# Patient Record
Sex: FEMALE | Race: WHITE
[De-identification: names, ages, dates, MRNs, and addresses within clinical notes are randomized per-mention and may not be internally consistent; named-entity substitution may affect disease eponyms.]

---

## 2017-04-24 ENCOUNTER — HOSPITAL ENCOUNTER (INPATIENT)
Dept: HOSPITAL 47 - 4MS4W | Age: 57
LOS: 4 days | Discharge: HOME HEALTH | DRG: 617 | End: 2017-04-28
Payer: COMMERCIAL

## 2017-04-24 VITALS — BODY MASS INDEX: 19.7 KG/M2

## 2017-04-24 DIAGNOSIS — Z95.5: ICD-10-CM

## 2017-04-24 DIAGNOSIS — I25.2: ICD-10-CM

## 2017-04-24 DIAGNOSIS — Z88.1: ICD-10-CM

## 2017-04-24 DIAGNOSIS — I50.9: ICD-10-CM

## 2017-04-24 DIAGNOSIS — E78.5: ICD-10-CM

## 2017-04-24 DIAGNOSIS — F32.9: ICD-10-CM

## 2017-04-24 DIAGNOSIS — E11.621: ICD-10-CM

## 2017-04-24 DIAGNOSIS — F41.9: ICD-10-CM

## 2017-04-24 DIAGNOSIS — F17.200: ICD-10-CM

## 2017-04-24 DIAGNOSIS — E11.69: Primary | ICD-10-CM

## 2017-04-24 DIAGNOSIS — Z82.5: ICD-10-CM

## 2017-04-24 DIAGNOSIS — Z83.3: ICD-10-CM

## 2017-04-24 DIAGNOSIS — Z79.82: ICD-10-CM

## 2017-04-24 DIAGNOSIS — Z88.0: ICD-10-CM

## 2017-04-24 DIAGNOSIS — K21.9: ICD-10-CM

## 2017-04-24 DIAGNOSIS — Z88.5: ICD-10-CM

## 2017-04-24 DIAGNOSIS — L97.514: ICD-10-CM

## 2017-04-24 DIAGNOSIS — Z79.4: ICD-10-CM

## 2017-04-24 DIAGNOSIS — I11.0: ICD-10-CM

## 2017-04-24 DIAGNOSIS — J44.9: ICD-10-CM

## 2017-04-24 DIAGNOSIS — E11.319: ICD-10-CM

## 2017-04-24 DIAGNOSIS — I69.354: ICD-10-CM

## 2017-04-24 DIAGNOSIS — Z82.49: ICD-10-CM

## 2017-04-24 DIAGNOSIS — Z79.899: ICD-10-CM

## 2017-04-24 DIAGNOSIS — M86.9: ICD-10-CM

## 2017-04-24 DIAGNOSIS — I25.10: ICD-10-CM

## 2017-04-24 LAB
ALP SERPL-CCNC: 63 U/L (ref 38–126)
ALT SERPL-CCNC: 23 U/L (ref 9–52)
ANION GAP SERPL CALC-SCNC: 6 MMOL/L
AST SERPL-CCNC: 15 U/L (ref 14–36)
BASOPHILS # BLD AUTO: 0 K/UL (ref 0–0.2)
BASOPHILS NFR BLD AUTO: 1 %
BUN SERPL-SCNC: 35 MG/DL (ref 7–17)
CALCIUM SPEC-MCNC: 9.7 MG/DL (ref 8.4–10.2)
CH: 31.2
CHCM: 31.7
CHLORIDE SERPL-SCNC: 108 MMOL/L (ref 98–107)
CO2 SERPL-SCNC: 26 MMOL/L (ref 22–30)
EOSINOPHIL # BLD AUTO: 0.1 K/UL (ref 0–0.7)
EOSINOPHIL NFR BLD AUTO: 1 %
ERYTHROCYTE [DISTWIDTH] IN BLOOD BY AUTOMATED COUNT: 3.28 M/UL (ref 3.8–5.4)
ERYTHROCYTE [DISTWIDTH] IN BLOOD: 12.7 % (ref 11.5–15.5)
GLUCOSE BLD-MCNC: 153 MG/DL (ref 75–99)
GLUCOSE BLD-MCNC: 226 MG/DL (ref 75–99)
GLUCOSE BLD-MCNC: 359 MG/DL (ref 75–99)
GLUCOSE BLD-MCNC: 372 MG/DL (ref 75–99)
GLUCOSE BLD-MCNC: 482 MG/DL (ref 75–99)
GLUCOSE BLD-MCNC: 497 MG/DL (ref 75–99)
GLUCOSE SERPL-MCNC: 286 MG/DL (ref 74–99)
HCT VFR BLD AUTO: 32.4 % (ref 34–46)
HDW: 2.24
HEMOGLOBIN A1C: 9.6 % (ref 4.2–6.1)
HGB BLD-MCNC: 10.5 GM/DL (ref 11.4–16)
LUC NFR BLD AUTO: 3 %
LYMPHOCYTES # SPEC AUTO: 1.7 K/UL (ref 1–4.8)
LYMPHOCYTES NFR SPEC AUTO: 29 %
MCH RBC QN AUTO: 32 PG (ref 25–35)
MCHC RBC AUTO-ENTMCNC: 32.3 G/DL (ref 31–37)
MCV RBC AUTO: 98.9 FL (ref 80–100)
MONOCYTES # BLD AUTO: 0.4 K/UL (ref 0–1)
MONOCYTES NFR BLD AUTO: 6 %
NEUTROPHILS # BLD AUTO: 3.4 K/UL (ref 1.3–7.7)
NEUTROPHILS NFR BLD AUTO: 60 %
NON-AFRICAN AMERICAN GFR(MDRD): 46
POTASSIUM SERPL-SCNC: 6.4 MMOL/L (ref 3.5–5.1)
PROT SERPL-MCNC: 7.2 G/DL (ref 6.3–8.2)
SODIUM SERPL-SCNC: 140 MMOL/L (ref 137–145)
WBC # BLD AUTO: 0.16 10*3/UL
WBC # BLD AUTO: 5.7 K/UL (ref 3.8–10.6)
WBC (PEROX): 6.12

## 2017-04-24 PROCEDURE — 80053 COMPREHEN METABOLIC PANEL: CPT

## 2017-04-24 PROCEDURE — 87086 URINE CULTURE/COLONY COUNT: CPT

## 2017-04-24 PROCEDURE — 83036 HEMOGLOBIN GLYCOSYLATED A1C: CPT

## 2017-04-24 PROCEDURE — 94640 AIRWAY INHALATION TREATMENT: CPT

## 2017-04-24 PROCEDURE — 80048 BASIC METABOLIC PNL TOTAL CA: CPT

## 2017-04-24 PROCEDURE — 81003 URINALYSIS AUTO W/O SCOPE: CPT

## 2017-04-24 PROCEDURE — 85025 COMPLETE CBC W/AUTO DIFF WBC: CPT

## 2017-04-24 RX ADMIN — ISODIUM CHLORIDE PRN MG: 0.03 SOLUTION RESPIRATORY (INHALATION) at 21:04

## 2017-04-24 RX ADMIN — ATORVASTATIN CALCIUM SCH MG: 20 TABLET, FILM COATED ORAL at 21:48

## 2017-04-24 RX ADMIN — INSULIN HUMAN SCH MLS/HR: 100 INJECTION, SOLUTION PARENTERAL at 22:17

## 2017-04-24 RX ADMIN — Medication SCH MG: at 21:48

## 2017-04-24 RX ADMIN — BUDESONIDE AND FORMOTEROL FUMARATE DIHYDRATE SCH PUFF: 160; 4.5 AEROSOL RESPIRATORY (INHALATION) at 21:04

## 2017-04-24 NOTE — P.HPIM
History of Present Illness


H&P Date: 17


Chief Complaint: Right foot ulcer with osteomyelitis





Patient is a 57-year-old female with multiple medical problems including 

history of hypertension, hyperlipidemia, insulin-dependent diabetes mellitus, 

history of stroke with left sided hemiparesis and history of physical debility 

who developed an ulcer was cellulitis on her right foot on her fifth toe she 

was treated aggressively as outpatient by Dr. Moody, Dr. Roy, and Dr. Ferrell however patient continued to have ulceration and suspected 

osteomyelitis at this time she is admitted to medical floor for IV antibiotic 

management and consultation was Dr. Moody and Dr. Ferrell patient may need 

amputation of her fifth toe on the right.





Past Medical History


Past Medical History: Chest Pain / Angina, Heart Failure, COPD, CVA/TIA, 

Diabetes Mellitus, Deep Vein Thrombosis (DVT), Eye Disorder, GERD/Reflux, 

Hyperlipidemia, Hypertension, Myocardial Infarction (MI), Thyroid Disorder


Additional Past Medical History / Comment(s): HX OF CVA X3 (LAST 2015)-HAS LT 

ARM PARALYSIS & WEAKNESS LEFT LEG. MI .  DVT RT  AXILLA. RENAL FAILURE.  

LOW THYROID,  PERIPHERAL NEUROPATHY HANDS & FEET.  ANEMIA.  HX OF DKA. USES W/

C. CONSTIPATION, ESOPHAGITIS.  EPIGLOTITIS.  HEADACHES SINCE CVA, uses a 

wheelchair.  RETINOPATHY MARZENA EYES.  HX RT TOE INFECTION, GANGRENE, HAD AMP.


Last Myocardial Infarction Date:: 


History of Any Multi-Drug Resistant Organisms: MRSA


Date of last positivie culture/infection: 2013


MDRO Source:: Right Foot


Past Surgical History: Appendectomy,  Section, Cholecystectomy, Heart 

Catheterization With Stent, Hysterectomy, Orthopedic Surgery


Additional Past Surgical History / Comment(s): Amputation Rt 2ND Toe.  C-S X3.  

EGD.  Bronchoscopy.  RT Arm Port Placed FOR AB RX; Removed.  6 CARDIAC STENTS. 

left shoulder bone removed


Past Anesthesia/Blood Transfusion Reactions: No Reported Reaction


Additional Past Anesthesia/Blood Transfusion Reaction / Comment(s): HX OF BLOOD 

TRANSFUSION- NO REACTION


Date of Last Stent Placement:: 2013


Past Psychological History: Anxiety, Bipolar, Depression


Additional Psychological History / Comment(s): HER adult son lives with her.


Smoking Status: Current every day smoker


Past Alcohol Use History: None Reported


Additional Past Alcohol Use History / Comment(s): SMOKED FOR 36 YRS. 1PPD.  

STARTED SMOKING AGE 19, QUIT 2015, RECENTLY SMOKING.


Past Drug Use History: None Reported





- Past Family History


  ** Father


Family Medical History: Unable to Obtain, Coronary Artery Disease (CAD), 

Diabetes Mellitus





  ** Mother


Family Medical History: COPD





Medications and Allergies


 Home Medications











 Medication  Instructions  Recorded  Confirmed  Type


 


hydrALAZINE HCL [Apresoline] 50 mg PO TID 14 History


 


Nitroglycerin Sl Tabs [Nitrostat] 0.4 mg SUBLINGUAL Q5M PRN 01/15/15 04/24/17 

History


 


Ferrous Sulfate [Feosol] 325 mg PO BID 07/10/15 04/24/17 History


 


Lisinopril [Prinivil] 10 mg PO QAM 07/10/15 04/24/17 History


 


Albuterol Inhaler [Ventolin Hfa 2 puff INHALATION RT-Q6H PRN 16 

History





Inhaler]    


 


Aspirin EC [Ecotrin] 81 mg PO DAILY 16 History


 


Atorvastatin [Lipitor] 20 mg PO HS 16 History


 


Citalopram Hydrobromide [CeleXA] 20 mg PO DAILY 16 History


 


Famotidine [Pepcid] 20 mg PO BID-W/MEALS 16 History


 


Insulin Lispro [humaLOG] 4 units SQ TID-W/MEALS 16 History


 


Metoclopramide [Reglan] 5 mg PO AC-TID 16 History


 


Valproic Acid [Depakene] 250 mg PO DAILY 16 History


 


Vitamin B Complex 1 cap PO DAILY 16 History


 


Budesonide-Formot 160-4.5 Mcg 2 puff INHALATION RT-BID 10/06/16 04/24/17 History





[Symbicort 160-4.5 Mcg Inhaler]    


 


Ergocalciferol [Vitamin D2] 50,000 unit PO SA 10/06/16 04/24/17 History


 


Insulin Glargine [Lantus] 15 unit SQ HS 10/06/16 04/24/17 History


 


Ondansetron [Zofran] 4 mg PO DAILY PRN 17 History


 


INSULIN LISPRO (HumaLOG) [humaLOG] See Protocol SQ TID-W/MEALS 04/10/17 04/24/

17 History


 


ALPRAZolam [Xanax] 0.5 mg PO TID PRN 17 History


 


Ascorbic Acid [Vitamin C] 500 mg PO DAILY 17 History


 


Folic Acid 0.8 mg PO DAILY 17 History


 


HYDROcodone/APAP 7.5-325MG [Norco 1 tab PO TID PRN 17 History





7.5-325]    


 


Polyethylene Glycol 3350 [Miralax] 17 gm PO DAILY PRN 17 History


 


Sennosides [Senna] 8.6 mg PO DAILY PRN 17 History











 Allergies











Allergy/AdvReac Type Severity Reaction Status Date / Time


 


Barbiturates Allergy  Rash/Hives Verified 17 16:47


 


cephalexin monohydrate Allergy  Rash/Hives Verified 17 16:47





[From Keflex]     


 


morphine Allergy  Rash/Hives Verified 17 16:47


 


Penicillins Allergy  Rash/Hives Verified 17 16:47


 


phenobarbital Allergy  Swelling Verified 17 16:47


 


venom-honey bee Allergy  Swelling Verified 17 16:47





[bee venom (honey bee)]     


 


amlodipine besylate AdvReac  Vomiting Verified 17 16:47





[From Norvasc]     














Physical Exam


Vitals: 


 Intake and Output











 17





 06:59 14:59 22:59


 


Other:   


 


  Weight   48.988 kg








 Patient Weight











 17





 06:59


 


Weight 48.988 kg














In general patient is alert and oriented 3 in no apparent distress 


HEENT head normocephalic and atraumatic


Neck is supple no JVD no goiter no lymphadenopathy


Chest is clear to auscultation no crackles no wheezing


Cardiac exam reveals regular heart sounds no gallops no murmurs


Abdomen is soft nontender no organomegaly


Extremity exam reveals mild edema with palpable peripheral pulses, there is a 

nonhealing ulcer on the right fifth


Neurological examination reveals left side hemiparesis with spastic contraction 

in the left hand








Results


Labs: 


 Abnormal Lab Results - Last 24 Hours (Table)











  17 Range/Units





  17:15 


 


POC Glucose (mg/dL)  153 H  (75-99)  mg/dL














Thrombosis Risk Factor Assmnt





- Choose All That Apply


Any of the Below Risk Factors Present?: Yes


Each Factor Represents 1 point: Abnormal pulmonary function (COPD), Age 41-60 

years, Medical pt on bed rest


Other Risk Factors: Yes


Each Risk Factor Represents 2 Points: Patient confined to bed


Thrombosis Risk Factor Assessment Total Risk Factor Score: 5


Thrombosis Risk Factor Assessment Level: High Risk





Assessment and Plan


Plan: 





#1 right foot cellulitis was possible osteomyelitis with nonhealing ulcer at 

this time will start IV vancomycin, consultation with Dr. Moody and Dr. Fredy Jc initiated patient may need amputation of her right fifth toe





#2 insulin-dependent diabetes mellitus at this time will check hemoglobin A1c 

will use insulin drip to assure good glucose control if needed





#3 underlying history of hypertension well-controlled on current medications 

continue





#4 underlying history of hyperlipidemia





#5 underlying history of coronary artery disease stable at this time





#6 previous history of right hemispheric stroke with left-sided hemiparesis





At this time patient is admitted to medical floor started on IV vancomycin 

awaiting input from Dr. oMody and Dr. Ferrell

## 2017-04-25 LAB
ALP SERPL-CCNC: 66 U/L (ref 38–126)
ALT SERPL-CCNC: 27 U/L (ref 9–52)
ANION GAP SERPL CALC-SCNC: 8 MMOL/L
AST SERPL-CCNC: 13 U/L (ref 14–36)
BASOPHILS # BLD AUTO: 0 K/UL (ref 0–0.2)
BASOPHILS NFR BLD AUTO: 1 %
BUN SERPL-SCNC: 29 MG/DL (ref 7–17)
CALCIUM SPEC-MCNC: 9.1 MG/DL (ref 8.4–10.2)
CH: 30.9
CHCM: 30.5
CHLORIDE SERPL-SCNC: 109 MMOL/L (ref 98–107)
CO2 SERPL-SCNC: 22 MMOL/L (ref 22–30)
EOSINOPHIL # BLD AUTO: 0.1 K/UL (ref 0–0.7)
EOSINOPHIL NFR BLD AUTO: 1 %
ERYTHROCYTE [DISTWIDTH] IN BLOOD BY AUTOMATED COUNT: 3.22 M/UL (ref 3.8–5.4)
ERYTHROCYTE [DISTWIDTH] IN BLOOD: 12.8 % (ref 11.5–15.5)
GLUCOSE BLD-MCNC: 104 MG/DL (ref 75–99)
GLUCOSE BLD-MCNC: 105 MG/DL (ref 75–99)
GLUCOSE BLD-MCNC: 134 MG/DL (ref 75–99)
GLUCOSE BLD-MCNC: 137 MG/DL (ref 75–99)
GLUCOSE BLD-MCNC: 151 MG/DL (ref 75–99)
GLUCOSE BLD-MCNC: 170 MG/DL (ref 75–99)
GLUCOSE BLD-MCNC: 263 MG/DL (ref 75–99)
GLUCOSE BLD-MCNC: 279 MG/DL (ref 75–99)
GLUCOSE BLD-MCNC: 332 MG/DL (ref 75–99)
GLUCOSE BLD-MCNC: 337 MG/DL (ref 75–99)
GLUCOSE BLD-MCNC: 340 MG/DL (ref 75–99)
GLUCOSE BLD-MCNC: 354 MG/DL (ref 75–99)
GLUCOSE BLD-MCNC: 360 MG/DL (ref 75–99)
GLUCOSE BLD-MCNC: 405 MG/DL (ref 75–99)
GLUCOSE BLD-MCNC: 73 MG/DL (ref 75–99)
GLUCOSE BLD-MCNC: 81 MG/DL (ref 75–99)
GLUCOSE SERPL-MCNC: 349 MG/DL (ref 74–99)
HCT VFR BLD AUTO: 32.8 % (ref 34–46)
HDW: 2.11
HGB BLD-MCNC: 10 GM/DL (ref 11.4–16)
LUC NFR BLD AUTO: 3 %
LYMPHOCYTES # SPEC AUTO: 2.5 K/UL (ref 1–4.8)
LYMPHOCYTES NFR SPEC AUTO: 38 %
MCH RBC QN AUTO: 31.1 PG (ref 25–35)
MCHC RBC AUTO-ENTMCNC: 30.5 G/DL (ref 31–37)
MCV RBC AUTO: 101.8 FL (ref 80–100)
MONOCYTES # BLD AUTO: 0.3 K/UL (ref 0–1)
MONOCYTES NFR BLD AUTO: 4 %
NEUTROPHILS # BLD AUTO: 3.5 K/UL (ref 1.3–7.7)
NEUTROPHILS NFR BLD AUTO: 54 %
NON-AFRICAN AMERICAN GFR(MDRD): 56
POTASSIUM SERPL-SCNC: 5.3 MMOL/L (ref 3.5–5.1)
PROT SERPL-MCNC: 6.7 G/DL (ref 6.3–8.2)
SODIUM SERPL-SCNC: 139 MMOL/L (ref 137–145)
WBC # BLD AUTO: 0.18 10*3/UL
WBC # BLD AUTO: 6.6 K/UL (ref 3.8–10.6)
WBC (PEROX): 6.87

## 2017-04-25 PROCEDURE — 0Y6X0Z1 DETACHMENT AT RIGHT 5TH TOE, HIGH, OPEN APPROACH: ICD-10-PCS

## 2017-04-25 RX ADMIN — METOCLOPRAMIDE SCH MG: 5 TABLET ORAL at 17:27

## 2017-04-25 RX ADMIN — FAMOTIDINE SCH MG: 20 TABLET, FILM COATED ORAL at 08:00

## 2017-04-25 RX ADMIN — ATORVASTATIN CALCIUM SCH MG: 20 TABLET, FILM COATED ORAL at 20:36

## 2017-04-25 RX ADMIN — CITALOPRAM HYDROBROMIDE SCH MG: 20 TABLET ORAL at 08:00

## 2017-04-25 RX ADMIN — FAMOTIDINE SCH MG: 20 TABLET, FILM COATED ORAL at 17:27

## 2017-04-25 RX ADMIN — HYDROMORPHONE HYDROCHLORIDE PRN MG: 1 INJECTION, SOLUTION INTRAMUSCULAR; INTRAVENOUS; SUBCUTANEOUS at 23:25

## 2017-04-25 RX ADMIN — HYDROCODONE BITARTRATE AND ACETAMINOPHEN PRN EACH: 7.5; 325 TABLET ORAL at 17:26

## 2017-04-25 RX ADMIN — METOCLOPRAMIDE SCH MG: 5 TABLET ORAL at 14:10

## 2017-04-25 RX ADMIN — FOLIC ACID SCH MG: 1 TABLET ORAL at 08:00

## 2017-04-25 RX ADMIN — METOCLOPRAMIDE SCH MG: 5 TABLET ORAL at 08:01

## 2017-04-25 RX ADMIN — OXYCODONE HYDROCHLORIDE AND ACETAMINOPHEN SCH MG: 500 TABLET ORAL at 08:00

## 2017-04-25 RX ADMIN — LISINOPRIL SCH MG: 10 TABLET ORAL at 08:00

## 2017-04-25 RX ADMIN — BUDESONIDE AND FORMOTEROL FUMARATE DIHYDRATE SCH PUFF: 160; 4.5 AEROSOL RESPIRATORY (INHALATION) at 20:41

## 2017-04-25 RX ADMIN — Medication SCH EACH: at 14:09

## 2017-04-25 RX ADMIN — Medication SCH MG: at 08:00

## 2017-04-25 RX ADMIN — ASPIRIN 81 MG CHEWABLE TABLET SCH MG: 81 TABLET CHEWABLE at 08:00

## 2017-04-25 RX ADMIN — VALPROIC ACID SCH MG: 250 SOLUTION ORAL at 08:00

## 2017-04-25 RX ADMIN — BUDESONIDE AND FORMOTEROL FUMARATE DIHYDRATE SCH PUFF: 160; 4.5 AEROSOL RESPIRATORY (INHALATION) at 07:28

## 2017-04-25 RX ADMIN — INSULIN HUMAN SCH MLS/HR: 100 INJECTION, SOLUTION PARENTERAL at 17:27

## 2017-04-25 RX ADMIN — ISODIUM CHLORIDE PRN MG: 0.03 SOLUTION RESPIRATORY (INHALATION) at 20:41

## 2017-04-25 RX ADMIN — Medication SCH MG: at 20:37

## 2017-04-25 RX ADMIN — ISODIUM CHLORIDE PRN MG: 0.03 SOLUTION RESPIRATORY (INHALATION) at 07:28

## 2017-04-25 RX ADMIN — ISODIUM CHLORIDE PRN MG: 0.03 SOLUTION RESPIRATORY (INHALATION) at 16:30

## 2017-04-25 NOTE — P.CON
Consult Note





- .


Consult date: 04/25/17


Assessment/Plan:: 





57-year-old  female with history of multiple medical troubles includes 

hypertension and diabetes mellitus type 2 is a history of extensive stroke with 

left-sided hemiparesis and remains under the care of family members and an aide 

for her ongoing care.  She is not able to toilet herself.  She has been a 

[facility] and was last seen by Dr. Ferrell.  There 

was plan for amputation which was apparently scheduled for April 19 but because 

patient's blood sugar was 360, this was canceled.  Patient has had ongoing 

problems with her right foot ulcer that is nonhealing and has been admitted to 

the hospital and started on vancomycin.  Lab work showed a potassium of 6.4, 

BUN 35 and creatinine 1.2.  Patient did receive dose of Kayexalate.  White 

count was 5.7.  Blood sugars were again elevated and as high as 497, currently 

104.  Patient has consult in place with Dr. Ferrell with anticipation of 

amputation on this hospitalization.  Patient is complaining of burning with 

urination for which a urinalysis and urine culture will be obtained. 


Please see the consult note is dictated by nurse practitioner Mrs. Kasia Steen.


Patient is on insulin drip to help with her elevated glucose.  Patient is 

instructed the importance of glucose control.  The fifth toe amputation will 

result in a wound that will require healing.  Fortune she has stopped smoking 

which will be helpful.  The without excellent glucose control she will be at 

risk for worsening of this infectious process.  The patient is aware.  

Currently on antibiotic therapy.  Cultures are pending.  Continue vancomycin 

for now.  Pain control was initiated postoperative and small doses of morphine 

will be given at this time.  Continue supportive care.  We'll follow.  I agree 

with evaluation, assessment and plan as dictated by nurse practitioner Mrs. Kasia Steen.

## 2017-04-25 NOTE — P.PN
Subjective


Principal diagnosis: 





right fifth toe osteomyelitis





patient is a 57-year-old female was known history of insulin-dependent diabetes 

mellitus and previous history of stroke with left sided pennie-paresis who was 

admitted to [facility] due to right foot cellulitis with 

evidence of osteomyelitis on the right fifth toe patient was evaluated by Dr. Ferrell and underwent amputation of the right fifth toe is maintained on IV 

antibiotic vancomycin consultation for Dr. Moody was also initiated.


Patient has insulin-dependent diabetes mellitus her glucose was not well 

controlled currently she is maintained on insulin drip





Clinically patient is doing better she is complaining of mild pain in her right 

foot otherwise she denies any complaints





Objective





- Vital Signs


Vital signs: 


 Vital Signs











Temp  99.4 F   04/25/17 12:34


 


Pulse  68   04/25/17 16:46


 


Resp  16   04/25/17 13:34


 


BP  135/66   04/25/17 13:34


 


Pulse Ox  100   04/25/17 13:34








 Intake & Output











 04/24/17 04/25/17 04/25/17





 18:59 06:59 18:59


 


Intake Total  33.200 565.50


 


Output Total   27


 


Balance  33.200 538.50


 


Weight 48.988 kg  48.988 kg


 


Intake:   


 


  IV   506


 


  Intake, IV Titration  33.200 59.50





  Amount   


 


    Insulin Regular 100 unit  33.200 59.50





    In Sodium Chloride 0.9%   





    100 ml @ Titrate IV .Q0M   





    [facility] Rx#:[id number]   


 


Output:   


 


  Urine   2


 


  Estimated Blood Loss   25


 


Other:   


 


  Voiding Method Diaper Diaper 





 Incontinent Incontinent 


 


  # Voids  3 


 


  # Bowel Movements  2 2














- Exam





in general patient is alert and oriented in no apparent distress


HEENT head normocephalic and atraumatic


Neck is supple no JVD no goiter no lymphadenopathy


Chest is clear to auscultation no wheezing


Cardiac exam reveals regular heart sounds no murmurs


Abdomen is soft nontender no organomegaly


Extremity exam reveals minimal edema





- Labs


CBC & Chem 7: 


 04/25/17 08:22





 04/25/17 08:22


Labs: 


 Abnormal Lab Results - Last 24 Hours (Table)











  04/24/17 04/24/17 04/24/17 Range/Units





  18:12 18:12 18:12 


 


RBC  3.28 L    (3.80-5.40)  m/uL


 


Hgb  10.5 L    (11.4-16.0)  gm/dL


 


Hct  32.4 L    (34.0-46.0)  %


 


MCV     (80.0-100.0)  fL


 


MCHC     (31.0-37.0)  g/dL


 


Potassium   6.4 H*   (3.5-5.1)  mmol/L


 


Chloride   108 H   ()  mmol/L


 


BUN   35 H   (7-17)  mg/dL


 


Creatinine   1.20 H   (0.52-1.04)  mg/dL


 


Glucose   286 H   (74-99)  mg/dL


 


POC Glucose (mg/dL)     (75-99)  mg/dL


 


Hemoglobin A1c    9.6 H  (4.2-6.1)  %


 


AST     (14-36)  U/L














  04/24/17 04/24/17 04/24/17 Range/Units





  19:43 19:45 20:44 


 


RBC     (3.80-5.40)  m/uL


 


Hgb     (11.4-16.0)  gm/dL


 


Hct     (34.0-46.0)  %


 


MCV     (80.0-100.0)  fL


 


MCHC     (31.0-37.0)  g/dL


 


Potassium     (3.5-5.1)  mmol/L


 


Chloride     ()  mmol/L


 


BUN     (7-17)  mg/dL


 


Creatinine     (0.52-1.04)  mg/dL


 


Glucose     (74-99)  mg/dL


 


POC Glucose (mg/dL)  533 H  497 H  482 H  (75-99)  mg/dL


 


Hemoglobin A1c     (4.2-6.1)  %


 


AST     (14-36)  U/L














  04/24/17 04/24/17 04/24/17 Range/Units





  22:16 22:50 23:23 


 


RBC     (3.80-5.40)  m/uL


 


Hgb     (11.4-16.0)  gm/dL


 


Hct     (34.0-46.0)  %


 


MCV     (80.0-100.0)  fL


 


MCHC     (31.0-37.0)  g/dL


 


Potassium     (3.5-5.1)  mmol/L


 


Chloride     ()  mmol/L


 


BUN     (7-17)  mg/dL


 


Creatinine     (0.52-1.04)  mg/dL


 


Glucose     (74-99)  mg/dL


 


POC Glucose (mg/dL)  372 H  359 H  226 H  (75-99)  mg/dL


 


Hemoglobin A1c     (4.2-6.1)  %


 


AST     (14-36)  U/L














  04/25/17 04/25/17 04/25/17 Range/Units





  00:35 02:39 04:45 


 


RBC     (3.80-5.40)  m/uL


 


Hgb     (11.4-16.0)  gm/dL


 


Hct     (34.0-46.0)  %


 


MCV     (80.0-100.0)  fL


 


MCHC     (31.0-37.0)  g/dL


 


Potassium     (3.5-5.1)  mmol/L


 


Chloride     ()  mmol/L


 


BUN     (7-17)  mg/dL


 


Creatinine     (0.52-1.04)  mg/dL


 


Glucose     (74-99)  mg/dL


 


POC Glucose (mg/dL)  73 L  137 H  151 H  (75-99)  mg/dL


 


Hemoglobin A1c     (4.2-6.1)  %


 


AST     (14-36)  U/L














  04/25/17 04/25/17 04/25/17 Range/Units





  06:27 08:22 08:22 


 


RBC   3.22 L   (3.80-5.40)  m/uL


 


Hgb   10.0 L   (11.4-16.0)  gm/dL


 


Hct   32.8 L   (34.0-46.0)  %


 


MCV   101.8 H   (80.0-100.0)  fL


 


MCHC   30.5 L   (31.0-37.0)  g/dL


 


Potassium    5.3 H  (3.5-5.1)  mmol/L


 


Chloride    109 H  ()  mmol/L


 


BUN    29 H  (7-17)  mg/dL


 


Creatinine     (0.52-1.04)  mg/dL


 


Glucose    349 H  (74-99)  mg/dL


 


POC Glucose (mg/dL)  134 H    (75-99)  mg/dL


 


Hemoglobin A1c     (4.2-6.1)  %


 


AST    13 L  (14-36)  U/L














  04/25/17 04/25/17 04/25/17 Range/Units





  08:46 09:40 10:28 


 


RBC     (3.80-5.40)  m/uL


 


Hgb     (11.4-16.0)  gm/dL


 


Hct     (34.0-46.0)  %


 


MCV     (80.0-100.0)  fL


 


MCHC     (31.0-37.0)  g/dL


 


Potassium     (3.5-5.1)  mmol/L


 


Chloride     ()  mmol/L


 


BUN     (7-17)  mg/dL


 


Creatinine     (0.52-1.04)  mg/dL


 


Glucose     (74-99)  mg/dL


 


POC Glucose (mg/dL)  405 H  354 H  263 H  (75-99)  mg/dL


 


Hemoglobin A1c     (4.2-6.1)  %


 


AST     (14-36)  U/L














  04/25/17 04/25/17 04/25/17 Range/Units





  11:11 12:20 13:27 


 


RBC     (3.80-5.40)  m/uL


 


Hgb     (11.4-16.0)  gm/dL


 


Hct     (34.0-46.0)  %


 


MCV     (80.0-100.0)  fL


 


MCHC     (31.0-37.0)  g/dL


 


Potassium     (3.5-5.1)  mmol/L


 


Chloride     ()  mmol/L


 


BUN     (7-17)  mg/dL


 


Creatinine     (0.52-1.04)  mg/dL


 


Glucose     (74-99)  mg/dL


 


POC Glucose (mg/dL)  170 H  104 H  105 H  (75-99)  mg/dL


 


Hemoglobin A1c     (4.2-6.1)  %


 


AST     (14-36)  U/L














  04/25/17 04/25/17 Range/Units





  14:57 16:51 


 


RBC    (3.80-5.40)  m/uL


 


Hgb    (11.4-16.0)  gm/dL


 


Hct    (34.0-46.0)  %


 


MCV    (80.0-100.0)  fL


 


MCHC    (31.0-37.0)  g/dL


 


Potassium    (3.5-5.1)  mmol/L


 


Chloride    ()  mmol/L


 


BUN    (7-17)  mg/dL


 


Creatinine    (0.52-1.04)  mg/dL


 


Glucose    (74-99)  mg/dL


 


POC Glucose (mg/dL)  360 H  337 H  (75-99)  mg/dL


 


Hemoglobin A1c    (4.2-6.1)  %


 


AST    (14-36)  U/L














Assessment and Plan


Plan: 





#1 right foot cellulitis was possible osteomyelitis with nonhealing ulcer at 

this time will start IV vancomycin, consultation with Dr. Moody initiated 


patient status post amputation of her right fifth toeby Dr. Ferrell





#2 insulin-dependent diabetes mellitus at this time will check hemoglobin A1c 

will use insulin drip to assure good glucose control if needed





#3 underlying history of hypertension well-controlled on current medications 

continue





#4 underlying history of hyperlipidemia





#5 underlying history of coronary artery disease stable at this time





#6 previous history of right hemispheric stroke with left-sided hemiparesis





At this time patient is admitted to medical floor started on IV vancomycin 

awaiting input from Dr. Moody and Dr. Ferrell

## 2017-04-25 NOTE — P.CONS
History of Present Illness





- Reason for Consult


Consult date: 17


Foot infection





- History of Present Illness





57-year-old  female with history of multiple medical troubles includes 

hypertension and diabetes mellitus type 2 is a history of extensive stroke with 

left-sided hemiparesis and remains under the care of family members and an aide 

for her ongoing care.  She is not able to toilet herself.  She has been a 

[facility] and was last seen by Dr. Ferrell.  There 

was plan for amputation which was apparently scheduled for  but because 

patient's blood sugar was 360, this was canceled.  Patient has had ongoing 

problems with her right foot ulcer that is nonhealing and has been admitted to 

the hospital and started on vancomycin.  Lab work showed a potassium of 6.4, 

BUN 35 and creatinine 1.2.  Patient did receive dose of Kayexalate.  White 

count was 5.7.  Blood sugars were again elevated and as high as 497, currently 

104.  Patient has consult in place with Dr. Ferrell with anticipation of 

amputation on this hospitalization.  Patient is complaining of burning with 

urination for which a urinalysis and urine culture will be obtained. 











Review of Systems


All systems: negative


Constitutional: Denies chills, Denies fever


Eyes: denies blurred vision, denies pain


Ears, nose, mouth and throat: Denies headache, Denies sore throat


Cardiovascular: Denies chest pain, Denies shortness of breath


Respiratory: Denies cough


Gastrointestinal: Denies abdominal pain, Denies diarrhea, Denies nausea, Denies 

vomiting


Genitourinary: Reports dysuria, Denies hematuria


Musculoskeletal: Denies myalgias


Integumentary: Denies pruritus, Denies rash


Neurological: Denies numbness, Denies weakness


Psychiatric: Denies anxiety, Denies depression


Endocrine: Denies fatigue, Denies weight change





Past Medical History


Past Medical History: Chest Pain / Angina, Heart Failure, COPD, CVA/TIA, 

Diabetes Mellitus, Deep Vein Thrombosis (DVT), Eye Disorder, GERD/Reflux, 

Hyperlipidemia, Hypertension, Myocardial Infarction (MI), Thyroid Disorder


Additional Past Medical History / Comment(s): HX OF CVA X3 (LAST 2015)-HAS LT 

ARM PARALYSIS & WEAKNESS LEFT LEG. MI .  DVT RT  AXILLA. RENAL FAILURE.  

LOW THYROID,  PERIPHERAL NEUROPATHY HANDS & FEET.  ANEMIA.  HX OF DKA. USES W/

C. CONSTIPATION, ESOPHAGITIS.  EPIGLOTITIS.  HEADACHES SINCE CVA, uses a 

wheelchair.  RETINOPATHY MARZENA EYES.  HX RT TOE INFECTION, GANGRENE, HAD AMP.


Last Myocardial Infarction Date:: 


History of Any Multi-Drug Resistant Organisms: MRSA


Year Discovered:: 2013


MDRO Source:: Right Foot


Past Surgical History: Appendectomy,  Section, Cholecystectomy, Heart 

Catheterization With Stent, Hysterectomy, Orthopedic Surgery


Additional Past Surgical History / Comment(s): Amputation Rt 2ND Toe.  C-S X3.  

EGD.  Bronchoscopy.  RT Arm Port Placed FOR AB RX; Removed.  6 CARDIAC STENTS. 

left shoulder bone removed


Past Anesthesia/Blood Transfusion Reactions: No Reported Reaction


Additional Past Anesthesia/Blood Transfusion Reaction / Comm: HX OF BLOOD 

TRANSFUSION- NO REACTION


Date of Last Stent Placement:: 2013


Past Psychological History: Anxiety, Bipolar, Depression


Additional Psychological History / Comment(s): HER adult son lives with her.


Smoking Status: Current every day smoker


Past Alcohol Use History: None Reported


Additional Past Alcohol Use History / Comment(s): SMOKED FOR 36 YRS. 1PPD.  

STARTED SMOKING AGE 19, QUIT 2015, RECENTLY SMOKING.


Past Drug Use History: None Reported





- Past Family History


  ** Father


Family Medical History: Unable to Obtain, Coronary Artery Disease (CAD), 

Diabetes Mellitus





  ** Mother


Family Medical History: COPD





Medications and Allergies


 Home Medications











 Medication  Instructions  Recorded  Confirmed  Type


 


hydrALAZINE HCL [Apresoline] 50 mg PO TID 14 History


 


Nitroglycerin Sl Tabs [Nitrostat] 0.4 mg SUBLINGUAL Q5M PRN 01/15/15 04/24/17 

History


 


Ferrous Sulfate [Feosol] 325 mg PO BID 07/10/15 04/24/17 History


 


Lisinopril [Prinivil] 10 mg PO QAM 07/10/15 04/24/17 History


 


Albuterol Inhaler [Ventolin Hfa 2 puff INHALATION RT-Q6H PRN 16 

History





Inhaler]    


 


Aspirin EC [Ecotrin] 81 mg PO DAILY 16 History


 


Atorvastatin [Lipitor] 20 mg PO HS 16 History


 


Citalopram Hydrobromide [CeleXA] 20 mg PO DAILY 16 History


 


Famotidine [Pepcid] 20 mg PO BID-W/MEALS 16 History


 


Insulin Lispro [humaLOG] 4 units SQ TID-W/MEALS 16 History


 


Metoclopramide [Reglan] 5 mg PO AC-TID 16 History


 


Valproic Acid [Depakene] 250 mg PO DAILY 16 History


 


Vitamin B Complex 1 cap PO DAILY 16 History


 


Budesonide-Formot 160-4.5 Mcg 2 puff INHALATION RT-BID 10/06/16 04/24/17 History





[Symbicort 160-4.5 Mcg Inhaler]    


 


Ergocalciferol [Vitamin D2] 50,000 unit PO SA 10/06/16 04/24/17 History


 


Insulin Glargine [Lantus] 15 unit SQ HS 10/06/16 04/24/17 History


 


Ondansetron [Zofran] 4 mg PO DAILY PRN 17 History


 


INSULIN LISPRO (HumaLOG) [humaLOG] See Protocol SQ TID-W/MEALS 04/10/17 04/24/

17 History


 


ALPRAZolam [Xanax] 0.5 mg PO TID PRN 17 History


 


Ascorbic Acid [Vitamin C] 500 mg PO DAILY 17 History


 


Folic Acid 0.8 mg PO DAILY 17 History


 


HYDROcodone/APAP 7.5-325MG [Norco 1 tab PO TID PRN 17 History





7.5-325]    


 


Polyethylene Glycol 3350 [Miralax] 17 gm PO DAILY PRN 17 History


 


Sennosides [Senna] 8.6 mg PO DAILY PRN 17 History











 Allergies











Allergy/AdvReac Type Severity Reaction Status Date / Time


 


Barbiturates Allergy  Rash/Hives Verified 17 16:47


 


cephalexin monohydrate Allergy  Rash/Hives Verified 17 16:47





[From Keflex]     


 


morphine Allergy  Rash/Hives Verified 17 16:47


 


Penicillins Allergy  Rash/Hives Verified 17 16:47


 


phenobarbital Allergy  Swelling Verified 17 16:47


 


venom-honey bee Allergy  Swelling Verified 17 16:47





[bee venom (honey bee)]     


 


amlodipine besylate AdvReac  Vomiting Verified 17 16:47





[From Norvasc]     














Physical Exam


Vitals: 


 Vital Signs











  Temp Pulse Pulse Pulse Resp BP Pulse Ox


 


 17 07:38   78     


 


 17 07:28   78     


 


 17 07:00  98.2 F   82   14  153/80  96


 


 17 23:00  99.1 F   71   14  114/64  93 L


 


 17 21:47     80   129/74 


 


 17 21:13   88     


 


 17 21:05   84     


 


 17 18:40  99.1 F    77  16  135/66  98








 Intake and Output











 17





 22:59 06:59 14:59


 


Intake Total 14.567 18.633 


 


Balance 14.567 18.633 


 


Intake:   


 


  Intake, IV Titration 14.567 18.633 





  Amount   


 


    Insulin Regular 100 unit 14.567 18.633 





    In Sodium Chloride 0.9%   





    100 ml @ Titrate IV .Q0M   





    [facility] Rx#:[id number]   


 


Other:   


 


  Voiding Method Diaper  





 Incontinent  


 


  # Voids 3 3 


 


  # Bowel Movements  2 


 


  Weight 48.988 kg  

















Gen: This is a 57-year-old  female.  She is sitting up in bed and 

appears to be in no acute distress.


HEENT: Head is atraumatic, normocephalic. Pupils equal, round. Sclerae is 

anicteric.  Conjunctiva pink.  Mucous membranes of the mouth are slightly dry.  

No thrush.  Poor dentition.


NECK: Supple. No JVD. No lymphadenopathy. No thyromegaly. 


LUNGS: Clear to auscultation. No wheezes or rhonchi.  No intercostal 

retractions.


HEART: Regular rate and rhythm. No murmur. 


ABDOMEN: Soft. Bowel sounds are present. No masses.  No tenderness.


EXTREMITIES: No pedal edema.  No calf tenderness.  Dorsalis pedis +2 

bilaterally.  Ulceration right foot.


NEUROLOGICAL: Patient is awake, alert and oriented x3.  Noted difficulty with 

speech but easy to understand.








Results


Results: 





 Laboratory Results











WBC  6.6 k/uL (3.8-10.6)   17  08:22    


 


RBC  3.22 m/uL (3.80-5.40)  L  17  08:22    


 


Hgb  10.0 gm/dL (11.4-16.0)  L  17  08:22    


 


Hct  32.8 % (34.0-46.0)  L  17  08:22    


 


MCV  101.8 fL (80.0-100.0)  H  17  08:22    


 


MCH  31.1 pg (25.0-35.0)   17  08:    


 


MCHC  30.5 g/dL (31.0-37.0)  L  17  08:22    


 


RDW  12.8 % (11.5-15.5)   17  08:22    


 


Plt Count  207 k/uL (150-450)   17  08:22    


 


Neutrophils %  54 %  17  08:22    


 


Lymphocytes %  38 %  17  08:22    


 


Monocytes %  4 %  17  08:22    


 


Eosinophils %  1 %  17  08:22    


 


Basophils %  1 %  17  08:22    


 


Neutrophils #  3.5 k/uL (1.3-7.7)   17  08:22    


 


Lymphocytes #  2.5 k/uL (1.0-4.8)   17  08:22    


 


Monocytes #  0.3 k/uL (0-1.0)   17  08:22    


 


Eosinophils #  0.1 k/uL (0-0.7)   17  08:22    


 


Basophils #  0.0 k/uL (0-0.2)   17  08:22    


 


Hypochromasia  Slight   17  08:22    


 


Macrocytosis  Slight   17  08:22    


 


Sodium  139 mmol/L (137-145)   17  08:22    


 


Potassium  5.3 mmol/L (3.5-5.1)  H  17  08:22    


 


Chloride  109 mmol/L ()  H  17  08:22    


 


Carbon Dioxide  22 mmol/L (22-30)   17  08:22    


 


Anion Gap  8 mmol/L  17  08:22    


 


BUN  29 mg/dL (7-17)  H  17  08:22    


 


Creatinine  1.02 mg/dL (0.52-1.04)   17  08:22    


 


Est GFR (MDRD) Af Amer  >60  (>60 ml/min/1.73 sqM)   17  08:22    


 


Est GFR (MDRD) Non-Af  56  (>60 ml/min/1.73 sqM)   17  08:22    


 


Glucose  349 mg/dL (74-99)  H  17  08:22    


 


POC Glucose (mg/dL)  104 mg/dL (75-99)  H  17  12:20    


 


POC Glu Operater ID  Lynn Sanz   17  12:20    


 


Estimated Ave Glu mg/dL  229 mg/dL  17  18:12    


 


Hemoglobin A1c  9.6 % (4.2-6.1)  H  17  18:12    


 


Calcium  9.1 mg/dL (8.4-10.2)   17  08:22    


 


Total Bilirubin  0.5 mg/dL (0.2-1.3)   17  08:22    


 


AST  13 U/L (14-36)  L  17  08:22    


 


ALT  27 U/L (9-52)   17  08:22    


 


Alkaline Phosphatase  66 U/L ()   17  08:22    


 


Total Protein  6.7 g/dL (6.3-8.2)   17  08:22    


 


Albumin  3.6 g/dL (3.5-5.0)   17  08:22    











CBC & Chem 7: 


 17 08:22





 17 08:22


Labs: 


 Abnormal Lab Results - Last 24 Hours (Table)











  17 Range/Units





  17:15 18:12 18:12 


 


RBC   3.28 L   (3.80-5.40)  m/uL


 


Hgb   10.5 L   (11.4-16.0)  gm/dL


 


Hct   32.4 L   (34.0-46.0)  %


 


Potassium    6.4 H*  (3.5-5.1)  mmol/L


 


Chloride    108 H  ()  mmol/L


 


BUN    35 H  (7-17)  mg/dL


 


Creatinine    1.20 H  (0.52-1.04)  mg/dL


 


Glucose    286 H  (74-99)  mg/dL


 


POC Glucose (mg/dL)  153 H    (75-99)  mg/dL


 


Hemoglobin A1c     (4.2-6.1)  %














  17 Range/Units





  18:12 19:43 19:45 


 


RBC     (3.80-5.40)  m/uL


 


Hgb     (11.4-16.0)  gm/dL


 


Hct     (34.0-46.0)  %


 


Potassium     (3.5-5.1)  mmol/L


 


Chloride     ()  mmol/L


 


BUN     (7-17)  mg/dL


 


Creatinine     (0.52-1.04)  mg/dL


 


Glucose     (74-99)  mg/dL


 


POC Glucose (mg/dL)   533 H  497 H  (75-99)  mg/dL


 


Hemoglobin A1c  9.6 H    (4.2-6.1)  %














  17 Range/Units





  20:44 22:16 22:50 


 


RBC     (3.80-5.40)  m/uL


 


Hgb     (11.4-16.0)  gm/dL


 


Hct     (34.0-46.0)  %


 


Potassium     (3.5-5.1)  mmol/L


 


Chloride     ()  mmol/L


 


BUN     (7-17)  mg/dL


 


Creatinine     (0.52-1.04)  mg/dL


 


Glucose     (74-99)  mg/dL


 


POC Glucose (mg/dL)  482 H  372 H  359 H  (75-99)  mg/dL


 


Hemoglobin A1c     (4.2-6.1)  %














  17 Range/Units





  23:23 00:35 02:39 


 


RBC     (3.80-5.40)  m/uL


 


Hgb     (11.4-16.0)  gm/dL


 


Hct     (34.0-46.0)  %


 


Potassium     (3.5-5.1)  mmol/L


 


Chloride     ()  mmol/L


 


BUN     (7-17)  mg/dL


 


Creatinine     (0.52-1.04)  mg/dL


 


Glucose     (74-99)  mg/dL


 


POC Glucose (mg/dL)  226 H  73 L  137 H  (75-99)  mg/dL


 


Hemoglobin A1c     (4.2-6.1)  %














  17 Range/Units





  04:45 06:27 


 


RBC    (3.80-5.40)  m/uL


 


Hgb    (11.4-16.0)  gm/dL


 


Hct    (34.0-46.0)  %


 


Potassium    (3.5-5.1)  mmol/L


 


Chloride    ()  mmol/L


 


BUN    (7-17)  mg/dL


 


Creatinine    (0.52-1.04)  mg/dL


 


Glucose    (74-99)  mg/dL


 


POC Glucose (mg/dL)  151 H  134 H  (75-99)  mg/dL


 


Hemoglobin A1c    (4.2-6.1)  %














Assessment and Plan


Plan: 





This is a 57-year-old  female with had a nonhealing diabetic ulcer to 

the right foot with uncontrolled blood sugars and hemoglobin A1c of 9.6.  

Patient is currently on vancomycin which will be continued.  Dr. Ferrell is on 

consult for amputation.  Continue supportive care.  Further recommendations as 

patient progresses.





The above dictated assessment and findings were discussed with Dr. Moody.  The 

impression and plan of care have been directed as dictated.  Kasia Steen nurse 

practitioner acting as scribe for Dr. Moody.

## 2017-04-25 NOTE — P.PCN
Date of Procedure: 04/25/17


Preoperative Diagnosis: 


Sepulveda grade 3 ulceration right fifth toe secondary to bone exposure.


Postoperative Diagnosis: 


Same


Procedure(s) Performed: 


Amputation of the right fifth toe through proximal phalanx


Anesthesia: MAC


Surgeon: Gerardo Ferrell


Estimated Blood Loss (ml): 25


Pathology: none sent


Condition: stable


Disposition: PACU


Indications for Procedure: 


Patient has had a refractory ulcer on the medial right great toe.  She has bone 

of the distal portion of the proximal phalanx exposed.


Operative Findings: 


The proximal tissues appeared healthy.  The area of infection of the bone was 

very isolated to the area of the ulceration.


Description of Procedure: 


With the patient supine position, under benefit of IV sedation, we prepped and 

draped in standard fashion.  We made an incision leaving a large lateral flap 

and excising the bony portion of the right fifth toe staying close to the bone 

on the lateral aspect.  The middle and distal phalanx were removed by excision.

  The proximal phalanx was removed except for its proximal head using a 

rongeur.  There was good bleeding from the edges.  Hemostasis was accomplished 

with pressure.  The wound was irrigated and closed with interrupted nylon.  

Sterile dressings were applied.  The patient tolerated the procedure well.

## 2017-04-25 NOTE — P.CON
Consult Note





- .


Consult date: 04/25/17


Assessment/Plan:: 





This 57-year-old woman has a and exposed bone in the fifth toe right foot.  

This is a nonhealing ulcer with osteomyelitis.





Please refer to consultation an [facility].





We discussed with the patient in detail options for therapy.  She wishes to 

proceed with amputation of the fifth toe right foot.

## 2017-04-26 LAB
ALP SERPL-CCNC: 48 U/L (ref 38–126)
ALT SERPL-CCNC: 25 U/L (ref 9–52)
ANION GAP SERPL CALC-SCNC: 8 MMOL/L
AST SERPL-CCNC: 14 U/L (ref 14–36)
BASOPHILS # BLD AUTO: 0 K/UL (ref 0–0.2)
BASOPHILS NFR BLD AUTO: 0 %
BUN SERPL-SCNC: 29 MG/DL (ref 7–17)
CALCIUM SPEC-MCNC: 9 MG/DL (ref 8.4–10.2)
CH: 31
CHCM: 30.9
CHLORIDE SERPL-SCNC: 111 MMOL/L (ref 98–107)
CO2 SERPL-SCNC: 24 MMOL/L (ref 22–30)
EOSINOPHIL # BLD AUTO: 0 K/UL (ref 0–0.7)
EOSINOPHIL NFR BLD AUTO: 1 %
ERYTHROCYTE [DISTWIDTH] IN BLOOD BY AUTOMATED COUNT: 2.93 M/UL (ref 3.8–5.4)
ERYTHROCYTE [DISTWIDTH] IN BLOOD: 13.2 % (ref 11.5–15.5)
GLUCOSE BLD-MCNC: 142 MG/DL (ref 75–99)
GLUCOSE BLD-MCNC: 142 MG/DL (ref 75–99)
GLUCOSE BLD-MCNC: 149 MG/DL (ref 75–99)
GLUCOSE BLD-MCNC: 152 MG/DL (ref 75–99)
GLUCOSE BLD-MCNC: 161 MG/DL (ref 75–99)
GLUCOSE BLD-MCNC: 184 MG/DL (ref 75–99)
GLUCOSE BLD-MCNC: 196 MG/DL (ref 75–99)
GLUCOSE BLD-MCNC: 208 MG/DL (ref 75–99)
GLUCOSE BLD-MCNC: 231 MG/DL (ref 75–99)
GLUCOSE BLD-MCNC: 328 MG/DL (ref 75–99)
GLUCOSE BLD-MCNC: 351 MG/DL (ref 75–99)
GLUCOSE SERPL-MCNC: 154 MG/DL (ref 74–99)
GLUCOSE UR QL: (no result)
HCT VFR BLD AUTO: 29.5 % (ref 34–46)
HDW: 2.07
HGB BLD-MCNC: 9.1 GM/DL (ref 11.4–16)
LUC NFR BLD AUTO: 2 %
LYMPHOCYTES # SPEC AUTO: 2.3 K/UL (ref 1–4.8)
LYMPHOCYTES NFR SPEC AUTO: 28 %
MCH RBC QN AUTO: 31.1 PG (ref 25–35)
MCHC RBC AUTO-ENTMCNC: 30.9 G/DL (ref 31–37)
MCV RBC AUTO: 100.7 FL (ref 80–100)
MONOCYTES # BLD AUTO: 0.4 K/UL (ref 0–1)
MONOCYTES NFR BLD AUTO: 5 %
NEUTROPHILS # BLD AUTO: 5.4 K/UL (ref 1.3–7.7)
NEUTROPHILS NFR BLD AUTO: 65 %
NON-AFRICAN AMERICAN GFR(MDRD): >60
PH UR: 5 [PH] (ref 5–8)
POTASSIUM SERPL-SCNC: 4.9 MMOL/L (ref 3.5–5.1)
PROT SERPL-MCNC: 6.2 G/DL (ref 6.3–8.2)
SODIUM SERPL-SCNC: 143 MMOL/L (ref 137–145)
SP GR UR: 1.01 (ref 1–1.03)
UA BILLING (MACRO VS. MICRO): (no result)
UROBILINOGEN UR QL STRIP: <2 MG/DL (ref ?–2)
WBC # BLD AUTO: 0.14 10*3/UL
WBC # BLD AUTO: 8.4 K/UL (ref 3.8–10.6)
WBC (PEROX): 8.7

## 2017-04-26 RX ADMIN — SODIUM CHLORIDE SCH MLS/HR: 9 INJECTION, SOLUTION INTRAVENOUS at 17:44

## 2017-04-26 RX ADMIN — BUDESONIDE AND FORMOTEROL FUMARATE DIHYDRATE SCH PUFF: 160; 4.5 AEROSOL RESPIRATORY (INHALATION) at 20:29

## 2017-04-26 RX ADMIN — ASPIRIN 81 MG CHEWABLE TABLET SCH MG: 81 TABLET CHEWABLE at 08:49

## 2017-04-26 RX ADMIN — LISINOPRIL SCH MG: 10 TABLET ORAL at 08:48

## 2017-04-26 RX ADMIN — HYDROCODONE BITARTRATE AND ACETAMINOPHEN PRN EACH: 7.5; 325 TABLET ORAL at 15:46

## 2017-04-26 RX ADMIN — ISODIUM CHLORIDE PRN MG: 0.03 SOLUTION RESPIRATORY (INHALATION) at 20:29

## 2017-04-26 RX ADMIN — ATORVASTATIN CALCIUM SCH MG: 20 TABLET, FILM COATED ORAL at 21:59

## 2017-04-26 RX ADMIN — FAMOTIDINE SCH MG: 20 TABLET, FILM COATED ORAL at 08:48

## 2017-04-26 RX ADMIN — INSULIN HUMAN SCH MLS/HR: 100 INJECTION, SOLUTION PARENTERAL at 10:56

## 2017-04-26 RX ADMIN — OXYCODONE HYDROCHLORIDE AND ACETAMINOPHEN SCH MG: 500 TABLET ORAL at 08:49

## 2017-04-26 RX ADMIN — METOCLOPRAMIDE SCH MG: 5 TABLET ORAL at 08:49

## 2017-04-26 RX ADMIN — CITALOPRAM HYDROBROMIDE SCH MG: 20 TABLET ORAL at 08:48

## 2017-04-26 RX ADMIN — ISODIUM CHLORIDE PRN MG: 0.03 SOLUTION RESPIRATORY (INHALATION) at 13:58

## 2017-04-26 RX ADMIN — METOCLOPRAMIDE SCH MG: 5 TABLET ORAL at 12:59

## 2017-04-26 RX ADMIN — FOLIC ACID SCH MG: 1 TABLET ORAL at 08:48

## 2017-04-26 RX ADMIN — INSULIN GLARGINE SCH UNIT: 100 INJECTION, SOLUTION SUBCUTANEOUS at 21:59

## 2017-04-26 RX ADMIN — Medication SCH MG: at 21:59

## 2017-04-26 RX ADMIN — FAMOTIDINE SCH MG: 20 TABLET, FILM COATED ORAL at 17:44

## 2017-04-26 RX ADMIN — BUDESONIDE AND FORMOTEROL FUMARATE DIHYDRATE SCH PUFF: 160; 4.5 AEROSOL RESPIRATORY (INHALATION) at 08:33

## 2017-04-26 RX ADMIN — HYDROMORPHONE HYDROCHLORIDE PRN MG: 1 INJECTION, SOLUTION INTRAMUSCULAR; INTRAVENOUS; SUBCUTANEOUS at 13:49

## 2017-04-26 RX ADMIN — Medication SCH MG: at 08:48

## 2017-04-26 RX ADMIN — Medication SCH EACH: at 12:59

## 2017-04-26 RX ADMIN — VALPROIC ACID SCH MG: 250 SOLUTION ORAL at 08:48

## 2017-04-26 RX ADMIN — ISODIUM CHLORIDE PRN MG: 0.03 SOLUTION RESPIRATORY (INHALATION) at 08:32

## 2017-04-26 RX ADMIN — METOCLOPRAMIDE SCH MG: 5 TABLET ORAL at 17:45

## 2017-04-26 NOTE — P.PN
Subjective


Principal diagnosis: 





Right Diabetic foot infection with bone necrosis


57-year-old  female with history of multiple medical troubles includes 

hypertension and diabetes mellitus type 2 is a history of extensive stroke with 

left-sided hemiparesis and remains under the care of family members and an aide 

for her ongoing care.  She is not able to toilet herself.  She has been a 

patient at the wound healing center and was last seen by Dr. Ferrell.  There 

was plan for amputation which was apparently scheduled for April 19 but because 

patient's blood sugar was 360, this was canceled.  Patient has had ongoing 

problems with her right foot ulcer that is nonhealing and has been admitted to 

the hospital and started on vancomycin.  Lab work showed a potassium of 6.4, 

BUN 35 and creatinine 1.2.  Patient did receive dose of Kayexalate.  White 

count was 5.7.  Blood sugars were again elevated and as high as 497, currently 

104.  


Remains on an insulin drip but is improving.  Amputation went well.





Objective





- Vital Signs


Vital signs: 


 Vital Signs











Temp  99.5 F   04/26/17 15:00


 


Pulse  76   04/26/17 20:51


 


Resp  20   04/26/17 15:00


 


BP  120/58   04/26/17 15:00


 


Pulse Ox  95   04/26/17 15:00








 Intake & Output











 04/26/17 04/26/17 04/27/17





 06:59 18:59 06:59


 


Intake Total 121.401 531.016 3.408


 


Balance 121.401 531.016 3.408


 


Intake:   


 


  Intake, IV Titration 61.401 51.016 3.408





  Amount   


 


    Insulin Regular 100 unit 61.401 51.016 3.408





    In Sodium Chloride 0.9%   





    100 ml @ Titrate IV .Q0M   





    [facility] Rx#:[id number]   


 


  Oral 60 480 


 


Other:   


 


  Voiding Method  Bedpan 


 


  # Voids 1 4 


 


  # Bowel Movements  0 














- Exam





Gen: This is a 57-year-old  female.  She is sitting up in bed and 

appears to be in no acute distress.


HEENT: Head is atraumatic, normocephalic. Pupils equal, round. Sclerae is 

anicteric.  Conjunctiva pink.  Mucous membranes of the mouth are slightly dry.  

No thrush.  Poor dentition.


NECK: Supple. No JVD. No lymphadenopathy. No thyromegaly. 


LUNGS: Clear to auscultation. No wheezes or rhonchi.  No intercostal 

retractions.


HEART: Regular rate and rhythm. No murmur. 


ABDOMEN: Soft. Bowel sounds are present. No masses.  No tenderness.


EXTREMITIES: No pedal edema.  No calf tenderness.  Dorsalis pedis +2 

bilaterally.  Post operative dressing is in place.  Patient to be changed by 

the surgeon.


NEUROLOGICAL: Patient is awake, alert and oriented x3.  Noted difficulty with 

speech but easy to understand.





- Labs


CBC & Chem 7: 


 04/26/17 09:08





 04/26/17 09:08


Labs: 


 Abnormal Lab Results - Last 24 Hours (Table)











  04/25/17 04/26/17 04/26/17 Range/Units





  23:10 01:13 03:30 


 


RBC     (3.80-5.40)  m/uL


 


Hgb     (11.4-16.0)  gm/dL


 


Hct     (34.0-46.0)  %


 


MCV     (80.0-100.0)  fL


 


MCHC     (31.0-37.0)  g/dL


 


Chloride     ()  mmol/L


 


BUN     (7-17)  mg/dL


 


Glucose     (74-99)  mg/dL


 


POC Glucose (mg/dL)  332 H  149 H   (75-99)  mg/dL


 


Total Protein     (6.3-8.2)  g/dL


 


Albumin     (3.5-5.0)  g/dL


 


Urine Glucose (UA)    3+ H  (Negative)  














  04/26/17 04/26/17 04/26/17 Range/Units





  03:41 05:17 07:02 


 


RBC     (3.80-5.40)  m/uL


 


Hgb     (11.4-16.0)  gm/dL


 


Hct     (34.0-46.0)  %


 


MCV     (80.0-100.0)  fL


 


MCHC     (31.0-37.0)  g/dL


 


Chloride     ()  mmol/L


 


BUN     (7-17)  mg/dL


 


Glucose     (74-99)  mg/dL


 


POC Glucose (mg/dL)  196 H  351 H  328 H  (75-99)  mg/dL


 


Total Protein     (6.3-8.2)  g/dL


 


Albumin     (3.5-5.0)  g/dL


 


Urine Glucose (UA)     (Negative)  














  04/26/17 04/26/17 04/26/17 Range/Units





  08:58 09:08 09:08 


 


RBC   2.93 L   (3.80-5.40)  m/uL


 


Hgb   9.1 L   (11.4-16.0)  gm/dL


 


Hct   29.5 L   (34.0-46.0)  %


 


MCV   100.7 H   (80.0-100.0)  fL


 


MCHC   30.9 L   (31.0-37.0)  g/dL


 


Chloride    111 H  ()  mmol/L


 


BUN    29 H  (7-17)  mg/dL


 


Glucose    154 H  (74-99)  mg/dL


 


POC Glucose (mg/dL)  152 H    (75-99)  mg/dL


 


Total Protein    6.2 L  (6.3-8.2)  g/dL


 


Albumin    3.3 L  (3.5-5.0)  g/dL


 


Urine Glucose (UA)     (Negative)  














  04/26/17 04/26/17 04/26/17 Range/Units





  10:49 13:27 15:32 


 


RBC     (3.80-5.40)  m/uL


 


Hgb     (11.4-16.0)  gm/dL


 


Hct     (34.0-46.0)  %


 


MCV     (80.0-100.0)  fL


 


MCHC     (31.0-37.0)  g/dL


 


Chloride     ()  mmol/L


 


BUN     (7-17)  mg/dL


 


Glucose     (74-99)  mg/dL


 


POC Glucose (mg/dL)  142 H  231 H  208 H  (75-99)  mg/dL


 


Total Protein     (6.3-8.2)  g/dL


 


Albumin     (3.5-5.0)  g/dL


 


Urine Glucose (UA)     (Negative)  














  04/26/17 04/26/17 04/26/17 Range/Units





  17:24 19:37 21:05 


 


RBC     (3.80-5.40)  m/uL


 


Hgb     (11.4-16.0)  gm/dL


 


Hct     (34.0-46.0)  %


 


MCV     (80.0-100.0)  fL


 


MCHC     (31.0-37.0)  g/dL


 


Chloride     ()  mmol/L


 


BUN     (7-17)  mg/dL


 


Glucose     (74-99)  mg/dL


 


POC Glucose (mg/dL)  161 H  142 H  184 H  (75-99)  mg/dL


 


Total Protein     (6.3-8.2)  g/dL


 


Albumin     (3.5-5.0)  g/dL


 


Urine Glucose (UA)     (Negative)  








 Microbiology - Last 24 Hours (Table)











 04/26/17 03:30 Urine Culture - Preliminary





 Urine,Voided 








 Laboratory Results











WBC  8.4 k/uL (3.8-10.6)   04/26/17  09:08    


 


RBC  2.93 m/uL (3.80-5.40)  L  04/26/17  09:08    


 


Hgb  9.1 gm/dL (11.4-16.0)  L  04/26/17  09:08    


 


Hct  29.5 % (34.0-46.0)  L  04/26/17  09:08    


 


MCV  100.7 fL (80.0-100.0)  H  04/26/17  09:08    


 


MCH  31.1 pg (25.0-35.0)   04/26/17  09:08    


 


MCHC  30.9 g/dL (31.0-37.0)  L  04/26/17  09:08    


 


RDW  13.2 % (11.5-15.5)   04/26/17  09:08    


 


Plt Count  195 k/uL (150-450)   04/26/17  09:08    


 


Neutrophils %  65 %  04/26/17  09:08    


 


Lymphocytes %  28 %  04/26/17  09:08    


 


Monocytes %  5 %  04/26/17  09:08    


 


Eosinophils %  1 %  04/26/17  09:08    


 


Basophils %  0 %  04/26/17  09:08    


 


Neutrophils #  5.4 k/uL (1.3-7.7)   04/26/17  09:08    


 


Lymphocytes #  2.3 k/uL (1.0-4.8)   04/26/17  09:08    


 


Monocytes #  0.4 k/uL (0-1.0)   04/26/17  09:08    


 


Eosinophils #  0.0 k/uL (0-0.7)   04/26/17  09:08    


 


Basophils #  0.0 k/uL (0-0.2)   04/26/17  09:08    


 


Hypochromasia  Slight   04/25/17  08:22    


 


Macrocytosis  Slight   04/25/17  08:22    


 


Sodium  143 mmol/L (137-145)   04/26/17  09:08    


 


Potassium  4.9 mmol/L (3.5-5.1)   04/26/17  09:08    


 


Chloride  111 mmol/L ()  H  04/26/17  09:08    


 


Carbon Dioxide  24 mmol/L (22-30)   04/26/17  09:08    


 


Anion Gap  8 mmol/L  04/26/17  09:08    


 


BUN  29 mg/dL (7-17)  H  04/26/17  09:08    


 


Creatinine  0.95 mg/dL (0.52-1.04)   04/26/17  09:08    


 


Est GFR (MDRD) Af Amer  >60  (>60 ml/min/1.73 sqM)   04/26/17  09:08    


 


Est GFR (MDRD) Non-Af  >60  (>60 ml/min/1.73 sqM)   04/26/17  09:08    


 


Glucose  154 mg/dL (74-99)  H  04/26/17  09:08    


 


POC Glucose (mg/dL)  184 mg/dL (75-99)  H  04/26/17  21:05    


 


POC Glu Operater ID  Jazmine Friend   04/26/17  21:05    


 


Estimated Ave Glu mg/dL  229 mg/dL  04/24/17  18:12    


 


Hemoglobin A1c  9.6 % (4.2-6.1)  H  04/24/17  18:12    


 


Calcium  9.0 mg/dL (8.4-10.2)   04/26/17  09:08    


 


Total Bilirubin  0.4 mg/dL (0.2-1.3)   04/26/17  09:08    


 


AST  14 U/L (14-36)   04/26/17  09:08    


 


ALT  25 U/L (9-52)   04/26/17  09:08    


 


Alkaline Phosphatase  48 U/L ()   04/26/17  09:08    


 


Total Protein  6.2 g/dL (6.3-8.2)  L  04/26/17  09:08    


 


Albumin  3.3 g/dL (3.5-5.0)  L  04/26/17  09:08    


 


Urine Color  Yellow   04/26/17  03:30    


 


Urine Appearance  Clear  (Clear)   04/26/17  03:30    


 


Urine pH  5.0  (5.0-8.0)   04/26/17  03:30    


 


Ur Specific Gravity  1.013  (1.001-1.035)   04/26/17  03:30    


 


Urine Protein  Negative  (Negative)   04/26/17  03:30    


 


Urine Glucose (UA)  3+  (Negative)  H  04/26/17  03:30    


 


Urine Ketones  Negative  (Negative)   04/26/17  03:30    


 


Urine Blood  Negative  (Negative)   04/26/17  03:30    


 


Urine Nitrite  Negative  (Negative)   04/26/17  03:30    


 


Urine Bilirubin  Negative  (Negative)   04/26/17  03:30    


 


Urine Urobilinogen  <2.0 mg/dL (<2.0)   04/26/17  03:30    


 


Ur Leukocyte Esterase  Negative  (Negative)   04/26/17  03:30    








 Microbiology





04/26/17 03:30   Urine,Voided   Urine Culture - Preliminary











Assessment and Plan


(1) Type 2 diabetes mellitus with right diabetic foot infection


Narrative/Plan: 


57-year-old woman presents to Hospital for evaluation of her nonhealing 

ulceration to her right lateral foot at the fifth metatarsal head.  She 

continues to have difficulties with uncontrolled blood sugar and is now on an 

insulin drip.  With improved blood sugar she was taken to the operating room 

and had the amputation of the fifth metatarsal head.  Dressing is in place.


Insulin drip is being tapered and discontinued.  And may be on insulin as of 

tomorrow by subcutaneous injection.  If stable may be going home.  There is no 

plans for outpatient intravenous antibiotic therapy.  However could go home on 

oral doxycycline therapy 100 mg daily twice per day until she is followed up in 

the [facility].


Status: Acute   





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


Status: Acute

## 2017-04-26 NOTE — P.PN
Subjective


Principal diagnosis: 





right fifth toe osteomyelitis





patient is a 57-year-old female was known history of insulin-dependent diabetes 

mellitus and previous history of stroke with left sided pennie-paresis who was 

admitted to [facility] due to right foot cellulitis with 

evidence of osteomyelitis on the right fifth toe patient was evaluated by Dr. Ferrell and underwent amputation of the right fifth toe is maintained on IV 

antibiotic vancomycin consultation for Dr. Moody was also initiated.


Patient has insulin-dependent diabetes mellitus her glucose was not well 

controlled currently she is maintained on insulin drip





Clinically patient is doing better she is complaining of mild pain in her right 

foot otherwise she denies any complaints





Objective





- Vital Signs


Vital signs: 


 Vital Signs











Temp  99.5 F   04/26/17 15:00


 


Pulse  86   04/26/17 15:00


 


Resp  20   04/26/17 15:00


 


BP  120/58   04/26/17 15:00


 


Pulse Ox  95   04/26/17 15:00








 Intake & Output











 04/25/17 04/26/17 04/26/17





 18:59 06:59 18:59


 


Intake Total 565.50 121.401 531.016


 


Output Total 27  


 


Balance 538.50 121.401 531.016


 


Weight 48.988 kg  


 


Intake:   


 


    


 


  Intake, IV Titration 59.50 61.401 51.016





  Amount   


 


    Insulin Regular 100 unit 59.50 61.401 51.016





    In Sodium Chloride 0.9%   





    100 ml @ Titrate IV .Q0M   





    [facility] Rx#:[id number]   


 


  Oral  60 480


 


Output:   


 


  Urine 2  


 


  Estimated Blood Loss 25  


 


Other:   


 


  Voiding Method   Bedpan


 


  # Voids  1 4


 


  # Bowel Movements 2  0














- Exam





in general patient is alert and oriented in no apparent distress


HEENT head normocephalic and atraumatic


Neck is supple no JVD no goiter no lymphadenopathy


Chest is clear to auscultation no wheezing


Cardiac exam reveals regular heart sounds no murmurs


Abdomen is soft nontender no organomegaly


Extremity exam reveals minimal edema





- Labs


CBC & Chem 7: 


 04/26/17 09:08





 04/26/17 09:08


Labs: 


 Abnormal Lab Results - Last 24 Hours (Table)











  04/25/17 04/25/17 04/25/17 Range/Units





  19:38 21:09 23:10 


 


RBC     (3.80-5.40)  m/uL


 


Hgb     (11.4-16.0)  gm/dL


 


Hct     (34.0-46.0)  %


 


MCV     (80.0-100.0)  fL


 


MCHC     (31.0-37.0)  g/dL


 


Chloride     ()  mmol/L


 


BUN     (7-17)  mg/dL


 


Glucose     (74-99)  mg/dL


 


POC Glucose (mg/dL)  279 H  340 H  332 H  (75-99)  mg/dL


 


Total Protein     (6.3-8.2)  g/dL


 


Albumin     (3.5-5.0)  g/dL


 


Urine Glucose (UA)     (Negative)  














  04/26/17 04/26/17 04/26/17 Range/Units





  01:13 03:30 03:41 


 


RBC     (3.80-5.40)  m/uL


 


Hgb     (11.4-16.0)  gm/dL


 


Hct     (34.0-46.0)  %


 


MCV     (80.0-100.0)  fL


 


MCHC     (31.0-37.0)  g/dL


 


Chloride     ()  mmol/L


 


BUN     (7-17)  mg/dL


 


Glucose     (74-99)  mg/dL


 


POC Glucose (mg/dL)  149 H   196 H  (75-99)  mg/dL


 


Total Protein     (6.3-8.2)  g/dL


 


Albumin     (3.5-5.0)  g/dL


 


Urine Glucose (UA)   3+ H   (Negative)  














  04/26/17 04/26/17 04/26/17 Range/Units





  05:17 07:02 08:58 


 


RBC     (3.80-5.40)  m/uL


 


Hgb     (11.4-16.0)  gm/dL


 


Hct     (34.0-46.0)  %


 


MCV     (80.0-100.0)  fL


 


MCHC     (31.0-37.0)  g/dL


 


Chloride     ()  mmol/L


 


BUN     (7-17)  mg/dL


 


Glucose     (74-99)  mg/dL


 


POC Glucose (mg/dL)  351 H  328 H  152 H  (75-99)  mg/dL


 


Total Protein     (6.3-8.2)  g/dL


 


Albumin     (3.5-5.0)  g/dL


 


Urine Glucose (UA)     (Negative)  














  04/26/17 04/26/17 04/26/17 Range/Units





  09:08 09:08 10:49 


 


RBC  2.93 L    (3.80-5.40)  m/uL


 


Hgb  9.1 L    (11.4-16.0)  gm/dL


 


Hct  29.5 L    (34.0-46.0)  %


 


MCV  100.7 H    (80.0-100.0)  fL


 


MCHC  30.9 L    (31.0-37.0)  g/dL


 


Chloride   111 H   ()  mmol/L


 


BUN   29 H   (7-17)  mg/dL


 


Glucose   154 H   (74-99)  mg/dL


 


POC Glucose (mg/dL)    142 H  (75-99)  mg/dL


 


Total Protein   6.2 L   (6.3-8.2)  g/dL


 


Albumin   3.3 L   (3.5-5.0)  g/dL


 


Urine Glucose (UA)     (Negative)  














  04/26/17 04/26/17 04/26/17 Range/Units





  13:27 15:32 17:24 


 


RBC     (3.80-5.40)  m/uL


 


Hgb     (11.4-16.0)  gm/dL


 


Hct     (34.0-46.0)  %


 


MCV     (80.0-100.0)  fL


 


MCHC     (31.0-37.0)  g/dL


 


Chloride     ()  mmol/L


 


BUN     (7-17)  mg/dL


 


Glucose     (74-99)  mg/dL


 


POC Glucose (mg/dL)  231 H  208 H  161 H  (75-99)  mg/dL


 


Total Protein     (6.3-8.2)  g/dL


 


Albumin     (3.5-5.0)  g/dL


 


Urine Glucose (UA)     (Negative)  








 Microbiology - Last 24 Hours (Table)











 04/26/17 03:30 Urine Culture - Preliminary





 Urine,Voided 














Assessment and Plan


Plan: 





#1 right foot cellulitis was possible osteomyelitis with nonhealing ulcer at 

this time will start IV vancomycin, consultation with Dr. Moody initiated 


patient status post amputation of her right fifth toe by Dr. Ferrell





#2 insulin-dependent diabetes mellitus at this time will check hemoglobin A1c 


plan today is to discontinue insulin drip and restart Lantus at 18 units subcu 

daily and use NovoLog sliding scale before meals





#3 underlying history of hypertension well-controlled on current medications 

continue





#4 underlying history of hyperlipidemia





#5 underlying history of coronary artery disease stable at this time





#6 previous history of right hemispheric stroke with left-sided hemiparesis





At this time patient is admitted to medical floor started on IV vancomycin 

awaiting input from Dr. Moody and Dr. Ferrell

## 2017-04-26 NOTE — P.PN
<Ray Kingston T - Last Filed: 04/26/17 09:24>





Progress Note - Text


Vascular Surgery Nursing 


POD: #2, amputation of the right fifth toe through proximal phalanx.


Patient awake and alert, no distress noted, no specific complaints. 


Vital Signs: Afebrile, T-max 99.4F


 Vital Signs - 24 hr











  04/25/17 04/25/17 04/25/17





  11:18 12:34 12:49


 


Temperature 98.5 F 99.4 F 


 


Pulse Rate   


 


Pulse Rate [ 93  





Pulse Oximetery   





]   


 


Pulse Rate [ 80 69 68





Right]   


 


Respiratory 16 12 16





Rate   


 


Blood Pressure 136/60 95/52 112/58





[Left Arm   





Supine]   


 


Blood Pressure   





[Right Arm]   


 


O2 Sat by Pulse 97 93 L 98





Oximetry   














  04/25/17 04/25/17 04/25/17





  13:04 13:19 13:34


 


Temperature   


 


Pulse Rate   


 


Pulse Rate [   





Pulse Oximetery   





]   


 


Pulse Rate [ 66 68 64





Right]   


 


Respiratory 16 16 16





Rate   


 


Blood Pressure 120/70 123/62 135/66





[Left Arm   





Supine]   


 


Blood Pressure   





[Right Arm]   


 


O2 Sat by Pulse 99 100 100





Oximetry   














  04/25/17 04/25/17 04/25/17





  16:31 16:46 20:33


 


Temperature   


 


Pulse Rate 68 68 80


 


Pulse Rate [   





Pulse Oximetery   





]   


 


Pulse Rate [   





Right]   


 


Respiratory   





Rate   


 


Blood Pressure   





[Left Arm   





Supine]   


 


Blood Pressure   





[Right Arm]   


 


O2 Sat by Pulse   





Oximetry   














  04/25/17 04/25/17 04/26/17





  20:44 23:00 03:46


 


Temperature  98.8 F 


 


Pulse Rate 80  


 


Pulse Rate [   





Pulse Oximetery   





]   


 


Pulse Rate [  76 





Right]   


 


Respiratory  14 





Rate   


 


Blood Pressure  93/53 111/63





[Left Arm   





Supine]   


 


Blood Pressure   





[Right Arm]   


 


O2 Sat by Pulse  95 





Oximetry   














  04/26/17 04/26/17 04/26/17





  07:00 08:33 08:49


 


Temperature 97.6 F  


 


Pulse Rate  72 74


 


Pulse Rate [ 80  





Pulse Oximetery   





]   


 


Pulse Rate [   





Right]   


 


Respiratory 20  





Rate   


 


Blood Pressure   





[Left Arm   





Supine]   


 


Blood Pressure 128/76  





[Right Arm]   


 


O2 Sat by Pulse 96  





Oximetry   








 


Labs: 


 BMP











  04/25/17





  08:22


 


Sodium  139


 


Potassium  5.3 H


 


Chloride  109 H


 


Carbon Dioxide  22


 


BUN  29 H


 


Creatinine  1.02


 


Glucose  349 H


 


Calcium  9.1








 Liver Function











  04/25/17 Range/Units





  08:22 


 


Total Bilirubin  0.5  (0.2-1.3)  mg/dL


 


AST  13 L  (14-36)  U/L


 


ALT  27  (9-52)  U/L


 


Alkaline Phosphatase  66  ()  U/L


 


Albumin  3.6  (3.5-5.0)  g/dL








 Urine











  04/26/17 Range/Units





  03:30 


 


Urine Color  Yellow  


 


Urine Appearance  Clear  (Clear)  


 


Urine pH  5.0  (5.0-8.0)  


 


Ur Specific Gravity  1.013  (1.001-1.035)  


 


Urine Protein  Negative  (Negative)  


 


Urine Glucose (UA)  3+ H  (Negative)  











IV Fluids: Insulin drip at 8 units per hour


Right foot wound dressing is dry and intact 


CBGs: 152-328 mg/dL


 Intake & Output











 04/24/17 04/25/17 04/26/17 04/27/17





 06:59 06:59 06:59 06:59


 


Intake Total  33.200 686.901 17.4


 


Output Total   27 


 


Balance  33.200 659.901 17.4


 


Weight  48.988 kg 48.988 kg 








Active Medications





Hydrocodone Bitart/Acetaminophen (Norco 7.5-325)  1 each PO TID PRN


   PRN Reason: Pain


   Last Admin: 04/25/17 17:26 Dose:  1 each


Albuterol Sulfate (Ventolin Nebulized)  2.5 mg INHALATION RT-Q6H PRN


   PRN Reason: Shortness Of Breath


   Last Admin: 04/26/17 08:32 Dose:  2.5 mg


Alprazolam (Xanax)  0.5 mg PO TID PRN


   PRN Reason: Anxiety


Ascorbic Acid (Vitamin C)  500 mg PO DAILY [facility]


   Last Admin: 04/26/17 08:49 Dose:  500 mg


Aspirin (Aspirin)  81 mg PO DAILY [facility]


   Last Admin: 04/26/17 08:49 Dose:  81 mg


Atorvastatin Calcium (Lipitor)  20 mg PO HS [facility]


   Last Admin: 04/25/17 20:36 Dose:  20 mg


Budesonide/Formoterol Fumarate (Symbicort 160-4.5 Mcg Inhaler)  2 puff 

INHALATION RT-BID [facility]


   Last Admin: 04/26/17 08:33 Dose:  2 puff


Citalopram Hydrobromide (Celexa)  20 mg PO DAILY [facility]


   Last Admin: 04/26/17 08:48 Dose:  20 mg


Famotidine (Pepcid)  20 mg PO BID-W/MEALS [facility]


   Last Admin: 04/26/17 08:48 Dose:  20 mg


Ferrous Sulfate (Feosol)  325 mg PO BID [facility]


   Last Admin: 04/26/17 08:48 Dose:  325 mg


Folic Acid (Folic Acid)  1 mg PO DAILY [facility]


   Last Admin: 04/26/17 08:48 Dose:  1 mg


Hydralazine HCl (Apresoline)  50 mg PO TID [facility]


   Last Admin: 04/26/17 08:48 Dose:  50 mg


Hydromorphone HCl (Dilaudid)  0.5 mg IVP Q4HR PRN


   PRN Reason: Pain


   Last Admin: 04/25/17 23:25 Dose:  0.5 mg


Insulin Human Regular 100 unit (/ Sodium Chloride)  101 mls @ 0 mls/hr IV .Q0M 

[facility]; Titrate


   PRN Reason: Protocol


   Last Titration: 04/26/17 07:15 Dose:  8 mls/hr


Vancomycin HCl 750 mg/ Sodium (Chloride)  250 mls @ 125 mls/hr IVPB Q16H [facility]


Lisinopril (Zestril)  10 mg PO QAM [facility]


   Last Admin: 04/26/17 08:48 Dose:  10 mg


Metoclopramide HCl (Reglan)  5 mg PO AC-TID [facility]


   Last Admin: 04/26/17 08:49 Dose:  5 mg


Nitroglycerin (Nitrostat)  0.4 mg SUBLINGUAL Q5M PRN


   PRN Reason: Chest Pain


Ondansetron HCl (Zofran)  4 mg PO DAILY PRN


   PRN Reason: Nausea


Polyethylene Glycol (Miralax)  17 gm PO DAILY PRN


   PRN Reason: Constipation


Senna (Senokot)  8.6 mg PO DAILY PRN


   PRN Reason: Constipation


Valproic Acid (Depakene Syrup)  250 mg PO DAILY [facility]


   Last Admin: 04/26/17 08:48 Dose:  250 mg


Vitamin B Complex/Vit C/Vit E/Zinc (Z-Bec)  1 each PO DAILY@1200 [facility]


   Last Admin: 04/25/17 14:09 Dose:  1 each





Plan:


Medicine to start insulin coverage, wean from insulin drip as tolerated.


Continue local wound care.


Infectious disease service following.


We'll follow up with the patient upon discharge in the wound clinic.











<Gerardo Ferrell - Last Filed: 04/26/17 10:58>





Progress Note - Text





NP note reviewed and accepted.





Stable condition post toe amputation.  Home when okay with primary.  We'll see 

in the wound center in 1 week.

## 2017-04-27 LAB
ANION GAP SERPL CALC-SCNC: 8 MMOL/L
BUN SERPL-SCNC: 22 MG/DL (ref 7–17)
CALCIUM SPEC-MCNC: 9.2 MG/DL (ref 8.4–10.2)
CHLORIDE SERPL-SCNC: 111 MMOL/L (ref 98–107)
CO2 SERPL-SCNC: 24 MMOL/L (ref 22–30)
GLUCOSE BLD-MCNC: 105 MG/DL (ref 75–99)
GLUCOSE BLD-MCNC: 211 MG/DL (ref 75–99)
GLUCOSE BLD-MCNC: 329 MG/DL (ref 75–99)
GLUCOSE BLD-MCNC: 367 MG/DL (ref 75–99)
GLUCOSE BLD-MCNC: 77 MG/DL (ref 75–99)
GLUCOSE SERPL-MCNC: 139 MG/DL (ref 74–99)
NON-AFRICAN AMERICAN GFR(MDRD): >60
POTASSIUM SERPL-SCNC: 5.3 MMOL/L (ref 3.5–5.1)
SODIUM SERPL-SCNC: 143 MMOL/L (ref 137–145)

## 2017-04-27 RX ADMIN — ASPIRIN 81 MG CHEWABLE TABLET SCH MG: 81 TABLET CHEWABLE at 08:17

## 2017-04-27 RX ADMIN — FAMOTIDINE SCH MG: 20 TABLET, FILM COATED ORAL at 17:12

## 2017-04-27 RX ADMIN — OXYCODONE HYDROCHLORIDE AND ACETAMINOPHEN SCH MG: 500 TABLET ORAL at 08:18

## 2017-04-27 RX ADMIN — HYDROMORPHONE HYDROCHLORIDE PRN MG: 1 INJECTION, SOLUTION INTRAMUSCULAR; INTRAVENOUS; SUBCUTANEOUS at 08:33

## 2017-04-27 RX ADMIN — INSULIN LISPRO SCH: 100 INJECTION, SOLUTION INTRAVENOUS; SUBCUTANEOUS at 08:16

## 2017-04-27 RX ADMIN — LISINOPRIL SCH MG: 10 TABLET ORAL at 10:59

## 2017-04-27 RX ADMIN — METOCLOPRAMIDE SCH MG: 5 TABLET ORAL at 17:12

## 2017-04-27 RX ADMIN — INSULIN LISPRO SCH UNIT: 100 INJECTION, SOLUTION INTRAVENOUS; SUBCUTANEOUS at 02:27

## 2017-04-27 RX ADMIN — Medication SCH MG: at 21:20

## 2017-04-27 RX ADMIN — METOCLOPRAMIDE SCH MG: 5 TABLET ORAL at 11:01

## 2017-04-27 RX ADMIN — BUDESONIDE AND FORMOTEROL FUMARATE DIHYDRATE SCH PUFF: 160; 4.5 AEROSOL RESPIRATORY (INHALATION) at 19:37

## 2017-04-27 RX ADMIN — VALPROIC ACID SCH MG: 250 SOLUTION ORAL at 08:17

## 2017-04-27 RX ADMIN — ATORVASTATIN CALCIUM SCH MG: 20 TABLET, FILM COATED ORAL at 21:20

## 2017-04-27 RX ADMIN — Medication SCH EACH: at 11:01

## 2017-04-27 RX ADMIN — SODIUM CHLORIDE SCH MLS/HR: 9 INJECTION, SOLUTION INTRAVENOUS at 08:17

## 2017-04-27 RX ADMIN — FAMOTIDINE SCH MG: 20 TABLET, FILM COATED ORAL at 08:17

## 2017-04-27 RX ADMIN — ISODIUM CHLORIDE PRN MG: 0.03 SOLUTION RESPIRATORY (INHALATION) at 19:37

## 2017-04-27 RX ADMIN — METOCLOPRAMIDE SCH MG: 5 TABLET ORAL at 08:18

## 2017-04-27 RX ADMIN — INSULIN LISPRO SCH: 100 INJECTION, SOLUTION INTRAVENOUS; SUBCUTANEOUS at 17:11

## 2017-04-27 RX ADMIN — BUDESONIDE AND FORMOTEROL FUMARATE DIHYDRATE SCH PUFF: 160; 4.5 AEROSOL RESPIRATORY (INHALATION) at 09:36

## 2017-04-27 RX ADMIN — INSULIN GLARGINE SCH UNIT: 100 INJECTION, SOLUTION SUBCUTANEOUS at 21:20

## 2017-04-27 RX ADMIN — Medication SCH MG: at 08:18

## 2017-04-27 RX ADMIN — HYDROCODONE BITARTRATE AND ACETAMINOPHEN PRN EACH: 7.5; 325 TABLET ORAL at 02:48

## 2017-04-27 RX ADMIN — FOLIC ACID SCH MG: 1 TABLET ORAL at 08:17

## 2017-04-27 RX ADMIN — CITALOPRAM HYDROBROMIDE SCH MG: 20 TABLET ORAL at 08:18

## 2017-04-27 RX ADMIN — VALPROIC ACID SCH: 250 SOLUTION ORAL at 11:00

## 2017-04-27 RX ADMIN — DIVALPROEX SODIUM SCH MG: 250 TABLET, DELAYED RELEASE ORAL at 10:59

## 2017-04-27 RX ADMIN — INSULIN LISPRO SCH UNIT: 100 INJECTION, SOLUTION INTRAVENOUS; SUBCUTANEOUS at 12:31

## 2017-04-27 RX ADMIN — INSULIN LISPRO SCH UNIT: 100 INJECTION, SOLUTION INTRAVENOUS; SUBCUTANEOUS at 21:20

## 2017-04-27 NOTE — P.DS
Providers


Date of admission: 


04/24/17 16:04





Expected date of discharge: 04/27/17


Attending physician: 


Saniya Cabral





Consults: 





 





04/24/17 17:35


Consult Physician Routine 


   Consulting Provider: Ray Moody


   Consult Reason/Comments: R foot osteomyelitis


   Do you want consulting provider notified?: Yes


Consult Physician Routine 


   Consulting Provider: Gerardo Ferrell


   Consult Reason/Comments: R foot osteomyelitis


   Do you want consulting provider notified?: Yes











Primary care physician: 


Saniyalloyd SiegelMisha





[facility]: 





Discharge diagnosis





#1 Sepulveda grade 3 ulceration of right fifth toe secondary to bone exposure and 

diabetes.  status post amputation of the right fifth toe by Dr. Ferrell





#2 insulin-dependent diabetes mellitus: Lantus increased to 18 units during 

this admission.  Continue with her scheduled Humalog and sliding scale





#3 underlying history of hypertension well-controlled on current medications 

continue





#4 underlying history of hyperlipidemia





#5 underlying history of coronary artery disease stable at this time





#6 previous history of right hemispheric stroke with left-sided hemiparesis





Hospital course





This is a 57-year-old female who has a chronic right diabetic foot ulcer that 

was nonhealing.  She does follow with Dr. Ferrell in the wound care center.  

However she was scheduled for amputation of the toe on April 19 because of 

patient's blood sugars being 360 this was canceled.  She was therefore admitted 

to the hospital and started on IV vancomycin.  Dr. Moody in Dr. Ferrell were 

consulted.  Patient was diagnosed with Sepulveda grade 3 ulceration of the right 

fifth toe related to her diabetes.  Dr. Ferrell did proceed with the 

amputation of the right fifth toe.  Patient has done well.  She scheduled follow

-up with Dr. Ferrell in the wound care center in one week.  Dr. Moody is 

recommending doxycycline 100 mg twice a day for 1 week or until she is followed 

at the wound care center.  Patient is stable for discharge.  Her Lantus was 

adjusted to 18 units during this admission.  Blood sugars are better controlled 

and she is off of the insulin drip.  Please refer to chart for any further 

details.  Patient is stable for discharge.








Patient Condition at Discharge: Stable





Plan - Discharge Summary


New Discharge Prescriptions: 


Doxycycline Hyclate 100 mg PO BID #14 tab


Discharge Medication List





hydrALAZINE HCL [Apresoline] 50 mg PO TID 09/16/14 [History]


Nitroglycerin Sl Tabs [Nitrostat] 0.4 mg SUBLINGUAL Q5M PRN 01/15/15 [History]


Ferrous Sulfate [Feosol] 325 mg PO BID 07/10/15 [History]


Lisinopril [Prinivil] 10 mg PO QAM 07/10/15 [History]


Albuterol Inhaler [Ventolin Hfa Inhaler] 2 puff INHALATION RT-Q6H PRN 02/19/16 [

History]


Aspirin EC [Ecotrin Low Dose] 81 mg PO DAILY 02/19/16 [History]


Atorvastatin [Lipitor] 20 mg PO HS 02/19/16 [History]


Citalopram Hydrobromide [CeleXA] 20 mg PO DAILY 02/19/16 [History]


Famotidine [Pepcid] 20 mg PO BID-W/MEALS 02/19/16 [History]


Insulin Lispro [humaLOG] 4 units SQ TID-W/MEALS 02/19/16 [History]


Metoclopramide [Reglan] 5 mg PO AC-TID 02/19/16 [History]


Valproic Acid [Depakene] 250 mg PO DAILY 02/19/16 [History]


Vitamin B Complex 1 cap PO DAILY 02/19/16 [History]


Budesonide-Formot 160-4.5 Mcg [Symbicort 160-4.5 Mcg Inhaler] 2 puff INHALATION 

RT-BID 10/06/16 [History]


Ergocalciferol [Vitamin D2 (DRISDOL)] 50,000 unit PO SA 10/06/16 [History]


Ondansetron [Zofran] 4 mg PO DAILY PRN 03/31/17 [History]


INSULIN LISPRO (HumaLOG) [humaLOG] See Protocol SQ TID-W/MEALS 04/10/17 [History

]


ALPRAZolam [Xanax] 0.5 mg PO TID PRN 04/24/17 [History]


Ascorbic Acid [Vitamin C] 500 mg PO DAILY 04/24/17 [History]


Folic Acid 0.8 mg PO DAILY 04/24/17 [History]


HYDROcodone/APAP 7.5-325MG [Norco 7.5-325] 1 tab PO TID PRN 04/24/17 [History]


Polyethylene Glycol 3350 [Miralax] 17 gm PO DAILY PRN 04/24/17 [History]


Sennosides [Senna] 8.6 mg PO DAILY PRN 04/24/17 [History]


Doxycycline Hyclate 100 mg PO BID #14 tab 04/27/17 [Rx]


Insulin Glargine [Lantus] 18 unit SQ HS #0  04/27/17 [Rx]








Follow up Appointment(s)/Referral(s): 


[facility], [NON-STAFF] - 


Gerardo Ferrell DO [Doctor of Osteopathic Medicine] - 1 Week


Saniya Cabral MD [Primary Care Provider] - 1 Week


Activity/Diet/Wound Care/Special Instructions: 


Diet: cardiac, diabetic


Activity: per Dr. Ferrell


Discharge Disposition: HOME WITH HOME HEALTH SERVICES

## 2017-04-27 NOTE — P.PN
Progress Note - Text





CV Surgery Nursing





POD: #2, status post amputation of the right fifth toe through the proximal 

phalanx.





Patient awake and alert, no distress noted, no specific complaints.





Vital Signs: Afebrile


 Vital Signs - 24 hr











  04/26/17 04/26/17 04/26/17





  15:00 20:30 20:51


 


Temperature 99.5 F  


 


Pulse Rate  76 76


 


Pulse Rate [ 86  





Pulse Oximetery   





]   


 


Respiratory 20  





Rate   


 


Blood Pressure 120/58  





[Right Arm]   


 


O2 Sat by Pulse 95  





Oximetry   














  04/26/17 04/27/17





  23:00 07:00


 


Temperature 97.8 F 97.5 F L


 


Pulse Rate  


 


Pulse Rate [ 89 72





Pulse Oximetery  





]  


 


Respiratory 18 20





Rate  


 


Blood Pressure 133/59 123/60





[Right Arm]  


 


O2 Sat by Pulse 93 L 96





Oximetry  














Labs: 


 [facility]











  04/27/17





  08:19


 


Sodium  143


 


Potassium  5.3 H


 


Chloride  111 H


 


Carbon Dioxide  24


 


BUN  22 H


 


Creatinine  0.89


 


Glucose  139 H


 


Calcium  9.2








 Microbiology





04/26/17 03:30   Urine,Voided   Urine Culture - Final








Lungs: Essentially clear throughout, diminished bilateral bases.  Respirations 

are symmetrical and unlabored.





O2 sat: 95% on room air.





Heart:  S1S2, regular rhythm and rate, negative for S3, gallop or murmur.





Right foot incision clean dry and well approximated.  No drainage noted.  

Sutures intact.  Dressing was placed applying Adaptic, folded 4 x 4 to cover, 

secured with Kerlix wrap and Ace wrap.





Abdomen:  Soft, Positive bowel sounds present in all 4 quadrants.





CBGs:  mg/dL in the last 24 hours.





U/O:  Adequate.





24 hr Total:


 Intake & Output











 04/25/17 04/26/17 04/27/17 04/28/17





 06:59 06:59 06:59 06:59


 


Intake Total 33.200 734.640 1938.424 480


 


Output Total  27  


 


Balance 33.200 746.069 7773.424 480


 


Weight 48.988 kg 48.988 kg  











Active Medications





Hydrocodone Bitart/Acetaminophen (Norco 7.5-325)  1 each PO TID PRN


   PRN Reason: Pain


   Last Admin: 04/27/17 02:48 Dose:  1 each


Albuterol Sulfate (Ventolin Nebulized)  2.5 mg INHALATION RT-Q6H PRN


   PRN Reason: Shortness Of Breath


   Last Admin: 04/26/17 20:29 Dose:  2.5 mg


Alprazolam (Xanax)  0.5 mg PO TID PRN


   PRN Reason: Anxiety


   Last Admin: 04/27/17 08:34 Dose:  0.5 mg


Ascorbic Acid (Vitamin C)  500 mg PO DAILY [facility]


   Last Admin: 04/27/17 08:18 Dose:  500 mg


Aspirin (Aspirin)  81 mg PO DAILY [facility]


   Last Admin: 04/27/17 08:17 Dose:  81 mg


Atorvastatin Calcium (Lipitor)  20 mg PO HS [facility]


   Last Admin: 04/26/17 21:59 Dose:  20 mg


Budesonide/Formoterol Fumarate (Symbicort 160-4.5 Mcg Inhaler)  2 puff 

INHALATION RT-BID [facility]


   Last Admin: 04/27/17 09:36 Dose:  2 puff


Citalopram Hydrobromide (Celexa)  20 mg PO DAILY [facility]


   Last Admin: 04/27/17 08:18 Dose:  20 mg


Divalproex Sodium (Depakote)  250 mg PO DAILY [facility]


   Last Admin: 04/27/17 10:59 Dose:  250 mg


Famotidine (Pepcid)  20 mg PO BID-W/MEALS [facility]


   Last Admin: 04/27/17 08:17 Dose:  20 mg


Ferrous Sulfate (Feosol)  325 mg PO BID [facility]


   Last Admin: 04/27/17 08:18 Dose:  325 mg


Folic Acid (Folic Acid)  1 mg PO DAILY [facility]


   Last Admin: 04/27/17 08:17 Dose:  1 mg


Hydralazine HCl (Apresoline)  50 mg PO TID [facility]


   Last Admin: 04/27/17 08:17 Dose:  50 mg


Hydromorphone HCl (Dilaudid)  0.5 mg IVP Q4HR PRN


   PRN Reason: Pain


   Last Admin: 04/27/17 08:33 Dose:  0.5 mg


Insulin Human Regular 100 unit (/ Sodium Chloride)  101 mls @ 0 mls/hr IV .Q0M 

[facility]; Titrate


   PRN Reason: Protocol


   Last Titration: 04/26/17 22:01 Dose:  0 mls/hr


Vancomycin HCl 750 mg/ Sodium (Chloride)  250 mls @ 125 mls/hr IVPB Q16H [facility]


   Last Admin: 04/27/17 08:17 Dose:  125 mls/hr


Insulin Glargine (Lantus)  18 unit SQ HS [facility]


   Last Admin: 04/26/17 21:59 Dose:  18 unit


Insulin Human Lispro (Humalog)  0 unit SQ ACHS [facility]


   PRN Reason: Protocol


   Last Admin: 04/27/17 12:31 Dose:  6 unit


Lisinopril (Zestril)  10 mg PO QAM [facility]


   Last Admin: 04/27/17 10:59 Dose:  10 mg


Metoclopramide HCl (Reglan)  5 mg PO AC-TID [facility]


   Last Admin: 04/27/17 11:01 Dose:  5 mg


Miscellaneous Information (Vancomycin Trough Due)  0 each MISCELLANE AS 

DIRECTED ONE


   Stop: 04/28/17 15:01


Nitroglycerin (Nitrostat)  0.4 mg SUBLINGUAL Q5M PRN


   PRN Reason: Chest Pain


Ondansetron HCl (Zofran)  4 mg PO DAILY PRN


   PRN Reason: Nausea


Polyethylene Glycol (Miralax)  17 gm PO DAILY PRN


   PRN Reason: Constipation


Senna (Senokot)  8.6 mg PO DAILY PRN


   PRN Reason: Constipation


Vitamin B Complex/Vit C/Vit E/Zinc (Z-Bec)  1 each PO DAILY@1200 [facility]


   Last Admin: 04/27/17 11:01 Dose:  1 each





 Plan:





1.  Discharge when okay with with primary care, follow-up in the wound center 

next Wednesday, 05/03/2017 at 10:30 AM with Dr. Ferrell.

## 2017-04-28 VITALS — SYSTOLIC BLOOD PRESSURE: 162 MMHG | DIASTOLIC BLOOD PRESSURE: 75 MMHG | TEMPERATURE: 96.7 F

## 2017-04-28 VITALS — RESPIRATION RATE: 18 BRPM

## 2017-04-28 VITALS — HEART RATE: 56 BPM

## 2017-04-28 LAB
ANION GAP SERPL CALC-SCNC: 5 MMOL/L
BUN SERPL-SCNC: 27 MG/DL (ref 7–17)
CALCIUM SPEC-MCNC: 9.3 MG/DL (ref 8.4–10.2)
CHLORIDE SERPL-SCNC: 113 MMOL/L (ref 98–107)
CO2 SERPL-SCNC: 27 MMOL/L (ref 22–30)
GLUCOSE BLD-MCNC: 437 MG/DL (ref 75–99)
GLUCOSE BLD-MCNC: 70 MG/DL (ref 75–99)
GLUCOSE SERPL-MCNC: 58 MG/DL (ref 74–99)
NON-AFRICAN AMERICAN GFR(MDRD): 54
POTASSIUM SERPL-SCNC: 5.4 MMOL/L (ref 3.5–5.1)
SODIUM SERPL-SCNC: 145 MMOL/L (ref 137–145)

## 2017-04-28 RX ADMIN — DIVALPROEX SODIUM SCH MG: 250 TABLET, DELAYED RELEASE ORAL at 08:16

## 2017-04-28 RX ADMIN — METOCLOPRAMIDE SCH MG: 5 TABLET ORAL at 11:32

## 2017-04-28 RX ADMIN — Medication SCH MG: at 08:15

## 2017-04-28 RX ADMIN — HYDROCODONE BITARTRATE AND ACETAMINOPHEN PRN EACH: 7.5; 325 TABLET ORAL at 08:27

## 2017-04-28 RX ADMIN — SODIUM CHLORIDE SCH MLS/HR: 9 INJECTION, SOLUTION INTRAVENOUS at 00:23

## 2017-04-28 RX ADMIN — FAMOTIDINE SCH MG: 20 TABLET, FILM COATED ORAL at 08:15

## 2017-04-28 RX ADMIN — Medication SCH EACH: at 11:32

## 2017-04-28 RX ADMIN — METOCLOPRAMIDE SCH MG: 5 TABLET ORAL at 08:15

## 2017-04-28 RX ADMIN — INSULIN LISPRO SCH UNIT: 100 INJECTION, SOLUTION INTRAVENOUS; SUBCUTANEOUS at 13:35

## 2017-04-28 RX ADMIN — ASPIRIN 81 MG CHEWABLE TABLET SCH MG: 81 TABLET CHEWABLE at 08:15

## 2017-04-28 RX ADMIN — OXYCODONE HYDROCHLORIDE AND ACETAMINOPHEN SCH MG: 500 TABLET ORAL at 08:15

## 2017-04-28 RX ADMIN — BUDESONIDE AND FORMOTEROL FUMARATE DIHYDRATE SCH PUFF: 160; 4.5 AEROSOL RESPIRATORY (INHALATION) at 09:26

## 2017-04-28 RX ADMIN — ISODIUM CHLORIDE PRN MG: 0.03 SOLUTION RESPIRATORY (INHALATION) at 09:26

## 2017-04-28 RX ADMIN — INSULIN LISPRO SCH: 100 INJECTION, SOLUTION INTRAVENOUS; SUBCUTANEOUS at 08:12

## 2017-04-28 RX ADMIN — FOLIC ACID SCH MG: 1 TABLET ORAL at 08:15

## 2017-04-28 RX ADMIN — CITALOPRAM HYDROBROMIDE SCH MG: 20 TABLET ORAL at 08:15

## 2017-04-28 RX ADMIN — LISINOPRIL SCH MG: 10 TABLET ORAL at 08:15

## 2017-05-06 ENCOUNTER — HOSPITAL ENCOUNTER (EMERGENCY)
Dept: HOSPITAL 47 - EC | Age: 57
Discharge: HOME | End: 2017-05-06
Payer: COMMERCIAL

## 2017-05-06 VITALS — DIASTOLIC BLOOD PRESSURE: 56 MMHG | HEART RATE: 64 BPM | SYSTOLIC BLOOD PRESSURE: 135 MMHG | RESPIRATION RATE: 18 BRPM

## 2017-05-06 DIAGNOSIS — I25.2: ICD-10-CM

## 2017-05-06 DIAGNOSIS — F31.9: ICD-10-CM

## 2017-05-06 DIAGNOSIS — E78.5: ICD-10-CM

## 2017-05-06 DIAGNOSIS — F41.9: ICD-10-CM

## 2017-05-06 DIAGNOSIS — Z88.5: ICD-10-CM

## 2017-05-06 DIAGNOSIS — R53.83: ICD-10-CM

## 2017-05-06 DIAGNOSIS — Z79.899: ICD-10-CM

## 2017-05-06 DIAGNOSIS — Z79.51: ICD-10-CM

## 2017-05-06 DIAGNOSIS — Z91.030: ICD-10-CM

## 2017-05-06 DIAGNOSIS — Z79.82: ICD-10-CM

## 2017-05-06 DIAGNOSIS — Z88.1: ICD-10-CM

## 2017-05-06 DIAGNOSIS — I50.9: ICD-10-CM

## 2017-05-06 DIAGNOSIS — Z88.8: ICD-10-CM

## 2017-05-06 DIAGNOSIS — R06.02: ICD-10-CM

## 2017-05-06 DIAGNOSIS — F17.200: ICD-10-CM

## 2017-05-06 DIAGNOSIS — Z86.73: ICD-10-CM

## 2017-05-06 DIAGNOSIS — Z79.4: ICD-10-CM

## 2017-05-06 DIAGNOSIS — R05: ICD-10-CM

## 2017-05-06 DIAGNOSIS — Z88.0: ICD-10-CM

## 2017-05-06 DIAGNOSIS — D64.9: ICD-10-CM

## 2017-05-06 DIAGNOSIS — E11.42: ICD-10-CM

## 2017-05-06 DIAGNOSIS — Z86.718: ICD-10-CM

## 2017-05-06 DIAGNOSIS — E03.9: ICD-10-CM

## 2017-05-06 DIAGNOSIS — I11.0: ICD-10-CM

## 2017-05-06 DIAGNOSIS — E11.649: Primary | ICD-10-CM

## 2017-05-06 DIAGNOSIS — K21.9: ICD-10-CM

## 2017-05-06 LAB
ALP SERPL-CCNC: 52 U/L (ref 38–126)
ALT SERPL-CCNC: 34 U/L (ref 9–52)
ANION GAP SERPL CALC-SCNC: 6 MMOL/L
AST SERPL-CCNC: 30 U/L (ref 14–36)
BASOPHILS # BLD AUTO: 0 K/UL (ref 0–0.2)
BASOPHILS NFR BLD AUTO: 0 %
BUN SERPL-SCNC: 40 MG/DL (ref 7–17)
CALCIUM SPEC-MCNC: 8.9 MG/DL (ref 8.4–10.2)
CH: 31
CHCM: 31.3
CHLORIDE SERPL-SCNC: 110 MMOL/L (ref 98–107)
CK SERPL-CCNC: 35 U/L (ref 30–135)
CO2 SERPL-SCNC: 22 MMOL/L (ref 22–30)
EOSINOPHIL # BLD AUTO: 0.1 K/UL (ref 0–0.7)
EOSINOPHIL NFR BLD AUTO: 1 %
ERYTHROCYTE [DISTWIDTH] IN BLOOD BY AUTOMATED COUNT: 3.37 M/UL (ref 3.8–5.4)
ERYTHROCYTE [DISTWIDTH] IN BLOOD: 13.1 % (ref 11.5–15.5)
GLUCOSE BLD-MCNC: 266 MG/DL (ref 75–99)
GLUCOSE SERPL-MCNC: 287 MG/DL (ref 74–99)
HCT VFR BLD AUTO: 33.6 % (ref 34–46)
HDW: 2.15
HGB BLD-MCNC: 10.5 GM/DL (ref 11.4–16)
LUC NFR BLD AUTO: 2 %
LYMPHOCYTES # SPEC AUTO: 1.4 K/UL (ref 1–4.8)
LYMPHOCYTES NFR SPEC AUTO: 23 %
MAGNESIUM SPEC-SCNC: 2.3 MG/DL (ref 1.6–2.3)
MCH RBC QN AUTO: 31.2 PG (ref 25–35)
MCHC RBC AUTO-ENTMCNC: 31.3 G/DL (ref 31–37)
MCV RBC AUTO: 99.6 FL (ref 80–100)
MONOCYTES # BLD AUTO: 0.4 K/UL (ref 0–1)
MONOCYTES NFR BLD AUTO: 7 %
NEUTROPHILS # BLD AUTO: 4.1 K/UL (ref 1.3–7.7)
NEUTROPHILS NFR BLD AUTO: 67 %
NON-AFRICAN AMERICAN GFR(MDRD): 46
PROT SERPL-MCNC: 6.6 G/DL (ref 6.3–8.2)
SODIUM SERPL-SCNC: 138 MMOL/L (ref 137–145)
WBC # BLD AUTO: 0.13 10*3/UL
WBC # BLD AUTO: 6.1 K/UL (ref 3.8–10.6)
WBC (PEROX): 6.42

## 2017-05-06 PROCEDURE — 85025 COMPLETE CBC W/AUTO DIFF WBC: CPT

## 2017-05-06 PROCEDURE — 36415 COLL VENOUS BLD VENIPUNCTURE: CPT

## 2017-05-06 PROCEDURE — 80053 COMPREHEN METABOLIC PANEL: CPT

## 2017-05-06 PROCEDURE — 82553 CREATINE MB FRACTION: CPT

## 2017-05-06 PROCEDURE — 83735 ASSAY OF MAGNESIUM: CPT

## 2017-05-06 PROCEDURE — 96360 HYDRATION IV INFUSION INIT: CPT

## 2017-05-06 PROCEDURE — 99285 EMERGENCY DEPT VISIT HI MDM: CPT

## 2017-05-06 PROCEDURE — 82550 ASSAY OF CK (CPK): CPT

## 2017-05-06 NOTE — ED
General Adult HPI





- General


Chief complaint: Recheck/Abnormal Lab/Rx


Stated complaint: diabetic issues


Time Seen by Provider: 17 20:28


Source: patient, RN notes reviewed, old records reviewed


Mode of arrival: EMS


Limitations: no limitations





- History of Present Illness


Initial comments: 





Physical 57-year-old female with chief complaint of hypoglycemic episode 

earlier today.  Patient's family reports that she was very tired all day today.

  They state that she did not eat much.  They state that she was acting almost 

as if she had a stroke.  She was on response.  They were able to get her some 

orange juice and pizza.  They state that she did start to perk up a bit after 

that.  They report that she's had a history of stroke in the past and does have 

a chronic left-sided arm paralysis and left-sided facial droop.  Patient denies 

any abdominal pain, dysuria or hematuria.  She reports that she's had a cough 

and felt slightly short of breath.  She denies any chest pain.  She states that 

she always has neck pain.  Patient family reports that she has just been 

generally fatigued today.Patient has a past medical history of chest pain, 

heart failure, COPD, diabetes, hypertension, thyroid disorder.  Patient's 

family reports that she was recently at her toe removed and is on antibiotics.  

She was discharged not too long ago at that time.  They state that they did 

change her blood pressure medication and decrease her lisinopril.





- Related Data


 Home Medications











 Medication  Instructions  Recorded  Confirmed


 


hydrALAZINE HCL [Apresoline] 50 mg PO TID 14


 


Nitroglycerin Sl Tabs [Nitrostat] 0.4 mg SUBLINGUAL Q5M PRN 01/15/15 05/06/17


 


Albuterol Inhaler [Ventolin Hfa 2 puff INHALATION RT-Q6H PRN 16





Inhaler]   


 


Aspirin EC [Ecotrin Low Dose] 81 mg PO DAILY 16


 


Atorvastatin [Lipitor] 20 mg PO HS 16


 


Citalopram Hydrobromide [CeleXA] 20 mg PO DAILY 16


 


Famotidine [Pepcid] 20 mg PO BID-W/MEALS 16


 


Insulin Lispro [humaLOG] 4 units SQ TID-W/MEALS 16


 


Metoclopramide [Reglan] 5 mg PO AC-TID 16


 


Valproic Acid [Depakene] 250 mg PO DAILY 16


 


Vitamin B Complex 1 cap PO DAILY 16


 


Budesonide-Formot 160-4.5 Mcg 2 puff INHALATION RT-BID 10/06/16 05/06/17





[Symbicort 160-4.5 Mcg Inhaler]   


 


Ergocalciferol [Vitamin D2 50,000 unit PO SA 10/06/16 05/06/17





(DRISDOL)]   


 


Ondansetron [Zofran] 4 mg PO DAILY PRN 17


 


INSULIN LISPRO (HumaLOG) [humaLOG] See Protocol SQ TID-W/MEALS 04/10/17 05/06/17


 


ALPRAZolam [Xanax] 0.5 mg PO TID PRN 17


 


Ascorbic Acid [Vitamin C] 500 mg PO DAILY 17


 


HYDROcodone/APAP 10-325MG [Norco 1 tab PO Q6H PRN 17





]   








 Previous Rx's











 Medication  Instructions  Recorded


 


Insulin Glargine [Lantus] 18 unit SQ HS #0  17


 


Lisinopril [Zestril] 2.5 mg PO DAILY #30 tab 17











 Allergies











Allergy/AdvReac Type Severity Reaction Status Date / Time


 


Barbiturates Allergy  Rash/Hives Verified 17 11:05


 


cephalexin monohydrate Allergy  Rash/Hives Verified 17 11:05





[From Keflex]     


 


morphine Allergy  Rash/Hives Verified 17 11:05


 


Penicillins Allergy  Rash/Hives Verified 17 11:05


 


phenobarbital Allergy  Swelling Verified 17 11:05


 


venom-honey bee Allergy  Swelling Verified 17 11:05





[bee venom (honey bee)]     


 


amlodipine besylate AdvReac  Vomiting Verified 17 11:05





[From Norvasc]     














Review of Systems


ROS Statement: 


Those systems with pertinent positive or pertinent negative responses have been 

documented in the HPI.





ROS Other: All systems not noted in ROS Statement are negative.





Past Medical History


Past Medical History: Chest Pain / Angina, Heart Failure, COPD, CVA/TIA, 

Diabetes Mellitus, Deep Vein Thrombosis (DVT), Eye Disorder, GERD/Reflux, 

Hyperlipidemia, Hypertension, Myocardial Infarction (MI), Thyroid Disorder


Additional Past Medical History / Comment(s): HX OF CVA X3 (LAST 2015)-HAS LT 

ARM PARALYSIS & WEAKNESS LEFT LEG. MI .  DVT RT  AXILLA. RENAL FAILURE.  

LOW THYROID,  PERIPHERAL NEUROPATHY HANDS & FEET.  ANEMIA.  HX OF DKA. USES W/

C. CONSTIPATION, ESOPHAGITIS.  EPIGLOTITIS.  HEADACHES SINCE CVA, uses a 

wheelchair.  RETINOPATHY MARZENA EYES.  HX RT TOE INFECTION, GANGRENE, HAD AMP.


Last Myocardial Infarction Date:: 


History of Any Multi-Drug Resistant Organisms: MRSA


Date of last positivie culture/infection: 2013


MDRO Source:: Right Foot


Past Surgical History: Appendectomy,  Section, Cholecystectomy, Heart 

Catheterization With Stent, Hysterectomy, Orthopedic Surgery


Additional Past Surgical History / Comment(s): Amputation Rt 2ND Toe.  C-S X3.  

EGD.  Bronchoscopy.  RT Arm Port Placed FOR AB RX; Removed.  6 CARDIAC STENTS. 

left shoulder bone removed


Past Anesthesia/Blood Transfusion Reactions: No Reported Reaction


Additional Past Anesthesia/Blood Transfusion Reaction / Comment(s): HX OF BLOOD 

TRANSFUSION- NO REACTION


Date of Last Stent Placement:: 2013


Past Psychological History: Anxiety, Bipolar, Depression


Additional Psychological History / Comment(s): HER adult son lives with her.


Smoking Status: Current every day smoker


Past Alcohol Use History: None Reported


Additional Past Alcohol Use History / Comment(s): SMOKED FOR 36 YRS. 1PPD.  

STARTED SMOKING AGE 19, QUIT 2015, RECENTLY SMOKING.


Past Drug Use History: None Reported





- Past Family History


  ** Father


Family Medical History: Unable to Obtain, Coronary Artery Disease (CAD), 

Diabetes Mellitus





  ** Mother


Family Medical History: COPD





General Exam





- General Exam Comments


Initial Comments: 





This is a 37-year-old female.  Patient has evidence of chronic stroke residual 

symptoms with left arm paralysis.  She doesn't appear to be in any acute 

distress at this time.


Limitations: no limitations


General appearance: alert, in no apparent distress


Head exam: Present: atraumatic, normocephalic, normal inspection


Eye exam: Present: normal appearance, PERRL, EOMI.  Absent: scleral icterus, 

conjunctival injection, periorbital swelling


ENT exam: Present: normal exam, mucous membranes moist


Neck exam: Present: normal inspection.  Absent: tenderness, meningismus, 

lymphadenopathy


Respiratory exam: Present: normal lung sounds bilaterally.  Absent: respiratory 

distress, wheezes, rales, rhonchi, stridor


Cardiovascular Exam: Present: regular rate, normal rhythm, normal heart sounds.

  Absent: systolic murmur, diastolic murmur, rubs, gallop, clicks


GI/Abdominal exam: Present: soft, normal bowel sounds.  Absent: distended, 

tenderness, guarding, rebound, rigid


Extremities exam: Present: normal inspection, full ROM, normal capillary 

refill.  Absent: tenderness, pedal edema, joint swelling, calf tenderness


  ** Left


Forearm Wrist exam: Absent: normal inspection, full ROM


Hand Wrist exam: Absent: normal inspection, full ROM (chronic left arm and hand 

contraction.)


Back exam: Present: normal inspection


Neurological exam: Present: alert, oriented X3, CN II-XII intact


Psychiatric exam: Present: normal affect, normal mood


Skin exam: Present: warm, dry, intact, normal color.  Absent: rash





Course


 Vital Signs











  17





  20:27 23:04


 


Pulse Rate 67 64


 


Respiratory 20 18





Rate  


 


Blood Pressure 86/47 135/56


 


O2 Sat by Pulse 99 97





Oximetry  














Medical Decision Making





- Medical Decision Making


Physical 57-year-old female with chief complaint of hypoglycemic episode 

earlier today.  Patient's family reports that she was very tired all day today.

  They state that she did not eat much.  They state that she was acting almost 

as if she had a stroke.  She was on response.  They were able to get her some 

orange juice and pizza.  They state that she did start to perk up a bit after 

that.  They report that she's had a history of stroke in the past and does have 

a chronic left-sided arm paralysis and left-sided facial droop.  Patient denies 

any abdominal pain, dysuria or hematuria.  She reports that she's had a cough 

and felt slightly short of breath.  She denies any chest pain.  She states that 

she always has neck pain.  Patient family reports that she has just been 

generally fatigued today.Patient has a past medical history of chest pain, 

heart failure, COPD, diabetes, hypertension, thyroid disorder.  Patient's 

family reports that she was recently at her toe removed and is on antibiotics.  

She was discharged not too long ago at that time.  They state that they did 

change her blood pressure medication and decrease her lisinopril.





Blood glucose checked, and 260. Patient is reportedly much better at this time. 

Patient BP initially as 86/56. Patient given IV fluids and BP returned to 130/

56. PAtient reprots she is feeling much better and wants to go home, labs 

reviewed and are negative besides slightly elevated CR. Compared to previous 

labs 4 months ago, no significant change.  Patient agrees to treatment plan and 

close follow up with PCP. 








- Lab Data


Result diagrams: 


 17 22:00





 17 22:00


 Lab Results











  17 Range/Units





  21:17 22:00 22:00 


 


WBC   6.1   (3.8-10.6)  k/uL


 


RBC   3.37 L   (3.80-5.40)  m/uL


 


Hgb   10.5 L   (11.4-16.0)  gm/dL


 


Hct   33.6 L   (34.0-46.0)  %


 


MCV   99.6   (80.0-100.0)  fL


 


MCH   31.2   (25.0-35.0)  pg


 


MCHC   31.3   (31.0-37.0)  g/dL


 


RDW   13.1   (11.5-15.5)  %


 


Plt Count   192   (150-450)  k/uL


 


Neutrophils %   67   %


 


Lymphocytes %   23   %


 


Monocytes %   7   %


 


Eosinophils %   1   %


 


Basophils %   0   %


 


Neutrophils #   4.1   (1.3-7.7)  k/uL


 


Lymphocytes #   1.4   (1.0-4.8)  k/uL


 


Monocytes #   0.4   (0-1.0)  k/uL


 


Eosinophils #   0.1   (0-0.7)  k/uL


 


Basophils #   0.0   (0-0.2)  k/uL


 


Sodium    138  (137-145)  mmol/L


 


Potassium      (3.5-5.1)  mmol/L


 


Chloride    110 H  ()  mmol/L


 


Carbon Dioxide    22  (22-30)  mmol/L


 


Anion Gap    6  mmol/L


 


BUN    40 H  (7-17)  mg/dL


 


Creatinine    1.20 H  (0.52-1.04)  mg/dL


 


Est GFR (MDRD) Af Amer    56  (>60 ml/min/1.73 sqM)  


 


Est GFR (MDRD) Non-Af    46  (>60 ml/min/1.73 sqM)  


 


Glucose    287 H  (74-99)  mg/dL


 


POC Glucose (mg/dL)  266 H    (75-99)  mg/dL


 


POC Glu Operater ID  Arft, Ricky    


 


Calcium    8.9  (8.4-10.2)  mg/dL


 


Magnesium    2.3  (1.6-2.3)  mg/dL


 


Total Bilirubin    0.6  (0.2-1.3)  mg/dL


 


AST    30  (14-36)  U/L


 


ALT    34  (9-52)  U/L


 


Alkaline Phosphatase    52  ()  U/L


 


Total Creatine Kinase     ()  U/L


 


CK-MB (CK-2)     (0.0-2.4)  ng/mL


 


CK-MB (CK-2) Rel Index     


 


Total Protein    6.6  (6.3-8.2)  g/dL


 


Albumin    3.6  (3.5-5.0)  g/dL














  17 Range/Units





  22:00 


 


WBC   (3.8-10.6)  k/uL


 


RBC   (3.80-5.40)  m/uL


 


Hgb   (11.4-16.0)  gm/dL


 


Hct   (34.0-46.0)  %


 


MCV   (80.0-100.0)  fL


 


MCH   (25.0-35.0)  pg


 


MCHC   (31.0-37.0)  g/dL


 


RDW   (11.5-15.5)  %


 


Plt Count   (150-450)  k/uL


 


Neutrophils %   %


 


Lymphocytes %   %


 


Monocytes %   %


 


Eosinophils %   %


 


Basophils %   %


 


Neutrophils #   (1.3-7.7)  k/uL


 


Lymphocytes #   (1.0-4.8)  k/uL


 


Monocytes #   (0-1.0)  k/uL


 


Eosinophils #   (0-0.7)  k/uL


 


Basophils #   (0-0.2)  k/uL


 


Sodium   (137-145)  mmol/L


 


Potassium   (3.5-5.1)  mmol/L


 


Chloride   ()  mmol/L


 


Carbon Dioxide   (22-30)  mmol/L


 


Anion Gap   mmol/L


 


BUN   (7-17)  mg/dL


 


Creatinine   (0.52-1.04)  mg/dL


 


Est GFR (MDRD) Af Amer   (>60 ml/min/1.73 sqM)  


 


Est GFR (MDRD) Non-Af   (>60 ml/min/1.73 sqM)  


 


Glucose   (74-99)  mg/dL


 


POC Glucose (mg/dL)   (75-99)  mg/dL


 


POC Glu Operater ID   


 


Calcium   (8.4-10.2)  mg/dL


 


Magnesium   (1.6-2.3)  mg/dL


 


Total Bilirubin   (0.2-1.3)  mg/dL


 


AST   (14-36)  U/L


 


ALT   (9-52)  U/L


 


Alkaline Phosphatase   ()  U/L


 


Total Creatine Kinase  35  ()  U/L


 


CK-MB (CK-2)  1.2  (0.0-2.4)  ng/mL


 


CK-MB (CK-2) Rel Index  3.4  


 


Total Protein   (6.3-8.2)  g/dL


 


Albumin   (3.5-5.0)  g/dL














Disposition


Clinical Impression: 


 Hypoglycemia





Disposition: HOME SELF-CARE


Condition: Good


Instructions:  Hypoglycemia in a Person with Diabetes (ED)


Additional Instructions: 


Patient advised to rest, remain hydrated.  Follow-up with primary care provider 

Monday.  Return to the emergency department if any alarming signs or symptoms 

occur.


Referrals: 


Saniya Cabral MD [Primary Care Provider] - 1-2 days


Time of Disposition: 23:17

## 2017-05-30 ENCOUNTER — HOSPITAL ENCOUNTER (EMERGENCY)
Dept: HOSPITAL 47 - EC | Age: 57
Discharge: HOME | End: 2017-05-30
Payer: COMMERCIAL

## 2017-05-30 VITALS — SYSTOLIC BLOOD PRESSURE: 149 MMHG | TEMPERATURE: 99.3 F | HEART RATE: 71 BPM | DIASTOLIC BLOOD PRESSURE: 69 MMHG

## 2017-05-30 VITALS — RESPIRATION RATE: 16 BRPM

## 2017-05-30 DIAGNOSIS — I25.2: ICD-10-CM

## 2017-05-30 DIAGNOSIS — Z79.82: ICD-10-CM

## 2017-05-30 DIAGNOSIS — M25.552: ICD-10-CM

## 2017-05-30 DIAGNOSIS — J44.9: ICD-10-CM

## 2017-05-30 DIAGNOSIS — Z86.73: ICD-10-CM

## 2017-05-30 DIAGNOSIS — M25.562: ICD-10-CM
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
## 2017-05-30 DIAGNOSIS — Z88.0: ICD-10-CM

## 2017-05-30 DIAGNOSIS — E78.5: ICD-10-CM

## 2017-05-30 DIAGNOSIS — Z79.51: ICD-10-CM

## 2017-05-30 DIAGNOSIS — M25.512: ICD-10-CM

## 2017-05-30 DIAGNOSIS — S62.102A: Primary | ICD-10-CM

## 2017-05-30 DIAGNOSIS — I50.9: ICD-10-CM

## 2017-05-30 DIAGNOSIS — Z88.8: ICD-10-CM

## 2017-05-30 DIAGNOSIS — E11.65: ICD-10-CM

## 2017-05-30 DIAGNOSIS — F31.9: ICD-10-CM

## 2017-05-30 DIAGNOSIS — Z79.4: ICD-10-CM

## 2017-05-30 DIAGNOSIS — I11.0: ICD-10-CM

## 2017-05-30 DIAGNOSIS — F17.200: ICD-10-CM

## 2017-05-30 DIAGNOSIS — D64.9: ICD-10-CM

## 2017-05-30 DIAGNOSIS — E11.42: ICD-10-CM

## 2017-05-30 DIAGNOSIS — R41.82: ICD-10-CM

## 2017-05-30 DIAGNOSIS — Z88.1: ICD-10-CM

## 2017-05-30 DIAGNOSIS — Z88.5: ICD-10-CM

## 2017-05-30 DIAGNOSIS — N19: ICD-10-CM

## 2017-05-30 DIAGNOSIS — Z79.899: ICD-10-CM

## 2017-05-30 DIAGNOSIS — E07.9: ICD-10-CM

## 2017-05-30 DIAGNOSIS — Z91.030: ICD-10-CM

## 2017-05-30 DIAGNOSIS — F41.9: ICD-10-CM

## 2017-05-30 DIAGNOSIS — I10: ICD-10-CM

## 2017-05-30 LAB
ALP SERPL-CCNC: 65 U/L (ref 38–126)
ALT SERPL-CCNC: 25 U/L (ref 9–52)
ANION GAP SERPL CALC-SCNC: 9 MMOL/L
APTT BLD: 19.9 SEC (ref 22–30)
AST SERPL-CCNC: 19 U/L (ref 14–36)
BASOPHILS # BLD AUTO: 0 K/UL (ref 0–0.2)
BASOPHILS NFR BLD AUTO: 0 %
BUN SERPL-SCNC: 32 MG/DL (ref 7–17)
CALCIUM SPEC-MCNC: 9.2 MG/DL (ref 8.4–10.2)
CH: 30.6
CHCM: 30.2
CHLORIDE SERPL-SCNC: 105 MMOL/L (ref 98–107)
CK SERPL-CCNC: 46 U/L (ref 30–135)
CO2 SERPL-SCNC: 24 MMOL/L (ref 22–30)
EOSINOPHIL # BLD AUTO: 0 K/UL (ref 0–0.7)
EOSINOPHIL NFR BLD AUTO: 0 %
ERYTHROCYTE [DISTWIDTH] IN BLOOD BY AUTOMATED COUNT: 3.33 M/UL (ref 3.8–5.4)
ERYTHROCYTE [DISTWIDTH] IN BLOOD: 12.7 % (ref 11.5–15.5)
GLUCOSE BLD-MCNC: 190 MG/DL (ref 75–99)
GLUCOSE BLD-MCNC: 352 MG/DL (ref 75–99)
GLUCOSE BLD-MCNC: 434 MG/DL (ref 75–99)
GLUCOSE SERPL-MCNC: 486 MG/DL (ref 74–99)
GLUCOSE UR QL: (no result)
HCT VFR BLD AUTO: 33.9 % (ref 34–46)
HDW: 2.21
HGB BLD-MCNC: 10.2 GM/DL (ref 11.4–16)
INR PPP: 0.9 (ref ?–1.1)
LUC NFR BLD AUTO: 1 %
LYMPHOCYTES # SPEC AUTO: 0.8 K/UL (ref 1–4.8)
LYMPHOCYTES NFR SPEC AUTO: 12 %
MCH RBC QN AUTO: 30.7 PG (ref 25–35)
MCHC RBC AUTO-ENTMCNC: 30.1 G/DL (ref 31–37)
MCV RBC AUTO: 101.8 FL (ref 80–100)
MONOCYTES # BLD AUTO: 0.4 K/UL (ref 0–1)
MONOCYTES NFR BLD AUTO: 5 %
NEUTROPHILS # BLD AUTO: 5.5 K/UL (ref 1.3–7.7)
NEUTROPHILS NFR BLD AUTO: 82 %
NON-AFRICAN AMERICAN GFR(MDRD): 46
PARTICLE COUNT: 3887
PH BLDV: 7.26 [PH] (ref 7.31–7.41)
PH UR: 5.5 [PH] (ref 5–8)
POTASSIUM SERPL-SCNC: 5.4 MMOL/L (ref 3.5–5.1)
PROT SERPL-MCNC: 6.9 G/DL (ref 6.3–8.2)
PT BLD: 9.7 SEC (ref 9–12)
RBC UR QL: 2 /HPF (ref 0–5)
SODIUM SERPL-SCNC: 138 MMOL/L (ref 137–145)
SP GR UR: 1.01 (ref 1–1.03)
SQUAMOUS UR QL AUTO: <1 /HPF (ref 0–4)
TROPONIN I SERPL-MCNC: <0.012 NG/ML (ref 0–0.03)
UA BILLING (MACRO VS. MICRO): (no result)
UROBILINOGEN UR QL STRIP: <2 MG/DL (ref ?–2)
WBC # BLD AUTO: 0.05 10*3/UL
WBC # BLD AUTO: 6.8 K/UL (ref 3.8–10.6)
WBC (PEROX): 7.38

## 2017-05-30 PROCEDURE — 96360 HYDRATION IV INFUSION INIT: CPT

## 2017-05-30 PROCEDURE — 81001 URINALYSIS AUTO W/SCOPE: CPT

## 2017-05-30 PROCEDURE — 73502 X-RAY EXAM HIP UNI 2-3 VIEWS: CPT

## 2017-05-30 PROCEDURE — 82553 CREATINE MB FRACTION: CPT

## 2017-05-30 PROCEDURE — 85730 THROMBOPLASTIN TIME PARTIAL: CPT

## 2017-05-30 PROCEDURE — 80306 DRUG TEST PRSMV INSTRMNT: CPT

## 2017-05-30 PROCEDURE — 93005 ELECTROCARDIOGRAM TRACING: CPT

## 2017-05-30 PROCEDURE — 80053 COMPREHEN METABOLIC PANEL: CPT

## 2017-05-30 PROCEDURE — 71020: CPT

## 2017-05-30 PROCEDURE — 36415 COLL VENOUS BLD VENIPUNCTURE: CPT

## 2017-05-30 PROCEDURE — 87040 BLOOD CULTURE FOR BACTERIA: CPT

## 2017-05-30 PROCEDURE — 87086 URINE CULTURE/COLONY COUNT: CPT

## 2017-05-30 PROCEDURE — 82803 BLOOD GASES ANY COMBINATION: CPT

## 2017-05-30 PROCEDURE — 84484 ASSAY OF TROPONIN QUANT: CPT

## 2017-05-30 PROCEDURE — 99285 EMERGENCY DEPT VISIT HI MDM: CPT

## 2017-05-30 PROCEDURE — 82550 ASSAY OF CK (CPK): CPT

## 2017-05-30 PROCEDURE — 29125 APPL SHORT ARM SPLINT STATIC: CPT

## 2017-05-30 PROCEDURE — 85025 COMPLETE CBC W/AUTO DIFF WBC: CPT

## 2017-05-30 PROCEDURE — 70450 CT HEAD/BRAIN W/O DYE: CPT

## 2017-05-30 PROCEDURE — 85610 PROTHROMBIN TIME: CPT

## 2017-05-30 NOTE — XR
EXAMINATION TYPE: XR chest 2V

 

DATE OF EXAM: 5/30/2017

 

COMPARISON: Chest x-ray 7/11/2015 

 

HISTORY: altered mental status

 

TECHNIQUE:  Frontal and lateral views of the chest are obtained.

 

FINDINGS:  There is no pleural effusion, or pneumothorax seen.  The cardiac silhouette size is within
 normal limits.  Patchy basilar density is noted. The osseous structures are intact.

 

IMPRESSION:  Basilar atelectasis. Low lung volumes, patient is rotated.

## 2017-05-30 NOTE — XR
EXAMINATION TYPE: XR wrist complete LT

 

DATE OF EXAM: 5/30/2017

 

CLINICAL HISTORY: Fall injury 2 weeks ago with persistent pain

 

TECHNIQUE:  Frontal, lateral, scaphoid, and oblique images of the left wrist are obtained.

 

COMPARISON: Left wrist x-ray [place] [facility], 2011.

 

FINDINGS: Osseous structures are demineralized. There is new irregularity with suspected tiny avulsio
n type subacute fracture at the radial aspect distal radial epiphysis. Carpal joint spaces are preser
roshan. Overlying peripheral IV in the dorsum of wrist is present with moderate soft tissue swelling not
ed.

 

IMPRESSION:  There is probable acute/subacute nondisplaced intra-articular fracture of distal radial 
epiphysis with new cortical irregularity present.

## 2017-05-30 NOTE — XR
EXAMINATION TYPE: XR Hip Complete LT

 

DATE OF EXAM: 5/30/2017

 

CLINICAL HISTORY: Left hip pain after fall.

 

TECHNIQUE:  AP and frogleg views of the left hip are obtained.

 

COMPARISON: Abdominal x-ray December 2, 2014.

 

FINDINGS:  There is no acute fracture/dislocation evident in the left hip. There is subchondral lucen
cy in the femoral head which have lost normal spherical shape. Underlying avascular necrosis is suspe
cted. This can be confirmed with nonemergent MRI if desired. Finding is progressed from 2014 study. S
ome new ossific fragmentation is felt present. Scattered pelvic phleboliths are redemonstrated.

 

IMPRESSION:  There is no acute fracture or dislocation in the left hip.

## 2017-05-30 NOTE — XR
EXAMINATION TYPE: XR shoulder complete LT

 

DATE OF EXAM: 5/30/2017

 

CLINICAL HISTORY: Left shoulder pain after fall. History of left-sided paralysis.

 

TECHNIQUE:  Three views of the left shoulder are obtained.

 

COMPARISON: Left shoulder x-ray September 8, 2016 

 

FINDINGS:  There is no new acute fracture/dislocation evident in the left shoulder. Osseous structure
s are demineralized.. There is redemonstration of displaced fracture through distal left clavicle. Gl
enohumeral joint is maintained.  The visualized ribs are intact and unremarkable.

 

IMPRESSION:  There is no new acute fracture or dislocation in the left shoulder.

## 2017-05-30 NOTE — XR
EXAMINATION TYPE: XR knee complete LT

 

DATE OF EXAM: 5/30/2017

 

CLINICAL HISTORY: Left knee pain

 

TECHNIQUE:  Three views of the left knee are obtained.

 

COMPARISON: None.

 

FINDINGS:  Osseous structures are somewhat demineralized. Evaluation suboptimal due to incomplete ext
ension. There is no acute fracture/dislocation evident in left knee.  The tri-compartment joint space
s appear within normal limits. Some vascular calcification in the posterior soft tissue is noted

 

IMPRESSION:  There is no acute fracture or dislocation in the left knee.

## 2017-05-30 NOTE — CT
EXAMINATION TYPE: CT brain wo con

 

DATE OF EXAM: 5/30/2017 2:13 PM

 

HISTORY: Altered mental status

 

CT DLP: 1085 mGycm.  Automated Exposure Control for Dose Reduction was Utilized.

 

TECHNIQUE: CT scan of the head is performed without contrast.

 

COMPARISON: CT brain July 12, 2015..

 

FINDINGS:   There is no acute intracranial hemorrhage or midline shift identified. No hydrocephalus i
s present.  Area of encephalomalacia right frontal lobe with superior anterior temporal lobe extensio
n is redemonstrated. The globes are intact and the visualized sinuses are clear.   

 

IMPRESSION:  No acute intracranial hemorrhage or midline shift.  There is large area of encephalomala
aaron centered right frontal lobe redemonstrated.

## 2017-05-30 NOTE — ED
Altered Mental Status HPI





- General


Chief Complaint: Altered Mental Status


Stated Complaint: Altered mental status


Time Seen by Provider: 17 11:49


Source: patient


Mode of arrival: EMS


Limitations: no limitations





- History of Present Illness


Initial Comments: 





And came in with there are change in mental status she was quite weak and didn'

t eat much her sister said her sugar was too high she fell and she injured her 

left wrist and left knee and left hip denies hitting her head or any scalp 

laceration.  She denies any chest pain no shortness of breath no abdominal pain 

no significant decrease in her strength in her arms or legs.  There is over 400 

today he was reassessed was unremarkable





- Related Data


 Home Medications











 Medication  Instructions  Recorded  Confirmed


 


Nitroglycerin Sl Tabs [Nitrostat] 0.4 mg SUBLINGUAL Q5M PRN 01/15/15 05/30/17


 


Albuterol Inhaler [Ventolin Hfa 2 puff INHALATION RT-Q6H PRN 16





Inhaler]   


 


Aspirin EC [Ecotrin Low Dose] 81 mg PO DAILY 16


 


Atorvastatin [Lipitor] 20 mg PO HS 16


 


Citalopram Hydrobromide [CeleXA] 20 mg PO DAILY 16


 


Famotidine [Pepcid] 20 mg PO W/LUNCH 16


 


Insulin Lispro [humaLOG] 4 units SQ TID-W/MEALS 16


 


Metoclopramide [Reglan] 5 mg PO AC-TID 16


 


Valproic Acid [Depakene] 250 mg PO DAILY 16


 


Vitamin B Complex 1 cap PO DAILY 16


 


Budesonide-Formot 160-4.5 Mcg 2 puff INHALATION RT-BID 10/06/16 05/30/17





[Symbicort 160-4.5 Mcg Inhaler]   


 


Ergocalciferol [Vitamin D2 50,000 unit PO SA 10/06/16 05/30/17





(DRISDOL)]   


 


Ondansetron [Zofran] 4 mg PO DAILY PRN 17


 


INSULIN LISPRO (HumaLOG) [humaLOG] See Protocol SQ TID-W/MEALS 04/10/17 05/30/17


 


ALPRAZolam [Xanax] 0.5 mg PO TID PRN 17


 


Ascorbic Acid [Vitamin C] 500 mg PO DAILY 17


 


HYDROcodone/APAP 10-325MG [Norco 1 tab PO Q6H PRN 17





]   








 Previous Rx's











 Medication  Instructions  Recorded


 


Insulin Glargine [Lantus] 18 unit SQ HS #0  17











 Allergies











Allergy/AdvReac Type Severity Reaction Status Date / Time


 


Barbiturates Allergy  Rash/Hives Verified 17 12:11


 


cephalexin monohydrate Allergy  Rash/Hives Verified 17 12:11





[From Keflex]     


 


morphine Allergy  Rash/Hives Verified 17 12:11


 


Penicillins Allergy  Rash/Hives Verified 17 12:11


 


phenobarbital Allergy  Swelling Verified 17 12:11


 


venom-honey bee Allergy  Swelling Verified 17 12:11





[bee venom (honey bee)]     


 


amlodipine besylate AdvReac  Vomiting Verified 17 12:11





[From Norvasc]     














Review of Systems


ROS Statement: 


Those systems with pertinent positive or pertinent negative responses have been 

documented in the HPI.





ROS Other: All systems not noted in ROS Statement are negative.





Past Medical History


Past Medical History: Chest Pain / Angina, Heart Failure, COPD, CVA/TIA, 

Diabetes Mellitus, Deep Vein Thrombosis (DVT), Eye Disorder, GERD/Reflux, 

Hyperlipidemia, Hypertension, Myocardial Infarction (MI), Thyroid Disorder


Additional Past Medical History / Comment(s): HX OF CVA X3 (LAST 2015)-HAS LT 

ARM PARALYSIS & WEAKNESS LEFT LEG. MI .  DVT RT  AXILLA. RENAL FAILURE.  

LOW THYROID,  PERIPHERAL NEUROPATHY HANDS & FEET.  ANEMIA.  HX OF DKA. USES W/

C. CONSTIPATION, ESOPHAGITIS.  EPIGLOTITIS.  HEADACHES SINCE CVA, uses a 

wheelchair.  RETINOPATHY MARZENA EYES.  HX RT TOE INFECTION, GANGRENE, HAD AMP.


Last Myocardial Infarction Date:: 


History of Any Multi-Drug Resistant Organisms: MRSA


Date of last positivie culture/infection: 2013


MDRO Source:: Right Foot


Past Surgical History: Appendectomy,  Section, Cholecystectomy, Heart 

Catheterization With Stent, Hysterectomy, Orthopedic Surgery


Additional Past Surgical History / Comment(s): Amputation Rt 2ND Toe.  C-S X3.  

EGD.  Bronchoscopy.  RT Arm Port Placed FOR AB RX; Removed.  6 CARDIAC STENTS. 

left shoulder bone removed


Past Anesthesia/Blood Transfusion Reactions: No Reported Reaction


Additional Past Anesthesia/Blood Transfusion Reaction / Comment(s): HX OF BLOOD 

TRANSFUSION- NO REACTION


Date of Last Stent Placement:: 2013


Past Psychological History: Anxiety, Bipolar, Depression


Additional Psychological History / Comment(s): HER adult son lives with her.


Smoking Status: Current every day smoker


Past Alcohol Use History: None Reported


Additional Past Alcohol Use History / Comment(s): SMOKED FOR 36 YRS. 1PPD.  

STARTED SMOKING AGE 19, QUIT 2015, RECENTLY SMOKING.


Past Drug Use History: None Reported





- Past Family History


  ** Father


Family Medical History: Unable to Obtain, Coronary Artery Disease (CAD), 

Diabetes Mellitus





  ** Mother


Family Medical History: COPD





General Exam





- General Exam Comments


Initial Comments: 





General:  The patient is awake and sleepy she follows the commands GSS is 15, 

she looks dehydrated 


Skin:  Skin is warm and dry and no rashes or lesions are noted. 


Eye:  Pupils are equal, round and reactive to light, extra-ocular movements are 

intact; there is normal conjunctiva bilaterally.  


Ears, nose, mouth and throat:  There are moist mucous membranes and no oral 

lesions. 


Neck:  The neck is supple, there is no tenderness  or JVD.  


Cardiovascular:  There is a regular rate and rhythm. No murmur, rub or gallop 

is appreciated.


Respiratory: To auscultation bilateral, no wheezing no rhonchi no distress  

respiratory wise noticed


Gastrointestinal:  Soft, non-distended, non-tender abdomen without masses or 

organomegaly noted. There is no rebound or guarding present. Bowel sounds are 

unremarkable. 


Back:  There is no tenderness to palpation in the midline. There is no obvious 

deformity.


Musculoskeletal:  Face has a deformity no neurovascular compromise noticed of 

the distal left hand she is tender over the left greater trochanter and left 

shoulder is also tender over the deltoid area is decreased range of motion 

worse so for all these joints is the left wrist range of motion is 

significantly decreased at the left wrist with the pain.  Refills are within 

normal range no motor or sensory deficits noticed


Neurological:  CN II-XII intact, Cranial nerves III through XII are intact. 

There are no obvious motor or sensory deficits. Coordination appears grossly 

intact. Speech is normal.


Psychiatric:  Cooperative, appropriate mood & affect, normal judgment.  





Limitations: no limitations





Course





 Vital Signs











  17





  11:44 13:29 14:30


 


Temperature 100.1 F H  


 


Pulse Rate 74  62


 


Respiratory 16 16 16





Rate   


 


Blood Pressure 91/52 140/67 179/72


 


O2 Sat by Pulse 95 100 100





Oximetry   














  17





  15:35 16:50


 


Temperature  


 


Pulse Rate 61 65


 


Respiratory 17 16





Rate  


 


Blood Pressure 149/69 166/75


 


O2 Sat by Pulse 100 100





Oximetry  














- Reevaluation(s)


Reevaluation #1: 





17 17:06


Patient decided to see Dr. Santiago since elective ToiBrandon was operated on his 

shoulder earlier , left wrist was splinted in the ER





Medical Decision Making





- Lab Data


Result diagrams: 


 17 12:24





 17 12:24





 Lab Results











  17 Range/Units





  11:48 12:24 12:24 


 


WBC   6.8   (3.8-10.6)  k/uL


 


RBC   3.33 L   (3.80-5.40)  m/uL


 


Hgb   10.2 L   (11.4-16.0)  gm/dL


 


Hct   33.9 L   (34.0-46.0)  %


 


MCV   101.8 H   (80.0-100.0)  fL


 


MCH   30.7   (25.0-35.0)  pg


 


MCHC   30.1 L   (31.0-37.0)  g/dL


 


RDW   12.7   (11.5-15.5)  %


 


Plt Count   148 L   (150-450)  k/uL


 


Neutrophils %   82   %


 


Lymphocytes %   12   %


 


Monocytes %   5   %


 


Eosinophils %   0   %


 


Basophils %   0   %


 


Neutrophils #   5.5   (1.3-7.7)  k/uL


 


Lymphocytes #   0.8 L   (1.0-4.8)  k/uL


 


Monocytes #   0.4   (0-1.0)  k/uL


 


Eosinophils #   0.0   (0-0.7)  k/uL


 


Basophils #   0.0   (0-0.2)  k/uL


 


Hypochromasia   Moderate   


 


Macrocytosis   Slight   


 


PT    9.7  (9.0-12.0)  sec


 


INR    0.9  (<1.1)  


 


APTT    19.9 L  (22.0-30.0)  sec


 


VBG pH     (7.31-7.41)  


 


VBG pCO2     (37-51)  mmHg


 


VBG HCO3     (24-28)  mmol/L


 


Sodium     (137-145)  mmol/L


 


Potassium     (3.5-5.1)  mmol/L


 


Chloride     ()  mmol/L


 


Carbon Dioxide     (22-30)  mmol/L


 


Anion Gap     mmol/L


 


BUN     (7-17)  mg/dL


 


Creatinine     (0.52-1.04)  mg/dL


 


Est GFR (MDRD) Af Amer     (>60 ml/min/1.73 sqM)  


 


Est GFR (MDRD) Non-Af     (>60 ml/min/1.73 sqM)  


 


Glucose     (74-99)  mg/dL


 


POC Glucose (mg/dL)  434 H    (75-99)  mg/dL


 


POC Glu Operater ID  Sebastien Hood    


 


Calcium     (8.4-10.2)  mg/dL


 


Total Bilirubin     (0.2-1.3)  mg/dL


 


AST     (14-36)  U/L


 


ALT     (9-52)  U/L


 


Alkaline Phosphatase     ()  U/L


 


Total Creatine Kinase     ()  U/L


 


CK-MB (CK-2)     (0.0-2.4)  ng/mL


 


CK-MB (CK-2) Rel Index     


 


Troponin I     (0.000-0.034)  ng/mL


 


Total Protein     (6.3-8.2)  g/dL


 


Albumin     (3.5-5.0)  g/dL


 


Urine Color     


 


Urine Appearance     (Clear)  


 


Urine pH     (5.0-8.0)  


 


Ur Specific Gravity     (1.001-1.035)  


 


Urine Protein     (Negative)  


 


Urine Glucose (UA)     (Negative)  


 


Urine Ketones     (Negative)  


 


Urine Blood     (Negative)  


 


Urine Nitrite     (Negative)  


 


Urine Bilirubin     (Negative)  


 


Urine Urobilinogen     (<2.0)  mg/dL


 


Ur Leukocyte Esterase     (Negative)  


 


Urine RBC     (0-5)  /hpf


 


Ur Squamous Epith Cells     (0-4)  /hpf


 


Urine Opiates Screen     (NotDetected)  


 


Ur Oxycodone Screen     (NotDetected)  


 


Urine Methadone Screen     (NotDetected)  


 


Ur Propoxyphene Screen     (NotDetected)  


 


Ur Barbiturates Screen     (NotDetected)  


 


U Tricyclic Antidepress     (NotDetected)  


 


Ur Phencyclidine Scrn     (NotDetected)  


 


Ur Amphetamines Screen     (NotDetected)  


 


U Methamphetamines Scrn     (NotDetected)  


 


U Benzodiazepines Scrn     (NotDetected)  


 


Urine Cocaine Screen     (NotDetected)  


 


U Marijuana (THC) Screen     (NotDetected)  














  17 Range/Units





  12:24 12:24 12:24 


 


WBC     (3.8-10.6)  k/uL


 


RBC     (3.80-5.40)  m/uL


 


Hgb     (11.4-16.0)  gm/dL


 


Hct     (34.0-46.0)  %


 


MCV     (80.0-100.0)  fL


 


MCH     (25.0-35.0)  pg


 


MCHC     (31.0-37.0)  g/dL


 


RDW     (11.5-15.5)  %


 


Plt Count     (150-450)  k/uL


 


Neutrophils %     %


 


Lymphocytes %     %


 


Monocytes %     %


 


Eosinophils %     %


 


Basophils %     %


 


Neutrophils #     (1.3-7.7)  k/uL


 


Lymphocytes #     (1.0-4.8)  k/uL


 


Monocytes #     (0-1.0)  k/uL


 


Eosinophils #     (0-0.7)  k/uL


 


Basophils #     (0-0.2)  k/uL


 


Hypochromasia     


 


Macrocytosis     


 


PT     (9.0-12.0)  sec


 


INR     (<1.1)  


 


APTT     (22.0-30.0)  sec


 


VBG pH    7.26 L  (7.31-7.41)  


 


VBG pCO2    56 H  (37-51)  mmHg


 


VBG HCO3    24  (24-28)  mmol/L


 


Sodium  138    (137-145)  mmol/L


 


Potassium  5.4 H    (3.5-5.1)  mmol/L


 


Chloride  105    ()  mmol/L


 


Carbon Dioxide  24    (22-30)  mmol/L


 


Anion Gap  9    mmol/L


 


BUN  32 H    (7-17)  mg/dL


 


Creatinine  1.22 H    (0.52-1.04)  mg/dL


 


Est GFR (MDRD) Af Amer  55    (>60 ml/min/1.73 sqM)  


 


Est GFR (MDRD) Non-Af  46    (>60 ml/min/1.73 sqM)  


 


Glucose  486 H*    (74-99)  mg/dL


 


POC Glucose (mg/dL)     (75-99)  mg/dL


 


POC Glu Operater ID     


 


Calcium  9.2    (8.4-10.2)  mg/dL


 


Total Bilirubin  0.5    (0.2-1.3)  mg/dL


 


AST  19    (14-36)  U/L


 


ALT  25    (9-52)  U/L


 


Alkaline Phosphatase  65    ()  U/L


 


Total Creatine Kinase   46   ()  U/L


 


CK-MB (CK-2)   1.1   (0.0-2.4)  ng/mL


 


CK-MB (CK-2) Rel Index   2.4   


 


Troponin I   <0.012   (0.000-0.034)  ng/mL


 


Total Protein  6.9    (6.3-8.2)  g/dL


 


Albumin  3.8    (3.5-5.0)  g/dL


 


Urine Color     


 


Urine Appearance     (Clear)  


 


Urine pH     (5.0-8.0)  


 


Ur Specific Gravity     (1.001-1.035)  


 


Urine Protein     (Negative)  


 


Urine Glucose (UA)     (Negative)  


 


Urine Ketones     (Negative)  


 


Urine Blood     (Negative)  


 


Urine Nitrite     (Negative)  


 


Urine Bilirubin     (Negative)  


 


Urine Urobilinogen     (<2.0)  mg/dL


 


Ur Leukocyte Esterase     (Negative)  


 


Urine RBC     (0-5)  /hpf


 


Ur Squamous Epith Cells     (0-4)  /hpf


 


Urine Opiates Screen     (NotDetected)  


 


Ur Oxycodone Screen     (NotDetected)  


 


Urine Methadone Screen     (NotDetected)  


 


Ur Propoxyphene Screen     (NotDetected)  


 


Ur Barbiturates Screen     (NotDetected)  


 


U Tricyclic Antidepress     (NotDetected)  


 


Ur Phencyclidine Scrn     (NotDetected)  


 


Ur Amphetamines Screen     (NotDetected)  


 


U Methamphetamines Scrn     (NotDetected)  


 


U Benzodiazepines Scrn     (NotDetected)  


 


Urine Cocaine Screen     (NotDetected)  


 


U Marijuana (THC) Screen     (NotDetected)  














  17 Range/Units





  13:27 13:28 16:47 


 


WBC     (3.8-10.6)  k/uL


 


RBC     (3.80-5.40)  m/uL


 


Hgb     (11.4-16.0)  gm/dL


 


Hct     (34.0-46.0)  %


 


MCV     (80.0-100.0)  fL


 


MCH     (25.0-35.0)  pg


 


MCHC     (31.0-37.0)  g/dL


 


RDW     (11.5-15.5)  %


 


Plt Count     (150-450)  k/uL


 


Neutrophils %     %


 


Lymphocytes %     %


 


Monocytes %     %


 


Eosinophils %     %


 


Basophils %     %


 


Neutrophils #     (1.3-7.7)  k/uL


 


Lymphocytes #     (1.0-4.8)  k/uL


 


Monocytes #     (0-1.0)  k/uL


 


Eosinophils #     (0-0.7)  k/uL


 


Basophils #     (0-0.2)  k/uL


 


Hypochromasia     


 


Macrocytosis     


 


PT     (9.0-12.0)  sec


 


INR     (<1.1)  


 


APTT     (22.0-30.0)  sec


 


VBG pH     (7.31-7.41)  


 


VBG pCO2     (37-51)  mmHg


 


VBG HCO3     (24-28)  mmol/L


 


Sodium     (137-145)  mmol/L


 


Potassium     (3.5-5.1)  mmol/L


 


Chloride     ()  mmol/L


 


Carbon Dioxide     (22-30)  mmol/L


 


Anion Gap     mmol/L


 


BUN     (7-17)  mg/dL


 


Creatinine     (0.52-1.04)  mg/dL


 


Est GFR (MDRD) Af Amer     (>60 ml/min/1.73 sqM)  


 


Est GFR (MDRD) Non-Af     (>60 ml/min/1.73 sqM)  


 


Glucose     (74-99)  mg/dL


 


POC Glucose (mg/dL)   352 H  190 H  (75-99)  mg/dL


 


POC Glu Operater Sebastien Goins Cynthia  


 


Calcium     (8.4-10.2)  mg/dL


 


Total Bilirubin     (0.2-1.3)  mg/dL


 


AST     (14-36)  U/L


 


ALT     (9-52)  U/L


 


Alkaline Phosphatase     ()  U/L


 


Total Creatine Kinase     ()  U/L


 


CK-MB (CK-2)     (0.0-2.4)  ng/mL


 


CK-MB (CK-2) Rel Index     


 


Troponin I     (0.000-0.034)  ng/mL


 


Total Protein     (6.3-8.2)  g/dL


 


Albumin     (3.5-5.0)  g/dL


 


Urine Color  Yellow    


 


Urine Appearance  Clear    (Clear)  


 


Urine pH  5.5    (5.0-8.0)  


 


Ur Specific Gravity  1.014    (1.001-1.035)  


 


Urine Protein  Negative    (Negative)  


 


Urine Glucose (UA)  4+ H    (Negative)  


 


Urine Ketones  Negative    (Negative)  


 


Urine Blood  Trace H    (Negative)  


 


Urine Nitrite  Negative    (Negative)  


 


Urine Bilirubin  Negative    (Negative)  


 


Urine Urobilinogen  <2.0    (<2.0)  mg/dL


 


Ur Leukocyte Esterase  Negative    (Negative)  


 


Urine RBC  2    (0-5)  /hpf


 


Ur Squamous Epith Cells  <1    (0-4)  /hpf


 


Urine Opiates Screen  Detected H    (NotDetected)  


 


Ur Oxycodone Screen  Not Detected    (NotDetected)  


 


Urine Methadone Screen  Not Detected    (NotDetected)  


 


Ur Propoxyphene Screen  Not Detected    (NotDetected)  


 


Ur Barbiturates Screen  Not Detected    (NotDetected)  


 


U Tricyclic Antidepress  Not Detected    (NotDetected)  


 


Ur Phencyclidine Scrn  Not Detected    (NotDetected)  


 


Ur Amphetamines Screen  Not Detected    (NotDetected)  


 


U Methamphetamines Scrn  Not Detected    (NotDetected)  


 


U Benzodiazepines Scrn  Detected H    (NotDetected)  


 


Urine Cocaine Screen  Not Detected    (NotDetected)  


 


U Marijuana (THC) Screen  Not Detected    (NotDetected)  














Disposition


Clinical Impression: 


 Change in mental status, Hyperglycemia, Left wrist fracture





Disposition: HOME SELF-CARE


Condition: Good


Additional Instructions: 


Will continue taking Tylenol or Motrin for the pain considering that she is 

quite groggy and sleepy and she is already taking the benzos as well as Norco's 

I would not add any further pain medications or prescriptions


Referrals: 


Saniya Cbaral MD [Primary Care Provider] - 1-2 days


Oscar Rosen MD [STAFF PHYSICIAN] - 1-2 days

## 2017-11-27 ENCOUNTER — HOSPITAL ENCOUNTER (OUTPATIENT)
Dept: HOSPITAL 47 - EC | Age: 57
Setting detail: OBSERVATION
LOS: 1 days | Discharge: HOME | End: 2017-11-28
Payer: COMMERCIAL

## 2017-11-27 VITALS — RESPIRATION RATE: 18 BRPM

## 2017-11-27 VITALS — BODY MASS INDEX: 32.9 KG/M2

## 2017-11-27 DIAGNOSIS — Z88.8: ICD-10-CM

## 2017-11-27 DIAGNOSIS — F31.9: ICD-10-CM

## 2017-11-27 DIAGNOSIS — I25.2: ICD-10-CM

## 2017-11-27 DIAGNOSIS — E86.0: ICD-10-CM

## 2017-11-27 DIAGNOSIS — Z90.710: ICD-10-CM

## 2017-11-27 DIAGNOSIS — Z82.49: ICD-10-CM

## 2017-11-27 DIAGNOSIS — I50.9: ICD-10-CM

## 2017-11-27 DIAGNOSIS — Z83.3: ICD-10-CM

## 2017-11-27 DIAGNOSIS — Z79.4: ICD-10-CM

## 2017-11-27 DIAGNOSIS — N18.3: ICD-10-CM

## 2017-11-27 DIAGNOSIS — I69.354: ICD-10-CM

## 2017-11-27 DIAGNOSIS — R55: Primary | ICD-10-CM

## 2017-11-27 DIAGNOSIS — Z82.5: ICD-10-CM

## 2017-11-27 DIAGNOSIS — Z89.421: ICD-10-CM

## 2017-11-27 DIAGNOSIS — E11.42: ICD-10-CM

## 2017-11-27 DIAGNOSIS — I95.9: ICD-10-CM

## 2017-11-27 DIAGNOSIS — E11.22: ICD-10-CM

## 2017-11-27 DIAGNOSIS — Z79.899: ICD-10-CM

## 2017-11-27 DIAGNOSIS — J44.9: ICD-10-CM

## 2017-11-27 DIAGNOSIS — R19.7: ICD-10-CM

## 2017-11-27 DIAGNOSIS — Z99.3: ICD-10-CM

## 2017-11-27 DIAGNOSIS — I65.23: ICD-10-CM

## 2017-11-27 DIAGNOSIS — Z88.0: ICD-10-CM

## 2017-11-27 DIAGNOSIS — Z86.718: ICD-10-CM

## 2017-11-27 DIAGNOSIS — E03.9: ICD-10-CM

## 2017-11-27 DIAGNOSIS — E11.319: ICD-10-CM

## 2017-11-27 DIAGNOSIS — E11.649: ICD-10-CM

## 2017-11-27 DIAGNOSIS — Z90.49: ICD-10-CM

## 2017-11-27 DIAGNOSIS — E11.65: ICD-10-CM

## 2017-11-27 DIAGNOSIS — F41.9: ICD-10-CM

## 2017-11-27 DIAGNOSIS — D50.9: ICD-10-CM

## 2017-11-27 DIAGNOSIS — K21.9: ICD-10-CM

## 2017-11-27 DIAGNOSIS — I13.0: ICD-10-CM

## 2017-11-27 DIAGNOSIS — F17.290: ICD-10-CM

## 2017-11-27 DIAGNOSIS — I25.10: ICD-10-CM

## 2017-11-27 DIAGNOSIS — E78.5: ICD-10-CM

## 2017-11-27 DIAGNOSIS — Z91.030: ICD-10-CM

## 2017-11-27 DIAGNOSIS — Z88.6: ICD-10-CM

## 2017-11-27 DIAGNOSIS — Z86.14: ICD-10-CM

## 2017-11-27 DIAGNOSIS — Z90.89: ICD-10-CM

## 2017-11-27 DIAGNOSIS — Z79.51: ICD-10-CM

## 2017-11-27 DIAGNOSIS — Z79.82: ICD-10-CM

## 2017-11-27 DIAGNOSIS — N17.9: ICD-10-CM

## 2017-11-27 LAB
ALBUMIN SERPL-MCNC: 3.6 G/DL (ref 3.5–5)
ALP SERPL-CCNC: 53 U/L (ref 38–126)
ALT SERPL-CCNC: 36 U/L (ref 9–52)
ANION GAP SERPL CALC-SCNC: 6 MMOL/L
APTT BLD: 22.1 SEC (ref 22–30)
AST SERPL-CCNC: 30 U/L (ref 14–36)
BASOPHILS # BLD AUTO: 0 K/UL (ref 0–0.2)
BASOPHILS NFR BLD AUTO: 1 %
BUN SERPL-SCNC: 32 MG/DL (ref 7–17)
CALCIUM SPEC-MCNC: 9.3 MG/DL (ref 8.4–10.2)
CHLORIDE SERPL-SCNC: 103 MMOL/L (ref 98–107)
CK SERPL-CCNC: 58 U/L (ref 30–135)
CO2 SERPL-SCNC: 31 MMOL/L (ref 22–30)
D DIMER PPP FEU-MCNC: 0.47 MG/L FEU (ref ?–0.6)
EOSINOPHIL # BLD AUTO: 0.1 K/UL (ref 0–0.7)
EOSINOPHIL NFR BLD AUTO: 1 %
ERYTHROCYTE [DISTWIDTH] IN BLOOD BY AUTOMATED COUNT: 3.33 M/UL (ref 3.8–5.4)
ERYTHROCYTE [DISTWIDTH] IN BLOOD: 13.8 % (ref 11.5–15.5)
GLUCOSE BLD-MCNC: 213 MG/DL (ref 75–99)
GLUCOSE BLD-MCNC: 220 MG/DL (ref 75–99)
GLUCOSE BLD-MCNC: 221 MG/DL (ref 75–99)
GLUCOSE BLD-MCNC: 316 MG/DL (ref 75–99)
GLUCOSE BLD-MCNC: 429 MG/DL (ref 75–99)
GLUCOSE BLD-MCNC: 47 MG/DL (ref 75–99)
GLUCOSE BLD-MCNC: 68 MG/DL (ref 75–99)
GLUCOSE SERPL-MCNC: 140 MG/DL (ref 74–99)
GLUCOSE UR QL: (no result)
HBA1C MFR BLD: 9.5 % (ref 4–6)
HCT VFR BLD AUTO: 32 % (ref 34–46)
HGB BLD-MCNC: 10.3 GM/DL (ref 11.4–16)
INR PPP: 1 (ref ?–1.2)
LYMPHOCYTES # SPEC AUTO: 1.7 K/UL (ref 1–4.8)
LYMPHOCYTES NFR SPEC AUTO: 38 %
MAGNESIUM SPEC-SCNC: 2.1 MG/DL (ref 1.6–2.3)
MCH RBC QN AUTO: 30.8 PG (ref 25–35)
MCHC RBC AUTO-ENTMCNC: 32.1 G/DL (ref 31–37)
MCV RBC AUTO: 96 FL (ref 80–100)
MONOCYTES # BLD AUTO: 0.4 K/UL (ref 0–1)
MONOCYTES NFR BLD AUTO: 8 %
NEUTROPHILS # BLD AUTO: 2.3 K/UL (ref 1.3–7.7)
NEUTROPHILS NFR BLD AUTO: 51 %
PH UR: 5 [PH] (ref 5–8)
PLATELET # BLD AUTO: 166 K/UL (ref 150–450)
POTASSIUM SERPL-SCNC: 4.8 MMOL/L (ref 3.5–5.1)
PROT SERPL-MCNC: 6.5 G/DL (ref 6.3–8.2)
PT BLD: 9.8 SEC (ref 9–12)
SODIUM SERPL-SCNC: 140 MMOL/L (ref 137–145)
SP GR UR: 1.02 (ref 1–1.03)
TROPONIN I SERPL-MCNC: <0.012 NG/ML (ref 0–0.03)
UROBILINOGEN UR QL STRIP: <2 MG/DL (ref ?–2)
WBC # BLD AUTO: 4.5 K/UL (ref 3.8–10.6)

## 2017-11-27 PROCEDURE — 96376 TX/PRO/DX INJ SAME DRUG ADON: CPT

## 2017-11-27 PROCEDURE — 99285 EMERGENCY DEPT VISIT HI MDM: CPT

## 2017-11-27 PROCEDURE — 94640 AIRWAY INHALATION TREATMENT: CPT

## 2017-11-27 PROCEDURE — 83735 ASSAY OF MAGNESIUM: CPT

## 2017-11-27 PROCEDURE — 80053 COMPREHEN METABOLIC PANEL: CPT

## 2017-11-27 PROCEDURE — 96374 THER/PROPH/DIAG INJ IV PUSH: CPT

## 2017-11-27 PROCEDURE — 81003 URINALYSIS AUTO W/O SCOPE: CPT

## 2017-11-27 PROCEDURE — 85379 FIBRIN DEGRADATION QUANT: CPT

## 2017-11-27 PROCEDURE — 84443 ASSAY THYROID STIM HORMONE: CPT

## 2017-11-27 PROCEDURE — 36415 COLL VENOUS BLD VENIPUNCTURE: CPT

## 2017-11-27 PROCEDURE — 93880 EXTRACRANIAL BILAT STUDY: CPT

## 2017-11-27 PROCEDURE — 85730 THROMBOPLASTIN TIME PARTIAL: CPT

## 2017-11-27 PROCEDURE — 82550 ASSAY OF CK (CPK): CPT

## 2017-11-27 PROCEDURE — 93005 ELECTROCARDIOGRAM TRACING: CPT

## 2017-11-27 PROCEDURE — 96360 HYDRATION IV INFUSION INIT: CPT

## 2017-11-27 PROCEDURE — 93306 TTE W/DOPPLER COMPLETE: CPT

## 2017-11-27 PROCEDURE — 84484 ASSAY OF TROPONIN QUANT: CPT

## 2017-11-27 PROCEDURE — 82553 CREATINE MB FRACTION: CPT

## 2017-11-27 PROCEDURE — 85610 PROTHROMBIN TIME: CPT

## 2017-11-27 PROCEDURE — 71020: CPT

## 2017-11-27 PROCEDURE — 70450 CT HEAD/BRAIN W/O DYE: CPT

## 2017-11-27 PROCEDURE — 85025 COMPLETE CBC W/AUTO DIFF WBC: CPT

## 2017-11-27 PROCEDURE — 70498 CT ANGIOGRAPHY NECK: CPT

## 2017-11-27 PROCEDURE — 96361 HYDRATE IV INFUSION ADD-ON: CPT

## 2017-11-27 PROCEDURE — 74000: CPT

## 2017-11-27 PROCEDURE — 83036 HEMOGLOBIN GLYCOSYLATED A1C: CPT

## 2017-11-27 PROCEDURE — 95816 EEG AWAKE AND DROWSY: CPT

## 2017-11-27 PROCEDURE — 96375 TX/PRO/DX INJ NEW DRUG ADDON: CPT

## 2017-11-27 RX ADMIN — OXYCODONE HYDROCHLORIDE AND ACETAMINOPHEN SCH MG: 500 TABLET ORAL at 12:27

## 2017-11-27 RX ADMIN — METOCLOPRAMIDE SCH MG: 5 TABLET ORAL at 08:42

## 2017-11-27 RX ADMIN — INSULIN ASPART SCH UNIT: 100 INJECTION, SOLUTION INTRAVENOUS; SUBCUTANEOUS at 13:58

## 2017-11-27 RX ADMIN — BUDESONIDE AND FORMOTEROL FUMARATE DIHYDRATE SCH: 160; 4.5 AEROSOL RESPIRATORY (INHALATION) at 15:56

## 2017-11-27 RX ADMIN — INSULIN ASPART SCH UNIT: 100 INJECTION, SOLUTION INTRAVENOUS; SUBCUTANEOUS at 21:51

## 2017-11-27 RX ADMIN — Medication SCH MG: at 12:28

## 2017-11-27 RX ADMIN — BUDESONIDE AND FORMOTEROL FUMARATE DIHYDRATE SCH PUFF: 160; 4.5 AEROSOL RESPIRATORY (INHALATION) at 19:32

## 2017-11-27 RX ADMIN — INSULIN ASPART SCH UNIT: 100 INJECTION, SOLUTION INTRAVENOUS; SUBCUTANEOUS at 18:10

## 2017-11-27 RX ADMIN — ASPIRIN 81 MG CHEWABLE TABLET SCH MG: 81 TABLET CHEWABLE at 08:42

## 2017-11-27 RX ADMIN — METOCLOPRAMIDE SCH MG: 5 TABLET ORAL at 18:10

## 2017-11-27 RX ADMIN — METOCLOPRAMIDE SCH MG: 5 TABLET ORAL at 14:03

## 2017-11-27 RX ADMIN — CITALOPRAM HYDROBROMIDE SCH MG: 20 TABLET ORAL at 08:42

## 2017-11-27 RX ADMIN — VALPROIC ACID SCH MG: 250 SOLUTION ORAL at 08:42

## 2017-11-27 RX ADMIN — DULOXETINE SCH MG: 60 CAPSULE, DELAYED RELEASE ORAL at 08:42

## 2017-11-27 RX ADMIN — HEPARIN SODIUM SCH UNIT: 5000 INJECTION, SOLUTION INTRAVENOUS; SUBCUTANEOUS at 21:51

## 2017-11-27 RX ADMIN — CEFAZOLIN SCH MLS/HR: 330 INJECTION, POWDER, FOR SOLUTION INTRAMUSCULAR; INTRAVENOUS at 05:03

## 2017-11-27 NOTE — XR
EXAMINATION TYPE: XR KUB

 

DATE OF EXAM: 11/27/2017

 

COMPARISON: 6/27/2013

 

HISTORY: Abdominal pain

 

TECHNIQUE: Single view

 

FINDINGS: There is no sign of intestinal obstruction or pneumoperitoneum. There are phleboliths in th
e pelvis. I see no pathologic calcifications over the kidneys. There is no evidence of a mass.

 

IMPRESSION: Nonacute abdomen.

## 2017-11-27 NOTE — XR
EXAMINATION TYPE: XR chest 2V

 

DATE OF EXAM: 11/27/2017

 

COMPARISON: 5/30/2017

 

HISTORY: Syncope

 

TECHNIQUE:  Frontal and lateral views of the chest are obtained.

 

FINDINGS:  Heart and mediastinum are normal. Lungs are clear. Diaphragm is normal. Bony thorax appear
s normal. There are chest leads.

 

IMPRESSION:  Normal chest. There is clearing of the mild congestion and pleural reaction compared to 
old exam.

## 2017-11-27 NOTE — ED
Dizziness HPI





- General


Chief Complaint: Syncope


Stated Complaint: syncope


Time Seen by Provider: 17 00:52


Source: patient, EMS, RN notes reviewed, old records reviewed


Mode of arrival: EMS


Limitations: no limitations





- History of Present Illness


Initial Comments: 





57-year-old female with a history of stroke presents emergency Department with 

syncopal episode.  She's had multiple episodes of diarrhea today, as well as 

been having high blood sugars.  Patient's caregiver reports that they took her 

to the bathroom, and when she stood up after having multiple bowel movements 

she passed out..  The patient was passed out for 3-4 minutes.  Patient's 

caregiver reports that her eyes seem to "flicker".  Patient caregiver called 

EMS and they brought her here shortly afterwards.  Patient reports emergency 

department alert and oriented.  Complains of a minor headache, some lower 

abdominal cramping, and paresthesias in her left arm.  Patient reports she does 

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  Patient 

denies any recent fever, chills, shortness of breath, chest pain, back pain, 

abdominal pain, nausea vomiting, numbness or tingling, dysuria or hematuria, 

constipation or diarrhea, headaches or visual changes, or any other current 

symptoms





- Related Data


 Home Medications











 Medication  Instructions  Recorded  Confirmed


 


Nitroglycerin Sl Tabs [Nitrostat] 0.4 mg SUBLINGUAL Q5M PRN 01/15/15 09/19/17


 


Albuterol Inhaler [Ventolin Hfa 2 puff INHALATION RT-Q6H PRN 16





Inhaler]   


 


Aspirin EC [Ecotrin Low Dose] 81 mg PO DAILY 16


 


Atorvastatin [Lipitor] 20 mg PO HS 16


 


Citalopram Hydrobromide [CeleXA] 20 mg PO DAILY 16


 


Famotidine [Pepcid] 20 mg PO BID 16


 


Metoclopramide [Reglan] 5 mg PO AC-TID 16


 


Valproic Acid [Depakene] 250 mg PO DAILY 16


 


Budesonide-Formot 160-4.5 Mcg 2 puff INHALATION RT-BID 10/06/16 09/19/17





[Symbicort 160-4.5 Mcg Inhaler]   


 


Ergocalciferol [Vitamin D2 50,000 unit PO SA 10/06/16 09/19/17





(DRISDOL)]   


 


Ascorbic Acid [Vitamin C] 500 mg PO DAILY 17


 


HYDROcodone/APAP 10-325MG [Norco 1 tab PO Q6H PRN 17





]   


 


DULoxetine HCL [Cymbalta] 60 mg PO DAILY 17


 


Ferrous Sulfate [Iron (65  mg PO DAILY 17





Elemental)]   


 


Insulin Glargine [Lantus] 22 unit SQ HS 17


 


Insulin Lispro [humaLOG] 6 units SQ AC-TID 17


 


ALPRAZolam [Xanax] 1 mg PO Q8HR 17











 Allergies











Allergy/AdvReac Type Severity Reaction Status Date / Time


 


Barbiturates Allergy  Rash/Hives Verified 17 00:32


 


cephalexin monohydrate Allergy  Rash/Hives Verified 17 00:32





[From Keflex]     


 


morphine Allergy  Rash/Hives Verified 17 00:32


 


Penicillins Allergy  Rash/Hives Verified 17 00:32


 


phenobarbital Allergy  Swelling Verified 17 00:32


 


venom-honey bee Allergy  Swelling Verified 17 00:32





[bee venom (honey bee)]     


 


amlodipine besylate AdvReac  Vomiting Verified 17 00:32





[From Norvasc]     














Review of Systems


ROS Statement: 


Those systems with pertinent positive or pertinent negative responses have been 

documented in the HPI.





ROS Other: All systems not noted in ROS Statement are negative.





Past Medical History


Past Medical History: Chest Pain / Angina, Heart Failure, COPD, CVA/TIA, 

Diabetes Mellitus, Deep Vein Thrombosis (DVT), Eye Disorder, GERD/Reflux, 

Hyperlipidemia, Hypertension, Myocardial Infarction (MI), Thyroid Disorder


Additional Past Medical History / Comment(s): HX OF CVA X3 (LAST 2015)-HAS LT 

ARM PARALYSIS & WEAKNESS LEFT LEG. MI .  DVT RT  AXILLA. RENAL FAILURE.  

LOW THYROID,  PERIPHERAL NEUROPATHY HANDS & FEET.  ANEMIA.  HX OF DKA. USES W/

C. CONSTIPATION, ESOPHAGITIS.  EPIGLOTITIS.  HEADACHES SINCE CVA, uses a 

wheelchair.  RETINOPATHY MARZENA EYES.  HX RT TOE INFECTION- GANGRENE, HAD AMP.


Last Myocardial Infarction Date:: 


History of Any Multi-Drug Resistant Organisms: MRSA


Date of last positivie culture/infection: 2013


MDRO Source:: Right Foot


Past Surgical History: Appendectomy,  Section, Cholecystectomy, Heart 

Catheterization With Stent, Hysterectomy, Orthopedic Surgery


Additional Past Surgical History / Comment(s): Amputation Rt 2ND Toe.  C-S X3.  

EGD.  Bronchoscopy.  RT Arm Port Placed FOR AB RX; Removed.  6 CARDIAC STENTS. 

left shoulder bone removed


Past Anesthesia/Blood Transfusion Reactions: No Reported Reaction


Additional Past Anesthesia/Blood Transfusion Reaction / Comment(s): HX OF BLOOD 

TRANSFUSION- NO REACTION


Date of Last Stent Placement:: 2013


Past Psychological History: Anxiety, Bipolar, Depression


Smoking Status: Current every day smoker


Past Alcohol Use History: None Reported


Past Drug Use History: None Reported





- Past Family History


  ** Father


Family Medical History: Unable to Obtain, Coronary Artery Disease (CAD), 

Diabetes Mellitus





  ** Mother


Family Medical History: COPD





General Exam





- General Exam Comments


Initial Comments: 





Frail-appearing 57-year-old female.  No acute distress.


Limitations: no limitations


General appearance: alert, in no apparent distress


Head exam: Present: atraumatic, normocephalic, normal inspection


Eye exam: Present: normal appearance, PERRL, EOMI.  Absent: scleral icterus, 

conjunctival injection, periorbital swelling


ENT exam: Present: normal exam, mucous membranes moist


Neck exam: Present: normal inspection.  Absent: tenderness, meningismus, 

lymphadenopathy


Respiratory exam: Present: normal lung sounds bilaterally.  Absent: respiratory 

distress, wheezes, rales, rhonchi, stridor


Cardiovascular Exam: Present: regular rate, normal rhythm, normal heart sounds.

  Absent: systolic murmur, diastolic murmur, rubs, gallop, clicks


GI/Abdominal exam: Present: soft, normal bowel sounds.  Absent: distended, 

tenderness, guarding, rebound, rigid


Extremities exam: Present: normal inspection, full ROM, normal capillary 

refill.  Absent: tenderness, pedal edema, joint swelling, calf tenderness


Back exam: Present: normal inspection


Neurological exam: Present: alert, oriented X3, other ( has chronic 

paralysis of the left arm and leg.)


Psychiatric exam: Present: normal affect, normal mood


Skin exam: Present: warm





Course





 Vital Signs











  17





  00:27 01:09 02:09


 


Temperature 98.3 F  


 


Pulse Rate 87 83 73


 


Respiratory 18 18 18





Rate   


 


Blood Pressure 109/63 100/65 137/77


 


O2 Sat by Pulse 99 100 100





Oximetry   














  17





  02:39


 


Temperature 


 


Pulse Rate 71


 


Respiratory 18





Rate 


 


Blood Pressure 148/66


 


O2 Sat by Pulse 100





Oximetry 














Medical Decision Making





- Medical Decision Making





57-year-old female with history of strokes presents emergency Department with 

syncopal episode when getting off the toilet, she was on the ground for 

approximately 3 minutes.  Plans of a headache, and just general fatigue and 

weakness.  Patient's labwork was reviewed, patient appears to be dehydrated 

with a elevation of BUN/creatinine compared to previous labs.  Patient is given 

IV hydration today.  Cardiac enzymes were within normal limits, EKG was also 

normal.  Chest x-ray abdominal x-ray showed no significant abnormalities.  At 

this time discussed with Dr. Owusu, Sukhwinderomet the patient to observation for IV 

hydration and monitoring.  Family understands treatment plan.





- Lab Data


Result diagrams: 


 17 00:56





 17 00:56





 Lab Results











  17 Range/Units





  00:34 00:56 00:56 


 


WBC    4.5  (3.8-10.6)  k/uL


 


RBC    3.33 L  (3.80-5.40)  m/uL


 


Hgb    10.3 L  (11.4-16.0)  gm/dL


 


Hct    32.0 L  (34.0-46.0)  %


 


MCV    96.0  (80.0-100.0)  fL


 


MCH    30.8  (25.0-35.0)  pg


 


MCHC    32.1  (31.0-37.0)  g/dL


 


RDW    13.8  (11.5-15.5)  %


 


Plt Count    166  (150-450)  k/uL


 


Neutrophils %    51  %


 


Lymphocytes %    38  %


 


Monocytes %    8  %


 


Eosinophils %    1  %


 


Basophils %    1  %


 


Neutrophils #    2.3  (1.3-7.7)  k/uL


 


Lymphocytes #    1.7  (1.0-4.8)  k/uL


 


Monocytes #    0.4  (0-1.0)  k/uL


 


Eosinophils #    0.1  (0-0.7)  k/uL


 


Basophils #    0.0  (0-0.2)  k/uL


 


PT     (9.0-12.0)  sec


 


INR     (<1.2)  


 


APTT     (22.0-30.0)  sec


 


D-Dimer     (<0.60)  mg/L FEU


 


Sodium     (137-145)  mmol/L


 


Potassium     (3.5-5.1)  mmol/L


 


Chloride     ()  mmol/L


 


Carbon Dioxide     (22-30)  mmol/L


 


Anion Gap     mmol/L


 


BUN     (7-17)  mg/dL


 


Creatinine     (0.52-1.04)  mg/dL


 


Est GFR (MDRD) Af Amer     (>60 ml/min/1.73 sqM)  


 


Est GFR (MDRD) Non-Af     (>60 ml/min/1.73 sqM)  


 


Glucose     (74-99)  mg/dL


 


POC Glucose (mg/dL)  220 H    (75-99)  mg/dL


 


POC Glu Operater ID  Francine Ceja    


 


Calcium     (8.4-10.2)  mg/dL


 


Magnesium     (1.6-2.3)  mg/dL


 


Total Bilirubin     (0.2-1.3)  mg/dL


 


AST     (14-36)  U/L


 


ALT     (9-52)  U/L


 


Alkaline Phosphatase     ()  U/L


 


Total Creatine Kinase   58   ()  U/L


 


CK-MB (CK-2)   1.3   (0.0-2.4)  ng/mL


 


CK-MB (CK-2) Rel Index   2.2   


 


Troponin I   <0.012   (0.000-0.034)  ng/mL


 


Total Protein     (6.3-8.2)  g/dL


 


Albumin     (3.5-5.0)  g/dL














  17 Range/Units





  00:56 00:56 


 


WBC    (3.8-10.6)  k/uL


 


RBC    (3.80-5.40)  m/uL


 


Hgb    (11.4-16.0)  gm/dL


 


Hct    (34.0-46.0)  %


 


MCV    (80.0-100.0)  fL


 


MCH    (25.0-35.0)  pg


 


MCHC    (31.0-37.0)  g/dL


 


RDW    (11.5-15.5)  %


 


Plt Count    (150-450)  k/uL


 


Neutrophils %    %


 


Lymphocytes %    %


 


Monocytes %    %


 


Eosinophils %    %


 


Basophils %    %


 


Neutrophils #    (1.3-7.7)  k/uL


 


Lymphocytes #    (1.0-4.8)  k/uL


 


Monocytes #    (0-1.0)  k/uL


 


Eosinophils #    (0-0.7)  k/uL


 


Basophils #    (0-0.2)  k/uL


 


PT   9.8  (9.0-12.0)  sec


 


INR   1.0  (<1.2)  


 


APTT   22.1  (22.0-30.0)  sec


 


D-Dimer   0.47  (<0.60)  mg/L FEU


 


Sodium  140   (137-145)  mmol/L


 


Potassium  4.8   (3.5-5.1)  mmol/L


 


Chloride  103   ()  mmol/L


 


Carbon Dioxide  31 H   (22-30)  mmol/L


 


Anion Gap  6   mmol/L


 


BUN  32 H   (7-17)  mg/dL


 


Creatinine  1.80 H   (0.52-1.04)  mg/dL


 


Est GFR (MDRD) Af Amer  35   (>60 ml/min/1.73 sqM)  


 


Est GFR (MDRD) Non-Af  29   (>60 ml/min/1.73 sqM)  


 


Glucose  140 H   (74-99)  mg/dL


 


POC Glucose (mg/dL)    (75-99)  mg/dL


 


POC Glu Operater ID    


 


Calcium  9.3   (8.4-10.2)  mg/dL


 


Magnesium  2.1   (1.6-2.3)  mg/dL


 


Total Bilirubin  0.4   (0.2-1.3)  mg/dL


 


AST  30   (14-36)  U/L


 


ALT  36   (9-52)  U/L


 


Alkaline Phosphatase  53   ()  U/L


 


Total Creatine Kinase    ()  U/L


 


CK-MB (CK-2)    (0.0-2.4)  ng/mL


 


CK-MB (CK-2) Rel Index    


 


Troponin I    (0.000-0.034)  ng/mL


 


Total Protein  6.5   (6.3-8.2)  g/dL


 


Albumin  3.6   (3.5-5.0)  g/dL














17 03:54


 is sinus rhythm, ventricular rate of 87 beats were minute period.  

Interval 1:30 milliseconds.  QRS ration 76 most seconds.  QT QTc is 358/4:30 

milliseconds.





- Radiology Data


Radiology results: report reviewed





Chest x-ray and KUB are negative for any acute process.





Disposition


Clinical Impression: 


 Dehydration, Hyperglycemia, NA (acute kidney injury), Diarrhea





Disposition: ADMITTED AS IP TO THIS HOSP


Condition: Stable


Referrals: 


Saniya Cabral MD [Primary Care Provider] - 1-2 days


Time of Disposition: 03:55

## 2017-11-27 NOTE — P.HPIM
History of Present Illness


H&P Date: 17


Chief Complaint: Passed out





This is a 57-year-old female with a known past medical history of CVA with left-

sided paralysis, diabetes mellitus, congestive heart failure, previous DVT of 

the lower extremity, hypertension, hyperlipidemia, hypothyroidism and bipolar.  

Patient's history was obtained from her caregiver.  The caregiver reports that 

Morenita had been having multiple episodes of diarrhea on Saturday.  Yesterday 

she had no further episodes of diarrhea.  Yesterday she was helping Morenita get 

to the bathroom to urinate.  She then finished urinating got up to stand with 

assistance and use of a wheelchair.  She became dizzy and passed out.  The 

caregiver reports that the patient passed out for about 3-4 minutes.  Her 

eyelids were flickering open and closed.  She called EMS and patient was 

brought into the emergency room.  Patient's blood sugar at that time was in the 

200s per caregiver.  Caregiver also reports that the patient was having issues 

with severely elevated blood sugars in the 500s.  She had been given extra 

insulin prior to the passing out episode.  When she came to the floor this 

morning blood sugars had dropped down to 46.  Blood sugars are now back up in 

the 200s.  Patient does have a history of fluctuating blood sugars.  Patient 

also had evidence of acute kidney injury with a creatinine of 1.80.  Caregiver 

does report that patient had been eating and drinking.  But again did have a 

day of multiple episodes of diarrhea.  D-dimer was reported normal at 0.47.  

Computed tomography scan the brain shows old stroke on the right side anterior 

cerebral and middle cerebral artery.  No acute intracranial abnormality.  No 

change.  KUB x-ray and chest x-ray were both negative.  EKG showing a normal 

sinus rhythm.  Troponin was negative.  Patient is very sleepy during my exam.  

However she is arousable.  Patient has not slept that she was in the ER all 

night.  Xanax was held this morning.  Also will be discontinuing the Dilaudid.  

Patient did receive a dose of IV Dilaudid due to a headache.  This could be 

contributing to her sleepiness.  She denies any chest pain or shortness of 

breath.  Denies any nausea or vomiting.  Denies any burning with urination.





Past Medical History


Past Medical History: Chest Pain / Angina, Heart Failure, COPD, CVA/TIA, 

Diabetes Mellitus, Deep Vein Thrombosis (DVT), Eye Disorder, GERD/Reflux, 

Hyperlipidemia, Hypertension, Myocardial Infarction (MI), Thyroid Disorder


Additional Past Medical History / Comment(s): HX OF CVA X3 (LAST 2015)-HAS LT 

ARM PARALYSIS & WEAKNESS LEFT LEG. MI .  DVT RT  AXILLA. RENAL FAILURE.  

LOW THYROID,  PERIPHERAL NEUROPATHY HANDS & FEET.  ANEMIA.  HX OF DKA. USES W/

C. CONSTIPATION, ESOPHAGITIS.  EPIGLOTITIS.  HEADACHES SINCE CVA.  RETINOPATHY 

MARZENA EYES.  HX RT TOE INFECTION- GANGRENE, HAD AMP.


Last Myocardial Infarction Date:: 


History of Any Multi-Drug Resistant Organisms: MRSA


Date of last positivie culture/infection: 2013


MDRO Source:: Right Foot


Past Surgical History: Appendectomy,  Section, Cholecystectomy, Heart 

Catheterization With Stent, Hysterectomy, Orthopedic Surgery


Additional Past Surgical History / Comment(s): Amputation Rt 2ND Toe.  C-S X3.  

EGD.  Bronchoscopy.  RT Arm Port Placed FOR AB RX; Removed.  6 CARDIAC STENTS. 

left shoulder bone removed


Past Anesthesia/Blood Transfusion Reactions: No Reported Reaction


Additional Past Anesthesia/Blood Transfusion Reaction / Comment(s): HX OF BLOOD 

TRANSFUSION- NO REACTION


Date of Last Stent Placement:: 2013


Past Psychological History: Anxiety, Bipolar, Depression


Additional Psychological History / Comment(s): PT HAS A CARE GIVER-LOLITA. LOLITA 

STATED PT UNABLE TO AMBULATE(LT SIDE WAS AFFECTED BY STROKE) USES A W/C.


Smoking Status: Former smoker


Past Alcohol Use History: None Reported


Additional Past Alcohol Use History / Comment(s): Patient states she is using a 

vaper cigarettes. She has a 24-hour caregiver that lives with her.  She is 

wheelchair bound.


Past Drug Use History: None Reported





- Past Family History


  ** Father


Family Medical History: Unable to Obtain, Coronary Artery Disease (CAD), 

Diabetes Mellitus





  ** Mother


Family Medical History: COPD





Medications and Allergies


 Home Medications











 Medication  Instructions  Recorded  Confirmed  Type


 


Nitroglycerin Sl Tabs [Nitrostat] 0.4 mg SUBLINGUAL Q5M PRN 01/15/15 11/27/17 

History


 


Albuterol Inhaler [Ventolin Hfa 2 puff INHALATION RT-Q6H PRN 16 

History





Inhaler]    


 


Aspirin EC [Ecotrin Low Dose] 81 mg PO DAILY 16 History


 


Atorvastatin [Lipitor] 20 mg PO HS 16 History


 


Citalopram Hydrobromide [CeleXA] 20 mg PO DAILY 16 History


 


Famotidine [Pepcid] 20 mg PO BID 16 History


 


Metoclopramide [Reglan] 5 mg PO AC-TID 16 History


 


Valproic Acid [Depakene] 250 mg PO DAILY 16 History


 


Budesonide-Formot 160-4.5 Mcg 2 puff INHALATION RT-BID 10/06/16 11/27/17 History





[Symbicort 160-4.5 Mcg Inhaler]    


 


Ergocalciferol [Vitamin D2 50,000 unit PO SA 10/06/16 11/27/17 History





(DRISDOL)]    


 


Ascorbic Acid [Vitamin C] 500 mg PO DAILY 17 History


 


HYDROcodone/APAP 10-325MG [Norco 1 tab PO Q6H PRN 17 History





]    


 


DULoxetine HCL [Cymbalta] 60 mg PO DAILY 17 History


 


Ferrous Sulfate [Iron (65  mg PO DAILY 17 History





Elemental)]    


 


Insulin Glargine [Lantus] 22 unit SQ HS 17 History


 


Insulin Lispro [humaLOG] 6 units SQ AC-TID 17 History


 


ALPRAZolam [Xanax] 1 mg PO Q8HR 17 History











 Allergies











Allergy/AdvReac Type Severity Reaction Status Date / Time


 


Barbiturates Allergy  Rash/Hives Verified 17 07:55


 


cephalexin monohydrate Allergy  Rash/Hives Verified 17 07:55





[From Keflex]     


 


morphine Allergy  Rash/Hives Verified 17 07:55


 


Penicillins Allergy  Rash/Hives Verified 17 07:55


 


phenobarbital Allergy  Swelling Verified 17 07:55


 


venom-honey bee Allergy  Swelling Verified 17 07:55





[bee venom (honey bee)]     


 


amlodipine besylate AdvReac  Vomiting Verified 17 07:55





[From Norvasc]     














Physical Exam


Vitals: 


 Vital Signs











  Temp Pulse Pulse Resp BP BP Pulse Ox


 


 17 08:35       106/52 


 


 17 07:56  97.4 F L   66  16   83/46  100


 


 17 06:16     18   


 


 17 05:57  97.9 F   67  18   131/60  100


 


 17 05:04  97.9 F  74   18  147/68   100


 


 17 04:09   75   18  151/74   100


 


 17 03:39   71   18  150/75   100


 


 17 03:09   74   18  139/80   100


 


 17 02:39   71   18  148/66   100


 


 17 02:09   73   18  137/77   100


 


 17 01:09   83   18  100/65   100


 


 17 00:27  98.3 F  87   18  109/63   99








 Intake and Output











 17





 22:59 06:59 14:59


 


Other:   


 


  Voiding Method  Diaper 





  Incontinent 


 


  Weight  81.647 kg 














Head normocephalic


Neck supple


Lungs clear to auscultation bilaterally no wheezing or crackles


Heart regular rate and rhythm S1-S2, no rub or gallop


Abdomen is soft nontender nondistended positive bowel sounds no 

hepatosplenomegaly


Extremities no edema


Neuro left-sided paralysis.  Patient sleepy but arousable.  No evidence of 

distress





Results


CBC & Chem 7: 


 17 00:56





 17 00:56


Labs: 


 Abnormal Lab Results - Last 24 Hours (Table)











  17 Range/Units





  00:34 00:56 00:56 


 


RBC   3.33 L   (3.80-5.40)  m/uL


 


Hgb   10.3 L   (11.4-16.0)  gm/dL


 


Hct   32.0 L   (34.0-46.0)  %


 


Carbon Dioxide    31 H  (22-30)  mmol/L


 


BUN    32 H  (7-17)  mg/dL


 


Creatinine    1.80 H  (0.52-1.04)  mg/dL


 


Glucose    140 H  (74-99)  mg/dL


 


POC Glucose (mg/dL)  220 H    (75-99)  mg/dL


 


Urine Glucose (UA)     (Negative)  














  17 Range/Units





  03:35 06:32 06:50 


 


RBC     (3.80-5.40)  m/uL


 


Hgb     (11.4-16.0)  gm/dL


 


Hct     (34.0-46.0)  %


 


Carbon Dioxide     (22-30)  mmol/L


 


BUN     (7-17)  mg/dL


 


Creatinine     (0.52-1.04)  mg/dL


 


Glucose     (74-99)  mg/dL


 


POC Glucose (mg/dL)   47 L  68 L  (75-99)  mg/dL


 


Urine Glucose (UA)  4+ H    (Negative)  














  17 Range/Units





  08:33 


 


RBC   (3.80-5.40)  m/uL


 


Hgb   (11.4-16.0)  gm/dL


 


Hct   (34.0-46.0)  %


 


Carbon Dioxide   (22-30)  mmol/L


 


BUN   (7-17)  mg/dL


 


Creatinine   (0.52-1.04)  mg/dL


 


Glucose   (74-99)  mg/dL


 


POC Glucose (mg/dL)  213 H  (75-99)  mg/dL


 


Urine Glucose (UA)   (Negative)  














Thrombosis Risk Factor Assmnt





- Choose All That Apply


Any of the Below Risk Factors Present?: Yes


Each Factor Represents 1 point: Abnormal pulmonary function (COPD), Age 41-60 

years


Other Risk Factors: Yes


Each Risk Factor Represents 3 Points: History of DVT/PE


Other congenital or acquired thrombophilia - If yes, enter type in comment: No


Thrombosis Risk Factor Assessment Total Risk Factor Score: 5


Thrombosis Risk Factor Assessment Level: High Risk





Assessment and Plan


Assessment: 





1.  Syncope: Monitor for episodes of hypoglycemia.  Check for orthostatic 

hypotension.  Check echo to.  Check carotid Doppler.  EKG showing a normal 

sinus rhythm.  Continue telemetry monitoring.  Symptoms also may have been 

related to dehydration.  We'll continue with IV fluids.





2.  Diarrhea prior to admission.  Now resolved.





3.  Acute kidney injury with known chronic kidney disease, stage III.  Continue 

IV fluids.  Fluids were decreased down to 50 mL an hour.  Repeat labs in a.m.





4.  History of stroke with left-sided paralysis





5.  Insulin-dependent diabetes mellitus: Fluctuating blood sugars.  Patient had 

hypoglycemia when she came to the observation floor likely related to the extra 

insulin she received at home for her elevated blood sugars.  Resume her Lantus.

  Continue sliding scale.  Continue to monitor.  Blood sugar now in the 200s





6.  History of coronary artery disease with previous myocardial infarction





7.  History of hypertension continue to monitor.  She did have one episode of 

hypotension.  Now improved.





8.  Iron deficiency anemia: Continue with iron supplement





9.  Hyperlipidemia continue Lipitor





GI prophylaxis Pepcid and DVT prophylaxis subcu heparin


Time with Patient: Greater than 30 (Greater than 50% of the total time spent in 

counseling and coordination of care.I performed an examination of the patient 

and discussed their management with the physician Assistant.  I have reviewed 

the Physician Assistant's notes and agree with the documented findings and plan 

of care)

## 2017-11-27 NOTE — US
EXAMINATION TYPE: US carotid duplex BILAT

 

DATE OF EXAM: 11/27/2017

 

COMPARISON: US 7/10/2015

 

CLINICAL HISTORY: 57-year-old  female with syncope. Patient could not stay awake during exam, could n
ot hold head up or turn her head making the exam extremely difficult and limited 

 

Carotid duplex ultrasound examination. In direct Doppler criteria was utilized.

 

FINDINGS:

There is moderate atherosclerotic change at both bifurcations. The right ICA appears occluded. Increa
sed velocities in the left mid ICA.

 

EXAM MEASUREMENTS: 

 

RIGHT:  Peak Systolic Velocity (PSV) cm/sec

----- Right CCA:  37.3  

----- Right ICA:  0.0     

----- Right ECA:  104.3   

ICA/CCA ratio:  0.0    

 

RIGHT:  End Diastole cm/sec

----- Right CCA:  4.3   

----- Right ICA:  0.0      

----- Right ECA:  11.4     

 

LEFT:  Peak Systolic Velocity (PSV) cm/sec

----- Left CCA:  74.1  

----- Left ICA:  150.8   

----- Left ECA:  127.6  

ICA/CCA ratio:  2.0  

 

LEFT:  End Diastole cm/sec

----- Left CCA:  17.6  

----- Left ICA:  41.6   

----- Left ECA:  9.1 

 

VERTEBRALS (direction of flow):

Right Vertebral: Antegrade

Left Vertebral: Antegrade

 

Rhythm:  Normal

 

 

 

 

IMPRESSION:  

1. 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 CT angiography can further assess.   

2. Measurements suggest a moderate (50-69%) stenosis of the left ICA.

 

 

Criteria for Assigning % of Stenosis / Diameter reduction

(Estimation based on the indirect measurements of the internal carotid artery velocities (ICA PSV).

1.  Normal (no stenosis)=ICA PSV < 125 cm/s: ratio < 2.0: ICA EDV<40 cm/s.

2. Less than 50% stenosis=ICA PSV < 125 cm/s: ratio < 2.0: ICA EDV<40 cm/s.

3.  50 to 69% stenosis=ICA PSV of 125 to 230 cm/s: ration 2.0 ? 4.0: ICA EDV  cm/s.

4.  Greater than 70% stenosis to near occlusion= ICA PSV > 230 cm/s: ratio > 4.0: ICA EDV > 100 cm/s.
 

5.  Near occlusion= ICA PSV velocities may be low or undetectable: variable ratio and ICA EDV.

6.  Total occlusion=unable to detect flow.

## 2017-11-27 NOTE — CT
EXAMINATION TYPE: CT brain wo con

 

DATE OF EXAM: 11/27/2017

 

COMPARISON: 5/30/2017

 

HISTORY: Prior on synapse, syncope tonight, history of CVA with left sided neuro deficits

 

CT DLP: DLP:892.10 mGycm

Automated exposure control for dose reduction was used.

 

FINDINGS: 

There is extensive hypodensity in the right cerebral hemisphere involving the frontal temporal and pa
rietal lobes related to old infarct. There is no midline shift. There is no sign of intracranial hemo
rrhage. The calvarium is intact.

 

IMPRESSION: 

OLD LARGE RIGHT-SIDED ANTERIOR CEREBRAL AND MIDDLE CEREBRAL ARTERY INFARCT. NO ACUTE INTRACRANIAL ABN
ORMALITY. NO CHANGE.

## 2017-11-28 VITALS — TEMPERATURE: 97.7 F | DIASTOLIC BLOOD PRESSURE: 86 MMHG | SYSTOLIC BLOOD PRESSURE: 146 MMHG | HEART RATE: 91 BPM

## 2017-11-28 LAB
ALBUMIN SERPL-MCNC: 3.2 G/DL (ref 3.5–5)
ALP SERPL-CCNC: 60 U/L (ref 38–126)
ALT SERPL-CCNC: 49 U/L (ref 9–52)
ANION GAP SERPL CALC-SCNC: 6 MMOL/L
AST SERPL-CCNC: 31 U/L (ref 14–36)
BUN SERPL-SCNC: 25 MG/DL (ref 7–17)
CALCIUM SPEC-MCNC: 9.2 MG/DL (ref 8.4–10.2)
CELLS COUNTED: 100
CHLORIDE SERPL-SCNC: 112 MMOL/L (ref 98–107)
CO2 SERPL-SCNC: 26 MMOL/L (ref 22–30)
ERYTHROCYTE [DISTWIDTH] IN BLOOD BY AUTOMATED COUNT: 3.37 M/UL (ref 3.8–5.4)
ERYTHROCYTE [DISTWIDTH] IN BLOOD: 12.8 % (ref 11.5–15.5)
GLUCOSE BLD-MCNC: 209 MG/DL (ref 75–99)
GLUCOSE BLD-MCNC: 289 MG/DL (ref 75–99)
GLUCOSE BLD-MCNC: 443 MG/DL (ref 75–99)
GLUCOSE BLD-MCNC: 49 MG/DL (ref 75–99)
GLUCOSE BLD-MCNC: 94 MG/DL (ref 75–99)
GLUCOSE SERPL-MCNC: 45 MG/DL (ref 74–99)
HCT VFR BLD AUTO: 33.1 % (ref 34–46)
HGB BLD-MCNC: 10.1 GM/DL (ref 11.4–16)
LYMPHOCYTES # BLD MANUAL: 2.21 K/UL (ref 1–4.8)
MCH RBC QN AUTO: 30.1 PG (ref 25–35)
MCHC RBC AUTO-ENTMCNC: 30.7 G/DL (ref 31–37)
MCV RBC AUTO: 98.2 FL (ref 80–100)
MONOCYTES # BLD MANUAL: 0.29 K/UL (ref 0–1)
NEUTROPHILS NFR BLD MANUAL: 39 %
NEUTS SEG # BLD MANUAL: 1.6 K/UL (ref 1.3–7.7)
PLATELET # BLD AUTO: 196 K/UL (ref 150–450)
POTASSIUM SERPL-SCNC: 4.1 MMOL/L (ref 3.5–5.1)
PROT SERPL-MCNC: 6.1 G/DL (ref 6.3–8.2)
SODIUM SERPL-SCNC: 144 MMOL/L (ref 137–145)
WBC # BLD AUTO: 4.1 K/UL (ref 3.8–10.6)

## 2017-11-28 RX ADMIN — INSULIN ASPART SCH: 100 INJECTION, SOLUTION INTRAVENOUS; SUBCUTANEOUS at 19:59

## 2017-11-28 RX ADMIN — CEFAZOLIN SCH: 330 INJECTION, POWDER, FOR SOLUTION INTRAMUSCULAR; INTRAVENOUS at 09:37

## 2017-11-28 RX ADMIN — CITALOPRAM HYDROBROMIDE SCH MG: 20 TABLET ORAL at 12:12

## 2017-11-28 RX ADMIN — METOCLOPRAMIDE SCH: 5 TABLET ORAL at 12:10

## 2017-11-28 RX ADMIN — BUDESONIDE AND FORMOTEROL FUMARATE DIHYDRATE SCH PUFF: 160; 4.5 AEROSOL RESPIRATORY (INHALATION) at 09:21

## 2017-11-28 RX ADMIN — OXYCODONE HYDROCHLORIDE AND ACETAMINOPHEN SCH MG: 500 TABLET ORAL at 12:13

## 2017-11-28 RX ADMIN — METOCLOPRAMIDE SCH MG: 5 TABLET ORAL at 12:13

## 2017-11-28 RX ADMIN — INSULIN ASPART SCH: 100 INJECTION, SOLUTION INTRAVENOUS; SUBCUTANEOUS at 12:10

## 2017-11-28 RX ADMIN — ASPIRIN 81 MG CHEWABLE TABLET SCH MG: 81 TABLET CHEWABLE at 12:13

## 2017-11-28 RX ADMIN — VALPROIC ACID SCH MG: 250 SOLUTION ORAL at 12:13

## 2017-11-28 RX ADMIN — BUDESONIDE AND FORMOTEROL FUMARATE DIHYDRATE SCH: 160; 4.5 AEROSOL RESPIRATORY (INHALATION) at 20:37

## 2017-11-28 RX ADMIN — DULOXETINE SCH MG: 60 CAPSULE, DELAYED RELEASE ORAL at 12:12

## 2017-11-28 RX ADMIN — METOCLOPRAMIDE SCH: 5 TABLET ORAL at 19:59

## 2017-11-28 RX ADMIN — INSULIN ASPART SCH: 100 INJECTION, SOLUTION INTRAVENOUS; SUBCUTANEOUS at 12:14

## 2017-11-28 RX ADMIN — Medication SCH: at 12:13

## 2017-11-28 RX ADMIN — HEPARIN SODIUM SCH UNIT: 5000 INJECTION, SOLUTION INTRAVENOUS; SUBCUTANEOUS at 12:14

## 2017-11-28 NOTE — CONS
CONSULTATION



This is 57-year-old female, she has been admitted to [facility] with history of dizzy

spells at home.  She was brought into the hospital.  Patient had a carotid ultrasound,

which showed right side total occlusion, left side 50% to 69%.  Patient had a history

of CVA 3 years ago.  Affected her left side with fixed paralysis.  Patient has history

of congestive heart failure, diabetes mellitus, history of coronary stent, history of

hypertension.



PHYSICAL EXAMINATION:

Patient was seen in her room.  Patient's history is obtained from the care caregivers.

Neck is supple.  No bruit appreciated.  Chest is clear to auscultation.  First and

second sounds are normal.  Abdomen is soft.

Brachial and radial femoral pulses present.  Patient affixed (left side affecting the

arm and leg.)



Ultrasound showed right side totally occluded, left side 50% to 69%.  At this point,

patient had a right-side total occlusion is from the previous stroke most likely and

she has a fixed stroke, left-sided 50% to 69%, history of dizziness but no history of

emesis _____ and any new motor deficit. The right side is totally occluded.  There is

no indication for surgical intervention.  We will follow this patient closely in my

office in a month.



Thank you very much.





MMODL / IJN: [id number] / Job#: [id number]

## 2017-11-28 NOTE — CT
EXAMINATION TYPE: CT angio neck

 

DATE OF EXAM: 11/28/2017

 

COMPARISON: Ultrasound 11/27/2017

 

HISTORY: 57-year-old female with CVA, evaluate for stenosis

 

TECHNIQUE: Contiguous axial scanning of the neck performed with IV Contrast, patient injected with 65
 mL of Omnipaque 350. Coronal/sagittal MIP reconstructions performed. 3-D reconstructions generated o
n a dedicated independent workstation.

 

CT DLP: 167.4 mGycm

Automated exposure control for dose reduction was used.

 

FINDINGS: 

Mild atherosclerotic changes at the origin of the left subclavian and brachiocephalic arteries. Possi
ble moderate to severe stenosis at the origin of the left vertebral artery and mild to moderate at th
e origin of the right vertebral artery. The vertebral arteries are codominant and otherwise patent th
roughout the course.

 

Large hypodensity right MCA territory with a very diminutive right intracranial anterior circulation 
which likely receives collateral flow from the left side and posterior circulation.

 

The right common carotid artery is patent. There is confirmation of the proximal right ICA occlusion.


 

The left common carotid artery is patent.

 

Marked tortuosity of the proximal left internal carotid artery. In combination with patient motion, a
ssessment is limited but narrowing in the left carotid bulb seems to be over 50% but the remainder of
 the left internal carotid artery is widely patent.

 

 

 

 

IMPRESSION: 

 

1. FINDINGS CONFIRM COMPLETE OCCLUSION OF THE PROXIMAL RIGHT ICA.

2. SEVERE TORTUOSITY OF THE PROXIMAL LEFT ICA IN COMBINATION WITH PATIENT MOTION. THE EXACT DEGREE OF
 NARROWING AT THE PROXIMAL LEFT ICA IS DIFFICULT TO ASSESS BUT IS OVER 50%. THE REMAINDER OF THE LEFT
 INTERNAL CAROTID ARTERY IS WIDELY PATENT.

3. DIMINUTIVE INTRACRANIAL ANTERIOR CIRCULATION ON THE RIGHT LIKELY BEING SUPPLIED FROM SOME RETROGRA
DE FLOW FROM THE LEFT SIDE AND POSTERIOR CIRCULATION.

4. OLD RIGHT-SIDED INTRACRANIAL INFARCT REDEMONSTRATED.

## 2017-11-28 NOTE — P.DS
Providers


Date of admission: 


11/28/17 10:08





Expected date of discharge: 11/28/17


Attending physician: 


Saniya Cabral





Consults: 





 





11/27/17 12:34


Consult Physician Routine 


   Consulting Provider: Melissa Solis


   Consult Reason/Comments: syncope and possible stroke


   Do you want consulting provider notified?: Yes





11/27/17 18:15


Consult Physician Routine 


   Consulting Provider: Ortega Dao


   Consult Reason/Comments: syncope


   Do you want consulting provider notified?: Yes











Primary care physician: 


Saniya Cabral





[facility] Course: 





Discharge diagnosis





1.  Syncope: Likely secondary to dehydration.  Also related to the right 

internal carotid artery stenosis.  orthostatics negative. Check echo.  EKG 

showing a normal sinus rhythm.  Continue telemetry monitoring.  Symptoms also 

may have been related to dehydration.  Patient was cleared for discharge by 

both cardiology and neurology





2.  Diarrhea prior to admission.  Now resolved.





3.  Acute kidney injury with known chronic kidney disease, stage III.  Continue 

IV fluids.  Creatinine has normalized with IV fluids





4.  History of stroke with left-sided paralysis





5.  Insulin-dependent diabetes mellitus: Fluctuating blood sugars.  Patient had 

hypoglycemia when she came to the observation floor likely related to the extra 

insulin she received at home for her elevated blood sugars.  Resume her Lantus.

  Continue sliding scale.  Continue to monitor.  patient had no episode of 

hypoglycemia this morning.  Per caregiver she's only taking the Levemir 18 

units at bedtime instead of 22.  At this time we'll cut her Levemir down to 16 

units and continue sliding scale coverage.  A1c is 9.5.  Patient will likely 

need follow-up with endocrinologist outpatient.





6.  History of coronary artery disease with previous myocardial infarction





7.  History of hypertension continue to monitor.  She did have one episode of 

hypotension.  Now improved.





8.  Iron deficiency anemia: Continue with iron supplement





9.  Hyperlipidemia continue Lipitor





10.  Complete occlusion of the right internal carotid artery and 50-69% left 

internal carotid artery stenosis on carotid ultrasound.  Patient was evaluated 

by vascular surgery.  No surgical intervention required.  We will follow up 

with her in the office.  CTA of the neck pending.








Hospital course





This is a 57-year-old female with a known past medical history of CVA with left-

sided paralysis, diabetes mellitus, congestive heart failure, previous DVT of 

the lower extremity, hypertension, hyperlipidemia, hypothyroidism and bipolar.  

Patient's history was obtained from her caregiver.  The caregiver reports that 

Morenita had been having multiple episodes of diarrhea on Saturday.  Yesterday 

she had no further episodes of diarrhea.  Yesterday she was helping Morenita get 

to the bathroom to urinate.  She then finished urinating got up to stand with 

assistance and use of a wheelchair.  She became dizzy and passed out.  The 

caregiver reports that the patient passed out for about 3-4 minutes.  Her 

eyelids were flickering open and closed.  She called EMS and patient was 

brought into the emergency room.  Patient's blood sugar at that time was in the 

200s per caregiver.  Caregiver also reports that the patient was having issues 

with severely elevated blood sugars in the 500s.  She had been given extra 

insulin prior to the passing out episode.  When she came to the floor this 

morning blood sugars had dropped down to 46.  Blood sugars are now back up in 

the 200s.  Patient does have a history of fluctuating blood sugars.  Patient 

also had evidence of acute kidney injury with a creatinine of 1.80.  Caregiver 

does report that patient had been eating and drinking.  But again did have a 

day of multiple episodes of diarrhea.  D-dimer was reported normal at 0.47.  

Computed tomography scan the brain shows old stroke on the right side anterior 

cerebral and middle cerebral artery.  No acute intracranial abnormality.  No 

change.  KUB x-ray and chest x-ray were both negative.  EKG showing a normal 

sinus rhythm.  Troponin was negative.  Patient is very sleepy during my exam.  

However she is arousable.  Patient has not slept that she was in the ER all 

night.  Xanax was held this morning.  Also will be discontinuing the Dilaudid.  

Patient did receive a dose of IV Dilaudid due to a headache.  This could be 

contributing to her sleepiness.  She denies any chest pain or shortness of 

breath.  Denies any nausea or vomiting.  Denies any burning with urination.





11/28/2017 patient more awake and alert today.  She said the night.  Her 

carotid ultrasound showed complete occlusion in the right internal carotid 

artery and 50-69% and the left internal carotid artery.  Patient has been seen 

by vascular surgery and will follow-up with her in the office.  No surgical 

intervention at this time.  Kidney functions have improved after given IV 

fluids.  She's also being followed by neurology.  Test results are pending for 

a CT of the neck echo and EEG.  Patient had hypoglycemia with a blood sugar of 

45 this morning.  Was given dextrose.  Blood sugar has come up to 209.  Her 

caregiver says that she only takes 18 units in said the 22 units of Levemir at 

home.





Patient was cleared by cardiology and neurology for discharge.  Echo showed a 

preserved EF.  CTA of the neck did confirm complete occlusion in the right 

internal carotid artery.  Patient will be following up with Dr. Dao in the 

outpatient setting for follow-up on her carotid artery stenosis.  Patient's 

kidney function returned to normal after IV fluids given.  Likely patient's 

syncopal episode was related to dehydration.  Patient's did have some 

hypoglycemia this morning but as stated above she was only taking Levemir 18 

units instead of the 22 units.  We will further decrease the Levemir to 16 

units.  Continue to have patient monitor blood sugars.  Continue with her 

Humalog 6 units with each meal.  And she is to continue with Humalog sliding 

scale coverage.  Hospital Humalog sliding scale given to patient.  Patient's 

symptoms have improved.  She will follow-up with consulting physicians as 

scheduled.  She is medically stable for discharge.  Please refer to chart for 

further details.


Patient Condition at Discharge: Stable





Plan - Discharge Summary


Discharge Rx Participant: No


New Discharge Prescriptions: 


Continue


   Nitroglycerin Sl Tabs [Nitrostat] 0.4 mg SUBLINGUAL Q5M PRN


     PRN Reason: Chest Pain


   Albuterol Inhaler [Ventolin Hfa Inhaler] 2 puff INHALATION RT-Q6H PRN


     PRN Reason: Shortness Of Breath


   Atorvastatin [Lipitor] 20 mg PO HS


   Famotidine [Pepcid] 20 mg PO BID


   Citalopram Hydrobromide [CeleXA] 20 mg PO DAILY


   Aspirin EC [Ecotrin Low Dose] 81 mg PO DAILY


   Valproic Acid [Depakene] 250 mg PO DAILY


   Metoclopramide [Reglan] 5 mg PO AC-TID


   Ergocalciferol [Vitamin D2 (DRISDOL)] 50,000 unit PO SA


   Budesonide-Formot 160-4.5 Mcg [Symbicort 160-4.5 Mcg Inhaler] 2 puff 

INHALATION RT-BID


   Ascorbic Acid [Vitamin C] 500 mg PO DAILY


   HYDROcodone/APAP 10-325MG [Norco ] 1 tab PO Q6H PRN


     PRN Reason: Pain


   Insulin Lispro [humaLOG] 6 units SQ AC-TID


   Ferrous Sulfate [Iron (65 MG Elemental)] 325 mg PO DAILY


   DULoxetine HCL [Cymbalta] 60 mg PO DAILY


   ALPRAZolam [Xanax] 1 mg PO Q8HR





Changed


   Insulin Glargine [Lantus] 16 unit SQ HS #0


Discharge Medication List





Nitroglycerin Sl Tabs [Nitrostat] 0.4 mg SUBLINGUAL Q5M PRN 01/15/15 [History]


Albuterol Inhaler [Ventolin Hfa Inhaler] 2 puff INHALATION RT-Q6H PRN 02/19/16 [

History]


Aspirin EC [Ecotrin Low Dose] 81 mg PO DAILY 02/19/16 [History]


Atorvastatin [Lipitor] 20 mg PO HS 02/19/16 [History]


Citalopram Hydrobromide [CeleXA] 20 mg PO DAILY 02/19/16 [History]


Famotidine [Pepcid] 20 mg PO BID 02/19/16 [History]


Metoclopramide [Reglan] 5 mg PO AC-TID 02/19/16 [History]


Valproic Acid [Depakene] 250 mg PO DAILY 02/19/16 [History]


Budesonide-Formot 160-4.5 Mcg [Symbicort 160-4.5 Mcg Inhaler] 2 puff INHALATION 

RT-BID 10/06/16 [History]


Ergocalciferol [Vitamin D2 (DRISDOL)] 50,000 unit PO SA 10/06/16 [History]


Ascorbic Acid [Vitamin C] 500 mg PO DAILY 04/24/17 [History]


HYDROcodone/APAP 10-325MG [Norco ] 1 tab PO Q6H PRN 05/06/17 [History]


DULoxetine HCL [Cymbalta] 60 mg PO DAILY 09/19/17 [History]


Ferrous Sulfate [Iron (65 MG Elemental)] 325 mg PO DAILY 09/19/17 [History]


Insulin Lispro [humaLOG] 6 units SQ AC-TID 09/19/17 [History]


ALPRAZolam [Xanax] 1 mg PO Q8HR 09/22/17 [History]


Insulin Glargine [Lantus] 16 unit SQ HS #0 11/28/17 [Rx]








Follow up Appointment(s)/Referral(s): 


Melissa Solis MD [STAFF PHYSICIAN] - 1 Week


Sandhya Marte MD [STAFF PHYSICIAN] - 2 Weeks


Ortega Dao MD [STAFF PHYSICIAN] - 4 Weeks


Saniya Cabral MD [Primary Care Provider] - 1 Week


Patient Instructions/Handouts:  Syncope (GEN)


Activity/Diet/Wound Care/Special Instructions: 


Diet: diabetic, cardiac


Activity: as tolerated





copy of Humalog sliding scale A given to patient


Discharge Disposition: HOME SELF-CARE

## 2017-11-28 NOTE — P.PN
Subjective


Progress Note Date: 11/28/17


Principal diagnosis: 





Syncope





This is a pleasant 57-year-old  female continuing be evaluated by the 

neurology service for syncope.  She has a history of ischemic stroke with 

residual left hemiparesis.  She lives at home with a caregiver.  She had a 

history of diarrhea for several days.  She stood up from the commode a few days 

ago and became lightheaded and had a syncopal episode.  She had been 

hypotensive.  IV hydration was started.  Recall that his CT of the brain showed 

an old ischemic stroke involving the right middle cerebral artery and anterior 

cerebral distribution.  Her carotid Doppler showed significant stenosis so a 

CTA was done.  It did show total occlusion of the right internal carotid artery 

and greater than 50% occlusion on the left.  She is being followed by 

cardiovascular surgery and they have recommended follow up in outpatient 

setting.  She denies any recurrence of significant dizziness or syncope since 

her admission.  At the time of my exam she is resting comfortably in bed in no 

acute distress.





Objective





- Vital Signs


Vital signs: 


 Vital Signs











Temp  97.5 F L  11/28/17 12:48


 


Pulse  76   11/28/17 12:48


 


Resp  18   11/28/17 12:48


 


BP  110/52   11/28/17 12:48


 


Pulse Ox  88 L  11/28/17 12:48








 Intake & Output











 11/27/17 11/28/17 11/28/17





 18:59 06:59 18:59


 


Intake Total 236  360


 


Balance 236  360


 


Weight 81.647 kg  


 


Intake:   


 


  Oral 236  360


 


Other:   


 


  Voiding Method Bedside Commode Bedside Commode 





 Diaper Diaper 





 Incontinent Incontinent 


 


  # Voids   1


 


  # Bowel Movements   1














- Constitutional


General appearance: Present: average body habitus, cooperative





- EENT


Eyes: Present: PERRLA.  Absent: abnormal pupil, ptosis


ENT: Present: hearing grossly normal





- Cardiovascular


Rhythm: regular





- Gastrointestinal


General gastrointestinal: Absent: distended, tenderness





- Neurologic


Neurologic Comment(s): 





She is alert awake and oriented 3.  Speech and language are normal.  Strength 

is 0 out of 5 left upper extremity 2 out of 5 left lower extremity and 5 out of 

5 on the right side.  She has diminished light touch on the left side.  She has 

a left facial droop.





- Labs


CBC & Chem 7: 


 11/28/17 06:54





 11/28/17 06:54


Labs: 


 Abnormal Lab Results - Last 24 Hours (Table)











  11/27/17 11/27/17 11/28/17 Range/Units





  17:01 20:36 06:54 


 


RBC    3.37 L  (3.80-5.40)  m/uL


 


Hgb    10.1 L  (11.4-16.0)  gm/dL


 


Hct    33.1 L  (34.0-46.0)  %


 


MCHC    30.7 L  (31.0-37.0)  g/dL


 


Chloride     ()  mmol/L


 


BUN     (7-17)  mg/dL


 


Glucose     (74-99)  mg/dL


 


POC Glucose (mg/dL)  316 H  221 H   (75-99)  mg/dL


 


Total Protein     (6.3-8.2)  g/dL


 


Albumin     (3.5-5.0)  g/dL














  11/28/17 11/28/17 11/28/17 Range/Units





  06:54 06:54 07:11 


 


RBC     (3.80-5.40)  m/uL


 


Hgb     (11.4-16.0)  gm/dL


 


Hct     (34.0-46.0)  %


 


MCHC     (31.0-37.0)  g/dL


 


Chloride  112 H    ()  mmol/L


 


BUN  25 H    (7-17)  mg/dL


 


Glucose  45 L*    (74-99)  mg/dL


 


POC Glucose (mg/dL)   49 L  209 H  (75-99)  mg/dL


 


Total Protein  6.1 L    (6.3-8.2)  g/dL


 


Albumin  3.2 L    (3.5-5.0)  g/dL














Assessment and Plan


(1) Syncope and collapse


Current Visit: Yes   Status: Resolved   Code(s): R55 - SYNCOPE AND COLLAPSE   

SNOMED Code(s): 732511137


   





(2) Hemiparesis affecting left side as late effect of cerebrovascular accident


Current Visit: Yes   Status: Chronic   Code(s): I69.354 - HEMIPLGA FOLLOWING 

CEREBRAL INFRC AFFECTING LEFT NONDOM SIDE   SNOMED Code(s): 348877894


   





(3) Carotid stenosis


Current Visit: Yes   Status: Chronic   Code(s): I65.29 - OCCLUSION AND STENOSIS 

OF UNSPECIFIED CAROTID ARTERY   SNOMED Code(s): 54154662


   





(4) Dehydration


Current Visit: Yes   Status: Resolved   Code(s): E86.0 - DEHYDRATION   SNOMED 

Code(s): 10074546


   





(5) Diarrhea


Current Visit: Yes   Status: Resolved   Code(s): R19.7 - DIARRHEA, UNSPECIFIED 

  SNOMED Code(s): 90259509


   





(6) Hyperglycemia


Current Visit: Yes   Status: Chronic   Code(s): R73.9 - HYPERGLYCEMIA, 

UNSPECIFIED   SNOMED Code(s): 91004898


   


Plan: 





Her syncopal episode was likely due to dehydration.  She is doing much better 

after IV hydration.  As discussed above she will follow-up in an outpatient 

setting to monitor her carotid stenosis.  Barring any unforeseen abnormalities 

on her EEG she would be released from a neurological standpoint.





I have performed a history and physical on the above patient.  I have reviewed 

the above note, and agree.

## 2017-11-28 NOTE — P.PN
Subjective


Progress Note Date: 11/28/17





This is a 57-year-old female with a known past medical history of CVA with left-

sided paralysis, diabetes mellitus, congestive heart failure, previous DVT of 

the lower extremity, hypertension, hyperlipidemia, hypothyroidism and bipolar.  

Patient's history was obtained from her caregiver.  The caregiver reports that 

Morenita had been having multiple episodes of diarrhea on Saturday.  Yesterday 

she had no further episodes of diarrhea.  Yesterday she was helping Morenita get 

to the bathroom to urinate.  She then finished urinating got up to stand with 

assistance and use of a wheelchair.  She became dizzy and passed out.  The 

caregiver reports that the patient passed out for about 3-4 minutes.  Her 

eyelids were flickering open and closed.  She called EMS and patient was 

brought into the emergency room.  Patient's blood sugar at that time was in the 

200s per caregiver.  Caregiver also reports that the patient was having issues 

with severely elevated blood sugars in the 500s.  She had been given extra 

insulin prior to the passing out episode.  When she came to the floor this 

morning blood sugars had dropped down to 46.  Blood sugars are now back up in 

the 200s.  Patient does have a history of fluctuating blood sugars.  Patient 

also had evidence of acute kidney injury with a creatinine of 1.80.  Caregiver 

does report that patient had been eating and drinking.  But again did have a 

day of multiple episodes of diarrhea.  D-dimer was reported normal at 0.47.  

Computed tomography scan the brain shows old stroke on the right side anterior 

cerebral and middle cerebral artery.  No acute intracranial abnormality.  No 

change.  KUB x-ray and chest x-ray were both negative.  EKG showing a normal 

sinus rhythm.  Troponin was negative.  Patient is very sleepy during my exam.  

However she is arousable.  Patient has not slept that she was in the ER all 

night.  Xanax was held this morning.  Also will be discontinuing the Dilaudid.  

Patient did receive a dose of IV Dilaudid due to a headache.  This could be 

contributing to her sleepiness.  She denies any chest pain or shortness of 

breath.  Denies any nausea or vomiting.  Denies any burning with urination.





11/28/2017 patient more awake and alert today.  She said the night.  Her 

carotid ultrasound showed complete occlusion in the right internal carotid 

artery and 50-69% and the left internal carotid artery.  Patient has been seen 

by vascular surgery and will follow-up with her in the office.  No surgical 

intervention at this time.  Kidney functions have improved after given IV 

fluids.  She's also being followed by neurology.  Test results are pending for 

a CT of the neck echo and EEG.  Patient had hypoglycemia with a blood sugar of 

45 this morning.  Was given dextrose.  Blood sugar has come up to 209.  Her 

caregiver says that she only takes 18 units in said the 22 units of Levemir at 

home.








Objective





- Vital Signs


Vital signs: 


 Vital Signs











Temp  97.4 F L  11/28/17 08:00


 


Pulse  71   11/28/17 08:00


 


Resp  18   11/28/17 08:00


 


BP  128/66   11/28/17 08:00


 


Pulse Ox  97   11/28/17 08:00








 Intake & Output











 11/27/17 11/28/17 11/28/17





 18:59 06:59 18:59


 


Intake Total 236  


 


Balance 236  


 


Weight 81.647 kg  


 


Intake:   


 


  Oral 236  


 


Other:   


 


  Voiding Method Bedside Commode Bedside Commode 





 Diaper Diaper 





 Incontinent Incontinent 














- Exam





1.  Syncope: Monitor for episodes of hypoglycemia. orthostatics negative. Check 

echo.  EKG showing a normal sinus rhythm.  Continue telemetry monitoring.  

Symptoms also may have been related to dehydration.  We'll continue with IV 

fluids.patient evaluated by cardiology and neurology





2.  Diarrhea prior to admission.  Now resolved.





3.  Acute kidney injury with known chronic kidney disease, stage III.  Continue 

IV fluids.  Creatinine has normalized with IV fluids





4.  History of stroke with left-sided paralysis





5.  Insulin-dependent diabetes mellitus: Fluctuating blood sugars.  Patient had 

hypoglycemia when she came to the observation floor likely related to the extra 

insulin she received at home for her elevated blood sugars.  Resume her Lantus.

  Continue sliding scale.  Continue to monitor.  patient had no episode of 

hypoglycemia this morning.  Per caregiver she's only taking the Levemir 18 

units at bedtime incentive 22.  At this time we'll cut her Levemir down to 16 

units and continue sliding scale coverage.  A1c is 9.5.  Patient will likely 

need follow-up with endocrinologist outpatient.





6.  History of coronary artery disease with previous myocardial infarction





7.  History of hypertension continue to monitor.  She did have one episode of 

hypotension.  Now improved.





8.  Iron deficiency anemia: Continue with iron supplement





9.  Hyperlipidemia continue Lipitor





10.  Complete occlusion of the right internal carotid artery and 50-69% left 

internal carotid artery stenosis on carotid ultrasound.  Patient was evaluated 

by vascular surgery.  No surgical intervention required.  We will follow up 

with her in the office.  CTA of the neck pending.





Consult physical therapy





Anticipate discharge within the next 1-2 days





I performed an examination of the patient and discussed their management with 

the physician Assistant.  I have reviewed the Physician Assistant's notes and 

agree with the documented findings and plan of care














- Labs


CBC & Chem 7: 


 11/28/17 06:54





 11/28/17 06:54


Labs: 


 Abnormal Lab Results - Last 24 Hours (Table)











  11/27/17 11/27/17 11/27/17 Range/Units





  00:56 11:52 17:01 


 


RBC     (3.80-5.40)  m/uL


 


Hgb     (11.4-16.0)  gm/dL


 


Hct     (34.0-46.0)  %


 


MCHC     (31.0-37.0)  g/dL


 


Chloride     ()  mmol/L


 


BUN     (7-17)  mg/dL


 


Glucose     (74-99)  mg/dL


 


POC Glucose (mg/dL)   429 H  316 H  (75-99)  mg/dL


 


Hemoglobin A1c  9.5 H    (4.0-6.0)  %


 


Total Protein     (6.3-8.2)  g/dL


 


Albumin     (3.5-5.0)  g/dL














  11/27/17 11/28/17 11/28/17 Range/Units





  20:36 06:54 06:54 


 


RBC   3.37 L   (3.80-5.40)  m/uL


 


Hgb   10.1 L   (11.4-16.0)  gm/dL


 


Hct   33.1 L   (34.0-46.0)  %


 


MCHC   30.7 L   (31.0-37.0)  g/dL


 


Chloride    112 H  ()  mmol/L


 


BUN    25 H  (7-17)  mg/dL


 


Glucose    45 L*  (74-99)  mg/dL


 


POC Glucose (mg/dL)  221 H    (75-99)  mg/dL


 


Hemoglobin A1c     (4.0-6.0)  %


 


Total Protein    6.1 L  (6.3-8.2)  g/dL


 


Albumin    3.2 L  (3.5-5.0)  g/dL














  11/28/17 11/28/17 Range/Units





  06:54 07:11 


 


RBC    (3.80-5.40)  m/uL


 


Hgb    (11.4-16.0)  gm/dL


 


Hct    (34.0-46.0)  %


 


MCHC    (31.0-37.0)  g/dL


 


Chloride    ()  mmol/L


 


BUN    (7-17)  mg/dL


 


Glucose    (74-99)  mg/dL


 


POC Glucose (mg/dL)  49 L  209 H  (75-99)  mg/dL


 


Hemoglobin A1c    (4.0-6.0)  %


 


Total Protein    (6.3-8.2)  g/dL


 


Albumin    (3.5-5.0)  g/dL

## 2017-11-28 NOTE — CONS
CONSULTATION



DATE OF CONSULTATION:

2017.



CHIEF COMPLAINT:

Syncope.



HISTORY OF PRESENT ILLNESS:

The patient is a pleasant 57-year-old  female, who was being evaluated today

on 2017 by the Neurology Service per the request of Dr. Cabral for syncope.  The

patient has history of ischemic stroke with residual left hemiparesis.  She does have a

caregiver at home.  The patient has been having diarrhea for several days.  According

to the caregiver, the patient was on the bedside commode and when she went to stand up,

she became lightheaded and passed out.  If she has been afebrile but has been having

episodes of hypotension with one of the blood pressure reading at 83/46.  She was

started on IV hydration and admitted for further workup and management.  A CT scan of

the brain was done, which showed old ischemic stroke involving the right middle

cerebral artery and anterior cerebral artery distribution.  Her carotid Doppler showed

evidence of possible complete occlusion of the right internal carotid artery and 50 to

69% occlusion of the left internal carotid artery.  Her CBC showed anemia with a

hemoglobin of 10.3 and hematocrit of 32%.  Her comprehensive metabolic profile showed

renal insufficiency with a BUN of 32 and creatinine of 1.8.  Her cardiac enzymes were

normal.  Her hemoglobin A1c was elevated at 9.5, and her urinalysis showed proteinuria.

According to the caregiver, the patient's diabetes has not been well controlled.

Although she does have a good diabetic diet.  At the time of my evaluation, the patient

is lying in her bed and appears to be in no acute distress.  She denies any recurrence

of any dizziness or syncopal spells.



PAST MEDICAL HISTORY:

Stroke, chronic obstructive pulmonary disease, diabetes, history of deep venous

thrombosis, gastroesophageal reflux disease, dyslipidemia, hypertension, history of

myocardial infarction, hypothyroidism, history of right toe amputation, history of

MRSA, appendectomy, , cholecystectomy, hysterectomy, orthopedic surgeries,

anxiety disorder, depression, bipolar disorder.



SOCIAL HISTORY:

The patient is a former smoker.  She denies any alcohol or drug use.  She does have a

caregiver that lives with her.



FAMILY HISTORY:

Positive for COPD, heart disease, and diabetes.



HOME MEDICATIONS:

Reviewed in the chart.



ALLERGIES:

BARBITURATES, KEFLEX, MORPHINE, PENICILLIN, PHENOBARBITAL, NORVASC, BEE VENOM.



REVIEW OF SYSTEMS:

CONSTITUTIONAL:  Positive for fatigue.

EYES:  Positive for blurred vision.

ENT:  Negative.

CARDIOVASCULAR:  As mentioned above.

NEUROLOGICAL:  As mentioned above.

RESPIRATORY:  Positive for occasional shortness of breath.

GASTROINTESTINAL:  Positive for occasional heartburn and as mentioned above.

GENITOURINARY:  Negative.

ENDOCRINE:  As mentioned above.

MUSCULOSKELETAL:  Positive for occasional joint pain.

DERMATOLOGICAL:  Negative.

PSYCHIATRIC:  Positive for bipolar disorder, depression, and anxiety disorder.



PHYSICAL EXAM:

Vital signs show a temperature of 98.8, pulse 80, respirations 16, blood pressure

101/54.

GENERAL APPEARANCE:  The patient is a well-developed  female, who appears to

be in no acute distress.

HEENT:  Normocephalic, atraumatic, left facial droop is seen.

NECK:  Supple  with no masses felt.

CARDIOVASCULAR:  Regular rate and rhythm.

ABDOMEN:  Nontender nondistended.

EXTREMITIES: Showed no edema or clubbing.

NEUROLOGICAL EXAM: The patient is awake and oriented x3.  Speech and language are

normal.  Strength is 0/5 on the left upper extremity, 2/5 on the left lower extremity,

and 5/5 on the right side.  Sensory exam showed diminished light touch sensation on the

left compared to the right.  Cranial nerve testing showed left facial drooping.  No

seizure-like activity is seen.



IMPRESSION:

1. Syncopal spell.

2. Dehydration.

3. History of ischemic stroke with residual left hemiparesis.

4. Uncontrolled diabetes.

5. Carotid stenosis.



RECOMMENDATION:

The patient did suffer a syncopal episode which was preceded by lightheadedness.  She

has been having diarrhea for a few days and is likely dehydrated, given her renal

function test.  Continue IV hydration as tolerated.  I did review her CT scan of the

brain which showed no changes when compared to her 2017 study.  She was reassured

from that standpoint.  Due to her carotid Doppler findings, I will order a CT angiogram

of the neck.  I will repeat her chemistry panel in the morning  to make sure her renal

function has normalized before doing the CT angiogram.  I will order an EEG.  Continue

DVT prophylaxis and GI prophylaxis.  I also had a lengthy discussion with her caregiver

regarding her uncontrolled diabetes.  She will follow up further with Dr. Cabral and

possibly with Endocrinology if needed.  Continue the rest of your current workup and

management.  I will continue to follow with you.  Further recommendations to follow.



Thank you, Dr. Cabral, our for allowing me to participate in the care of your patient.

If you have any questions, please feel free to contact me.





[email or web address] / IJN: [id number] / Job#: [id number]

## 2017-11-28 NOTE — EEG
ELECTROENCEPHALOGRAM REPORT



DATE OF SERVICE:

11/28/2017.



REASON FOR TESTING:

Syncope.



DESCRIPTION OF THE PROCEDURE:

This EEG was performed using a 21 channel digital electroencephalograph, following

international 10-20 system.



DESCRIPTION OF THE RECORDING:

From the beginning of the tracing, and with patient's eyes closed, the background

rhythm was mostly consisting of 8 hertz alpha frequency in the posterior occipital

leads.  Frequent muscle and movement artifacts are seen.  Photic stimulation was

performed with a minimal driving response seen.  No pathological waves were elicited.

Hyperventilation was not performed.  No epileptiform discharges were seen.  Her EKG

lead showed a regular rate and rhythm.



INTERPRETATION:

This awake EEG can be considered within normal limits.  No epileptiform discharges were

seen.  The absence of epileptiform discharges does not rule out the diagnosis of

epilepsy, therefore clinical correlation is recommended.





[email or web address] / IJN: [id number] / Job#: [id number]

## 2018-01-08 ENCOUNTER — HOSPITAL ENCOUNTER (OUTPATIENT)
Dept: HOSPITAL 47 - EC | Age: 58
Setting detail: OBSERVATION
LOS: 3 days | Discharge: HOME | End: 2018-01-11
Payer: COMMERCIAL

## 2018-01-08 VITALS — BODY MASS INDEX: 23.2 KG/M2

## 2018-01-08 DIAGNOSIS — I12.9: ICD-10-CM

## 2018-01-08 DIAGNOSIS — Z83.3: ICD-10-CM

## 2018-01-08 DIAGNOSIS — I50.9: ICD-10-CM

## 2018-01-08 DIAGNOSIS — Z95.5: ICD-10-CM

## 2018-01-08 DIAGNOSIS — Z79.82: ICD-10-CM

## 2018-01-08 DIAGNOSIS — E11.40: ICD-10-CM

## 2018-01-08 DIAGNOSIS — Z88.3: ICD-10-CM

## 2018-01-08 DIAGNOSIS — N18.3: ICD-10-CM

## 2018-01-08 DIAGNOSIS — Z87.891: ICD-10-CM

## 2018-01-08 DIAGNOSIS — E11.65: ICD-10-CM

## 2018-01-08 DIAGNOSIS — Z90.710: ICD-10-CM

## 2018-01-08 DIAGNOSIS — Z88.5: ICD-10-CM

## 2018-01-08 DIAGNOSIS — R51: ICD-10-CM

## 2018-01-08 DIAGNOSIS — Z79.51: ICD-10-CM

## 2018-01-08 DIAGNOSIS — R20.0: ICD-10-CM

## 2018-01-08 DIAGNOSIS — F31.9: ICD-10-CM

## 2018-01-08 DIAGNOSIS — Z79.899: ICD-10-CM

## 2018-01-08 DIAGNOSIS — F41.9: ICD-10-CM

## 2018-01-08 DIAGNOSIS — R07.89: Primary | ICD-10-CM

## 2018-01-08 DIAGNOSIS — E78.5: ICD-10-CM

## 2018-01-08 DIAGNOSIS — Z79.4: ICD-10-CM

## 2018-01-08 DIAGNOSIS — I13.0: ICD-10-CM

## 2018-01-08 DIAGNOSIS — E07.9: ICD-10-CM

## 2018-01-08 DIAGNOSIS — I25.10: ICD-10-CM

## 2018-01-08 DIAGNOSIS — Z86.711: ICD-10-CM

## 2018-01-08 DIAGNOSIS — K21.9: ICD-10-CM

## 2018-01-08 DIAGNOSIS — E11.319: ICD-10-CM

## 2018-01-08 DIAGNOSIS — E11.22: ICD-10-CM

## 2018-01-08 DIAGNOSIS — N17.9: ICD-10-CM

## 2018-01-08 DIAGNOSIS — I25.2: ICD-10-CM

## 2018-01-08 DIAGNOSIS — Z82.5: ICD-10-CM

## 2018-01-08 DIAGNOSIS — E86.0: ICD-10-CM

## 2018-01-08 DIAGNOSIS — Z88.8: ICD-10-CM

## 2018-01-08 DIAGNOSIS — Z86.14: ICD-10-CM

## 2018-01-08 DIAGNOSIS — Z99.3: ICD-10-CM

## 2018-01-08 DIAGNOSIS — R19.7: ICD-10-CM

## 2018-01-08 DIAGNOSIS — R11.10: ICD-10-CM

## 2018-01-08 DIAGNOSIS — I69.354: ICD-10-CM

## 2018-01-08 DIAGNOSIS — J44.9: ICD-10-CM

## 2018-01-08 DIAGNOSIS — Z91.030: ICD-10-CM

## 2018-01-08 DIAGNOSIS — Z82.49: ICD-10-CM

## 2018-01-08 DIAGNOSIS — Z88.0: ICD-10-CM

## 2018-01-08 DIAGNOSIS — Z89.421: ICD-10-CM

## 2018-01-08 DIAGNOSIS — Z86.718: ICD-10-CM

## 2018-01-08 LAB
ALBUMIN SERPL-MCNC: 3.5 G/DL (ref 3.5–5)
ALP SERPL-CCNC: 53 U/L (ref 38–126)
ALT SERPL-CCNC: 40 U/L (ref 9–52)
ANION GAP SERPL CALC-SCNC: 9 MMOL/L
APTT BLD: 21.1 SEC (ref 22–30)
AST SERPL-CCNC: 18 U/L (ref 14–36)
BASOPHILS # BLD AUTO: 0 K/UL (ref 0–0.2)
BASOPHILS NFR BLD AUTO: 0 %
BUN SERPL-SCNC: 32 MG/DL (ref 7–17)
CALCIUM SPEC-MCNC: 9.1 MG/DL (ref 8.4–10.2)
CHLORIDE SERPL-SCNC: 103 MMOL/L (ref 98–107)
CK SERPL-CCNC: 40 U/L (ref 30–135)
CK SERPL-CCNC: 50 U/L (ref 30–135)
CO2 SERPL-SCNC: 27 MMOL/L (ref 22–30)
EOSINOPHIL # BLD AUTO: 0 K/UL (ref 0–0.7)
EOSINOPHIL NFR BLD AUTO: 1 %
ERYTHROCYTE [DISTWIDTH] IN BLOOD BY AUTOMATED COUNT: 3.32 M/UL (ref 3.8–5.4)
ERYTHROCYTE [DISTWIDTH] IN BLOOD: 13.8 % (ref 11.5–15.5)
GLUCOSE BLD-MCNC: 321 MG/DL (ref 75–99)
GLUCOSE BLD-MCNC: 389 MG/DL (ref 75–99)
GLUCOSE BLD-MCNC: 408 MG/DL (ref 75–99)
GLUCOSE SERPL-MCNC: 402 MG/DL (ref 74–99)
HCT VFR BLD AUTO: 32.1 % (ref 34–46)
HGB BLD-MCNC: 10.1 GM/DL (ref 11.4–16)
INR PPP: 1 (ref ?–1.2)
LYMPHOCYTES # SPEC AUTO: 1.4 K/UL (ref 1–4.8)
LYMPHOCYTES NFR SPEC AUTO: 31 %
MAGNESIUM SPEC-SCNC: 1.9 MG/DL (ref 1.6–2.3)
MCH RBC QN AUTO: 30.5 PG (ref 25–35)
MCHC RBC AUTO-ENTMCNC: 31.5 G/DL (ref 31–37)
MCV RBC AUTO: 96.7 FL (ref 80–100)
MONOCYTES # BLD AUTO: 0.2 K/UL (ref 0–1)
MONOCYTES NFR BLD AUTO: 5 %
NEUTROPHILS # BLD AUTO: 2.7 K/UL (ref 1.3–7.7)
NEUTROPHILS NFR BLD AUTO: 61 %
PLATELET # BLD AUTO: 166 K/UL (ref 150–450)
POTASSIUM SERPL-SCNC: 5.1 MMOL/L (ref 3.5–5.1)
PROT SERPL-MCNC: 6.1 G/DL (ref 6.3–8.2)
PT BLD: 10.1 SEC (ref 9–12)
SODIUM SERPL-SCNC: 139 MMOL/L (ref 137–145)
TROPONIN I SERPL-MCNC: <0.012 NG/ML (ref 0–0.03)
TROPONIN I SERPL-MCNC: <0.012 NG/ML (ref 0–0.03)
WBC # BLD AUTO: 4.5 K/UL (ref 3.8–10.6)

## 2018-01-08 PROCEDURE — 80061 LIPID PANEL: CPT

## 2018-01-08 PROCEDURE — 85025 COMPLETE CBC W/AUTO DIFF WBC: CPT

## 2018-01-08 PROCEDURE — 96372 THER/PROPH/DIAG INJ SC/IM: CPT

## 2018-01-08 PROCEDURE — 96365 THER/PROPH/DIAG IV INF INIT: CPT

## 2018-01-08 PROCEDURE — 82550 ASSAY OF CK (CPK): CPT

## 2018-01-08 PROCEDURE — 87502 INFLUENZA DNA AMP PROBE: CPT

## 2018-01-08 PROCEDURE — 85610 PROTHROMBIN TIME: CPT

## 2018-01-08 PROCEDURE — 83036 HEMOGLOBIN GLYCOSYLATED A1C: CPT

## 2018-01-08 PROCEDURE — 93005 ELECTROCARDIOGRAM TRACING: CPT

## 2018-01-08 PROCEDURE — 82553 CREATINE MB FRACTION: CPT

## 2018-01-08 PROCEDURE — 80164 ASSAY DIPROPYLACETIC ACD TOT: CPT

## 2018-01-08 PROCEDURE — 94640 AIRWAY INHALATION TREATMENT: CPT

## 2018-01-08 PROCEDURE — 84484 ASSAY OF TROPONIN QUANT: CPT

## 2018-01-08 PROCEDURE — 94760 N-INVAS EAR/PLS OXIMETRY 1: CPT

## 2018-01-08 PROCEDURE — 83735 ASSAY OF MAGNESIUM: CPT

## 2018-01-08 PROCEDURE — 36415 COLL VENOUS BLD VENIPUNCTURE: CPT

## 2018-01-08 PROCEDURE — 99285 EMERGENCY DEPT VISIT HI MDM: CPT

## 2018-01-08 PROCEDURE — 70450 CT HEAD/BRAIN W/O DYE: CPT

## 2018-01-08 PROCEDURE — 96366 THER/PROPH/DIAG IV INF ADDON: CPT

## 2018-01-08 PROCEDURE — 80053 COMPREHEN METABOLIC PANEL: CPT

## 2018-01-08 PROCEDURE — 71046 X-RAY EXAM CHEST 2 VIEWS: CPT

## 2018-01-08 PROCEDURE — 85730 THROMBOPLASTIN TIME PARTIAL: CPT

## 2018-01-08 RX ADMIN — METOCLOPRAMIDE SCH MG: 5 TABLET ORAL at 22:46

## 2018-01-08 RX ADMIN — INSULIN ASPART SCH UNIT: 100 INJECTION, SOLUTION INTRAVENOUS; SUBCUTANEOUS at 22:40

## 2018-01-08 RX ADMIN — NITROGLYCERIN SCH: 20 OINTMENT TOPICAL at 22:42

## 2018-01-08 RX ADMIN — ATORVASTATIN CALCIUM SCH MG: 20 TABLET, FILM COATED ORAL at 22:44

## 2018-01-08 RX ADMIN — DOCUSATE SODIUM SCH: 100 CAPSULE, LIQUID FILLED ORAL at 22:41

## 2018-01-08 RX ADMIN — DOCUSATE SODIUM SCH MG: 100 CAPSULE, LIQUID FILLED ORAL at 22:46

## 2018-01-08 RX ADMIN — FAMOTIDINE SCH MG: 20 TABLET, FILM COATED ORAL at 22:45

## 2018-01-08 RX ADMIN — INSULIN ASPART SCH UNIT: 100 INJECTION, SOLUTION INTRAVENOUS; SUBCUTANEOUS at 17:01

## 2018-01-08 NOTE — ED
General Adult HPI





- General


Stated complaint: Chest Pain


Time Seen by Provider: 18 14:08


Source: patient, EMS, RN notes reviewed


Mode of arrival: EMS


Limitations: physical limitation





- History of Present Illness


Initial comments: 





Patient is a pleasant 57-year-old female presenting to the emergency department 

for chest discomfort.  Onset was around a half an hour ago.  Patient states 

discomfort was somewhat severe however now is moderate.  Patient has a 

difficult time describing the type of discomfort she is experiencing.  Patient 

is a poor historian.  Patient states she does not generally have chest 

discomfort.  Patient does complain of a headache.  Patient states she does get 

headaches frequently since her stroke in , approximately weekly.  Headache 

is similar to previous headaches.  No complaints of dyspnea.  Patient states 

there has been some mild rhinorrhea recently and has recently been exposed to 

influenza.  No vomiting.  No diaphoresis.  Patient did receive aspirin from EMS 

prior to arrival.





- Related Data


 Home Medications











 Medication  Instructions  Recorded  Confirmed


 


Nitroglycerin Sl Tabs [Nitrostat] 0.4 mg SUBLINGUAL Q5M PRN 01/15/15 01/08/18


 


Albuterol Inhaler [Ventolin Hfa 2 puff INHALATION RT-Q4H PRN 16





Inhaler]   


 


Aspirin EC [Ecotrin Low Dose] 81 mg PO DAILY 16


 


Atorvastatin [Lipitor] 20 mg PO HS 16


 


Famotidine [Pepcid] 20 mg PO BID 16


 


Metoclopramide [Reglan] 5 mg PO AC-TID 16


 


Valproic Acid [Depakene] 250 mg PO DAILY 16


 


Budesonide-Formot 160-4.5 Mcg 2 puff INHALATION RT-BID 10/06/16 01/08/18





[Symbicort 160-4.5 Mcg Inhaler]   


 


Ergocalciferol [Vitamin D2 50,000 unit PO Q7D 10/06/16 01/08/18





(DRISDOL)]   


 


HYDROcodone/APAP 10-325MG [Norco 1 tab PO Q6H PRN 17





]   


 


DULoxetine HCL [Cymbalta] 60 mg PO DAILY 17


 


Ferrous Sulfate [Iron (65  mg PO DAILY 17





Elemental)]   


 


Insulin Lispro [humaLOG] 4 units SQ AC-TID 17


 


ALPRAZolam [Xanax] 1 mg PO TID 17


 


Docusate [Colace] 100 mg PO BID 18


 


EPINEPHrine (Auto Inject) [Epipen] 0.3 mg IM ONCE PRN 18


 


Glucagon Emergency Kit 1 mg SQ ONCE PRN 18


 


Insulin Glargine [Lantus] 20 unit SQ HS 18


 


Ondansetron HCl [Zofran] 4 mg PO DAILY PRN 18


 


SILVER sulfADIAZINE Cream 1 applic TOPICAL DAILY 18





[Silvadene 1% Cream]   


 


Vitamin B Complex 1 cap PO DAILY 18











 Allergies











Allergy/AdvReac Type Severity Reaction Status Date / Time


 


Barbiturates Allergy  Rash/Hives Verified 18 15:02


 


cephalexin monohydrate Allergy  Rash/Hives Verified 18 15:02





[From Keflex]     


 


morphine Allergy  Rash/Hives Verified 18 15:02


 


Penicillins Allergy  Rash/Hives Verified 18 15:02


 


phenobarbital Allergy  Swelling Verified 18 14:20


 


venom-honey bee Allergy  Swelling Verified 18 15:02





[bee venom (honey bee)]     


 


amlodipine besylate AdvReac  Vomiting Verified 18 14:20





[From Norvasc]     














Review of Systems


ROS Statement: 


Those systems with pertinent positive or pertinent negative responses have been 

documented in the HPI.





ROS Other: All systems not noted in ROS Statement are negative.


Constitutional: Denies: fever


Eyes: Denies: eye pain


ENT: Denies: ear pain


Respiratory: Denies: cough


Cardiovascular: Reports: chest pain


Endocrine: Denies: fatigue


Gastrointestinal: Denies: abdominal pain


Genitourinary: Denies: dysuria


Musculoskeletal: Denies: back pain


Skin: Denies: rash


Neurological: Reports: headache





Past Medical History


Past Medical History: Chest Pain / Angina, Heart Failure, COPD, CVA/TIA, 

Diabetes Mellitus, Deep Vein Thrombosis (DVT), Eye Disorder, GERD/Reflux, 

Hyperlipidemia, Hypertension, Myocardial Infarction (MI), Thyroid Disorder


Additional Past Medical History / Comment(s): HX OF CVA X3 (LAST 2015)-HAS LT 

ARM PARALYSIS & WEAKNESS LEFT LEG. MI .  DVT RT  AXILLA. RENAL FAILURE.  

LOW THYROID,  PERIPHERAL NEUROPATHY HANDS & FEET.  ANEMIA.  HX OF DKA. USES W/

C. CONSTIPATION, ESOPHAGITIS.  EPIGLOTITIS.  HEADACHES SINCE CVA.  RETINOPATHY 

MARZENA EYES.  HX RT TOE INFECTION- GANGRENE, HAD AMP.


Last Myocardial Infarction Date:: 


History of Any Multi-Drug Resistant Organisms: MRSA


Date of last positivie culture/infection: 2013


MDRO Source:: Right Foot


Past Surgical History: Appendectomy,  Section, Cholecystectomy, Heart 

Catheterization With Stent, Hysterectomy, Orthopedic Surgery


Additional Past Surgical History / Comment(s): Amputation Rt 2ND Toe.  C-S X3.  

EGD.  Bronchoscopy.  RT Arm Port Placed FOR AB RX; Removed.  6 CARDIAC STENTS. 

left shoulder bone removed


Past Anesthesia/Blood Transfusion Reactions: No Reported Reaction


Additional Past Anesthesia/Blood Transfusion Reaction / Comment(s): HX OF BLOOD 

TRANSFUSION- NO REACTION


Date of Last Stent Placement:: 2013


Past Psychological History: Anxiety, Bipolar, Depression


Additional Psychological History / Comment(s): PT HAS A CARE GIVER-LOLITA. LOLITA 

STATED PT UNABLE TO AMBULATE(LT SIDE WAS AFFECTED BY STROKE) USES A W/C.


Smoking Status: Former smoker


Past Alcohol Use History: None Reported


Additional Past Alcohol Use History / Comment(s): Patient states she is using a 

vaper cigarettes. She has a 24-hour caregiver that lives with her.  She is 

wheelchair bound.


Past Drug Use History: None Reported





- Past Family History


  ** Father


Family Medical History: Unable to Obtain, Coronary Artery Disease (CAD), 

Diabetes Mellitus





  ** Mother


Family Medical History: COPD





General Exam


Limitations: no limitations


General appearance: alert, in no apparent distress, other (Patient states left 

facial droop and left arm weakness are chronic and unchanged.)


Head exam: Present: atraumatic


Eye exam: Present: normal appearance, PERRL


ENT exam: Present: normal oropharynx


Neck exam: Present: normal inspection


Respiratory exam: Present: normal lung sounds bilaterally


Cardiovascular Exam: Present: regular rate, normal rhythm


  ** Expanded


Peripheral pulses: 2+: Radial (R), Radial (L), Dorsalis Pedis (R), Dorsalis 

Pedis (L)


GI/Abdominal exam: Present: soft.  Absent: tenderness


Extremities exam: Present: other (Left arm contracted).  Absent: pedal edema, 

calf tenderness


Neurological exam: Present: alert, CN II-XII intact (Except for left facial 

droop), motor sensory deficit (Left arm weakness), other (Left arm weakness and 

contraction.  Patient states chronic.  Left facial weakness/droop patient 

states chronic.)


  ** Expanded


Motor strength exam: RUE: 5, LUE: 2/1, RLE: 5, LLE: 5


Psychiatric exam: Present: normal affect, normal mood


Skin exam: Present: normal color





Course


 Vital Signs











  18





  14:11 14:56


 


Temperature 98.5 F 


 


Pulse Rate 82 75


 


Respiratory 16 16





Rate  


 


Blood Pressure 124/58 133/68


 


O2 Sat by Pulse 100 100





Oximetry  














EKG Findings





- EKG Comments:


EKG Findings:: Normal sinus rhythm 78.  .  QRS 80.  .  QTc 456.  

Normal axis.  Normal QRS.  No acute ST change.





Medical Decision Making





- Medical Decision Making





Patient reevaluated and resting comfortably in bed.  No headache at this time.  

No chest discomfort at this time.  Case was discussed in detail with Dr. Cabral

, who will admit his patient.





- Lab Data


Result diagrams: 


 18 14:42





 18 14:42


 Lab Results











  18 Range/Units





  14:42 14:42 14:42 


 


WBC  4.5    (3.8-10.6)  k/uL


 


RBC  3.32 L    (3.80-5.40)  m/uL


 


Hgb  10.1 L    (11.4-16.0)  gm/dL


 


Hct  32.1 L    (34.0-46.0)  %


 


MCV  96.7    (80.0-100.0)  fL


 


MCH  30.5    (25.0-35.0)  pg


 


MCHC  31.5    (31.0-37.0)  g/dL


 


RDW  13.8    (11.5-15.5)  %


 


Plt Count  166    (150-450)  k/uL


 


Neutrophils %  61    %


 


Lymphocytes %  31    %


 


Monocytes %  5    %


 


Eosinophils %  1    %


 


Basophils %  0    %


 


Neutrophils #  2.7    (1.3-7.7)  k/uL


 


Lymphocytes #  1.4    (1.0-4.8)  k/uL


 


Monocytes #  0.2    (0-1.0)  k/uL


 


Eosinophils #  0.0    (0-0.7)  k/uL


 


Basophils #  0.0    (0-0.2)  k/uL


 


PT   10.1   (9.0-12.0)  sec


 


INR   1.0   (<1.2)  


 


APTT   21.1 L   (22.0-30.0)  sec


 


Sodium    139  (137-145)  mmol/L


 


Potassium    5.1  (3.5-5.1)  mmol/L


 


Chloride    103  ()  mmol/L


 


Carbon Dioxide    27  (22-30)  mmol/L


 


Anion Gap    9  mmol/L


 


BUN    32 H  (7-17)  mg/dL


 


Creatinine    1.10 H  (0.52-1.04)  mg/dL


 


Est GFR (MDRD) Af Amer    >60  (>60 ml/min/1.73 sqM)  


 


Est GFR (MDRD) Non-Af    51  (>60 ml/min/1.73 sqM)  


 


Glucose    402 H  (74-99)  mg/dL


 


Calcium    9.1  (8.4-10.2)  mg/dL


 


Magnesium    1.9  (1.6-2.3)  mg/dL


 


Total Bilirubin    0.3  (0.2-1.3)  mg/dL


 


AST    18  (14-36)  U/L


 


ALT    40  (9-52)  U/L


 


Alkaline Phosphatase    53  ()  U/L


 


Total Creatine Kinase     ()  U/L


 


CK-MB (CK-2)     (0.0-2.4)  ng/mL


 


CK-MB (CK-2) Rel Index     


 


Troponin I     (0.000-0.034)  ng/mL


 


Total Protein    6.1 L  (6.3-8.2)  g/dL


 


Albumin    3.5  (3.5-5.0)  g/dL


 


Valproic Acid    <10.0  ug/mL


 


Influenza Type A RNA     (Not Detectd)  


 


Influenza Type B (PCR)     (Not Detectd)  














  18 Range/Units





  14:42 15:00 


 


WBC    (3.8-10.6)  k/uL


 


RBC    (3.80-5.40)  m/uL


 


Hgb    (11.4-16.0)  gm/dL


 


Hct    (34.0-46.0)  %


 


MCV    (80.0-100.0)  fL


 


MCH    (25.0-35.0)  pg


 


MCHC    (31.0-37.0)  g/dL


 


RDW    (11.5-15.5)  %


 


Plt Count    (150-450)  k/uL


 


Neutrophils %    %


 


Lymphocytes %    %


 


Monocytes %    %


 


Eosinophils %    %


 


Basophils %    %


 


Neutrophils #    (1.3-7.7)  k/uL


 


Lymphocytes #    (1.0-4.8)  k/uL


 


Monocytes #    (0-1.0)  k/uL


 


Eosinophils #    (0-0.7)  k/uL


 


Basophils #    (0-0.2)  k/uL


 


PT    (9.0-12.0)  sec


 


INR    (<1.2)  


 


APTT    (22.0-30.0)  sec


 


Sodium    (137-145)  mmol/L


 


Potassium    (3.5-5.1)  mmol/L


 


Chloride    ()  mmol/L


 


Carbon Dioxide    (22-30)  mmol/L


 


Anion Gap    mmol/L


 


BUN    (7-17)  mg/dL


 


Creatinine    (0.52-1.04)  mg/dL


 


Est GFR (MDRD) Af Amer    (>60 ml/min/1.73 sqM)  


 


Est GFR (MDRD) Non-Af    (>60 ml/min/1.73 sqM)  


 


Glucose    (74-99)  mg/dL


 


Calcium    (8.4-10.2)  mg/dL


 


Magnesium    (1.6-2.3)  mg/dL


 


Total Bilirubin    (0.2-1.3)  mg/dL


 


AST    (14-36)  U/L


 


ALT    (9-52)  U/L


 


Alkaline Phosphatase    ()  U/L


 


Total Creatine Kinase  40   ()  U/L


 


CK-MB (CK-2)  1.2   (0.0-2.4)  ng/mL


 


CK-MB (CK-2) Rel Index  3.0   


 


Troponin I  <0.012   (0.000-0.034)  ng/mL


 


Total Protein    (6.3-8.2)  g/dL


 


Albumin    (3.5-5.0)  g/dL


 


Valproic Acid    ug/mL


 


Influenza Type A RNA   Not Detected  (Not Detectd)  


 


Influenza Type B (PCR)   Not Detected  (Not Detectd)  














- Radiology Data


Radiology results: image reviewed (Chest x-ray shows no acute cardiopulmonary 

process.)





Disposition


Clinical Impression: 


 Chest pain





Disposition: ADMITTED AS IP TO THIS HOSP


Referrals: 


Saniya Cabral MD [Primary Care Provider] - 1-2 days


Decision Time: 16:00

## 2018-01-08 NOTE — XR
EXAMINATION TYPE: XR chest 2V

 

DATE OF EXAM: 1/8/2018

 

COMPARISON: 11/20/1717

 

HISTORY: Chest pain and vomiting

 

TECHNIQUE:  Frontal and lateral views of the chest are obtained.

 

FINDINGS:  There is no focal air space opacity, pleural effusion, or pneumothorax seen.  The cardiac 
silhouette size is within normal limits.   The osseous structures are intact. Mild multilevel degener
ative change of the thoracic spine are noted. Dense coronary calcifications versus cardiac stents are
 incidentally seen.

 

IMPRESSION:  No acute cardiopulmonary process.

## 2018-01-09 LAB
ALBUMIN SERPL-MCNC: 4.2 G/DL (ref 3.5–5)
ALP SERPL-CCNC: 65 U/L (ref 38–126)
ALT SERPL-CCNC: 44 U/L (ref 9–52)
ANION GAP SERPL CALC-SCNC: 12 MMOL/L
AST SERPL-CCNC: 19 U/L (ref 14–36)
BASOPHILS # BLD AUTO: 0 K/UL (ref 0–0.2)
BASOPHILS NFR BLD AUTO: 0 %
BUN SERPL-SCNC: 36 MG/DL (ref 7–17)
CALCIUM SPEC-MCNC: 10 MG/DL (ref 8.4–10.2)
CHLORIDE SERPL-SCNC: 103 MMOL/L (ref 98–107)
CHOLEST SERPL-MCNC: 146 MG/DL (ref ?–200)
CK SERPL-CCNC: 58 U/L (ref 30–135)
CO2 SERPL-SCNC: 27 MMOL/L (ref 22–30)
EOSINOPHIL # BLD AUTO: 0.1 K/UL (ref 0–0.7)
EOSINOPHIL NFR BLD AUTO: 1 %
ERYTHROCYTE [DISTWIDTH] IN BLOOD BY AUTOMATED COUNT: 3.67 M/UL (ref 3.8–5.4)
ERYTHROCYTE [DISTWIDTH] IN BLOOD: 13.6 % (ref 11.5–15.5)
GLUCOSE BLD-MCNC: 198 MG/DL (ref 75–99)
GLUCOSE BLD-MCNC: 216 MG/DL (ref 75–99)
GLUCOSE BLD-MCNC: 386 MG/DL (ref 75–99)
GLUCOSE BLD-MCNC: 425 MG/DL (ref 75–99)
GLUCOSE BLD-MCNC: 541 MG/DL (ref 75–99)
GLUCOSE BLD-MCNC: 568 MG/DL (ref 75–99)
GLUCOSE BLD-MCNC: 74 MG/DL (ref 75–99)
GLUCOSE BLD-MCNC: 78 MG/DL (ref 75–99)
GLUCOSE SERPL-MCNC: 233 MG/DL (ref 74–99)
HBA1C MFR BLD: 9.4 % (ref 4–6)
HCT VFR BLD AUTO: 34.8 % (ref 34–46)
HDLC SERPL-MCNC: 50 MG/DL (ref 40–60)
HGB BLD-MCNC: 11.1 GM/DL (ref 11.4–16)
LDLC SERPL CALC-MCNC: 76 MG/DL (ref 0–99)
LYMPHOCYTES # SPEC AUTO: 2.5 K/UL (ref 1–4.8)
LYMPHOCYTES NFR SPEC AUTO: 38 %
MCH RBC QN AUTO: 30.3 PG (ref 25–35)
MCHC RBC AUTO-ENTMCNC: 32 G/DL (ref 31–37)
MCV RBC AUTO: 94.8 FL (ref 80–100)
MONOCYTES # BLD AUTO: 0.4 K/UL (ref 0–1)
MONOCYTES NFR BLD AUTO: 5 %
NEUTROPHILS # BLD AUTO: 3.5 K/UL (ref 1.3–7.7)
NEUTROPHILS NFR BLD AUTO: 54 %
PLATELET # BLD AUTO: 202 K/UL (ref 150–450)
POTASSIUM SERPL-SCNC: 4.4 MMOL/L (ref 3.5–5.1)
PROT SERPL-MCNC: 7.1 G/DL (ref 6.3–8.2)
SODIUM SERPL-SCNC: 142 MMOL/L (ref 137–145)
TRIGL SERPL-MCNC: 101 MG/DL (ref ?–150)
TROPONIN I SERPL-MCNC: <0.012 NG/ML (ref 0–0.03)
WBC # BLD AUTO: 6.6 K/UL (ref 3.8–10.6)

## 2018-01-09 RX ADMIN — FAMOTIDINE SCH MG: 20 TABLET, FILM COATED ORAL at 11:28

## 2018-01-09 RX ADMIN — ISODIUM CHLORIDE PRN MG: 0.03 SOLUTION RESPIRATORY (INHALATION) at 20:05

## 2018-01-09 RX ADMIN — METOCLOPRAMIDE SCH MG: 5 TABLET ORAL at 11:28

## 2018-01-09 RX ADMIN — BUDESONIDE AND FORMOTEROL FUMARATE DIHYDRATE SCH PUFF: 160; 4.5 AEROSOL RESPIRATORY (INHALATION) at 07:55

## 2018-01-09 RX ADMIN — ASPIRIN 325 MG ORAL TABLET SCH MG: 325 PILL ORAL at 11:28

## 2018-01-09 RX ADMIN — NITROGLYCERIN SCH: 20 OINTMENT TOPICAL at 18:00

## 2018-01-09 RX ADMIN — DOCUSATE SODIUM SCH MG: 100 CAPSULE, LIQUID FILLED ORAL at 20:06

## 2018-01-09 RX ADMIN — NITROGLYCERIN SCH: 20 OINTMENT TOPICAL at 00:24

## 2018-01-09 RX ADMIN — HEPARIN SODIUM SCH UNIT: 5000 INJECTION, SOLUTION INTRAVENOUS; SUBCUTANEOUS at 20:10

## 2018-01-09 RX ADMIN — VALPROIC ACID SCH MG: 250 SOLUTION ORAL at 11:29

## 2018-01-09 RX ADMIN — NITROGLYCERIN SCH: 20 OINTMENT TOPICAL at 11:10

## 2018-01-09 RX ADMIN — ISODIUM CHLORIDE PRN MG: 0.03 SOLUTION RESPIRATORY (INHALATION) at 12:20

## 2018-01-09 RX ADMIN — ISODIUM CHLORIDE PRN MG: 0.03 SOLUTION RESPIRATORY (INHALATION) at 07:55

## 2018-01-09 RX ADMIN — DULOXETINE SCH MG: 60 CAPSULE, DELAYED RELEASE ORAL at 11:28

## 2018-01-09 RX ADMIN — NITROGLYCERIN SCH: 20 OINTMENT TOPICAL at 06:51

## 2018-01-09 RX ADMIN — Medication SCH EACH: at 11:28

## 2018-01-09 RX ADMIN — METOCLOPRAMIDE SCH: 5 TABLET ORAL at 13:01

## 2018-01-09 RX ADMIN — BUDESONIDE AND FORMOTEROL FUMARATE DIHYDRATE SCH PUFF: 160; 4.5 AEROSOL RESPIRATORY (INHALATION) at 20:05

## 2018-01-09 RX ADMIN — METOCLOPRAMIDE SCH MG: 5 TABLET ORAL at 18:06

## 2018-01-09 RX ADMIN — ATORVASTATIN CALCIUM SCH MG: 20 TABLET, FILM COATED ORAL at 20:06

## 2018-01-09 RX ADMIN — CEFAZOLIN SCH MLS/HR: 330 INJECTION, POWDER, FOR SOLUTION INTRAMUSCULAR; INTRAVENOUS at 15:00

## 2018-01-09 RX ADMIN — INSULIN ASPART SCH UNIT: 100 INJECTION, SOLUTION INTRAVENOUS; SUBCUTANEOUS at 12:58

## 2018-01-09 RX ADMIN — INSULIN ASPART SCH: 100 INJECTION, SOLUTION INTRAVENOUS; SUBCUTANEOUS at 11:09

## 2018-01-09 RX ADMIN — INSULIN ASPART SCH UNIT: 100 INJECTION, SOLUTION INTRAVENOUS; SUBCUTANEOUS at 18:06

## 2018-01-09 RX ADMIN — ISODIUM CHLORIDE PRN MG: 0.03 SOLUTION RESPIRATORY (INHALATION) at 15:56

## 2018-01-09 NOTE — P.CRDCN
History of Present Illness


Consult date: 18


History of present illness: 





Mrs. Ramirez is a pleasant 57 year-old female with past medical history 

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 

mellitus, hypertension, dyslipidemia, CVA with residual left sided paralysis, 

COPD and neuropathy. She has seen Dr. Marte in the office. She lives at 

home with a full-time caregiver. We have been asked to see her in consultation 

for complaints of chest pain. At the time of my exam the patient is seen 

sitting up in bed behaving very jittery and removing her clothes. She is 

rocking back and forth in bed and talking loudly and forcefully. She denies 

chest pain, shortness of breath, dizziness, palpitations, nausea, vomiting or 

diaphoresis. She seems to be a very poor historian and is unclear of why she is 

here. Per ER notes she had an episode of chest discomfort that lasted 

approximately 20 minutes with a headache and congestion with nasal drainage. 

She was last here in the hospital with syncope and was recommended to have 

event monitor but declined. 


EKG reveals sinus mechanism with no acute ST or T-wave abnormalities. 


Cardiac enzymes negative x3, hgb 11.1, platelets 202, potassium 4.4, creatinine 

1.35, LDL 76, HDL 50. 


Chest xray negative for an acute cardiopulmonary process. 


Current cardiac medications include atorvastatin 20 mg and aspirin 81mg. 


Most recent catheterization from  reveals mild intimal disease of LAD at 

the teakoff of the first diagonal branch with no evidence of stent restenosis 

in RCA. 


Most recent echocardiogram on file from 2018 preserved LV function with EF 65

-70%, trace MR and TR. 


 





Review of Systems





At the time of my exam:


CONSTITUTIONAL: Denies fever. Denies chills.


EYES: Denies blurred vision. Denies vision changes. Denies eye pain.


EARS, NOSE, MOUTH & THROAT: Denies headache. Denies sore throat. Denies ear 

pain.


CARDIOVASCULAR: Denies chest pain. Denies shortness of breath. Denies 

orthopnea. Denies PND. Denies palpitations.


RESPIRATORY: Denies cough. 


GASTROINTESTINAL: Denies abdominal pain. Denies diarrhea. Denies constipation. 

Denies nausea. Denies vomiting.


MUSCULOSKELETAL: Denies myalgias.


INTEGUMENTARY: Denies pruitis. Denies rash.


NEUROLOGIC: Denies numbness. Denies tingling. Denies weakness.


PSYCHIATRIC: Denies anxiety. Denies depression.


ENDOCRINE: Denies fatigue. Denies weight change. Denies polydipsia. Denies 

polyurina.


GENITOURINARY: Denies burning, hematuria or urgency with micturation.


HEMATOLOGIC: Denies history of anemia. Denies bleeding. 








Past Medical History


Past Medical History: Chest Pain / Angina, Heart Failure, COPD, CVA/TIA, 

Diabetes Mellitus, Deep Vein Thrombosis (DVT), Eye Disorder, GERD/Reflux, 

Hyperlipidemia, Hypertension, Myocardial Infarction (MI), Thyroid Disorder


Additional Past Medical History / Comment(s): HX OF CVA X3 (LAST 2015)-HAS LT 

ARM PARALYSIS & WEAKNESS LEFT LEG. MI .  DVT RT  AXILLA. RENAL FAILURE.  

LOW THYROID,  PERIPHERAL NEUROPATHY HANDS & FEET.  ANEMIA.  HX OF DKA. USES W/

C. CONSTIPATION, ESOPHAGITIS.  EPIGLOTITIS.  HEADACHES SINCE CVA.  RETINOPATHY 

MARZENA EYES.  HX RT TOE INFECTION- GANGRENE, HAD AMP."i need eye sx-i have 

bleeding behind the eyes"


Last Myocardial Infarction Date:: 


History of Any Multi-Drug Resistant Organisms: MRSA


Date of last positivie culture/infection: 2013


MDRO Source:: Right Foot


Past Surgical History: Appendectomy,  Section, Cholecystectomy, Heart 

Catheterization With Stent, Hysterectomy, Orthopedic Surgery


Additional Past Surgical History / Comment(s): Amputation Rt 2ND Toe.  C-S X3.  

EGD.  Bronchoscopy.  RT Arm Port Placed FOR AB RX; Removed.  6 CARDIAC STENTS. 

left shoulder bone removed


Past Anesthesia/Blood Transfusion Reactions: No Reported Reaction


Additional Past Anesthesia/Blood Transfusion Reaction / Comment(s): HX OF BLOOD 

TRANSFUSION- NO REACTION


Date of Last Stent Placement:: 2013


Smoking Status: Former smoker





- Past Family History


  ** Father


Family Medical History: Unable to Obtain, Coronary Artery Disease (CAD), 

Diabetes Mellitus





  ** Mother


Family Medical History: COPD





Medications and Allergies


 Home Medications











 Medication  Instructions  Recorded  Confirmed  Type


 


Nitroglycerin Sl Tabs [Nitrostat] 0.4 mg SUBLINGUAL Q5M PRN 01/15/15 01/08/18 

History


 


Albuterol Inhaler [Ventolin Hfa 2 puff INHALATION RT-Q4H PRN 16 

History





Inhaler]    


 


Aspirin EC [Ecotrin Low Dose] 81 mg PO DAILY 16 History


 


Atorvastatin [Lipitor] 20 mg PO HS 16 History


 


Famotidine [Pepcid] 20 mg PO BID 16 History


 


Metoclopramide [Reglan] 5 mg PO AC-TID 16 History


 


Valproic Acid [Depakene] 250 mg PO DAILY 16 History


 


Budesonide-Formot 160-4.5 Mcg 2 puff INHALATION RT-BID 10/06/16 01/08/18 History





[Symbicort 160-4.5 Mcg Inhaler]    


 


Ergocalciferol [Vitamin D2 50,000 unit PO Q7D 10/06/16 01/08/18 History





(DRISDOL)]    


 


HYDROcodone/APAP 10-325MG [Norco 1 tab PO Q6H PRN 17 History





]    


 


DULoxetine HCL [Cymbalta] 60 mg PO DAILY 17 History


 


Ferrous Sulfate [Iron (65  mg PO DAILY 17 History





Elemental)]    


 


Insulin Lispro [humaLOG] 4 units SQ AC-TID 17 History


 


ALPRAZolam [Xanax] 1 mg PO TID 17 History


 


Docusate [Colace] 100 mg PO BID 18 History


 


EPINEPHrine (Auto Inject) [Epipen] 0.3 mg IM ONCE PRN 18 History


 


Glucagon Emergency Kit 1 mg SQ ONCE PRN 18 History


 


Insulin Glargine [Lantus] 20 unit SQ HS 18 History


 


Ondansetron HCl [Zofran] 4 mg PO DAILY PRN 18 History


 


SILVER sulfADIAZINE Cream 1 applic TOPICAL DAILY 18 History





[Silvadene 1% Cream]    


 


Vitamin B Complex 1 cap PO DAILY 18 History











 Allergies











Allergy/AdvReac Type Severity Reaction Status Date / Time


 


Barbiturates Allergy  Rash/Hives Verified 18 15:02


 


cephalexin monohydrate Allergy  Rash/Hives Verified 18 15:02





[From Keflex]     


 


morphine Allergy  Rash/Hives Verified 18 15:02


 


Penicillins Allergy  Rash/Hives Verified 18 15:02


 


phenobarbital Allergy  Swelling Verified 18 14:20


 


venom-honey bee Allergy  Swelling Verified 18 15:02





[bee venom (honey bee)]     


 


amlodipine besylate AdvReac  Vomiting Verified 18 14:20





[From Norvasc]     














Physical Exam


Vitals: 


 Vital Signs











  Temp Pulse Pulse Resp BP BP Pulse Ox


 


 18 08:10   76     


 


 18 07:58  98.1 F   74  18   124/68  95


 


 18 07:56   76     


 


 18 04:00     18   


 


 18 01:15  98 F   76  18   134/67  99


 


 18 00:00     18   


 


 18 20:00  98.7 F   80  18   142/78  97


 


 18 18:20    90  16   


 


 18 17:45  98.6 F   90  16   140/66  96


 


 18 16:33  98.8 F      


 


 18 16:32   65   18  145/76   97


 


 18 14:56   75   16  133/68   100


 


 18 14:11  98.5 F  82   16  124/58   100








 Intake and Output











 18





 22:59 06:59 14:59


 


Intake Total 236  


 


Balance 236  


 


Intake:   


 


  Oral 236  


 


Other:   


 


  Voiding Method   Bedside Commode


 


  # Voids  3 


 


  Weight  55.792 kg 











Blood pressure 124/68 heart rate 74 afebrile


GENERAL: This is a 57-year-old  female in no apparent distress at the 

time of my examination.


HEENT: Head is atraumatic, normocephalic. Pupils are equal, round. Sclerae 

anicteric. Conjunctivae are clear. Mucous membranes of the mouth are moist. 

Neck is supple. There is no jugular venous distention. No carotid bruit is 

heard.


LUNGS: Clear to auscultation no wheezes, rales or rhonchi. No chest wall 

tenderness is noted on palpation or with deep breathing.


HEART: Regular rate and rhythm without murmurs, rubs or gallops. S1 and S2 

heard.


ABDOMEN: Soft, nontender. Bowel sounds are heard. No organomegaly noted.


EXTREMITIES: 2+ peripheral pulses with no evidence of peripheral edema and no 

calf tenderness noted.  Left-sided weakness secondary to prior CVA.


NEUROLOGIC: Patient is awake, alert with confusion at baseline.


 








Results





 18 09:04





 18 09:04


 Cardiac Enzymes











  18 Range/Units





  14:42 14:42 20:38 


 


AST  18    (14-36)  U/L


 


CK-MB (CK-2)   1.2  1.4  (0.0-2.4)  ng/mL


 


Troponin I   <0.012  <0.012  (0.000-0.034)  ng/mL














  18 Range/Units





  03:09 09:04 


 


AST   19  (14-36)  U/L


 


CK-MB (CK-2)  1.4   (0.0-2.4)  ng/mL


 


Troponin I  <0.012   (0.000-0.034)  ng/mL








 Coagulation











  18 Range/Units





  14:42 


 


PT  10.1  (9.0-12.0)  sec


 


APTT  21.1 L  (22.0-30.0)  sec








 Lipids











  18 Range/Units





  03:09 


 


Triglycerides  101  (<150)  mg/dL


 


Cholesterol  146  (<200)  mg/dL


 


HDL Cholesterol  50  (40-60)  mg/dL








 CBC











  18 Range/Units





  14:42 09:04 


 


WBC  4.5  6.6  (3.8-10.6)  k/uL


 


RBC  3.32 L  3.67 L  (3.80-5.40)  m/uL


 


Hgb  10.1 L  11.1 L  (11.4-16.0)  gm/dL


 


Hct  32.1 L  34.8  (34.0-46.0)  %


 


Plt Count  166  202  (150-450)  k/uL








 Comprehensive Metabolic Panel











  18 Range/Units





  14:42 09:04 


 


Sodium  139  142  (137-145)  mmol/L


 


Potassium  5.1  4.4  (3.5-5.1)  mmol/L


 


Chloride  103  103  ()  mmol/L


 


Carbon Dioxide  27  27  (22-30)  mmol/L


 


BUN  32 H  36 H  (7-17)  mg/dL


 


Creatinine  1.10 H  1.35 H  (0.52-1.04)  mg/dL


 


Glucose  402 H  233 H  (74-99)  mg/dL


 


Calcium  9.1  10.0  (8.4-10.2)  mg/dL


 


AST  18  19  (14-36)  U/L


 


ALT  40  44  (9-52)  U/L


 


Alkaline Phosphatase  53  65  ()  U/L


 


Total Protein  6.1 L  7.1  (6.3-8.2)  g/dL


 


Albumin  3.5  4.2  (3.5-5.0)  g/dL








 Current Medications











Generic Name Dose Route Start Last Admin





  Trade Name Freq  PRN Reason Stop Dose Admin


 


Hydrocodone Bitart/Acetaminophen  1 each  18 21:17  





  [place] 10  PO   





  Q6H PRN   





  Pain   


 


Albuterol Sulfate  2.5 mg  18 21:17  18 07:55





  Ventolin Nebulized  INHALATION   2.5 mg





  RT-Q4H PRN   Administration





  Shortness Of Breath   


 


Alprazolam  1 mg  18 22:00  18 22:42





  Xanax  PO   1 mg





  TID TYRON   Administration


 


Aspirin  325 mg  18 09:00  





  Aspirin  PO   





  DAILY [facility]   


 


Atorvastatin Calcium  20 mg  18 21:30  18 22:44





  Lipitor  PO   20 mg





  HS TYRON   Administration


 


Budesonide/Formoterol Fumarate  2 puff  18 08:00  18 07:55





  Symbicort 160-4.5 Mcg Inhaler  INHALATION   2 puff





  RT-BID TYRON   Administration


 


Docusate Sodium  100 mg  18 21:30  18 22:46





  Colace  PO   100 mg





  BID TYRON   Administration


 


Duloxetine HCl  60 mg  18 09:00  





  Cymbalta  PO   





  DAILY [facility]   


 


Ergocalciferol  50,000 unit  18 21:30  18 22:45





  Vitamin D2  PO   50,000 unit





  Q7D TYRON   Administration


 


Famotidine  20 mg  18 21:30  18 22:45





  Pepcid  PO   20 mg





  BID [facility]   Administration


 


Ferrous Sulfate  325 mg  01/09/18 09:00  





  Feosol  PO   





  DAILY TYRON   


 


Insulin Aspart  0 unit  18 17:30  18 22:40





  Novolog  SQ   5 unit





  ACHS TYRON   Administration





  Protocol   


 


Insulin Detemir  20 unit  18 21:45  18 22:41





  Levemir  SQ   20 unit





  HS TYRON   Administration


 


Metoclopramide HCl  5 mg  18 21:30  18 22:46





  Reglan  PO   5 mg





  AC-TID TYRON   Administration


 


Nitroglycerin  1 inch  18 18:00  18 06:51





  Nitro-Bid Oint  TOPICAL   Not Given





  Q6HR TYRON   


 


Nitroglycerin  0.4 mg  18 16:01  





  Nitrostat  SUBLINGUAL   





  Q5M PRN   





  Chest Pain   


 


Silver Sulfadiazine  1 applic  18 21:30  18 22:42





  Silvadene Cream  TOPICAL   Not Given





  DAILY TYRON   


 


Valproic Acid  250 mg  18 09:00  





  Depakene Syrup  PO   





  DAILY [facility]   


 


Vitamin B Complex/Vit C/Vit E/Zinc  1 each  18 09:00  





  Z-Bec  PO   





  DAILY TYRON   








 Intake and Output











 18





 22:59 06:59 14:59


 


Intake Total 236  


 


Balance 236  


 


Intake:   


 


  Oral 236  


 


Other:   


 


  Voiding Method   Bedside Commode


 


  # Voids  3 


 


  Weight  55.792 kg 








 





 18 09:04 





 18 09:04 











Assessment and Plan


Assessment: 





ASSESSMENT


1.  Chest pain, atypical.  Negative cardiac enzymes and a normal EKG acute 

coronary event has been ruled out.


2.  History of coronary artery disease.  Patent stent to the RCA mild 10-20% 

disease of the LAD.


3.  Diabetes mellitus


4.  Hypertension


5.  Dyslipidemia


6.  History of CVA


7.  COPD





PLAN


Acute coronary event has been ruled out.  Continue current cardiac medications 

to include aspirin 81 mg and atorvastatin 20 mg.  No further cardiac workup at 

this time.  She can follow-up with Dr. Marte in the office as an 

outpatient.  He kindly for this consultation.





The above impression and plan of care have been discussed and directed by the 

signing physician. Jolynn Rogers, nurse practitioner, acting as scribe for 

signing physician.

## 2018-01-09 NOTE — CT
EXAMINATION TYPE: CT brain wo con

 

DATE OF EXAM: 1/9/2018

 

COMPARISON: Prior head CT 11/27/2017

 

HISTORY: Left sided facial numbness and pain

 

CT DLP: 1121 mGycm

Automated exposure control for dose reduction was used.

 

FINDINGS: 

Cerebral vascular calcifications, extensive remote cerebrovascular accident change again noted, there
 is ex vacuo phenomenon of the right lateral ventricle. There is no hemorrhage or hydrocephalus.

 

IMPRESSION: 

REMOTE CEREBRAL VASCULAR ACCIDENT. NO ACUTE ABNORMALITIES EVIDENT.

## 2018-01-09 NOTE — P.HPIM
History of Present Illness


H&P Date: 18


Chief Complaint: Chest pain





This is a 57-year-old female with a known past medical history of CVA with left-

sided paresis, myocardial infarction, coronary artery disease with previous 

cardiac stents and uncontrolled diabetes mellitus.  Also has a history of 

chronic renal failure, hypertension and hyperlipidemia.  Patient presents to 

emergency room with complaints of chest pain in the center of her chest.  She 

reports it felt like a ton of bricks sitting on her chest.  She reports some 

shortness of breath.  Also had an episode of vomiting and diarrhea.  Also 

complaining of a headache on the left side of her head with some left-sided 

facial numbness.  Troponins were negative 3 sets.  EKG had shown normal sinus 

rhythm.  Chest x-ray was negative.  Influenza swab negative.  Cardiology 

consulted.  Last heart catheterization was in  showing mild intimal disease 

of the LAD no evidence of stent restenosis in the RCA.  Patient seen by 

cardiology.  Recommend current cardiac medications.  No further cardiac workup 

required.  MI was ruled out.  However with patient's history of stroke and 

reporting some left facial numbness and headache.  Computed tomography scan of 

the brain will be ordered to rule out stroke





Review of Systems





Please refer to HPI otherwise unremarkable





Past Medical History


Past Medical History: Chest Pain / Angina, Heart Failure, COPD, CVA/TIA, 

Diabetes Mellitus, Deep Vein Thrombosis (DVT), Eye Disorder, GERD/Reflux, 

Hyperlipidemia, Hypertension, Myocardial Infarction (MI), Thyroid Disorder


Additional Past Medical History / Comment(s): HX OF CVA X3 (LAST 2015)-HAS LT 

ARM PARALYSIS & WEAKNESS LEFT LEG. MI .  DVT RT  AXILLA. RENAL FAILURE.  

LOW THYROID,  PERIPHERAL NEUROPATHY HANDS & FEET.  ANEMIA.  HX OF DKA. USES W/

C. CONSTIPATION, ESOPHAGITIS.  EPIGLOTITIS.  HEADACHES SINCE CVA.  RETINOPATHY 

MARZENA EYES.  HX RT TOE INFECTION- GANGRENE, HAD AMP."i need eye sx-i have 

bleeding behind the eyes"


Last Myocardial Infarction Date:: 


History of Any Multi-Drug Resistant Organisms: MRSA


Date of last positivie culture/infection: 2013


MDRO Source:: Right Foot


Past Surgical History: Appendectomy,  Section, Cholecystectomy, Heart 

Catheterization With Stent, Hysterectomy, Orthopedic Surgery


Additional Past Surgical History / Comment(s): Amputation Rt 2ND Toe.  C-S X3.  

EGD.  Bronchoscopy.  RT Arm Port Placed FOR AB RX; Removed.  6 CARDIAC STENTS. 

left shoulder bone removed


Past Anesthesia/Blood Transfusion Reactions: No Reported Reaction


Additional Past Anesthesia/Blood Transfusion Reaction / Comment(s): HX OF BLOOD 

TRANSFUSION- NO REACTION


Date of Last Stent Placement:: 2013


Smoking Status: Former smoker





- Past Family History


  ** Father


Family Medical History: Unable to Obtain, Coronary Artery Disease (CAD), 

Diabetes Mellitus





  ** Mother


Family Medical History: COPD





Medications and Allergies


 Home Medications











 Medication  Instructions  Recorded  Confirmed  Type


 


Nitroglycerin Sl Tabs [Nitrostat] 0.4 mg SUBLINGUAL Q5M PRN 01/15/15 01/08/18 

History


 


Albuterol Inhaler [Ventolin Hfa 2 puff INHALATION RT-Q4H PRN 16 

History





Inhaler]    


 


Aspirin EC [Ecotrin Low Dose] 81 mg PO DAILY 16 History


 


Atorvastatin [Lipitor] 20 mg PO HS 16 History


 


Famotidine [Pepcid] 20 mg PO BID 16 History


 


Metoclopramide [Reglan] 5 mg PO AC-TID 16 History


 


Valproic Acid [Depakene] 250 mg PO DAILY 16 History


 


Budesonide-Formot 160-4.5 Mcg 2 puff INHALATION RT-BID 10/06/16 01/08/18 History





[Symbicort 160-4.5 Mcg Inhaler]    


 


Ergocalciferol [Vitamin D2 50,000 unit PO Q7D 10/06/16 01/08/18 History





(DRISDOL)]    


 


HYDROcodone/APAP 10-325MG [Norco 1 tab PO Q6H PRN 17 History





]    


 


DULoxetine HCL [Cymbalta] 60 mg PO DAILY 17 History


 


Ferrous Sulfate [Iron (65  mg PO DAILY 17 History





Elemental)]    


 


Insulin Lispro [humaLOG] 4 units SQ AC-TID 17 History


 


ALPRAZolam [Xanax] 1 mg PO TID 17 History


 


Docusate [Colace] 100 mg PO BID 18 History


 


EPINEPHrine (Auto Inject) [Epipen] 0.3 mg IM ONCE PRN 18 History


 


Glucagon Emergency Kit 1 mg SQ ONCE PRN 18 History


 


Insulin Glargine [Lantus] 20 unit SQ HS 18 History


 


Ondansetron HCl [Zofran] 4 mg PO DAILY PRN 18 History


 


SILVER sulfADIAZINE Cream 1 applic TOPICAL DAILY 18 History





[Silvadene 1% Cream]    


 


Vitamin B Complex 1 cap PO DAILY 18 History











 Allergies











Allergy/AdvReac Type Severity Reaction Status Date / Time


 


Barbiturates Allergy  Rash/Hives Verified 18 15:02


 


cephalexin monohydrate Allergy  Rash/Hives Verified 18 15:02





[From Keflex]     


 


morphine Allergy  Rash/Hives Verified 18 15:02


 


Penicillins Allergy  Rash/Hives Verified 18 15:02


 


phenobarbital Allergy  Swelling Verified 18 14:20


 


venom-honey bee Allergy  Swelling Verified 18 15:02





[bee venom (honey bee)]     


 


amlodipine besylate AdvReac  Vomiting Verified 18 14:20





[From Norvasc]     














Physical Exam


Vitals: 


 Vital Signs











  Temp Pulse Pulse Resp BP BP Pulse Ox


 


 18 12:29   76     


 


 18 12:20   76     


 


 18 12:00  98.1 F   98  18   128/61  100


 


 18 08:10   76     


 


 18 07:58  98.1 F   74  18   124/68  95


 


 18 07:56   76     


 


 18 04:00     18   


 


 18 01:15  98 F   76  18   134/67  99


 


 18 00:00     18   


 


 18 20:00  98.7 F   80  18   142/78  97


 


 18 18:20    90  16   


 


 18 17:45  98.6 F   90  16   140/66  96


 


 18 16:33  98.8 F      


 


 18 16:32   65   18  145/76   97


 


 18 14:56   75   16  133/68   100


 


 18 14:11  98.5 F  82   16  124/58   100








 Intake and Output











 18





 22:59 06:59 14:59


 


Intake Total 236  


 


Balance 236  


 


Intake:   


 


  Oral 236  


 


Other:   


 


  Voiding Method   Bedside Commode


 


  # Voids  3 


 


  Weight  55.792 kg 














Head normocephalic


Neck supple


Lungs clear to auscultation bilaterally no wheezing or crackles


Heart regular rate and rhythm S1-S2, no rub or gallop


Abdomen is soft nontender nondistended positive bowel sounds no 

hepatosplenomegaly


Extremities no edema


Neuro alert and orientated to 3.  Left sided paralysis affecting the arm and leg





Results


CBC & Chem 7: 


 18 09:04





 18 09:04


Labs: 


 Abnormal Lab Results - Last 24 Hours (Table)











  18 Range/Units





  14:42 14:42 14:42 


 


RBC  3.32 L    (3.80-5.40)  m/uL


 


Hgb  10.1 L    (11.4-16.0)  gm/dL


 


Hct  32.1 L    (34.0-46.0)  %


 


APTT   21.1 L   (22.0-30.0)  sec


 


BUN    32 H  (7-17)  mg/dL


 


Creatinine    1.10 H  (0.52-1.04)  mg/dL


 


Glucose    402 H  (74-99)  mg/dL


 


POC Glucose (mg/dL)     (75-99)  mg/dL


 


Total Protein    6.1 L  (6.3-8.2)  g/dL














  18 Range/Units





  16:48 17:37 20:56 


 


RBC     (3.80-5.40)  m/uL


 


Hgb     (11.4-16.0)  gm/dL


 


Hct     (34.0-46.0)  %


 


APTT     (22.0-30.0)  sec


 


BUN     (7-17)  mg/dL


 


Creatinine     (0.52-1.04)  mg/dL


 


Glucose     (74-99)  mg/dL


 


POC Glucose (mg/dL)  408 H  389 H  321 H  (75-99)  mg/dL


 


Total Protein     (6.3-8.2)  g/dL














  18 Range/Units





  07:11 09:04 09:04 


 


RBC   3.67 L   (3.80-5.40)  m/uL


 


Hgb   11.1 L   (11.4-16.0)  gm/dL


 


Hct     (34.0-46.0)  %


 


APTT     (22.0-30.0)  sec


 


BUN    36 H  (7-17)  mg/dL


 


Creatinine    1.35 H  (0.52-1.04)  mg/dL


 


Glucose    233 H  (74-99)  mg/dL


 


POC Glucose (mg/dL)  216 H    (75-99)  mg/dL


 


Total Protein     (6.3-8.2)  g/dL














  18 Range/Units





  11:53 


 


RBC   (3.80-5.40)  m/uL


 


Hgb   (11.4-16.0)  gm/dL


 


Hct   (34.0-46.0)  %


 


APTT   (22.0-30.0)  sec


 


BUN   (7-17)  mg/dL


 


Creatinine   (0.52-1.04)  mg/dL


 


Glucose   (74-99)  mg/dL


 


POC Glucose (mg/dL)  425 H  (75-99)  mg/dL


 


Total Protein   (6.3-8.2)  g/dL














Thrombosis Risk Factor Assmnt





- Choose All That Apply


Any of the Below Risk Factors Present?: Yes


Each Factor Represents 1 point: Abnormal pulmonary function (COPD), Age 41-60 

years


Other Risk Factors: Yes


Each Risk Factor Represents 3 Points: History of DVT/PE


Other congenital or acquired thrombophilia - If yes, enter type in comment: No


Thrombosis Risk Factor Assessment Total Risk Factor Score: 5


Thrombosis Risk Factor Assessment Level: High Risk





Assessment and Plan


Assessment: 





1.Chest pain: MI ruled out.  Cardiac enzymes negative 3 sets.  EKG normal 

sinus rhythm.  Patient seen evaluated by cardiology.  Commending current 

cardiac meds with aspirin and Lipitor.  In follow-up outpatient.  No further 

cardiac testing required.





2.  Left facial numbness and headache.  Computed tomography scan of the brain 

will be ordered to rule out stroke.  Echo from 2017 shows an EF of 65-

70%.  Carotid ultrasound showed complete occlusion of right internal carotid 

artery and 50-69% on the left internal carotid artery.  Which is noted on 

carotid ultrasound in 2017.  Patient was evaluated by vascular surgery 

at that time and no surgical intervention required.  Patient is to continue 

with follow-up outpatient with vascular surgery





3.  History of coronary artery disease with previous cardiac stents





4.  Diabetes mellitus insulin-dependent and uncontrolled.  Patient had an 

elevated blood sugars in the 200s to 400.  Increase Levemir to 22 units at 

bedtime.  Continue NovoLog sliding scale





5.  History of CVA with left-sided paralysis





6.  Essential hypertension





7.  Acute kidney injury with chronic kidney disease, stage III.  creatinine has 

gone up to 1.35.  Start IV fluids





DVT prophylaxis subcu heparin


Time with Patient: Greater than 30 (Greater than 50% of the total time spent in 

counseling and coordination of care.I performed an examination of the patient 

and discussed their management with the physician Assistant.  I have reviewed 

the Physician Assistant's notes and agree with the documented findings and plan 

of care)

## 2018-01-10 VITALS — RESPIRATION RATE: 18 BRPM

## 2018-01-10 LAB
ALBUMIN SERPL-MCNC: 3.4 G/DL (ref 3.5–5)
ALP SERPL-CCNC: 54 U/L (ref 38–126)
ALT SERPL-CCNC: 40 U/L (ref 9–52)
ANION GAP SERPL CALC-SCNC: 9 MMOL/L
AST SERPL-CCNC: 18 U/L (ref 14–36)
BASOPHILS # BLD AUTO: 0 K/UL (ref 0–0.2)
BASOPHILS NFR BLD AUTO: 1 %
BUN SERPL-SCNC: 27 MG/DL (ref 7–17)
CALCIUM SPEC-MCNC: 9.2 MG/DL (ref 8.4–10.2)
CHLORIDE SERPL-SCNC: 105 MMOL/L (ref 98–107)
CO2 SERPL-SCNC: 28 MMOL/L (ref 22–30)
EOSINOPHIL # BLD AUTO: 0.1 K/UL (ref 0–0.7)
EOSINOPHIL NFR BLD AUTO: 2 %
ERYTHROCYTE [DISTWIDTH] IN BLOOD BY AUTOMATED COUNT: 3.33 M/UL (ref 3.8–5.4)
ERYTHROCYTE [DISTWIDTH] IN BLOOD: 13.4 % (ref 11.5–15.5)
GLUCOSE BLD-MCNC: 111 MG/DL (ref 75–99)
GLUCOSE BLD-MCNC: 189 MG/DL (ref 75–99)
GLUCOSE BLD-MCNC: 205 MG/DL (ref 75–99)
GLUCOSE BLD-MCNC: 342 MG/DL (ref 75–99)
GLUCOSE BLD-MCNC: 388 MG/DL (ref 75–99)
GLUCOSE BLD-MCNC: 414 MG/DL (ref 75–99)
GLUCOSE BLD-MCNC: 63 MG/DL (ref 75–99)
GLUCOSE BLD-MCNC: 66 MG/DL (ref 75–99)
GLUCOSE BLD-MCNC: 74 MG/DL (ref 75–99)
GLUCOSE SERPL-MCNC: 327 MG/DL (ref 74–99)
HCT VFR BLD AUTO: 31.9 % (ref 34–46)
HGB BLD-MCNC: 10.2 GM/DL (ref 11.4–16)
LYMPHOCYTES # SPEC AUTO: 1.5 K/UL (ref 1–4.8)
LYMPHOCYTES NFR SPEC AUTO: 34 %
MCH RBC QN AUTO: 30.5 PG (ref 25–35)
MCHC RBC AUTO-ENTMCNC: 31.9 G/DL (ref 31–37)
MCV RBC AUTO: 95.6 FL (ref 80–100)
MONOCYTES # BLD AUTO: 0.3 K/UL (ref 0–1)
MONOCYTES NFR BLD AUTO: 6 %
NEUTROPHILS # BLD AUTO: 2.5 K/UL (ref 1.3–7.7)
NEUTROPHILS NFR BLD AUTO: 56 %
PLATELET # BLD AUTO: 180 K/UL (ref 150–450)
POTASSIUM SERPL-SCNC: 4.6 MMOL/L (ref 3.5–5.1)
PROT SERPL-MCNC: 6 G/DL (ref 6.3–8.2)
SODIUM SERPL-SCNC: 142 MMOL/L (ref 137–145)
WBC # BLD AUTO: 4.4 K/UL (ref 3.8–10.6)

## 2018-01-10 RX ADMIN — Medication SCH MG: at 17:46

## 2018-01-10 RX ADMIN — DULOXETINE SCH MG: 60 CAPSULE, DELAYED RELEASE ORAL at 17:46

## 2018-01-10 RX ADMIN — ATORVASTATIN CALCIUM SCH MG: 20 TABLET, FILM COATED ORAL at 20:37

## 2018-01-10 RX ADMIN — INSULIN ASPART SCH UNIT: 100 INJECTION, SOLUTION INTRAVENOUS; SUBCUTANEOUS at 07:40

## 2018-01-10 RX ADMIN — INSULIN ASPART SCH: 100 INJECTION, SOLUTION INTRAVENOUS; SUBCUTANEOUS at 17:44

## 2018-01-10 RX ADMIN — INSULIN ASPART SCH UNIT: 100 INJECTION, SOLUTION INTRAVENOUS; SUBCUTANEOUS at 20:36

## 2018-01-10 RX ADMIN — DOCUSATE SODIUM SCH: 100 CAPSULE, LIQUID FILLED ORAL at 11:36

## 2018-01-10 RX ADMIN — METOCLOPRAMIDE SCH: 5 TABLET ORAL at 11:35

## 2018-01-10 RX ADMIN — VALPROIC ACID SCH MG: 250 SOLUTION ORAL at 17:46

## 2018-01-10 RX ADMIN — INSULIN ASPART SCH: 100 INJECTION, SOLUTION INTRAVENOUS; SUBCUTANEOUS at 14:23

## 2018-01-10 RX ADMIN — FAMOTIDINE SCH MG: 20 TABLET, FILM COATED ORAL at 17:46

## 2018-01-10 RX ADMIN — NITROGLYCERIN SCH: 20 OINTMENT TOPICAL at 00:12

## 2018-01-10 RX ADMIN — METOCLOPRAMIDE SCH MG: 5 TABLET ORAL at 17:45

## 2018-01-10 RX ADMIN — HEPARIN SODIUM SCH UNIT: 5000 INJECTION, SOLUTION INTRAVENOUS; SUBCUTANEOUS at 20:36

## 2018-01-10 RX ADMIN — HEPARIN SODIUM SCH: 5000 INJECTION, SOLUTION INTRAVENOUS; SUBCUTANEOUS at 11:36

## 2018-01-10 RX ADMIN — ASPIRIN 325 MG ORAL TABLET SCH: 325 PILL ORAL at 11:35

## 2018-01-10 RX ADMIN — BUDESONIDE AND FORMOTEROL FUMARATE DIHYDRATE SCH: 160; 4.5 AEROSOL RESPIRATORY (INHALATION) at 07:53

## 2018-01-10 RX ADMIN — METOCLOPRAMIDE SCH: 5 TABLET ORAL at 17:44

## 2018-01-10 RX ADMIN — NITROGLYCERIN SCH: 20 OINTMENT TOPICAL at 17:44

## 2018-01-10 RX ADMIN — BUDESONIDE AND FORMOTEROL FUMARATE DIHYDRATE SCH PUFF: 160; 4.5 AEROSOL RESPIRATORY (INHALATION) at 20:32

## 2018-01-10 RX ADMIN — Medication SCH: at 11:36

## 2018-01-10 RX ADMIN — CEFAZOLIN SCH MLS/HR: 330 INJECTION, POWDER, FOR SOLUTION INTRAMUSCULAR; INTRAVENOUS at 17:48

## 2018-01-10 RX ADMIN — NITROGLYCERIN SCH: 20 OINTMENT TOPICAL at 03:41

## 2018-01-10 RX ADMIN — NITROGLYCERIN SCH: 20 OINTMENT TOPICAL at 11:37

## 2018-01-10 NOTE — P.PN
Subjective


Progress Note Date: 01/10/18





This is a 57-year-old female with a known past medical history of CVA with left-

sided paresis, myocardial infarction, coronary artery disease with previous 

cardiac stents and uncontrolled diabetes mellitus.  Also has a history of 

chronic renal failure, hypertension and hyperlipidemia.  Patient presents to 

emergency room with complaints of chest pain in the center of her chest.  She 

reports it felt like a ton of bricks sitting on her chest.  She reports some 

shortness of breath.  Also had an episode of vomiting and diarrhea.  Also 

complaining of a headache on the left side of her head with some left-sided 

facial numbness.  Troponins were negative 3 sets.  EKG had shown normal sinus 

rhythm.  Chest x-ray was negative.  Influenza swab negative.  Cardiology 

consulted.  Last heart catheterization was in 2008 showing mild intimal disease 

of the LAD no evidence of stent restenosis in the RCA.  Patient seen by 

cardiology.  Recommend current cardiac medications.  No further cardiac workup 

required.  MI was ruled out.  However with patient's history of stroke and 

reporting some left facial numbness and headache.  Computed tomography scan of 

the brain will be ordered to rule out stroke.





On 01/10/2018 patient is somnolent she is arousable but go back to sleep easily

, glucose was elevated yesterday requiring insulin drip was discontinued today 

and patient is back on her Levemir 20 units daily and NovoLog sliding scale 

before meals.  Awaiting neurology evaluation to rule out continue 

cerebrovascular accident, continue was current management.  

consult will be requested








Objective





- Vital Signs


Vital signs: 


 Vital Signs











Temp  97.8 F   01/10/18 08:00


 


Pulse  78   01/10/18 08:00


 


Resp  16   01/10/18 08:00


 


BP  141/78   01/10/18 08:00


 


Pulse Ox  97   01/10/18 08:00








 Intake & Output











 01/09/18 01/10/18 01/10/18





 18:59 06:59 18:59


 


Intake Total 626.65 27.436 


 


Balance 626.65 27.436 


 


Weight   55.792 kg


 


Intake:   


 


  Intake, IV Titration 26.65 27.436 





  Amount   


 


    Insulin Regular 100 unit 26.65 27.436 





    In Sodium Chloride 0.9%   





    100 ml @ Titrate IV .Q0M   





    TYRON Rx#:[id number]   


 


  Oral 600  


 


Other:   


 


  Voiding Method Bedside Commode Bedside Commode Bedside Commode





   Diaper





   Incontinent


 


  # Voids 1  1














- Exam





Head normocephalic and atraumatic


Neck supple no JVD no goiter and no adenopathy


Lungs clear to auscultation bilaterally no wheezing or crackles


Heart regular rate and rhythm S1-S2, no rub or gallop


Abdomen is soft nontender nondistended positive bowel sounds no 

hepatosplenomegaly


Extremities no edema


Neuro alert and orientated to 3.  Left sided paralysis affecting the arm and 

leg which is chronic








- Labs


CBC & Chem 7: 


 01/10/18 06:27





 01/10/18 06:27


Labs: 


 Abnormal Lab Results - Last 24 Hours (Table)











  01/08/18 01/09/18 01/09/18 Range/Units





  20:38 13:29 13:32 


 


RBC     (3.80-5.40)  m/uL


 


Hgb     (11.4-16.0)  gm/dL


 


Hct     (34.0-46.0)  %


 


BUN     (7-17)  mg/dL


 


Creatinine     (0.52-1.04)  mg/dL


 


Glucose     (74-99)  mg/dL


 


POC Glucose (mg/dL)   597 H  568 H  (75-99)  mg/dL


 


Hemoglobin A1c  9.4 H    (4.0-6.0)  %


 


Total Protein     (6.3-8.2)  g/dL


 


Albumin     (3.5-5.0)  g/dL














  01/09/18 01/09/18 01/09/18 Range/Units





  14:37 17:02 18:58 


 


RBC     (3.80-5.40)  m/uL


 


Hgb     (11.4-16.0)  gm/dL


 


Hct     (34.0-46.0)  %


 


BUN     (7-17)  mg/dL


 


Creatinine     (0.52-1.04)  mg/dL


 


Glucose     (74-99)  mg/dL


 


POC Glucose (mg/dL)  541 H  386 H  198 H  (75-99)  mg/dL


 


Hemoglobin A1c     (4.0-6.0)  %


 


Total Protein     (6.3-8.2)  g/dL


 


Albumin     (3.5-5.0)  g/dL














  01/09/18 01/09/18 01/10/18 Range/Units





  21:13 23:15 03:59 


 


RBC     (3.80-5.40)  m/uL


 


Hgb     (11.4-16.0)  gm/dL


 


Hct     (34.0-46.0)  %


 


BUN     (7-17)  mg/dL


 


Creatinine     (0.52-1.04)  mg/dL


 


Glucose     (74-99)  mg/dL


 


POC Glucose (mg/dL)  74 L  42 L  189 H  (75-99)  mg/dL


 


Hemoglobin A1c     (4.0-6.0)  %


 


Total Protein     (6.3-8.2)  g/dL


 


Albumin     (3.5-5.0)  g/dL














  01/10/18 01/10/18 01/10/18 Range/Units





  06:27 06:27 07:01 


 


RBC  3.33 L    (3.80-5.40)  m/uL


 


Hgb  10.2 L    (11.4-16.0)  gm/dL


 


Hct  31.9 L    (34.0-46.0)  %


 


BUN   27 H   (7-17)  mg/dL


 


Creatinine   1.09 H   (0.52-1.04)  mg/dL


 


Glucose   327 H   (74-99)  mg/dL


 


POC Glucose (mg/dL)    342 H  (75-99)  mg/dL


 


Hemoglobin A1c     (4.0-6.0)  %


 


Total Protein   6.0 L   (6.3-8.2)  g/dL


 


Albumin   3.4 L   (3.5-5.0)  g/dL














  01/10/18 01/10/18 Range/Units





  10:12 12:03 


 


RBC    (3.80-5.40)  m/uL


 


Hgb    (11.4-16.0)  gm/dL


 


Hct    (34.0-46.0)  %


 


BUN    (7-17)  mg/dL


 


Creatinine    (0.52-1.04)  mg/dL


 


Glucose    (74-99)  mg/dL


 


POC Glucose (mg/dL)  205 H  111 H  (75-99)  mg/dL


 


Hemoglobin A1c    (4.0-6.0)  %


 


Total Protein    (6.3-8.2)  g/dL


 


Albumin    (3.5-5.0)  g/dL

## 2018-01-11 VITALS — DIASTOLIC BLOOD PRESSURE: 63 MMHG | TEMPERATURE: 98.1 F | SYSTOLIC BLOOD PRESSURE: 133 MMHG

## 2018-01-11 VITALS — HEART RATE: 84 BPM

## 2018-01-11 LAB
ALBUMIN SERPL-MCNC: 3.5 G/DL (ref 3.5–5)
ALP SERPL-CCNC: 49 U/L (ref 38–126)
ALT SERPL-CCNC: 36 U/L (ref 9–52)
ANION GAP SERPL CALC-SCNC: 9 MMOL/L
AST SERPL-CCNC: 21 U/L (ref 14–36)
BASOPHILS # BLD AUTO: 0 K/UL (ref 0–0.2)
BASOPHILS NFR BLD AUTO: 1 %
BUN SERPL-SCNC: 25 MG/DL (ref 7–17)
CALCIUM SPEC-MCNC: 9.7 MG/DL (ref 8.4–10.2)
CHLORIDE SERPL-SCNC: 107 MMOL/L (ref 98–107)
CO2 SERPL-SCNC: 27 MMOL/L (ref 22–30)
EOSINOPHIL # BLD AUTO: 0.1 K/UL (ref 0–0.7)
EOSINOPHIL NFR BLD AUTO: 2 %
ERYTHROCYTE [DISTWIDTH] IN BLOOD BY AUTOMATED COUNT: 3.61 M/UL (ref 3.8–5.4)
ERYTHROCYTE [DISTWIDTH] IN BLOOD: 13.6 % (ref 11.5–15.5)
GLUCOSE BLD-MCNC: 144 MG/DL (ref 75–99)
GLUCOSE BLD-MCNC: 494 MG/DL (ref 75–99)
GLUCOSE BLD-MCNC: 525 MG/DL (ref 75–99)
GLUCOSE SERPL-MCNC: 151 MG/DL (ref 74–99)
HCT VFR BLD AUTO: 33.8 % (ref 34–46)
HGB BLD-MCNC: 10.6 GM/DL (ref 11.4–16)
LYMPHOCYTES # SPEC AUTO: 1.5 K/UL (ref 1–4.8)
LYMPHOCYTES NFR SPEC AUTO: 42 %
MCH RBC QN AUTO: 29.5 PG (ref 25–35)
MCHC RBC AUTO-ENTMCNC: 31.4 G/DL (ref 31–37)
MCV RBC AUTO: 93.8 FL (ref 80–100)
MONOCYTES # BLD AUTO: 0.2 K/UL (ref 0–1)
MONOCYTES NFR BLD AUTO: 6 %
NEUTROPHILS # BLD AUTO: 1.8 K/UL (ref 1.3–7.7)
NEUTROPHILS NFR BLD AUTO: 49 %
PLATELET # BLD AUTO: 188 K/UL (ref 150–450)
POTASSIUM SERPL-SCNC: 4.9 MMOL/L (ref 3.5–5.1)
PROT SERPL-MCNC: 6.2 G/DL (ref 6.3–8.2)
SODIUM SERPL-SCNC: 143 MMOL/L (ref 137–145)
WBC # BLD AUTO: 3.7 K/UL (ref 3.8–10.6)

## 2018-01-11 RX ADMIN — HEPARIN SODIUM SCH: 5000 INJECTION, SOLUTION INTRAVENOUS; SUBCUTANEOUS at 08:45

## 2018-01-11 RX ADMIN — INSULIN ASPART SCH UNIT: 100 INJECTION, SOLUTION INTRAVENOUS; SUBCUTANEOUS at 12:53

## 2018-01-11 RX ADMIN — Medication SCH MG: at 12:54

## 2018-01-11 RX ADMIN — METOCLOPRAMIDE SCH MG: 5 TABLET ORAL at 08:39

## 2018-01-11 RX ADMIN — METOCLOPRAMIDE SCH MG: 5 TABLET ORAL at 12:56

## 2018-01-11 RX ADMIN — ASPIRIN 325 MG ORAL TABLET SCH MG: 325 PILL ORAL at 08:41

## 2018-01-11 RX ADMIN — FAMOTIDINE SCH MG: 20 TABLET, FILM COATED ORAL at 08:40

## 2018-01-11 RX ADMIN — ISODIUM CHLORIDE PRN MG: 0.03 SOLUTION RESPIRATORY (INHALATION) at 12:01

## 2018-01-11 RX ADMIN — ISODIUM CHLORIDE PRN MG: 0.03 SOLUTION RESPIRATORY (INHALATION) at 09:10

## 2018-01-11 RX ADMIN — NITROGLYCERIN SCH: 20 OINTMENT TOPICAL at 12:54

## 2018-01-11 RX ADMIN — DOCUSATE SODIUM SCH: 100 CAPSULE, LIQUID FILLED ORAL at 00:12

## 2018-01-11 RX ADMIN — DULOXETINE SCH MG: 60 CAPSULE, DELAYED RELEASE ORAL at 08:39

## 2018-01-11 RX ADMIN — Medication SCH EACH: at 08:39

## 2018-01-11 RX ADMIN — BUDESONIDE AND FORMOTEROL FUMARATE DIHYDRATE SCH PUFF: 160; 4.5 AEROSOL RESPIRATORY (INHALATION) at 09:10

## 2018-01-11 RX ADMIN — DOCUSATE SODIUM SCH MG: 100 CAPSULE, LIQUID FILLED ORAL at 08:39

## 2018-01-11 RX ADMIN — NITROGLYCERIN SCH: 20 OINTMENT TOPICAL at 00:12

## 2018-01-11 RX ADMIN — NITROGLYCERIN SCH: 20 OINTMENT TOPICAL at 06:38

## 2018-01-11 RX ADMIN — VALPROIC ACID SCH MG: 250 SOLUTION ORAL at 08:39

## 2018-01-11 NOTE — P.DS
Providers


Date of admission: 


01/08/18 16:01





Expected date of discharge: 01/11/18


Attending physician: 


Saniya Cabral





Consults: 





 





01/08/18 16:01


Consult Physician Urgent 


   Consulting Provider: Ofelia Huffman


   Consult Reason/Comments: cp


   Do you want consulting provider notified?: Yes











Primary care physician: 


Saniya Cabral





[facility] Course: 





Discharge diagnosis





1.Chest pain: MI ruled out.  Likely musculoskeletal pain.  She does have 

tenderness with palpation of the chest wall.  Cardiac enzymes negative 3 sets.

  EKG normal sinus rhythm.  Patient seen evaluated by cardiology.  Commending 

current cardiac meds with aspirin and Lipitor.  In follow-up outpatient.  No 

further cardiac testing required.





2.  Left facial numbness and headache.  These are chronic symptoms for patient 

since her old stroke.  She reports really no new change.  Computed tomography 

scan of the brain shows a remote cerebral vascular accident.  No acute 

abnormalities evident.  Echo from November 2017 shows an EF of 65-70%.  Carotid 

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

% on the left internal carotid artery.  Which is noted on carotid ultrasound in 

November 2017.  Patient was evaluated by vascular surgery at that time and no 

surgical intervention required.  Patient is to continue with follow-up 

outpatient with vascular surgery





3.  History of coronary artery disease with previous cardiac stents





4.  Diabetes mellitus insulin-dependent and uncontrolled.  Patient did require 

an insulin drip.  Blood sugars are now stable and restarted on her home dose of 

Levemir 20 units and NovoLog sliding scale





5.  History of CVA with left-sided paralysis





6.  Essential hypertension





7.  Acute kidney injury with chronic kidney disease, stage III.  creatinine has 

gone up to 1.35.  Improved with IV fluids.  Creatinine is now down to 1.07








Hospital course





This is a 57-year-old female with a known past medical history of CVA with left-

sided paresis, myocardial infarction, coronary artery disease with previous 

cardiac stents and uncontrolled diabetes mellitus.  Also has a history of 

chronic renal failure, hypertension and hyperlipidemia.  Patient presents to 

emergency room with complaints of chest pain in the center of her chest.  She 

reports it felt like a ton of bricks sitting on her chest.  She reports some 

shortness of breath.  Also had an episode of vomiting and diarrhea.  Also 

complaining of a headache on the left side of her head with some left-sided 

facial numbness.  Troponins were negative 3 sets.  EKG had shown normal sinus 

rhythm.  Chest x-ray was negative.  Influenza swab negative.  Cardiology 

consulted.  Last heart catheterization was in 2008 showing mild intimal disease 

of the LAD no evidence of stent restenosis in the RCA.  Patient seen by 

cardiology.  Recommend current cardiac medications.  No further cardiac workup 

required.  MI was ruled out.  However with patient's history of stroke and 

reporting some left facial numbness and headache.  Computed tomography scan of 

the brain will be ordered to rule out stroke





Patient symptoms have improved.  She reports some chest discomfort which is 

musculoskeletal.  She does have tenderness with palpation of the chest wall.  

MI was ruled out.  Troponins were negative 3 sets.  She will follow up with 

cardiology in outpatient setting.  Patient also had reported some left-sided 

facial numbness and headache.  These are chronic symptoms for the patient.  

There was a computed tomography scan of the brain which showed no acute 

changes.  An echo and carotid as stated above.  Initially a consult was placed 

for neurology however, patient has had no new symptoms.  Therefore this is not 

a new stroke.  These are her old symptoms.  She will follow-up with Dr. Cabral 

in the office.  She also had some dehydration which did improve with IV fluids.





Patient is having blood sugars in the 400s at time of discharge.  She will get 

a total of 13 units of NovoLog prior to discharge.  Patient has a known history 

of fluctuating blood sugars.  At home that she will be on her strict regimen of 

Levemir 20 and NovoLog sliding scale.  She is to continue with her NovoLog 

sliding scale.  And to add 5 units on top of her sliding scale if blood sugar 

is greater than 400.  In the hospital her insulin has been adjusted and held 

multiple times.  And patient is a very brittle diabetic and sensitive to these 

adjustments.  Initially held for possible cardiac testing.  And since then it 

has been fluctuating.  At this time when she her home regimen and strict diet 

blood sugar should show improvement.  Patient's caregiver has been informed and 

understands.  Patient and caregiver are very eager for discharge.  Patient is 

already getting dressed to go home.  Therefore we will discharge patient will 

have her monitor her blood sugars closely.  And have her follow up with Dr. Cabral on Monday next week





I performed an examination of the patient and discussed their management with 

the physician Assistant.  I have reviewed the Physician Assistant's notes and 

agree with the documented findings and plan of care


Patient Condition at Discharge: Stable





Plan - Discharge Summary


Discharge Rx Participant: No


New Discharge Prescriptions: 


Continue


   Nitroglycerin Sl Tabs [Nitrostat] 0.4 mg SUBLINGUAL Q5M PRN


     PRN Reason: Chest Pain


   Albuterol Inhaler [Ventolin Hfa Inhaler] 2 puff INHALATION RT-Q4H PRN


     PRN Reason: Shortness Of Breath


   Atorvastatin [Lipitor] 20 mg PO HS


   Famotidine [Pepcid] 20 mg PO BID


   Aspirin EC [Ecotrin Low Dose] 81 mg PO DAILY


   Valproic Acid [Depakene] 250 mg PO DAILY


   Metoclopramide [Reglan] 5 mg PO AC-TID


   Ergocalciferol [Vitamin D2 (DRISDOL)] 50,000 unit PO Q7D


   Budesonide-Formot 160-4.5 Mcg [Symbicort 160-4.5 Mcg Inhaler] 2 puff 

INHALATION RT-BID


   HYDROcodone/APAP 10-325MG [Norco ] 1 tab PO Q6H PRN


     PRN Reason: Pain


   Insulin Lispro [humaLOG] 4 units SQ AC-TID


   Ferrous Sulfate [Iron (65 MG Elemental)] 325 mg PO DAILY


   DULoxetine HCL [Cymbalta] 60 mg PO DAILY


   ALPRAZolam [Xanax] 1 mg PO TID


   SILVER sulfADIAZINE Cream [Silvadene 1% Cream] 1 applic TOPICAL DAILY


   Insulin Glargine [Lantus] 20 unit SQ HS


   Glucagon Emergency Kit 1 mg SQ ONCE PRN


     PRN Reason: Blood Sugar - Low


   Vitamin B Complex 1 cap PO DAILY


   EPINEPHrine (Auto Inject) [Epipen] 0.3 mg IM ONCE PRN


     PRN Reason: Anaphylaxis


   Docusate [Colace] 100 mg PO BID


   Ondansetron HCl [Zofran] 4 mg PO DAILY PRN


     PRN Reason: Nausea


Discharge Medication List





Nitroglycerin Sl Tabs [Nitrostat] 0.4 mg SUBLINGUAL Q5M PRN 01/15/15 [History]


Albuterol Inhaler [Ventolin Hfa Inhaler] 2 puff INHALATION RT-Q4H PRN 02/19/16 [

History]


Aspirin EC [Ecotrin Low Dose] 81 mg PO DAILY 02/19/16 [History]


Atorvastatin [Lipitor] 20 mg PO HS 02/19/16 [History]


Famotidine [Pepcid] 20 mg PO BID 02/19/16 [History]


Metoclopramide [Reglan] 5 mg PO AC-TID 02/19/16 [History]


Valproic Acid [Depakene] 250 mg PO DAILY 02/19/16 [History]


Budesonide-Formot 160-4.5 Mcg [Symbicort 160-4.5 Mcg Inhaler] 2 puff INHALATION 

RT-BID 10/06/16 [History]


Ergocalciferol [Vitamin D2 (DRISDOL)] 50,000 unit PO Q7D 10/06/16 [History]


HYDROcodone/APAP 10-325MG [Norco ] 1 tab PO Q6H PRN 05/06/17 [History]


DULoxetine HCL [Cymbalta] 60 mg PO DAILY 09/19/17 [History]


Ferrous Sulfate [Iron (65 MG Elemental)] 325 mg PO DAILY 09/19/17 [History]


Insulin Lispro [humaLOG] 4 units SQ AC-TID 09/19/17 [History]


ALPRAZolam [Xanax] 1 mg PO TID 09/22/17 [History]


Docusate [Colace] 100 mg PO BID 01/08/18 [History]


EPINEPHrine (Auto Inject) [Epipen] 0.3 mg IM ONCE PRN 01/08/18 [History]


Glucagon Emergency Kit 1 mg SQ ONCE PRN 01/08/18 [History]


Insulin Glargine [Lantus] 20 unit SQ HS 01/08/18 [History]


Ondansetron HCl [Zofran] 4 mg PO DAILY PRN 01/08/18 [History]


SILVER sulfADIAZINE Cream [Silvadene 1% Cream] 1 applic TOPICAL DAILY 01/08/18 [

History]


Vitamin B Complex 1 cap PO DAILY 01/08/18 [History]








Follow up Appointment(s)/Referral(s): 


Sandhya Marte MD [STAFF PHYSICIAN] - 4 Weeks


Saniya Cabral MD [Primary Care Provider] - 1 Week


Activity/Diet/Wound Care/Special Instructions: 


Diet: diabetic, cardiac


Activity: as tolerated





if Blood sugar is greater than 400 then give an additional 5 units on top of 

her regular sliding scale





check Blood sugar before each meal TID and record. Bring results to Dr. Cabral'

s office


Discharge Disposition: HOME SELF-CARE

## 2018-04-05 ENCOUNTER — HOSPITAL ENCOUNTER (EMERGENCY)
Dept: HOSPITAL 47 - EC | Age: 58
LOS: 1 days | Discharge: HOME | End: 2018-04-06
Payer: COMMERCIAL

## 2018-04-05 DIAGNOSIS — S90.821A: Primary | ICD-10-CM

## 2018-04-05 DIAGNOSIS — Z91.030: ICD-10-CM

## 2018-04-05 DIAGNOSIS — Z79.4: ICD-10-CM

## 2018-04-05 DIAGNOSIS — J44.9: ICD-10-CM

## 2018-04-05 DIAGNOSIS — Z88.1: ICD-10-CM

## 2018-04-05 DIAGNOSIS — Z88.0: ICD-10-CM

## 2018-04-05 DIAGNOSIS — Z79.82: ICD-10-CM

## 2018-04-05 DIAGNOSIS — L08.9: ICD-10-CM

## 2018-04-05 DIAGNOSIS — E11.40: ICD-10-CM

## 2018-04-05 DIAGNOSIS — Z89.421: ICD-10-CM

## 2018-04-05 DIAGNOSIS — Z88.8: ICD-10-CM

## 2018-04-05 DIAGNOSIS — Z88.5: ICD-10-CM

## 2018-04-05 DIAGNOSIS — K21.9: ICD-10-CM

## 2018-04-05 DIAGNOSIS — Z79.899: ICD-10-CM

## 2018-04-05 DIAGNOSIS — Z79.51: ICD-10-CM

## 2018-04-05 DIAGNOSIS — I25.2: ICD-10-CM

## 2018-04-05 DIAGNOSIS — E78.5: ICD-10-CM

## 2018-04-05 DIAGNOSIS — Z86.73: ICD-10-CM

## 2018-04-05 DIAGNOSIS — Z98.890: ICD-10-CM

## 2018-04-05 DIAGNOSIS — F17.200: ICD-10-CM

## 2018-04-05 DIAGNOSIS — F41.9: ICD-10-CM

## 2018-04-05 DIAGNOSIS — F32.9: ICD-10-CM

## 2018-04-05 DIAGNOSIS — Z95.5: ICD-10-CM

## 2018-04-05 DIAGNOSIS — D64.9: ICD-10-CM

## 2018-04-05 DIAGNOSIS — Z86.14: ICD-10-CM

## 2018-04-05 LAB
ALBUMIN SERPL-MCNC: 3.9 G/DL (ref 3.5–5)
ALP SERPL-CCNC: 67 U/L (ref 38–126)
ALT SERPL-CCNC: 26 U/L (ref 9–52)
ANION GAP SERPL CALC-SCNC: 9 MMOL/L
AST SERPL-CCNC: 26 U/L (ref 14–36)
BASOPHILS # BLD AUTO: 0 K/UL (ref 0–0.2)
BASOPHILS NFR BLD AUTO: 0 %
BUN SERPL-SCNC: 34 MG/DL (ref 7–17)
CALCIUM SPEC-MCNC: 9.8 MG/DL (ref 8.4–10.2)
CHLORIDE SERPL-SCNC: 103 MMOL/L (ref 98–107)
CO2 SERPL-SCNC: 30 MMOL/L (ref 22–30)
EOSINOPHIL # BLD AUTO: 0.1 K/UL (ref 0–0.7)
EOSINOPHIL NFR BLD AUTO: 1 %
ERYTHROCYTE [DISTWIDTH] IN BLOOD BY AUTOMATED COUNT: 3.47 M/UL (ref 3.8–5.4)
ERYTHROCYTE [DISTWIDTH] IN BLOOD: 12.7 % (ref 11.5–15.5)
GLUCOSE BLD-MCNC: 200 MG/DL (ref 75–99)
GLUCOSE SERPL-MCNC: 203 MG/DL (ref 74–99)
HCT VFR BLD AUTO: 32.4 % (ref 34–46)
HGB BLD-MCNC: 10.6 GM/DL (ref 11.4–16)
LYMPHOCYTES # SPEC AUTO: 1.5 K/UL (ref 1–4.8)
LYMPHOCYTES NFR SPEC AUTO: 15 %
MCH RBC QN AUTO: 30.4 PG (ref 25–35)
MCHC RBC AUTO-ENTMCNC: 32.7 G/DL (ref 31–37)
MCV RBC AUTO: 93.2 FL (ref 80–100)
MONOCYTES # BLD AUTO: 0.5 K/UL (ref 0–1)
MONOCYTES NFR BLD AUTO: 5 %
NEUTROPHILS # BLD AUTO: 7.6 K/UL (ref 1.3–7.7)
NEUTROPHILS NFR BLD AUTO: 77 %
PLATELET # BLD AUTO: 195 K/UL (ref 150–450)
POTASSIUM SERPL-SCNC: 5.3 MMOL/L (ref 3.5–5.1)
PROT SERPL-MCNC: 7.1 G/DL (ref 6.3–8.2)
SODIUM SERPL-SCNC: 142 MMOL/L (ref 137–145)
WBC # BLD AUTO: 9.9 K/UL (ref 3.8–10.6)

## 2018-04-05 PROCEDURE — 87070 CULTURE OTHR SPECIMN AEROBIC: CPT

## 2018-04-05 PROCEDURE — 80053 COMPREHEN METABOLIC PANEL: CPT

## 2018-04-05 PROCEDURE — 87186 SC STD MICRODIL/AGAR DIL: CPT

## 2018-04-05 PROCEDURE — 99284 EMERGENCY DEPT VISIT MOD MDM: CPT

## 2018-04-05 PROCEDURE — 36415 COLL VENOUS BLD VENIPUNCTURE: CPT

## 2018-04-05 PROCEDURE — 87040 BLOOD CULTURE FOR BACTERIA: CPT

## 2018-04-05 PROCEDURE — 96360 HYDRATION IV INFUSION INIT: CPT

## 2018-04-05 PROCEDURE — 87077 CULTURE AEROBIC IDENTIFY: CPT

## 2018-04-05 PROCEDURE — 85025 COMPLETE CBC W/AUTO DIFF WBC: CPT

## 2018-04-05 PROCEDURE — 87205 SMEAR GRAM STAIN: CPT

## 2018-04-05 NOTE — XR
EXAMINATION TYPE: XR foot complete RT

 

DATE OF EXAM: 4/5/2018

 

COMPARISON: NONE

 

HISTORY: Pain

 

TECHNIQUE: 3 views

 

FINDINGS: I see no fracture nor dislocation. There is amputation deformity of the second toe and the 
little toe. There is a lucency over the plantar aspect of the calcaneus that could be an ulcer crater
. The metatarsals are intact. I see no focal bone destruction.

 

IMPRESSION: No evidence of osteomyelitis. Possible fissure or ulcer on the plantar aspect of the calc
aneus.

## 2018-04-06 VITALS
TEMPERATURE: 97.4 F | RESPIRATION RATE: 16 BRPM | DIASTOLIC BLOOD PRESSURE: 70 MMHG | SYSTOLIC BLOOD PRESSURE: 140 MMHG | HEART RATE: 79 BPM

## 2018-04-06 NOTE — ED
Skin/Abscess/FB HPI





- General


Chief complaint: Skin/Abscess/Foreign Body


Stated complaint: foot infection


Time Seen by Provider: 18 21:40


Source: patient, RN notes reviewed, old records reviewed


Mode of arrival: wheelchair


Limitations: no limitations





- History of Present Illness


Initial comments: 





This patient is a 58-year-old female caregiver of an  and question infection on 

her right heel. She reports that she and she started out with a crack from dry 

skin on her heel. She states that they put some cream and tape on it. They now 

noticed a blister form. There for that she's had some smite are surrounding 

erythema from the blister. No fevers or chills. She has a diabetic, sugars have 

been elevated to 300. She has had a history of osteomyelitis in her 2nd and 5th 

toe .Patient has no fever, chills, chest pain, shortness of breath. She was 

given her nightly pain medication prior to arrival. She does not relate most of 

the history, majority from her caregiver. 





- Related Data


 Home Medications











 Medication  Instructions  Recorded  Confirmed


 


Albuterol Inhaler [Ventolin Hfa 2 puff INHALATION RT-Q4H PRN 16





Inhaler]   


 


Aspirin EC [Ecotrin Low Dose] 81 mg PO DAILY 16


 


Atorvastatin [Lipitor] 20 mg PO HS 16


 


Famotidine [Pepcid] 20 mg PO BID 16


 


Valproic Acid [Depakene] 250 mg PO DAILY 16


 


Budesonide-Formot 160-4.5 Mcg 2 puff INHALATION RT-BID 10/06/16 04/05/18





[Symbicort 160-4.5 Mcg Inhaler]   


 


Ergocalciferol [Vitamin D2 50,000 unit PO Q7D 10/06/16 04/05/18





(DRISDOL)]   


 


HYDROcodone/APAP 10-325MG [Norco 1 tab PO Q6H PRN 17





]   


 


DULoxetine HCL [Cymbalta] 60 mg PO DAILY 17


 


Ferrous Sulfate [Iron (65  mg PO DAILY 17





Elemental)]   


 


Insulin Lispro [humaLOG] 4 units SQ AC-TID 17


 


ALPRAZolam [Xanax] 1 mg PO TID 17


 


Docusate [Colace] 100 mg PO BID 18


 


EPINEPHrine (Auto Inject) [Epipen] 0.3 mg IM ONCE PRN 18


 


Glucagon Emergency Kit 1 mg SQ ONCE PRN 18


 


Insulin Glargine [Lantus] 20 unit SQ HS 18


 


Vitamin B Complex 1 cap PO DAILY 18








 Previous Rx's











 Medication  Instructions  Recorded


 


Collagenase [Santyl] 1 applic TOPICAL BID #20 gm 18


 


Sulfamethox-Tmp 800-160Mg [Bactrim 2 tab PO Q12HR #40 tab 18





-160 mg]  











 Allergies











Allergy/AdvReac Type Severity Reaction Status Date / Time


 


Barbiturates Allergy  Rash/Hives Verified 18 22:19


 


cephalexin monohydrate Allergy  Rash/Hives Verified 18 22:19





[From Keflex]     


 


morphine Allergy  Rash/Hives Verified 18 22:19


 


Penicillins Allergy  Rash/Hives Verified 18 22:19


 


phenobarbital Allergy  Swelling Verified 18 22:19


 


venom-honey bee Allergy  Swelling Verified 18 22:19





[bee venom (honey bee)]     


 


amlodipine besylate AdvReac  Vomiting Verified 18 22:19





[From Norvasc]     














Review of Systems


ROS Statement: 


Those systems with pertinent positive or pertinent negative responses have been 

documented in the HPI.





ROS Other: All systems not noted in ROS Statement are negative.





Past Medical History


Past Medical History: Chest Pain / Angina, Heart Failure, COPD, CVA/TIA, 

Diabetes Mellitus, Deep Vein Thrombosis (DVT), Eye Disorder, GERD/Reflux, 

Hyperlipidemia, Hypertension, Myocardial Infarction (MI), Thyroid Disorder


Additional Past Medical History / Comment(s): HX OF CVA X3 (LAST 2015)-HAS LT 

ARM PARALYSIS & WEAKNESS LEFT LEG. MI .  DVT RT  AXILLA. RENAL FAILURE.  

LOW THYROID,  PERIPHERAL NEUROPATHY HANDS & FEET.  ANEMIA.  HX OF DKA. USES W/

C. CONSTIPATION, ESOPHAGITIS.  EPIGLOTITIS.  HEADACHES SINCE CVA.  RETINOPATHY 

MARZENA EYES.  HX RT TOE INFECTION- GANGRENE, HAD AMP."i need eye sx-i have 

bleeding behind the eyes"


Last Myocardial Infarction Date:: 


History of Any Multi-Drug Resistant Organisms: MRSA


Date of last positivie culture/infection: 2013


MDRO Source:: Right Foot


Past Surgical History: Appendectomy,  Section, Cholecystectomy, Heart 

Catheterization With Stent, Hysterectomy, Orthopedic Surgery


Additional Past Surgical History / Comment(s): Amputation Rt 2ND Toe.  C-S X3.  

EGD.  Bronchoscopy.  RT Arm Port Placed FOR AB RX; Removed.  6 CARDIAC STENTS. 

left shoulder bone removed


Past Anesthesia/Blood Transfusion Reactions: No Reported Reaction


Additional Past Anesthesia/Blood Transfusion Reaction / Comment(s): HX OF BLOOD 

TRANSFUSION- NO REACTION


Date of Last Stent Placement:: 2013


Past Psychological History: Anxiety, Bipolar, Depression


Smoking Status: Current every day smoker


Past Alcohol Use History: None Reported


Past Drug Use History: None Reported





- Past Family History


  ** Father


Family Medical History: Unable to Obtain, Coronary Artery Disease (CAD), 

Diabetes Mellitus





  ** Mother


Family Medical History: COPD





General Exam





- General Exam Comments


Initial Comments: 





58 year old female, frail. 


Limitations: no limitations


General appearance: alert, in no apparent distress


Head exam: Present: atraumatic, normocephalic, normal inspection


Eye exam: Present: normal appearance, PERRL, EOMI.  Absent: scleral icterus, 

conjunctival injection, periorbital swelling


Respiratory exam: Present: normal lung sounds bilaterally.  Absent: respiratory 

distress, wheezes, rales, rhonchi, stridor


Cardiovascular Exam: Present: regular rate, normal rhythm, normal heart sounds.

  Absent: systolic murmur, diastolic murmur, rubs, gallop, clicks


  ** Right


Lower Leg exam: Present: normal inspection, full ROM


Ankle exam: Present: normal inspection, full ROM


Foot/Toe exam: Absent: normal inspection (missing toes 2,5 after amputation. 

She has a fissure over her heel with blister noted. Small surrounding erythema 

measuring 4 cm. )


Neurovascular tendon exam: Present: no vascular compromise


Neurological exam: Present: alert, oriented X3, CN II-XII intact


Psychiatric exam: Present: normal affect, normal mood


Skin exam: Present: warm, dry, intact, normal color.  Absent: rash





Course


 Vital Signs











  18





  21:37 01:06


 


Temperature 99.1 F 97.4 F L


 


Pulse Rate 88 79


 


Respiratory 18 16





Rate  


 


Blood Pressure 168/77 140/70


 


O2 Sat by Pulse 98 95





Oximetry  














Medical Decision Making





- Medical Decision Making


This patient is a 58-year-old female caregiver of an  and question infection on 

her right heel. She reports that she and she started out with a crack from dry 

skin on her heel. She states that they put some cream and tape on it. They now 

noticed a blister form. There for that she's had some smite are surrounding 

erythema from the blister. No fevers or chills. She has a diabetic, sugars have 

been elevated to 300.  PAtient had a wound culture obtaiend. She does have a 

fisure from cracked skin with bister and erythema measurin 3-4 cm around the 

area. No fever or chills. WBC is normal. Xray shows no osteomyelitis. Patient 

has seen previous wound care clinic. Patient will be started on santyl cream, 

advised on wound care. Will start on bactrim. Alll questions answered and 

return parameters discussed. 








- Lab Data


Result diagrams: 


 18 23:30





 18 23:30


 Lab Results











  18 Range/Units





  23:21 23:30 23:30 


 


WBC   9.9   (3.8-10.6)  k/uL


 


RBC   3.47 L   (3.80-5.40)  m/uL


 


Hgb   10.6 L   (11.4-16.0)  gm/dL


 


Hct   32.4 L   (34.0-46.0)  %


 


MCV   93.2   (80.0-100.0)  fL


 


MCH   30.4   (25.0-35.0)  pg


 


MCHC   32.7   (31.0-37.0)  g/dL


 


RDW   12.7   (11.5-15.5)  %


 


Plt Count   195   (150-450)  k/uL


 


Neutrophils %   77   %


 


Lymphocytes %   15   %


 


Monocytes %   5   %


 


Eosinophils %   1   %


 


Basophils %   0   %


 


Neutrophils #   7.6   (1.3-7.7)  k/uL


 


Lymphocytes #   1.5   (1.0-4.8)  k/uL


 


Monocytes #   0.5   (0-1.0)  k/uL


 


Eosinophils #   0.1   (0-0.7)  k/uL


 


Basophils #   0.0   (0-0.2)  k/uL


 


Sodium    142  (137-145)  mmol/L


 


Potassium    5.3 H  (3.5-5.1)  mmol/L


 


Chloride    103  ()  mmol/L


 


Carbon Dioxide    30  (22-30)  mmol/L


 


Anion Gap    9  mmol/L


 


BUN    34 H  (7-17)  mg/dL


 


Creatinine    0.90  (0.52-1.04)  mg/dL


 


Est GFR (CKD-EPI)AfAm    83  (>60 ml/min/1.73 sqM)  


 


Est GFR (CKD-EPI)NonAf    72  (>60 ml/min/1.73 sqM)  


 


Glucose    203 H  (74-99)  mg/dL


 


POC Glucose (mg/dL)  200 H    (75-99)  mg/dL


 


POC Glu Operater ID  Fransisco Harris    


 


Calcium    9.8  (8.4-10.2)  mg/dL


 


Total Bilirubin    0.3  (0.2-1.3)  mg/dL


 


AST    26  (14-36)  U/L


 


ALT    26  (9-52)  U/L


 


Alkaline Phosphatase    67  ()  U/L


 


Total Protein    7.1  (6.3-8.2)  g/dL


 


Albumin    3.9  (3.5-5.0)  g/dL














- Radiology Data


Radiology results: report reviewed


No evidence of osteomyelitis. Possible fissure or ulcer on the plantar aspect 

of the calcaneus. 





Disposition


Clinical Impression: 


 Blister of right heel with infection





Disposition: HOME SELF-CARE


Condition: Good


Instructions:  Abscess Incision and Drainage (ED)


Additional Instructions: 


Patient has a follow-up promptly with primary care provider in wound care 

clinic.  Can take the antibiotics.  Apply the wound care ointment.  Return to 

emergency department if any alarming signs or symptoms occur.


Prescriptions: 


Collagenase [Santyl] 1 applic TOPICAL BID #20 gm


Sulfamethox-Tmp 800-160Mg [Bactrim -160 mg] 2 tab PO Q12HR #40 tab


Referrals: 


Saniya Cabral MD [Primary Care Provider] - 1-2 days


Time of Disposition: 00:48

## 2018-04-19 ENCOUNTER — HOSPITAL ENCOUNTER (INPATIENT)
Dept: HOSPITAL 47 - EC | Age: 58
LOS: 3 days | Discharge: HOME | DRG: 683 | End: 2018-04-22
Payer: COMMERCIAL

## 2018-04-19 VITALS — BODY MASS INDEX: 21.5 KG/M2

## 2018-04-19 DIAGNOSIS — I25.2: ICD-10-CM

## 2018-04-19 DIAGNOSIS — Z79.51: ICD-10-CM

## 2018-04-19 DIAGNOSIS — Z88.0: ICD-10-CM

## 2018-04-19 DIAGNOSIS — Z90.710: ICD-10-CM

## 2018-04-19 DIAGNOSIS — Z88.1: ICD-10-CM

## 2018-04-19 DIAGNOSIS — Z99.3: ICD-10-CM
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
## 2018-04-19 DIAGNOSIS — Z89.421: ICD-10-CM

## 2018-04-19 DIAGNOSIS — Z88.5: ICD-10-CM

## 2018-04-19 DIAGNOSIS — F43.22: ICD-10-CM

## 2018-04-19 DIAGNOSIS — Z86.718: ICD-10-CM

## 2018-04-19 DIAGNOSIS — Z91.030: ICD-10-CM

## 2018-04-19 DIAGNOSIS — E78.5: ICD-10-CM

## 2018-04-19 DIAGNOSIS — Z86.14: ICD-10-CM

## 2018-04-19 DIAGNOSIS — I25.10: ICD-10-CM

## 2018-04-19 DIAGNOSIS — F32.9: ICD-10-CM

## 2018-04-19 DIAGNOSIS — Z82.5: ICD-10-CM

## 2018-04-19 DIAGNOSIS — Z71.6: ICD-10-CM

## 2018-04-19 DIAGNOSIS — I13.0: ICD-10-CM

## 2018-04-19 DIAGNOSIS — L97.419: ICD-10-CM

## 2018-04-19 DIAGNOSIS — L97.429: ICD-10-CM

## 2018-04-19 DIAGNOSIS — F17.200: ICD-10-CM

## 2018-04-19 DIAGNOSIS — E11.621: ICD-10-CM

## 2018-04-19 DIAGNOSIS — K21.9: ICD-10-CM

## 2018-04-19 DIAGNOSIS — Z79.4: ICD-10-CM

## 2018-04-19 DIAGNOSIS — Z95.5: ICD-10-CM

## 2018-04-19 DIAGNOSIS — S00.81XA: ICD-10-CM

## 2018-04-19 DIAGNOSIS — E11.319: ICD-10-CM

## 2018-04-19 DIAGNOSIS — N17.9: Primary | ICD-10-CM

## 2018-04-19 DIAGNOSIS — E87.5: ICD-10-CM

## 2018-04-19 DIAGNOSIS — J44.9: ICD-10-CM

## 2018-04-19 DIAGNOSIS — R45.1: ICD-10-CM

## 2018-04-19 DIAGNOSIS — I69.354: ICD-10-CM

## 2018-04-19 DIAGNOSIS — N18.3: ICD-10-CM

## 2018-04-19 DIAGNOSIS — Z83.3: ICD-10-CM

## 2018-04-19 DIAGNOSIS — I50.9: ICD-10-CM

## 2018-04-19 DIAGNOSIS — E11.22: ICD-10-CM

## 2018-04-19 DIAGNOSIS — Z88.8: ICD-10-CM

## 2018-04-19 DIAGNOSIS — E86.0: ICD-10-CM

## 2018-04-19 DIAGNOSIS — E07.9: ICD-10-CM

## 2018-04-19 DIAGNOSIS — Z82.49: ICD-10-CM

## 2018-04-19 LAB
ALBUMIN SERPL-MCNC: 4.2 G/DL (ref 3.5–5)
ALP SERPL-CCNC: 107 U/L (ref 38–126)
ALT SERPL-CCNC: 48 U/L (ref 9–52)
ANION GAP SERPL CALC-SCNC: 11 MMOL/L
AST SERPL-CCNC: 21 U/L (ref 14–36)
BASOPHILS # BLD AUTO: 0 K/UL (ref 0–0.2)
BASOPHILS NFR BLD AUTO: 0 %
BUN SERPL-SCNC: 35 MG/DL (ref 7–17)
CALCIUM SPEC-MCNC: 9.9 MG/DL (ref 8.4–10.2)
CHLORIDE SERPL-SCNC: 106 MMOL/L (ref 98–107)
CO2 SERPL-SCNC: 24 MMOL/L (ref 22–30)
EOSINOPHIL # BLD AUTO: 0.1 K/UL (ref 0–0.7)
EOSINOPHIL NFR BLD AUTO: 1 %
ERYTHROCYTE [DISTWIDTH] IN BLOOD BY AUTOMATED COUNT: 3.48 M/UL (ref 3.8–5.4)
ERYTHROCYTE [DISTWIDTH] IN BLOOD: 12.7 % (ref 11.5–15.5)
GLUCOSE BLD-MCNC: 332 MG/DL (ref 75–99)
GLUCOSE BLD-MCNC: 421 MG/DL (ref 75–99)
GLUCOSE SERPL-MCNC: 306 MG/DL (ref 74–99)
GLUCOSE UR QL: (no result)
HCT VFR BLD AUTO: 32.3 % (ref 34–46)
HGB BLD-MCNC: 10.6 GM/DL (ref 11.4–16)
LYMPHOCYTES # SPEC AUTO: 1.1 K/UL (ref 1–4.8)
LYMPHOCYTES NFR SPEC AUTO: 23 %
MAGNESIUM SPEC-SCNC: 2 MG/DL (ref 1.6–2.3)
MCH RBC QN AUTO: 30.5 PG (ref 25–35)
MCHC RBC AUTO-ENTMCNC: 32.9 G/DL (ref 31–37)
MCV RBC AUTO: 92.7 FL (ref 80–100)
MONOCYTES # BLD AUTO: 0.2 K/UL (ref 0–1)
MONOCYTES NFR BLD AUTO: 5 %
NEUTROPHILS # BLD AUTO: 3.3 K/UL (ref 1.3–7.7)
NEUTROPHILS NFR BLD AUTO: 67 %
PH UR: 5.5 [PH] (ref 5–8)
PLATELET # BLD AUTO: 263 K/UL (ref 150–450)
POTASSIUM SERPL-SCNC: 6 MMOL/L (ref 3.5–5.1)
PROT SERPL-MCNC: 7.2 G/DL (ref 6.3–8.2)
SODIUM SERPL-SCNC: 141 MMOL/L (ref 137–145)
SP GR UR: 1.01 (ref 1–1.03)
UROBILINOGEN UR QL STRIP: <2 MG/DL (ref ?–2)
WBC # BLD AUTO: 4.9 K/UL (ref 3.8–10.6)

## 2018-04-19 PROCEDURE — 83735 ASSAY OF MAGNESIUM: CPT

## 2018-04-19 PROCEDURE — 82075 ASSAY OF BREATH ETHANOL: CPT

## 2018-04-19 PROCEDURE — 99284 EMERGENCY DEPT VISIT MOD MDM: CPT

## 2018-04-19 PROCEDURE — 94640 AIRWAY INHALATION TREATMENT: CPT

## 2018-04-19 PROCEDURE — 80306 DRUG TEST PRSMV INSTRMNT: CPT

## 2018-04-19 PROCEDURE — 85025 COMPLETE CBC W/AUTO DIFF WBC: CPT

## 2018-04-19 PROCEDURE — 80164 ASSAY DIPROPYLACETIC ACD TOT: CPT

## 2018-04-19 PROCEDURE — 36415 COLL VENOUS BLD VENIPUNCTURE: CPT

## 2018-04-19 PROCEDURE — 87040 BLOOD CULTURE FOR BACTERIA: CPT

## 2018-04-19 PROCEDURE — 80053 COMPREHEN METABOLIC PANEL: CPT

## 2018-04-19 PROCEDURE — 81003 URINALYSIS AUTO W/O SCOPE: CPT

## 2018-04-19 RX ADMIN — FAMOTIDINE SCH MG: 20 TABLET, FILM COATED ORAL at 20:06

## 2018-04-19 RX ADMIN — SULFAMETHOXAZOLE AND TRIMETHOPRIM SCH EACH: 800; 160 TABLET ORAL at 20:06

## 2018-04-19 RX ADMIN — HYDROCODONE BITARTRATE AND ACETAMINOPHEN PRN EACH: 10; 325 TABLET ORAL at 20:06

## 2018-04-19 RX ADMIN — CEFAZOLIN SCH: 330 INJECTION, POWDER, FOR SOLUTION INTRAMUSCULAR; INTRAVENOUS at 23:20

## 2018-04-19 RX ADMIN — METOCLOPRAMIDE SCH MG: 5 TABLET ORAL at 20:06

## 2018-04-19 RX ADMIN — DOCUSATE SODIUM SCH MG: 100 CAPSULE, LIQUID FILLED ORAL at 20:06

## 2018-04-19 RX ADMIN — INSULIN DETEMIR SCH UNIT: 100 INJECTION, SOLUTION SUBCUTANEOUS at 21:09

## 2018-04-19 RX ADMIN — BUDESONIDE AND FORMOTEROL FUMARATE DIHYDRATE SCH PUFF: 160; 4.5 AEROSOL RESPIRATORY (INHALATION) at 19:33

## 2018-04-19 RX ADMIN — INSULIN ASPART SCH UNIT: 100 INJECTION, SOLUTION INTRAVENOUS; SUBCUTANEOUS at 21:09

## 2018-04-19 NOTE — ED
Psych HPI





- General


Chief Complaint: Psychiatric Symptoms


Stated Complaint: mental health


Time Seen by Provider: 18 16:35


Source: patient, family, RN notes reviewed, Caregiver


Mode of arrival: wheelchair





- History of Present Illness


Initial Comments: 





This is a 58-year-old female history of a CVA with left residual hemiparesis 

also a history depression who was brought in by family at the request of her 

doctor for admission today.  Patient apparently is history depression and has 

been having violent behavior and outbursts she's been trying to injure herself.

  She also is been trying to open up a wound on her right lower extremity that 

was starting he'll finally.  She apparently has been taking her medications as 

directed but they do not seem to be working.  No reports of fevers chills 

nausea vomiting sweats head trauma headaches dysuria or other symptoms.


MD Complaint: feels depressed, other





- Related Data


 Home Medications











 Medication  Instructions  Recorded  Confirmed


 


Albuterol Inhaler [Ventolin Hfa 2 puff INHALATION RT-Q4H PRN 16





Inhaler]   


 


Aspirin EC [Ecotrin Low Dose] 81 mg PO DAILY 16


 


Famotidine [Pepcid] 20 mg PO BID 16


 


Valproic Acid [Depakene] 250 mg PO DAILY 16


 


Budesonide-Formot 160-4.5 Mcg 2 puff INHALATION RT-BID 10/06/16 04/19/18





[Symbicort 160-4.5 Mcg Inhaler]   


 


Ergocalciferol [Vitamin D2 50,000 unit PO Q7D 10/06/16 04/19/18





(DRISDOL)]   


 


HYDROcodone/APAP 10-325MG [Norco 1 tab PO Q6H PRN 17





]   


 


DULoxetine HCL [Cymbalta] 60 mg PO DAILY 17


 


Ferrous Sulfate [Iron (65  mg PO DAILY 17





Elemental)]   


 


Insulin Lispro [humaLOG] 0 units SQ AC-TID 17


 


ALPRAZolam [Xanax] 1 mg PO TID 17


 


Docusate [Colace] 100 mg PO BID 18


 


EPINEPHrine (Auto Inject) [Epipen] 0.3 mg IM ONCE PRN 18


 


Glucagon Emergency Kit 1 mg SQ ONCE PRN 18


 


Insulin Glargine [Lantus] 20 unit SQ HS 18


 


Vitamin B Complex 1 cap PO DAILY 18


 


ARIPiprazole [Abilify] 2 mg PO DAILY 18


 


Metoclopramide [Reglan] 5 mg PO TID 18


 


Nitroglycerin Sl Tabs [Nitrostat] 0.4 mg SUBLINGUAL Q5M PRN 18


 


Ondansetron [Zofran] 4 mg PO DAILY PRN 18


 


SILVER sulfADIAZINE Cream 1 applic TOPICAL DAILY PRN 18





[Silvadene 1% Cream]   








 Previous Rx's











 Medication  Instructions  Recorded


 


Nicotine 14Mg/24Hr Patch [Habitrol] 1 patch TRANSDERM DAILY #30 patch 18


 


Sulfamethox-Tmp 800-160Mg [Bactrim 1 tab PO Q12HR #28 tab 18





-160 mg]  











 Allergies











Allergy/AdvReac Type Severity Reaction Status Date / Time


 


Barbiturates Allergy  Rash/Hives Verified 18 17:13


 


cephalexin monohydrate Allergy  Rash/Hives Verified 18 17:13





[From Keflex]     


 


morphine Allergy  Rash/Hives Verified 18 17:13


 


Penicillins Allergy  Rash/Hives Verified 18 17:13


 


phenobarbital Allergy  Swelling Verified 18 17:13


 


venom-honey bee Allergy  Swelling Verified 18 17:13





[bee venom (honey bee)]     


 


amlodipine besylate AdvReac  Vomiting Verified 18 17:13





[From Norvasc]     














Review of Systems


ROS Statement: 


Those systems with pertinent positive or pertinent negative responses have been 

documented in the HPI.





ROS Other: All systems not noted in ROS Statement are negative.





Past Medical History


Past Medical History: Chest Pain / Angina, Heart Failure, COPD, CVA/TIA, 

Diabetes Mellitus, Deep Vein Thrombosis (DVT), Eye Disorder, GERD/Reflux, 

Hyperlipidemia, Hypertension, Myocardial Infarction (MI), Thyroid Disorder


Additional Past Medical History / Comment(s): HX OF CVA X3 (LAST 2015)-HAS LT 

ARM PARALYSIS & WEAKNESS LEFT LEG. MI .  DVT RT  AXILLA. RENAL FAILURE.  

LOW THYROID,  PERIPHERAL NEUROPATHY HANDS & FEET.  ANEMIA.  HX OF DKA. USES W/

C. CONSTIPATION, ESOPHAGITIS.  EPIGLOTITIS.  HEADACHES SINCE CVA.  RETINOPATHY 

MARZENA EYES.  HX RT TOE INFECTION- GANGRENE, HAD AMP."i need eye sx-i have 

bleeding behind the eyes"


Last Myocardial Infarction Date:: 


History of Any Multi-Drug Resistant Organisms: MRSA


Date of last positivie culture/infection: 18


MDRO Source:: Right Foot


Past Surgical History: Appendectomy,  Section, Cholecystectomy, Heart 

Catheterization With Stent, Hysterectomy, Orthopedic Surgery


Additional Past Surgical History / Comment(s): Amputation Rt 2ND Toe.  C-S X3.  

EGD.  Bronchoscopy.  RT Arm Port Placed FOR AB RX; Removed.  6 CARDIAC STENTS. 

left shoulder bone removed


Past Anesthesia/Blood Transfusion Reactions: No Reported Reaction


Additional Past Anesthesia/Blood Transfusion Reaction / Comment(s): HX OF BLOOD 

TRANSFUSION- NO REACTION


Date of Last Stent Placement:: 2013


Past Psychological History: Anxiety, Bipolar, Depression


Smoking Status: Current every day smoker


Past Alcohol Use History: None Reported


Past Drug Use History: None Reported





- Past Family History


  ** Father


Family Medical History: Unable to Obtain, Coronary Artery Disease (CAD), 

Diabetes Mellitus





  ** Mother


Family Medical History: COPD





General Exam





- General Exam Comments


Initial Comments: 





Is a well-developed well-nourished awake alert oriented history female


Limitations: no limitations


General appearance: alert, in no apparent distress


Head exam: Present: atraumatic, normocephalic, normal inspection


Eye exam: Present: normal appearance, PERRL, EOMI.  Absent: scleral icterus, 

conjunctival injection, periorbital swelling


ENT exam: Present: normal exam, mucous membranes moist


Neck exam: Present: normal inspection.  Absent: tenderness, meningismus, 

lymphadenopathy


Respiratory exam: Present: normal lung sounds bilaterally.  Absent: respiratory 

distress, wheezes, rales, rhonchi, stridor


Cardiovascular Exam: Present: regular rate, normal rhythm, normal heart sounds.

  Absent: systolic murmur, diastolic murmur, rubs, gallop, clicks


GI/Abdominal exam: Present: soft, normal bowel sounds.  Absent: distended, 

tenderness, guarding, rebound, rigid


Extremities exam: Present: full ROM, normal capillary refill, other (Healing 

abrasion noted to the left upper arm.  Additionally there is a wound to the 

right lower extremity.  No proximal lymphangitis or lymphadenopathy.).  Absent: 

tenderness, pedal edema, joint swelling, calf tenderness


Back exam: Present: full ROM, other (Old scar to the upper back from self injury

).  Absent: tenderness, CVA tenderness (R), CVA tenderness (L), muscle spasm, 

paraspinal tenderness, vertebral tenderness


Neurological exam: Present: alert, oriented X3, CN II-XII intact


Psychiatric exam: Present: depressed, flat affect


Skin exam: Present: warm, dry, intact, normal color.  Absent: rash





Course





 Vital Signs











  18





  16:29


 


Temperature 97.5 F L


 


Pulse Rate 93


 


Respiratory 20





Rate 


 


Blood Pressure 165/101


 


O2 Sat by Pulse 98





Oximetry 














Medical Decision Making





- Medical Decision Making





I did discuss findings with patient's family as well as with Dr. Cabral the 

patient will be admitted with consultation by psychiatry as well as Dr. Moody.





Disposition


Clinical Impression: 


 Depression, Failure of outpatient treatment, Wound of right lower extremity, 

Adjustment disorder





Disposition: ADMITTED AS IP TO THIS [facility]


Condition: Stable


Is patient prescribed a controlled substance at d/c from ED?: No


Referrals: 


Saniya Cabral MD [Primary Care Provider] - 1-2 days

## 2018-04-20 LAB
ALBUMIN SERPL-MCNC: 3.5 G/DL (ref 3.5–5)
ALP SERPL-CCNC: 81 U/L (ref 38–126)
ALT SERPL-CCNC: 46 U/L (ref 9–52)
ANION GAP SERPL CALC-SCNC: 12 MMOL/L
AST SERPL-CCNC: 21 U/L (ref 14–36)
BASOPHILS # BLD AUTO: 0 K/UL (ref 0–0.2)
BASOPHILS NFR BLD AUTO: 0 %
BUN SERPL-SCNC: 39 MG/DL (ref 7–17)
CALCIUM SPEC-MCNC: 9.2 MG/DL (ref 8.4–10.2)
CHLORIDE SERPL-SCNC: 105 MMOL/L (ref 98–107)
CO2 SERPL-SCNC: 22 MMOL/L (ref 22–30)
EOSINOPHIL # BLD AUTO: 0.1 K/UL (ref 0–0.7)
EOSINOPHIL NFR BLD AUTO: 2 %
ERYTHROCYTE [DISTWIDTH] IN BLOOD BY AUTOMATED COUNT: 3.27 M/UL (ref 3.8–5.4)
ERYTHROCYTE [DISTWIDTH] IN BLOOD: 13 % (ref 11.5–15.5)
GLUCOSE BLD-MCNC: 167 MG/DL (ref 75–99)
GLUCOSE BLD-MCNC: 200 MG/DL (ref 75–99)
GLUCOSE BLD-MCNC: 233 MG/DL (ref 75–99)
GLUCOSE BLD-MCNC: 272 MG/DL (ref 75–99)
GLUCOSE SERPL-MCNC: 296 MG/DL (ref 74–99)
HCT VFR BLD AUTO: 31.3 % (ref 34–46)
HGB BLD-MCNC: 9.9 GM/DL (ref 11.4–16)
LYMPHOCYTES # SPEC AUTO: 1.4 K/UL (ref 1–4.8)
LYMPHOCYTES NFR SPEC AUTO: 35 %
MCH RBC QN AUTO: 30.1 PG (ref 25–35)
MCHC RBC AUTO-ENTMCNC: 31.5 G/DL (ref 31–37)
MCV RBC AUTO: 95.7 FL (ref 80–100)
MONOCYTES # BLD AUTO: 0.3 K/UL (ref 0–1)
MONOCYTES NFR BLD AUTO: 6 %
NEUTROPHILS # BLD AUTO: 2.1 K/UL (ref 1.3–7.7)
NEUTROPHILS NFR BLD AUTO: 54 %
PLATELET # BLD AUTO: 231 K/UL (ref 150–450)
POTASSIUM SERPL-SCNC: 5.4 MMOL/L (ref 3.5–5.1)
PROT SERPL-MCNC: 6.4 G/DL (ref 6.3–8.2)
SODIUM SERPL-SCNC: 139 MMOL/L (ref 137–145)
WBC # BLD AUTO: 4 K/UL (ref 3.8–10.6)

## 2018-04-20 RX ADMIN — FAMOTIDINE SCH MG: 20 TABLET, FILM COATED ORAL at 08:33

## 2018-04-20 RX ADMIN — INSULIN ASPART SCH UNIT: 100 INJECTION, SOLUTION INTRAVENOUS; SUBCUTANEOUS at 21:16

## 2018-04-20 RX ADMIN — NICOTINE SCH PATCH: 14 PATCH, EXTENDED RELEASE TRANSDERMAL at 08:29

## 2018-04-20 RX ADMIN — DOCUSATE SODIUM SCH MG: 100 CAPSULE, LIQUID FILLED ORAL at 08:33

## 2018-04-20 RX ADMIN — ISODIUM CHLORIDE PRN MG: 0.03 SOLUTION RESPIRATORY (INHALATION) at 10:54

## 2018-04-20 RX ADMIN — HYDROCODONE BITARTRATE AND ACETAMINOPHEN PRN EACH: 10; 325 TABLET ORAL at 15:05

## 2018-04-20 RX ADMIN — SULFAMETHOXAZOLE AND TRIMETHOPRIM SCH EACH: 800; 160 TABLET ORAL at 21:16

## 2018-04-20 RX ADMIN — BUDESONIDE AND FORMOTEROL FUMARATE DIHYDRATE SCH PUFF: 160; 4.5 AEROSOL RESPIRATORY (INHALATION) at 08:37

## 2018-04-20 RX ADMIN — Medication SCH EACH: at 08:31

## 2018-04-20 RX ADMIN — METOCLOPRAMIDE SCH MG: 5 TABLET ORAL at 21:16

## 2018-04-20 RX ADMIN — METOCLOPRAMIDE SCH MG: 5 TABLET ORAL at 08:32

## 2018-04-20 RX ADMIN — METOCLOPRAMIDE SCH MG: 5 TABLET ORAL at 16:45

## 2018-04-20 RX ADMIN — INSULIN ASPART SCH UNIT: 100 INJECTION, SOLUTION INTRAVENOUS; SUBCUTANEOUS at 08:26

## 2018-04-20 RX ADMIN — INSULIN ASPART SCH UNIT: 100 INJECTION, SOLUTION INTRAVENOUS; SUBCUTANEOUS at 18:00

## 2018-04-20 RX ADMIN — DOCUSATE SODIUM SCH: 100 CAPSULE, LIQUID FILLED ORAL at 21:16

## 2018-04-20 RX ADMIN — Medication SCH MG: at 08:33

## 2018-04-20 RX ADMIN — FAMOTIDINE SCH MG: 20 TABLET, FILM COATED ORAL at 21:16

## 2018-04-20 RX ADMIN — HYDROCODONE BITARTRATE AND ACETAMINOPHEN PRN EACH: 10; 325 TABLET ORAL at 08:27

## 2018-04-20 RX ADMIN — BUDESONIDE AND FORMOTEROL FUMARATE DIHYDRATE SCH PUFF: 160; 4.5 AEROSOL RESPIRATORY (INHALATION) at 19:45

## 2018-04-20 RX ADMIN — CEFAZOLIN SCH MLS/HR: 330 INJECTION, POWDER, FOR SOLUTION INTRAMUSCULAR; INTRAVENOUS at 15:05

## 2018-04-20 RX ADMIN — ASPIRIN 81 MG CHEWABLE TABLET SCH MG: 81 TABLET CHEWABLE at 08:34

## 2018-04-20 RX ADMIN — HYDROCODONE BITARTRATE AND ACETAMINOPHEN PRN EACH: 10; 325 TABLET ORAL at 21:14

## 2018-04-20 RX ADMIN — INSULIN DETEMIR SCH UNIT: 100 INJECTION, SOLUTION SUBCUTANEOUS at 21:15

## 2018-04-20 RX ADMIN — SULFAMETHOXAZOLE AND TRIMETHOPRIM SCH EACH: 800; 160 TABLET ORAL at 08:31

## 2018-04-20 RX ADMIN — DULOXETINE SCH MG: 60 CAPSULE, DELAYED RELEASE ORAL at 08:31

## 2018-04-20 RX ADMIN — DIVALPROEX SODIUM SCH MG: 250 TABLET, DELAYED RELEASE ORAL at 08:33

## 2018-04-20 RX ADMIN — INSULIN ASPART SCH UNIT: 100 INJECTION, SOLUTION INTRAVENOUS; SUBCUTANEOUS at 12:56

## 2018-04-20 NOTE — P.CN
Psychiatric Consult





- .


Consult date: 18


Consult:: 





18 14:49


Identification: Patient is a 58-year-old female who was admitted to the 

hospital because she tried to reopen the wound on her right heel.  





Reason for Consult: Consultation was requested for depression and adjustment 

reaction.





History of Present Illness: Patient's medical record was reviewed, the patient 

was seen and interviewed with her room and her caregiver was present during the 

interview and provided history.  Patient states that she banged her right heel 

on the floor at home when she was not given a cigarette, patient states she was 

angry because her caregiver smokes and she wanted a cigarette.  She states that 

she is not supposed to be smoking so that her heel will heal.  Patient states 

that she got frustrated and angry and that is what she did.  Her caregiver says 

that she has been more frustrated recently and at times will not take her 

medication by just dropping them on the floor.  Patient lives with her 

caregiver and they state that they will be moving because her son and his 

girlfriend who have been living with them were selling drugs in the house was 

raided and they now need to move.  Patient states that she had a CVA 3 years 

ago that caused her to have left sided weakness she states her arm greater than 

her leg, she states she uses a wheelchair at home.  She states since that time 

she is been more depressed and frustrated.  She states her psychotropic 

medications have been prescribed by her primary care physician and is unclear 

on the exact times in which they were started.  Patient states that the 

Cymbalta was begun several months ago as well as Depakote.  Patient is also 

taking Xanax 1 mg 3 times a day as well as Abilify 2 mg which she states was 

recently started.  She states that at times at home she has seen things such as 

people, she then stated that she thinks the house is haunted.  Patient states 

that she is not currently having any visual hallucinations.  Patient states 

that when she hit her foot on the floor she was not attempting to commit 

suicide.  Patient has also picked an area on her face and on her arm due to her 

frustration with not being given a cigarette, someone not pushing her in the 

wheelchair somewhere.  Patient states that she was never treated for any 

psychiatric disorders prior to her primary care physician beginning the above 

medications.  Patient states that she has never felt suicidal and has never 

made any suicide attempts.  Patient states that she has never had any inpatient 

psychiatric treatment nor has she ever been seen by a psychiatrist.  Patient's 

caregiver states that the patient does occasionally throw things when she gets 

upset, lies about how she is doing and occasionally does drop her medications 

on the floor and states that since the treatment for the ulcer has begun the 

patient has become more frustrated at home.


Patient does not endorse any manic symptoms, she does endorse feeling depressed 

since her stroke 3 years ago as well as increasing frustration recently due to 

the ulcer.  Patient states she is concerned if it doesn't heel she may have to 

have an amputation and she has already had that done on right foot.  Patient 

states that she is unsure if the medications have been helpful or not as is her 

caregiver unable to state if they have been effective or not.  Patient does not 

endorse any current suicidal thoughts and no psychotic symptoms were elicited.





Past Psychiatric History: Patient has no prior inpatient treatment or 

outpatient psychiatric care treatment.  Her primary care physician has been 

prescribing Cymbalta 60 mg a day, Depakote 250 mg a day, Xanax 1 mg 3 times a 

day and Abilify 2 mg daily.  Patient states she has never been on any other 

medications.





Past Medical/Surgical History: Patient has a history of COPD, CVA 3, diabetes 

mellitus, DVT, GERD, hyperlipidemia, hypertension, myocardial infarction, 

thyroid disorder and she is status post appendectomy, cholecystectomy status 

post stent placement for coronary artery disease, amputation of toes on her 

right foot.





Social History: Patient states she was  and her   in  and 

she has 3 children.  She has 5 surviving brothers and 1 surviving sister and 1 

 sibling.  She states that after high school she went tended college 

for 2 years.  She was  and did work and states she has not worked for a 

number of years.  Patient states that she is currently living with her 

caregiver who is a childhood friend and they now need to move out of the house 

after was raided due to her son's drug use.





Substance Use History: Patient does not use any alcohol or drugs and was 

smoking up to 3 packs of cigarettes a day.





Mental status: Appearance/Attitude: Patient is sitting up in bed in no acute 

distress, she makes intermittent eye contact and was cooperative.


Behavior: Patient does not exhibit any psychomotor agitation or retardation.


Speech/Language: Patient responded to questions with brief answers, she spoke 

in a flat monotone and was coherent


Thought Process: Patient was goal-directed there is no evidence of loose 

association or flight of ideas


Thought Content: Patient denied any current auditory or visual hallucinations, 

but stated her house was haunted and past she has seen figures or dead people 

walking.  No delusions or paranoid ideation were elicited.  Patient states that 

she's been increasingly frustrated since her last stroke 3 years ago as well as 

feeling more depressed.  Patient states that her appetite has been good at home 

and her sleep has been fair.  She states she was frustrated at home when she 

couldn't smoke a cigarette and so banged her right foot on the floor.  Patient 

per her caregiver has occasionally thrown her pills on the floor or thrown 

things when she gets frustrated the patient says this is when they don't we'll 

her in the wheelchair where she wants to go.


Suicidal/Homicidal Ideation: Patient denies any current suicidal or homicidal 

ideation


Sensorium/Cognition: Patient is alert and oriented to person, place, and time 

and formal cognitive testing was not performed


Mood/Affect: Patient's mood was restricted and her affect was slightly blunted


Insight/Judgment: Patient's insight and judgment are fair





Assessment: Patient presents after coming frustrated and upset because she 

could not have a cigarette and so banged her right heel on the floor, when I 

questioned the patient regarding this she said that she wanted a cigarette, 

when I questioned her further about how she thought that behavior stalled that 

situation she said that it didn't and that the pain in her foot increased.  She 

states that she is fearful that she will lose her foot.  Patient has been 

placed on medications by her primary care doctor for treatment of her 

depression that has presented after her last CVA in .  Patient cannot tell 

me how beneficial it has been.  Patient states that the Abilify 2 mg was most 

recently started because she had been reporting seeing things at home.  Patient 

is also on Depakote and Xanax.  Patient currently states that she was not 

attempting to hurt herself, she was not attempting suicide and has never made 

any suicide attempts but gets frustrated when people don't do what she wants 

especially as she now needs to ambulate using a wheelchair.  As well patient 

her caregiver need to move out of their current home due to her son's drug use 

and the house being raided.  Patient did not endorse any current symptoms of 

deondre, no psychotic symptoms were endorsed and the patient states that she is 

depressed and frustrated but no current suicidal ideation.





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 

disorder severe





Plan: Patient is not endorsing any current suicidal ideation, she is not 

endorsing any psychotic symptoms and therefore does not require inpatient 

psychiatric admission.  I will also discontinue the one-to-one sitter as the 

patient is not expressing any suicidal ideation or thoughts of self-harm.  I 

spoke with the patient regarding her frustration and depression and suggested 

that she seek outpatient psychiatric care for both counseling as well as 

medication management and her caregiver stated that they were planning to go to 

[facility] and she is aware of how to access treatment there.  

Patient was encouraged to continue to take her medications as they have been 

prescribed so that an assessment of how well they are working can be made 

knowing that the patient has been taking them as they have been prescribed.  I 

encouraged the patient to follow-up with the suggestion of outpatient 

counseling to assist her with developing better coping skills.  Patient should 

continue on her current psychotropic medication as they're currently 

prescribed.  It would certainly be advantageous as the patient is on opiate 

medication to slowly titrate her off of the Xanax, but this will need to be 

done over a period of time.  I will sign off the case there are any further 

questions or concerns please and hesitate to contact me


18 14:50





18 15:07

## 2018-04-20 NOTE — P.HPIM
History of Present Illness


H&P Date: 18


Chief Complaint: Combative and aggressive behavior





This is a 58-year-old female with a known past medical history of uncontrolled 

diabetes mellitus type 2, COPD, hyperlipidemia, hypertension, myocardial 

infarction, CVA with left-sided paralysis, coronary artery disease with 

previous cardiac stents, chronic kidney disease stage III, depression and 

possible bipolar.  She also was recently hospitalized with a right heel 

diabetic ulcer.  She's followed at the wound care center and has been on 

Bactrim.  Patient went to see Dr. Cabral yesterday family was concerned 

regarding her change in behavior.  Patient has been very aggressive and having 

violent behavior.  She has been aggressive towards her caregivers.  She's also 

been hurting herself on purpose.  As far as her left heel wound she has been 

hitting that purposely against things and breaking the new scab open.  Also she 

has been scratching at herself on the left arm and the left side of her chin.  

She's brought it into the emergency room for further evaluation and psychiatric 

evaluation.  Patient does have some evidence of dehydration and acute kidney 

injury.  Creatinine has gone up to 1.55.  She also has some hypokalemia with a 

potassium of 6.  Repeat potassium is 5.4 and she is receiving Kayexalate.  And 

IV fluids have been started.  She is on the Bactrim for the left diabetic heel 

ulcer.  Patient is currently sitting in bed comfortably no signs of agitation.  

She was actually able to sleep through the night.  Family had also mentioned 

patient now has been having difficulty sleeping and not very much.  Patient 

denies any fever chills or sweats.  Denies any nausea or vomiting.  Denies any 

bowel movement changes or urinary symptoms.  Denies any chest pain or shortness 

of breath





Review of Systems





Please refer to HPI otherwise unremarkable





Past Medical History


Past Medical History: Chest Pain / Angina, Heart Failure, COPD, CVA/TIA, 

Diabetes Mellitus, Deep Vein Thrombosis (DVT), Eye Disorder, GERD/Reflux, 

Hyperlipidemia, Hypertension, Myocardial Infarction (MI), Thyroid Disorder


Additional Past Medical History / Comment(s): HX OF CVA X3 (LAST 2015)-HAS LT 

ARM PARALYSIS & WEAKNESS LEFT LEG. MI 2012.  DVT RT  AXILLA. RENAL FAILURE.  

LOW THYROID,  PERIPHERAL NEUROPATHY HANDS & FEET.  ANEMIA.  HX OF DKA. USES W/

C. CONSTIPATION, ESOPHAGITIS.  EPIGLOTITIS.  HEADACHES SINCE CVA.  RETINOPATHY 

MARZENA EYES.  HX RT TOE INFECTION- GANGRENE, HAD AMP."i need eye sx-i have 

bleeding behind the eyes"


Last Myocardial Infarction Date:: 


History of Any Multi-Drug Resistant Organisms: MRSA


Date of last positivie culture/infection: 18


MDRO Source:: Right Foot


Past Surgical History: Appendectomy,  Section, Cholecystectomy, Heart 

Catheterization With Stent, Hysterectomy, Orthopedic Surgery


Additional Past Surgical History / Comment(s): Amputation Rt 2ND Toe.  C-S X3.  

EGD.  Bronchoscopy.  RT Arm Port Placed FOR AB RX; Removed.  6 CARDIAC STENTS. 

left shoulder bone removed


Past Anesthesia/Blood Transfusion Reactions: No Reported Reaction


Additional Past Anesthesia/Blood Transfusion Reaction / Comment(s): HX OF BLOOD 

TRANSFUSION- NO REACTION


Date of Last Stent Placement:: 2013


Past Psychological History: Anxiety, Bipolar, Depression


Additional Psychological History / Comment(s): PT HAS A CARE GIVER-LOLITA. LOLITA 

STATED PT UNABLE TO AMBULATE(LT SIDE WAS AFFECTED BY STROKE) USES A W/C. also 

has glucometer,shower chair(has cane /walker that she previously used).  

Patient does continue to smoke, the caregivers relate that the patient is 

unmanageable without tobacco use.  She is on multiple psychiatric medications 

it was not thought to be a candidate for Chantix.


Smoking Status: Current every day smoker


Past Alcohol Use History: None Reported


Additional Past Alcohol Use History / Comment(s): Patient states she is using a 

e cigarettes. She has a 24-hour caregiver that lives with her.  She is 

wheelchair bound.


Past Drug Use History: None Reported





- Past Family History


  ** Father


Family Medical History: Unable to Obtain, Coronary Artery Disease (CAD), 

Diabetes Mellitus





  ** Mother


Family Medical History: COPD





Medications and Allergies


 Home Medications











 Medication  Instructions  Recorded  Confirmed  Type


 


Albuterol Inhaler [Ventolin Hfa 2 puff INHALATION RT-Q4H PRN 16 

History





Inhaler]    


 


Aspirin EC [Ecotrin Low Dose] 81 mg PO DAILY 16 History


 


Famotidine [Pepcid] 20 mg PO BID 16 History


 


Valproic Acid [Depakene] 250 mg PO DAILY 16 History


 


Budesonide-Formot 160-4.5 Mcg 2 puff INHALATION RT-BID 10/06/16 04/19/18 History





[Symbicort 160-4.5 Mcg Inhaler]    


 


Ergocalciferol [Vitamin D2 50,000 unit PO Q7D 10/06/16 04/19/18 History





(DRISDOL)]    


 


HYDROcodone/APAP 10-325MG [Norco 1 tab PO Q6H PRN 17 History





]    


 


DULoxetine HCL [Cymbalta] 60 mg PO DAILY 17 History


 


Ferrous Sulfate [Iron (65  mg PO DAILY 17 History





Elemental)]    


 


Insulin Lispro [humaLOG] 0 units SQ AC-TID 17 History


 


ALPRAZolam [Xanax] 1 mg PO TID 17 History


 


Docusate [Colace] 100 mg PO BID 18 History


 


EPINEPHrine (Auto Inject) [Epipen] 0.3 mg IM ONCE PRN 18 History


 


Glucagon Emergency Kit 1 mg SQ ONCE PRN 18 History


 


Insulin Glargine [Lantus] 20 unit SQ HS 18 History


 


Vitamin B Complex 1 cap PO DAILY 18 History


 


ARIPiprazole [Abilify] 2 mg PO DAILY 18 History


 


Metoclopramide [Reglan] 5 mg PO TID 18 History


 


Nitroglycerin Sl Tabs [Nitrostat] 0.4 mg SUBLINGUAL Q5M PRN 18 

History


 


Ondansetron [Zofran] 4 mg PO DAILY PRN 18 History


 


SILVER sulfADIAZINE Cream 1 applic TOPICAL DAILY PRN 18 History





[Silvadene 1% Cream]    


 


Nicotine 14Mg/24Hr Patch [Habitrol] 1 patch TRANSDERM DAILY #30 patch 18 Rx


 


Sulfamethox-Tmp 800-160Mg [Bactrim 1 tab PO Q12HR #28 tab 18 Rx





-160 mg]    











 Allergies











Allergy/AdvReac Type Severity Reaction Status Date / Time


 


Barbiturates Allergy  Rash/Hives Verified 18 17:13


 


cephalexin monohydrate Allergy  Rash/Hives Verified 18 17:13





[From Keflex]     


 


morphine Allergy  Rash/Hives Verified 18 17:13


 


Penicillins Allergy  Rash/Hives Verified 18 17:13


 


phenobarbital Allergy  Swelling Verified 18 17:13


 


venom-honey bee Allergy  Swelling Verified 18 17:13





[bee venom (honey bee)]     


 


amlodipine besylate AdvReac  Vomiting Verified 18 17:13





[From Norvasc]     














Physical Exam


Vitals: 


 Vital Signs











  Temp Pulse Pulse Resp BP BP Pulse Ox


 


 18 11:05   88     


 


 18 10:54   88     


 


 18 22:40  98.6 F   92  17   109/55  96


 


 18 18:41  97.6 F  84   18  144/65   97


 


 18 17:47   83   18  157/75   97


 


 18 16:29  97.5 F L  93   20  165/101   98








 Intake and Output











 18





 22:59 06:59 14:59


 


Intake Total  240 


 


Balance  240 


 


Intake:   


 


  Oral  240 


 


Other:   


 


  # Voids  1 1


 


  Weight 53.524 kg  














Head normocephalic


Neck supple


Lungs clear to auscultation bilaterally no wheezing or crackles


Heart regular rate and rhythm S1-S2, no rub or gallop


Abdomen is soft nontender nondistended positive bowel sounds no 

hepatosplenomegaly


Extremities no edema.  Healing right heel ulcer.  There is a scab present.  No 

evidence of any drainage or cellulitis.


Neuro alert and orientated to 3.  Patient is calm and answering questions 

appropriately no signs of aggression.  Bedside sitter present.  Left-sided 

paralysis from old stroke





Results


CBC & Chem 7: 


 18 09:09





 18 09:09


Labs: 


 Abnormal Lab Results - Last 24 Hours (Table)











  18 Range/Units





  17:38 17:38 17:57 


 


RBC  3.48 L    (3.80-5.40)  m/uL


 


Hgb  10.6 L    (11.4-16.0)  gm/dL


 


Hct  32.3 L    (34.0-46.0)  %


 


Potassium   6.0 H   (3.5-5.1)  mmol/L


 


BUN   35 H   (7-17)  mg/dL


 


Creatinine   1.10 H   (0.52-1.04)  mg/dL


 


Glucose   306 H   (74-99)  mg/dL


 


POC Glucose (mg/dL)    332 H  (75-99)  mg/dL


 


Urine Glucose (UA)     (Negative)  


 


Urine Opiates Screen     (NotDetected)  


 


U Benzodiazepines Scrn     (NotDetected)  














  18 Range/Units





  19:10 20:39 08:20 


 


RBC     (3.80-5.40)  m/uL


 


Hgb     (11.4-16.0)  gm/dL


 


Hct     (34.0-46.0)  %


 


Potassium     (3.5-5.1)  mmol/L


 


BUN     (7-17)  mg/dL


 


Creatinine     (0.52-1.04)  mg/dL


 


Glucose     (74-99)  mg/dL


 


POC Glucose (mg/dL)   421 H  200 H  (75-99)  mg/dL


 


Urine Glucose (UA)  4+ H    (Negative)  


 


Urine Opiates Screen  Detected H    (NotDetected)  


 


U Benzodiazepines Scrn  Detected H    (NotDetected)  














  18 Range/Units





  09:09 09:09 11:22 


 


RBC  3.27 L    (3.80-5.40)  m/uL


 


Hgb  9.9 L    (11.4-16.0)  gm/dL


 


Hct  31.3 L    (34.0-46.0)  %


 


Potassium   5.4 H   (3.5-5.1)  mmol/L


 


BUN   39 H   (7-17)  mg/dL


 


Creatinine   1.55 H   (0.52-1.04)  mg/dL


 


Glucose   296 H   (74-99)  mg/dL


 


POC Glucose (mg/dL)    167 H  (75-99)  mg/dL


 


Urine Glucose (UA)     (Negative)  


 


Urine Opiates Screen     (NotDetected)  


 


U Benzodiazepines Scrn     (NotDetected)  














Assessment and Plan


Assessment: 





1.  Aggressive behavior and agitation: We'll have patient evaluated by 

psychiatry.  Patient reports that she has been taking her psychiatric meds as 

scheduled





2.  History of depression and possible bipolar





3.  Healing Diabetic right heel ulcer: Continue with the Bactrim.  Infectious 

disease consulted





4.  Acute kidney injury: Start normal saline at 50 mL an hour.  Repeat labs in 

a.m.





5.  Hyperkalemia: Patient receiving Kayexalate.  Repeat potassium level in a.m.





6.  Acute on chronic kidney disease, stage III





7 insulin-dependent diabetes mellitus, type II with uncontrolled blood sugars: 

Continue Levemir and sliding scale coverage





8.  History of CVA with left-sided paralysis





9.  History of coronary artery disease cardiac enzymes





10.  History of iron deficiency anemia





11.  Nicotine dependence: Continue nicotine patch.  Discussed smoking cessation 

for greater than 3 minutes





GI prophylaxis Pepcid and DVT prophylaxis Lovenox


Time with Patient: Greater than 30 (Greater than 50% of the total time spent in 

counseling and coordination of care.I performed an examination of the patient 

and discussed their management with the physician Assistant.  I have reviewed 

the Physician Assistant's notes and agree with the documented findings and plan 

of care)

## 2018-04-20 NOTE — P.CONS
History of Present Illness





- Reason for Consult


Consult date: 18


Right lower extremity wound





- History of Present Illness





This is a 58-year-old  female patient who was recently hospitalized 

 through  and was seen by ID service due to a MRSA diabetic 

ulcer right heel.  Patient was discharged on Bactrim and she subsequently 

followed up with Dr. Ferrell in the wound healing center on .  He 

recommended thoroughly padding that he'll ulcer as patient was traumatizing the 

area in order to get cigarettes.  Otherwise Eucerin cream to be used on dry 

areas.  Patient apparently had violent outburst yesterday and was trying to 

hurt herself and was also trying to get cigarettes again.  She was trying to 

traumatize her wound opened it.  Patient was found to be afebrile with white 

count of 4.9, blood pressure was elevated 165/101, potassium 6, BUN 35 

creatinine 1.1.  Urine drug screen was positive for opiates and 

benzodiazepines.  Blood sugars running in the 300-420.  Urinalysis was clear 

other than 4+ glucose.  Blood cultures status received.  Patient has been 

admitted to the [facility] floor and a psychiatry consult is in place.  Patient 

has a sitter at the bedside and her full-time caregiver is also at the bedside.

  Patient can verbalize what happened yesterday and realizes that it was a poor 

choice at this time denies wanting to hurt herself.  She is complaining of pain 

to the right heel.  No fever or chills.  She has been eating without 

difficulty.  She denies any nausea or vomiting.  No chest pain shortness of 

breath or cough.  Regarding smoking, patient is trying to quit and using a 

nicotine patch.





Review of Systems


All systems: negative


Constitutional: Denies anorexia, Denies chills, Denies fatigue, Denies fever, 

Denies lethargy, Denies malaise, Denies poor appetite, Denies weakness


Eyes: denies blurred vision, denies pain


Ears, nose, mouth and throat: Denies dental pain, Denies headache, Denies mouth 

pain, Denies sore throat, Denies vertigo


Cardiovascular: Denies chest pain, Denies dyspnea on exertion, Denies edema, 

Denies leg edema, Denies lightheadedness, Denies shortness of breath, Denies 

syncope


Respiratory: Denies congestion, Denies cough, Denies cough with sputum, Denies 

dyspnea, Denies excessive sputum, Denies hemoptysis, Denies home oxygen, Denies 

wheezing


Gastrointestinal: Denies abdominal pain, Denies diarrhea, Denies nausea, Denies 

vomiting


Genitourinary: Denies dysuria, Denies hematuria


Musculoskeletal: Denies myalgias


Integumentary: Reports wounds, Denies pruritus, Denies rash


Neurological: Denies numbness, Denies weakness


Psychiatric: Denies anxiety, Denies depression


Endocrine: Denies fatigue, Denies weight change





Past Medical History


Past Medical History: Chest Pain / Angina, Heart Failure, COPD, CVA/TIA, 

Diabetes Mellitus, Deep Vein Thrombosis (DVT), Eye Disorder, GERD/Reflux, 

Hyperlipidemia, Hypertension, Myocardial Infarction (MI), Thyroid Disorder


Additional Past Medical History / Comment(s): HX OF CVA X3 (LAST 2015)-HAS LT 

ARM PARALYSIS & WEAKNESS LEFT LEG. MI .  DVT RT  AXILLA. RENAL FAILURE.  

LOW THYROID,  PERIPHERAL NEUROPATHY HANDS & FEET.  ANEMIA.  HX OF DKA. USES W/

C. CONSTIPATION, ESOPHAGITIS.  EPIGLOTITIS.  HEADACHES SINCE CVA.  RETINOPATHY 

MARZENA EYES.  HX RT TOE INFECTION- GANGRENE, HAD AMP."i need eye sx-i have 

bleeding behind the eyes"


Last Myocardial Infarction Date:: 


History of Any Multi-Drug Resistant Organisms: MRSA


Year Discovered:: 18


MDRO Source:: Right Foot


Past Surgical History: Appendectomy,  Section, Cholecystectomy, Heart 

Catheterization With Stent, Hysterectomy, Orthopedic Surgery


Additional Past Surgical History / Comment(s): Amputation Rt 2ND Toe.  C-S X3.  

EGD.  Bronchoscopy.  RT Arm Port Placed FOR AB RX; Removed.  6 CARDIAC STENTS. 

left shoulder bone removed


Past Anesthesia/Blood Transfusion Reactions: No Reported Reaction


Additional Past Anesthesia/Blood Transfusion Reaction / Comm: HX OF BLOOD 

TRANSFUSION- NO REACTION


Date of Last Stent Placement:: 2013


Past Psychological History: Anxiety, Bipolar, Depression


Additional Psychological History / Comment(s): PT HAS A CARE GIVER-LOLITA. LOLITA 

STATED PT UNABLE TO AMBULATE(LT SIDE WAS AFFECTED BY STROKE) USES A W/C. also 

has glucometer,shower chair(has cane /walker that she previously used).  

Patient does continue to smoke, the caregivers relate that the patient is 

unmanageable without tobacco use.  She is on multiple psychiatric medications 

it was not thought to be a candidate for Chantix.


Smoking Status: Current every day smoker


Past Alcohol Use History: None Reported


Additional Past Alcohol Use History / Comment(s): Patient states she is using a 

e cigarettes. She has a 24-hour caregiver that lives with her.  She is 

wheelchair bound.


Past Drug Use History: None Reported





- Past Family History


  ** Father


Family Medical History: Unable to Obtain, Coronary Artery Disease (CAD), 

Diabetes Mellitus





  ** Mother


Family Medical History: COPD





Medications and Allergies


 Home Medications











 Medication  Instructions  Recorded  Confirmed  Type


 


Albuterol Inhaler [Ventolin Hfa 2 puff INHALATION RT-Q4H PRN 16 

History





Inhaler]    


 


Aspirin EC [Ecotrin Low Dose] 81 mg PO DAILY 16 History


 


Famotidine [Pepcid] 20 mg PO BID 16 History


 


Valproic Acid [Depakene] 250 mg PO DAILY 16 History


 


Budesonide-Formot 160-4.5 Mcg 2 puff INHALATION RT-BID 10/06/16 04/19/18 History





[Symbicort 160-4.5 Mcg Inhaler]    


 


Ergocalciferol [Vitamin D2 50,000 unit PO Q7D 10/06/16 04/19/18 History





(DRISDOL)]    


 


HYDROcodone/APAP 10-325MG [Norco 1 tab PO Q6H PRN 17 History





]    


 


DULoxetine HCL [Cymbalta] 60 mg PO DAILY 17 History


 


Ferrous Sulfate [Iron (65  mg PO DAILY 17 History





Elemental)]    


 


Insulin Lispro [humaLOG] 0 units SQ AC-TID 17 History


 


ALPRAZolam [Xanax] 1 mg PO TID 17 History


 


Docusate [Colace] 100 mg PO BID 18 History


 


EPINEPHrine (Auto Inject) [Epipen] 0.3 mg IM ONCE PRN 18 History


 


Glucagon Emergency Kit 1 mg SQ ONCE PRN 18 History


 


Insulin Glargine [Lantus] 20 unit SQ HS 18 History


 


Vitamin B Complex 1 cap PO DAILY 18 History


 


ARIPiprazole [Abilify] 2 mg PO DAILY 18 History


 


Metoclopramide [Reglan] 5 mg PO TID 18 History


 


Nitroglycerin Sl Tabs [Nitrostat] 0.4 mg SUBLINGUAL Q5M PRN 18 

History


 


Ondansetron [Zofran] 4 mg PO DAILY PRN 18 History


 


SILVER sulfADIAZINE Cream 1 applic TOPICAL DAILY PRN 18 History





[Silvadene 1% Cream]    


 


Nicotine 14Mg/24Hr Patch [Habitrol] 1 patch TRANSDERM DAILY #30 patch 18 Rx


 


Sulfamethox-Tmp 800-160Mg [Bactrim 1 tab PO Q12HR #28 tab 18 Rx





-160 mg]    











 Allergies











Allergy/AdvReac Type Severity Reaction Status Date / Time


 


Barbiturates Allergy  Rash/Hives Verified 18 17:13


 


cephalexin monohydrate Allergy  Rash/Hives Verified 18 17:13





[From Keflex]     


 


morphine Allergy  Rash/Hives Verified 18 17:13


 


Penicillins Allergy  Rash/Hives Verified 18 17:13


 


phenobarbital Allergy  Swelling Verified 18 17:13


 


venom-honey bee Allergy  Swelling Verified 18 17:13





[bee venom (honey bee)]     


 


amlodipine besylate AdvReac  Vomiting Verified 18 17:13





[From Norvasc]     














Physical Exam


Vitals: 


 Vital Signs











  Temp Pulse Pulse Resp BP BP Pulse Ox


 


 18 22:40  98.6 F   92  17   109/55  96


 


 18 18:41  97.6 F  84   18  144/65   97


 


 18 17:47   83   18  157/75   97


 


 18 16:29  97.5 F L  93   20  165/101   98








 Intake and Output











 18





 22:59 06:59 14:59


 


Intake Total  240 


 


Balance  240 


 


Intake:   


 


  Oral  240 


 


Other:   


 


  # Voids  1 1


 


  Weight 53.524 kg  

















Gen: This is a 58-year-old  female sitting up in bed and appears to be 

comfortable and in no acute distress.  


HEENT: Head is atraumatic, normocephalic. Pupils equal, round. Sclerae is 

anicteric. 


NECK: Supple. No JVD. No lymphadenopathy. No thyromegaly. 


LUNGS: Clear to auscultation. No wheezes or rhonchi.  No intercostal 

retractions.


HEART: Regular rate and rhythm. No murmur. 


ABDOMEN: Soft. Bowel sounds are present. No masses.  No tenderness.


EXTREMITIES: No pedal edema.  No calf tenderness.  To the right heel there is 

an ulceration noted with scant drainage, no significant erythema.  No foul 

order.


NEUROLOGICAL: Patient is awake, alert and oriented x3. Cranial nerves 2 through 

12 are grossly intact. 








Results


Results: 





 Laboratory Results











WBC  4.9 k/uL (3.8-10.6)   18  17:38    


 


RBC  3.48 m/uL (3.80-5.40)  L  18  17:38    


 


Hgb  10.6 gm/dL (11.4-16.0)  L  18  17:38    


 


Hct  32.3 % (34.0-46.0)  L  18  17:38    


 


MCV  92.7 fL (80.0-100.0)   18  17:38    


 


MCH  30.5 pg (25.0-35.0)   18  17:38    


 


MCHC  32.9 g/dL (31.0-37.0)   18  17:38    


 


RDW  12.7 % (11.5-15.5)   18  17:38    


 


Plt Count  263 k/uL (150-450)   18  17:38    


 


Neutrophils %  67 %  18  17:38    


 


Lymphocytes %  23 %  18  17:38    


 


Monocytes %  5 %  18  17:38    


 


Eosinophils %  1 %  18  17:38    


 


Basophils %  0 %  18  17:38    


 


Neutrophils #  3.3 k/uL (1.3-7.7)   18  17:38    


 


Lymphocytes #  1.1 k/uL (1.0-4.8)   18  17:38    


 


Monocytes #  0.2 k/uL (0-1.0)   18  17:38    


 


Eosinophils #  0.1 k/uL (0-0.7)   18  17:38    


 


Basophils #  0.0 k/uL (0-0.2)   18  17:38    


 


Sodium  141 mmol/L (137-145)   18  17:38    


 


Potassium  6.0 mmol/L (3.5-5.1)  H  18  17:38    


 


Chloride  106 mmol/L ()   18  17:38    


 


Carbon Dioxide  24 mmol/L (22-30)   18  17:38    


 


Anion Gap  11 mmol/L  18  17:38    


 


BUN  35 mg/dL (7-17)  H  18  17:38    


 


Creatinine  1.10 mg/dL (0.52-1.04)  H  18  17:38    


 


Est GFR (CKD-EPI)AfAm  64  (>60 ml/min/1.73 sqM)   18  17:38    


 


Est GFR (CKD-EPI)NonAf  56  (>60 ml/min/1.73 sqM)   18  17:38    


 


Glucose  306 mg/dL (74-99)  H  18  17:38    


 


POC Glucose (mg/dL)  200 mg/dL (75-99)  H  18  08:20    


 


POC Glu Operater ID  Charissa Olson   18  08:20    


 


Calcium  9.9 mg/dL (8.4-10.2)   18  17:38    


 


Magnesium  2.0 mg/dL (1.6-2.3)   18  17:38    


 


Total Bilirubin  0.3 mg/dL (0.2-1.3)   18  17:38    


 


AST  21 U/L (14-36)   18  17:38    


 


ALT  48 U/L (9-52)   18  17:38    


 


Alkaline Phosphatase  107 U/L ()   18  17:38    


 


Total Protein  7.2 g/dL (6.3-8.2)   18  17:38    


 


Albumin  4.2 g/dL (3.5-5.0)   18  17:38    


 


Urine Color  Yellow   18  19:10    


 


Urine Appearance  Clear  (Clear)   18  19:10    


 


Urine pH  5.5  (5.0-8.0)   18  19:10    


 


Ur Specific Gravity  1.014  (1.001-1.035)   18  19:10    


 


Urine Protein  Negative  (Negative)   18  19:10    


 


Urine Glucose (UA)  4+  (Negative)  H  18  19:10    


 


Urine Ketones  Negative  (Negative)   18  19:10    


 


Urine Blood  Negative  (Negative)   18  19:10    


 


Urine Nitrite  Negative  (Negative)   18  19:10    


 


Urine Bilirubin  Negative  (Negative)   18  19:10    


 


Urine Urobilinogen  <2.0 mg/dL (<2.0)   18  19:10    


 


Ur Leukocyte Esterase  Negative  (Negative)   18  19:10    


 


Urine Opiates Screen  Detected  (NotDetected)  H  18  19:10    


 


Ur Oxycodone Screen  Not Detected  (NotDetected)   18  19:10    


 


Urine Methadone Screen  Not Detected  (NotDetected)   18  19:10    


 


Ur Propoxyphene Screen  Not Detected  (NotDetected)   18  19:10    


 


Ur Barbiturates Screen  Not Detected  (NotDetected)   18  19:10    


 


Valproic Acid  <10.0 ug/mL  18  17:38    


 


U Tricyclic Antidepress  Not Detected  (NotDetected)   18  19:10    


 


Ur Phencyclidine Scrn  Not Detected  (NotDetected)   18  19:10    


 


Ur Amphetamines Screen  Not Detected  (NotDetected)   18  19:10    


 


U Methamphetamines Scrn  Not Detected  (NotDetected)   18  19:10    


 


U Benzodiazepines Scrn  Detected  (NotDetected)  H  18  19:10    


 


Urine Cocaine Screen  Not Detected  (NotDetected)   18  19:10    


 


U Marijuana (THC) Screen  Not Detected  (NotDetected)   04/19/18  19:10    











CBC & Chem 7: 


 04/21/18 07:35





 18 09:09


Labs: 


 Abnormal Lab Results - Last 24 Hours (Table)











  18 Range/Units





  17:38 17:38 17:57 


 


RBC  3.48 L    (3.80-5.40)  m/uL


 


Hgb  10.6 L    (11.4-16.0)  gm/dL


 


Hct  32.3 L    (34.0-46.0)  %


 


Potassium   6.0 H   (3.5-5.1)  mmol/L


 


BUN   35 H   (7-17)  mg/dL


 


Creatinine   1.10 H   (0.52-1.04)  mg/dL


 


Glucose   306 H   (74-99)  mg/dL


 


POC Glucose (mg/dL)    332 H  (75-99)  mg/dL


 


Urine Glucose (UA)     (Negative)  


 


Urine Opiates Screen     (NotDetected)  


 


U Benzodiazepines Scrn     (NotDetected)  














  18 Range/Units





  19:10 20:39 08:20 


 


RBC     (3.80-5.40)  m/uL


 


Hgb     (11.4-16.0)  gm/dL


 


Hct     (34.0-46.0)  %


 


Potassium     (3.5-5.1)  mmol/L


 


BUN     (7-17)  mg/dL


 


Creatinine     (0.52-1.04)  mg/dL


 


Glucose     (74-99)  mg/dL


 


POC Glucose (mg/dL)   421 H  200 H  (75-99)  mg/dL


 


Urine Glucose (UA)  4+ H    (Negative)  


 


Urine Opiates Screen  Detected H    (NotDetected)  


 


U Benzodiazepines Scrn  Detected H    (NotDetected)  














Assessment and Plan


Plan: 





This is a 58-year-old  female who presented to the hospital after self-

inflicted trauma to her right heel diabetic ulcer, present on admission  and 

concern for depression and suicidl thoughts. Patient has been on Bactrim for 

MRSA infection of this diabetic ulcer which is continued.  She is awaiting 

psychiatric evaluation with sitter at the bedside as well as full-time 

caregiver at the bedside.  Local wound care will be addressed.  Continue 

supportive care.  Further recommendations as patient progresses.





The above dictated assessment and findings were discussed with Dr. Moody.  The 

impression and plan of care have been directed as dictated.  Kasia Steen nurse 

practitioner acting as scribe for Dr. Moody.

## 2018-04-21 VITALS — TEMPERATURE: 97.6 F

## 2018-04-21 LAB
ALBUMIN SERPL-MCNC: 3.2 G/DL (ref 3.5–5)
ALP SERPL-CCNC: 71 U/L (ref 38–126)
ALT SERPL-CCNC: 40 U/L (ref 9–52)
ANION GAP SERPL CALC-SCNC: 10 MMOL/L
AST SERPL-CCNC: 18 U/L (ref 14–36)
BASOPHILS # BLD AUTO: 0 K/UL (ref 0–0.2)
BASOPHILS NFR BLD AUTO: 0 %
BUN SERPL-SCNC: 32 MG/DL (ref 7–17)
CALCIUM SPEC-MCNC: 8.8 MG/DL (ref 8.4–10.2)
CHLORIDE SERPL-SCNC: 108 MMOL/L (ref 98–107)
CO2 SERPL-SCNC: 27 MMOL/L (ref 22–30)
EOSINOPHIL # BLD AUTO: 0.1 K/UL (ref 0–0.7)
EOSINOPHIL NFR BLD AUTO: 2 %
ERYTHROCYTE [DISTWIDTH] IN BLOOD BY AUTOMATED COUNT: 3.08 M/UL (ref 3.8–5.4)
ERYTHROCYTE [DISTWIDTH] IN BLOOD: 13.2 % (ref 11.5–15.5)
GLUCOSE BLD-MCNC: 258 MG/DL (ref 75–99)
GLUCOSE BLD-MCNC: 374 MG/DL (ref 75–99)
GLUCOSE BLD-MCNC: 73 MG/DL (ref 75–99)
GLUCOSE SERPL-MCNC: 62 MG/DL (ref 74–99)
HCT VFR BLD AUTO: 29 % (ref 34–46)
HGB BLD-MCNC: 9.2 GM/DL (ref 11.4–16)
LYMPHOCYTES # SPEC AUTO: 2 K/UL (ref 1–4.8)
LYMPHOCYTES NFR SPEC AUTO: 43 %
MCH RBC QN AUTO: 29.7 PG (ref 25–35)
MCHC RBC AUTO-ENTMCNC: 31.6 G/DL (ref 31–37)
MCV RBC AUTO: 94.1 FL (ref 80–100)
MONOCYTES # BLD AUTO: 0.3 K/UL (ref 0–1)
MONOCYTES NFR BLD AUTO: 6 %
NEUTROPHILS # BLD AUTO: 2.2 K/UL (ref 1.3–7.7)
NEUTROPHILS NFR BLD AUTO: 47 %
PLATELET # BLD AUTO: 209 K/UL (ref 150–450)
POTASSIUM SERPL-SCNC: 4.7 MMOL/L (ref 3.5–5.1)
PROT SERPL-MCNC: 6 G/DL (ref 6.3–8.2)
SODIUM SERPL-SCNC: 145 MMOL/L (ref 137–145)
WBC # BLD AUTO: 4.7 K/UL (ref 3.8–10.6)

## 2018-04-21 RX ADMIN — METOCLOPRAMIDE SCH MG: 5 TABLET ORAL at 18:06

## 2018-04-21 RX ADMIN — INSULIN ASPART SCH UNIT: 100 INJECTION, SOLUTION INTRAVENOUS; SUBCUTANEOUS at 18:06

## 2018-04-21 RX ADMIN — BUDESONIDE AND FORMOTEROL FUMARATE DIHYDRATE SCH PUFF: 160; 4.5 AEROSOL RESPIRATORY (INHALATION) at 21:00

## 2018-04-21 RX ADMIN — ASPIRIN 81 MG CHEWABLE TABLET SCH MG: 81 TABLET CHEWABLE at 09:06

## 2018-04-21 RX ADMIN — FAMOTIDINE SCH MG: 20 TABLET, FILM COATED ORAL at 22:00

## 2018-04-21 RX ADMIN — BUDESONIDE AND FORMOTEROL FUMARATE DIHYDRATE SCH PUFF: 160; 4.5 AEROSOL RESPIRATORY (INHALATION) at 09:25

## 2018-04-21 RX ADMIN — SULFAMETHOXAZOLE AND TRIMETHOPRIM SCH EACH: 800; 160 TABLET ORAL at 22:01

## 2018-04-21 RX ADMIN — INSULIN ASPART SCH: 100 INJECTION, SOLUTION INTRAVENOUS; SUBCUTANEOUS at 08:32

## 2018-04-21 RX ADMIN — CEFAZOLIN SCH MLS/HR: 330 INJECTION, POWDER, FOR SOLUTION INTRAMUSCULAR; INTRAVENOUS at 09:13

## 2018-04-21 RX ADMIN — METOCLOPRAMIDE SCH MG: 5 TABLET ORAL at 22:00

## 2018-04-21 RX ADMIN — SULFAMETHOXAZOLE AND TRIMETHOPRIM SCH EACH: 800; 160 TABLET ORAL at 09:05

## 2018-04-21 RX ADMIN — INSULIN ASPART SCH UNIT: 100 INJECTION, SOLUTION INTRAVENOUS; SUBCUTANEOUS at 22:01

## 2018-04-21 RX ADMIN — ENOXAPARIN SODIUM SCH MG: 40 INJECTION SUBCUTANEOUS at 09:04

## 2018-04-21 RX ADMIN — HYDROCODONE BITARTRATE AND ACETAMINOPHEN PRN EACH: 10; 325 TABLET ORAL at 14:16

## 2018-04-21 RX ADMIN — HYDROCODONE BITARTRATE AND ACETAMINOPHEN PRN EACH: 10; 325 TABLET ORAL at 22:00

## 2018-04-21 RX ADMIN — INSULIN DETEMIR SCH UNIT: 100 INJECTION, SOLUTION SUBCUTANEOUS at 22:01

## 2018-04-21 RX ADMIN — DOCUSATE SODIUM SCH: 100 CAPSULE, LIQUID FILLED ORAL at 22:03

## 2018-04-21 RX ADMIN — DIVALPROEX SODIUM SCH MG: 250 TABLET, DELAYED RELEASE ORAL at 09:05

## 2018-04-21 RX ADMIN — HYDROCODONE BITARTRATE AND ACETAMINOPHEN PRN EACH: 10; 325 TABLET ORAL at 06:12

## 2018-04-21 RX ADMIN — FAMOTIDINE SCH MG: 20 TABLET, FILM COATED ORAL at 09:04

## 2018-04-21 RX ADMIN — NICOTINE SCH PATCH: 14 PATCH, EXTENDED RELEASE TRANSDERMAL at 09:04

## 2018-04-21 RX ADMIN — INSULIN ASPART SCH UNIT: 100 INJECTION, SOLUTION INTRAVENOUS; SUBCUTANEOUS at 12:50

## 2018-04-21 RX ADMIN — Medication SCH MG: at 09:05

## 2018-04-21 RX ADMIN — DULOXETINE SCH MG: 60 CAPSULE, DELAYED RELEASE ORAL at 09:06

## 2018-04-21 RX ADMIN — ISODIUM CHLORIDE PRN MG: 0.03 SOLUTION RESPIRATORY (INHALATION) at 09:25

## 2018-04-21 RX ADMIN — Medication SCH EACH: at 09:06

## 2018-04-21 RX ADMIN — DOCUSATE SODIUM SCH MG: 100 CAPSULE, LIQUID FILLED ORAL at 09:04

## 2018-04-21 RX ADMIN — METOCLOPRAMIDE SCH MG: 5 TABLET ORAL at 09:05

## 2018-04-21 NOTE — P.CON
Consult Note





- .


Consult date: 04/20/18


Assessment/Plan:: 





This is a 58-year-old  female patient who was recently hospitalized 

April 8 through April 13 and was seen by ID service due to a MRSA diabetic 

ulcer right heel.  Patient was discharged on Bactrim and she subsequently 

followed up with Dr. Ferrell in the wound healing center on April 17.  He 

recommended thoroughly padding that he'll ulcer as patient was traumatizing the 

area in order to get cigarettes.  Otherwise Eucerin cream to be used on dry 

areas.  Patient apparently had violent outburst yesterday and was trying to 

hurt herself and was also trying to get cigarettes again.  She was trying to 

traumatize her wound opened it.  Patient was found to be afebrile with white 

count of 4.9, blood pressure was elevated 165/101, potassium 6, BUN 35 

creatinine 1.1.  Urine drug screen was positive for opiates and 

benzodiazepines.  Blood sugars running in the 300-420.  Urinalysis was clear 

other than 4+ glucose.  Blood cultures status received.  Patient has been 

admitted to the [facility] floor and a psychiatry consult is in place.  Patient 

has a sitter at the bedside and her full-time caregiver is also at the bedside.

  Patient can verbalize what happened yesterday and realizes that it was a poor 

choice at this time denies wanting to hurt herself.  She is complaining of pain 

to the right heel.  No fever or chills.  She has been eating without 

difficulty.  She denies any nausea or vomiting.  No chest pain shortness of 

breath or cough.  Regarding smoking, patient is trying to quit and using a 

nicotine patch.Please see consult note as dictated by AN Steen.


Patient aware of poor behaviour and the consequences that are now occurring, 

patient denies suicidal ideation or desire to self harm at this time. Will have 

psych eval and will have local wound care as ordered.  Off load the heel and 

avoid further trauma. Needs follow up as outpatient for the ulceration to the 

right heel which is not draining at this time. I agree with the evaluation 

assessment and plan as dictated by AN Steen..

## 2018-04-21 NOTE — P.PN
Subjective


Progress Note Date: 04/21/18





This is a 58-year-old female with a known past medical history of uncontrolled 

diabetes mellitus type 2, COPD, hyperlipidemia, hypertension, myocardial 

infarction, CVA with left-sided paralysis, coronary artery disease with 

previous cardiac stents, chronic kidney disease stage III, depression and 

possible bipolar.  She also was recently hospitalized with a right heel 

diabetic ulcer.  She's followed at the wound care center and has been on 

Bactrim.  Patient went to see Dr. Cabral yesterday family was concerned 

regarding her change in behavior.  Patient has been very aggressive and having 

violent behavior.  She has been aggressive towards her caregivers.  She's also 

been hurting herself on purpose.  As far as her left heel wound she has been 

hitting that purposely against things and breaking the new scab open.  Also she 

has been scratching at herself on the left arm and the left side of her chin.  

She's brought it into the emergency room for further evaluation and psychiatric 

evaluation.  Patient does have some evidence of dehydration and acute kidney 

injury.  Creatinine has gone up to 1.55.  She also has some hypokalemia with a 

potassium of 6.  Repeat potassium is 5.4 and she is receiving Kayexalate.  And 

IV fluids have been started.  She is on the Bactrim for the left diabetic heel 

ulcer.  Patient is currently sitting in bed comfortably no signs of agitation.  

She was actually able to sleep through the night.  Family had also mentioned 

patient now has been having difficulty sleeping and not very much.  Patient 

denies any fever chills or sweats.  Denies any nausea or vomiting.  Denies any 

bowel movement changes or urinary symptoms.  Denies any chest pain or shortness 

of breath.





On 04/21/2018 patient is alert and oriented she is calm today, family is at 

bedside and they do not report any episode of anger or aggressive behavior, 

lower extremity open wound and cellulitis improving gradually patient was seen 

by Dr. Moody she is on oral antibiotic and local wound care, patient denies 

any symptoms at this time.








Objective





- Vital Signs


Vital signs: 


 Vital Signs











Temp  97.9 F   04/21/18 07:35


 


Pulse  80   04/21/18 09:40


 


Resp  18   04/21/18 07:35


 


BP  121/68   04/21/18 07:35


 


Pulse Ox  97   04/21/18 07:35








 Intake & Output











 04/20/18 04/21/18 04/21/18





 18:59 06:59 18:59


 


Intake Total  980 400


 


Balance  980 400


 


Weight  53.524 kg 


 


Intake:   


 


  IV   400


 


    Sodium Chloride 0.9% 1,   400





    000 ml @ 50 mls/hr IV .   





    Q20H TYRON Rx#:[id number]   


 


  Intake, IV Titration  400 





  Amount   


 


    Sodium Chloride 0.9% 1,  400 





    000 ml @ 50 mls/hr IV .   





    Q20H TYRON Rx#:[id number]   


 


  Oral  580 


 


Other:   


 


  Voiding Method  Bedside Commode Bedside Commode


 


  # Voids 2 2 


 


  # Bowel Movements  1 














- Exam





Head normocephalic and atraumatic


Neck supple no JVD no goiter


Lungs clear to auscultation bilaterally no wheezing or crackles


Heart regular rate and rhythm S1-S2, no rub or gallop


Abdomen is soft nontender nondistended positive bowel sounds no 

hepatosplenomegaly


Extremities no edema.  Healing right heel ulcer.  There is a scab present.  No 

evidence of any drainage or cellulitis.


Neuro alert and orientated to 3.  Patient is calm and answering questions 

appropriately no signs of aggression.  Bedside sitter present.  Left-sided 

paralysis from old stroke








- Labs


CBC & Chem 7: 


 04/21/18 07:35





 04/21/18 07:35


Labs: 


 Abnormal Lab Results - Last 24 Hours (Table)











  04/20/18 04/20/18 04/21/18 Range/Units





  17:37 20:22 07:30 


 


RBC     (3.80-5.40)  m/uL


 


Hgb     (11.4-16.0)  gm/dL


 


Hct     (34.0-46.0)  %


 


Chloride     ()  mmol/L


 


BUN     (7-17)  mg/dL


 


Creatinine     (0.52-1.04)  mg/dL


 


Glucose     (74-99)  mg/dL


 


POC Glucose (mg/dL)  272 H  233 H  73 L  (75-99)  mg/dL


 


Total Bilirubin     (0.2-1.3)  mg/dL


 


Total Protein     (6.3-8.2)  g/dL


 


Albumin     (3.5-5.0)  g/dL














  04/21/18 04/21/18 04/21/18 Range/Units





  07:35 07:35 11:36 


 


RBC  3.08 L    (3.80-5.40)  m/uL


 


Hgb  9.2 L    (11.4-16.0)  gm/dL


 


Hct  29.0 L    (34.0-46.0)  %


 


Chloride   108 H   ()  mmol/L


 


BUN   32 H   (7-17)  mg/dL


 


Creatinine   1.43 H   (0.52-1.04)  mg/dL


 


Glucose   62 L   (74-99)  mg/dL


 


POC Glucose (mg/dL)    258 H  (75-99)  mg/dL


 


Total Bilirubin   0.1 L   (0.2-1.3)  mg/dL


 


Total Protein   6.0 L   (6.3-8.2)  g/dL


 


Albumin   3.2 L   (3.5-5.0)  g/dL








 Microbiology - Last 24 Hours (Table)











 04/19/18 17:38 Blood Culture - Preliminary





 Blood    No Growth after 24 hours














Assessment and Plan


Plan: 





1.  Aggressive behavior and agitation: We'll have patient evaluated by 

psychiatry.  Patient reports that she has been taking her psychiatric meds as 

scheduled





2.  History of depression and possible bipolar





3.  Healing Diabetic right heel ulcer: Continue with the Bactrim.  Infectious 

disease consulted





4.  Acute kidney injury: Start normal saline at 50 mL an hour.  Repeat labs in 

a.m.





5.  Hyperkalemia: Patient receiving Kayexalate.  Repeat potassium level in a.m.





6.  Acute on chronic kidney disease, stage III





7 insulin-dependent diabetes mellitus, type II with uncontrolled blood sugars: 

Continue Levemir and sliding scale coverage





8.  History of CVA with left-sided paralysis





9.  History of coronary artery disease cardiac enzymes





10.  History of iron deficiency anemia





11.  Nicotine dependence: Continue nicotine patch.  Discussed smoking cessation 

for greater than 3 minutes





GI prophylaxis Pepcid and DVT prophylaxis Lovenox

## 2018-04-22 VITALS — HEART RATE: 73 BPM | SYSTOLIC BLOOD PRESSURE: 113 MMHG | DIASTOLIC BLOOD PRESSURE: 60 MMHG | RESPIRATION RATE: 15 BRPM

## 2018-04-22 LAB
ALBUMIN SERPL-MCNC: 3 G/DL (ref 3.5–5)
ALP SERPL-CCNC: 62 U/L (ref 38–126)
ALT SERPL-CCNC: 35 U/L (ref 9–52)
ANION GAP SERPL CALC-SCNC: 9 MMOL/L
AST SERPL-CCNC: 19 U/L (ref 14–36)
BASOPHILS # BLD AUTO: 0 K/UL (ref 0–0.2)
BASOPHILS NFR BLD AUTO: 0 %
BUN SERPL-SCNC: 29 MG/DL (ref 7–17)
CALCIUM SPEC-MCNC: 9.3 MG/DL (ref 8.4–10.2)
CHLORIDE SERPL-SCNC: 112 MMOL/L (ref 98–107)
CO2 SERPL-SCNC: 25 MMOL/L (ref 22–30)
EOSINOPHIL # BLD AUTO: 0.1 K/UL (ref 0–0.7)
EOSINOPHIL NFR BLD AUTO: 2 %
ERYTHROCYTE [DISTWIDTH] IN BLOOD BY AUTOMATED COUNT: 3.06 M/UL (ref 3.8–5.4)
ERYTHROCYTE [DISTWIDTH] IN BLOOD: 13.3 % (ref 11.5–15.5)
GLUCOSE BLD-MCNC: 103 MG/DL (ref 75–99)
GLUCOSE BLD-MCNC: 285 MG/DL (ref 75–99)
GLUCOSE SERPL-MCNC: 104 MG/DL (ref 74–99)
HCT VFR BLD AUTO: 29 % (ref 34–46)
HGB BLD-MCNC: 9.2 GM/DL (ref 11.4–16)
LYMPHOCYTES # SPEC AUTO: 1.7 K/UL (ref 1–4.8)
LYMPHOCYTES NFR SPEC AUTO: 37 %
MCH RBC QN AUTO: 30 PG (ref 25–35)
MCHC RBC AUTO-ENTMCNC: 31.6 G/DL (ref 31–37)
MCV RBC AUTO: 94.9 FL (ref 80–100)
MONOCYTES # BLD AUTO: 0.3 K/UL (ref 0–1)
MONOCYTES NFR BLD AUTO: 6 %
NEUTROPHILS # BLD AUTO: 2.5 K/UL (ref 1.3–7.7)
NEUTROPHILS NFR BLD AUTO: 53 %
PLATELET # BLD AUTO: 195 K/UL (ref 150–450)
POTASSIUM SERPL-SCNC: 5.8 MMOL/L (ref 3.5–5.1)
PROT SERPL-MCNC: 5.8 G/DL (ref 6.3–8.2)
SODIUM SERPL-SCNC: 146 MMOL/L (ref 137–145)
WBC # BLD AUTO: 4.7 K/UL (ref 3.8–10.6)

## 2018-04-22 RX ADMIN — FAMOTIDINE SCH MG: 20 TABLET, FILM COATED ORAL at 07:33

## 2018-04-22 RX ADMIN — METOCLOPRAMIDE SCH MG: 5 TABLET ORAL at 07:34

## 2018-04-22 RX ADMIN — Medication SCH EACH: at 07:35

## 2018-04-22 RX ADMIN — ENOXAPARIN SODIUM SCH MG: 40 INJECTION SUBCUTANEOUS at 07:34

## 2018-04-22 RX ADMIN — BUDESONIDE AND FORMOTEROL FUMARATE DIHYDRATE SCH PUFF: 160; 4.5 AEROSOL RESPIRATORY (INHALATION) at 09:21

## 2018-04-22 RX ADMIN — CEFAZOLIN SCH: 330 INJECTION, POWDER, FOR SOLUTION INTRAMUSCULAR; INTRAVENOUS at 07:19

## 2018-04-22 RX ADMIN — DIVALPROEX SODIUM SCH MG: 250 TABLET, DELAYED RELEASE ORAL at 07:36

## 2018-04-22 RX ADMIN — Medication SCH MG: at 07:35

## 2018-04-22 RX ADMIN — INSULIN ASPART SCH: 100 INJECTION, SOLUTION INTRAVENOUS; SUBCUTANEOUS at 07:33

## 2018-04-22 RX ADMIN — CEFAZOLIN SCH MLS/HR: 330 INJECTION, POWDER, FOR SOLUTION INTRAMUSCULAR; INTRAVENOUS at 07:26

## 2018-04-22 RX ADMIN — NICOTINE SCH PATCH: 14 PATCH, EXTENDED RELEASE TRANSDERMAL at 07:34

## 2018-04-22 RX ADMIN — ASPIRIN 81 MG CHEWABLE TABLET SCH MG: 81 TABLET CHEWABLE at 07:35

## 2018-04-22 RX ADMIN — INSULIN ASPART SCH UNIT: 100 INJECTION, SOLUTION INTRAVENOUS; SUBCUTANEOUS at 12:19

## 2018-04-22 RX ADMIN — SULFAMETHOXAZOLE AND TRIMETHOPRIM SCH EACH: 800; 160 TABLET ORAL at 07:35

## 2018-04-22 RX ADMIN — DOCUSATE SODIUM SCH MG: 100 CAPSULE, LIQUID FILLED ORAL at 07:35

## 2018-04-22 RX ADMIN — DULOXETINE SCH MG: 60 CAPSULE, DELAYED RELEASE ORAL at 07:34

## 2018-04-22 NOTE — P.DS
Providers


Date of admission: 


04/20/18 14:02





Expected date of discharge: 04/22/18


Attending physician: 


Saniya Cabral





Consults: 





 





04/19/18 17:30


Consult Physician Routine 


   Consulting Provider: Doreen Guzman


   Consult Reason/Comments: Depression and adjustment reaction


   Do you want consulting provider notified?: Yes, Notify in am





04/19/18 17:31


Consult Physician Routine 


   Consulting Provider: Ray Moody


   Consult Reason/Comments: Evaluation of right lower extremity wound


   Do you want consulting provider notified?: Yes











Primary care physician: 


Saniya Misha





[facility] Course: 








Diagnosis on discharge:











1.  Aggressive behavior and agitation: We'll have patient evaluated by 

psychiatry.  Patient reports that she has been taking her psychiatric meds as 

scheduled





2.  History of depression and possible bipolar





3.  Healing Diabetic right heel ulcer: Continue with the Bactrim.  Infectious 

disease consulted





4.  Acute kidney injury: Start normal saline at 50 mL an hour.  Repeat labs in 

a.m.





5.  Hyperkalemia: Patient receiving Kayexalate.  Repeat potassium level in a.m.





6.  Acute on chronic kidney disease, stage III





7 insulin-dependent diabetes mellitus, type II with uncontrolled blood sugars: 

Continue Levemir and sliding scale coverage





8.  History of CVA with left-sided paralysis





9.  History of coronary artery disease cardiac enzymes





10.  History of iron deficiency anemia





11.  Nicotine dependence: Continue nicotine patch.  Discussed smoking cessation 

for greater than 3 minutes

















Hospital course:








This is a 58-year-old female with a known past medical history of uncontrolled 

diabetes mellitus type 2, COPD, hyperlipidemia, hypertension, myocardial 

infarction, CVA with left-sided paralysis, coronary artery disease with 

previous cardiac stents, chronic kidney disease stage III, depression and 

possible bipolar.  She also was recently hospitalized with a right heel 

diabetic ulcer.  She's followed at the wound care center and has been on 

Bactrim.  Patient went to see Dr. Cabral yesterday family was concerned 

regarding her change in behavior.  Patient has been very aggressive and having 

violent behavior.  She has been aggressive towards her caregivers.  She's also 

been hurting herself on purpose.  As far as her left heel wound she has been 

hitting that purposely against things and breaking the new scab open.  Also she 

has been scratching at herself on the left arm and the left side of her chin.  

She's brought it into the emergency room for further evaluation and psychiatric 

evaluation.  Patient does have some evidence of dehydration and acute kidney 

injury.  Creatinine has gone up to 1.55.  She also has some hypokalemia with a 

potassium of 6.  Repeat potassium is 5.4 and she is receiving Kayexalate.  And 

IV fluids have been started.  She is on the Bactrim for the left diabetic heel 

ulcer.  Patient is currently sitting in bed comfortably no signs of agitation.  

She was actually able to sleep through the night.  Family had also mentioned 

patient now has been having difficulty sleeping and not very much.  Patient 

denies any fever chills or sweats.  Denies any nausea or vomiting.  Denies any 

bowel movement changes or urinary symptoms.  Denies any chest pain or shortness 

of breath.





On 04/21/2018 patient is alert and oriented she is calm today, family is at 

bedside and they do not report any episode of anger or aggressive behavior, 

lower extremity open wound and cellulitis improving gradually patient was seen 

by Dr. Moody she is on oral antibiotic and local wound care, patient denies 

any symptoms at this time.





On 4/22/2018 patient is alert and oriented in no distress. patient is calm 

today and has no complaints, there in no fever orchills, no cough, no nausea or 

vomiting no abdominal pain and no urinary symptoms.


Patient Condition at Discharge: Stable





Plan - Discharge Summary


New Discharge Prescriptions: 


Continue


   Albuterol Inhaler [Ventolin Hfa Inhaler] 2 puff INHALATION RT-Q4H PRN


     PRN Reason: Shortness Of Breath


   Famotidine [Pepcid] 20 mg PO BID


   Aspirin EC [Ecotrin Low Dose] 81 mg PO DAILY


   Valproic Acid [Depakene] 250 mg PO DAILY


   Ergocalciferol [Vitamin D2 (DRISDOL)] 50,000 unit PO Q7D


   Budesonide-Formot 160-4.5 Mcg [Symbicort 160-4.5 Mcg Inhaler] 2 puff 

INHALATION RT-BID


   HYDROcodone/APAP 10-325MG [Norco ] 1 tab PO Q6H PRN


     PRN Reason: Pain


   Insulin Lispro [humaLOG] 0 units SQ AC-TID


   Ferrous Sulfate [Iron (65 MG Elemental)] 325 mg PO DAILY


   DULoxetine HCL [Cymbalta] 60 mg PO DAILY


   ALPRAZolam [Xanax] 1 mg PO TID


   Insulin Glargine [Lantus] 20 unit SQ HS


   Glucagon Emergency Kit 1 mg SQ ONCE PRN


     PRN Reason: Blood Sugar - Low


   Vitamin B Complex 1 cap PO DAILY


   EPINEPHrine (Auto Inject) [Epipen] 0.3 mg IM ONCE PRN


     PRN Reason: Anaphylaxis


   Docusate [Colace] 100 mg PO BID


   Ondansetron [Zofran] 4 mg PO DAILY PRN


     PRN Reason: Nausea


   ARIPiprazole [Abilify] 2 mg PO DAILY


   SILVER sulfADIAZINE Cream [Silvadene 1% Cream] 1 applic TOPICAL DAILY PRN


     PRN Reason: Rash


   Metoclopramide [Reglan] 5 mg PO TID


   Nitroglycerin Sl Tabs [Nitrostat] 0.4 mg SUBLINGUAL Q5M PRN


     PRN Reason: Chest Pain


   Nicotine 14Mg/24Hr Patch [Habitrol] 1 patch TRANSDERM DAILY #30 patch


   Sulfamethox-Tmp 800-160Mg [Bactrim -160 mg] 1 tab PO Q12HR #28 tab


Discharge Medication List





Albuterol Inhaler [Ventolin Hfa Inhaler] 2 puff INHALATION RT-Q4H PRN 02/19/16 [

History]


Aspirin EC [Ecotrin Low Dose] 81 mg PO DAILY 02/19/16 [History]


Famotidine [Pepcid] 20 mg PO BID 02/19/16 [History]


Valproic Acid [Depakene] 250 mg PO DAILY 02/19/16 [History]


Budesonide-Formot 160-4.5 Mcg [Symbicort 160-4.5 Mcg Inhaler] 2 puff INHALATION 

RT-BID 10/06/16 [History]


Ergocalciferol [Vitamin D2 (DRISDOL)] 50,000 unit PO Q7D 10/06/16 [History]


HYDROcodone/APAP 10-325MG [Norco ] 1 tab PO Q6H PRN 05/06/17 [History]


DULoxetine HCL [Cymbalta] 60 mg PO DAILY 09/19/17 [History]


Ferrous Sulfate [Iron (65 MG Elemental)] 325 mg PO DAILY 09/19/17 [History]


Insulin Lispro [humaLOG] 0 units SQ AC-TID 09/19/17 [History]


ALPRAZolam [Xanax] 1 mg PO TID 09/22/17 [History]


Docusate [Colace] 100 mg PO BID 01/08/18 [History]


EPINEPHrine (Auto Inject) [Epipen] 0.3 mg IM ONCE PRN 01/08/18 [History]


Glucagon Emergency Kit 1 mg SQ ONCE PRN 01/08/18 [History]


Insulin Glargine [Lantus] 20 unit SQ HS 01/08/18 [History]


Vitamin B Complex 1 cap PO DAILY 01/08/18 [History]


ARIPiprazole [Abilify] 2 mg PO DAILY 04/09/18 [History]


Metoclopramide [Reglan] 5 mg PO TID 04/09/18 [History]


Nitroglycerin Sl Tabs [Nitrostat] 0.4 mg SUBLINGUAL Q5M PRN 04/09/18 [History]


Ondansetron [Zofran] 4 mg PO DAILY PRN 04/09/18 [History]


SILVER sulfADIAZINE Cream [Silvadene 1% Cream] 1 applic TOPICAL DAILY PRN 04/09/ 18 [History]


Nicotine 14Mg/24Hr Patch [Habitrol] 1 patch TRANSDERM DAILY #30 patch 04/13/18 [

Rx]


Sulfamethox-Tmp 800-160Mg [Bactrim -160 mg] 1 tab PO Q12HR #28 tab 04/13/ 18 [Rx]








Follow up Appointment(s)/Referral(s): 


Saniya Cabral MD [Primary Care Provider] - 1-2 days

## 2018-04-23 LAB — GLUCOSE BLD-MCNC: 145 MG/DL (ref 75–99)

## 2019-07-31 ENCOUNTER — HOSPITAL ENCOUNTER (OUTPATIENT)
Dept: HOSPITAL 47 - EC | Age: 59
Setting detail: OBSERVATION
LOS: 1 days | Discharge: HOME | End: 2019-08-01
Payer: COMMERCIAL

## 2019-07-31 DIAGNOSIS — E11.40: ICD-10-CM

## 2019-07-31 DIAGNOSIS — I25.2: ICD-10-CM

## 2019-07-31 DIAGNOSIS — E78.5: ICD-10-CM

## 2019-07-31 DIAGNOSIS — Z86.14: ICD-10-CM

## 2019-07-31 DIAGNOSIS — F41.9: ICD-10-CM

## 2019-07-31 DIAGNOSIS — I25.10: ICD-10-CM

## 2019-07-31 DIAGNOSIS — Z95.5: ICD-10-CM

## 2019-07-31 DIAGNOSIS — Z79.82: ICD-10-CM

## 2019-07-31 DIAGNOSIS — Z83.3: ICD-10-CM

## 2019-07-31 DIAGNOSIS — Z88.8: ICD-10-CM

## 2019-07-31 DIAGNOSIS — Z79.4: ICD-10-CM

## 2019-07-31 DIAGNOSIS — Z82.49: ICD-10-CM

## 2019-07-31 DIAGNOSIS — E11.65: ICD-10-CM

## 2019-07-31 DIAGNOSIS — E11.319: ICD-10-CM

## 2019-07-31 DIAGNOSIS — G62.9: ICD-10-CM

## 2019-07-31 DIAGNOSIS — F31.9: ICD-10-CM

## 2019-07-31 DIAGNOSIS — Z88.0: ICD-10-CM

## 2019-07-31 DIAGNOSIS — R07.2: Primary | ICD-10-CM

## 2019-07-31 DIAGNOSIS — I11.0: ICD-10-CM

## 2019-07-31 DIAGNOSIS — I50.9: ICD-10-CM

## 2019-07-31 DIAGNOSIS — E11.649: ICD-10-CM

## 2019-07-31 DIAGNOSIS — Z88.1: ICD-10-CM

## 2019-07-31 DIAGNOSIS — H57.9: ICD-10-CM

## 2019-07-31 DIAGNOSIS — Z89.421: ICD-10-CM

## 2019-07-31 DIAGNOSIS — J44.9: ICD-10-CM

## 2019-07-31 DIAGNOSIS — I69.354: ICD-10-CM

## 2019-07-31 DIAGNOSIS — F17.200: ICD-10-CM

## 2019-07-31 DIAGNOSIS — K21.0: ICD-10-CM

## 2019-07-31 DIAGNOSIS — Z82.5: ICD-10-CM

## 2019-07-31 DIAGNOSIS — R11.0: ICD-10-CM

## 2019-07-31 DIAGNOSIS — E03.9: ICD-10-CM

## 2019-07-31 DIAGNOSIS — Z91.030: ICD-10-CM

## 2019-07-31 DIAGNOSIS — Z90.49: ICD-10-CM

## 2019-07-31 DIAGNOSIS — Z88.5: ICD-10-CM

## 2019-07-31 DIAGNOSIS — Z79.899: ICD-10-CM

## 2019-07-31 DIAGNOSIS — N19: ICD-10-CM

## 2019-07-31 DIAGNOSIS — R51: ICD-10-CM

## 2019-07-31 DIAGNOSIS — Z86.718: ICD-10-CM

## 2019-07-31 LAB
ALBUMIN SERPL-MCNC: 3.8 G/DL (ref 3.5–5)
ALP SERPL-CCNC: 60 U/L (ref 38–126)
ALT SERPL-CCNC: 27 U/L (ref 9–52)
ANION GAP SERPL CALC-SCNC: 8 MMOL/L
APTT BLD: 20.1 SEC (ref 22–30)
AST SERPL-CCNC: 27 U/L (ref 14–36)
BASOPHILS # BLD AUTO: 0 K/UL (ref 0–0.2)
BASOPHILS NFR BLD AUTO: 0 %
BUN SERPL-SCNC: 33 MG/DL (ref 7–17)
CALCIUM SPEC-MCNC: 9.1 MG/DL (ref 8.4–10.2)
CHLORIDE SERPL-SCNC: 109 MMOL/L (ref 98–107)
CO2 SERPL-SCNC: 26 MMOL/L (ref 22–30)
D DIMER PPP FEU-MCNC: 0.5 MG/L FEU (ref ?–0.6)
EOSINOPHIL # BLD AUTO: 0.1 K/UL (ref 0–0.7)
EOSINOPHIL NFR BLD AUTO: 2 %
ERYTHROCYTE [DISTWIDTH] IN BLOOD BY AUTOMATED COUNT: 3.28 M/UL (ref 3.8–5.4)
ERYTHROCYTE [DISTWIDTH] IN BLOOD: 13.3 % (ref 11.5–15.5)
GLUCOSE BLD-MCNC: 104 MG/DL (ref 75–99)
GLUCOSE BLD-MCNC: 140 MG/DL (ref 75–99)
GLUCOSE BLD-MCNC: 58 MG/DL (ref 75–99)
GLUCOSE SERPL-MCNC: 45 MG/DL (ref 74–99)
HCT VFR BLD AUTO: 32 % (ref 34–46)
HGB BLD-MCNC: 10.1 GM/DL (ref 11.4–16)
INR PPP: 0.9 (ref ?–1.2)
LYMPHOCYTES # SPEC AUTO: 2.4 K/UL (ref 1–4.8)
LYMPHOCYTES NFR SPEC AUTO: 46 %
MAGNESIUM SPEC-SCNC: 2.3 MG/DL (ref 1.6–2.3)
MCH RBC QN AUTO: 30.7 PG (ref 25–35)
MCHC RBC AUTO-ENTMCNC: 31.5 G/DL (ref 31–37)
MCV RBC AUTO: 97.6 FL (ref 80–100)
MONOCYTES # BLD AUTO: 0.3 K/UL (ref 0–1)
MONOCYTES NFR BLD AUTO: 6 %
NEUTROPHILS # BLD AUTO: 2.3 K/UL (ref 1.3–7.7)
NEUTROPHILS NFR BLD AUTO: 44 %
PLATELET # BLD AUTO: 161 K/UL (ref 150–450)
POTASSIUM SERPL-SCNC: 4.9 MMOL/L (ref 3.5–5.1)
PROT SERPL-MCNC: 6.9 G/DL (ref 6.3–8.2)
PT BLD: 9.6 SEC (ref 9–12)
SODIUM SERPL-SCNC: 143 MMOL/L (ref 137–145)
WBC # BLD AUTO: 5.2 K/UL (ref 3.8–10.6)

## 2019-07-31 PROCEDURE — 36415 COLL VENOUS BLD VENIPUNCTURE: CPT

## 2019-07-31 PROCEDURE — 93306 TTE W/DOPPLER COMPLETE: CPT

## 2019-07-31 PROCEDURE — 84484 ASSAY OF TROPONIN QUANT: CPT

## 2019-07-31 PROCEDURE — 80164 ASSAY DIPROPYLACETIC ACD TOT: CPT

## 2019-07-31 PROCEDURE — 83880 ASSAY OF NATRIURETIC PEPTIDE: CPT

## 2019-07-31 PROCEDURE — 93005 ELECTROCARDIOGRAM TRACING: CPT

## 2019-07-31 PROCEDURE — 85610 PROTHROMBIN TIME: CPT

## 2019-07-31 PROCEDURE — 71046 X-RAY EXAM CHEST 2 VIEWS: CPT

## 2019-07-31 PROCEDURE — 83735 ASSAY OF MAGNESIUM: CPT

## 2019-07-31 PROCEDURE — 85025 COMPLETE CBC W/AUTO DIFF WBC: CPT

## 2019-07-31 PROCEDURE — 85730 THROMBOPLASTIN TIME PARTIAL: CPT

## 2019-07-31 PROCEDURE — 80053 COMPREHEN METABOLIC PANEL: CPT

## 2019-07-31 PROCEDURE — 99285 EMERGENCY DEPT VISIT HI MDM: CPT

## 2019-07-31 PROCEDURE — 85379 FIBRIN DEGRADATION QUANT: CPT

## 2019-07-31 PROCEDURE — 80048 BASIC METABOLIC PNL TOTAL CA: CPT

## 2019-07-31 RX ADMIN — ATORVASTATIN CALCIUM SCH MG: 20 TABLET, FILM COATED ORAL at 22:28

## 2019-07-31 NOTE — ED
Chest Pain HPI





- General


Chief Complaint: Chest Pain


Stated Complaint: CHEST PAIN


Time Seen by Provider: 19 17:45


Source: patient, EMS


Mode of arrival: EMS


Limitations: no limitations





- History of Present Illness


Initial Comments: 





The patient is a 59-year-old female who presents to the emergency department 

with reported chest pain.  She states that it started approximately 1 hour prior

to arrival.  She was sitting in her chair when she had left-sided substernal 

chest pain that did not radiate.  She admits to a significant cardiac history.  

She does have 6 stents in her heart.  States that the last was placed was 6 

years ago.  She is unsure of her last stress test or echo.  She does see Dr. Urban in office.  She grades the pain as a 7 out of 10.  EMS was called.  

They did provide her with 325 mg of aspirin and 1 mg of nitro.  She reports that

it did not change her pain.  She denies ripping or tearing sensation to her 

back.  She denies any back pain.  No ripping or tearing sensation. Does admit to

slight nausea.  No diaphoresis.  Denies a cough, hemoptysis or fevers.  Denies 

any abdominal pain or changes in her bowel or bladder habits. She does admit to 

a history of DVT. She is not currently on any blood thinners. Denies shortness 

of breath. There are no other alleviating, precipitating or modifying factors.





- Related Data


                                Home Medications











 Medication  Instructions  Recorded  Confirmed


 


Albuterol Inhaler [Ventolin Hfa 2 puff INHALATION RT-Q4H PRN 16





Inhaler]   


 


Aspirin EC [Ecotrin Low Dose] 81 mg PO DAILY 16


 


Famotidine [Pepcid] 20 mg PO DAILY 16


 


Valproic Acid [Depakene] 250 mg PO DAILY 16


 


Ergocalciferol [Vitamin D2 50,000 unit PO Q7D 10/06/16 07/31/19





(DRISDOL)]   


 


HYDROcodone/APAP 10-325MG [Norco 1 tab PO Q6H PRN 17





]   


 


DULoxetine HCL [Cymbalta] 60 mg PO DAILY 17


 


Ferrous Sulfate [Iron (65  mg PO DAILY 17





Elemental)]   


 


ALPRAZolam [Xanax] 1 mg PO TID PRN 17


 


Vitamin B Complex 1 cap PO DAILY 18


 


Ondansetron [Zofran] 4 mg PO DAILY PRN 18


 


Atorvastatin [Lipitor] 20 mg PO DAILY 19


 


QUEtiapine FUMARATE [SEROquel] 25 mg PO BID 19


 


QUEtiapine [SEROquel] 100 mg PO HS 19








                                  Previous Rx's











 Medication  Instructions  Recorded


 


Insulin Glargine [Lantus] 22 unit SQ HS #0 19


 


Insulin Lispro [humaLOG] See Protocol SQ AC-TID #1 19











                                    Allergies











Allergy/AdvReac Type Severity Reaction Status Date / Time


 


Barbiturates Allergy  Rash/Hives Verified 19 17:53


 


cephalexin monohydrate Allergy  Rash/Hives Verified 19 17:53





[From Keflex]     


 


morphine Allergy  Rash/Hives Verified 19 17:53


 


Penicillins Allergy  Rash/Hives Verified 19 17:53


 


phenobarbital Allergy  Swelling Verified 19 17:53


 


venom-honey bee Allergy  Swelling Verified 19 17:53





[bee venom (honey bee)]     


 


amlodipine besylate AdvReac  Vomiting Verified 19 17:53





[From Norvasc]     














Review of Systems


ROS Statement: 


Those systems with pertinent positive or pertinent negative responses have been 

documented in the HPI.





ROS Other: All systems not noted in ROS Statement are negative.





EKG Findings





- EKG Comments:


EKG Findings:: EKG demonstrates a normal sinus rhythm with a ventricular rate of

74.  DC interval 144.  QRS 84.  .  There are no acute ST segment 

elevations or depressions concerning for ischemic changes





Past Medical History


Past Medical History: Chest Pain / Angina, Heart Failure, COPD, CVA/TIA, 

Diabetes Mellitus, Deep Vein Thrombosis (DVT), Eye Disorder, GERD/Reflux, 

Hyperlipidemia, Hypertension, Myocardial Infarction (MI), Thyroid Disorder


Additional Past Medical History / Comment(s): HX OF CVA X3 (LAST 2015)-HAS LT 

ARM PARALYSIS & WEAKNESS LEFT LEG. MI 2012.  DVT RT  AXILLA. RENAL FAILURE.  LOW

THYROID,  PERIPHERAL NEUROPATHY HANDS & FEET.  ANEMIA.  HX OF DKA. USES W/C. 

CONSTIPATION, ESOPHAGITIS.  EPIGLOTITIS.  HEADACHES SINCE CVA.  RETINOPATHY MARZENA 

EYES.  HX RT TOE INFECTION- GANGRENE, HAD AMP."i need eye sx-i have bleeding 

behind the eyes"


Last Myocardial Infarction Date:: 


History of Any Multi-Drug Resistant Organisms: MRSA


Date of last positivie culture/infection: 18


MDRO Source:: Right Foot


Past Surgical History: Appendectomy,  Section, Cholecystectomy, Heart 

Catheterization With Stent, Hysterectomy, Orthopedic Surgery


Additional Past Surgical History / Comment(s): Amputation Rt 2ND Toe.  C-S X3.  

EGD.  Bronchoscopy.  RT Arm Port Placed FOR AB RX; Removed.  6 CARDIAC STENTS. 

left shoulder bone removed


Past Anesthesia/Blood Transfusion Reactions: No Reported Reaction


Additional Past Anesthesia/Blood Transfusion Reaction / Comment(s): HX OF BLOOD 

TRANSFUSION- NO REACTION


Date of Last Stent Placement:: 2013


Past Psychological History: Anxiety, Bipolar, Depression


Smoking Status: Current every day smoker


Past Alcohol Use History: None Reported


Past Drug Use History: None Reported





- Past Family History


  ** Father


Family Medical History: Unable to Obtain, Coronary Artery Disease (CAD), 

Diabetes Mellitus





  ** Mother


Family Medical History: COPD





General Exam


Limitations: physical limitation


General appearance: alert, in no apparent distress


Head exam: Present: atraumatic, normocephalic, normal inspection


Eye exam: Present: normal appearance, PERRL, EOMI.  Absent: scleral icterus, 

conjunctival injection, periorbital swelling


ENT exam: Present: normal exam, mucous membranes moist


Neck exam: Present: normal inspection.  Absent: tenderness, meningismus, 

lymphadenopathy


Respiratory exam: Present: normal lung sounds bilaterally.  Absent: respiratory 

distress, wheezes, rales, rhonchi, stridor


Cardiovascular Exam: Present: regular rate, normal rhythm, normal heart sounds. 

Absent: systolic murmur, diastolic murmur, rubs, gallop, clicks


GI/Abdominal exam: Present: soft, normal bowel sounds.  Absent: distended, 

tenderness, guarding, rebound, rigid


Extremities exam: Present: normal inspection, full ROM, normal capillary refill.

 Absent: tenderness, pedal edema, joint swelling, calf tenderness


Back exam: Present: normal inspection


Neurological exam: Present: alert, oriented X3, CN II-XII intact


Psychiatric exam: Present: normal affect, normal mood


Skin exam: Present: warm, dry, intact, normal color.  Absent: rash





Course


                                   Vital Signs











  19





  17:41 18:00 19:00


 


Temperature 98.3 F  


 


Pulse Rate 74 89 85


 


Respiratory 20 18 18





Rate   


 


Blood Pressure 104/63 121/63 142/99


 


O2 Sat by Pulse 96 98 99





Oximetry   














  19





  20:16 21:19


 


Temperature  


 


Pulse Rate 77 84


 


Respiratory 20 20





Rate  


 


Blood Pressure 134/98 161/91


 


O2 Sat by Pulse 98 94 L





Oximetry  














Chest Pain MDM





- Differential Diagnosis


AMI, ACS, PE, Pericarditis, Pneumonia, Pleurisy-Other, Pneumothorax/Tension





- MDM





Upon arrival the patient was placed into room 7.  A thorough history and 

physical exam was performed.  The patient was hooked up to continuous pulse ox 

and cardiac monitoring.  A 12-lead EKG was performed on the patient which 

demonstrated a normal sinus rhythm.  Laboratory studies were conducted and the 

patient was sent for a chest x-ray.  Upon return of the results, they are 

discussed with the patient. She was hypoglycemia and given juice to drink. Her 

first troponin is negative.  I did recommend overnight observation for telemetry

monitoring and to continue to trend the patient's troponins.  I also recommended

a cardiology consult.  Called and discussed case with Dr. Cabral and he did 

accept admission for the patient.  Bridging orders were placed. We will trend 

the patients glucose level. The patient was then transported to floor in stable 

condition





Disposition


Clinical Impression: 


 Chest pain, Hypoglycemia





Disposition: ADMITTED AS IP TO THIS [facility]


Condition: Stable


Is patient prescribed a controlled substance at d/c from ED?: No


Decision to Admit Reason: Admit from EC


Decision Date: 19


Decision Time: 20:35

## 2019-07-31 NOTE — XR
EXAMINATION: XR chest 2V

DATE AND TIME:  7/31/2019 6:17 PM

 

CLINICAL INDICATION: [facility]; Chest Pain

 

TECHNIQUE: Departmental protocol

 

COMPARISON: 1/8/2018

 

FINDINGS: 

The lungs are clear. 

 

The pleural spaces are negative.

 

The cardiac silhouette is not enlarged. Evidence of right coronary calcification versus stent noted; 
the remainder of the mediastinal silhouette is unremarkable. 

 

The skeletal structures and soft tissues are negative for acute findings. Remodeled left posterior T9
 and T10 is noted, consistent with remote healed fractures.

 

IMPRESSION:

NO ACUTE PROCESS.

## 2019-08-01 VITALS — SYSTOLIC BLOOD PRESSURE: 154 MMHG | DIASTOLIC BLOOD PRESSURE: 84 MMHG | TEMPERATURE: 97.5 F | HEART RATE: 63 BPM

## 2019-08-01 VITALS — RESPIRATION RATE: 18 BRPM

## 2019-08-01 LAB
ANION GAP SERPL CALC-SCNC: 6 MMOL/L
BASOPHILS # BLD AUTO: 0 K/UL (ref 0–0.2)
BASOPHILS NFR BLD AUTO: 1 %
BUN SERPL-SCNC: 29 MG/DL (ref 7–17)
CALCIUM SPEC-MCNC: 8.7 MG/DL (ref 8.4–10.2)
CHLORIDE SERPL-SCNC: 107 MMOL/L (ref 98–107)
CO2 SERPL-SCNC: 27 MMOL/L (ref 22–30)
EOSINOPHIL # BLD AUTO: 0.1 K/UL (ref 0–0.7)
EOSINOPHIL NFR BLD AUTO: 2 %
ERYTHROCYTE [DISTWIDTH] IN BLOOD BY AUTOMATED COUNT: 3.33 M/UL (ref 3.8–5.4)
ERYTHROCYTE [DISTWIDTH] IN BLOOD: 13.2 % (ref 11.5–15.5)
GLUCOSE BLD-MCNC: 267 MG/DL (ref 75–99)
GLUCOSE BLD-MCNC: 348 MG/DL (ref 75–99)
GLUCOSE SERPL-MCNC: 249 MG/DL (ref 74–99)
HCT VFR BLD AUTO: 31.9 % (ref 34–46)
HGB BLD-MCNC: 10.4 GM/DL (ref 11.4–16)
LYMPHOCYTES # SPEC AUTO: 1.6 K/UL (ref 1–4.8)
LYMPHOCYTES NFR SPEC AUTO: 48 %
MCH RBC QN AUTO: 31.1 PG (ref 25–35)
MCHC RBC AUTO-ENTMCNC: 32.5 G/DL (ref 31–37)
MCV RBC AUTO: 95.9 FL (ref 80–100)
MONOCYTES # BLD AUTO: 0.2 K/UL (ref 0–1)
MONOCYTES NFR BLD AUTO: 5 %
NEUTROPHILS # BLD AUTO: 1.4 K/UL (ref 1.3–7.7)
NEUTROPHILS NFR BLD AUTO: 41 %
PLATELET # BLD AUTO: 159 K/UL (ref 150–450)
POTASSIUM SERPL-SCNC: 4.9 MMOL/L (ref 3.5–5.1)
SODIUM SERPL-SCNC: 140 MMOL/L (ref 137–145)
WBC # BLD AUTO: 3.3 K/UL (ref 3.8–10.6)

## 2019-08-01 RX ADMIN — ATORVASTATIN CALCIUM SCH MG: 20 TABLET, FILM COATED ORAL at 10:26

## 2019-08-01 RX ADMIN — INSULIN ASPART SCH UNIT: 100 INJECTION, SOLUTION INTRAVENOUS; SUBCUTANEOUS at 12:34

## 2019-08-01 RX ADMIN — INSULIN ASPART SCH: 100 INJECTION, SOLUTION INTRAVENOUS; SUBCUTANEOUS at 10:16

## 2019-08-01 NOTE — P.HPIM
History of Present Illness


H&P Date: 19





This is a 59-year-old female patient who presents with complaints of chest pain.

 Patient reports that the pain started abruptly and lasted approximately 1 hour.

 Patient reports that it was left-sided substernal chest pain that did not 

radiate.  Patient does report associated nausea.  Patient does have a 

significant cardiac history for 6 stents in which she does follow with 

Cardilology.  Additional medical history includes heart failure, COPD, CVA, 

diabetes mellitus Coumadin DVT, I disorder, GERD, hyperlipidemia, hypertension, 

myocardial infarction, hypothyroidism, bipolar, anxiety and depression.  Patient

also is a current every day smoker.  Patient also hyperglycemic upon arrival 

blood sugar 45.  Chest x-ray completed showing no acute process.  EKG showing 

normal sinus rhythm and normal EKG.  Troponins negative.  Cardiology service is 

consulted.  2-D echo has been ordered.  At this time patient denies chest pain 

or shortness breath.  Patient denies nausea vomiting or diarrhea.  Patient 

denies any urinary burning or frequency. 





Review of Systems





please refer HPI otherwise unremarkable





Past Medical History


Past Medical History: Chest Pain / Angina, Heart Failure, COPD, CVA/TIA, 

Diabetes Mellitus, Deep Vein Thrombosis (DVT), Eye Disorder, GERD/Reflux, 

Hyperlipidemia, Hypertension, Myocardial Infarction (MI), Thyroid Disorder


Additional Past Medical History / Comment(s): HX OF CVA X3 (LAST 2015)-HAS LT 

ARM PARALYSIS & WEAKNESS LEFT LEG. MI .  DVT RT  AXILLA. RENAL FAILURE.  LOW

THYROID,  PERIPHERAL NEUROPATHY HANDS & FEET.  ANEMIA.  HX OF DKA. USES W/C. 

CONSTIPATION, ESOPHAGITIS.  EPIGLOTITIS.  HEADACHES SINCE CVA.  RETINOPATHY MARZENA 

EYES.  HX RT TOE INFECTION- GANGRENE, HAD AMP."i need eye sx-i have bleeding 

behind the eyes"


Last Myocardial Infarction Date:: 


History of Any Multi-Drug Resistant Organisms: MRSA


Date of last positivie culture/infection: 18


MDRO Source:: Right Foot


Past Surgical History: Appendectomy,  Section, Cholecystectomy, Heart 

Catheterization With Stent, Hysterectomy, Orthopedic Surgery


Additional Past Surgical History / Comment(s): Amputation Rt 2ND Toe.  C-S X3.  

EGD.  Bronchoscopy.  RT Arm Port Placed FOR AB RX; Removed.  6 CARDIAC STENTS. 

left shoulder bone removed


Past Anesthesia/Blood Transfusion Reactions: No Reported Reaction


Additional Past Anesthesia/Blood Transfusion Reaction / Comment(s): HX OF BLOOD 

TRANSFUSION- NO REACTION


Date of Last Stent Placement:: 2013


Past Psychological History: Anxiety, Bipolar, Depression


Smoking Status: Current every day smoker


Past Alcohol Use History: None Reported


Past Drug Use History: None Reported





- Past Family History


  ** Father


Family Medical History: Unable to Obtain, Coronary Artery Disease (CAD), 

Diabetes Mellitus





  ** Mother


Family Medical History: COPD





Medications and Allergies


                                Home Medications











 Medication  Instructions  Recorded  Confirmed  Type


 


Albuterol Inhaler [Ventolin Hfa 2 puff INHALATION RT-Q4H PRN 16 

History





Inhaler]    


 


Aspirin EC [Ecotrin Low Dose] 81 mg PO DAILY 16 History


 


Famotidine [Pepcid] 20 mg PO DAILY 16 History


 


Valproic Acid [Depakene] 250 mg PO DAILY 16 History


 


Ergocalciferol [Vitamin D2 50,000 unit PO Q7D 10/06/16 07/31/19 History





(DRISDOL)]    


 


HYDROcodone/APAP 10-325MG [Norco 1 tab PO Q6H PRN 17 History





]    


 


DULoxetine HCL [Cymbalta] 60 mg PO DAILY 17 History


 


Ferrous Sulfate [Iron (65  mg PO DAILY 17 History





Elemental)]    


 


Insulin Lispro [humaLOG] 6 units SQ AC-TID 17 History


 


ALPRAZolam [Xanax] 1 mg PO TID PRN 17 History


 


Insulin Glargine [Lantus] 25 unit SQ HS 18 History


 


Vitamin B Complex 1 cap PO DAILY 18 History


 


Ondansetron [Zofran] 4 mg PO DAILY PRN 18 History


 


Atorvastatin [Lipitor] 20 mg PO DAILY 19 History


 


QUEtiapine FUMARATE [SEROquel] 25 mg PO BID 19 History


 


QUEtiapine [SEROquel] 100 mg PO HS 19 History








                                    Allergies











Allergy/AdvReac Type Severity Reaction Status Date / Time


 


Barbiturates Allergy  Rash/Hives Verified 19 17:53


 


cephalexin monohydrate Allergy  Rash/Hives Verified 19 17:53





[From Keflex]     


 


morphine Allergy  Rash/Hives Verified 19 17:53


 


Penicillins Allergy  Rash/Hives Verified 19 17:53


 


phenobarbital Allergy  Swelling Verified 19 17:53


 


venom-honey bee Allergy  Swelling Verified 19 17:53





[bee venom (honey bee)]     


 


amlodipine besylate AdvReac  Vomiting Verified 19 17:53





[From Norvasc]     














Physical Exam


Vitals: 


                                   Vital Signs











  Temp Pulse Pulse Resp BP BP Pulse Ox


 


 19 08:00  97.3 F L   73  18   92/56  97


 


 19 04:00  97.5 F L   68  16   112/66  95


 


 19 00:00  97.9 F   72  16   144/75  97


 


 19 22:01  98.6 F   94  18   145/85  98


 


 19 21:19   84   20  161/91   94 L


 


 19 20:16   77   20  134/98   98


 


 19 19:00   85   18  142/99   99


 


 19 18:00   89   18  121/63   98


 


 19 17:41  98.3 F  74   20  104/63   96








                                Intake and Output











 19





 22:59 06:59 14:59


 


Intake Total 510  


 


Balance 510  


 


Intake:   


 


  Amount of Fluid Infused ( 10  





  ml)   


 


  Oral 500  


 


Other:   


 


  Voiding Method  Bedside Commode Bedside Commode


 


  # Voids 2 1 


 


  Weight 74.843 kg  














Head normocephalic


Neck supple


Lungs clear to auscultation bilaterally no wheezing or crackles


Heart regular rate and rhythm S1-S2, no rub or gallop


Abdomen is soft nontender nondistended positive bowel sounds no 

hepatosplenomegaly


Extremities no edema


Neuro alert and orientated to 3.  Slow to respond





Results


CBC & Chem 7: 


                                 19 07:11





                                 19 07:11


Labs: 


                  Abnormal Lab Results - Last 24 Hours (Table)











  19 Range/Units





  18:37 18:37 18:37 


 


WBC     (3.8-10.6)  k/uL


 


RBC  3.28 L    (3.80-5.40)  m/uL


 


Hgb  10.1 L    (11.4-16.0)  gm/dL


 


Hct  32.0 L    (34.0-46.0)  %


 


APTT   20.1 L   (22.0-30.0)  sec


 


Chloride    109 H  ()  mmol/L


 


BUN    33 H  (7-17)  mg/dL


 


Glucose    45 L*  (74-99)  mg/dL


 


POC Glucose (mg/dL)     (75-99)  mg/dL














  19 Range/Units





  18:45 19:05 22:24 


 


WBC     (3.8-10.6)  k/uL


 


RBC     (3.80-5.40)  m/uL


 


Hgb     (11.4-16.0)  gm/dL


 


Hct     (34.0-46.0)  %


 


APTT     (22.0-30.0)  sec


 


Chloride     ()  mmol/L


 


BUN     (7-17)  mg/dL


 


Glucose     (74-99)  mg/dL


 


POC Glucose (mg/dL)  58 L  104 H  140 H  (75-99)  mg/dL














  19 Range/Units





  06:29 07:11 07:11 


 


WBC   3.3 L   (3.8-10.6)  k/uL


 


RBC   3.33 L   (3.80-5.40)  m/uL


 


Hgb   10.4 L   (11.4-16.0)  gm/dL


 


Hct   31.9 L   (34.0-46.0)  %


 


APTT     (22.0-30.0)  sec


 


Chloride     ()  mmol/L


 


BUN    29 H  (7-17)  mg/dL


 


Glucose    249 H  (74-99)  mg/dL


 


POC Glucose (mg/dL)  267 H    (75-99)  mg/dL














Thrombosis Risk Factor Assmnt





- Choose All That Apply


Any of the Below Risk Factors Present?: Yes


Each Risk Factor Represents 3 Points: History of DVT/PE


Thrombosis Risk Factor Assessment Total Risk Factor Score: 3


Thrombosis Risk Factor Assessment Level: Moderate Risk





Assessment and Plan


Assessment: 





1.  Chest pain.  Troponins negative times Three.  Chest x-ray negative.  

Cardiology services have been consulted 2-D echo has been ordered





2.  Hypoglycemia.  Blood sugar 45 upon arrival.  Resolved





3.  History of diabetes mellitus type 2





4.  History of coronary artery disease with multiple stents.  Patient is 

followed cardiology services





5.  History of COPD





6.  History of CVA with left arm paralysis and weakness to left leg





7.  History of DVT





8.  History of eye disorder





9.  History of GERD





10.  History of hypothyroidism





11.  History of nicotine dependence.  Patient educated greater than 3 minutes on

smoking cessation.  Nicotine patch ordered





12.  History of bipolar





13.  History of anxiety














Time with Patient: Greater than 30 (Greater than 60% of the total time spent in 

counseling and coordination of care. I performed an examination of the patient 

and discussed their management with the Nurse Practitioner.  I have reviewed the

Nurse Practitioner's notes and agree with the documented findings and plan of 

care)

## 2019-08-01 NOTE — P.CRDCN
History of Present Illness


History of present illness: 


This is a pleasant 59-year-old  female past medical history significant

for inferior wall MI requiring sent to the [facility] in 2017, diabetes mellitus, 

hypertension, dyslipidemia, CVA, COPD and neuropathy. She has seen Dr. Marte in the office, has not followed up since 2017. We have been asked

to see her in consultation secondary to chest pain. She presented to ED last 

evening after having an episode of chest discomfort in the left precordial 

region all sitting at the dining room table the pain radiated through to the 

back.  The pain was persistent for approximately one hour.  She denies any 

associated shortness of breath, dizziness, nausea, vomiting or diaphoresis.  She

is somewhat lethargic and seems to be a poor historian.  She is seen and 

examined resting comfortably laying flat in bed in no acute distress.  She 

denies active chest discomfort at this time.





EKG reveals sinus mechanism with no acute ST or T wave abnormalities noted.


Chest x-ray is negative for an acute cardiopulmonary process.


Laboratory data reviewed, WBC 5.2, hemoglobin 10.1, platelets 161, d-dimer 0.5, 

proBNP 85, cardiac enzymes negative 3, sodium 143, potassium 4.9, creatinine 

0.97, glucose on admission 45.


Current cardiac medications include aspirin 81 mg daily, atorvastatin 20 mg d

aily. 


Most recent echocardiogram obtained in 2017 reveals preserved LV 

systolic function with ejection fraction 65-70%.





At the time of my exam:


CONSTITUTIONAL: Denies fever. Denies chills.


EYES: Denies blurred vision. Denies vision changes. Denies eye pain.


EARS, NOSE, MOUTH & THROAT: Denies headache. Denies sore throat. Denies ear 

pain.


CARDIOVASCULAR: Denies chest pain. Denies shortness of breath. Denies orthopnea.

Denies PND. Denies palpitations.


RESPIRATORY: Denies cough. 


GASTROINTESTINAL: Denies abdominal pain. Denies diarrhea. Denies constipation. 

Denies nausea. Denies vomiting.


MUSCULOSKELETAL: Denies myalgias.


INTEGUMENTARY: Denies pruitis. Denies rash.


NEUROLOGIC: Denies numbness. Denies tingling. Denies weakness.


PSYCHIATRIC: Denies anxiety. Denies depression.


ENDOCRINE: Denies fatigue. Denies weight change. Denies polydipsia. Denies 

polyurina.


GENITOURINARY: Denies burning, hematuria or urgency with micturation.


HEMATOLOGIC: Denies history of anemia. Denies bleeding. 





Blood pressure 112/66 heart rate 68 afebrile maintaining oxygen saturation on 

room air


GENERAL: This is a 59-year-old  female in no apparent distress at the 

time of my examination.


HEENT: Head is atraumatic, normocephalic. Pupils are equal, round. Sclerae 

anicteric. Conjunctivae are clear. Mucous membranes of the mouth are moist. Neck

is supple. There is no jugular venous distention. No carotid bruit is heard.


LUNGS: Clear to auscultation no wheezes, rales or rhonchi. No chest wall 

tenderness is noted on palpation or with deep breathing.


HEART: Regular rate and rhythm without murmurs, rubs or gallops. S1 and S2 

heard.


ABDOMEN: Soft, nontender. Bowel sounds are heard. No organomegaly noted.


EXTREMITIES: No evidence of peripheral edema and no calf tenderness noted.


VASCULAR: Radial and dorsalis pedis pulses palpated, no evidence of clubbing.  


NEUROLOGIC: Patient is awake, alert and oriented.


 


ASSESSMENT


Chest pain, atypical for angina. An acute coronary event has been ruled out. 


History of CAD s/p stent placement in the setting of an acute inferior wall 

myocardial infarction 


Dyslipidemia


Diabetes mellitus


CVA


COPD





PLAN


An acute coronary event has been ruled out. No EKG evidence of ischemia and 

negative cardiac enzymes. 


Obtain 2D echocardiogram and doppler study to assess cardiac structure and 

function. 


Symptoms are atypical for angina, no further cardiac work-up as an inpatient. 

Follow up with Dr. Marte upon discharge. 


She is quite lethargic and slow to respond, possibly related to hypoglycemia 

noted on admission or recent norco administration. 


Thank you kindly for this consultation.





Nurse Practitioner note has been reviewed, I agree with a documented findings 

and plan of care.  Patient was seen and examined.








Past Medical History


Past Medical History: Chest Pain / Angina, Heart Failure, COPD, CVA/TIA, 

Diabetes Mellitus, Deep Vein Thrombosis (DVT), Eye Disorder, GERD/Reflux, 

Hyperlipidemia, Hypertension, Myocardial Infarction (MI), Thyroid Disorder


Additional Past Medical History / Comment(s): HX OF CVA X3 (LAST 2015)-HAS LT 

ARM PARALYSIS & WEAKNESS LEFT LEG. MI .  DVT RT  AXILLA. RENAL FAILURE.  LOW

THYROID,  PERIPHERAL NEUROPATHY HANDS & FEET.  ANEMIA.  HX OF DKA. USES W/C. 

CONSTIPATION, ESOPHAGITIS.  EPIGLOTITIS.  HEADACHES SINCE CVA.  RETINOPATHY MARZENA 

EYES.  HX RT TOE INFECTION- GANGRENE, HAD AMP."i need eye sx-i have bleeding 

behind the eyes"


Last Myocardial Infarction Date:: 


History of Any Multi-Drug Resistant Organisms: MRSA


Date of last positivie culture/infection: 18


MDRO Source:: Right Foot


Past Surgical History: Appendectomy,  Section, Cholecystectomy, Heart 

Catheterization With Stent, Hysterectomy, Orthopedic Surgery


Additional Past Surgical History / Comment(s): Amputation Rt 2ND Toe.  C-S X3.  

EGD.  Bronchoscopy.  RT Arm Port Placed FOR AB RX; Removed.  6 CARDIAC STENTS. 

left shoulder bone removed


Past Anesthesia/Blood Transfusion Reactions: No Reported Reaction


Additional Past Anesthesia/Blood Transfusion Reaction / Comment(s): HX OF BLOOD 

TRANSFUSION- NO REACTION


Date of Last Stent Placement:: 2013


Past Psychological History: Anxiety, Bipolar, Depression


Smoking Status: Current every day smoker


Past Alcohol Use History: None Reported


Past Drug Use History: None Reported





- Past Family History


  ** Father


Family Medical History: Unable to Obtain, Coronary Artery Disease (CAD), 

Diabetes Mellitus





  ** Mother


Family Medical History: COPD





Medications and Allergies


                                Home Medications











 Medication  Instructions  Recorded  Confirmed  Type


 


Albuterol Inhaler [Ventolin Hfa 2 puff INHALATION RT-Q4H PRN 16 

History





Inhaler]    


 


Aspirin EC [Ecotrin Low Dose] 81 mg PO DAILY 16 History


 


Famotidine [Pepcid] 20 mg PO DAILY 16 History


 


Valproic Acid [Depakene] 250 mg PO DAILY 16 History


 


Ergocalciferol [Vitamin D2 50,000 unit PO Q7D 10/06/16 07/31/19 History





(DRISDOL)]    


 


HYDROcodone/APAP 10-325MG [Norco 1 tab PO Q6H PRN 17 History





]    


 


DULoxetine HCL [Cymbalta] 60 mg PO DAILY 17 History


 


Ferrous Sulfate [Iron (65  mg PO DAILY 17 History





Elemental)]    


 


Insulin Lispro [humaLOG] 6 units SQ AC-TID 17 History


 


ALPRAZolam [Xanax] 1 mg PO TID PRN 17 History


 


Insulin Glargine [Lantus] 25 unit SQ HS 18 History


 


Vitamin B Complex 1 cap PO DAILY 18 History


 


Ondansetron [Zofran] 4 mg PO DAILY PRN 18 History


 


Atorvastatin [Lipitor] 20 mg PO DAILY 19 History


 


QUEtiapine FUMARATE [SEROquel] 25 mg PO BID 19 History


 


QUEtiapine [SEROquel] 100 mg PO HS 19 History








                                    Allergies











Allergy/AdvReac Type Severity Reaction Status Date / Time


 


Barbiturates Allergy  Rash/Hives Verified 19 17:53


 


cephalexin monohydrate Allergy  Rash/Hives Verified 19 17:53





[From Keflex]     


 


morphine Allergy  Rash/Hives Verified 19 17:53


 


Penicillins Allergy  Rash/Hives Verified 19 17:53


 


phenobarbital Allergy  Swelling Verified 19 17:53


 


venom-honey bee Allergy  Swelling Verified 19 17:53





[bee venom (honey bee)]     


 


amlodipine besylate AdvReac  Vomiting Verified 19 17:53





[From Norvasc]     














Physical Exam


Vitals: 


                                   Vital Signs











  Temp Pulse Pulse Resp BP BP Pulse Ox


 


 19 04:00  97.5 F L   68  16   112/66  95


 


 19 00:00  97.9 F   72  16   144/75  97


 


 19 22:01  98.6 F   94  18   145/85  98


 


 19 21:19   84   20  161/91   94 L


 


 19 20:16   77   20  134/98   98


 


 19 19:00   85   18  142/99   99


 


 19 18:00   89   18  121/63   98


 


 19 17:41  98.3 F  74   20  104/63   96








                                Intake and Output











 19





 22:59 06:59 14:59


 


Intake Total 510  


 


Balance 510  


 


Intake:   


 


  Amount of Fluid Infused ( 10  





  ml)   


 


  Oral 500  


 


Other:   


 


  Voiding Method  Bedside Commode 


 


  # Voids 2 1 


 


  Weight 74.843 kg  














Results





                                 19 07:11





                                 19 07:11


                                 Cardiac Enzymes











  19 Range/Units





  18:37 18:37 00:15 


 


AST  27    (14-36)  U/L


 


Troponin I   <0.012  <0.012  (0.000-0.034)  ng/mL








                                   Coagulation











  19 Range/Units





  18:37 


 


PT  9.6  (9.0-12.0)  sec


 


APTT  20.1 L  (22.0-30.0)  sec








                                       CBC











  19 Range/Units





  18:37 07:11 


 


WBC  5.2  3.3 L  (3.8-10.6)  k/uL


 


RBC  3.28 L  3.33 L  (3.80-5.40)  m/uL


 


Hgb  10.1 L  10.4 L  (11.4-16.0)  gm/dL


 


Hct  32.0 L  31.9 L  (34.0-46.0)  %


 


Plt Count  161  159  (150-450)  k/uL








                          Comprehensive Metabolic Panel











  19 Range/Units





  18:37 


 


Sodium  143  (137-145)  mmol/L


 


Potassium  4.9  (3.5-5.1)  mmol/L


 


Chloride  109 H  ()  mmol/L


 


Carbon Dioxide  26  (22-30)  mmol/L


 


BUN  33 H  (7-17)  mg/dL


 


Creatinine  0.97  (0.52-1.04)  mg/dL


 


Glucose  45 L*  (74-99)  mg/dL


 


Calcium  9.1  (8.4-10.2)  mg/dL


 


AST  27  (14-36)  U/L


 


ALT  27  (9-52)  U/L


 


Alkaline Phosphatase  60  ()  U/L


 


Total Protein  6.9  (6.3-8.2)  g/dL


 


Albumin  3.8  (3.5-5.0)  g/dL








                               Current Medications











Generic Name Dose Route Start Last Admin





  Trade Name Freq  PRN Reason Stop Dose Admin


 


Hydrocodone Bitart/Acetaminophen  1 each  19 20:38  19 22:26





  Norco 10  PO   1 each





  Q6H PRN   Administration





  Pain  


 


Albuterol Sulfate  2.5 mg  19 20:38 





  Ventolin Nebulized  INHALATION  





  RT-Q4H PRN  





  Shortness Of Breath  


 


Alprazolam  1 mg  19 20:38  19 22:26





  Xanax  PO   1 mg





  TID PRN   Administration





  Anxiety  


 


Aspirin  81 mg  19 09:00 





  Aspirin  PO  





  DAILY [facility]  


 


Atorvastatin Calcium  20 mg  19 20:45  19 22:28





  Lipitor  PO   20 mg





  DAILY T[facility]ON   Administration


 


Duloxetine HCl  60 mg  19 09:00 





  Cymbalta  PO  





  DAILY TYRON  


 


Famotidine  20 mg  19 09:00 





  Pepcid  PO  





  DAILY TYRON  


 


Insulin Aspart  0 unit  19 07:30 





  Novolog  SQ  





  AC-TID [facility]  





  Protocol  


 


Insulin Detemir  25 unit  19 21:00  19 22:31





  Levemir  SQ   Not Given





  HS TYRON  


 


Naloxone HCl  0.2 mg  19 20:35 





  Narcan  IV  





  Q2M PRN  





  Opioid Reversal  


 


Quetiapine Fumarate  100 mg  19 21:00  19 22:26





  Seroquel  PO   100 mg





  HS TYRON   Administration


 


Valproic Acid  250 mg  19 09:00 





  Depakene Syrup  PO  





  DAILY [facility]  








                                Intake and Output











 19





 22:59 06:59 14:59


 


Intake Total 510  


 


Balance 510  


 


Intake:   


 


  Amount of Fluid Infused ( 10  





  ml)   


 


  Oral 500  


 


Other:   


 


  Voiding Method  Bedside Commode 


 


  # Voids 2 1 


 


  Weight 74.843 kg  








                                        





                                 19 07:11 





                                 19 18:37

## 2019-08-01 NOTE — ECHOF
Referral Reason:chest pain, CAD



MEASUREMENTS

--------

HEIGHT: 157.5 cm

WEIGHT: 56.7 kg

BP: 

RVIDd:   2.3 cm     (< 3.3)

IVSd:   1.1 cm     (0.6 - 1.1)

LVIDd:   4.1 cm     (3.9 - 5.3)

LVPWd:   1.0 cm     (0.6 - 1.1)

IVSs:   1.5 cm

LVIDs:   2.2 cm

LVPWs:   1.6 cm

LA Diam:   3.0 cm     (2.7 - 3.8)

LAESV Index (A-L):   13.40 ml/m

Ao Diam:   3.0 cm     (2.0 - 3.7)

AV Cusp:   1.7 cm     (1.5 - 2.6)

MV EXCURSION:   12.690 mm     (> 18.000)

MV EF SLOPE:   95 mm/s     (70 - 150)

EPSS:   0.4 cm

MV E Willie:   0.76 m/s

MV DecT:   234 ms

MV A Willie:   0.79 m/s

MV E/A Ratio:   0.96 

RAP:   5.00 mmHg

RVSP:   27.65 mmHg







FINDINGS

--------

Sinus rhythm.

This was a technically adequate study.

The left ventricular size is normal.   There is borderline concentric left ventricular hypertrophy.  
 Overall left ventricular systolic function is normal with, an EF between 60 - 65 %.

The right ventricle is normal in size.

Normal LA  size by volume 22+/-6 ml/m2.

The right atrium is normal in size.

Interatrial and interventricular septum intact.

The aortic valve is trileaflet and appears structurally normal.

The mitral valve is normal.

Mild tricuspid regurgitation present.   Right ventricular systolic pressure is normal at < 35 mmHg.

Trace/mild (physiologic)  pulmonic regurgitation.

The aortic root size is normal.

Normal inferior vena cava with normal inspiratory collapse consistent with estimated right atrial pre
ssure of  5 mmHg.

There is no pericardial effusion.



CONCLUSIONS

--------

1. Sinus rhythm.

2. This was a technically adequate study.

3. The left ventricular size is normal.

4. There is borderline concentric left ventricular hypertrophy.

5. Overall left ventricular systolic function is normal with, an EF between 60 - 65 %.

6. The right ventricle is normal in size.

7. Normal LA size by volume 22+/-6 ml/m2.

8. The right atrium is normal in size.

9. Interatrial and interventricular septum intact.

10. The aortic valve is trileaflet and appears structurally normal.

11. The mitral valve is normal.

12. Mild tricuspid regurgitation present.

13. Right ventricular systolic pressure is normal at < 35 mmHg.

14. Trace/mild (physiologic)  pulmonic regurgitation.

15. The aortic root size is normal.

16. Normal inferior vena cava with normal inspiratory collapse consistent with estimated right atrial
 pressure of  5 mmHg.

17. There is no pericardial effusion.





SONOGRAPHER: Yamilet Deleon RDCS

## 2019-08-01 NOTE — P.DS
Providers


Date of admission: 


07/31/19 20:35





Expected date of discharge: 08/01/19


Attending physician: 


Saniya Cabral





Consults: 





                                        





07/31/19 20:37


Consult Physician Urgent 


   Consulting Provider: [facility]


   Consult Reason/Comments: acute chest pain, hx [facility]


   Do you want consulting provider notified?: Yes











Primary care physician: 


Saniya Cabral





[facility] Course: 





Discharge diagnosis





1.  Chest pain.  Troponins negative times Three.  Chest x-ray negative.  2-D 

echo completed showing an EF of 60-65%.  Per cardiology services an acute 

coronary event has been ruled out.  No further cardiac workup.  Patient to 

follow-up outpatient with her cardiologist





2.  Hypoglycemia.  Blood sugar 45 upon arrival.  Resolved.  Patient's long and 

sitting insulin will be decreased to 22 units.  And short acting insulin will be

changed to sliding scale coverage





3.  History of diabetes mellitus type 2





4.  History of coronary artery disease with multiple stents.  Patient is 

followed cardiology services





5.  History of COPD





6.  History of CVA with left arm paralysis and weakness to left leg





7.  History of DVT





8.  History of eye disorder





9.  History of GERD





10.  History of hypothyroidism





11.  History of nicotine dependence.  Patient educated greater than 3 minutes on

smoking cessation.  Nicotine patch ordered





12.  History of bipolar





13.  History of anxiety











Hospital course








This is a 59-year-old female patient who presents with complaints of chest pain.

 Patient reports that the pain started abruptly and lasted approximately 1 hour.

 Patient reports that it was left-sided substernal chest pain that did not 

radiate.  Patient does report associated nausea.  Patient does have a 

significant cardiac history for 6 stents in which she does follow with 

Cardilology.  Additional medical history includes heart failure, COPD, CVA, 

diabetes mellitus Coumadin DVT, I disorder, GERD, hyperlipidemia, hypertension, 

myocardial infarction, hypothyroidism, bipolar, anxiety and depression.  Patient

also is a current every day smoker.  Patient also hyperglycemic upon arrival 

blood sugar 45.  Chest x-ray completed showing no acute process.  EKG showing 

normal sinus rhythm and normal EKG.  Troponins negative.  Cardiology service is 

consulted.  2-D echo has been ordered.  At this time patient denies chest pain 

or shortness breath.  Patient denies nausea vomiting or diarrhea.  Patient 

denies any urinary burning or frequency. 








Acute coronary event has been ruled out per cardiology.  2-D echo has completed.

 Patient has been cleared for discharge from cardiology standpoint.  Patients 

insulin adjusted due to hypoglycemia.  Patient's blood sugar is now on the 

higher side.  This time patient denies any chest pain or shortness of breath.  

Patient denies nausea vomiting or diarrhea.  Patient denies any urinary 

frequency Or burning





I performed an examination of the patient and discussed their management with 

the Nurse Practitioner.  I have reviewed the Nurse Practitioner's notes and 

agree with the documented findings and plan of care 


Patient Condition at Discharge: Stable





Plan - Discharge Summary


Discharge Rx Participant: No


New Discharge Prescriptions: 


Continue


   Albuterol Inhaler [Ventolin Hfa Inhaler] 2 puff INHALATION RT-Q4H PRN


     PRN Reason: Shortness Of Breath


   Famotidine [Pepcid] 20 mg PO DAILY


   Aspirin EC [Ecotrin Low Dose] 81 mg PO DAILY


   Valproic Acid [Depakene] 250 mg PO DAILY


   Ergocalciferol [Vitamin D2 (DRISDOL)] 50,000 unit PO Q7D


   HYDROcodone/APAP 10-325MG [Norco ] 1 tab PO Q6H PRN


     PRN Reason: Pain


   Ferrous Sulfate [Iron (65 MG Elemental)] 325 mg PO DAILY


   DULoxetine HCL [Cymbalta] 60 mg PO DAILY


   ALPRAZolam [Xanax] 1 mg PO TID PRN


     PRN Reason: Anxiety


   Insulin Glargine [Lantus] 25 unit SQ HS


   Vitamin B Complex 1 cap PO DAILY


   Ondansetron [Zofran] 4 mg PO DAILY PRN


     PRN Reason: Nausea


   QUEtiapine [SEROquel] 100 mg PO HS


   QUEtiapine FUMARATE [SEROquel] 25 mg PO BID


   Atorvastatin [Lipitor] 20 mg PO DAILY





Changed


   Insulin Lispro [humaLOG] See Protocol SQ AC-TID #1


Discharge Medication List





Albuterol Inhaler [Ventolin Hfa Inhaler] 2 puff INHALATION RT-Q4H PRN 02/19/16 

[History]


Aspirin EC [Ecotrin Low Dose] 81 mg PO DAILY 02/19/16 [History]


Famotidine [Pepcid] 20 mg PO DAILY 02/19/16 [History]


Valproic Acid [Depakene] 250 mg PO DAILY 02/19/16 [History]


Ergocalciferol [Vitamin D2 (DRISDOL)] 50,000 unit PO Q7D 10/06/16 [History]


HYDROcodone/APAP 10-325MG [Norco ] 1 tab PO Q6H PRN 05/06/17 [History]


DULoxetine HCL [Cymbalta] 60 mg PO DAILY 09/19/17 [History]


Ferrous Sulfate [Iron (65 MG Elemental)] 325 mg PO DAILY 09/19/17 [History]


ALPRAZolam [Xanax] 1 mg PO TID PRN 09/22/17 [History]


Insulin Glargine [Lantus] 25 unit SQ HS 01/08/18 [History]


Vitamin B Complex 1 cap PO DAILY 01/08/18 [History]


Ondansetron [Zofran] 4 mg PO DAILY PRN 04/09/18 [History]


Atorvastatin [Lipitor] 20 mg PO DAILY 07/31/19 [History]


QUEtiapine FUMARATE [SEROquel] 25 mg PO BID 07/31/19 [History]


QUEtiapine [SEROquel] 100 mg PO HS 07/31/19 [History]


Insulin Lispro [humaLOG] See Protocol SQ AC-TID #1 08/01/19 [Rx]








Follow up Appointment(s)/Referral(s): 


Saniya Cabral MD [Primary Care Provider] - 1-2 days


Sandhya Marte MD [STAFF PHYSICIAN] - 08/15/19 3:15 pm


Activity/Diet/Wound Care/Special Instructions: 


Activity as tolerated





Diet heart healthy





Sliding scale protocol scale a to be provided


Discharge Disposition: HOME SELF-CARE

## 2019-11-01 ENCOUNTER — HOSPITAL ENCOUNTER (INPATIENT)
Dept: HOSPITAL 47 - EC | Age: 59
LOS: 4 days | Discharge: HOME HEALTH | DRG: 617 | End: 2019-11-05
Attending: INTERNAL MEDICINE | Admitting: INTERNAL MEDICINE
Payer: COMMERCIAL

## 2019-11-01 VITALS — BODY MASS INDEX: 21.5 KG/M2

## 2019-11-01 DIAGNOSIS — Z79.82: ICD-10-CM

## 2019-11-01 DIAGNOSIS — Z95.5: ICD-10-CM

## 2019-11-01 DIAGNOSIS — Z79.899: ICD-10-CM

## 2019-11-01 DIAGNOSIS — M84.674A: ICD-10-CM

## 2019-11-01 DIAGNOSIS — E11.319: ICD-10-CM

## 2019-11-01 DIAGNOSIS — I96: ICD-10-CM

## 2019-11-01 DIAGNOSIS — E11.42: ICD-10-CM

## 2019-11-01 DIAGNOSIS — K21.9: ICD-10-CM

## 2019-11-01 DIAGNOSIS — E11.69: ICD-10-CM

## 2019-11-01 DIAGNOSIS — E11.65: ICD-10-CM

## 2019-11-01 DIAGNOSIS — Z86.14: ICD-10-CM

## 2019-11-01 DIAGNOSIS — E11.621: Primary | ICD-10-CM

## 2019-11-01 DIAGNOSIS — E03.9: ICD-10-CM

## 2019-11-01 DIAGNOSIS — Z86.718: ICD-10-CM

## 2019-11-01 DIAGNOSIS — Z99.3: ICD-10-CM

## 2019-11-01 DIAGNOSIS — Z83.3: ICD-10-CM

## 2019-11-01 DIAGNOSIS — F17.200: ICD-10-CM

## 2019-11-01 DIAGNOSIS — M86.9: ICD-10-CM

## 2019-11-01 DIAGNOSIS — E78.5: ICD-10-CM

## 2019-11-01 DIAGNOSIS — I11.0: ICD-10-CM

## 2019-11-01 DIAGNOSIS — I25.2: ICD-10-CM

## 2019-11-01 DIAGNOSIS — Z82.5: ICD-10-CM

## 2019-11-01 DIAGNOSIS — I50.9: ICD-10-CM

## 2019-11-01 DIAGNOSIS — L03.115: ICD-10-CM

## 2019-11-01 DIAGNOSIS — Z91.030: ICD-10-CM

## 2019-11-01 DIAGNOSIS — Z90.49: ICD-10-CM

## 2019-11-01 DIAGNOSIS — E11.52: ICD-10-CM

## 2019-11-01 DIAGNOSIS — F41.9: ICD-10-CM

## 2019-11-01 DIAGNOSIS — I25.10: ICD-10-CM

## 2019-11-01 DIAGNOSIS — Z88.1: ICD-10-CM

## 2019-11-01 DIAGNOSIS — Z88.8: ICD-10-CM

## 2019-11-01 DIAGNOSIS — Z82.49: ICD-10-CM

## 2019-11-01 DIAGNOSIS — L97.514: ICD-10-CM

## 2019-11-01 DIAGNOSIS — B95.62: ICD-10-CM

## 2019-11-01 DIAGNOSIS — F31.9: ICD-10-CM

## 2019-11-01 DIAGNOSIS — Z88.0: ICD-10-CM

## 2019-11-01 DIAGNOSIS — Z90.710: ICD-10-CM

## 2019-11-01 DIAGNOSIS — E11.649: ICD-10-CM

## 2019-11-01 DIAGNOSIS — D63.8: ICD-10-CM

## 2019-11-01 DIAGNOSIS — J44.9: ICD-10-CM

## 2019-11-01 DIAGNOSIS — Z79.4: ICD-10-CM

## 2019-11-01 DIAGNOSIS — I69.354: ICD-10-CM

## 2019-11-01 LAB
ALBUMIN SERPL-MCNC: 3.3 G/DL (ref 3.5–5)
ALP SERPL-CCNC: 72 U/L (ref 38–126)
ALT SERPL-CCNC: 22 U/L (ref 9–52)
ANION GAP SERPL CALC-SCNC: 8 MMOL/L
AST SERPL-CCNC: 20 U/L (ref 14–36)
BASOPHILS # BLD AUTO: 0.1 K/UL (ref 0–0.2)
BASOPHILS NFR BLD AUTO: 1 %
BUN SERPL-SCNC: 26 MG/DL (ref 7–17)
CALCIUM SPEC-MCNC: 8.8 MG/DL (ref 8.4–10.2)
CHLORIDE SERPL-SCNC: 101 MMOL/L (ref 98–107)
CO2 SERPL-SCNC: 29 MMOL/L (ref 22–30)
EOSINOPHIL # BLD AUTO: 0 K/UL (ref 0–0.7)
EOSINOPHIL NFR BLD AUTO: 0 %
ERYTHROCYTE [DISTWIDTH] IN BLOOD BY AUTOMATED COUNT: 3.04 M/UL (ref 3.8–5.4)
ERYTHROCYTE [DISTWIDTH] IN BLOOD: 12.5 % (ref 11.5–15.5)
GLUCOSE BLD-MCNC: 251 MG/DL (ref 75–99)
GLUCOSE SERPL-MCNC: 450 MG/DL (ref 74–99)
HCT VFR BLD AUTO: 28.8 % (ref 34–46)
HGB BLD-MCNC: 9.4 GM/DL (ref 11.4–16)
LYMPHOCYTES # SPEC AUTO: 1 K/UL (ref 1–4.8)
LYMPHOCYTES NFR SPEC AUTO: 12 %
MCH RBC QN AUTO: 30.9 PG (ref 25–35)
MCHC RBC AUTO-ENTMCNC: 32.6 G/DL (ref 31–37)
MCV RBC AUTO: 94.8 FL (ref 80–100)
MONOCYTES # BLD AUTO: 0.4 K/UL (ref 0–1)
MONOCYTES NFR BLD AUTO: 5 %
NEUTROPHILS # BLD AUTO: 6.9 K/UL (ref 1.3–7.7)
NEUTROPHILS NFR BLD AUTO: 82 %
PLATELET # BLD AUTO: 243 K/UL (ref 150–450)
POTASSIUM SERPL-SCNC: 4.9 MMOL/L (ref 3.5–5.1)
PROT SERPL-MCNC: 6.7 G/DL (ref 6.3–8.2)
SODIUM SERPL-SCNC: 138 MMOL/L (ref 137–145)
WBC # BLD AUTO: 8.4 K/UL (ref 3.8–10.6)

## 2019-11-01 PROCEDURE — 87205 SMEAR GRAM STAIN: CPT

## 2019-11-01 PROCEDURE — 93923 UPR/LXTR ART STDY 3+ LVLS: CPT

## 2019-11-01 PROCEDURE — 80053 COMPREHEN METABOLIC PANEL: CPT

## 2019-11-01 PROCEDURE — 0Y6P0Z1 DETACHMENT AT RIGHT 1ST TOE, HIGH, OPEN APPROACH: ICD-10-PCS

## 2019-11-01 PROCEDURE — 99285 EMERGENCY DEPT VISIT HI MDM: CPT

## 2019-11-01 PROCEDURE — 94640 AIRWAY INHALATION TREATMENT: CPT

## 2019-11-01 PROCEDURE — 82728 ASSAY OF FERRITIN: CPT

## 2019-11-01 PROCEDURE — 87075 CULTR BACTERIA EXCEPT BLOOD: CPT

## 2019-11-01 PROCEDURE — 82565 ASSAY OF CREATININE: CPT

## 2019-11-01 PROCEDURE — 83605 ASSAY OF LACTIC ACID: CPT

## 2019-11-01 PROCEDURE — 87040 BLOOD CULTURE FOR BACTERIA: CPT

## 2019-11-01 PROCEDURE — 96365 THER/PROPH/DIAG IV INF INIT: CPT

## 2019-11-01 PROCEDURE — 96367 TX/PROPH/DG ADDL SEQ IV INF: CPT

## 2019-11-01 PROCEDURE — 81001 URINALYSIS AUTO W/SCOPE: CPT

## 2019-11-01 PROCEDURE — 36415 COLL VENOUS BLD VENIPUNCTURE: CPT

## 2019-11-01 PROCEDURE — 83540 ASSAY OF IRON: CPT

## 2019-11-01 PROCEDURE — 83550 IRON BINDING TEST: CPT

## 2019-11-01 PROCEDURE — 80202 ASSAY OF VANCOMYCIN: CPT

## 2019-11-01 PROCEDURE — 85025 COMPLETE CBC W/AUTO DIFF WBC: CPT

## 2019-11-01 PROCEDURE — 96366 THER/PROPH/DIAG IV INF ADDON: CPT

## 2019-11-01 PROCEDURE — 87070 CULTURE OTHR SPECIMN AEROBIC: CPT

## 2019-11-01 RX ADMIN — INSULIN ASPART SCH UNIT: 100 INJECTION, SOLUTION INTRAVENOUS; SUBCUTANEOUS at 21:06

## 2019-11-01 RX ADMIN — CEFAZOLIN SCH MLS/HR: 330 INJECTION, POWDER, FOR SOLUTION INTRAMUSCULAR; INTRAVENOUS at 18:24

## 2019-11-01 RX ADMIN — INSULIN DETEMIR SCH UNIT: 100 INJECTION, SOLUTION SUBCUTANEOUS at 21:06

## 2019-11-01 NOTE — XR
EXAMINATION TYPE: XR foot complete RT

 

DATE OF EXAM: 11/1/2019

 

COMPARISON: 4/8/2018

 

HISTORY: Diabetic foot ulcer great toe

 

TECHNIQUE: 3 views right foot

 

FINDINGS: There is some erosion of the distal portion of the distal phalanx compared to prior study. 
Soft tissue abnormality is evident. There is prior amputation of the second and fifth digits. No acut
e fractures are evident. Soft tissue swelling over the great toe.

 

IMPRESSION:

1.  Soft tissue swelling at the great toe with some resorption of the distal portion of the still pha
lanx and underlying osteomyelitis may be present.

## 2019-11-01 NOTE — ED
General Adult HPI





<Tereso Marroquin - Last Filed: 19 17:26>





- General


Source: patient, family, RN notes reviewed, old records reviewed


Mode of arrival: wheelchair


Limitations: altered mental status, physical limitation





<Estrada Munroe - Last Filed: 19 17:33>





- General


Chief complaint: Skin/Abscess/Foreign Body


Stated complaint: diabetic rt foot ulcer


Time Seen by Provider: 19 15:15





- History of Present Illness


Initial comments: 


59-year-old female patient past history significant for diabetes, headache for 

ulcers presents ED chief complaint of diabetic foot ulcer on great toe of right 

foot.  Patient caregiver provides most of history.  She reports this has been 

ongoing for approximately 3 weeks.  She has been following up with Dr. Roy a 

podiatrist.  He has been monitoring the wound on a weekly basis.  Today was the 

third check of the wound, he believes that has gotten much worse.  And requires 

IV antibiotics and admission to the hospital. Prior to this the patient has not 

been on any antibiotics.  Reports chills at home, denies any other complaints.








Systemic: Pt denies fatigue, fever/chills, rash. Pt denies weakness, night 

sweats, weight loss. 


Neuro: Pt denies headache, visual disturbances, syncope or pre-syncope.


HEENT: Pt denies ocular discharge or irritation, otalgia, rhinorrhea, 

pharyngitis or notable lymphadenopathy. 


Cardiopulmonary: Pt denies chest pain, SOB, heart palpitations, dyspnea on 

exertion.  


Abdominal/GI: Pt denies abdominal pain, n/v/d. 


: Pt denies dysuria, burning w/ urination, frequency/urgency. Denies new onset

urinary or bowel incontinence.  


MSK: Pt denies myalgia, loss of strength or function in extremities. 


Neuro: Pt denies new onset weakness, paresthesias. 


 (Estrada Munroe)





- Related Data


                                Home Medications











 Medication  Instructions  Recorded  Confirmed


 


Albuterol Inhaler [Ventolin Hfa 2 puff INHALATION RT-Q4H PRN 16





Inhaler]   


 


Aspirin EC [Ecotrin Low Dose] 81 mg PO DAILY 16


 


Famotidine [Pepcid] 20 mg PO DAILY 16


 


Valproic Acid [Depakene] 250 mg PO DAILY 16


 


Ergocalciferol [Vitamin D2 50,000 unit PO SA 10/06/16 11/01/19





(DRISDOL)]   


 


HYDROcodone/APAP 10-325MG [Norco 1 tab PO Q6H PRN 17





]   


 


DULoxetine HCL [Cymbalta] 60 mg PO DAILY 17


 


Ferrous Sulfate [Iron (65  mg PO DAILY 17





Elemental)]   


 


ALPRAZolam [Xanax] 1 mg PO Q8H PRN 17


 


Vitamin B Complex 1 cap PO DAILY 18


 


Ondansetron [Zofran] 4 mg PO DAILY PRN 18


 


Atorvastatin [Lipitor] 20 mg PO DAILY 19


 


QUEtiapine FUMARATE [SEROquel] 25 mg PO BID 19


 


QUEtiapine [SEROquel] 100 mg PO HS 19








                                  Previous Rx's











 Medication  Instructions  Recorded


 


Insulin Glargine [Lantus] 22 unit SQ HS #0 19


 


Insulin Lispro [humaLOG] See Protocol SQ AC-TID #1 19











                                    Allergies











Allergy/AdvReac Type Severity Reaction Status Date / Time


 


Barbiturates Allergy  Rash/Hives Verified 19 16:45


 


cephalexin monohydrate Allergy  Rash/Hives Verified 19 16:45





[From Keflex]     


 


morphine Allergy  Rash/Hives Verified 19 16:45


 


Penicillins Allergy  Rash/Hives Verified 19 16:45


 


phenobarbital Allergy  Swelling Verified 19 16:45


 


venom-honey bee Allergy  Swelling Verified 19 16:45





[bee venom (honey bee)]     


 


amlodipine besylate AdvReac  Vomiting Verified 19 16:45





[From Norvasc]     














Review of Systems


ROS Other: All systems not noted in ROS Statement are negative.





<Tereso Marroquin - Last Filed: 19 17:26>


ROS Other: All systems not noted in ROS Statement are negative.





<Estrada Munroe - Last Filed: 19 17:33>


ROS Statement: 


Those systems with pertinent positive or pertinent negative responses have been 

documented in the HPI.








Past Medical History


Past Medical History: Chest Pain / Angina, Heart Failure, COPD, CVA/TIA, 

Diabetes Mellitus, Deep Vein Thrombosis (DVT), Eye Disorder, GERD/Reflux, 

Hyperlipidemia, Hypertension, Myocardial Infarction (MI), Thyroid Disorder


Additional Past Medical History / Comment(s): HX OF CVA X3 (LAST 2015)-HAS LT 

ARM PARALYSIS & WEAKNESS LEFT LEG. MI .  DVT RT  AXILLA. RENAL FAILURE.  LOW

THYROID,  PERIPHERAL NEUROPATHY HANDS & FEET.  ANEMIA.  HX OF DKA. USES W/C. 

CONSTIPATION, ESOPHAGITIS.  EPIGLOTITIS.  HEADACHES SINCE CVA.  RETINOPATHY MARZENA 

EYES.  HX RT TOE INFECTION- GANGRENE, HAD AMP."i need eye sx-i have bleeding 

behind the eyes"


Last Myocardial Infarction Date:: 


History of Any Multi-Drug Resistant Organisms: MRSA


Date of last positivie culture/infection: 18


MDRO Source:: Right Foot


Past Surgical History: Appendectomy,  Section, Cholecystectomy, Heart 

Catheterization With Stent, Hysterectomy, Orthopedic Surgery


Additional Past Surgical History / Comment(s): Amputation Rt 2ND Toe.  C-S X3.  

EGD.  Bronchoscopy.  RT Arm Port Placed FOR AB RX; Removed.  6 CARDIAC STENTS. 

left shoulder bone removed


Past Anesthesia/Blood Transfusion Reactions: No Reported Reaction


Additional Past Anesthesia/Blood Transfusion Reaction / Comment(s): HX OF BLOOD 

TRANSFUSION- NO REACTION


Date of Last Stent Placement:: 2013


Past Psychological History: Anxiety, Bipolar, Depression


Smoking Status: Current every day smoker


Past Alcohol Use History: None Reported


Past Drug Use History: None Reported





- Past Family History


  ** Father


Family Medical History: Unable to Obtain, Coronary Artery Disease (CAD), 

Diabetes Mellitus





  ** Mother


Family Medical History: COPD





<Estrada Munroe - Last Filed: 19 17:33>





General Exam


Limitations: altered mental status, physical limitation





<Estrada Munroe - Last Filed: 19 17:33>





- General Exam Comments


Initial Comments: 








Constitutional: NAD, AOX3, Pt has pleasant affect. 


HEENT: NC/AT, trachea midline, neck supple, no lymphadenopathy. Posterior 

pharynx non erythematous, without exudates. External ears appear normal, without

discharge. Mucous membranes moist. Eyes PERRLA, EOM intact. There is no scleral 

icterus. No pallor noted. 


Cardiopulmonary: RRR, no murmurs, rubs or gallops, no JVD noted. Lungs CTAB in 

anterior and posterior fields. No peripheral edema. 


Abdominal exam: Abdomen soft and non-distended. Abdomen non-tender to palpation 

in all 4 quadrants. Bowel sounds active in LLQ. No hepatosplenomegaly. No ecch

ymosis


Neuro: CN II-XII grossly intact. No nuchal rigidity. No raccon eyes, no brown 

sign, no hemotympanum. No cervical spinal tenderness. 


MSK: Stage II ulcer noted at her aspect of great toe right foot. Mild amount of 

surrounding erythema.  No posterior calf tenderness bilaterally, homans sign ne

gative bilaterally. Posterior tibialis and radial pulse +2 bilaterally. 

Sensation intact in upper and lower extremities. Full active ROM in upper and 

lower extremities, 5/5 stregnth. 


 (Estrada Munroe)





Course


                                   Vital Signs











  19





  15:06 16:49


 


Temperature 97.9 F 


 


Pulse Rate 99 101 H


 


Respiratory 18 16





Rate  


 


Blood Pressure 118/64 116/82


 


O2 Sat by Pulse 97 96





Oximetry  














Medical Decision Making





- Lab Data


Result diagrams: 


                                 19 16:00





                                 19 16:00





<Tereso Marroquin - Last Filed: 19 17:26>





- Lab Data


Result diagrams: 


                                 19 16:00





                                 19 16:00





<Estrada Munroe - Last Filed: 19 17:33>





- Medical Decision Making





Patient reevaluated and reexamined by myself, Dr. Marroquin.  He did repeat pharynx.

 This includes diagnostic interpretation [facility] plan.  Patient is updated on plan.

 Case was discussed in detail with Dr. Cabral who will admit his patient with 

consult for Dr. Moody, Dr. Ferrell, and podiatry Dr. Roy. (Tereso Marroquin)


59-year-old female patient past history significant for diabetes, headache for 

ulcers presents ED chief complaint of diabetic foot ulcer on great toe of right 

foot.  Patient caregiver provides most of history.  She reports this has been 

ongoing for approximately 3 weeks.  She has been following up with Dr. Roy a 

podiatrist.  He has been monitoring the wound on a weekly basis.  Today was the 

third check of the wound, he believes that has gotten much worse.  And requires 

IV antibiotics and admission to the hospital. Prior to this the patient has not 

been on any antibiotics.  Reports chills at home, denies any other complaints.  

Patient will signs stable, afebrile.  Physical exam displayed: Stage II ulcer 

noted at her aspect of great toe right foot. Mild amount of surrounding 

erythema.  No posterior calf tenderness bilaterally, homans sign negative 

bilaterally. Posterior tibialis and radial pulse +2 bilaterally. Sensation 

intact in upper and lower extremities. Full active ROM in upper and lower 

extremities, 5/5 stregnth.  Laboratory investigations displayed hyperglycemic, 

otherwise noncompressive.  Plain films displayed soft tissue swelling at the 

great toe with some reabsorption of the distal portion of the phalanx and underl

betzy possible malaise may still be present.  Patient initiated on vancomycin, 

Levaquin.  Will be admitted for further evaluation.  Case discussed with Dr. Marroquin.  (Estrada Munroe)





- Lab Data


                                   Lab Results











  19 Range/Units





  16:00 16:00 16:00 


 


WBC  8.4    (3.8-10.6)  k/uL


 


RBC  3.04 L    (3.80-5.40)  m/uL


 


Hgb  9.4 L    (11.4-16.0)  gm/dL


 


Hct  28.8 L    (34.0-46.0)  %


 


MCV  94.8    (80.0-100.0)  fL


 


MCH  30.9    (25.0-35.0)  pg


 


MCHC  32.6    (31.0-37.0)  g/dL


 


RDW  12.5    (11.5-15.5)  %


 


Plt Count  243    (150-450)  k/uL


 


Neutrophils %  82    %


 


Lymphocytes %  12    %


 


Monocytes %  5    %


 


Eosinophils %  0    %


 


Basophils %  1    %


 


Neutrophils #  6.9    (1.3-7.7)  k/uL


 


Lymphocytes #  1.0    (1.0-4.8)  k/uL


 


Monocytes #  0.4    (0-1.0)  k/uL


 


Eosinophils #  0.0    (0-0.7)  k/uL


 


Basophils #  0.1    (0-0.2)  k/uL


 


Sodium   138   (137-145)  mmol/L


 


Potassium   4.9   (3.5-5.1)  mmol/L


 


Chloride   101   ()  mmol/L


 


Carbon Dioxide   29   (22-30)  mmol/L


 


Anion Gap   8   mmol/L


 


BUN   26 H   (7-17)  mg/dL


 


Creatinine   0.97   (0.52-1.04)  mg/dL


 


Est GFR (CKD-EPI)AfAm   75   (>60 ml/min/1.73 sqM)  


 


Est GFR (CKD-EPI)NonAf   65   (>60 ml/min/1.73 sqM)  


 


Glucose   450 H   (74-99)  mg/dL


 


Plasma Lactic Acid Carmelo    0.9  (0.7-2.0)  mmol/L


 


Calcium   8.8   (8.4-10.2)  mg/dL


 


Total Bilirubin   0.3   (0.2-1.3)  mg/dL


 


AST   20   (14-36)  U/L


 


ALT   22   (9-52)  U/L


 


Alkaline Phosphatase   72   ()  U/L


 


Total Protein   6.7   (6.3-8.2)  g/dL


 


Albumin   3.3 L   (3.5-5.0)  g/dL














Disposition





<Tereso Marroquin - Last Filed: 19 17:26>





<Estrada Munroe - Last Filed: 19 17:33>


Clinical Impression: 


 Diabetic foot ulcer





Disposition: ADMITTED AS IP TO THIS HOSP


Condition: Serious


Referrals: 


Saniya Cabral MD [Primary Care Provider] - 1-2 days

## 2019-11-02 LAB
GLUCOSE BLD-MCNC: 213 MG/DL (ref 75–99)
GLUCOSE BLD-MCNC: 258 MG/DL (ref 75–99)
GLUCOSE BLD-MCNC: 343 MG/DL (ref 75–99)
GLUCOSE BLD-MCNC: 51 MG/DL (ref 75–99)
GLUCOSE BLD-MCNC: 78 MG/DL (ref 75–99)
GLUCOSE UR QL: (no result)
HYALINE CASTS UR QL AUTO: 3 /LPF (ref 0–2)
PH UR: 5.5 [PH] (ref 5–8)
RBC UR QL: 4 /HPF (ref 0–5)
SP GR UR: 1.01 (ref 1–1.03)
UROBILINOGEN UR QL STRIP: <2 MG/DL (ref ?–2)

## 2019-11-02 RX ADMIN — INSULIN ASPART SCH: 100 INJECTION, SOLUTION INTRAVENOUS; SUBCUTANEOUS at 07:14

## 2019-11-02 RX ADMIN — ISODIUM CHLORIDE PRN MG: 0.03 SOLUTION RESPIRATORY (INHALATION) at 17:07

## 2019-11-02 RX ADMIN — CEFAZOLIN SCH MLS/HR: 330 INJECTION, POWDER, FOR SOLUTION INTRAMUSCULAR; INTRAVENOUS at 04:37

## 2019-11-02 RX ADMIN — CEFAZOLIN SCH MLS/HR: 330 INJECTION, POWDER, FOR SOLUTION INTRAMUSCULAR; INTRAVENOUS at 13:53

## 2019-11-02 RX ADMIN — DULOXETINE SCH MG: 60 CAPSULE, DELAYED RELEASE ORAL at 08:43

## 2019-11-02 RX ADMIN — Medication SCH EACH: at 08:43

## 2019-11-02 RX ADMIN — CEFAZOLIN SCH: 330 INJECTION, POWDER, FOR SOLUTION INTRAMUSCULAR; INTRAVENOUS at 04:08

## 2019-11-02 RX ADMIN — SODIUM CHLORIDE SCH MLS/HR: 9 INJECTION, SOLUTION INTRAVENOUS at 05:52

## 2019-11-02 RX ADMIN — SODIUM CHLORIDE SCH MLS/HR: 9 INJECTION, SOLUTION INTRAVENOUS at 22:08

## 2019-11-02 RX ADMIN — HYDROCODONE BITARTRATE AND ACETAMINOPHEN PRN EACH: 10; 325 TABLET ORAL at 10:06

## 2019-11-02 RX ADMIN — HYDROCODONE BITARTRATE AND ACETAMINOPHEN PRN EACH: 10; 325 TABLET ORAL at 20:13

## 2019-11-02 RX ADMIN — INSULIN ASPART SCH UNIT: 100 INJECTION, SOLUTION INTRAVENOUS; SUBCUTANEOUS at 22:07

## 2019-11-02 RX ADMIN — ISODIUM CHLORIDE PRN MG: 0.03 SOLUTION RESPIRATORY (INHALATION) at 08:37

## 2019-11-02 RX ADMIN — ENOXAPARIN SODIUM SCH MG: 40 INJECTION SUBCUTANEOUS at 13:54

## 2019-11-02 RX ADMIN — INSULIN DETEMIR SCH UNIT: 100 INJECTION, SOLUTION SUBCUTANEOUS at 22:08

## 2019-11-02 RX ADMIN — INSULIN ASPART SCH UNIT: 100 INJECTION, SOLUTION INTRAVENOUS; SUBCUTANEOUS at 17:00

## 2019-11-02 RX ADMIN — ISODIUM CHLORIDE PRN MG: 0.03 SOLUTION RESPIRATORY (INHALATION) at 21:50

## 2019-11-02 RX ADMIN — INSULIN ASPART SCH UNIT: 100 INJECTION, SOLUTION INTRAVENOUS; SUBCUTANEOUS at 12:15

## 2019-11-02 RX ADMIN — FAMOTIDINE SCH MG: 20 TABLET, FILM COATED ORAL at 08:43

## 2019-11-02 RX ADMIN — ISODIUM CHLORIDE PRN MG: 0.03 SOLUTION RESPIRATORY (INHALATION) at 11:39

## 2019-11-02 RX ADMIN — DIVALPROEX SODIUM SCH MG: 250 TABLET, DELAYED RELEASE ORAL at 08:43

## 2019-11-02 RX ADMIN — ASPIRIN 81 MG CHEWABLE TABLET SCH MG: 81 TABLET CHEWABLE at 08:43

## 2019-11-02 RX ADMIN — Medication SCH MG: at 08:43

## 2019-11-02 RX ADMIN — LEVOFLOXACIN SCH MLS/HR: 750 INJECTION, SOLUTION INTRAVENOUS at 16:59

## 2019-11-02 RX ADMIN — ATORVASTATIN CALCIUM SCH MG: 20 TABLET, FILM COATED ORAL at 08:43

## 2019-11-02 NOTE — P.GSCN
History of Present Illness


Consult date: 19


Reason for Consult: 





Right foot great toe ulcer


Requesting physician: Tereso Marroquin


History of present illness: 





This is a 59-year-old female patient who is followed by Dr. Cabral on an 

outpatient basis.  She has a past medical history significant for diabetes 

mellitus type 2, cerebrovascular accident in  with left-sided weakness, 

wheelchair-bound, myocardial infarction, atherosclerotic heart disease with 

previous stenting, peripheral neuropathy, gastroesophageal reflux disease, 

hypertension, hyperlipidemia, bipolar, anxiety, depression, COPD, DVT, remote 

history of nicotine dependence and anemia.  The patient presented to the 

emergency department here at [facility] last evening with 

complaints of a right great toe ulceration which she reports has been ongoing 

for about 3 weeks.  She has been following with Dr. Roy a podiatrist on an 

outpatient basis.  She feels that the wound to her right great toe has been 

getting more red over the last week.  She denies any fevers, chills, fatigue, 

recent weight loss, shortness of breath or nausea and vomiting.  Due to the 

patient's wound to her right great toe a consult was placed to Dr. Gerardo Ferrell for further evaluation and treatment recommendations.





Review of Systems





A 14 point review of systems was completed and was negative except as mentioned 

in HPI.





Past Medical History


Past Medical History: Asthma, Coronary Artery Disease (CAD), Chest Pain / 

Angina, Heart Failure, COPD, CVA/TIA, Diabetes Mellitus, Deep Vein Thrombosis 

(DVT), Eye Disorder, GERD/Reflux, Hyperlipidemia, Hypertension, Myocardial 

Infarction (MI), Neurologic Disorder


Additional Past Medical History / Comment(s): HX OF CVA X3 (LAST 2015)-HAS LT 

ARM PARALYSIS & WEAKNESS LEFT LEG. MI .  DVT RT  AXILLA. RENAL FAILURE.  LOW

THYROID,  PERIPHERAL NEUROPATHY HANDS & FEET.  ANEMIA.  HX OF DKA. USES W/C. 

CONSTIPATION, ESOPHAGITIS.  EPIGLOTITIS.  HEADACHES SINCE CVA.  RETINOPATHY MARZENA 

EYES.  HX RT TOE INFECTION- GANGRENE, HAD AMP."i need eye sx-i have bleeding 

behind the eyes"


Last Myocardial Infarction Date:: 


History of Any Multi-Drug Resistant Organisms: MRSA


Year Discovered:: 18


MDRO Source:: Right Foot


Past Surgical History: Appendectomy,  Section, Cholecystectomy, Heart 

Catheterization With Stent, Hysterectomy, Orthopedic Surgery


Additional Past Surgical History / Comment(s): Amputation Rt 2ND Toe.  C-S X3.  

EGD.  Bronchoscopy.  RT Arm Port Placed FOR AB RX; Removed.  6 CARDIAC STENTS. 

left shoulder bone removed


Past Anesthesia/Blood Transfusion Reactions: No Reported Reaction


Additional Past Anesthesia/Blood Transfusion Reaction / Comm: HX OF BLOOD 

TRANSFUSION- NO REACTION


Date of Last Stent Placement:: 2013


Past Psychological History: Anxiety, Bipolar, Depression


Additional Psychological History / Comment(s): PT HAS A CARE GIVER-LOLITA. UNABLE

TO AMBULATE(LT SIDE WAS AFFECTED BY STROKE) USES A W/C. also has 

glucometer,shower chair(has cane /walker that she previously used).  Patient 

does continue to smoke, the caregivers relate that the patient is unmanageable 

without tobacco use.  She is on multiple psychiatric medications it was not 

thought to be a candidate for Chantix.


Smoking Status: Former smoker


Past Alcohol Use History: None Reported


Additional Past Alcohol Use History / Comment(s): Patient states she is using a 

e cigarettes. She has a 24-hour caregiver that lives with her.  She is 

wheelchair bound. states she quit smoking a year and a half ago. Using marijuana

edibles


Past Drug Use History: Marijuana


Additional Drug Use History / Comment(s): using marijuana edibles





- Past Family History


  ** Father


Family Medical History: Unable to Obtain, Coronary Artery Disease (CAD), 

Diabetes Mellitus





  ** Mother


Family Medical History: COPD





Medications and Allergies


                                Home Medications











 Medication  Instructions  Recorded  Confirmed  Type


 


Albuterol Inhaler [Ventolin Hfa 2 puff INHALATION RT-Q4H PRN 16 

History





Inhaler]    


 


Aspirin EC [Ecotrin Low Dose] 81 mg PO DAILY 16 History


 


Famotidine [Pepcid] 20 mg PO DAILY 16 History


 


Valproic Acid [Depakene] 250 mg PO DAILY 16 History


 


Ergocalciferol [Vitamin D2 50,000 unit PO SA 10/06/16 11/01/19 History





(DRISDOL)]    


 


HYDROcodone/APAP 10-325MG [Norco 1 tab PO Q6H PRN 17 History





]    


 


DULoxetine HCL [Cymbalta] 60 mg PO DAILY 17 History


 


Ferrous Sulfate [Iron (65  mg PO DAILY 17 History





Elemental)]    


 


ALPRAZolam [Xanax] 1 mg PO Q8H PRN 17 History


 


Vitamin B Complex 1 cap PO DAILY 18 History


 


Ondansetron [Zofran] 4 mg PO DAILY PRN 18 History


 


Atorvastatin [Lipitor] 20 mg PO DAILY 19 History


 


QUEtiapine FUMARATE [SEROquel] 25 mg PO BID 19 History


 


QUEtiapine [SEROquel] 100 mg PO HS 19 History


 


Insulin Glargine [Lantus] 22 unit SQ HS #0 19 Rx


 


Insulin Lispro [humaLOG] See Protocol SQ AC-TID #1 19 Rx








                                    Allergies











Allergy/AdvReac Type Severity Reaction Status Date / Time


 


Barbiturates Allergy  Rash/Hives Verified 19 16:45


 


cephalexin monohydrate Allergy  Rash/Hives Verified 19 16:45





[From Keflex]     


 


morphine Allergy  Rash/Hives Verified 19 16:45


 


Penicillins Allergy  Rash/Hives Verified 19 16:45


 


phenobarbital Allergy  Swelling Verified 19 16:45


 


venom-honey bee Allergy  Swelling Verified 19 16:45





[bee venom (honey bee)]     


 


amlodipine besylate AdvReac  Vomiting Verified 19 16:45





[From Norvasc]     














Surgical - Exam


                                   Vital Signs











Temp Pulse Resp BP Pulse Ox


 


 97.9 F   99   18   118/64   97 


 


 19 15:06  19 15:06  19 15:06  19 15:06  19 15:06














- General


well developed, well nourished, no distress, no pain, chronically ill





- Eyes


PERRL, normal ocular movement





- ENT


normal pinna, normal nares, normal mucosa, no hearing loss, no congestion





- Neck





Neck is supple, no lymphadenopathy.


no masses, no bruits, trachea midline, no venous distension





- Respiratory





Lung sounds essentially clear throughout.  Respirations are symmetrical and 

nonlabored.





- Cardiovascular





Regular rhythm and rate.  S1 and S2 present, negative for S3, gallop or murmur. 

No peripheral edema.





- Abdomen





Abdomen is soft, nontender nondistended.  Active bowel sounds present all 4 

abdominal quadrants.  No guarding or rigidity.  No organomegaly present.





- Genitourinary





Deferred





- Rectum





Deferred





- Integumentary





Right great toe wound Sepulveda's grade 3.  Right great toe erythema.


no rash, no growths, no abnormal pigmentation





- Neurologic





Cranial nerves II through XII grossly intact.





- Musculoskeletal





Chronic Left arm paralysis and left lower extremity weakness.





- Psychiatric


oriented to person, oriented to place, speech is normal





Results





- Labs





                                 19 16:00





                                 19 16:00


                  Abnormal Lab Results - Last 24 Hours (Table)











  19 Range/Units





  16:00 16:00 20:28 


 


RBC  3.04 L    (3.80-5.40)  m/uL


 


Hgb  9.4 L    (11.4-16.0)  gm/dL


 


Hct  28.8 L    (34.0-46.0)  %


 


BUN   26 H   (7-17)  mg/dL


 


Glucose   450 H   (74-99)  mg/dL


 


POC Glucose (mg/dL)    251 H  (75-99)  mg/dL


 


Albumin   3.3 L   (3.5-5.0)  g/dL


 


Urine Glucose (UA)     (Negative)  


 


Ur Leukocyte Esterase     (Negative)  


 


Urine WBC     (0-5)  /hpf


 


Hyaline Casts     (0-2)  /lpf














  19 Range/Units





  06:05 07:07 


 


RBC    (3.80-5.40)  m/uL


 


Hgb    (11.4-16.0)  gm/dL


 


Hct    (34.0-46.0)  %


 


BUN    (7-17)  mg/dL


 


Glucose    (74-99)  mg/dL


 


POC Glucose (mg/dL)   51 L  (75-99)  mg/dL


 


Albumin    (3.5-5.0)  g/dL


 


Urine Glucose (UA)  3+ H   (Negative)  


 


Ur Leukocyte Esterase  Small H   (Negative)  


 


Urine WBC  8 H   (0-5)  /hpf


 


Hyaline Casts  3 H   (0-2)  /lpf








                      Microbiology - Last 24 Hours (Table)











 19 16:00 Gram Stain - Preliminary





 Foot - Right Wound Culture - Preliminary








                                 Diabetes panel











  19 Range/Units





  16:00 


 


Sodium  138  (137-145)  mmol/L


 


Potassium  4.9  (3.5-5.1)  mmol/L


 


Chloride  101  ()  mmol/L


 


Carbon Dioxide  29  (22-30)  mmol/L


 


BUN  26 H  (7-17)  mg/dL


 


Creatinine  0.97  (0.52-1.04)  mg/dL


 


Glucose  450 H  (74-99)  mg/dL


 


Calcium  8.8  (8.4-10.2)  mg/dL


 


AST  20  (14-36)  U/L


 


ALT  22  (9-52)  U/L


 


Alkaline Phosphatase  72  ()  U/L


 


Total Protein  6.7  (6.3-8.2)  g/dL


 


Albumin  3.3 L  (3.5-5.0)  g/dL








                                  Calcium panel











  19 Range/Units





  16:00 


 


Calcium  8.8  (8.4-10.2)  mg/dL


 


Albumin  3.3 L  (3.5-5.0)  g/dL








                                 Pituitary panel











  19 Range/Units





  16:00 


 


Sodium  138  (137-145)  mmol/L


 


Potassium  4.9  (3.5-5.1)  mmol/L


 


Chloride  101  ()  mmol/L


 


Carbon Dioxide  29  (22-30)  mmol/L


 


BUN  26 H  (7-17)  mg/dL


 


Creatinine  0.97  (0.52-1.04)  mg/dL


 


Glucose  450 H  (74-99)  mg/dL


 


Calcium  8.8  (8.4-10.2)  mg/dL








                                  Adrenal panel











  19 Range/Units





  16:00 


 


Sodium  138  (137-145)  mmol/L


 


Potassium  4.9  (3.5-5.1)  mmol/L


 


Chloride  101  ()  mmol/L


 


Carbon Dioxide  29  (22-30)  mmol/L


 


BUN  26 H  (7-17)  mg/dL


 


Creatinine  0.97  (0.52-1.04)  mg/dL


 


Glucose  450 H  (74-99)  mg/dL


 


Calcium  8.8  (8.4-10.2)  mg/dL


 


Total Bilirubin  0.3  (0.2-1.3)  mg/dL


 


AST  20  (14-36)  U/L


 


ALT  22  (9-52)  U/L


 


Alkaline Phosphatase  72  ()  U/L


 


Total Protein  6.7  (6.3-8.2)  g/dL


 


Albumin  3.3 L  (3.5-5.0)  g/dL














Assessment and Plan


Assessment: 





1.  Right great toe ulcer


2.  Diabetes mellitus type 2


3.  History of hypertension


4.  History of hyperlipidemia


5.  History of CVA with left-sided weakness, wheelchair bound


6.  History of myocardial infarction


7.  History of atherosclerotic heart disease with previous stenting


8.  Gastroesophageal reflux disease


9.  History of bipolar disorder


10.  History of anxiety


11.  History of depression


12.  Remote history of nicotine dependence


13.  History of DVT


14.  History of COPD


15.  History of anemia.


Plan: 





The patient was seen and examined at her bedside on the fourth floor medical 

surgical unit.  Her chart and diagnostics review.  Her case was discussed with 

Dr. Gerardo Ferrell.  At this time we will obtain a lower extremity arterial 

Doppler with toe pressures.  Wound care instructions are as follows, irrigate 

the wound to her right great toe with half strength peroxide, cover with 4 x 4 

gauze and wrapped with Kerlix.  No weightbearing to her right foot.  The patient

will be tentatively scheduled for an amputation of her right great toe on 

Monday, 2019.  Medical management and other comorbidities per primary care

service.  Antibiotic management per infectious disease.  More recommendations to

follow based on patient's clinical course.





Thank you for this consult and we will look for to working with him the care of 

your patient.


Time with Patient: Greater than 30

## 2019-11-02 NOTE — P.HPIM
History of Present Illness


H&P Date: 19








Morenita Ramirez is a 59-year-old female who presented to [facility] emergency room due to right foot cellulitis and ulceration on the right

great toe, patient was followed by Dr. Roy podiatrist, she failed outpatient 

treatment for foot cellulitis and toe ulceration were worsening, she was sent to

emergency room and was admitted to medical floor, consultation to infectious 

disease and surgery were initiated, patient was started on IV antibiotics in the

emergency room.


Patient has a known history of diabetes mellitus type 1 maintained on insulin he

also has a known history of stroke with left sided weakness, coronary artery 

disease was previous history of angioplasty and stent placement, history of 

peripheral neuropathy, history of hypertension, history of hyperlipidemia, 

history of depression was anxiety, history of COPD DVT, history of chronic 

anemia.





Today patient was seen and examined on the medical floor she is alert and 

oriented 3 in no apparent distress she is complaining of pain in her foot 

otherwise she denies any complaints there is no fever or chills no headache or 

dizziness no chest pain no shortness of breath no cough no nausea or vomiting no

abdominal pain no diarrhea no burning was urination no frequency or urgency no 

hematuria.  Patient has chronic neurological deficit without any change at this 

time.  She is wheelchair bound since her stroke 4 years.





Past Medical History


Past Medical History: Asthma, Coronary Artery Disease (CAD), Chest Pain / 

Angina, Heart Failure, COPD, CVA/TIA, Diabetes Mellitus, Deep Vein Thrombosis 

(DVT), Eye Disorder, GERD/Reflux, Hyperlipidemia, Hypertension, Myocardial Inf

arction (MI), Neurologic Disorder


Additional Past Medical History / Comment(s): HX OF CVA X3 (LAST 2015)-HAS LT 

ARM PARALYSIS & WEAKNESS LEFT LEG. MI .  DVT RT  AXILLA. RENAL FAILURE.  LOW

THYROID,  PERIPHERAL NEUROPATHY HANDS & FEET.  ANEMIA.  HX OF DKA. USES W/C. CON

STIPATION, ESOPHAGITIS.  EPIGLOTITIS.  HEADACHES SINCE CVA.  RETINOPATHY MARZENA 

EYES.  HX RT TOE INFECTION- GANGRENE, HAD AMP."i need eye sx-i have bleeding 

behind the eyes"


Last Myocardial Infarction Date:: 


History of Any Multi-Drug Resistant Organisms: MRSA


Date of last positivie culture/infection: 18


MDRO Source:: Right Foot


Past Surgical History: Appendectomy,  Section, Cholecystectomy, Heart 

Catheterization With Stent, Hysterectomy, Orthopedic Surgery


Additional Past Surgical History / Comment(s): Amputation Rt 2ND Toe.  C-S X3.  

EGD.  Bronchoscopy.  RT Arm Port Placed FOR AB RX; Removed.  6 CARDIAC STENTS. 

left shoulder bone removed


Past Anesthesia/Blood Transfusion Reactions: No Reported Reaction


Additional Past Anesthesia/Blood Transfusion Reaction / Comment(s): HX OF BLOOD 

TRANSFUSION- NO REACTION


Date of Last Stent Placement:: 2013


Past Psychological History: Anxiety, Bipolar, Depression


Additional Psychological History / Comment(s): PT HAS A CARE GIVER-LOLITA. UNABLE

TO AMBULATE(LT SIDE WAS AFFECTED BY STROKE) USES A W/C. also has 

glucometer,shower chair(has cane /walker that she previously used).  Patient 

does continue to smoke, the caregivers relate that the patient is unmanageable 

without tobacco use.  She is on multiple psychiatric medications it was not 

thought to be a candidate for Chantix.


Smoking Status: Former smoker


Past Alcohol Use History: None Reported


Additional Past Alcohol Use History / Comment(s): Patient states she is using a 

e cigarettes. She has a 24-hour caregiver that lives with her.  She is 

wheelchair bound. states she quit smoking a year and a half ago. Using marijuana

edibles


Past Drug Use History: Marijuana


Additional Drug Use History / Comment(s): using marijuana edibles





- Past Family History


  ** Father


Family Medical History: Unable to Obtain, Coronary Artery Disease (CAD), 

Diabetes Mellitus





  ** Mother


Family Medical History: COPD





Medications and Allergies


                                Home Medications











 Medication  Instructions  Recorded  Confirmed  Type


 


Albuterol Inhaler [Ventolin Hfa 2 puff INHALATION RT-Q4H PRN 16 

History





Inhaler]    


 


Aspirin EC [Ecotrin Low Dose] 81 mg PO DAILY 16 History


 


Famotidine [Pepcid] 20 mg PO DAILY 16 History


 


Valproic Acid [Depakene] 250 mg PO DAILY 16 History


 


Ergocalciferol [Vitamin D2 50,000 unit PO SA 10/06/16 11/01/19 History





(DRISDOL)]    


 


HYDROcodone/APAP 10-325MG [Norco 1 tab PO Q6H PRN 17 History





]    


 


DULoxetine HCL [Cymbalta] 60 mg PO DAILY 17 History


 


Ferrous Sulfate [Iron (65  mg PO DAILY 17 History





Elemental)]    


 


ALPRAZolam [Xanax] 1 mg PO Q8H PRN 17 History


 


Vitamin B Complex 1 cap PO DAILY 18 History


 


Ondansetron [Zofran] 4 mg PO DAILY PRN 18 History


 


Atorvastatin [Lipitor] 20 mg PO DAILY 19 History


 


QUEtiapine FUMARATE [SEROquel] 25 mg PO BID 19 History


 


QUEtiapine [SEROquel] 100 mg PO HS 19 History


 


Insulin Glargine [Lantus] 22 unit SQ HS #0 19 Rx


 


Insulin Lispro [humaLOG] See Protocol SQ AC-TID #1 19 Rx








                                    Allergies











Allergy/AdvReac Type Severity Reaction Status Date / Time


 


Barbiturates Allergy  Rash/Hives Verified 19 16:45


 


cephalexin monohydrate Allergy  Rash/Hives Verified 19 16:45





[From Keflex]     


 


morphine Allergy  Rash/Hives Verified 19 16:45


 


Penicillins Allergy  Rash/Hives Verified 19 16:45


 


phenobarbital Allergy  Swelling Verified 19 16:45


 


venom-honey bee Allergy  Swelling Verified 19 16:45





[bee venom (honey bee)]     


 


amlodipine besylate AdvReac  Vomiting Verified 19 16:45





[From Norvasc]     














Physical Exam


Vitals: 


                                   Vital Signs











  Temp Pulse Pulse Resp BP BP Pulse Ox


 


 19 11:50   92     


 


 19 11:39   92     


 


 19 08:47   92     


 


 19 08:45     18   


 


 19 08:37   92     


 


 19 07:00  98.5 F   93  16   123/66  92 L


 


 19 02:11  98.6 F   85  17   95/53  95


 


 19 19:25  99.2 F   98  16   144/61  96


 


 19 18:45   99   20  122/65   99


 


 19 16:49   101 H   16  116/82   96


 


 19 15:06  97.9 F  99   18  118/64   97








                                Intake and Output











 19





 22:59 06:59 14:59


 


Intake Total 200 1000 596


 


Balance 200 1000 596


 


Intake:   


 


  Intake, IV Titration 200 1000 





  Amount   


 


    Sodium Chloride 0.9% 1, 200 1000 





    000 ml @ 100 mls/hr IV .   





    Q10H TYRON Rx#:[id number]   


 


  Oral   596


 


Other:   


 


  Voiding Method Toilet  Bedside Commode


 


  # Voids 1 1 1


 


  # Bowel Movements   1


 


  Weight 53.524 kg  














In general patient is alert and oriented 3 in no apparent distress


HEENT head normocephalic and atraumatic


Neck is supple no JVD no goiter no lymphadenopathy


Chest exam reveals a few scattered crackles no wheezing


Cardiac exam reveals regular heart sounds no gallops no murmurs


Abdomen is soft nontender no organomegaly with normal bowel sounds


Extremity exam reveals no edema, the right great toe is swollen was ulceration 

and scabbing and surrounding erythema extending to the base of the foot


Neurological examination reveals left sided weakness which is chronic involving 

the upper and lower extremities





Results


CBC & Chem 7: 


                                 19 16:00





                                 19 16:00


Labs: 


                  Abnormal Lab Results - Last 24 Hours (Table)











  19 Range/Units





  16:00 16:00 20:28 


 


RBC  3.04 L    (3.80-5.40)  m/uL


 


Hgb  9.4 L    (11.4-16.0)  gm/dL


 


Hct  28.8 L    (34.0-46.0)  %


 


BUN   26 H   (7-17)  mg/dL


 


Glucose   450 H   (74-99)  mg/dL


 


POC Glucose (mg/dL)    251 H  (75-99)  mg/dL


 


Albumin   3.3 L   (3.5-5.0)  g/dL


 


Urine Glucose (UA)     (Negative)  


 


Ur Leukocyte Esterase     (Negative)  


 


Urine WBC     (0-5)  /hpf


 


Hyaline Casts     (0-2)  /lpf














  19 Range/Units





  06:05 07:07 11:42 


 


RBC     (3.80-5.40)  m/uL


 


Hgb     (11.4-16.0)  gm/dL


 


Hct     (34.0-46.0)  %


 


BUN     (7-17)  mg/dL


 


Glucose     (74-99)  mg/dL


 


POC Glucose (mg/dL)   51 L  343 H  (75-99)  mg/dL


 


Albumin     (3.5-5.0)  g/dL


 


Urine Glucose (UA)  3+ H    (Negative)  


 


Ur Leukocyte Esterase  Small H    (Negative)  


 


Urine WBC  8 H    (0-5)  /hpf


 


Hyaline Casts  3 H    (0-2)  /lpf








                      Microbiology - Last 24 Hours (Table)











 19 16:00 Gram Stain - Preliminary





 Foot - Right Wound Culture - Preliminary














Thrombosis Risk Factor Assmnt





- Choose All That Apply


Any of the Below Risk Factors Present?: Yes


Each Factor Represents 1 point: Abnormal pulmonary function (COPD)


Other Risk Factors: No


Other congenital or acquired thrombophilia - If yes, enter type in comment: No


Thrombosis Risk Factor Assessment Total Risk Factor Score: 1


Thrombosis Risk Factor Assessment Level: Low Risk





Assessment and Plan


Plan: 





#1 right lower extremity cellulitis with right big toe ulceration and swelling





#2 insulin-dependent diabetes mellitus very brittle continue was current insulin

dose and add sliding scale





#3 history of stroke was left sided weakness in 2015 patient is wheelchair-bound

and a 24-hour care





#4 underlying history of hypertension





#5 underlying history of hyperlipidemia





#6 underlying history of COPD patient quit smoking recently





#7 underlying history of depression and bipolar disorder and anxiety disorder





At this time patient was started on IV antibiotic continue


Continue current insulin dose


Awaiting input from infectious disease and vascular surgery


For DVT prophylaxis she will be started on Lovenox


For GI prophylaxis patient is maintained on Pepcid

## 2019-11-02 NOTE — P.CONS
History of Present Illness





- Reason for Consult


Consult date: 19





- Chief Complaint


right great toe ulcer





- History of Present Illness





59-year-old woman who is known to the infectious disease service from prior 

diabetic lower extremity ulcerations.  She is following the wound healing Center

most recently for the left heel ulceration that healed.  He is now presenting 

from her care setting with the significant change to the right foot at the great

toe.  There is  ulceration that has progressively worsened and now she has 

evidence of some gangrenous changes of the toe at the time of her presentation. 

She's been seen by vascular surgery and infectious disease consultation was 

requested.  The patient has mental compromise but is able to relate that her jayashree

n is under good control.  She is denying severe fevers or chills but just does 

not feel well.





Review of Systems





patient is a poor historian but relates


HEENT:Denies headache or acute visual change.  Denies sinus or mouth 

discomforts.  Denies neck stiffness or pain.  Denies significant oral cavity 

pain.  Denies difficulty on swallowing.





Lungs: Chronic shortness of breath cough no hemoptysis





Cardiovascular: Denies significant shortness of breath, chest pain, chest wall 

pain, 


orthopnea, dyspnea on exertion, syncope





Gastrointestinal:Denies nausea, vomiting, diarrhea, constipation, hematemesis, 

melena, hematochezia.  No no significant change of bowel habit noticed.





Musculoskeletal: denies significant myalgias or arthralgias.  No new joint 

swelling.  Denies new back pain.





Skin: Denies new rash or lesions.  No new ulcers or wounds are related..





Neuro: Denies headache or visual change.  Denies any new onset weakness or 

difficulty with ambulation.  Denies falls or seizures.





Psychiatric:Denies anxiety or depression.





Endocrine: Denies significant fatigue, denies significant weight loss or weight 

gain.














Past Medical History


Past Medical History: Asthma, Coronary Artery Disease (CAD), Chest Pain / 

Angina, Heart Failure, COPD, CVA/TIA, Diabetes Mellitus, Deep Vein Thrombosis 

(DVT), Eye Disorder, GERD/Reflux, Hyperlipidemia, Hypertension, Myocardial 

Infarction (MI), Neurologic Disorder


Additional Past Medical History / Comment(s): HX OF CVA X3 (LAST 2015)-HAS LT 

ARM PARALYSIS & WEAKNESS LEFT LEG. MI 2012.  DVT RT  AXILLA. RENAL FAILURE.  LOW

THYROID,  PERIPHERAL NEUROPATHY HANDS & FEET.  ANEMIA.  HX OF DKA. USES W/C. 

CONSTIPATION, ESOPHAGITIS.  EPIGLOTITIS.  HEADACHES SINCE CVA.  RETINOPATHY MARZENA 

EYES.  HX RT TOE INFECTION- GANGRENE, HAD AMP."i need eye sx-i have bleeding 

behind the eyes"


Last Myocardial Infarction Date:: 


History of Any Multi-Drug Resistant Organisms: MRSA


Year Discovered:: 18


MDRO Source:: Right Foot


Past Surgical History: Appendectomy,  Section, Cholecystectomy, Heart 

Catheterization With Stent, Hysterectomy, Orthopedic Surgery


Additional Past Surgical History / Comment(s): Amputation Rt 2ND Toe.  C-S X3.  

EGD.  Bronchoscopy.  RT Arm Port Placed FOR AB RX; Removed.  6 CARDIAC STENTS. l

eft shoulder bone removed


Past Anesthesia/Blood Transfusion Reactions: No Reported Reaction


Additional Past Anesthesia/Blood Transfusion Reaction / Comm: HX OF BLOOD 

TRANSFUSION- NO REACTION


Date of Last Stent Placement:: 2013


Past Psychological History: Anxiety, Bipolar, Depression


Additional Psychological History / Comment(s): PT HAS A CARE GIVER-LOLITA. UNABLE

TO AMBULATE(LT SIDE WAS AFFECTED BY STROKE) USES A W/C. also has 

glucometer,shower chair(has cane /walker that she previously used).  Patient 

does continue to smoke, the caregivers relate that the patient is unmanageable 

without tobacco use.  She is on multiple psychiatric medications it was not 

thought to be a candidate for Chantix.


Smoking Status: Current every day smoker


Past Alcohol Use History: None Reported


Additional Past Alcohol Use History / Comment(s): Using marijuana edibles


Past Drug Use History: Marijuana


Additional Drug Use History / Comment(s): using marijuana edibles





- Past Family History


  ** Father


Family Medical History: Unable to Obtain, Coronary Artery Disease (CAD), 

Diabetes Mellitus





  ** Mother


Family Medical History: COPD





Medications and Allergies


Home Medications and Allergies Comment(s): 





                               Current Medications





Acetaminophen (Tylenol Tab)  650 mg PO Q6HR PRN


   PRN Reason: Mild Pain or Fever > 100.5


Hydrocodone Bitart/Acetaminophen (Norco 10)  1 each PO Q6H PRN


   PRN Reason: MODERATE Pain


   Last Admin: 19 20:13 Dose:  1 each


   Documented by: 


Albuterol Sulfate (Ventolin Nebulized)  2.5 mg INHALATION RT-Q4H PRN


   PRN Reason: Shortness Of Breath


   Last Admin: 19 21:50 Dose:  2.5 mg


   Documented by: 


Alprazolam (Xanax)  1 mg PO Q8H PRN


   PRN Reason: Anxiety


Aspirin (Aspirin)  81 mg PO DAILY [facility]


   Last Admin: 19 08:43 Dose:  81 mg


   Documented by: 


Atorvastatin Calcium (Lipitor)  20 mg PO DAILY [facility]


   Last Admin: 19 08:43 Dose:  20 mg


   Documented by: 


Divalproex Sodium (Depakote)  250 mg PO DAILY [facility]


   Last Admin: 19 08:43 Dose:  250 mg


   Documented by: 


Duloxetine HCl (Cymbalta)  60 mg PO DAILY [facility]


   Last Admin: 19 08:43 Dose:  60 mg


   Documented by: 


Enoxaparin Sodium (Lovenox)  40 mg SQ DAILY [facility]


   Last Admin: 19 13:54 Dose:  40 mg


   Documented by: 


Ergocalciferol (Vitamin D2)  50,000 unit PO SA [facility]


   Last Admin: 19 08:43 Dose:  50,000 unit


   Documented by: 


Famotidine (Pepcid)  20 mg PO DAILY [facility]


   Last Admin: 19 08:43 Dose:  20 mg


   Documented by: 


Ferrous Sulfate (Feosol)  325 mg PO DAILY [facility]


   Last Admin: 19 08:43 Dose:  325 mg


   Documented by: 


Vancomycin HCl 1,000 mg/ (Sodium Chloride)  250 mls @ 125 mls/hr IVPB Q16H [facility]


   Last Admin: 19 22:08 Dose:  125 mls/hr


   Documented by: 


Sodium Chloride (Saline 0.9%)  1,000 mls @ 100 mls/hr IV .Q10H [facility]


   Last Admin: 19 13:53 Dose:  100 mls/hr


   Documented by: 


Levofloxacin 750 mg/ IV (Solution)  150 mls @ 100 mls/hr IVPB Q24H [facility]


   Last Admin: 19 16:59 Dose:  100 mls/hr


   Documented by: 


Ibuprofen (Motrin)  400 mg PO Q6HR PRN


   PRN Reason: Mild Pain or Fever > 100.5


Insulin Aspart (Novolog)  0 unit SQ ACHS [facility]; Protocol


   Last Admin: 19 22:07 Dose:  3 unit


   Documented by: 


Insulin Detemir (Levemir)  22 unit SQ HS [facility]


   Last Admin: 19 22:08 Dose:  22 unit


   Documented by: 


Miscellaneous Information (Vancomycin Trough Due)  1 each MISCELLANE ONCE ONE


   Stop: 19 05:01


Multivit/Ca Carb/B Cmplx/FA/Prenat (Nephrocaps)  1 each PO DAILY [facility]


   Last Admin: 19 08:43 Dose:  1 each


   Documented by: 


Naloxone HCl (Narcan)  0.2 mg IV Q2M PRN


   PRN Reason: Opioid Reversal


Ondansetron HCl (Zofran)  4 mg PO DAILY PRN


   PRN Reason: Nausea


Quetiapine Fumarate (Seroquel)  100 mg PO HS [facility]


   Last Admin: 19 22:07 Dose:  100 mg


   Documented by: 


Quetiapine Fumarate (Seroquel)  25 mg PO BID [facility]


   Last Admin: 19 22:07 Dose:  25 mg


   Documented by: 








                                Home Medications











 Medication  Instructions  Recorded  Confirmed  Type


 


Albuterol Inhaler [Ventolin Hfa 2 puff INHALATION RT-Q4H PRN 16 

History





Inhaler]    


 


Aspirin EC [Ecotrin Low Dose] 81 mg PO DAILY 16 History


 


Famotidine [Pepcid] 20 mg PO DAILY 16 History


 


Valproic Acid [Depakene] 250 mg PO DAILY 16 History


 


Ergocalciferol [Vitamin D2 50,000 unit PO SA 10/06/16 11/01/19 History





(DRISDOL)]    


 


HYDROcodone/APAP 10-325MG [Norco 1 tab PO Q6H PRN 17 History





]    


 


DULoxetine HCL [Cymbalta] 60 mg PO DAILY 17 History


 


Ferrous Sulfate [Iron (65  mg PO DAILY 17 History





Elemental)]    


 


ALPRAZolam [Xanax] 1 mg PO Q8H PRN 17 History


 


Vitamin B Complex 1 cap PO DAILY 18 History


 


Ondansetron [Zofran] 4 mg PO DAILY PRN 18 History


 


Atorvastatin [Lipitor] 20 mg PO DAILY 19 History


 


QUEtiapine FUMARATE [SEROquel] 25 mg PO BID 19 History


 


QUEtiapine [SEROquel] 100 mg PO HS 19 History


 


Insulin Glargine [Lantus] 22 unit SQ HS #0 19 Rx


 


Insulin Lispro [humaLOG] See Protocol SQ AC-TID #1 19 Rx








                                    Allergies











Allergy/AdvReac Type Severity Reaction Status Date / Time


 


Barbiturates Allergy  Rash/Hives Verified 19 16:45


 


cephalexin monohydrate Allergy  Rash/Hives Verified 19 16:45





[From Keflex]     


 


morphine Allergy  Rash/Hives Verified 19 16:45


 


Penicillins Allergy  Rash/Hives Verified 19 16:45


 


phenobarbital Allergy  Swelling Verified 19 16:45


 


venom-honey bee Allergy  Swelling Verified 19 16:45





[bee venom (honey bee)]     


 


amlodipine besylate AdvReac  Vomiting Verified 19 16:45





[From Norvasc]     














Physical Exam


Vitals: 


                                   Vital Signs











  Temp Pulse Pulse Resp BP Pulse Ox


 


 19 21:50   91    


 


 19 19:50  99.3 F   96  14  118/61  94 L


 


 19 17:16   89    


 


 19 17:07   89    


 


 19 16:50     18  


 


 19 15:00  99.3 F   87  16  102/68  96


 


 19 11:50   92    


 


 19 11:39   92    


 


 19 08:47   92    


 


 19 08:45     18  


 


 19 08:37   92    


 


 19 07:00  98.5 F   93  16  123/66  92 L


 


 19 02:11  98.6 F   85  17  95/53  95








                                Intake and Output











 19





 06:59 14:59 22:59


 


Intake Total 1000 1642 296


 


Balance 1000 1642 296


 


Intake:   


 


  Intake, IV Titration 1000 750 





  Amount   


 


    Sodium Chloride 0.9% 1, 1000 750 





    000 ml @ 100 mls/hr IV .   





    Q10H [facility] Rx#:[id number]   


 


  Oral  892 296


 


Other:   


 


  Voiding Method  Bedside Commode 


 


  # Voids 1 1 1


 


  # Bowel Movements  1 


 


  Weight  53.524 kg 














Gen: This is a 89-year-old  female sitting up in bed and appears to be

comfortable and in no acute distress.  


HEENT: Head is atraumatic, normocephalic. Pupils equal, round. Sclerae is 

anicteric. 


NECK: Supple. No JVD. No lymphadenopathy. No thyromegaly. 


LUNGS: Clear to auscultation. No wheezes or rhonchi.  No intercostal 

retractions.


HEART: Regular rate and rhythm. No murmur. 


ABDOMEN: Soft. Bowel sounds are present. No masses.  No tenderness.


EXTREMITIES: No pedal edema.  No calf tenderness.  To the right Great toe there 

is evidence of gangrenous change, soft tissue liquefication foul odor is noted


NEUROLOGICAL: Patient is awake, alert and oriented x3 recognizably observe her 

by name does have difficulty with her speech poststroke





Results


CBC & Chem 7: 


                                 19 16:00





                                 19 16:00


Labs: 


                  Abnormal Lab Results - Last 24 Hours (Table)











  19 Range/Units





  06:05 07:07 11:42 


 


POC Glucose (mg/dL)   51 L  343 H  (75-99)  mg/dL


 


Urine Glucose (UA)  3+ H    (Negative)  


 


Ur Leukocyte Esterase  Small H    (Negative)  


 


Urine WBC  8 H    (0-5)  /hpf


 


Hyaline Casts  3 H    (0-2)  /lpf














  19 Range/Units





  16:32 21:06 


 


POC Glucose (mg/dL)  258 H  213 H  (75-99)  mg/dL


 


Urine Glucose (UA)    (Negative)  


 


Ur Leukocyte Esterase    (Negative)  


 


Urine WBC    (0-5)  /hpf


 


Hyaline Casts    (0-2)  /lpf








                      Microbiology - Last 24 Hours (Table)











 19 16:00 Blood Culture - Preliminary





 Blood    No Growth after 24 hours


 


 19 16:00 Gram Stain - Preliminary





 Foot - Right Wound Culture - Preliminary








                               Laboratory Results











WBC  8.4 k/uL (3.8-10.6)   19  16:00    


 


RBC  3.04 m/uL (3.80-5.40)  L  19  16:00    


 


Hgb  9.4 gm/dL (11.4-16.0)  L  19  16:00    


 


Hct  28.8 % (34.0-46.0)  L  19  16:00    


 


MCV  94.8 fL (80.0-100.0)   19  16:00    


 


MCH  30.9 pg (25.0-35.0)   19  16:00    


 


MCHC  32.6 g/dL (31.0-37.0)   19  16:00    


 


RDW  12.5 % (11.5-15.5)   19  16:00    


 


Plt Count  243 k/uL (150-450)   19  16:00    


 


Neutrophils %  82 %  19  16:00    


 


Lymphocytes %  12 %  19  16:00    


 


Monocytes %  5 %  19  16:00    


 


Eosinophils %  0 %  19  16:00    


 


Basophils %  1 %  19  16:00    


 


Neutrophils #  6.9 k/uL (1.3-7.7)   19  16:00    


 


Lymphocytes #  1.0 k/uL (1.0-4.8)   19  16:00    


 


Monocytes #  0.4 k/uL (0-1.0)   19  16:00    


 


Eosinophils #  0.0 k/uL (0-0.7)   19  16:00    


 


Basophils #  0.1 k/uL (0-0.2)   19  16:00    


 


Sodium  138 mmol/L (137-145)   19  16:00    


 


Potassium  4.9 mmol/L (3.5-5.1)   19  16:00    


 


Chloride  101 mmol/L ()   19  16:00    


 


Carbon Dioxide  29 mmol/L (22-30)   19  16:00    


 


Anion Gap  8 mmol/L  19  16:00    


 


BUN  26 mg/dL (7-17)  H  19  16:00    


 


Creatinine  0.97 mg/dL (0.52-1.04)   19  16:00    


 


Est GFR (CKD-EPI)AfAm  75  (>60 ml/min/1.73 sqM)   19  16:00    


 


Est GFR (CKD-EPI)NonAf  65  (>60 ml/min/1.73 sqM)   19  16:00    


 


Glucose  450 mg/dL (74-99)  H  19  16:00    


 


POC Glucose (mg/dL)  213 mg/dL (75-99)  H  19  21:06    


 


POC Glu Operater Shaunna Munoz   19  21:06    


 


Plasma Lactic Acid Carmelo  0.9 mmol/L (0.7-2.0)   19  16:00    


 


Calcium  8.8 mg/dL (8.4-10.2)   19  16:00    


 


Total Bilirubin  0.3 mg/dL (0.2-1.3)   19  16:00    


 


AST  20 U/L (14-36)   19  16:00    


 


ALT  22 U/L (9-52)   19  16:00    


 


Alkaline Phosphatase  72 U/L ()   19  16:00    


 


Total Protein  6.7 g/dL (6.3-8.2)   19  16:00    


 


Albumin  3.3 g/dL (3.5-5.0)  L  19  16:00    


 


Urine Color  Yellow   19  06:05    


 


Urine Appearance  Clear  (Clear)   19  06:05    


 


Urine pH  5.5  (5.0-8.0)   19  06:05    


 


Ur Specific Gravity  1.013  (1.001-1.035)   19  06:05    


 


Urine Protein  Negative  (Negative)   19  06:05    


 


Urine Glucose (UA)  3+  (Negative)  H  19  06:05    


 


Urine Ketones  Negative  (Negative)   19  06:05    


 


Urine Blood  Negative  (Negative)   19  06:05    


 


Urine Nitrite  Negative  (Negative)   19  06:05    


 


Urine Bilirubin  Negative  (Negative)   19  06:05    


 


Urine Urobilinogen  <2.0 mg/dL (<2.0)   19  06:05    


 


Ur Leukocyte Esterase  Small  (Negative)  H  19  06:05    


 


Urine RBC  4 /hpf (0-5)   19  06:05    


 


Urine WBC  8 /hpf (0-5)  H  19  06:05    


 


Hyaline Casts  3 /lpf (0-2)  H  19  06:05    








                                  Microbiology





19 16:00   Blood   Blood Culture - Preliminary


                            No Growth after 24 hours


19 16:00   Foot - Right   Gram Stain - Preliminary


19 16:00   Foot - Right   Wound Culture - Preliminary








Comments: 


Right foot x-ray is reviewed revealing evidence of the osteomyelitis of the 

great toe distal phalanx with bony destruction








Assessment and Plan


(1) Diabetic ulcer of foot associated with type 2 diabetes mellitus, with 

necrosis of bone


Narrative/Plan: 


59 -year-old woman who has multiple medical troubles including stroke, diabetes 

hypertension peripheral vascular disease with prior diabetic foot ulcerations 

presents to hospital with worsening ulceration to the right foot great toe.  The

patient has been followed in the outpatient clinic.  However recently there's 

been no significant change to the right great toe now with evidence of the 

gangrenous changes to the toe.  She's been seen by the vascular surgeon with 

plans for at least distal toe amputation hoping to save the first metatarsal 

head given her difficulties with transfer from her prior stroke.  Antibiotic 

therapy is with vancomycin and Zosyn until further cultures are available but s

he does have recent MRSA infection noted.  Local wound care as absorptive 

dressing until surgical intervention.  Glucose control adequate protein intake 

and a multivitamin are all important.


Current Visit: Yes   Status: Acute   Code(s): E11.621 - TYPE 2 DIABETES MELLITUS

WITH FOOT ULCER; L97.504 - NON-PRS CHRONIC ULCER OTH PRT UNSP FOOT W NECROSIS OF

BONE   SNOMED Code(s): 0012098438641


   





(2) Cellulitis of right foot


Current Visit: Yes   Status: Acute   Code(s): L03.115 - CELLULITIS OF RIGHT 

LOWER LIMB   SNOMED Code(s): 499390894


   





(3) MRSA (methicillin resistant Staphylococcus aureus) infection


Current Visit: Yes   Status: Acute   Code(s): A49.02 - METHICILLIN RESIS STAPH 

INFECTION, UNSP SITE   SNOMED Code(s): 877493453

## 2019-11-03 LAB
GLUCOSE BLD-MCNC: 207 MG/DL (ref 75–99)
GLUCOSE BLD-MCNC: 260 MG/DL (ref 75–99)
GLUCOSE BLD-MCNC: 321 MG/DL (ref 75–99)
GLUCOSE BLD-MCNC: 46 MG/DL (ref 75–99)
GLUCOSE BLD-MCNC: 54 MG/DL (ref 75–99)
GLUCOSE BLD-MCNC: 90 MG/DL (ref 75–99)

## 2019-11-03 RX ADMIN — ISODIUM CHLORIDE PRN MG: 0.03 SOLUTION RESPIRATORY (INHALATION) at 21:26

## 2019-11-03 RX ADMIN — INSULIN ASPART SCH UNIT: 100 INJECTION, SOLUTION INTRAVENOUS; SUBCUTANEOUS at 21:47

## 2019-11-03 RX ADMIN — Medication SCH MG: at 08:24

## 2019-11-03 RX ADMIN — INSULIN ASPART SCH: 100 INJECTION, SOLUTION INTRAVENOUS; SUBCUTANEOUS at 08:29

## 2019-11-03 RX ADMIN — FAMOTIDINE SCH MG: 20 TABLET, FILM COATED ORAL at 08:24

## 2019-11-03 RX ADMIN — ISODIUM CHLORIDE PRN MG: 0.03 SOLUTION RESPIRATORY (INHALATION) at 09:02

## 2019-11-03 RX ADMIN — INSULIN ASPART SCH UNIT: 100 INJECTION, SOLUTION INTRAVENOUS; SUBCUTANEOUS at 17:35

## 2019-11-03 RX ADMIN — DULOXETINE SCH MG: 60 CAPSULE, DELAYED RELEASE ORAL at 08:24

## 2019-11-03 RX ADMIN — HYDROCODONE BITARTRATE AND ACETAMINOPHEN PRN EACH: 10; 325 TABLET ORAL at 19:50

## 2019-11-03 RX ADMIN — Medication SCH EACH: at 08:25

## 2019-11-03 RX ADMIN — SODIUM CHLORIDE SCH MLS/HR: 9 INJECTION, SOLUTION INTRAVENOUS at 18:05

## 2019-11-03 RX ADMIN — DIVALPROEX SODIUM SCH MG: 250 TABLET, DELAYED RELEASE ORAL at 08:26

## 2019-11-03 RX ADMIN — INSULIN DETEMIR SCH: 100 INJECTION, SOLUTION SUBCUTANEOUS at 21:48

## 2019-11-03 RX ADMIN — ASPIRIN 81 MG CHEWABLE TABLET SCH MG: 81 TABLET CHEWABLE at 08:24

## 2019-11-03 RX ADMIN — INSULIN ASPART SCH: 100 INJECTION, SOLUTION INTRAVENOUS; SUBCUTANEOUS at 14:34

## 2019-11-03 RX ADMIN — LEVOFLOXACIN SCH MLS/HR: 750 INJECTION, SOLUTION INTRAVENOUS at 21:47

## 2019-11-03 RX ADMIN — CEFAZOLIN SCH: 330 INJECTION, POWDER, FOR SOLUTION INTRAMUSCULAR; INTRAVENOUS at 14:34

## 2019-11-03 RX ADMIN — ISODIUM CHLORIDE PRN MG: 0.03 SOLUTION RESPIRATORY (INHALATION) at 16:40

## 2019-11-03 RX ADMIN — ISODIUM CHLORIDE PRN MG: 0.03 SOLUTION RESPIRATORY (INHALATION) at 12:00

## 2019-11-03 RX ADMIN — ATORVASTATIN CALCIUM SCH MG: 20 TABLET, FILM COATED ORAL at 08:24

## 2019-11-03 RX ADMIN — ENOXAPARIN SODIUM SCH MG: 40 INJECTION SUBCUTANEOUS at 08:24

## 2019-11-03 RX ADMIN — CEFAZOLIN SCH: 330 INJECTION, POWDER, FOR SOLUTION INTRAMUSCULAR; INTRAVENOUS at 21:43

## 2019-11-03 NOTE — P.PN
Subjective


Progress Note Date: 11/03/19


Principal diagnosis: 





Diabetic infection with osteomyelitis right great toe.





Patient has no specific complaints today.





Objective





- Vital Signs


Vital signs: 


                                   Vital Signs











Temp  98.8 F   11/03/19 07:00


 


Pulse  86   11/03/19 07:00


 


Resp  14   11/03/19 07:00


 


BP  141/65   11/03/19 07:00


 


Pulse Ox  96   11/03/19 07:00








                                 Intake & Output











 11/02/19 11/03/19 11/03/19





 19:59 06:59 18:59


 


Intake Total   296


 


Output Total   300


 


Balance   -4


 


Weight   


 


Intake:   


 


  Intake, IV Titration   





  Amount   


 


    Sodium Chloride 0.9% 1,   





    000 ml @ 100 mls/hr IV .   





    Q10H [facility] Rx#:[id number]   


 


    Vancomycin 1,000 mg In   





    Sodium Chloride 0.9% 250   





    ml @ 125 mls/hr IVPB Q16H   





    [facility] Rx#:[id number]   


 


  Oral   296


 


Output:   


 


  Urine   300


 


Other:   


 


  Voiding Method   


 


  # Voids   


 


  # Bowel Movements   














- Exam





Patient has necrosis of the distal tip of the toe and the distal tip of the 

phalanx is palpable through the necrosis.  She is already status post amputation

of the right fourth and fifth toes.





- Labs


CBC & Chem 7: 


                                 11/01/19 16:00





                                 11/03/19 06:24


Labs: 


                  Abnormal Lab Results - Last 24 Hours (Table)











  11/02/19 11/02/19 11/02/19 Range/Units





  11:42 16:32 21:06 


 


POC Glucose (mg/dL)  343 H  258 H  213 H  (75-99)  mg/dL














  11/03/19 11/03/19 Range/Units





  07:17 07:33 


 


POC Glucose (mg/dL)  46 L  54 L  (75-99)  mg/dL








                      Microbiology - Last 24 Hours (Table)











 11/01/19 16:00 Blood Culture - Preliminary





 Blood    No Growth after 24 hours


 


 11/01/19 16:00 Gram Stain - Preliminary





 Foot - Right Wound Culture - Preliminary














Assessment and Plan


(1) Diabetic ulcer of right great toe


Current Visit: Yes   Status: Acute   Code(s): E11.621 - TYPE 2 DIABETES MELLITUS

WITH FOOT ULCER; L97.519 - NON-PRS CHRONIC ULCER OTH PRT RIGHT FOOT W UNSP 

SEVERITY   SNOMED Code(s): 78723222


   





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


Current Visit: Yes   Status: Acute   Code(s): L97.514 - NON-PRS CHRONIC ULCER 

OTH PRT RIGHT FOOT W NECROSIS OF BONE   SNOMED Code(s): 92198305


   


Plan: 





I discussed with the patient the findings.  She will require amputation of the 

distal great toe.  We also discussed the potential to leave this portion open 

for delayed primary closure due to the diabetic infection.  We also discussed 

the possibility of amputation of the second and third toes due to their 

prominence and vulnerability once the great toe is gone.  She verbalizes 

understanding of the options.  We will proceed tomorrow with debridement and 

make decisions accordingly.  She verbalizes agreement with our plan.

## 2019-11-03 NOTE — P.PN
Subjective


Progress Note Date: 11/03/19








Morenita Ramirez is a 59-year-old female who presented to [facility] emergency room due to right foot cellulitis and ulceration on the right

great toe, patient was followed by Dr. Roy podiatrist, she failed outpatient 

treatment for foot cellulitis and toe ulceration were worsening, she was sent to

emergency room and was admitted to medical floor, consultation to infectious 

disease and surgery were initiated, patient was started on IV antibiotics in the

emergency room.


Patient has a known history of diabetes mellitus type 1 maintained on insulin he

also has a known history of stroke with left sided weakness, coronary artery 

disease was previous history of angioplasty and stent placement, history of 

peripheral neuropathy, history of hypertension, history of hyperlipidemia, 

history of depression was anxiety, history of COPD DVT, history of chronic 

anemia.





On 11/2/2019 patient was seen and examined on the medical floor she is alert and

oriented 3 in no apparent distress she is complaining of pain in her foot 

otherwise she denies any complaints there is no fever or chills no headache or 

dizziness no chest pain no shortness of breath no cough no nausea or vomiting no

abdominal pain no diarrhea no burning was urination no frequency or urgency no 

hematuria.  Patient has chronic neurological deficit without any change at this 

time.  She is wheelchair bound since her stroke 4 years.





On 11/03/2019 patient was seen and examined she is alert and oriented in no 

apparent distress she is complaining of some pain and discomfort in her right 

foot otherwise she denies any complaints there is no fever or chills no headache

or dizziness no chest pain no shortness of breath no cough no nausea or vomiting

no abdominal pain no diarrhea and no urinary symptoms.  Patient had episodes of 

hypoglycemia in the morning at this point will decrease Lantus dose from 22 

units daily to 20 units daily.  Case was discussed with Dr. Frausto and plan is 

to proceed with right great toe amputation tomorrow.








Objective





- Vital Signs


Vital signs: 


                                   Vital Signs











Temp  99.1 F   11/03/19 01:10 EDT


 


Pulse  89   11/03/19 01:10 EDT


 


Resp  16   11/03/19 01:10 EDT


 


BP  112/66   11/03/19 01:10 EDT


 


Pulse Ox  96   11/03/19 01:10 EDT








                                 Intake & Output











 11/02/19 11/03/19 11/03/19





 19:59 06:59 18:59


 


Intake Total   


 


Balance   


 


Weight   


 


Intake:   


 


  Intake, IV Titration   





  Amount   


 


    Sodium Chloride 0.9% 1,   





    000 ml @ 100 mls/hr IV .   





    Q10H [facility] Rx#:[id number]   


 


    Vancomycin 1,000 mg In   





    Sodium Chloride 0.9% 250   





    ml @ 125 mls/hr IVPB Q16H   





    [facility] Rx#:[id number]   


 


  Oral   


 


Other:   


 


  Voiding Method   


 


  # Voids   


 


  # Bowel Movements   














- Exam








In general patient is alert and oriented 3 in no apparent distress


HEENT head normocephalic and atraumatic


Neck is supple no JVD no goiter no lymphadenopathy


Chest exam reveals a few scattered crackles no wheezing


Cardiac exam reveals regular heart sounds no gallops no murmurs


Abdomen is soft nontender no organomegaly with normal bowel sounds


Extremity exam reveals no edema, the right great toe is swollen with ulceration 

and scabbing and surrounding erythema extending to the base of the foot


Neurological examination reveals left sided weakness which is chronic involving 

the upper and lower extremities








- Labs


CBC & Chem 7: 


                                 11/01/19 16:00





                                 11/03/19 06:24


Labs: 


                  Abnormal Lab Results - Last 24 Hours (Table)











  11/02/19 11/02/19 11/02/19 Range/Units





  11:42 16:32 21:06 


 


POC Glucose (mg/dL)  343 H  258 H  213 H  (75-99)  mg/dL














  11/03/19 11/03/19 Range/Units





  07:17 07:33 


 


POC Glucose (mg/dL)  46 L  54 L  (75-99)  mg/dL








                      Microbiology - Last 24 Hours (Table)











 11/01/19 16:00 Blood Culture - Preliminary





 Blood    No Growth after 24 hours


 


 11/01/19 16:00 Gram Stain - Preliminary





 Foot - Right Wound Culture - Preliminary














Assessment and Plan


Plan: 





#1 right lower extremity cellulitis with right big toe ulceration and swelling





#2 insulin-dependent diabetes mellitus very brittle continue was current insulin

dose and add sliding scale





#3 history of stroke was left sided weakness in 2015 patient is wheelchair-bound

and a 24-hour care





#4 underlying history of hypertension





#5 underlying history of hyperlipidemia





#6 underlying history of COPD patient quit smoking recently





#7 underlying history of depression and bipolar disorder and anxiety disorder





At this time patient was started on IV antibiotic continue


Continue current insulin dose


Input from Dr. Moody reviewed continue with current antibiotics


Case discussed with Dr. Dencklau plan is for right great toe amputation tomorrow


For DVT prophylaxis she will be started on Lovenox


For GI prophylaxis patient is maintained on Pepcid

## 2019-11-04 LAB
ALBUMIN SERPL-MCNC: 2.6 G/DL (ref 3.5–5)
ALP SERPL-CCNC: 52 U/L (ref 38–126)
ALT SERPL-CCNC: 21 U/L (ref 9–52)
ANION GAP SERPL CALC-SCNC: 3 MMOL/L
AST SERPL-CCNC: 21 U/L (ref 14–36)
BASOPHILS # BLD AUTO: 0 K/UL (ref 0–0.2)
BASOPHILS NFR BLD AUTO: 1 %
BUN SERPL-SCNC: 12 MG/DL (ref 7–17)
CALCIUM SPEC-MCNC: 8.2 MG/DL (ref 8.4–10.2)
CHLORIDE SERPL-SCNC: 112 MMOL/L (ref 98–107)
CO2 SERPL-SCNC: 28 MMOL/L (ref 22–30)
EOSINOPHIL # BLD AUTO: 0.1 K/UL (ref 0–0.7)
EOSINOPHIL NFR BLD AUTO: 2 %
ERYTHROCYTE [DISTWIDTH] IN BLOOD BY AUTOMATED COUNT: 2.6 M/UL (ref 3.8–5.4)
ERYTHROCYTE [DISTWIDTH] IN BLOOD: 12.2 % (ref 11.5–15.5)
GLUCOSE BLD-MCNC: 138 MG/DL (ref 75–99)
GLUCOSE BLD-MCNC: 248 MG/DL (ref 75–99)
GLUCOSE BLD-MCNC: 264 MG/DL (ref 75–99)
GLUCOSE BLD-MCNC: 269 MG/DL (ref 75–99)
GLUCOSE BLD-MCNC: 277 MG/DL (ref 75–99)
GLUCOSE SERPL-MCNC: 95 MG/DL (ref 74–99)
HCT VFR BLD AUTO: 25.2 % (ref 34–46)
HGB BLD-MCNC: 8 GM/DL (ref 11.4–16)
LYMPHOCYTES # SPEC AUTO: 1.2 K/UL (ref 1–4.8)
LYMPHOCYTES NFR SPEC AUTO: 23 %
MCH RBC QN AUTO: 30.7 PG (ref 25–35)
MCHC RBC AUTO-ENTMCNC: 31.6 G/DL (ref 31–37)
MCV RBC AUTO: 97.2 FL (ref 80–100)
MONOCYTES # BLD AUTO: 0.4 K/UL (ref 0–1)
MONOCYTES NFR BLD AUTO: 7 %
NEUTROPHILS # BLD AUTO: 3.4 K/UL (ref 1.3–7.7)
NEUTROPHILS NFR BLD AUTO: 64 %
PLATELET # BLD AUTO: 259 K/UL (ref 150–450)
POTASSIUM SERPL-SCNC: 4.6 MMOL/L (ref 3.5–5.1)
PROT SERPL-MCNC: 5.7 G/DL (ref 6.3–8.2)
SODIUM SERPL-SCNC: 143 MMOL/L (ref 137–145)
WBC # BLD AUTO: 5.3 K/UL (ref 3.8–10.6)

## 2019-11-04 RX ADMIN — LEVOFLOXACIN SCH MLS/HR: 750 INJECTION, SOLUTION INTRAVENOUS at 15:42

## 2019-11-04 RX ADMIN — SODIUM CHLORIDE SCH MLS/HR: 9 INJECTION, SOLUTION INTRAVENOUS at 18:01

## 2019-11-04 RX ADMIN — ASPIRIN 81 MG CHEWABLE TABLET SCH: 81 TABLET CHEWABLE at 09:54

## 2019-11-04 RX ADMIN — SODIUM CHLORIDE SCH MLS/HR: 9 INJECTION, SOLUTION INTRAVENOUS at 17:02

## 2019-11-04 RX ADMIN — HYDROMORPHONE HYDROCHLORIDE ONE MG: 1 INJECTION, SOLUTION INTRAMUSCULAR; INTRAVENOUS; SUBCUTANEOUS at 12:35

## 2019-11-04 RX ADMIN — Medication SCH: at 09:54

## 2019-11-04 RX ADMIN — INSULIN ASPART SCH: 100 INJECTION, SOLUTION INTRAVENOUS; SUBCUTANEOUS at 07:55

## 2019-11-04 RX ADMIN — DULOXETINE SCH MG: 60 CAPSULE, DELAYED RELEASE ORAL at 13:43

## 2019-11-04 RX ADMIN — CEFAZOLIN SCH MLS/HR: 330 INJECTION, POWDER, FOR SOLUTION INTRAMUSCULAR; INTRAVENOUS at 15:45

## 2019-11-04 RX ADMIN — SODIUM CHLORIDE SCH MLS/HR: 9 INJECTION, SOLUTION INTRAVENOUS at 06:09

## 2019-11-04 RX ADMIN — ISODIUM CHLORIDE PRN MG: 0.03 SOLUTION RESPIRATORY (INHALATION) at 06:54

## 2019-11-04 RX ADMIN — INSULIN ASPART SCH: 100 INJECTION, SOLUTION INTRAVENOUS; SUBCUTANEOUS at 13:40

## 2019-11-04 RX ADMIN — INSULIN ASPART SCH UNIT: 100 INJECTION, SOLUTION INTRAVENOUS; SUBCUTANEOUS at 22:41

## 2019-11-04 RX ADMIN — DIVALPROEX SODIUM SCH MG: 250 TABLET, DELAYED RELEASE ORAL at 13:43

## 2019-11-04 RX ADMIN — ENOXAPARIN SODIUM SCH: 40 INJECTION SUBCUTANEOUS at 09:54

## 2019-11-04 RX ADMIN — INSULIN DETEMIR SCH UNIT: 100 INJECTION, SOLUTION SUBCUTANEOUS at 22:42

## 2019-11-04 RX ADMIN — ATORVASTATIN CALCIUM SCH: 20 TABLET, FILM COATED ORAL at 09:54

## 2019-11-04 RX ADMIN — CEFAZOLIN SCH MLS/HR: 330 INJECTION, POWDER, FOR SOLUTION INTRAMUSCULAR; INTRAVENOUS at 15:43

## 2019-11-04 RX ADMIN — FAMOTIDINE SCH: 20 TABLET, FILM COATED ORAL at 09:54

## 2019-11-04 RX ADMIN — HYDROCODONE BITARTRATE AND ACETAMINOPHEN PRN EACH: 10; 325 TABLET ORAL at 15:43

## 2019-11-04 RX ADMIN — HYDROMORPHONE HYDROCHLORIDE ONE MG: 1 INJECTION, SOLUTION INTRAMUSCULAR; INTRAVENOUS; SUBCUTANEOUS at 12:57

## 2019-11-04 RX ADMIN — ISODIUM CHLORIDE PRN MG: 0.03 SOLUTION RESPIRATORY (INHALATION) at 15:21

## 2019-11-04 RX ADMIN — INSULIN ASPART SCH UNIT: 100 INJECTION, SOLUTION INTRAVENOUS; SUBCUTANEOUS at 17:01

## 2019-11-04 RX ADMIN — CEFAZOLIN SCH: 330 INJECTION, POWDER, FOR SOLUTION INTRAMUSCULAR; INTRAVENOUS at 06:13

## 2019-11-04 NOTE — P.PN
Subjective


Progress Note Date: 11/04/19





Morenita Ramirez is a 59-year-old female who presented to [facility] emergency room due to right foot cellulitis and ulceration on the right

great toe, patient was followed by Dr. Roy podiatrist, she failed outpatient 

treatment for foot cellulitis and toe ulceration were worsening, she was sent to

emergency room and was admitted to medical floor, consultation to infectious 

disease and surgery were initiated, patient was started on IV antibiotics in the

emergency room.


Patient has a known history of diabetes mellitus type 1 maintained on insulin he

also has a known history of stroke with left sided weakness, coronary artery 

disease was previous history of angioplasty and stent placement, history of 

peripheral neuropathy, history of hypertension, history of hyperlipidemia, 

history of depression was anxiety, history of COPD DVT, history of chronic 

anemia.





On 11/2/2019 patient was seen and examined on the medical floor she is alert and

oriented 3 in no apparent distress she is complaining of pain in her foot 

otherwise she denies any complaints there is no fever or chills no headache or 

dizziness no chest pain no shortness of breath no cough no nausea or vomiting no

abdominal pain no diarrhea no burning was urination no frequency or urgency no 

hematuria.  Patient has chronic neurological deficit without any change at this 

time.  She is wheelchair bound since her stroke 4 years.





On 11/03/2019 patient was seen and examined she is alert and oriented in no 

apparent distress she is complaining of some pain and discomfort in her right 

foot otherwise she denies any complaints there is no fever or chills no headache

or dizziness no chest pain no shortness of breath no cough no nausea or vomiting

no abdominal pain no diarrhea and no urinary symptoms.  Patient had episodes of 

hypoglycemia in the morning at this point will decrease Lantus dose from 22 

units daily to 20 units daily.  Case was discussed with Dr. Frausto and plan is 

to proceed with right great toe amputation tomorrow.








On 11/04/2019 patient is alert and oriented with some mild discomfort to right 

foot area.  Plans for right greater toe amputation today with Dr. garcia.  

Patient denies chest pain or shortness of breath.  Patient denies nausea 

vomiting or diarrhea.  Patient denies any urinary burning or frequency.  Blood 

sugars have improved.  Patient's hemoglobin low at 8.0.  Patient denies any 

active bleeding.  Will order iron studies





Objective





- Vital Signs


Vital signs: 


                                   Vital Signs











Temp  99.3 F   11/04/19 10:28


 


Pulse  93   11/04/19 10:28


 


Resp  20   11/04/19 10:28


 


BP  141/69   11/04/19 10:28


 


Pulse Ox  92 L  11/04/19 10:28








                                 Intake & Output











 11/03/19 11/04/19 11/04/19





 18:59 06:59 18:59


 


Intake Total 592 296 


 


Output Total 600  


 


Balance -8 296 


 


Intake:   


 


  Oral 592 296 


 


Output:   


 


  Urine 600  


 


Other:   


 


  Voiding Method Bedpan Bedpan 


 


  # Voids 1 2 


 


  # Bowel Movements 1 1 














- Exam








In general patient is alert and oriented 3 in no apparent distress


HEENT head normocephalic and atraumatic


Neck is supple no JVD no goiter no lymphadenopathy


Chest exam reveals a few scattered crackles no wheezing


Cardiac exam reveals regular heart sounds no gallops no murmurs


Abdomen is soft nontender no organomegaly with normal bowel sounds


Extremity exam reveals no edema, the right great toe is swollen with ulceration 

and scabbing and surrounding erythema extending to the base of the foot


Neurological examination reveals left sided weakness which is chronic involving 

the upper and lower extremities








- Labs


CBC & Chem 7: 


                                 11/04/19 05:05





                                 11/04/19 05:05


Labs: 


                  Abnormal Lab Results - Last 24 Hours (Table)











  11/03/19 11/03/19 11/03/19 Range/Units





  11:51 17:10 20:25 


 


RBC     (3.80-5.40)  m/uL


 


Hgb     (11.4-16.0)  gm/dL


 


Hct     (34.0-46.0)  %


 


Chloride     ()  mmol/L


 


POC Glucose (mg/dL)  260 H  321 H  207 H  (75-99)  mg/dL


 


Calcium     (8.4-10.2)  mg/dL


 


Total Protein     (6.3-8.2)  g/dL


 


Albumin     (3.5-5.0)  g/dL














  11/04/19 11/04/19 11/04/19 Range/Units





  05:05 05:05 06:58 


 


RBC   2.60 L   (3.80-5.40)  m/uL


 


Hgb   8.0 L   (11.4-16.0)  gm/dL


 


Hct   25.2 L   (34.0-46.0)  %


 


Chloride  112 H    ()  mmol/L


 


POC Glucose (mg/dL)    138 H  (75-99)  mg/dL


 


Calcium  8.2 L    (8.4-10.2)  mg/dL


 


Total Protein  5.7 L    (6.3-8.2)  g/dL


 


Albumin  2.6 L    (3.5-5.0)  g/dL














  11/04/19 Range/Units





  10:47 


 


RBC   (3.80-5.40)  m/uL


 


Hgb   (11.4-16.0)  gm/dL


 


Hct   (34.0-46.0)  %


 


Chloride   ()  mmol/L


 


POC Glucose (mg/dL)  277 H  (75-99)  mg/dL


 


Calcium   (8.4-10.2)  mg/dL


 


Total Protein   (6.3-8.2)  g/dL


 


Albumin   (3.5-5.0)  g/dL








                      Microbiology - Last 24 Hours (Table)











 11/01/19 16:00 Gram Stain - Final





 Foot - Right Wound Culture - Final


 


 11/01/19 16:00 Blood Culture - Preliminary





 Blood    No Growth after 48 hours














Assessment and Plan


Assessment: 


#1 right lower extremity cellulitis with right big toe ulceration and swelling. 

Plans for right greater toe amputation today with Dr. garcia.  Per infectious 

disease patient is currently receiving Levaquin and Zosyn





#2 insulin-dependent diabetes mellitus very brittle continue was current insulin

dose and add sliding scale.  Lantus decreased to 20 units due to hypoglycemia





#3 history of stroke was left sided weakness in 2015 patient is wheelchair-bound

and a 24-hour care





#4 underlying history of hypertension





#5 underlying history of hyperlipidemia





#6 underlying history of COPD patient quit smoking recently





#7 underlying history of depression and bipolar disorder and anxiety disorder





#8.  Anemia.  Iron studies have been ordered.  No signs of active bleeding at 

this time.  Patient is maintained on ferrous sulfate








For DVT prophylaxis she will be started on Lovenox


For GI prophylaxis patient is maintained on Pepcid





I performed an examination of the patient and discussed their management with 

the Nurse Practitioner.  I have reviewed the Nurse Practitioner's notes and 

agree with the documented findings and plan of care

## 2019-11-04 NOTE — P.OP
Date of Procedure: 11/04/19


Preoperative Diagnosis: 


Osteomyelitis right great toe


Postoperative Diagnosis: 


Same


Procedure(s) Performed: 


Amputation right great toe through proximal phalanx


Anesthesia: MAC


Surgeon: Gerardo Ferrell


Estimated Blood Loss (ml): 50


Pathology: other (Toe for cultures)


Condition: stable


Disposition: PACU


Indications for Procedure: 


The patient has an infection on the distal right great toe involving the distal 

tip of the phalanx with a fracture.  He through its midportion


Operative Findings: 


The tissues around the fracture and soft tissues around it were obviously 

necrotic.  Proximal to this area the tissues were healthy pink and had an 

excellent blood supply.  There was no sign of infection or abnormality in the 

proximal phalanx


Description of Procedure: 


With the patient spine position, under benefit of IV sedation, we prepped and 

draped in standard fashion.  We started with a fishmouth incision proximal to 

the area of obvious necrosis.  We took this directly down to the interphalangeal

joint and remove the entire distal phalanx with surrounding soft tissue.  We 

used a rongeur to remove the distal head of the proximal phalanx.  We then 

excised any abnormal looking tissue of the surrounding flaps and fashioned flaps

in a fishmouth fashion.  Hemostasis was accomplished with electrocautery.  We 

irrigated with saline.  We closed with 4-0 nylon.  Hemostasis was satisfactory. 

Sterile dressings were applied.  The patient tolerated the procedure well and 

was taken to recovery area in stable condition.

## 2019-11-04 NOTE — P.PN
Progress Note - Text


Progress Note Date: 11/04/19





The patient tolerated her right great toe amputation well.  From a surgical 

standpoint she can be discharged at any time.  Most of the infection has been 

removed.  The patient should go home on oral antibiotics as dictated by 

infectious disease.  I will follow her in the office in 3 or 10 days depending 

on scheduling

## 2019-11-05 VITALS
TEMPERATURE: 97.7 F | DIASTOLIC BLOOD PRESSURE: 57 MMHG | HEART RATE: 82 BPM | SYSTOLIC BLOOD PRESSURE: 113 MMHG | RESPIRATION RATE: 15 BRPM

## 2019-11-05 LAB
ALBUMIN SERPL-MCNC: 2.6 G/DL (ref 3.5–5)
ALP SERPL-CCNC: 52 U/L (ref 38–126)
ALT SERPL-CCNC: 21 U/L (ref 9–52)
ANION GAP SERPL CALC-SCNC: 6 MMOL/L
AST SERPL-CCNC: 16 U/L (ref 14–36)
BASOPHILS # BLD AUTO: 0 K/UL (ref 0–0.2)
BASOPHILS NFR BLD AUTO: 0 %
BUN SERPL-SCNC: 15 MG/DL (ref 7–17)
CALCIUM SPEC-MCNC: 8.5 MG/DL (ref 8.4–10.2)
CHLORIDE SERPL-SCNC: 112 MMOL/L (ref 98–107)
CO2 SERPL-SCNC: 26 MMOL/L (ref 22–30)
EOSINOPHIL # BLD AUTO: 0.1 K/UL (ref 0–0.7)
EOSINOPHIL NFR BLD AUTO: 2 %
ERYTHROCYTE [DISTWIDTH] IN BLOOD BY AUTOMATED COUNT: 2.7 M/UL (ref 3.8–5.4)
ERYTHROCYTE [DISTWIDTH] IN BLOOD: 12.3 % (ref 11.5–15.5)
FERRITIN SERPL-MCNC: 183.2 NG/ML (ref 10–291)
GLUCOSE BLD-MCNC: 111 MG/DL (ref 75–99)
GLUCOSE BLD-MCNC: 156 MG/DL (ref 75–99)
GLUCOSE BLD-MCNC: 91 MG/DL (ref 75–99)
GLUCOSE SERPL-MCNC: 99 MG/DL (ref 74–99)
HCT VFR BLD AUTO: 26.2 % (ref 34–46)
HGB BLD-MCNC: 8.3 GM/DL (ref 11.4–16)
IRON SERPL-MCNC: 43 UG/DL (ref 50–170)
LYMPHOCYTES # SPEC AUTO: 1.3 K/UL (ref 1–4.8)
LYMPHOCYTES NFR SPEC AUTO: 33 %
MCH RBC QN AUTO: 30.5 PG (ref 25–35)
MCHC RBC AUTO-ENTMCNC: 31.5 G/DL (ref 31–37)
MCV RBC AUTO: 96.9 FL (ref 80–100)
MONOCYTES # BLD AUTO: 0.2 K/UL (ref 0–1)
MONOCYTES NFR BLD AUTO: 6 %
NEUTROPHILS # BLD AUTO: 2.2 K/UL (ref 1.3–7.7)
NEUTROPHILS NFR BLD AUTO: 55 %
PLATELET # BLD AUTO: 289 K/UL (ref 150–450)
POTASSIUM SERPL-SCNC: 4.1 MMOL/L (ref 3.5–5.1)
PROT SERPL-MCNC: 5.6 G/DL (ref 6.3–8.2)
SODIUM SERPL-SCNC: 144 MMOL/L (ref 137–145)
TIBC SERPL-MCNC: 160 UG/DL (ref 228–460)
WBC # BLD AUTO: 3.9 K/UL (ref 3.8–10.6)

## 2019-11-05 RX ADMIN — DULOXETINE SCH MG: 60 CAPSULE, DELAYED RELEASE ORAL at 07:57

## 2019-11-05 RX ADMIN — ENOXAPARIN SODIUM SCH MG: 40 INJECTION SUBCUTANEOUS at 07:57

## 2019-11-05 RX ADMIN — HYDROCODONE BITARTRATE AND ACETAMINOPHEN PRN EACH: 10; 325 TABLET ORAL at 17:39

## 2019-11-05 RX ADMIN — Medication SCH MG: at 07:57

## 2019-11-05 RX ADMIN — ATORVASTATIN CALCIUM SCH MG: 20 TABLET, FILM COATED ORAL at 07:57

## 2019-11-05 RX ADMIN — Medication SCH EACH: at 07:58

## 2019-11-05 RX ADMIN — FAMOTIDINE SCH MG: 20 TABLET, FILM COATED ORAL at 07:57

## 2019-11-05 RX ADMIN — INSULIN ASPART SCH: 100 INJECTION, SOLUTION INTRAVENOUS; SUBCUTANEOUS at 07:13

## 2019-11-05 RX ADMIN — DIVALPROEX SODIUM SCH MG: 250 TABLET, DELAYED RELEASE ORAL at 07:58

## 2019-11-05 RX ADMIN — CEFAZOLIN SCH MLS/HR: 330 INJECTION, POWDER, FOR SOLUTION INTRAMUSCULAR; INTRAVENOUS at 07:59

## 2019-11-05 RX ADMIN — HYDROCODONE BITARTRATE AND ACETAMINOPHEN PRN EACH: 10; 325 TABLET ORAL at 01:49

## 2019-11-05 RX ADMIN — SODIUM CHLORIDE SCH MLS/HR: 9 INJECTION, SOLUTION INTRAVENOUS at 05:55

## 2019-11-05 RX ADMIN — LEVOFLOXACIN SCH: 750 INJECTION, SOLUTION INTRAVENOUS at 17:06

## 2019-11-05 RX ADMIN — INSULIN ASPART SCH: 100 INJECTION, SOLUTION INTRAVENOUS; SUBCUTANEOUS at 11:59

## 2019-11-05 RX ADMIN — ASPIRIN 81 MG CHEWABLE TABLET SCH MG: 81 TABLET CHEWABLE at 07:57

## 2019-11-05 NOTE — P.DS
Providers


Date of admission: 


11/01/19 17:27





Expected date of discharge: 11/05/19


Attending physician: 


Saniya Cabral





Consults: 





                                        





11/01/19 17:27


Consult Physician Urgent 


   Consulting Provider: Ray Moody


   Consult Reason/Comments: Toe ulcer, osteomyelitis


   Do you want consulting provider notified?: Yes





11/01/19 17:28


Consult Physician Urgent 


   Consulting Provider: Gerardo Ferrell


   Consult Reason/Comments: Total ulcer


   Do you want consulting provider notified?: Yes


Consult Physician Urgent 


   Consulting Provider: Janes Roy


   Consult Reason/Comments: Total ulcer


   Do you want consulting provider notified?: Yes











Primary care physician: 


Saniya Misha





[facility]: 





Discharge diagnosis


#1 right lower extremity cellulitis with right big toe ulceration and swelling. 

Status post right greater toe amputation with Dr. garcia.  Per infectious 

disease patient is currently receiving Levaquin and Zosyn.  Patient has been 

cleared for discharge from surgical standpoint patient follow-up outpatient with

surgical services.  Per infectious disease patient may be discharged home on 

Bactrim double strength twice a day for 10 days until seen in wound care center.





#2 insulin-dependent diabetes mellitus very brittle continue was current insulin

dose and add sliding scale.  Lantus decreased to 20 units due to hypoglycemia





#3 history of stroke was left sided weakness in 2015 patient is wheelchair-bound

and a 24-hour care





#4 underlying history of hypertension





#5 underlying history of hyperlipidemia





#6 underlying history of COPD patient quit smoking recently





#7 underlying history of depression and bipolar disorder and anxiety disorder





#8.  Anemia.  Iron studies have been ordered.  No signs of active bleeding at 

this time.  Patient is maintained on ferrous sulfate.  hgb increasing to 8.3








Hospital course





Morenita Ramirez is a 59-year-old female who presented to [facility] emergency room due to right foot cellulitis and ulceration on the right

great toe, patient was followed by Dr. Roy podiatrist, she failed outpatient 

treatment for foot cellulitis and toe ulceration were worsening, she was sent to

emergency room and was admitted to medical floor, consultation to infectious 

disease and surgery were initiated, patient was started on IV antibiotics in the

emergency room.


Patient has a known history of diabetes mellitus type 1 maintained on insulin he

also has a known history of stroke with left sided weakness, coronary artery 

disease was previous history of angioplasty and stent placement, history of 

peripheral neuropathy, history of hypertension, history of hyperlipidemia, 

history of depression was anxiety, history of COPD DVT, history of chronic 

anemia.





On 11/2/2019 patient was seen and examined on the medical floor she is alert and

oriented 3 in no apparent distress she is complaining of pain in her foot 

otherwise she denies any complaints there is no fever or chills no headache or d

izziness no chest pain no shortness of breath no cough no nausea or vomiting no 

abdominal pain no diarrhea no burning was urination no frequency or urgency no 

hematuria.  Patient has chronic neurological deficit without any change at this 

time.  She is wheelchair bound since her stroke 4 years.





On 11/03/2019 patient was seen and examined she is alert and oriented in no 

apparent distress she is complaining of some pain and discomfort in her right 

foot otherwise she denies any complaints there is no fever or chills no headache

or dizziness no chest pain no shortness of breath no cough no nausea or vomiting

no abdominal pain no diarrhea and no urinary symptoms.  Patient had episodes of 

hypoglycemia in the morning at this point will decrease Lantus dose from 22 

units daily to 20 units daily.  Case was discussed with Dr. Frausto and plan is 

to proceed with right great toe amputation tomorrow.








On 11/04/2019 patient is alert and oriented with some mild discomfort to right 

foot area.  Plans for right greater toe amputation today with Dr. garcia.  

Patient denies chest pain or shortness of breath.  Patient denies nausea 

vomiting or diarrhea.  Patient denies any urinary burning or frequency.  Blood 

sugars have improved.  Patient's hemoglobin low at 8.0.  Patient denies any 

active bleeding.  Will order iron studies





On 11/05/2019 patient is alert and oriented 3.  Patient having some mild 

discomfort to right foot area.  Patient has been cleared for discharge from 

surgical standpoint.  Per infectious disease patient may be discharged on 

Bactrim DS twice a day for 10 days and follow-up in wound care clinic for 

further management.  Patient will be DC'd with [facility].  Patient

denies chest pain or shortness of breath.  Patient denies nausea vomiting or 

diarrhea.  Patient denies any urinary burning or frequency.  Repeat CBC has been

ordered for 1 week due to anemia.  Patient will be DC'd on lower dose of Lantus 

due to hypoglycemia.





I performed an examination of the patient and discussed their management with 

the Nurse Practitioner.  I have reviewed the Nurse Practitioner's notes and 

agree with the documented findings and plan of care


Patient Condition at Discharge: Stable





Plan - Discharge Summary


Discharge Rx Participant: Yes


New Discharge Prescriptions: 


New


   Sulfamethox-Tmp 800-160Mg [Bactrim -160 mg] 1 tab PO Q12HR 10 Days #20 

tab


   Insulin Glargine [Lantus] 20 unit SQ HS 30 Days #1 vial





Continue


   Albuterol Inhaler [Ventolin Hfa Inhaler] 2 puff INHALATION RT-Q4H PRN


     PRN Reason: Shortness Of Breath


   Famotidine [Pepcid] 20 mg PO DAILY


   Aspirin EC [Ecotrin Low Dose] 81 mg PO DAILY


   Valproic Acid [Depakene] 250 mg PO DAILY


   Ergocalciferol [Vitamin D2 (DRISDOL)] 50,000 unit PO SA


   HYDROcodone/APAP 10-325MG [Norco ] 1 tab PO Q6H PRN


     PRN Reason: Pain


   Ferrous Sulfate [Iron (65 MG Elemental)] 325 mg PO DAILY


   DULoxetine HCL [Cymbalta] 60 mg PO DAILY


   ALPRAZolam [Xanax] 1 mg PO Q8H PRN


     PRN Reason: Anxiety


   Vitamin B Complex 1 cap PO DAILY


   Ondansetron [Zofran] 4 mg PO DAILY PRN


     PRN Reason: Nausea


   QUEtiapine [SEROquel] 100 mg PO HS


   QUEtiapine FUMARATE [SEROquel] 25 mg PO BID


   Atorvastatin [Lipitor] 20 mg PO DAILY


   Insulin Lispro [humaLOG] See Protocol SQ AC-TID #1





Discontinued


   Insulin Glargine [Lantus] 22 unit SQ HS #0


Discharge Medication List





Albuterol Inhaler [Ventolin Hfa Inhaler] 2 puff INHALATION RT-Q4H PRN 02/19/16 

[History]


Aspirin EC [Ecotrin Low Dose] 81 mg PO DAILY 02/19/16 [History]


Famotidine [Pepcid] 20 mg PO DAILY 02/19/16 [History]


Valproic Acid [Depakene] 250 mg PO DAILY 02/19/16 [History]


Ergocalciferol [Vitamin D2 (DRISDOL)] 50,000 unit PO SA 10/06/16 [History]


HYDROcodone/APAP 10-325MG [Norco ] 1 tab PO Q6H PRN 05/06/17 [History]


DULoxetine HCL [Cymbalta] 60 mg PO DAILY 09/19/17 [History]


Ferrous Sulfate [Iron (65 MG Elemental)] 325 mg PO DAILY 09/19/17 [History]


ALPRAZolam [Xanax] 1 mg PO Q8H PRN 09/22/17 [History]


Vitamin B Complex 1 cap PO DAILY 01/08/18 [History]


Ondansetron [Zofran] 4 mg PO DAILY PRN 04/09/18 [History]


Atorvastatin [Lipitor] 20 mg PO DAILY 07/31/19 [History]


QUEtiapine FUMARATE [SEROquel] 25 mg PO BID 07/31/19 [History]


QUEtiapine [SEROquel] 100 mg PO HS 07/31/19 [History]


Insulin Lispro [humaLOG] See Protocol SQ AC-TID #1 08/01/19 [Rx]


Insulin Glargine [Lantus] 20 unit SQ HS 30 Days #1 vial 11/05/19 [Rx]


Sulfamethox-Tmp 800-160Mg [Bactrim -160 mg] 1 tab PO Q12HR 10 Days #20 tab

11/05/19 [Rx]








Follow up Appointment(s)/Referral(s): 


[facility], [NON-STAFF] - 


Saniya Cabral MD [Primary Care Provider] - 11/08/19 11:30 am


Gerardo Ferrell DO [Doctor of Osteopathic Medicine] - 10 Days


Activity/Diet/Wound Care/Special Instructions: 


Activity as tolerated








Diet heart healthy cardiac consistent








Patient to follow-up with wound care center in 10 days.


Discharge Disposition: HOME WITH HOME HEALTH SERVICES

## 2019-11-06 NOTE — P.ARTDOP
Arterial Doppler





LOWER EXTREMITY ARTERIAL DOPPLER:





DATE OF SERVICE: 11/02/2019





Reason for study: Right foot ulcers.





Doppler waveforms: Atypical bilaterally throughout.





Pulse volume recording: Good toe waveforms bilaterally.





Pressure gradients: Small gradient above the thigh on the right and across the 

knee bilaterally..





Ankle-brachial indices: 0.74 on the right and 0.75 on the left.





Toe pressures: 86 on the right, 75 on the left





Impression: Mild to moderate bilateral fem-pop disease.  Toe perfusion probably 

adequate for healing.  Clinical correlation recommended.

## 2019-11-08 ENCOUNTER — HOSPITAL ENCOUNTER (EMERGENCY)
Dept: HOSPITAL 47 - EC | Age: 59
Discharge: HOME | End: 2019-11-08
Payer: COMMERCIAL

## 2019-11-08 VITALS — SYSTOLIC BLOOD PRESSURE: 95 MMHG | DIASTOLIC BLOOD PRESSURE: 52 MMHG

## 2019-11-08 VITALS — TEMPERATURE: 98.3 F | HEART RATE: 82 BPM | RESPIRATION RATE: 16 BRPM

## 2019-11-08 DIAGNOSIS — I11.0: ICD-10-CM

## 2019-11-08 DIAGNOSIS — F31.9: ICD-10-CM

## 2019-11-08 DIAGNOSIS — M96.840: ICD-10-CM

## 2019-11-08 DIAGNOSIS — D64.9: ICD-10-CM

## 2019-11-08 DIAGNOSIS — Z88.5: ICD-10-CM

## 2019-11-08 DIAGNOSIS — I50.9: ICD-10-CM

## 2019-11-08 DIAGNOSIS — Z79.82: ICD-10-CM

## 2019-11-08 DIAGNOSIS — Z91.030: ICD-10-CM

## 2019-11-08 DIAGNOSIS — L76.82: Primary | ICD-10-CM

## 2019-11-08 DIAGNOSIS — I25.2: ICD-10-CM

## 2019-11-08 DIAGNOSIS — Z86.14: ICD-10-CM

## 2019-11-08 DIAGNOSIS — Z86.73: ICD-10-CM

## 2019-11-08 DIAGNOSIS — Z88.8: ICD-10-CM

## 2019-11-08 DIAGNOSIS — J44.9: ICD-10-CM

## 2019-11-08 DIAGNOSIS — Z89.421: ICD-10-CM

## 2019-11-08 DIAGNOSIS — F41.9: ICD-10-CM

## 2019-11-08 DIAGNOSIS — I25.119: ICD-10-CM

## 2019-11-08 DIAGNOSIS — F17.200: ICD-10-CM

## 2019-11-08 DIAGNOSIS — E78.5: ICD-10-CM

## 2019-11-08 DIAGNOSIS — K21.9: ICD-10-CM

## 2019-11-08 DIAGNOSIS — Z79.899: ICD-10-CM

## 2019-11-08 DIAGNOSIS — Z88.1: ICD-10-CM

## 2019-11-08 DIAGNOSIS — Z88.0: ICD-10-CM

## 2019-11-08 LAB
ANION GAP SERPL CALC-SCNC: 6 MMOL/L
BASOPHILS # BLD AUTO: 0 K/UL (ref 0–0.2)
BASOPHILS NFR BLD AUTO: 0 %
BUN SERPL-SCNC: 23 MG/DL (ref 7–17)
CALCIUM SPEC-MCNC: 8.6 MG/DL (ref 8.4–10.2)
CHLORIDE SERPL-SCNC: 106 MMOL/L (ref 98–107)
CO2 SERPL-SCNC: 30 MMOL/L (ref 22–30)
EOSINOPHIL # BLD AUTO: 0.1 K/UL (ref 0–0.7)
EOSINOPHIL NFR BLD AUTO: 1 %
ERYTHROCYTE [DISTWIDTH] IN BLOOD BY AUTOMATED COUNT: 2.48 M/UL (ref 3.8–5.4)
ERYTHROCYTE [DISTWIDTH] IN BLOOD: 12.9 % (ref 11.5–15.5)
GLUCOSE SERPL-MCNC: 256 MG/DL (ref 74–99)
HCT VFR BLD AUTO: 23.5 % (ref 34–46)
HGB BLD-MCNC: 7.7 GM/DL (ref 11.4–16)
LYMPHOCYTES # SPEC AUTO: 1.2 K/UL (ref 1–4.8)
LYMPHOCYTES NFR SPEC AUTO: 20 %
MCH RBC QN AUTO: 30.9 PG (ref 25–35)
MCHC RBC AUTO-ENTMCNC: 32.6 G/DL (ref 31–37)
MCV RBC AUTO: 94.8 FL (ref 80–100)
MONOCYTES # BLD AUTO: 0.3 K/UL (ref 0–1)
MONOCYTES NFR BLD AUTO: 5 %
NEUTROPHILS # BLD AUTO: 4.4 K/UL (ref 1.3–7.7)
NEUTROPHILS NFR BLD AUTO: 71 %
PLATELET # BLD AUTO: 288 K/UL (ref 150–450)
POTASSIUM SERPL-SCNC: 4.6 MMOL/L (ref 3.5–5.1)
SODIUM SERPL-SCNC: 142 MMOL/L (ref 137–145)
WBC # BLD AUTO: 6.2 K/UL (ref 3.8–10.6)

## 2019-11-08 PROCEDURE — 99283 EMERGENCY DEPT VISIT LOW MDM: CPT

## 2019-11-08 PROCEDURE — 36415 COLL VENOUS BLD VENIPUNCTURE: CPT

## 2019-11-08 PROCEDURE — 80048 BASIC METABOLIC PNL TOTAL CA: CPT

## 2019-11-08 PROCEDURE — 85025 COMPLETE CBC W/AUTO DIFF WBC: CPT

## 2019-11-08 NOTE — XR
EXAMINATION TYPE: XR foot complete RT

 

DATE OF EXAM: 11/8/2019

 

CLINICAL HISTORY: Right foot pain and discoloration since recent surgery Monday

 

TECHNIQUE: Frontal, lateral, and oblique images of the right foot are obtained.

 

COMPARISON: 11/1/2019

 

FINDINGS: Interval partial amputation of the proximal first phalanx and entire distal first phalanx. 
Osseous fragment remains. Overlying soft tissue swelling is seen. Chronic unchanged appearance of the
 amputations of the second and fifth digits. Linear density within the fourth digit is also unchanged
, likely surgical. Mild diffuse soft tissue swelling is seen. Old fracture deformity of the base of t
he fifth metatarsal unchanged from the prior. No osseous ulceration or periosteal reaction seen.

 

IMPRESSION: Mild diffuse soft tissue swelling and postoperative change. No osseous ulceration nor per
iosteal reaction.

## 2019-11-08 NOTE — CDI
Documentation Clarification Form



Date: 11/8/19

From: Ernestina Genao

Phone: If you have a question about this query, please contact [place] 
 at [phone] between 8am and 5pm.

Admit Date: 11/1/19

Discharge Date:11/5/19 

Patient Name: Morenita Ramirez

Visit Number: [id number]



ATTENTION: The Clinical Documentation Specialists (CDI) and [facility] Coding Staff 
appreciate your assistance in clarifying documentation. Please respond to the 
clarification below the line at the bottom and electronically sign. The CDI & 
[facility] Coding staff will review the response and follow-up if needed. Please note: 
Queries are made part of the Legal Health Record. If you have any questions, 
please contact the author of this message via ITS.



Dear Dr. Saniya Cabral



Conflicting documentation has been found in the medical record:  Type 1 diabetes
is documented in the H&P, discharge summary and 10/3 and 10/4 progress notes.  
Type 2 diabetes is documented is documented in Dr. Ferrell's and Dr. Moody' 
notes.



History/Risk Factors:  Diabetes, neuropathy, retinopathy

Clinical Indicators: hyperglycemia, hypoglycemia

Treatment: Patient is on Insulin



In your opinion, what is the most clinically appropriate diagnosis for this 
patient?  

Type 1 diabetes

Type 2 diabetes

Other explanation of clinical findings

Unable to determine (no explanation for clinical findings)

___________________________________________________________________________

Type 2 diabetes mellitus

MTDD

## 2019-11-08 NOTE — ED
General Adult HPI





- General


Chief complaint: Wound/Laceration


Stated complaint: rt foot problem, post op


Time Seen by Provider: 19 19:07


Source: patient, family


Mode of arrival: ambulatory


Limitations: no limitations





- History of Present Illness


Initial comments: 





Dictation was produced using dragon dictation software. please excuse any 

grammatical, word or spelling errors. 





Chief Complaint: 59-year-old female presents with abnormal discoloration to the 

right foot





History of Present Illness: Is a 59-year-old female with past medical history of

peripheral vascular disease.  On Monday patient had amputation of the great toe 

on the right foot.  Since then she's been having discoloration to the right 

foot.  There is been bruising noted by family members to the right lateral and 

right dorsum of the foot with blister formation in the left foot.  Patient 

denies any worsening pain.  Patient had procedures performed by Dr. Ferrell.  

She was seen in the office and started on Bactrim for concerns of infection.  

Patient has no other complaints at this time.  Does not complain of increased 

pain to the right foot.  They do have home health care nurse that irrigates the 

wound regularly.








The ROS documented in this emergency department record has been reviewed and 

confirmed by me.  Those systems with pertinent positive or negative responses 

have been documented in the HPI.  All other systems are other negative and/or 

noncontributory.








PHYSICAL EXAM:


General Impression: Alert and oriented x3, not in acute distress


HEENT: Normocephalic atraumatic, extra-ocular movements intact, pupils equal and

reactive to light bilaterally, mucous membranes moist.


Cardiovascular: Heart regular rate and rhythm, S1&S2 audible, no murmurs, rubs 

or gallops


Chest: Lungs clear to auscultation bilaterally, no rhonchi, no wheeze, no rales


Abdomen: Bowel sounds present, abdomen soft, non-tender, non-distended, no 

organomegaly


Musculoskeletal: Pulses present and equal in all extremities, no peripheral 

edema


Right foot: Surgical site clean dry intact with ecchymoses noted to the distal 

toe stump.  There is also bruising noted to the right lateral and dorsal medial 

aspect of the right foot.  There is a 2 x 2 centimeter blister to the plantar 

surface of the midfoot


Motor:  no focal deficits noted


Neurological: CN II-XII grossly intact, no focal motor or sensory deficits noted


Skin: Intact with no visualized rashes


Psych: Normal affect and mood





ED course: 59-year-old female presents with discoloration of the right foot.  

She is 5 days postop from toe amputation.  She is currently on Bactrim.  There 

is bruising and blister formation noted on the foot.  These appear likely 

secondary to mild trauma from surgery.  Vital signs upon arrival are within 

acceptable limits.  There are no signs of infection at this time.  Laboratory 

evaluation obtained showing no acute findings.  X-rays nonacute.  Discussed 

patient case in detail with Dr. Ferrell who performed the surgery recently.  

Dr. Ferrell agrees the patient's clear for discharge with follow-up next week. 

Return parameters discussed.  Patient told to continue her Bactrim.  Patient 

placed in a soft dressing around the foot and soft shoe.














- Related Data


                                Home Medications











 Medication  Instructions  Recorded  Confirmed


 


RX: Albuterol Inhaler [Ventolin 2 puff INHALATION RT-Q4H PRN 16





Hfa Inhaler]   


 


RX: Aspirin EC [Ecotrin Low Dose] 81 mg PO DAILY 16


 


RX: Famotidine [Pepcid] 20 mg PO DAILY 16


 


RX: Valproic Acid [Depakene] 250 mg PO DAILY 16


 


RX: Ergocalciferol [Vitamin D2 50,000 unit PO SA 10/06/16 11/08/19





(DRISDOL)]   


 


RX: HYDROcodone/APAP 10-325MG 1 tab PO Q6H PRN 17





[Norco ]   


 


RX: DULoxetine HCL [Cymbalta] 60 mg PO DAILY 17


 


RX: Ferrous Sulfate [Iron (65  mg PO DAILY 17





Elemental)]   


 


RX: ALPRAZolam [Xanax] 1 mg PO Q8H PRN 17


 


RX: Vitamin B Complex 1 cap PO DAILY 18


 


RX: Ondansetron [Zofran] 4 mg PO DAILY PRN 18


 


RX: Atorvastatin [Lipitor] 20 mg PO DAILY 19


 


RX: QUEtiapine FUMARATE [SEROquel] 25 mg PO BID 19


 


RX: QUEtiapine [SEROquel] 100 mg PO HS 19








                                  Previous Rx's











 Medication  Instructions  Recorded


 


RX: Insulin Lispro [humaLOG] See Protocol SQ AC-TID #1 19


 


Insulin Glargine [Lantus] 20 unit SQ HS 30 Days #1 vial 19


 


Sulfamethox-Tmp 800-160Mg [Bactrim 1 tab PO Q12HR 10 Days #20 tab 19





-160 mg]  











                                    Allergies











Allergy/AdvReac Type Severity Reaction Status Date / Time


 


Barbiturates Allergy  Rash/Hives Verified 19 19:22


 


cephalexin monohydrate Allergy  Rash/Hives Verified 19 19:22





[From Keflex]     


 


morphine Allergy  Rash/Hives Verified 19 19:22


 


Penicillins Allergy  Rash/Hives Verified 19 19:22


 


phenobarbital Allergy  Swelling Verified 19 19:22


 


venom-honey bee Allergy  Swelling Verified 19 19:22





[bee venom (honey bee)]     


 


amlodipine besylate AdvReac  Vomiting Verified 19 19:22





[From Norvasc]     














Review of Systems


ROS Statement: 


Those systems with pertinent positive or pertinent negative responses have been 

documented in the HPI.





ROS Other: All systems not noted in ROS Statement are negative.





Past Medical History


Past Medical History: Asthma, Coronary Artery Disease (CAD), Chest Pain / 

Angina, Heart Failure, COPD, CVA/TIA, Diabetes Mellitus, Deep Vein Thrombosis 

(DVT), Eye Disorder, GERD/Reflux, Hyperlipidemia, Hypertension, Myocardial 

Infarction (MI), Neurologic Disorder


Additional Past Medical History / Comment(s): HX OF CVA X3 (LAST 2015)-HAS LT 

ARM PARALYSIS & WEAKNESS LEFT LEG. MI .  DVT RT  AXILLA. RENAL FAILURE.  LOW

THYROID,  PERIPHERAL NEUROPATHY HANDS & FEET.  ANEMIA.  HX OF DKA. USES W/C. 

CONSTIPATION, ESOPHAGITIS.  EPIGLOTITIS.  HEADACHES SINCE CVA.  RETINOPATHY MARZENA 

EYES.  HX RT TOE INFECTION- GANGRENE, HAD AMP."i need eye sx-i have bleeding 

behind the eyes"


Last Myocardial Infarction Date:: 


History of Any Multi-Drug Resistant Organisms: MRSA


Date of last positivie culture/infection: 18


MDRO Source:: Right Foot


Past Surgical History: Appendectomy,  Section, Cholecystectomy, Heart 

Catheterization With Stent, Hysterectomy, Orthopedic Surgery


Additional Past Surgical History / Comment(s): Amputation Rt 2ND Toe.  C-S X3.  

EGD.  Bronchoscopy.  RT Arm Port Placed FOR AB RX; Removed.  6 CARDIAC STENTS. 

left shoulder bone removed


Past Anesthesia/Blood Transfusion Reactions: No Reported Reaction


Additional Past Anesthesia/Blood Transfusion Reaction / Comment(s): HX OF BLOOD 

TRANSFUSION- NO REACTION


Date of Last Stent Placement:: 2013


Past Psychological History: Anxiety, Bipolar, Depression


Smoking Status: Current every day smoker


Past Alcohol Use History: None Reported


Past Drug Use History: Marijuana





- Past Family History


  ** Father


Family Medical History: Unable to Obtain, Coronary Artery Disease (CAD), 

Diabetes Mellitus





  ** Mother


Family Medical History: COPD





General Exam


Limitations: no limitations





Course


                                   Vital Signs











  19





  18:58


 


Temperature 97.5 F L


 


Pulse Rate 78


 


Respiratory 22





Rate 


 


Blood Pressure 95/52


 


O2 Sat by Pulse 96





Oximetry 














Medical Decision Making





- Lab Data


Result diagrams: 


                                 19 19:50





                                 19 19:50


                                   Lab Results











  19 Range/Units





  19:50 19:50 


 


WBC   6.2  (3.8-10.6)  k/uL


 


RBC   2.48 L  (3.80-5.40)  m/uL


 


Hgb   7.7 L  (11.4-16.0)  gm/dL


 


Hct   23.5 L  (34.0-46.0)  %


 


MCV   94.8  (80.0-100.0)  fL


 


MCH   30.9  (25.0-35.0)  pg


 


MCHC   32.6  (31.0-37.0)  g/dL


 


RDW   12.9  (11.5-15.5)  %


 


Plt Count   288  (150-450)  k/uL


 


Neutrophils %   71  %


 


Lymphocytes %   20  %


 


Monocytes %   5  %


 


Eosinophils %   1  %


 


Basophils %   0  %


 


Neutrophils #   4.4  (1.3-7.7)  k/uL


 


Lymphocytes #   1.2  (1.0-4.8)  k/uL


 


Monocytes #   0.3  (0-1.0)  k/uL


 


Eosinophils #   0.1  (0-0.7)  k/uL


 


Basophils #   0.0  (0-0.2)  k/uL


 


Hypochromasia   Slight  


 


Sodium  142   (137-145)  mmol/L


 


Potassium  4.6   (3.5-5.1)  mmol/L


 


Chloride  106   ()  mmol/L


 


Carbon Dioxide  30   (22-30)  mmol/L


 


Anion Gap  6   mmol/L


 


BUN  23 H   (7-17)  mg/dL


 


Creatinine  1.37 H   (0.52-1.04)  mg/dL


 


Est GFR (CKD-EPI)AfAm  49   (>60 ml/min/1.73 sqM)  


 


Est GFR (CKD-EPI)NonAf  43   (>60 ml/min/1.73 sqM)  


 


Glucose  256 H   (74-99)  mg/dL


 


Calcium  8.6   (8.4-10.2)  mg/dL














Disposition


Clinical Impression: 


 Discoloration of skin of foot





Disposition: HOME SELF-CARE


Condition: Good


Instructions (If sedation given, give patient instructions):  Toe Amputation 

(DC)


Is patient prescribed a controlled substance at d/c from ED?: No


Referrals: 


Saniya Cabral MD [Primary Care Provider] - 1-2 days


Time of Disposition: 21:22

## 2020-01-02 ENCOUNTER — HOSPITAL ENCOUNTER (INPATIENT)
Dept: HOSPITAL 47 - EC | Age: 60
LOS: 7 days | Discharge: HOME HEALTH | DRG: 637 | End: 2020-01-09
Attending: INTERNAL MEDICINE | Admitting: INTERNAL MEDICINE
Payer: COMMERCIAL

## 2020-01-02 VITALS — BODY MASS INDEX: 23.1 KG/M2

## 2020-01-02 DIAGNOSIS — I25.10: ICD-10-CM

## 2020-01-02 DIAGNOSIS — I69.351: ICD-10-CM

## 2020-01-02 DIAGNOSIS — Z79.4: ICD-10-CM

## 2020-01-02 DIAGNOSIS — E10.43: ICD-10-CM

## 2020-01-02 DIAGNOSIS — L89.513: ICD-10-CM

## 2020-01-02 DIAGNOSIS — E10.69: ICD-10-CM

## 2020-01-02 DIAGNOSIS — Z91.030: ICD-10-CM

## 2020-01-02 DIAGNOSIS — E87.5: ICD-10-CM

## 2020-01-02 DIAGNOSIS — M86.9: ICD-10-CM

## 2020-01-02 DIAGNOSIS — Z82.49: ICD-10-CM

## 2020-01-02 DIAGNOSIS — E10.319: ICD-10-CM

## 2020-01-02 DIAGNOSIS — F31.9: ICD-10-CM

## 2020-01-02 DIAGNOSIS — E10.51: ICD-10-CM

## 2020-01-02 DIAGNOSIS — J44.9: ICD-10-CM

## 2020-01-02 DIAGNOSIS — A41.9: ICD-10-CM

## 2020-01-02 DIAGNOSIS — Z88.8: ICD-10-CM

## 2020-01-02 DIAGNOSIS — D50.9: ICD-10-CM

## 2020-01-02 DIAGNOSIS — E78.5: ICD-10-CM

## 2020-01-02 DIAGNOSIS — Z86.14: ICD-10-CM

## 2020-01-02 DIAGNOSIS — E10.22: ICD-10-CM

## 2020-01-02 DIAGNOSIS — E10.649: ICD-10-CM

## 2020-01-02 DIAGNOSIS — Z79.82: ICD-10-CM

## 2020-01-02 DIAGNOSIS — Z95.5: ICD-10-CM

## 2020-01-02 DIAGNOSIS — N18.3: ICD-10-CM

## 2020-01-02 DIAGNOSIS — K31.84: ICD-10-CM

## 2020-01-02 DIAGNOSIS — I13.0: ICD-10-CM

## 2020-01-02 DIAGNOSIS — E83.42: ICD-10-CM

## 2020-01-02 DIAGNOSIS — E10.10: Primary | ICD-10-CM

## 2020-01-02 DIAGNOSIS — E87.1: ICD-10-CM

## 2020-01-02 DIAGNOSIS — I25.2: ICD-10-CM

## 2020-01-02 DIAGNOSIS — Z90.710: ICD-10-CM

## 2020-01-02 DIAGNOSIS — Z86.718: ICD-10-CM

## 2020-01-02 DIAGNOSIS — Z83.3: ICD-10-CM

## 2020-01-02 DIAGNOSIS — Z96.1: ICD-10-CM

## 2020-01-02 DIAGNOSIS — Z79.899: ICD-10-CM

## 2020-01-02 DIAGNOSIS — R65.20: ICD-10-CM

## 2020-01-02 DIAGNOSIS — I50.9: ICD-10-CM

## 2020-01-02 DIAGNOSIS — Z87.891: ICD-10-CM

## 2020-01-02 DIAGNOSIS — Z88.0: ICD-10-CM

## 2020-01-02 DIAGNOSIS — E87.6: ICD-10-CM

## 2020-01-02 DIAGNOSIS — Z88.1: ICD-10-CM

## 2020-01-02 DIAGNOSIS — E03.9: ICD-10-CM

## 2020-01-02 DIAGNOSIS — F41.9: ICD-10-CM

## 2020-01-02 DIAGNOSIS — Z82.5: ICD-10-CM

## 2020-01-02 LAB
ALBUMIN SERPL-MCNC: 3.8 G/DL (ref 3.5–5)
ALBUMIN SERPL-MCNC: 4 G/DL (ref 3.5–5)
ALP SERPL-CCNC: 104 U/L (ref 38–126)
ALP SERPL-CCNC: 90 U/L (ref 38–126)
ALT SERPL-CCNC: 20 U/L (ref 4–34)
ALT SERPL-CCNC: 22 U/L (ref 4–34)
ANION GAP SERPL CALC-SCNC: 18 MMOL/L
ANION GAP SERPL CALC-SCNC: 26 MMOL/L
ANION GAP SERPL CALC-SCNC: 31 MMOL/L
AST SERPL-CCNC: 21 U/L (ref 14–36)
AST SERPL-CCNC: 22 U/L (ref 14–36)
BASOPHILS # BLD AUTO: 0 K/UL (ref 0–0.2)
BASOPHILS NFR BLD AUTO: 0 %
BUN SERPL-SCNC: 45 MG/DL (ref 7–17)
BUN SERPL-SCNC: 55 MG/DL (ref 7–17)
BUN SERPL-SCNC: 56 MG/DL (ref 7–17)
CALCIUM SPEC-MCNC: 10.2 MG/DL (ref 8.4–10.2)
CALCIUM SPEC-MCNC: 9.1 MG/DL (ref 8.4–10.2)
CALCIUM SPEC-MCNC: 9.4 MG/DL (ref 8.4–10.2)
CHLORIDE SERPL-SCNC: 102 MMOL/L (ref 98–107)
CHLORIDE SERPL-SCNC: 109 MMOL/L (ref 98–107)
CHLORIDE SERPL-SCNC: 115 MMOL/L (ref 98–107)
CO2 SERPL-SCNC: 10 MMOL/L (ref 22–30)
CO2 SERPL-SCNC: 15 MMOL/L (ref 22–30)
CO2 SERPL-SCNC: 8 MMOL/L (ref 22–30)
EOSINOPHIL # BLD AUTO: 0 K/UL (ref 0–0.7)
EOSINOPHIL NFR BLD AUTO: 0 %
ERYTHROCYTE [DISTWIDTH] IN BLOOD BY AUTOMATED COUNT: 3.61 M/UL (ref 3.8–5.4)
ERYTHROCYTE [DISTWIDTH] IN BLOOD: 13.6 % (ref 11.5–15.5)
GLUCOSE BLD-MCNC: 227 MG/DL (ref 75–99)
GLUCOSE BLD-MCNC: 257 MG/DL (ref 75–99)
GLUCOSE BLD-MCNC: 313 MG/DL (ref 75–99)
GLUCOSE BLD-MCNC: 351 MG/DL (ref 75–99)
GLUCOSE BLD-MCNC: 380 MG/DL (ref 75–99)
GLUCOSE BLD-MCNC: 406 MG/DL (ref 75–99)
GLUCOSE BLD-MCNC: 412 MG/DL (ref 75–99)
GLUCOSE BLD-MCNC: 454 MG/DL (ref 75–99)
GLUCOSE BLD-MCNC: 507 MG/DL (ref 75–99)
GLUCOSE BLD-MCNC: 531 MG/DL (ref 75–99)
GLUCOSE BLD-MCNC: 575 MG/DL (ref 75–99)
GLUCOSE BLD-MCNC: >600 MG/DL (ref 75–99)
GLUCOSE BLD-MCNC: >600 MG/DL (ref 75–99)
GLUCOSE SERPL-MCNC: 401 MG/DL (ref 74–99)
GLUCOSE SERPL-MCNC: 645 MG/DL (ref 74–99)
GLUCOSE SERPL-MCNC: 829 MG/DL (ref 74–99)
GLUCOSE UR QL: (no result)
HCO3 BLDV-SCNC: 7 MMOL/L (ref 24–28)
HCT VFR BLD AUTO: 37.1 % (ref 34–46)
HGB BLD-MCNC: 10.4 GM/DL (ref 11.4–16)
KETONES UR QL STRIP.AUTO: (no result)
LYMPHOCYTES # SPEC AUTO: 0.8 K/UL (ref 1–4.8)
LYMPHOCYTES NFR SPEC AUTO: 9 %
MCH RBC QN AUTO: 28.8 PG (ref 25–35)
MCHC RBC AUTO-ENTMCNC: 28 G/DL (ref 31–37)
MCV RBC AUTO: 102.8 FL (ref 80–100)
MONOCYTES # BLD AUTO: 0.3 K/UL (ref 0–1)
MONOCYTES NFR BLD AUTO: 3 %
NEUTROPHILS # BLD AUTO: 7.3 K/UL (ref 1.3–7.7)
NEUTROPHILS NFR BLD AUTO: 87 %
PCO2 BLDV: 18 MMHG (ref 37–51)
PH BLDV: 7.21 [PH] (ref 7.31–7.41)
PH UR: 5 [PH] (ref 5–8)
PLATELET # BLD AUTO: 222 K/UL (ref 150–450)
POTASSIUM SERPL-SCNC: 4.7 MMOL/L (ref 3.5–5.1)
POTASSIUM SERPL-SCNC: 4.7 MMOL/L (ref 3.5–5.1)
POTASSIUM SERPL-SCNC: 5.8 MMOL/L (ref 3.5–5.1)
PROT SERPL-MCNC: 7.1 G/DL (ref 6.3–8.2)
PROT SERPL-MCNC: 7.3 G/DL (ref 6.3–8.2)
SODIUM SERPL-SCNC: 141 MMOL/L (ref 137–145)
SODIUM SERPL-SCNC: 145 MMOL/L (ref 137–145)
SODIUM SERPL-SCNC: 148 MMOL/L (ref 137–145)
SP GR UR: 1.02 (ref 1–1.03)
UROBILINOGEN UR QL STRIP: <2 MG/DL (ref ?–2)
WBC # BLD AUTO: 8.4 K/UL (ref 3.8–10.6)

## 2020-01-02 PROCEDURE — 05HB33Z INSERTION OF INFUSION DEVICE INTO RIGHT BASILIC VEIN, PERCUTANEOUS APPROACH: ICD-10-PCS

## 2020-01-02 PROCEDURE — 80048 BASIC METABOLIC PNL TOTAL CA: CPT

## 2020-01-02 PROCEDURE — 74018 RADEX ABDOMEN 1 VIEW: CPT

## 2020-01-02 PROCEDURE — 84484 ASSAY OF TROPONIN QUANT: CPT

## 2020-01-02 PROCEDURE — 96375 TX/PRO/DX INJ NEW DRUG ADDON: CPT

## 2020-01-02 PROCEDURE — 83605 ASSAY OF LACTIC ACID: CPT

## 2020-01-02 PROCEDURE — 82728 ASSAY OF FERRITIN: CPT

## 2020-01-02 PROCEDURE — 96374 THER/PROPH/DIAG INJ IV PUSH: CPT

## 2020-01-02 PROCEDURE — 82803 BLOOD GASES ANY COMBINATION: CPT

## 2020-01-02 PROCEDURE — 83735 ASSAY OF MAGNESIUM: CPT

## 2020-01-02 PROCEDURE — 87186 SC STD MICRODIL/AGAR DIL: CPT

## 2020-01-02 PROCEDURE — 81003 URINALYSIS AUTO W/O SCOPE: CPT

## 2020-01-02 PROCEDURE — 80053 COMPREHEN METABOLIC PANEL: CPT

## 2020-01-02 PROCEDURE — 83550 IRON BINDING TEST: CPT

## 2020-01-02 PROCEDURE — 36410 VNPNXR 3YR/> PHY/QHP DX/THER: CPT

## 2020-01-02 PROCEDURE — 87070 CULTURE OTHR SPECIMN AEROBIC: CPT

## 2020-01-02 PROCEDURE — 86140 C-REACTIVE PROTEIN: CPT

## 2020-01-02 PROCEDURE — 83690 ASSAY OF LIPASE: CPT

## 2020-01-02 PROCEDURE — 83540 ASSAY OF IRON: CPT

## 2020-01-02 PROCEDURE — 87077 CULTURE AEROBIC IDENTIFY: CPT

## 2020-01-02 PROCEDURE — 96361 HYDRATE IV INFUSION ADD-ON: CPT

## 2020-01-02 PROCEDURE — 84100 ASSAY OF PHOSPHORUS: CPT

## 2020-01-02 PROCEDURE — 87205 SMEAR GRAM STAIN: CPT

## 2020-01-02 PROCEDURE — 82009 KETONE BODYS QUAL: CPT

## 2020-01-02 PROCEDURE — 76937 US GUIDE VASCULAR ACCESS: CPT

## 2020-01-02 PROCEDURE — 36415 COLL VENOUS BLD VENIPUNCTURE: CPT

## 2020-01-02 PROCEDURE — 83036 HEMOGLOBIN GLYCOSYLATED A1C: CPT

## 2020-01-02 PROCEDURE — 93005 ELECTROCARDIOGRAM TRACING: CPT

## 2020-01-02 PROCEDURE — 85652 RBC SED RATE AUTOMATED: CPT

## 2020-01-02 PROCEDURE — 85025 COMPLETE CBC W/AUTO DIFF WBC: CPT

## 2020-01-02 PROCEDURE — 80202 ASSAY OF VANCOMYCIN: CPT

## 2020-01-02 PROCEDURE — 99285 EMERGENCY DEPT VISIT HI MDM: CPT

## 2020-01-02 PROCEDURE — 78315 BONE IMAGING 3 PHASE: CPT

## 2020-01-02 RX ADMIN — FAMOTIDINE SCH MG: 10 INJECTION, SOLUTION INTRAVENOUS at 21:11

## 2020-01-02 RX ADMIN — ONDANSETRON SCH MG: 2 INJECTION INTRAMUSCULAR; INTRAVENOUS at 22:58

## 2020-01-02 RX ADMIN — HEPARIN SODIUM SCH UNIT: 5000 INJECTION, SOLUTION INTRAVENOUS; SUBCUTANEOUS at 20:33

## 2020-01-02 RX ADMIN — CEFAZOLIN SCH MLS/HR: 330 INJECTION, POWDER, FOR SOLUTION INTRAMUSCULAR; INTRAVENOUS at 12:06

## 2020-01-02 RX ADMIN — CEFAZOLIN SCH MLS/HR: 330 INJECTION, POWDER, FOR SOLUTION INTRAMUSCULAR; INTRAVENOUS at 20:33

## 2020-01-02 RX ADMIN — CEFAZOLIN SCH: 330 INJECTION, POWDER, FOR SOLUTION INTRAMUSCULAR; INTRAVENOUS at 17:13

## 2020-01-02 NOTE — HP
HISTORY AND PHYSICAL



Morenita Ramirez is a 59-year-old female with a history of diabetes mellitus, who

presented to the ER at [facility] with nausea and vomiting associated with

diarrhea for about 3 days.  She was subsequently seen in the ER and admitted for

further evaluation and management.  She has a history of diabetes mellitus, CVA,

history of chronic wound in the right leg, history of asthma, coronary artery disease,

congestive heart failure, anemia, appendectomy, cholecystectomy, cardiac cath with

stent placement, amputation of the right 2nd toe, anxiety, depression.



FAMILY HISTORY:

Positive for coronary artery disease in her father.  COPD in her mother.



SOCIAL HISTORY:

The patient is a former smoker.  Does not drink alcohol excessively.



MEDICATIONS:

Prior to admission were vitamin B complex, Depakene, Seroquel, Zofran, Lantus insulin,

Humalog, Norco, ferrous sulfate, Pepcid, Drisdol, Cymbalta, Lipitor, Ecotrin, Ventolin

HFA and Xanax.



REVIEW OF SYSTEMS:

Noncontributory.



PHYSICAL EXAMINATION:

Patient was lying in bed.  Her blood pressure is 118/74, respiratory rate of 12, pulse

rate 105, temperature 98.4 degrees Fahrenheit.  HEENT reveals pupils are equal.  No

jugular venous distention.  Chest reveals no wheeze.  Cardiovascular system is S1, S2.

No S3, no S4.  No murmurs.  Abdomen is soft.  There is no edema.  There are 2 wounds on

the right foot, 1 lateral and 1 medial.



LABS:

Revealed a white count of 8.4, hemoglobin of 10.4.  Venous blood gas, pH of 7.21 with a

bicarb of 7.  On the electrolytes, sodium is 145, potassium 4.7, chloride 109, bicarb

10, BUN 55, creatinine 1.66, glucose 645.  Acetone was positive.  UA showed 3+ glucose

with 3+ ketones.  Foot x-ray showed medial and lateral soft tissue ulcers with new

underlying lytic destruction involving the lateral aspect of the 5th metatarsal head

and neck compatible with osteomyelitis.  Previous partial great toe and 5th toe

amputations, previous complete 2nd toe amputation.



IMPRESSION:

At this time:

1. Diabetic ketoacidosis.

2. Hyperosmolar state.

3. Right wound infection with osteomyelitis.

At this point in time, keep her in the ICU on DKA protocol.  Keep her on Levaquin and

vancomycin until seen by ID.  Keep her on GI and DVT prophylaxis.  Depending on how she

does, we shall make further changes to her care.  She was counseled regarding her

condition and this approach and has a fair understanding of our recommendations.





MMODL / IJN: [id number] / Job#: [id number]

## 2020-01-02 NOTE — P.CNPUL
History of Present Illness


Consult date: 20


Reason for consult: dyspnea, COPD


Chief complaint: Osteomyelitis and DKA


History of present illness: 


59-year-old female patient well-known to me with extensive vascular history 

including coronary artery disease, CHF, COPD, type 2 diabetes, CVA with right-

sided hemiparesis presents to the chief complaint nausea vomiting diarrhea the 

last 3 days.  Patient has caregiver with her which provides much of history.  

She reports the patient has had very high sugars, nausea vomiting diarrhea last 

3 days.  Patient complains some epigastric abdominal pain.  Denies any upper 

respiratory symptoms, cough, congestion.  Denies any chest pain or shortness of 

breath.  Patient does have a chronic wound of her right foot for she is present 

Bactrim has been following up with the wound clinic.  Review of the data 

revealed that patient has profound metabolic acidosis with a stage III chronic 

renal failure and x-ray of the foot consistent consistent with osteomyelitis of 

the fifth metatarsal bone








Review of Systems


All systems: negative





Past Medical History


Past Medical History: Asthma, Coronary Artery Disease (CAD), Chest Pain / 

Angina, Heart Failure, COPD, CVA/TIA, Diabetes Mellitus, Deep Vein Thrombosis 

(DVT), Eye Disorder, GERD/Reflux, Hyperlipidemia, Hypertension, Myocardial 

Infarction (MI), Neurologic Disorder, Osteoarthritis (OA), Pneumonia, Renal 

Disease


Additional Past Medical History / Comment(s): IDDM (brittle), DKAs, neuropathy 

bilateral hands/feet, retinopathy bilateral eyes, cellulitis R foot, R great toe

and 2nd toe infections/amputations, current wound R foot-being seen in [facility], 

renal failure, anemia, CVAs with L sided paralysis, headaches started after 

CVAs, brain lesions, DVT R axillae, low back pain, varicosities, seizure many 

years ago (), hypothyroid, constipation, bilateral tinnitis occasionally, 

sinus problems.


Last Myocardial Infarction Date:: 


History of Any Multi-Drug Resistant Organisms: MRSA


Date of last positivie culture/infection: 18


MDRO Source:: Right Foot


Past Surgical History: Appendectomy,  Section, Cholecystectomy, Heart 

Catheterization With Stent, Hysterectomy, Orthopedic Surgery


Additional Past Surgical History / Comment(s): PCI with multiple stents, R great

toe and 2nd toe amps, debridements R foot ulcer, L shoulder surgery to remove 

bone, bronchoscopy, EGD, colonoscopy, R arm port since removed, bilateral 

cataract removals/lens implants.


Past Anesthesia/Blood Transfusion Reactions: No Reported Reaction


Additional Past Anesthesia/Blood Transfusion Reaction / Comment(s): HX OF BLOOD 

TRANSFUSION- NO REACTION


Date of Last Stent Placement:: 2013


Smoking Status: Former smoker





- Past Family History


  ** Father


Family Medical History: Unable to Obtain, Coronary Artery Disease (CAD), 

Diabetes Mellitus





  ** Mother


Family Medical History: COPD





Medications and Allergies


                                Home Medications











 Medication  Instructions  Recorded  Confirmed  Type


 


Albuterol Inhaler [Ventolin Hfa 2 puff INHALATION RT-Q4H PRN 16 

History





Inhaler]    


 


Aspirin EC [Ecotrin Low Dose] 81 mg PO DAILY 16 History


 


Famotidine [Pepcid] 20 mg PO DAILY 16 History


 


Valproic Acid [Depakene] 250 mg PO DAILY 16 History


 


Ergocalciferol [Vitamin D2 50,000 unit PO SA 10/06/16 01/02/20 History





(DRISDOL)]    


 


HYDROcodone/APAP 10-325MG [Norco 1 tab PO Q6H PRN 17 History





]    


 


DULoxetine HCL [Cymbalta] 60 mg PO DAILY 17 History


 


Ferrous Sulfate [Iron (65  mg PO DAILY 17 History





Elemental)]    


 


ALPRAZolam [Xanax] 1 mg PO Q8H PRN 17 History


 


Ondansetron [Zofran] 4 mg PO DAILY PRN 18 History


 


Atorvastatin [Lipitor] 20 mg PO DAILY 19 History


 


QUEtiapine FUMARATE [SEROquel] 25 mg PO BID 19 History


 


QUEtiapine [SEROquel] 100 mg PO HS 19 History


 


INSULIN LISPRO (humaLOG) [humaLOG] 6 units SQ AC-TID 20 History


 


Insulin Glargine [Lantus] 25 unit SQ HS 20 History


 


Vitamin B Complex With Vit C 1 tab PO DAILY 20 History








                                    Allergies











Allergy/AdvReac Type Severity Reaction Status Date / Time


 


Barbiturates Allergy  Rash/Hives Verified 20 13:39


 


cephalexin monohydrate Allergy  Rash/Hives Verified 20 13:39





[From Keflex]     


 


morphine Allergy  Rash/Hives Verified 20 13:39


 


Penicillins Allergy  Rash/Hives Verified 20 13:39


 


phenobarbital Allergy  Swelling Verified 20 13:39


 


venom-honey bee Allergy  Swelling Verified 20 13:39





[bee venom (honey bee)]     


 


amlodipine besylate AdvReac  Vomiting Verified 20 13:39





[From Norvasc]     














Physical Exam


Vitals: 


                                   Vital Signs











  Temp Pulse Resp BP Pulse Ox


 


 20 17:00   105 H  18  125/80  96


 


 20 16:00  98.4 F  106 H  15  125/80  98


 


 20 15:43   102 H  11 L  161/74  99


 


 20 14:26  98.4 F  107 H  18  132/58  97


 


 20 13:51   104 H  18  114/49  97


 


 20 12:36   105 H  18  136/58  97


 


 20 09:54  98.4 F  100  20  136/58  98








                                Intake and Output











 20





 06:59 14:59 22:59


 


Intake Total   600


 


Balance   600


 


Intake:   


 


  IV   600


 


    Sodium Chloride 0.9% 1,   600





    000 ml @ 200 mls/hr IV .   





    Q5H [facility] Rx#:[id number]   


 


Other:   


 


  Voiding Method   Bedpan





   Diaper


 


  # Voids   1


 


  Weight  54.431 kg 














- Constitutional


General appearance: disheveled, mild distress





- EENT


Eyes: EOMI, PERRLA, poor dentition, normal appearance


ENT: normal oropharynx


Ears: bilateral: normal





- Neck


Neck: normal ROM


Carotids: bilateral: upstroke normal


Thyroid: bilateral: normal size





- Respiratory


Respiratory: bilateral: CTA





- Cardiovascular


Rhythm: regular


Heart sounds: normal: S1, S2





- Gastrointestinal


General gastrointestinal: decreased bowel sounds, distended, soft





- Integumentary


Integumentary: decreased turgor





- Neurologic


Neurologic: CNII-XII intact





- Musculoskeletal


Musculoskeletal: generalized weakness, right sided weakness





- Psychiatric


Psychiatric: A&O x's 3, appropriate affect





Prior surgery of the right foot with osteomyelitis finding as noted above





Results





- Laboratory Findings


CBC and BMP: 


                                 20 09:53





                                 20 13:24


Abnormal lab findings: 


                                  Abnormal Labs











  20





  09:53 09:53 09:53


 


RBC  3.61 L  


 


Hgb  10.4 L  


 


MCV  102.8 H D  


 


MCHC  28.0 L  


 


Lymphocytes #  0.8 L  


 


VBG pH   


 


VBG pCO2   


 


VBG HCO3   


 


Potassium   5.8 H 


 


Chloride   


 


Carbon Dioxide   8 L* 


 


BUN   56 H 


 


Creatinine   1.70 H 


 


Glucose   829 H* 


 


POC Glucose (mg/dL)   


 


Plasma Lactic Acid Carmelo    2.4 H*


 


Lipase   15 L 


 


Urine Glucose (UA)   


 


Urine Ketones   














  20





  10:18 11:10 11:10


 


RBC   


 


Hgb   


 


MCV   


 


MCHC   


 


Lymphocytes #   


 


VBG pH   7.21 L 


 


VBG pCO2   18 L* 


 


VBG HCO3   7 L* 


 


Potassium   


 


Chloride   


 


Carbon Dioxide   


 


BUN   


 


Creatinine   


 


Glucose   


 


POC Glucose (mg/dL)  >600 H  


 


Plasma Lactic Acid Carmelo   


 


Lipase   


 


Urine Glucose (UA)    4+ H


 


Urine Ketones    3+ H














  20





  12:38 13:24 13:41


 


RBC   


 


Hgb   


 


MCV   


 


MCHC   


 


Lymphocytes #   


 


VBG pH   


 


VBG pCO2   


 


VBG HCO3   


 


Potassium   


 


Chloride   109 H 


 


Carbon Dioxide   10 L 


 


BUN   55 H 


 


Creatinine   1.66 H 


 


Glucose   645 H* 


 


POC Glucose (mg/dL)  >600 H   575 H


 


Plasma Lactic Acid Carmelo   


 


Lipase   


 


Urine Glucose (UA)   


 


Urine Ketones   














  20





  14:39 15:03 16:03


 


RBC   


 


Hgb   


 


MCV   


 


MCHC   


 


Lymphocytes #   


 


VBG pH   


 


VBG pCO2   


 


VBG HCO3   


 


Potassium   


 


Chloride   


 


Carbon Dioxide   


 


BUN   


 


Creatinine   


 


Glucose   


 


POC Glucose (mg/dL)  531 H  507 H  412 H


 


Plasma Lactic Acid Carmelo   


 


Lipase   


 


Urine Glucose (UA)   


 


Urine Ketones   














  20





  17:13


 


RBC 


 


Hgb 


 


MCV 


 


MCHC 


 


Lymphocytes # 


 


VBG pH 


 


VBG pCO2 


 


VBG HCO3 


 


Potassium 


 


Chloride 


 


Carbon Dioxide 


 


BUN 


 


Creatinine 


 


Glucose 


 


POC Glucose (mg/dL)  454 H


 


Plasma Lactic Acid Carmelo 


 


Lipase 


 


Urine Glucose (UA) 


 


Urine Ketones 














- Diagnostic Findings


Comments: 





X-ray of abdomen and foot reviewed





Assessment and Plan


Assessment: 


Severe sepsis





Osteomyelitis of the fifth metatarsal bone





Diabetic ketoacidosis





Severe type 1 diabetes with complications





Mild hyperkalemia





Peripheral vascular disease





Plan: 





The ICU staff could not get a IV need to put a central line





Insulin drip





IV fluids





Broad-spectrum antibiotics with IV vancomycin





Supportive care





Monitor electrolytes and potassium





Monitor renal functions








Time with Patient: Greater than 30

## 2020-01-02 NOTE — XR
EXAMINATION TYPE: XR KUB

 

DATE OF EXAM: 1/2/2020 11:53 AM

 

CLINICAL HISTORY:  Abdominal pain and nausea

 

TECHNIQUE: Single supine KUB image of the abdomen is obtained.

 

COMPARISON: None.

 

FINDINGS: Scattered gas is seen in nondilated small bowel loops. Gas and fecal material is seen in no
ndilated colon. Multiple phleboliths are seen within the pelvis. Extensive atherosclerosis of the dis
tommy abdominal aorta and its branches. The lung bases are clear and the osseous structures are intact.


 

IMPRESSION: 

 

Nonobstructive bowel gas pattern.

## 2020-01-02 NOTE — ED
General Adult HPI





- General


Chief complaint: Nausea/Vomiting/Diarrhea


Stated complaint: High sugar


Time Seen by Provider: 20 10:06


Source: patient, family, RN notes reviewed, old records reviewed


Mode of arrival: EMS


Limitations: physical limitation





- History of Present Illness


Initial comments: 


59-year-old female patient with extensive vascular history including coronary 

artery disease, CHF, COPD, type 2 diabetes, CVA with right-sided hemiparesis 

presents to the chief complaint nausea vomiting diarrhea the last 3 days.  

Patient has caregiver with her which provides much of history.  She reports the 

patient has had very high sugars, nausea vomiting diarrhea last 3 days.  Patient

complains some epigastric abdominal pain.  Denies any upper respiratory 

symptoms, cough, congestion.  Denies any chest pain or shortness of breath.  

Patient does have a chronic wound of her right foot for she is present Bactrim 

has been following up with the wound clinic.





Systemic: Pt denies fatigue, fever/chills, rash. Pt denies weakness, night 

sweats, weight loss. 


Neuro: Pt denies headache, visual disturbances, syncope or pre-syncope.


HEENT: Pt denies ocular discharge or irritation, otalgia, rhinorrhea, 

pharyngitis or notable lymphadenopathy. 


Cardiopulmonary: Pt denies chest pain, SOB, heart palpitations, dyspnea on 

exertion.  


Abdominal/GI: Pt denies abdominal pain, n/v/d. 


: Pt denies dysuria, burning w/ urination, frequency/urgency. Denies new onset

urinary or bowel incontinence.  


MSK: Pt denies myalgia, loss of strength or function in extremities. 


Neuro: Pt denies new onset weakness, paresthesias. 








- Related Data


                                Home Medications











 Medication  Instructions  Recorded  Confirmed


 


Albuterol Inhaler [Ventolin Hfa 2 puff INHALATION RT-Q4H PRN 16





Inhaler]   


 


Aspirin EC [Ecotrin Low Dose] 81 mg PO DAILY 16


 


Famotidine [Pepcid] 20 mg PO DAILY 16


 


Valproic Acid [Depakene] 250 mg PO DAILY 16


 


Ergocalciferol [Vitamin D2 50,000 unit PO SA 10/06/16 11/08/19





(DRISDOL)]   


 


HYDROcodone/APAP 10-325MG [Norco 1 tab PO Q6H PRN 17





]   


 


DULoxetine HCL [Cymbalta] 60 mg PO DAILY 17


 


Ferrous Sulfate [Iron (65  mg PO DAILY 17





Elemental)]   


 


ALPRAZolam [Xanax] 1 mg PO Q8H PRN 17


 


Vitamin B Complex 1 cap PO DAILY 18


 


Ondansetron [Zofran] 4 mg PO DAILY PRN 18


 


Atorvastatin [Lipitor] 20 mg PO DAILY 19


 


QUEtiapine FUMARATE [SEROquel] 25 mg PO BID 19


 


QUEtiapine [SEROquel] 100 mg PO HS 19








                                  Previous Rx's











 Medication  Instructions  Recorded


 


Insulin Lispro [humaLOG] See Protocol SQ AC-TID #1 19


 


Insulin Glargine [Lantus] 20 unit SQ HS 30 Days #1 vial 19


 


Sulfamethox-Tmp 800-160Mg [Bactrim 1 tab PO Q12HR 10 Days #20 tab 19





-160 mg]  











                                    Allergies











Allergy/AdvReac Type Severity Reaction Status Date / Time


 


Barbiturates Allergy  Rash/Hives Verified 19 19:22


 


cephalexin monohydrate Allergy  Rash/Hives Verified 19 19:22





[From Keflex]     


 


morphine Allergy  Rash/Hives Verified 19 19:22


 


Penicillins Allergy  Rash/Hives Verified 19 19:22


 


phenobarbital Allergy  Swelling Verified 19 19:22


 


venom-honey bee Allergy  Swelling Verified 19 19:22





[bee venom (honey bee)]     


 


amlodipine besylate AdvReac  Vomiting Verified 19 19:22





[From Norvasc]     














Review of Systems


ROS Statement: 


Those systems with pertinent positive or pertinent negative responses have been 

documented in the HPI.





ROS Other: All systems not noted in ROS Statement are negative.





Past Medical History


Past Medical History: Asthma, Coronary Artery Disease (CAD), Chest Pain / 

Angina, Heart Failure, COPD, CVA/TIA, Diabetes Mellitus, Deep Vein Thrombosis 

(DVT), Eye Disorder, GERD/Reflux, Hyperlipidemia, Hypertension, Myocardial 

Infarction (MI), Neurologic Disorder


Additional Past Medical History / Comment(s): HX OF CVA X3 (LAST 2015)-HAS LT 

ARM PARALYSIS & WEAKNESS LEFT LEG. MI .  DVT RT  AXILLA. RENAL FAILURE.  LOW

THYROID,  PERIPHERAL NEUROPATHY HANDS & FEET.  ANEMIA.  HX OF DKA. USES W/C. 

CONSTIPATION, ESOPHAGITIS.  EPIGLOTITIS.  HEADACHES SINCE CVA.  RETINOPATHY MARZENA 

EYES.  HX RT TOE INFECTION- GANGRENE, HAD AMP.


Last Myocardial Infarction Date:: 


History of Any Multi-Drug Resistant Organisms: MRSA


Date of last positivie culture/infection: 18


MDRO Source:: Right Foot


Past Surgical History: Appendectomy,  Section, Cholecystectomy, Heart 

Catheterization With Stent, Hysterectomy, Orthopedic Surgery


Additional Past Surgical History / Comment(s): Amputation Rt 2ND Toe.  C-S X3.  

EGD.  Bronchoscopy.  RT Arm Port Placed FOR AB RX; Removed.  6 CARDIAC STENTS. 

left shoulder bone removed


Past Anesthesia/Blood Transfusion Reactions: No Reported Reaction


Additional Past Anesthesia/Blood Transfusion Reaction / Comment(s): HX OF BLOOD 

TRANSFUSION- NO REACTION


Date of Last Stent Placement:: 2013


Past Psychological History: Anxiety, Bipolar, Depression


Smoking Status: Former smoker


Past Alcohol Use History: None Reported


Past Drug Use History: None Reported





- Past Family History


  ** Father


Family Medical History: Unable to Obtain, Coronary Artery Disease (CAD), 

Diabetes Mellitus





  ** Mother


Family Medical History: COPD





General Exam





- General Exam Comments


Initial Comments: 





Constitutional: NAD, AOX3, Pt has pleasant affect. 


HEENT: NC/AT, trachea midline, neck supple, no lymphadenopathy. Posterior 

pharynx non erythematous, without exudates. External ears appear normal, without

discharge. Mucous membranes moist. Eyes PERRLA, EOM intact. There is no scleral 

icterus. No pallor noted. 


Cardiopulmonary: RRR, no murmurs, rubs or gallops, no JVD noted. Lungs CTAB in 

anterior and posterior fields. No peripheral edema. 


Abdominal exam: Abdomen soft and non-distended. Abdomen mildly tender to 

palpation in epigastric region. Bowel sounds active in LLQ. No 

hepatosplenomegaly. No ecchymosis


Neuro: CN II-XII intact. No nuchal rigidity. No raccon eyes, no brown sign, no 

hemotympanum. No cervical spinal tenderness. 


MSK: Diabetic foot also noted on medial and lateral aspect of right foot.  

Approximately 4 cm in diameter.  No posterior calf tenderness bilaterally, 

homans sign negative bilaterally. Posterior tibialis and radial pulse +2 

bilaterally. Sensation intact in upper and lower extremities. Full active ROM in

upper and lower extremities, 5/5 stregnth. 








Limitations: physical limitation





Course


                                   Vital Signs











  20





  09:54 12:36


 


Temperature 98.4 F 


 


Pulse Rate 100 105 H


 


Respiratory 20 18





Rate  


 


Blood Pressure 136/58 136/58


 


O2 Sat by Pulse 98 97





Oximetry  














Medical Decision Making





- Medical Decision Making


59-year-old female patient with extensive vascular history including coronary 

artery disease, CHF, COPD, type 2 diabetes, CVA with right-sided hemiparesis 

presents to the chief complaint nausea vomiting diarrhea the last 3 days.  

Patient has caregiver with her which provides much of history.  She reports the 

patient has had very high sugars, nausea vomiting diarrhea last 3 days.  Patient

complains some epigastric abdominal pain.  Denies any upper respiratory 

symptoms, cough, congestion.  Denies any chest pain or shortness of breath.  

Patient does have a chronic wound of her right foot for she is present Bactrim 

has been following up with the wound clinic. Patient vital signs are stable, 

afebrile.  Physical exam displayed: Diabetic foot also noted on medial and 

lateral aspect of right foot.  Approximately 4 cm in diameter.  Laboratory 

investigations revealed diabetic ketoacidosis.  PH 7.2, venous blood gas pCO2 

18, H CN III 7.  Potassium 5.8.  At 8.  Creatinine 1.7.  She has a 2.4.  Glucose

829.  UA displayed ketones.  EKG nonischemic.  Patient initiated on insulin, IV 

fluids.  Initiated on Levaquin and vancomycin for diabetic foot infection which 

may have caused diabetic ketoacidosis.  Patient was reportedly compliant on 

medication insulin.  Case discussed in depth with Dr. Plasencia. 








- Lab Data


Result diagrams: 


                                 20 09:53





                                 20 09:53


                                   Lab Results











  20 Range/Units





  09:53 09:53 09:53 


 


WBC  8.4    (3.8-10.6)  k/uL


 


RBC  3.61 L    (3.80-5.40)  m/uL


 


Hgb  10.4 L    (11.4-16.0)  gm/dL


 


Hct  37.1    (34.0-46.0)  %


 


MCV  102.8 H D    (80.0-100.0)  fL


 


MCH  28.8    (25.0-35.0)  pg


 


MCHC  28.0 L    (31.0-37.0)  g/dL


 


RDW  13.6    (11.5-15.5)  %


 


Plt Count  222    (150-450)  k/uL


 


Neutrophils %  87    %


 


Lymphocytes %  9    %


 


Monocytes %  3    %


 


Eosinophils %  0    %


 


Basophils %  0    %


 


Neutrophils #  7.3    (1.3-7.7)  k/uL


 


Lymphocytes #  0.8 L    (1.0-4.8)  k/uL


 


Monocytes #  0.3    (0-1.0)  k/uL


 


Eosinophils #  0.0    (0-0.7)  k/uL


 


Basophils #  0.0    (0-0.2)  k/uL


 


Hypochromasia  Marked    


 


Macrocytosis  Slight    


 


VBG pH     (7.31-7.41)  


 


VBG pCO2     (37-51)  mmHg


 


VBG HCO3     (24-28)  mmol/L


 


Sodium   141   (137-145)  mmol/L


 


Potassium   5.8 H   (3.5-5.1)  mmol/L


 


Chloride   102   ()  mmol/L


 


Carbon Dioxide   8 L*   (22-30)  mmol/L


 


Anion Gap   31   mmol/L


 


BUN   56 H   (7-17)  mg/dL


 


Creatinine   1.70 H   (0.52-1.04)  mg/dL


 


Est GFR (CKD-EPI)AfAm   38   (>60 ml/min/1.73 sqM)  


 


Est GFR (CKD-EPI)NonAf   33   (>60 ml/min/1.73 sqM)  


 


Glucose   829 H*   (74-99)  mg/dL


 


POC Glucose (mg/dL)     (75-99)  mg/dL


 


POC Glu Operater ID     


 


Plasma Lactic Acid Carmelo    2.4 H*  (0.7-2.0)  mmol/L


 


Calcium   10.2   (8.4-10.2)  mg/dL


 


Total Bilirubin   0.5   (0.2-1.3)  mg/dL


 


AST   21   (14-36)  U/L


 


ALT   20   (4-34)  U/L


 


Alkaline Phosphatase   104   ()  U/L


 


Troponin I     (0.000-0.034)  ng/mL


 


Total Protein   7.3   (6.3-8.2)  g/dL


 


Albumin   4.0   (3.5-5.0)  g/dL


 


Lipase   15 L   ()  U/L


 


Urine Color     


 


Urine Appearance     (Clear)  


 


Urine pH     (5.0-8.0)  


 


Ur Specific Gravity     (1.001-1.035)  


 


Urine Protein     (Negative)  


 


Urine Glucose (UA)     (Negative)  


 


Urine Ketones     (Negative)  


 


Urine Blood     (Negative)  


 


Urine Nitrite     (Negative)  


 


Urine Bilirubin     (Negative)  


 


Urine Urobilinogen     (<2.0)  mg/dL


 


Ur Leukocyte Esterase     (Negative)  


 


Acetone, Qual     (Negative)  














  20 Range/Units





  09:53 09:53 10:18 


 


WBC     (3.8-10.6)  k/uL


 


RBC     (3.80-5.40)  m/uL


 


Hgb     (11.4-16.0)  gm/dL


 


Hct     (34.0-46.0)  %


 


MCV     (80.0-100.0)  fL


 


MCH     (25.0-35.0)  pg


 


MCHC     (31.0-37.0)  g/dL


 


RDW     (11.5-15.5)  %


 


Plt Count     (150-450)  k/uL


 


Neutrophils %     %


 


Lymphocytes %     %


 


Monocytes %     %


 


Eosinophils %     %


 


Basophils %     %


 


Neutrophils #     (1.3-7.7)  k/uL


 


Lymphocytes #     (1.0-4.8)  k/uL


 


Monocytes #     (0-1.0)  k/uL


 


Eosinophils #     (0-0.7)  k/uL


 


Basophils #     (0-0.2)  k/uL


 


Hypochromasia     


 


Macrocytosis     


 


VBG pH     (7.31-7.41)  


 


VBG pCO2     (37-51)  mmHg


 


VBG HCO3     (24-28)  mmol/L


 


Sodium     (137-145)  mmol/L


 


Potassium     (3.5-5.1)  mmol/L


 


Chloride     ()  mmol/L


 


Carbon Dioxide     (22-30)  mmol/L


 


Anion Gap     mmol/L


 


BUN     (7-17)  mg/dL


 


Creatinine     (0.52-1.04)  mg/dL


 


Est GFR (CKD-EPI)AfAm     (>60 ml/min/1.73 sqM)  


 


Est GFR (CKD-EPI)NonAf     (>60 ml/min/1.73 sqM)  


 


Glucose     (74-99)  mg/dL


 


POC Glucose (mg/dL)    >600 H  (75-99)  mg/dL


 


POC Glu Operater ID    Nolvia Mart  


 


Plasma Lactic Acid Carmelo     (0.7-2.0)  mmol/L


 


Calcium     (8.4-10.2)  mg/dL


 


Total Bilirubin     (0.2-1.3)  mg/dL


 


AST     (14-36)  U/L


 


ALT     (4-34)  U/L


 


Alkaline Phosphatase     ()  U/L


 


Troponin I  <0.012    (0.000-0.034)  ng/mL


 


Total Protein     (6.3-8.2)  g/dL


 


Albumin     (3.5-5.0)  g/dL


 


Lipase     ()  U/L


 


Urine Color     


 


Urine Appearance     (Clear)  


 


Urine pH     (5.0-8.0)  


 


Ur Specific Gravity     (1.001-1.035)  


 


Urine Protein     (Negative)  


 


Urine Glucose (UA)     (Negative)  


 


Urine Ketones     (Negative)  


 


Urine Blood     (Negative)  


 


Urine Nitrite     (Negative)  


 


Urine Bilirubin     (Negative)  


 


Urine Urobilinogen     (<2.0)  mg/dL


 


Ur Leukocyte Esterase     (Negative)  


 


Acetone, Qual   Positive   (Negative)  














  20 Range/Units





  11:10 11:10 12:38 


 


WBC     (3.8-10.6)  k/uL


 


RBC     (3.80-5.40)  m/uL


 


Hgb     (11.4-16.0)  gm/dL


 


Hct     (34.0-46.0)  %


 


MCV     (80.0-100.0)  fL


 


MCH     (25.0-35.0)  pg


 


MCHC     (31.0-37.0)  g/dL


 


RDW     (11.5-15.5)  %


 


Plt Count     (150-450)  k/uL


 


Neutrophils %     %


 


Lymphocytes %     %


 


Monocytes %     %


 


Eosinophils %     %


 


Basophils %     %


 


Neutrophils #     (1.3-7.7)  k/uL


 


Lymphocytes #     (1.0-4.8)  k/uL


 


Monocytes #     (0-1.0)  k/uL


 


Eosinophils #     (0-0.7)  k/uL


 


Basophils #     (0-0.2)  k/uL


 


Hypochromasia     


 


Macrocytosis     


 


VBG pH  7.21 L    (7.31-7.41)  


 


VBG pCO2  18 L*    (37-51)  mmHg


 


VBG HCO3  7 L*    (24-28)  mmol/L


 


Sodium     (137-145)  mmol/L


 


Potassium     (3.5-5.1)  mmol/L


 


Chloride     ()  mmol/L


 


Carbon Dioxide     (22-30)  mmol/L


 


Anion Gap     mmol/L


 


BUN     (7-17)  mg/dL


 


Creatinine     (0.52-1.04)  mg/dL


 


Est GFR (CKD-EPI)AfAm     (>60 ml/min/1.73 sqM)  


 


Est GFR (CKD-EPI)NonAf     (>60 ml/min/1.73 sqM)  


 


Glucose     (74-99)  mg/dL


 


POC Glucose (mg/dL)    >600 H  (75-99)  mg/dL


 


POC Glu Operater ID    Brianna Dc  


 


Plasma Lactic Acid Carmelo     (0.7-2.0)  mmol/L


 


Calcium     (8.4-10.2)  mg/dL


 


Total Bilirubin     (0.2-1.3)  mg/dL


 


AST     (14-36)  U/L


 


ALT     (4-34)  U/L


 


Alkaline Phosphatase     ()  U/L


 


Troponin I     (0.000-0.034)  ng/mL


 


Total Protein     (6.3-8.2)  g/dL


 


Albumin     (3.5-5.0)  g/dL


 


Lipase     ()  U/L


 


Urine Color   Light Yellow   


 


Urine Appearance   Clear   (Clear)  


 


Urine pH   5.0   (5.0-8.0)  


 


Ur Specific Gravity   1.019   (1.001-1.035)  


 


Urine Protein   Negative   (Negative)  


 


Urine Glucose (UA)   4+ H   (Negative)  


 


Urine Ketones   3+ H   (Negative)  


 


Urine Blood   Negative   (Negative)  


 


Urine Nitrite   Negative   (Negative)  


 


Urine Bilirubin   Negative   (Negative)  


 


Urine Urobilinogen   <2.0   (<2.0)  mg/dL


 


Ur Leukocyte Esterase   Negative   (Negative)  


 


Acetone, Qual     (Negative)  














Disposition


Clinical Impression: 


 Diabetic foot infection, Diabetic ketoacidosis





Disposition: ADMITTED AS IP TO THIS HOSP


Condition: Serious


Is patient prescribed a controlled substance at d/c from ED?: No


Referrals: 


Saniya Cabral MD [Primary Care Provider] - 1-2 days

## 2020-01-02 NOTE — XR
EXAMINATION TYPE: XR foot complete RT

 

DATE OF EXAM: 1/2/2020

 

COMPARISON: 12/18/2019

 

HISTORY: 59-year-old female pain and chronic infection

 

TECHNIQUE: 3 views

 

FINDINGS: 

Partial great toe amputation at the level of the proximal phalangeal neck. Stable appearance to the o
steotomy margin. Additional partial amputation of the fifth toe just beyond the proximal phalangeal b
ase. Again, stable appearance to the osteotomy margin.

 

Prior second toe amputation. Subchondral cystic change redemonstrated within the second metatarsal he
ad. Suspects subtle early developing heterotopic ossification.

 

Suspect a retained needle fragment measuring 8 mm long within the plantar soft tissues at the level o
f the fourth proximal phalanx.

 

Medial and lateral ulcers at the mid/forefoot level. Annual lytic destruction along the lateral aspec
t of the fifth metatarsal head and neck.

 

 

IMPRESSION: 

1. Medial and lateral soft tissue ulcers at the mid/forefoot level. There is new underlying lytic bassem
truction involving the lateral aspect of the fifth metatarsal head and neck compatible with osteomyel
itis.

2. Previous partial great toe and fifth toe amputations and previous complete second toe amputation. 
The osteotomy margins appear relatively similar to 12/18/2019 arguing against osteomyelitis at these 
sites.

## 2020-01-02 NOTE — ED
Medical Decision Making





- Medical Decision Making


Ventricular 102,.  Full and 52, QRS 88, QT//42.  Sinus tachycardia, right

shoulder discomfort on EKG, no concern for acute ischemia at this time.








- Lab Data


Result diagrams: 


                                 01/02/20 09:53





                                 01/02/20 09:53


                                   Lab Results











  01/02/20 01/02/20 01/02/20 Range/Units





  09:53 09:53 09:53 


 


WBC  8.4    (3.8-10.6)  k/uL


 


RBC  3.61 L    (3.80-5.40)  m/uL


 


Hgb  10.4 L    (11.4-16.0)  gm/dL


 


Hct  37.1    (34.0-46.0)  %


 


MCV  102.8 H D    (80.0-100.0)  fL


 


MCH  28.8    (25.0-35.0)  pg


 


MCHC  28.0 L    (31.0-37.0)  g/dL


 


RDW  13.6    (11.5-15.5)  %


 


Plt Count  222    (150-450)  k/uL


 


Neutrophils %  87    %


 


Lymphocytes %  9    %


 


Monocytes %  3    %


 


Eosinophils %  0    %


 


Basophils %  0    %


 


Neutrophils #  7.3    (1.3-7.7)  k/uL


 


Lymphocytes #  0.8 L    (1.0-4.8)  k/uL


 


Monocytes #  0.3    (0-1.0)  k/uL


 


Eosinophils #  0.0    (0-0.7)  k/uL


 


Basophils #  0.0    (0-0.2)  k/uL


 


Hypochromasia  Marked    


 


Macrocytosis  Slight    


 


VBG pH     (7.31-7.41)  


 


VBG pCO2     (37-51)  mmHg


 


VBG HCO3     (24-28)  mmol/L


 


Sodium   141   (137-145)  mmol/L


 


Potassium   5.8 H   (3.5-5.1)  mmol/L


 


Chloride   102   ()  mmol/L


 


Carbon Dioxide   8 L*   (22-30)  mmol/L


 


Anion Gap   31   mmol/L


 


BUN   56 H   (7-17)  mg/dL


 


Creatinine   1.70 H   (0.52-1.04)  mg/dL


 


Est GFR (CKD-EPI)AfAm   38   (>60 ml/min/1.73 sqM)  


 


Est GFR (CKD-EPI)NonAf   33   (>60 ml/min/1.73 sqM)  


 


Glucose   829 H*   (74-99)  mg/dL


 


POC Glucose (mg/dL)     (75-99)  mg/dL


 


POC Glu Operater ID     


 


Plasma Lactic Acid Carmelo    2.4 H*  (0.7-2.0)  mmol/L


 


Calcium   10.2   (8.4-10.2)  mg/dL


 


Total Bilirubin   0.5   (0.2-1.3)  mg/dL


 


AST   21   (14-36)  U/L


 


ALT   20   (4-34)  U/L


 


Alkaline Phosphatase   104   ()  U/L


 


Troponin I     (0.000-0.034)  ng/mL


 


Total Protein   7.3   (6.3-8.2)  g/dL


 


Albumin   4.0   (3.5-5.0)  g/dL


 


Lipase   15 L   ()  U/L


 


Urine Color     


 


Urine Appearance     (Clear)  


 


Urine pH     (5.0-8.0)  


 


Ur Specific Gravity     (1.001-1.035)  


 


Urine Protein     (Negative)  


 


Urine Glucose (UA)     (Negative)  


 


Urine Ketones     (Negative)  


 


Urine Blood     (Negative)  


 


Urine Nitrite     (Negative)  


 


Urine Bilirubin     (Negative)  


 


Urine Urobilinogen     (<2.0)  mg/dL


 


Ur Leukocyte Esterase     (Negative)  


 


Acetone, Qual     (Negative)  














  01/02/20 01/02/20 01/02/20 Range/Units





  09:53 09:53 10:18 


 


WBC     (3.8-10.6)  k/uL


 


RBC     (3.80-5.40)  m/uL


 


Hgb     (11.4-16.0)  gm/dL


 


Hct     (34.0-46.0)  %


 


MCV     (80.0-100.0)  fL


 


MCH     (25.0-35.0)  pg


 


MCHC     (31.0-37.0)  g/dL


 


RDW     (11.5-15.5)  %


 


Plt Count     (150-450)  k/uL


 


Neutrophils %     %


 


Lymphocytes %     %


 


Monocytes %     %


 


Eosinophils %     %


 


Basophils %     %


 


Neutrophils #     (1.3-7.7)  k/uL


 


Lymphocytes #     (1.0-4.8)  k/uL


 


Monocytes #     (0-1.0)  k/uL


 


Eosinophils #     (0-0.7)  k/uL


 


Basophils #     (0-0.2)  k/uL


 


Hypochromasia     


 


Macrocytosis     


 


VBG pH     (7.31-7.41)  


 


VBG pCO2     (37-51)  mmHg


 


VBG HCO3     (24-28)  mmol/L


 


Sodium     (137-145)  mmol/L


 


Potassium     (3.5-5.1)  mmol/L


 


Chloride     ()  mmol/L


 


Carbon Dioxide     (22-30)  mmol/L


 


Anion Gap     mmol/L


 


BUN     (7-17)  mg/dL


 


Creatinine     (0.52-1.04)  mg/dL


 


Est GFR (CKD-EPI)AfAm     (>60 ml/min/1.73 sqM)  


 


Est GFR (CKD-EPI)NonAf     (>60 ml/min/1.73 sqM)  


 


Glucose     (74-99)  mg/dL


 


POC Glucose (mg/dL)    >600 H  (75-99)  mg/dL


 


POC Glu Operater ID    Nolvia Mart  


 


Plasma Lactic Acid Carmelo     (0.7-2.0)  mmol/L


 


Calcium     (8.4-10.2)  mg/dL


 


Total Bilirubin     (0.2-1.3)  mg/dL


 


AST     (14-36)  U/L


 


ALT     (4-34)  U/L


 


Alkaline Phosphatase     ()  U/L


 


Troponin I  <0.012    (0.000-0.034)  ng/mL


 


Total Protein     (6.3-8.2)  g/dL


 


Albumin     (3.5-5.0)  g/dL


 


Lipase     ()  U/L


 


Urine Color     


 


Urine Appearance     (Clear)  


 


Urine pH     (5.0-8.0)  


 


Ur Specific Gravity     (1.001-1.035)  


 


Urine Protein     (Negative)  


 


Urine Glucose (UA)     (Negative)  


 


Urine Ketones     (Negative)  


 


Urine Blood     (Negative)  


 


Urine Nitrite     (Negative)  


 


Urine Bilirubin     (Negative)  


 


Urine Urobilinogen     (<2.0)  mg/dL


 


Ur Leukocyte Esterase     (Negative)  


 


Acetone, Qual   Positive   (Negative)  














  01/02/20 01/02/20 01/02/20 Range/Units





  11:10 11:10 12:38 


 


WBC     (3.8-10.6)  k/uL


 


RBC     (3.80-5.40)  m/uL


 


Hgb     (11.4-16.0)  gm/dL


 


Hct     (34.0-46.0)  %


 


MCV     (80.0-100.0)  fL


 


MCH     (25.0-35.0)  pg


 


MCHC     (31.0-37.0)  g/dL


 


RDW     (11.5-15.5)  %


 


Plt Count     (150-450)  k/uL


 


Neutrophils %     %


 


Lymphocytes %     %


 


Monocytes %     %


 


Eosinophils %     %


 


Basophils %     %


 


Neutrophils #     (1.3-7.7)  k/uL


 


Lymphocytes #     (1.0-4.8)  k/uL


 


Monocytes #     (0-1.0)  k/uL


 


Eosinophils #     (0-0.7)  k/uL


 


Basophils #     (0-0.2)  k/uL


 


Hypochromasia     


 


Macrocytosis     


 


VBG pH  7.21 L    (7.31-7.41)  


 


VBG pCO2  18 L*    (37-51)  mmHg


 


VBG HCO3  7 L*    (24-28)  mmol/L


 


Sodium     (137-145)  mmol/L


 


Potassium     (3.5-5.1)  mmol/L


 


Chloride     ()  mmol/L


 


Carbon Dioxide     (22-30)  mmol/L


 


Anion Gap     mmol/L


 


BUN     (7-17)  mg/dL


 


Creatinine     (0.52-1.04)  mg/dL


 


Est GFR (CKD-EPI)AfAm     (>60 ml/min/1.73 sqM)  


 


Est GFR (CKD-EPI)NonAf     (>60 ml/min/1.73 sqM)  


 


Glucose     (74-99)  mg/dL


 


POC Glucose (mg/dL)    >600 H  (75-99)  mg/dL


 


POC Glu Operater ID    Brianna Dc  


 


Plasma Lactic Acid Carmelo     (0.7-2.0)  mmol/L


 


Calcium     (8.4-10.2)  mg/dL


 


Total Bilirubin     (0.2-1.3)  mg/dL


 


AST     (14-36)  U/L


 


ALT     (4-34)  U/L


 


Alkaline Phosphatase     ()  U/L


 


Troponin I     (0.000-0.034)  ng/mL


 


Total Protein     (6.3-8.2)  g/dL


 


Albumin     (3.5-5.0)  g/dL


 


Lipase     ()  U/L


 


Urine Color   Light Yellow   


 


Urine Appearance   Clear   (Clear)  


 


Urine pH   5.0   (5.0-8.0)  


 


Ur Specific Gravity   1.019   (1.001-1.035)  


 


Urine Protein   Negative   (Negative)  


 


Urine Glucose (UA)   4+ H   (Negative)  


 


Urine Ketones   3+ H   (Negative)  


 


Urine Blood   Negative   (Negative)  


 


Urine Nitrite   Negative   (Negative)  


 


Urine Bilirubin   Negative   (Negative)  


 


Urine Urobilinogen   <2.0   (<2.0)  mg/dL


 


Ur Leukocyte Esterase   Negative   (Negative)  


 


Acetone, Qual     (Negative)  














Disposition


Clinical Impression: 


 Diabetic foot infection, Diabetic ketoacidosis





Disposition: ADMITTED AS IP TO THIS HOSP


Condition: Serious


Is patient prescribed a controlled substance at d/c from ED?: No

## 2020-01-03 LAB
ANION GAP SERPL CALC-SCNC: 8 MMOL/L
ANION GAP SERPL CALC-SCNC: 8 MMOL/L
BASOPHILS # BLD AUTO: 0.1 K/UL (ref 0–0.2)
BASOPHILS NFR BLD AUTO: 1 %
BUN SERPL-SCNC: 33 MG/DL (ref 7–17)
BUN SERPL-SCNC: 39 MG/DL (ref 7–17)
CALCIUM SPEC-MCNC: 9.1 MG/DL (ref 8.4–10.2)
CALCIUM SPEC-MCNC: 9.4 MG/DL (ref 8.4–10.2)
CHLORIDE SERPL-SCNC: 118 MMOL/L (ref 98–107)
CHLORIDE SERPL-SCNC: 118 MMOL/L (ref 98–107)
CO2 SERPL-SCNC: 23 MMOL/L (ref 22–30)
CO2 SERPL-SCNC: 24 MMOL/L (ref 22–30)
EOSINOPHIL # BLD AUTO: 0 K/UL (ref 0–0.7)
EOSINOPHIL NFR BLD AUTO: 0 %
ERYTHROCYTE [DISTWIDTH] IN BLOOD BY AUTOMATED COUNT: 3.5 M/UL (ref 3.8–5.4)
ERYTHROCYTE [DISTWIDTH] IN BLOOD: 13.9 % (ref 11.5–15.5)
GLUCOSE BLD-MCNC: 159 MG/DL (ref 75–99)
GLUCOSE BLD-MCNC: 179 MG/DL (ref 75–99)
GLUCOSE BLD-MCNC: 205 MG/DL (ref 75–99)
GLUCOSE BLD-MCNC: 319 MG/DL (ref 75–99)
GLUCOSE BLD-MCNC: 79 MG/DL (ref 75–99)
GLUCOSE BLD-MCNC: 87 MG/DL (ref 75–99)
GLUCOSE SERPL-MCNC: 102 MG/DL (ref 74–99)
GLUCOSE SERPL-MCNC: 209 MG/DL (ref 74–99)
HBA1C MFR BLD: 9 % (ref 4–6)
HCT VFR BLD AUTO: 32.6 % (ref 34–46)
HGB BLD-MCNC: 10.3 GM/DL (ref 11.4–16)
LYMPHOCYTES # SPEC AUTO: 1.3 K/UL (ref 1–4.8)
LYMPHOCYTES NFR SPEC AUTO: 10 %
MCH RBC QN AUTO: 29.4 PG (ref 25–35)
MCHC RBC AUTO-ENTMCNC: 31.6 G/DL (ref 31–37)
MCV RBC AUTO: 93.2 FL (ref 80–100)
MONOCYTES # BLD AUTO: 0.5 K/UL (ref 0–1)
MONOCYTES NFR BLD AUTO: 3 %
NEUTROPHILS # BLD AUTO: 11.6 K/UL (ref 1.3–7.7)
NEUTROPHILS NFR BLD AUTO: 86 %
PLATELET # BLD AUTO: 321 K/UL (ref 150–450)
POTASSIUM SERPL-SCNC: 4.1 MMOL/L (ref 3.5–5.1)
POTASSIUM SERPL-SCNC: 4.2 MMOL/L (ref 3.5–5.1)
SODIUM SERPL-SCNC: 149 MMOL/L (ref 137–145)
SODIUM SERPL-SCNC: 150 MMOL/L (ref 137–145)
WBC # BLD AUTO: 13.5 K/UL (ref 3.8–10.6)

## 2020-01-03 RX ADMIN — INSULIN DETEMIR SCH UNIT: 100 INJECTION, SOLUTION SUBCUTANEOUS at 01:22

## 2020-01-03 RX ADMIN — SODIUM BICARBONATE SCH: 84 INJECTION, SOLUTION INTRAVENOUS at 23:17

## 2020-01-03 RX ADMIN — TRIMETHOBENZAMIDE HYDROCHLORIDE PRN MG: 100 INJECTION INTRAMUSCULAR at 22:49

## 2020-01-03 RX ADMIN — HEPARIN SODIUM SCH UNIT: 5000 INJECTION, SOLUTION INTRAVENOUS; SUBCUTANEOUS at 20:36

## 2020-01-03 RX ADMIN — INSULIN ASPART SCH UNIT: 100 INJECTION, SOLUTION INTRAVENOUS; SUBCUTANEOUS at 17:15

## 2020-01-03 RX ADMIN — TRIMETHOBENZAMIDE HYDROCHLORIDE PRN MG: 100 INJECTION INTRAMUSCULAR at 15:12

## 2020-01-03 RX ADMIN — SODIUM CHLORIDE SCH MLS/HR: 9 INJECTION, SOLUTION INTRAVENOUS at 12:16

## 2020-01-03 RX ADMIN — ONDANSETRON SCH MG: 2 INJECTION INTRAMUSCULAR; INTRAVENOUS at 12:15

## 2020-01-03 RX ADMIN — INSULIN ASPART SCH: 100 INJECTION, SOLUTION INTRAVENOUS; SUBCUTANEOUS at 12:30

## 2020-01-03 RX ADMIN — KETOROLAC TROMETHAMINE PRN MG: 30 INJECTION, SOLUTION INTRAMUSCULAR at 22:45

## 2020-01-03 RX ADMIN — INSULIN ASPART SCH UNIT: 100 INJECTION, SOLUTION INTRAVENOUS; SUBCUTANEOUS at 20:36

## 2020-01-03 RX ADMIN — INSULIN ASPART SCH: 100 INJECTION, SOLUTION INTRAVENOUS; SUBCUTANEOUS at 06:34

## 2020-01-03 RX ADMIN — ONDANSETRON SCH MG: 2 INJECTION INTRAMUSCULAR; INTRAVENOUS at 17:15

## 2020-01-03 RX ADMIN — KETOROLAC TROMETHAMINE PRN MG: 30 INJECTION, SOLUTION INTRAMUSCULAR at 08:57

## 2020-01-03 RX ADMIN — HEPARIN SODIUM SCH UNIT: 5000 INJECTION, SOLUTION INTRAVENOUS; SUBCUTANEOUS at 07:57

## 2020-01-03 RX ADMIN — FAMOTIDINE SCH MG: 10 INJECTION, SOLUTION INTRAVENOUS at 07:57

## 2020-01-03 RX ADMIN — INSULIN DETEMIR SCH UNIT: 100 INJECTION, SOLUTION SUBCUTANEOUS at 20:38

## 2020-01-03 RX ADMIN — KETOROLAC TROMETHAMINE PRN MG: 30 INJECTION, SOLUTION INTRAMUSCULAR at 15:16

## 2020-01-03 RX ADMIN — SODIUM BICARBONATE SCH MLS/HR: 84 INJECTION, SOLUTION INTRAVENOUS at 01:23

## 2020-01-03 RX ADMIN — ONDANSETRON SCH MG: 2 INJECTION INTRAMUSCULAR; INTRAVENOUS at 05:13

## 2020-01-03 NOTE — PN
PROGRESS NOTE



DATE OF SERVICE:

01/03/2020.



REASON FOR FOLLOWUP:

Right diabetic foot wound and concern for underlying osteomyelitis.



INTERVAL HISTORY:

The patient is currently afebrile.  Patient is breathing comfortably.  Still

complaining of feeling nauseous and vomiting and rocking in the bed with movement,

consistent movement of the leg.  Still complaining of pain to the leg area, but unable

to quantify further.  No diarrhea.



PHYSICAL EXAMINATION:

Blood pressure is 155/100 with a pulse of 94, temperature 98.3.  She is 94% on room

air. General description is a middle-aged female up in the bed in no distress.

Respiratory system: Unlabored breathing. Clear to auscultation anteriorly.  Heart S1,

S2.  Regular rate and rhythm. Abdomen soft, no tenderness. Bilateral leg wounds with

slough tissue with surrounding redness and no drainage.



LABS:

Hemoglobin 10.3 with white count 13.5, BUN of 53, creatinine 1.02.



DIAGNOSTIC IMPRESSION AND PLAN:

Patient with right foot wound, both the lateral and medial side with concern for

underlying osteomyelitis on the basis of the x-ray report.  Previous culture positive

for MRSA.  The patient is currently covered with vancomycin which will be continued

with local care with [facility] and monitor clinical course closely.





MMODL / IJN: [id number] / Job#: [id number]

## 2020-01-03 NOTE — CONS
CONSULTATION



DATE OF SERVICE:

2020



REASON FOR CONSULTATION:

Diabetic foot infection.



HISTORY OF PRESENT ILLNESS:

Patient is a 59-year-old  female with a past medical history significant for

diabetes mellitus.  The patient recently did have amputation of the right big toe,

distal phalanx for a diabetic foot infection by Dr. Ferrell.  The patient apparently

did have a problem keeping her foot straight, keep on blocking her foot and apparently

did develop a wound on both the medial as well as the lateral aspect off the right foot

with the patient has for a couple of weeks now.  The patient local wound care has been

[facility] and follows [facility].  The patient presenting

to the [facility] ER this morning with chief complaints of nausea, vomiting,

diarrhea that has been going on for the last 3 days.  The patient's sugar had been

running high.  The patient denies significant abdominal pain, though and denies having

any blood or any mucus in the stool.  The patient on arrival to the ER has been

afebrile.  The patient has a normal white count of 8.4.  This patient's blood sugar was

more than 600.  She has been diagnosed with diabetic ketoacidosis and has been admitted

to the ICU.  I was asked to see the patient regarding right diabetic foot wound and

need for antibiotic therapy.



REVIEW OF SYSTEM:

Positive points have been mentioned in HPI.  Rest of systems are negative.



Past medical history significant for asthma, coronary artery disease, heart failure

with CVA, TIA, diabetes mellitus is severe deflated, hypertension, hyperlipidemia,

pneumonia, insufficiency.



PAST SURGICAL HISTORY:

Appendectomy, , cholecystectomy, PTCA with stent, hysterectomy.



SOCIAL HISTORY:

Remote history of smoking, no drinking or drug use.



FAMILY HISTORY:

Father with history of diabetes and coronary artery disease.  Mother history of COPD.



ALLERGIES:

Multiple medication _____PENICILLIN, PHENOBARBITAL, AMLODIPINE.



PHYSICAL EXAMINATION:

On examination, her blood pressure is 125/80 with a pulse of 105, temperature 98.4, she

is 96% on room air.

General description is a middle-aged female, lying in bed in no distress.  No tachypnea

or accessory muscle for respiration use.

HEENT:  Shows pallor, no scleral icterus.  Oral mucosa is dry.  No pharyngeal erythema

or thrush.

NECK:  Trachea central, no thyromegaly.

LUNGS:  Unlabored breathing, clear to auscultation anteriorly.  No wheeze or crackle,

heart S1, S2.  Regular rate and rhythm.

ABDOMEN:  Soft, no tenderness.

EXTREMITIES: No edema of the feet, examination of right foot both on the medial and

lateral border did have a wound with slough tissue. There is no significant guarding,

swelling, redness or any drainage.

NEUROLOGICAL:  Patient is awake, alert, oriented, mood and affect normal.



LABS:

Hemoglobin is 10.4, white count 8.4, BUN of 56, creatinine 1.66, potassium is 4.7,

liver exams are normal.  X-rays of the foot, medial and lateral soft tissue at the mid

forefront level with new underlying lytic destruction involving the lateral aspect of

the fifth metatarsal head, compatible with osteomyelitis.



DIAGNOSTIC IMPRESSION AND PLAN:

Patient presenting to the hospital with nausea and vomiting with concern for underlying

DKA in this patient who did have a right diabetic foot wound on the right foot, medial

and lateral border, now with abnormal x-ray is suspicious for osteomyelitis.  Recent

cultures were positive for strep.  This patient currently do have allergies both to

PENICILLIN as well as CEPHALEXIN, limit the number of antibiotics that could be safely

used.



PLAN:

1. Local wound care with Medihoney followed by moist dressing and keep the area off

    the pressure.

2. Vancomycin pharmacy to dose target of 15, will watch kidney function closely.

3. Will follow up on clinical condition and culture to further adjust medication if

    needed.

Thank you for this consultation.  Will follow the patient along with you.





MMODL / IJN: [id number] / Job#: [id number]

## 2020-01-03 NOTE — P.PN
Subjective


Progress Note Date: 01/03/20


Principal diagnosis: 


Severe sepsis





Osteomyelitis of the right fifth metatarsal bone





Diabetic ketoacidosis





Severe type 1 diabetes with complications





Mild hyperkalemia





Peripheral vascular disease








01/03/2020, patient seen and evaluated examined she is appears more comfortable 

diabetic ketoacidosis had resolved patient is off of insulin drip she cannot 

take much by mouth due to her ongoing intermittent nausea and emesis she is on 

Zofran however, she is complaining of pain in the right foot for which she is 

started on low-dose Toradol, overall appears more composed labs reviewed 

medications reviewed care plan discussed with the staff at length patient is 

appear more composed today








Objective





- Vital Signs


Vital signs: 


                                   Vital Signs











Temp  97.4 F L  01/03/20 08:00


 


Pulse  108 H  01/03/20 08:00


 


Resp  20   01/03/20 08:00


 


BP  136/93   01/03/20 08:00


 


Pulse Ox  96   01/03/20 08:00








                                 Intake & Output











 01/02/20 01/03/20 01/03/20





 18:59 06:59 18:59


 


Intake Total 800 1669.221 100


 


Output Total  931 200


 


Balance 800 738.221 -100


 


Weight 54.431 kg 57.5 kg 


 


Intake:   


 


   1600 100


 


    D5-0.45% NaCl with KCl  450 





    20Meq/l 1,000 ml @ 150   





    mls/hr IV .Q6H40M [facility] Rx#   





    :[id number]   


 


    Levofloxacin 750Mg-D5w  100 





    Pmx 750 mg In Dextrose/   





    Water 1 150ml.bag @ 100   





    mls/hr IVPB Q24H [facility] Rx#:   





    [id number]   


 


    Sodium Chloride 0.45% 1,  250 100





    000 ml @ 50 mls/hr IV .   





    Q20H [facility] Rx#:[id number]   


 


    Sodium Chloride 0.9% 1, 800 800 





    000 ml @ 200 mls/hr IV .   





    Q5H [facility] Rx#:[id number]   


 


  Intake, IV Titration  69.221 





  Amount   


 


    Insulin Regular 100 unit  69.221 





    In Sodium Chloride 0.9%   





    100 ml @ 0.1 UNITS/KG/HR   





    5.498 mls/hr IV .[id number]   





    [facility] Rx#:[id number]   


 


Output:   


 


  Urine  930 200


 


  Stool  1 


 


Other:   


 


  Voiding Method Bedpan Indwelling Catheter Indwelling Catheter





 Diaper  


 


  # Voids 0 1 














- Exam


- Constitutional


General appearance: disheveled, mild distress





- EENT


Eyes: EOMI, PERRLA, poor dentition, normal appearance


ENT: normal oropharynx


Ears: bilateral: normal





- Neck


Neck: normal ROM


Carotids: bilateral: upstroke normal


Thyroid: bilateral: normal size





- Respiratory


Respiratory: bilateral: CTA





- Cardiovascular


Rhythm: regular


Heart sounds: normal: S1, S2





- Gastrointestinal


General gastrointestinal: decreased bowel sounds, distended, soft





- Integumentary


Integumentary: decreased turgor





- Neurologic


Neurologic: CNII-XII intact





- Musculoskeletal


Musculoskeletal: generalized weakness, right sided weakness





- Psychiatric


Psychiatric: A&O x's 3, appropriate affect





Prior surgery of the right foot with osteomyelitis finding as noted above











- Labs


CBC & Chem 7: 


                                 01/02/20 09:53





                                 01/03/20 05:18


Labs: 


                  Abnormal Lab Results - Last 24 Hours (Table)











  01/02/20 01/02/20 01/02/20 Range/Units





  09:53 09:53 09:53 


 


RBC  3.61 L    (3.80-5.40)  m/uL


 


Hgb  10.4 L    (11.4-16.0)  gm/dL


 


MCV  102.8 H D    (80.0-100.0)  fL


 


MCHC  28.0 L    (31.0-37.0)  g/dL


 


Lymphocytes #  0.8 L    (1.0-4.8)  k/uL


 


VBG pH     (7.31-7.41)  


 


VBG pCO2     (37-51)  mmHg


 


VBG HCO3     (24-28)  mmol/L


 


Sodium     (137-145)  mmol/L


 


Potassium   5.8 H   (3.5-5.1)  mmol/L


 


Chloride     ()  mmol/L


 


Carbon Dioxide   8 L*   (22-30)  mmol/L


 


BUN   56 H   (7-17)  mg/dL


 


Creatinine   1.70 H   (0.52-1.04)  mg/dL


 


Glucose   829 H*   (74-99)  mg/dL


 


POC Glucose (mg/dL)     (75-99)  mg/dL


 


Plasma Lactic Acid Carmelo    2.4 H*  (0.7-2.0)  mmol/L


 


Phosphorus     (2.5-4.5)  mg/dL


 


Lipase   15 L   ()  U/L


 


Urine Glucose (UA)     (Negative)  


 


Urine Ketones     (Negative)  














  01/02/20 01/02/20 01/02/20 Range/Units





  10:18 11:10 11:10 


 


RBC     (3.80-5.40)  m/uL


 


Hgb     (11.4-16.0)  gm/dL


 


MCV     (80.0-100.0)  fL


 


MCHC     (31.0-37.0)  g/dL


 


Lymphocytes #     (1.0-4.8)  k/uL


 


VBG pH   7.21 L   (7.31-7.41)  


 


VBG pCO2   18 L*   (37-51)  mmHg


 


VBG HCO3   7 L*   (24-28)  mmol/L


 


Sodium     (137-145)  mmol/L


 


Potassium     (3.5-5.1)  mmol/L


 


Chloride     ()  mmol/L


 


Carbon Dioxide     (22-30)  mmol/L


 


BUN     (7-17)  mg/dL


 


Creatinine     (0.52-1.04)  mg/dL


 


Glucose     (74-99)  mg/dL


 


POC Glucose (mg/dL)  >600 H    (75-99)  mg/dL


 


Plasma Lactic Acid Carmelo     (0.7-2.0)  mmol/L


 


Phosphorus     (2.5-4.5)  mg/dL


 


Lipase     ()  U/L


 


Urine Glucose (UA)    4+ H  (Negative)  


 


Urine Ketones    3+ H  (Negative)  














  01/02/20 01/02/20 01/02/20 Range/Units





  12:38 13:24 13:41 


 


RBC     (3.80-5.40)  m/uL


 


Hgb     (11.4-16.0)  gm/dL


 


MCV     (80.0-100.0)  fL


 


MCHC     (31.0-37.0)  g/dL


 


Lymphocytes #     (1.0-4.8)  k/uL


 


VBG pH     (7.31-7.41)  


 


VBG pCO2     (37-51)  mmHg


 


VBG HCO3     (24-28)  mmol/L


 


Sodium     (137-145)  mmol/L


 


Potassium     (3.5-5.1)  mmol/L


 


Chloride   109 H   ()  mmol/L


 


Carbon Dioxide   10 L   (22-30)  mmol/L


 


BUN   55 H   (7-17)  mg/dL


 


Creatinine   1.66 H   (0.52-1.04)  mg/dL


 


Glucose   645 H*   (74-99)  mg/dL


 


POC Glucose (mg/dL)  >600 H   575 H  (75-99)  mg/dL


 


Plasma Lactic Acid Carmelo     (0.7-2.0)  mmol/L


 


Phosphorus     (2.5-4.5)  mg/dL


 


Lipase     ()  U/L


 


Urine Glucose (UA)     (Negative)  


 


Urine Ketones     (Negative)  














  01/02/20 01/02/20 01/02/20 Range/Units





  14:39 15:03 16:03 


 


RBC     (3.80-5.40)  m/uL


 


Hgb     (11.4-16.0)  gm/dL


 


MCV     (80.0-100.0)  fL


 


MCHC     (31.0-37.0)  g/dL


 


Lymphocytes #     (1.0-4.8)  k/uL


 


VBG pH     (7.31-7.41)  


 


VBG pCO2     (37-51)  mmHg


 


VBG HCO3     (24-28)  mmol/L


 


Sodium     (137-145)  mmol/L


 


Potassium     (3.5-5.1)  mmol/L


 


Chloride     ()  mmol/L


 


Carbon Dioxide     (22-30)  mmol/L


 


BUN     (7-17)  mg/dL


 


Creatinine     (0.52-1.04)  mg/dL


 


Glucose     (74-99)  mg/dL


 


POC Glucose (mg/dL)  531 H  507 H  412 H  (75-99)  mg/dL


 


Plasma Lactic Acid Carmelo     (0.7-2.0)  mmol/L


 


Phosphorus     (2.5-4.5)  mg/dL


 


Lipase     ()  U/L


 


Urine Glucose (UA)     (Negative)  


 


Urine Ketones     (Negative)  














  01/02/20 01/02/20 01/02/20 Range/Units





  17:13 18:08 18:56 


 


RBC     (3.80-5.40)  m/uL


 


Hgb     (11.4-16.0)  gm/dL


 


MCV     (80.0-100.0)  fL


 


MCHC     (31.0-37.0)  g/dL


 


Lymphocytes #     (1.0-4.8)  k/uL


 


VBG pH     (7.31-7.41)  


 


VBG pCO2     (37-51)  mmHg


 


VBG HCO3     (24-28)  mmol/L


 


Sodium     (137-145)  mmol/L


 


Potassium     (3.5-5.1)  mmol/L


 


Chloride     ()  mmol/L


 


Carbon Dioxide     (22-30)  mmol/L


 


BUN     (7-17)  mg/dL


 


Creatinine     (0.52-1.04)  mg/dL


 


Glucose     (74-99)  mg/dL


 


POC Glucose (mg/dL)  454 H  406 H  380 H  (75-99)  mg/dL


 


Plasma Lactic Acid Carmelo     (0.7-2.0)  mmol/L


 


Phosphorus     (2.5-4.5)  mg/dL


 


Lipase     ()  U/L


 


Urine Glucose (UA)     (Negative)  


 


Urine Ketones     (Negative)  














  01/02/20 01/02/20 01/02/20 Range/Units





  19:19 20:17 20:54 


 


RBC     (3.80-5.40)  m/uL


 


Hgb     (11.4-16.0)  gm/dL


 


MCV     (80.0-100.0)  fL


 


MCHC     (31.0-37.0)  g/dL


 


Lymphocytes #     (1.0-4.8)  k/uL


 


VBG pH     (7.31-7.41)  


 


VBG pCO2     (37-51)  mmHg


 


VBG HCO3     (24-28)  mmol/L


 


Sodium  148 H    (137-145)  mmol/L


 


Potassium     (3.5-5.1)  mmol/L


 


Chloride  115 H    ()  mmol/L


 


Carbon Dioxide  15 L    (22-30)  mmol/L


 


BUN  45 H    (7-17)  mg/dL


 


Creatinine  1.24 H    (0.52-1.04)  mg/dL


 


Glucose  401 H    (74-99)  mg/dL


 


POC Glucose (mg/dL)   351 H  313 H  (75-99)  mg/dL


 


Plasma Lactic Acid Carmelo     (0.7-2.0)  mmol/L


 


Phosphorus     (2.5-4.5)  mg/dL


 


Lipase     ()  U/L


 


Urine Glucose (UA)     (Negative)  


 


Urine Ketones     (Negative)  














  01/02/20 01/02/20 01/02/20 Range/Units





  22:08 22:59 23:32 


 


RBC     (3.80-5.40)  m/uL


 


Hgb     (11.4-16.0)  gm/dL


 


MCV     (80.0-100.0)  fL


 


MCHC     (31.0-37.0)  g/dL


 


Lymphocytes #     (1.0-4.8)  k/uL


 


VBG pH     (7.31-7.41)  


 


VBG pCO2     (37-51)  mmHg


 


VBG HCO3     (24-28)  mmol/L


 


Sodium    149 H  (137-145)  mmol/L


 


Potassium     (3.5-5.1)  mmol/L


 


Chloride    118 H  ()  mmol/L


 


Carbon Dioxide     (22-30)  mmol/L


 


BUN    39 H  (7-17)  mg/dL


 


Creatinine    1.08 H  (0.52-1.04)  mg/dL


 


Glucose    209 H  (74-99)  mg/dL


 


POC Glucose (mg/dL)  257 H  227 H   (75-99)  mg/dL


 


Plasma Lactic Acid Carmelo     (0.7-2.0)  mmol/L


 


Phosphorus     (2.5-4.5)  mg/dL


 


Lipase     ()  U/L


 


Urine Glucose (UA)     (Negative)  


 


Urine Ketones     (Negative)  














  01/03/20 01/03/20 01/03/20 Range/Units





  00:13 02:05 05:18 


 


RBC     (3.80-5.40)  m/uL


 


Hgb     (11.4-16.0)  gm/dL


 


MCV     (80.0-100.0)  fL


 


MCHC     (31.0-37.0)  g/dL


 


Lymphocytes #     (1.0-4.8)  k/uL


 


VBG pH     (7.31-7.41)  


 


VBG pCO2     (37-51)  mmHg


 


VBG HCO3     (24-28)  mmol/L


 


Sodium    150 H  (137-145)  mmol/L


 


Potassium     (3.5-5.1)  mmol/L


 


Chloride    118 H  ()  mmol/L


 


Carbon Dioxide     (22-30)  mmol/L


 


BUN    33 H  (7-17)  mg/dL


 


Creatinine     (0.52-1.04)  mg/dL


 


Glucose    102 H  (74-99)  mg/dL


 


POC Glucose (mg/dL)  205 H  159 H   (75-99)  mg/dL


 


Plasma Lactic Acid Carmelo     (0.7-2.0)  mmol/L


 


Phosphorus    2.0 L  (2.5-4.5)  mg/dL


 


Lipase     ()  U/L


 


Urine Glucose (UA)     (Negative)  


 


Urine Ketones     (Negative)  














Assessment and Plan


Assessment: 


Severe sepsis





Osteomyelitis of the fifth metatarsal bone





Diabetic ketoacidosis





Severe type 1 diabetes with complications





Poorly controlled diabetes mellitus





Mild hyperkalemia





Peripheral vascular disease





Plan: 





The ICU staff could not get a IV need to put a central line





Insulin drip





IV fluids





Broad-spectrum antibiotics with IV vancomycin





Supportive care





Monitor electrolytes and potassium





Monitor renal functions








Time with Patient: Greater than 30

## 2020-01-03 NOTE — PN
PROGRESS NOTE



Covering for Dr. Cabral.



DATE OF SERVICE:

01/03/2020



Patient is a 59-year-old female who is seen sitting up in bed, is awake and alert.

Does rock back and forth in the bed.  Tells me that the nausea is resolved, however,

does have dry heaves while I am doing my assessment. Patient is afebrile,

hemodynamically stable, in no acute distress.



ON PHYSICAL EXAM:

VITAL SIGNS: Temperature 97.4, heart rate is 93, respiratory rate is 59, blood pressure

136/93, O2 saturation 96% on room air.

HEENT.  Head is normocephalic, atraumatic.

Neck is supple.  Trachea is midline.

LUNGS:  With decreased breath sounds.  No clear rales or wheezes.

HEART: S1, S2 heard.  Not tachycardic.

ABDOMEN:  Soft.  Bowel sounds are heard.

EXTREMITIES: With no edema to the left lower extremity.  Patient does have wounds on

the right foot lateral and medial with dressings, which are dry and intact.

NEUROLOGIC:  Patient is awake and alert.



LABS:

White count 13.5, hemoglobin is 10.3, hematocrit 32.6 with 321,000 platelets.  Sodium

is 150, potassium is 4.2, chloride 118, CO2 is 24. Anion gap is 8. BUN is 33,

creatinine is 1.02. Glucose is 102. Calcium is 9.4. Phosphorus is 2.0.



No new imaging to review.



IMPRESSION AT THIS TIME:

1. Diabetic ketoacidosis.

2. Hyperosmolar state.

3. Right wound infection with osteomyelitis.



PLAN:

Continue current medications, which have been reviewed.  Continue IV antibiotics per

Infectious Disease.  Continue GI and DVT prophylaxis.  Replace phosphorus and we will

continue to follow patient closely making further changes as necessary.





MMODL / IJN: [id number] / Job#: [id number]

## 2020-01-03 NOTE — P.GSCN
History of Present Illness


Consult date: 20


Reason for Consult: 





Right foot wounds


Requesting physician: Yoni Zazueta


History of present illness: 





This is a 59-year-old female who follows on an outpatient basis with Dr. Cabral.

 She is a previous medical history of multiple comorbidities including right 

diabetic foot wounds with amputation of the second and fifth toes and history of

MRSA and strep infection in her wounds, uncontrolled type 2 diabetes with h

yperglycemia and most recent hemoglobin A1c 9.5%, coronary artery disease, 

chronic CHF, COPD, CVA, and previous tobacco dependence.  She presented to 

[facility] emergency room yesterday with complaints of nausea, vomiting,

and diarrhea 3 days as well as elevated blood sugars.  She was diagnosed with 

DKA and admitted to the intensive care unit for management.  She has been 

treated in the wound care center by Dr. Ferrell for chronic right lower 

extremity wounds.  Foot x-ray completed yesterday demonstrate soft tissue ulcers

of the mid and forefoot with new underlying lytic distraction compatible with 

osteomyelitis.  Infectious disease was consulted as well as Dr. Ferrell for 

management.





Review of Systems





Review of systems was completed and was negative except as noted.  Most of ROS 

was completed with the assistance of the patient's caregiver as the patient is 

not the best historian





- Gastrointestinal


Reports as per HPI, Reports diarrhea, Reports nausea, Reports vomiting





- Integumentary


Reports as per HPI, Reports wounds





Past Medical History


Past Medical History: Asthma, Coronary Artery Disease (CAD), Chest Pain / 

Angina, Heart Failure, COPD, CVA/TIA, Diabetes Mellitus, Deep Vein Thrombosis 

(DVT), Eye Disorder, GERD/Reflux, Hyperlipidemia, Hypertension, Myocardial 

Infarction (MI), Neurologic Disorder, Osteoarthritis (OA), Pneumonia, Renal 

Disease


Additional Past Medical History / Comment(s): IDDM (brittle), DKAs, neuropathy 

bilateral hands/feet, retinopathy bilateral eyes, cellulitis R foot, R great toe

and 2nd toe infections/amputations, current wound R foot-being seen in [facility], 

renal failure, anemia, CVAs with L sided paralysis, headaches started after 

CVAs, brain lesions, DVT R axillae, low back pain, varicosities, seizure many 

years ago (), hypothyroid, constipation, bilateral tinnitis occasionally, 

sinus problems.


Last Myocardial Infarction Date:: 


History of Any Multi-Drug Resistant Organisms: MRSA


Year Discovered:: 18


MDRO Source:: Right Foot


Past Surgical History: Appendectomy,  Section, Cholecystectomy, Heart 

Catheterization With Stent, Hysterectomy, Orthopedic Surgery


Additional Past Surgical History / Comment(s): PCI with multiple stents, R great

toe and 2nd toe amps, debridements R foot ulcer, L shoulder surgery to remove 

bone, bronchoscopy, EGD, colonoscopy, R arm port since removed, bilateral 

cataract removals/lens implants.


Past Anesthesia/Blood Transfusion Reactions: No Reported Reaction


Additional Past Anesthesia/Blood Transfusion Reaction / Comm: HX OF BLOOD 

TRANSFUSION- NO REACTION


Date of Last Stent Placement:: 2013


Smoking Status: Former smoker





- Past Family History


  ** Father


Family Medical History: Unable to Obtain, Coronary Artery Disease (CAD), Kamilla

betes Mellitus





  ** Mother


Family Medical History: COPD





Medications and Allergies


                                Home Medications











 Medication  Instructions  Recorded  Confirmed  Type


 


Albuterol Inhaler [Ventolin Hfa 2 puff INHALATION RT-Q4H PRN 16 

History





Inhaler]    


 


Aspirin EC [Ecotrin Low Dose] 81 mg PO DAILY 16 History


 


Famotidine [Pepcid] 20 mg PO DAILY 16 History


 


Valproic Acid [Depakene] 250 mg PO DAILY 16 History


 


Ergocalciferol [Vitamin D2 50,000 unit PO SA 10/06/16 01/02/20 History





(DRISDOL)]    


 


HYDROcodone/APAP 10-325MG [Norco 1 tab PO Q6H PRN 17 History





]    


 


DULoxetine HCL [Cymbalta] 60 mg PO DAILY 17 History


 


Ferrous Sulfate [Iron (65  mg PO DAILY 17 History





Elemental)]    


 


ALPRAZolam [Xanax] 1 mg PO Q8H PRN 17 History


 


Ondansetron [Zofran] 4 mg PO DAILY PRN 18 History


 


Atorvastatin [Lipitor] 20 mg PO DAILY 19 History


 


QUEtiapine FUMARATE [SEROquel] 25 mg PO BID 19 History


 


QUEtiapine [SEROquel] 100 mg PO HS 19 History


 


INSULIN LISPRO (humaLOG) [humaLOG] 6 units SQ AC-TID 20 History


 


Insulin Glargine [Lantus] 25 unit SQ HS 20 History


 


Vitamin B Complex With Vit C 1 tab PO DAILY 20 History








                                    Allergies











Allergy/AdvReac Type Severity Reaction Status Date / Time


 


Barbiturates Allergy  Rash/Hives Verified 20 13:39


 


cephalexin monohydrate Allergy  Rash/Hives Verified 20 13:39





[From Keflex]     


 


morphine Allergy  Rash/Hives Verified 20 13:39


 


Penicillins Allergy  Rash/Hives Verified 20 13:39


 


phenobarbital Allergy  Swelling Verified 20 13:39


 


venom-honey bee Allergy  Swelling Verified 20 13:39





[bee venom (honey bee)]     


 


amlodipine besylate AdvReac  Vomiting Verified 20 13:39





[From Norvasc]     














Surgical - Exam


                                   Vital Signs











Temp Pulse Resp BP Pulse Ox


 


 98.4 F   100   20   136/58   98 


 


 20 09:54  20 09:54  20 09:54  20 09:54  20 09:54














- General


well developed, well nourished, no distress, no pain, chronically ill





- Eyes


PERRL, normal ocular movement





- ENT


no hearing loss, poor skilled nursing





- Neck


no masses, no bruits, trachea midline





- Respiratory





Lungs sounds diminished bilaterally.  Respirations even, nonlabored.  Currently 

on room air with oxygen saturation 96%.  No chest wall deformities.  No clubbing

or cyanosis.





- Cardiovascular





S1, S2 present.  Tachycardic but regular rate and rhythm.  Palpable peripheral 

pulses bilaterally.  No edema present.  No calf pain or tenderness.





- Abdomen


Abdomen: soft, non tender, bowel sounds





- Genitourinary





Deferred





- Rectum





Deferred





- Integumentary





Skin is warm and dry.  There are 2 wounds to the right foot, see chart for 

pictures and measurements, there is no drainage or purulence present, both 

wounds are dry.  Surrounding skin is slightly reddened.





- Neurologic


normal coordination





- Musculoskeletal


normal posture





- Psychiatric


oriented to time, oriented to person, oriented to place, speech is normal





Results





- Labs





                                 20 08:57





                                 20 05:18


                  Abnormal Lab Results - Last 24 Hours (Table)











  20 Range/Units





  13:24 14:39 15:03 


 


WBC     (3.8-10.6)  k/uL


 


RBC     (3.80-5.40)  m/uL


 


Hgb     (11.4-16.0)  gm/dL


 


Hct     (34.0-46.0)  %


 


Neutrophils #     (1.3-7.7)  k/uL


 


Sodium     (137-145)  mmol/L


 


Chloride  109 H    ()  mmol/L


 


Carbon Dioxide  10 L    (22-30)  mmol/L


 


BUN  55 H    (7-17)  mg/dL


 


Creatinine  1.66 H    (0.52-1.04)  mg/dL


 


Glucose  645 H*    (74-99)  mg/dL


 


POC Glucose (mg/dL)   531 H  507 H  (75-99)  mg/dL


 


Phosphorus     (2.5-4.5)  mg/dL














  20 Range/Units





  16:03 17:13 18:08 


 


WBC     (3.8-10.6)  k/uL


 


RBC     (3.80-5.40)  m/uL


 


Hgb     (11.4-16.0)  gm/dL


 


Hct     (34.0-46.0)  %


 


Neutrophils #     (1.3-7.7)  k/uL


 


Sodium     (137-145)  mmol/L


 


Chloride     ()  mmol/L


 


Carbon Dioxide     (22-30)  mmol/L


 


BUN     (7-17)  mg/dL


 


Creatinine     (0.52-1.04)  mg/dL


 


Glucose     (74-99)  mg/dL


 


POC Glucose (mg/dL)  412 H  454 H  406 H  (75-99)  mg/dL


 


Phosphorus     (2.5-4.5)  mg/dL














  20 Range/Units





  18:56 19:19 20:17 


 


WBC     (3.8-10.6)  k/uL


 


RBC     (3.80-5.40)  m/uL


 


Hgb     (11.4-16.0)  gm/dL


 


Hct     (34.0-46.0)  %


 


Neutrophils #     (1.3-7.7)  k/uL


 


Sodium   148 H   (137-145)  mmol/L


 


Chloride   115 H   ()  mmol/L


 


Carbon Dioxide   15 L   (22-30)  mmol/L


 


BUN   45 H   (7-17)  mg/dL


 


Creatinine   1.24 H   (0.52-1.04)  mg/dL


 


Glucose   401 H   (74-99)  mg/dL


 


POC Glucose (mg/dL)  380 H   351 H  (75-99)  mg/dL


 


Phosphorus     (2.5-4.5)  mg/dL














  20 Range/Units





  20:54 22:08 22:59 


 


WBC     (3.8-10.6)  k/uL


 


RBC     (3.80-5.40)  m/uL


 


Hgb     (11.4-16.0)  gm/dL


 


Hct     (34.0-46.0)  %


 


Neutrophils #     (1.3-7.7)  k/uL


 


Sodium     (137-145)  mmol/L


 


Chloride     ()  mmol/L


 


Carbon Dioxide     (22-30)  mmol/L


 


BUN     (7-17)  mg/dL


 


Creatinine     (0.52-1.04)  mg/dL


 


Glucose     (74-99)  mg/dL


 


POC Glucose (mg/dL)  313 H  257 H  227 H  (75-99)  mg/dL


 


Phosphorus     (2.5-4.5)  mg/dL














  20 Range/Units





  23:32 00:13 02:05 


 


WBC     (3.8-10.6)  k/uL


 


RBC     (3.80-5.40)  m/uL


 


Hgb     (11.4-16.0)  gm/dL


 


Hct     (34.0-46.0)  %


 


Neutrophils #     (1.3-7.7)  k/uL


 


Sodium  149 H    (137-145)  mmol/L


 


Chloride  118 H    ()  mmol/L


 


Carbon Dioxide     (22-30)  mmol/L


 


BUN  39 H    (7-17)  mg/dL


 


Creatinine  1.08 H    (0.52-1.04)  mg/dL


 


Glucose  209 H    (74-99)  mg/dL


 


POC Glucose (mg/dL)   205 H  159 H  (75-99)  mg/dL


 


Phosphorus     (2.5-4.5)  mg/dL














  20 Range/Units





  05:18 08:57 


 


WBC   13.5 H  (3.8-10.6)  k/uL


 


RBC   3.50 L  (3.80-5.40)  m/uL


 


Hgb   10.3 L  (11.4-16.0)  gm/dL


 


Hct   32.6 L  (34.0-46.0)  %


 


Neutrophils #   11.6 H  (1.3-7.7)  k/uL


 


Sodium  150 H   (137-145)  mmol/L


 


Chloride  118 H   ()  mmol/L


 


Carbon Dioxide    (22-30)  mmol/L


 


BUN  33 H   (7-17)  mg/dL


 


Creatinine    (0.52-1.04)  mg/dL


 


Glucose  102 H   (74-99)  mg/dL


 


POC Glucose (mg/dL)    (75-99)  mg/dL


 


Phosphorus  2.0 L   (2.5-4.5)  mg/dL








                                 Diabetes panel











  20 Range/Units





  13:24 19:19 23:32 


 


Sodium  145  148 H  149 H  (137-145)  mmol/L


 


Potassium  4.7  4.7  4.1  (3.5-5.1)  mmol/L


 


Chloride  109 H  115 H  118 H  ()  mmol/L


 


Carbon Dioxide  10 L  15 L  23  (22-30)  mmol/L


 


BUN  55 H  45 H  39 H  (7-17)  mg/dL


 


Creatinine  1.66 H  1.24 H  1.08 H  (0.52-1.04)  mg/dL


 


Glucose  645 H*  401 H  209 H  (74-99)  mg/dL


 


Calcium  9.4  9.1  9.1  (8.4-10.2)  mg/dL


 


AST  22    (14-36)  U/L


 


ALT  22    (4-34)  U/L


 


Alkaline Phosphatase  90    ()  U/L


 


Total Protein  7.1    (6.3-8.2)  g/dL


 


Albumin  3.8    (3.5-5.0)  g/dL














  20 Range/Units





  05:18 


 


Sodium  150 H  (137-145)  mmol/L


 


Potassium  4.2  (3.5-5.1)  mmol/L


 


Chloride  118 H  ()  mmol/L


 


Carbon Dioxide  24  (22-30)  mmol/L


 


BUN  33 H  (7-17)  mg/dL


 


Creatinine  1.02  (0.52-1.04)  mg/dL


 


Glucose  102 H  (74-99)  mg/dL


 


Calcium  9.4  (8.4-10.2)  mg/dL


 


AST   (14-36)  U/L


 


ALT   (4-34)  U/L


 


Alkaline Phosphatase   ()  U/L


 


Total Protein   (6.3-8.2)  g/dL


 


Albumin   (3.5-5.0)  g/dL








                                  Calcium panel











  20 Range/Units





  13:24 19:19 23:32 


 


Calcium  9.4  9.1  9.1  (8.4-10.2)  mg/dL


 


Phosphorus     (2.5-4.5)  mg/dL


 


Albumin  3.8    (3.5-5.0)  g/dL














  20 Range/Units





  05:18 


 


Calcium  9.4  (8.4-10.2)  mg/dL


 


Phosphorus  2.0 L  (2.5-4.5)  mg/dL


 


Albumin   (3.5-5.0)  g/dL








                                 Pituitary panel











  20 Range/Units





  13:24 19:19 23:32 


 


Sodium  145  148 H  149 H  (137-145)  mmol/L


 


Potassium  4.7  4.7  4.1  (3.5-5.1)  mmol/L


 


Chloride  109 H  115 H  118 H  ()  mmol/L


 


Carbon Dioxide  10 L  15 L  23  (22-30)  mmol/L


 


BUN  55 H  45 H  39 H  (7-17)  mg/dL


 


Creatinine  1.66 H  1.24 H  1.08 H  (0.52-1.04)  mg/dL


 


Glucose  645 H*  401 H  209 H  (74-99)  mg/dL


 


Calcium  9.4  9.1  9.1  (8.4-10.2)  mg/dL














  20 Range/Units





  05:18 


 


Sodium  150 H  (137-145)  mmol/L


 


Potassium  4.2  (3.5-5.1)  mmol/L


 


Chloride  118 H  ()  mmol/L


 


Carbon Dioxide  24  (22-30)  mmol/L


 


BUN  33 H  (7-17)  mg/dL


 


Creatinine  1.02  (0.52-1.04)  mg/dL


 


Glucose  102 H  (74-99)  mg/dL


 


Calcium  9.4  (8.4-10.2)  mg/dL








                                  Adrenal panel











  20 Range/Units





  13:24 19:19 23:32 


 


Sodium  145  148 H  149 H  (137-145)  mmol/L


 


Potassium  4.7  4.7  4.1  (3.5-5.1)  mmol/L


 


Chloride  109 H  115 H  118 H  ()  mmol/L


 


Carbon Dioxide  10 L  15 L  23  (22-30)  mmol/L


 


BUN  55 H  45 H  39 H  (7-17)  mg/dL


 


Creatinine  1.66 H  1.24 H  1.08 H  (0.52-1.04)  mg/dL


 


Glucose  645 H*  401 H  209 H  (74-99)  mg/dL


 


Calcium  9.4  9.1  9.1  (8.4-10.2)  mg/dL


 


Total Bilirubin  0.4    (0.2-1.3)  mg/dL


 


AST  22    (14-36)  U/L


 


ALT  22    (4-34)  U/L


 


Alkaline Phosphatase  90    ()  U/L


 


Total Protein  7.1    (6.3-8.2)  g/dL


 


Albumin  3.8    (3.5-5.0)  g/dL














  20 Range/Units





  05:18 


 


Sodium  150 H  (137-145)  mmol/L


 


Potassium  4.2  (3.5-5.1)  mmol/L


 


Chloride  118 H  ()  mmol/L


 


Carbon Dioxide  24  (22-30)  mmol/L


 


BUN  33 H  (7-17)  mg/dL


 


Creatinine  1.02  (0.52-1.04)  mg/dL


 


Glucose  102 H  (74-99)  mg/dL


 


Calcium  9.4  (8.4-10.2)  mg/dL


 


Total Bilirubin   (0.2-1.3)  mg/dL


 


AST   (14-36)  U/L


 


ALT   (4-34)  U/L


 


Alkaline Phosphatase   ()  U/L


 


Total Protein   (6.3-8.2)  g/dL


 


Albumin   (3.5-5.0)  g/dL














- Imaging


Additional studies: 





Foot x-ray reviewed





Assessment and Plan


Assessment: 





1.  Chronic right lower extremity wounds


2.  History of MRSA and strep infection to her right foot wounds


3.  Uncontrolled type 2 diabetes with hyperglycemia, most recent hemoglobin A1c 

9.5%


4.  History of coronary artery disease


5.  Chronic CHF


6.  COPD


7.  CVA


8.  Previous tobacco dependence


Plan: 





The patient was seen and examined at the bedside in the intensive care unit.  

Chart/diagnostics were reviewed.  Caregiver was at the bedside.  The case will 

be discussed with Dr. Ferrell.  Notes from the wound care center were reviewed.

 Both right foot wounds are without purulent drainage.  As recommended by Dr. Snyder we would continue with medical any dressing changes daily.  Continue IV 

antibiotics per infectious disease.  Right foot should be elevated.  Blood 

sugars need to be much better controlled, caregiver states her blood sugars 

usually run in the 300s.  Long discussion had with patient's caregiver that her 

for her blood sugars are not better controlled she runs the risk of continued 

wounds with possibility of future amputation.  Medical management of other 

comorbidities per primary care service.  More recommendations to follow.





Thank you Dr. Zazueta for this consult.  Please call us with any further 

questions.


Time with Patient: Greater than 30

## 2020-01-04 LAB
GLUCOSE BLD-MCNC: 104 MG/DL (ref 75–99)
GLUCOSE BLD-MCNC: 104 MG/DL (ref 75–99)
GLUCOSE BLD-MCNC: 112 MG/DL (ref 75–99)
GLUCOSE BLD-MCNC: 240 MG/DL (ref 75–99)
GLUCOSE BLD-MCNC: 296 MG/DL (ref 75–99)
GLUCOSE BLD-MCNC: 322 MG/DL (ref 75–99)
GLUCOSE BLD-MCNC: 49 MG/DL (ref 75–99)
GLUCOSE BLD-MCNC: 55 MG/DL (ref 75–99)
GLUCOSE BLD-MCNC: 75 MG/DL (ref 75–99)
GLUCOSE BLD-MCNC: 85 MG/DL (ref 75–99)

## 2020-01-04 RX ADMIN — INSULIN ASPART SCH: 100 INJECTION, SOLUTION INTRAVENOUS; SUBCUTANEOUS at 10:22

## 2020-01-04 RX ADMIN — INSULIN ASPART SCH UNIT: 100 INJECTION, SOLUTION INTRAVENOUS; SUBCUTANEOUS at 17:35

## 2020-01-04 RX ADMIN — SODIUM CHLORIDE SCH MLS/HR: 9 INJECTION, SOLUTION INTRAVENOUS at 20:31

## 2020-01-04 RX ADMIN — HEPARIN SODIUM SCH: 5000 INJECTION, SOLUTION INTRAVENOUS; SUBCUTANEOUS at 14:10

## 2020-01-04 RX ADMIN — INSULIN DETEMIR SCH: 100 INJECTION, SOLUTION SUBCUTANEOUS at 21:16

## 2020-01-04 RX ADMIN — TRIMETHOBENZAMIDE HYDROCHLORIDE PRN MG: 100 INJECTION INTRAMUSCULAR at 04:49

## 2020-01-04 RX ADMIN — SODIUM CHLORIDE SCH MLS/HR: 9 INJECTION, SOLUTION INTRAVENOUS at 03:31

## 2020-01-04 RX ADMIN — ONDANSETRON SCH MG: 2 INJECTION INTRAMUSCULAR; INTRAVENOUS at 14:09

## 2020-01-04 RX ADMIN — INSULIN ASPART SCH UNIT: 100 INJECTION, SOLUTION INTRAVENOUS; SUBCUTANEOUS at 21:10

## 2020-01-04 RX ADMIN — TRIMETHOBENZAMIDE HYDROCHLORIDE PRN MG: 100 INJECTION INTRAMUSCULAR at 17:18

## 2020-01-04 RX ADMIN — LEVOFLOXACIN SCH MLS/HR: 750 INJECTION, SOLUTION INTRAVENOUS at 17:28

## 2020-01-04 RX ADMIN — ONDANSETRON SCH MG: 2 INJECTION INTRAMUSCULAR; INTRAVENOUS at 00:20

## 2020-01-04 RX ADMIN — INSULIN ASPART SCH: 100 INJECTION, SOLUTION INTRAVENOUS; SUBCUTANEOUS at 14:10

## 2020-01-04 RX ADMIN — ONDANSETRON SCH MG: 2 INJECTION INTRAMUSCULAR; INTRAVENOUS at 06:03

## 2020-01-04 RX ADMIN — KETOROLAC TROMETHAMINE PRN MG: 30 INJECTION, SOLUTION INTRAMUSCULAR at 17:17

## 2020-01-04 RX ADMIN — TRIMETHOBENZAMIDE HYDROCHLORIDE PRN MG: 100 INJECTION INTRAMUSCULAR at 23:40

## 2020-01-04 RX ADMIN — FAMOTIDINE SCH MG: 10 INJECTION, SOLUTION INTRAVENOUS at 14:10

## 2020-01-04 RX ADMIN — SODIUM BICARBONATE SCH: 84 INJECTION, SOLUTION INTRAVENOUS at 20:29

## 2020-01-04 RX ADMIN — HEPARIN SODIUM SCH UNIT: 5000 INJECTION, SOLUTION INTRAVENOUS; SUBCUTANEOUS at 21:22

## 2020-01-04 RX ADMIN — INSULIN DETEMIR SCH UNIT: 100 INJECTION, SOLUTION SUBCUTANEOUS at 22:14

## 2020-01-04 RX ADMIN — ONDANSETRON SCH MG: 2 INJECTION INTRAMUSCULAR; INTRAVENOUS at 20:27

## 2020-01-04 NOTE — P.CONS
History of Present Illness





- Reason for Consult


Consult date: 20


Nausea and vomiting


Requesting physician: Yoni Zazueta





- Chief Complaint


Nausea, vomiting, DKA





- History of Present Illness





59-year-old female with medical history significant for uncontrolled type 2 

diabetes mellitus, coronary artery disease, CHF, COPD, CVA and tobacco 

dependence who presented to the hospital due to intractable nausea, vomiting and

elevated blood sugars.  The patient reports symptoms of nausea, vomiting and 

retching occurring for approximately 3 days.  She did have some diarrhea prior 

to presentation however this is resolved.  No blood or black tarry stool noted. 

The patient reports she's had previous similar episodes associated with uncontro

lled blood sugars in DKA.  Her last episode over 5 years ago per her 

recollection.  She does report nausea at home for which she takes Zofran 

therapy.  She denies any sick contacts or new medications prior to developing 

the symptoms.  Last EGD and colonoscopy were approximately 8 years ago significa

nt polyps.  Laboratory evaluation on presentation was significant for WBC 13.5, 

hemoglobin 10.3, platelets 321,000, lipase 15, total bilirubin 0.4, alkaline 

phosphatase 90, AST 22 and ALT 22.











Review of Systems








REVIEW OF SYSTEMS:


CONSTITUTIONAL: Denies any fevers, chills, weight change or fatigue.


CARDIOVASCULAR: Denies any chest pain, palpitations high or low blood pressures


RESPIRATORY: Denies any shortness of breath, hemoptysis or cough.  


GENITOURINARY:  No dysuria or hematuria. 


MUSCULOSKELETAL: No weakness reported. 


SKIN: Denies any new rashes or lesions, jaundice or pallor. 


PSYCHIATRIC: Denies any depression or anxiety. 


NEUROLOGY: Denies headache, denies any new focal deficits. 


EARS/NOSE/THROAT: No recent hearing change, congestion, nasal discharge or sore 

throat.


EYES: No pain in eyes, discharge or change in vision. 


GASTROINTESTINAL: As per HPI.





Past Medical History


Past Medical History: Asthma, Coronary Artery Disease (CAD), Chest Pain / 

Angina, Heart Failure, COPD, CVA/TIA, Diabetes Mellitus, Deep Vein Thrombosis 

(DVT), Eye Disorder, GERD/Reflux, Hyperlipidemia, Hypertension, Myocardial 

Infarction (MI), Neurologic Disorder, Osteoarthritis (OA), Pneumonia, Renal Dis

ease


Additional Past Medical History / Comment(s): IDDM (brittle), DKAs, neuropathy 

bilateral hands/feet, retinopathy bilateral eyes, cellulitis R foot, R great toe

and 2nd toe infections/amputations, current wound R foot-being seen in [facility], 

renal failure, anemia, CVAs with L sided paralysis, headaches started after 

CVAs, brain lesions, DVT R axillae, low back pain, varicosities, seizure many 

years ago (), hypothyroid, constipation, bilateral tinnitis occasionally, si

nus problems.


Last Myocardial Infarction Date:: 


History of Any Multi-Drug Resistant Organisms: MRSA


Year Discovered:: 18


MDRO Source:: Right Foot


Past Surgical History: Appendectomy,  Section, Cholecystectomy, Heart 

Catheterization With Stent, Hysterectomy, Orthopedic Surgery


Additional Past Surgical History / Comment(s): PCI with multiple stents, R great

toe and 2nd toe amps, debridements R foot ulcer, L shoulder surgery to remove 

bone, bronchoscopy, EGD, colonoscopy, R arm port since removed, bilateral 

cataract removals/lens implants.


Past Anesthesia/Blood Transfusion Reactions: No Reported Reaction


Additional Past Anesthesia/Blood Transfusion Reaction / Comm: HX OF BLOOD 

TRANSFUSION- NO REACTION


Date of Last Stent Placement:: 2013


Smoking Status: Former smoker





- Past Family History


  ** Father


Family Medical History: Unable to Obtain, Coronary Artery Disease (CAD), 

Diabetes Mellitus





  ** Mother


Family Medical History: COPD





Medications and Allergies


                                Home Medications











 Medication  Instructions  Recorded  Confirmed  Type


 


Albuterol Inhaler [Ventolin Hfa 2 puff INHALATION RT-Q4H PRN 16 

History





Inhaler]    


 


Aspirin EC [Ecotrin Low Dose] 81 mg PO DAILY 16 History


 


Famotidine [Pepcid] 20 mg PO DAILY 16 History


 


Valproic Acid [Depakene] 250 mg PO DAILY 16 History


 


Ergocalciferol [Vitamin D2 50,000 unit PO SA 10/06/16 01/02/20 History





(DRISDOL)]    


 


HYDROcodone/APAP 10-325MG [Norco 1 tab PO Q6H PRN 17 History





]    


 


DULoxetine HCL [Cymbalta] 60 mg PO DAILY 17 History


 


Ferrous Sulfate [Iron (65  mg PO DAILY 17 History





Elemental)]    


 


ALPRAZolam [Xanax] 1 mg PO Q8H PRN 17 History


 


Ondansetron [Zofran] 4 mg PO DAILY PRN 18 History


 


Atorvastatin [Lipitor] 20 mg PO DAILY 19 History


 


QUEtiapine FUMARATE [SEROquel] 25 mg PO BID 19 History


 


QUEtiapine [SEROquel] 100 mg PO HS 19 History


 


INSULIN LISPRO (humaLOG) [humaLOG] 6 units SQ AC-TID 20 History


 


Insulin Glargine [Lantus] 25 unit SQ HS 20 History


 


Vitamin B Complex With Vit C 1 tab PO DAILY 20 History








                                    Allergies











Allergy/AdvReac Type Severity Reaction Status Date / Time


 


Barbiturates Allergy  Rash/Hives Verified 20 13:39


 


cephalexin monohydrate Allergy  Rash/Hives Verified 20 13:39





[From Keflex]     


 


morphine Allergy  Rash/Hives Verified 20 13:39


 


Penicillins Allergy  Rash/Hives Verified 20 13:39


 


phenobarbital Allergy  Swelling Verified 20 13:39


 


venom-honey bee Allergy  Swelling Verified 20 13:39





[bee venom (honey bee)]     


 


amlodipine besylate AdvReac  Vomiting Verified 20 13:39





[From Norvasc]     














Physical Exam


Vitals: 


                                   Vital Signs











  Temp Pulse Resp BP Pulse Ox


 


 20 09:00   93  15  155/109  96


 


 20 08:00  97.4 F L  108 H  20  136/93  96


 


 20 07:00   102 H  39 H  136/93  96


 


 20 06:00   108 H  33 H  121/105  96


 


 20 05:00   108 H  30 H  131/78  97


 


 20 04:00  98.0 F  107 H  61 H  155/70  96


 


 20 03:00   112 H  26 H  131/65  93 L


 


 20 02:00   105 H  24  109/77  95


 


 20 01:00   112 H  15  109/77  96


 


 20 00:00  98.2 F  109 H  26 H  109/77  97


 


 20 23:28   113 H  22   96


 


 20 23:00   105 H  21  151/47  97


 


 20 22:00   104 H  13  90/77  94 L


 


 20 21:00   107 H  19  166/73  95


 


 20 20:00  98.1 F  108 H  42 H  166/73  97


 


 20 19:00   106 H  15  146/105  96


 


 20 18:00   105 H  12  118/74  96


 


 20 17:00   105 H  18  125/80  96


 


 20 16:00  98.4 F  106 H  15  125/80  98


 


 20 15:43   102 H  11 L  161/74  99


 


 20 14:26  98.4 F  107 H  18  132/58  97


 


 20 13:51   104 H  18  114/49  97


 


 20 12:36   105 H  18  136/58  97








                                Intake and Output











 20





 22:59 06:59 14:59


 


Intake Total 1755.896 713.325 100


 


Output Total 0 931 200


 


Balance 1755.896 -217.675 -100


 


Intake:   


 


  IV 1700 700 100


 


    D5-0.45% NaCl with KCl  450 





    20Meq/l 1,000 ml @ 150   





    mls/hr IV .Q6H40M [facility] Rx#   





    :[id number]   


 


    Levofloxacin 750Mg-D5w 100  





    Pmx 750 mg In Dextrose/   





    Water 1 150ml.bag @ 100   





    mls/hr IVPB Q24H [facility] Rx#:   





    [id number]   


 


    Sodium Chloride 0.45% 1,  250 100





    000 ml @ 50 mls/hr IV .   





    Q20H [facility] Rx#:[id number]   


 


    Sodium Chloride 0.9% 1, 1600  





    000 ml @ 200 mls/hr IV .   





    Q5H [facility] Rx#:[id number]   


 


  Intake, IV Titration 55.896 13.325 





  Amount   


 


    Insulin Regular 100 unit 55.896 13.325 





    In Sodium Chloride 0.9%   





    100 ml @ 0.1 UNITS/KG/HR   





    5.498 mls/hr IV .[id number]   





    [facility] Rx#:[id number]   


 


Output:   


 


  Urine 0 930 200


 


  Stool  1 


 


Other:   


 


  Voiding Method Bedpan Indwelling Catheter Indwelling Catheter


 


  # Voids 1 1 


 


  Weight  57.5 kg 














On physical examination, patient appears comfortable in no apparent distress. 


HEAD: Normocephalic, atraumatic. 


EYES: No scleral icterus. No conjunctival injection. 


MOUTH: No lesions, tongue midline. 


NECK: Trachea midline, no gross abnormalities. 


CHEST: Clear to auscultation with no wheezing or rhonchi appreciated. 


HEART: Regular rate and rhythm. 


ABDOMEN: Soft, nontender. Bowel sounds are positive. No organomegaly.  No 

guarding or rigidity.


EXTREMITIES: No pedal edema. 


SKIN: No rashes, no jaundice. 


NEUROLOGIC: Alert and oriented x3. 





Results


CBC & Chem 7: 


                                 20 08:57





                                 20 05:18


Labs: 


                  Abnormal Lab Results - Last 24 Hours (Table)











  20 Range/Units





  11:10 11:10 12:38 


 


WBC     (3.8-10.6)  k/uL


 


RBC     (3.80-5.40)  m/uL


 


Hgb     (11.4-16.0)  gm/dL


 


Hct     (34.0-46.0)  %


 


Neutrophils #     (1.3-7.7)  k/uL


 


VBG pH  7.21 L    (7.31-7.41)  


 


VBG pCO2  18 L*    (37-51)  mmHg


 


VBG HCO3  7 L*    (24-28)  mmol/L


 


Sodium     (137-145)  mmol/L


 


Chloride     ()  mmol/L


 


Carbon Dioxide     (22-30)  mmol/L


 


BUN     (7-17)  mg/dL


 


Creatinine     (0.52-1.04)  mg/dL


 


Glucose     (74-99)  mg/dL


 


POC Glucose (mg/dL)    >600 H  (75-99)  mg/dL


 


Phosphorus     (2.5-4.5)  mg/dL


 


Urine Glucose (UA)   4+ H   (Negative)  


 


Urine Ketones   3+ H   (Negative)  














  20 Range/Units





  13:24 13:41 14:39 


 


WBC     (3.8-10.6)  k/uL


 


RBC     (3.80-5.40)  m/uL


 


Hgb     (11.4-16.0)  gm/dL


 


Hct     (34.0-46.0)  %


 


Neutrophils #     (1.3-7.7)  k/uL


 


VBG pH     (7.31-7.41)  


 


VBG pCO2     (37-51)  mmHg


 


VBG HCO3     (24-28)  mmol/L


 


Sodium     (137-145)  mmol/L


 


Chloride  109 H    ()  mmol/L


 


Carbon Dioxide  10 L    (22-30)  mmol/L


 


BUN  55 H    (7-17)  mg/dL


 


Creatinine  1.66 H    (0.52-1.04)  mg/dL


 


Glucose  645 H*    (74-99)  mg/dL


 


POC Glucose (mg/dL)   575 H  531 H  (75-99)  mg/dL


 


Phosphorus     (2.5-4.5)  mg/dL


 


Urine Glucose (UA)     (Negative)  


 


Urine Ketones     (Negative)  














  20 Range/Units





  15:03 16:03 17:13 


 


WBC     (3.8-10.6)  k/uL


 


RBC     (3.80-5.40)  m/uL


 


Hgb     (11.4-16.0)  gm/dL


 


Hct     (34.0-46.0)  %


 


Neutrophils #     (1.3-7.7)  k/uL


 


VBG pH     (7.31-7.41)  


 


VBG pCO2     (37-51)  mmHg


 


VBG HCO3     (24-28)  mmol/L


 


Sodium     (137-145)  mmol/L


 


Chloride     ()  mmol/L


 


Carbon Dioxide     (22-30)  mmol/L


 


BUN     (7-17)  mg/dL


 


Creatinine     (0.52-1.04)  mg/dL


 


Glucose     (74-99)  mg/dL


 


POC Glucose (mg/dL)  507 H  412 H  454 H  (75-99)  mg/dL


 


Phosphorus     (2.5-4.5)  mg/dL


 


Urine Glucose (UA)     (Negative)  


 


Urine Ketones     (Negative)  














  20 Range/Units





  18:08 18:56 19:19 


 


WBC     (3.8-10.6)  k/uL


 


RBC     (3.80-5.40)  m/uL


 


Hgb     (11.4-16.0)  gm/dL


 


Hct     (34.0-46.0)  %


 


Neutrophils #     (1.3-7.7)  k/uL


 


VBG pH     (7.31-7.41)  


 


VBG pCO2     (37-51)  mmHg


 


VBG HCO3     (24-28)  mmol/L


 


Sodium    148 H  (137-145)  mmol/L


 


Chloride    115 H  ()  mmol/L


 


Carbon Dioxide    15 L  (22-30)  mmol/L


 


BUN    45 H  (7-17)  mg/dL


 


Creatinine    1.24 H  (0.52-1.04)  mg/dL


 


Glucose    401 H  (74-99)  mg/dL


 


POC Glucose (mg/dL)  406 H  380 H   (75-99)  mg/dL


 


Phosphorus     (2.5-4.5)  mg/dL


 


Urine Glucose (UA)     (Negative)  


 


Urine Ketones     (Negative)  














  20 Range/Units





  20:17 20:54 22:08 


 


WBC     (3.8-10.6)  k/uL


 


RBC     (3.80-5.40)  m/uL


 


Hgb     (11.4-16.0)  gm/dL


 


Hct     (34.0-46.0)  %


 


Neutrophils #     (1.3-7.7)  k/uL


 


VBG pH     (7.31-7.41)  


 


VBG pCO2     (37-51)  mmHg


 


VBG HCO3     (24-28)  mmol/L


 


Sodium     (137-145)  mmol/L


 


Chloride     ()  mmol/L


 


Carbon Dioxide     (22-30)  mmol/L


 


BUN     (7-17)  mg/dL


 


Creatinine     (0.52-1.04)  mg/dL


 


Glucose     (74-99)  mg/dL


 


POC Glucose (mg/dL)  351 H  313 H  257 H  (75-99)  mg/dL


 


Phosphorus     (2.5-4.5)  mg/dL


 


Urine Glucose (UA)     (Negative)  


 


Urine Ketones     (Negative)  














  20 Range/Units





  22:59 23:32 00:13 


 


WBC     (3.8-10.6)  k/uL


 


RBC     (3.80-5.40)  m/uL


 


Hgb     (11.4-16.0)  gm/dL


 


Hct     (34.0-46.0)  %


 


Neutrophils #     (1.3-7.7)  k/uL


 


VBG pH     (7.31-7.41)  


 


VBG pCO2     (37-51)  mmHg


 


VBG HCO3     (24-28)  mmol/L


 


Sodium   149 H   (137-145)  mmol/L


 


Chloride   118 H   ()  mmol/L


 


Carbon Dioxide     (22-30)  mmol/L


 


BUN   39 H   (7-17)  mg/dL


 


Creatinine   1.08 H   (0.52-1.04)  mg/dL


 


Glucose   209 H   (74-99)  mg/dL


 


POC Glucose (mg/dL)  227 H   205 H  (75-99)  mg/dL


 


Phosphorus     (2.5-4.5)  mg/dL


 


Urine Glucose (UA)     (Negative)  


 


Urine Ketones     (Negative)  














  20 Range/Units





  02:05 05:18 08:57 


 


WBC    13.5 H  (3.8-10.6)  k/uL


 


RBC    3.50 L  (3.80-5.40)  m/uL


 


Hgb    10.3 L  (11.4-16.0)  gm/dL


 


Hct    32.6 L  (34.0-46.0)  %


 


Neutrophils #    11.6 H  (1.3-7.7)  k/uL


 


VBG pH     (7.31-7.41)  


 


VBG pCO2     (37-51)  mmHg


 


VBG HCO3     (24-28)  mmol/L


 


Sodium   150 H   (137-145)  mmol/L


 


Chloride   118 H   ()  mmol/L


 


Carbon Dioxide     (22-30)  mmol/L


 


BUN   33 H   (7-17)  mg/dL


 


Creatinine     (0.52-1.04)  mg/dL


 


Glucose   102 H   (74-99)  mg/dL


 


POC Glucose (mg/dL)  159 H    (75-99)  mg/dL


 


Phosphorus   2.0 L   (2.5-4.5)  mg/dL


 


Urine Glucose (UA)     (Negative)  


 


Urine Ketones     (Negative)  











Abdominal x-ray: report reviewed (Nonobstructive bowel gas pattern or abdominal 

x-ray)





Assessment and Plan


(1) Intractable nausea and vomiting


Narrative/Plan: 


51-year-old female with multiple medical comorbidities came into the hospital 3 

days of nausea and vomiting and diarrhea.  Diarrhea currently improved however 

patient continued to have retching and vomiting.  She was found to have a low 

blood sugars and treated for diabetic ketoacidosis.  Blood sugars, however 

cannot rule out a component of viral or bacterial gastroenteritis, or other 

etiology.  So improvement in symptoms with antiemetic therapy which has been 

optimized.


Current Visit: Yes   Status: Acute   Code(s): R11.2 - NAUSEA WITH VOMITING, 

UNSPECIFIED   SNOMED Code(s): 497550605


   





(2) Diabetic ketoacidosis


Current Visit: Yes   Status: Acute   Code(s): E11.10 - TYPE 2 DIABETES MELLITUS 

WITH KETOACIDOSIS WITHOUT COMA   SNOMED Code(s): 674999469


   


Plan: 





Supportive care


Okay for liquids, advance as tolerated


Zofran changed to around-the-clock


Tigan as needed, intramuscular for breakthrough nausea


Compazine suppository, for breakthrough nausea


Tight glycemic control


No plans for endoscopic evaluation at this time


If symptoms improve and otherwise medically stable no further plan for GI 

evaluation at this time


Thank you for allowing us to participate in the care of the patient

## 2020-01-04 NOTE — PN
PROGRESS NOTE



DATE OF SERVICE:

01/04/2020



REASON FOR FOLLOWUP:

Right diabetic foot wound with osteomyelitis.



INTERVAL HISTORY:

The patient is currently afebrile.  Patient has been breathing comfortably.  Still

complaining of feeling nauseated and vomiting, unable to keep anything down.  No chest

pain.  No abdominal pain.  No diarrhea or worsening pain to the right foot area.



PHYSICAL EXAMINATION:

Blood pressure 184/84 with a pulse of 91 temperature 97.5.  She is 95% we description

is a middle-aged female, lying in bed in no distress.

RESPIRATORY SYSTEM: Unlabored breathing, clear to auscultation anteriorly.

HEART: S1, S2.  Regular rate and rhythm.

ABDOMEN:  Soft, no tenderness.

Right foot is currently dressed up, no obvious drainage on the dressing.



LABS:

No new labs have been obtained today.



DIAGNOSTIC IMPRESSION AND PLAN:

Patient with right diabetic foot wound.  This patient did have pressure ulcer stage III

both on the right foot lateral and medial side with the x-ray suspicious for

osteomyelitis.  Previous culture positive for MRSA.  The patient is covered with

vancomycin.  We will monitor clinical course closely.  Local care to continue with dry

Aquacel Silver dressing and continue supportive care.





MMODL / IJN: [id number] / Job#: [id number]

## 2020-01-04 NOTE — PN
PROGRESS NOTE



She was seen on 01/04/2020.



She does not have IV access and has been hypoglycemic.  She does not have shortness of

breath and had been doing somewhat better overall.



On physical examination her blood pressure is 184/84, respiratory rate is 16, pulse 91,

temperature 97.5, O2 saturation on room is 95%.

HEENT is unremarkable.  Chest is clear.  Cardiovascular system reveals an S1, S2.

Abdomen is soft.  There is chronic wound.



LABS:

Reveal glucose it was 55 this morning and subsequently has come up to 104.



IMPRESSION:

1. Diabetic ketoacidosis.

2. There is a wound of the right foot.

Continue IV antibiotics per ID.  Insulin treat hypoglycemia.  Follow her electrolytes.

She may require central line IV access.





MMODL / IJN: [id number] / Job#: [id number]

## 2020-01-04 NOTE — P.PN
Subjective


Progress Note Date: 01/04/20


Principal diagnosis: 


Severe sepsis





Osteomyelitis of the right fifth metatarsal bone





Diabetic ketoacidosis





Severe type 1 diabetes with complications





Mild hyperkalemia





Peripheral vascular disease








01/04/2020, patient seen eval examined during the rounds is still intermittently

nauseous but more awake and oriented now remains on broad-spectrum antibiotics 

midline there wasn't inserted yesterday was lost would however a small that her 

for IV axis has been obtained patient will likely need a PICC line for long-term

antibiotics for right foot osteomyelitis which will be done early next week labs

reviewed medications reviewed continue current plan of care





01/03/2020, patient seen and evaluated examined she is appears more comfortable 

diabetic ketoacidosis had resolved patient is off of insulin drip she cannot 

take much by mouth due to her ongoing intermittent nausea and emesis she is on 

Zofran however, she is complaining of pain in the right foot for which she is 

started on low-dose Toradol, overall appears more composed labs reviewed 

medications reviewed care plan discussed with the staff at length patient is 

appear more composed today








Objective





- Vital Signs


Vital signs: 


                                   Vital Signs











Temp  97.5 F L  01/04/20 09:00


 


Pulse  91   01/04/20 09:00


 


Resp  16   01/04/20 09:00


 


BP  184/84   01/04/20 09:00


 


Pulse Ox  95   01/04/20 09:00








                                 Intake & Output











 01/03/20 01/04/20 01/04/20





 18:59 06:59 18:59


 


Intake Total 100 50 


 


Output Total 1400 1400 400


 


Balance -1300 -1350 -400


 


Intake:   


 


   50 


 


    Sodium Chloride 0.45% 1, 100 50 





    000 ml @ 50 mls/hr IV .   





    Q20H [facility] Rx#:[id number]   


 


Output:   


 


  Urine 1400 1400 400


 


Other:   


 


  Voiding Method Indwelling Catheter Indwelling Catheter 


 


  # Bowel Movements   0














- Exam


- Constitutional


General appearance: disheveled, mild distress





- EENT


Eyes: EOMI, PERRLA, poor dentition, normal appearance


ENT: normal oropharynx


Ears: bilateral: normal





- Neck


Neck: normal ROM


Carotids: bilateral: upstroke normal


Thyroid: bilateral: normal size





- Respiratory


Respiratory: bilateral: CTA





- Cardiovascular


Rhythm: regular


Heart sounds: normal: S1, S2





- Gastrointestinal


General gastrointestinal: decreased bowel sounds, distended, soft





- Integumentary


Integumentary: decreased turgor





- Neurologic


Neurologic: CNII-XII intact





- Musculoskeletal


Musculoskeletal: generalized weakness, right sided weakness





- Psychiatric


Psychiatric: A&O x's 3, appropriate affect





Prior surgery of the right foot with osteomyelitis finding as noted above











- Labs


CBC & Chem 7: 


                                 01/03/20 08:57





                                 01/03/20 05:18


Labs: 


                  Abnormal Lab Results - Last 24 Hours (Table)











  01/03/20 01/03/20 01/04/20 Range/Units





  16:53 20:22 02:51 


 


POC Glucose (mg/dL)  319 H  179 H  49 L  (75-99)  mg/dL














  01/04/20 01/04/20 01/04/20 Range/Units





  03:08 03:26 11:04 


 


POC Glucose (mg/dL)  55 L  104 H  104 H  (75-99)  mg/dL














  01/04/20 Range/Units





  11:53 


 


POC Glucose (mg/dL)  112 H  (75-99)  mg/dL














Assessment and Plan


Assessment: 


Severe sepsis





Osteomyelitis of the fifth metatarsal bone





Diabetic ketoacidosis





Severe type 1 diabetes with complications





Poorly controlled diabetes mellitus





Mild hyperkalemia





Peripheral vascular disease





Plan: 





Off of Insulin drip





IV fluids will gently rehydrate





Broad-spectrum antibiotics with IV vancomycin





Supportive care





Monitor electrolytes and potassium





Monitor renal functions





PICC line early next week








Time with Patient: Greater than 30

## 2020-01-05 LAB
GLUCOSE BLD-MCNC: 104 MG/DL (ref 75–99)
GLUCOSE BLD-MCNC: 123 MG/DL (ref 75–99)
GLUCOSE BLD-MCNC: 145 MG/DL (ref 75–99)
GLUCOSE BLD-MCNC: 200 MG/DL (ref 75–99)
GLUCOSE BLD-MCNC: 204 MG/DL (ref 75–99)
GLUCOSE BLD-MCNC: 212 MG/DL (ref 75–99)

## 2020-01-05 RX ADMIN — SODIUM CHLORIDE SCH: 9 INJECTION, SOLUTION INTRAVENOUS at 12:08

## 2020-01-05 RX ADMIN — ONDANSETRON SCH MG: 2 INJECTION INTRAMUSCULAR; INTRAVENOUS at 17:31

## 2020-01-05 RX ADMIN — POTASSIUM CHLORIDE SCH: 14.9 INJECTION, SOLUTION INTRAVENOUS at 10:42

## 2020-01-05 RX ADMIN — HEPARIN SODIUM SCH UNIT: 5000 INJECTION, SOLUTION INTRAVENOUS; SUBCUTANEOUS at 08:14

## 2020-01-05 RX ADMIN — INSULIN DETEMIR SCH UNIT: 100 INJECTION, SOLUTION SUBCUTANEOUS at 20:28

## 2020-01-05 RX ADMIN — KETOROLAC TROMETHAMINE PRN MG: 30 INJECTION, SOLUTION INTRAMUSCULAR at 01:37

## 2020-01-05 RX ADMIN — ONDANSETRON SCH MG: 2 INJECTION INTRAMUSCULAR; INTRAVENOUS at 12:04

## 2020-01-05 RX ADMIN — INSULIN ASPART SCH UNIT: 100 INJECTION, SOLUTION INTRAVENOUS; SUBCUTANEOUS at 20:28

## 2020-01-05 RX ADMIN — HEPARIN SODIUM SCH UNIT: 5000 INJECTION, SOLUTION INTRAVENOUS; SUBCUTANEOUS at 19:41

## 2020-01-05 RX ADMIN — INSULIN ASPART SCH: 100 INJECTION, SOLUTION INTRAVENOUS; SUBCUTANEOUS at 11:55

## 2020-01-05 RX ADMIN — ONDANSETRON SCH MG: 2 INJECTION INTRAMUSCULAR; INTRAVENOUS at 00:45

## 2020-01-05 RX ADMIN — FAMOTIDINE SCH MG: 10 INJECTION, SOLUTION INTRAVENOUS at 08:14

## 2020-01-05 RX ADMIN — ONDANSETRON SCH MG: 2 INJECTION INTRAMUSCULAR; INTRAVENOUS at 05:38

## 2020-01-05 RX ADMIN — INSULIN ASPART SCH: 100 INJECTION, SOLUTION INTRAVENOUS; SUBCUTANEOUS at 17:05

## 2020-01-05 RX ADMIN — TRIMETHOBENZAMIDE HYDROCHLORIDE PRN MG: 100 INJECTION INTRAMUSCULAR at 08:14

## 2020-01-05 RX ADMIN — TRIMETHOBENZAMIDE HYDROCHLORIDE PRN MG: 100 INJECTION INTRAMUSCULAR at 19:41

## 2020-01-05 RX ADMIN — SODIUM CHLORIDE SCH MLS/HR: 9 INJECTION, SOLUTION INTRAVENOUS at 13:00

## 2020-01-05 RX ADMIN — INSULIN ASPART SCH: 100 INJECTION, SOLUTION INTRAVENOUS; SUBCUTANEOUS at 07:12

## 2020-01-05 NOTE — PN
PROGRESS NOTE



She is unable to eat or drink anything.  She has had some episodes of hypoglycemia.

She does have a stable IV at this point.  She is complaining of pain in her right foot.



On physical examination, vitals were stable.  She is afebrile. Her chest reveals

decreased breath sounds.  No wheeze.  Cardiovascular system is S1, S2.  Abdomen is

soft.  Bowel sounds are heard.  There is no edema.  There is a surgical dressing over

the right foot.



IMPRESSION:

At this time is:

1. Diabetic ketoacidosis.

2. Gastroparesis, which is quite significant for which we shall start her on D5 half-

    normal saline as she has not received any calories since yesterday.  Would defer to

    Gastroenterology to further help with treatment of the gastroparesis.

Her prognosis is guarded.  Continue antibiotics per ID and local wound care.





MMODL / IJN: [id number] / Job#: [id number]

## 2020-01-05 NOTE — PN
PROGRESS NOTE



DATE OF SERVICE:

01/05/2020



REASON FOR FOLLOWUP:

Right diabetic foot wound with concern for underlying osteomyelitis.



INTERVAL HISTORY:

The patient is currently afebrile.  Patient remains to be feeling nauseous and throwing

up.  Denies having any chest pain or any worsening abdominal pain.  No diarrhea or any

worsening pain to the right foot.



PHYSICAL EXAMINATION:

Blood pressure is 144/80 with a pulse of 99, temperature 98.2. She is 95% on room air.

General description is a middle-aged female, lying in bed in no distress.  Respiratory

system: Unlabored breathing, clear to auscultation anteriorly. Heart S1, S2.  Regular

rate and rhythm.  Abdomen soft, no tenderness.

Right foot is dressed, no drainage on the dressing.



DIAGNOSTIC IMPRESSION AND PLAN:

Patient with right foot diabetic foot wound with a pressure ulcer stage III both the

medial and lateral side of the right foot.  It was suspicious (  ) possible

osteomyelitis.  Bone scan done to confirm that. Local care to continue with Medihoney.

Continue vancomycin and monitor clinical course closely.





MMODL / IJN: [id number] / Job#: [id number]

## 2020-01-06 LAB
ALBUMIN SERPL-MCNC: 3.2 G/DL (ref 3.5–5)
ALP SERPL-CCNC: 76 U/L (ref 38–126)
ALT SERPL-CCNC: 33 U/L (ref 4–34)
ANION GAP SERPL CALC-SCNC: 8 MMOL/L
AST SERPL-CCNC: 45 U/L (ref 14–36)
BASOPHILS # BLD AUTO: 0 K/UL (ref 0–0.2)
BASOPHILS NFR BLD AUTO: 1 %
BUN SERPL-SCNC: 17 MG/DL (ref 7–17)
CALCIUM SPEC-MCNC: 8.7 MG/DL (ref 8.4–10.2)
CHLORIDE SERPL-SCNC: 105 MMOL/L (ref 98–107)
CO2 SERPL-SCNC: 28 MMOL/L (ref 22–30)
EOSINOPHIL # BLD AUTO: 0 K/UL (ref 0–0.7)
EOSINOPHIL NFR BLD AUTO: 0 %
ERYTHROCYTE [DISTWIDTH] IN BLOOD BY AUTOMATED COUNT: 3.96 M/UL (ref 3.8–5.4)
ERYTHROCYTE [DISTWIDTH] IN BLOOD: 13.9 % (ref 11.5–15.5)
GLUCOSE BLD-MCNC: 123 MG/DL (ref 75–99)
GLUCOSE BLD-MCNC: 145 MG/DL (ref 75–99)
GLUCOSE BLD-MCNC: 164 MG/DL (ref 75–99)
GLUCOSE BLD-MCNC: 70 MG/DL (ref 75–99)
GLUCOSE SERPL-MCNC: 127 MG/DL (ref 74–99)
HCT VFR BLD AUTO: 36.6 % (ref 34–46)
HGB BLD-MCNC: 11.7 GM/DL (ref 11.4–16)
LYMPHOCYTES # SPEC AUTO: 1.8 K/UL (ref 1–4.8)
LYMPHOCYTES NFR SPEC AUTO: 27 %
MCH RBC QN AUTO: 29.6 PG (ref 25–35)
MCHC RBC AUTO-ENTMCNC: 32.1 G/DL (ref 31–37)
MCV RBC AUTO: 92.4 FL (ref 80–100)
MONOCYTES # BLD AUTO: 0.3 K/UL (ref 0–1)
MONOCYTES NFR BLD AUTO: 5 %
NEUTROPHILS # BLD AUTO: 4.4 K/UL (ref 1.3–7.7)
NEUTROPHILS NFR BLD AUTO: 65 %
PLATELET # BLD AUTO: 222 K/UL (ref 150–450)
POTASSIUM SERPL-SCNC: 3 MMOL/L (ref 3.5–5.1)
PROT SERPL-MCNC: 6.5 G/DL (ref 6.3–8.2)
SODIUM SERPL-SCNC: 141 MMOL/L (ref 137–145)
WBC # BLD AUTO: 6.7 K/UL (ref 3.8–10.6)

## 2020-01-06 RX ADMIN — ONDANSETRON SCH MG: 2 INJECTION INTRAMUSCULAR; INTRAVENOUS at 00:28

## 2020-01-06 RX ADMIN — KETOROLAC TROMETHAMINE PRN MG: 30 INJECTION, SOLUTION INTRAMUSCULAR at 23:47

## 2020-01-06 RX ADMIN — ONDANSETRON SCH MG: 2 INJECTION INTRAMUSCULAR; INTRAVENOUS at 05:11

## 2020-01-06 RX ADMIN — INSULIN ASPART SCH UNIT: 100 INJECTION, SOLUTION INTRAVENOUS; SUBCUTANEOUS at 20:23

## 2020-01-06 RX ADMIN — POTASSIUM CHLORIDE SCH MLS/HR: 14.9 INJECTION, SOLUTION INTRAVENOUS at 05:13

## 2020-01-06 RX ADMIN — HEPARIN SODIUM SCH UNIT: 5000 INJECTION, SOLUTION INTRAVENOUS; SUBCUTANEOUS at 07:21

## 2020-01-06 RX ADMIN — POTASSIUM CHLORIDE SCH MLS/HR: 7.46 INJECTION, SOLUTION INTRAVENOUS at 13:54

## 2020-01-06 RX ADMIN — LEVOFLOXACIN SCH MLS/HR: 750 INJECTION, SOLUTION INTRAVENOUS at 18:51

## 2020-01-06 RX ADMIN — KETOROLAC TROMETHAMINE PRN MG: 30 INJECTION, SOLUTION INTRAMUSCULAR at 11:45

## 2020-01-06 RX ADMIN — POTASSIUM CHLORIDE SCH MLS/HR: 7.46 INJECTION, SOLUTION INTRAVENOUS at 14:45

## 2020-01-06 RX ADMIN — INSULIN ASPART SCH: 100 INJECTION, SOLUTION INTRAVENOUS; SUBCUTANEOUS at 17:07

## 2020-01-06 RX ADMIN — INSULIN ASPART SCH: 100 INJECTION, SOLUTION INTRAVENOUS; SUBCUTANEOUS at 12:49

## 2020-01-06 RX ADMIN — HEPARIN SODIUM SCH UNIT: 5000 INJECTION, SOLUTION INTRAVENOUS; SUBCUTANEOUS at 18:52

## 2020-01-06 RX ADMIN — ONDANSETRON SCH MG: 2 INJECTION INTRAMUSCULAR; INTRAVENOUS at 11:45

## 2020-01-06 RX ADMIN — FAMOTIDINE SCH MG: 10 INJECTION, SOLUTION INTRAVENOUS at 07:21

## 2020-01-06 RX ADMIN — SODIUM CHLORIDE SCH MLS/HR: 9 INJECTION, SOLUTION INTRAVENOUS at 00:29

## 2020-01-06 RX ADMIN — INSULIN DETEMIR SCH UNIT: 100 INJECTION, SOLUTION SUBCUTANEOUS at 20:38

## 2020-01-06 RX ADMIN — POTASSIUM CHLORIDE SCH MLS/HR: 7.46 INJECTION, SOLUTION INTRAVENOUS at 17:56

## 2020-01-06 RX ADMIN — SODIUM CHLORIDE SCH MLS/HR: 9 INJECTION, SOLUTION INTRAVENOUS at 23:47

## 2020-01-06 RX ADMIN — INSULIN ASPART SCH: 100 INJECTION, SOLUTION INTRAVENOUS; SUBCUTANEOUS at 07:06

## 2020-01-06 RX ADMIN — SODIUM CHLORIDE SCH MLS/HR: 9 INJECTION, SOLUTION INTRAVENOUS at 13:50

## 2020-01-06 RX ADMIN — POTASSIUM CHLORIDE SCH MLS/HR: 7.46 INJECTION, SOLUTION INTRAVENOUS at 16:47

## 2020-01-06 RX ADMIN — ONDANSETRON SCH MG: 2 INJECTION INTRAMUSCULAR; INTRAVENOUS at 23:47

## 2020-01-06 RX ADMIN — ONDANSETRON SCH MG: 2 INJECTION INTRAMUSCULAR; INTRAVENOUS at 17:56

## 2020-01-06 NOTE — NM
EXAMINATION TYPE: NM bone 3 phase

 

DATE OF EXAM: 1/6/2020

 

COMPARISON: NONE

 

HISTORY: Right foot wound. Possible osteomyelitis.

 

Triple phase bone scintigraphy was performed following the injection of 23.8 mCi Tc 99m MDP.  Immedia
te images and 4 hours post injection images acquired.

 

FINDINGS: 

 

The flow study shows some hyperemia of the right forefoot compared to the left. This is also present 
on the immediate images.

 

Delayed images show focal increased uptake in the distal fifth metatarsal of the right foot.

 

Delayed images show increased uptake at the first tarsometatarsal joint of the right foot. There is m
ild increased uptake in the left first MP joint. Remainder of exam is unremarkable.

 

IMPRESSION:

Increased uptake distal fifth metatarsal is consistent with osteomyelitis.

 

Increased uptake in the first tarsometatarsal joint of the right foot consistent with arthritic disea
se. Increased uptake first MP joint of the left foot consistent with arthritic disease.

 

IMPRESSION: 

 

No scintigraphic evidence of osseous metastatic disease.

## 2020-01-06 NOTE — PN
PROGRESS NOTE



DATE OF SERVICE:

01/06/2020



REASON FOR FOLLOWUP:

Right diabetic foot wound and a question of osteomyelitis.



INTERVAL HISTORY:

The patient is currently afebrile.  The patient has been breathing comfortably.  The

patient remains to be complaining of nausea and vomiting and unable to keep anything

down.  No abdominal pain  or any worsening pain in the right foot area.



PHYSICAL EXAMINATION:

Blood pressure is 147/79 with pulse of 97, temperature 98.  She is 97% on room air.

General description is a middle-aged female lying in bed in no distress.  Respiratory

system: Unlabored breathing.  Clear to auscultation anteriorly.  Heart S1, S2.  Regular

rate and rhythm.

ABDOMEN:  Soft. No tenderness.  Right foot is currently dressed up.  No obvious

drainage on the dressing.



LABS:

The patient's CRP is currently normal.  Sedimentation rate is not significantly

elevated.  _____.



DIAGNOSTIC IMPRESSION AND PLAN:

Patient with right diabetic foot wound.  X-rays were suspicious for osteo.  However,

the bone scan report is not very clear.  The patient's CRP is normal and sed rate is

not elevated either.  We will hold on PICC line at this point and review the bone scan

images with the radiologist.  Currently on vancomycin to continue and monitor clinical

course closely.





MMODL / IJN: [id number] / Job#: [id number]

## 2020-01-06 NOTE — PN
PROGRESS NOTE



REQUESTING PHYSICIAN:  Dr. Cabral.





The patient is a 59-year-old pleasant white female admitted to the hospital with acute

DKA, which since has resolved.  She has longstanding history of diabetes mellitus.

Congestive heart failure, and coronary artery disease.  She continues to have

nausea,vomiting with occasional retching.  Presently on a clear liquid diet and for the

1st time she states she is able to keep some Jell-O down.  She denies any heartburn.

She remains on Zofran, Tigan suppository and Compazine as needed for the nausea.  She

denies any abdominal pain.  No rectal bleeding or melena.



PHYSICAL EXAMINATION:

Appears comfortable no apparent distress vital signs stable.  The blood pressure

126/70.  Pulse rate 96, tempature 98.9.

HEENT EXAMINATION:  Unremarkable.  Conjunctivae pink, sclerae anicteric.  Pupils equal.

Oral cavity no lesions.

NECK:  No JVD or lymph node enlargement.

CHEST:  Clear to auscultation.

HEART:  Regular rate and rhythm.

ABDOMEN:  Soft.  Very minimal tenderness in the epigastric area.

EXTREMITIES:  No pedal edema.  She has a diabetic foot infection and the left foot was

all bandaged.

NEUROLOGIC:  Alert and oriented x3.  No focal deficits.



LABS:

From today WBC 6.7, hemoglobin 11.7, platelets normal.  Basic metabolic panel is within

normal limits. Blood sugar is 127.  ALT, AST, T-bilirubin and alkaline phosphatase are

within normal limits.



IMPRESSION:

1. Persistent nausea, vomiting for the last few days duration.  Presently on IV Pepcid

    and antiemetics and her symptoms are gradually improving.

2. Acute diabetic ketoacidosis, resolved.

3. Diabetic foot infection, on IV vancomycin.

4. History of congestive heart failure/CVA in the past.



RECOMMENDATIONS:

1. Continue with symptomatic and supportive care with antiemetics and IV H2 blockers.

2. Continue with clear liquids and tomorrow morning if she is better, we will advance

    as tolerated.

3. Small frequent meals.

4. If she continues to have persistent symptoms, we will consider an upper endoscopy

    during this hospitalization.





For now we will continue to monitor her closely.

Thank you for this consultation.





MMODL / IJN: [id number] / Job#: [id number]

## 2020-01-06 NOTE — P.PN
Subjective


Progress Note Date: 01/06/20 01/06/2020 patient admitted to the hospital with nausea vomiting and diarrhea 

for 3 days.  She's found have evidence of DKA.  She does have right wound 

infections with possible osteomyelitis of the foot.  She's undergoing a bone 

scan today.  She's followed closely by infectious disease.  She is also being 

seen by GI service regarding possible gastroparesis.  She did have a blood sugar

this morning of 123 is now down to 70.  She did have episodes of hypoglycemia 

and the D5 half-normal saline was added yesterday.  Sodium level has also 

improved from 150-141.  Patient has been dry heaving and is had poor oral 

intake.  No further episodes of diarrhea.  She has any chest pain or shortness 

of breath.





Objective





- Vital Signs


Vital signs: 


                                   Vital Signs











Temp  98.9 F   01/06/20 08:20


 


Pulse  96   01/06/20 08:20


 


Resp  16   01/06/20 08:20


 


BP  126/70   01/06/20 08:20


 


Pulse Ox  96   01/06/20 08:20








                                 Intake & Output











 01/05/20 01/06/20 01/06/20





 18:59 06:59 18:59


 


Intake Total 920  400


 


Output Total 350 350 350


 


Balance 570 -350 50


 


Intake:   


 


  Intake, IV Titration 400  200





  Amount   


 


    Dextrose 5%-0.45% NaCl 1, 150  200





    000 ml @ 50 mls/hr IV .   





    Q20H [facility] Rx#:[id number]   


 


    Vancomycin 1,000 mg In 250  





    Sodium Chloride 0.9% 250   





    ml @ 125 mls/hr IVPB Q12H   





    [facility] Rx#:[id number]   


 


  Oral 520  200


 


Output:   


 


  Urine 350 350 350


 


Other:   


 


  Voiding Method Indwelling Catheter Indwelling Catheter Indwelling Catheter


 


  # Voids   1














- Exam





Head normocephalic


Neck supple


Lungs clear to auscultation bilaterally no wheezing or crackles


Heart regular rate and rhythm S1-S2, no rub or gallop


Abdomen is soft nontender nondistended positive bowel sounds no 

hepatosplenomegaly


Extremities right foot ulcers noted on the medial and lateral aspect of the 

foot.  Are large and dry


Neuro alert and orientated to 3





- Labs


CBC & Chem 7: 


                                 01/06/20 10:43





                                 01/06/20 10:43


Labs: 


                  Abnormal Lab Results - Last 24 Hours (Table)











  01/05/20 01/05/20 01/05/20 Range/Units





  17:03 20:09 20:24 


 


Potassium     (3.5-5.1)  mmol/L


 


Glucose     (74-99)  mg/dL


 


POC Glucose (mg/dL)  204 H  200 H  212 H  (75-99)  mg/dL


 


AST     (14-36)  U/L


 


Albumin     (3.5-5.0)  g/dL














  01/06/20 01/06/20 01/06/20 Range/Units





  02:03 06:59 10:43 


 


Potassium    3.0 L  (3.5-5.1)  mmol/L


 


Glucose    127 H  (74-99)  mg/dL


 


POC Glucose (mg/dL)  145 H  123 H   (75-99)  mg/dL


 


AST    45 H  (14-36)  U/L


 


Albumin    3.2 L  (3.5-5.0)  g/dL














  01/06/20 Range/Units





  13:45 


 


Potassium   (3.5-5.1)  mmol/L


 


Glucose   (74-99)  mg/dL


 


POC Glucose (mg/dL)  70 L  (75-99)  mg/dL


 


AST   (14-36)  U/L


 


Albumin   (3.5-5.0)  g/dL














Assessment and Plan


Assessment: 





1.  Diabetic ketoacidosis present on admission now improved





2.  Gastroparesis with poor oral intake.  GI service has been on consult.  





3.  Nausea vomiting and diarrhea likely secondary to a gastroenteritis.  Patient

still having episodes of dry heaves.  Continue with the IV Pepcid, Zofran and 

high and as needed





4.  Right diabetic foot wound with stage III pressure ulcer on both the medial 

and lateral side of the right foot.  Concerns for possible osteomyelitis of the 

right fifth metatarsal bone on x-ray.  Infectious disease is following.  Patient

has bone scan scheduled for today.  Continue with the vancomycin.  Patient was 

seen by vascular surgery no plans for surgical intervention at this time 

continue with antibiotics





5.  History of diabetes type 1, brittle diabetic.  Patient has been having 

episodes of hypoglycemia.  Currently has D5 half-normal saline.  Hypoglycemia 

likely due to poor oral intake.





6.  History of peripheral vascular disease





7.  Hyponatremia: Likely secondary to poor oral intake.  Improved with IV 

fluids.  Sodium has decreased from 150 down to 141.  Continue to monitor





8.  Hypokalemia: Patient receiving potassium supplement.  Repeat labs in a.m.





GI prophylaxis Pepcid and DVT prophylaxis subcu





I performed an examination of the patient and discussed their management with 

the physician Assistant.  I have reviewed the Physician Assistant's notes and 

agree with the documented findings and plan of care

## 2020-01-07 LAB
ALBUMIN SERPL-MCNC: 2.8 G/DL (ref 3.5–5)
ALP SERPL-CCNC: 60 U/L (ref 38–126)
ALT SERPL-CCNC: 31 U/L (ref 4–34)
ANION GAP SERPL CALC-SCNC: 7 MMOL/L
AST SERPL-CCNC: 45 U/L (ref 14–36)
BASOPHILS # BLD AUTO: 0 K/UL (ref 0–0.2)
BASOPHILS NFR BLD AUTO: 0 %
BUN SERPL-SCNC: 17 MG/DL (ref 7–17)
CALCIUM SPEC-MCNC: 8.4 MG/DL (ref 8.4–10.2)
CHLORIDE SERPL-SCNC: 105 MMOL/L (ref 98–107)
CO2 SERPL-SCNC: 27 MMOL/L (ref 22–30)
EOSINOPHIL # BLD AUTO: 0.1 K/UL (ref 0–0.7)
EOSINOPHIL NFR BLD AUTO: 1 %
ERYTHROCYTE [DISTWIDTH] IN BLOOD BY AUTOMATED COUNT: 3.49 M/UL (ref 3.8–5.4)
ERYTHROCYTE [DISTWIDTH] IN BLOOD: 14.1 % (ref 11.5–15.5)
FERRITIN SERPL-MCNC: 194.9 NG/ML (ref 10–291)
GLUCOSE BLD-MCNC: 158 MG/DL (ref 75–99)
GLUCOSE BLD-MCNC: 237 MG/DL (ref 75–99)
GLUCOSE BLD-MCNC: 348 MG/DL (ref 75–99)
GLUCOSE BLD-MCNC: 54 MG/DL (ref 75–99)
GLUCOSE BLD-MCNC: 74 MG/DL (ref 75–99)
GLUCOSE BLD-MCNC: 99 MG/DL (ref 75–99)
GLUCOSE SERPL-MCNC: 75 MG/DL (ref 74–99)
HCT VFR BLD AUTO: 32.4 % (ref 34–46)
HGB BLD-MCNC: 10.3 GM/DL (ref 11.4–16)
IRON SERPL-MCNC: 47 UG/DL (ref 50–170)
LYMPHOCYTES # SPEC AUTO: 2.1 K/UL (ref 1–4.8)
LYMPHOCYTES NFR SPEC AUTO: 30 %
MCH RBC QN AUTO: 29.6 PG (ref 25–35)
MCHC RBC AUTO-ENTMCNC: 31.9 G/DL (ref 31–37)
MCV RBC AUTO: 92.9 FL (ref 80–100)
MONOCYTES # BLD AUTO: 0.3 K/UL (ref 0–1)
MONOCYTES NFR BLD AUTO: 5 %
NEUTROPHILS # BLD AUTO: 4.2 K/UL (ref 1.3–7.7)
NEUTROPHILS NFR BLD AUTO: 62 %
PLATELET # BLD AUTO: 196 K/UL (ref 150–450)
POTASSIUM SERPL-SCNC: 3.1 MMOL/L (ref 3.5–5.1)
PROT SERPL-MCNC: 5.7 G/DL (ref 6.3–8.2)
SODIUM SERPL-SCNC: 139 MMOL/L (ref 137–145)
TIBC SERPL-MCNC: 154 UG/DL (ref 228–460)
WBC # BLD AUTO: 6.9 K/UL (ref 3.8–10.6)

## 2020-01-07 RX ADMIN — SODIUM CHLORIDE SCH MLS/HR: 9 INJECTION, SOLUTION INTRAVENOUS at 15:18

## 2020-01-07 RX ADMIN — INSULIN ASPART SCH UNIT: 100 INJECTION, SOLUTION INTRAVENOUS; SUBCUTANEOUS at 17:45

## 2020-01-07 RX ADMIN — INSULIN ASPART SCH UNIT: 100 INJECTION, SOLUTION INTRAVENOUS; SUBCUTANEOUS at 12:25

## 2020-01-07 RX ADMIN — FAMOTIDINE SCH MG: 10 INJECTION, SOLUTION INTRAVENOUS at 08:41

## 2020-01-07 RX ADMIN — POTASSIUM CHLORIDE SCH MLS/HR: 14.9 INJECTION, SOLUTION INTRAVENOUS at 02:28

## 2020-01-07 RX ADMIN — HEPARIN SODIUM SCH UNIT: 5000 INJECTION, SOLUTION INTRAVENOUS; SUBCUTANEOUS at 08:41

## 2020-01-07 RX ADMIN — ONDANSETRON SCH MG: 2 INJECTION INTRAMUSCULAR; INTRAVENOUS at 05:55

## 2020-01-07 RX ADMIN — INSULIN ASPART SCH UNIT: 100 INJECTION, SOLUTION INTRAVENOUS; SUBCUTANEOUS at 21:00

## 2020-01-07 RX ADMIN — FAMOTIDINE SCH MG: 20 TABLET, FILM COATED ORAL at 21:00

## 2020-01-07 RX ADMIN — ONDANSETRON SCH MG: 2 INJECTION INTRAMUSCULAR; INTRAVENOUS at 23:13

## 2020-01-07 RX ADMIN — HEPARIN SODIUM SCH UNIT: 5000 INJECTION, SOLUTION INTRAVENOUS; SUBCUTANEOUS at 21:00

## 2020-01-07 RX ADMIN — ONDANSETRON SCH MG: 2 INJECTION INTRAMUSCULAR; INTRAVENOUS at 17:43

## 2020-01-07 RX ADMIN — INSULIN ASPART SCH: 100 INJECTION, SOLUTION INTRAVENOUS; SUBCUTANEOUS at 07:31

## 2020-01-07 RX ADMIN — INSULIN DETEMIR SCH UNIT: 100 INJECTION, SOLUTION SUBCUTANEOUS at 21:01

## 2020-01-07 RX ADMIN — POTASSIUM CHLORIDE SCH MLS/HR: 14.9 INJECTION, SOLUTION INTRAVENOUS at 22:29

## 2020-01-07 RX ADMIN — ONDANSETRON SCH MG: 2 INJECTION INTRAMUSCULAR; INTRAVENOUS at 12:24

## 2020-01-07 NOTE — P.PN
Subjective


Progress Note Date: 01/07/20 01/06/2020 patient admitted to the hospital with nausea vomiting and diarrhea 

for 3 days.  She's found have evidence of DKA.  She does have right wound 

infections with possible osteomyelitis of the foot.  She's undergoing a bone 

scan today.  She's followed closely by infectious disease.  She is also being 

seen by GI service regarding possible gastroparesis.  She did have a blood sugar

this morning of 123 is now down to 70.  She did have episodes of hypoglycemia 

and the D5 half-normal saline was added yesterday.  Sodium level has also 

improved from 150-141.  Patient has been dry heaving and is had poor oral 

intake.  No further episodes of diarrhea.  She has any chest pain or shortness 

of breath.





01/07/2020 patient sitting up in bed comfortably.  She was tolerating a clear 

liquid breakfast.  No further episodes of vomiting or dry heaves since yesterday

afternoon.  No bowel movement for a couple days.  Springer catheter will be removed

today.  Discussed case with ID service.  Levaquin will be discontinued. continue

the vancomycin.  ID service will be reviewed feeling bone scan findings with rad

iologist.  Await their further recommendations.  Patient did have another blood 

sugar low at 54 at 2:00 this morning.  Blood sugars are now showing improvement 

patient is now eating.  We'll monitor.





Objective





- Vital Signs


Vital signs: 


                                   Vital Signs











Temp  98.9 F   01/07/20 07:00


 


Pulse  82   01/07/20 07:00


 


Resp  16   01/07/20 07:00


 


BP  144/76   01/07/20 07:00


 


Pulse Ox  98   01/07/20 07:00








                                 Intake & Output











 01/06/20 01/07/20 01/07/20





 18:59 06:59 18:59


 


Intake Total 750 1050 225


 


Output Total 350 200 


 


Balance 400 850 225


 


Intake:   


 


  Intake, IV Titration 550 1050 





  Amount   


 


    Dextrose 5%-0.45% NaCl 1, 200 800 





    000 ml @ 50 mls/hr IV .   





    Q20H [facility] Rx#:[id number]   


 


    Potassium Chloride 10 meq 100  





    In Water For Injection 1   





    100ml.bag @ 100 mls/hr   





    IVPB Q1HR [facility] Rx#:   





    [id number]   


 


    Vancomycin 1,000 mg In 250 250 





    Sodium Chloride 0.9% 250   





    ml @ 125 mls/hr IVPB Q12H   





    [facility] Rx#:[id number]   


 


  Oral 200  225


 


Output:   


 


  Urine 350 200 


 


Other:   


 


  Voiding Method Indwelling Catheter Indwelling Catheter Indwelling Catheter


 


  # Voids 1  














- Exam





Head normocephalic


Neck supple


Lungs clear to auscultation bilaterally no wheezing or crackles


Heart regular rate and rhythm S1-S2, no rub or gallop


Abdomen is soft nontender nondistended positive bowel sounds no 

hepatosplenomegaly


Extremities right foot ulcers noted on the medial and lateral aspect of the 

foot.  Are large and dry


Neuro alert and orientated to 3





- Labs


CBC & Chem 7: 


                                 01/07/20 06:45





                                 01/07/20 06:45


Labs: 


                  Abnormal Lab Results - Last 24 Hours (Table)











  01/06/20 01/06/20 01/07/20 Range/Units





  10:43 17:01 02:04 


 


RBC     (3.80-5.40)  m/uL


 


Hgb     (11.4-16.0)  gm/dL


 


Hct     (34.0-46.0)  %


 


ESR  26 H    (0-20)  mm/hr


 


Potassium     (3.5-5.1)  mmol/L


 


POC Glucose (mg/dL)   164 H  54 L  (75-99)  mg/dL


 


AST     (14-36)  U/L


 


Total Protein     (6.3-8.2)  g/dL


 


Albumin     (3.5-5.0)  g/dL














  01/07/20 01/07/20 01/07/20 Range/Units





  02:19 06:45 06:45 


 


RBC   3.49 L   (3.80-5.40)  m/uL


 


Hgb   10.3 L   (11.4-16.0)  gm/dL


 


Hct   32.4 L   (34.0-46.0)  %


 


ESR     (0-20)  mm/hr


 


Potassium    3.1 L  (3.5-5.1)  mmol/L


 


POC Glucose (mg/dL)  74 L    (75-99)  mg/dL


 


AST    45 H  (14-36)  U/L


 


Total Protein    5.7 L  (6.3-8.2)  g/dL


 


Albumin    2.8 L  (3.5-5.0)  g/dL














  01/07/20 Range/Units





  11:57 


 


RBC   (3.80-5.40)  m/uL


 


Hgb   (11.4-16.0)  gm/dL


 


Hct   (34.0-46.0)  %


 


ESR   (0-20)  mm/hr


 


Potassium   (3.5-5.1)  mmol/L


 


POC Glucose (mg/dL)  158 H  (75-99)  mg/dL


 


AST   (14-36)  U/L


 


Total Protein   (6.3-8.2)  g/dL


 


Albumin   (3.5-5.0)  g/dL














Assessment and Plan


Assessment: 





1.  Diabetic ketoacidosis present on admission now improved





2.  Gastroparesis with poor oral intake.  patient evaluated by GI service.  At 

this time they're advancing diet.  And only plan for any further endoscopy if 

patient has vomiting.  





3.  Nausea vomiting and diarrhea likely secondary to a gastroenteritis.  Patient

still having episodes of dry heaves.  Continue with the IV Pepcid, Zofran as 

needed





4.  Right diabetic foot wound with stage III pressure ulcer on both the medial 

and lateral side of the right foot.  Concerns for possible osteomyelitis of the 

right fifth metatarsal bone on x-ray.  Infectious disease is following.  patient

had bone scan completed.  Awaiting further ID recommendations.  ID is reviewing 

bone scan results with radiologist. Continue with the vancomycin.  Patient was 

seen by vascular surgery no plans for surgical intervention at this time 

continue with antibiotics





5.  History of diabetes type 1, brittle diabetic.  Patient has been having 

episodes of hypoglycemia.  Currently has D5 half-normal saline.  Hypoglycemia 

likely due to poor oral intake.Levemir was decreased to 20 units at bedtime.  

Patient is now starting to eat.  Last blood sugar was 158.  We'll continue to 

monitor





6.  History of peripheral vascular disease





7.  Hyponatremia: Likely secondary to poor oral intake.  Improved with IV 

fluids.  Sodium has decreased from 150 down to 141.  Continue to monitor





8.  Hypokalemia: Patient receiving potassium supplement.  Repeat labs in a.m.





9.  Hypomagnesemia: Magnesium 1.6 will give 1 g of magnesium sulfate





GI prophylaxis Pepcid and DVT prophylaxis subcu





I performed an examination of the patient and discussed their management with 

the physician Assistant.  I have reviewed the Physician Assistant's notes and 

agree with the documented findings and plan of care

## 2020-01-07 NOTE — P.PN
Subjective


Progress Note Date: 01/07/20


Principal diagnosis: 


Severe sepsis





Osteomyelitis of the right fifth metatarsal bone





Diabetic ketoacidosis





Severe type 1 diabetes with complications





Mild hyperkalemia





Peripheral vascular disease








01/07/2020, patient seen eval reexamined during the rounds labs reviewed medicat

ions reviewed patient has been doing well currently on remains on IV antibiotics

bone scan is not showing a clear-cut osteomyelitis for now ID service is 

evaluating it, patient is with a PICC line now due to unreliable venous axis





01/06/2020, patient seen eval examined during the rounds she has been started on

clear liquid diet denies any chest pain patient remains on broad-spectrum 

antibiotics for osteomyelitis of the right foot, PICC line to be inserted, bone 

scan phosphatase has been completed secondary to be done tonight





01/04/2020, patient seen eval examined during the rounds is still intermittently

nauseous but more awake and oriented now remains on broad-spectrum antibiotics 

midline there wasn't inserted yesterday was lost would however a small that her 

for IV axis has been obtained patient will likely need a PICC line for long-term

antibiotics for right foot osteomyelitis which will be done early next week labs

reviewed medications reviewed continue current plan of care





01/03/2020, patient seen and evaluated examined she is appears more comfortable 

diabetic ketoacidosis had resolved patient is off of insulin drip she cannot 

take much by mouth due to her ongoing intermittent nausea and emesis she is on 

Zofran however, she is complaining of pain in the right foot for which she is 

started on low-dose Toradol, overall appears more composed labs reviewed 

medications reviewed care plan discussed with the staff at length patient is 

appear more composed today








Objective





- Vital Signs


Vital signs: 


                                   Vital Signs











Temp  99.1 F   01/07/20 16:00


 


Pulse  92   01/07/20 16:00


 


Resp  16   01/07/20 16:00


 


BP  119/48   01/07/20 16:00


 


Pulse Ox  98   01/07/20 16:00








                                 Intake & Output











 01/06/20 01/07/20 01/07/20





 18:59 06:59 18:59


 


Intake Total 750 1050 2305


 


Output Total 350 200 


 


Balance 


 


Weight   57.5 kg


 


Intake:   


 


  Intake, IV Titration 550 1050 1600





  Amount   


 


    Dextrose 5%-0.45% NaCl 1, 





    000 ml @ 50 mls/hr IV .   





    Q20H [facility] Rx#:[id number]   


 


    Potassium Chloride 10 meq 100  





    In Water For Injection 1   





    100ml.bag @ 100 mls/hr   





    IVPB Q1HR [facility] Rx#:   





    [id number]   


 


    Vancomycin 1,000 mg In 250 250 





    Sodium Chloride 0.9% 250   





    ml @ 125 mls/hr IVPB Q12H   





    [facility] Rx#:[id number]   


 


  Oral 200  705


 


Output:   


 


  Urine 350 200 


 


Other:   


 


  Voiding Method Indwelling Catheter Indwelling Catheter Indwelling Catheter


 


  # Voids 1  














- Exam


- Constitutional


General appearance: disheveled, mild distress





- EENT


Eyes: EOMI, PERRLA, poor dentition, normal appearance


ENT: normal oropharynx


Ears: bilateral: normal





- Neck


Neck: normal ROM


Carotids: bilateral: upstroke normal


Thyroid: bilateral: normal size





- Respiratory


Respiratory: bilateral: CTA





- Cardiovascular


Rhythm: regular


Heart sounds: normal: S1, S2





- Gastrointestinal


General gastrointestinal: decreased bowel sounds, distended, soft





- Integumentary


Integumentary: decreased turgor





- Neurologic


Neurologic: CNII-XII intact





- Musculoskeletal


Musculoskeletal: generalized weakness, right sided weakness





- Psychiatric


Psychiatric: A&O x's 3, appropriate affect





Prior surgery of the right foot with osteomyelitis finding as noted above











- Labs


CBC & Chem 7: 


                                 01/07/20 06:45





                                 01/07/20 06:45


Labs: 


                  Abnormal Lab Results - Last 24 Hours (Table)











  01/06/20 01/07/20 01/07/20 Range/Units





  17:01 02:04 02:19 


 


RBC     (3.80-5.40)  m/uL


 


Hgb     (11.4-16.0)  gm/dL


 


Hct     (34.0-46.0)  %


 


Potassium     (3.5-5.1)  mmol/L


 


POC Glucose (mg/dL)  164 H  54 L  74 L  (75-99)  mg/dL


 


Iron     ()  ug/dL


 


TIBC     (228-460)  ug/dL


 


AST     (14-36)  U/L


 


Total Protein     (6.3-8.2)  g/dL


 


Albumin     (3.5-5.0)  g/dL














  01/07/20 01/07/20 01/07/20 Range/Units





  06:45 06:45 10:48 


 


RBC  3.49 L    (3.80-5.40)  m/uL


 


Hgb  10.3 L    (11.4-16.0)  gm/dL


 


Hct  32.4 L    (34.0-46.0)  %


 


Potassium   3.1 L   (3.5-5.1)  mmol/L


 


POC Glucose (mg/dL)     (75-99)  mg/dL


 


Iron    47 L  ()  ug/dL


 


TIBC    154 L  (228-460)  ug/dL


 


AST   45 H   (14-36)  U/L


 


Total Protein   5.7 L   (6.3-8.2)  g/dL


 


Albumin   2.8 L   (3.5-5.0)  g/dL














  01/07/20 Range/Units





  11:57 


 


RBC   (3.80-5.40)  m/uL


 


Hgb   (11.4-16.0)  gm/dL


 


Hct   (34.0-46.0)  %


 


Potassium   (3.5-5.1)  mmol/L


 


POC Glucose (mg/dL)  158 H  (75-99)  mg/dL


 


Iron   ()  ug/dL


 


TIBC   (228-460)  ug/dL


 


AST   (14-36)  U/L


 


Total Protein   (6.3-8.2)  g/dL


 


Albumin   (3.5-5.0)  g/dL








                      Microbiology - Last 24 Hours (Table)











 01/07/20 11:30 Wound Culture - Preliminary





 Foot - Right 














Assessment and Plan


Assessment: 


Severe sepsis





Osteomyelitis of the right foot and metatarsal bones versus arthritis of the 

right foot





Diabetic ketoacidosis





Severe type 1 diabetes with complications





Poorly controlled diabetes mellitus





Mild hyperkalemia





Peripheral vascular disease





Plan: 





PICC line due to unreliable IV axis





IV fluids will gently rehydrate





Broad-spectrum antibiotics with IV vancomycin





Supportive care





Monitor electrolytes and potassium





Monitor renal functions








Time with Patient: Greater than 30

## 2020-01-07 NOTE — PN
PROGRESS NOTE



DATE OF DICTATION:

01/07/2020



This patient is a 59-year-old pleasant white female admitted to the hospital with acute

DKA four days ago. Since being in the hospital she has been having nausea, vomiting and

abdominal pain.  She was started on antiemetics with Zofran and Tigan suppository as

well as Compazine and she is feeling much better today.  Her diet was advanced to a

regular diet; tolerating well.  No new complaints.



PHYSICAL EXAMINATION:

She appears comfortable. No apparent distress.

Vital signs are stable. Blood pressure is 133/86, pulse rate 84 per minute and

afebrile.

HEENT examination unremarkable. Conjunctivae pink. Sclerae anicteric. Oral cavity no

lesions.

NECK: No JVD or lymph node enlargement.

CHEST: Clear to auscultation.

HEART:  Regular rate and rhythm.

ABDOMEN:  Soft.  Bowel sounds are positive.  No organomegaly.

EXTREMITIES: No pedal edema. Diabetic ulcer on the left foot, which is bandaged.

SKIN: No rashes.

NEUROLOGIC:  Alert and oriented x3.  No focal deficits.



LABS:

No labs available from today.



IMPRESSION:

1. Acute diabetic ketoacidosis, resolved.

2. Nausea and vomiting have significantly improved.  Presently on a regular diet,

    tolerating well.  Remains on antiemetics as needed and IV famotidine.

3. Diabetic foot infection.  Remains on IV vancomycin.



RECOMMENDATIONS:

1. Continue with Pepcid 20 mg twice daily.

2. Advance diet as tolerated.

3. Small frequent meals.

4. We will sign off at this time.  Please call us if needed.

Thank you for this consultation.





MMODL / IJN: [id number] / Job#: [id number]

## 2020-01-08 LAB
ALBUMIN SERPL-MCNC: 2.7 G/DL (ref 3.5–5)
ALP SERPL-CCNC: 67 U/L (ref 38–126)
ALT SERPL-CCNC: 27 U/L (ref 4–34)
ANION GAP SERPL CALC-SCNC: 4 MMOL/L
AST SERPL-CCNC: 27 U/L (ref 14–36)
BASOPHILS # BLD AUTO: 0 K/UL (ref 0–0.2)
BASOPHILS NFR BLD AUTO: 0 %
BUN SERPL-SCNC: 11 MG/DL (ref 7–17)
CALCIUM SPEC-MCNC: 8.7 MG/DL (ref 8.4–10.2)
CHLORIDE SERPL-SCNC: 109 MMOL/L (ref 98–107)
CO2 SERPL-SCNC: 27 MMOL/L (ref 22–30)
EOSINOPHIL # BLD AUTO: 0.1 K/UL (ref 0–0.7)
EOSINOPHIL NFR BLD AUTO: 1 %
ERYTHROCYTE [DISTWIDTH] IN BLOOD BY AUTOMATED COUNT: 3.35 M/UL (ref 3.8–5.4)
ERYTHROCYTE [DISTWIDTH] IN BLOOD: 14.6 % (ref 11.5–15.5)
GLUCOSE BLD-MCNC: 123 MG/DL (ref 75–99)
GLUCOSE BLD-MCNC: 133 MG/DL (ref 75–99)
GLUCOSE BLD-MCNC: 193 MG/DL (ref 75–99)
GLUCOSE BLD-MCNC: 317 MG/DL (ref 75–99)
GLUCOSE BLD-MCNC: 339 MG/DL (ref 75–99)
GLUCOSE BLD-MCNC: 359 MG/DL (ref 75–99)
GLUCOSE BLD-MCNC: 373 MG/DL (ref 75–99)
GLUCOSE BLD-MCNC: 93 MG/DL (ref 75–99)
GLUCOSE SERPL-MCNC: 114 MG/DL (ref 74–99)
HCT VFR BLD AUTO: 31.2 % (ref 34–46)
HGB BLD-MCNC: 9.8 GM/DL (ref 11.4–16)
LYMPHOCYTES # SPEC AUTO: 1.6 K/UL (ref 1–4.8)
LYMPHOCYTES NFR SPEC AUTO: 19 %
MAGNESIUM SPEC-SCNC: 1.9 MG/DL (ref 1.6–2.3)
MCH RBC QN AUTO: 29.1 PG (ref 25–35)
MCHC RBC AUTO-ENTMCNC: 31.3 G/DL (ref 31–37)
MCV RBC AUTO: 93.1 FL (ref 80–100)
MONOCYTES # BLD AUTO: 0.4 K/UL (ref 0–1)
MONOCYTES NFR BLD AUTO: 5 %
NEUTROPHILS # BLD AUTO: 5.9 K/UL (ref 1.3–7.7)
NEUTROPHILS NFR BLD AUTO: 73 %
PLATELET # BLD AUTO: 191 K/UL (ref 150–450)
POTASSIUM SERPL-SCNC: 3.7 MMOL/L (ref 3.5–5.1)
PROT SERPL-MCNC: 5.6 G/DL (ref 6.3–8.2)
SODIUM SERPL-SCNC: 140 MMOL/L (ref 137–145)
WBC # BLD AUTO: 8.1 K/UL (ref 3.8–10.6)

## 2020-01-08 RX ADMIN — SODIUM CHLORIDE SCH MLS/HR: 9 INJECTION, SOLUTION INTRAVENOUS at 17:07

## 2020-01-08 RX ADMIN — INSULIN ASPART SCH: 100 INJECTION, SOLUTION INTRAVENOUS; SUBCUTANEOUS at 09:16

## 2020-01-08 RX ADMIN — INSULIN ASPART SCH UNIT: 100 INJECTION, SOLUTION INTRAVENOUS; SUBCUTANEOUS at 20:44

## 2020-01-08 RX ADMIN — SODIUM CHLORIDE SCH MLS/HR: 9 INJECTION, SOLUTION INTRAVENOUS at 05:22

## 2020-01-08 RX ADMIN — POTASSIUM CHLORIDE SCH: 14.9 INJECTION, SOLUTION INTRAVENOUS at 18:45

## 2020-01-08 RX ADMIN — INSULIN ASPART SCH: 100 INJECTION, SOLUTION INTRAVENOUS; SUBCUTANEOUS at 20:13

## 2020-01-08 RX ADMIN — INSULIN ASPART SCH UNIT: 100 INJECTION, SOLUTION INTRAVENOUS; SUBCUTANEOUS at 13:38

## 2020-01-08 RX ADMIN — INSULIN ASPART SCH: 100 INJECTION, SOLUTION INTRAVENOUS; SUBCUTANEOUS at 20:46

## 2020-01-08 RX ADMIN — FAMOTIDINE SCH MG: 20 TABLET, FILM COATED ORAL at 09:16

## 2020-01-08 RX ADMIN — ONDANSETRON SCH MG: 2 INJECTION INTRAMUSCULAR; INTRAVENOUS at 05:23

## 2020-01-08 RX ADMIN — ONDANSETRON SCH MG: 2 INJECTION INTRAMUSCULAR; INTRAVENOUS at 13:37

## 2020-01-08 RX ADMIN — HEPARIN SODIUM SCH UNIT: 5000 INJECTION, SOLUTION INTRAVENOUS; SUBCUTANEOUS at 09:16

## 2020-01-08 RX ADMIN — INSULIN ASPART SCH UNIT: 100 INJECTION, SOLUTION INTRAVENOUS; SUBCUTANEOUS at 17:07

## 2020-01-08 RX ADMIN — ONDANSETRON SCH MG: 2 INJECTION INTRAMUSCULAR; INTRAVENOUS at 17:08

## 2020-01-08 RX ADMIN — INSULIN DETEMIR SCH UNIT: 100 INJECTION, SOLUTION SUBCUTANEOUS at 21:44

## 2020-01-08 RX ADMIN — INSULIN DETEMIR SCH: 100 INJECTION, SOLUTION SUBCUTANEOUS at 20:21

## 2020-01-08 RX ADMIN — FAMOTIDINE SCH MG: 20 TABLET, FILM COATED ORAL at 20:33

## 2020-01-08 RX ADMIN — POTASSIUM CHLORIDE SCH MLS/HR: 14.9 INJECTION, SOLUTION INTRAVENOUS at 20:34

## 2020-01-08 RX ADMIN — HEPARIN SODIUM SCH UNIT: 5000 INJECTION, SOLUTION INTRAVENOUS; SUBCUTANEOUS at 20:33

## 2020-01-08 NOTE — P.PN
Subjective


Progress Note Date: 01/08/20


Principal diagnosis: 


Severe sepsis





Osteomyelitis of the right fifth metatarsal bone





Diabetic ketoacidosis





Severe type 1 diabetes with complications





Mild hyperkalemia





Peripheral vascular disease








01/08/2020, patient seen eval examined during the rounds labs reviewed medicatio

ns reviewed, PICC line couldn't be done patient is being considered for MediPort





01/07/2020, patient seen eval reexamined during the rounds labs reviewed 

medications reviewed patient has been doing well currently on remains on IV 

antibiotics bone scan is not showing a clear-cut osteomyelitis for now ID 

service is evaluating it, patient is with a PICC line now due to unreliable 

venous axis





01/06/2020, patient seen eval examined during the rounds she has been started on

clear liquid diet denies any chest pain patient remains on broad-spectrum 

antibiotics for osteomyelitis of the right foot, PICC line to be inserted, bone 

scan phosphatase has been completed secondary to be done tonight





01/04/2020, patient seen eval examined during the rounds is still intermittently

nauseous but more awake and oriented now remains on broad-spectrum antibiotics 

midline there wasn't inserted yesterday was lost would however a small that her 

for IV axis has been obtained patient will likely need a PICC line for long-term

antibiotics for right foot osteomyelitis which will be done early next week labs

reviewed medications reviewed continue current plan of care





01/03/2020, patient seen and evaluated examined she is appears more comfortable 

diabetic ketoacidosis had resolved patient is off of insulin drip she cannot 

take much by mouth due to her ongoing intermittent nausea and emesis she is on 

Zofran however, she is complaining of pain in the right foot for which she is 

started on low-dose Toradol, overall appears more composed labs reviewed 

medications reviewed care plan discussed with the staff at length patient is 

appear more composed today








Objective





- Vital Signs


Vital signs: 


                                   Vital Signs











Temp  98.5 F   01/08/20 07:00


 


Pulse  83   01/08/20 07:00


 


Resp  15   01/08/20 07:00


 


BP  152/78   01/08/20 07:00


 


Pulse Ox  95   01/08/20 07:00








                                 Intake & Output











 01/07/20 01/08/20 01/08/20





 18:59 06:59 18:59


 


Intake Total 2430  240


 


Output Total 500 175 


 


Balance 1930 -175 240


 


Weight 57.5 kg  


 


Intake:   


 


  Intake, IV Titration 1600  





  Amount   


 


    Dextrose 5%-0.45% NaCl 1, 1600  





    000 ml @ 50 mls/hr IV .   





    Q20H [facility] Rx#:[id number]   


 


  Oral 830  240


 


Output:   


 


  Urine 250 175 


 


  Post Void Residual 250  


 


Other:   


 


  Voiding Method Indwelling Catheter Bedpan 





  Diaper 


 


  # Voids  2 














- Exam


- Constitutional


General appearance: disheveled, mild distress





- EENT


Eyes: EOMI, PERRLA, poor dentition, normal appearance


ENT: normal oropharynx


Ears: bilateral: normal





- Neck


Neck: normal ROM


Carotids: bilateral: upstroke normal


Thyroid: bilateral: normal size





- Respiratory


Respiratory: bilateral: CTA





- Cardiovascular


Rhythm: regular


Heart sounds: normal: S1, S2





- Gastrointestinal


General gastrointestinal: decreased bowel sounds, distended, soft





- Integumentary


Integumentary: decreased turgor





- Neurologic


Neurologic: CNII-XII intact





- Musculoskeletal


Musculoskeletal: generalized weakness, right sided weakness





- Psychiatric


Psychiatric: A&O x's 3, appropriate affect





Prior surgery of the right foot with osteomyelitis finding as noted above











- Labs


CBC & Chem 7: 


                                 01/08/20 07:34





                                 01/08/20 07:34


Labs: 


                  Abnormal Lab Results - Last 24 Hours (Table)











  01/06/20 01/06/20 01/07/20 Range/Units





  20:09 20:15 10:48 


 


RBC     (3.80-5.40)  m/uL


 


Hgb     (11.4-16.0)  gm/dL


 


Hct     (34.0-46.0)  %


 


Chloride     ()  mmol/L


 


Glucose     (74-99)  mg/dL


 


POC Glucose (mg/dL)  349 H  359 H   (75-99)  mg/dL


 


Iron    47 L  ()  ug/dL


 


TIBC    154 L  (228-460)  ug/dL


 


Total Protein     (6.3-8.2)  g/dL


 


Albumin     (3.5-5.0)  g/dL














  01/07/20 01/07/20 01/07/20 Range/Units





  11:57 16:43 20:29 


 


RBC     (3.80-5.40)  m/uL


 


Hgb     (11.4-16.0)  gm/dL


 


Hct     (34.0-46.0)  %


 


Chloride     ()  mmol/L


 


Glucose     (74-99)  mg/dL


 


POC Glucose (mg/dL)  158 H  348 H  237 H  (75-99)  mg/dL


 


Iron     ()  ug/dL


 


TIBC     (228-460)  ug/dL


 


Total Protein     (6.3-8.2)  g/dL


 


Albumin     (3.5-5.0)  g/dL














  01/08/20 01/08/20 01/08/20 Range/Units





  05:21 06:57 07:34 


 


RBC    3.35 L  (3.80-5.40)  m/uL


 


Hgb    9.8 L  (11.4-16.0)  gm/dL


 


Hct    31.2 L  (34.0-46.0)  %


 


Chloride     ()  mmol/L


 


Glucose     (74-99)  mg/dL


 


POC Glucose (mg/dL)  123 H  133 H   (75-99)  mg/dL


 


Iron     ()  ug/dL


 


TIBC     (228-460)  ug/dL


 


Total Protein     (6.3-8.2)  g/dL


 


Albumin     (3.5-5.0)  g/dL














  01/08/20 Range/Units





  07:34 


 


RBC   (3.80-5.40)  m/uL


 


Hgb   (11.4-16.0)  gm/dL


 


Hct   (34.0-46.0)  %


 


Chloride  109 H  ()  mmol/L


 


Glucose  114 H  (74-99)  mg/dL


 


POC Glucose (mg/dL)   (75-99)  mg/dL


 


Iron   ()  ug/dL


 


TIBC   (228-460)  ug/dL


 


Total Protein  5.6 L  (6.3-8.2)  g/dL


 


Albumin  2.7 L  (3.5-5.0)  g/dL








                      Microbiology - Last 24 Hours (Table)











 01/07/20 11:30 Gram Stain - Preliminary





 Foot - Right Wound Culture - Preliminary














Assessment and Plan


Assessment: 


Severe sepsis





Osteomyelitis of the right foot and metatarsal bones versus arthritis of the 

right foot





Diabetic ketoacidosis





Severe type 1 diabetes with complications





Poorly controlled diabetes mellitus





Mild hyperkalemia





Peripheral vascular disease





Plan: 





PICC line could not be done consider Mediport 





IV fluids will gently rehydrate





Broad-spectrum antibiotics with IV vancomycin





Supportive care





Monitor electrolytes and potassium





Monitor renal functions








Time with Patient: Greater than 30

## 2020-01-08 NOTE — PN
PROGRESS NOTE



DATE OF SERVICE:

01/08/2020



REASON FOR FOLLOWUP:

Right diabetic foot infection with osteomyelitis.



INTERVAL HISTORY:

The patient is currently afebrile. She has been breathing comfortably.  Her nausea and

vomiting have resolved.  She was tolerating a regular diet.  No chest pain.  No

abdominal pain. No diarrhea.



PHYSICAL EXAMINATION:

Blood pressure is 174/82 with a pulse of 89, temperature 98.2. She is 96% on room air.

General description is a middle-aged female lying in bed in no distress.

RESPIRATORY SYSTEM: Unlabored breathing. Clear to auscultation anteriorly.

HEART: S1, S2.  Regular rate and rhythm.

ABDOMEN: Soft. No tenderness.

Right foot is currently dressed up. No obvious drainage on the dressing.



LABS:

Hemoglobin 9._____, white count 8.1.  BUN of 11, creatinine 0.87.



DIAGNOSTIC IMPRESSION AND PLAN:

Patient with right diabetic foot wound with a pressure ulcer on both the medial and

lateral sides with evidence of osteomyelitis on the right lateral foot at the base of

the fifth toe.  The case was discussed in detail with the surgeon, who is recommending

more extensive surgery and possible transmetatarsal amputation.  Okay to discharge the

patient home tomorrow on _____ mg twice a day and he will follow the patient next week.

This has been discussed in detail with the nurse practitioner for the admitting team.

Continue local wound care with Med[facility] and continue with supportive care.





MMGOMEZL / MIKAYLA: [id number] / Job#: [id number]

## 2020-01-08 NOTE — PN
PROGRESS NOTE



DATE OF SERVICE:

01/07/2020.



REASON FOR FOLLOWUP:

Right diabetic foot wound with osteomyelitis.



INTERVAL HISTORY:

The patient is currently afebrile.  The patient has been breathing comfortably.  The

patient denies having any chest pain or shortness of breath or cough.  Vomiting has

improved.  Denies any worsening pain in the right foot area.



PHYSICAL EXAMINATION:

Blood pressure 132/80 with a pulse of 89, temperature 97.8.  She is 100% on room air.

General description is a middle-aged female lying in bed in no distress.  Respiratory

system: Unlabored breathing, clear to auscultation anteriorly.  Heart S1, S2.  Regular

rate and rhythm.  Abdomen soft, no tenderness.  Right foot lateral border wound still

has slough tissue. _____ culture obtained. No foul smelling drainage.



LABS:

Hemoglobin is 10.1, white count 6.9 with a BUN of 17, creatinine 0.84.  X-rays and the

bone scan was reviewed with Radiology.  This is suspicious for osteomyelitis.



DIAGNOSTIC IMPRESSION AND PLAN:

Patient with right diabetic foot wound in this patient who did have a wound both on the

lateral and medial side with evidence of destruction left 5th metatarsal head.

Detailed discussion with vascular surgery for debridement of the wound and deep

cultures and possible consideration for MediPort placement for IV antibiotics as the

patient could not get the PICC line and monitor her clinical course closely.





MMGOMEZL / CHRISTOPHN: [id number] / Job#: [id number]

## 2020-01-08 NOTE — P.PN
Subjective


Progress Note Date: 01/08/20 01/06/2020 patient admitted to the hospital with nausea vomiting and diarrhea 

for 3 days.  She's found have evidence of DKA.  She does have right wound 

infections with possible osteomyelitis of the foot.  She's undergoing a bone 

scan today.  She's followed closely by infectious disease.  She is also being 

seen by GI service regarding possible gastroparesis.  She did have a blood sugar

this morning of 123 is now down to 70.  She did have episodes of hypoglycemia 

and the D5 half-normal saline was added yesterday.  Sodium level has also 

improved from 150-141.  Patient has been dry heaving and is had poor oral 

intake.  No further episodes of diarrhea.  She has any chest pain or shortness 

of breath.





01/07/2020 patient sitting up in bed comfortably.  She was tolerating a clear 

liquid breakfast.  No further episodes of vomiting or dry heaves since yesterday

afternoon.  No bowel movement for a couple days.  Springer catheter will be removed

today.  Discussed case with ID service.  Levaquin will be discontinued. continue

the vancomycin.  ID service will be reviewed feeling bone scan findings with rad

iologist.  Await their further recommendations.  Patient did have another blood 

sugar low at 54 at 2:00 this morning.  Blood sugars are now showing improvement 

patient is now eating.  We'll monitor.








On 01/08/2020 patient is resting comfortably in bed.  No further episodes of 

nausea or vomiting.  She remains on vancomycin for wounds.  No episodes of 

hypoglycemia.  Dr. Snyder to discuss case with vascular surgery for possible 

debridement.  At this time patient denies chest pain or shortness breath.  

Patient denies nausea vomiting or diarrhea.  Patient denies any urinary burning 

or frequency





Objective





- Vital Signs


Vital signs: 


                                   Vital Signs











Temp  98.5 F   01/08/20 07:00


 


Pulse  83   01/08/20 07:00


 


Resp  15   01/08/20 07:00


 


BP  152/78   01/08/20 07:00


 


Pulse Ox  95   01/08/20 07:00








                                 Intake & Output











 01/07/20 01/08/20 01/08/20





 18:59 06:59 18:59


 


Intake Total 2430  240


 


Output Total 500 175 


 


Balance 1930 -175 240


 


Weight 57.5 kg  


 


Intake:   


 


  Intake, IV Titration 1600  





  Amount   


 


    Dextrose 5%-0.45% NaCl 1, 1600  





    000 ml @ 50 mls/hr IV .   





    Q20H [facility] Rx#:[id number]   


 


  Oral 830  240


 


Output:   


 


  Urine 250 175 


 


  Post Void Residual 250  


 


Other:   


 


  Voiding Method Indwelling Catheter Bedpan 





  Diaper 


 


  # Voids  2 














- Exam





Head normocephalic


Neck supple


Lungs clear to auscultation bilaterally no wheezing or crackles


Heart regular rate and rhythm S1-S2, no rub or gallop


Abdomen is soft nontender nondistended positive bowel sounds no 

hepatosplenomegaly


Extremities right foot ulcers noted on the medial and lateral aspect of the 

foot.  Are large and dry


Neuro alert and orientated to 3








- Labs


CBC & Chem 7: 


                                 01/08/20 07:34





                                 01/08/20 07:34


Labs: 


                  Abnormal Lab Results - Last 24 Hours (Table)











  01/06/20 01/06/20 01/07/20 Range/Units





  20:09 20:15 10:48 


 


RBC     (3.80-5.40)  m/uL


 


Hgb     (11.4-16.0)  gm/dL


 


Hct     (34.0-46.0)  %


 


Chloride     ()  mmol/L


 


Glucose     (74-99)  mg/dL


 


POC Glucose (mg/dL)  349 H  359 H   (75-99)  mg/dL


 


Iron    47 L  ()  ug/dL


 


TIBC    154 L  (228-460)  ug/dL


 


Total Protein     (6.3-8.2)  g/dL


 


Albumin     (3.5-5.0)  g/dL














  01/07/20 01/07/20 01/08/20 Range/Units





  16:43 20:29 05:21 


 


RBC     (3.80-5.40)  m/uL


 


Hgb     (11.4-16.0)  gm/dL


 


Hct     (34.0-46.0)  %


 


Chloride     ()  mmol/L


 


Glucose     (74-99)  mg/dL


 


POC Glucose (mg/dL)  348 H  237 H  123 H  (75-99)  mg/dL


 


Iron     ()  ug/dL


 


TIBC     (228-460)  ug/dL


 


Total Protein     (6.3-8.2)  g/dL


 


Albumin     (3.5-5.0)  g/dL














  01/08/20 01/08/20 01/08/20 Range/Units





  06:57 07:34 07:34 


 


RBC   3.35 L   (3.80-5.40)  m/uL


 


Hgb   9.8 L   (11.4-16.0)  gm/dL


 


Hct   31.2 L   (34.0-46.0)  %


 


Chloride    109 H  ()  mmol/L


 


Glucose    114 H  (74-99)  mg/dL


 


POC Glucose (mg/dL)  133 H    (75-99)  mg/dL


 


Iron     ()  ug/dL


 


TIBC     (228-460)  ug/dL


 


Total Protein    5.6 L  (6.3-8.2)  g/dL


 


Albumin    2.7 L  (3.5-5.0)  g/dL














  01/08/20 Range/Units





  11:22 


 


RBC   (3.80-5.40)  m/uL


 


Hgb   (11.4-16.0)  gm/dL


 


Hct   (34.0-46.0)  %


 


Chloride   ()  mmol/L


 


Glucose   (74-99)  mg/dL


 


POC Glucose (mg/dL)  193 H  (75-99)  mg/dL


 


Iron   ()  ug/dL


 


TIBC   (228-460)  ug/dL


 


Total Protein   (6.3-8.2)  g/dL


 


Albumin   (3.5-5.0)  g/dL








                      Microbiology - Last 24 Hours (Table)











 01/07/20 11:30 Gram Stain - Preliminary





 Foot - Right Wound Culture - Preliminary














Assessment and Plan


Assessment: 


1.  Diabetic ketoacidosis present on admission now improved





2.  Gastroparesis with poor oral intake.  patient evaluated by GI service.  At 

this time they're advancing diet.  And only plan for any further endoscopy if 

patient has vomiting.  





3.  Nausea vomiting and diarrhea likely secondary to a gastroenteritis.  Patient

still having episodes of dry heaves.  Continue with the IV Pepcid, Zofran as 

needed.  Symptoms have resolved





4.  Right diabetic foot wound with stage III pressure ulcer on both the medial 

and lateral side of the right foot.  Concerns for possible osteomyelitis of the 

right fifth metatarsal bone on x-ray.  Infectious disease is following.  patient

had bone scan completed.  Awaiting further ID recommendations.  ID is reviewing 

bone scan results with radiologist. Continue with the vancomycin.  Patient was 

seen by vascular surgery no plans for surgical intervention at this time 

continue with antibiotics





5.  History of diabetes type 1, brittle diabetic.  Patient has been having 

episodes of hypoglycemia.  Currently has D5 half-normal saline.  Hypoglycemia 

likely due to poor oral intake.Levemir was decreased to 20 units at bedtime.  

Patient is now starting to eat.  Last blood sugar was 158.  We'll continue to 

monitor.  No further episodes of hypoglycemia





6.  History of peripheral vascular disease





7.  Hyponatremia: Likely secondary to poor oral intake.  Improved with IV 

fluids.  Sodium has decreased from 150 down to 141.  Continue to monitor





8.  Hypokalemia: Patient receiving potassium supplement.  Repeat labs in a.m.





9.  Hypomagnesemia: Magnesium 1.6 will give 1 g of magnesium sulfate








GI prophylaxis Pepcid.  DVT prophylaxis subcu heparin





I performed an examination of the patient and discussed their management with 

the Nurse Practitioner.  I have reviewed the Nurse Practitioner's notes and 

agree with the documented findings and plan of care

## 2020-01-09 VITALS — TEMPERATURE: 98.5 F | HEART RATE: 72 BPM | SYSTOLIC BLOOD PRESSURE: 150 MMHG | DIASTOLIC BLOOD PRESSURE: 90 MMHG

## 2020-01-09 VITALS — RESPIRATION RATE: 15 BRPM

## 2020-01-09 LAB
ALBUMIN SERPL-MCNC: 2.6 G/DL (ref 3.5–5)
ALP SERPL-CCNC: 60 U/L (ref 38–126)
ALT SERPL-CCNC: 21 U/L (ref 4–34)
ANION GAP SERPL CALC-SCNC: 2 MMOL/L
AST SERPL-CCNC: 21 U/L (ref 14–36)
BASOPHILS # BLD AUTO: 0.1 K/UL (ref 0–0.2)
BASOPHILS NFR BLD AUTO: 1 %
BUN SERPL-SCNC: 12 MG/DL (ref 7–17)
CALCIUM SPEC-MCNC: 8.5 MG/DL (ref 8.4–10.2)
CHLORIDE SERPL-SCNC: 109 MMOL/L (ref 98–107)
CO2 SERPL-SCNC: 30 MMOL/L (ref 22–30)
EOSINOPHIL # BLD AUTO: 0.1 K/UL (ref 0–0.7)
EOSINOPHIL NFR BLD AUTO: 3 %
ERYTHROCYTE [DISTWIDTH] IN BLOOD BY AUTOMATED COUNT: 3.18 M/UL (ref 3.8–5.4)
ERYTHROCYTE [DISTWIDTH] IN BLOOD: 14.9 % (ref 11.5–15.5)
GLUCOSE BLD-MCNC: 115 MG/DL (ref 75–99)
GLUCOSE BLD-MCNC: 120 MG/DL (ref 75–99)
GLUCOSE BLD-MCNC: 149 MG/DL (ref 75–99)
GLUCOSE BLD-MCNC: 99 MG/DL (ref 75–99)
GLUCOSE SERPL-MCNC: 88 MG/DL (ref 74–99)
HCT VFR BLD AUTO: 29.2 % (ref 34–46)
HGB BLD-MCNC: 9.4 GM/DL (ref 11.4–16)
LYMPHOCYTES # SPEC AUTO: 1.5 K/UL (ref 1–4.8)
LYMPHOCYTES NFR SPEC AUTO: 26 %
MCH RBC QN AUTO: 29.6 PG (ref 25–35)
MCHC RBC AUTO-ENTMCNC: 32.3 G/DL (ref 31–37)
MCV RBC AUTO: 91.8 FL (ref 80–100)
MONOCYTES # BLD AUTO: 0.4 K/UL (ref 0–1)
MONOCYTES NFR BLD AUTO: 7 %
NEUTROPHILS # BLD AUTO: 3.6 K/UL (ref 1.3–7.7)
NEUTROPHILS NFR BLD AUTO: 63 %
PLATELET # BLD AUTO: 189 K/UL (ref 150–450)
POTASSIUM SERPL-SCNC: 3.5 MMOL/L (ref 3.5–5.1)
PROT SERPL-MCNC: 5.4 G/DL (ref 6.3–8.2)
SODIUM SERPL-SCNC: 141 MMOL/L (ref 137–145)
WBC # BLD AUTO: 5.8 K/UL (ref 3.8–10.6)

## 2020-01-09 RX ADMIN — SODIUM CHLORIDE SCH MLS/HR: 9 INJECTION, SOLUTION INTRAVENOUS at 05:00

## 2020-01-09 RX ADMIN — FAMOTIDINE SCH MG: 20 TABLET, FILM COATED ORAL at 08:07

## 2020-01-09 RX ADMIN — HEPARIN SODIUM SCH UNIT: 5000 INJECTION, SOLUTION INTRAVENOUS; SUBCUTANEOUS at 08:07

## 2020-01-09 RX ADMIN — ONDANSETRON SCH: 2 INJECTION INTRAMUSCULAR; INTRAVENOUS at 00:13

## 2020-01-09 RX ADMIN — ONDANSETRON SCH: 2 INJECTION INTRAMUSCULAR; INTRAVENOUS at 07:58

## 2020-01-09 RX ADMIN — ONDANSETRON SCH: 2 INJECTION INTRAMUSCULAR; INTRAVENOUS at 05:00

## 2020-01-09 RX ADMIN — INSULIN ASPART SCH UNIT: 100 INJECTION, SOLUTION INTRAVENOUS; SUBCUTANEOUS at 12:47

## 2020-01-09 RX ADMIN — INSULIN ASPART SCH: 100 INJECTION, SOLUTION INTRAVENOUS; SUBCUTANEOUS at 07:58

## 2020-01-09 NOTE — P.PN
Subjective


Progress Note Date: 01/09/20


Principal diagnosis: 


Severe sepsis





Osteomyelitis of the right fifth metatarsal bone





Diabetic ketoacidosis





Severe type 1 diabetes with complications





Mild hyperkalemia





Peripheral vascular disease








01/09/2020, patient seen and evaluated examined during the rounds labs reviewed 

medications reviewed care plan discussed, patient is likely to be discharged 

later on today, patient is being followed by vascular infectious disease for 

possible osteomyelitis





01/08/2020, patient seen eval examined during the rounds labs reviewed 

medications reviewed, PICC line couldn't be done patient is being considered for

MediPort





01/07/2020, patient seen eval reexamined during the rounds labs reviewed 

medications reviewed patient has been doing well currently on remains on IV 

antibiotics bone scan is not showing a clear-cut osteomyelitis for now ID 

service is evaluating it, patient is with a PICC line now due to unreliable 

venous axis





01/06/2020, patient seen eval examined during the rounds she has been started on

clear liquid diet denies any chest pain patient remains on broad-spectrum 

antibiotics for osteomyelitis of the right foot, PICC line to be inserted, bone 

scan phosphatase has been completed secondary to be done tonight





01/04/2020, patient seen eval examined during the rounds is still intermittently

nauseous but more awake and oriented now remains on broad-spectrum antibiotics 

midline there wasn't inserted yesterday was lost would however a small that her 

for IV axis has been obtained patient will likely need a PICC line for long-term

antibiotics for right foot osteomyelitis which will be done early next week labs

reviewed medications reviewed continue current plan of care





01/03/2020, patient seen and evaluated examined she is appears more comfortable 

diabetic ketoacidosis had resolved patient is off of insulin drip she cannot 

take much by mouth due to her ongoing intermittent nausea and emesis she is on 

Zofran however, she is complaining of pain in the right foot for which she is 

started on low-dose Toradol, overall appears more composed labs reviewed 

medications reviewed care plan discussed with the staff at length patient is 

appear more composed today








Objective





- Vital Signs


Vital signs: 


                                   Vital Signs











Temp  98.6 F   01/09/20 07:00


 


Pulse  81   01/09/20 07:00


 


Resp  15   01/09/20 07:00


 


BP  146/77   01/09/20 07:00


 


Pulse Ox  98   01/09/20 07:00








                                 Intake & Output











 01/08/20 01/09/20 01/09/20





 18:59 06:59 18:59


 


Intake Total 365 1050 300


 


Balance 365 1050 300


 


Intake:   


 


  Intake, IV Titration  1050 





  Amount   


 


    Dextrose 5%-0.45% NaCl 1,  800 





    000 ml @ 50 mls/hr IV .   





    Q20H [facility] Rx#:[id number]   


 


    Vancomycin 1,000 mg In  250 





    Sodium Chloride 0.9% 250   





    ml @ 125 mls/hr IVPB Q12H   





    [facility] Rx#:[id number]   


 


  Oral 365  300


 


Other:   


 


  Voiding Method  Diaper Diaper


 


  # Voids 2 2 














- Exam


- Constitutional


General appearance: disheveled, mild distress





- EENT


Eyes: EOMI, PERRLA, poor dentition, normal appearance


ENT: normal oropharynx


Ears: bilateral: normal





- Neck


Neck: normal ROM


Carotids: bilateral: upstroke normal


Thyroid: bilateral: normal size





- Respiratory


Respiratory: bilateral: CTA





- Cardiovascular


Rhythm: regular


Heart sounds: normal: S1, S2





- Gastrointestinal


General gastrointestinal: decreased bowel sounds, distended, soft





- Integumentary


Integumentary: decreased turgor





- Neurologic


Neurologic: CNII-XII intact





- Musculoskeletal


Musculoskeletal: generalized weakness, right sided weakness





- Psychiatric


Psychiatric: A&O x's 3, appropriate affect





Prior surgery of the right foot with osteomyelitis finding as noted above











- Labs


CBC & Chem 7: 


                                 01/09/20 07:22





                                 01/09/20 07:22


Labs: 


                  Abnormal Lab Results - Last 24 Hours (Table)











  01/08/20 01/08/20 01/08/20 Range/Units





  16:49 20:10 21:38 


 


RBC     (3.80-5.40)  m/uL


 


Hgb     (11.4-16.0)  gm/dL


 


Hct     (34.0-46.0)  %


 


Chloride     ()  mmol/L


 


POC Glucose (mg/dL)  339 H  373 H  317 H  (75-99)  mg/dL


 


Total Protein     (6.3-8.2)  g/dL


 


Albumin     (3.5-5.0)  g/dL














  01/09/20 01/09/20 01/09/20 Range/Units





  01:39 02:21 07:22 


 


RBC    3.18 L  (3.80-5.40)  m/uL


 


Hgb    9.4 L  (11.4-16.0)  gm/dL


 


Hct    29.2 L  (34.0-46.0)  %


 


Chloride     ()  mmol/L


 


POC Glucose (mg/dL)  115 H  120 H   (75-99)  mg/dL


 


Total Protein     (6.3-8.2)  g/dL


 


Albumin     (3.5-5.0)  g/dL














  01/09/20 01/09/20 Range/Units





  07:22 11:26 


 


RBC    (3.80-5.40)  m/uL


 


Hgb    (11.4-16.0)  gm/dL


 


Hct    (34.0-46.0)  %


 


Chloride  109 H   ()  mmol/L


 


POC Glucose (mg/dL)   149 H  (75-99)  mg/dL


 


Total Protein  5.4 L   (6.3-8.2)  g/dL


 


Albumin  2.6 L   (3.5-5.0)  g/dL








                      Microbiology - Last 24 Hours (Table)











 01/07/20 11:30 Gram Stain - Final





 Foot - Right Wound Culture - Final





    Staphylococcus epidermidis














Assessment and Plan


Assessment: 


Severe sepsis





Osteomyelitis of the right foot and metatarsal bones versus arthritis of the 

right foot





Diabetic ketoacidosis





Severe type 1 diabetes with complications





Poorly controlled diabetes mellitus





Mild hyperkalemia





Peripheral vascular disease





Plan: 





PICC line could not be done consider Mediport 





IV fluids will gently rehydrate





Broad-spectrum antibiotics with IV vancomycin





Supportive care





Monitor electrolytes and potassium





Monitor renal functions








Time with Patient: Greater than 30

## 2020-01-09 NOTE — CDI
Documentation Clarification Form



Date: 01/09/2020 11:57:16 AM

From: Lana Guthrie RN CCDS 

Phone: [phone]

MRN: [id number]

Admit Date: 01/02/2020 01:13:00 PM

Patient Name: Morenita Ramirez

Visit Number: [id number]

Discharge Date:  





ATTENTION: The Clinical Documentation Specialists (CDI) and [facility] Coding Staff 
appreciate your assistance in clarifying documentation. Please respond to the 
clarification below the line at the bottom and electronically sign. The CDI & 
[facility] Coding staff will review the response and follow-up if needed. Please note: 
Queries are made part of the Legal Health Record. If you have any questions, 
please contact the author of this message via ITS.



Dr. Saniya Cabral



Severe Sepsis is documented in Pulmonary consult and in subsequent progress 
notes.  



History/Risk Factors: 59-year-old female presents to the ED via EMS for nausea, 
vomiting, diarrhea and high blood sugars. 

Medical history Diabetic foot wound, DM, Asthma, CAD, Anemia, 

Clinical Indicators:

Vss on admission 136/58 100 98.4 98% ra 

Wbc 8.4; VBG pCO2 18; VBG HC03 7; Glucose 829; Lactic acid 2.4; A1c 9.0; 
Acetone, Qual positive

Treatment: 1/2/20/20 .9ns 2L bolus, Insulin 101mls @ 5.498 mls/hr iv [id number]; 
0.9ns 1000mls @ 200mls/hr iv Q5H jerry, Levaquin ivpb x1; 1/2/2020 thru current 
date Vancomycin ivpb; D5% 1/2ns [id number] 



Definition of Present on Admission (POA):  A diagnosis present at the time the 
order for admission to inpatient status was written.



For each diagnosis, documentation must be clear to determine if the condition 
was present at the time of the patients inpatient admission or developed during
the hospital stay.  



Please clarify Sepsis 



____ Sepsis Ruled Out

____Y = Yes, the condition was present at the time of the order for inpatient 
admission. 

____N = No, the condition was not present at the time of the order for inpatient
admission. 

____W = Clinically undetermined if the condition was present at the time of the 
order for inpatient admission.





(Last Revision: Dec 2018)

___________________________________________________________________________

NO the condition was not present at the time of the order for inpatient 
admission



[facility]

## 2020-01-09 NOTE — P.DS
Providers


Date of admission: 


01/02/20 13:13





Expected date of discharge: 01/09/20


Attending physician: 


Sainya Cabral





Consults: 





                                        





01/02/20 13:13


Consult Physician Stat 


   Consulting Provider: Ross Delgado


   Consult Reason/Comments: icu patient


   Do you want consulting provider notified?: Yes





01/02/20 14:47


Consult Physician Routine 


   Consulting Provider: Mian Snyder


   Consult Reason/Comments: diabetic foot infection


   Do you want consulting provider notified?: Yes





01/02/20 19:15


Consult Physician Routine 


   Consulting Provider: Kieran Tompkins


   Consult Reason/Comments: nausea/vomiting


   Do you want consulting provider notified?: Yes





01/03/20 12:31


Consult Physician Routine 


   Consulting Provider: Gerardo Ferrell


   Consult Reason/Comments: foot wound


   Do you want consulting provider notified?: Yes











Primary care physician: 


Saniya Cabral





[facility] Course: 





Discharge diagnosis





1.  Diabetic ketoacidosis present on admission now improved





2.  Gastroparesis with poor oral intake.  Now resolved.  Patient seen by GI 

service.  No plan for endoscopy during this admission





3.  Nausea vomiting and diarrhea likely secondary to a gastroenteritis. Symptoms

have resolved and tolerating diet





4.  Right diabetic foot wound with stage III pressure ulcer on both the medial 

and lateral side of the right foot with evidence of osteomyelitis on the right 

lateral foot at the base of the fifth toe.  Patient followed by infectious 

disease and vascular surgery.  This surgery is recommending surgical 

intervention outpatient on Monday.  Nursing staff is checking on the exact 

appointment date and time.





5.  History of diabetes type 1, brittle diabetic.  Patient has been having 

episodes of hypoglycemia.  Patient's Levemir was decreased to 22 units during 

this admission.  And she will continue with her Humalog sliding scale.  Sliding 

scale printed out per nursing staff.





6.  History of peripheral vascular disease





7.  Hyponatremia: Likely secondary to poor oral intake.  Improved with IV 

fluids.  Sodium has decreased from 150 down to 141.  Continue to monitor





8.  Hypokalemia: Patient receiving potassium supplement.  Potassium at discharge

3.5.





9.  Hypomagnesemia: Resolved.  Magnesium at discharge 2.1








Hospital course





01/06/2020 patient admitted to the hospital with nausea vomiting and diarrhea 

for 3 days.  She's found have evidence of DKA.  She does have right wound 

infections with possible osteomyelitis of the foot.  She's undergoing a bone 

scan today.  She's followed closely by infectious disease.  She is also being 

seen by GI service regarding possible gastroparesis.  She did have a blood sugar

this morning of 123 is now down to 70.  She did have episodes of hypoglycemia 

and the D5 half-normal saline was added yesterday.  Sodium level has also 

improved from 150-141.  Patient has been dry heaving and is had poor oral 

intake.  No further episodes of diarrhea.  She has any chest pain or shortness 

of breath.





01/07/2020 patient sitting up in bed comfortably.  She was tolerating a clear 

liquid breakfast.  No further episodes of vomiting or dry heaves since yesterday

afternoon.  No bowel movement for a couple days.  Springer catheter will be removed

today.  Discussed case with ID service.  Levaquin will be discontinued. continue

the vancomycin.  ID service will be reviewed feeling bone scan findings with 

radiologist.  Await their further recommendations.  Patient did have another 

blood sugar low at 54 at 2:00 this morning.  Blood sugars are now showing 

improvement patient is now eating.  We'll monitor.








On 01/08/2020 patient is resting comfortably in bed.  No further episodes of 

nausea or vomiting.  She remains on vancomycin for wounds.  No episodes of 

hypoglycemia.  Dr. Snyder to discuss case with vascular surgery for possible 

debridement.  At this time patient denies chest pain or shortness breath.  

Patient denies nausea vomiting or diarrhea.  Patient denies any urinary burning 

or frequency








01/09/2020 patient is medically stable for discharge.  She has been cleared by 

all consulting physicians for discharge.  Patient presented with diabetic 

ketoacidosis on admission with also evidence of a gastroenteritis.  The symptoms

have improved.  Her insulin dose has been adjusted.  The Levemir has been 

decreased from 25-20 units at bedtime.  The NovoLog scheduled has been stopped. 

And she will continue just with a sliding scale coverage.  Nursing staff will 

put out a sliding scale for patient and family.  For patient's right diabetic 

foot wound she will continue with doxycycline 100 mg twice a day for 7 more 

days.  Patient also to follow-up with Dr. Ferrell on Monday for surgical 

intervention on that right foot.  Patient is currently tolerating diet.  

Vomiting and diarrhea resolved.  She was seen by GI service during this 

admission no intervention required.  Patient's hemoglobin at discharge is 9.4.  

She has known iron deficiency anemia no active signs of bleeding.  Iron was low 

at 47.  We'll continue with ferrous sulfate at 325 mg twice a day.  Recommend 

checking CBC and BMP in 3 days.





Patient has appointment with Dr. Ferrell on Monday for surgery on her right 

foot


Follow up with Dr. Cabral in 1 week


Follow-up with Dr. Andre in 1 week


Follow-up with Dr. Snyder at wound care center in one week


[facility] to follow





I performed an examination of the patient and discussed their management with 

the physician Assistant.  I have reviewed the Physician Assistant's notes and 

agree with the documented findings and plan of care


Patient Condition at Discharge: Stable





Plan - Discharge Summary


Discharge Rx Participant: No


New Discharge Prescriptions: 


New


   Ferrous Sulfate [Iron (65 MG Elemental)] 325 mg PO BID #60 tab


   Insulin Detemir (Levemir) [Levemir] 22 unit SQ HS #1 vial


   INSULIN ASPART (NovoLOG) [NovoLOG (formulary)] 0 unit SQ ACHS  vial


   Doxycycline [Vibramycin] 100 mg PO BID 7 Days #14 capsule





Continue


   Albuterol Inhaler [Ventolin Hfa Inhaler] 2 puff INHALATION RT-Q4H PRN


     PRN Reason: Shortness Of Breath


   Famotidine [Pepcid] 20 mg PO DAILY


   Valproic Acid [Depakene] 250 mg PO DAILY


   Ergocalciferol [Vitamin D2 (DRISDOL)] 50,000 unit PO SA


   HYDROcodone/APAP 10-325MG [Norco ] 1 tab PO Q6H PRN


     PRN Reason: Pain


   DULoxetine HCL [Cymbalta] 60 mg PO DAILY


   ALPRAZolam [Xanax] 1 mg PO Q8H PRN


     PRN Reason: Anxiety


   Ondansetron [Zofran] 4 mg PO DAILY PRN


     PRN Reason: Nausea


   QUEtiapine [SEROquel] 100 mg PO HS


   QUEtiapine FUMARATE [SEROquel] 25 mg PO BID


   Atorvastatin [Lipitor] 20 mg PO DAILY


   Vitamin B Complex With Vit C 1 tab PO DAILY





Discontinued


   Aspirin EC [Ecotrin Low Dose] 81 mg PO DAILY


   Ferrous Sulfate [Iron (65 MG Elemental)] 325 mg PO DAILY


   INSULIN LISPRO (humaLOG) [humaLOG] 6 units SQ AC-TID


   Insulin Glargine [Lantus] 25 unit SQ HS


Discharge Medication List





Albuterol Inhaler [Ventolin Hfa Inhaler] 2 puff INHALATION RT-Q4H PRN 02/19/16 

[History]


Famotidine [Pepcid] 20 mg PO DAILY 02/19/16 [History]


Valproic Acid [Depakene] 250 mg PO DAILY 02/19/16 [History]


Ergocalciferol [Vitamin D2 (DRISDOL)] 50,000 unit PO SA 10/06/16 [History]


HYDROcodone/APAP 10-325MG [Norco ] 1 tab PO Q6H PRN 05/06/17 [History]


DULoxetine HCL [Cymbalta] 60 mg PO DAILY 09/19/17 [History]


ALPRAZolam [Xanax] 1 mg PO Q8H PRN 09/22/17 [History]


Ondansetron [Zofran] 4 mg PO DAILY PRN 04/09/18 [History]


Atorvastatin [Lipitor] 20 mg PO DAILY 07/31/19 [History]


QUEtiapine FUMARATE [SEROquel] 25 mg PO BID 07/31/19 [History]


QUEtiapine [SEROquel] 100 mg PO HS 07/31/19 [History]


Vitamin B Complex With Vit C 1 tab PO DAILY 01/02/20 [History]


Doxycycline [Vibramycin] 100 mg PO BID 7 Days #14 capsule 01/09/20 [Rx]


Ferrous Sulfate [Iron (65 MG Elemental)] 325 mg PO BID #60 tab 01/09/20 [Rx]


INSULIN ASPART (NovoLOG) [NovoLOG (formulary)] 0 unit SQ ACHS  vial 01/09/20 

[Rx]


Insulin Detemir (Levemir) [Levemir] 22 unit SQ HS #1 vial 01/09/20 [Rx]








Follow up Appointment(s)/Referral(s): 


[facility], [NON-STAFF] - As Needed


[facility] [NON-STAFF] - 1 Week (With Dr. Ferrell)


Ross Delgado MD [STAFF PHYSICIAN] - 1 Week


Saniya Cabral MD [Primary Care Provider] - 1 Week


Gerardo Ferrell DO [Doctor of Osteopathic Medicine] - 01/13/20


Activity/Diet/Wound Care/Special Instructions: 


Diet: diabetic


Activity: as tolerated





Hold aspirin for surgery on Monday


Discharge Disposition: HOME WITH HOME HEALTH SERVICES

## 2020-01-10 ENCOUNTER — HOSPITAL ENCOUNTER (INPATIENT)
Dept: HOSPITAL 47 - EC | Age: 60
LOS: 11 days | Discharge: HOME HEALTH | DRG: 628 | End: 2020-01-21
Attending: INTERNAL MEDICINE | Admitting: INTERNAL MEDICINE
Payer: COMMERCIAL

## 2020-01-10 VITALS — BODY MASS INDEX: 20.3 KG/M2

## 2020-01-10 DIAGNOSIS — I11.0: ICD-10-CM

## 2020-01-10 DIAGNOSIS — J44.9: ICD-10-CM

## 2020-01-10 DIAGNOSIS — I25.2: ICD-10-CM

## 2020-01-10 DIAGNOSIS — Z86.14: ICD-10-CM

## 2020-01-10 DIAGNOSIS — G81.94: ICD-10-CM

## 2020-01-10 DIAGNOSIS — E10.628: ICD-10-CM

## 2020-01-10 DIAGNOSIS — Z83.3: ICD-10-CM

## 2020-01-10 DIAGNOSIS — I25.10: ICD-10-CM

## 2020-01-10 DIAGNOSIS — Z87.891: ICD-10-CM

## 2020-01-10 DIAGNOSIS — E10.65: ICD-10-CM

## 2020-01-10 DIAGNOSIS — E10.621: ICD-10-CM

## 2020-01-10 DIAGNOSIS — Z90.710: ICD-10-CM

## 2020-01-10 DIAGNOSIS — I70.235: ICD-10-CM

## 2020-01-10 DIAGNOSIS — F41.9: ICD-10-CM

## 2020-01-10 DIAGNOSIS — G93.41: ICD-10-CM

## 2020-01-10 DIAGNOSIS — E78.5: ICD-10-CM

## 2020-01-10 DIAGNOSIS — E03.9: ICD-10-CM

## 2020-01-10 DIAGNOSIS — Z79.899: ICD-10-CM

## 2020-01-10 DIAGNOSIS — E10.69: Primary | ICD-10-CM

## 2020-01-10 DIAGNOSIS — Z79.2: ICD-10-CM

## 2020-01-10 DIAGNOSIS — Z91.19: ICD-10-CM

## 2020-01-10 DIAGNOSIS — Z86.73: ICD-10-CM

## 2020-01-10 DIAGNOSIS — Z99.3: ICD-10-CM

## 2020-01-10 DIAGNOSIS — Z96.1: ICD-10-CM

## 2020-01-10 DIAGNOSIS — Z82.49: ICD-10-CM

## 2020-01-10 DIAGNOSIS — Z91.11: ICD-10-CM

## 2020-01-10 DIAGNOSIS — Z88.1: ICD-10-CM

## 2020-01-10 DIAGNOSIS — Z86.718: ICD-10-CM

## 2020-01-10 DIAGNOSIS — Z79.82: ICD-10-CM

## 2020-01-10 DIAGNOSIS — Z95.5: ICD-10-CM

## 2020-01-10 DIAGNOSIS — F31.9: ICD-10-CM

## 2020-01-10 DIAGNOSIS — E10.51: ICD-10-CM

## 2020-01-10 DIAGNOSIS — I50.9: ICD-10-CM

## 2020-01-10 DIAGNOSIS — M86.171: ICD-10-CM

## 2020-01-10 DIAGNOSIS — E10.319: ICD-10-CM

## 2020-01-10 DIAGNOSIS — Z79.4: ICD-10-CM

## 2020-01-10 DIAGNOSIS — Z82.5: ICD-10-CM

## 2020-01-10 LAB
ALBUMIN SERPL-MCNC: 3.3 G/DL (ref 3.5–5)
ALP SERPL-CCNC: 104 U/L (ref 38–126)
ALT SERPL-CCNC: 39 U/L (ref 4–34)
AMYLASE SERPL-CCNC: <30 U/L (ref 30–110)
ANION GAP SERPL CALC-SCNC: 9 MMOL/L
APTT BLD: 16.4 SEC (ref 22–30)
AST SERPL-CCNC: 66 U/L (ref 14–36)
BASOPHILS # BLD AUTO: 0 K/UL (ref 0–0.2)
BASOPHILS NFR BLD AUTO: 0 %
BUN SERPL-SCNC: 18 MG/DL (ref 7–17)
CALCIUM SPEC-MCNC: 9.2 MG/DL (ref 8.4–10.2)
CHLORIDE SERPL-SCNC: 102 MMOL/L (ref 98–107)
CO2 SERPL-SCNC: 26 MMOL/L (ref 22–30)
EOSINOPHIL # BLD AUTO: 0.1 K/UL (ref 0–0.7)
EOSINOPHIL NFR BLD AUTO: 1 %
ERYTHROCYTE [DISTWIDTH] IN BLOOD BY AUTOMATED COUNT: 3.52 M/UL (ref 3.8–5.4)
ERYTHROCYTE [DISTWIDTH] IN BLOOD: 14.8 % (ref 11.5–15.5)
GLUCOSE BLD-MCNC: 225 MG/DL (ref 75–99)
GLUCOSE BLD-MCNC: 284 MG/DL (ref 75–99)
GLUCOSE BLD-MCNC: 305 MG/DL (ref 75–99)
GLUCOSE BLD-MCNC: 448 MG/DL (ref 75–99)
GLUCOSE BLD-MCNC: 565 MG/DL (ref 75–99)
GLUCOSE BLD-MCNC: >600 MG/DL (ref 75–99)
GLUCOSE SERPL-MCNC: 649 MG/DL (ref 74–99)
HCO3 BLDV-SCNC: 21 MMOL/L (ref 24–28)
HCT VFR BLD AUTO: 33.9 % (ref 34–46)
HGB BLD-MCNC: 10.5 GM/DL (ref 11.4–16)
INR PPP: 0.9 (ref ?–1.2)
LYMPHOCYTES # SPEC AUTO: 1.2 K/UL (ref 1–4.8)
LYMPHOCYTES NFR SPEC AUTO: 14 %
MCH RBC QN AUTO: 29.8 PG (ref 25–35)
MCHC RBC AUTO-ENTMCNC: 31 G/DL (ref 31–37)
MCV RBC AUTO: 96.2 FL (ref 80–100)
MONOCYTES # BLD AUTO: 0.3 K/UL (ref 0–1)
MONOCYTES NFR BLD AUTO: 4 %
NEUTROPHILS # BLD AUTO: 7 K/UL (ref 1.3–7.7)
NEUTROPHILS NFR BLD AUTO: 80 %
PCO2 BLDV: 21 MMHG (ref 37–51)
PH BLDV: 7.61 [PH] (ref 7.31–7.41)
PLATELET # BLD AUTO: 202 K/UL (ref 150–450)
POTASSIUM SERPL-SCNC: 5 MMOL/L (ref 3.5–5.1)
PROT SERPL-MCNC: 6.2 G/DL (ref 6.3–8.2)
PT BLD: 9.6 SEC (ref 9–12)
SODIUM SERPL-SCNC: 137 MMOL/L (ref 137–145)
WBC # BLD AUTO: 8.7 K/UL (ref 3.8–10.6)

## 2020-01-10 PROCEDURE — 96361 HYDRATE IV INFUSION ADD-ON: CPT

## 2020-01-10 PROCEDURE — 85652 RBC SED RATE AUTOMATED: CPT

## 2020-01-10 PROCEDURE — 87075 CULTR BACTERIA EXCEPT BLOOD: CPT

## 2020-01-10 PROCEDURE — 83735 ASSAY OF MAGNESIUM: CPT

## 2020-01-10 PROCEDURE — 83605 ASSAY OF LACTIC ACID: CPT

## 2020-01-10 PROCEDURE — 84520 ASSAY OF UREA NITROGEN: CPT

## 2020-01-10 PROCEDURE — 87070 CULTURE OTHR SPECIMN AEROBIC: CPT

## 2020-01-10 PROCEDURE — 93005 ELECTROCARDIOGRAM TRACING: CPT

## 2020-01-10 PROCEDURE — 84100 ASSAY OF PHOSPHORUS: CPT

## 2020-01-10 PROCEDURE — 87077 CULTURE AEROBIC IDENTIFY: CPT

## 2020-01-10 PROCEDURE — 94640 AIRWAY INHALATION TREATMENT: CPT

## 2020-01-10 PROCEDURE — 82803 BLOOD GASES ANY COMBINATION: CPT

## 2020-01-10 PROCEDURE — 36415 COLL VENOUS BLD VENIPUNCTURE: CPT

## 2020-01-10 PROCEDURE — 85610 PROTHROMBIN TIME: CPT

## 2020-01-10 PROCEDURE — 82150 ASSAY OF AMYLASE: CPT

## 2020-01-10 PROCEDURE — 94760 N-INVAS EAR/PLS OXIMETRY 1: CPT

## 2020-01-10 PROCEDURE — 85025 COMPLETE CBC W/AUTO DIFF WBC: CPT

## 2020-01-10 PROCEDURE — 83690 ASSAY OF LIPASE: CPT

## 2020-01-10 PROCEDURE — 84132 ASSAY OF SERUM POTASSIUM: CPT

## 2020-01-10 PROCEDURE — 87186 SC STD MICRODIL/AGAR DIL: CPT

## 2020-01-10 PROCEDURE — 82565 ASSAY OF CREATININE: CPT

## 2020-01-10 PROCEDURE — 80051 ELECTROLYTE PANEL: CPT

## 2020-01-10 PROCEDURE — 82009 KETONE BODYS QUAL: CPT

## 2020-01-10 PROCEDURE — 99285 EMERGENCY DEPT VISIT HI MDM: CPT

## 2020-01-10 PROCEDURE — 96360 HYDRATION IV INFUSION INIT: CPT

## 2020-01-10 PROCEDURE — 86140 C-REACTIVE PROTEIN: CPT

## 2020-01-10 PROCEDURE — 36573 INSJ PICC RS&I 5 YR+: CPT

## 2020-01-10 PROCEDURE — 85730 THROMBOPLASTIN TIME PARTIAL: CPT

## 2020-01-10 PROCEDURE — 83036 HEMOGLOBIN GLYCOSYLATED A1C: CPT

## 2020-01-10 PROCEDURE — 87205 SMEAR GRAM STAIN: CPT

## 2020-01-10 PROCEDURE — 80053 COMPREHEN METABOLIC PANEL: CPT

## 2020-01-10 PROCEDURE — 82947 ASSAY GLUCOSE BLOOD QUANT: CPT

## 2020-01-10 RX ADMIN — CEFAZOLIN SCH MLS/HR: 330 INJECTION, POWDER, FOR SOLUTION INTRAMUSCULAR; INTRAVENOUS at 23:26

## 2020-01-10 RX ADMIN — INSULIN HUMAN SCH MLS/HR: 100 INJECTION, SOLUTION PARENTERAL at 18:38

## 2020-01-10 RX ADMIN — CEFAZOLIN SCH MLS/HR: 330 INJECTION, POWDER, FOR SOLUTION INTRAMUSCULAR; INTRAVENOUS at 20:14

## 2020-01-10 RX ADMIN — DOXYCYCLINE SCH MG: 100 CAPSULE ORAL at 23:25

## 2020-01-10 RX ADMIN — DEXTROSE MONOHYDRATE, SODIUM CHLORIDE, AND POTASSIUM CHLORIDE SCH: 50; 4.5; 1.49 INJECTION, SOLUTION INTRAVENOUS at 23:20

## 2020-01-10 NOTE — P.PN
Progress Note - Text


Progress Note Date: 01/10/20





The patient was discharged to home 1/9/20 to return as an outpatient for surgery

1/13/20 with Dr. Ferrell.  She was discharged in stable condition, but does 

need surgical debridement with possible transmetatarsal amputation of the right 

foot for treatment of osteomyeltis in conjunction with antibiotics prescribed by

Dr. Snyder from infectious disease.  Physical exam at the time of discharge 

revealed a stable woman with no cardiac or respiratory issues, better control of

her blood sugars, no further nausea or vomitting, with chronic right foot ulcers

being treated with Doxycycline and local wound care with Medihoney dressing 

changes daily.

## 2020-01-10 NOTE — ED
General Adult HPI





- General


Chief complaint: Recheck/Abnormal Lab/Rx


Stated complaint: high blood sugar


Time Seen by Provider: 01/10/20 16:53


Source: EMS


Mode of arrival: EMS


Limitations: physical limitation





- History of Present Illness


Initial comments: 





Patient is 59-year-old female with type 2 diabetes presenting to emergency 

Department with chief complaint of abdominal pain nausea vomiting.  Her daughter

states the patient was discharged yesterday for DKA however blood sugar went 

back up last night.  She reports the patient woke up this morning and was 

getting her medication as advised but it was not able to stay control.  Daughter

states the patient developed nausea and multiple episodes of vomiting.  Patient 

also reports abdominal pain but denies any diarrhea.  They deny any night sweats

or chills.  They state that at home the glucose monitor could not give an actual

number.





- Related Data


                                Home Medications











 Medication  Instructions  Recorded  Confirmed


 


Albuterol Inhaler [Ventolin Hfa 2 puff INHALATION RT-Q4H PRN 02/19/16 01/10/20





Inhaler]   


 


Famotidine [Pepcid] 20 mg PO DAILY 02/19/16 01/10/20


 


Valproic Acid [Depakene] 250 mg PO DAILY 02/19/16 01/10/20


 


Ergocalciferol [Vitamin D2 50,000 unit PO SA 10/06/16 01/10/20





(DRISDOL)]   


 


HYDROcodone/APAP 10-325MG [Norco 1 tab PO Q6H PRN 05/06/17 01/10/20





]   


 


DULoxetine HCL [Cymbalta] 60 mg PO DAILY 09/19/17 01/10/20


 


ALPRAZolam [Xanax] 1 mg PO Q8H 09/22/17 01/10/20


 


Ondansetron [Zofran] 4 mg PO DAILY PRN 04/09/18 01/10/20


 


Atorvastatin [Lipitor] 20 mg PO DAILY 07/31/19 01/10/20


 


QUEtiapine FUMARATE [SEROquel] 25 mg PO BID 07/31/19 01/10/20


 


QUEtiapine [SEROquel] 100 mg PO HS 07/31/19 01/10/20


 


Vitamin B Complex With Vit C 1 tab PO DAILY 01/02/20 01/10/20


 


Aspirin [Adult Low Dose Aspirin EC] 81 mg PO DAILY 01/10/20 01/10/20


 


Ferrous Sulfate [Iron (65  mg PO DAILY 01/10/20 01/10/20





Elemental)]   


 


INSULIN LISPRO (humaLOG) [humaLOG] 6 units SQ AC-TID 01/10/20 01/10/20


 


Insulin Glargine [Lantus] 25 unit SQ HS 01/10/20 01/10/20








                                  Previous Rx's











 Medication  Instructions  Recorded


 


Doxycycline [Vibramycin] 100 mg PO BID 7 Days #14 capsule 20











                                    Allergies











Allergy/AdvReac Type Severity Reaction Status Date / Time


 


Barbiturates Allergy  Rash/Hives Verified 01/10/20 16:27


 


cephalexin monohydrate Allergy  Rash/Hives Verified 01/10/20 16:27





[From Keflex]     


 


morphine Allergy  Rash/Hives Verified 01/10/20 16:27


 


Penicillins Allergy  Rash/Hives Verified 01/10/20 16:27


 


phenobarbital Allergy  Swelling Verified 01/10/20 16:27


 


venom-honey bee Allergy  Swelling Verified 01/10/20 16:27





[bee venom (honey bee)]     


 


amlodipine besylate AdvReac  Vomiting Verified 01/10/20 16:27





[From Norvasc]     














Review of Systems


ROS Statement: 


Those systems with pertinent positive or pertinent negative responses have been 

documented in the HPI.





ROS Other: All systems not noted in ROS Statement are negative.





Past Medical History


Past Medical History: Asthma, Coronary Artery Disease (CAD), Chest Pain / 

Angina, Heart Failure, COPD, CVA/TIA, Diabetes Mellitus, Deep Vein Thrombosis 

(DVT), Eye Disorder, GERD/Reflux, Hyperlipidemia, Hypertension, Myocardial 

Infarction (MI), Neurologic Disorder, Osteoarthritis (OA), Pneumonia, Renal 

Disease


Additional Past Medical History / Comment(s): IDDM (brittle), DKAs, neuropathy 

bilateral hands/feet, retinopathy bilateral eyes, cellulitis R foot, R great toe

and 2nd toe infections/amputations, current wound R foot-being seen in [facility], 

renal failure, anemia, CVAs with L sided paralysis, headaches started after 

CVAs, brain lesions, DVT R axillae, low back pain, varicosities, seizure many 

years ago (), hypothyroid, constipation, bilateral tinnitis occasionally, 

sinus problems.


Last Myocardial Infarction Date:: 


History of Any Multi-Drug Resistant Organisms: MRSA


Date of last positivie culture/infection: 18


MDRO Source:: Right Foot


Past Surgical History: Appendectomy,  Section, Cholecystectomy, Heart 

Catheterization With Stent, Hysterectomy, Orthopedic Surgery


Additional Past Surgical History / Comment(s): PCI with multiple stents, R great

toe and 2nd toe amps, debridements R foot ulcer, L shoulder surgery to remove 

bone, bronchoscopy, EGD, colonoscopy, R arm port since removed, bilateral ca

taract removals/lens implants.


Past Anesthesia/Blood Transfusion Reactions: No Reported Reaction


Additional Past Anesthesia/Blood Transfusion Reaction / Comment(s): HX OF BLOOD 

TRANSFUSION- NO REACTION


Date of Last Stent Placement:: 2013


Past Psychological History: Anxiety, Bipolar, Depression


Smoking Status: Former smoker


Past Alcohol Use History: None Reported


Past Drug Use History: Marijuana





- Past Family History


  ** Father


Family Medical History: Unable to Obtain, Coronary Artery Disease (CAD), 

Diabetes Mellitus





  ** Mother


Family Medical History: COPD





General Exam


Limitations: physical limitation


General appearance: alert, in no apparent distress


Head exam: Present: atraumatic, normocephalic, normal inspection


Eye exam: Present: normal appearance, PERRL, EOMI


Pupils: Present: normal accommodation


ENT exam: Present: normal exam, mucous membranes dry


Neck exam: Present: normal inspection, full ROM


Respiratory exam: Present: normal lung sounds bilaterally


Cardiovascular Exam: Present: regular rate, normal rhythm, normal heart sounds


GI/Abdominal exam: Present: soft, tenderness (Diffuse abdominal tenderness).  

Absent: distended


Extremities exam: Present: normal inspection, full ROM


Back exam: Present: normal inspection, full ROM


Neurological exam: Present: alert, oriented X3


Psychiatric exam: Present: normal affect, normal mood


Skin exam: Present: warm, dry, intact, normal color





Course





                                   Vital Signs











  01/10/20 01/10/20 01/10/20





  16:27 16:40 17:00


 


Temperature 98.7 F  


 


Pulse Rate 104 H 103 H 103 H


 


Respiratory 20 15 24





Rate   


 


Blood Pressure 131/53 108/52 108/52


 


O2 Sat by Pulse 95 100 100





Oximetry   














  01/10/20 01/10/20 01/10/20





  17:30 18:00 18:40


 


Temperature   


 


Pulse Rate 106 H 106 H 109 H


 


Respiratory 23 22 20





Rate   


 


Blood Pressure 129/71 138/77 137/65


 


O2 Sat by Pulse 100 100 94 L





Oximetry   














Medical Decision Making





- Medical Decision Making





patient is a 59-year-old female with history of type 2 diabetes presenting to 

the emergency department with a chief complaint of nausea vomiting abdominal 

pain.  On initial evaluation patient is still nausea and has abdominal pain.  

She appears to be very dry.  Blood glucose levels were 650.  Patient given 1.7 L

of bolus fluids.  Patient was also given 6 mg of regular insulin bolus.  Patient

started on 5.7 units of regular insulin per hour drip.  Patient started on DKA 

protocol.  Acetone negative.  Patient will be admitted for hyperglycemia.  Case 

discussed with Dr. Marroquin.





- Lab Data


Result diagrams: 


                                 01/10/20 16:53





                                 01/10/20 16:53





                                   Lab Results











  01/10/20 01/10/20 01/10/20 Range/Units





  16:51 16:53 16:53 


 


WBC     (3.8-10.6)  k/uL


 


RBC     (3.80-5.40)  m/uL


 


Hgb     (11.4-16.0)  gm/dL


 


Hct     (34.0-46.0)  %


 


MCV     (80.0-100.0)  fL


 


MCH     (25.0-35.0)  pg


 


MCHC     (31.0-37.0)  g/dL


 


RDW     (11.5-15.5)  %


 


Plt Count     (150-450)  k/uL


 


Neutrophils %     %


 


Lymphocytes %     %


 


Monocytes %     %


 


Eosinophils %     %


 


Basophils %     %


 


Neutrophils #     (1.3-7.7)  k/uL


 


Lymphocytes #     (1.0-4.8)  k/uL


 


Monocytes #     (0-1.0)  k/uL


 


Eosinophils #     (0-0.7)  k/uL


 


Basophils #     (0-0.2)  k/uL


 


Hypochromasia     


 


PT     (9.0-12.0)  sec


 


INR     (<1.2)  


 


APTT     (22.0-30.0)  sec


 


VBG pH     (7.31-7.41)  


 


VBG pCO2     (37-51)  mmHg


 


VBG HCO3     (24-28)  mmol/L


 


Sodium   137   (137-145)  mmol/L


 


Potassium   5.0   (3.5-5.1)  mmol/L


 


Chloride   102   ()  mmol/L


 


Carbon Dioxide   26   (22-30)  mmol/L


 


Anion Gap   9   mmol/L


 


BUN   18 H   (7-17)  mg/dL


 


Creatinine   0.90   (0.52-1.04)  mg/dL


 


Est GFR (CKD-EPI)AfAm   82   (>60 ml/min/1.73 sqM)  


 


Est GFR (CKD-EPI)NonAf   71   (>60 ml/min/1.73 sqM)  


 


Glucose   649 H*   (74-99)  mg/dL


 


POC Glucose (mg/dL)  >600 H    (75-99)  mg/dL


 


POC Glu Operater ID      


 


Plasma Lactic Acid Carmelo    1.9  (0.7-2.0)  mmol/L


 


Calcium   9.2   (8.4-10.2)  mg/dL


 


Phosphorus     (2.5-4.5)  mg/dL


 


Total Bilirubin   0.7   (0.2-1.3)  mg/dL


 


AST   66 H   (14-36)  U/L


 


ALT   39 H   (4-34)  U/L


 


Alkaline Phosphatase   104   ()  U/L


 


Total Protein   6.2 L   (6.3-8.2)  g/dL


 


Albumin   3.3 L   (3.5-5.0)  g/dL


 


Amylase   <30 L   ()  U/L


 


Lipase   15 L   ()  U/L


 


Acetone, Qual     (Negative)  














  01/10/20 01/10/20 01/10/20 Range/Units





  16:53 16:53 16:53 


 


WBC  8.7    (3.8-10.6)  k/uL


 


RBC  3.52 L    (3.80-5.40)  m/uL


 


Hgb  10.5 L    (11.4-16.0)  gm/dL


 


Hct  33.9 L    (34.0-46.0)  %


 


MCV  96.2    (80.0-100.0)  fL


 


MCH  29.8    (25.0-35.0)  pg


 


MCHC  31.0    (31.0-37.0)  g/dL


 


RDW  14.8    (11.5-15.5)  %


 


Plt Count  202    (150-450)  k/uL


 


Neutrophils %  80    %


 


Lymphocytes %  14    %


 


Monocytes %  4    %


 


Eosinophils %  1    %


 


Basophils %  0    %


 


Neutrophils #  7.0    (1.3-7.7)  k/uL


 


Lymphocytes #  1.2    (1.0-4.8)  k/uL


 


Monocytes #  0.3    (0-1.0)  k/uL


 


Eosinophils #  0.1    (0-0.7)  k/uL


 


Basophils #  0.0    (0-0.2)  k/uL


 


Hypochromasia  Moderate    


 


PT   9.6   (9.0-12.0)  sec


 


INR   0.9   (<1.2)  


 


APTT   16.4 L   (22.0-30.0)  sec


 


VBG pH     (7.31-7.41)  


 


VBG pCO2     (37-51)  mmHg


 


VBG HCO3     (24-28)  mmol/L


 


Sodium     (137-145)  mmol/L


 


Potassium     (3.5-5.1)  mmol/L


 


Chloride     ()  mmol/L


 


Carbon Dioxide     (22-30)  mmol/L


 


Anion Gap     mmol/L


 


BUN     (7-17)  mg/dL


 


Creatinine     (0.52-1.04)  mg/dL


 


Est GFR (CKD-EPI)AfAm     (>60 ml/min/1.73 sqM)  


 


Est GFR (CKD-EPI)NonAf     (>60 ml/min/1.73 sqM)  


 


Glucose     (74-99)  mg/dL


 


POC Glucose (mg/dL)     (75-99)  mg/dL


 


POC Glu Operater ID     


 


Plasma Lactic Acid Carmelo     (0.7-2.0)  mmol/L


 


Calcium     (8.4-10.2)  mg/dL


 


Phosphorus    3.9  (2.5-4.5)  mg/dL


 


Total Bilirubin     (0.2-1.3)  mg/dL


 


AST     (14-36)  U/L


 


ALT     (4-34)  U/L


 


Alkaline Phosphatase     ()  U/L


 


Total Protein     (6.3-8.2)  g/dL


 


Albumin     (3.5-5.0)  g/dL


 


Amylase     ()  U/L


 


Lipase     ()  U/L


 


Acetone, Qual     (Negative)  














  01/10/20 01/10/20 01/10/20 Range/Units





  16:53 18:35 19:02 


 


WBC     (3.8-10.6)  k/uL


 


RBC     (3.80-5.40)  m/uL


 


Hgb     (11.4-16.0)  gm/dL


 


Hct     (34.0-46.0)  %


 


MCV     (80.0-100.0)  fL


 


MCH     (25.0-35.0)  pg


 


MCHC     (31.0-37.0)  g/dL


 


RDW     (11.5-15.5)  %


 


Plt Count     (150-450)  k/uL


 


Neutrophils %     %


 


Lymphocytes %     %


 


Monocytes %     %


 


Eosinophils %     %


 


Basophils %     %


 


Neutrophils #     (1.3-7.7)  k/uL


 


Lymphocytes #     (1.0-4.8)  k/uL


 


Monocytes #     (0-1.0)  k/uL


 


Eosinophils #     (0-0.7)  k/uL


 


Basophils #     (0-0.2)  k/uL


 


Hypochromasia     


 


PT     (9.0-12.0)  sec


 


INR     (<1.2)  


 


APTT     (22.0-30.0)  sec


 


VBG pH   7.61 H*   (7.31-7.41)  


 


VBG pCO2   21 L   (37-51)  mmHg


 


VBG HCO3   21 L   (24-28)  mmol/L


 


Sodium     (137-145)  mmol/L


 


Potassium     (3.5-5.1)  mmol/L


 


Chloride     ()  mmol/L


 


Carbon Dioxide     (22-30)  mmol/L


 


Anion Gap     mmol/L


 


BUN     (7-17)  mg/dL


 


Creatinine     (0.52-1.04)  mg/dL


 


Est GFR (CKD-EPI)AfAm     (>60 ml/min/1.73 sqM)  


 


Est GFR (CKD-EPI)NonAf     (>60 ml/min/1.73 sqM)  


 


Glucose     (74-99)  mg/dL


 


POC Glucose (mg/dL)    565 H  (75-99)  mg/dL


 


POC Glu Operater ID      


 


Plasma Lactic Acid Carmelo     (0.7-2.0)  mmol/L


 


Calcium     (8.4-10.2)  mg/dL


 


Phosphorus     (2.5-4.5)  mg/dL


 


Total Bilirubin     (0.2-1.3)  mg/dL


 


AST     (14-36)  U/L


 


ALT     (4-34)  U/L


 


Alkaline Phosphatase     ()  U/L


 


Total Protein     (6.3-8.2)  g/dL


 


Albumin     (3.5-5.0)  g/dL


 


Amylase     ()  U/L


 


Lipase     ()  U/L


 


Acetone, Qual  Negative    (Negative)  














  01/10/20 Range/Units





  20:10 


 


WBC   (3.8-10.6)  k/uL


 


RBC   (3.80-5.40)  m/uL


 


Hgb   (11.4-16.0)  gm/dL


 


Hct   (34.0-46.0)  %


 


MCV   (80.0-100.0)  fL


 


MCH   (25.0-35.0)  pg


 


MCHC   (31.0-37.0)  g/dL


 


RDW   (11.5-15.5)  %


 


Plt Count   (150-450)  k/uL


 


Neutrophils %   %


 


Lymphocytes %   %


 


Monocytes %   %


 


Eosinophils %   %


 


Basophils %   %


 


Neutrophils #   (1.3-7.7)  k/uL


 


Lymphocytes #   (1.0-4.8)  k/uL


 


Monocytes #   (0-1.0)  k/uL


 


Eosinophils #   (0-0.7)  k/uL


 


Basophils #   (0-0.2)  k/uL


 


Hypochromasia   


 


PT   (9.0-12.0)  sec


 


INR   (<1.2)  


 


APTT   (22.0-30.0)  sec


 


VBG pH   (7.31-7.41)  


 


VBG pCO2   (37-51)  mmHg


 


VBG HCO3   (24-28)  mmol/L


 


Sodium   (137-145)  mmol/L


 


Potassium   (3.5-5.1)  mmol/L


 


Chloride   ()  mmol/L


 


Carbon Dioxide   (22-30)  mmol/L


 


Anion Gap   mmol/L


 


BUN   (7-17)  mg/dL


 


Creatinine   (0.52-1.04)  mg/dL


 


Est GFR (CKD-EPI)AfAm   (>60 ml/min/1.73 sqM)  


 


Est GFR (CKD-EPI)NonAf   (>60 ml/min/1.73 sqM)  


 


Glucose   (74-99)  mg/dL


 


POC Glucose (mg/dL)  448 H  (75-99)  mg/dL


 


POC Glu Operater ID  Quoc Coto  


 


Plasma Lactic Acid Camrelo   (0.7-2.0)  mmol/L


 


Calcium   (8.4-10.2)  mg/dL


 


Phosphorus   (2.5-4.5)  mg/dL


 


Total Bilirubin   (0.2-1.3)  mg/dL


 


AST   (14-36)  U/L


 


ALT   (4-34)  U/L


 


Alkaline Phosphatase   ()  U/L


 


Total Protein   (6.3-8.2)  g/dL


 


Albumin   (3.5-5.0)  g/dL


 


Amylase   ()  U/L


 


Lipase   ()  U/L


 


Acetone, Qual   (Negative)  














Disposition


Clinical Impression: 


 Hyperglycemia





Disposition: ADMITTED AS IP TO THIS [facility]


Condition: Fair


Instructions (If sedation given, give patient instructions):  Diabetic 

Ketoacidosis (DC)


Additional Instructions: 


Patient will be admitted


Is patient prescribed a controlled substance at d/c from ED?: No


Referrals: 


Saniya Cabral MD [Primary Care Provider] - 1-2 days


Time of Disposition: 20:26

## 2020-01-10 NOTE — PN
PROGRESS NOTE



DATE OF SERVICE:

01/09/2020



REASON FOR FOLLOWUP:

Right diabetic foot wound with osteomyelitis.



INTERVAL HISTORY:

The patient is currently afebrile.  The patient has been breathing comfortably.  The

patient denies having any chest pain or shortness of breath or cough. No nausea,

vomiting.  No abdominal pain.  No diarrhea.



PHYSICAL EXAMINATION:

Blood pressure is 150/90 with a pulse of 72, temperature 98.5.  She is 98% on room air.

General description is a middle-aged female, up in the bed in no distress.

RESPIRATORY SYSTEM:  Unlabored breathing.  Clear to auscultation anteriorly.

HEART:  S1, S2. Regular rate and rhythm.

ABDOMEN:  Soft.  No tenderness.

Right foot is currently dressed up.  No obvious drainage on the dressing.



LABS:

Hemoglobin 9.4, white count of 5.8, BUN 12, creatinine of 0.89.  Wound culture with

Staph epi.



DIAGNOSTIC IMPRESSION AND PLAN:

Patient with right diabetic foot wound with concern for underlying osteomyelitis.

Surgery is recommending possible transmetatarsal amputation, has been scheduled for

Monday.  Patient will be able to go home on oral doxycycline in a patient currently not

toxic or septic.  _____ has been discussed in detail with the nurse practitioner for

admitting team.





MMODL / IJN: [id number] / Job#: [id number]

## 2020-01-11 LAB
ANION GAP SERPL CALC-SCNC: 5 MMOL/L
BUN SERPL-SCNC: 19 MG/DL (ref 7–17)
CHLORIDE SERPL-SCNC: 115 MMOL/L (ref 98–107)
CO2 SERPL-SCNC: 22 MMOL/L (ref 22–30)
GLUCOSE BLD-MCNC: 111 MG/DL (ref 75–99)
GLUCOSE BLD-MCNC: 124 MG/DL (ref 75–99)
GLUCOSE BLD-MCNC: 130 MG/DL (ref 75–99)
GLUCOSE BLD-MCNC: 146 MG/DL (ref 75–99)
GLUCOSE BLD-MCNC: 152 MG/DL (ref 75–99)
GLUCOSE BLD-MCNC: 197 MG/DL (ref 75–99)
GLUCOSE BLD-MCNC: 303 MG/DL (ref 75–99)
GLUCOSE BLD-MCNC: 327 MG/DL (ref 75–99)
GLUCOSE BLD-MCNC: 93 MG/DL (ref 75–99)
GLUCOSE BLD-MCNC: 98 MG/DL (ref 75–99)
GLUCOSE SERPL-MCNC: 128 MG/DL (ref 74–99)
POTASSIUM SERPL-SCNC: 3.9 MMOL/L (ref 3.5–5.1)
SODIUM SERPL-SCNC: 142 MMOL/L (ref 137–145)

## 2020-01-11 RX ADMIN — INSULIN ASPART SCH UNIT: 100 INJECTION, SOLUTION INTRAVENOUS; SUBCUTANEOUS at 21:07

## 2020-01-11 RX ADMIN — INSULIN HUMAN SCH: 100 INJECTION, SOLUTION PARENTERAL at 14:23

## 2020-01-11 RX ADMIN — DEXTROSE MONOHYDRATE, SODIUM CHLORIDE, AND POTASSIUM CHLORIDE SCH MLS/HR: 50; 4.5; 1.49 INJECTION, SOLUTION INTRAVENOUS at 12:40

## 2020-01-11 RX ADMIN — CEFAZOLIN SCH: 330 INJECTION, POWDER, FOR SOLUTION INTRAMUSCULAR; INTRAVENOUS at 05:20

## 2020-01-11 RX ADMIN — Medication SCH MG: at 15:21

## 2020-01-11 RX ADMIN — DULOXETINE SCH MG: 60 CAPSULE, DELAYED RELEASE ORAL at 15:21

## 2020-01-11 RX ADMIN — DEXTROSE MONOHYDRATE, SODIUM CHLORIDE, AND POTASSIUM CHLORIDE SCH: 50; 4.5; 1.49 INJECTION, SOLUTION INTRAVENOUS at 05:20

## 2020-01-11 RX ADMIN — DOXYCYCLINE SCH MG: 100 CAPSULE ORAL at 07:34

## 2020-01-11 RX ADMIN — ERGOCALCIFEROL SCH UNIT: 1.25 CAPSULE ORAL at 15:21

## 2020-01-11 RX ADMIN — CEFAZOLIN SCH: 330 INJECTION, POWDER, FOR SOLUTION INTRAMUSCULAR; INTRAVENOUS at 15:22

## 2020-01-11 RX ADMIN — DOXYCYCLINE SCH MG: 100 CAPSULE ORAL at 21:08

## 2020-01-11 RX ADMIN — CEFAZOLIN SCH MLS/HR: 330 INJECTION, POWDER, FOR SOLUTION INTRAMUSCULAR; INTRAVENOUS at 21:08

## 2020-01-11 RX ADMIN — INSULIN ASPART SCH UNIT: 100 INJECTION, SOLUTION INTRAVENOUS; SUBCUTANEOUS at 17:53

## 2020-01-11 RX ADMIN — INSULIN ASPART SCH UNIT: 100 INJECTION, SOLUTION INTRAVENOUS; SUBCUTANEOUS at 17:54

## 2020-01-11 RX ADMIN — CEFAZOLIN SCH: 330 INJECTION, POWDER, FOR SOLUTION INTRAMUSCULAR; INTRAVENOUS at 07:27

## 2020-01-11 NOTE — P.HPIM
History of Present Illness


H&P Date: 20








Morenita Pham is a 59-year-old female with known history of insulin-dependent 

diabetes mellitus type 1 and peripheral vascular disease who was recently 

discharged from [facility] to the nursing home.  Patient was 

therefore short time, she was found to have a glucose level of 665 she was 

having mental status changes she was sent back to [facility] 

emergency room, she was started on IV insulin drip and was admitted to medical 

floor.  Patient has multiple medical problems including history of hypertension,

history of hyperlipidemia, history of asthma, history of severe peripheral 

vascular disease with chronic wounds on the right leg requiring toe amputation, 

history of stroke, history of congestive heart failure, history of coronary 

artery disease with previous history of angioplasty and stent placement, history

of depression with anxiety disorder.





Patient was seen and examined on 2019, she is alert slightly confused in 

no apparent distress, she has been maintained on IV insulin drip and her glucose

level is below 200 this time, there is no fever or chills no headache or 

dizziness no chest pain no shortness of breath no cough no nausea or vomiting no

abdominal pain no diarrhea no burning with urination no frequency or urgency and

no hematuria.





Past Medical History


Past Medical History: Asthma, Coronary Artery Disease (CAD), Chest Pain / 

Angina, Heart Failure, COPD, CVA/TIA, Diabetes Mellitus, Deep Vein Thrombosis 

(DVT), Eye Disorder, GERD/Reflux, Hyperlipidemia, Hypertension, Myocardial 

Infarction (MI), Neurologic Disorder, Osteoarthritis (OA), Pneumonia, Renal 

Disease


Additional Past Medical History / Comment(s): IDDM (brittle), DKAs, neuropathy 

bilateral hands/feet, retinopathy bilateral eyes, cellulitis R foot, R great toe

and 2nd toe infections/amputations, current wound R foot-being seen in [facility], 

renal failure, anemia, CVAs with L sided paralysis, headaches started after 

CVAs, brain lesions, DVT R axillae, low back pain, varicosities, seizure many 

years ago (), hypothyroid, constipation, bilateral tinnitis occasionally, 

sinus problems.


Last Myocardial Infarction Date:: 


History of Any Multi-Drug Resistant Organisms: MRSA


Date of last positivie culture/infection: 18


MDRO Source:: Right Foot


Past Surgical History: Appendectomy,  Section, Cholecystectomy, Heart 

Catheterization With Stent, Hysterectomy, Orthopedic Surgery


Additional Past Surgical History / Comment(s): PCI with multiple stents, R great

toe and 2nd toe amps, debridements R foot ulcer, L shoulder surgery to remove b

one, bronchoscopy, EGD, colonoscopy, R arm port since removed, bilateral 

cataract removals/lens implants.


Past Anesthesia/Blood Transfusion Reactions: No Reported Reaction


Additional Past Anesthesia/Blood Transfusion Reaction / Comment(s): HX OF BLOOD 

TRANSFUSION- NO REACTION


Date of Last Stent Placement:: 2013


Past Psychological History: Anxiety, Bipolar, Depression


Additional Psychological History / Comment(s): Pt has a legal guardian, Noelle Menon, who is pt's sister.  Currently her legal guardian is hospitalized.  Pt 

has a caregiver, Eleanor, who resides with her.  Pt is wheelchair bound d/t CVA 

with L sided paralysis arm and leg.  She has a shower chair and a glucometer.  

Her sister or caregiver drive her to appts.  [employer].


Smoking Status: Former smoker


Past Alcohol Use History: None Reported


Additional Past Alcohol Use History / Comment(s): Pt started smoking in  and

quit in .   Using marijuana edibles occasionally but none for a "long time"


Past Drug Use History: Marijuana


Additional Drug Use History / Comment(s): using marijuana edibles





- Past Family History


  ** Father


Family Medical History: Unable to Obtain, Coronary Artery Disease (CAD), 

Diabetes Mellitus





  ** Mother


Family Medical History: COPD





Medications and Allergies


                                Home Medications











 Medication  Instructions  Recorded  Confirmed  Type


 


Albuterol Inhaler [Ventolin Hfa 2 puff INHALATION RT-Q4H PRN 02/19/16 01/10/20 

History





Inhaler]    


 


Famotidine [Pepcid] 20 mg PO DAILY 02/19/16 01/10/20 History


 


Valproic Acid [Depakene] 250 mg PO DAILY 02/19/16 01/10/20 History


 


Ergocalciferol [Vitamin D2 50,000 unit PO SA 10/06/16 01/10/20 History





(DRISDOL)]    


 


HYDROcodone/APAP 10-325MG [Norco 1 tab PO Q6H PRN 05/06/17 01/10/20 History





]    


 


DULoxetine HCL [Cymbalta] 60 mg PO DAILY 09/19/17 01/10/20 History


 


ALPRAZolam [Xanax] 1 mg PO Q8H 09/22/17 01/10/20 History


 


Ondansetron [Zofran] 4 mg PO DAILY PRN 04/09/18 01/10/20 History


 


Atorvastatin [Lipitor] 20 mg PO DAILY 07/31/19 01/10/20 History


 


QUEtiapine FUMARATE [SEROquel] 25 mg PO BID 07/31/19 01/10/20 History


 


QUEtiapine [SEROquel] 100 mg PO HS 07/31/19 01/10/20 History


 


Vitamin B Complex With Vit C 1 tab PO DAILY 01/02/20 01/10/20 History


 


Doxycycline [Vibramycin] 100 mg PO BID 7 Days #14 capsule 01/09/20 01/10/20 Rx


 


Aspirin [Adult Low Dose Aspirin EC] 81 mg PO DAILY 01/10/20 01/10/20 History


 


Ferrous Sulfate [Iron (65  mg PO DAILY 01/10/20 01/10/20 History





Elemental)]    


 


INSULIN LISPRO (humaLOG) [humaLOG] 6 units SQ AC-TID 01/10/20 01/10/20 History


 


Insulin Glargine [Lantus] 25 unit SQ HS 01/10/20 01/10/20 History








                                    Allergies











Allergy/AdvReac Type Severity Reaction Status Date / Time


 


Barbiturates Allergy  Rash/Hives Verified 01/10/20 20:52


 


cephalexin monohydrate Allergy  Rash/Hives Verified 01/10/20 20:52





[From Keflex]     


 


morphine Allergy  Rash/Hives Verified 01/10/20 20:52


 


Penicillins Allergy  Rash/Hives Verified 01/10/20 20:52


 


phenobarbital Allergy  Swelling Verified 01/10/20 20:52


 


venom-honey bee Allergy  Swelling Verified 01/10/20 20:52





[bee venom (honey bee)]     


 


amlodipine besylate AdvReac  Vomiting Verified 01/10/20 20:52





[From Norvasc]     














Physical Exam


Vitals: 


                                   Vital Signs











  Temp Pulse Pulse Resp BP BP Pulse Ox


 


 20 07:32  98 F   87  12   134/63  97


 


 20 01:51  98.1 F   101 H  13   160/59  98


 


 01/10/20 20:30   98   17  127/72  


 


 01/10/20 20:00   99   14  132/71  


 


 01/10/20 19:30   101 H   14  140/67  


 


 01/10/20 19:00   108 H   12  137/65  


 


 01/10/20 18:40   109 H   20  137/65   94 L


 


 01/10/20 18:30   105 H   28 H  134/68  


 


 01/10/20 18:00   106 H   22  138/77   100


 


 01/10/20 17:30   106 H   23  129/71   100


 


 01/10/20 17:00   103 H   24  108/52   100


 


 01/10/20 16:40   103 H   15  108/52   100


 


 01/10/20 16:27  98.7 F  104 H   20  131/53   95








                                Intake and Output











 01/10/20 01/11/20 01/11/20





 22:59 06:59 14:59


 


Intake Total 214.102 549.475 808.367


 


Balance 214.102 549.475 808.367


 


Intake:   


 


  Intake, IV Titration 34.102 9.475 808.367





  Amount   


 


    D5-0.45% NaCl with KCl   800





    20Meq/l 1,000 ml @ 150   





    mls/hr IV .Q6H40M [facility] Rx#   





    :[id number]   


 


    Insulin Regular 100 unit 34.102 9.475 8.367





    In Sodium Chloride 0.9%   





    100 ml @ 0.1 UNITS/KG/HR   





    5.772 mls/hr IV .[id number]   





    [facility] Rx#:[id number]   


 


  Oral 180 540 


 


Other:   


 


  Voiding Method Diaper  Diaper


 


  # Voids   2


 


  # Bowel Movements   1


 


  Weight 57.153 kg  














In general patient is alert slightly confused in no apparent distress


HEENT head normocephalic and atraumatic


Neck is supple no JVD no goiter no lymphadenopathy


Chest exam reveals a few scattered rhonchi no wheezing


Cardiac exam reveals regular heart sounds no gallops no murmurs


Abdomen is soft nontender no organomegaly with normal bowel sounds


Extremity exam reveals no edema no cyanosis or clubbing, right foot with 

previous second toe amputation and open wounds.


Neurological examination reveals weakness related to previous stroke without any

acute neurological changes





Results


CBC & Chem 7: 


                                 01/10/20 16:53





                                 20 00:17


Labs: 


                  Abnormal Lab Results - Last 24 Hours (Table)











  01/10/20 01/10/20 01/10/20 Range/Units





  16:51 16:53 16:53 


 


RBC    3.52 L  (3.80-5.40)  m/uL


 


Hgb    10.5 L  (11.4-16.0)  gm/dL


 


Hct    33.9 L  (34.0-46.0)  %


 


APTT     (22.0-30.0)  sec


 


VBG pH     (7.31-7.41)  


 


VBG pCO2     (37-51)  mmHg


 


VBG HCO3     (24-28)  mmol/L


 


Chloride     ()  mmol/L


 


BUN   18 H   (7-17)  mg/dL


 


Glucose   649 H*   (74-99)  mg/dL


 


POC Glucose (mg/dL)  >600 H    (75-99)  mg/dL


 


Phosphorus     (2.5-4.5)  mg/dL


 


AST   66 H   (14-36)  U/L


 


ALT   39 H   (4-34)  U/L


 


Total Protein   6.2 L   (6.3-8.2)  g/dL


 


Albumin   3.3 L   (3.5-5.0)  g/dL


 


Amylase   <30 L   ()  U/L


 


Lipase   15 L   ()  U/L














  01/10/20 01/10/20 01/10/20 Range/Units





  16:53 18:35 19:02 


 


RBC     (3.80-5.40)  m/uL


 


Hgb     (11.4-16.0)  gm/dL


 


Hct     (34.0-46.0)  %


 


APTT  16.4 L    (22.0-30.0)  sec


 


VBG pH   7.61 H*   (7.31-7.41)  


 


VBG pCO2   21 L   (37-51)  mmHg


 


VBG HCO3   21 L   (24-28)  mmol/L


 


Chloride     ()  mmol/L


 


BUN     (7-17)  mg/dL


 


Glucose     (74-99)  mg/dL


 


POC Glucose (mg/dL)    565 H  (75-99)  mg/dL


 


Phosphorus     (2.5-4.5)  mg/dL


 


AST     (14-36)  U/L


 


ALT     (4-34)  U/L


 


Total Protein     (6.3-8.2)  g/dL


 


Albumin     (3.5-5.0)  g/dL


 


Amylase     ()  U/L


 


Lipase     ()  U/L














  01/10/20 01/10/20 01/10/20 Range/Units





  20:10 21:23 21:56 


 


RBC     (3.80-5.40)  m/uL


 


Hgb     (11.4-16.0)  gm/dL


 


Hct     (34.0-46.0)  %


 


APTT     (22.0-30.0)  sec


 


VBG pH     (7.31-7.41)  


 


VBG pCO2     (37-51)  mmHg


 


VBG HCO3     (24-28)  mmol/L


 


Chloride     ()  mmol/L


 


BUN     (7-17)  mg/dL


 


Glucose     (74-99)  mg/dL


 


POC Glucose (mg/dL)  448 H  305 H  284 H  (75-99)  mg/dL


 


Phosphorus     (2.5-4.5)  mg/dL


 


AST     (14-36)  U/L


 


ALT     (4-34)  U/L


 


Total Protein     (6.3-8.2)  g/dL


 


Albumin     (3.5-5.0)  g/dL


 


Amylase     ()  U/L


 


Lipase     ()  U/L














  01/10/20 01/11/20 01/11/20 Range/Units





  22:32 00:17 02:26 


 


RBC     (3.80-5.40)  m/uL


 


Hgb     (11.4-16.0)  gm/dL


 


Hct     (34.0-46.0)  %


 


APTT     (22.0-30.0)  sec


 


VBG pH     (7.31-7.41)  


 


VBG pCO2     (37-51)  mmHg


 


VBG HCO3     (24-28)  mmol/L


 


Chloride   115 H   ()  mmol/L


 


BUN   19 H   (7-17)  mg/dL


 


Glucose   128 H   (74-99)  mg/dL


 


POC Glucose (mg/dL)  225 H   130 H  (75-99)  mg/dL


 


Phosphorus   2.4 L   (2.5-4.5)  mg/dL


 


AST     (14-36)  U/L


 


ALT     (4-34)  U/L


 


Total Protein     (6.3-8.2)  g/dL


 


Albumin     (3.5-5.0)  g/dL


 


Amylase     ()  U/L


 


Lipase     ()  U/L














  20 Range/Units





  03:33 05:28 07:16 


 


RBC     (3.80-5.40)  m/uL


 


Hgb     (11.4-16.0)  gm/dL


 


Hct     (34.0-46.0)  %


 


APTT     (22.0-30.0)  sec


 


VBG pH     (7.31-7.41)  


 


VBG pCO2     (37-51)  mmHg


 


VBG HCO3     (24-28)  mmol/L


 


Chloride     ()  mmol/L


 


BUN     (7-17)  mg/dL


 


Glucose     (74-99)  mg/dL


 


POC Glucose (mg/dL)  152 H  124 H  197 H  (75-99)  mg/dL


 


Phosphorus     (2.5-4.5)  mg/dL


 


AST     (14-36)  U/L


 


ALT     (4-34)  U/L


 


Total Protein     (6.3-8.2)  g/dL


 


Albumin     (3.5-5.0)  g/dL


 


Amylase     ()  U/L


 


Lipase     ()  U/L














  20 Range/Units





  09:36 11:44 


 


RBC    (3.80-5.40)  m/uL


 


Hgb    (11.4-16.0)  gm/dL


 


Hct    (34.0-46.0)  %


 


APTT    (22.0-30.0)  sec


 


VBG pH    (7.31-7.41)  


 


VBG pCO2    (37-51)  mmHg


 


VBG HCO3    (24-28)  mmol/L


 


Chloride    ()  mmol/L


 


BUN    (7-17)  mg/dL


 


Glucose    (74-99)  mg/dL


 


POC Glucose (mg/dL)  146 H  111 H  (75-99)  mg/dL


 


Phosphorus    (2.5-4.5)  mg/dL


 


AST    (14-36)  U/L


 


ALT    (4-34)  U/L


 


Total Protein    (6.3-8.2)  g/dL


 


Albumin    (3.5-5.0)  g/dL


 


Amylase    ()  U/L


 


Lipase    ()  U/L














Assessment and Plan


Plan: 





#1 severe hyperglycemia likely related to noncompliance with diet, patient 

consumes large amount of candies in a binge manner occasionally.  She was 

counseled in length in that regard, she was started on IV insulin drip in the 

emergency room and at this time her glucose is below 200 will discontinue 

insulin drip and resume Levemir 22 units at bedtime and NovoLog sliding scale 

before meals.





#2 right foot wound infection with osteomyelitis, continue doxycycline by mouth.

 Patient will need further surgical intervention on her foot.





#3 underlying history of hypertension





#4 underlying history of hyperlipidemia





#5 underlying history of congestive heart failure





#6 underlying history of coronary artery disease with previous history of 

angioplasty and stent placement





#7 underlying history of depression with anxiety disorder





Plan at this time is to switched to Levemir and NovoLog sliding scale


Continue to  regarding diet


Continue with oral antibiotic doxycycline


Patient was scheduled to be readmitted next week for further surgical 

intervention on her right foot


Will reassess if patient needs to be transferred back to the nursing home or if 

she will stay until her surgery.

## 2020-01-12 LAB
ALBUMIN SERPL-MCNC: 2.6 G/DL (ref 3.5–5)
ALP SERPL-CCNC: 69 U/L (ref 38–126)
ALT SERPL-CCNC: 25 U/L (ref 4–34)
ANION GAP SERPL CALC-SCNC: 5 MMOL/L
AST SERPL-CCNC: 27 U/L (ref 14–36)
BASOPHILS # BLD AUTO: 0 K/UL (ref 0–0.2)
BASOPHILS NFR BLD AUTO: 0 %
BUN SERPL-SCNC: 14 MG/DL (ref 7–17)
CALCIUM SPEC-MCNC: 8.5 MG/DL (ref 8.4–10.2)
CHLORIDE SERPL-SCNC: 112 MMOL/L (ref 98–107)
CO2 SERPL-SCNC: 24 MMOL/L (ref 22–30)
EOSINOPHIL # BLD AUTO: 0 K/UL (ref 0–0.7)
EOSINOPHIL NFR BLD AUTO: 1 %
ERYTHROCYTE [DISTWIDTH] IN BLOOD BY AUTOMATED COUNT: 3 M/UL (ref 3.8–5.4)
ERYTHROCYTE [DISTWIDTH] IN BLOOD: 14.9 % (ref 11.5–15.5)
GLUCOSE BLD-MCNC: 103 MG/DL (ref 75–99)
GLUCOSE BLD-MCNC: 127 MG/DL (ref 75–99)
GLUCOSE BLD-MCNC: 189 MG/DL (ref 75–99)
GLUCOSE BLD-MCNC: 24 MG/DL (ref 75–99)
GLUCOSE BLD-MCNC: 26 MG/DL (ref 75–99)
GLUCOSE BLD-MCNC: 93 MG/DL (ref 75–99)
GLUCOSE SERPL-MCNC: 25 MG/DL (ref 74–99)
HCT VFR BLD AUTO: 28.3 % (ref 34–46)
HGB BLD-MCNC: 9 GM/DL (ref 11.4–16)
LYMPHOCYTES # SPEC AUTO: 2.2 K/UL (ref 1–4.8)
LYMPHOCYTES NFR SPEC AUTO: 37 %
MCH RBC QN AUTO: 29.9 PG (ref 25–35)
MCHC RBC AUTO-ENTMCNC: 31.6 G/DL (ref 31–37)
MCV RBC AUTO: 94.6 FL (ref 80–100)
MONOCYTES # BLD AUTO: 0.3 K/UL (ref 0–1)
MONOCYTES NFR BLD AUTO: 6 %
NEUTROPHILS # BLD AUTO: 3.2 K/UL (ref 1.3–7.7)
NEUTROPHILS NFR BLD AUTO: 54 %
PLATELET # BLD AUTO: 229 K/UL (ref 150–450)
POTASSIUM SERPL-SCNC: 3.3 MMOL/L (ref 3.5–5.1)
PROT SERPL-MCNC: 5.5 G/DL (ref 6.3–8.2)
SODIUM SERPL-SCNC: 141 MMOL/L (ref 137–145)
WBC # BLD AUTO: 5.8 K/UL (ref 3.8–10.6)

## 2020-01-12 RX ADMIN — CEFAZOLIN SCH MLS/HR: 330 INJECTION, POWDER, FOR SOLUTION INTRAMUSCULAR; INTRAVENOUS at 10:18

## 2020-01-12 RX ADMIN — ATORVASTATIN CALCIUM SCH MG: 20 TABLET, FILM COATED ORAL at 08:21

## 2020-01-12 RX ADMIN — POTASSIUM CHLORIDE SCH MEQ: 20 TABLET, EXTENDED RELEASE ORAL at 12:03

## 2020-01-12 RX ADMIN — FAMOTIDINE SCH MG: 20 TABLET, FILM COATED ORAL at 08:22

## 2020-01-12 RX ADMIN — CEFAZOLIN SCH: 330 INJECTION, POWDER, FOR SOLUTION INTRAMUSCULAR; INTRAVENOUS at 03:48

## 2020-01-12 RX ADMIN — INSULIN ASPART SCH: 100 INJECTION, SOLUTION INTRAVENOUS; SUBCUTANEOUS at 08:21

## 2020-01-12 RX ADMIN — POTASSIUM CHLORIDE SCH MEQ: 20 TABLET, EXTENDED RELEASE ORAL at 10:18

## 2020-01-12 RX ADMIN — CEFAZOLIN SCH MLS/HR: 330 INJECTION, POWDER, FOR SOLUTION INTRAMUSCULAR; INTRAVENOUS at 21:37

## 2020-01-12 RX ADMIN — INSULIN ASPART SCH: 100 INJECTION, SOLUTION INTRAVENOUS; SUBCUTANEOUS at 12:49

## 2020-01-12 RX ADMIN — DOXYCYCLINE SCH MG: 100 CAPSULE ORAL at 08:22

## 2020-01-12 RX ADMIN — DOXYCYCLINE SCH MG: 100 CAPSULE ORAL at 21:37

## 2020-01-12 RX ADMIN — INSULIN ASPART SCH UNIT: 100 INJECTION, SOLUTION INTRAVENOUS; SUBCUTANEOUS at 17:33

## 2020-01-12 RX ADMIN — DULOXETINE SCH MG: 60 CAPSULE, DELAYED RELEASE ORAL at 08:22

## 2020-01-12 RX ADMIN — ASPIRIN 81 MG CHEWABLE TABLET SCH MG: 81 TABLET CHEWABLE at 08:22

## 2020-01-12 RX ADMIN — Medication SCH MG: at 08:21

## 2020-01-12 RX ADMIN — CEFAZOLIN SCH: 330 INJECTION, POWDER, FOR SOLUTION INTRAMUSCULAR; INTRAVENOUS at 05:22

## 2020-01-12 RX ADMIN — CEFAZOLIN SCH: 330 INJECTION, POWDER, FOR SOLUTION INTRAMUSCULAR; INTRAVENOUS at 16:18

## 2020-01-12 RX ADMIN — INSULIN ASPART SCH UNIT: 100 INJECTION, SOLUTION INTRAVENOUS; SUBCUTANEOUS at 17:32

## 2020-01-12 RX ADMIN — VALPROIC ACID SCH MG: 250 SOLUTION ORAL at 08:22

## 2020-01-12 RX ADMIN — INSULIN ASPART SCH: 100 INJECTION, SOLUTION INTRAVENOUS; SUBCUTANEOUS at 21:04

## 2020-01-12 NOTE — P.PN
Subjective


Progress Note Date: 01/12/20








Morenita Pham is a 59-year-old female with known history of insulin-dependent 

diabetes mellitus type 1 and peripheral vascular disease who was recently 

discharged from [facility] to the nursing home.  Patient was 

therefore short time, she was found to have a glucose level of 665 she was 

having mental status changes she was sent back to [facility] 

emergency room, she was started on IV insulin drip and was admitted to medical 

floor.  Patient has multiple medical problems including history of hypertension,

history of hyperlipidemia, history of asthma, history of severe peripheral 

vascular disease with chronic wounds on the right leg requiring toe amputation, 

history of stroke, history of congestive heart failure, history of coronary 

artery disease with previous history of angioplasty and stent placement, history

of depression with anxiety disorder.





Patient was seen and examined on 01/11/2020, she is alert slightly confused in 

no apparent distress, she has been maintained on IV insulin drip and her glucose

level is below 200 this time, there is no fever or chills no headache or 

dizziness no chest pain no shortness of breath no cough no nausea or vomiting no

abdominal pain no diarrhea no burning with urination no frequency or urgency and

no hematuria.





On 01/12/2020 patient was seen and examined on the medical floor, she is alert 

slightly confused in no distress, glucose level is well-controlled at this time,

she had an episode of hypoglycemia this morning, at this time will decrease 

Levemir dose to 20 units down from 22 units and monitor glucose level closely.  

On review of systems patient is complaining of pain in her lower extremity, 

otherwise she denies any complaints there is no fever or chills no headache or 

dizziness no chest pain no shortness of breath no cough no nausea or vomiting no

abdominal pain no diarrhea and no urinary symptoms.





Objective





- Vital Signs


Vital signs: 


                                   Vital Signs











Temp  98.2 F   01/12/20 02:32


 


Pulse  84   01/12/20 12:14


 


Resp  15   01/12/20 07:00


 


BP  113/65   01/12/20 07:00


 


Pulse Ox  96   01/12/20 07:00








                                 Intake & Output











 01/11/20 01/12/20 01/12/20





 18:59 06:59 18:59


 


Intake Total 808.367 600 400


 


Output Total  1 


 


Balance 808.367 599 400


 


Weight 57.153 kg  


 


Intake:   


 


  IV   400


 


    Sodium Chloride 0.9% 1,   400





    000 ml @ 200 mls/hr IV .   





    Q5H [facility] Rx#:[id number]   


 


  Intake, IV Titration 808.367 600 





  Amount   


 


    D5-0.45% NaCl with KCl 800  





    20Meq/l 1,000 ml @ 150   





    mls/hr IV .Q6H40M [facility] Rx#   





    :[id number]   


 


    Insulin Regular 100 unit 8.367  





    In Sodium Chloride 0.9%   





    100 ml @ 0.1 UNITS/KG/HR   





    5.772 mls/hr IV .[id number]   





    [facility] Rx#:[id number]   


 


    Sodium Chloride 0.9% 1,  600 





    000 ml @ 200 mls/hr IV .   





    Q5H [facility] Rx#:[id number]   


 


Output:   


 


  Urine  1 


 


Other:   


 


  Voiding Method Diaper Diaper Incontinent


 


  # Voids 2 2 


 


  # Bowel Movements 1 1 2














- Exam








In general patient is alert slightly confused in no apparent distress


HEENT head normocephalic and atraumatic


Neck is supple no JVD no goiter no lymphadenopathy


Chest exam reveals a few scattered rhonchi no wheezing


Cardiac exam reveals regular heart sounds no gallops no murmurs


Abdomen is soft nontender no organomegaly with normal bowel sounds


Extremity exam reveals no edema no cyanosis or clubbing, right foot with 

previous second toe amputation and open wounds.


Neurological examination reveals weakness related to previous stroke without any

acute neurological changes








- Labs


CBC & Chem 7: 


                                 01/12/20 06:28





                                 01/12/20 14:51


Labs: 


                  Abnormal Lab Results - Last 24 Hours (Table)











  01/11/20 01/11/20 01/12/20 Range/Units





  16:52 20:23 06:28 


 


RBC    3.00 L  (3.80-5.40)  m/uL


 


Hgb    9.0 L D  (11.4-16.0)  gm/dL


 


Hct    28.3 L  (34.0-46.0)  %


 


Potassium     (3.5-5.1)  mmol/L


 


Chloride     ()  mmol/L


 


Glucose     (74-99)  mg/dL


 


POC Glucose (mg/dL)  327 H  303 H   (75-99)  mg/dL


 


Total Protein     (6.3-8.2)  g/dL


 


Albumin     (3.5-5.0)  g/dL














  01/12/20 01/12/20 01/12/20 Range/Units





  06:28 07:01 07:03 


 


RBC     (3.80-5.40)  m/uL


 


Hgb     (11.4-16.0)  gm/dL


 


Hct     (34.0-46.0)  %


 


Potassium  3.3 L    (3.5-5.1)  mmol/L


 


Chloride  112 H    ()  mmol/L


 


Glucose  25 L*    (74-99)  mg/dL


 


POC Glucose (mg/dL)   24 L  26 L  (75-99)  mg/dL


 


Total Protein  5.5 L    (6.3-8.2)  g/dL


 


Albumin  2.6 L    (3.5-5.0)  g/dL














  01/12/20 01/12/20 Range/Units





  07:26 12:12 


 


RBC    (3.80-5.40)  m/uL


 


Hgb    (11.4-16.0)  gm/dL


 


Hct    (34.0-46.0)  %


 


Potassium    (3.5-5.1)  mmol/L


 


Chloride    ()  mmol/L


 


Glucose    (74-99)  mg/dL


 


POC Glucose (mg/dL)  103 H  127 H  (75-99)  mg/dL


 


Total Protein    (6.3-8.2)  g/dL


 


Albumin    (3.5-5.0)  g/dL














Assessment and Plan


Plan: 





#1 severe hyperglycemia likely related to noncompliance with diet, patient 

consumes large amount of candies in a binge manner occasionally.  She was 

counseled in length in that regard, she was started on IV insulin drip in the 

emergency room and at this time her glucose is below 200 will discontinue i

nsulin drip and resume Levemir 22 units at bedtime and NovoLog sliding scale 

before meals.





#2 right foot wound infection with osteomyelitis, continue doxycycline by mouth.

 Patient will need further surgical intervention on her foot.





#3 underlying history of hypertension





#4 underlying history of hyperlipidemia





#5 underlying history of congestive heart failure





#6 underlying history of coronary artery disease with previous history of 

angioplasty and stent placement





#7 underlying history of depression with anxiety disorder





#8 patient was scheduled to be readmitted tomorrow for surgical intervention on 

her right foot consultation for vascular surgery was initiated





Plan at this time is to decrease Levemir dose to 20 units daily at bedtime 

continue NovoLog sliding scale


Consultation for vascular surgery was initiated


Continue to  regarding diet


Continue with oral antibiotic doxycycline


Patient was scheduled to be readmitted next week for further surgical 

intervention on her right foot


Will reassess if patient needs to be transferred back to the nursing home or if 

she will stay until her surgery.

## 2020-01-13 LAB
ALBUMIN SERPL-MCNC: 2.4 G/DL (ref 3.5–5)
ALP SERPL-CCNC: 66 U/L (ref 38–126)
ALT SERPL-CCNC: 20 U/L (ref 4–34)
ANION GAP SERPL CALC-SCNC: 2 MMOL/L
AST SERPL-CCNC: 20 U/L (ref 14–36)
BASOPHILS # BLD AUTO: 0 K/UL (ref 0–0.2)
BASOPHILS NFR BLD AUTO: 1 %
BUN SERPL-SCNC: 12 MG/DL (ref 7–17)
CALCIUM SPEC-MCNC: 8.3 MG/DL (ref 8.4–10.2)
CHLORIDE SERPL-SCNC: 110 MMOL/L (ref 98–107)
CO2 SERPL-SCNC: 30 MMOL/L (ref 22–30)
EOSINOPHIL # BLD AUTO: 0 K/UL (ref 0–0.7)
EOSINOPHIL NFR BLD AUTO: 1 %
ERYTHROCYTE [DISTWIDTH] IN BLOOD BY AUTOMATED COUNT: 2.92 M/UL (ref 3.8–5.4)
ERYTHROCYTE [DISTWIDTH] IN BLOOD: 15.4 % (ref 11.5–15.5)
GLUCOSE BLD-MCNC: 125 MG/DL (ref 75–99)
GLUCOSE BLD-MCNC: 138 MG/DL (ref 75–99)
GLUCOSE BLD-MCNC: 145 MG/DL (ref 75–99)
GLUCOSE BLD-MCNC: 156 MG/DL (ref 75–99)
GLUCOSE BLD-MCNC: 157 MG/DL (ref 75–99)
GLUCOSE BLD-MCNC: 176 MG/DL (ref 75–99)
GLUCOSE BLD-MCNC: 424 MG/DL (ref 75–99)
GLUCOSE BLD-MCNC: 507 MG/DL (ref 75–99)
GLUCOSE BLD-MCNC: 558 MG/DL (ref 75–99)
GLUCOSE BLD-MCNC: 68 MG/DL (ref 75–99)
GLUCOSE BLD-MCNC: 83 MG/DL (ref 75–99)
GLUCOSE SERPL-MCNC: 140 MG/DL (ref 74–99)
HCT VFR BLD AUTO: 27.6 % (ref 34–46)
HGB BLD-MCNC: 8.5 GM/DL (ref 11.4–16)
LYMPHOCYTES # SPEC AUTO: 1.6 K/UL (ref 1–4.8)
LYMPHOCYTES NFR SPEC AUTO: 45 %
MCH RBC QN AUTO: 29.3 PG (ref 25–35)
MCHC RBC AUTO-ENTMCNC: 30.9 G/DL (ref 31–37)
MCV RBC AUTO: 94.7 FL (ref 80–100)
MONOCYTES # BLD AUTO: 0.2 K/UL (ref 0–1)
MONOCYTES NFR BLD AUTO: 6 %
NEUTROPHILS # BLD AUTO: 1.6 K/UL (ref 1.3–7.7)
NEUTROPHILS NFR BLD AUTO: 45 %
PLATELET # BLD AUTO: 220 K/UL (ref 150–450)
POTASSIUM SERPL-SCNC: 4.2 MMOL/L (ref 3.5–5.1)
PROT SERPL-MCNC: 5.1 G/DL (ref 6.3–8.2)
SODIUM SERPL-SCNC: 142 MMOL/L (ref 137–145)
WBC # BLD AUTO: 3.5 K/UL (ref 3.8–10.6)

## 2020-01-13 PROCEDURE — 0QBN0ZZ EXCISION OF RIGHT METATARSAL, OPEN APPROACH: ICD-10-PCS

## 2020-01-13 PROCEDURE — 2W3SX2Z IMMOBILIZATION OF RIGHT FOOT USING CAST: ICD-10-PCS

## 2020-01-13 RX ADMIN — HEPARIN SODIUM SCH UNIT: 5000 INJECTION, SOLUTION INTRAVENOUS; SUBCUTANEOUS at 21:18

## 2020-01-13 RX ADMIN — Medication SCH: at 19:04

## 2020-01-13 RX ADMIN — VALPROIC ACID SCH MG: 250 SOLUTION ORAL at 08:48

## 2020-01-13 RX ADMIN — CEFAZOLIN SCH: 330 INJECTION, POWDER, FOR SOLUTION INTRAMUSCULAR; INTRAVENOUS at 17:00

## 2020-01-13 RX ADMIN — CEFAZOLIN SCH MLS/HR: 330 INJECTION, POWDER, FOR SOLUTION INTRAMUSCULAR; INTRAVENOUS at 21:18

## 2020-01-13 RX ADMIN — INSULIN ASPART SCH: 100 INJECTION, SOLUTION INTRAVENOUS; SUBCUTANEOUS at 14:29

## 2020-01-13 RX ADMIN — DOXYCYCLINE SCH MG: 100 CAPSULE ORAL at 14:51

## 2020-01-13 RX ADMIN — INSULIN ASPART SCH UNIT: 100 INJECTION, SOLUTION INTRAVENOUS; SUBCUTANEOUS at 08:48

## 2020-01-13 RX ADMIN — FAMOTIDINE SCH MG: 20 TABLET, FILM COATED ORAL at 14:28

## 2020-01-13 RX ADMIN — ASPIRIN 81 MG CHEWABLE TABLET SCH MG: 81 TABLET CHEWABLE at 14:28

## 2020-01-13 RX ADMIN — ATORVASTATIN CALCIUM SCH MG: 20 TABLET, FILM COATED ORAL at 14:28

## 2020-01-13 RX ADMIN — DOXYCYCLINE SCH MG: 100 CAPSULE ORAL at 21:18

## 2020-01-13 RX ADMIN — DULOXETINE SCH MG: 60 CAPSULE, DELAYED RELEASE ORAL at 14:28

## 2020-01-13 RX ADMIN — INSULIN ASPART SCH: 100 INJECTION, SOLUTION INTRAVENOUS; SUBCUTANEOUS at 08:49

## 2020-01-13 NOTE — P.OPNOTE
Outpatient Note





- .


Assessment/Comments:: 





Date of service: 01/13/2020


Surgeon: Mariaelena


Pre-and postop diagnosis: Diabetic infection right foot


Type of debridement: Excisional surgical including bone tendon and soft tissue


Chief complaint: ulcer of lateral and medial right foot


Anesthesia: Gen.


Signs of infection: Mild redness and some erosion of the distal fifth metatarsal

head


Other material in the wound that is expected to inhibit healing or promote 

adjacent tissue breakdown: Same


Degree of epithelialization: %


Method and instrument: Surgical debridement with scalpel, rongeur,


Character of the wound after debridement: Clean bloody soft tissue bed 

Description of necrotic material present: Nonviable soft tissue, subcutaneous 

tissue, distal aspect of fifth metatarsal on the right Description of tissue 

removed: Same


Pre-debridement measurement: The medial aspect was 3.5 x 3 lateral 5 x 3.8 cm 

and medial 0.2 lateral 0.2 cm in depth


Postoperative debridement measurement: Medial ray 0.8 x 3.3, lateral 5.2 x 3.9 

cm and medial 0.3 lateral 0.8 cm in depth


Specimens bone for culture


Control of bleeding: Electrocautery


Post debridement dressing: Right side total contact cast


Patient tolerated procedure well





Date of service: 01/13/2020


Surgeon: Mariaelena


Pre-and postop diagnosis : Diabetic ulcers right foot


Procedure: Placement of right-sided total contact cast





Procedure: With the patient in supine position, we first placed the thinwall 

stockinette with the upper portion at the top of the patella. Redundancy was 

taped to the dorsum of the foot.  We then placed the felt pad, taping it over 

the tibia, dorsum of the foot, plantar aspect of the foot, and Achilles tendon. 

Corners were cut at the heel.  The disks were taped over the malleoli.  We then 

placed the heavy fishnet stockinette with the upper portion at the lower margin 

of the patella.  Redundancy was taped to the dorsum of the foot.  The patient 

was then placed in prone position.  Cast material was wetted and rolled onto the

foot and leg.  The upper edges of the stockinettes were rolled over the top.  

Wrinkles were smoothed out.  Redundancy was smoothed onto the dorsum of the 

foot.  When the cast was hardened, patient was placed in the walking boot.  

Patient tolerated the procedure well.

## 2020-01-13 NOTE — P.PN
Progress Note - Text


Progress Note Date: 01/13/20





This patient is very familiar to me.  Please refer to recent progress notes and 

recent H&P's.





This is a 59-year-old debilitated female who was previously scheduled for an 

outpatient debridement of the right foot with possible transmetatarsal 

amputation.  Due to a lot of jerking motions in her foot, her diabetes, her 

noncompliance with wound care and blood sugar management she has developed 

ulcers on the medial and lateral aspect of the right foot.  Her vascular studies

suggest occlusive disease but with perfusion that appears adequate for healing. 

She is nonambulatory and is considered a poor candidate for vascular 

intervention.





We have discussed options with the patient and her family in detail.  We have 

recommended that we proceed with an aggressive debridement.  If at the time of 

the debridement the remaining foot does not appear adequate for eventual closure

we would proceed directly with transmetatarsal amputation.  We will do our best 

to protect the area with a total contact cast.  If the area does not heal she 

will require below knee amputation.





The patient and family have verbalized an understanding of the options and their

agreement with our plan.

## 2020-01-13 NOTE — P.PN
Subjective


Progress Note Date: 01/13/20





Morenita Pham is a 59-year-old female with known history of insulin-dependent 

diabetes mellitus type 1 and peripheral vascular disease who was recently 

discharged from [facility] to the nursing home.  Patient was 

therefore short time, she was found to have a glucose level of 665 she was 

having mental status changes she was sent back to [facility] 

emergency room, she was started on IV insulin drip and was admitted to medical 

floor.  Patient has multiple medical problems including history of hypertension,

history of hyperlipidemia, history of asthma, history of severe peripheral 

vascular disease with chronic wounds on the right leg requiring toe amputation, 

history of stroke, history of congestive heart failure, history of coronary 

artery disease with previous history of angioplasty and stent placement, history

of depression with anxiety disorder.





Patient was seen and examined on 01/11/2020, she is alert slightly confused in 

no apparent distress, she has been maintained on IV insulin drip and her glucose

level is below 200 this time, there is no fever or chills no headache or 

dizziness no chest pain no shortness of breath no cough no nausea or vomiting no

abdominal pain no diarrhea no burning with urination no frequency or urgency and

no hematuria.





On 01/12/2020 patient was seen and examined on the medical floor, she is alert 

slightly confused in no distress, glucose level is well-controlled at this time,

she had an episode of hypoglycemia this morning, at this time will decrease 

Levemir dose to 20 units down from 22 units and monitor glucose level closely.  

On review of systems patient is complaining of pain in her lower extremity, 

otherwise she denies any complaints there is no fever or chills no headache or 

dizziness no chest pain no shortness of breath no cough no nausea or vomiting no

abdominal pain no diarrhea and no urinary symptoms.





On 01/13/2020 patient is alert and resting comfortably in bed.  Plans for 

debridement today with Dr. Dash of foot wound.  Long-acting insulin insulin 

currently on hold.  Blood sugar this a.m. 176.  This time patient denies chest 

pain or shortness of breath.  Patient moving diarrhea.  Denies any urinary 

burning or frequency





Objective





- Vital Signs


Vital signs: 


                                   Vital Signs











Temp  97.5 F L  01/13/20 09:49


 


Pulse  75   01/13/20 09:49


 


Resp  16   01/13/20 09:49


 


BP  125/58   01/13/20 09:49


 


Pulse Ox  99   01/13/20 09:49








                                 Intake & Output











 01/12/20 01/13/20 01/13/20





 18:59 06:59 18:59


 


Intake Total 400  


 


Balance 400  


 


Intake:   


 


    


 


    Sodium Chloride 0.9% 1, 400  





    000 ml @ 50 mls/hr IV .   





    Q20H [facility] Rx#:[id number]   


 


Other:   


 


  Voiding Method Incontinent Incontinent 


 


  # Voids  2 


 


  # Bowel Movements 2  














- Exam





In general patient is alert slightly confused in no apparent distress


HEENT head normocephalic and atraumatic


Neck is supple no JVD no goiter no lymphadenopathy


Chest exam reveals a few scattered rhonchi no wheezing


Cardiac exam reveals regular heart sounds no gallops no murmurs


Abdomen is soft nontender no organomegaly with normal bowel sounds


Extremity exam reveals no edema no cyanosis or clubbing, right foot with 

previous second toe amputation and open wounds.


Neurological examination reveals weakness related to previous stroke without any

acute neurological changes








- Labs


CBC & Chem 7: 


                                 01/13/20 06:42





                                 01/13/20 06:42


Labs: 


                  Abnormal Lab Results - Last 24 Hours (Table)











  01/12/20 01/12/20 01/13/20 Range/Units





  12:12 17:19 01:18 


 


WBC     (3.8-10.6)  k/uL


 


RBC     (3.80-5.40)  m/uL


 


Hgb     (11.4-16.0)  gm/dL


 


Hct     (34.0-46.0)  %


 


MCHC     (31.0-37.0)  g/dL


 


Chloride     ()  mmol/L


 


Glucose     (74-99)  mg/dL


 


POC Glucose (mg/dL)  127 H  189 H  68 L  (75-99)  mg/dL


 


Calcium     (8.4-10.2)  mg/dL


 


Total Protein     (6.3-8.2)  g/dL


 


Albumin     (3.5-5.0)  g/dL














  01/13/20 01/13/20 01/13/20 Range/Units





  05:29 06:42 06:42 


 


WBC   3.5 L   (3.8-10.6)  k/uL


 


RBC   2.92 L   (3.80-5.40)  m/uL


 


Hgb   8.5 L   (11.4-16.0)  gm/dL


 


Hct   27.6 L   (34.0-46.0)  %


 


MCHC   30.9 L   (31.0-37.0)  g/dL


 


Chloride    110 H  ()  mmol/L


 


Glucose    140 H  (74-99)  mg/dL


 


POC Glucose (mg/dL)  125 H    (75-99)  mg/dL


 


Calcium    8.3 L  (8.4-10.2)  mg/dL


 


Total Protein    5.1 L  (6.3-8.2)  g/dL


 


Albumin    2.4 L  (3.5-5.0)  g/dL














  01/13/20 01/13/20 Range/Units





  07:06 10:01 


 


WBC    (3.8-10.6)  k/uL


 


RBC    (3.80-5.40)  m/uL


 


Hgb    (11.4-16.0)  gm/dL


 


Hct    (34.0-46.0)  %


 


MCHC    (31.0-37.0)  g/dL


 


Chloride    ()  mmol/L


 


Glucose    (74-99)  mg/dL


 


POC Glucose (mg/dL)  156 H  176 H  (75-99)  mg/dL


 


Calcium    (8.4-10.2)  mg/dL


 


Total Protein    (6.3-8.2)  g/dL


 


Albumin    (3.5-5.0)  g/dL














Assessment and Plan


Assessment: 





#1 severe hyperglycemia likely related to noncompliance with diet, patient 

consumes large amount of candies in a binge manner occasionally.  She was 

counseled in length in that regard, she was started on IV insulin drip in the 

emergency room and at this time her glucose is below 200 will discontinue 

insulin drip and resume Levemir 22 units at bedtime and NovoLog sliding scale 

before meals.  Long-acting insulin has been DC'd.  Patient remains on sliding 

scale +6 units with meals.  We'll continue to monitor





#2 right foot wound infection with osteomyelitis, continue doxycycline by mouth.

 She to undergo debridement today with Dr. Dash.





#3 underlying history of hypertension





#4 underlying history of hyperlipidemia





#5 underlying history of congestive heart failure





#6 underlying history of coronary artery disease with previous history of 

angioplasty and stent placement





#7 underlying history of depression with anxiety disorder





DVT prophylaxis heparin.  GI prophylaxis Pepcid





Consultation for vascular surgery was initiated


Continue to  regarding diet


Continue with oral antibiotic doxycycline








I performed an examination of the patient and discussed their management with 

the Nurse Practitioner.  I have reviewed the Nurse Practitioner's notes and 

agree with the documented findings and plan of care

## 2020-01-14 LAB
ALBUMIN SERPL-MCNC: 2.3 G/DL (ref 3.5–5)
ALP SERPL-CCNC: 72 U/L (ref 38–126)
ALT SERPL-CCNC: 18 U/L (ref 4–34)
ANION GAP SERPL CALC-SCNC: 3 MMOL/L
AST SERPL-CCNC: 19 U/L (ref 14–36)
BASOPHILS # BLD AUTO: 0 K/UL (ref 0–0.2)
BASOPHILS NFR BLD AUTO: 0 %
BUN SERPL-SCNC: 11 MG/DL (ref 7–17)
CALCIUM SPEC-MCNC: 8.2 MG/DL (ref 8.4–10.2)
CHLORIDE SERPL-SCNC: 106 MMOL/L (ref 98–107)
CO2 SERPL-SCNC: 29 MMOL/L (ref 22–30)
EOSINOPHIL # BLD AUTO: 0.1 K/UL (ref 0–0.7)
EOSINOPHIL NFR BLD AUTO: 1 %
ERYTHROCYTE [DISTWIDTH] IN BLOOD BY AUTOMATED COUNT: 2.84 M/UL (ref 3.8–5.4)
ERYTHROCYTE [DISTWIDTH] IN BLOOD: 15.1 % (ref 11.5–15.5)
GLUCOSE BLD-MCNC: 110 MG/DL (ref 75–99)
GLUCOSE BLD-MCNC: 114 MG/DL (ref 75–99)
GLUCOSE BLD-MCNC: 161 MG/DL (ref 75–99)
GLUCOSE BLD-MCNC: 177 MG/DL (ref 75–99)
GLUCOSE BLD-MCNC: 223 MG/DL (ref 75–99)
GLUCOSE BLD-MCNC: 258 MG/DL (ref 75–99)
GLUCOSE BLD-MCNC: 261 MG/DL (ref 75–99)
GLUCOSE BLD-MCNC: 68 MG/DL (ref 75–99)
GLUCOSE BLD-MCNC: 69 MG/DL (ref 75–99)
GLUCOSE BLD-MCNC: 77 MG/DL (ref 75–99)
GLUCOSE BLD-MCNC: 94 MG/DL (ref 75–99)
GLUCOSE SERPL-MCNC: 259 MG/DL (ref 74–99)
HCT VFR BLD AUTO: 27.1 % (ref 34–46)
HGB BLD-MCNC: 8.3 GM/DL (ref 11.4–16)
LYMPHOCYTES # SPEC AUTO: 1.6 K/UL (ref 1–4.8)
LYMPHOCYTES NFR SPEC AUTO: 32 %
MCH RBC QN AUTO: 29.1 PG (ref 25–35)
MCHC RBC AUTO-ENTMCNC: 30.5 G/DL (ref 31–37)
MCV RBC AUTO: 95.5 FL (ref 80–100)
MONOCYTES # BLD AUTO: 0.3 K/UL (ref 0–1)
MONOCYTES NFR BLD AUTO: 5 %
NEUTROPHILS # BLD AUTO: 3 K/UL (ref 1.3–7.7)
NEUTROPHILS NFR BLD AUTO: 60 %
PLATELET # BLD AUTO: 216 K/UL (ref 150–450)
POTASSIUM SERPL-SCNC: 4.3 MMOL/L (ref 3.5–5.1)
PROT SERPL-MCNC: 5.1 G/DL (ref 6.3–8.2)
SODIUM SERPL-SCNC: 138 MMOL/L (ref 137–145)
WBC # BLD AUTO: 5 K/UL (ref 3.8–10.6)

## 2020-01-14 RX ADMIN — DOXYCYCLINE SCH MG: 100 CAPSULE ORAL at 09:06

## 2020-01-14 RX ADMIN — Medication SCH MG: at 12:48

## 2020-01-14 RX ADMIN — INSULIN ASPART SCH: 100 INJECTION, SOLUTION INTRAVENOUS; SUBCUTANEOUS at 20:54

## 2020-01-14 RX ADMIN — ATORVASTATIN CALCIUM SCH MG: 20 TABLET, FILM COATED ORAL at 09:06

## 2020-01-14 RX ADMIN — VALPROIC ACID SCH MG: 250 SOLUTION ORAL at 09:07

## 2020-01-14 RX ADMIN — INSULIN DETEMIR SCH: 100 INJECTION, SOLUTION SUBCUTANEOUS at 21:41

## 2020-01-14 RX ADMIN — DOXYCYCLINE SCH MG: 100 CAPSULE ORAL at 21:40

## 2020-01-14 RX ADMIN — INSULIN ASPART SCH UNIT: 100 INJECTION, SOLUTION INTRAVENOUS; SUBCUTANEOUS at 12:48

## 2020-01-14 RX ADMIN — DULOXETINE SCH MG: 60 CAPSULE, DELAYED RELEASE ORAL at 09:06

## 2020-01-14 RX ADMIN — HEPARIN SODIUM SCH UNIT: 5000 INJECTION, SOLUTION INTRAVENOUS; SUBCUTANEOUS at 09:06

## 2020-01-14 RX ADMIN — ASPIRIN 81 MG CHEWABLE TABLET SCH MG: 81 TABLET CHEWABLE at 09:06

## 2020-01-14 RX ADMIN — FAMOTIDINE SCH MG: 20 TABLET, FILM COATED ORAL at 09:06

## 2020-01-14 RX ADMIN — INSULIN ASPART SCH: 100 INJECTION, SOLUTION INTRAVENOUS; SUBCUTANEOUS at 17:26

## 2020-01-14 RX ADMIN — HEPARIN SODIUM SCH UNIT: 5000 INJECTION, SOLUTION INTRAVENOUS; SUBCUTANEOUS at 21:40

## 2020-01-14 RX ADMIN — HYDROCODONE BITARTRATE AND ACETAMINOPHEN PRN EACH: 10; 325 TABLET ORAL at 09:11

## 2020-01-14 RX ADMIN — INSULIN ASPART SCH: 100 INJECTION, SOLUTION INTRAVENOUS; SUBCUTANEOUS at 17:30

## 2020-01-14 NOTE — P.PN
Subjective


Progress Note Date: 01/14/20


Principal diagnosis: 





Right lower extremity chronic wounds with infection.  Previous medical history 

of recent hospitaliztion for DKA, right diabetic foot wounds with amputation of 

the second and fifth toes and history of MRSA and strep infection in her wounds,

uncontrolled type 2 diabetes with hyperglycemia and most recent hemoglobin A1c 

9.5%, coronary artery disease, chronic CHF, COPD, CVA, and previous tobacco 

dependence.  





POD #1 Excisional surgical debridement including bone, tendon, soft tissue with 

placement of total contact cast





The patient is currently sitting up in bed on the medical surgical unit in no 

acute distress.  She states pain is controlled on current medication regimen.  

Contact cast currently in place to right lower extremity. Blood sugars have been

very labile. No new concerns.





Objective





- Vital Signs


Vital signs: 


                                   Vital Signs











Temp  98.1 F   01/14/20 07:48


 


Pulse  91   01/14/20 08:00


 


Resp  18   01/14/20 08:00


 


BP  148/84   01/14/20 07:48


 


Pulse Ox  98   01/14/20 07:48








                                 Intake & Output











 01/13/20 01/14/20 01/14/20





 18:59 06:59 18:59


 


Intake Total 675 68.049 400


 


Output Total 30  


 


Balance 645 68.049 400


 


Intake:   


 


    400


 


    Sodium Chloride 0.9% 1,   400





    000 ml @ 50 mls/hr IV .   





    Q20H [facility] Rx#:[id number]   


 


  Intake, IV Titration  68.049 





  Amount   


 


    Insulin Regular 100 unit  68.049 





    In Sodium Chloride 0.9%   





    100 ml @ Titrate IV .Q0M   





    [facility] Rx#:[id number]   


 


Output:   


 


  Estimated Blood Loss 30  


 


Other:   


 


  Voiding Method Incontinent Incontinent Incontinent


 


  # Voids 1 2 














- Constitutional


General appearance: Present: cooperative, no acute distress





- Respiratory


Details: 





Lungs sounds diminished bilaterally.  Respirations even, nonlabored.  Currently 

on room air with oxygen saturation 98%.  





- Cardiovascular


Details: 





S1, S2 present.  Regular rate and rhythm.  Palpable peripheral pulses 

bilaterally.  No edema present.  No calf pain or tenderness.





- Gastrointestinal


Gastrointestinal Comment(s): 





Abdomen soft, non-tender, non-distended.  Active bowel sounds x 4 quadrants.  

Tolerating diet





- Genitourinary


Genitourinary Comment(s): 





Continues to void





- Integumentary


Integumentary Comment(s): 





Skin is warm and dry.  Total contact cast in place to right lower extremity.





- Musculoskeletal


Musculoskeletal: Present: strength equal bilaterally





- Psychiatric


Psychiatric: Present: A&O x's 3, appropriate affect





- Allied health notes


Allied health notes reviewed: nursing





- Labs


CBC & Chem 7: 


                                 01/14/20 09:27





                                 01/14/20 09:27


Labs: 


                  Abnormal Lab Results - Last 24 Hours (Table)











  01/13/20 01/13/20 01/13/20 Range/Units





  16:46 18:08 20:23 


 


RBC     (3.80-5.40)  m/uL


 


Hgb     (11.4-16.0)  gm/dL


 


Hct     (34.0-46.0)  %


 


MCHC     (31.0-37.0)  g/dL


 


Glucose     (74-99)  mg/dL


 


POC Glucose (mg/dL)  507 H  558 H  424 H  (75-99)  mg/dL


 


Calcium     (8.4-10.2)  mg/dL


 


Total Protein     (6.3-8.2)  g/dL


 


Albumin     (3.5-5.0)  g/dL














  01/13/20 01/14/20 01/14/20 Range/Units





  22:50 00:54 01:40 


 


RBC     (3.80-5.40)  m/uL


 


Hgb     (11.4-16.0)  gm/dL


 


Hct     (34.0-46.0)  %


 


MCHC     (31.0-37.0)  g/dL


 


Glucose     (74-99)  mg/dL


 


POC Glucose (mg/dL)  138 H  68 L  110 H  (75-99)  mg/dL


 


Calcium     (8.4-10.2)  mg/dL


 


Total Protein     (6.3-8.2)  g/dL


 


Albumin     (3.5-5.0)  g/dL














  01/14/20 01/14/20 01/14/20 Range/Units





  03:16 07:07 08:21 


 


RBC     (3.80-5.40)  m/uL


 


Hgb     (11.4-16.0)  gm/dL


 


Hct     (34.0-46.0)  %


 


MCHC     (31.0-37.0)  g/dL


 


Glucose     (74-99)  mg/dL


 


POC Glucose (mg/dL)  177 H  69 L  161 H  (75-99)  mg/dL


 


Calcium     (8.4-10.2)  mg/dL


 


Total Protein     (6.3-8.2)  g/dL


 


Albumin     (3.5-5.0)  g/dL














  01/14/20 01/14/20 01/14/20 Range/Units





  09:18 09:27 09:27 


 


RBC   2.84 L   (3.80-5.40)  m/uL


 


Hgb   8.3 L   (11.4-16.0)  gm/dL


 


Hct   27.1 L   (34.0-46.0)  %


 


MCHC   30.5 L   (31.0-37.0)  g/dL


 


Glucose    259 H  (74-99)  mg/dL


 


POC Glucose (mg/dL)  258 H    (75-99)  mg/dL


 


Calcium    8.2 L  (8.4-10.2)  mg/dL


 


Total Protein    5.1 L  (6.3-8.2)  g/dL


 


Albumin    2.3 L  (3.5-5.0)  g/dL














  01/14/20 01/14/20 Range/Units





  10:57 11:50 


 


RBC    (3.80-5.40)  m/uL


 


Hgb    (11.4-16.0)  gm/dL


 


Hct    (34.0-46.0)  %


 


MCHC    (31.0-37.0)  g/dL


 


Glucose    (74-99)  mg/dL


 


POC Glucose (mg/dL)  223 H  261 H  (75-99)  mg/dL


 


Calcium    (8.4-10.2)  mg/dL


 


Total Protein    (6.3-8.2)  g/dL


 


Albumin    (3.5-5.0)  g/dL








                      Microbiology - Last 24 Hours (Table)











 01/13/20 11:20 Gram Stain - Preliminary





 Foot - Right Tissue Culture - Preliminary


 


 01/13/20 11:20 Anaerobic Culture - Preliminary





 Foot - Right 














Assessment and Plan


Assessment: 





1.  Chronic right lower extremity wounds, S/P debridement with placement of 

total contact cast


2.  History of MRSA and strep infection to her right foot wounds


3.  Uncontrolled type 2 diabetes with hyperglycemia, most recent hemoglobin A1c 

9.5%, recent admission for DKA


4.  History of coronary artery disease


5.  Chronic CHF


6.  COPD


7.  CVA


8.  Previous tobacco dependence


Plan: 





1.  Maintain total contact cast.  Walking boot to be applied for ambulation


2.  Follow up in wound care center for TCC change.  Appointment made


3.  Patient needs tight control of blood sugars 


4.  Appropriate for rehab placement at discharge.  PT/OT consulted


5.  Continue doxycline per ID


6.  GI/DVT prophylaxis


7.  Medical management of other comorbidities per primary care service


8.  Will continue to see while hospitalized as needed


Time with Patient: Greater than 30

## 2020-01-14 NOTE — P.PN
Subjective


Progress Note Date: 01/14/20





Morenita Pham is a 59-year-old female with known history of insulin-dependent 

diabetes mellitus type 1 and peripheral vascular disease who was recently 

discharged from [facility] to the nursing home.  Patient was 

therefore short time, she was found to have a glucose level of 665 she was 

having mental status changes she was sent back to [facility] 

emergency room, she was started on IV insulin drip and was admitted to medical 

floor.  Patient has multiple medical problems including history of hypertension,

history of hyperlipidemia, history of asthma, history of severe peripheral 

vascular disease with chronic wounds on the right leg requiring toe amputation, 

history of stroke, history of congestive heart failure, history of coronary 

artery disease with previous history of angioplasty and stent placement, history

of depression with anxiety disorder.





Patient was seen and examined on 01/11/2020, she is alert slightly confused in 

no apparent distress, she has been maintained on IV insulin drip and her glucose

level is below 200 this time, there is no fever or chills no headache or 

dizziness no chest pain no shortness of breath no cough no nausea or vomiting no

abdominal pain no diarrhea no burning with urination no frequency or urgency and

no hematuria.





On 01/12/2020 patient was seen and examined on the medical floor, she is alert 

slightly confused in no distress, glucose level is well-controlled at this time,

she had an episode of hypoglycemia this morning, at this time will decrease 

Levemir dose to 20 units down from 22 units and monitor glucose level closely.  

On review of systems patient is complaining of pain in her lower extremity, 

otherwise she denies any complaints there is no fever or chills no headache or 

dizziness no chest pain no shortness of breath no cough no nausea or vomiting no

abdominal pain no diarrhea and no urinary symptoms.





On 01/13/2020 patient is alert and resting comfortably in bed.  Plans for 

debridement today with Dr. Dash of foot wound.  Long-acting insulin insulin 

currently on hold.  Blood sugar this a.m. 176.  This time patient denies chest 

pain or shortness of breath.  Patient moving diarrhea.  Denies any urinary 

burning or frequency





On 01/14/2020 patient is status post debridement of right foot wounds with Dr. Dash.  She is currently postop day 1.  Blood sugars after surgical procedure 

greater than 400.  Patient was started back on insulin drip.  Current blood 

sugar 166.  Will transition patient to NovoLog 6 units and 12 units of long-

acting insulin will continue to monitor blood sugars closely due to known 

brittle diabetic.  At this time patient denies chest pain or shortness of 

breath.  Patient denies nausea vomiting or diarrhea.  Patient denies any urinary

burning or frequency.





Objective





- Vital Signs


Vital signs: 


                                   Vital Signs











Temp  98.1 F   01/14/20 07:48


 


Pulse  91   01/14/20 07:48


 


Resp  18   01/14/20 07:48


 


BP  148/84   01/14/20 07:48


 


Pulse Ox  98   01/14/20 07:48








                                 Intake & Output











 01/13/20 01/14/20 01/14/20





 18:59 06:59 18:59


 


Intake Total 675 68.049 


 


Output Total 30  


 


Balance 645 68.049 


 


Intake:   


 


    


 


  Intake, IV Titration  68.049 





  Amount   


 


    Insulin Regular 100 unit  68.049 





    In Sodium Chloride 0.9%   





    100 ml @ Titrate IV .Q0M   





    [facility] Rx#:[id number]   


 


Output:   


 


  Estimated Blood Loss 30  


 


Other:   


 


  Voiding Method Incontinent Incontinent 


 


  # Voids 1 2 














- Exam





In general patient is alert slightly confused in no apparent distress


HEENT head normocephalic and atraumatic


Neck is supple no JVD no goiter no lymphadenopathy


Chest exam reveals a few scattered rhonchi no wheezing


Cardiac exam reveals regular heart sounds no gallops no murmurs


Abdomen is soft nontender no organomegaly with normal bowel sounds


Extremity exam reveals no edema no cyanosis or clubbing, right foot with 

previous second toe amputation and open wounds.


Neurological examination reveals weakness related to previous stroke without any

acute neurological changes








- Labs


CBC & Chem 7: 


                                 01/14/20 09:27





                                 01/14/20 09:27


Labs: 


                  Abnormal Lab Results - Last 24 Hours (Table)











  01/13/20 01/13/20 01/13/20 Range/Units





  07:30 12:07 16:46 


 


RBC     (3.80-5.40)  m/uL


 


Hgb     (11.4-16.0)  gm/dL


 


Hct     (34.0-46.0)  %


 


MCHC     (31.0-37.0)  g/dL


 


Glucose     (74-99)  mg/dL


 


POC Glucose (mg/dL)  157 H  145 H  507 H  (75-99)  mg/dL


 


Calcium     (8.4-10.2)  mg/dL


 


Total Protein     (6.3-8.2)  g/dL


 


Albumin     (3.5-5.0)  g/dL














  01/13/20 01/13/20 01/13/20 Range/Units





  18:08 20:23 22:50 


 


RBC     (3.80-5.40)  m/uL


 


Hgb     (11.4-16.0)  gm/dL


 


Hct     (34.0-46.0)  %


 


MCHC     (31.0-37.0)  g/dL


 


Glucose     (74-99)  mg/dL


 


POC Glucose (mg/dL)  558 H  424 H  138 H  (75-99)  mg/dL


 


Calcium     (8.4-10.2)  mg/dL


 


Total Protein     (6.3-8.2)  g/dL


 


Albumin     (3.5-5.0)  g/dL














  01/14/20 01/14/20 01/14/20 Range/Units





  00:54 01:40 03:16 


 


RBC     (3.80-5.40)  m/uL


 


Hgb     (11.4-16.0)  gm/dL


 


Hct     (34.0-46.0)  %


 


MCHC     (31.0-37.0)  g/dL


 


Glucose     (74-99)  mg/dL


 


POC Glucose (mg/dL)  68 L  110 H  177 H  (75-99)  mg/dL


 


Calcium     (8.4-10.2)  mg/dL


 


Total Protein     (6.3-8.2)  g/dL


 


Albumin     (3.5-5.0)  g/dL














  01/14/20 01/14/20 01/14/20 Range/Units





  07:07 08:21 09:18 


 


RBC     (3.80-5.40)  m/uL


 


Hgb     (11.4-16.0)  gm/dL


 


Hct     (34.0-46.0)  %


 


MCHC     (31.0-37.0)  g/dL


 


Glucose     (74-99)  mg/dL


 


POC Glucose (mg/dL)  69 L  161 H  258 H  (75-99)  mg/dL


 


Calcium     (8.4-10.2)  mg/dL


 


Total Protein     (6.3-8.2)  g/dL


 


Albumin     (3.5-5.0)  g/dL














  01/14/20 01/14/20 Range/Units





  09:27 09:27 


 


RBC  2.84 L   (3.80-5.40)  m/uL


 


Hgb  8.3 L   (11.4-16.0)  gm/dL


 


Hct  27.1 L   (34.0-46.0)  %


 


MCHC  30.5 L   (31.0-37.0)  g/dL


 


Glucose   259 H  (74-99)  mg/dL


 


POC Glucose (mg/dL)    (75-99)  mg/dL


 


Calcium   8.2 L  (8.4-10.2)  mg/dL


 


Total Protein   5.1 L  (6.3-8.2)  g/dL


 


Albumin   2.3 L  (3.5-5.0)  g/dL








                      Microbiology - Last 24 Hours (Table)











 01/13/20 11:20 Gram Stain - Preliminary





 Foot - Right Tissue Culture - Preliminary


 


 01/13/20 11:20 Anaerobic Culture - Preliminary





 Foot - Right 














Assessment and Plan


Assessment: 





#1 severe hyperglycemia likely related to noncompliance with diet, patient 

consumes large amount of candies in a binge manner occasionally.  She was 

counseled in length in that regard, she was started on IV insulin drip in the 

emergency room .  Patient was started back on insulin drip post surgical 

intervention.  At this time insulin drip will be DC'd.  Patient will be started 

back on NovoLog sliding scale +6 units with meals and 12 units of Levemir





#2 right foot wound infection with osteomyelitis, continue doxycycline by mouth.

 Status post debridement of right foot once per Dr. Dash.  joel is 

currently postop day 1





#3 underlying history of hypertension





#4 underlying history of hyperlipidemia





#5 underlying history of congestive heart failure





#6 underlying history of coronary artery disease with previous history of 

angioplasty and stent placement





#7 underlying history of depression with anxiety disorder





DVT prophylaxis heparin.  GI prophylaxis Pepcid





Continue to  regarding diet


Continue with oral antibiotic doxycycline


PT and OT has been consulted





I performed an examination of the patient and discussed their management with 

the Nurse Practitioner.  I have reviewed the Nurse Practitioner's notes and 

agree with the documented findings and plan of care

## 2020-01-15 LAB
ALBUMIN SERPL-MCNC: 2.5 G/DL (ref 3.5–5)
ALP SERPL-CCNC: 85 U/L (ref 38–126)
ALT SERPL-CCNC: 18 U/L (ref 4–34)
ANION GAP SERPL CALC-SCNC: 5 MMOL/L
AST SERPL-CCNC: 18 U/L (ref 14–36)
BASOPHILS # BLD AUTO: 0 K/UL (ref 0–0.2)
BASOPHILS NFR BLD AUTO: 1 %
BUN SERPL-SCNC: 14 MG/DL (ref 7–17)
CALCIUM SPEC-MCNC: 8.2 MG/DL (ref 8.4–10.2)
CHLORIDE SERPL-SCNC: 105 MMOL/L (ref 98–107)
CO2 SERPL-SCNC: 27 MMOL/L (ref 22–30)
EOSINOPHIL # BLD AUTO: 0.1 K/UL (ref 0–0.7)
EOSINOPHIL NFR BLD AUTO: 2 %
ERYTHROCYTE [DISTWIDTH] IN BLOOD BY AUTOMATED COUNT: 2.86 M/UL (ref 3.8–5.4)
ERYTHROCYTE [DISTWIDTH] IN BLOOD: 14.9 % (ref 11.5–15.5)
GLUCOSE BLD-MCNC: 201 MG/DL (ref 75–99)
GLUCOSE BLD-MCNC: 269 MG/DL (ref 75–99)
GLUCOSE BLD-MCNC: 359 MG/DL (ref 75–99)
GLUCOSE BLD-MCNC: 478 MG/DL (ref 75–99)
GLUCOSE BLD-MCNC: 71 MG/DL (ref 75–99)
GLUCOSE SERPL-MCNC: 433 MG/DL (ref 74–99)
HBA1C MFR BLD: 8.4 % (ref 4–6)
HCT VFR BLD AUTO: 27.6 % (ref 34–46)
HGB BLD-MCNC: 8.7 GM/DL (ref 11.4–16)
LYMPHOCYTES # SPEC AUTO: 1.3 K/UL (ref 1–4.8)
LYMPHOCYTES NFR SPEC AUTO: 35 %
MCH RBC QN AUTO: 30.3 PG (ref 25–35)
MCHC RBC AUTO-ENTMCNC: 31.3 G/DL (ref 31–37)
MCV RBC AUTO: 96.6 FL (ref 80–100)
MONOCYTES # BLD AUTO: 0.1 K/UL (ref 0–1)
MONOCYTES NFR BLD AUTO: 4 %
NEUTROPHILS # BLD AUTO: 2.1 K/UL (ref 1.3–7.7)
NEUTROPHILS NFR BLD AUTO: 57 %
PLATELET # BLD AUTO: 187 K/UL (ref 150–450)
POTASSIUM SERPL-SCNC: 4.9 MMOL/L (ref 3.5–5.1)
PROT SERPL-MCNC: 5.3 G/DL (ref 6.3–8.2)
SODIUM SERPL-SCNC: 137 MMOL/L (ref 137–145)
WBC # BLD AUTO: 3.6 K/UL (ref 3.8–10.6)

## 2020-01-15 PROCEDURE — 2W3SX2Z IMMOBILIZATION OF RIGHT FOOT USING CAST: ICD-10-PCS

## 2020-01-15 RX ADMIN — VALPROIC ACID SCH MG: 250 SOLUTION ORAL at 07:54

## 2020-01-15 RX ADMIN — INSULIN ASPART SCH: 100 INJECTION, SOLUTION INTRAVENOUS; SUBCUTANEOUS at 16:51

## 2020-01-15 RX ADMIN — Medication SCH MG: at 07:54

## 2020-01-15 RX ADMIN — INSULIN ASPART SCH UNIT: 100 INJECTION, SOLUTION INTRAVENOUS; SUBCUTANEOUS at 22:43

## 2020-01-15 RX ADMIN — INSULIN ASPART SCH: 100 INJECTION, SOLUTION INTRAVENOUS; SUBCUTANEOUS at 17:13

## 2020-01-15 RX ADMIN — DOXYCYCLINE SCH MG: 100 CAPSULE ORAL at 22:42

## 2020-01-15 RX ADMIN — INSULIN DETEMIR SCH UNIT: 100 INJECTION, SOLUTION SUBCUTANEOUS at 22:42

## 2020-01-15 RX ADMIN — ASPIRIN 81 MG CHEWABLE TABLET SCH MG: 81 TABLET CHEWABLE at 07:54

## 2020-01-15 RX ADMIN — DOXYCYCLINE SCH MG: 100 CAPSULE ORAL at 07:54

## 2020-01-15 RX ADMIN — HEPARIN SODIUM SCH UNIT: 5000 INJECTION, SOLUTION INTRAVENOUS; SUBCUTANEOUS at 07:54

## 2020-01-15 RX ADMIN — ATORVASTATIN CALCIUM SCH MG: 20 TABLET, FILM COATED ORAL at 07:54

## 2020-01-15 RX ADMIN — INSULIN ASPART SCH UNIT: 100 INJECTION, SOLUTION INTRAVENOUS; SUBCUTANEOUS at 07:55

## 2020-01-15 RX ADMIN — DULOXETINE SCH MG: 60 CAPSULE, DELAYED RELEASE ORAL at 07:54

## 2020-01-15 RX ADMIN — HEPARIN SODIUM SCH UNIT: 5000 INJECTION, SOLUTION INTRAVENOUS; SUBCUTANEOUS at 22:42

## 2020-01-15 RX ADMIN — INSULIN ASPART SCH UNIT: 100 INJECTION, SOLUTION INTRAVENOUS; SUBCUTANEOUS at 12:53

## 2020-01-15 RX ADMIN — CEFAZOLIN SCH MLS/HR: 330 INJECTION, POWDER, FOR SOLUTION INTRAMUSCULAR; INTRAVENOUS at 09:49

## 2020-01-15 RX ADMIN — FAMOTIDINE SCH MG: 20 TABLET, FILM COATED ORAL at 07:54

## 2020-01-15 NOTE — P.PN
Subjective


Progress Note Date: 01/15/20


Patient is tolerating total contact cast.  Patient has abrasions to the left 

anterior leg due to rubbing of the total contact cast.  Cast removed and 

reapplied.  Ulceration shows Slough with minimal granulation to dorsal foot and 

lateral foot.  Patient has no complaints








Objective





- Vital Signs


Vital signs: 


                                   Vital Signs











Temp  97.7 F   01/15/20 08:00


 


Pulse  103 H  01/15/20 08:00


 


Resp  19   01/15/20 08:00


 


BP  136/81   01/15/20 08:00


 


Pulse Ox  96   01/15/20 08:00








                                 Intake & Output











 01/14/20 01/15/20 01/15/20





 18:59 06:59 18:59


 


Intake Total 400 150 650


 


Balance 400 150 650


 


Intake:   


 


   150 400


 


    Sodium Chloride 0.9% 1, 400 150 400





    000 ml @ 50 mls/hr IV .   





    Q20H [facility] Rx#:[id number]   


 


  Oral   250


 


Other:   


 


  Voiding Method Diaper Diaper Diaper





 Incontinent Incontinent Incontinent


 


  # Voids 1 2 














- Exam


Ulceration to lateral foot has adherent Slough with minimal granulation, 

ulceration to dorsal foot shows granulation with minimal adherent Slough








- Labs


CBC & Chem 7: 


                                 01/15/20 06:31





                                 01/15/20 06:31


Labs: 


                  Abnormal Lab Results - Last 24 Hours (Table)











  01/14/20 01/15/20 01/15/20 Range/Units





  20:21 06:31 06:31 


 


WBC   3.6 L   (3.8-10.6)  k/uL


 


RBC   2.86 L   (3.80-5.40)  m/uL


 


Hgb   8.7 L   (11.4-16.0)  gm/dL


 


Hct   27.6 L   (34.0-46.0)  %


 


Glucose    433 H  (74-99)  mg/dL


 


POC Glucose (mg/dL)  114 H    (75-99)  mg/dL


 


Calcium    8.2 L  (8.4-10.2)  mg/dL


 


Total Protein    5.3 L  (6.3-8.2)  g/dL


 


Albumin    2.5 L  (3.5-5.0)  g/dL














  01/15/20 01/15/20 Range/Units





  07:21 11:56 


 


WBC    (3.8-10.6)  k/uL


 


RBC    (3.80-5.40)  m/uL


 


Hgb    (11.4-16.0)  gm/dL


 


Hct    (34.0-46.0)  %


 


Glucose    (74-99)  mg/dL


 


POC Glucose (mg/dL)  478 H  269 H  (75-99)  mg/dL


 


Calcium    (8.4-10.2)  mg/dL


 


Total Protein    (6.3-8.2)  g/dL


 


Albumin    (3.5-5.0)  g/dL








                      Microbiology - Last 24 Hours (Table)











 01/13/20 11:20 Gram Stain - Preliminary





 Foot - Right Tissue Culture - Preliminary





    Coagulase Negative Staph














Assessment and Plan


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 

exposed


Current Visit: Yes   Status: Acute   Code(s): L97.512 - NON-PRS CHRONIC ULCER 

OTH PRT RIGHT FOOT W FAT LAYER EXPOSED   SNOMED Code(s): 010695809


   





(2) Diabetes mellitus due to underlying condition with foot ulcer


Current Visit: Yes   Status: Acute   Code(s): E08.621 - DIABETES MELLITUS DUE TO

UNDERLYING CONDITION W FOOT ULCER; L97.509 - NON-PRESSURE CHRONIC ULCER OTH PRT 

UNSP FOOT W UNSP SEVERITY   SNOMED Code(s): 8358648


   





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 

part of foot


Current Visit: No   Status: Acute   Code(s): I70.235 - ATHSCL NATIVE ARTERIES OF

RIGHT LEG W ULCER OTH PRT FOOT   SNOMED Code(s): 159790641206195


   


Plan: 


Obstructive silver and foam applied to right lower extremity, right total 

contact cast applied, nonweightbearing to right lower extremity.  Zinc barrier 

cream applied to left anterior lower extremity with foam and Kerlix.  Patient to

return to the wound care center next Wednesday for her weekly appointment.  

Instructed given on weightbearing status patient verbalized understanding





DNP note has been reviewed and discussed with Dr. Ferrell and the impression 

and plan of care has been directed as dictated.

## 2020-01-15 NOTE — P.PN
Subjective


Progress Note Date: 01/15/20





Morenita Pham is a 59-year-old female with known history of insulin-dependent 

diabetes mellitus type 1 and peripheral vascular disease who was recently 

discharged from [facility] to the nursing home.  Patient was 

therefore short time, she was found to have a glucose level of 665 she was 

having mental status changes she was sent back to [facility] 

emergency room, she was started on IV insulin drip and was admitted to medical 

floor.  Patient has multiple medical problems including history of hypertension,

history of hyperlipidemia, history of asthma, history of severe peripheral 

vascular disease with chronic wounds on the right leg requiring toe amputation, 

history of stroke, history of congestive heart failure, history of coronary 

artery disease with previous history of angioplasty and stent placement, history

of depression with anxiety disorder.





Patient was seen and examined on 01/11/2020, she is alert slightly confused in 

no apparent distress, she has been maintained on IV insulin drip and her glucose

level is below 200 this time, there is no fever or chills no headache or 

dizziness no chest pain no shortness of breath no cough no nausea or vomiting no

abdominal pain no diarrhea no burning with urination no frequency or urgency and

no hematuria.





On 01/12/2020 patient was seen and examined on the medical floor, she is alert 

slightly confused in no distress, glucose level is well-controlled at this time,

she had an episode of hypoglycemia this morning, at this time will decrease 

Levemir dose to 20 units down from 22 units and monitor glucose level closely.  

On review of systems patient is complaining of pain in her lower extremity, 

otherwise she denies any complaints there is no fever or chills no headache or 

dizziness no chest pain no shortness of breath no cough no nausea or vomiting no

abdominal pain no diarrhea and no urinary symptoms.





On 01/13/2020 patient is alert and resting comfortably in bed.  Plans for 

debridement today with Dr. Dash of foot wound.  Long-acting insulin insulin 

currently on hold.  Blood sugar this a.m. 176.  This time patient denies chest 

pain or shortness of breath.  Patient moving diarrhea.  Denies any urinary 

burning or frequency





On 01/14/2020 patient is status post debridement of right foot wounds with Dr. Dash.  She is currently postop day 1.  Blood sugars after surgical procedure 

greater than 400.  Patient was started back on insulin drip.  Current blood 

sugar 166.  Will transition patient to NovoLog 6 units and 12 units of long-

acting insulin will continue to monitor blood sugars closely due to known 

brittle diabetic.  At this time patient denies chest pain or shortness of 

breath.  Patient denies nausea vomiting or diarrhea.  Patient denies any urinary

burning or frequency.





On 01/15/2020 patient is currently postop day 2 from debridement of right foot 

wounds.  Discussed case with Mary nurse practitioner with Dr. Dash.  Planning 

to switch boot tomorrow.  Blood sugars remain fluctuating.  Blood sugar last 

night 112 per nursing staff long-acting insulin was held blood sugar this a.m. 

478.  At this time patient denies chest pain or shortness of breath.  Patient 

denies nausea vomiting or diarrhea.  Patient denies any urinary burning or 

frequency











Objective





- Vital Signs


Vital signs: 


                                   Vital Signs











Temp  97.7 F   01/15/20 08:00


 


Pulse  103 H  01/15/20 08:00


 


Resp  19   01/15/20 08:00


 


BP  136/81   01/15/20 08:00


 


Pulse Ox  96   01/15/20 08:00








                                 Intake & Output











 01/14/20 01/15/20 01/15/20





 18:59 06:59 18:59


 


Intake Total 400 150 


 


Balance 400 150 


 


Intake:   


 


   150 


 


    Sodium Chloride 0.9% 1, 400 150 





    000 ml @ 50 mls/hr IV .   





    Q20H [facility] Rx#:[id number]   


 


Other:   


 


  Voiding Method Diaper Diaper 





 Incontinent Incontinent 


 


  # Voids 1 2 














- Exam





In general patient is alert slightly confused in no apparent distress


HEENT head normocephalic and atraumatic


Neck is supple no JVD no goiter no lymphadenopathy


Chest exam reveals a few scattered rhonchi no wheezing


Cardiac exam reveals regular heart sounds no gallops no murmurs


Abdomen is soft nontender no organomegaly with normal bowel sounds


Extremity exam reveals no edema no cyanosis or clubbing, right foot with 

previous second toe amputation and open wounds.  Right boot in place dressing is

clean dry and intact


Neurological examination reveals weakness related to previous stroke without any

acute neurological changes








- Labs


CBC & Chem 7: 


                                 01/15/20 06:31





                                 01/15/20 06:31


Labs: 


                  Abnormal Lab Results - Last 24 Hours (Table)











  01/14/20 01/14/20 01/14/20 Range/Units





  09:27 09:27 10:57 


 


WBC     (3.8-10.6)  k/uL


 


RBC  2.84 L    (3.80-5.40)  m/uL


 


Hgb  8.3 L    (11.4-16.0)  gm/dL


 


Hct  27.1 L    (34.0-46.0)  %


 


MCHC  30.5 L    (31.0-37.0)  g/dL


 


Glucose   259 H   (74-99)  mg/dL


 


POC Glucose (mg/dL)    223 H  (75-99)  mg/dL


 


Calcium   8.2 L   (8.4-10.2)  mg/dL


 


Total Protein   5.1 L   (6.3-8.2)  g/dL


 


Albumin   2.3 L   (3.5-5.0)  g/dL














  01/14/20 01/14/20 01/15/20 Range/Units





  11:50 20:21 06:31 


 


WBC    3.6 L  (3.8-10.6)  k/uL


 


RBC    2.86 L  (3.80-5.40)  m/uL


 


Hgb    8.7 L  (11.4-16.0)  gm/dL


 


Hct    27.6 L  (34.0-46.0)  %


 


MCHC     (31.0-37.0)  g/dL


 


Glucose     (74-99)  mg/dL


 


POC Glucose (mg/dL)  261 H  114 H   (75-99)  mg/dL


 


Calcium     (8.4-10.2)  mg/dL


 


Total Protein     (6.3-8.2)  g/dL


 


Albumin     (3.5-5.0)  g/dL














  01/15/20 01/15/20 Range/Units





  06:31 07:21 


 


WBC    (3.8-10.6)  k/uL


 


RBC    (3.80-5.40)  m/uL


 


Hgb    (11.4-16.0)  gm/dL


 


Hct    (34.0-46.0)  %


 


MCHC    (31.0-37.0)  g/dL


 


Glucose  433 H   (74-99)  mg/dL


 


POC Glucose (mg/dL)   478 H  (75-99)  mg/dL


 


Calcium  8.2 L   (8.4-10.2)  mg/dL


 


Total Protein  5.3 L   (6.3-8.2)  g/dL


 


Albumin  2.5 L   (3.5-5.0)  g/dL








                      Microbiology - Last 24 Hours (Table)











 01/13/20 11:20 Gram Stain - Preliminary





 Foot - Right Tissue Culture - Preliminary














Assessment and Plan


Assessment: 





#1 severe hyperglycemia likely related to noncompliance with diet, patient 

consumes large amount of candies in a binge manner occasionally.  She was 

counseled in length in that regard, she was started on IV insulin drip in the 

emergency room .  Patient was started back on insulin drip post surgical 

intervention.  At this time insulin drip will be DC'd.  Patient will be started 

back on NovoLog sliding scale +6 units with meals and 12 units of Levemir





#2 right foot wound infection with osteomyelitis, continue doxycycline by mouth.

 Status post debridement of right foot once per Dr. Dash.  joel is 

currently postop day 2. Per nurse practitioner in the planning to switch boot 

tomorrow 01/16/2020 prior to discharge





#3 underlying history of hypertension





#4 underlying history of hyperlipidemia





#5 underlying history of congestive heart failure





#6 underlying history of coronary artery disease with previous history of 

angioplasty and stent placement





#7 underlying history of depression with anxiety disorder





DVT prophylaxis heparin.  GI prophylaxis Pepcid





Continue to  regarding diet


Continue with oral antibiotic doxycycline


PT and OT has been consulted





I performed an examination of the patient and discussed their management with 

the Nurse Practitioner.  I have reviewed the Nurse Practitioner's notes and 

agree with the documented findings and plan of care

## 2020-01-15 NOTE — CDI
Documentation Clarification Form



Date: 01/15/2020 02:37:55 PM

From: Lana Guthrie RN CCDS

Phone: [phone]

MRN: [id number]

Admit Date: 01/12/2020 02:17:00 PM

Patient Name: Morenita Ramirez

Visit Number: [id number]

Discharge Date:  



ATTENTION: The Clinical Documentation Specialists (CDI) and [facility] Coding Staff 
appreciate your assistance in clarifying documentation. Please respond to the 
clarification below the line at the bottom and electronically sign. The CDI & 
[facility] Coding staff will review the response and follow-up if needed. Please note: 
Queries are made part of the Legal Health Record. If you have any questions, 
please contact the author of this message via ITS.



Dr. Saniya Cabral



Altered Mental Status was documented in the H & P 



History/Risk Factors: 59-year-old female with a medical history of DM, COPD, HTN
with recent admission for DKA and wound infection. 

Clinical Indicators: She was found to have a glucose level of 665 she was 
having mental status changes she was sent back to [facility] 
Emergency Room, she was started on IV insulin drip and admitted to medical 
floor [facility] 

Per your Progress note 1/11 she is alert slightly confused in no apparent 
distress, she has been maintained on IV insulin drip and her glucose level is 
below 200 this time.

Per your Progress note 1/12  She is alert slightly confused in no distress, 
glucose level is well controlled at this time, she had an episode of 
hypoglycemia this morning, at this time will decrease Levemir  dose to 20 units 
down from 22 units and monitor glucose level closely.

Labs: 1/10/20 Glucose 649; POC Glucose 1/11/20 152; 1/11/20 POC glucose 327; 
1/12/20 Glucose 25; 1/13/20 POC glucose 558; 1/14/2020 POC Glucose 114; 1/15/20 
POC Glucose 478; 

Treatment:  1/10 Humulin R 6 unit iv x1; Insulin Human Regular 101mls @ 5.772 
mls/hr iv Q [id number], 1/11 Insulin Aspart sliding scale ACHS; Levemir 22 unit HS;
Insulin Glargine 25 units HS ; orders changed 1/14 discontinued ivpb insulin, 
and Insulin Glargine, Levemir 22 unit HS;  1/14 Levemir 12units HS  



In your professional opinion, please clarify the etiology of the Altered Mental 
Status, if known.



* Metabolic Encephalopathy secondary to hyperglycemia 

* Metabolic Encephalopathy ruled out

* Metabolic Encephalopathy (please specify) ___________ 

* Other condition (please specify) _________________

* Unable to determine



(Last Revision: April 2018)

___________________________________________________________________________



metabolic encephalopathy secondary to hyperglycemia

MTDD

## 2020-01-15 NOTE — P.PCN
Date of Procedure: 01/15/20


Procedure(s) Performed: 


Provider: Guillermina


Pre-and postop diagnosis :[ Arthrosclerosis of the native vessels of right leg 

with ulceration of other part of foot, nonpressure chronic ulcer of other part 

of foot with muscle exposed, diabetes with foot ulcer]


Procedure: Placement of [right ] total contact cast





Procedure: With the patient in supine position, we first placed the thinwall 

stockinette with the upper portion at the top of the patella. Redundancy was 

taped to the dorsum of the foot.  We then placed the felt pad, taping it over 

the tibia, dorsum of the foot, plantar aspect of the foot, and Achilles tendon. 

Corners were cut at the heel.  The disks were taped over the malleoli.  We then 

placed the heavy fishnet stockinette with the upper portion at the lower margin 

of the patella.  Redundancy was taped to the dorsum of the foot.  The patient 

was then placed in prone position.  Cast material was wetted and rolled onto the

foot and leg.  The upper edges of the stockinettes were rolled over the top.  

Wrinkles were smoothed out.  Redundancy was smoothed onto the dorsum of the 

foot.  When the cast was hardened, patient was placed in the walking boot.  

Patient tolerated the procedure well.


Abrasion noted to left lower extremity related to rubbing of the cast.  Zinc 

applied to site with foam and Kerlix to secure.

## 2020-01-16 LAB
ALBUMIN SERPL-MCNC: 2.5 G/DL (ref 3.5–5)
ALP SERPL-CCNC: 81 U/L (ref 38–126)
ALT SERPL-CCNC: 16 U/L (ref 4–34)
ANION GAP SERPL CALC-SCNC: 2 MMOL/L
AST SERPL-CCNC: 17 U/L (ref 14–36)
BASOPHILS # BLD AUTO: 0 K/UL (ref 0–0.2)
BASOPHILS NFR BLD AUTO: 1 %
BUN SERPL-SCNC: 19 MG/DL (ref 7–17)
CALCIUM SPEC-MCNC: 8.4 MG/DL (ref 8.4–10.2)
CHLORIDE SERPL-SCNC: 106 MMOL/L (ref 98–107)
CO2 SERPL-SCNC: 32 MMOL/L (ref 22–30)
EOSINOPHIL # BLD AUTO: 0.1 K/UL (ref 0–0.7)
EOSINOPHIL NFR BLD AUTO: 1 %
ERYTHROCYTE [DISTWIDTH] IN BLOOD BY AUTOMATED COUNT: 2.75 M/UL (ref 3.8–5.4)
ERYTHROCYTE [DISTWIDTH] IN BLOOD: 15 % (ref 11.5–15.5)
GLUCOSE BLD-MCNC: 138 MG/DL (ref 75–99)
GLUCOSE BLD-MCNC: 185 MG/DL (ref 75–99)
GLUCOSE BLD-MCNC: 244 MG/DL (ref 75–99)
GLUCOSE BLD-MCNC: 65 MG/DL (ref 75–99)
GLUCOSE BLD-MCNC: 77 MG/DL (ref 75–99)
GLUCOSE BLD-MCNC: 89 MG/DL (ref 75–99)
GLUCOSE SERPL-MCNC: 69 MG/DL (ref 74–99)
HCT VFR BLD AUTO: 26 % (ref 34–46)
HGB BLD-MCNC: 8.4 GM/DL (ref 11.4–16)
LYMPHOCYTES # SPEC AUTO: 1.4 K/UL (ref 1–4.8)
LYMPHOCYTES NFR SPEC AUTO: 36 %
MCH RBC QN AUTO: 30.5 PG (ref 25–35)
MCHC RBC AUTO-ENTMCNC: 32.3 G/DL (ref 31–37)
MCV RBC AUTO: 94.5 FL (ref 80–100)
MONOCYTES # BLD AUTO: 0.2 K/UL (ref 0–1)
MONOCYTES NFR BLD AUTO: 5 %
NEUTROPHILS # BLD AUTO: 2.2 K/UL (ref 1.3–7.7)
NEUTROPHILS NFR BLD AUTO: 55 %
PLATELET # BLD AUTO: 212 K/UL (ref 150–450)
POTASSIUM SERPL-SCNC: 4 MMOL/L (ref 3.5–5.1)
PROT SERPL-MCNC: 5.3 G/DL (ref 6.3–8.2)
SODIUM SERPL-SCNC: 140 MMOL/L (ref 137–145)
WBC # BLD AUTO: 4 K/UL (ref 3.8–10.6)

## 2020-01-16 RX ADMIN — INSULIN ASPART SCH: 100 INJECTION, SOLUTION INTRAVENOUS; SUBCUTANEOUS at 21:28

## 2020-01-16 RX ADMIN — CEFAZOLIN SCH MLS/HR: 330 INJECTION, POWDER, FOR SOLUTION INTRAMUSCULAR; INTRAVENOUS at 03:55

## 2020-01-16 RX ADMIN — INSULIN ASPART SCH UNIT: 100 INJECTION, SOLUTION INTRAVENOUS; SUBCUTANEOUS at 17:19

## 2020-01-16 RX ADMIN — FAMOTIDINE SCH MG: 20 TABLET, FILM COATED ORAL at 09:51

## 2020-01-16 RX ADMIN — ASPIRIN 81 MG CHEWABLE TABLET SCH MG: 81 TABLET CHEWABLE at 09:51

## 2020-01-16 RX ADMIN — INSULIN ASPART SCH: 100 INJECTION, SOLUTION INTRAVENOUS; SUBCUTANEOUS at 07:23

## 2020-01-16 RX ADMIN — INSULIN ASPART SCH: 100 INJECTION, SOLUTION INTRAVENOUS; SUBCUTANEOUS at 13:10

## 2020-01-16 RX ADMIN — CEFAZOLIN SCH MLS/HR: 330 INJECTION, POWDER, FOR SOLUTION INTRAMUSCULAR; INTRAVENOUS at 21:49

## 2020-01-16 RX ADMIN — DOXYCYCLINE SCH MG: 100 CAPSULE ORAL at 09:51

## 2020-01-16 RX ADMIN — HEPARIN SODIUM SCH UNIT: 5000 INJECTION, SOLUTION INTRAVENOUS; SUBCUTANEOUS at 09:51

## 2020-01-16 RX ADMIN — Medication SCH MG: at 17:18

## 2020-01-16 RX ADMIN — INSULIN DETEMIR SCH: 100 INJECTION, SOLUTION SUBCUTANEOUS at 21:29

## 2020-01-16 RX ADMIN — INSULIN ASPART SCH UNIT: 100 INJECTION, SOLUTION INTRAVENOUS; SUBCUTANEOUS at 17:18

## 2020-01-16 RX ADMIN — ATORVASTATIN CALCIUM SCH MG: 20 TABLET, FILM COATED ORAL at 09:51

## 2020-01-16 RX ADMIN — VALPROIC ACID SCH MG: 250 SOLUTION ORAL at 09:50

## 2020-01-16 RX ADMIN — DULOXETINE SCH MG: 60 CAPSULE, DELAYED RELEASE ORAL at 09:51

## 2020-01-16 RX ADMIN — INSULIN ASPART SCH: 100 INJECTION, SOLUTION INTRAVENOUS; SUBCUTANEOUS at 07:24

## 2020-01-16 RX ADMIN — HEPARIN SODIUM SCH UNIT: 5000 INJECTION, SOLUTION INTRAVENOUS; SUBCUTANEOUS at 21:33

## 2020-01-16 NOTE — P.DS
Providers


Date of admission: 


01/12/20 14:17





Expected date of discharge: 01/16/20


Attending physician: 


Saniya Cabral





Consults: 





                                        





01/14/20 08:09


Consult Physician Routine 


   Consulting Provider: Gerardo Ferrell


   Consult Reason/Comments: s/p debridement, established patient


   Do you want consulting provider notified?: Yes





01/16/20 08:43


Consult Physician Routine 


   Consulting Provider: Mian Snyder


   Consult Reason/Comments: culture antibiotics for d/c


   Do you want consulting provider notified?: Yes











Primary care physician: 


Saniya [facility] Course: 





Discharge diagnosis





#1 severe hyperglycemia likely related to noncompliance with diet, patient 

consumes large amount of candies in a binge manner occasionally.  She was 

counseled in length in that regard, she was started on IV insulin drip in the 

emergency room .  Patient was started back on insulin drip post surgical 

intervention.  At this time insulin drip will be DC'd.  Patient will be started 

back on NovoLog sliding scale +6 units with meals and 12 units of Levemir.  

Sugars have improved.  Blood sugar this a.m. 69 patient did receive 12 units of 

Levemir last night.  Patient will be DC'd home on sliding scale +6 units with 

meals +12 units of Levemir at night.  Patient educated to monitor sugar intake.





#2 right foot wound infection with osteomyelitis, continue doxycycline by mouth.

 Status post debridement of right foot once per Dr. Dash.  lashondan is 

currently postop day 2. Per nurse practitioner in the planning to switch boot 

tomorrow 01/16/2020 prior to discharge.  Dr. Snyder has been consulted to 

evaluate patient prior to discharge continue doxycycline





#3 underlying history of hypertension





#4 underlying history of hyperlipidemia





#5 underlying history of congestive heart failure





#6 underlying history of coronary artery disease with previous history of 

angioplasty and stent placement





#7 underlying history of depression with anxiety disorder





Hospital course





Morenita Pham is a 59-year-old female with known history of insulin-dependent 

diabetes mellitus type 1 and peripheral vascular disease who was recently 

discharged from [facility] to the nursing home.  Patient was 

therefore short time, she was found to have a glucose level of 665 she was 

having mental status changes she was sent back to [facility] 

emergency room, she was started on IV insulin drip and was admitted to medical 

floor.  Patient has multiple medical problems including history of hypertension,

history of hyperlipidemia, history of asthma, history of severe peripheral 

vascular disease with chronic wounds on the right leg requiring toe amputation, 

history of stroke, history of congestive heart failure, history of coronary 

artery disease with previous history of angioplasty and stent placement, history

of depression with anxiety disorder.





Patient was seen and examined on 01/11/2020, she is alert slightly confused in 

no apparent distress, she has been maintained on IV insulin drip and her glucose

level is below 200 this time, there is no fever or chills no headache or 

dizziness no chest pain no shortness of breath no cough no nausea or vomiting no

abdominal pain no diarrhea no burning with urination no frequency or urgency and

no hematuria.





On 01/12/2020 patient was seen and examined on the medical floor, she is alert 

slightly confused in no distress, glucose level is well-controlled at this time,

she had an episode of hypoglycemia this morning, at this time will decrease 

Levemir dose to 20 units down from 22 units and monitor glucose level closely.  

On review of systems patient is complaining of pain in her lower extremity, 

otherwise she denies any complaints there is no fever or chills no headache or 

dizziness no chest pain no shortness of breath no cough no nausea or vomiting no

abdominal pain no diarrhea and no urinary symptoms.





On 01/13/2020 patient is alert and resting comfortably in bed.  Plans for debrid

ement today with Dr. Dash of foot wound.  Long-acting insulin insulin 

currently on hold.  Blood sugar this a.m. 176.  This time patient denies chest 

pain or shortness of breath.  Patient moving diarrhea.  Denies any urinary 

burning or frequency





On 01/14/2020 patient is status post debridement of right foot wounds with Dr. Dash.  She is currently postop day 1.  Blood sugars after surgical procedure 

greater than 400.  Patient was started back on insulin drip.  Current blood 

sugar 166.  Will transition patient to NovoLog 6 units and 12 units of long-

acting insulin will continue to monitor blood sugars closely due to known 

brittle diabetic.  At this time patient denies chest pain or shortness of 

breath.  Patient denies nausea vomiting or diarrhea.  Patient denies any urinary

burning or frequency.





On 01/15/2020 patient is currently postop day 2 from debridement of right foot 

wounds.  Discussed case with Mary nurse practitioner with Dr. Dash.  Planning 

to switch boot tomorrow.  Blood sugars remain fluctuating.  Blood sugar last 

night 112 per nursing staff long-acting insulin was held blood sugar this a.m. 

478.  At this time patient denies chest pain or shortness of breath.  Patient 

denies nausea vomiting or diarrhea.  Patient denies any urinary burning or 

frequency





On 01/16/2020 patient is currently postop day 3 from debridement of right foot 

wounds.  Patient has been cleared for discharge from surgical services.  Patient

to return to wound care center next Wednesday for her weekly appointment contact

cast has been applied.  Blood sugars have improved.  Insulin has been adjusted 

to 12 units of Levemir at night +6 units and sliding scale with meals.  NovoLog 

sliding scale to be printed and given to caretaker.  Dr. Snyder to evaluate 

patient prior to discharge patient will be likely discharged on doxycycline.





I performed an examination of the patient and discussed their management with 

the Nurse Practitioner.  I have reviewed the Nurse Practitioner's notes and ag

ree with the documented findings and plan of care


Patient Condition at Discharge: Stable





Plan - Discharge Summary


Discharge Rx Participant: Yes


New Discharge Prescriptions: 


New


   INSULIN ASPART (NovoLOG) [NovoLOG (formulary)] 0 unit SQ ACHS  vial





Continue


   Albuterol Inhaler [Ventolin Hfa Inhaler] 2 puff INHALATION RT-Q4H PRN


     PRN Reason: Shortness Of Breath


   Famotidine [Pepcid] 20 mg PO DAILY


   Valproic Acid [Depakene] 250 mg PO DAILY


   Ergocalciferol [Vitamin D2 (DRISDOL)] 50,000 unit PO SA


   HYDROcodone/APAP 10-325MG [Norco ] 1 tab PO Q6H PRN


     PRN Reason: Pain


   DULoxetine HCL [Cymbalta] 60 mg PO DAILY


   ALPRAZolam [Xanax] 1 mg PO Q8H


   Ondansetron [Zofran] 4 mg PO DAILY PRN


     PRN Reason: Nausea


   QUEtiapine [SEROquel] 100 mg PO HS


   QUEtiapine FUMARATE [SEROquel] 25 mg PO BID


   Atorvastatin [Lipitor] 20 mg PO DAILY


   Vitamin B Complex With Vit C 1 tab PO DAILY


   INSULIN LISPRO (humaLOG) [humaLOG] 6 units SQ AC-TID


   Aspirin [Adult Low Dose Aspirin EC] 81 mg PO DAILY


   Ferrous Sulfate [Iron (65 MG Elemental)] 325 mg PO DAILY


   Doxycycline [Vibramycin] 100 mg PO BID 10 Days #20 capsule





Changed


   Insulin Glargine [Lantus] 12 unit SQ HS #0


Discharge Medication List





Albuterol Inhaler [Ventolin Hfa Inhaler] 2 puff INHALATION RT-Q4H PRN 02/19/16 

[History]


Famotidine [Pepcid] 20 mg PO DAILY 02/19/16 [History]


Valproic Acid [Depakene] 250 mg PO DAILY 02/19/16 [History]


Ergocalciferol [Vitamin D2 (DRISDOL)] 50,000 unit PO SA 10/06/16 [History]


HYDROcodone/APAP 10-325MG [Norco ] 1 tab PO Q6H PRN 05/06/17 [History]


DULoxetine HCL [Cymbalta] 60 mg PO DAILY 09/19/17 [History]


ALPRAZolam [Xanax] 1 mg PO Q8H 09/22/17 [History]


Ondansetron [Zofran] 4 mg PO DAILY PRN 04/09/18 [History]


Atorvastatin [Lipitor] 20 mg PO DAILY 07/31/19 [History]


QUEtiapine FUMARATE [SEROquel] 25 mg PO BID 07/31/19 [History]


QUEtiapine [SEROquel] 100 mg PO HS 07/31/19 [History]


Vitamin B Complex With Vit C 1 tab PO DAILY 01/02/20 [History]


Aspirin [Adult Low Dose Aspirin EC] 81 mg PO DAILY 01/10/20 [History]


Ferrous Sulfate [Iron (65 MG Elemental)] 325 mg PO DAILY 01/10/20 [History]


INSULIN LISPRO (humaLOG) [humaLOG] 6 units SQ AC-TID 01/10/20 [History]


Doxycycline [Vibramycin] 100 mg PO BID 10 Days #20 capsule 01/16/20 [Rx]


INSULIN ASPART (NovoLOG) [NovoLOG (formulary)] 0 unit SQ ACHS  vial 01/16/20 

[Rx]


Insulin Glargine [Lantus] 12 unit SQ HS #0 01/16/20 [Rx]








Follow up Appointment(s)/Referral(s): 


[facility], [NON-STAFF] - 


Wound Healing,Center [NON-STAFF] - 01/22/20 2:00 pm


Saniya Cabral MD [Primary Care Provider] - 1-2 days


Patient Instructions/Handouts:  Diabetic Ketoacidosis (DC)


Activity/Diet/Wound Care/Special Instructions: 


Activity as tolerated





Diet consistent carb





Insulin has been adjusted to long-acting decreasing down to 12 units at night 

plus sliding scale and 6 units of NovoLog with meals.  Nursing staff to provide 

sliding-scale to caretaker








Discharge Disposition: HOME WITH HOME HEALTH SERVICES

## 2020-01-16 NOTE — CONS
CONSULTATION



DATE OF SERVICE:

2020



REASON FOR CONSULTATION:

Right diabetic foot infection with osteomyelitis and discharge antibiotic

recommendations.



HISTORY OF PRESENT ILLNESS:

The patient is a 59-year-old  female who was recently admitted to this

facility.  She had a right diabetic foot infection with a wound both on the right

median and lateral side with evidence of osteomyelitis at the base of the fifth toe.

Cultures were positive for Staph epi.  The patient was evaluated on her last visit by

Vascular Surgery and they recommended transmetatarsal amputation.  The patient was

discharged home on oral doxycycline.  Apparently the patient was readmitted the very

next day.  Subsequently the patient has been evaluated by Vascular Surgery and did have

debridement of the right foot wound with excision including bone, tendon and soft

tissue.  The patient's OR cultures are now positive for Staph epidermidis.  The patient

has been maintained on oral doxycycline.  I was asked to see the patient today prior to

discharge for discharge antibiotic recommendations. The patient currently denies having

any chest pain or shortness of breath or cough.  No nausea, no vomiting.  No abdominal

pain or pain to the right foot, which is currently covered with a total contact cast

that was applied yesterday.



REVIEW OF SYSTEMS:

Positive points have been mentioned in HPI.  Rest of the systems are negative.



PAST MEDICAL HISTORY:

Her past medical history is significant for diabetes mellitus, CVA, TIA, COPD, coronary

artery disease, asthma, MI, hypertension, hyperlipidemia, right diabetic foot

infection, previous history of MRSA.



PAST SURGICAL HISTORY:

Appendectomy, , cholecystectomy, heart catheterization with stent,

hysterectomy.



SOCIAL HISTORY:

Remote history of smoking.  Did admit to marijuana use.  No drinking.



FAMILY HISTORY:

Father with history of diabetes and coronary artery disease.  Mother with history of

COPD.



ALLERGIES:

CEPHALEXIN, PENICILLIN, PHENOBARBITAL, AMLODIPINE.



MEDICATIONS:

Medications currently include Norco, Ventolin, Xanax, aspirin, Cymbalta, Pepcid, iron

sulfate, heparin, NovoLog, Levemir, Narcan, Zofran, Seroquel and Depakote.



PHYSICAL EXAMINATION:

His blood pressure is 150/60 with a pulse of 90, temperature 98.3. She is 99% on room

air.

General description is a middle-aged female lying in bed in no distress.  No tachypnea

or accessory muscle of respiration use.

HEENT examination shows pallor. No scleral icterus.  Oral mucosa membrane is dry.  No

pharyngeal erythema or thrush.

NECK: Trachea is central. No thyromegaly.

LUNGS: Unlabored breathing. Clear to auscultation anteriorly.

HEART: S1, S2.  Regular rate and rhythm.

ABDOMEN: Soft. No tenderness. No guarding or rigidity.

EXTREMITIES:  Right foot is currently in a total contact cast.

Neurologically, patient is awake, alert, oriented x3.  Mood and affect normal.



LABS:

Hemoglobin 8.4, white count 4.6, BUN of 19, creatinine 0.94.  Electrolytes and liver

enzymes are normal.  Wound culture with Staph epidermidis with vancomycin AKASH of 2.



DIAGNOSTIC IMPRESSION AND PLAN:

1. Patient with right diabetic foot infection with underlying osteomyelitis at the

    base of the fifth toe.  Patient is status post debridement, as this patient did

    undergo transmetatarsal amputation as was previously suggested and with concern for

    underlying osteomyelitis.  The patient will need to be on IV antibiotic therapy in

    order to completely heal this osteomyelitis.

2. Patient with multiple ANTIBIOTIC ALLERGIES. That will limit the number of

    antibiotics safe to use.



PLAN:

1. Discontinue doxycycline.

2. Will start the patient on daptomycin 6 mg/kg as the Staph epi AKASH to vancomycin has

    been 2 and with concern for possible failure of treatment.

3. Continue local wound care per Vascular Surgery.

4. She will get a PICC line. Once the outpatient IV antibiotic are arranged, she will

    go home from ID standpoint.  This has been discussed in detail with the nurse

    practitioner for the admitting team.





MMODL / IJN: [id number] / Job#: [id number]

## 2020-01-16 NOTE — XMS REPORT
Referral Summary

                           Created on:2020



Patient:Morenita Ramirez

Sex:Female

:1960

External Reference #:[id number]





Demographics







                          Address                   [address]



                                                    [place] [address]

 

                          Phone                     [phone]

 

                          Preferred Language        en

 

                          Marital Status            Not  or 

 

                          Confucianism Affiliation     Unknown

 

                          Race                      White

 

                          Ethnic Group              Not  or 









Author







                          Name                      Jacit

 

                          Address                   [address]



                                                    Unavailable



                                                    [place] [address]









Care Team Providers







                    Name                Role                Phone

 

                    Mariaelena            Unavailable         Unavailable

 

                    Matthew                Unavailable         Unavailable

 

                    Bri            Unavailable         Unavailable

 

                    [facility]            Un[facility]         Unavailable









Allergies, Adverse Reactions and Alerts







                Substance       Reaction        Reaction Severity Status

 

                phenobarbital   cant breathe    Severe          Active

 

                morphine        cant breathe    Severe          Active

 

                penicillin      rash            Moderate        Active

 

                bee venom protein (honey bee) cant breathe    Severe          Ac

tive







Medications







                Medication      Directions      Start Date      Status

 

                alprazolam 0.5 mg tablet 1 tablet oral   Unspecified     active

 

                valproic acid 250 mg capsule 1 capsule oral  Unspecified     act

yadi

 

                Seroquel 100 mg tablet 1 tablet oral   Unspecified     active

 

                Norco 5 mg-325 mg tablet 1 tablet oral   Unspecified     active

 

                Cymbalta 20 mg capsule,delayed 1 capsule,delayed release(DR/EC) 

Unspecified     

active



                release         oral                            

 

                Zofran 4 mg tablet 1 tablet oral/daily 2019      active

 

                Lipitor 10 mg tablet 1 tablet oral/daily 2019      active

 

                Ventolin HFA 90 mcg/actuation 1 HFA aerosol inhaler inhalation 1

2019      active



                aerosol inhaler                                 

 

                Pepcid 20 mg tablet 1 tablet oral/daily 2019      active

 

                Lantus U-100 Insulin 100 unit/mL 25 solution subcutaneous/daily 

2019      

active



                subcutaneous solution                                 

 

                aspirin 81 mg chewable tablet 1 tablet,chewable oral/daily       active

 

                B Complex 1.7 mg-20 mg-2 mg-1.2 1 liquid sublingual 2019  

    active



                mg/mL sublingual liquid                                 

 

                Santyl 250 unit/gram topical ointment topical apply to ulcers       active



                ointment        nickel in depth, edge to edge,                 



                                30 day supply                   







Problems







                    Problem             Onset Date          Status

 

                    I70.235 - Atherosclerosis of native arteries of right leg wi

th 2019          active



                    ulceration of other part of foot                     

 

                    L97.512 - Non-pressure chronic ulcer of other part of right 

foot 2019          

active



                    with fat layer exposed                     

 

                    E08.621 - Diabetes mellitus due to underlying condition with

 foot 2019          

active



                    ulcer                                   







Encounters







                          Date                      Location

 

                          2019 12:00:00 AM    [facility]

 

                          Encounter Diagnosis: I70.235 - Atherosclerosis 



                          of native arteries of right leg with 



                          ulceration of other part of foot 

 

                          Encounter Diagnosis: L97.512 - Non-pressure 



                          chronic ulcer of other part of right foot with 



                          fat layer exposed         

 

                          Encounter Diagnosis: E08.621 - Diabetes 



                          mellitus due to underlying condition with foot 



                          ulcer                     







Vital signs







                    Vital               Value               Unit

 

                    Height              62                  [in_i]

 

                    Weight Measured     120                 [lb_av]

 

                    BP Systolic         148                 mm[Hg]

 

                    BP Diastolic        107                 mm[Hg]

 

                    BMI (Body Mass Index) 21.9                Unspecified

 

                    Weight Measured     54.55               kg

 

                    Body Temperature    98.1                [degF]

 

                    Body Temperature    36.72               Aye

 

                    O2 % BldC Oximetry  Unspecified         Unspecified

 

                    Heart Rate          94                  /min

 

                    Respiratory Rate    18                  /min

 

                    Inhaled O2 concentration Unspecified         Unspecified







Immunizations







                    Name                Date                Status

 

                    Immunization information has not been included or does not e

xist.                     







Procedures







                    Procedure           Date                Status

 

                    Debridement; sub-Q tissue (includes epidermis and dermis, if

 2019          

Completed



                    performed), first 20 sqcm or less                     

 

                    Debridement (eg. high pressure waterjet w/wo suction, sharp 

2019          Completed



                    selective debridement with scissors, scalpel, &             

        



                    forceps)including topical application(s), total wound surfac

e                     



                    area; 1st 20 sqcm or less                     







Social History

Smoking Status: Current some day smoker



Goals







                                        Description

 

                                        Goal: Necrotic/devitalized tissue will b

e minimized in the wound bed

 

                                        Goal: Patient/caregiver will verbalize u

nderstanding of the [facility]



                                        [facility]

 

                                        Goal: Patient will remain free from deve

lopment of additional pressure ulcers

 

                                        Goal: Patient/caregiver will verbalize u

nderstanding of pressure ulcer 

management

 

                                        Goal: Patient/caregiver will verbalize u

nderstanding of skin care regimen

 

                                        Goal: Ulcer/skin breakdown will have a v

olume reduction of 30% by week 4







Health Concerns







                                        Description

 

                                        Problem: Necrotic Tissue

 

                                        Problem: Orientation to the Wound Care P

rogram

 

                                        Problem: Pressure

 

                                        Problem: Wound/Skin Impairment







Functional Status







                          Description               Date

 

                          Cognitive Status: Alert and Oriented x 4 (Active) 

 

                          Ambulatory Status - Wheel Chair (Active) 2019







Assessment and Plan







                                        Description

 

                                        Radiology: X-ray, foot. Notes: r/o osteo

.

 

                                        Other: FOOTCMRT.  2019.

 

                                        Plan of Treatment: Apply topical anesthe

tic as ordered

 

                                        Plan of Treatment: Excisional debridemen

t

 

                                        Plan of Treatment: Test ordered outside 

of clinic

 

                                        Plan of Treatment: Patient referred for 

pressure reduction/relief devices

 

                                        Plan of Treatment: Test ordered outside 

of clinic

 

                                        Plan of Treatment: Patient referred to h

ome care

 

                                        Plan of Treatment: Referred to [facility] for dressing supplies

 

                                        Plan of Treatment: Skin care regimen ini

tiated

 

                                        Plan of Treatment: Topical wound managem

ent initiated

 

                                        Assessment: The patient is status post a

mputation of the right great toe.  The



                                        patient is status post CVA and has invol

untary movements.  She subsequently 

kicked



                                        and distorted the dressings in her right

 foot enough to create ulcerations.  She

 has



                                        known occlusive disease but appears to h

ave adequate circulation for



                                        [id number]: Patient is tolerating

 dressings without any difficulties.  

Sutures



                                        are starting to lift from the right grea

t toe.2019: Patient is complaining

 of



                                        pain to the right foot radiating to the 

leg.  Patient has started using Santyl.







Results







           Name       Specimen   Value      Unit       Ref. Range Date

 

           Result information has not been included or does                     

                        



           not exist.                                             







Medical Equipment







                Implanted       Area            [place]             Assigning Author

marisol

 

                Medical Equipment information has not been included or          

                       



                does not exist.                                 







Reason for Referral

transition of care

## 2020-01-17 LAB
BASOPHILS # BLD AUTO: 0 K/UL (ref 0–0.2)
BASOPHILS NFR BLD AUTO: 1 %
EOSINOPHIL # BLD AUTO: 0.1 K/UL (ref 0–0.7)
EOSINOPHIL NFR BLD AUTO: 2 %
ERYTHROCYTE [DISTWIDTH] IN BLOOD BY AUTOMATED COUNT: 2.76 M/UL (ref 3.8–5.4)
ERYTHROCYTE [DISTWIDTH] IN BLOOD: 15 % (ref 11.5–15.5)
GLUCOSE BLD-MCNC: 130 MG/DL (ref 75–99)
GLUCOSE BLD-MCNC: 281 MG/DL (ref 75–99)
GLUCOSE BLD-MCNC: 332 MG/DL (ref 75–99)
GLUCOSE BLD-MCNC: 390 MG/DL (ref 75–99)
GLUCOSE BLD-MCNC: 398 MG/DL (ref 75–99)
GLUCOSE BLD-MCNC: 42 MG/DL (ref 75–99)
GLUCOSE BLD-MCNC: 426 MG/DL (ref 75–99)
GLUCOSE BLD-MCNC: 435 MG/DL (ref 75–99)
GLUCOSE BLD-MCNC: 77 MG/DL (ref 75–99)
HCT VFR BLD AUTO: 26.9 % (ref 34–46)
HGB BLD-MCNC: 8.6 GM/DL (ref 11.4–16)
LYMPHOCYTES # SPEC AUTO: 1.3 K/UL (ref 1–4.8)
LYMPHOCYTES NFR SPEC AUTO: 41 %
MCH RBC QN AUTO: 31.1 PG (ref 25–35)
MCHC RBC AUTO-ENTMCNC: 31.9 G/DL (ref 31–37)
MCV RBC AUTO: 97.4 FL (ref 80–100)
MONOCYTES # BLD AUTO: 0.2 K/UL (ref 0–1)
MONOCYTES NFR BLD AUTO: 5 %
NEUTROPHILS # BLD AUTO: 1.6 K/UL (ref 1.3–7.7)
NEUTROPHILS NFR BLD AUTO: 50 %
PLATELET # BLD AUTO: 191 K/UL (ref 150–450)
WBC # BLD AUTO: 3.2 K/UL (ref 3.8–10.6)

## 2020-01-17 PROCEDURE — 02HV33Z INSERTION OF INFUSION DEVICE INTO SUPERIOR VENA CAVA, PERCUTANEOUS APPROACH: ICD-10-PCS

## 2020-01-17 RX ADMIN — INSULIN ASPART SCH UNIT: 100 INJECTION, SOLUTION INTRAVENOUS; SUBCUTANEOUS at 17:19

## 2020-01-17 RX ADMIN — CEFAZOLIN SCH MLS/HR: 330 INJECTION, POWDER, FOR SOLUTION INTRAMUSCULAR; INTRAVENOUS at 15:00

## 2020-01-17 RX ADMIN — INSULIN ASPART SCH: 100 INJECTION, SOLUTION INTRAVENOUS; SUBCUTANEOUS at 12:15

## 2020-01-17 RX ADMIN — HEPARIN SODIUM SCH: 5000 INJECTION, SOLUTION INTRAVENOUS; SUBCUTANEOUS at 08:51

## 2020-01-17 RX ADMIN — FAMOTIDINE SCH MG: 20 TABLET, FILM COATED ORAL at 09:38

## 2020-01-17 RX ADMIN — Medication SCH MG: at 12:20

## 2020-01-17 RX ADMIN — INSULIN ASPART SCH: 100 INJECTION, SOLUTION INTRAVENOUS; SUBCUTANEOUS at 21:58

## 2020-01-17 RX ADMIN — INSULIN ASPART SCH UNIT: 100 INJECTION, SOLUTION INTRAVENOUS; SUBCUTANEOUS at 17:18

## 2020-01-17 RX ADMIN — ASPIRIN 81 MG CHEWABLE TABLET SCH MG: 81 TABLET CHEWABLE at 14:59

## 2020-01-17 RX ADMIN — VALPROIC ACID SCH MG: 250 SOLUTION ORAL at 09:39

## 2020-01-17 RX ADMIN — HEPARIN SODIUM SCH UNIT: 5000 INJECTION, SOLUTION INTRAVENOUS; SUBCUTANEOUS at 21:58

## 2020-01-17 RX ADMIN — INSULIN DETEMIR SCH UNIT: 100 INJECTION, SOLUTION SUBCUTANEOUS at 21:55

## 2020-01-17 RX ADMIN — INSULIN ASPART SCH UNIT: 100 INJECTION, SOLUTION INTRAVENOUS; SUBCUTANEOUS at 07:39

## 2020-01-17 RX ADMIN — INSULIN ASPART SCH UNIT: 100 INJECTION, SOLUTION INTRAVENOUS; SUBCUTANEOUS at 07:40

## 2020-01-17 RX ADMIN — DULOXETINE SCH MG: 60 CAPSULE, DELAYED RELEASE ORAL at 09:38

## 2020-01-17 NOTE — P.DS
Providers


Date of admission: 


01/12/20 14:17





Expected date of discharge: 01/17/20


Attending physician: 


Saniya Cabral





Consults: 





                                        





01/14/20 08:09


Consult Physician Routine 


   Consulting Provider: Gerardo Ferrell


   Consult Reason/Comments: s/p debridement, established patient


   Do you want consulting provider notified?: Yes





01/16/20 08:43


Consult Physician Routine 


   Consulting Provider: Mian Snyder


   Consult Reason/Comments: culture antibiotics for d/c


   Do you want consulting provider notified?: Yes











Primary care physician: 


Saniyalloyd Cabral





[facility] Course: 





discharge diagnosis


#1 severe hyperglycemia likely related to noncompliance with diet, patient 

consumes large amount of candies in a binge manner occasionally.  She was 

counseled in length in that regard, she was started on IV insulin drip in the 

emergency room .  Patient was started back on insulin drip post surgical 

intervention.  At this time insulin drip will be DC'd.  Patient will be started 

back on NovoLog sliding scale +6 units with meals and 12 units of Levemir.  

Sugars have improved.  Blood sugar this a.m. 69 patient did receive 12 units of 

Levemir last night.  Patient will be DC'd home on sliding scale +6 units with 

meals +12 units of Levemir at night.  Patient educated to monitor sugar intake. 

Patient's blood sugar still fluctuating patient is known brittle diabetic will 

be DC'd on 12 units a Levemir and sliding scale insulin only





#2 right foot wound infection with osteomyelitis, continue doxycycline by mouth.

 Status post debridement of right foot once per Dr. Dash.  lashondan is 

currently postop day 2. Per nurse practitioner in the planning to switch boot 

tomorrow 01/16/2020 prior to discharge.  Discussed case with Dr. Snyder.  Patient

did have PICC line placement and will be DC'd on daptomycin IV antibiotics per 

infectious disease.  Patient is planning to go home with home healthcare.  

Patient has been cleared for discharge from infectious disease standpoint





#3 underlying history of hypertension





#4 underlying history of hyperlipidemia





#5 underlying history of congestive heart failure





#6 underlying history of coronary artery disease with previous history of angio

plasty and stent placement








Hospital course








Morenita Pham is a 59-year-old female with known history of insulin-dependent 

diabetes mellitus type 1 and peripheral vascular disease who was recently 

discharged from [facility] to the nursing home.  Patient was 

therefore short time, she was found to have a glucose level of 665 she was 

having mental status changes she was sent back to [facility] 

emergency room, she was started on IV insulin drip and was admitted to medical 

floor.  Patient has multiple medical problems including history of hypertension,

history of hyperlipidemia, history of asthma, history of severe peripheral 

vascular disease with chronic wounds on the right leg requiring toe amputation, 

history of stroke, history of congestive heart failure, history of coronary 

artery disease with previous history of angioplasty and stent placement, history

of depression with anxiety disorder.





Patient was seen and examined on 01/11/2020, she is alert slightly confused in 

no apparent distress, she has been maintained on IV insulin drip and her glucose

level is below 200 this time, there is no fever or chills no headache or 

dizziness no chest pain no shortness of breath no cough no nausea or vomiting no

abdominal pain no diarrhea no burning with urination no frequency or urgency and

no hematuria.





On 01/12/2020 patient was seen and examined on the medical floor, she is alert 

slightly confused in no distress, glucose level is well-controlled at this time,

she had an episode of hypoglycemia this morning, at this time will decrease 

Levemir dose to 20 units down from 22 units and monitor glucose level closely.  

On review of systems patient is complaining of pain in her lower extremity, 

otherwise she denies any complaints there is no fever or chills no headache or 

dizziness no chest pain no shortness of breath no cough no nausea or vomiting no

abdominal pain no diarrhea and no urinary symptoms.





On 01/13/2020 patient is alert and resting comfortably in bed.  Plans for 

debridement today with Dr. Dash of foot wound.  Long-acting insulin insulin 

currently on hold.  Blood sugar this a.m. 176.  This time patient denies chest 

pain or shortness of breath.  Patient moving diarrhea.  Denies any urinary 

burning or frequency





On 01/14/2020 patient is status post debridement of right foot wounds with Dr. Dash.  She is currently postop day 1.  Blood sugars after surgical procedure 

greater than 400.  Patient was started back on insulin drip.  Current blood 

sugar 166.  Will transition patient to NovoLog 6 units and 12 units of long-

acting insulin will continue to monitor blood sugars closely due to known 

brittle diabetic.  At this time patient denies chest pain or shortness of 

breath.  Patient denies nausea vomiting or diarrhea.  Patient denies any urinary

burning or frequency.





On 01/15/2020 patient is currently postop day 2 from debridement of right foot 

wounds.  Discussed case with Mary nurse practitioner with Dr. Dash.  Planning 

to switch boot tomorrow.  Blood sugars remain fluctuating.  Blood sugar last 

night 112 per nursing staff long-acting insulin was held blood sugar this a.m. 

478.  At this time patient denies chest pain or shortness of breath.  Patient 

denies nausea vomiting or diarrhea.  Patient denies any urinary burning or 

frequency





On 01/16/2020 patient is currently postop day 3 from debridement of right foot 

wounds.  Patient has been cleared for discharge from surgical services.  Patient

to return to wound care center next Wednesday for her weekly appointment contact

cast has been applied.  Blood sugars have improved.  Insulin has been adjusted 

to 12 units of Levemir at night +6 units and sliding scale with meals.  NovoLog 

sliding scale to be printed and given to caretaker.  Dr. Snyder to evaluate 

patient prior to discharge patient will be likely discharged on doxycycline.





On 01/17/2020 patient is alert and oriented 3.  Patient did have PICC line 

placement.  Patient will be DC'd on daptomycin per infectious disease 

recommendation.  Antibiotics have been ordered per ID.  Insulin has been 

adjusted to Levemir 12 units at night with sliding scale.  Patient to follow-up 

with PCP for further management.  Patient will be discharged home with home 

healthcare PICC line has been placed.  Patient denies chest pain or shortness 

breath.  Patient denies nausea vomiting or diarrhea.  Patient denies any urinary

burning or frequency





I performed an examination of the patient and discussed their management with 

the Nurse Practitioner.  I have reviewed the Nurse Practitioner's notes and 

agree with the documented findings and plan of care








Patient Condition at Discharge: Stable





Plan - Discharge Summary


Discharge Rx Participant: Yes


New Discharge Prescriptions: 


New


   INSULIN ASPART (NovoLOG) [NovoLOG (formulary)] 0 unit SQ ACHS  vial


   DAPTOmycin [Daptomycin] 350 mg IV DAILY #40 vial





Continue


   Albuterol Inhaler [Ventolin Hfa Inhaler] 2 puff INHALATION RT-Q4H PRN


     PRN Reason: Shortness Of Breath


   Famotidine [Pepcid] 20 mg PO DAILY


   Valproic Acid [Depakene] 250 mg PO DAILY


   Ergocalciferol [Vitamin D2 (DRISDOL)] 50,000 unit PO SA


   HYDROcodone/APAP 10-325MG [Norco ] 1 tab PO Q6H PRN


     PRN Reason: Pain


   DULoxetine HCL [Cymbalta] 60 mg PO DAILY


   ALPRAZolam [Xanax] 1 mg PO Q8H


   Ondansetron [Zofran] 4 mg PO DAILY PRN


     PRN Reason: Nausea


   QUEtiapine [SEROquel] 100 mg PO HS


   QUEtiapine FUMARATE [SEROquel] 25 mg PO BID


   Atorvastatin [Lipitor] 20 mg PO DAILY


   Vitamin B Complex With Vit C 1 tab PO DAILY


   Aspirin [Adult Low Dose Aspirin EC] 81 mg PO DAILY


   Ferrous Sulfate [Iron (65 MG Elemental)] 325 mg PO DAILY





Changed


   Insulin Glargine [Lantus] 12 unit SQ HS #0





Discontinued


   Doxycycline [Vibramycin] 100 mg PO BID 7 Days #14 capsule


   INSULIN LISPRO (humaLOG) [humaLOG] 6 units SQ AC-TID


Discharge Medication List





Albuterol Inhaler [Ventolin Hfa Inhaler] 2 puff INHALATION RT-Q4H PRN 02/19/16 

[History]


Famotidine [Pepcid] 20 mg PO DAILY 02/19/16 [History]


Valproic Acid [Depakene] 250 mg PO DAILY 02/19/16 [History]


Ergocalciferol [Vitamin D2 (DRISDOL)] 50,000 unit PO SA 10/06/16 [History]


HYDROcodone/APAP 10-325MG [Norco ] 1 tab PO Q6H PRN 05/06/17 [History]


DULoxetine HCL [Cymbalta] 60 mg PO DAILY 09/19/17 [History]


ALPRAZolam [Xanax] 1 mg PO Q8H 09/22/17 [History]


Ondansetron [Zofran] 4 mg PO DAILY PRN 04/09/18 [History]


Atorvastatin [Lipitor] 20 mg PO DAILY 07/31/19 [History]


QUEtiapine FUMARATE [SEROquel] 25 mg PO BID 07/31/19 [History]


QUEtiapine [SEROquel] 100 mg PO HS 07/31/19 [History]


Vitamin B Complex With Vit C 1 tab PO DAILY 01/02/20 [History]


Aspirin [Adult Low Dose Aspirin EC] 81 mg PO DAILY 01/10/20 [History]


Ferrous Sulfate [Iron (65 MG Elemental)] 325 mg PO DAILY 01/10/20 [History]


INSULIN ASPART (NovoLOG) [NovoLOG (formulary)] 0 unit SQ ACHS  vial 01/16/20 

[Rx]


Insulin Glargine [Lantus] 12 unit SQ HS #0 01/16/20 [Rx]


DAPTOmycin [Daptomycin] 350 mg IV DAILY #40 vial 01/17/20 [Rx]








Follow up Appointment(s)/Referral(s): 


[facility], [NON-STAFF] - 


Wound Healing,[place] [NON-STAFF] - 01/22/20 2:00 pm


Saniya Cabral MD [Primary Care Provider] - 1-2 days


Mian Snyder MD [STAFF PHYSICIAN] - 1 Week


Ambulatory/Diagnostic Orders: 


Basic Metabolic Panel [LAB.AMB] Location: None Selected


C Reactive Protein [LAB.AMB] Location: None Selected


Complete Blood Count w/diff [LAB.AMB] Location: None Selected


Erythrocyte Sedimentation Rate [LAB.AMB] Location: None Selected


Patient Instructions/Handouts:  Diabetic Ketoacidosis (DC)


Activity/Diet/Wound Care/Special Instructions: 


Activity as tolerated





Diet consistent carb





Insulin has been adjusted to long-acting decreasing down to 12 units at night 

plus sliding scale and 6 units of NovoLog with meals.  Nursing staff to provide 

sliding-scale to caretaker








Discharge Disposition: HOME WITH HOME HEALTH SERVICES

## 2020-01-17 NOTE — IR
PICC LINE PLACEMENT:

 

HISTORY:  Infection requiring long-term antibiotic therapy

 

PROCEDURE:  Ultrasound and fluoroscopic guidance of PICC line placement.

 

COMPLICATIONS:  None

 

ANESTHESIA:  1. 1% Lidocaine locally.

 

FINDINGS/TECHNIQUE:  The procedure was explained to the patient.  The risks, complications, benefits 
and alternatives were discussed and any questions were answered.  Informed consent was obtained.  The
 patient was placed supine on the fluoroscopic table and prepped and draped in the usual sterile [facility]
ion.  Utilizing a  21 gauge needle and sonographic and fluoroscopic guidance, access in the vein was 
achieved and there is placement of a 0.018 guidewire.  The vein is patent.  A 4-F sheath was placed o
orlin the guidewire.  The guidewire and dilator were removed and a 4-F. PICC line was placed through th
e sheath with the tip at the level of the SVC.  The sheath was removed, the catheter was flushed and 
sutured into position.  The patient was stable throughout the procedure and remained stable upon disc
harge from the Department of Radiology. 

 

The vein puncture was patent under ultrasound. A gray scale image was obtained to document patency of
 the vein punctured.

 

All elements of the maximal barrier technique were utilized.

 

FLUOROSCOPY TIME:  0.2 minutes and one image submitted

 

IMPRESSION: 

Successful PICC line placement under ultrasound and fluoroscopic guidance.

## 2020-01-17 NOTE — PN
PROGRESS NOTE



DATE OF SERVICE:

01/17/2020.



REASON FOR FOLLOWUP:

Right diabetic foot ulcer with osteomyelitis.



INTERVAL HISTORY:

The patient is currently afebrile.  The patient is breathing comfortably.  The patient

denies having any chest pain or shortness of breath or cough. No nausea, vomiting, or

any worsening pain to the right foot.



PHYSICAL EXAMINATION:

Blood pressure 132/78 with a pulse of 91, temperature 98. She is 97% on room air.

General description is a middle-aged female lying in bed in no distress.

RESPIRATORY SYSTEM: Unlabored breathing. Clear to auscultation anteriorly.

HEART: S1, S2.  Regular rate and rhythm.

ABDOMEN: Soft. No tenderness.

Right foot is currently covered with a total contact cast.



LABS:

Wound culture has been positive for Staph epi with vancomycin AKASH of 2.



DIAGNOSTIC IMPRESSION AND PLAN:

Patient with a right diabetic foot wound with osteomyelitis at the base of the fifth

toe in this patient who is status post surgical debridement.  Cultures have been

Staphylococcus epidermidis. These have been tissue cultures with a vancomycin AKASH of 2

and concern for vancomycin failure of treatment. Recommending daptomycin 6 mg/kg for a

total of 6 weeks.  Current dose is 350 mg daily with weekly monitoring of CBC, BMP,

sedimentation rate and CPK.  Once antibiotic is arranged, she will be able to go home

from ID standpoint.  Discussed with the  as well as the nurse practitioner

for the admitting team.





AMY / MIKAYLA: [id number] / Job#: [id number]

## 2020-01-18 LAB
ALBUMIN SERPL-MCNC: 2.5 G/DL (ref 3.5–5)
ALP SERPL-CCNC: 88 U/L (ref 38–126)
ALT SERPL-CCNC: 14 U/L (ref 4–34)
ANION GAP SERPL CALC-SCNC: 2 MMOL/L
AST SERPL-CCNC: 17 U/L (ref 14–36)
BUN SERPL-SCNC: 19 MG/DL (ref 7–17)
CALCIUM SPEC-MCNC: 8.5 MG/DL (ref 8.4–10.2)
CHLORIDE SERPL-SCNC: 109 MMOL/L (ref 98–107)
CO2 SERPL-SCNC: 31 MMOL/L (ref 22–30)
GLUCOSE BLD-MCNC: 128 MG/DL (ref 75–99)
GLUCOSE BLD-MCNC: 131 MG/DL (ref 75–99)
GLUCOSE BLD-MCNC: 215 MG/DL (ref 75–99)
GLUCOSE BLD-MCNC: 75 MG/DL (ref 75–99)
GLUCOSE BLD-MCNC: 81 MG/DL (ref 75–99)
GLUCOSE SERPL-MCNC: 72 MG/DL (ref 74–99)
MAGNESIUM SPEC-SCNC: 2.1 MG/DL (ref 1.6–2.3)
POTASSIUM SERPL-SCNC: 4.2 MMOL/L (ref 3.5–5.1)
PROT SERPL-MCNC: 5.5 G/DL (ref 6.3–8.2)
SODIUM SERPL-SCNC: 142 MMOL/L (ref 137–145)

## 2020-01-18 RX ADMIN — VALPROIC ACID SCH MG: 250 SOLUTION ORAL at 09:25

## 2020-01-18 RX ADMIN — INSULIN ASPART SCH UNIT: 100 INJECTION, SOLUTION INTRAVENOUS; SUBCUTANEOUS at 17:11

## 2020-01-18 RX ADMIN — INSULIN DETEMIR SCH UNIT: 100 INJECTION, SOLUTION SUBCUTANEOUS at 21:53

## 2020-01-18 RX ADMIN — CEFAZOLIN SCH: 330 INJECTION, POWDER, FOR SOLUTION INTRAMUSCULAR; INTRAVENOUS at 16:27

## 2020-01-18 RX ADMIN — HEPARIN SODIUM SCH UNIT: 5000 INJECTION, SOLUTION INTRAVENOUS; SUBCUTANEOUS at 21:53

## 2020-01-18 RX ADMIN — DULOXETINE SCH MG: 60 CAPSULE, DELAYED RELEASE ORAL at 09:25

## 2020-01-18 RX ADMIN — INSULIN ASPART SCH: 100 INJECTION, SOLUTION INTRAVENOUS; SUBCUTANEOUS at 09:26

## 2020-01-18 RX ADMIN — INSULIN ASPART SCH: 100 INJECTION, SOLUTION INTRAVENOUS; SUBCUTANEOUS at 12:09

## 2020-01-18 RX ADMIN — FAMOTIDINE SCH MG: 20 TABLET, FILM COATED ORAL at 09:25

## 2020-01-18 RX ADMIN — ERGOCALCIFEROL SCH UNIT: 1.25 CAPSULE ORAL at 09:25

## 2020-01-18 RX ADMIN — Medication SCH MG: at 12:09

## 2020-01-18 RX ADMIN — INSULIN ASPART SCH: 100 INJECTION, SOLUTION INTRAVENOUS; SUBCUTANEOUS at 21:05

## 2020-01-18 RX ADMIN — HEPARIN SODIUM SCH UNIT: 5000 INJECTION, SOLUTION INTRAVENOUS; SUBCUTANEOUS at 09:26

## 2020-01-18 RX ADMIN — ASPIRIN 81 MG CHEWABLE TABLET SCH MG: 81 TABLET CHEWABLE at 09:25

## 2020-01-18 RX ADMIN — HYDROCODONE BITARTRATE AND ACETAMINOPHEN PRN EACH: 10; 325 TABLET ORAL at 20:06

## 2020-01-18 NOTE — P.PN
Subjective


Progress Note Date: 01/18/20





Morneita Pham is a 59-year-old female with known history of insulin-dependent 

diabetes mellitus type 1 and peripheral vascular disease who was recently 

discharged from [facility] to the nursing home.  Patient was 

therefore short time, she was found to have a glucose level of 665 she was 

having mental status changes she was sent back to [facility] 

emergency room, she was started on IV insulin drip and was admitted to medical 

floor.  Patient has multiple medical problems including history of hypertension,

history of hyperlipidemia, history of asthma, history of severe peripheral 

vascular disease with chronic wounds on the right leg requiring toe amputation, 

history of stroke, history of congestive heart failure, history of coronary 

artery disease with previous history of angioplasty and stent placement, history

of depression with anxiety disorder.





Patient was seen and examined on 01/11/2020, she is alert slightly confused in 

no apparent distress, she has been maintained on IV insulin drip and her glucose

level is below 200 this time, there is no fever or chills no headache or 

dizziness no chest pain no shortness of breath no cough no nausea or vomiting no

abdominal pain no diarrhea no burning with urination no frequency or urgency and

no hematuria.





On 01/12/2020 patient was seen and examined on the medical floor, she is alert 

slightly confused in no distress, glucose level is well-controlled at this time,

she had an episode of hypoglycemia this morning, at this time will decrease 

Levemir dose to 20 units down from 22 units and monitor glucose level closely.  

On review of systems patient is complaining of pain in her lower extremity, 

otherwise she denies any complaints there is no fever or chills no headache or 

dizziness no chest pain no shortness of breath no cough no nausea or vomiting no

abdominal pain no diarrhea and no urinary symptoms.





On 01/13/2020 patient is alert and resting comfortably in bed.  Plans for 

debridement today with Dr. Dash of foot wound.  Long-acting insulin insulin 

currently on hold.  Blood sugar this a.m. 176.  This time patient denies chest 

pain or shortness of breath.  Patient moving diarrhea.  Denies any urinary 

burning or frequency





On 01/14/2020 patient is status post debridement of right foot wounds with Dr. Dash.  She is currently postop day 1.  Blood sugars after surgical procedure 

greater than 400.  Patient was started back on insulin drip.  Current blood 

sugar 166.  Will transition patient to NovoLog 6 units and 12 units of long-

acting insulin will continue to monitor blood sugars closely due to known 

brittle diabetic.  At this time patient denies chest pain or shortness of 

breath.  Patient denies nausea vomiting or diarrhea.  Patient denies any urinary

burning or frequency.





On 01/15/2020 patient is currently postop day 2 from debridement of right foot 

wounds.  Discussed case with Mary nurse practitioner with Dr. Dash.  Planning 

to switch boot tomorrow.  Blood sugars remain fluctuating.  Blood sugar last 

night 112 per nursing staff long-acting insulin was held blood sugar this a.m. 

478.  At this time patient denies chest pain or shortness of breath.  Patient 

denies nausea vomiting or diarrhea.  Patient denies any urinary burning or 

frequency





On 01/16/2020 patient is currently postop day 3 from debridement of right foot 

wounds.  Patient has been cleared for discharge from surgical services.  Patient

to return to wound care center next Wednesday for her weekly appointment contact

cast has been applied.  Blood sugars have improved.  Insulin has been adjusted 

to 12 units of Levemir at night +6 units and sliding scale with meals.  NovoLog 

sliding scale to be printed and given to caretaker.  Dr. Snyder to evaluate 

patient prior to discharge patient will be likely discharged on doxycycline.





On 01/17/2020 patient is alert and oriented 3.  Patient did have PICC line 

placement.  Patient will be DC'd on daptomycin per infectious disease 

recommendation.  Antibiotics have been ordered per ID.  Insulin has been 

adjusted to Levemir 12 units at night with sliding scale.  Patient to follow-up 

with PCP for further management.  Patient will be discharged home with home 

healthcare PICC line has been placed.  Patient denies chest pain or shortness 

breath.  Patient denies nausea vomiting or diarrhea.  Patient denies any urinary

burning or frequency





On 1/18/2019 Patient is alert and oriented x 3. discharge on hold until 

insurance prior auth is complete for home IVAB.  At this time patient denies 

chest pain or shortness of breath. denies nausea vomiting or diarrhea. denies 

any urinary burning or frequency





Objective





- Vital Signs


Vital signs: 


                                   Vital Signs











Temp  97.3 F L  01/18/20 07:00


 


Pulse  68   01/18/20 07:00


 


Resp  15   01/18/20 07:00


 


BP  103/61   01/18/20 07:00


 


Pulse Ox  98   01/18/20 07:00








                                 Intake & Output











 01/17/20 01/18/20 01/18/20





 18:59 06:59 18:59


 


Intake Total 400 450 


 


Balance 400 450 


 


Intake:   


 


    


 


    Sodium Chloride 0.9% 1, 400  





    000 ml @ 50 mls/hr IV .   





    Q20H [facility] Rx#:[id number]   


 


  Oral  450 


 


Other:   


 


  Voiding Method Diaper Diaper 





 Incontinent Incontinent 


 


  # Voids  1 














- Exam





In general patient is alert slightly confused in no apparent distress


HEENT head normocephalic and atraumatic


Neck is supple no JVD no goiter no lymphadenopathy


Chest exam reveals a few scattered rhonchi no wheezing


Cardiac exam reveals regular heart sounds no gallops no murmurs


Abdomen is soft nontender no organomegaly with normal bowel sounds


Extremity exam reveals no edema no cyanosis or clubbing, right foot with 

previous second toe amputation and open wounds.  Right boot in place dressing is

clean dry and intact


Neurological examination reveals weakness related to previous stroke without any

acute neurological changes








- Labs


CBC & Chem 7: 


                                 01/17/20 06:32





                                 01/18/20 07:48


Labs: 


                  Abnormal Lab Results - Last 24 Hours (Table)











  01/17/20 01/17/20 01/17/20 Range/Units





  06:32 12:15 16:56 


 


Chloride     ()  mmol/L


 


Carbon Dioxide     (22-30)  mmol/L


 


BUN     (7-17)  mg/dL


 


Glucose     (74-99)  mg/dL


 


POC Glucose (mg/dL)   42 L  281 H  (75-99)  mg/dL


 


C-Reactive Protein  23.7 H    (<10.0)  mg/L


 


Total Protein     (6.3-8.2)  g/dL


 


Albumin     (3.5-5.0)  g/dL














  01/17/20 01/18/20 Range/Units





  21:48 07:48 


 


Chloride   109 H  ()  mmol/L


 


Carbon Dioxide   31 H  (22-30)  mmol/L


 


BUN   19 H  (7-17)  mg/dL


 


Glucose   72 L  (74-99)  mg/dL


 


POC Glucose (mg/dL)  130 H   (75-99)  mg/dL


 


C-Reactive Protein    (<10.0)  mg/L


 


Total Protein   5.5 L  (6.3-8.2)  g/dL


 


Albumin   2.5 L  (3.5-5.0)  g/dL








                      Microbiology - Last 24 Hours (Table)











 01/13/20 11:20 Anaerobic Culture - Final





 Foot - Right 














Assessment and Plan


Assessment: 





#1 severe hyperglycemia likely related to noncompliance with diet, patient 

consumes large amount of candies in a binge manner occasionally.  She was 

counseled in length in that regard, she was started on IV insulin drip in the 

emergency room .  Patient was started back on insulin drip post surgical 

intervention.  At this time insulin drip will be DC'd.  Patient will be started 

back on NovoLog sliding scale +6 units with meals and 12 units of Levemir.  

Sugars have improved.  Blood sugar this a.m. 69 patient did receive 12 units of 

Levemir last night.  Patient will be DC'd home on sliding scale +6 units with 

meals +12 units of Levemir at night.  Patient educated to monitor sugar intake. 

Patient's blood sugar still fluctuating patient is known brittle diabetic will 

be DC'd on 12 units a Levemir and sliding scale insulin only





#2 right foot wound infection with osteomyelitis, continue doxycycline by mouth.

 Status post debridement of right foot once per Dr. Dash.  lashondan is 

currently postop day 2. Per nurse practitioner in the planning to switch boot 

tomorrow 01/16/2020 prior to discharge.  Discussed case with Dr. Snyder.  Patient

did have PICC line placement and will be DC'd on daptomycin IV antibiotics per 

infectious disease.  Patient is planning to go home with home healthcare.  

Patient has been cleared for discharge from infectious disease standpoint





#3 underlying history of hypertension





#4 underlying history of hyperlipidemia





#5 underlying history of congestive heart failure





#6 underlying history of coronary artery disease with previous history of 

angioplasty and stent placement





DVT prophylaxis heparin, GI prophylaxis pepcid.


waiting on insurance prior auth for home antibiotics





I performed an examination of the patient and discussed their management with [facility] Nurse Practitioner.  I have reviewed the Nurse Practitioner's notes and agree 

with the documented findings and plan of care

## 2020-01-18 NOTE — PN
PROGRESS NOTE



DATE OF SERVICE:

01/18/2020.



REASON FOR FOLLOWUP:

Right foot osteomyelitis.



INTERVAL HISTORY:

The patient is currently afebrile.  The patient is breathing comfortably.  Denies

having any chest pain, shortness of breath or cough.  No nausea, vomiting, abdominal

pain, or any worsening pain in the right foot.



PHYSICAL EXAMINATION:

Blood pressure 149/71 with a pulse of 106, temperature of 98.2. She is 93% on room air.

General description is a middle-aged female, lying in bed in no distress.  Respiratory

system: Unlabored breathing.  Clear to auscultation anteriorly.  Heart S1, S2.  Regular

rate and rhythm.

ABDOMEN:  Soft, no tenderness.



LABS:

BUN of 19, creatinine 0.96.



DIAGNOSTIC IMPRESSION AND PLAN:

Patient with right diabetic foot wound with underlying osteomyelitis in this patient

with  culture positive for Staph epi with vancomycin _____.  The patient is currently

covered with daptomycin.  Waiting for insurance approval for discharge. Continue

supportive care.





MMODL / IJN: [id number] / Job#: [id number]

## 2020-01-19 LAB
ALBUMIN SERPL-MCNC: 2.5 G/DL (ref 3.5–5)
ALP SERPL-CCNC: 91 U/L (ref 38–126)
ALT SERPL-CCNC: 15 U/L (ref 4–34)
ANION GAP SERPL CALC-SCNC: 2 MMOL/L
AST SERPL-CCNC: 23 U/L (ref 14–36)
BASOPHILS # BLD AUTO: 0 K/UL (ref 0–0.2)
BASOPHILS NFR BLD AUTO: 1 %
BUN SERPL-SCNC: 20 MG/DL (ref 7–17)
CALCIUM SPEC-MCNC: 8.5 MG/DL (ref 8.4–10.2)
CHLORIDE SERPL-SCNC: 106 MMOL/L (ref 98–107)
CO2 SERPL-SCNC: 31 MMOL/L (ref 22–30)
EOSINOPHIL # BLD AUTO: 0.1 K/UL (ref 0–0.7)
EOSINOPHIL NFR BLD AUTO: 1 %
ERYTHROCYTE [DISTWIDTH] IN BLOOD BY AUTOMATED COUNT: 2.84 M/UL (ref 3.8–5.4)
ERYTHROCYTE [DISTWIDTH] IN BLOOD: 15.4 % (ref 11.5–15.5)
GLUCOSE BLD-MCNC: 117 MG/DL (ref 75–99)
GLUCOSE BLD-MCNC: 166 MG/DL (ref 75–99)
GLUCOSE BLD-MCNC: 213 MG/DL (ref 75–99)
GLUCOSE BLD-MCNC: 269 MG/DL (ref 75–99)
GLUCOSE BLD-MCNC: 470 MG/DL (ref 75–99)
GLUCOSE BLD-MCNC: 477 MG/DL (ref 75–99)
GLUCOSE SERPL-MCNC: 122 MG/DL (ref 74–99)
HCT VFR BLD AUTO: 27.3 % (ref 34–46)
HGB BLD-MCNC: 8.5 GM/DL (ref 11.4–16)
LYMPHOCYTES # SPEC AUTO: 1.3 K/UL (ref 1–4.8)
LYMPHOCYTES NFR SPEC AUTO: 36 %
MCH RBC QN AUTO: 30.1 PG (ref 25–35)
MCHC RBC AUTO-ENTMCNC: 31.2 G/DL (ref 31–37)
MCV RBC AUTO: 96.3 FL (ref 80–100)
MONOCYTES # BLD AUTO: 0.2 K/UL (ref 0–1)
MONOCYTES NFR BLD AUTO: 6 %
NEUTROPHILS # BLD AUTO: 1.9 K/UL (ref 1.3–7.7)
NEUTROPHILS NFR BLD AUTO: 53 %
PLATELET # BLD AUTO: 188 K/UL (ref 150–450)
POTASSIUM SERPL-SCNC: 4.2 MMOL/L (ref 3.5–5.1)
PROT SERPL-MCNC: 5.4 G/DL (ref 6.3–8.2)
SODIUM SERPL-SCNC: 139 MMOL/L (ref 137–145)
WBC # BLD AUTO: 3.5 K/UL (ref 3.8–10.6)

## 2020-01-19 RX ADMIN — DULOXETINE SCH MG: 60 CAPSULE, DELAYED RELEASE ORAL at 10:07

## 2020-01-19 RX ADMIN — INSULIN ASPART SCH: 100 INJECTION, SOLUTION INTRAVENOUS; SUBCUTANEOUS at 09:40

## 2020-01-19 RX ADMIN — CEFAZOLIN SCH: 330 INJECTION, POWDER, FOR SOLUTION INTRAMUSCULAR; INTRAVENOUS at 12:03

## 2020-01-19 RX ADMIN — INSULIN ASPART SCH UNIT: 100 INJECTION, SOLUTION INTRAVENOUS; SUBCUTANEOUS at 12:03

## 2020-01-19 RX ADMIN — INSULIN ASPART SCH UNIT: 100 INJECTION, SOLUTION INTRAVENOUS; SUBCUTANEOUS at 21:47

## 2020-01-19 RX ADMIN — FAMOTIDINE SCH MG: 20 TABLET, FILM COATED ORAL at 10:07

## 2020-01-19 RX ADMIN — HEPARIN SODIUM SCH UNIT: 5000 INJECTION, SOLUTION INTRAVENOUS; SUBCUTANEOUS at 21:48

## 2020-01-19 RX ADMIN — Medication SCH MG: at 12:03

## 2020-01-19 RX ADMIN — INSULIN ASPART SCH UNIT: 100 INJECTION, SOLUTION INTRAVENOUS; SUBCUTANEOUS at 17:31

## 2020-01-19 RX ADMIN — VALPROIC ACID SCH MG: 250 SOLUTION ORAL at 10:16

## 2020-01-19 RX ADMIN — INSULIN DETEMIR SCH UNIT: 100 INJECTION, SOLUTION SUBCUTANEOUS at 21:47

## 2020-01-19 RX ADMIN — HEPARIN SODIUM SCH UNIT: 5000 INJECTION, SOLUTION INTRAVENOUS; SUBCUTANEOUS at 10:07

## 2020-01-19 RX ADMIN — ASPIRIN 81 MG CHEWABLE TABLET SCH MG: 81 TABLET CHEWABLE at 10:07

## 2020-01-19 NOTE — P.PN
Subjective


Progress Note Date: 01/19/20








Morenita Pham is a 59-year-old female with known history of insulin-dependent 

diabetes mellitus type 1 and peripheral vascular disease who was recently 

discharged from [facility] to the nursing home.  Patient was 

therefore short time, she was found to have a glucose level of 665 she was 

having mental status changes she was sent back to [facility] 

emergency room, she was started on IV insulin drip and was admitted to medical 

floor.  Patient has multiple medical problems including history of hypertension,

history of hyperlipidemia, history of asthma, history of severe peripheral 

vascular disease with chronic wounds on the right leg requiring toe amputation, 

history of stroke, history of congestive heart failure, history of coronary 

artery disease with previous history of angioplasty and stent placement, history

of depression with anxiety disorder.





Patient was seen and examined on 01/11/2020, she is alert slightly confused in 

no apparent distress, she has been maintained on IV insulin drip and her glucose

level is below 200 this time, there is no fever or chills no headache or 

dizziness no chest pain no shortness of breath no cough no nausea or vomiting no

abdominal pain no diarrhea no burning with urination no frequency or urgency and

no hematuria.





On 01/12/2020 patient was seen and examined on the medical floor, she is alert 

slightly confused in no distress, glucose level is well-controlled at this time,

she had an episode of hypoglycemia this morning, at this time will decrease 

Levemir dose to 20 units down from 22 units and monitor glucose level closely.  

On review of systems patient is complaining of pain in her lower extremity, 

otherwise she denies any complaints there is no fever or chills no headache or 

dizziness no chest pain no shortness of breath no cough no nausea or vomiting no

abdominal pain no diarrhea and no urinary symptoms.





On 01/13/2020 patient is alert and resting comfortably in bed.  Plans for 

debridement today with Dr. Dash of foot wound.  Long-acting insulin insulin 

currently on hold.  Blood sugar this a.m. 176.  This time patient denies chest 

pain or shortness of breath.  Patient moving diarrhea.  Denies any urinary 

burning or frequency





On 01/14/2020 patient is status post debridement of right foot wounds with Dr. Dash.  She is currently postop day 1.  Blood sugars after surgical procedure 

greater than 400.  Patient was started back on insulin drip.  Current blood 

sugar 166.  Will transition patient to NovoLog 6 units and 12 units of long-

acting insulin will continue to monitor blood sugars closely due to known 

brittle diabetic.  At this time patient denies chest pain or shortness of 

breath.  Patient denies nausea vomiting or diarrhea.  Patient denies any urinary

burning or frequency.





On 01/15/2020 patient is currently postop day 2 from debridement of right foot 

wounds.  Discussed case with Mary nurse practitioner with Dr. Dash.  Planning 

to switch boot tomorrow.  Blood sugars remain fluctuating.  Blood sugar last 

night 112 per nursing staff long-acting insulin was held blood sugar this a.m. 

478.  At this time patient denies chest pain or shortness of breath.  Patient 

denies nausea vomiting or diarrhea.  Patient denies any urinary burning or 

frequency





On 01/16/2020 patient is currently postop day 3 from debridement of right foot 

wounds.  Patient has been cleared for discharge from surgical services.  Patient

to return to wound care center next Wednesday for her weekly appointment contact

cast has been applied.  Blood sugars have improved.  Insulin has been adjusted 

to 12 units of Levemir at night +6 units and sliding scale with meals.  NovoLog 

sliding scale to be printed and given to caretaker.  Dr. Snyder to evaluate 

patient prior to discharge patient will be likely discharged on doxycycline.





On 01/17/2020 patient is alert and oriented 3.  Patient did have PICC line 

placement.  Patient will be DC'd on daptomycin per infectious disease 

recommendation.  Antibiotics have been ordered per ID.  Insulin has been 

adjusted to Levemir 12 units at night with sliding scale.  Patient to follow-up 

with PCP for further management.  Patient will be discharged home with home 

healthcare PICC line has been placed.  Patient denies chest pain or shortness 

breath.  Patient denies nausea vomiting or diarrhea.  Patient denies any urinary

burning or frequency





On 1/18/2020 Patient is alert and oriented x 3. discharge on hold until 

insurance prior auth is complete for home IVAB.  At this time patient denies 

chest pain or shortness of breath. denies nausea vomiting or diarrhea. denies 

any urinary burning or frequency








On 01/19/2020 patient was seen and examined on the medical floor she is alert 

and oriented 3 in no apparent distress she denies any complaints at this time 

she is still awaiting prior authorization for IV antibiotic at home





Objective





- Vital Signs


Vital signs: 


                                   Vital Signs











Temp  97.7 F   01/19/20 07:00


 


Pulse  83   01/19/20 07:00


 


Resp  15   01/19/20 07:00


 


BP  96/58   01/19/20 07:00


 


Pulse Ox  98   01/19/20 07:04








                                 Intake & Output











 01/18/20 01/19/20 01/19/20





 18:59 06:59 18:59


 


Intake Total 350 1160 275


 


Balance 350 1160 275


 


Intake:   


 


  Oral 350 1160 275


 


Other:   


 


  Voiding Method Diaper Diaper Diaper





 Incontinent Incontinent Incontinent


 


  # Voids 1 1 














- Exam








In general patient is alert slightly confused in no apparent distress


HEENT head normocephalic and atraumatic


Neck is supple no JVD no goiter no lymphadenopathy


Chest exam reveals a few scattered rhonchi no wheezing


Cardiac exam reveals regular heart sounds no gallops no murmurs


Abdomen is soft nontender no organomegaly with normal bowel sounds


Extremity exam reveals no edema no cyanosis or clubbing, right foot with 

previous second toe amputation and open wounds.  Right boot in place dressing is

clean dry and intact


Neurological examination reveals weakness related to previous stroke without any

acute neurological changes











- Labs


CBC & Chem 7: 


                                 01/19/20 06:21





                                 01/19/20 06:21


Labs: 


                  Abnormal Lab Results - Last 24 Hours (Table)











  01/18/20 01/18/20 01/19/20 Range/Units





  16:33 20:13 04:05 


 


WBC     (3.8-10.6)  k/uL


 


RBC     (3.80-5.40)  m/uL


 


Hgb     (11.4-16.0)  gm/dL


 


Hct     (34.0-46.0)  %


 


Carbon Dioxide     (22-30)  mmol/L


 


BUN     (7-17)  mg/dL


 


Glucose     (74-99)  mg/dL


 


POC Glucose (mg/dL)  215 H  128 H  166 H  (75-99)  mg/dL


 


Total Protein     (6.3-8.2)  g/dL


 


Albumin     (3.5-5.0)  g/dL














  01/19/20 01/19/20 01/19/20 Range/Units





  06:21 06:21 06:55 


 


WBC  3.5 L    (3.8-10.6)  k/uL


 


RBC  2.84 L    (3.80-5.40)  m/uL


 


Hgb  8.5 L    (11.4-16.0)  gm/dL


 


Hct  27.3 L    (34.0-46.0)  %


 


Carbon Dioxide   31 H   (22-30)  mmol/L


 


BUN   20 H   (7-17)  mg/dL


 


Glucose   122 H   (74-99)  mg/dL


 


POC Glucose (mg/dL)    117 H  (75-99)  mg/dL


 


Total Protein   5.4 L   (6.3-8.2)  g/dL


 


Albumin   2.5 L   (3.5-5.0)  g/dL














  01/19/20 Range/Units





  11:33 


 


WBC   (3.8-10.6)  k/uL


 


RBC   (3.80-5.40)  m/uL


 


Hgb   (11.4-16.0)  gm/dL


 


Hct   (34.0-46.0)  %


 


Carbon Dioxide   (22-30)  mmol/L


 


BUN   (7-17)  mg/dL


 


Glucose   (74-99)  mg/dL


 


POC Glucose (mg/dL)  269 H  (75-99)  mg/dL


 


Total Protein   (6.3-8.2)  g/dL


 


Albumin   (3.5-5.0)  g/dL














Assessment and Plan


Plan: 








#1 severe hyperglycemia likely related to noncompliance with diet, patient 

consumes large amount of candies in a binge manner occasionally.  She was 

counseled in length in that regard, she was started on IV insulin drip in the 

emergency room .  Patient was started back on insulin drip post surgical 

intervention.  At this time insulin drip will be DC'd.  Patient will be started 

back on NovoLog sliding scale +6 units with meals and 12 units of Levemir.  

Sugars have improved.  Blood sugar this a.m. 69 patient did receive 12 units of 

Levemir last night.  Patient will be DC'd home on sliding scale +6 units with 

meals +12 units of Levemir at night.  Patient educated to monitor sugar intake. 

Patient's blood sugar still fluctuating patient is known brittle diabetic will 

be DC'd on 12 units a Levemir and sliding scale insulin only





#2 right foot wound infection with osteomyelitis, continue doxycycline by mouth.

 Status post debridement of right foot once per Dr. Dash.  joel is 

currently postop day 2. Per nurse practitioner in the planning to switch boot 

tomorrow 01/16/2020 prior to discharge.  Discussed case with Dr. Claudio.  Patient

did have PICC line placement and will be DC'd on daptomycin IV antibiotics per 

infectious disease.  Patient is planning to go home with home healthcare.  

Patient has been cleared for discharge from infectious disease standpoint





#3 underlying history of hypertension





#4 underlying history of hyperlipidemia





#5 underlying history of congestive heart failure





#6 underlying history of coronary artery disease with previous history of 

angioplasty and stent placement





DVT prophylaxis heparin, GI prophylaxis pepcid.


waiting on insurance prior auth for home antibiotics

## 2020-01-19 NOTE — PN
PROGRESS NOTE



DATE OF SERVICE:

01/19/2020.



REASON FOR FOLLOWUP:

Right diabetic foot wound with osteomyelitis.



INTERVAL HISTORY:

The patient is currently afebrile.  Patient is breathing comfortably.  Patient denies

any chest pain.  No cough.  No further nausea, vomiting, abdominal pain.  No pain to

the right foot.



PHYSICAL EXAMINATION:

Blood pressure 107/61 with a pulse of 98 temperature 98.3. She is 97% on room air.

General description is a middle-aged female lying in bed in no distress.  Respiratory

system: Unlabored breathing.  Clear to auscultation anteriorly.  Heart S1, S2.  Regular

rate and rhythm.  Abdomen soft, no tenderness. Right foot is currently covered with

_____ cast.



LABS:

Hemoglobin 8.5, white count 3.4, BUN of 20, creatinine 0.89.



DIAGNOSTIC IMPRESSION AND PLAN:

Patient with right diabetic foot infection in this patient who did have osteomyelitis.

The patient is status post debridement.  Culture positive for Staph epi with vancomycin

_____.  Currently waiting for daptomycin.  Arrangement for the outpatient and continue

with supportive care.





MMODL / IJN: [id number] / Job#: [id number]

## 2020-01-20 LAB
ALBUMIN SERPL-MCNC: 2.4 G/DL (ref 3.5–5)
ALP SERPL-CCNC: 93 U/L (ref 38–126)
ALT SERPL-CCNC: 16 U/L (ref 4–34)
ANION GAP SERPL CALC-SCNC: 3 MMOL/L
AST SERPL-CCNC: 25 U/L (ref 14–36)
BASOPHILS # BLD AUTO: 0 K/UL (ref 0–0.2)
BASOPHILS NFR BLD AUTO: 1 %
BUN SERPL-SCNC: 20 MG/DL (ref 7–17)
CALCIUM SPEC-MCNC: 8.4 MG/DL (ref 8.4–10.2)
CHLORIDE SERPL-SCNC: 105 MMOL/L (ref 98–107)
CO2 SERPL-SCNC: 30 MMOL/L (ref 22–30)
EOSINOPHIL # BLD AUTO: 0 K/UL (ref 0–0.7)
EOSINOPHIL NFR BLD AUTO: 1 %
ERYTHROCYTE [DISTWIDTH] IN BLOOD BY AUTOMATED COUNT: 2.78 M/UL (ref 3.8–5.4)
ERYTHROCYTE [DISTWIDTH] IN BLOOD: 15.3 % (ref 11.5–15.5)
GLUCOSE BLD-MCNC: 173 MG/DL (ref 75–99)
GLUCOSE BLD-MCNC: 270 MG/DL (ref 75–99)
GLUCOSE BLD-MCNC: 291 MG/DL (ref 75–99)
GLUCOSE BLD-MCNC: 320 MG/DL (ref 75–99)
GLUCOSE BLD-MCNC: 350 MG/DL (ref 75–99)
GLUCOSE BLD-MCNC: 428 MG/DL (ref 75–99)
GLUCOSE BLD-MCNC: 87 MG/DL (ref 75–99)
GLUCOSE SERPL-MCNC: 72 MG/DL (ref 74–99)
HCT VFR BLD AUTO: 26.5 % (ref 34–46)
HGB BLD-MCNC: 8.3 GM/DL (ref 11.4–16)
LYMPHOCYTES # SPEC AUTO: 1.5 K/UL (ref 1–4.8)
LYMPHOCYTES NFR SPEC AUTO: 43 %
MCH RBC QN AUTO: 30 PG (ref 25–35)
MCHC RBC AUTO-ENTMCNC: 31.5 G/DL (ref 31–37)
MCV RBC AUTO: 95.3 FL (ref 80–100)
MONOCYTES # BLD AUTO: 0.3 K/UL (ref 0–1)
MONOCYTES NFR BLD AUTO: 7 %
NEUTROPHILS # BLD AUTO: 1.6 K/UL (ref 1.3–7.7)
NEUTROPHILS NFR BLD AUTO: 45 %
PLATELET # BLD AUTO: 168 K/UL (ref 150–450)
POTASSIUM SERPL-SCNC: 4.2 MMOL/L (ref 3.5–5.1)
PROT SERPL-MCNC: 5.2 G/DL (ref 6.3–8.2)
SODIUM SERPL-SCNC: 138 MMOL/L (ref 137–145)
WBC # BLD AUTO: 3.6 K/UL (ref 3.8–10.6)

## 2020-01-20 RX ADMIN — ASPIRIN 81 MG CHEWABLE TABLET SCH MG: 81 TABLET CHEWABLE at 09:05

## 2020-01-20 RX ADMIN — FAMOTIDINE SCH MG: 20 TABLET, FILM COATED ORAL at 09:05

## 2020-01-20 RX ADMIN — INSULIN DETEMIR SCH UNIT: 100 INJECTION, SOLUTION SUBCUTANEOUS at 22:03

## 2020-01-20 RX ADMIN — HEPARIN SODIUM SCH UNIT: 5000 INJECTION, SOLUTION INTRAVENOUS; SUBCUTANEOUS at 09:05

## 2020-01-20 RX ADMIN — INSULIN ASPART SCH: 100 INJECTION, SOLUTION INTRAVENOUS; SUBCUTANEOUS at 09:03

## 2020-01-20 RX ADMIN — INSULIN ASPART SCH UNIT: 100 INJECTION, SOLUTION INTRAVENOUS; SUBCUTANEOUS at 21:25

## 2020-01-20 RX ADMIN — INSULIN ASPART SCH UNIT: 100 INJECTION, SOLUTION INTRAVENOUS; SUBCUTANEOUS at 12:20

## 2020-01-20 RX ADMIN — DULOXETINE SCH MG: 60 CAPSULE, DELAYED RELEASE ORAL at 09:05

## 2020-01-20 RX ADMIN — INSULIN ASPART SCH UNIT: 100 INJECTION, SOLUTION INTRAVENOUS; SUBCUTANEOUS at 17:39

## 2020-01-20 RX ADMIN — Medication SCH MG: at 12:20

## 2020-01-20 RX ADMIN — CEFAZOLIN SCH MLS/HR: 330 INJECTION, POWDER, FOR SOLUTION INTRAMUSCULAR; INTRAVENOUS at 02:08

## 2020-01-20 RX ADMIN — VALPROIC ACID SCH MG: 250 SOLUTION ORAL at 09:06

## 2020-01-20 RX ADMIN — HEPARIN SODIUM SCH UNIT: 5000 INJECTION, SOLUTION INTRAVENOUS; SUBCUTANEOUS at 21:20

## 2020-01-20 NOTE — P.PN
Subjective


Progress Note Date: 01/20/20





Morenita Pham is a 59-year-old female with known history of insulin-dependent 

diabetes mellitus type 1 and peripheral vascular disease who was recently 

discharged from [facility] to the nursing home.  Patient was 

therefore short time, she was found to have a glucose level of 665 she was 

having mental status changes she was sent back to [facility] 

emergency room, she was started on IV insulin drip and was admitted to medical 

floor.  Patient has multiple medical problems including history of hypertension,

history of hyperlipidemia, history of asthma, history of severe peripheral 

vascular disease with chronic wounds on the right leg requiring toe amputation, 

history of stroke, history of congestive heart failure, history of coronary 

artery disease with previous history of angioplasty and stent placement, history

of depression with anxiety disorder.





Patient was seen and examined on 01/11/2020, she is alert slightly confused in 

no apparent distress, she has been maintained on IV insulin drip and her glucose

level is below 200 this time, there is no fever or chills no headache or 

dizziness no chest pain no shortness of breath no cough no nausea or vomiting no

abdominal pain no diarrhea no burning with urination no frequency or urgency and

no hematuria.





On 01/12/2020 patient was seen and examined on the medical floor, she is alert 

slightly confused in no distress, glucose level is well-controlled at this time,

she had an episode of hypoglycemia this morning, at this time will decrease 

Levemir dose to 20 units down from 22 units and monitor glucose level closely.  

On review of systems patient is complaining of pain in her lower extremity, 

otherwise she denies any complaints there is no fever or chills no headache or 

dizziness no chest pain no shortness of breath no cough no nausea or vomiting no

abdominal pain no diarrhea and no urinary symptoms.





On 01/13/2020 patient is alert and resting comfortably in bed.  Plans for 

debridement today with Dr. Dash of foot wound.  Long-acting insulin insulin 

currently on hold.  Blood sugar this a.m. 176.  This time patient denies chest 

pain or shortness of breath.  Patient moving diarrhea.  Denies any urinary 

burning or frequency





On 01/14/2020 patient is status post debridement of right foot wounds with Dr. Dash.  She is currently postop day 1.  Blood sugars after surgical procedure 

greater than 400.  Patient was started back on insulin drip.  Current blood 

sugar 166.  Will transition patient to NovoLog 6 units and 12 units of long-

acting insulin will continue to monitor blood sugars closely due to known 

brittle diabetic.  At this time patient denies chest pain or shortness of 

breath.  Patient denies nausea vomiting or diarrhea.  Patient denies any urinary

burning or frequency.





On 01/15/2020 patient is currently postop day 2 from debridement of right foot 

wounds.  Discussed case with Mary nurse practitioner with Dr. Dash.  Planning 

to switch boot tomorrow.  Blood sugars remain fluctuating.  Blood sugar last 

night 112 per nursing staff long-acting insulin was held blood sugar this a.m. 

478.  At this time patient denies chest pain or shortness of breath.  Patient 

denies nausea vomiting or diarrhea.  Patient denies any urinary burning or 

frequency





On 01/16/2020 patient is currently postop day 3 from debridement of right foot 

wounds.  Patient has been cleared for discharge from surgical services.  Patient

to return to wound care center next Wednesday for her weekly appointment contact

cast has been applied.  Blood sugars have improved.  Insulin has been adjusted 

to 12 units of Levemir at night +6 units and sliding scale with meals.  NovoLog 

sliding scale to be printed and given to caretaker.  Dr. Snyder to evaluate 

patient prior to discharge patient will be likely discharged on doxycycline.





On 01/17/2020 patient is alert and oriented 3.  Patient did have PICC line 

placement.  Patient will be DC'd on daptomycin per infectious disease 

recommendation.  Antibiotics have been ordered per ID.  Insulin has been 

adjusted to Levemir 12 units at night with sliding scale.  Patient to follow-up 

with PCP for further management.  Patient will be discharged home with home 

healthcare PICC line has been placed.  Patient denies chest pain or shortness 

breath.  Patient denies nausea vomiting or diarrhea.  Patient denies any urinary

burning or frequency





On 1/18/2020 Patient is alert and oriented x 3. discharge on hold until 

insurance prior auth is complete for home IVAB.  At this time patient denies 

chest pain or shortness of breath. denies nausea vomiting or diarrhea. denies 

any urinary burning or frequency








On 01/19/2020 patient was seen and examined on the medical floor she is alert 

and oriented 3 in no apparent distress she denies any complaints at this time 

she is still awaiting prior authorization for IV antibiotic at home





On 01/20/2020 patient is alert and oriented 3.  Awaiting insurance prior off 

for home IV antibiotics.  Patient remains on daptomycin.  Patient denies chest 

pain or shortness of breath.  She denies nausea vomiting diarrhea.  Patient 

denies any urinary burning burning or frequency.





Objective





- Vital Signs


Vital signs: 


                                   Vital Signs











Temp  98.5 F   01/20/20 07:00


 


Pulse  80   01/20/20 07:00


 


Resp  16   01/20/20 07:00


 


BP  90/52   01/20/20 02:05


 


Pulse Ox  95   01/20/20 07:00








                                 Intake & Output











 01/19/20 01/20/20 01/20/20





 18:59 06:59 18:59


 


Intake Total 375 240 


 


Output Total 800  


 


Balance -425 240 


 


Intake:   


 


  Oral 375 240 


 


Output:   


 


  Urine 800  


 


Other:   


 


  Voiding Method Diaper Diaper 





 Incontinent Incontinent 


 


  # Voids 2 1 














- Exam





In general patient is alert slightly confused in no apparent distress


HEENT head normocephalic and atraumatic


Neck is supple no JVD no goiter no lymphadenopathy


Chest exam reveals a few scattered rhonchi no wheezing


Cardiac exam reveals regular heart sounds no gallops no murmurs


Abdomen is soft nontender no organomegaly with normal bowel sounds


Extremity exam reveals no edema no cyanosis or clubbing, right foot with 

previous second toe amputation and open wounds.  Right boot in place dressing is

clean dry and intact


Neurological examination reveals weakness related to previous stroke without any

acute neurological changes








- Labs


CBC & Chem 7: 


                                 01/19/20 06:21





                                 01/19/20 06:21


Labs: 


                  Abnormal Lab Results - Last 24 Hours (Table)











  01/17/20 01/17/20 01/19/20 Range/Units





  11:56 11:58 11:33 


 


POC Glucose (mg/dL)  38 L  41 L  269 H  (75-99)  mg/dL














  01/19/20 01/19/20 01/19/20 Range/Units





  16:28 21:15 21:19 


 


POC Glucose (mg/dL)  213 H  419 H  428 H  (75-99)  mg/dL














  01/19/20 01/19/20 01/19/20 Range/Units





  21:36 22:29 23:58 


 


POC Glucose (mg/dL)  477 H  470 H  270 H  (75-99)  mg/dL














Assessment and Plan


Assessment: 





#1 severe hyperglycemia likely related to noncompliance with diet, patient 

consumes large amount of candies in a binge manner occasionally.  She was 

counseled in length in that regard, she was started on IV insulin drip in the 

emergency room .  Patient was started back on insulin drip post surgical i

ntervention.  At this time insulin drip will be DC'd.  Patient will be started 

back on NovoLog sliding scale +6 units with meals and 12 units of Levemir.  

Sugars have improved.  Blood sugar this a.m. 69 patient did receive 12 units of 

Levemir last night.  Patient will be DC'd home on sliding scale +6 units with 

meals +12 units of Levemir at night.  Patient educated to monitor sugar intake. 

Patient's blood sugar still fluctuating patient is known brittle diabetic will 

be DC'd on 12 units a Levemir and sliding scale insulin only





#2 right foot wound infection with osteomyelitis, continue doxycycline by mouth.

 Status post debridement of right foot once per Dr. Dash.  lashondan is 

currently postop day 2. Per nurse practitioner in the planning to switch boot 

tomorrow 01/16/2020 prior to discharge.  Discussed case with Dr. Snyder.  Patient

did have PICC line placement and will be DC'd on daptomycin IV antibiotics per 

infectious disease.  Patient is planning to go home with home healthcare.  

Patient has been cleared for discharge from infectious disease standpoint





#3 underlying history of hypertension





#4 underlying history of hyperlipidemia





#5 underlying history of congestive heart failure





#6 underlying history of coronary artery disease with previous history of 

angioplasty and stent placement





DVT prophylaxis heparin, GI prophylaxis pepcid.


waiting on insurance prior auth for home antibiotics





I performed an examination of the patient and discussed their management with 

the Nurse Practitioner.  I have reviewed the Nurse Practitioner's notes and 

agree with the documented findings and plan of care

## 2020-01-20 NOTE — PN
PROGRESS NOTE



DATE OF SERVICE:

01/20/2020



REASON FOR FOLLOWUP:

Right foot osteomyelitis.



INTERVAL HISTORY:

The patient is currently afebrile.  The patient is breathing comfortably.  The patient

denies having any chest pain or cough.  No nausea, vomiting.  No abdominal pain.  No

diarrhea.  No pain in the right foot area.



PHYSICAL EXAMINATION:

Blood pressure is 90/52 with a pulse of 80, temperature of 98.4. She is 95% on room

air.

General description is a middle-aged female lying in bed in no distress.

RESPIRATORY SYSTEM: Unlabored breathing. Clear to auscultation anteriorly.

HEART: S1, S2.  Regular rate and rhythm.

ABDOMEN: Soft. No tenderness.

Right foot is currently covered with a cast.



LABS:

Hemoglobin is 8.3, white count of 3.3, BUN of 20, creatinine 1.02.



DIAGNOSTIC IMPRESSION AND PLAN:

Patient with right diabetic foot wound with underlying osteomyelitis at the base of the

fifth toe, status post debridement.  Culture has been positive for Staph epidermidis

with vancomycin AKASH of 2.  Currently on daptomycin.  Waiting for insurance

authorization before discharge. Monitor clinical course closely.





MMODL / IJN: [id number] / Job#: [id number]

## 2020-01-21 VITALS
DIASTOLIC BLOOD PRESSURE: 83 MMHG | TEMPERATURE: 97.5 F | RESPIRATION RATE: 16 BRPM | SYSTOLIC BLOOD PRESSURE: 139 MMHG | HEART RATE: 97 BPM

## 2020-01-21 LAB
ALBUMIN SERPL-MCNC: 2.5 G/DL (ref 3.5–5)
ALP SERPL-CCNC: 92 U/L (ref 38–126)
ALT SERPL-CCNC: 16 U/L (ref 4–34)
ANION GAP SERPL CALC-SCNC: 3 MMOL/L
AST SERPL-CCNC: 24 U/L (ref 14–36)
BASOPHILS # BLD AUTO: 0 K/UL (ref 0–0.2)
BASOPHILS NFR BLD AUTO: 0 %
BUN SERPL-SCNC: 20 MG/DL (ref 7–17)
CALCIUM SPEC-MCNC: 8.5 MG/DL (ref 8.4–10.2)
CHLORIDE SERPL-SCNC: 110 MMOL/L (ref 98–107)
CO2 SERPL-SCNC: 29 MMOL/L (ref 22–30)
EOSINOPHIL # BLD AUTO: 0 K/UL (ref 0–0.7)
EOSINOPHIL NFR BLD AUTO: 2 %
ERYTHROCYTE [DISTWIDTH] IN BLOOD BY AUTOMATED COUNT: 2.8 M/UL (ref 3.8–5.4)
ERYTHROCYTE [DISTWIDTH] IN BLOOD: 15 % (ref 11.5–15.5)
GLUCOSE BLD-MCNC: 168 MG/DL (ref 75–99)
GLUCOSE BLD-MCNC: 401 MG/DL (ref 75–99)
GLUCOSE BLD-MCNC: 97 MG/DL (ref 75–99)
GLUCOSE SERPL-MCNC: 95 MG/DL (ref 74–99)
HCT VFR BLD AUTO: 26.8 % (ref 34–46)
HGB BLD-MCNC: 8.4 GM/DL (ref 11.4–16)
LYMPHOCYTES # SPEC AUTO: 1.1 K/UL (ref 1–4.8)
LYMPHOCYTES NFR SPEC AUTO: 40 %
MCH RBC QN AUTO: 30.1 PG (ref 25–35)
MCHC RBC AUTO-ENTMCNC: 31.5 G/DL (ref 31–37)
MCV RBC AUTO: 95.5 FL (ref 80–100)
MONOCYTES # BLD AUTO: 0.2 K/UL (ref 0–1)
MONOCYTES NFR BLD AUTO: 7 %
NEUTROPHILS # BLD AUTO: 1.4 K/UL (ref 1.3–7.7)
NEUTROPHILS NFR BLD AUTO: 50 %
PLATELET # BLD AUTO: 176 K/UL (ref 150–450)
POTASSIUM SERPL-SCNC: 4.2 MMOL/L (ref 3.5–5.1)
PROT SERPL-MCNC: 5.4 G/DL (ref 6.3–8.2)
SODIUM SERPL-SCNC: 142 MMOL/L (ref 137–145)
WBC # BLD AUTO: 2.9 K/UL (ref 3.8–10.6)

## 2020-01-21 RX ADMIN — ASPIRIN 81 MG CHEWABLE TABLET SCH MG: 81 TABLET CHEWABLE at 08:25

## 2020-01-21 RX ADMIN — INSULIN ASPART SCH: 100 INJECTION, SOLUTION INTRAVENOUS; SUBCUTANEOUS at 07:08

## 2020-01-21 RX ADMIN — INSULIN ASPART SCH UNIT: 100 INJECTION, SOLUTION INTRAVENOUS; SUBCUTANEOUS at 16:49

## 2020-01-21 RX ADMIN — Medication SCH MG: at 08:25

## 2020-01-21 RX ADMIN — VALPROIC ACID SCH MG: 250 SOLUTION ORAL at 08:25

## 2020-01-21 RX ADMIN — DULOXETINE SCH MG: 60 CAPSULE, DELAYED RELEASE ORAL at 08:24

## 2020-01-21 RX ADMIN — INSULIN ASPART SCH UNIT: 100 INJECTION, SOLUTION INTRAVENOUS; SUBCUTANEOUS at 12:13

## 2020-01-21 RX ADMIN — CEFAZOLIN SCH: 330 INJECTION, POWDER, FOR SOLUTION INTRAMUSCULAR; INTRAVENOUS at 05:09

## 2020-01-21 RX ADMIN — FAMOTIDINE SCH MG: 20 TABLET, FILM COATED ORAL at 08:24

## 2020-01-21 RX ADMIN — HEPARIN SODIUM SCH UNIT: 5000 INJECTION, SOLUTION INTRAVENOUS; SUBCUTANEOUS at 08:25

## 2020-01-21 NOTE — PN
PROGRESS NOTE



DATE OF SERVICE:

01/21/2020



REASON FOR FOLLOWUP:

Right foot osteomyelitis.



INTERVAL HISTORY:

The patient is currently afebrile.  Patient is breathing comfortably.  Patient denies

having any chest pain, shortness of breath or cough.  No nausea, no vomiting.  No

abdominal pain or pain to the right leg area.



PHYSICAL EXAMINATION:

Blood pressure 102/67 with pulse of 57, temperature 98. She is 95% on room air.

General description is a middle-aged female, lying in bed in no distress.

RESPIRATORY SYSTEM: Unlabored breathing, clear to auscultation anteriorly.  HEART: S1,

S2.  Regular rate and rhythm.

ABDOMEN:  Soft, no tenderness.

Right foot currently dressed up with _____ cast.



LABS:

Hemoglobin 8.4, white count 2.9, BUN of 20, creatinine 0.92.



DIAGNOSTIC IMPRESSION AND PLAN:

Patient with right foot osteomyelitis, status post debridement.  Culture with

Staphylococcus epidermidis with vancomycin AKASH of 2.  She is currently on daptomycin to

continue for a total of 6 weeks. _____ CBC, BMP and Sed rate.  Follow up in the office

next week.





MMODL / IJN: [id number] / Job#: [id number]

## 2020-01-21 NOTE — P.DS
Providers


Date of admission: 


01/12/20 14:17





Expected date of discharge: 01/21/20


Attending physician: 


Saniya Cabral





Consults: 





                                        





01/14/20 08:09


Consult Physician Routine 


   Consulting Provider: Gerardo Ferrell


   Consult Reason/Comments: s/p debridement, established patient


   Do you want consulting provider notified?: Yes





01/16/20 08:43


Consult Physician Routine 


   Consulting Provider: Mian Snyder


   Consult Reason/Comments: culture antibiotics for d/c


   Do you want consulting provider notified?: Yes











Primary care physician: 


Saniyalloyd Cabarl





[facility] Course: 





Discharge diagnosis


#1 severe hyperglycemia likely related to noncompliance with diet, patient 

consumes large amount of candies in a binge manner occasionally.  She was 

counseled in length in that regard, she was started on IV insulin drip in the 

emergency room .  Patient was started back on insulin drip post surgical 

intervention.  At this time insulin drip will be DC'd.  Patient will be started 

back on NovoLog sliding scale +6 units with meals and 12 units of Levemir.  

Sugars have improved.  Blood sugar this a.m. 69 patient did receive 12 units of 

Levemir last night.  Patient will be DC'd home on sliding scale +6 units with 

meals +12 units of Levemir at night.  Patient educated to monitor sugar intake. 

Patient's blood sugar still fluctuating patient is known brittle diabetic will 

be DC'd on 12 units a Levemir and sliding scale insulin only





#2 right foot wound infection with osteomyelitis, continue doxycycline by mouth.

 Status post debridement of right foot once per Dr. Dash.  lashondan is 

currently postop day 2. Per nurse practitioner in the planning to switch boot 

tomorrow 01/16/2020 prior to discharge.  Discussed case with Dr. Snyder.  Patient

did have PICC line placement and will be DC'd on daptomycin IV antibiotics per 

infectious disease.  Patient is planning to go home with home healthcare.  

Patient has been cleared for discharge from infectious disease standpoint





#3 underlying history of hypertension





#4 underlying history of hyperlipidemia





#5 underlying history of congestive heart failure





#6 underlying history of coronary artery disease with previous history of angio

plasty and stent placement





Hospital course





Morenita Pham is a 59-year-old female with known history of insulin-dependent 

diabetes mellitus type 1 and peripheral vascular disease who was recently 

discharged from [facility] to the nursing home.  Patient was 

therefore short time, she was found to have a glucose level of 665 she was 

having mental status changes she was sent back to [facility] 

emergency room, she was started on IV insulin drip and was admitted to medical 

floor.  Patient has multiple medical problems including history of hypertension,

history of hyperlipidemia, history of asthma, history of severe peripheral 

vascular disease with chronic wounds on the right leg requiring toe amputation, 

history of stroke, history of congestive heart failure, history of coronary 

artery disease with previous history of angioplasty and stent placement, history

of depression with anxiety disorder.





Patient was seen and examined on 01/11/2020, she is alert slightly confused in 

no apparent distress, she has been maintained on IV insulin drip and her glucose

level is below 200 this time, there is no fever or chills no headache or di

zziness no chest pain no shortness of breath no cough no nausea or vomiting no 

abdominal pain no diarrhea no burning with urination no frequency or urgency and

no hematuria.





On 01/12/2020 patient was seen and examined on the medical floor, she is alert 

slightly confused in no distress, glucose level is well-controlled at this time,

she had an episode of hypoglycemia this morning, at this time will decrease 

Levemir dose to 20 units down from 22 units and monitor glucose level closely.  

On review of systems patient is complaining of pain in her lower extremity, 

otherwise she denies any complaints there is no fever or chills no headache or 

dizziness no chest pain no shortness of breath no cough no nausea or vomiting no

abdominal pain no diarrhea and no urinary symptoms.





On 01/13/2020 patient is alert and resting comfortably in bed.  Plans for 

debridement today with Dr. Dash of foot wound.  Long-acting insulin insulin 

currently on hold.  Blood sugar this a.m. 176.  This time patient denies chest 

pain or shortness of breath.  Patient moving diarrhea.  Denies any urinary 

burning or frequency





On 01/14/2020 patient is status post debridement of right foot wounds with Dr. Dash.  She is currently postop day 1.  Blood sugars after surgical procedure 

greater than 400.  Patient was started back on insulin drip.  Current blood 

sugar 166.  Will transition patient to NovoLog 6 units and 12 units of long-

acting insulin will continue to monitor blood sugars closely due to known 

brittle diabetic.  At this time patient denies chest pain or shortness of 

breath.  Patient denies nausea vomiting or diarrhea.  Patient denies any urinary

burning or frequency.





On 01/15/2020 patient is currently postop day 2 from debridement of right foot 

wounds.  Discussed case with Mary nurse practitioner with Dr. Dash.  Planning 

to switch boot tomorrow.  Blood sugars remain fluctuating.  Blood sugar last 

night 112 per nursing staff long-acting insulin was held blood sugar this a.m. 

478.  At this time patient denies chest pain or shortness of breath.  Patient 

denies nausea vomiting or diarrhea.  Patient denies any urinary burning or 

frequency





On 01/16/2020 patient is currently postop day 3 from debridement of right foot 

wounds.  Patient has been cleared for discharge from surgical services.  Patient

to return to wound care center next Wednesday for her weekly appointment contact

cast has been applied.  Blood sugars have improved.  Insulin has been adjusted 

to 12 units of Levemir at night +6 units and sliding scale with meals.  NovoLog 

sliding scale to be printed and given to caretaker.  Dr. Snyder to evaluate 

patient prior to discharge patient will be likely discharged on doxycycline.





On 01/17/2020 patient is alert and oriented 3.  Patient did have PICC line 

placement.  Patient will be DC'd on daptomycin per infectious disease 

recommendation.  Antibiotics have been ordered per ID.  Insulin has been 

adjusted to Levemir 12 units at night with sliding scale.  Patient to follow-up 

with PCP for further management.  Patient will be discharged home with home 

healthcare PICC line has been placed.  Patient denies chest pain or shortness 

breath.  Patient denies nausea vomiting or diarrhea.  Patient denies any urinary

burning or frequency





On 1/18/2020 Patient is alert and oriented x 3. discharge on hold until 

insurance prior auth is complete for home IVAB.  At this time patient denies 

chest pain or shortness of breath. denies nausea vomiting or diarrhea. denies 

any urinary burning or frequency








On 01/19/2020 patient was seen and examined on the medical floor she is alert 

and oriented 3 in no apparent distress she denies any complaints at this time 

she is still awaiting prior authorization for IV antibiotic at home





On 01/20/2020 patient is alert and oriented 3.  Awaiting insurance prior off 

for home IV antibiotics.  Patient remains on daptomycin.  Patient denies chest 

pain or shortness of breath.  She denies nausea vomiting diarrhea.  Patient 

denies any urinary burning burning or frequency.





On 01/21/2020 patient is alert and oriented 3.  Patient did receive insurance 

prior auth for home IV antibiotics.  Discussed case with Dr. Snyder per 

infectious disease patient may receive today's dose prior to leaving.  Patient 

will be DC'd on sliding scale +12 units of levemir or night.  Patient denies any

chest pain or shortness breath.  Patient denies nausea vomiting or diarrhea.  

Patient denies any urinary burning or frequency





I performed an examination of the patient and discussed their management with 

the Nurse Practitioner.  I have reviewed the Nurse Practitioner's notes and 

agree with the documented findings and plan of care


Patient Condition at Discharge: Stable





Plan - Discharge Summary


Discharge Rx Participant: Yes


New Discharge Prescriptions: 


New


   INSULIN ASPART (NovoLOG) [NovoLOG (formulary)] 0 unit SQ ACHS  vial


   DAPTOmycin [Daptomycin] 350 mg IV DAILY #40 vial





Continue


   Albuterol Inhaler [Ventolin Hfa Inhaler] 2 puff INHALATION RT-Q4H PRN


     PRN Reason: Shortness Of Breath


   Famotidine [Pepcid] 20 mg PO DAILY


   Valproic Acid [Depakene] 250 mg PO DAILY


   Ergocalciferol [Vitamin D2 (DRISDOL)] 50,000 unit PO SA


   HYDROcodone/APAP 10-325MG [Norco ] 1 tab PO Q6H PRN


     PRN Reason: Pain


   DULoxetine HCL [Cymbalta] 60 mg PO DAILY


   ALPRAZolam [Xanax] 1 mg PO Q8H


   Ondansetron [Zofran] 4 mg PO DAILY PRN


     PRN Reason: Nausea


   QUEtiapine [SEROquel] 100 mg PO HS


   QUEtiapine FUMARATE [SEROquel] 25 mg PO BID


   Atorvastatin [Lipitor] 20 mg PO DAILY


   Vitamin B Complex With Vit C 1 tab PO DAILY


   Aspirin [Adult Low Dose Aspirin EC] 81 mg PO DAILY


   Ferrous Sulfate [Iron (65 MG Elemental)] 325 mg PO DAILY





Changed


   Insulin Glargine [Lantus] 12 unit SQ HS #0





Discontinued


   Doxycycline [Vibramycin] 100 mg PO BID 7 Days #14 capsule


   INSULIN LISPRO (humaLOG) [humaLOG] 6 units SQ AC-TID


Discharge Medication List





Albuterol Inhaler [Ventolin Hfa Inhaler] 2 puff INHALATION RT-Q4H PRN 02/19/16 

[History]


Famotidine [Pepcid] 20 mg PO DAILY 02/19/16 [History]


Valproic Acid [Depakene] 250 mg PO DAILY 02/19/16 [History]


Ergocalciferol [Vitamin D2 (DRISDOL)] 50,000 unit PO SA 10/06/16 [History]


HYDROcodone/APAP 10-325MG [Norco ] 1 tab PO Q6H PRN 05/06/17 [History]


DULoxetine HCL [Cymbalta] 60 mg PO DAILY 09/19/17 [History]


ALPRAZolam [Xanax] 1 mg PO Q8H 09/22/17 [History]


Ondansetron [Zofran] 4 mg PO DAILY PRN 04/09/18 [History]


Atorvastatin [Lipitor] 20 mg PO DAILY 07/31/19 [History]


QUEtiapine FUMARATE [SEROquel] 25 mg PO BID 07/31/19 [History]


QUEtiapine [SEROquel] 100 mg PO HS 07/31/19 [History]


Vitamin B Complex With Vit C 1 tab PO DAILY 01/02/20 [History]


Aspirin [Adult Low Dose Aspirin EC] 81 mg PO DAILY 01/10/20 [History]


Ferrous Sulfate [Iron (65 MG Elemental)] 325 mg PO DAILY 01/10/20 [History]


INSULIN ASPART (NovoLOG) [NovoLOG (formulary)] 0 unit SQ ACHS  vial 01/16/20 

[Rx]


Insulin Glargine [Lantus] 12 unit SQ HS #0 01/16/20 [Rx]


DAPTOmycin [Daptomycin] 350 mg IV DAILY #40 vial 01/17/20 [Rx]








Follow up Appointment(s)/Referral(s): 


[facility], [NON-STAFF] - 


[facility], [REFERRING] - As Needed


[facility] [NON-STAFF] - 01/22/20 2:00 pm


Saniya Cabral MD [Primary Care Provider] - 1-2 days


Mian Snyder MD [STAFF PHYSICIAN] - 1 Week


Ambulatory/Diagnostic Orders: 


Basic Metabolic Panel [LAB.AMB] Location: None Selected


C Reactive Protein [LAB.AMB] Location: None Selected


Complete Blood Count w/diff [LAB.AMB] Location: None Selected


Erythrocyte Sedimentation Rate [LAB.AMB] Location: None Selected


Patient Instructions/Handouts:  Diabetic Ketoacidosis (DC)


Activity/Diet/Wound Care/Special Instructions: 


Activity as tolerated





Diet consistent carb





Insulin has been adjusted to long-acting decreasing down to 12 units at night 

plus sliding scale and 6 units of NovoLog with meals.  Nursing staff to provide 

sliding-scale to caretaker








Discharge Disposition: HOME WITH HOME HEALTH SERVICES

## 2020-01-23 ENCOUNTER — HOSPITAL ENCOUNTER (INPATIENT)
Dept: HOSPITAL 47 - EC | Age: 60
LOS: 8 days | Discharge: NURSING, REHAB OR LONG-TERM CARE | DRG: 638 | End: 2020-01-31
Attending: INTERNAL MEDICINE | Admitting: INTERNAL MEDICINE
Payer: COMMERCIAL

## 2020-01-23 VITALS — BODY MASS INDEX: 23.2 KG/M2

## 2020-01-23 DIAGNOSIS — B95.7: ICD-10-CM

## 2020-01-23 DIAGNOSIS — Z88.0: ICD-10-CM

## 2020-01-23 DIAGNOSIS — G92: ICD-10-CM

## 2020-01-23 DIAGNOSIS — Z83.3: ICD-10-CM
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
## 2020-01-23 DIAGNOSIS — Z99.3: ICD-10-CM

## 2020-01-23 DIAGNOSIS — Z91.030: ICD-10-CM

## 2020-01-23 DIAGNOSIS — Z88.8: ICD-10-CM

## 2020-01-23 DIAGNOSIS — Z89.421: ICD-10-CM

## 2020-01-23 DIAGNOSIS — E10.65: ICD-10-CM

## 2020-01-23 DIAGNOSIS — I25.2: ICD-10-CM

## 2020-01-23 DIAGNOSIS — Z82.5: ICD-10-CM

## 2020-01-23 DIAGNOSIS — E10.51: ICD-10-CM

## 2020-01-23 DIAGNOSIS — Z87.891: ICD-10-CM

## 2020-01-23 DIAGNOSIS — J44.1: ICD-10-CM

## 2020-01-23 DIAGNOSIS — Z90.710: ICD-10-CM

## 2020-01-23 DIAGNOSIS — Z90.49: ICD-10-CM

## 2020-01-23 DIAGNOSIS — R32: ICD-10-CM

## 2020-01-23 DIAGNOSIS — Z89.411: ICD-10-CM

## 2020-01-23 DIAGNOSIS — E10.319: ICD-10-CM

## 2020-01-23 DIAGNOSIS — I50.9: ICD-10-CM

## 2020-01-23 DIAGNOSIS — E78.5: ICD-10-CM

## 2020-01-23 DIAGNOSIS — M19.90: ICD-10-CM

## 2020-01-23 DIAGNOSIS — Z79.82: ICD-10-CM

## 2020-01-23 DIAGNOSIS — I83.90: ICD-10-CM

## 2020-01-23 DIAGNOSIS — Z98.42: ICD-10-CM

## 2020-01-23 DIAGNOSIS — Z91.19: ICD-10-CM

## 2020-01-23 DIAGNOSIS — L97.519: ICD-10-CM

## 2020-01-23 DIAGNOSIS — R62.7: ICD-10-CM

## 2020-01-23 DIAGNOSIS — E10.621: Primary | ICD-10-CM

## 2020-01-23 DIAGNOSIS — I69.354: ICD-10-CM

## 2020-01-23 DIAGNOSIS — K21.9: ICD-10-CM

## 2020-01-23 DIAGNOSIS — T50.905A: ICD-10-CM

## 2020-01-23 DIAGNOSIS — Z95.5: ICD-10-CM

## 2020-01-23 DIAGNOSIS — I95.9: ICD-10-CM

## 2020-01-23 DIAGNOSIS — F41.9: ICD-10-CM

## 2020-01-23 DIAGNOSIS — E03.9: ICD-10-CM

## 2020-01-23 DIAGNOSIS — I11.0: ICD-10-CM

## 2020-01-23 DIAGNOSIS — E10.69: ICD-10-CM

## 2020-01-23 DIAGNOSIS — I25.10: ICD-10-CM

## 2020-01-23 DIAGNOSIS — E44.1: ICD-10-CM

## 2020-01-23 DIAGNOSIS — E10.42: ICD-10-CM

## 2020-01-23 DIAGNOSIS — Z87.01: ICD-10-CM

## 2020-01-23 DIAGNOSIS — Z82.49: ICD-10-CM

## 2020-01-23 DIAGNOSIS — M86.9: ICD-10-CM

## 2020-01-23 DIAGNOSIS — E10.649: ICD-10-CM

## 2020-01-23 DIAGNOSIS — Z79.899: ICD-10-CM

## 2020-01-23 DIAGNOSIS — Z79.4: ICD-10-CM

## 2020-01-23 DIAGNOSIS — Z86.718: ICD-10-CM

## 2020-01-23 DIAGNOSIS — Z96.1: ICD-10-CM

## 2020-01-23 DIAGNOSIS — Z98.41: ICD-10-CM

## 2020-01-23 DIAGNOSIS — Z86.14: ICD-10-CM

## 2020-01-23 LAB
ALBUMIN SERPL-MCNC: 3 G/DL (ref 3.5–5)
ALP SERPL-CCNC: 114 U/L (ref 38–126)
ALT SERPL-CCNC: 18 U/L (ref 4–34)
ANION GAP SERPL CALC-SCNC: 7 MMOL/L
APTT BLD: 21.9 SEC (ref 22–30)
AST SERPL-CCNC: 19 U/L (ref 14–36)
BASOPHILS # BLD AUTO: 0.1 K/UL (ref 0–0.2)
BASOPHILS NFR BLD AUTO: 1 %
BUN SERPL-SCNC: 24 MG/DL (ref 7–17)
CALCIUM SPEC-MCNC: 8.3 MG/DL (ref 8.4–10.2)
CHLORIDE SERPL-SCNC: 101 MMOL/L (ref 98–107)
CO2 SERPL-SCNC: 27 MMOL/L (ref 22–30)
EOSINOPHIL # BLD AUTO: 0 K/UL (ref 0–0.7)
EOSINOPHIL NFR BLD AUTO: 0 %
ERYTHROCYTE [DISTWIDTH] IN BLOOD BY AUTOMATED COUNT: 2.94 M/UL (ref 3.8–5.4)
ERYTHROCYTE [DISTWIDTH] IN BLOOD: 14.9 % (ref 11.5–15.5)
GLUCOSE BLD-MCNC: 247 MG/DL (ref 75–99)
GLUCOSE BLD-MCNC: 269 MG/DL (ref 75–99)
GLUCOSE BLD-MCNC: 521 MG/DL (ref 75–99)
GLUCOSE SERPL-MCNC: 426 MG/DL (ref 74–99)
HCT VFR BLD AUTO: 27.4 % (ref 34–46)
HGB BLD-MCNC: 8.8 GM/DL (ref 11.4–16)
INR PPP: 0.9 (ref ?–1.2)
LYMPHOCYTES # SPEC AUTO: 1.1 K/UL (ref 1–4.8)
LYMPHOCYTES NFR SPEC AUTO: 17 %
MAGNESIUM SPEC-SCNC: 1.6 MG/DL (ref 1.6–2.3)
MCH RBC QN AUTO: 29.9 PG (ref 25–35)
MCHC RBC AUTO-ENTMCNC: 32.1 G/DL (ref 31–37)
MCV RBC AUTO: 93.2 FL (ref 80–100)
MONOCYTES # BLD AUTO: 0.4 K/UL (ref 0–1)
MONOCYTES NFR BLD AUTO: 6 %
NEUTROPHILS # BLD AUTO: 4.6 K/UL (ref 1.3–7.7)
NEUTROPHILS NFR BLD AUTO: 74 %
PLATELET # BLD AUTO: 204 K/UL (ref 150–450)
POTASSIUM SERPL-SCNC: 4.9 MMOL/L (ref 3.5–5.1)
PROT SERPL-MCNC: 6.1 G/DL (ref 6.3–8.2)
PT BLD: 9.4 SEC (ref 9–12)
SODIUM SERPL-SCNC: 135 MMOL/L (ref 137–145)
WBC # BLD AUTO: 6.3 K/UL (ref 3.8–10.6)

## 2020-01-23 PROCEDURE — 83036 HEMOGLOBIN GLYCOSYLATED A1C: CPT

## 2020-01-23 PROCEDURE — 96374 THER/PROPH/DIAG INJ IV PUSH: CPT

## 2020-01-23 PROCEDURE — 85730 THROMBOPLASTIN TIME PARTIAL: CPT

## 2020-01-23 PROCEDURE — 96361 HYDRATE IV INFUSION ADD-ON: CPT

## 2020-01-23 PROCEDURE — 84100 ASSAY OF PHOSPHORUS: CPT

## 2020-01-23 PROCEDURE — 87040 BLOOD CULTURE FOR BACTERIA: CPT

## 2020-01-23 PROCEDURE — 94640 AIRWAY INHALATION TREATMENT: CPT

## 2020-01-23 PROCEDURE — 85610 PROTHROMBIN TIME: CPT

## 2020-01-23 PROCEDURE — 80164 ASSAY DIPROPYLACETIC ACD TOT: CPT

## 2020-01-23 PROCEDURE — 80053 COMPREHEN METABOLIC PANEL: CPT

## 2020-01-23 PROCEDURE — 71045 X-RAY EXAM CHEST 1 VIEW: CPT

## 2020-01-23 PROCEDURE — 82140 ASSAY OF AMMONIA: CPT

## 2020-01-23 PROCEDURE — 83735 ASSAY OF MAGNESIUM: CPT

## 2020-01-23 PROCEDURE — 94760 N-INVAS EAR/PLS OXIMETRY 1: CPT

## 2020-01-23 PROCEDURE — 84484 ASSAY OF TROPONIN QUANT: CPT

## 2020-01-23 PROCEDURE — 83605 ASSAY OF LACTIC ACID: CPT

## 2020-01-23 PROCEDURE — 71046 X-RAY EXAM CHEST 2 VIEWS: CPT

## 2020-01-23 PROCEDURE — 99284 EMERGENCY DEPT VISIT MOD MDM: CPT

## 2020-01-23 PROCEDURE — 70450 CT HEAD/BRAIN W/O DYE: CPT

## 2020-01-23 PROCEDURE — 36415 COLL VENOUS BLD VENIPUNCTURE: CPT

## 2020-01-23 PROCEDURE — 85025 COMPLETE CBC W/AUTO DIFF WBC: CPT

## 2020-01-23 RX ADMIN — CEFAZOLIN SCH MLS/HR: 330 INJECTION, POWDER, FOR SOLUTION INTRAMUSCULAR; INTRAVENOUS at 22:42

## 2020-01-24 LAB
ALBUMIN SERPL-MCNC: 3.3 G/DL (ref 3.5–5)
ALP SERPL-CCNC: 90 U/L (ref 38–126)
ALT SERPL-CCNC: 21 U/L (ref 4–34)
ANION GAP SERPL CALC-SCNC: 8 MMOL/L
AST SERPL-CCNC: 33 U/L (ref 14–36)
BASOPHILS # BLD AUTO: 0 K/UL (ref 0–0.2)
BASOPHILS NFR BLD AUTO: 0 %
BUN SERPL-SCNC: 23 MG/DL (ref 7–17)
CALCIUM SPEC-MCNC: 8.6 MG/DL (ref 8.4–10.2)
CHLORIDE SERPL-SCNC: 100 MMOL/L (ref 98–107)
CO2 SERPL-SCNC: 29 MMOL/L (ref 22–30)
EOSINOPHIL # BLD AUTO: 0 K/UL (ref 0–0.7)
EOSINOPHIL NFR BLD AUTO: 1 %
ERYTHROCYTE [DISTWIDTH] IN BLOOD BY AUTOMATED COUNT: 2.94 M/UL (ref 3.8–5.4)
ERYTHROCYTE [DISTWIDTH] IN BLOOD: 14.9 % (ref 11.5–15.5)
GLUCOSE BLD-MCNC: 230 MG/DL (ref 75–99)
GLUCOSE BLD-MCNC: 231 MG/DL (ref 75–99)
GLUCOSE BLD-MCNC: 363 MG/DL (ref 75–99)
GLUCOSE BLD-MCNC: 427 MG/DL (ref 75–99)
GLUCOSE BLD-MCNC: 461 MG/DL (ref 75–99)
GLUCOSE BLD-MCNC: 473 MG/DL (ref 75–99)
GLUCOSE SERPL-MCNC: 336 MG/DL (ref 74–99)
HCT VFR BLD AUTO: 28.1 % (ref 34–46)
HGB BLD-MCNC: 8.9 GM/DL (ref 11.4–16)
LYMPHOCYTES # SPEC AUTO: 1 K/UL (ref 1–4.8)
LYMPHOCYTES NFR SPEC AUTO: 14 %
MCH RBC QN AUTO: 30.2 PG (ref 25–35)
MCHC RBC AUTO-ENTMCNC: 31.6 G/DL (ref 31–37)
MCV RBC AUTO: 95.5 FL (ref 80–100)
MONOCYTES # BLD AUTO: 0.1 K/UL (ref 0–1)
MONOCYTES NFR BLD AUTO: 2 %
NEUTROPHILS # BLD AUTO: 5.6 K/UL (ref 1.3–7.7)
NEUTROPHILS NFR BLD AUTO: 82 %
PLATELET # BLD AUTO: 208 K/UL (ref 150–450)
POTASSIUM SERPL-SCNC: 4.9 MMOL/L (ref 3.5–5.1)
PROT SERPL-MCNC: 6.8 G/DL (ref 6.3–8.2)
SODIUM SERPL-SCNC: 137 MMOL/L (ref 137–145)
WBC # BLD AUTO: 6.8 K/UL (ref 3.8–10.6)

## 2020-01-24 RX ADMIN — IPRATROPIUM BROMIDE AND ALBUTEROL SULFATE SCH ML: .5; 3 SOLUTION RESPIRATORY (INHALATION) at 08:44

## 2020-01-24 RX ADMIN — ASPIRIN 81 MG CHEWABLE TABLET SCH MG: 81 TABLET CHEWABLE at 09:58

## 2020-01-24 RX ADMIN — IPRATROPIUM BROMIDE AND ALBUTEROL SULFATE SCH ML: .5; 3 SOLUTION RESPIRATORY (INHALATION) at 19:31

## 2020-01-24 RX ADMIN — METHYLPREDNISOLONE SODIUM SUCCINATE SCH MG: 125 INJECTION, POWDER, FOR SOLUTION INTRAMUSCULAR; INTRAVENOUS at 00:48

## 2020-01-24 RX ADMIN — INSULIN ASPART SCH UNIT: 100 INJECTION, SOLUTION INTRAVENOUS; SUBCUTANEOUS at 07:16

## 2020-01-24 RX ADMIN — Medication SCH MG: at 09:59

## 2020-01-24 RX ADMIN — FAMOTIDINE SCH MG: 20 TABLET, FILM COATED ORAL at 09:59

## 2020-01-24 RX ADMIN — VALPROIC ACID SCH MG: 250 SOLUTION ORAL at 10:50

## 2020-01-24 RX ADMIN — ENOXAPARIN SODIUM SCH MG: 40 INJECTION SUBCUTANEOUS at 09:58

## 2020-01-24 RX ADMIN — HYDROCODONE BITARTRATE AND ACETAMINOPHEN PRN EACH: 10; 325 TABLET ORAL at 10:50

## 2020-01-24 RX ADMIN — INSULIN ASPART SCH UNIT: 100 INJECTION, SOLUTION INTRAVENOUS; SUBCUTANEOUS at 12:04

## 2020-01-24 RX ADMIN — CEFAZOLIN SCH MLS/HR: 330 INJECTION, POWDER, FOR SOLUTION INTRAMUSCULAR; INTRAVENOUS at 00:51

## 2020-01-24 RX ADMIN — IPRATROPIUM BROMIDE AND ALBUTEROL SULFATE SCH ML: .5; 3 SOLUTION RESPIRATORY (INHALATION) at 15:53

## 2020-01-24 RX ADMIN — INSULIN ASPART SCH UNIT: 100 INJECTION, SOLUTION INTRAVENOUS; SUBCUTANEOUS at 17:04

## 2020-01-24 RX ADMIN — IPRATROPIUM BROMIDE AND ALBUTEROL SULFATE SCH ML: .5; 3 SOLUTION RESPIRATORY (INHALATION) at 11:56

## 2020-01-24 RX ADMIN — DULOXETINE SCH MG: 60 CAPSULE, DELAYED RELEASE ORAL at 09:58

## 2020-01-24 RX ADMIN — METHYLPREDNISOLONE SODIUM SUCCINATE SCH MG: 125 INJECTION, POWDER, FOR SOLUTION INTRAMUSCULAR; INTRAVENOUS at 05:32

## 2020-01-24 RX ADMIN — HYDROCODONE BITARTRATE AND ACETAMINOPHEN PRN EACH: 10; 325 TABLET ORAL at 21:54

## 2020-01-24 NOTE — XR
EXAMINATION TYPE: XR chest 2V

 

DATE OF EXAM: 1/24/2020

 

COMPARISON: Chest x-ray July 31, 2019

 

HISTORY: COPD with difficulty breathing.

 

TECHNIQUE:  Frontal and lateral views of the chest are obtained.

 

FINDINGS:  New Right-sided PICC line terminating in SVC. There is chronic parenchyma change without s
uspicious new focal air space opacity, pleural effusion, or pneumothorax seen.  The cardiac silhouett
e size is enlarged on current study.  Calcified coronary stent right aspect of heart redemonstrated. 
The osseous structures are intact.

 

IMPRESSION:  Cardiomegaly without suspicious acute pulmonary process.

## 2020-01-25 LAB
ALBUMIN SERPL-MCNC: 3 G/DL (ref 3.5–5)
ALP SERPL-CCNC: 89 U/L (ref 38–126)
ALT SERPL-CCNC: 18 U/L (ref 4–34)
ANION GAP SERPL CALC-SCNC: 4 MMOL/L
AST SERPL-CCNC: 23 U/L (ref 14–36)
BASOPHILS # BLD AUTO: 0.1 K/UL (ref 0–0.2)
BASOPHILS NFR BLD AUTO: 1 %
BUN SERPL-SCNC: 26 MG/DL (ref 7–17)
CALCIUM SPEC-MCNC: 9.1 MG/DL (ref 8.4–10.2)
CHLORIDE SERPL-SCNC: 105 MMOL/L (ref 98–107)
CO2 SERPL-SCNC: 31 MMOL/L (ref 22–30)
EOSINOPHIL # BLD AUTO: 0 K/UL (ref 0–0.7)
EOSINOPHIL NFR BLD AUTO: 0 %
ERYTHROCYTE [DISTWIDTH] IN BLOOD BY AUTOMATED COUNT: 2.82 M/UL (ref 3.8–5.4)
ERYTHROCYTE [DISTWIDTH] IN BLOOD: 15.4 % (ref 11.5–15.5)
GLUCOSE BLD-MCNC: 179 MG/DL (ref 75–99)
GLUCOSE BLD-MCNC: 199 MG/DL (ref 75–99)
GLUCOSE BLD-MCNC: 237 MG/DL (ref 75–99)
GLUCOSE BLD-MCNC: 440 MG/DL (ref 75–99)
GLUCOSE BLD-MCNC: 70 MG/DL (ref 75–99)
GLUCOSE SERPL-MCNC: 185 MG/DL (ref 74–99)
HCT VFR BLD AUTO: 27.1 % (ref 34–46)
HGB BLD-MCNC: 8.6 GM/DL (ref 11.4–16)
LYMPHOCYTES # SPEC AUTO: 1.4 K/UL (ref 1–4.8)
LYMPHOCYTES NFR SPEC AUTO: 24 %
MCH RBC QN AUTO: 30.3 PG (ref 25–35)
MCHC RBC AUTO-ENTMCNC: 31.6 G/DL (ref 31–37)
MCV RBC AUTO: 95.9 FL (ref 80–100)
MONOCYTES # BLD AUTO: 0.3 K/UL (ref 0–1)
MONOCYTES NFR BLD AUTO: 6 %
NEUTROPHILS # BLD AUTO: 4.1 K/UL (ref 1.3–7.7)
NEUTROPHILS NFR BLD AUTO: 68 %
PLATELET # BLD AUTO: 242 K/UL (ref 150–450)
POTASSIUM SERPL-SCNC: 4.4 MMOL/L (ref 3.5–5.1)
PROT SERPL-MCNC: 6.1 G/DL (ref 6.3–8.2)
SODIUM SERPL-SCNC: 140 MMOL/L (ref 137–145)
WBC # BLD AUTO: 6 K/UL (ref 3.8–10.6)

## 2020-01-25 RX ADMIN — IPRATROPIUM BROMIDE AND ALBUTEROL SULFATE SCH ML: .5; 3 SOLUTION RESPIRATORY (INHALATION) at 15:56

## 2020-01-25 RX ADMIN — DULOXETINE SCH MG: 60 CAPSULE, DELAYED RELEASE ORAL at 08:20

## 2020-01-25 RX ADMIN — IPRATROPIUM BROMIDE AND ALBUTEROL SULFATE SCH ML: .5; 3 SOLUTION RESPIRATORY (INHALATION) at 12:21

## 2020-01-25 RX ADMIN — INSULIN ASPART SCH UNIT: 100 INJECTION, SOLUTION INTRAVENOUS; SUBCUTANEOUS at 08:21

## 2020-01-25 RX ADMIN — ENOXAPARIN SODIUM SCH MG: 40 INJECTION SUBCUTANEOUS at 08:20

## 2020-01-25 RX ADMIN — ASPIRIN 81 MG CHEWABLE TABLET SCH MG: 81 TABLET CHEWABLE at 08:20

## 2020-01-25 RX ADMIN — IPRATROPIUM BROMIDE AND ALBUTEROL SULFATE SCH ML: .5; 3 SOLUTION RESPIRATORY (INHALATION) at 20:37

## 2020-01-25 RX ADMIN — INSULIN DETEMIR SCH UNIT: 100 INJECTION, SOLUTION SUBCUTANEOUS at 20:48

## 2020-01-25 RX ADMIN — FAMOTIDINE SCH MG: 20 TABLET, FILM COATED ORAL at 08:20

## 2020-01-25 RX ADMIN — INSULIN ASPART SCH UNIT: 100 INJECTION, SOLUTION INTRAVENOUS; SUBCUTANEOUS at 12:09

## 2020-01-25 RX ADMIN — Medication SCH MG: at 08:20

## 2020-01-25 RX ADMIN — IPRATROPIUM BROMIDE AND ALBUTEROL SULFATE SCH ML: .5; 3 SOLUTION RESPIRATORY (INHALATION) at 08:55

## 2020-01-25 RX ADMIN — HYDROCODONE BITARTRATE AND ACETAMINOPHEN PRN EACH: 10; 325 TABLET ORAL at 21:32

## 2020-01-25 RX ADMIN — VALPROIC ACID SCH MG: 250 SOLUTION ORAL at 08:20

## 2020-01-25 RX ADMIN — INSULIN ASPART SCH: 100 INJECTION, SOLUTION INTRAVENOUS; SUBCUTANEOUS at 16:32

## 2020-01-25 NOTE — P.PN
Subjective


Progress Note Date: 01/25/20








This is a 59-year-old female patient who presented to the ER with concerns of 

failure to thrive and concerns of living conditions per Visiting nurse.  Patient

was recently discharged on IV antibiotics for concerns for right foot 

osteomyelitis.  Patient is also a known brittle diabetic.  Additional medical 

history includes asthma, CAD, chest pain, heart failure, COPD, CVA, diabetes 

mellitus, deep vein thrombosis, GERD, hyperlipidemia, hypertension, 

osteoarthritis, renal disease and reoccurring extremity wounds due to diabetes 

cells and wound care clinic.  According to ER report patient was covered in 

feces and poor living conditions were noted in house.  Per patient she reports 

that she lives with caregiver.  Patient was also noted to have some wheezing per

ER admitted for COPD exacerbation.  At this time will consult infectious disease

for ongoing IV antibiotic use.  Social work services also consulted to assess 

living condition and plan for discharge.  Patient was started on Solu-Medrol for

COPD exacerbation and DuoNeb breathing treatments will order chest x-ray.  At 

this time patient denies chest pain or shortness of breath.  Patient denies 

nausea vomiting or diarrhea.  Patient denies any urinary burning or frequency





On 01/25/2020 patient was seen and examined on the medical floor she is alert 

and oriented 3 in no apparent distress she is complaining of occasional pain in

her lower extremity otherwise she denies any complaints there is no fever no 

chills no headache no dizziness no chest pain no shortness of breath no cough no

nausea or vomiting no abdominal pain no diarrhea no burning with urination no 

frequency or urgency no hematuria





Objective





- Vital Signs


Vital signs: 


                                   Vital Signs











Temp  97.9 F   01/25/20 14:33


 


Pulse  85   01/25/20 15:17


 


Resp  16   01/25/20 15:17


 


BP  90/52   01/25/20 14:33


 


Pulse Ox  95   01/25/20 14:33








                                 Intake & Output











 01/24/20 01/25/20 01/25/20





 18:59 06:59 18:59


 


Intake Total 1040  1004


 


Balance 1040  1004


 


Weight 57.606 kg  


 


Intake:   


 


  Intake, IV Titration 800  50





  Amount   


 


    DAPTOmycin 350 mg In 100  50





    Sodium Chloride 0.9% 50   





    ml @ 100 mls/hr IVPB   





    Q24HR [facility] Rx#:[id number]   


 


    Sodium Chloride 0.9% 1, 700  





    000 ml @ 100 mls/hr IV .   





    Q10H [facility] Rx#:[id number]   


 


  Oral 240  954


 


Other:   


 


  Voiding Method Diaper  Diaper





 Incontinent  Incontinent


 


  # Voids 1 1 2














- Exam





In general patient is alert and oriented 3 in no apparent distress


HEENT head normocephalic and atraumatic


Neck is supple no JVD no goiter no lymphadenopathy


Lungs diminished bilaterally no crackles no wheezing


Heart regular rate and rhythm S1-S2, no rub or gallop


Abdomen is soft nontender nondistended positive bowel sounds no 

hepatosplenomegaly


Extremities no edema.  Right foot dressing place clean dry and intact


Neuro no acute gross focal neurological deficit, residual weakness from previous

stroke and changed





- Labs


CBC & Chem 7: 


                                 01/25/20 06:53





                                 01/25/20 06:53


Labs: 


                  Abnormal Lab Results - Last 24 Hours (Table)











  01/24/20 01/24/20 01/25/20 Range/Units





  16:29 20:58 01:44 


 


RBC     (3.80-5.40)  m/uL


 


Hgb     (11.4-16.0)  gm/dL


 


Hct     (34.0-46.0)  %


 


Carbon Dioxide     (22-30)  mmol/L


 


BUN     (7-17)  mg/dL


 


Glucose     (74-99)  mg/dL


 


POC Glucose (mg/dL)  461 H  473 H  440 H  (75-99)  mg/dL


 


Total Protein     (6.3-8.2)  g/dL


 


Albumin     (3.5-5.0)  g/dL














  01/25/20 01/25/20 01/25/20 Range/Units





  06:53 06:53 06:56 


 


RBC  2.82 L    (3.80-5.40)  m/uL


 


Hgb  8.6 L    (11.4-16.0)  gm/dL


 


Hct  27.1 L    (34.0-46.0)  %


 


Carbon Dioxide   31 H   (22-30)  mmol/L


 


BUN   26 H   (7-17)  mg/dL


 


Glucose   185 H   (74-99)  mg/dL


 


POC Glucose (mg/dL)    199 H  (75-99)  mg/dL


 


Total Protein   6.1 L   (6.3-8.2)  g/dL


 


Albumin   3.0 L   (3.5-5.0)  g/dL














  01/25/20 Range/Units





  11:33 


 


RBC   (3.80-5.40)  m/uL


 


Hgb   (11.4-16.0)  gm/dL


 


Hct   (34.0-46.0)  %


 


Carbon Dioxide   (22-30)  mmol/L


 


BUN   (7-17)  mg/dL


 


Glucose   (74-99)  mg/dL


 


POC Glucose (mg/dL)  179 H  (75-99)  mg/dL


 


Total Protein   (6.3-8.2)  g/dL


 


Albumin   (3.5-5.0)  g/dL








                      Microbiology - Last 24 Hours (Table)











 01/23/20 22:45 Blood Culture - Preliminary





 Blood    No Growth after 24 hours














Assessment and Plan


Plan: 











1.  Failure to thrive with concerns of living conditions.  Social work services 

have been consulted to assess discharge planning and home environment





2.  Right foot wound infection with osteomyelitis.  Patient recently underwent 

debridement with Dr. Dash.  Patient recently discharged on IV daptomycin per 

ID.  Infectious disease services have been consulted.  Daptomycin has been 

resumed





3.  Acute COPD exacerbation.  Patient started Solu-Medrol DuoNeb breathing 

treatments chest x-ray has been ordered





4.  Type 1 diabetes mellitus known brittle diabetic with noncompliance.  

Patient's home dose of Levemir and sliding scale insulin has been ordered





5.  History of peripheral vascular disease





6.  History of essential hypertension





7.  History of hyperlipidemia





8.  History of congestive heart failure





9.  History of coronary artery disease with previous history of angioplasty and 

stent placement





DVT prophylaxis Lovenox.  GI prophylaxis Pepcid


Infectious disease consultant continue daptomycin


Social work services consulted

## 2020-01-26 LAB
ALBUMIN SERPL-MCNC: 3 G/DL (ref 3.5–5)
ALP SERPL-CCNC: 94 U/L (ref 38–126)
ALT SERPL-CCNC: 18 U/L (ref 4–34)
ANION GAP SERPL CALC-SCNC: 3 MMOL/L
AST SERPL-CCNC: 20 U/L (ref 14–36)
BASOPHILS # BLD AUTO: 0 K/UL (ref 0–0.2)
BASOPHILS NFR BLD AUTO: 1 %
BUN SERPL-SCNC: 28 MG/DL (ref 7–17)
CALCIUM SPEC-MCNC: 9 MG/DL (ref 8.4–10.2)
CHLORIDE SERPL-SCNC: 104 MMOL/L (ref 98–107)
CO2 SERPL-SCNC: 33 MMOL/L (ref 22–30)
EOSINOPHIL # BLD AUTO: 0 K/UL (ref 0–0.7)
EOSINOPHIL NFR BLD AUTO: 1 %
ERYTHROCYTE [DISTWIDTH] IN BLOOD BY AUTOMATED COUNT: 2.62 M/UL (ref 3.8–5.4)
ERYTHROCYTE [DISTWIDTH] IN BLOOD: 15.6 % (ref 11.5–15.5)
GLUCOSE BLD-MCNC: 168 MG/DL (ref 75–99)
GLUCOSE BLD-MCNC: 225 MG/DL (ref 75–99)
GLUCOSE BLD-MCNC: 242 MG/DL (ref 75–99)
GLUCOSE BLD-MCNC: 58 MG/DL (ref 75–99)
GLUCOSE BLD-MCNC: 64 MG/DL (ref 75–99)
GLUCOSE BLD-MCNC: 71 MG/DL (ref 75–99)
GLUCOSE BLD-MCNC: 73 MG/DL (ref 75–99)
GLUCOSE BLD-MCNC: 93 MG/DL (ref 75–99)
GLUCOSE SERPL-MCNC: 74 MG/DL (ref 74–99)
HCT VFR BLD AUTO: 25.3 % (ref 34–46)
HGB BLD-MCNC: 8.1 GM/DL (ref 11.4–16)
LYMPHOCYTES # SPEC AUTO: 1.6 K/UL (ref 1–4.8)
LYMPHOCYTES NFR SPEC AUTO: 30 %
MCH RBC QN AUTO: 30.9 PG (ref 25–35)
MCHC RBC AUTO-ENTMCNC: 32.1 G/DL (ref 31–37)
MCV RBC AUTO: 96.2 FL (ref 80–100)
MONOCYTES # BLD AUTO: 0.3 K/UL (ref 0–1)
MONOCYTES NFR BLD AUTO: 6 %
NEUTROPHILS # BLD AUTO: 3.2 K/UL (ref 1.3–7.7)
NEUTROPHILS NFR BLD AUTO: 61 %
PLATELET # BLD AUTO: 235 K/UL (ref 150–450)
POTASSIUM SERPL-SCNC: 4.9 MMOL/L (ref 3.5–5.1)
PROT SERPL-MCNC: 6.2 G/DL (ref 6.3–8.2)
SODIUM SERPL-SCNC: 140 MMOL/L (ref 137–145)
WBC # BLD AUTO: 5.3 K/UL (ref 3.8–10.6)

## 2020-01-26 RX ADMIN — IPRATROPIUM BROMIDE AND ALBUTEROL SULFATE SCH ML: .5; 3 SOLUTION RESPIRATORY (INHALATION) at 08:28

## 2020-01-26 RX ADMIN — INSULIN ASPART SCH: 100 INJECTION, SOLUTION INTRAVENOUS; SUBCUTANEOUS at 07:03

## 2020-01-26 RX ADMIN — INSULIN DETEMIR SCH UNIT: 100 INJECTION, SOLUTION SUBCUTANEOUS at 20:32

## 2020-01-26 RX ADMIN — IPRATROPIUM BROMIDE AND ALBUTEROL SULFATE SCH ML: .5; 3 SOLUTION RESPIRATORY (INHALATION) at 19:53

## 2020-01-26 RX ADMIN — HYDROCODONE BITARTRATE AND ACETAMINOPHEN PRN EACH: 10; 325 TABLET ORAL at 23:15

## 2020-01-26 RX ADMIN — INSULIN ASPART SCH UNIT: 100 INJECTION, SOLUTION INTRAVENOUS; SUBCUTANEOUS at 12:37

## 2020-01-26 RX ADMIN — ENOXAPARIN SODIUM SCH MG: 40 INJECTION SUBCUTANEOUS at 08:45

## 2020-01-26 RX ADMIN — IPRATROPIUM BROMIDE AND ALBUTEROL SULFATE SCH ML: .5; 3 SOLUTION RESPIRATORY (INHALATION) at 11:57

## 2020-01-26 RX ADMIN — INSULIN ASPART SCH UNIT: 100 INJECTION, SOLUTION INTRAVENOUS; SUBCUTANEOUS at 17:17

## 2020-01-26 RX ADMIN — Medication SCH MG: at 08:45

## 2020-01-26 RX ADMIN — ASPIRIN 81 MG CHEWABLE TABLET SCH MG: 81 TABLET CHEWABLE at 08:45

## 2020-01-26 RX ADMIN — IPRATROPIUM BROMIDE AND ALBUTEROL SULFATE SCH ML: .5; 3 SOLUTION RESPIRATORY (INHALATION) at 16:15

## 2020-01-26 RX ADMIN — DULOXETINE SCH MG: 60 CAPSULE, DELAYED RELEASE ORAL at 08:45

## 2020-01-26 RX ADMIN — VALPROIC ACID SCH MG: 250 SOLUTION ORAL at 08:45

## 2020-01-26 RX ADMIN — FAMOTIDINE SCH MG: 20 TABLET, FILM COATED ORAL at 08:45

## 2020-01-26 NOTE — P.PN
Subjective


Progress Note Date: 01/26/20








This is a 59-year-old female patient who presented to the ER with concerns of 

failure to thrive and concerns of living conditions per Visiting nurse.  Patient

was recently discharged on IV antibiotics for concerns for right foot 

osteomyelitis.  Patient is also a known brittle diabetic.  Additional medical 

history includes asthma, CAD, chest pain, heart failure, COPD, CVA, diabetes 

mellitus, deep vein thrombosis, GERD, hyperlipidemia, hypertension, 

osteoarthritis, renal disease and reoccurring extremity wounds due to diabetes 

cells and wound care clinic.  According to ER report patient was covered in 

feces and poor living conditions were noted in house.  Per patient she reports 

that she lives with caregiver.  Patient was also noted to have some wheezing per

ER admitted for COPD exacerbation.  At this time will consult infectious disease

for ongoing IV antibiotic use.  Social work services also consulted to assess 

living condition and plan for discharge.  Patient was started on Solu-Medrol for

COPD exacerbation and DuoNeb breathing treatments will order chest x-ray.  At 

this time patient denies chest pain or shortness of breath.  Patient denies 

nausea vomiting or diarrhea.  Patient denies any urinary burning or frequency





On 01/25/2020 patient was seen and examined on the medical floor she is alert 

and oriented 3 in no apparent distress she is complaining of occasional pain in

her lower extremity otherwise she denies any complaints there is no fever no 

chills no headache no dizziness no chest pain no shortness of breath no cough no

nausea or vomiting no abdominal pain no diarrhea no burning with urination no 

frequency or urgency no hematuria





On 01/26/2020 patient was seen and examined on the medical floor she states she 

is doing well she denies any complaints at this time there is no fever or chills

no headache or dizziness no chest pain no shortness of breath no cough no nausea

or vomiting no abdominal pain no diarrhea no burning with urination no frequency

or urgency and no hematuria and in the lower extremity is well-controlled at 

this time





Objective





- Vital Signs


Vital signs: 


                                   Vital Signs











Temp  98.4 F   01/26/20 06:55


 


Pulse  82   01/26/20 12:09


 


Resp  14   01/26/20 08:25


 


BP  104/66   01/26/20 06:55


 


Pulse Ox  96   01/26/20 06:55








                                 Intake & Output











 01/25/20 01/26/20 01/26/20





 18:59 06:59 18:59


 


Intake Total 1004 720 


 


Balance 1004 720 


 


Intake:   


 


  Intake, IV Titration 50  





  Amount   


 


    DAPTOmycin 350 mg In 50  





    Sodium Chloride 0.9% 50   





    ml @ 100 mls/hr IVPB   





    Q24HR [facility] Rx#:[id number]   


 


  Oral 030 658 


 


Other:   


 


  Voiding Method Diaper  Diaper





 Incontinent  Incontinent


 


  # Voids 2 1 














- Exam





In general patient is alert and oriented 3 in no apparent distress


HEENT head normocephalic and atraumatic


Neck is supple no JVD no goiter no lymphadenopathy


Lungs diminished bilaterally no crackles no wheezing


Heart regular rate and rhythm S1-S2, no rub or gallop


Abdomen is soft nontender nondistended positive bowel sounds no 

hepatosplenomegaly


Extremities no edema.  Right foot dressing place clean dry and intact


Neuro no acute gross focal neurological deficit, residual weakness from previous

stroke and changed





- Labs


CBC & Chem 7: 


                                 01/26/20 06:36





                                 01/26/20 06:36


Labs: 


                  Abnormal Lab Results - Last 24 Hours (Table)











  01/25/20 01/25/20 01/26/20 Range/Units





  16:30 20:45 03:09 


 


RBC     (3.80-5.40)  m/uL


 


Hgb     (11.4-16.0)  gm/dL


 


Hct     (34.0-46.0)  %


 


RDW     (11.5-15.5)  %


 


Carbon Dioxide     (22-30)  mmol/L


 


BUN     (7-17)  mg/dL


 


POC Glucose (mg/dL)  70 L  237 H  58 L  (75-99)  mg/dL


 


Total Protein     (6.3-8.2)  g/dL


 


Albumin     (3.5-5.0)  g/dL














  01/26/20 01/26/20 01/26/20 Range/Units





  03:35 04:01 04:35 


 


RBC     (3.80-5.40)  m/uL


 


Hgb     (11.4-16.0)  gm/dL


 


Hct     (34.0-46.0)  %


 


RDW     (11.5-15.5)  %


 


Carbon Dioxide     (22-30)  mmol/L


 


BUN     (7-17)  mg/dL


 


POC Glucose (mg/dL)  71 L  64 L  73 L  (75-99)  mg/dL


 


Total Protein     (6.3-8.2)  g/dL


 


Albumin     (3.5-5.0)  g/dL














  01/26/20 01/26/20 01/26/20 Range/Units





  06:36 06:36 11:43 


 


RBC  2.62 L    (3.80-5.40)  m/uL


 


Hgb  8.1 L    (11.4-16.0)  gm/dL


 


Hct  25.3 L    (34.0-46.0)  %


 


RDW  15.6 H    (11.5-15.5)  %


 


Carbon Dioxide   33 H   (22-30)  mmol/L


 


BUN   28 H   (7-17)  mg/dL


 


POC Glucose (mg/dL)    168 H  (75-99)  mg/dL


 


Total Protein   6.2 L   (6.3-8.2)  g/dL


 


Albumin   3.0 L   (3.5-5.0)  g/dL








                      Microbiology - Last 24 Hours (Table)











 01/23/20 22:45 Blood Culture - Preliminary





 Blood    No Growth after 48 hours














Assessment and Plan


Plan: 











1.  Failure to thrive with concerns of living conditions.  Social work services 

have been consulted to assess discharge planning and home environment





2.  Right foot wound infection with osteomyelitis.  Patient recently underwent 

debridement with Dr. Dash.  Patient recently discharged on IV daptomycin per 

ID.  Infectious disease services have been consulted.  Daptomycin has been 

resumed





3.  Acute COPD exacerbation.  Patient started Solu-Medrol DuoNeb breathing 

treatments chest x-ray has been ordered





4.  Type 1 diabetes mellitus known brittle diabetic with noncompliance.  

Patient's home dose of Levemir and sliding scale insulin has been ordered





5.  History of peripheral vascular disease





6.  History of essential hypertension





7.  History of hyperlipidemia





8.  History of congestive heart failure





9.  History of coronary artery disease with previous history of angioplasty and 

stent placement





DVT prophylaxis Lovenox.  GI prophylaxis Pepcid


Infectious disease consultant continue daptomycin


Social work services consulted

## 2020-01-27 LAB
ALBUMIN SERPL-MCNC: 2.6 G/DL (ref 3.5–5)
ALP SERPL-CCNC: 83 U/L (ref 38–126)
ALT SERPL-CCNC: 14 U/L (ref 4–34)
ANION GAP SERPL CALC-SCNC: 3 MMOL/L
AST SERPL-CCNC: 16 U/L (ref 14–36)
BASOPHILS # BLD AUTO: 0 K/UL (ref 0–0.2)
BASOPHILS NFR BLD AUTO: 1 %
BUN SERPL-SCNC: 25 MG/DL (ref 7–17)
CALCIUM SPEC-MCNC: 8.6 MG/DL (ref 8.4–10.2)
CHLORIDE SERPL-SCNC: 103 MMOL/L (ref 98–107)
CO2 SERPL-SCNC: 34 MMOL/L (ref 22–30)
EOSINOPHIL # BLD AUTO: 0.1 K/UL (ref 0–0.7)
EOSINOPHIL NFR BLD AUTO: 2 %
ERYTHROCYTE [DISTWIDTH] IN BLOOD BY AUTOMATED COUNT: 2.65 M/UL (ref 3.8–5.4)
ERYTHROCYTE [DISTWIDTH] IN BLOOD: 15.4 % (ref 11.5–15.5)
GLUCOSE BLD-MCNC: 137 MG/DL (ref 75–99)
GLUCOSE BLD-MCNC: 219 MG/DL (ref 75–99)
GLUCOSE BLD-MCNC: 234 MG/DL (ref 75–99)
GLUCOSE BLD-MCNC: 370 MG/DL (ref 75–99)
GLUCOSE BLD-MCNC: 58 MG/DL (ref 75–99)
GLUCOSE BLD-MCNC: 88 MG/DL (ref 75–99)
GLUCOSE BLD-MCNC: 92 MG/DL (ref 75–99)
GLUCOSE SERPL-MCNC: 76 MG/DL (ref 74–99)
HCT VFR BLD AUTO: 25.4 % (ref 34–46)
HGB BLD-MCNC: 8 GM/DL (ref 11.4–16)
LYMPHOCYTES # SPEC AUTO: 1.3 K/UL (ref 1–4.8)
LYMPHOCYTES NFR SPEC AUTO: 37 %
MCH RBC QN AUTO: 30.2 PG (ref 25–35)
MCHC RBC AUTO-ENTMCNC: 31.5 G/DL (ref 31–37)
MCV RBC AUTO: 95.8 FL (ref 80–100)
MONOCYTES # BLD AUTO: 0.3 K/UL (ref 0–1)
MONOCYTES NFR BLD AUTO: 7 %
NEUTROPHILS # BLD AUTO: 1.8 K/UL (ref 1.3–7.7)
NEUTROPHILS NFR BLD AUTO: 51 %
PLATELET # BLD AUTO: 226 K/UL (ref 150–450)
POTASSIUM SERPL-SCNC: 4.5 MMOL/L (ref 3.5–5.1)
PROT SERPL-MCNC: 5.6 G/DL (ref 6.3–8.2)
SODIUM SERPL-SCNC: 140 MMOL/L (ref 137–145)
WBC # BLD AUTO: 3.5 K/UL (ref 3.8–10.6)

## 2020-01-27 RX ADMIN — ENOXAPARIN SODIUM SCH MG: 40 INJECTION SUBCUTANEOUS at 09:20

## 2020-01-27 RX ADMIN — FAMOTIDINE SCH MG: 20 TABLET, FILM COATED ORAL at 09:20

## 2020-01-27 RX ADMIN — IPRATROPIUM BROMIDE AND ALBUTEROL SULFATE SCH ML: .5; 3 SOLUTION RESPIRATORY (INHALATION) at 09:12

## 2020-01-27 RX ADMIN — INSULIN DETEMIR SCH UNIT: 100 INJECTION, SOLUTION SUBCUTANEOUS at 21:17

## 2020-01-27 RX ADMIN — Medication SCH MG: at 09:20

## 2020-01-27 RX ADMIN — ASPIRIN 81 MG CHEWABLE TABLET SCH MG: 81 TABLET CHEWABLE at 09:20

## 2020-01-27 RX ADMIN — HYDROCODONE BITARTRATE AND ACETAMINOPHEN PRN EACH: 10; 325 TABLET ORAL at 12:23

## 2020-01-27 RX ADMIN — DULOXETINE SCH MG: 60 CAPSULE, DELAYED RELEASE ORAL at 09:20

## 2020-01-27 RX ADMIN — IPRATROPIUM BROMIDE AND ALBUTEROL SULFATE SCH ML: .5; 3 SOLUTION RESPIRATORY (INHALATION) at 13:00

## 2020-01-27 RX ADMIN — VALPROIC ACID SCH MG: 250 SOLUTION ORAL at 09:20

## 2020-01-27 RX ADMIN — INSULIN ASPART SCH UNIT: 100 INJECTION, SOLUTION INTRAVENOUS; SUBCUTANEOUS at 17:43

## 2020-01-27 RX ADMIN — IPRATROPIUM BROMIDE AND ALBUTEROL SULFATE SCH ML: .5; 3 SOLUTION RESPIRATORY (INHALATION) at 22:14

## 2020-01-27 RX ADMIN — IPRATROPIUM BROMIDE AND ALBUTEROL SULFATE SCH: .5; 3 SOLUTION RESPIRATORY (INHALATION) at 17:04

## 2020-01-27 RX ADMIN — INSULIN ASPART SCH UNIT: 100 INJECTION, SOLUTION INTRAVENOUS; SUBCUTANEOUS at 12:20

## 2020-01-27 RX ADMIN — INSULIN ASPART SCH: 100 INJECTION, SOLUTION INTRAVENOUS; SUBCUTANEOUS at 08:46

## 2020-01-27 NOTE — PN
PROGRESS NOTE



DATE OF SERVICE:

01/26/2020



REASON FOR FOLLOWUP:

Right foot osteomyelitis.



INTERVAL HISTORY:

The patient is currently afebrile.  She has been breathing comfortably.  Denies having

any chest pain or cough. No nausea, no vomiting.  No abdominal pain or pain to the

right foot.



PHYSICAL EXAMINATION:

Blood pressure is 117/63 with a pulse of 90, temperature 97.7. She is 93% on room air.

General description is a middle-aged female lying in bed in no distress.

RESPIRATORY SYSTEM:  Unlabored breathing, clear to auscultation anteriorly.

HEART: S1, S2.  Regular rate and rhythm.

ABDOMEN: Soft, no tenderness.

Right foot is currently dressed up, no obvious drainage on the dressing.



LABS:

Hemoglobin 8.1, white count of 5.3, BUN of 28, creatinine 0.97.  Blood culture has been

negative.



DIAGNOSTIC IMPRESSION AND PLAN:

Patient with right diabetic foot infection in this patient who did have a right foot

osteomyelitis at the base of the fifth toe. Culture with Staphylococcus epidermidis.

Patient is currently covered with daptomycin.  Continue with local care with [facility]

and admitted most for socially and more likely will need placement.  Continue

supportive care.





MMODL / IJN: [id number] / Job#: [id number]

## 2020-01-27 NOTE — P.PN
Subjective


Progress Note Date: 01/27/20





This is a 59-year-old female patient who presented to the ER with concerns of 

failure to thrive and concerns of living conditions per Visiting nurse.  Patient

was recently discharged on IV antibiotics for concerns for right foot 

osteomyelitis.  Patient is also a known brittle diabetic.  Additional medical 

history includes asthma, CAD, chest pain, heart failure, COPD, CVA, diabetes 

mellitus, deep vein thrombosis, GERD, hyperlipidemia, hypertension, 

osteoarthritis, renal disease and reoccurring extremity wounds due to diabetes 

cells and wound care clinic.  According to ER report patient was covered in 

feces and poor living conditions were noted in house.  Per patient she reports 

that she lives with caregiver.  Patient was also noted to have some wheezing per

ER admitted for COPD exacerbation.  At this time will consult infectious disease

for ongoing IV antibiotic use.  Social work services also consulted to assess 

living condition and plan for discharge.  Patient was started on Solu-Medrol for

COPD exacerbation and DuoNeb breathing treatments will order chest x-ray.  At 

this time patient denies chest pain or shortness of breath.  Patient denies 

nausea vomiting or diarrhea.  Patient denies any urinary burning or frequency





On 01/25/2020 patient was seen and examined on the medical floor she is alert 

and oriented 3 in no apparent distress she is complaining of occasional pain in

her lower extremity otherwise she denies any complaints there is no fever no 

chills no headache no dizziness no chest pain no shortness of breath no cough no

nausea or vomiting no abdominal pain no diarrhea no burning with urination no 

frequency or urgency no hematuria





On 01/26/2020 patient was seen and examined on the medical floor she states she 

is doing well she denies any complaints at this time there is no fever or chills

no headache or dizziness no chest pain no shortness of breath no cough no nausea

or vomiting no abdominal pain no diarrhem burning with urination no frequency or

urgency and no hematuria and in the lower extremity is well-controlled at this 

time








On 01/27/2020 patient is alert and oriented 3.  Patient denies any complaints 

there is nausea vomiting or diarrhea.  Denies chest pain or shortness breath.  

Patient denies any urinary burning or frequency.  Per social work discussion was

held with legal gaurdian and okay to discharge to [facility] for IV antibiotics.  

[facility] for IV antibiotics. 














Objective





- Vital Signs


Vital signs: 


                                   Vital Signs











Temp  97.9 F   01/27/20 07:00


 


Pulse  92   01/27/20 13:12


 


Resp  16   01/27/20 07:00


 


BP  92/53   01/27/20 07:00


 


Pulse Ox  93 L  01/27/20 07:00








                                 Intake & Output











 01/26/20 01/27/20 01/27/20





 18:59 06:59 18:59


 


Intake Total 500 480 


 


Balance 500 480 


 


Intake:   


 


  Intake, IV Titration 50  





  Amount   


 


    DAPTOmycin 350 mg In 50  





    Sodium Chloride 0.9% 50   





    ml @ 100 mls/hr IVPB   





    Q24HR [facility] Rx#:[id number]   


 


  Oral 450 480 


 


Other:   


 


  Voiding Method Diaper  Diaper





 Incontinent  Incontinent


 


  # Voids 1 2 














- Exam





In general patient is alert and oriented 3 in no apparent distress


HEENT head normocephalic and atraumatic


Neck is supple no JVD no goiter no lymphadenopathy


Lungs diminished bilaterally no crackles no wheezing


Heart regular rate and rhythm S1-S2, no rub or gallop


Abdomen is soft nontender nondistended positive bowel sounds no hepatos

plenomegaly


Extremities no edema.  Right foot dressing place clean dry and intact


Neuro no acute gross focal neurological deficit, residual weakness from previous

stroke and changed





- Labs


CBC & Chem 7: 


                                 01/27/20 06:34





                                 01/27/20 06:34


Labs: 


                  Abnormal Lab Results - Last 24 Hours (Table)











  01/26/20 01/26/20 01/27/20 Range/Units





  16:55 19:05 02:12 


 


WBC     (3.8-10.6)  k/uL


 


RBC     (3.80-5.40)  m/uL


 


Hgb     (11.4-16.0)  gm/dL


 


Hct     (34.0-46.0)  %


 


Carbon Dioxide     (22-30)  mmol/L


 


BUN     (7-17)  mg/dL


 


POC Glucose (mg/dL)  225 H  242 H  137 H  (75-99)  mg/dL


 


Total Protein     (6.3-8.2)  g/dL


 


Albumin     (3.5-5.0)  g/dL














  01/27/20 01/27/20 01/27/20 Range/Units





  05:31 06:34 06:34 


 


WBC   3.5 L   (3.8-10.6)  k/uL


 


RBC   2.65 L   (3.80-5.40)  m/uL


 


Hgb   8.0 L   (11.4-16.0)  gm/dL


 


Hct   25.4 L   (34.0-46.0)  %


 


Carbon Dioxide    34 H  (22-30)  mmol/L


 


BUN    25 H  (7-17)  mg/dL


 


POC Glucose (mg/dL)  58 L    (75-99)  mg/dL


 


Total Protein    5.6 L  (6.3-8.2)  g/dL


 


Albumin    2.6 L  (3.5-5.0)  g/dL














  01/27/20 Range/Units





  11:46 


 


WBC   (3.8-10.6)  k/uL


 


RBC   (3.80-5.40)  m/uL


 


Hgb   (11.4-16.0)  gm/dL


 


Hct   (34.0-46.0)  %


 


Carbon Dioxide   (22-30)  mmol/L


 


BUN   (7-17)  mg/dL


 


POC Glucose (mg/dL)  234 H  (75-99)  mg/dL


 


Total Protein   (6.3-8.2)  g/dL


 


Albumin   (3.5-5.0)  g/dL








                      Microbiology - Last 24 Hours (Table)











 01/23/20 22:45 Blood Culture - Preliminary





 Blood    No Growth after 72 hours














Assessment and Plan


Assessment: 





1.  Failure to thrive with concerns of living conditions.  Social work services 

have been consulted to assess discharge planning and home environment





2.  Right foot wound infection with osteomyelitis.  Patient recently underwent 

debridement with Dr. Dash.  Patient recently discharged on IV daptomycin per 

ID.  Infectious disease services have been consulted.  Daptomycin has been 

resumed





3.  Acute COPD exacerbation.  Patient started Solu-Medrol DuoNeb breathing 

treatments chest x-ray has been ordered





4.  Type 1 diabetes mellitus known brittle diabetic with noncompliance.  

Patient's home dose of Levemir and sliding scale insulin has been ordered





5.  History of peripheral vascular disease





6.  History of essential hypertension





7.  History of hyperlipidemia





8.  History of congestive heart failure





9.  History of coronary artery disease with previous history of angioplasty and 

stent placement





DVT prophylaxis Lovenox.  GI prophylaxis Pepcid


Infectious disease consultant continue daptomycin


Social work services consulted





Possible discharge to [facility] or  [facility]





I performed an examination of the patient and discussed their management with 

the Nurse Practitioner.  I have reviewed the Nurse Practitioner's notes and 

agree with the documented findings and plan of care

## 2020-01-28 LAB
ALBUMIN SERPL-MCNC: 2.9 G/DL (ref 3.5–5)
ALP SERPL-CCNC: 104 U/L (ref 38–126)
ALT SERPL-CCNC: 15 U/L (ref 4–34)
ANION GAP SERPL CALC-SCNC: 5 MMOL/L
AST SERPL-CCNC: 18 U/L (ref 14–36)
BASOPHILS # BLD AUTO: 0 K/UL (ref 0–0.2)
BASOPHILS NFR BLD AUTO: 1 %
BUN SERPL-SCNC: 30 MG/DL (ref 7–17)
CALCIUM SPEC-MCNC: 8.8 MG/DL (ref 8.4–10.2)
CHLORIDE SERPL-SCNC: 102 MMOL/L (ref 98–107)
CO2 SERPL-SCNC: 31 MMOL/L (ref 22–30)
EOSINOPHIL # BLD AUTO: 0 K/UL (ref 0–0.7)
EOSINOPHIL NFR BLD AUTO: 0 %
ERYTHROCYTE [DISTWIDTH] IN BLOOD BY AUTOMATED COUNT: 2.84 M/UL (ref 3.8–5.4)
ERYTHROCYTE [DISTWIDTH] IN BLOOD: 15.2 % (ref 11.5–15.5)
GLUCOSE BLD-MCNC: 242 MG/DL (ref 75–99)
GLUCOSE BLD-MCNC: 249 MG/DL (ref 75–99)
GLUCOSE BLD-MCNC: 288 MG/DL (ref 75–99)
GLUCOSE BLD-MCNC: 402 MG/DL (ref 75–99)
GLUCOSE BLD-MCNC: 454 MG/DL (ref 75–99)
GLUCOSE BLD-MCNC: 496 MG/DL (ref 75–99)
GLUCOSE SERPL-MCNC: 229 MG/DL (ref 74–99)
HCT VFR BLD AUTO: 27.2 % (ref 34–46)
HGB BLD-MCNC: 8.6 GM/DL (ref 11.4–16)
LYMPHOCYTES # SPEC AUTO: 1.4 K/UL (ref 1–4.8)
LYMPHOCYTES NFR SPEC AUTO: 21 %
MAGNESIUM SPEC-SCNC: 2.1 MG/DL (ref 1.6–2.3)
MCH RBC QN AUTO: 30.1 PG (ref 25–35)
MCHC RBC AUTO-ENTMCNC: 31.4 G/DL (ref 31–37)
MCV RBC AUTO: 95.8 FL (ref 80–100)
MONOCYTES # BLD AUTO: 0.3 K/UL (ref 0–1)
MONOCYTES NFR BLD AUTO: 5 %
NEUTROPHILS # BLD AUTO: 4.9 K/UL (ref 1.3–7.7)
NEUTROPHILS NFR BLD AUTO: 72 %
PLATELET # BLD AUTO: 251 K/UL (ref 150–450)
POTASSIUM SERPL-SCNC: 5 MMOL/L (ref 3.5–5.1)
PROT SERPL-MCNC: 6.2 G/DL (ref 6.3–8.2)
SODIUM SERPL-SCNC: 138 MMOL/L (ref 137–145)
WBC # BLD AUTO: 6.7 K/UL (ref 3.8–10.6)

## 2020-01-28 RX ADMIN — INSULIN ASPART SCH UNIT: 100 INJECTION, SOLUTION INTRAVENOUS; SUBCUTANEOUS at 17:05

## 2020-01-28 RX ADMIN — INSULIN ASPART SCH UNIT: 100 INJECTION, SOLUTION INTRAVENOUS; SUBCUTANEOUS at 11:45

## 2020-01-28 RX ADMIN — Medication SCH MG: at 07:32

## 2020-01-28 RX ADMIN — ASPIRIN 81 MG CHEWABLE TABLET SCH MG: 81 TABLET CHEWABLE at 07:32

## 2020-01-28 RX ADMIN — IPRATROPIUM BROMIDE AND ALBUTEROL SULFATE SCH ML: .5; 3 SOLUTION RESPIRATORY (INHALATION) at 11:48

## 2020-01-28 RX ADMIN — IPRATROPIUM BROMIDE AND ALBUTEROL SULFATE SCH ML: .5; 3 SOLUTION RESPIRATORY (INHALATION) at 08:30

## 2020-01-28 RX ADMIN — VALPROIC ACID SCH MG: 250 SOLUTION ORAL at 07:33

## 2020-01-28 RX ADMIN — IPRATROPIUM BROMIDE AND ALBUTEROL SULFATE SCH ML: .5; 3 SOLUTION RESPIRATORY (INHALATION) at 15:34

## 2020-01-28 RX ADMIN — ENOXAPARIN SODIUM SCH MG: 40 INJECTION SUBCUTANEOUS at 07:32

## 2020-01-28 RX ADMIN — IPRATROPIUM BROMIDE AND ALBUTEROL SULFATE SCH ML: .5; 3 SOLUTION RESPIRATORY (INHALATION) at 19:15

## 2020-01-28 RX ADMIN — FAMOTIDINE SCH MG: 20 TABLET, FILM COATED ORAL at 07:32

## 2020-01-28 RX ADMIN — INSULIN DETEMIR SCH UNIT: 100 INJECTION, SOLUTION SUBCUTANEOUS at 20:35

## 2020-01-28 RX ADMIN — DULOXETINE SCH MG: 60 CAPSULE, DELAYED RELEASE ORAL at 07:33

## 2020-01-28 RX ADMIN — INSULIN ASPART SCH UNIT: 100 INJECTION, SOLUTION INTRAVENOUS; SUBCUTANEOUS at 07:33

## 2020-01-28 NOTE — PN
PROGRESS NOTE



DATE OF SERVICE:

01/28/2020



REASON FOR FOLLOWUP:

Right foot wound and osteomyelitis.



INTERVAL HISTORY:

The patient is currently afebrile.  The patient is breathing comfortably.  Denies

having any chest pain or any cough.  No nausea, vomiting.  No abdominal pain.  No

diarrhea.



PHYSICAL EXAMINATION:

The blood pressure is 156/81 with a pulse of 96, temperature 98. She is 96% on room

air.  General description is a middle-aged female lying in bed in no distress.

Respiratory system: Unlabored breathing, clear to auscultation anteriorly.  Heart S1,

S2.  Regular rate and rhythm.

ABDOMEN:  Soft.  Right foot wound base still has some slough tissue.  No surrounding

redness and minimal drainage.



LABS:

Creatinine 1.08.



DIAGNOSTIC IMPRESSION AND PLAN:

Patient with right foot diabetic foot wound with underlying osteomyelitis to the foot.

Local care to continue with the Medihoney followed by moist dressing to keep the area

off the pressure and continue with tobramycin.  Continue supportive care.



Aquacel Silver dressing and continue with supportive care.





MMODL / IJN: [id number] / Job#: [id number]

## 2020-01-28 NOTE — CDI
Documentation Clarification Form



Date: 01/28/2020 01:39:12 PM

From: Kat Jacobson RN, CCDS

Phone: [phone]

MRN: [id number]

Admit Date: 01/26/2020 12:52:00 PM

Patient Name: Morenita Ramirez

Visit Number: [id number]

Discharge Date:  





ATTENTION: The Clinical Documentation Specialists (CDI) and [facility] Coding Staff 
appreciate your assistance in clarifying documentation. Please respond to the 
clarification below the line at the bottom and electronically sign. The CDI & 
[facility] Coding staff will review the response and follow-up if needed. Please note: 
Queries are made part of the Legal Health Record. If you have any questions, 
please contact the author of this message via ITS.



Dr. Saniya Cabral



Malnutrition and failure to thrive has been documented in ER evaluation on 
1/23/20. Failure to thrive is in the H/P and subsequent progress notes and 
further clarification is needed.



History/Risk Factors: Right foot wound infection with osteomyelitis, Diabetes 
mellitus, CVA with left sided paralysis, Wheel chair bound, 



Clinical Indicators:  59-year-old female found with unsafe living conditions, 
weakness, malnutrition, failure to thrive per ER evaluation on admission. 

Labs: 1/23/20) Glucose 269, 247, 521, HGB 8.8, HCT 27.4, Total protein 6.1, 
albumin 3.0

   Current BMI: 23.2

   Insufficient energy intake: Good, well nourished

       

Treatment:

Dietary Consult: Yes; Altered nutrition related laboratory values, glycemic 
labs. 

Dietary diagnostic statement: Suspected poor DM control PTA: unable to care for 
self at home  glucose = 426 on admit

Supplements: Glucernia BID,  Carbohydrate-modified diet 

Monitor supplement intake, glucemic labs





In your professional opinion, can you please clarify if these findings signify 
one of the following conditions?

Mild Protein-Calorie Malnutrition 

Moderate Protein-Calorie Malnutrition

Other condition, please specify 

Unable to determine



(Last Revision: July 2019)

___________________________________________________________________________

Mild protein-calorie malnutrition

MTDD

## 2020-01-28 NOTE — PN
PROGRESS NOTE



DATE OF SERVICE:

01/27/2020



REASON FOR FOLLOWUP:

Right foot osteomyelitis.



INTERVAL HISTORY:

The patient is currently afebrile.  Patient is breathing comfortably.  The patient

denies having any chest pain or cough.  No nausea or vomiting.  No abdominal pain or

pain to the right foot area.



EXAMINATION:

Blood pressure is 100/60 with a pulse of 73, temperature 98.6.  He is 92% on room air.

General  description is a middle-aged female lying in bed in no distress.  Respiratory

system: Unlabored breathing.  Clear to auscultation anteriorly. Heart S1, S2.  Regular

rate and rhythm.

ABDOMEN:  Soft.  No tenderness.  EXTREMITIES:  No edema of the feet.



LABS:

Hemoglobin of 8 with a white count of 3.5.  BUN of 25, creatinine 0.9.



DIAGNOSTIC IMPRESSION AND PLAN:

Patient with right foot osteomyelitis in this patient with diabetic foot infection

admitted to the hospital for possible _____ placement because of _____ condition.  The

patient is currently covered with daptomycin that will be continued and we will monitor

clinical course closely.  Continue supportive care.





MMODL / IJN: [id number] / Job#: [id number]

## 2020-01-28 NOTE — P.PN
Subjective


Progress Note Date: 01/28/20





This is a 59-year-old female patient who presented to the ER with concerns of 

failure to thrive and concerns of living conditions per Visiting nurse.  Patient

was recently discharged on IV antibiotics for concerns for right foot 

osteomyelitis.  Patient is also a known brittle diabetic.  Additional medical 

history includes asthma, CAD, chest pain, heart failure, COPD, CVA, diabetes 

mellitus, deep vein thrombosis, GERD, hyperlipidemia, hypertension, 

osteoarthritis, renal disease and reoccurring extremity wounds due to diabetes 

cells and wound care clinic.  According to ER report patient was covered in 

feces and poor living conditions were noted in house.  Per patient she reports 

that she lives with caregiver.  Patient was also noted to have some wheezing per

ER admitted for COPD exacerbation.  At this time will consult infectious disease

for ongoing IV antibiotic use.  Social work services also consulted to assess 

living condition and plan for discharge.  Patient was started on Solu-Medrol for

COPD exacerbation and DuoNeb breathing treatments will order chest x-ray.  At 

this time patient denies chest pain or shortness of breath.  Patient denies 

nausea vomiting or diarrhea.  Patient denies any urinary burning or frequency





On 01/25/2020 patient was seen and examined on the medical floor she is alert 

and oriented 3 in no apparent distress she is complaining of occasional pain in

her lower extremity otherwise she denies any complaints there is no fever no 

chills no headache no dizziness no chest pain no shortness of breath no cough no

nausea or vomiting no abdominal pain no diarrhea no burning with urination no 

frequency or urgency no hematuria





On 01/26/2020 patient was seen and examined on the medical floor she states she 

is doing well she denies any complaints at this time there is no fever or chills

no headache or dizziness no chest pain no shortness of breath no cough no nausea

or vomiting no abdominal pain no diarrhem burning with urination no frequency or

urgency and no hematuria and in the lower extremity is well-controlled at this 

time








On 01/27/2020 patient is alert and oriented 3.  Patient denies any complaints 

there is nausea vomiting or diarrhea.  Denies chest pain or shortness breath.  

Patient denies any urinary burning or frequency.  Per social work discussion was

held with legal gaurdian and okay to discharge to [facility] for IV antibiotics.  

[facility] or [facility] for IV antibiotics. 








On 01/28/2020 patient is alert and oriented 3.  Patient is sleepy but does wake

up to follow commands.  Waiting on insurance coverage for rehab in regards to 

antibiotics.  Possible [facility] and [facility] discharge. patient denies chest 

pain or shortness of breath. denies any urinary and frequency.  Denies nausea 

vomiting or diarrhea patient remains on daptomycin IV antibiotics





Objective





- Vital Signs


Vital signs: 


                                   Vital Signs











Temp  97.9 F   01/28/20 07:00


 


Pulse  86   01/28/20 11:58


 


Resp  17   01/28/20 08:00


 


BP  125/58   01/28/20 07:00


 


Pulse Ox  96   01/28/20 07:00








                                 Intake & Output











 01/27/20 01/28/20 01/28/20





 18:59 06:59 18:59


 


Other:   


 


  Voiding Method Diaper  Diaper





 Incontinent  Incontinent


 


  # Voids 2 1 














- Exam





In general patient is alert and oriented 3 in no apparent distress


HEENT head normocephalic and atraumatic


Neck is supple no JVD no goiter no lymphadenopathy


Lungs diminished bilaterally no crackles no wheezing


Heart regular rate and rhythm S1-S2, no rub or gallop


Abdomen is soft nontender nondistended positive bowel sounds no 

hepatosplenomegaly


Extremities no edema.  Right foot dressing place clean dry and intact


Neuro no acute gross focal neurological deficit, residual weakness from previous

stroke and changed





- Labs


CBC & Chem 7: 


                                 01/28/20 09:34





                                 01/28/20 09:34


Labs: 


                  Abnormal Lab Results - Last 24 Hours (Table)











  01/27/20 01/27/20 01/28/20 Range/Units





  16:38 20:21 01:40 


 


RBC     (3.80-5.40)  m/uL


 


Hgb     (11.4-16.0)  gm/dL


 


Hct     (34.0-46.0)  %


 


Carbon Dioxide     (22-30)  mmol/L


 


BUN     (7-17)  mg/dL


 


Creatinine     (0.52-1.04)  mg/dL


 


Glucose     (74-99)  mg/dL


 


POC Glucose (mg/dL)  219 H  370 H  496 H  (75-99)  mg/dL


 


Total Protein     (6.3-8.2)  g/dL


 


Albumin     (3.5-5.0)  g/dL














  01/28/20 01/28/20 01/28/20 Range/Units





  06:32 09:34 09:34 


 


RBC    2.84 L  (3.80-5.40)  m/uL


 


Hgb    8.6 L  (11.4-16.0)  gm/dL


 


Hct    27.2 L  (34.0-46.0)  %


 


Carbon Dioxide   31 H   (22-30)  mmol/L


 


BUN   30 H   (7-17)  mg/dL


 


Creatinine   1.08 H   (0.52-1.04)  mg/dL


 


Glucose   229 H   (74-99)  mg/dL


 


POC Glucose (mg/dL)  288 H    (75-99)  mg/dL


 


Total Protein   6.2 L   (6.3-8.2)  g/dL


 


Albumin   2.9 L   (3.5-5.0)  g/dL














  01/28/20 01/28/20 Range/Units





  11:39 13:33 


 


RBC    (3.80-5.40)  m/uL


 


Hgb    (11.4-16.0)  gm/dL


 


Hct    (34.0-46.0)  %


 


Carbon Dioxide    (22-30)  mmol/L


 


BUN    (7-17)  mg/dL


 


Creatinine    (0.52-1.04)  mg/dL


 


Glucose    (74-99)  mg/dL


 


POC Glucose (mg/dL)  242 H  249 H  (75-99)  mg/dL


 


Total Protein    (6.3-8.2)  g/dL


 


Albumin    (3.5-5.0)  g/dL








                      Microbiology - Last 24 Hours (Table)











 01/23/20 22:45 Blood Culture - Preliminary





 Blood    No Growth after 96 hours














Assessment and Plan


Assessment: 





1.  Failure to thrive with concerns of living conditions.  Social work services 

have been consulted to assess discharge planning and home environment





2.  Right foot wound infection with osteomyelitis.  Patient recently underwent 

debridement with Dr. Dash.  Patient recently discharged on IV daptomycin per 

ID.  Infectious disease services have been consulted.  Daptomycin has been 

resumed





3.  Acute COPD exacerbation.  Patient started Solu-Medrol DuoNeb breathing [place]

tments chest x-ray has been ordered





4.  Type 1 diabetes mellitus known brittle diabetic with noncompliance.  

Patient's home dose of Levemir and sliding scale insulin has been ordered





5.  History of peripheral vascular disease





6.  History of essential hypertension





7.  History of hyperlipidemia





8.  History of congestive heart failure





9.  History of coronary artery disease with previous history of angioplasty and 

stent placement





DVT prophylaxis Lovenox.  GI prophylaxis Pepcid


Infectious disease consultant continue daptomycin


Social work services consulted





Possible discharge to [facility] or  [facility]





I performed an examination of the patient and discussed their management with 

the Nurse Practitioner.  I have reviewed the Nurse Practitioner's notes and 

agree with the documented findings and plan of care

## 2020-01-29 LAB
ALBUMIN SERPL-MCNC: 2.8 G/DL (ref 3.5–5)
ALP SERPL-CCNC: 92 U/L (ref 38–126)
ALT SERPL-CCNC: 14 U/L (ref 4–34)
ANION GAP SERPL CALC-SCNC: 3 MMOL/L
AST SERPL-CCNC: 18 U/L (ref 14–36)
BASOPHILS # BLD AUTO: 0 K/UL (ref 0–0.2)
BASOPHILS NFR BLD AUTO: 0 %
BUN SERPL-SCNC: 27 MG/DL (ref 7–17)
CALCIUM SPEC-MCNC: 9 MG/DL (ref 8.4–10.2)
CHLORIDE SERPL-SCNC: 103 MMOL/L (ref 98–107)
CO2 SERPL-SCNC: 34 MMOL/L (ref 22–30)
EOSINOPHIL # BLD AUTO: 0.1 K/UL (ref 0–0.7)
EOSINOPHIL NFR BLD AUTO: 2 %
ERYTHROCYTE [DISTWIDTH] IN BLOOD BY AUTOMATED COUNT: 2.84 M/UL (ref 3.8–5.4)
ERYTHROCYTE [DISTWIDTH] IN BLOOD: 15.1 % (ref 11.5–15.5)
GLUCOSE BLD-MCNC: 139 MG/DL (ref 75–99)
GLUCOSE BLD-MCNC: 240 MG/DL (ref 75–99)
GLUCOSE BLD-MCNC: 251 MG/DL (ref 75–99)
GLUCOSE BLD-MCNC: 282 MG/DL (ref 75–99)
GLUCOSE BLD-MCNC: 300 MG/DL (ref 75–99)
GLUCOSE BLD-MCNC: 305 MG/DL (ref 75–99)
GLUCOSE SERPL-MCNC: 164 MG/DL (ref 74–99)
HBA1C MFR BLD: 8.5 % (ref 4–6)
HCT VFR BLD AUTO: 27.4 % (ref 34–46)
HGB BLD-MCNC: 8.2 GM/DL (ref 11.4–16)
LYMPHOCYTES # SPEC AUTO: 1.7 K/UL (ref 1–4.8)
LYMPHOCYTES NFR SPEC AUTO: 37 %
MCH RBC QN AUTO: 29 PG (ref 25–35)
MCHC RBC AUTO-ENTMCNC: 30 G/DL (ref 31–37)
MCV RBC AUTO: 96.7 FL (ref 80–100)
MONOCYTES # BLD AUTO: 0.3 K/UL (ref 0–1)
MONOCYTES NFR BLD AUTO: 6 %
NEUTROPHILS # BLD AUTO: 2.5 K/UL (ref 1.3–7.7)
NEUTROPHILS NFR BLD AUTO: 54 %
PLATELET # BLD AUTO: 292 K/UL (ref 150–450)
POTASSIUM SERPL-SCNC: 4.5 MMOL/L (ref 3.5–5.1)
PROT SERPL-MCNC: 6 G/DL (ref 6.3–8.2)
SODIUM SERPL-SCNC: 140 MMOL/L (ref 137–145)
WBC # BLD AUTO: 4.6 K/UL (ref 3.8–10.6)

## 2020-01-29 RX ADMIN — INSULIN DETEMIR SCH UNIT: 100 INJECTION, SOLUTION SUBCUTANEOUS at 20:49

## 2020-01-29 RX ADMIN — IPRATROPIUM BROMIDE AND ALBUTEROL SULFATE SCH ML: .5; 3 SOLUTION RESPIRATORY (INHALATION) at 15:47

## 2020-01-29 RX ADMIN — INSULIN ASPART SCH UNIT: 100 INJECTION, SOLUTION INTRAVENOUS; SUBCUTANEOUS at 17:16

## 2020-01-29 RX ADMIN — IPRATROPIUM BROMIDE AND ALBUTEROL SULFATE SCH ML: .5; 3 SOLUTION RESPIRATORY (INHALATION) at 11:44

## 2020-01-29 RX ADMIN — FAMOTIDINE SCH MG: 20 TABLET, FILM COATED ORAL at 07:21

## 2020-01-29 RX ADMIN — DULOXETINE SCH MG: 60 CAPSULE, DELAYED RELEASE ORAL at 07:21

## 2020-01-29 RX ADMIN — ASPIRIN 81 MG CHEWABLE TABLET SCH MG: 81 TABLET CHEWABLE at 07:21

## 2020-01-29 RX ADMIN — VALPROIC ACID SCH MG: 250 SOLUTION ORAL at 07:21

## 2020-01-29 RX ADMIN — INSULIN ASPART SCH UNIT: 100 INJECTION, SOLUTION INTRAVENOUS; SUBCUTANEOUS at 07:20

## 2020-01-29 RX ADMIN — IPRATROPIUM BROMIDE AND ALBUTEROL SULFATE SCH ML: .5; 3 SOLUTION RESPIRATORY (INHALATION) at 19:49

## 2020-01-29 RX ADMIN — Medication SCH MG: at 07:21

## 2020-01-29 RX ADMIN — IPRATROPIUM BROMIDE AND ALBUTEROL SULFATE SCH ML: .5; 3 SOLUTION RESPIRATORY (INHALATION) at 08:15

## 2020-01-29 RX ADMIN — ENOXAPARIN SODIUM SCH MG: 40 INJECTION SUBCUTANEOUS at 07:21

## 2020-01-29 RX ADMIN — INSULIN ASPART SCH UNIT: 100 INJECTION, SOLUTION INTRAVENOUS; SUBCUTANEOUS at 11:53

## 2020-01-29 NOTE — PN
PROGRESS NOTE



DATE OF SERVICE:

01/29/2020



REASON FOR FOLLOWUP:

Right diabetic foot wound with an underlying osteomyelitis.



INTERVAL HISTORY:

The patient is currently afebrile.  Patient is breathing comfortably.  No chest pain.

No cough.  No nausea, no vomiting.  No abdominal pain.  Pain to the right foot area,

currently waiting for placement.



PHYSICAL EXAMINATION:

Blood pressure 103/71 with a pulse of 83, temperature 98.2, she is 94% on room air.

General description is a middle-aged female, lying in bed in no distress.

RESPIRATORY SYSTEM: Unlabored breathing, clear to auscultation anteriorly.

HEART:  S1, S2.  Regular rate and rhythm.

ABDOMEN:  Soft, no tenderness.

Right foot is currently dressed up.  No obvious drainage on the dressing.



LABS:

Hemoglobin 8.1, white count of 4.7, BUN of 27, creatinine 1.04.  Blood cultures have

been negative.



DIAGNOSTIC IMPRESSION AND PLAN:

Patient with right diabetic foot wound with underlying osteomyelitis.  The patient is

currently covered with daptomycin, will be continued to finish a 6-week course of

therapy.  Local care with Medihoney moist dressing, keep the area off the pressure.

Continue supportive care.





MMODL / IJN: [id number] / Job#: [id number]

## 2020-01-29 NOTE — P.PN
Subjective


Progress Note Date: 01/29/20





This is a 59-year-old female patient who presented to the ER with concerns of 

failure to thrive and concerns of living conditions per Visiting nurse.  Patient

was recently discharged on IV antibiotics for concerns for right foot 

osteomyelitis.  Patient is also a known brittle diabetic.  Additional medical 

history includes asthma, CAD, chest pain, heart failure, COPD, CVA, diabetes 

mellitus, deep vein thrombosis, GERD, hyperlipidemia, hypertension, 

osteoarthritis, renal disease and reoccurring extremity wounds due to diabetes 

cells and wound care clinic.  According to ER report patient was covered in 

feces and poor living conditions were noted in house.  Per patient she reports 

that she lives with caregiver.  Patient was also noted to have some wheezing per

ER admitted for COPD exacerbation.  At this time will consult infectious disease

for ongoing IV antibiotic use.  Social work services also consulted to assess 

living condition and plan for discharge.  Patient was started on Solu-Medrol for

COPD exacerbation and DuoNeb breathing treatments will order chest x-ray.  At 

this time patient denies chest pain or shortness of breath.  Patient denies 

nausea vomiting or diarrhea.  Patient denies any urinary burning or frequency





On 01/25/2020 patient was seen and examined on the medical floor she is alert 

and oriented 3 in no apparent distress she is complaining of occasional pain in

her lower extremity otherwise she denies any complaints there is no fever no 

chills no headache no dizziness no chest pain no shortness of breath no cough no

nausea or vomiting no abdominal pain no diarrhea no burning with urination no 

frequency or urgency no hematuria





On 01/26/2020 patient was seen and examined on the medical floor she states she 

is doing well she denies any complaints at this time there is no fever or chills

no headache or dizziness no chest pain no shortness of breath no cough no nausea

or vomiting no abdominal pain no diarrhem burning with urination no frequency or

urgency and no hematuria and in the lower extremity is well-controlled at this 

time








On 01/27/2020 patient is alert and oriented 3.  Patient denies any complaints 

there is nausea vomiting or diarrhea.  Denies chest pain or shortness breath.  

Patient denies any urinary burning or frequency.  Per social work discussion was

held with legal gaurdian and okay to discharge to [facility] for IV antibiotics.  

[facility] or [facility] for IV antibiotics. 








On 01/28/2020 patient is alert and oriented 3.  Patient is sleepy but does wake

up to follow commands.  Waiting on insurance coverage for rehab in regards to 

antibiotics.  Possible [facility] and [facility] discharge. patient denies chest 

pain or shortness of breath. denies any urinary and frequency.  Denies nausea 

vomiting or diarrhea patient remains on daptomycin IV antibiotics





On 01/29/2020 patient is alert and oriented 3.  Patient denies any complaints. 

Patient denies chest pain or shortness breath.  Patient denies nausea vomiting 

or diarrhea.  Patient denies any urinary burning or frequency.  Did discuss case

with infectious disease patient will acquire daptomycin for approximately 30 

more days at discharge social work working on discharge planning





Objective





- Vital Signs


Vital signs: 


                                   Vital Signs











Temp  98.2 F   01/29/20 07:00


 


Pulse  80   01/29/20 11:59


 


Resp  17   01/29/20 07:24


 


BP  103/71   01/29/20 07:00


 


Pulse Ox  94 L  01/29/20 07:00








                                 Intake & Output











 01/28/20 01/29/20 01/29/20





 18:59 06:59 18:59


 


Intake Total 100  


 


Balance 100  


 


Intake:   


 


  Intake, IV Titration 50  





  Amount   


 


    DAPTOmycin 350 mg In 50  





    Sodium Chloride 0.9% 50   





    ml @ 100 mls/hr IVPB   





    Q24HR [facility] Rx#:[id number]   


 


  Oral 50  


 


Other:   


 


  Voiding Method Diaper Diaper Diaper





 Incontinent Incontinent Incontinent


 


  # Voids  1 














- Exam





In general patient is alert and oriented 3 in no apparent distress


HEENT head normocephalic and atraumatic


Neck is supple no JVD no goiter no lymphadenopathy


Lungs diminished bilaterally no crackles no wheezing


Heart regular rate and rhythm S1-S2, no rub or gallop


Abdomen is soft nontender nondistended positive bowel sounds no 

hepatosplenomegaly


Extremities no edema.  Right foot dressing place clean dry and intact


Neuro no acute gross focal neurological deficit, residual weakness from previous

stroke and changed





- Labs


CBC & Chem 7: 


                                 01/29/20 08:26





                                 01/29/20 08:26


Labs: 


                  Abnormal Lab Results - Last 24 Hours (Table)











  01/28/20 01/28/20 01/28/20 Range/Units





  13:33 16:49 20:12 


 


RBC     (3.80-5.40)  m/uL


 


Hgb     (11.4-16.0)  gm/dL


 


Hct     (34.0-46.0)  %


 


MCHC     (31.0-37.0)  g/dL


 


Carbon Dioxide     (22-30)  mmol/L


 


BUN     (7-17)  mg/dL


 


Glucose     (74-99)  mg/dL


 


POC Glucose (mg/dL)  249 H  402 H  454 H  (75-99)  mg/dL


 


Total Protein     (6.3-8.2)  g/dL


 


Albumin     (3.5-5.0)  g/dL














  01/28/20 01/29/20 01/29/20 Range/Units





  23:54 01:36 06:51 


 


RBC     (3.80-5.40)  m/uL


 


Hgb     (11.4-16.0)  gm/dL


 


Hct     (34.0-46.0)  %


 


MCHC     (31.0-37.0)  g/dL


 


Carbon Dioxide     (22-30)  mmol/L


 


BUN     (7-17)  mg/dL


 


Glucose     (74-99)  mg/dL


 


POC Glucose (mg/dL)  305 H  251 H  139 H  (75-99)  mg/dL


 


Total Protein     (6.3-8.2)  g/dL


 


Albumin     (3.5-5.0)  g/dL














  01/29/20 01/29/20 01/29/20 Range/Units





  08:26 08:26 11:46 


 


RBC  2.84 L    (3.80-5.40)  m/uL


 


Hgb  8.2 L    (11.4-16.0)  gm/dL


 


Hct  27.4 L    (34.0-46.0)  %


 


MCHC  30.0 L    (31.0-37.0)  g/dL


 


Carbon Dioxide   34 H   (22-30)  mmol/L


 


BUN   27 H   (7-17)  mg/dL


 


Glucose   164 H   (74-99)  mg/dL


 


POC Glucose (mg/dL)    282 H  (75-99)  mg/dL


 


Total Protein   6.0 L   (6.3-8.2)  g/dL


 


Albumin   2.8 L   (3.5-5.0)  g/dL








                      Microbiology - Last 24 Hours (Table)











 01/23/20 22:45 Blood Culture - Preliminary





 Blood    No Growth after 120 hours














Assessment and Plan


Assessment: 





1.  Failure to thrive with concerns of living conditions.  Social work services 

have been consulted to assess discharge planning and home environment





2.  Right foot wound infection with osteomyelitis.  Patient recently underwent 

debridement with Dr. Denclau.  Patient recently discharged on IV daptomycin per 

ID.  Infectious disease services have been consulted.  Daptomycin has been 

resumed





3.  Acute COPD exacerbation.  Patient started Solu-Medrol DuoNeb breathing 

treatments chest x-ray has been ordered





4.  Type 1 diabetes mellitus known brittle diabetic with noncompliance.  

Patient's home dose of Levemir and sliding scale insulin has been ordered





5.  History of peripheral vascular disease





6.  History of essential hypertension





7.  History of hyperlipidemia





8.  History of congestive heart failure





9.  History of coronary artery disease with previous history of angioplasty and 

stent placement





DVT prophylaxis Lovenox.  GI prophylaxis Pepcid


Infectious disease consultant continue daptomycin


Social work services consulted





Possible discharge to [facility] or  [facility]





I performed an examination of the patient and discussed their management with 

the Nurse Practitioner.  I have reviewed the Nurse Practitioner's notes and 

agree with the documented findings and plan of care

## 2020-01-30 LAB
ALBUMIN SERPL-MCNC: 2.9 G/DL (ref 3.5–5)
ALP SERPL-CCNC: 88 U/L (ref 38–126)
ALT SERPL-CCNC: 15 U/L (ref 4–34)
AMMONIA PLAS-SCNC: <9 UMOL/L (ref ?–30)
ANION GAP SERPL CALC-SCNC: 2 MMOL/L
AST SERPL-CCNC: 21 U/L (ref 14–36)
BASOPHILS # BLD AUTO: 0 K/UL (ref 0–0.2)
BASOPHILS NFR BLD AUTO: 1 %
BUN SERPL-SCNC: 23 MG/DL (ref 7–17)
CALCIUM SPEC-MCNC: 9 MG/DL (ref 8.4–10.2)
CHLORIDE SERPL-SCNC: 104 MMOL/L (ref 98–107)
CO2 SERPL-SCNC: 32 MMOL/L (ref 22–30)
EOSINOPHIL # BLD AUTO: 0.1 K/UL (ref 0–0.7)
EOSINOPHIL NFR BLD AUTO: 2 %
ERYTHROCYTE [DISTWIDTH] IN BLOOD BY AUTOMATED COUNT: 2.72 M/UL (ref 3.8–5.4)
ERYTHROCYTE [DISTWIDTH] IN BLOOD: 15.2 % (ref 11.5–15.5)
GLUCOSE BLD-MCNC: 123 MG/DL (ref 75–99)
GLUCOSE BLD-MCNC: 127 MG/DL (ref 75–99)
GLUCOSE BLD-MCNC: 170 MG/DL (ref 75–99)
GLUCOSE BLD-MCNC: 179 MG/DL (ref 75–99)
GLUCOSE BLD-MCNC: 180 MG/DL (ref 75–99)
GLUCOSE BLD-MCNC: 183 MG/DL (ref 75–99)
GLUCOSE BLD-MCNC: 233 MG/DL (ref 75–99)
GLUCOSE BLD-MCNC: 288 MG/DL (ref 75–99)
GLUCOSE SERPL-MCNC: 117 MG/DL (ref 74–99)
HCT VFR BLD AUTO: 26 % (ref 34–46)
HGB BLD-MCNC: 8.1 GM/DL (ref 11.4–16)
LACTATE BLDV-SCNC: 0.8 MMOL/L (ref 0.7–2)
LYMPHOCYTES # SPEC AUTO: 1.4 K/UL (ref 1–4.8)
LYMPHOCYTES NFR SPEC AUTO: 32 %
MCH RBC QN AUTO: 29.9 PG (ref 25–35)
MCHC RBC AUTO-ENTMCNC: 31.2 G/DL (ref 31–37)
MCV RBC AUTO: 95.7 FL (ref 80–100)
MONOCYTES # BLD AUTO: 0.3 K/UL (ref 0–1)
MONOCYTES NFR BLD AUTO: 6 %
NEUTROPHILS # BLD AUTO: 2.4 K/UL (ref 1.3–7.7)
NEUTROPHILS NFR BLD AUTO: 57 %
PLATELET # BLD AUTO: 287 K/UL (ref 150–450)
POTASSIUM SERPL-SCNC: 4.6 MMOL/L (ref 3.5–5.1)
PROT SERPL-MCNC: 6.2 G/DL (ref 6.3–8.2)
SODIUM SERPL-SCNC: 138 MMOL/L (ref 137–145)
WBC # BLD AUTO: 4.2 K/UL (ref 3.8–10.6)

## 2020-01-30 RX ADMIN — ASPIRIN 81 MG CHEWABLE TABLET SCH: 81 TABLET CHEWABLE at 09:57

## 2020-01-30 RX ADMIN — IPRATROPIUM BROMIDE AND ALBUTEROL SULFATE SCH ML: .5; 3 SOLUTION RESPIRATORY (INHALATION) at 12:17

## 2020-01-30 RX ADMIN — INSULIN ASPART SCH UNIT: 100 INJECTION, SOLUTION INTRAVENOUS; SUBCUTANEOUS at 12:57

## 2020-01-30 RX ADMIN — FAMOTIDINE SCH: 20 TABLET, FILM COATED ORAL at 09:58

## 2020-01-30 RX ADMIN — INSULIN DETEMIR SCH UNIT: 100 INJECTION, SOLUTION SUBCUTANEOUS at 20:49

## 2020-01-30 RX ADMIN — INSULIN ASPART SCH UNIT: 100 INJECTION, SOLUTION INTRAVENOUS; SUBCUTANEOUS at 17:15

## 2020-01-30 RX ADMIN — Medication SCH: at 09:58

## 2020-01-30 RX ADMIN — ENOXAPARIN SODIUM SCH: 40 INJECTION SUBCUTANEOUS at 09:57

## 2020-01-30 RX ADMIN — VALPROIC ACID SCH: 250 SOLUTION ORAL at 10:14

## 2020-01-30 RX ADMIN — IPRATROPIUM BROMIDE AND ALBUTEROL SULFATE SCH: .5; 3 SOLUTION RESPIRATORY (INHALATION) at 19:29

## 2020-01-30 RX ADMIN — IPRATROPIUM BROMIDE AND ALBUTEROL SULFATE SCH ML: .5; 3 SOLUTION RESPIRATORY (INHALATION) at 15:38

## 2020-01-30 RX ADMIN — DULOXETINE SCH: 60 CAPSULE, DELAYED RELEASE ORAL at 09:57

## 2020-01-30 RX ADMIN — INSULIN ASPART SCH: 100 INJECTION, SOLUTION INTRAVENOUS; SUBCUTANEOUS at 08:53

## 2020-01-30 RX ADMIN — IPRATROPIUM BROMIDE AND ALBUTEROL SULFATE SCH ML: .5; 3 SOLUTION RESPIRATORY (INHALATION) at 08:45

## 2020-01-30 NOTE — P.PN
Subjective


Progress Note Date: 01/30/20





This is a 59-year-old female patient who presented to the ER with concerns of 

failure to thrive and concerns of living conditions per Visiting nurse.  Patient

was recently discharged on IV antibiotics for concerns for right foot 

osteomyelitis.  Patient is also a known brittle diabetic.  Additional medical 

history includes asthma, CAD, chest pain, heart failure, COPD, CVA, diabetes 

mellitus, deep vein thrombosis, GERD, hyperlipidemia, hypertension, 

osteoarthritis, renal disease and reoccurring extremity wounds due to diabetes 

cells and wound care clinic.  According to ER report patient was covered in 

feces and poor living conditions were noted in house.  Per patient she reports 

that she lives with caregiver.  Patient was also noted to have some wheezing per

ER admitted for COPD exacerbation.  At this time will consult infectious disease

for ongoing IV antibiotic use.  Social work services also consulted to assess 

living condition and plan for discharge.  Patient was started on Solu-Medrol for

COPD exacerbation and DuoNeb breathing treatments will order chest x-ray.  At 

this time patient denies chest pain or shortness of breath.  Patient denies 

nausea vomiting or diarrhea.  Patient denies any urinary burning or frequency





On 01/25/2020 patient was seen and examined on the medical floor she is alert 

and oriented 3 in no apparent distress she is complaining of occasional pain in

her lower extremity otherwise she denies any complaints there is no fever no 

chills no headache no dizziness no chest pain no shortness of breath no cough no

nausea or vomiting no abdominal pain no diarrhea no burning with urination no 

frequency or urgency no hematuria





On 01/26/2020 patient was seen and examined on the medical floor she states she 

is doing well she denies any complaints at this time there is no fever or chills

no headache or dizziness no chest pain no shortness of breath no cough no nausea

or vomiting no abdominal pain no diarrhem burning with urination no frequency or

urgency and no hematuria and in the lower extremity is well-controlled at this 

time








On 01/27/2020 patient is alert and oriented 3.  Patient denies any complaints 

there is nausea vomiting or diarrhea.  Denies chest pain or shortness breath.  

Patient denies any urinary burning or frequency.  Per social work discussion was

held with legal gaurdian and okay to discharge to [facility] for IV antibiotics.  

[facility] for [facility] antibiotics. 








On 01/28/2020 patient is alert and oriented 3.  Patient is sleepy but does wake

up to follow commands.  Waiting on insurance coverage for rehab in regards to 

antibiotics.  Possible [facility] and [facility] discharge. patient denies chest 

pain or shortness of breath. denies any urinary and frequency.  Denies nausea 

vomiting or diarrhea patient remains on daptomycin IV antibiotics





On 01/29/2020 patient is alert and oriented 3.  Patient denies any complaints. 

Patient denies chest pain or shortness breath.  Patient denies nausea vomiting 

or diarrhea.  Patient denies any urinary burning or frequency.  Did discuss case

with infectious disease patient will acquire daptomycin for approximately 30 

more days at discharge social work working on discharge planning





On 01/30/2020 on examination patient was found to be lethargic.  Patient does 

wake up to stimuli but quickly falls back to sleep.  Notify nursing staff to 

check blood sugar and vitals.  Patient's blood pressure low with systolic in the

70's. 500 mL bolus ordered.  Medications currently on hold.  Upon reexamination 

patient's blood pressure had improved but patient remains lethargic.  At this 

time will order head CT, lactic acid, ABGs, ammonia level, EKG and troponins.  

Also consulted Dr. Farfan per critical care did discuss case with critical care

teams nurse practitioner.  Vitals remained stable.  Patient is on room air pulse

ox 99.





Objective





- Vital Signs


Vital signs: 


                                   Vital Signs











Temp  97.8 F   01/30/20 10:30


 


Pulse  74   01/30/20 12:30


 


Resp  16   01/30/20 10:30


 


BP  90/52   01/30/20 10:30


 


Pulse Ox  95   01/30/20 10:30








                                 Intake & Output











 01/29/20 01/30/20 01/30/20





 18:59 06:59 18:59


 


Intake Total 50 1380 


 


Balance 50 1380 


 


Weight 57.606 kg  


 


Intake:   


 


  Intake, IV Titration 50  





  Amount   


 


    DAPTOmycin 350 mg In 50  





    Sodium Chloride 0.9% 50   





    ml @ 100 mls/hr IVPB   





    Q24HR [facility] Rx#:[id number]   


 


  Oral  1380 


 


Other:   


 


  Voiding Method Diaper Diaper Diaper





 Incontinent Incontinent Incontinent


 


  # Voids  3 














- Exam





In general patient is alert and oriented 3 in no apparent distress


HEENT head normocephalic and atraumatic


Neck is supple no JVD no goiter no lymphadenopathy


Lungs diminished bilaterally no crackles no wheezing


Heart regular rate and rhythm S1-S2, no rub or gallop


Abdomen is soft nontender nondistended positive bowel sounds no 

hepatosplenomegaly


Extremities no edema.  Right foot dressing place clean dry and intact


Neuro patient lethargic but does wake up to painful stimuli but does fall back 

to sleep and confusion at times residual weakness from previous stroke and 

changed





- Labs


CBC & Chem 7: 


                                 01/30/20 07:35





                                 01/30/20 07:35


Labs: 


                  Abnormal Lab Results - Last 24 Hours (Table)











  01/29/20 01/29/20 01/29/20 Range/Units





  08:26 17:08 20:15 


 


RBC     (3.80-5.40)  m/uL


 


Hgb     (11.4-16.0)  gm/dL


 


Hct     (34.0-46.0)  %


 


Carbon Dioxide     (22-30)  mmol/L


 


BUN     (7-17)  mg/dL


 


Glucose     (74-99)  mg/dL


 


POC Glucose (mg/dL)   300 H  240 H  (75-99)  mg/dL


 


Hemoglobin A1c  8.5 H    (4.0-6.0)  %


 


Total Protein     (6.3-8.2)  g/dL


 


Albumin     (3.5-5.0)  g/dL














  01/30/20 01/30/20 01/30/20 Range/Units





  01:49 06:57 07:35 


 


RBC    2.72 L  (3.80-5.40)  m/uL


 


Hgb    8.1 L  (11.4-16.0)  gm/dL


 


Hct    26.0 L  (34.0-46.0)  %


 


Carbon Dioxide     (22-30)  mmol/L


 


BUN     (7-17)  mg/dL


 


Glucose     (74-99)  mg/dL


 


POC Glucose (mg/dL)  183 H  127 H   (75-99)  mg/dL


 


Hemoglobin A1c     (4.0-6.0)  %


 


Total Protein     (6.3-8.2)  g/dL


 


Albumin     (3.5-5.0)  g/dL














  01/30/20 01/30/20 01/30/20 Range/Units





  07:35 07:42 09:43 


 


RBC     (3.80-5.40)  m/uL


 


Hgb     (11.4-16.0)  gm/dL


 


Hct     (34.0-46.0)  %


 


Carbon Dioxide  32 H    (22-30)  mmol/L


 


BUN  23 H    (7-17)  mg/dL


 


Glucose  117 H    (74-99)  mg/dL


 


POC Glucose (mg/dL)   123 H  170 H  (75-99)  mg/dL


 


Hemoglobin A1c     (4.0-6.0)  %


 


Total Protein  6.2 L    (6.3-8.2)  g/dL


 


Albumin  2.9 L    (3.5-5.0)  g/dL














  01/30/20 01/30/20 Range/Units





  11:05 11:44 


 


RBC    (3.80-5.40)  m/uL


 


Hgb    (11.4-16.0)  gm/dL


 


Hct    (34.0-46.0)  %


 


Carbon Dioxide    (22-30)  mmol/L


 


BUN    (7-17)  mg/dL


 


Glucose    (74-99)  mg/dL


 


POC Glucose (mg/dL)  180 H  179 H  (75-99)  mg/dL


 


Hemoglobin A1c    (4.0-6.0)  %


 


Total Protein    (6.3-8.2)  g/dL


 


Albumin    (3.5-5.0)  g/dL








                      Microbiology - Last 24 Hours (Table)











 01/23/20 22:45 Blood Culture - Final





 Blood    No Growth after 144 hours














Assessment and Plan


Assessment: 





1.  Failure to thrive with concerns of living conditions.  Social work services 

have been consulted to assess discharge planning and home environment





2.  Right foot wound infection with osteomyelitis.  Patient recently underwent 

debridement with Dr. Dash.  Patient recently discharged on IV daptomycin per 

ID.  Infectious disease services have been consulted.  Daptomycin has been 

resumed





3.  Acute COPD exacerbation.  Patient started Solu-Medrol DuoNeb breathing 

treatments chest x-ray has been ordered.  Resolved





4.  Type 1 diabetes mellitus known brittle diabetic with noncompliance.  

Patient's home dose of Levemir and sliding scale insulin has been ordered





5.  History of peripheral vascular disease





6.  History of essential hypertension





7.  History of hyperlipidemia





8.  History of congestive heart failure





9.  History of coronary artery disease with previous history of angioplasty and 

stent placement





10.  Altered mental status.  Head CT will be ordered.  ABGs, ammonia level and 

lactic acid ordered.  Critical care consult placed.  Did discuss case with 

critical care nurse practitioner.  Narcotics and controlled substances currently

on hold





11.  Hypotension.  Blood pressure systolic in the 70s.  500 mL bolus given blood

pressure did improve.





DVT prophylaxis Lovenox.  GI prophylaxis Pepcid


Infectious disease consultant continue daptomycin


Social work services consulted


Critical care service is consulted





Possible discharge to [facility] or  [facility]





I performed an examination of the patient and discussed their management with 

the Nurse Practitioner.  I have reviewed the Nurse Practitioner's notes and 

agree with the documented findings and plan of care

## 2020-01-30 NOTE — CT
EXAMINATION TYPE: CT brain wo con

 

DATE OF EXAM: 1/30/2020

 

HISTORY: Headache, history of left-sided paralysis and brain lesions. Altered mental status.

 

CT DLP: 1035.4 mGycm.  Automated Exposure Control for Dose Reduction was Utilized.

 

TECHNIQUE: CT scan of the head is performed without contrast.

 

COMPARISON: CT brain January 9, 2018 and older CTs

 

FINDINGS:   There is no acute intracranial hemorrhage or midline shift identified. There is diffuse v
entricular and sulcal prominence consistent with diffuse cerebral atrophy. Large area of encephalomal
acia involving the entire right frontal hemisphere MCA is redemonstrated. There is some extension int
o the right temporal lobe and to lesser degree the right parietal lobe similar to prior. Vascular kia
cification distal internal carotid arteries bilaterally redemonstrated. Globes are intact and visuali
zed paranasal sinuses are clear.  

 

IMPRESSION:  No acute intracranial hemorrhage or midline shift.  There is persistent mild to moderate
 diffuse cerebral atrophy with large right MCA distribution infarct all redemonstrated. No significan
t change from most recent prior.

## 2020-01-30 NOTE — XR
EXAMINATION TYPE: XR chest 1V

 

DATE OF EXAM: 1/30/2020

 

COMPARISON: 1/24/2020

 

HISTORY: Chest pain

 

TECHNIQUE: Single frontal view of the chest is obtained.

 

FINDINGS:  Right-sided PICC again terminates in the distal superior vena cava. Very trace left pleura
l effusion blunts the costophrenic angle. Otherwise the lungs are clear. Cardiomediastinal silhouette
 is stable. No acute osseous pathology.

 

IMPRESSION:  New trace left pleural effusion blunting the costophrenic angle, otherwise the lungs are
 clear.

## 2020-01-30 NOTE — PN
PROGRESS NOTE



DATE OF SERVICE:

01/30/2020



REASON FOR FOLLOWUP:

Right diabetic foot infection with osteomyelitis.



INTERVAL HISTORY:

The patient is currently afebrile.  The patient has been breathing comfortably.  Denies

having any chest pain or any cough.  No nausea, vomiting.  No abdominal pain. No

diarrhea.



PHYSICAL EXAMINATION:

Blood pressure 117/59, pulse of 87, temperature of 98.2. She is 97% on room air.

General description is a middle-aged female lying in bed in no distress.

RESPIRATORY SYSTEM: Unlabored breathing. Clear to auscultation anteriorly.

HEART: S1, S2.  Regular rate and rhythm.

ABDOMEN: Soft. No tenderness.

Right foot is currently dressed up.  No drainage on the dressing.



LABS:

Hemoglobin 8.1, white count 4.2. Creatinine is normal at 0.97. Blood culture has been

negative.



DIAGNOSTIC IMPRESSION AND PLAN:

Patient with right diabetic foot wound with evidence of osteomyelitis at the base of

the fifth _____, status post debridement.  The patient is currently covered with

daptomycin; that will be continued. Local care with [facility].  Awaiting placement.

Continue with supportive care.





MMODL / IJN: [id number] / Job#: [id number]

## 2020-01-30 NOTE — P.CNPUL
History of Present Illness


Consult date: 20


Chief complaint: Altered mentation


History of present illness: 





This is a 59-year-old female patient was sent over to the hospital because of 

concerns from the visiting nurse.  The patient has she has had diabetic foot 

ulcers and previous history of osteomyelitis and she had a recent discharge from

the hospital on IV antibiotics due to concern of right foot osteomyelitis.  She 

also has history of coronary artery disease, COPD, CVA, DVT, hypertension, 

hyperlipidemia, acid reflux, osteoarthritis, chronic kidney disease, recurring 

wounds/diabetic ulcers .  Apparently, the patient was found at home and feces 

and poor living condition was noted at home.  She apparently lives with a 

caregiver.  This was noted that the patient was also having increased shortness 

of breath and she wasn't COPD exacerbation in time of admission.  Social service

s were also consulted on the case.  Patient during this current hospital stay 

was started on COPD exacerbation treatment with accommodation bronchodilators 

and steroids.  The patient denies having any chest pain.  She denies any nausea 

vomiting abdominal pain or diarrhea and she denies having any dysuria fixed or 

urgency.  Over the next few days, the patient was found to be alert and she was 

complaining of pain in the lower extremities and she was being considered to be 

dis[place] back [place] [facility] [place]  Subsequently, on on 

today's evaluation the patient was found to be having some altered mentation, 

sedated and slightly hypotensive with a systolic blood pressure 74 with a 

diastolic of 37.  Note that the patient was taking daptomycin for right foot 

wound infection/osteomyelitis.  The patient undergone debridement by Dr. Ferrell.  She was receiving daptomycin upon discharge from the recent admission

and this was Resumed.  Based on this hypotension, the patient was started on IV 

fluids and subsequently systolic blood pressure improved..  Repeat chest x-ray 

shows no acute abnormalities.  A computed tomography scan of the brain was also 

ordered.





I saw the patient the bedside.  I noticed that she is easily arousable and she 

will follow commands and answer questions.  One left unstimulated, she will 

gradually snows and go to sleep.  She has obvious weakness in her left face and 

left upper extremity which is quite contracted related to previous CVA.  She has

osteomyelitis and open wounds in her right foot at the level of the anterior 

medial area and the lateral area and the base is covered with some purulent 

material.  The patient has missing toes on the right foot.  There is no 

surrounding cellulitis.  There is no swelling in lower extremities.  No reported

cough.  No reported shortness of breath.  No significant tachycardia.  She is 

given a half a liter bolus and his blood pressure is normalized.














Review of Systems


ROS unobtainable: due to mental status


Constitutional: Reports daytime sleepiness, Reports weakness


Eyes: denies as per HPI, denies blurred vision, denies bulging eye, denies 

decreased vision, denies diplopia, denies discharge, denies dry eye, denies 

irritation, denies itching, denies pain, denies photophobia, denies loss of 

peripheral vision, denies loss of vision, denies tunnel vision/blind spots


Ears: deny: decreased hearing, ear discharge, earache, tinnitus


Ears, nose, mouth and throat: Denies headache, Denies sore throat


Breasts: absent: as per HPI


Cardiovascular: Reports as per HPI


Respiratory: Reports as per HPI


Gastrointestinal: Reports as per HPI


Genitourinary: Reports incomplete emptying


Menstruation: Reports as per HPI


Musculoskeletal: Reports gait dysfunction, Reports muscle weakness


Musculoskeletal: absent: ankle pain, ankle stiffness, ankle swelling


Integumentary: Reports wounds


Neurological: Reports change in mentation, Reports gait dysfunction, Reports 

lack of coordination, Reports weakness (Left-sided weakness related to previous 

stroke)


Endocrine: Reports fatigue


Hematologic/Lymphatic: Reports as per HPI


Allergic/Immunologic: Reports as per HPI





Past Medical History


Past Medical History: Asthma, Coronary Artery Disease (CAD), Chest Pain / 

Angina, Heart Failure, COPD, CVA/TIA, Diabetes Mellitus, Deep Vein Thrombosis 

(DVT), Eye Disorder, GERD/Reflux, Hyperlipidemia, Hypertension, Myocardial 

Infarction (MI), Neurologic Disorder, Osteoarthritis (OA), Pneumonia, Renal 

Disease


Additional Past Medical History / Comment(s): IDDM (brittle), DKAs, neuropathy 

bilateral hands/feet, retinopathy bilateral eyes, cellulitis R foot, R great toe

and 2nd toe infections/amputations, current wound R foot-being seen in [facility], 

renal failure, anemia, CVAs with L sided paralysis, headaches started after 

CVAs, brain lesions, DVT R axillae, low back pain, varicosities, seizure many 

years ago (), hypothyroid, constipation, bilateral tinnitis occasionally, 

sinus problems.


Last Myocardial Infarction Date:: 


History of Any Multi-Drug Resistant Organisms: MRSA


Date of last positivie culture/infection: 18


MDRO Source:: Right Foot


Past Surgical History: Appendectomy,  Section, Cholecystectomy, Heart 

Catheterization With Stent, Hysterectomy, Orthopedic Surgery


Additional Past Surgical History / Comment(s): PCI with multiple stents, R great

toe and 2nd toe amps, debridements R foot ulcer, L shoulder surgery to remove 

bone, bronchoscopy, EGD, colonoscopy, R arm port since removed, bilateral 

cataract removals/lens implants.


Past Anesthesia/Blood Transfusion Reactions: No Reported Reaction


Additional Past Anesthesia/Blood Transfusion Reaction / Comment(s): HX OF BLOOD 

TRANSFUSION- NO REACTION


Date of Last Stent Placement:: 2013


Past Psychological History: Anxiety, Bipolar, Depression


Additional Psychological History / Comment(s): Pt has a legal guardian, Noelle Menon, who is pt's sister.  Currently her legal guardian is hospitalized.  Pt 

has a caregiver, Eleanor, who resides with her.  Pt is wheelchair bound d/t CVA 

with L sided paralysis arm and leg.  She has a shower chair and a glucometer.  

Her sister or caregiver drive her to appts.  [email or web address].


Smoking Status: Former smoker


Past Alcohol Use History: None Reported


Additional Past Alcohol Use History / Comment(s): Pt started smoking in  and

quit in .   Using marijuana edibles occasionally but none for a "long time"


Past Drug Use History: Marijuana


Additional Drug Use History / Comment(s): using marijuana edibles





- Past Family History


  ** Father


Family Medical History: Unable to Obtain, Coronary Artery Disease (CAD), 

Diabetes Mellitus





  ** Mother


Family Medical History: COPD





Medications and Allergies


                                Home Medications











 Medication  Instructions  Recorded  Confirmed  Type


 


Albuterol Inhaler [Ventolin Hfa 2 puff INHALATION RT-Q4H PRN 16 

History





Inhaler]    


 


Famotidine [Pepcid] 20 mg PO DAILY 16 History


 


Valproic Acid [Depakene] 250 mg PO DAILY 16 History


 


Ergocalciferol [Vitamin D2 50,000 unit PO SA 10/06/16 01/23/20 History





(DRISDOL)]    


 


HYDROcodone/APAP 10-325MG [Norco 1 tab PO Q6H PRN 17 History





]    


 


DULoxetine HCL [Cymbalta] 60 mg PO DAILY 17 History


 


ALPRAZolam [Xanax] 1 mg PO Q8H 17 History


 


Ondansetron [Zofran] 4 mg PO DAILY PRN 18 History


 


Atorvastatin [Lipitor] 20 mg PO DAILY 19 History


 


QUEtiapine FUMARATE [SEROquel] 25 mg PO BID 19 History


 


QUEtiapine [SEROquel] 100 mg PO HS 19 History


 


Vitamin B Complex With Vit C 1 tab PO DAILY 20 History


 


Aspirin [Adult Low Dose Aspirin EC] 81 mg PO DAILY 01/10/20 01/23/20 History


 


Ferrous Sulfate [Iron (65  mg PO DAILY 01/10/20 01/23/20 History





Elemental)]    


 


Insulin Glargine [Lantus] 12 unit SQ HS #0 20 Rx


 


DAPTOmycin [Daptomycin] 350 mg IV DAILY #40 vial 20 Rx


 


INSULIN ASPART (NovoLOG) [NovoLOG See Protocol SQ ACHS 20 History





(formulary)]    








                                    Allergies











Allergy/AdvReac Type Severity Reaction Status Date / Time


 


Barbiturates Allergy  Rash/Hives Verified 20 22:34


 


cephalexin monohydrate Allergy  Rash/Hives Verified 20 22:34





[From Keflex]     


 


morphine Allergy  Rash/Hives Verified 20 22:34


 


Penicillins Allergy  Rash/Hives Verified 20 22:34


 


phenobarbital Allergy  Swelling Verified 20 22:34


 


venom-honey bee Allergy  Swelling Verified 20 22:34





[bee venom (honey bee)]     


 


amlodipine besylate AdvReac  Vomiting Verified 20 22:34





[From Norvasc]     














Physical Exam


Vitals: 


                                   Vital Signs











  Temp Pulse Pulse Resp BP Pulse Ox


 


 20 12:30   74    


 


 20 12:20   71    


 


 20 10:30  97.8 F   68  16  90/52  95


 


 20 09:00   81    


 


 20 08:49       97


 


 20 08:45   74    


 


 20 07:45  97.6 F   91  16  74/37  98


 


 20 07:14  97.9 F   78  15  104/57  98


 


 20 01:08  97.7 F   113 H  16  125/71  95


 


 20 20:01   104 H    


 


 20 19:50   110 H    


 


 20 19:07  98.5 F   108 H  16  135/75  98


 


 20 16:00   91    


 


 20 15:47   91    


 


 20 15:00  98.0 F   94  18  162/89  99








                                Intake and Output











 20





 22:59 06:59 14:59


 


Intake Total 590 790 


 


Balance 590 790 


 


Intake:   


 


  Oral 590 790 


 


Other:   


 


  Voiding Method Diaper  Diaper





 Incontinent  Incontinent


 


  # Voids  3 


 


  Weight 57.606 kg  

















In general patient is alert when stimulated and she would open up her eyes and 

follows commands and answer questions appropriately.  If left unstimulated, she 

will gradually drift back to sleep if she gets quite sedated.  No neck 

stiffness.


HEENT head normocephalic and atraumatic


Neck is supple no JVD no goiter no lymphadenopathy


Lungs diminished bilaterally no crackles no wheezing


Heart regular rate and rhythm S1-S2, no rub or gallop


Abdomen is soft nontender nondistended positive bowel sounds no 

hepatosplenomegaly


Extremities no edema.  Right foot has 2 missing toes.  The patient has also 

large ulcers one in the lateral aspect of the right foot and the other one is in

the anteromedial aspect of the right foot and the base is covered with some 

purulent material and is exposed.  No cellulitis.  Pulses are diminished in 

lower extremities bilaterally.  Skin is dry.  Poor toenail status.


Neuro patient lethargic but does wake up to painful stimuli but does fall back 

to sleep and confusion at times residual weakness from previous stroke and 

changed.  The patient has chronic contracture in the left upper extremity and 

weakness along with some left facial weakness.  Pupils are equal and reactive to

light.  She is able to communicate and answer questions when aroused.








Results





- Laboratory Findings


CBC and BMP: 


                                 20 07:35





                                 20 07:35


PT/INR, D-dimer











PT  9.4 sec (9.0-12.0)   20  22:14    


 


INR  0.9  (<1.2)   20  22:14    








Abnormal lab findings: 


                                  Abnormal Labs











  20





  17:44 19:35 22:14


 


WBC   


 


RBC    2.94 L


 


Hgb    8.8 L


 


Hct    27.4 L


 


MCHC   


 


RDW   


 


APTT   


 


Sodium   


 


Carbon Dioxide   


 


BUN   


 


Creatinine   


 


Glucose   


 


POC Glucose (mg/dL)  269 H  247 H 


 


Hemoglobin A1c   


 


Calcium   


 


Total Protein   


 


Albumin   














  20





  22:14 22:14 23:01


 


WBC   


 


RBC   


 


Hgb   


 


Hct   


 


MCHC   


 


RDW   


 


APTT   21.9 L 


 


Sodium  135 L  


 


Carbon Dioxide   


 


BUN  24 H  


 


Creatinine   


 


Glucose  426 H  


 


POC Glucose (mg/dL)    521 H


 


Hemoglobin A1c   


 


Calcium  8.3 L  


 


Total Protein  6.1 L  


 


Albumin  3.0 L  














  20





  01:59 05:23 06:47


 


WBC   


 


RBC   


 


Hgb   


 


Hct   


 


MCHC   


 


RDW   


 


APTT   


 


Sodium   


 


Carbon Dioxide   


 


BUN   


 


Creatinine   


 


Glucose   


 


POC Glucose (mg/dL)  363 H  231 H  230 H


 


Hemoglobin A1c   


 


Calcium   


 


Total Protein   


 


Albumin   














  20





  09:07 09:07 11:39


 


WBC   


 


RBC  2.94 L  


 


Hgb  8.9 L  


 


Hct  28.1 L  


 


MCHC   


 


RDW   


 


APTT   


 


Sodium   


 


Carbon Dioxide   


 


BUN   23 H 


 


Creatinine   


 


Glucose   336 H 


 


POC Glucose (mg/dL)    427 H


 


Hemoglobin A1c   


 


Calcium   


 


Total Protein   


 


Albumin   3.3 L 














  20





  16:29 20:58 01:44


 


WBC   


 


RBC   


 


Hgb   


 


Hct   


 


MCHC   


 


RDW   


 


APTT   


 


Sodium   


 


Carbon Dioxide   


 


BUN   


 


Creatinine   


 


Glucose   


 


POC Glucose (mg/dL)  461 H  473 H  440 H


 


Hemoglobin A1c   


 


Calcium   


 


Total Protein   


 


Albumin   














  20





  06:53 06:53 06:56


 


WBC   


 


RBC  2.82 L  


 


Hgb  8.6 L  


 


Hct  27.1 L  


 


MCHC   


 


RDW   


 


APTT   


 


Sodium   


 


Carbon Dioxide   31 H 


 


BUN   26 H 


 


Creatinine   


 


Glucose   185 H 


 


POC Glucose (mg/dL)    199 H


 


Hemoglobin A1c   


 


Calcium   


 


Total Protein   6.1 L 


 


Albumin   3.0 L 














  20





  11:33 16:30 20:45


 


WBC   


 


RBC   


 


Hgb   


 


Hct   


 


MCHC   


 


RDW   


 


APTT   


 


Sodium   


 


Carbon Dioxide   


 


BUN   


 


Creatinine   


 


Glucose   


 


POC Glucose (mg/dL)  179 H  70 L  237 H


 


Hemoglobin A1c   


 


Calcium   


 


Total Protein   


 


Albumin   














  20





  03:09 03:35 04:01


 


WBC   


 


RBC   


 


Hgb   


 


Hct   


 


MCHC   


 


RDW   


 


APTT   


 


Sodium   


 


Carbon Dioxide   


 


BUN   


 


Creatinine   


 


Glucose   


 


POC Glucose (mg/dL)  58 L  71 L  64 L


 


Hemoglobin A1c   


 


Calcium   


 


Total Protein   


 


Albumin   














  20





  04:35 06:36 06:36


 


WBC   


 


RBC   2.62 L 


 


Hgb   8.1 L 


 


Hct   25.3 L 


 


MCHC   


 


RDW   15.6 H 


 


APTT   


 


Sodium   


 


Carbon Dioxide    33 H


 


BUN    28 H


 


Creatinine   


 


Glucose   


 


POC Glucose (mg/dL)  73 L  


 


Hemoglobin A1c   


 


Calcium   


 


Total Protein    6.2 L


 


Albumin    3.0 L














  20





  11:43 16:55 19:05


 


WBC   


 


RBC   


 


Hgb   


 


Hct   


 


MCHC   


 


RDW   


 


APTT   


 


Sodium   


 


Carbon Dioxide   


 


BUN   


 


Creatinine   


 


Glucose   


 


POC Glucose (mg/dL)  168 H  225 H  242 H


 


Hemoglobin A1c   


 


Calcium   


 


Total Protein   


 


Albumin   














  20





  02:12 05:31 06:34


 


WBC    3.5 L


 


RBC    2.65 L


 


Hgb    8.0 L


 


Hct    25.4 L


 


MCHC   


 


RDW   


 


APTT   


 


Sodium   


 


Carbon Dioxide   


 


BUN   


 


Creatinine   


 


Glucose   


 


POC Glucose (mg/dL)  137 H  58 L 


 


Hemoglobin A1c   


 


Calcium   


 


Total Protein   


 


Albumin   














  20





  06:34 11:46 16:38


 


WBC   


 


RBC   


 


Hgb   


 


Hct   


 


MCHC   


 


RDW   


 


APTT   


 


Sodium   


 


Carbon Dioxide  34 H  


 


BUN  25 H  


 


Creatinine   


 


Glucose   


 


POC Glucose (mg/dL)   234 H  219 H


 


Hemoglobin A1c   


 


Calcium   


 


Total Protein  5.6 L  


 


Albumin  2.6 L  














  20





  20:21 01:40 06:32


 


WBC   


 


RBC   


 


Hgb   


 


Hct   


 


MCHC   


 


RDW   


 


APTT   


 


Sodium   


 


Carbon Dioxide   


 


BUN   


 


Creatinine   


 


Glucose   


 


POC Glucose (mg/dL)  370 H  496 H  288 H


 


Hemoglobin A1c   


 


Calcium   


 


Total Protein   


 


Albumin   














  20





  09:34 09:34 11:39


 


WBC   


 


RBC   2.84 L 


 


Hgb   8.6 L 


 


Hct   27.2 L 


 


MCHC   


 


RDW   


 


APTT   


 


Sodium   


 


Carbon Dioxide  31 H  


 


BUN  30 H  


 


Creatinine  1.08 H  


 


Glucose  229 H  


 


POC Glucose (mg/dL)    242 H


 


Hemoglobin A1c   


 


Calcium   


 


Total Protein  6.2 L  


 


Albumin  2.9 L  














  20





  13:33 16:49 20:12


 


WBC   


 


RBC   


 


Hgb   


 


Hct   


 


MCHC   


 


RDW   


 


APTT   


 


Sodium   


 


Carbon Dioxide   


 


BUN   


 


Creatinine   


 


Glucose   


 


POC Glucose (mg/dL)  249 H  402 H  454 H


 


Hemoglobin A1c   


 


Calcium   


 


Total Protein   


 


Albumin   














  20





  23:54 01:36 06:51


 


WBC   


 


RBC   


 


Hgb   


 


Hct   


 


MCHC   


 


RDW   


 


APTT   


 


Sodium   


 


Carbon Dioxide   


 


BUN   


 


Creatinine   


 


Glucose   


 


POC Glucose (mg/dL)  305 H  251 H  139 H


 


Hemoglobin A1c   


 


Calcium   


 


Total Protein   


 


Albumin   














  20





  08:26 08:26 08:26


 


WBC   


 


RBC  2.84 L  


 


Hgb  8.2 L  


 


Hct  27.4 L  


 


MCHC  30.0 L  


 


RDW   


 


APTT   


 


Sodium   


 


Carbon Dioxide   34 H 


 


BUN   27 H 


 


Creatinine   


 


Glucose   164 H 


 


POC Glucose (mg/dL)   


 


Hemoglobin A1c    8.5 H


 


Calcium   


 


Total Protein   6.0 L 


 


Albumin   2.8 L 














  20





  11:46 17:08 20:15


 


WBC   


 


RBC   


 


Hgb   


 


Hct   


 


MCHC   


 


RDW   


 


APTT   


 


Sodium   


 


Carbon Dioxide   


 


BUN   


 


Creatinine   


 


Glucose   


 


POC Glucose (mg/dL)  282 H  300 H  240 H


 


Hemoglobin A1c   


 


Calcium   


 


Total Protein   


 


Albumin   














  20





  01:49 06:57 07:35


 


WBC   


 


RBC    2.72 L


 


Hgb    8.1 L


 


Hct    26.0 L


 


MCHC   


 


RDW   


 


APTT   


 


Sodium   


 


Carbon Dioxide   


 


BUN   


 


Creatinine   


 


Glucose   


 


POC Glucose (mg/dL)  183 H  127 H 


 


Hemoglobin A1c   


 


Calcium   


 


Total Protein   


 


Albumin   














  20





  07:35 07:42 09:43


 


WBC   


 


RBC   


 


Hgb   


 


Hct   


 


MCHC   


 


RDW   


 


APTT   


 


Sodium   


 


Carbon Dioxide  32 H  


 


BUN  23 H  


 


Creatinine   


 


Glucose  117 H  


 


POC Glucose (mg/dL)   123 H  170 H


 


Hemoglobin A1c   


 


Calcium   


 


Total Protein  6.2 L  


 


Albumin  2.9 L  














  20





  11:05 11:44


 


WBC  


 


RBC  


 


Hgb  


 


Hct  


 


MCHC  


 


RDW  


 


APTT  


 


Sodium  


 


Carbon Dioxide  


 


BUN  


 


Creatinine  


 


Glucose  


 


POC Glucose (mg/dL)  180 H  179 H


 


Hemoglobin A1c  


 


Calcium  


 


Total Protein  


 


Albumin  














- Diagnostic Findings


Chest x-ray: image reviewed





Assessment and Plan


Plan: 





1 altered mental status.  The patient was evaluated today.  The patient is more 

drowsy and sleepy and she does not demonstrate any new onset neurologic deficits

other than the ones that she had in the past.  On examination, she is easily 

arousable and she does have some left-sided weakness which has been present from

the previous CVA.  There may be a component of metabolic encephalopathy with 

drug induced encephalopathy.  Sepsis is felt to be less likely as the patient d

oes not have the typical systemic inflammatory response syndrome that is 

associated with sepsis.





2 right foot wound/osteomyelitis currently on daptomycin





3 COPD





4 diabetes mellitus type 1





5 peripheral vascular disease





6 hypertension





7 hyperlipidemia





8 coronary artery disease with previous coronary stenting





9 brief hypotension which has recovered





10 CHF which is currently inactive in stable





11 peripheral vascular disease





Plan





Check a valproic acid level


Hold Seroquel


Hold narcotics


Obtain blood cultures


Obtain ABGs


Proceed with a CAT scan of the brain


Check lactic acid level


Monitor the mental status and the patient is doing well for now and she is 

hemodynamically stable.  I do not see the need for ICU transfer for the time 

being.

## 2020-01-31 VITALS — TEMPERATURE: 97.4 F | DIASTOLIC BLOOD PRESSURE: 61 MMHG | RESPIRATION RATE: 15 BRPM | SYSTOLIC BLOOD PRESSURE: 99 MMHG

## 2020-01-31 VITALS — HEART RATE: 96 BPM

## 2020-01-31 LAB
ALBUMIN SERPL-MCNC: 3.3 G/DL (ref 3.5–5)
ALP SERPL-CCNC: 110 U/L (ref 38–126)
ALT SERPL-CCNC: 17 U/L (ref 4–34)
ANION GAP SERPL CALC-SCNC: 6 MMOL/L
AST SERPL-CCNC: 22 U/L (ref 14–36)
BASOPHILS # BLD AUTO: 0 K/UL (ref 0–0.2)
BASOPHILS NFR BLD AUTO: 0 %
BUN SERPL-SCNC: 18 MG/DL (ref 7–17)
CALCIUM SPEC-MCNC: 9.5 MG/DL (ref 8.4–10.2)
CHLORIDE SERPL-SCNC: 103 MMOL/L (ref 98–107)
CO2 SERPL-SCNC: 32 MMOL/L (ref 22–30)
EOSINOPHIL # BLD AUTO: 0 K/UL (ref 0–0.7)
EOSINOPHIL NFR BLD AUTO: 1 %
ERYTHROCYTE [DISTWIDTH] IN BLOOD BY AUTOMATED COUNT: 3.14 M/UL (ref 3.8–5.4)
ERYTHROCYTE [DISTWIDTH] IN BLOOD: 14.9 % (ref 11.5–15.5)
GLUCOSE BLD-MCNC: 123 MG/DL (ref 75–99)
GLUCOSE BLD-MCNC: 158 MG/DL (ref 75–99)
GLUCOSE BLD-MCNC: 380 MG/DL (ref 75–99)
GLUCOSE BLD-MCNC: 56 MG/DL (ref 75–99)
GLUCOSE BLD-MCNC: 57 MG/DL (ref 75–99)
GLUCOSE SERPL-MCNC: 37 MG/DL (ref 74–99)
HCT VFR BLD AUTO: 29.9 % (ref 34–46)
HGB BLD-MCNC: 9.1 GM/DL (ref 11.4–16)
LYMPHOCYTES # SPEC AUTO: 1.1 K/UL (ref 1–4.8)
LYMPHOCYTES NFR SPEC AUTO: 16 %
MCH RBC QN AUTO: 29.2 PG (ref 25–35)
MCHC RBC AUTO-ENTMCNC: 30.6 G/DL (ref 31–37)
MCV RBC AUTO: 95.3 FL (ref 80–100)
MONOCYTES # BLD AUTO: 0.5 K/UL (ref 0–1)
MONOCYTES NFR BLD AUTO: 7 %
NEUTROPHILS # BLD AUTO: 4.9 K/UL (ref 1.3–7.7)
NEUTROPHILS NFR BLD AUTO: 74 %
PLATELET # BLD AUTO: 380 K/UL (ref 150–450)
POTASSIUM SERPL-SCNC: 4.8 MMOL/L (ref 3.5–5.1)
PROT SERPL-MCNC: 7 G/DL (ref 6.3–8.2)
SODIUM SERPL-SCNC: 141 MMOL/L (ref 137–145)
WBC # BLD AUTO: 6.6 K/UL (ref 3.8–10.6)

## 2020-01-31 RX ADMIN — INSULIN ASPART SCH: 100 INJECTION, SOLUTION INTRAVENOUS; SUBCUTANEOUS at 08:19

## 2020-01-31 RX ADMIN — ENOXAPARIN SODIUM SCH MG: 40 INJECTION SUBCUTANEOUS at 10:08

## 2020-01-31 RX ADMIN — HYDROCODONE BITARTRATE AND ACETAMINOPHEN PRN EACH: 10; 325 TABLET ORAL at 04:19

## 2020-01-31 RX ADMIN — IPRATROPIUM BROMIDE AND ALBUTEROL SULFATE SCH ML: .5; 3 SOLUTION RESPIRATORY (INHALATION) at 09:02

## 2020-01-31 RX ADMIN — IPRATROPIUM BROMIDE AND ALBUTEROL SULFATE SCH: .5; 3 SOLUTION RESPIRATORY (INHALATION) at 12:13

## 2020-01-31 RX ADMIN — FAMOTIDINE SCH MG: 20 TABLET, FILM COATED ORAL at 10:09

## 2020-01-31 RX ADMIN — HYDROCODONE BITARTRATE AND ACETAMINOPHEN PRN EACH: 10; 325 TABLET ORAL at 10:09

## 2020-01-31 RX ADMIN — DULOXETINE SCH MG: 60 CAPSULE, DELAYED RELEASE ORAL at 10:08

## 2020-01-31 RX ADMIN — Medication SCH MG: at 10:09

## 2020-01-31 RX ADMIN — ASPIRIN 81 MG CHEWABLE TABLET SCH MG: 81 TABLET CHEWABLE at 10:08

## 2020-01-31 RX ADMIN — INSULIN ASPART SCH UNIT: 100 INJECTION, SOLUTION INTRAVENOUS; SUBCUTANEOUS at 12:02

## 2020-01-31 NOTE — P.DS
Providers


Date of admission: 


01/26/20 12:52





Expected date of discharge: 01/31/20


Attending physician: 


Saniya Cabral





Consults: 





                                        





01/24/20 08:09


Consult Physician Routine 


   Consulting Provider: Mian Snyder


   Consult Reason/Comments: established patient on daptomycin


   Do you want consulting provider notified?: Yes





01/30/20 12:24


Consult Physician Routine 


   Consulting Provider: David Hurley


   Consult Reason/Comments: [facility] [place]


   Do you want consulting provider notified?: Yes











Primary care physician: 


Saniya Cabral





[facility] Course: 





Discharge diagnosis





1.  Failure to thrive with concerns of living conditions.  Social work services 

have been consulted to assess discharge planning and home environment





2.  Right foot wound infection with osteomyelitis.  Patient recently underwent 

debridement with Dr. Dash.  Patient recently discharged on IV daptomycin per 

ID.  Infectious disease services have been consulted.  Daptomycin has been 

resumed





3.  Acute COPD exacerbation.  Patient started Solu-Medrol DuoNeb breathing 

treatments chest x-ray has been ordered.  Resolved





4.  Type 1 diabetes mellitus known brittle diabetic with noncompliance.  

Patient's home dose of Levemir and sliding scale insulin has been ordered.  She 

did have episode of hypoglycemia will decrease Levemir to 15 units





5.  History of peripheral vascular disease





6.  History of essential hypertension





7.  History of hyperlipidemia





8.  History of congestive heart failure





9.  History of coronary artery disease with previous history of angioplasty and 

stent placement





10.  Altered mental status.  Head CT will be ordered.  ABGs, ammonia level and 

lactic acid ordered.  Critical care consult placed.  Did discuss case with 

critical care nurse practitioner.  Narcotics and controlled substances currently

on hold.  Patient has returned to baseline.  Head CT was completed showing no 

acute intracranial hemorrhage or midline shift there is persistent mild to 

moderate diffuse cerebral atrophy with large right MCA distribution infarct 

already demonstrate no significant change from recent.  Patient has returned to 

normal baseline mentation





11.  Hypotension.  Blood pressure systolic in the 70s.  500 mL bolus given blood

pressure did improve.  Blood pressure has improved











Hospital course





This is a 59-year-old female patient who presented to the ER with concerns of 

failure to thrive and concerns of living conditions per Visiting nurse.  Patient

was recently discharged on IV antibiotics for concerns for right foot 

osteomyelitis.  Patient is also a known brittle diabetic.  Additional medical 

history includes asthma, CAD, chest pain, heart failure, COPD, CVA, diabetes 

mellitus, deep vein thrombosis, GERD, hyperlipidemia, hypertension, 

osteoarthritis, renal disease and reoccurring extremity wounds due to diabetes 

cells and wound care clinic.  According to ER report patient was covered in 

feces and poor living conditions were noted in house.  Per patient she reports 

that she lives with caregiver.  Patient was also noted to have some wheezing per

ER admitted for COPD exacerbation.  At this time will consult infectious disease

for ongoing IV antibiotic use.  Social work services also consulted to assess 

living condition and plan for discharge.  Patient was started on Solu-Medrol for

COPD exacerbation and DuoNeb breathing treatments will order chest x-ray.  At 

this time patient denies chest pain or shortness of breath.  Patient denies 

nausea vomiting or diarrhea.  Patient denies any urinary burning or frequency





On 01/25/2020 patient was seen and examined on the medical floor she is alert 

and oriented 3 in no apparent distress she is complaining of occasional pain in

her lower extremity otherwise she denies any complaints there is no fever no 

chills no headache no dizziness no chest pain no shortness of breath no cough no

nausea or vomiting no abdominal pain no diarrhea no burning with urination no 

frequency or urgency no hematuria





On 01/26/2020 patient was seen and examined on the medical floor she states she 

is doing well she denies any complaints at this time there is no fever or chills

no headache or dizziness no chest pain no shortness of breath no cough no nausea

or vomiting no abdominal pain no diarrhem burning with urination no frequency or

urgency and no hematuria and in the lower extremity is well-controlled at this 

time








On 01/27/2020 patient is alert and oriented 3.  Patient denies any complaints 

there is nausea vomiting or diarrhea.  Denies chest pain or shortness breath.  

Patient denies any urinary burning or frequency.  Per social work discussion was

held with legal messi and isabel to discharge to [facility] for IV antibiotics.  

Possible discharge to  [facility] or [facility] for IV antibiotics. 








On 01/28/2020 patient is alert and oriented 3.  Patient is sleepy but does wake

up to follow commands.  Waiting on insurance coverage for rehab in regards to 

antibiotics.  Possible [facility] and [facility] discharge. patient denies chest 

pain or shortness of breath. denies any urinary and frequency.  Denies nausea 

vomiting or diarrhea patient remains on daptomycin IV antibiotics





On 01/29/2020 patient is alert and oriented 3.  Patient denies any complaints. 

Patient denies chest pain or shortness breath.  Patient denies nausea vomiting 

or diarrhea.  Patient denies any urinary burning or frequency.  Did discuss case

with infectious disease patient will acquire daptomycin for approximately 30 

more days at discharge social work working on discharge planning





On 01/30/2020 on examination patient was found to be lethargic.  Patient does 

wake up to stimuli but quickly falls back to sleep.  Notify nursing staff to 

check blood sugar and vitals.  Patient's blood pressure low with systolic in the

70's. 500 mL bolus ordered.  Medications currently on hold.  Upon reexamination 

patient's blood pressure had improved but patient remains lethargic.  At this 

time will order head CT, lactic acid, ABGs, ammonia level, EKG and troponins.  

Also consulted Dr. Farfan per critical care did discuss case with critical care

teams nurse practitioner.  Vitals remained stable.  Patient is on room air pulse

ox 99.





On 01/31/2020 patient's mentation has returned to baseline.  Patient awake 

following commands eating breakfast.  Patient did have episode of low blood 

sugar exam room air has been decreased to 15 units.  Patient will be DC'd to 

[facility] today in order to finish out IV antibiotics.  Patient denies chest pain 

or shortness of breath.  Patient denies nausea vomiting or diarrhea.  Patient 

denies any urinary burning or frequency





I performed an examination of the patient and discussed their management with 

the Nurse Practitioner.  I have reviewed the Nurse Practitioner's notes and 

agree with the documented findings and plan of care





Patient Condition at Discharge: Stable





Plan - Discharge Summary


Discharge Rx Participant: No


New Discharge Prescriptions: 


New


   DAPTOmycin [Cubicin] 350 mg IVPB Q24HR  vial


   Insulin Detemir (Levemir) [Levemir] 15 unit SQ HS  syr


   QUEtiapine [SEROquel] 25 mg PO DAILY@1200  tab


   ALPRAZolam [Xanax] 0.5 mg PO Q8HR PRN  tab


     PRN Reason: Anxiety





Continue


   Albuterol Inhaler [Ventolin Hfa Inhaler] 2 puff INHALATION RT-Q4H PRN


     PRN Reason: Shortness Of Breath


   Famotidine [Pepcid] 20 mg PO DAILY


   Valproic Acid [Depakene] 250 mg PO DAILY


   Ergocalciferol [Vitamin D2 (DRISDOL)] 50,000 unit PO SA


   HYDROcodone/APAP 10-325MG [Norco ] 1 tab PO Q6H PRN


     PRN Reason: Pain


   DULoxetine HCL [Cymbalta] 60 mg PO DAILY


   Ondansetron [Zofran] 4 mg PO DAILY PRN


     PRN Reason: Nausea


   QUEtiapine [SEROquel] 100 mg PO HS


   Atorvastatin [Lipitor] 20 mg PO DAILY


   Vitamin B Complex With Vit C 1 tab PO DAILY


   Aspirin [Adult Low Dose Aspirin EC] 81 mg PO DAILY


   Ferrous Sulfate [Iron (65 MG Elemental)] 325 mg PO DAILY


   DAPTOmycin [Daptomycin] 350 mg IV DAILY #40 vial


   INSULIN ASPART (NovoLOG) [NovoLOG (formulary)] See Protocol SQ ACHS





Discontinued


   ALPRAZolam [Xanax] 1 mg PO Q8H


   QUEtiapine FUMARATE [SEROquel] 25 mg PO BID


   Insulin Glargine [Lantus] 12 unit SQ HS #0


Discharge Medication List





Albuterol Inhaler [Ventolin Hfa Inhaler] 2 puff INHALATION RT-Q4H PRN 02/19/16 

[History]


Famotidine [Pepcid] 20 mg PO DAILY 02/19/16 [History]


Valproic Acid [Depakene] 250 mg PO DAILY 02/19/16 [History]


Ergocalciferol [Vitamin D2 (DRISDOL)] 50,000 unit PO SA 10/06/16 [History]


HYDROcodone/APAP 10-325MG [Norco ] 1 tab PO Q6H PRN 05/06/17 [History]


DULoxetine HCL [Cymbalta] 60 mg PO DAILY 09/19/17 [History]


Ondansetron [Zofran] 4 mg PO DAILY PRN 04/09/18 [History]


Atorvastatin [Lipitor] 20 mg PO DAILY 07/31/19 [History]


QUEtiapine [SEROquel] 100 mg PO HS 07/31/19 [History]


Vitamin B Complex With Vit C 1 tab PO DAILY 01/02/20 [History]


Aspirin [Adult Low Dose Aspirin EC] 81 mg PO DAILY 01/10/20 [History]


Ferrous Sulfate [Iron (65 MG Elemental)] 325 mg PO DAILY 01/10/20 [History]


DAPTOmycin [Daptomycin] 350 mg IV DAILY #40 vial 01/17/20 [Rx]


INSULIN ASPART (NovoLOG) [NovoLOG (formulary)] See Protocol SQ ACHS 01/23/20 

[History]


ALPRAZolam [Xanax] 0.5 mg PO Q8HR PRN  tab 01/31/20 [Rx]


DAPTOmycin [Cubicin] 350 mg IVPB Q24HR  vial 01/31/20 [Rx]


Insulin Detemir (Levemir) [Levemir] 15 unit SQ HS  syr 01/31/20 [Rx]


QUEtiapine [SEROquel] 25 mg PO DAILY@1200  tab 01/31/20 [Rx]








Follow up Appointment(s)/Referral(s): 


Saniya Cabral MD [Primary Care Provider] - 1-2 days

## 2020-01-31 NOTE — P.PN
Subjective


Progress Note Date: 01/31/20


Principal diagnosis: 


  Altered mental status





This is a 59-year-old female patient was sent over to the hospital because of 

concerns from the visiting nurse.  The patient has she has had diabetic foot 

ulcers and previous history of osteomyelitis and she had a recent discharge from

the hospital on IV antibiotics due to concern of right foot osteomyelitis.  She 

also has history of coronary artery disease, COPD, CVA, DVT, hypertension, 

hyperlipidemia, acid reflux, osteoarthritis, chronic kidney disease, recurring 

wounds/diabetic ulcers .  Apparently, the patient was found at home and feces 

and poor living condition was noted at home.  She apparently lives with a 

caregiver.  This was noted that the patient was also having increased shortness 

of breath and she wasn't COPD exacerbation in time of admission.  Social 

services were also consulted on the case.  Patient during this current hospital 

stay was started on COPD exacerbation treatment with accommodation bronchodilato

rs and steroids.  The patient denies having any chest pain.  She denies any 

nausea vomiting abdominal pain or diarrhea and she denies having any dysuria 

fixed or urgency.  Over the next few days, the patient was found to be alert and

she was complaining of pain in the lower extremities and she was being 

considered to be discharged back to [facility] or [facility].  

Subsequently, on on today's evaluation the patient was found to be having some 

altered mentation, sedated and slightly hypotensive with a systolic blood 

pressure 74 with a diastolic of 37.  Note that the patient was taking daptomycin

for right foot wound infection/osteomyelitis.  The patient undergone debridement

by Dr. Ferrell.  She was receiving daptomycin upon discharge from the recent 

admission and this was Resumed.  Based on this hypotension, the patient was 

started on IV fluids and subsequently systolic blood pressure improved..  Repeat

chest x-ray shows no acute abnormalities.  A computed tomography scan of the 

brain was also ordered.





I saw the patient the bedside.  I noticed that she is easily arousable and she 

will follow commands and answer questions.  One left unstimulated, she will 

gradually snows and go to sleep.  She has obvious weakness in her left face and 

left upper extremity which is quite contracted related to previous CVA.  She has

osteomyelitis and open wounds in her right foot at the level of the anterior 

medial area and the lateral area and the base is covered with some purulent 

material.  The patient has missing toes on the right foot.  There is no 

surrounding cellulitis.  There is no swelling in lower extremities.  No reported

cough.  No reported shortness of breath.  No significant tachycardia.  She is 

given a half a liter bolus and his blood pressure is normalized.





On 01/31/2020 patient seen in follow-up on the general medical floor, her 

mentation has much improved, she is fully awake on today's exam, quite 

talkative, but very pleasant, cooperative, she denies any acute distress, she is

oriented to person and place, denies any difficulty breathing, denies any pain, 

she has involuntary movement involving her lower extremities and upper 

extremities, or if this is chronic or her baseline.  She states she had a fall 

last night, no signs of obvious injury. Lung sounds are clear on auscultation.  

No cough or congestion, patient was found to be hypoglycemic this morning, with 

a serum glucose of 37, and capillary glucose in the 50s, which has recovered 

with oral intake, and is currently at 123 mg/dL.  Brain CT did not show acute 

intracranial findings, it did show old large right MCA infarct.  Chest x-ray was

reviewed showing trace left pleural effusion, but no pneumonic infiltrates, no 

sign of fluid overload.  Patient remains on daptomycin for right foot o

steomyelitis, she has been afebrile, hemodynamically she stable, blood pressure 

is 99/60, patient is non-tachycardic on today's exam.











Objective





- Vital Signs


Vital signs: 


                                   Vital Signs











Temp  97.4 F L  01/31/20 07:19


 


Pulse  96   01/31/20 10:03


 


Resp  15   01/31/20 10:03


 


BP  99/61   01/31/20 07:19


 


Pulse Ox  96   01/31/20 07:19








                                 Intake & Output











 01/30/20 01/31/20 01/31/20





 18:59 06:59 18:59


 


Output Total  700 


 


Balance  -700 


 


Output:   


 


  Urine  700 


 


    Uretheral (Springer)  700 


 


Other:   


 


  Voiding Method Diaper Diaper Diaper





 Incontinent Incontinent Incontinent


 


  # Voids 1 3 














- Exam


In general patient is alert and oriented to person and place and follows 

commands and answer questions appropriately.  Her level of consciousness is 

improved from yesterday's exam, patient has spontaneously awake, she is quite 

talkative, she is cooperative, she denies any acute distress on today's exam.  

No neck stiffness.


HEENT head normocephalic and atraumatic


Neck is supple no JVD no goiter no lymphadenopathy


Lungs diminished bilaterally no crackles no wheezing


Heart regular rate and rhythm S1-S2, no rub or gallop


Abdomen is soft nontender nondistended positive bowel sounds no 

hepatosplenomegaly


Extremities no edema.  Right foot has 2 missing toes.  The patient has also lar

ge ulcers one in the lateral aspect of the right foot and the other one is in 

the anteromedial aspect of the right foot and the base is covered with some 

purulent material and is exposed.  No cellulitis.  Pulses are diminished in 

lower extremities bilaterally.  Skin is dry.  Poor toenail status.


Neuro patient awake and alert, oriented 2, talkative, responding appropriately,

at times residual weakness from previous stroke and changed.  She has some 

involuntary movement involving her bilateral lower extremities and upper 

extremities to a lesser degree, not sure of this is baseline The patient has 

chronic contracture in the left upper extremity and weakness along with some 

left facial weakness.  Pupils are equal and reactive to light.  She is able to 

communicate and answer questions 











- Labs


CBC & Chem 7: 


                                 01/31/20 06:44





                                 01/31/20 06:44


Labs: 


                  Abnormal Lab Results - Last 24 Hours (Table)











  01/30/20 01/30/20 01/30/20 Range/Units





  11:05 11:44 16:32 


 


RBC     (3.80-5.40)  m/uL


 


Hgb     (11.4-16.0)  gm/dL


 


Hct     (34.0-46.0)  %


 


MCHC     (31.0-37.0)  g/dL


 


Carbon Dioxide     (22-30)  mmol/L


 


BUN     (7-17)  mg/dL


 


Glucose     (74-99)  mg/dL


 


POC Glucose (mg/dL)  180 H  179 H  288 H  (75-99)  mg/dL


 


Albumin     (3.5-5.0)  g/dL














  01/30/20 01/31/20 01/31/20 Range/Units





  20:04 00:40 06:44 


 


RBC    3.14 L  (3.80-5.40)  m/uL


 


Hgb    9.1 L  (11.4-16.0)  gm/dL


 


Hct    29.9 L  (34.0-46.0)  %


 


MCHC    30.6 L  (31.0-37.0)  g/dL


 


Carbon Dioxide     (22-30)  mmol/L


 


BUN     (7-17)  mg/dL


 


Glucose     (74-99)  mg/dL


 


POC Glucose (mg/dL)  233 H  158 H   (75-99)  mg/dL


 


Albumin     (3.5-5.0)  g/dL














  01/31/20 01/31/20 01/31/20 Range/Units





  06:44 07:23 07:43 


 


RBC     (3.80-5.40)  m/uL


 


Hgb     (11.4-16.0)  gm/dL


 


Hct     (34.0-46.0)  %


 


MCHC     (31.0-37.0)  g/dL


 


Carbon Dioxide  32 H    (22-30)  mmol/L


 


BUN  18 H    (7-17)  mg/dL


 


Glucose  37 L*    (74-99)  mg/dL


 


POC Glucose (mg/dL)   56 L  57 L  (75-99)  mg/dL


 


Albumin  3.3 L    (3.5-5.0)  g/dL














  01/31/20 Range/Units





  08:10 


 


RBC   (3.80-5.40)  m/uL


 


Hgb   (11.4-16.0)  gm/dL


 


Hct   (34.0-46.0)  %


 


MCHC   (31.0-37.0)  g/dL


 


Carbon Dioxide   (22-30)  mmol/L


 


BUN   (7-17)  mg/dL


 


Glucose   (74-99)  mg/dL


 


POC Glucose (mg/dL)  123 H  (75-99)  mg/dL


 


Albumin   (3.5-5.0)  g/dL














Assessment and Plan


Plan: 


 Assessment:





1 altered mental status, improved.  The patient was evaluated today.  The 

patient is more drowsy and sleepy and she does not demonstrate any new onset 

neurologic deficits other than the ones that she had in the past.  On 

examination, she is easily arousable and she does have some left-sided weakness 

which has been present from the previous CVA.  There may be a component of 

metabolic encephalopathy with drug induced encephalopathy.  Sepsis is felt to be

less likely as the patient does not have the typical systemic inflammatory 

response syndrome that is associated with sepsis. 





2 right foot wound/osteomyelitis currently on daptomycin





3 COPD





4 diabetes mellitus type 1





5 peripheral vascular disease





6 hypertension





7 hyperlipidemia





8 coronary artery disease with previous coronary stenting





9 brief hypotension which has recovered





10 CHF which is currently inactive in stable





11 peripheral vascular disease





Plan:





Patient is much more awake and responsive on today's exam, she did have some 

episodes of hypoglycemia which was treated, and her blood glucose has recovered,

hemodynamically she is stable, blood cultures so far have shown no growth, she 

continues on daptomycin for right foot osteomyelitis, she's had no fevers, she 

is on room air, chest x-ray has been reviewed showing only trace left pleural 

effusion, no difficulty breathing, no cough or congestion, no fever or chills, 

no nausea vomiting or diarrhea.  Maintain safety precautions, oral intake, 

maintain aspiration precautions, from pulmonary/critical care perspective 

patient is stable, it could be considered for discharge to subacute rehab.  Her 

service will sign off and follow on as-needed basis





I performed a history & physical examination of the patient and discussed their 

management with my nurse practitioner, Amber Abraham.  I reviewed the nurse 

practitioner's note and agree with the documented findings and plan of care.  

Lung sounds are positive for clear breath sounds.  The findings and the 

impression was discussed with the patient.  I attest to the documentation by the

nurse practitioner. 


Time with Patient: Less than 30

## 2020-01-31 NOTE — PN
PROGRESS NOTE



DATE OF SERVICE:

01/31/2020



REASON FOR FOLLOWUP:

Right foot diabetic foot wound and osteomyelitis.



INTERVAL HISTORY:

The patient was seen on rounds early this afternoon.  The patient has been afebrile,

breathing comfortably.  Denies any pain to the right foot area.  No chest pain,

shortness of breath or cough.  No abdominal pain.  No diarrhea.



PHYSICAL EXAMINATION:

On examination, blood pressure 99/61 with a pulse of 96.  Temperature 97.4, she is 96%

on room air. General description is a middle-aged female lying in bed in no distress.

Respiratory system: Unlabored breathing.  Clear to auscultation anteriorly.  Heart S1,

S2.  Regular rate and rhythm. Abdomen soft, no tenderness.  Right foot both lateral and

medial wound did have soft tissue surrounding redness improved.  No drainage.



LABS:

Hemoglobin 9.1, white count 6.6, creatinine 0.88.



DIAGNOSTIC IMPRESSION AND PLAN:

Patient with right diabetic foot infection with underlying osteomyelitis.  Culture with

Staph epi.  _____Vancomycin. The patient currently on daptomycin.  She will continue to

finish for a total of 6 week course of therapy.  Local wound care with [facility],

followed by moist dressing to keep the area off the pressure.  Continue supportive

care.





MMODL / IJN: [id number] / Job#: [id number]

## 2020-03-14 ENCOUNTER — HOSPITAL ENCOUNTER (INPATIENT)
Dept: HOSPITAL 47 - EC | Age: 60
LOS: 5 days | Discharge: HOME HEALTH | DRG: 57 | End: 2020-03-19
Attending: INTERNAL MEDICINE | Admitting: INTERNAL MEDICINE
Payer: COMMERCIAL

## 2020-03-14 VITALS — BODY MASS INDEX: 22.3 KG/M2

## 2020-03-14 DIAGNOSIS — Z88.0: ICD-10-CM

## 2020-03-14 DIAGNOSIS — E03.9: ICD-10-CM

## 2020-03-14 DIAGNOSIS — F50.9: ICD-10-CM

## 2020-03-14 DIAGNOSIS — Z71.3: ICD-10-CM

## 2020-03-14 DIAGNOSIS — R10.9: ICD-10-CM

## 2020-03-14 DIAGNOSIS — Z88.1: ICD-10-CM

## 2020-03-14 DIAGNOSIS — Z98.41: ICD-10-CM

## 2020-03-14 DIAGNOSIS — E78.5: ICD-10-CM

## 2020-03-14 DIAGNOSIS — R32: ICD-10-CM

## 2020-03-14 DIAGNOSIS — E10.51: ICD-10-CM

## 2020-03-14 DIAGNOSIS — F41.9: ICD-10-CM

## 2020-03-14 DIAGNOSIS — Z91.81: ICD-10-CM

## 2020-03-14 DIAGNOSIS — N17.9: ICD-10-CM

## 2020-03-14 DIAGNOSIS — Z88.5: ICD-10-CM

## 2020-03-14 DIAGNOSIS — Z99.3: ICD-10-CM

## 2020-03-14 DIAGNOSIS — G93.89: ICD-10-CM

## 2020-03-14 DIAGNOSIS — Z82.49: ICD-10-CM

## 2020-03-14 DIAGNOSIS — I50.9: ICD-10-CM

## 2020-03-14 DIAGNOSIS — Z86.14: ICD-10-CM

## 2020-03-14 DIAGNOSIS — K21.9: ICD-10-CM

## 2020-03-14 DIAGNOSIS — D50.9: ICD-10-CM

## 2020-03-14 DIAGNOSIS — Z90.49: ICD-10-CM

## 2020-03-14 DIAGNOSIS — I49.3: ICD-10-CM

## 2020-03-14 DIAGNOSIS — G40.802: ICD-10-CM

## 2020-03-14 DIAGNOSIS — I11.0: ICD-10-CM

## 2020-03-14 DIAGNOSIS — Z79.4: ICD-10-CM

## 2020-03-14 DIAGNOSIS — E10.65: ICD-10-CM

## 2020-03-14 DIAGNOSIS — Z86.718: ICD-10-CM

## 2020-03-14 DIAGNOSIS — E10.319: ICD-10-CM

## 2020-03-14 DIAGNOSIS — E10.649: ICD-10-CM

## 2020-03-14 DIAGNOSIS — Z87.891: ICD-10-CM

## 2020-03-14 DIAGNOSIS — Z96.1: ICD-10-CM

## 2020-03-14 DIAGNOSIS — F31.9: ICD-10-CM

## 2020-03-14 DIAGNOSIS — I69.354: ICD-10-CM

## 2020-03-14 DIAGNOSIS — Z90.710: ICD-10-CM

## 2020-03-14 DIAGNOSIS — Z98.42: ICD-10-CM

## 2020-03-14 DIAGNOSIS — Z88.8: ICD-10-CM

## 2020-03-14 DIAGNOSIS — I25.10: ICD-10-CM

## 2020-03-14 DIAGNOSIS — I25.2: ICD-10-CM

## 2020-03-14 DIAGNOSIS — Z82.5: ICD-10-CM

## 2020-03-14 DIAGNOSIS — Z91.030: ICD-10-CM

## 2020-03-14 DIAGNOSIS — E10.621: ICD-10-CM

## 2020-03-14 DIAGNOSIS — Z79.82: ICD-10-CM

## 2020-03-14 DIAGNOSIS — Z83.3: ICD-10-CM

## 2020-03-14 DIAGNOSIS — L97.512: ICD-10-CM

## 2020-03-14 DIAGNOSIS — J44.9: ICD-10-CM

## 2020-03-14 DIAGNOSIS — I65.21: ICD-10-CM

## 2020-03-14 DIAGNOSIS — E86.0: ICD-10-CM

## 2020-03-14 DIAGNOSIS — Z87.01: ICD-10-CM

## 2020-03-14 DIAGNOSIS — M19.90: ICD-10-CM

## 2020-03-14 DIAGNOSIS — I69.398: Primary | ICD-10-CM

## 2020-03-14 DIAGNOSIS — Z89.421: ICD-10-CM

## 2020-03-14 DIAGNOSIS — I70.235: ICD-10-CM

## 2020-03-14 DIAGNOSIS — Z79.899: ICD-10-CM

## 2020-03-14 DIAGNOSIS — M62.40: ICD-10-CM

## 2020-03-14 DIAGNOSIS — Z89.411: ICD-10-CM

## 2020-03-14 LAB
ALBUMIN SERPL-MCNC: 3.9 G/DL (ref 3.5–5)
ALP SERPL-CCNC: 95 U/L (ref 38–126)
ALT SERPL-CCNC: 17 U/L (ref 4–34)
AMYLASE SERPL-CCNC: <30 U/L (ref 30–110)
ANION GAP SERPL CALC-SCNC: 9 MMOL/L
APTT BLD: 19.3 SEC (ref 22–30)
AST SERPL-CCNC: 18 U/L (ref 14–36)
BASOPHILS # BLD AUTO: 0 K/UL (ref 0–0.2)
BASOPHILS NFR BLD AUTO: 0 %
BUN SERPL-SCNC: 68 MG/DL (ref 7–17)
CALCIUM SPEC-MCNC: 9.7 MG/DL (ref 8.4–10.2)
CHLORIDE SERPL-SCNC: 105 MMOL/L (ref 98–107)
CO2 SERPL-SCNC: 28 MMOL/L (ref 22–30)
EOSINOPHIL # BLD AUTO: 0.1 K/UL (ref 0–0.7)
EOSINOPHIL NFR BLD AUTO: 1 %
ERYTHROCYTE [DISTWIDTH] IN BLOOD BY AUTOMATED COUNT: 4.05 M/UL (ref 3.8–5.4)
ERYTHROCYTE [DISTWIDTH] IN BLOOD: 14.4 % (ref 11.5–15.5)
GLUCOSE SERPL-MCNC: 305 MG/DL (ref 74–99)
GLUCOSE UR QL: (no result)
HCT VFR BLD AUTO: 37.8 % (ref 34–46)
HGB BLD-MCNC: 11.4 GM/DL (ref 11.4–16)
HYALINE CASTS UR QL AUTO: 288 /LPF (ref 0–2)
INR PPP: 1 (ref ?–1.2)
LYMPHOCYTES # SPEC AUTO: 2.1 K/UL (ref 1–4.8)
LYMPHOCYTES NFR SPEC AUTO: 23 %
MCH RBC QN AUTO: 28.2 PG (ref 25–35)
MCHC RBC AUTO-ENTMCNC: 30.3 G/DL (ref 31–37)
MCV RBC AUTO: 93.2 FL (ref 80–100)
MONOCYTES # BLD AUTO: 0.4 K/UL (ref 0–1)
MONOCYTES NFR BLD AUTO: 5 %
NEUTROPHILS # BLD AUTO: 6.4 K/UL (ref 1.3–7.7)
NEUTROPHILS NFR BLD AUTO: 70 %
PH UR: 5.5 [PH] (ref 5–8)
PLATELET # BLD AUTO: 357 K/UL (ref 150–450)
POTASSIUM SERPL-SCNC: 4.1 MMOL/L (ref 3.5–5.1)
PROT SERPL-MCNC: 7.8 G/DL (ref 6.3–8.2)
PROT UR QL: (no result)
PT BLD: 10.5 SEC (ref 9–12)
RBC UR QL: 1 /HPF (ref 0–5)
SODIUM SERPL-SCNC: 142 MMOL/L (ref 137–145)
SP GR UR: 1.02 (ref 1–1.03)
SQUAMOUS UR QL AUTO: <1 /HPF (ref 0–4)
UROBILINOGEN UR QL STRIP: <2 MG/DL (ref ?–2)
WBC # BLD AUTO: 9.1 K/UL (ref 3.8–10.6)
WBC # UR AUTO: <1 /HPF (ref 0–5)

## 2020-03-14 PROCEDURE — 96361 HYDRATE IV INFUSION ADD-ON: CPT

## 2020-03-14 PROCEDURE — 84484 ASSAY OF TROPONIN QUANT: CPT

## 2020-03-14 PROCEDURE — 93005 ELECTROCARDIOGRAM TRACING: CPT

## 2020-03-14 PROCEDURE — 81001 URINALYSIS AUTO W/SCOPE: CPT

## 2020-03-14 PROCEDURE — 80156 ASSAY CARBAMAZEPINE TOTAL: CPT

## 2020-03-14 PROCEDURE — 82150 ASSAY OF AMYLASE: CPT

## 2020-03-14 PROCEDURE — 99285 EMERGENCY DEPT VISIT HI MDM: CPT

## 2020-03-14 PROCEDURE — 83690 ASSAY OF LIPASE: CPT

## 2020-03-14 PROCEDURE — 87040 BLOOD CULTURE FOR BACTERIA: CPT

## 2020-03-14 PROCEDURE — 74018 RADEX ABDOMEN 1 VIEW: CPT

## 2020-03-14 PROCEDURE — 80164 ASSAY DIPROPYLACETIC ACD TOT: CPT

## 2020-03-14 PROCEDURE — 85025 COMPLETE CBC W/AUTO DIFF WBC: CPT

## 2020-03-14 PROCEDURE — 96372 THER/PROPH/DIAG INJ SC/IM: CPT

## 2020-03-14 PROCEDURE — 71046 X-RAY EXAM CHEST 2 VIEWS: CPT

## 2020-03-14 PROCEDURE — 85610 PROTHROMBIN TIME: CPT

## 2020-03-14 PROCEDURE — 70450 CT HEAD/BRAIN W/O DYE: CPT

## 2020-03-14 PROCEDURE — 80185 ASSAY OF PHENYTOIN TOTAL: CPT

## 2020-03-14 PROCEDURE — 85730 THROMBOPLASTIN TIME PARTIAL: CPT

## 2020-03-14 PROCEDURE — 83605 ASSAY OF LACTIC ACID: CPT

## 2020-03-14 PROCEDURE — 95816 EEG AWAKE AND DROWSY: CPT

## 2020-03-14 PROCEDURE — 83036 HEMOGLOBIN GLYCOSYLATED A1C: CPT

## 2020-03-14 PROCEDURE — 96365 THER/PROPH/DIAG IV INF INIT: CPT

## 2020-03-14 PROCEDURE — 36415 COLL VENOUS BLD VENIPUNCTURE: CPT

## 2020-03-14 PROCEDURE — 80053 COMPREHEN METABOLIC PANEL: CPT

## 2020-03-14 NOTE — ED
General Adult HPI





- General


Chief complaint: Seizure


Stated complaint: seizure


Time Seen by Provider: 20 21:48


Source: EMS


Mode of arrival: EMS


Limitations: no limitations





- History of Present Illness


Initial comments: 


59-year-old female patient with an extensive past medical history presents to 

the emergency department today for evaluation of possible syncope or seizure.  

Family member states that they were and with the patient when she was attempting

to use the bathroom when she fell backwards.  They deny any body shaking.  

States her eyes were open but she was not responding.  She was breathing.  

Patient has remote history of seizure is nothing recent.  Patient has been not 

eating or drinking lately due to no appetite.  Patient is reporting substernal 

chest pain and abdominal pain.  Is reporting nausea.  She has not had any 

vomiting.  Family member states that she has been trying to make herself vomit 

over the last few days.  Patient does have history of eating disorder.  She 

denies any fever or chills.  Denies cough or congestion. Patient denies any 

recent rash, cough, shortness of breath, diarrhea, constipation, back pain, numb

ness, tingling, dizziness, weakness, hematuria, dysuria, urinary urgency, 

urinary frequency, visual changes, or any other complaints.








- Related Data


                                Home Medications











 Medication  Instructions  Recorded  Confirmed


 


Albuterol Inhaler [Ventolin Hfa 2 puff INHALATION RT-Q4H PRN 16





Inhaler]   


 


Famotidine [Pepcid] 20 mg PO DAILY 16


 


Valproic Acid [Depakene] 250 mg PO DAILY 16


 


Ergocalciferol [Vitamin D2 50,000 unit PO SA 10/06/16 01/23/20





(DRISDOL)]   


 


HYDROcodone/APAP 10-325MG [Norco 1 tab PO Q6H PRN 17





]   


 


DULoxetine HCL [Cymbalta] 60 mg PO DAILY 17


 


Ondansetron [Zofran] 4 mg PO DAILY PRN 18


 


Atorvastatin [Lipitor] 20 mg PO DAILY 19


 


QUEtiapine [SEROquel] 100 mg PO HS 19


 


Vitamin B Complex With Vit C 1 tab PO DAILY 20


 


Aspirin [Adult Low Dose Aspirin EC] 81 mg PO DAILY 01/10/20 01/23/20


 


Ferrous Sulfate [Iron (65  mg PO DAILY 01/10/20 01/23/20





Elemental)]   


 


INSULIN ASPART (NovoLOG) [NovoLOG See Protocol SQ ACHS 20





(formulary)]   








                                  Previous Rx's











 Medication  Instructions  Recorded


 


DAPTOmycin [Daptomycin] 350 mg IV DAILY #40 vial 20


 


ALPRAZolam [Xanax] 0.5 mg PO Q8HR PRN  tab 20


 


DAPTOmycin [Cubicin] 350 mg IVPB Q24HR  vial 20


 


Insulin Detemir (Levemir) [Levemir] 15 unit SQ HS  syr 20


 


QUEtiapine [SEROquel] 25 mg PO DAILY@1200  tab 20











                                    Allergies











Allergy/AdvReac Type Severity Reaction Status Date / Time


 


Barbiturates Allergy  Rash/Hives Verified 20 21:44


 


cephalexin monohydrate Allergy  Rash/Hives Verified 20 21:44





[From Keflex]     


 


morphine Allergy  Rash/Hives Verified 20 21:44


 


Penicillins Allergy  Rash/Hives Verified 20 21:44


 


phenobarbital Allergy  Swelling Verified 20 21:44


 


venom-honey bee Allergy  Swelling Verified 20 21:44





[bee venom (honey bee)]     


 


amlodipine besylate AdvReac  Vomiting Verified 20 21:44





[From Norvasc]     














Review of Systems


ROS Statement: 


Those systems with pertinent positive or pertinent negative responses have been 

documented in the HPI.





ROS Other: All systems not noted in ROS Statement are negative.





Past Medical History


Past Medical History: Asthma, Coronary Artery Disease (CAD), Chest Pain / 

Angina, Heart Failure, COPD, CVA/TIA, Diabetes Mellitus, Deep Vein Thrombosis 

(DVT), Eye Disorder, GERD/Reflux, Hyperlipidemia, Hypertension, Myocardial 

Infarction (MI), Neurologic Disorder, Osteoarthritis (OA), Pneumonia, Renal 

Disease


Additional Past Medical History / Comment(s): IDDM (brittle), DKAs, neuropathy 

bilateral hands/feet, retinopathy bilateral eyes, cellulitis R foot, R great toe

and 2nd toe infections/amputations, current wound R foot-being seen in [facility], 

renal failure, anemia, CVAs with L sided paralysis, headaches started after 

CVAs, brain lesions, DVT R axillae, low back pain, varicosities, seizure many 

years ago (), hypothyroid, constipation, bilateral tinnitis occasionally, 

sinus problems.


Last Myocardial Infarction Date:: 


History of Any Multi-Drug Resistant Organisms: MRSA


Date of last positivie culture/infection: 18


MDRO Source:: Right Foot


Past Surgical History: Appendectomy,  Section, Cholecystectomy, Heart 

Catheterization With Stent, Hysterectomy, Orthopedic Surgery


Additional Past Surgical History / Comment(s): PCI with multiple stents, R great

toe and 2nd toe amps, debridements R foot ulcer, L shoulder surgery to remove 

bone, bronchoscopy, EGD, colonoscopy, R arm port since removed, bilateral 

cataract removals/lens implants.


Past Anesthesia/Blood Transfusion Reactions: No Reported Reaction


Additional Past Anesthesia/Blood Transfusion Reaction / Comment(s): HX OF BLOOD 

TRANSFUSION- NO REACTION


Date of Last Stent Placement:: 2013


Past Psychological History: Anxiety, Bipolar, Depression


Smoking Status: Former smoker


Past Alcohol Use History: None Reported


Past Drug Use History: Marijuana





- Past Family History


  ** Father


Family Medical History: Unable to Obtain, Coronary Artery Disease (CAD), 

Diabetes Mellitus





  ** Mother


Family Medical History: COPD





General Exam


Limitations: no limitations


General appearance: alert, in no apparent distress, other (This is a well-

developed, thin appearing adult female patient in no acute distress.  Vital 

signs upon presentation are temperature 98.1F, pulse 109, respirations 18, 

blood pressure 90/59, pulse ox 94% on room air.)


Eye exam: Present: normal appearance, PERRL, EOMI.  Absent: scleral icterus, 

conjunctival injection, periorbital swelling


ENT exam: Present: normal exam, normal oropharynx, mucous membranes moist


Respiratory exam: Present: normal lung sounds bilaterally.  Absent: respiratory 

distress, wheezes, rales, rhonchi, stridor


Cardiovascular Exam: Present: regular rate, normal rhythm, normal heart sounds. 

Absent: systolic murmur, diastolic murmur, rubs, gallop, clicks


GI/Abdominal exam: Present: soft, tenderness (Generalized), normal bowel sounds.

 Absent: distended, guarding, rebound, rigid


Neurological exam: Present: alert, oriented X3, CN II-XII intact


Psychiatric exam: Present: normal affect, normal mood


Skin exam: Present: warm, dry, intact, normal color.  Absent: rash





Course


                                   Vital Signs











  20





  21:40 22:07 22:30


 


Temperature 98.1 F  


 


Pulse Rate 109 H 100 98


 


Respiratory 18 18 20





Rate   


 


Blood Pressure 90/59 128/85 140/89


 


O2 Sat by Pulse 94 L 99 97





Oximetry   














  20





  23:00 23:30


 


Temperature  


 


Pulse Rate 96 91


 


Respiratory 16 15





Rate  


 


Blood Pressure 139/85 131/76


 


O2 Sat by Pulse 100 99





Oximetry  














EKG Findings





- EKG Comments:


EKG Findings:: EKG obtained at 2151 shows sinus rhythm with occasional PVCs.  

There is a prolonged QT interval.  Ventricular rate is 100, SC interval 128, QR 

restoration 84, , QTc 505.  No evidence of ST elevation or depression.





Medical Decision Making





- Medical Decision Making


59-year-old female patient presents to the emergency department today for ev

aluation of possible seizure-like activity.  Physical examination reveals a thin

appearing woman who is medically debilitated.  Lungs are clear to auscultation 

with good air movement.  She does have chronic contracture to the left arm.  She

is otherwise neurologically intact answering questions appropriately.  Patient 

apparently had a seizure-like activity while in the department where her head wa

s related to the left, eyes rolled back in her head and she had tonic-clonic 

movements.  She was given the fetus of Ativan which did improve symptoms, no 

further seizure activity noted.  Labs reviewed and did reveal elevated lactic 

acid.  Elevated BUN and creatinine.  Patient was given IV fluids.  Vital signs 

were satisfactory with no major abnormalities except for her initial low blood 

pressure.  She'll be admitted to the hospital for further evaluation.  She was 

started on seizure medications.








- Lab Data


Result diagrams: 


                                 20 22:18





                                 20 22:17


                                   Lab Results











  20 Range/Units





  22:17 22:18 22:18 


 


WBC   9.1   (3.8-10.6)  k/uL


 


RBC   4.05   (3.80-5.40)  m/uL


 


Hgb   11.4   (11.4-16.0)  gm/dL


 


Hct   37.8   (34.0-46.0)  %


 


MCV   93.2   (80.0-100.0)  fL


 


MCH   28.2   (25.0-35.0)  pg


 


MCHC   30.3 L   (31.0-37.0)  g/dL


 


RDW   14.4   (11.5-15.5)  %


 


Plt Count   357   (150-450)  k/uL


 


Neutrophils %   70   %


 


Lymphocytes %   23   %


 


Monocytes %   5   %


 


Eosinophils %   1   %


 


Basophils %   0   %


 


Neutrophils #   6.4   (1.3-7.7)  k/uL


 


Lymphocytes #   2.1   (1.0-4.8)  k/uL


 


Monocytes #   0.4   (0-1.0)  k/uL


 


Eosinophils #   0.1   (0-0.7)  k/uL


 


Basophils #   0.0   (0-0.2)  k/uL


 


Hypochromasia   Slight   


 


PT    10.5  (9.0-12.0)  sec


 


INR    1.0  (<1.2)  


 


APTT    19.3 L  (22.0-30.0)  sec


 


Sodium  142    (137-145)  mmol/L


 


Potassium  4.1    (3.5-5.1)  mmol/L


 


Chloride  105    ()  mmol/L


 


Carbon Dioxide  28    (22-30)  mmol/L


 


Anion Gap  9    mmol/L


 


BUN  68 H    (7-17)  mg/dL


 


Creatinine  1.39 H    (0.52-1.04)  mg/dL


 


Est GFR (CKD-EPI)AfAm  48    (>60 ml/min/1.73 sqM)  


 


Est GFR (CKD-EPI)NonAf  42    (>60 ml/min/1.73 sqM)  


 


Glucose  305 H    (74-99)  mg/dL


 


Plasma Lactic Acid Carmelo     (0.7-2.0)  mmol/L


 


Calcium  9.7    (8.4-10.2)  mg/dL


 


Total Bilirubin  0.2    (0.2-1.3)  mg/dL


 


AST  18    (14-36)  U/L


 


ALT  17    (4-34)  U/L


 


Alkaline Phosphatase  95    ()  U/L


 


Troponin I     (0.000-0.034)  ng/mL


 


Total Protein  7.8    (6.3-8.2)  g/dL


 


Albumin  3.9    (3.5-5.0)  g/dL


 


Amylase  <30 L    ()  U/L


 


Lipase  19 L    ()  U/L


 


Urine Color     


 


Urine Appearance     (Clear)  


 


Urine pH     (5.0-8.0)  


 


Ur Specific Gravity     (1.001-1.035)  


 


Urine Protein     (Negative)  


 


Urine Glucose (UA)     (Negative)  


 


Urine Ketones     (Negative)  


 


Urine Blood     (Negative)  


 


Urine Nitrite     (Negative)  


 


Urine Bilirubin     (Negative)  


 


Urine Urobilinogen     (<2.0)  mg/dL


 


Ur Leukocyte Esterase     (Negative)  


 


Urine RBC     (0-5)  /hpf


 


Urine WBC     (0-5)  /hpf


 


Ur Squamous Epith Cells     (0-4)  /hpf


 


Urine Bacteria     (None)  /hpf


 


Hyaline Casts     (0-2)  /lpf


 


Urine Mucus     (None)  /hpf


 


Phenytoin     ug/mL


 


Valproic Acid     ug/mL


 


Carbamazepine     ug/mL














  20 Range/Units





  22:18 22:18 22:18 


 


WBC     (3.8-10.6)  k/uL


 


RBC     (3.80-5.40)  m/uL


 


Hgb     (11.4-16.0)  gm/dL


 


Hct     (34.0-46.0)  %


 


MCV     (80.0-100.0)  fL


 


MCH     (25.0-35.0)  pg


 


MCHC     (31.0-37.0)  g/dL


 


RDW     (11.5-15.5)  %


 


Plt Count     (150-450)  k/uL


 


Neutrophils %     %


 


Lymphocytes %     %


 


Monocytes %     %


 


Eosinophils %     %


 


Basophils %     %


 


Neutrophils #     (1.3-7.7)  k/uL


 


Lymphocytes #     (1.0-4.8)  k/uL


 


Monocytes #     (0-1.0)  k/uL


 


Eosinophils #     (0-0.7)  k/uL


 


Basophils #     (0-0.2)  k/uL


 


Hypochromasia     


 


PT     (9.0-12.0)  sec


 


INR     (<1.2)  


 


APTT     (22.0-30.0)  sec


 


Sodium     (137-145)  mmol/L


 


Potassium     (3.5-5.1)  mmol/L


 


Chloride     ()  mmol/L


 


Carbon Dioxide     (22-30)  mmol/L


 


Anion Gap     mmol/L


 


BUN     (7-17)  mg/dL


 


Creatinine     (0.52-1.04)  mg/dL


 


Est GFR (CKD-EPI)AfAm     (>60 ml/min/1.73 sqM)  


 


Est GFR (CKD-EPI)NonAf     (>60 ml/min/1.73 sqM)  


 


Glucose     (74-99)  mg/dL


 


Plasma Lactic Acid Carmelo  3.4 H*    (0.7-2.0)  mmol/L


 


Calcium     (8.4-10.2)  mg/dL


 


Total Bilirubin     (0.2-1.3)  mg/dL


 


AST     (14-36)  U/L


 


ALT     (4-34)  U/L


 


Alkaline Phosphatase     ()  U/L


 


Troponin I   <0.012   (0.000-0.034)  ng/mL


 


Total Protein     (6.3-8.2)  g/dL


 


Albumin     (3.5-5.0)  g/dL


 


Amylase     ()  U/L


 


Lipase     ()  U/L


 


Urine Color     


 


Urine Appearance     (Clear)  


 


Urine pH     (5.0-8.0)  


 


Ur Specific Gravity     (1.001-1.035)  


 


Urine Protein     (Negative)  


 


Urine Glucose (UA)     (Negative)  


 


Urine Ketones     (Negative)  


 


Urine Blood     (Negative)  


 


Urine Nitrite     (Negative)  


 


Urine Bilirubin     (Negative)  


 


Urine Urobilinogen     (<2.0)  mg/dL


 


Ur Leukocyte Esterase     (Negative)  


 


Urine RBC     (0-5)  /hpf


 


Urine WBC     (0-5)  /hpf


 


Ur Squamous Epith Cells     (0-4)  /hpf


 


Urine Bacteria     (None)  /hpf


 


Hyaline Casts     (0-2)  /lpf


 


Urine Mucus     (None)  /hpf


 


Phenytoin    <3.0  ug/mL


 


Valproic Acid    21.4  ug/mL


 


Carbamazepine    <3.0  ug/mL














  20 Range/Units





  22:50 


 


WBC   (3.8-10.6)  k/uL


 


RBC   (3.80-5.40)  m/uL


 


Hgb   (11.4-16.0)  gm/dL


 


Hct   (34.0-46.0)  %


 


MCV   (80.0-100.0)  fL


 


MCH   (25.0-35.0)  pg


 


MCHC   (31.0-37.0)  g/dL


 


RDW   (11.5-15.5)  %


 


Plt Count   (150-450)  k/uL


 


Neutrophils %   %


 


Lymphocytes %   %


 


Monocytes %   %


 


Eosinophils %   %


 


Basophils %   %


 


Neutrophils #   (1.3-7.7)  k/uL


 


Lymphocytes #   (1.0-4.8)  k/uL


 


Monocytes #   (0-1.0)  k/uL


 


Eosinophils #   (0-0.7)  k/uL


 


Basophils #   (0-0.2)  k/uL


 


Hypochromasia   


 


PT   (9.0-12.0)  sec


 


INR   (<1.2)  


 


APTT   (22.0-30.0)  sec


 


Sodium   (137-145)  mmol/L


 


Potassium   (3.5-5.1)  mmol/L


 


Chloride   ()  mmol/L


 


Carbon Dioxide   (22-30)  mmol/L


 


Anion Gap   mmol/L


 


BUN   (7-17)  mg/dL


 


Creatinine   (0.52-1.04)  mg/dL


 


Est GFR (CKD-EPI)AfAm   (>60 ml/min/1.73 sqM)  


 


Est GFR (CKD-EPI)NonAf   (>60 ml/min/1.73 sqM)  


 


Glucose   (74-99)  mg/dL


 


Plasma Lactic Acid Carmelo   (0.7-2.0)  mmol/L


 


Calcium   (8.4-10.2)  mg/dL


 


Total Bilirubin   (0.2-1.3)  mg/dL


 


AST   (14-36)  U/L


 


ALT   (4-34)  U/L


 


Alkaline Phosphatase   ()  U/L


 


Troponin I   (0.000-0.034)  ng/mL


 


Total Protein   (6.3-8.2)  g/dL


 


Albumin   (3.5-5.0)  g/dL


 


Amylase   ()  U/L


 


Lipase   ()  U/L


 


Urine Color  Yellow  


 


Urine Appearance  Clear  (Clear)  


 


Urine pH  5.5  (5.0-8.0)  


 


Ur Specific Gravity  1.020  (1.001-1.035)  


 


Urine Protein  1+ H  (Negative)  


 


Urine Glucose (UA)  3+ H  (Negative)  


 


Urine Ketones  Negative  (Negative)  


 


Urine Blood  Negative  (Negative)  


 


Urine Nitrite  Negative  (Negative)  


 


Urine Bilirubin  Negative  (Negative)  


 


Urine Urobilinogen  <2.0  (<2.0)  mg/dL


 


Ur Leukocyte Esterase  Negative  (Negative)  


 


Urine RBC  1  (0-5)  /hpf


 


Urine WBC  <1  (0-5)  /hpf


 


Ur Squamous Epith Cells  <1  (0-4)  /hpf


 


Urine Bacteria  Rare H  (None)  /hpf


 


Hyaline Casts  288 H  (0-2)  /lpf


 


Urine Mucus  Rare H  (None)  /hpf


 


Phenytoin   ug/mL


 


Valproic Acid   ug/mL


 


Carbamazepine   ug/mL














- Radiology Data


Radiology results: report reviewed, image reviewed


KUB x-ray is obtained.  Report was reviewed in its entirety.  Impression by Dr. Brambila shows nonacute abdomen.  No change.





Two-view x-ray of the chest is obtained.  Report reviewed in its entirety.  

Impression by Dr. Brambila shows normal chest.  It is essentially complete c

learing of the left lower lobe pleural effusion and mild infiltrate compared to 

old exam.











Disposition


Clinical Impression: 


 Seizures





Disposition: ADMITTED AS IP TO THIS [facility]


Condition: Serious


Referrals: 


Saniya Cabral MD [Primary Care Provider] - 1-2 days


Decision to Admit Reason: Admit from EC


Decision Date: 03/15/20


Decision Time: 00:43

## 2020-03-14 NOTE — XR
EXAMINATION TYPE: XR chest 2V

 

DATE OF EXAM: 3/14/2020

 

COMPARISON: 1/30/2020

 

HISTORY: Abdominal pain. Chest pain

 

TECHNIQUE:

 

FINDINGS: Heart and mediastinum are normal. Lungs are clear. Diaphragm is normal. Bony thorax appears
 normal. There are chest leads.

 

IMPRESSION: Normal chest. There is essentially complete clearing of the left lower lobe pleural effus
ion and mild infiltrate compared to old exam.

## 2020-03-14 NOTE — XR
EXAMINATION TYPE: XR KUB

 

DATE OF EXAM: 3/14/2020

 

COMPARISON: January 2, 2020

 

HISTORY: Abdominal pain

 

TECHNIQUE:

 

FINDINGS: Supine view shows a normal bowel gas pattern. There is no sign of intestinal obstruction or
 pneumoperitoneum. Fecal pattern is normal. There is no evidence of a mass. There are phleboliths in 
the pelvis. Bony structures are intact. There are no pathologic calcifications over the kidneys.

 

IMPRESSION: Nonacute abdomen. No change.

## 2020-03-15 LAB
CARBAMAZEPINE SERPL-MCNC: <3 UG/ML
GLUCOSE BLD-MCNC: 173 MG/DL (ref 75–99)
GLUCOSE BLD-MCNC: 236 MG/DL (ref 75–99)
GLUCOSE BLD-MCNC: 258 MG/DL (ref 75–99)
GLUCOSE BLD-MCNC: 330 MG/DL (ref 75–99)
GLUCOSE BLD-MCNC: 68 MG/DL (ref 75–99)
GLUCOSE BLD-MCNC: 72 MG/DL (ref 75–99)
GLUCOSE BLD-MCNC: 78 MG/DL (ref 75–99)
GLUCOSE BLD-MCNC: 93 MG/DL (ref 75–99)
PHENYTOIN SERPL-MCNC: <3 UG/ML

## 2020-03-15 RX ADMIN — LEVETIRACETAM SCH MLS/HR: 10 INJECTION INTRAVENOUS at 20:34

## 2020-03-15 RX ADMIN — LEVETIRACETAM SCH MLS/HR: 10 INJECTION INTRAVENOUS at 07:14

## 2020-03-15 RX ADMIN — CEFAZOLIN SCH MLS/HR: 330 INJECTION, POWDER, FOR SOLUTION INTRAMUSCULAR; INTRAVENOUS at 01:12

## 2020-03-15 RX ADMIN — INSULIN ASPART SCH UNIT: 100 INJECTION, SOLUTION INTRAVENOUS; SUBCUTANEOUS at 20:34

## 2020-03-15 RX ADMIN — INSULIN ASPART SCH UNIT: 100 INJECTION, SOLUTION INTRAVENOUS; SUBCUTANEOUS at 17:09

## 2020-03-15 RX ADMIN — ATORVASTATIN CALCIUM SCH MG: 20 TABLET, FILM COATED ORAL at 20:34

## 2020-03-15 RX ADMIN — CEFAZOLIN SCH MLS/HR: 330 INJECTION, POWDER, FOR SOLUTION INTRAMUSCULAR; INTRAVENOUS at 16:04

## 2020-03-15 RX ADMIN — INSULIN ASPART SCH UNIT: 100 INJECTION, SOLUTION INTRAVENOUS; SUBCUTANEOUS at 12:22

## 2020-03-15 NOTE — CT
EXAMINATION TYPE: CT brain wo con

 

DATE OF EXAM: 3/15/2020

 

COMPARISON: 1/30/2020

 

HISTORY: seizure

 

CT DLP: 1099.4 mGycm

Automated exposure control for dose reduction was used.

 

FINDINGS: 

FINDINGS: There is no acute intracranial hemorrhage or midline shift identified. There is diffuse valery
tricular and sulcal prominence consistent with diffuse cerebral atrophy. Large area of encephalomalac
ia involving the entire right frontal hemisphere MCA is redemonstrated. There is some extension into 
the right temporal lobe and to lesser degree the right parietal lobe similar to prior. Vascular calci
fication distal internal carotid arteries bilaterally redemonstrated. Globes are intact and visualize
d paranasal sinuses are clear. 

 

 

IMPRESSION: 

STABLE LARGE AREA OF ENCEPHALOMALACIA INVOLVING THE RIGHT TEMPORAL, PARIETAL AND FRONTAL LOBES UNCHAN
GED FROM THE PRIOR EXAM. DYSTROPHIC AREAS OF CALCIFICATION ARE ALSO STABLE.

2. DEGENERATIVE CHANGE OF THE GREATER CENTRAL COMPONENT CORRELATE FOR NORMAL PRESSURE HYDROCEPHALUS O
R HYDROCEPHALUS. FINDINGS ARE SIMILAR TO THE PRIOR EXAM.

## 2020-03-15 NOTE — P.HPIM
History of Present Illness


H&P Date: 03/15/20





Morenita Ramirez is a 59-year-old female with extensive past medical history who 

presented to [facility] emergency room after having an episode 

of syncope at home.  Family described that the patient was attempting to 

transfer from her wheelchair to the toilet seat when she fell backward she was 

an responsive her eyes were open there was no tonic or clonic movements, patient

was brought in to [facility] emergency room where she was 

evaluated.


During her emergency room stay patient had a second episode of what seemed to be

a seizure activity, patient was an responsive her head was rotated to the left 

and her eyes were rolled back in her head and at this time she had tonic-clonic 

movements.  She was started on IV Keppra and was admitted to medical floor.


Patient has unclear history of seizure disorder, she has been maintained on 

valproic acid.  She was evaluated twice by neurology in  by Dr. Slois, and

in  by Dr. Washington and there was no mention of seizure diagnosis during 

those 2 consults.


In  patient had a major stroke affecting the right MCA and JESSICA, she has 

total occlusion of the right internal carotid artery.





Patient has multiple medical problems including insulin-dependent diabetes 

mellitus that is suboptimally controlled, she has sequela of her stroke in  

was left upper extremity and left lower extremity weakness and muscle 

contracture, she has peripheral vascular disease with open wounds on the right 

lower extremity with history of multiple toe amputation in the past, patient 

also has known history of hypertension, hyperlipidemia, asthma, depression, 

anemia, she has suboptimal living conditions at home.





Patient was seen and evaluated on the medical floor on 03/15/2020 she is alert 

somnolent in no apparent distress, she is answering a few questions 

appropriately prior to closing her eyes again, she is complaining of chest pain 

and abdominal pain, she states that she is in the hospital because she had a 

seizure, she denies any complaints otherwise





Past Medical History


Past Medical History: Asthma, Coronary Artery Disease (CAD), Chest Pain / 

Angina, Heart Failure, COPD, CVA/TIA, Diabetes Mellitus, Deep Vein Thrombosis 

(DVT), Eye Disorder, GERD/Reflux, Hyperlipidemia, Hypertension, Myocardial 

Infarction (MI), Neurologic Disorder, Osteoarthritis (OA), Pneumonia, Renal 

Disease


Additional Past Medical History / Comment(s): IDDM (brittle), DKAs, neuropathy 

bilateral hands/feet, retinopathy bilateral eyes, cellulitis R foot, R great toe

and 2nd toe infections/amputations, current wound R foot-being seen in [facility], 

renal failure, anemia, CVAs with L sided paralysis, headaches started after 

CVAs, brain lesions, DVT R axillae, low back pain, varicosities, seizure many 

years ago (), hypothyroid, constipation, bilateral tinnitis occasionally, 

sinus problems.


Last Myocardial Infarction Date:: 


History of Any Multi-Drug Resistant Organisms: MRSA


Date of last positivie culture/infection: 18


MDRO Source:: Right Foot


Past Surgical History: Appendectomy,  Section, Cholecystectomy, Heart 

Catheterization With Stent, Hysterectomy, Orthopedic Surgery


Additional Past Surgical History / Comment(s): PCI with multiple stents, R great

toe and 2nd toe amps, debridements R foot ulcer, L shoulder surgery to remove 

bone, bronchoscopy, EGD, colonoscopy, R arm port since removed, bilateral 

cataract removals/lens implants.


Past Anesthesia/Blood Transfusion Reactions: No Reported Reaction


Additional Past Anesthesia/Blood Transfusion Reaction / Comment(s): HX OF BLOOD 

TRANSFUSION- NO REACTION


Date of Last Stent Placement:: 2013


Past Psychological History: Anxiety, Bipolar, Depression


Additional Psychological History / Comment(s): Pt has a legal guardian, Noelle Menon, who is pt's sister.  Currently her legal guardian is hospitalized.  Pt 

has a caregiver, Eleanor, who resides with her.  Pt is wheelchair bound d/t CVA 

with L sided paralysis arm and leg.  She has a shower chair and a glucometer.  

Her sister or caregiver drive her to appts.  [email or web address].


Smoking Status: Former smoker


Past Alcohol Use History: None Reported


Additional Past Alcohol Use History / Comment(s): Pt started smoking in  and

quit in 2018.   Using marijuana edibles occasionally but none for a "long time"


Past Drug Use History: Marijuana


Additional Drug Use History / Comment(s): using marijuana edibles





- Past Family History


  ** Father


Family Medical History: Unable to Obtain, Coronary Artery Disease (CAD), 

Diabetes Mellitus





  ** Mother


Family Medical History: COPD





Medications and Allergies


                                Home Medications











 Medication  Instructions  Recorded  Confirmed  Type


 


Albuterol Inhaler [Ventolin Hfa 2 puff INHALATION RT-Q4H PRN 16 

History





Inhaler]    


 


Famotidine [Pepcid] 20 mg PO DAILY 16 History


 


Valproic Acid [Depakene] 250 mg PO DAILY 16 History


 


Ergocalciferol [Vitamin D2 50,000 unit PO SA 10/06/16 01/23/20 History





(DRISDOL)]    


 


HYDROcodone/APAP 10-325MG [Norco 1 tab PO Q6H PRN 17 History





]    


 


DULoxetine HCL [Cymbalta] 60 mg PO DAILY 17 History


 


Ondansetron [Zofran] 4 mg PO DAILY PRN 18 History


 


Atorvastatin [Lipitor] 20 mg PO DAILY 19 History


 


QUEtiapine [SEROquel] 100 mg PO HS 19 History


 


Vitamin B Complex With Vit C 1 tab PO DAILY 20 History


 


Aspirin [Adult Low Dose Aspirin EC] 81 mg PO DAILY 01/10/20 01/23/20 History


 


Ferrous Sulfate [Iron (65  mg PO DAILY 01/10/20 01/23/20 History





Elemental)]    


 


DAPTOmycin [Daptomycin] 350 mg IV DAILY #40 vial 20 Rx


 


INSULIN ASPART (NovoLOG) [NovoLOG See Protocol SQ ACHS 20 History





(formulary)]    


 


ALPRAZolam [Xanax] 0.5 mg PO Q8HR PRN  tab 20  Rx


 


DAPTOmycin [Cubicin] 350 mg IVPB Q24HR  vial 20  Rx


 


Insulin Detemir (Levemir) [Levemir] 15 unit SQ HS  syr 20  Rx


 


QUEtiapine [SEROquel] 25 mg PO DAILY@1200  tab 20  Rx








                                    Allergies











Allergy/AdvReac Type Severity Reaction Status Date / Time


 


Barbiturates Allergy  Rash/Hives Verified 20 21:44


 


cephalexin monohydrate Allergy  Rash/Hives Verified 20 21:44





[From Keflex]     


 


morphine Allergy  Rash/Hives Verified 20 21:44


 


Penicillins Allergy  Rash/Hives Verified 20 21:44


 


phenobarbital Allergy  Swelling Verified 20 21:44


 


venom-honey bee Allergy  Swelling Verified 20 21:44





[bee venom (honey bee)]     


 


amlodipine besylate AdvReac  Vomiting Verified 20 21:44





[From Norvasc]     














Physical Exam


Vitals: 


                                   Vital Signs











  Temp Pulse Pulse Resp BP BP Pulse Ox


 


 03/15/20 07:30     18   


 


 03/15/20 07:00  97.7 F   91  18   167/83  98


 


 03/15/20 04:37    94  18   


 


 03/15/20 03:20  98.5 F   94  18   128/75  99


 


 03/15/20 02:29  97.5 F L   83  18   110/70  98


 


 03/15/20 01:00  98.2 F  87   13  115/72   100


 


 03/15/20 00:30   94   12  142/67   99


 


 03/15/20 00:00   96   18  110/68   100


 


 20 23:30   91   15  131/76   99


 


 20 23:00   96   16  139/85   100


 


 20 22:30   98   20  140/89   97


 


 20 22:07   100   18  128/85   99


 


 20 21:40  98.1 F  109 H   18  90/59   94 L








                                Intake and Output











 03/14/20 03/15/20 03/15/20





 22:59 06:59 14:59


 


Intake Total  450 


 


Balance  450 


 


Intake:   


 


  Intake, IV Titration  450 





  Amount   


 


    Sodium Chloride 0.9% 1,  450 





    000 ml @ 75 mls/hr IV .   





    [id number] [facility] Rx#:[id number]   


 


Other:   


 


  Voiding Method  Bedside Commode Bedside Commode





  Bedpan Bedpan


 


  Weight 55.338 kg 55.338 kg 














In general patient is alert somnolent in no apparent distress


HEENT head normocephalic and atraumatic


Neck is supple no JVD no goiter no lymphadenopathy


Chest exam reveals a few scattered crackles bilaterally no wheezing


Cardiac exam reveals regular heart sounds no gallops no murmurs


Abdomen is soft nontender no organomegaly with normal bowel sounds


Extremity exam reveals no edema no cyanosis or clubbing, right foot is bandaged,

patient had recent toe amputation


Neurological examination reveals left sided weakness and muscle contracture 

involving the upper and lower extremities which is chronic no other acute 

neurological deficit at this





Results


CBC & Chem 7: 


                                 20 22:18





                                 20 22:17


Labs: 


                  Abnormal Lab Results - Last 24 Hours (Table)











  20 Range/Units





  22:17 22:18 22:18 


 


MCHC   30.3 L   (31.0-37.0)  g/dL


 


APTT    19.3 L  (22.0-30.0)  sec


 


BUN  68 H    (7-17)  mg/dL


 


Creatinine  1.39 H    (0.52-1.04)  mg/dL


 


Glucose  305 H    (74-99)  mg/dL


 


POC Glucose (mg/dL)     (75-99)  mg/dL


 


Plasma Lactic Acid Carmelo     (0.7-2.0)  mmol/L


 


Amylase  <30 L    ()  U/L


 


Lipase  19 L    ()  U/L


 


Urine Protein     (Negative)  


 


Urine Glucose (UA)     (Negative)  


 


Urine Bacteria     (None)  /hpf


 


Hyaline Casts     (0-2)  /lpf


 


Urine Mucus     (None)  /hpf














  03/14/20 03/14/20 03/15/20 Range/Units





  22:18 22:50 02:49 


 


MCHC     (31.0-37.0)  g/dL


 


APTT     (22.0-30.0)  sec


 


BUN     (7-17)  mg/dL


 


Creatinine     (0.52-1.04)  mg/dL


 


Glucose     (74-99)  mg/dL


 


POC Glucose (mg/dL)    72 L  (75-99)  mg/dL


 


Plasma Lactic Acid Carmelo  3.4 H*    (0.7-2.0)  mmol/L


 


Amylase     ()  U/L


 


Lipase     ()  U/L


 


Urine Protein   1+ H   (Negative)  


 


Urine Glucose (UA)   3+ H   (Negative)  


 


Urine Bacteria   Rare H   (None)  /hpf


 


Hyaline Casts   288 H   (0-2)  /lpf


 


Urine Mucus   Rare H   (None)  /hpf














  03/15/20 03/15/20 Range/Units





  02:59 07:38 


 


MCHC    (31.0-37.0)  g/dL


 


APTT    (22.0-30.0)  sec


 


BUN    (7-17)  mg/dL


 


Creatinine    (0.52-1.04)  mg/dL


 


Glucose    (74-99)  mg/dL


 


POC Glucose (mg/dL)  68 L  258 H  (75-99)  mg/dL


 


Plasma Lactic Acid Carmelo    (0.7-2.0)  mmol/L


 


Amylase    ()  U/L


 


Lipase    ()  U/L


 


Urine Protein    (Negative)  


 


Urine Glucose (UA)    (Negative)  


 


Urine Bacteria    (None)  /hpf


 


Hyaline Casts    (0-2)  /lpf


 


Urine Mucus    (None)  /hpf














Thrombosis Risk Factor Assmnt





- Choose All That Apply


Any of the Below Risk Factors Present?: Yes


Each Factor Represents 1 point: Age 41-60 years


Each Risk Factor Represents 3 Points: History of DVT/PE


Thrombosis Risk Factor Assessment Total Risk Factor Score: 4


Thrombosis Risk Factor Assessment Level: Moderate Risk





Assessment and Plan


Plan: 





#1 likely seizure activity 2, at this time patient was started on Keppra in the

emergency room, will obtain computed tomography scan of the brain, will obtain 

EEG, neurology consultation was requested.





#2 insulin-dependent diabetes mellitus will resume home insulin and monitor 

closely





#3 underlying history of peripheral vascular disease, with foot ulcer and 

multiple toe amputation in the past.





#4 complaint of chest pain, will obtain EKG and serial cardiac enzymes





#5 complaint of abdominal pain Will obtain abdominal ultrasound





#6 dehydration was acute kidney injury was elevated BUN at 68 and elevated crea

tinine at 1.39 will use gentle hydration and monitor.





#7 medication and labs were reviewed please see orders will follow closely 

prognosis is guarded

## 2020-03-16 LAB
ALBUMIN SERPL-MCNC: 2.7 G/DL (ref 3.5–5)
ALP SERPL-CCNC: 72 U/L (ref 38–126)
ALT SERPL-CCNC: 14 U/L (ref 4–34)
ANION GAP SERPL CALC-SCNC: 6 MMOL/L
AST SERPL-CCNC: 18 U/L (ref 14–36)
BASOPHILS # BLD AUTO: 0 K/UL (ref 0–0.2)
BASOPHILS NFR BLD AUTO: 0 %
BUN SERPL-SCNC: 28 MG/DL (ref 7–17)
CALCIUM SPEC-MCNC: 8.2 MG/DL (ref 8.4–10.2)
CHLORIDE SERPL-SCNC: 110 MMOL/L (ref 98–107)
CO2 SERPL-SCNC: 24 MMOL/L (ref 22–30)
EOSINOPHIL # BLD AUTO: 0.1 K/UL (ref 0–0.7)
EOSINOPHIL NFR BLD AUTO: 2 %
ERYTHROCYTE [DISTWIDTH] IN BLOOD BY AUTOMATED COUNT: 3.41 M/UL (ref 3.8–5.4)
ERYTHROCYTE [DISTWIDTH] IN BLOOD: 14.6 % (ref 11.5–15.5)
GLUCOSE BLD-MCNC: 101 MG/DL (ref 75–99)
GLUCOSE BLD-MCNC: 138 MG/DL (ref 75–99)
GLUCOSE BLD-MCNC: 149 MG/DL (ref 75–99)
GLUCOSE BLD-MCNC: 262 MG/DL (ref 75–99)
GLUCOSE BLD-MCNC: 264 MG/DL (ref 75–99)
GLUCOSE BLD-MCNC: 43 MG/DL (ref 75–99)
GLUCOSE BLD-MCNC: 56 MG/DL (ref 75–99)
GLUCOSE BLD-MCNC: 71 MG/DL (ref 75–99)
GLUCOSE SERPL-MCNC: 222 MG/DL (ref 74–99)
HCT VFR BLD AUTO: 32.2 % (ref 34–46)
HGB BLD-MCNC: 9.8 GM/DL (ref 11.4–16)
LYMPHOCYTES # SPEC AUTO: 2.7 K/UL (ref 1–4.8)
LYMPHOCYTES NFR SPEC AUTO: 37 %
MCH RBC QN AUTO: 28.6 PG (ref 25–35)
MCHC RBC AUTO-ENTMCNC: 30.4 G/DL (ref 31–37)
MCV RBC AUTO: 94.2 FL (ref 80–100)
MONOCYTES # BLD AUTO: 0.4 K/UL (ref 0–1)
MONOCYTES NFR BLD AUTO: 5 %
NEUTROPHILS # BLD AUTO: 3.8 K/UL (ref 1.3–7.7)
NEUTROPHILS NFR BLD AUTO: 53 %
PLATELET # BLD AUTO: 242 K/UL (ref 150–450)
POTASSIUM SERPL-SCNC: 4.4 MMOL/L (ref 3.5–5.1)
PROT SERPL-MCNC: 6 G/DL (ref 6.3–8.2)
SODIUM SERPL-SCNC: 140 MMOL/L (ref 137–145)
WBC # BLD AUTO: 7.2 K/UL (ref 3.8–10.6)

## 2020-03-16 RX ADMIN — ASPIRIN 81 MG CHEWABLE TABLET SCH MG: 81 TABLET CHEWABLE at 07:01

## 2020-03-16 RX ADMIN — DIVALPROEX SODIUM SCH MG: 250 TABLET, DELAYED RELEASE ORAL at 15:00

## 2020-03-16 RX ADMIN — CEFAZOLIN SCH MLS/HR: 330 INJECTION, POWDER, FOR SOLUTION INTRAMUSCULAR; INTRAVENOUS at 14:58

## 2020-03-16 RX ADMIN — DULOXETINE SCH MG: 60 CAPSULE, DELAYED RELEASE ORAL at 07:00

## 2020-03-16 RX ADMIN — LEVETIRACETAM SCH MLS/HR: 10 INJECTION INTRAVENOUS at 10:25

## 2020-03-16 RX ADMIN — DIVALPROEX SODIUM SCH MG: 250 TABLET, DELAYED RELEASE ORAL at 21:38

## 2020-03-16 RX ADMIN — INSULIN DETEMIR SCH UNIT: 100 INJECTION, SOLUTION SUBCUTANEOUS at 21:39

## 2020-03-16 RX ADMIN — FAMOTIDINE SCH MG: 20 TABLET, FILM COATED ORAL at 07:00

## 2020-03-16 RX ADMIN — ATORVASTATIN CALCIUM SCH MG: 20 TABLET, FILM COATED ORAL at 21:38

## 2020-03-16 RX ADMIN — INSULIN ASPART SCH: 100 INJECTION, SOLUTION INTRAVENOUS; SUBCUTANEOUS at 21:30

## 2020-03-16 RX ADMIN — INSULIN ASPART SCH UNIT: 100 INJECTION, SOLUTION INTRAVENOUS; SUBCUTANEOUS at 16:53

## 2020-03-16 RX ADMIN — Medication SCH MG: at 07:00

## 2020-03-16 RX ADMIN — CEFAZOLIN SCH MLS/HR: 330 INJECTION, POWDER, FOR SOLUTION INTRAMUSCULAR; INTRAVENOUS at 21:40

## 2020-03-16 RX ADMIN — LEVETIRACETAM SCH MLS/HR: 10 INJECTION INTRAVENOUS at 21:39

## 2020-03-16 RX ADMIN — INSULIN ASPART SCH UNIT: 100 INJECTION, SOLUTION INTRAVENOUS; SUBCUTANEOUS at 06:59

## 2020-03-16 RX ADMIN — CEFAZOLIN SCH MLS/HR: 330 INJECTION, POWDER, FOR SOLUTION INTRAMUSCULAR; INTRAVENOUS at 03:20

## 2020-03-16 RX ADMIN — INSULIN ASPART SCH: 100 INJECTION, SOLUTION INTRAVENOUS; SUBCUTANEOUS at 12:00

## 2020-03-16 NOTE — P.PN
Subjective


Progress Note Date: 03/16/20








Morenita Ramirez is a 59-year-old female with extensive past medical history who 

presented to [facility] emergency room after having an episode 

of syncope at home.  Family described that the patient was attempting to 

transfer from her wheelchair to the toilet seat when she fell backward she was 

an responsive her eyes were open there was no tonic or clonic movements, patient

was brought in to [facility] emergency room where she was 

evaluated.


During her emergency room stay patient had a second episode of what seemed to be

a seizure activity, patient was an responsive her head was rotated to the left 

and her eyes were rolled back in her head and at this time she had tonic-clonic 

movements.  She was started on IV Keppra and was admitted to medical floor.


Patient has unclear history of seizure disorder, she has been maintained on 

valproic acid.  She was evaluated twice by neurology in 2017 by Dr. Solis, and

in 2015 by Dr. Washington and there was no mention of seizure diagnosis during 

those 2 consults.


In 2015 patient had a major stroke affecting the right MCA and JESSICA, she has 

total occlusion of the right internal carotid artery.





On 03/16/2020 patient was seen and examined on the medical floor, she is alert 

slightly somnolent in no apparent distress, no new seizure activity reported by 

nursing staff.  Patient is feeling tired otherwise she denies any complaints, 

she had an episode of hypoglycemia at night, otherwise there is no fever or 

chills no headache or dizziness no chest pain no shortness of breath no cough no

nausea or vomiting no abdominal pain no diarrhea and no urinary symptoms





Objective





- Vital Signs


Vital signs: 


                                   Vital Signs











Temp  97.4 F L  03/16/20 07:00


 


Pulse  86   03/16/20 07:00


 


Resp  18   03/16/20 07:00


 


BP  149/79   03/16/20 07:00


 


Pulse Ox  99   03/16/20 07:00








                                 Intake & Output











 03/15/20 03/16/20 03/16/20





 18:59 06:59 18:59


 


Intake Total  925 


 


Balance  925 


 


Intake:   


 


  Intake, IV Titration  925 





  Amount   


 


    Sodium Chloride 0.9% 1,  825 





    000 ml @ 75 mls/hr IV .   





    [id number] [facility] Rx#:[id number]   


 


    levETIRAcetam IV 1,000 mg  100 





    In Saline 1 100ml.bag @   





    400 mls/hr IVPB Q12HR [facility]   





    Rx#:[id number]   


 


Other:   


 


  Voiding Method Bedside Commode Bedside Commode Bedside Commode





 Bedpan Bedpan Bedpan





  Diaper Diaper





  Incontinent Incontinent


 


  # Voids  2 2


 


  # Bowel Movements  1 1














- Exam








In general patient is alert somnolent in no apparent distress


HEENT head normocephalic and atraumatic


Neck is supple no JVD no goiter no lymphadenopathy


Chest exam reveals a few scattered crackles bilaterally no wheezing


Cardiac exam reveals regular heart sounds no gallops no murmurs


Abdomen is soft nontender no organomegaly with normal bowel sounds


Extremity exam reveals no edema no cyanosis or clubbing, right foot is bandaged,

patient had recent toe amputation


Neurological examination reveals left sided weakness and muscle contracture 

involving the upper and lower extremities which is chronic no other acute 

neurological deficit at this





- Labs


CBC & Chem 7: 


                                 03/16/20 07:24





                                 03/16/20 07:24


Labs: 


                  Abnormal Lab Results - Last 24 Hours (Table)











  03/15/20 03/15/20 03/16/20 Range/Units





  17:01 20:20 01:06 


 


RBC     (3.80-5.40)  m/uL


 


Hgb     (11.4-16.0)  gm/dL


 


Hct     (34.0-46.0)  %


 


MCHC     (31.0-37.0)  g/dL


 


Chloride     ()  mmol/L


 


BUN     (7-17)  mg/dL


 


Glucose     (74-99)  mg/dL


 


POC Glucose (mg/dL)  330 H  236 H  43 L  (75-99)  mg/dL


 


Calcium     (8.4-10.2)  mg/dL


 


Total Bilirubin     (0.2-1.3)  mg/dL


 


Total Protein     (6.3-8.2)  g/dL


 


Albumin     (3.5-5.0)  g/dL














  03/16/20 03/16/20 03/16/20 Range/Units





  01:22 01:42 05:35 


 


RBC     (3.80-5.40)  m/uL


 


Hgb     (11.4-16.0)  gm/dL


 


Hct     (34.0-46.0)  %


 


MCHC     (31.0-37.0)  g/dL


 


Chloride     ()  mmol/L


 


BUN     (7-17)  mg/dL


 


Glucose     (74-99)  mg/dL


 


POC Glucose (mg/dL)  56 L  101 H  262 H  (75-99)  mg/dL


 


Calcium     (8.4-10.2)  mg/dL


 


Total Bilirubin     (0.2-1.3)  mg/dL


 


Total Protein     (6.3-8.2)  g/dL


 


Albumin     (3.5-5.0)  g/dL














  03/16/20 03/16/20 03/16/20 Range/Units





  06:44 07:24 07:24 


 


RBC   3.41 L   (3.80-5.40)  m/uL


 


Hgb   9.8 L D   (11.4-16.0)  gm/dL


 


Hct   32.2 L   (34.0-46.0)  %


 


MCHC   30.4 L   (31.0-37.0)  g/dL


 


Chloride    110 H  ()  mmol/L


 


BUN    28 H  (7-17)  mg/dL


 


Glucose    222 H  (74-99)  mg/dL


 


POC Glucose (mg/dL)  264 H    (75-99)  mg/dL


 


Calcium    8.2 L  (8.4-10.2)  mg/dL


 


Total Bilirubin    <0.1 L  (0.2-1.3)  mg/dL


 


Total Protein    6.0 L  (6.3-8.2)  g/dL


 


Albumin    2.7 L  (3.5-5.0)  g/dL














  03/16/20 Range/Units





  11:56 


 


RBC   (3.80-5.40)  m/uL


 


Hgb   (11.4-16.0)  gm/dL


 


Hct   (34.0-46.0)  %


 


MCHC   (31.0-37.0)  g/dL


 


Chloride   ()  mmol/L


 


BUN   (7-17)  mg/dL


 


Glucose   (74-99)  mg/dL


 


POC Glucose (mg/dL)  71 L  (75-99)  mg/dL


 


Calcium   (8.4-10.2)  mg/dL


 


Total Bilirubin   (0.2-1.3)  mg/dL


 


Total Protein   (6.3-8.2)  g/dL


 


Albumin   (3.5-5.0)  g/dL








                      Microbiology - Last 24 Hours (Table)











 03/14/20 22:17 Blood Culture - Preliminary





 Blood    No Growth after 24 hours














Assessment and Plan


Plan: 





#1 likely seizure activity 2, at this time patient was started on Keppra in the

emergency room, will obtain computed tomography scan of the brain, will obtain 

EEG, neurology consultation was requested.





#2 insulin-dependent diabetes mellitus will resume home insulin and monitor 

closely





#3 underlying history of peripheral vascular disease, with foot ulcer and 

multiple toe amputation in the past.





#4 complaint of chest pain, will obtain EKG and serial cardiac enzymes





#5 complaint of abdominal pain Will obtain abdominal ultrasound





#6 dehydration was acute kidney injury was elevated BUN at 68 and elevated 

creatinine at 1.39 will use gentle hydration and monitor.





#7 medication and labs were reviewed please see orders will follow closely 

prognosis is guarded

## 2020-03-16 NOTE — P.CNNES
History of Present Illness


Consult date: 20


Requesting physician: Cris Whalen


Reason for Consult: Seizures


History of Present Illness: 





Patient is a 59-year-old female with history of previous CVA with chronic left 

hemiparesis, came to the hospital for possible seizures.  Patient does not 

remember details about her history.  No family members were present.  According 

to ED records, it was mentioned that family was present, when she was attempting

to use the bathroom, when she fell backwards.  They deny any body shaking.  Her 

eyes were open but she was not responding.  She was breathing.  Patient does 

have a remote history of seizure.  Patient has not been eating or drinking 

lately due to lack of appetite.  Patient does have history of eating disorder, 

and tries to induce vomiting.  There was no cough, congestion.  No rash.  

Patient arrived to the hospital at 9:38 PM on 2020.  Patient apparently 

had a seizure-like activity while in the ED, which her head was rolled to the 

left, eyes rolled back in her head and she had tonic-clonic movement.  She was 

given Ativan, which did improve symptoms with no further seizure activity noted.

 Patient has been on Depakote 250 mg once daily.  Her Depakote level was 21.4 on

arrival to the ED.  Patient at present offers no complaints.





Patient had computed tomography scan of the head, which again revealed stable 

large area of encephalomalacia involving the right temporal parietal and frontal

lobes unchanged from the prior examination with dystrophic areas of 

calcification are also stable.  Degenerative changes of the brain.  Radiologist 

reported possibility of NPH, although on my review, there is no evidence of NPH.

 EKG showed sinus rhythm with occasional PVCs, possible left atrial enlargement,

prolonged QT.  Chest x-ray showed normal chest.  Patient's blood test shows WBC 

7.2 hemoglobin 9.8, platelets 242, Chem-7 is normal.  Liver panel is normal.





Previous records revealed normal factor V Leiden, ESR 18, hemoglobin A1c 8.5 on 

2020, normal ammonia <9, on 2020.  Her total cholesterol 194, LDL 

96, HDL 65 and triglycerides 161, B12 367, folate normal, TFTs normal, vitamin D

98.  JENNIFER negative, ANCA C&P negative, anticardiolipin antibody borderline 

12.9/12.5.  DsDNA negative.  Patient's CTA of the neck from 2017 showed 

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  Severe tortuosity of the proximal

left ICA in combination with patient motion, the exact degree of narrowing at 

the proximal left ICA is difficult to assess but is over a 50%.  The remainder 

of the left ICA is widely patent.  Diminutive intracranial anterior circulation 

on the right likely being supplied from the retrograde flow from the left side 

and posterior circulation.  Old right-sided intracranial infarct.  Her last 2-D 

echo from 2019 showed sinus rhythm, EF 60-65%.  Normal left atrial size.  

EKG normal sinus rhythm





Patient states that she lives by herself.  She is a caregiver all the time at 

home.  She had 2 boys, who live in [facility] and GERD who live in [place].  Patient 

has history of an acute left hemiparesis on 01/15/2015.  Patient states that she

has smoked 2 packs per day, quit before her CVA.





Review of Systems





Denies headache, problem with the vision, hoarseness without dysphagia.  Patient

does have chronic left hemiparesis.  She also has amputated some toe of the 

right foot.  Denies any nausea vomiting diarrhea.  Patient did have some chest 

pain prior to arrival.





Past Medical History


Past Medical History: Asthma, Coronary Artery Disease (CAD), Chest Pain / 

Angina, Heart Failure, COPD, CVA/TIA, Diabetes Mellitus, Deep Vein Thrombosis 

(DVT), Eye Disorder, GERD/Reflux, Hyperlipidemia, Hypertension, Myocardial 

Infarction (MI), Neurologic Disorder, Osteoarthritis (OA), Pneumonia, Renal 

Disease


Additional Past Medical History / Comment(s): IDDM (brittle), DKAs, neuropathy 

bilateral hands/feet, retinopathy bilateral eyes, cellulitis R foot, R great toe

and 2nd toe infections/amputations, current wound R foot-being seen in [facility], 

renal failure, anemia, CVAs with L sided paralysis, headaches started after 

CVAs, brain lesions, DVT R axillae, low back pain, varicosities, seizure many 

years ago (), hypothyroid, constipation, bilateral tinnitis occasionally, 

sinus problems.


Last Myocardial Infarction Date:: 


History of Any Multi-Drug Resistant Organisms: MRSA


Date of last positivie culture/infection: 18


MDRO Source:: Right Foot


Past Surgical History: Appendectomy,  Section, Cholecystectomy, Heart 

Catheterization With Stent, Hysterectomy, Orthopedic Surgery


Additional Past Surgical History / Comment(s): PCI with multiple stents, R great

toe and 2nd toe amps, debridements R foot ulcer, L shoulder surgery to remove 

bone, bronchoscopy, EGD, colonoscopy, R arm port since removed, bilateral cat

aract removals/lens implants.


Past Anesthesia/Blood Transfusion Reactions: No Reported Reaction


Additional Past Anesthesia/Blood Transfusion Reaction / Comment(s): HX OF BLOOD 

TRANSFUSION- NO REACTION


Date of Last Stent Placement:: 2013


Past Psychological History: Anxiety, Bipolar, Depression


Additional Psychological History / Comment(s): Pt has a legal guardian, Noelle Menon, who is pt's sister.  Currently her legal guardian is hospitalized.  Pt 

has a caregiver, Eleanor, who resides with her.  Pt is wheelchair bound d/t CVA 

with L sided paralysis arm and leg.  She has a shower chair and a glucometer.  

Her sister or caregiver drive her to appts.  [email or web address].


Smoking Status: Former smoker


Past Alcohol Use History: None Reported


Additional Past Alcohol Use History / Comment(s): Pt started smoking in  and

quit in .   Using marijuana edibles occasionally but none for a "long time"


Past Drug Use History: Marijuana


Additional Drug Use History / Comment(s): using marijuana edibles





- Past Family History


  ** Father


Family Medical History: Unable to Obtain, Coronary Artery Disease (CAD), 

Diabetes Mellitus





  ** Mother


Family Medical History: COPD





Medications and Allergies


                                Home Medications











 Medication  Instructions  Recorded  Confirmed  Type


 


Albuterol Inhaler [Ventolin Hfa 2 puff INHALATION RT-Q4H PRN 02/19/16 03/15/20 

History





Inhaler]    


 


Famotidine [Pepcid] 20 mg PO DAILY@0900 02/19/16 03/15/20 History


 


Valproic Acid [Depakene] 250 mg PO DAILY@0900 02/19/16 03/15/20 History


 


Ergocalciferol [Vitamin D2 50,000 unit PO SA@0900 10/06/16 03/15/20 History





(DRISDOL)]    


 


HYDROcodone/APAP 10-325MG [Norco 1 tab PO Q6H PRN 05/06/17 03/15/20 History





]    


 


DULoxetine HCL [Cymbalta] 60 mg PO DAILY@0900 09/19/17 03/15/20 History


 


Ondansetron [Zofran] 4 mg PO DAILY PRN 04/09/18 03/15/20 History


 


Atorvastatin [Lipitor] 20 mg PO DAILY@2100 07/31/19 03/15/20 History


 


QUEtiapine [SEROquel] 100 mg PO HS@2100 07/31/19 03/15/20 History


 


Vitamin B Complex With Vit C 1 tab PO DAILY 01/02/20 03/15/20 History


 


Aspirin [Adult Low Dose Aspirin EC] 81 mg PO DAILY@0900 01/10/20 03/15/20 His

tory


 


Ferrous Sulfate [Iron (65  mg PO DAILY@0900 01/10/20 03/15/20 History





Elemental)]    


 


ALPRAZolam [Xanax] 0.5 mg PO Q8HR PRN  tab 01/31/20 03/15/20 Rx


 


Doxycycline Hyclate [Vibramycin] 100 mg PO BID 03/15/20 03/15/20 History


 


INSULIN LISPRO (humaLOG) [humaLOG] See Protocol SQ ACHS 03/15/20 03/15/20 

History


 


Insulin Glargine,Hum.rec.anlog 20 unit SQ HS 03/15/20 03/15/20 History





[Basaglar Kwikpen U-100]    


 


QUEtiapine [SEROquel] 25 mg PO DAILY@0900 03/15/20 03/15/20 History








                                    Allergies











Allergy/AdvReac Type Severity Reaction Status Date / Time


 


Barbiturates Allergy  Rash/Hives Verified 03/15/20 12:53


 


cephalexin monohydrate Allergy  Rash/Hives Verified 03/15/20 12:53





[From Keflex]     


 


morphine Allergy  Rash/Hives Verified 03/15/20 12:53


 


Penicillins Allergy  Rash/Hives Verified 03/15/20 12:53


 


phenobarbital Allergy  Swelling Verified 03/15/20 12:53


 


venom-honey bee Allergy  Swelling Verified 03/15/20 12:53





[bee venom (honey bee)]     


 


amlodipine besylate AdvReac  Vomiting Verified 03/15/20 12:53





[From Norvasc]     














Physical Examination





- Vital Signs


Vital Signs: 


                                   Vital Signs











  Temp Pulse Resp BP Pulse Ox


 


 20 07:00  97.4 F L  86  18  149/79  99


 


 20 01:05  97.4 F L  82  17  139/66  96


 


 03/15/20 18:40  98.4 F  94  18  151/80  98


 


 03/15/20 15:00  98.6 F  87  16  156/81 








                                Intake and Output











 03/15/20 03/16/20 03/16/20





 22:59 06:59 14:59


 


Intake Total 325 600 


 


Balance 325 600 


 


Intake:   


 


  Intake, IV Titration 325 600 





  Amount   


 


    Sodium Chloride 0.9% 1, 225 600 





    000 ml @ 75 mls/hr IV .   





    [id number] [facility] Rx#:[id number]   


 


    levETIRAcetam IV 1,000 mg 100  





    In Saline 1 100ml.bag @   





    400 mls/hr IVPB Q12HR [facility]   





    Rx#:[id number]   


 


Other:   


 


  Voiding Method Bedside Commode Bedside Commode Bedside Commode





 Bedpan Bedpan Bedpan





 Diaper Diaper Diaper





 Incontinent Incontinent Incontinent


 


  # Voids 1 2 1


 


  # Bowel Movements  1 














On examination patient is a middle aged  female, who appears older than

her stated age.  No obvious bruit S1 and S2 audible.  Patient has peripheral 

vascular disease.  Patient has amputated big toe and the second toe of the right

foot.  Patient is alert and awake.  Patient knows that she is in [facility] in [facility].  She thinks it is November and the year is .  

She knows her age and the name of the current president.  Speech and language 

functions are normal.  Attention and concentration fund of knowledge is somewhat

limited.  On cranial examination pupils are round and reactive to light, visual 

fields are full on confrontation with no neglect.  Extra ocular muscles are 

intact.  Patient has left facial weakness, central type.  Tongue protrudes the 

midline.  Palatal elevation normal on muscle strength testing the strength 

appears normal in the right arm and right leg.  On the left side her left arm is

completely spastic paretic.  Her left leg at hip flexion is about 3+ to 4 ankle 

is very weak.  Reflexes are brisk appears on the left with the Babinski in the 

left.  Sensory touch is equal with no neglect.  No ataxia for finger-to-nose on 

the right.  Cannot assess it on the left.





Results





- Laboratory Findings


CBC and BMP: 


                                 20 07:24





                                 20 07:24


Abnormal Lab Findings: 


                                  Abnormal Labs











  20





  22:17 22:18 22:18


 


RBC   


 


Hgb   


 


Hct   


 


MCHC   30.3 L 


 


APTT    19.3 L


 


Chloride   


 


BUN  68 H  


 


Creatinine  1.39 H  


 


Glucose  305 H  


 


POC Glucose (mg/dL)   


 


Plasma Lactic Acid Carmelo   


 


Calcium   


 


Total Bilirubin   


 


Total Protein   


 


Albumin   


 


Amylase  <30 L  


 


Lipase  19 L  


 


Urine Protein   


 


Urine Glucose (UA)   


 


Urine Bacteria   


 


Hyaline Casts   


 


Urine Mucus   














  03/14/20 03/14/20 03/15/20





  22:18 22:50 02:49


 


RBC   


 


Hgb   


 


Hct   


 


MCHC   


 


APTT   


 


Chloride   


 


BUN   


 


Creatinine   


 


Glucose   


 


POC Glucose (mg/dL)    72 L


 


Plasma Lactic Acid Carmelo  3.4 H*  


 


Calcium   


 


Total Bilirubin   


 


Total Protein   


 


Albumin   


 


Amylase   


 


Lipase   


 


Urine Protein   1+ H 


 


Urine Glucose (UA)   3+ H 


 


Urine Bacteria   Rare H 


 


Hyaline Casts   288 H 


 


Urine Mucus   Rare H 














  03/15/20 03/15/20 03/15/20





  02:59 07:38 12:05


 


RBC   


 


Hgb   


 


Hct   


 


MCHC   


 


APTT   


 


Chloride   


 


BUN   


 


Creatinine   


 


Glucose   


 


POC Glucose (mg/dL)  68 L  258 H  173 H


 


Plasma Lactic Acid Carmelo   


 


Calcium   


 


Total Bilirubin   


 


Total Protein   


 


Albumin   


 


Amylase   


 


Lipase   


 


Urine Protein   


 


Urine Glucose (UA)   


 


Urine Bacteria   


 


Hyaline Casts   


 


Urine Mucus   














  03/15/20 03/15/20 03/16/20





  17:01 20:20 01:06


 


RBC   


 


Hgb   


 


Hct   


 


MCHC   


 


APTT   


 


Chloride   


 


BUN   


 


Creatinine   


 


Glucose   


 


POC Glucose (mg/dL)  330 H  236 H  43 L


 


Plasma Lactic Acid Carmelo   


 


Calcium   


 


Total Bilirubin   


 


Total Protein   


 


Albumin   


 


Amylase   


 


Lipase   


 


Urine Protein   


 


Urine Glucose (UA)   


 


Urine Bacteria   


 


Hyaline Casts   


 


Urine Mucus   














  20





  01:22 01:42 05:35


 


RBC   


 


Hgb   


 


Hct   


 


MCHC   


 


APTT   


 


Chloride   


 


BUN   


 


Creatinine   


 


Glucose   


 


POC Glucose (mg/dL)  56 L  101 H  262 H


 


Plasma Lactic Acid Carmelo   


 


Calcium   


 


Total Bilirubin   


 


Total Protein   


 


Albumin   


 


Amylase   


 


Lipase   


 


Urine Protein   


 


Urine Glucose (UA)   


 


Urine Bacteria   


 


Hyaline Casts   


 


Urine Mucus   














  20





  06:44 07:24 07:24


 


RBC   3.41 L 


 


Hgb   9.8 L D 


 


Hct   32.2 L 


 


MCHC   30.4 L 


 


APTT   


 


Chloride    110 H


 


BUN    28 H


 


Creatinine   


 


Glucose    222 H


 


POC Glucose (mg/dL)  264 H  


 


Plasma Lactic Acid Carmelo   


 


Calcium    8.2 L


 


Total Bilirubin    <0.1 L


 


Total Protein    6.0 L


 


Albumin    2.7 L


 


Amylase   


 


Lipase   


 


Urine Protein   


 


Urine Glucose (UA)   


 


Urine Bacteria   


 


Hyaline Casts   


 


Urine Mucus   














  20





  11:56


 


RBC 


 


Hgb 


 


Hct 


 


MCHC 


 


APTT 


 


Chloride 


 


BUN 


 


Creatinine 


 


Glucose 


 


POC Glucose (mg/dL)  71 L


 


Plasma Lactic Acid Carmelo 


 


Calcium 


 


Total Bilirubin 


 


Total Protein 


 


Albumin 


 


Amylase 


 


Lipase 


 


Urine Protein 


 


Urine Glucose (UA) 


 


Urine Bacteria 


 


Hyaline Casts 


 


Urine Mucus 














Assessment and Plan


Assessment: 





* 59-year-old female admitted with seizure at home, but had a witnessed focal 

  onset seizure in the ER.  Patient has history of old right hemispheric CVA, 

  with chronic left hemiparesis.  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 

  epilepsy.  Patient EEG showed epileptic focus in the involving the right 

  temporal region.


* History of CVA involving right MCA/JESSICA vascular territory, with left pennie

  paresis, arm> leg.


* Diabetes, poorly controlled


* Hypertension


* Dyslipidemia


* X tobacco user.


Plan: 





* Patient has developed post stroke epilepsy.  Patient is currently on Depakote,

  but the levels are very subtherapeutic 21.4.


* We will increase dose of Depakote to 250 mg 3 times a day.


* I would suggest checking Depakote level in 2 weeks.


* Suggest follow-up with a neurologist locally in town in 2-4 weeks, to manage 

  her post stroke epilepsy and to adjust the antiepileptic medication, if 

  needed.


* Continue aspirin 81 mg daily.  Patient at present is not on statins.  Suggest 

  checking fasting a.m. lipid panel.  If abnormal, then would recommend Lipitor.


* Optimize control of diabetes to target A1c <7.0.


* Neurologically clear otherwise.

## 2020-03-17 LAB
GLUCOSE BLD-MCNC: 123 MG/DL (ref 75–99)
GLUCOSE BLD-MCNC: 155 MG/DL (ref 75–99)
GLUCOSE BLD-MCNC: 389 MG/DL (ref 75–99)
GLUCOSE BLD-MCNC: 394 MG/DL (ref 75–99)
GLUCOSE BLD-MCNC: 57 MG/DL (ref 75–99)
GLUCOSE BLD-MCNC: 70 MG/DL (ref 75–99)

## 2020-03-17 RX ADMIN — ATORVASTATIN CALCIUM SCH MG: 20 TABLET, FILM COATED ORAL at 20:34

## 2020-03-17 RX ADMIN — DIVALPROEX SODIUM SCH MG: 250 TABLET, DELAYED RELEASE ORAL at 07:31

## 2020-03-17 RX ADMIN — ASPIRIN 81 MG CHEWABLE TABLET SCH MG: 81 TABLET CHEWABLE at 07:30

## 2020-03-17 RX ADMIN — DIVALPROEX SODIUM SCH MG: 250 TABLET, DELAYED RELEASE ORAL at 15:03

## 2020-03-17 RX ADMIN — DIVALPROEX SODIUM SCH MG: 250 TABLET, DELAYED RELEASE ORAL at 20:34

## 2020-03-17 RX ADMIN — LEVETIRACETAM SCH MLS/HR: 10 INJECTION INTRAVENOUS at 08:44

## 2020-03-17 RX ADMIN — FAMOTIDINE SCH MG: 20 TABLET, FILM COATED ORAL at 07:30

## 2020-03-17 RX ADMIN — Medication SCH MG: at 07:30

## 2020-03-17 RX ADMIN — INSULIN ASPART SCH: 100 INJECTION, SOLUTION INTRAVENOUS; SUBCUTANEOUS at 06:55

## 2020-03-17 RX ADMIN — INSULIN ASPART SCH UNIT: 100 INJECTION, SOLUTION INTRAVENOUS; SUBCUTANEOUS at 20:34

## 2020-03-17 RX ADMIN — CEFAZOLIN SCH MLS/HR: 330 INJECTION, POWDER, FOR SOLUTION INTRAMUSCULAR; INTRAVENOUS at 07:32

## 2020-03-17 RX ADMIN — INSULIN ASPART SCH UNIT: 100 INJECTION, SOLUTION INTRAVENOUS; SUBCUTANEOUS at 11:36

## 2020-03-17 RX ADMIN — INSULIN DETEMIR SCH UNIT: 100 INJECTION, SOLUTION SUBCUTANEOUS at 20:34

## 2020-03-17 RX ADMIN — INSULIN ASPART SCH: 100 INJECTION, SOLUTION INTRAVENOUS; SUBCUTANEOUS at 16:49

## 2020-03-17 RX ADMIN — DULOXETINE SCH MG: 60 CAPSULE, DELAYED RELEASE ORAL at 07:29

## 2020-03-17 NOTE — P.PN
Subjective


Progress Note Date: 03/17/20





Patient is laying comfortably in the bed.  Offers no complaints.  No further 

seizures reported.





Objective





- Vital Signs


Vital signs: 


                                   Vital Signs











Temp  98.1 F   03/17/20 15:00


 


Pulse  96   03/17/20 15:00


 


Resp  18   03/17/20 15:00


 


BP  102/56   03/17/20 15:00


 


Pulse Ox  100   03/17/20 15:00








                                 Intake & Output











 03/16/20 03/17/20 03/17/20





 18:59 06:59 18:59


 


Other:   


 


  Voiding Method Bedside Commode Diaper Diaper





 Bedpan Incontinent Incontinent





 Diaper  





 Incontinent  


 


  # Voids 2 2 1


 


  # Bowel Movements 1  














- Exam





No change.





- Labs


CBC & Chem 7: 


                                 03/16/20 07:24





                                 03/16/20 07:24


Labs: 


                  Abnormal Lab Results - Last 24 Hours (Table)











  03/16/20 03/16/20 03/17/20 Range/Units





  16:38 20:40 01:27 


 


POC Glucose (mg/dL)  149 H  138 H  155 H  (75-99)  mg/dL














  03/17/20 03/17/20 03/17/20 Range/Units





  06:43 06:46 07:03 


 


POC Glucose (mg/dL)  48 L  57 L  70 L  (75-99)  mg/dL














  03/17/20 Range/Units





  11:32 


 


POC Glucose (mg/dL)  394 H  (75-99)  mg/dL








                      Microbiology - Last 24 Hours (Table)











 03/14/20 22:17 Blood Culture - Preliminary





 Blood    No Growth after 48 hours














Assessment and Plan


Assessment: 





* 59-year-old female admitted with seizure at home, but had a witnessed focal 

  onset seizure in the ER.  Patient has history of old right hemispheric CVA, 

  with chronic left hemiparesis.  Patient probably has developed post stroke ep

  ilepsy.  Patient EEG showed epileptic focus in the involving the right 

  temporal region.


* History of CVA involving right MCA/JESSICA vascular territory, with left 

  hemiparesis, arm> leg.


* Diabetes, poorly controlled


* Hypertension


* Dyslipidemia


* X tobacco user.


Plan: 





* Patient has developed post stroke epilepsy.  Patient is currently on Depakote,

  but the levels are very subtherapeutic 21.4.


* Continue Depakote 250 mg 3 times a day.  Depakote was previously given for 

  behavioral disorder.  Hopefully Depakote will further help with behaviors.


* Would stop Keppra, as I would suggest maintaining on single antiepileptic 

  agent.  Keppra can also worsen behavioral issues in the patient.


* I would suggest checking Depakote level in 2 weeks.


* Suggest follow-up with a neurologist locally in town in 2-4 weeks, to manage 

  her post stroke epilepsy and to adjust the antiepileptic medication, if 

  needed.


* Continue aspirin 81 mg daily.  


* Patient's lipid panel shows cholesterol 194, LDL 96.8, HDL 65.0 and 

  triglycerides 161.  Consider starting Lipitor 10 mg.  


* Optimize control of diabetes to target A1c <7.0.


* Neurologically clear otherwise.

## 2020-03-17 NOTE — P.CONS
History of Present Illness





- Reason for Consult


Consult date: 20


Wound care





- History of Present Illness


This is a 59-year-old patient known to the wound care center being seen on 4 S. 

for nonhealing ulcerations to the right foot.  Patient has a nonhealing 

ulcerations to the right dorsal foot right great toe and right lateral foot.  

Patient has had previous surgical debridement including removal of bone to the 

right lateral foot.  Patient previously was on a total contact cast however she 

had developed some excoriation and redness to the dorsal foot and right great 

toe.  Last appointment patient was found to have bone exposed to the right great

toe.  Patient's past medical history significant for post CVA and involuntary 

movements, peripheral vascular disease, diabetes, seizures.








Review of Systems


Review Of Systems:


Constitutional: No fever, no chills, no night sweats.  No weight change.  No 

weakness, fatigue or lethargy.  No daytime sleepiness.


Integumentary:reports wounds, no lesions.  No rash or pruritus.  No unusual 

bruising.  No change in hair or nails.








Past Medical History


Past Medical History: Asthma, Coronary Artery Disease (CAD), Chest Pain / 

Angina, Heart Failure, COPD, CVA/TIA, Diabetes Mellitus, Deep Vein Thrombosis 

(DVT), Eye Disorder, GERD/Reflux, Hyperlipidemia, Hypertension, Myocardial 

Infarction (MI), Neurologic Disorder, Osteoarthritis (OA), Pneumonia, Renal Dis

ease


Additional Past Medical History / Comment(s): IDDM (brittle), DKAs, neuropathy 

bilateral hands/feet, retinopathy bilateral eyes, cellulitis R foot, R great toe

and 2nd toe infections/amputations, current wound R foot-being seen in [facility], 

renal failure, anemia, CVAs with L sided paralysis, headaches started after 

CVAs, brain lesions, DVT R axillae, low back pain, varicosities, seizure many 

years ago (), hypothyroid, constipation, bilateral tinnitis occasionally, si

nus problems.


Last Myocardial Infarction Date:: 


History of Any Multi-Drug Resistant Organisms: MRSA


Year Discovered:: 18


MDRO Source:: Right Foot


Past Surgical History: Appendectomy,  Section, Cholecystectomy, Heart 

Catheterization With Stent, Hysterectomy, Orthopedic Surgery


Additional Past Surgical History / Comment(s): PCI with multiple stents, R great

toe and 2nd toe amps, debridements R foot ulcer, L shoulder surgery to remove 

bone, bronchoscopy, EGD, colonoscopy, R arm port since removed, bilateral 

cataract removals/lens implants.


Past Anesthesia/Blood Transfusion Reactions: No Reported Reaction


Additional Past Anesthesia/Blood Transfusion Reaction / Comm: HX OF BLOOD 

TRANSFUSION- NO REACTION


Date of Last Stent Placement:: 2013


Past Psychological History: Anxiety, Bipolar, Depression


Additional Psychological History / Comment(s): Pt has a legal guardian, Noelle Menon, who is pt's sister.  Currently her legal guardian is hospitalized.  Pt 

has a caregiver, Eleanor, who resides with her.  Pt is wheelchair bound d/t CVA 

with L sided paralysis arm and leg.  She has a shower chair and a glucometer.  

Her sister or caregiver drive her to appts.  [email or web address].


Smoking Status: Former smoker


Past Alcohol Use History: None Reported


Additional Past Alcohol Use History / Comment(s): Pt started smoking in  and

quit in .   Using marijuana edibles occasionally but none for a "long time"


Past Drug Use History: Marijuana


Additional Drug Use History / Comment(s): using marijuana edibles





- Past Family History


  ** Father


Family Medical History: Unable to Obtain, Coronary Artery Disease (CAD), 

Diabetes Mellitus





  ** Mother


Family Medical History: COPD





Medications and Allergies


                                Home Medications











 Medication  Instructions  Recorded  Confirmed  Type


 


Albuterol Inhaler [Ventolin Hfa 2 puff INHALATION RT-Q4H PRN 02/19/16 03/15/20 

History





Inhaler]    


 


Famotidine [Pepcid] 20 mg PO DAILY@0900 02/19/16 03/15/20 History


 


Valproic Acid [Depakene] 250 mg PO DAILY@0900 02/19/16 03/15/20 History


 


Ergocalciferol [Vitamin D2 50,000 unit PO SA@0900 10/06/16 03/15/20 History





(DRISDOL)]    


 


HYDROcodone/APAP 10-325MG [Norco 1 tab PO Q6H PRN 05/06/17 03/15/20 History





]    


 


DULoxetine HCL [Cymbalta] 60 mg PO DAILY@0900 09/19/17 03/15/20 History


 


Ondansetron [Zofran] 4 mg PO DAILY PRN 04/09/18 03/15/20 History


 


Atorvastatin [Lipitor] 20 mg PO DAILY@2100 07/31/19 03/15/20 History


 


QUEtiapine [SEROquel] 100 mg PO HS@2100 07/31/19 03/15/20 History


 


Vitamin B Complex With Vit C 1 tab PO DAILY 01/02/20 03/15/20 History


 


Aspirin [Adult Low Dose Aspirin EC] 81 mg PO DAILY@0900 01/10/20 03/15/20 

History


 


Ferrous Sulfate [Iron (65  mg PO DAILY@0900 01/10/20 03/15/20 History





Elemental)]    


 


ALPRAZolam [Xanax] 0.5 mg PO Q8HR PRN  tab 01/31/20 03/15/20 Rx


 


Doxycycline Hyclate [Vibramycin] 100 mg PO BID 03/15/20 03/15/20 History


 


INSULIN LISPRO (humaLOG) [humaLOG] See Protocol SQ ACHS 03/15/20 03/15/20 

History


 


Insulin Glargine,Hum.rec.anlog 20 unit SQ HS 03/15/20 03/15/20 History





[Basaglar Kwikpen U-100]    


 


QUEtiapine [SEROquel] 25 mg PO DAILY@0900 03/15/20 03/15/20 History








                                    Allergies











Allergy/AdvReac Type Severity Reaction Status Date / Time


 


Barbiturates Allergy  Rash/Hives Verified 03/15/20 12:53


 


cephalexin monohydrate Allergy  Rash/Hives Verified 03/15/20 12:53





[From Keflex]     


 


morphine Allergy  Rash/Hives Verified 03/15/20 12:53


 


Penicillins Allergy  Rash/Hives Verified 03/15/20 12:53


 


phenobarbital Allergy  Swelling Verified 03/15/20 12:53


 


venom-honey bee Allergy  Swelling Verified 03/15/20 12:53





[bee venom (honey bee)]     


 


amlodipine besylate AdvReac  Vomiting Verified 03/15/20 12:53





[From Norvasc]     














Physical Exam


Vitals: 


                                   Vital Signs











  Temp Pulse Resp BP Pulse Ox


 


 20 07:00  97.7 F  83  18  112/75  100


 


 20 02:08  98.0 F  95  18  110/58  100


 


 20 19:22  97.9 F  95  18  149/90  99


 


 20 15:00  97.8 F  92  16  159/76  99








                                Intake and Output











 20





 22:59 06:59 14:59


 


Other:   


 


  Voiding Method  Diaper Diaper





  Incontinent Incontinent


 


  # Voids 2 2 1











Physical exam:


General Appearance: Alert, cooperative, no distress, appears stated age.


Skin: Right lateral foot ulceration is healed, right dorsal foot of ulceration 

measures approximately 1 x 0.6 0.1 cm Slough noticed within the wound bed.  

Minimal granulation.  Skin edges are attached no tunneling or undermining noted.

 Ulceration to right great toe distal aspect shows significant callous Slough 

and fibrin.  Periwound shows erythema extending to dorsal foot.  all other Skin 

color, texture, tugor normal, no rashes or lesions.


Neurologic: Alert oriented x3 








Results


CBC & Chem 7: 


                                 20 07:24





                                 20 07:24


Labs: 


                  Abnormal Lab Results - Last 24 Hours (Table)











  20 Range/Units





  16:38 20:40 01:27 


 


POC Glucose (mg/dL)  149 H  138 H  155 H  (75-99)  mg/dL














  20 Range/Units





  06:43 06:46 07:03 


 


POC Glucose (mg/dL)  48 L  57 L  70 L  (75-99)  mg/dL














  20 Range/Units





  11:32 


 


POC Glucose (mg/dL)  394 H  (75-99)  mg/dL








                      Microbiology - Last 24 Hours (Table)











 20 22:17 Blood Culture - Preliminary





 Blood    No Growth after 48 hours














Assessment and Plan


(1) Atherosclerosis of native arteries of right leg with ulceration of other 

part of foot


Current Visit: No   Status: Acute   Code(s): I70.235 - ATHSCL NATIVE ARTERIES OF

RIGHT LEG W ULCER OTH PRT FOOT   SNOMED Code(s): 261081019188758


   





(2) Diabetes mellitus due to underlying condition with foot ulcer


Current Visit: No   Status: Acute   Code(s): E08.621 - DIABETES MELLITUS DUE TO 

UNDERLYING CONDITION W FOOT ULCER; L97.509 - NON-PRESSURE CHRONIC ULCER OTH PRT 

UNSP FOOT W UNSP SEVERITY   SNOMED Code(s): 7345557


   





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 

exposed


Current Visit: No   Status: Acute   Code(s): L97.512 - NON-PRS CHRONIC ULCER OTH

PRT RIGHT FOOT W FAT LAYER EXPOSED   SNOMED Code(s): 115642780


   


Plan: 


Patient to continue with wound care treatment.  At this time we will apply 

collagen, saline moistened gauze, dry gauze, rolled gauze secured paper tape 

changing every other day.  Patient will return to the wound care center upon 

discharge.  Next appointment  at 2:30.





For the consultation any questions please contact the wound care center





DNP note has been reviewed and discussed with Dr. Ferrell and the impression 

and plan of care has been directed as dictated.

## 2020-03-18 LAB
ALBUMIN SERPL-MCNC: 2.7 G/DL (ref 3.5–5)
ALP SERPL-CCNC: 59 U/L (ref 38–126)
ALT SERPL-CCNC: 15 U/L (ref 4–34)
ANION GAP SERPL CALC-SCNC: 7 MMOL/L
AST SERPL-CCNC: 20 U/L (ref 14–36)
BASOPHILS # BLD AUTO: 0 K/UL (ref 0–0.2)
BASOPHILS NFR BLD AUTO: 0 %
BUN SERPL-SCNC: 24 MG/DL (ref 7–17)
CALCIUM SPEC-MCNC: 8.3 MG/DL (ref 8.4–10.2)
CHLORIDE SERPL-SCNC: 106 MMOL/L (ref 98–107)
CO2 SERPL-SCNC: 25 MMOL/L (ref 22–30)
EOSINOPHIL # BLD AUTO: 0.2 K/UL (ref 0–0.7)
EOSINOPHIL NFR BLD AUTO: 2 %
ERYTHROCYTE [DISTWIDTH] IN BLOOD BY AUTOMATED COUNT: 3.57 M/UL (ref 3.8–5.4)
ERYTHROCYTE [DISTWIDTH] IN BLOOD: 14.5 % (ref 11.5–15.5)
GLUCOSE BLD-MCNC: 111 MG/DL (ref 75–99)
GLUCOSE BLD-MCNC: 117 MG/DL (ref 75–99)
GLUCOSE BLD-MCNC: 263 MG/DL (ref 75–99)
GLUCOSE BLD-MCNC: 314 MG/DL (ref 75–99)
GLUCOSE BLD-MCNC: 393 MG/DL (ref 75–99)
GLUCOSE BLD-MCNC: 91 MG/DL (ref 75–99)
GLUCOSE SERPL-MCNC: 390 MG/DL (ref 74–99)
HBA1C MFR BLD: 10 % (ref 4–6)
HCT VFR BLD AUTO: 32.8 % (ref 34–46)
HGB BLD-MCNC: 10 GM/DL (ref 11.4–16)
LYMPHOCYTES # SPEC AUTO: 2.7 K/UL (ref 1–4.8)
LYMPHOCYTES NFR SPEC AUTO: 33 %
MCH RBC QN AUTO: 28.1 PG (ref 25–35)
MCHC RBC AUTO-ENTMCNC: 30.6 G/DL (ref 31–37)
MCV RBC AUTO: 91.9 FL (ref 80–100)
MONOCYTES # BLD AUTO: 0.3 K/UL (ref 0–1)
MONOCYTES NFR BLD AUTO: 4 %
NEUTROPHILS # BLD AUTO: 4.9 K/UL (ref 1.3–7.7)
NEUTROPHILS NFR BLD AUTO: 60 %
PLATELET # BLD AUTO: 207 K/UL (ref 150–450)
POTASSIUM SERPL-SCNC: 4.9 MMOL/L (ref 3.5–5.1)
PROT SERPL-MCNC: 5.8 G/DL (ref 6.3–8.2)
SODIUM SERPL-SCNC: 138 MMOL/L (ref 137–145)
WBC # BLD AUTO: 8.2 K/UL (ref 3.8–10.6)

## 2020-03-18 RX ADMIN — DULOXETINE SCH MG: 60 CAPSULE, DELAYED RELEASE ORAL at 09:29

## 2020-03-18 RX ADMIN — DIVALPROEX SODIUM SCH MG: 250 TABLET, DELAYED RELEASE ORAL at 17:02

## 2020-03-18 RX ADMIN — CEFAZOLIN SCH: 330 INJECTION, POWDER, FOR SOLUTION INTRAMUSCULAR; INTRAVENOUS at 22:05

## 2020-03-18 RX ADMIN — INSULIN DETEMIR SCH UNIT: 100 INJECTION, SOLUTION SUBCUTANEOUS at 21:26

## 2020-03-18 RX ADMIN — DIVALPROEX SODIUM SCH MG: 250 TABLET, DELAYED RELEASE ORAL at 21:26

## 2020-03-18 RX ADMIN — FAMOTIDINE SCH MG: 20 TABLET, FILM COATED ORAL at 09:29

## 2020-03-18 RX ADMIN — INSULIN ASPART SCH UNIT: 100 INJECTION, SOLUTION INTRAVENOUS; SUBCUTANEOUS at 17:01

## 2020-03-18 RX ADMIN — INSULIN ASPART SCH UNIT: 100 INJECTION, SOLUTION INTRAVENOUS; SUBCUTANEOUS at 21:26

## 2020-03-18 RX ADMIN — CEFAZOLIN SCH: 330 INJECTION, POWDER, FOR SOLUTION INTRAMUSCULAR; INTRAVENOUS at 10:58

## 2020-03-18 RX ADMIN — ATORVASTATIN CALCIUM SCH MG: 20 TABLET, FILM COATED ORAL at 21:25

## 2020-03-18 RX ADMIN — DIVALPROEX SODIUM SCH MG: 250 TABLET, DELAYED RELEASE ORAL at 09:28

## 2020-03-18 RX ADMIN — INSULIN ASPART SCH: 100 INJECTION, SOLUTION INTRAVENOUS; SUBCUTANEOUS at 10:58

## 2020-03-18 RX ADMIN — Medication SCH MG: at 09:29

## 2020-03-18 RX ADMIN — INSULIN ASPART SCH UNIT: 100 INJECTION, SOLUTION INTRAVENOUS; SUBCUTANEOUS at 12:15

## 2020-03-18 RX ADMIN — ASPIRIN 81 MG CHEWABLE TABLET SCH MG: 81 TABLET CHEWABLE at 09:29

## 2020-03-18 NOTE — P.PN
Subjective


Progress Note Date: 03/18/20








Morenita Ramirez is a 59-year-old female with extensive past medical history who 

presented to [facility] emergency room after having an episode 

of syncope at home.  Family described that the patient was attempting to 

transfer from her wheelchair to the toilet seat when she fell backward she was 

an responsive her eyes were open there was no tonic or clonic movements, patient

was brought in to [facility] emergency room where she was 

evaluated.


During her emergency room stay patient had a second episode of what seemed to be

a seizure activity, patient was an responsive her head was rotated to the left 

and her eyes were rolled back in her head and at this time she had tonic-clonic 

movements.  She was started on IV Keppra and was admitted to medical floor.


Patient has unclear history of seizure disorder, she has been maintained on 

valproic acid.  She was evaluated twice by neurology in 2017 by Dr. Solis, and

in 2015 by Dr. Washington and there was no mention of seizure diagnosis during 

those 2 consults.


In 2015 patient had a major stroke affecting the right MCA and JESSICA, she has 

total occlusion of the right internal carotid artery.





On 03/16/2020 patient was seen and examined on the medical floor, she is alert 

slightly somnolent in no apparent distress, no new seizure activity reported by 

nursing staff.  Patient is feeling tired otherwise she denies any complaints, 

she had an episode of hypoglycemia at night, otherwise there is no fever or 

chills no headache or dizziness no chest pain no shortness of breath no cough no

nausea or vomiting no abdominal pain no diarrhea and no urinary symptoms





On 3/17/2020 Patient was seen and examined on the medical floor, case was 

discussed with Dr Hazel neurologist, patient had evidence of seizure activity 

on admission, she had a significant stroke in 2015, she has been maintained on a

low-dose Depakote for behavioral issues, she has not had any well-documented 

seizure activity in the past, she was started on IV Keppra in the emergency room

on presentation, case discussed in details with Dr. Hazel, who advised to 

increase dose of Depakote and continue was 1 and I seizure medication and 

discontinue Keppra at this time.  Clinically patient is doing better she is 

alert and responsive in no apparent distress there is no fever or chills no 

headache or dizziness no chest pain no shortness of breath no cough no nausea or

vomiting no abdominal pain no diarrhea and no urinary symptoms.  Consultation 

was requested for patient to be seen by Dr. Ferrell in regard to her right foot

open ulcers with history of toe amputations,





On 03/18/2020 patient was seen and examined on the medical floor she is alert 

and oriented in no apparent distress she is answering questions appropriately, 

there is no fever or chills no headache or dizziness no chest pain no shortness 

of breath no cough no nausea or vomiting no abdominal pain no diarrhea and no 

urinary symptoms, no new reported seizure activity per nursing staff.





Objective





- Vital Signs


Vital signs: 


                                   Vital Signs











Temp  97.5 F L  03/18/20 07:00


 


Pulse  77   03/18/20 07:25


 


Resp  16   03/18/20 07:25


 


BP  119/51   03/18/20 07:00


 


Pulse Ox  100   03/18/20 07:00








                                 Intake & Output











 03/17/20 03/18/20 03/18/20





 18:59 06:59 18:59


 


Intake Total   296


 


Balance   296


 


Intake:   


 


  Oral   296


 


Other:   


 


  Voiding Method Diaper Diaper Diaper





 Incontinent Incontinent Incontinent


 


  # Voids 1 1 














- Exam








In general patient is alert somnolent in no apparent distress


HEENT head normocephalic and atraumatic


Neck is supple no JVD no goiter no lymphadenopathy


Chest exam reveals a few scattered crackles bilaterally no wheezing


Cardiac exam reveals regular heart sounds no gallops no murmurs


Abdomen is soft nontender no organomegaly with normal bowel sounds


Extremity exam reveals no edema no cyanosis or clubbing, right foot is bandaged,

patient had recent toe amputation


Neurological examination reveals left sided weakness and muscle contracture inv

olving the upper and lower extremities which is chronic no other acute 

neurological deficit at this





- Labs


CBC & Chem 7: 


                                 03/16/20 07:24





                                 03/16/20 07:24


Labs: 


                  Abnormal Lab Results - Last 24 Hours (Table)











  03/17/20 03/17/20 03/18/20 Range/Units





  16:26 20:27 01:41 


 


POC Glucose (mg/dL)  123 H  389 H  117 H  (75-99)  mg/dL














  03/18/20 03/18/20 Range/Units





  07:01 11:38 


 


POC Glucose (mg/dL)  111 H  393 H  (75-99)  mg/dL








                      Microbiology - Last 24 Hours (Table)











 03/14/20 22:17 Blood Culture - Preliminary





 Blood    No Growth after 72 hours














Assessment and Plan


Plan: 





#1 likely seizure activity 2, at this time patient was started on Keppra in the

emergency room, will obtain computed tomography scan of the brain, will obtain 

EEG, neurology consultation was requested.  Per neurology recommendation dose of

Depakote was increased, Keppra was discontinued, will recheck Depakote level in 

a.m. tomorrow





#2 insulin-dependent diabetes mellitus will resume home insulin and monitor 

closely





#3 underlying history of peripheral vascular disease, with foot ulcer and 

multiple toe amputation in the past.





#4 complaint of chest pain, will obtain EKG and serial cardiac enzymes





#5 complaint of abdominal pain Will obtain abdominal ultrasound





#6 dehydration was acute kidney injury was elevated BUN at 68 and elevated 

creatinine at 1.39 will use gentle hydration and monitor.





#7 medication and labs were reviewed please see orders will follow closely p

rognosis is guarded

## 2020-03-18 NOTE — CDI
Documentation Clarification Form



Date: 03/18/2020 01:20:34 PM

From: Kat Jacobson RN, CCDS

Phone: [phone]

MRN: [id number]

Admit Date: 03/15/2020 01:37:00 AM

Patient Name: Morenita Ramirez

Visit Number: [id number]

Discharge Date:  





ATTENTION: The Clinical Documentation Specialists (CDI) and [facility] Coding Staff 
appreciate your assistance in clarifying documentation. Please respond to the 
clarification below the line at the bottom and electronically sign. The CDI & 
[facility] Coding staff will review the response and follow-up if needed. Please note: 
Queries are made part of the Legal Health Record. If you have any questions, 
please contact the author of this message via ITS.



Dr. Saniya Cabral





The patients principal diagnosis has not been clearly identified and requires 
clarification.

3/15 H/P and subsequent progress notes you have documented likely seizure 
activity x2

3/16 Neurology consult;(Dr. 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 
epilepsy



History/Risk factors: CVA, Diabetes Mellitus 



Clinical Indicators: 59-year-old female admitted with seizure at home, had a 
witnessed focal onset seizure in the ER.

 

EEG 3/16: Abnormal EEG due to background slowing of moderate to severe degree. 
this suggestive of generalized cerebral dysfunction and can be seen in a toxic 
medical metabolic encephalopathies or due to diffuse structure brain 
abnormality. Epileptic focus in evolving the right temporal region.

3/14 Lab findings: Lactic acid 3.4, Valproic Acid 21.4 

3/15 CT Brain: Stable large area of encephalomalcia involving the right temporal
parietal and frontal lobes unchanged from the prior exam. 

3/14 In ED: Vital Signs: 90/59 109 18 98.1 94 % RA



Treatment:

Depakote 250 mg PO TID

Monitor Depakote Levels 

Neurological assessment Q4 hours



In your professional opinion, can you please clarify which diagnosis, after 
study, accounted for the patients presenting symptoms and was the reason 
chiefly responsible for the admission?



Post stroke epilepsy

Seizure (specify type and cause)

Other, specify



(Last Revision: April 2018)

___________________________________________________________________________

MTDD

## 2020-03-18 NOTE — P.PN
Subjective


Progress Note Date: 03/18/20





Patient is laying comfortably in the bed.  Offers no complaints.  No further 

seizures reported.  Denies any side effect of medication





Objective





- Vital Signs


Vital signs: 


                                   Vital Signs











Temp  98.2 F   03/18/20 13:51


 


Pulse  96   03/18/20 16:00


 


Resp  18   03/18/20 13:51


 


BP  126/77   03/18/20 13:51


 


Pulse Ox  100   03/18/20 13:51








                                 Intake & Output











 03/17/20 03/18/20 03/18/20





 18:59 06:59 18:59


 


Intake Total   888


 


Balance   888


 


Intake:   


 


  Oral   888


 


Other:   


 


  Voiding Method Diaper Diaper Diaper





 Incontinent Incontinent Incontinent


 


  # Voids 1 1 2














- Exam





Patient is alert and awake.  Speech and language function appears normal.  

Patient continues to have left hemiparesis.





- Labs


CBC & Chem 7: 


                                 03/18/20 13:05





                                 03/18/20 13:05


Labs: 


                  Abnormal Lab Results - Last 24 Hours (Table)











  03/16/20 03/17/20 03/18/20 Range/Units





  07:24 20:27 01:41 


 


RBC     (3.80-5.40)  m/uL


 


Hgb     (11.4-16.0)  gm/dL


 


Hct     (34.0-46.0)  %


 


MCHC     (31.0-37.0)  g/dL


 


BUN     (7-17)  mg/dL


 


Glucose     (74-99)  mg/dL


 


POC Glucose (mg/dL)   389 H  117 H  (75-99)  mg/dL


 


Hemoglobin A1c  10.0 H    (4.0-6.0)  %


 


Calcium     (8.4-10.2)  mg/dL


 


Total Protein     (6.3-8.2)  g/dL


 


Albumin     (3.5-5.0)  g/dL














  03/18/20 03/18/20 03/18/20 Range/Units





  07:01 11:38 13:05 


 


RBC    3.57 L  (3.80-5.40)  m/uL


 


Hgb    10.0 L  (11.4-16.0)  gm/dL


 


Hct    32.8 L  (34.0-46.0)  %


 


MCHC    30.6 L  (31.0-37.0)  g/dL


 


BUN     (7-17)  mg/dL


 


Glucose     (74-99)  mg/dL


 


POC Glucose (mg/dL)  111 H  393 H   (75-99)  mg/dL


 


Hemoglobin A1c     (4.0-6.0)  %


 


Calcium     (8.4-10.2)  mg/dL


 


Total Protein     (6.3-8.2)  g/dL


 


Albumin     (3.5-5.0)  g/dL














  03/18/20 03/18/20 Range/Units





  13:05 16:31 


 


RBC    (3.80-5.40)  m/uL


 


Hgb    (11.4-16.0)  gm/dL


 


Hct    (34.0-46.0)  %


 


MCHC    (31.0-37.0)  g/dL


 


BUN  24 H   (7-17)  mg/dL


 


Glucose  390 H   (74-99)  mg/dL


 


POC Glucose (mg/dL)   263 H  (75-99)  mg/dL


 


Hemoglobin A1c    (4.0-6.0)  %


 


Calcium  8.3 L   (8.4-10.2)  mg/dL


 


Total Protein  5.8 L   (6.3-8.2)  g/dL


 


Albumin  2.7 L   (3.5-5.0)  g/dL








                      Microbiology - Last 24 Hours (Table)











 03/14/20 22:17 Blood Culture - Preliminary





 Blood    No Growth after 72 hours














Assessment and Plan


Assessment: 





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  Patient's EEG showed epileptic focus in the 

  involving the right temporal region.


* History of CVA involving right MCA/JESSICA vascular territory, with left 

  hemiparesis, arm> leg.


* Diabetes, poorly controlled


* Hypertension


* Dyslipidemia


* X tobacco user.


Plan: 





* Patient has developed post stroke epilepsy.  Patient is currently on Depakote,

  but the levels are very subtherapeutic 21.4.


* Continue Depakote 250 mg 3 times a day.  Depakote was previously given for 

  behavioral disorder.  Hopefully Depakote will further help with behaviors.


* Patient is off Keppra.  Keppra can also worsen behavioral issues in the 

  patient.


* I would suggest checking Depakote level in 2 weeks.


* Suggest follow-up with a neurologist locally in town in 2-4 weeks, to manage 

  her post stroke epilepsy and to adjust the antiepileptic medication, if 

  needed.


* Continue aspirin 81 mg daily.  


* Patient's lipid panel shows cholesterol 194, LDL 96.8, HDL 65.0 and 

  triglycerides 161.  Consider starting Lipitor 10 mg.  


* Optimize control of diabetes to target A1c <7.0.


* Neurologically clear otherwise.

## 2020-03-18 NOTE — P.PN
Subjective


Progress Note Date: 03/18/20








Morenita Ramirez is a 59-year-old female with extensive past medical history who 

presented to [facility] emergency room after having an episode 

of syncope at home.  Family described that the patient was attempting to 

transfer from her wheelchair to the toilet seat when she fell backward she was 

an responsive her eyes were open there was no tonic or clonic movements, patient

was brought in to [facility] emergency room where she was 

evaluated.


During her emergency room stay patient had a second episode of what seemed to be

a seizure activity, patient was an responsive her head was rotated to the left 

and her eyes were rolled back in her head and at this time she had tonic-clonic 

movements.  She was started on IV Keppra and was admitted to medical floor.


Patient has unclear history of seizure disorder, she has been maintained on 

valproic acid.  She was evaluated twice by neurology in 2017 by Dr. Solis, and

in 2015 by Dr. Washington and there was no mention of seizure diagnosis during 

those 2 consults.


In 2015 patient had a major stroke affecting the right MCA and JESSICA, she has 

total occlusion of the right internal carotid artery.





On 03/16/2020 patient was seen and examined on the medical floor, she is alert 

slightly somnolent in no apparent distress, no new seizure activity reported by 

nursing staff.  Patient is feeling tired otherwise she denies any complaints, 

she had an episode of hypoglycemia at night, otherwise there is no fever or 

chills no headache or dizziness no chest pain no shortness of breath no cough no

nausea or vomiting no abdominal pain no diarrhea and no urinary symptoms





On 3/17/2020 Patient was seen and examined on the medical floor, case was 

discussed with Dr Hazel neurologist, patient had evidence of seizure activity 

on admission, she had a significant stroke in 2015, she has been maintained on a

low-dose Depakote for behavioral issues, she has not had any well-documented 

seizure activity in the past, she was started on IV Keppra in the emergency room

on presentation, case discussed in details with Dr. Hazel, who advised to 

increase dose of Depakote and continue was 1 and I seizure medication and 

discontinue Keppra at this time.  Clinically patient is doing better she is 

alert and responsive in no apparent distress there is no fever or chills no 

headache or dizziness no chest pain no shortness of breath no cough no nausea or

vomiting no abdominal pain no diarrhea and no urinary symptoms.  Consultation 

was requested for patient to be seen by Dr. Ferrell in regard to her right foot

open ulcers with history of toe amputations,





Objective





- Vital Signs


Vital signs: 


                                   Vital Signs











Temp  97.5 F L  03/18/20 07:00


 


Pulse  77   03/18/20 07:25


 


Resp  16   03/18/20 07:25


 


BP  119/51   03/18/20 07:00


 


Pulse Ox  100   03/18/20 07:00








                                 Intake & Output











 03/17/20 03/18/20 03/18/20





 18:59 06:59 18:59


 


Intake Total   296


 


Balance   296


 


Intake:   


 


  Oral   296


 


Other:   


 


  Voiding Method Diaper Diaper Diaper





 Incontinent Incontinent Incontinent


 


  # Voids 1 1 














- Exam








In general patient is alert somnolent in no apparent distress


HEENT head normocephalic and atraumatic


Neck is supple no JVD no goiter no lymphadenopathy


Chest exam reveals a few scattered crackles bilaterally no wheezing


Cardiac exam reveals regular heart sounds no gallops no murmurs


Abdomen is soft nontender no organomegaly with normal bowel sounds


Extremity exam reveals no edema no cyanosis or clubbing, right foot is bandaged,

patient had recent toe amputation


Neurological examination reveals left sided weakness and muscle contracture 

involving the upper and lower extremities which is chronic no other acute 

neurological deficit at this





- Labs


CBC & Chem 7: 


                                 03/16/20 07:24





                                 03/16/20 07:24


Labs: 


                  Abnormal Lab Results - Last 24 Hours (Table)











  03/17/20 03/17/20 03/18/20 Range/Units





  16:26 20:27 01:41 


 


POC Glucose (mg/dL)  123 H  389 H  117 H  (75-99)  mg/dL














  03/18/20 03/18/20 Range/Units





  07:01 11:38 


 


POC Glucose (mg/dL)  111 H  393 H  (75-99)  mg/dL








                      Microbiology - Last 24 Hours (Table)











 03/14/20 22:17 Blood Culture - Preliminary





 Blood    No Growth after 72 hours














Assessment and Plan


Plan: 





#1 likely seizure activity 2, at this time patient was started on Keppra in the

emergency room, will obtain computed tomography scan of the brain, will obtain 

EEG, neurology consultation was requested.





#2 insulin-dependent diabetes mellitus will resume home insulin and monitor 

closely





#3 underlying history of peripheral vascular disease, with foot ulcer and mul

tiple toe amputation in the past.





#4 complaint of chest pain, will obtain EKG and serial cardiac enzymes





#5 complaint of abdominal pain Will obtain abdominal ultrasound





#6 dehydration was acute kidney injury was elevated BUN at 68 and elevated 

creatinine at 1.39 will use gentle hydration and monitor.





#7 medication and labs were reviewed please see orders will follow closely 

prognosis is guarded

## 2020-03-19 VITALS
DIASTOLIC BLOOD PRESSURE: 54 MMHG | RESPIRATION RATE: 18 BRPM | TEMPERATURE: 98.3 F | SYSTOLIC BLOOD PRESSURE: 109 MMHG | HEART RATE: 80 BPM

## 2020-03-19 LAB
ALBUMIN SERPL-MCNC: 2.7 G/DL (ref 3.5–5)
ALP SERPL-CCNC: 67 U/L (ref 38–126)
ALT SERPL-CCNC: 12 U/L (ref 4–34)
ANION GAP SERPL CALC-SCNC: 6 MMOL/L
AST SERPL-CCNC: 17 U/L (ref 14–36)
BASOPHILS # BLD AUTO: 0 K/UL (ref 0–0.2)
BASOPHILS NFR BLD AUTO: 0 %
BUN SERPL-SCNC: 32 MG/DL (ref 7–17)
CALCIUM SPEC-MCNC: 8.4 MG/DL (ref 8.4–10.2)
CHLORIDE SERPL-SCNC: 107 MMOL/L (ref 98–107)
CO2 SERPL-SCNC: 24 MMOL/L (ref 22–30)
EOSINOPHIL # BLD AUTO: 0.1 K/UL (ref 0–0.7)
EOSINOPHIL NFR BLD AUTO: 2 %
ERYTHROCYTE [DISTWIDTH] IN BLOOD BY AUTOMATED COUNT: 3.31 M/UL (ref 3.8–5.4)
ERYTHROCYTE [DISTWIDTH] IN BLOOD: 14.4 % (ref 11.5–15.5)
GLUCOSE BLD-MCNC: 145 MG/DL (ref 75–99)
GLUCOSE BLD-MCNC: 348 MG/DL (ref 75–99)
GLUCOSE BLD-MCNC: 383 MG/DL (ref 75–99)
GLUCOSE SERPL-MCNC: 325 MG/DL (ref 74–99)
HCT VFR BLD AUTO: 31 % (ref 34–46)
HGB BLD-MCNC: 9.5 GM/DL (ref 11.4–16)
LYMPHOCYTES # SPEC AUTO: 3.1 K/UL (ref 1–4.8)
LYMPHOCYTES NFR SPEC AUTO: 50 %
MCH RBC QN AUTO: 28.8 PG (ref 25–35)
MCHC RBC AUTO-ENTMCNC: 30.8 G/DL (ref 31–37)
MCV RBC AUTO: 93.5 FL (ref 80–100)
MONOCYTES # BLD AUTO: 0.3 K/UL (ref 0–1)
MONOCYTES NFR BLD AUTO: 4 %
NEUTROPHILS # BLD AUTO: 2.6 K/UL (ref 1.3–7.7)
NEUTROPHILS NFR BLD AUTO: 42 %
PLATELET # BLD AUTO: 202 K/UL (ref 150–450)
POTASSIUM SERPL-SCNC: 4.7 MMOL/L (ref 3.5–5.1)
PROT SERPL-MCNC: 5.8 G/DL (ref 6.3–8.2)
SODIUM SERPL-SCNC: 137 MMOL/L (ref 137–145)
WBC # BLD AUTO: 6.3 K/UL (ref 3.8–10.6)

## 2020-03-19 RX ADMIN — CEFAZOLIN SCH: 330 INJECTION, POWDER, FOR SOLUTION INTRAMUSCULAR; INTRAVENOUS at 12:18

## 2020-03-19 RX ADMIN — INSULIN ASPART SCH UNIT: 100 INJECTION, SOLUTION INTRAVENOUS; SUBCUTANEOUS at 07:17

## 2020-03-19 RX ADMIN — Medication SCH MG: at 07:15

## 2020-03-19 RX ADMIN — FAMOTIDINE SCH MG: 20 TABLET, FILM COATED ORAL at 07:15

## 2020-03-19 RX ADMIN — INSULIN ASPART SCH UNIT: 100 INJECTION, SOLUTION INTRAVENOUS; SUBCUTANEOUS at 12:18

## 2020-03-19 RX ADMIN — DIVALPROEX SODIUM SCH MG: 250 TABLET, DELAYED RELEASE ORAL at 07:16

## 2020-03-19 RX ADMIN — ASPIRIN 81 MG CHEWABLE TABLET SCH MG: 81 TABLET CHEWABLE at 07:15

## 2020-03-19 RX ADMIN — DIVALPROEX SODIUM SCH MG: 250 TABLET, DELAYED RELEASE ORAL at 15:07

## 2020-03-19 RX ADMIN — DULOXETINE SCH MG: 60 CAPSULE, DELAYED RELEASE ORAL at 07:14

## 2020-03-19 NOTE — P.PN
Subjective


Progress Note Date: 03/19/20





Patient is laying comfortably in the bed.  Offers no complaints.  No further 

seizures reported.  Denies any side effect of medication





Objective





- Vital Signs


Vital signs: 


                                   Vital Signs











Temp  98.3 F   03/19/20 07:00


 


Pulse  80   03/19/20 07:00


 


Resp  18   03/19/20 07:00


 


BP  109/54   03/19/20 07:00


 


Pulse Ox  97   03/19/20 07:00








                                 Intake & Output











 03/18/20 03/19/20 03/19/20





 18:59 06:59 18:59


 


Intake Total 888  


 


Output Total  2 


 


Balance 888 -2 


 


Weight   55.338 kg


 


Intake:   


 


  Oral 888  


 


Output:   


 


  Stool  2 


 


Other:   


 


  Voiding Method Diaper Diaper Diaper





 Incontinent Incontinent Incontinent


 


  # Voids 2 1 














- Exam





Patient is alert and awake.  Speech and language function appears normal.  

Patient continues to have left hemiparesis.





- Labs


CBC & Chem 7: 


                                 03/19/20 07:36





                                 03/19/20 07:36


Labs: 


                  Abnormal Lab Results - Last 24 Hours (Table)











  03/16/20 03/18/20 03/18/20 Range/Units





  07:24 16:31 20:35 


 


RBC     (3.80-5.40)  m/uL


 


Hgb     (11.4-16.0)  gm/dL


 


Hct     (34.0-46.0)  %


 


MCHC     (31.0-37.0)  g/dL


 


BUN     (7-17)  mg/dL


 


Glucose     (74-99)  mg/dL


 


POC Glucose (mg/dL)   263 H  314 H  (75-99)  mg/dL


 


Hemoglobin A1c  10.0 H    (4.0-6.0)  %


 


Total Protein     (6.3-8.2)  g/dL


 


Albumin     (3.5-5.0)  g/dL














  03/19/20 03/19/20 03/19/20 Range/Units





  02:48 06:57 07:36 


 


RBC    3.31 L  (3.80-5.40)  m/uL


 


Hgb    9.5 L  (11.4-16.0)  gm/dL


 


Hct    31.0 L  (34.0-46.0)  %


 


MCHC    30.8 L  (31.0-37.0)  g/dL


 


BUN     (7-17)  mg/dL


 


Glucose     (74-99)  mg/dL


 


POC Glucose (mg/dL)  383 H  348 H   (75-99)  mg/dL


 


Hemoglobin A1c     (4.0-6.0)  %


 


Total Protein     (6.3-8.2)  g/dL


 


Albumin     (3.5-5.0)  g/dL














  03/19/20 03/19/20 Range/Units





  07:36 11:48 


 


RBC    (3.80-5.40)  m/uL


 


Hgb    (11.4-16.0)  gm/dL


 


Hct    (34.0-46.0)  %


 


MCHC    (31.0-37.0)  g/dL


 


BUN  32 H   (7-17)  mg/dL


 


Glucose  325 H   (74-99)  mg/dL


 


POC Glucose (mg/dL)   145 H  (75-99)  mg/dL


 


Hemoglobin A1c    (4.0-6.0)  %


 


Total Protein  5.8 L   (6.3-8.2)  g/dL


 


Albumin  2.7 L   (3.5-5.0)  g/dL








                      Microbiology - Last 24 Hours (Table)











 03/14/20 22:17 Blood Culture - Preliminary





 Blood    No Growth after 96 hours














Assessment and Plan


Assessment: 





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  Patient's EEG showed epileptic focus involving

  the right temporal region.


* History of CVA involving right MCA/JESSICA vascular territory, with left 

  hemiparesis, arm> leg.


* Diabetes, poorly controlled


* Hypertension


* Dyslipidemia


* X tobacco user.


Plan: 





* Patient has developed post stroke epilepsy. Continue Depakote 250 mg 3 times a

  day.  Depakote was previously given for behavioral disorder.  Hopefully Dep

  akote will further help with behaviors.


* Patient is off Keppra.  Keppra can also worsen behavioral issues in the 

  patient.


* I would suggest checking Depakote level in 2 weeks.


* Suggest follow-up with a neurologist locally in town in 2-4 weeks, to manage 

  her post stroke epilepsy and to adjust the antiepileptic medication, if 

  needed.


* Continue aspirin 81 mg daily.  


* Patient's lipid panel shows cholesterol 194, LDL 96.8, HDL 65.0 and 

  triglycerides 161.  Consider starting Lipitor 10 mg.  


* Optimize control of diabetes to target A1c <7.0.


* Neurologically clear otherwise.

## 2020-03-19 NOTE — CDI
Documentation Clarification Form



Date: 03/19/2020 07:26:26 AM

From: Kat Jacobson RN, CCDS

Phone: [phone]

MRN: [id number]

Admit Date: 03/15/2020 01:37:00 AM

Patient Name: Morenita Ramirez

Visit Number: [id number]

Discharge Date:  





ATTENTION: The Clinical Documentation Specialists (CDI) and [facility] Coding Staff 
appreciate your assistance in clarifying documentation. Please respond to the 
clarification below the line at the bottom and electronically sign. The CDI & 
[facility] Coding staff will review the response and follow-up if needed. Please note: 
Queries are made part of the Legal Health Record. If you have any questions, 
please contact the author of this message via ITS.



Dr. Saniya Cabral



The patient has diabetes, as indicated in the past medical history and ED 
evaluation on 3/14.

3/18 neurology (Dr. Lance) "Diabetes, poorly controlled

Clarification is requested for diabetes poorly controlled.



History/Risk Factors: Diabetes mellitus, (Insulin dependent) (brittle) Diabetic 
Ketoacidosis acidosis, Eating disorder



Clinical Indicators: 59 year-old female (per ED evaluation on 3/14) has not been
eating or drinking lately due to no appetite. Family member states that she has 
been trying to make herself vomit over the last few days. 

3/14 Labs: BUN 68, Creatinine 1.39, Blood Glucose 305 

3/19  Blood Glucose 383, 348



Treatment: 

Insulin Novolog SQ ACHS 6 units

Insulin Levemir SQ 18 units HS

Monitor blood glucose ACHS





In order to capture the severity of Illness and necessary documentation 
specificity, please clarify:

DM Type 1

DM Type 2

Other, please specify

Unable to Determine



Please document any body system complications or specific manifestations related
to the diabetes:



Hyperglycemia

Hypoglycemia 

Other condition 

 



(Last Revision: October 2017)

___________________________________________________________________________

MTDD

## 2020-03-19 NOTE — P.DS
Providers


Date of admission: 


03/15/20 01:37





Expected date of discharge: 03/19/20


Attending physician: 


Saniya Cabral





Consults: 





                                        





03/15/20 00:42


Consult Physician Routine 


   Consulting Provider: Chloe Lance


   Consult Reason/Comments: Seizures


   Do you want consulting provider notified?: Yes





03/16/20 17:14


Consult Physician Routine 


   Consulting Provider: Gerardo Ferrell


   Consult Reason/Comments: known patient, right foot wound


   Do you want consulting provider notified?: Yes











Primary care physician: 


[facility] Course: 





Discharge diagnosis





#1Probable post stroke epilepsy.  Patient's EEG showed epileptic focus in the 

involving the right temporal region.  Patient seen evaluated by neurology.  They

have increased her Depakote to 250 mg 3 times a day.





#2 type 1 diabetes mellitus with uncontrolled blood sugars.  Patient is a very 

brittle diabetic.  Discharge home on her current insulin.  Further adjustments 

outpatient.





#3 underlying history of peripheral vascular disease, with foot ulcer and 

multiple toe amputation in the past.





#4 complaint of chest pain, resolved.  EKG normal sinus rhythm and troponins 

negative 3 sets.





#5 acute kidney injury with dehydration present on admission.  Creatinine was 

1.39.  Patient was given IV fluids.  Kidney functions have normalized.











Hospital course





Morenita Ramirez is a 59-year-old female with extensive past medical history who 

presented to [facility] emergency room after having an episode 

of syncope at home.  Family described that the patient was attempting to 

transfer from her wheelchair to the toilet seat when she fell backward she was 

an responsive her eyes were open there was no tonic or clonic movements, patient

was brought in to [facility] emergency room where she was 

evaluated.


During her emergency room stay patient had a second episode of what seemed to be

a seizure activity, patient was an responsive her head was rotated to the left 

and her eyes were rolled back in her head and at this time she had tonic-clonic 

movements.  She was started on IV Keppra and was admitted to medical floor.


Patient has unclear history of seizure disorder, she has been maintained on 

valproic acid.  She was evaluated twice by neurology in 2017 by Dr. Solis, and

in 2015 by Dr. Washington and there was no mention of seizure diagnosis during 

those 2 consults.


In 2015 patient had a major stroke affecting the right MCA and JESSICA, she has 

total occlusion of the right internal carotid artery.





On 03/16/2020 patient was seen and examined on the medical floor, she is alert 

slightly somnolent in no apparent distress, no new seizure activity reported by 

nursing staff.  Patient is feeling tired otherwise she denies any complaints, 

she had an episode of hypoglycemia at night, otherwise there is no fever or 

chills no headache or dizziness no chest pain no shortness of breath no cough no

nausea or vomiting no abdominal pain no diarrhea and no urinary symptoms





On 3/17/2020 Patient was seen and examined on the medical floor, case was 

discussed with Dr Hazel neurologist, patient had evidence of seizure activity 

on admission, she had a significant stroke in 2015, she has been maintained on a

low-dose Depakote for behavioral issues, she has not had any well-documented 

seizure activity in the past, she was started on IV Keppra in the emergency room

on presentation, case discussed in details with Dr. Hazel, who advised to 

increase dose of Depakote and continue was 1 and I seizure medication and 

discontinue Keppra at this time.  Clinically patient is doing better she is 

alert and responsive in no apparent distress there is no fever or chills no 

headache or dizziness no chest pain no shortness of breath no cough no nausea or

vomiting no abdominal pain no diarrhea and no urinary symptoms.  Consultation 

was requested for patient to be seen by Dr. Ferrell in regard to her right foot

open ulcers with history of toe amputations,





On 03/18/2020 patient was seen and examined on the medical floor she is alert 

and oriented in no apparent distress she is answering questions appropriately, 

there is no fever or chills no headache or dizziness no chest pain no shortness 

of breath no cough no nausea or vomiting no abdominal pain no diarrhea and no 

urinary symptoms, no new reported seizure activity per nursing staff.





03/19/2020 patient is medically stable for discharge.  Patient seen by neurology

during this admission for a possible post stroke epilepsy.  Patient has had no 

further seizure activity.  Neurology did increase patient's Depakote to 3 times 

a day instead of once a day.  Patient had been on the Depakote previously for 

just a mood stabilizer.  Patient is stable for discharge please refer to chart 

for any further details.  Depakote level subtherapeutic during this admission at

21.4.  





Neurology is recommending a repeat Depakote level in 2 weeks.





Patient follow up with Dr. Cabral in 1 week and Dr. Moeller in 1 week





I performed an examination of the patient and discussed their management with 

the physician Assistant.  I have reviewed the Physician Assistant's notes and 

agree with the documented findings and plan of care


Patient Condition at Discharge: Stable





Plan - Discharge Summary


Discharge Rx Participant: Yes


New Discharge Prescriptions: 


New


   Divalproex [Depakote] 250 mg PO TID #90 tablet.dr





Continue


   Albuterol Inhaler [Ventolin Hfa Inhaler] 2 puff INHALATION RT-Q4H PRN


     PRN Reason: Shortness Of Breath


   Famotidine [Pepcid] 20 mg PO DAILY@0900


   Ergocalciferol [Vitamin D2 (DRISDOL)] 50,000 unit PO SA@0900


   HYDROcodone/APAP 10-325MG [Norco ] 1 tab PO Q6H PRN


     PRN Reason: Pain


   DULoxetine HCL [Cymbalta] 60 mg PO DAILY@0900


   Ondansetron [Zofran] 4 mg PO DAILY PRN


     PRN Reason: Nausea


   QUEtiapine [SEROquel] 100 mg PO HS@2100


   Atorvastatin [Lipitor] 20 mg PO DAILY@2100


   Vitamin B Complex With Vit C 1 tab PO DAILY


   Aspirin [Adult Low Dose Aspirin EC] 81 mg PO DAILY@0900


   Ferrous Sulfate [Iron (65 MG Elemental)] 325 mg PO DAILY@0900


   ALPRAZolam [Xanax] 0.5 mg PO Q8HR PRN  tab


     PRN Reason: Anxiety


   Insulin Glargine,Hum.rec.anlog [Basaglar Kwikpen U-100] 20 unit SQ HS


   INSULIN LISPRO (humaLOG) [humaLOG] See Protocol SQ ACHS


   QUEtiapine [SEROquel] 25 mg PO DAILY@0900





Discontinued


   Valproic Acid [Depakene] 250 mg PO DAILY@0900


   Doxycycline Hyclate [Vibramycin] 100 mg PO BID


Discharge Medication List





Albuterol Inhaler [Ventolin Hfa Inhaler] 2 puff INHALATION RT-Q4H PRN 02/19/16 

[History]


Famotidine [Pepcid] 20 mg PO DAILY@0900 02/19/16 [History]


Ergocalciferol [Vitamin D2 (DRISDOL)] 50,000 unit PO SA@0900 10/06/16 [History]


HYDROcodone/APAP 10-325MG [Norco ] 1 tab PO Q6H PRN 05/06/17 [History]


DULoxetine HCL [Cymbalta] 60 mg PO DAILY@0900 09/19/17 [History]


Ondansetron [Zofran] 4 mg PO DAILY PRN 04/09/18 [History]


Atorvastatin [Lipitor] 20 mg PO DAILY@2100 07/31/19 [History]


QUEtiapine [SEROquel] 100 mg PO HS@2100 07/31/19 [History]


Vitamin B Complex With Vit C 1 tab PO DAILY 01/02/20 [History]


Aspirin [Adult Low Dose Aspirin EC] 81 mg PO DAILY@0900 01/10/20 [History]


Ferrous Sulfate [Iron (65 MG Elemental)] 325 mg PO DAILY@0900 01/10/20 [History]


ALPRAZolam [Xanax] 0.5 mg PO Q8HR PRN  tab 01/31/20 [Rx]


INSULIN LISPRO (humaLOG) [humaLOG] See Protocol SQ ACHS 03/15/20 [History]


Insulin Glargine,Hum.rec.anlog [Basaglar Kwikpen U-100] 20 unit SQ HS 03/15/20 

[History]


QUEtiapine [SEROquel] 25 mg PO DAILY@0900 03/15/20 [History]


Divalproex [Depakote] 250 mg PO TID #90 tablet. 03/19/20 [Rx]








Follow up Appointment(s)/Referral(s): 


[facility], [NON-STAFF] - 


[facility],[place] [NON-STAFF] - 04/01/20 2:30 pm


Saniya Cabral MD [Primary Care Provider] - 1 Week


Activity/Diet/Wound Care/Special Instructions: 


Diet: diabetic


Activity: as tolerated


Discharge Disposition: HOME WITH HOME HEALTH SERVICES

## 2020-07-28 ENCOUNTER — HOSPITAL ENCOUNTER (INPATIENT)
Dept: HOSPITAL 47 - EC | Age: 60
LOS: 8 days | Discharge: HOME HEALTH | DRG: 638 | End: 2020-08-05
Attending: INTERNAL MEDICINE | Admitting: INTERNAL MEDICINE
Payer: COMMERCIAL

## 2020-07-28 VITALS — BODY MASS INDEX: 21.7 KG/M2

## 2020-07-28 DIAGNOSIS — Z91.030: ICD-10-CM

## 2020-07-28 DIAGNOSIS — E78.5: ICD-10-CM

## 2020-07-28 DIAGNOSIS — Z86.718: ICD-10-CM

## 2020-07-28 DIAGNOSIS — E86.0: ICD-10-CM

## 2020-07-28 DIAGNOSIS — N18.3: ICD-10-CM

## 2020-07-28 DIAGNOSIS — Z87.891: ICD-10-CM

## 2020-07-28 DIAGNOSIS — F31.9: ICD-10-CM

## 2020-07-28 DIAGNOSIS — Z89.421: ICD-10-CM

## 2020-07-28 DIAGNOSIS — Z82.49: ICD-10-CM

## 2020-07-28 DIAGNOSIS — Z20.828: ICD-10-CM

## 2020-07-28 DIAGNOSIS — F41.9: ICD-10-CM

## 2020-07-28 DIAGNOSIS — K21.9: ICD-10-CM

## 2020-07-28 DIAGNOSIS — E11.10: Primary | ICD-10-CM

## 2020-07-28 DIAGNOSIS — R41.82: ICD-10-CM

## 2020-07-28 DIAGNOSIS — Z79.899: ICD-10-CM

## 2020-07-28 DIAGNOSIS — Z88.0: ICD-10-CM

## 2020-07-28 DIAGNOSIS — R51: ICD-10-CM

## 2020-07-28 DIAGNOSIS — Z88.5: ICD-10-CM

## 2020-07-28 DIAGNOSIS — Z98.42: ICD-10-CM

## 2020-07-28 DIAGNOSIS — Z90.49: ICD-10-CM

## 2020-07-28 DIAGNOSIS — E03.9: ICD-10-CM

## 2020-07-28 DIAGNOSIS — Z83.3: ICD-10-CM

## 2020-07-28 DIAGNOSIS — I95.9: ICD-10-CM

## 2020-07-28 DIAGNOSIS — J44.9: ICD-10-CM

## 2020-07-28 DIAGNOSIS — I69.354: ICD-10-CM

## 2020-07-28 DIAGNOSIS — N19: ICD-10-CM

## 2020-07-28 DIAGNOSIS — Z79.4: ICD-10-CM

## 2020-07-28 DIAGNOSIS — R11.2: ICD-10-CM

## 2020-07-28 DIAGNOSIS — Z98.41: ICD-10-CM

## 2020-07-28 DIAGNOSIS — G40.909: ICD-10-CM

## 2020-07-28 DIAGNOSIS — D63.8: ICD-10-CM

## 2020-07-28 DIAGNOSIS — Z82.5: ICD-10-CM

## 2020-07-28 DIAGNOSIS — I25.10: ICD-10-CM

## 2020-07-28 DIAGNOSIS — E11.649: ICD-10-CM

## 2020-07-28 DIAGNOSIS — H93.13: ICD-10-CM

## 2020-07-28 DIAGNOSIS — K59.00: ICD-10-CM

## 2020-07-28 DIAGNOSIS — Z90.710: ICD-10-CM

## 2020-07-28 DIAGNOSIS — E11.319: ICD-10-CM

## 2020-07-28 DIAGNOSIS — I25.2: ICD-10-CM

## 2020-07-28 DIAGNOSIS — Z86.14: ICD-10-CM

## 2020-07-28 DIAGNOSIS — Z99.3: ICD-10-CM

## 2020-07-28 DIAGNOSIS — Z89.411: ICD-10-CM

## 2020-07-28 DIAGNOSIS — Z88.8: ICD-10-CM

## 2020-07-28 DIAGNOSIS — Z96.1: ICD-10-CM

## 2020-07-28 DIAGNOSIS — Z79.82: ICD-10-CM

## 2020-07-28 DIAGNOSIS — Z87.01: ICD-10-CM

## 2020-07-28 DIAGNOSIS — I50.32: ICD-10-CM

## 2020-07-28 LAB
ALBUMIN SERPL-MCNC: 4 G/DL (ref 3.5–5)
ALP SERPL-CCNC: 89 U/L (ref 38–126)
ALT SERPL-CCNC: 23 U/L (ref 4–34)
ANION GAP SERPL CALC-SCNC: 14 MMOL/L
APTT BLD: 22.1 SEC (ref 22–30)
AST SERPL-CCNC: 23 U/L (ref 14–36)
BASOPHILS # BLD AUTO: 0 K/UL (ref 0–0.2)
BASOPHILS NFR BLD AUTO: 0 %
BUN SERPL-SCNC: 50 MG/DL (ref 7–17)
CALCIUM SPEC-MCNC: 9.5 MG/DL (ref 8.4–10.2)
CHLORIDE SERPL-SCNC: 101 MMOL/L (ref 98–107)
CO2 SERPL-SCNC: 22 MMOL/L (ref 22–30)
EOSINOPHIL # BLD AUTO: 0 K/UL (ref 0–0.7)
EOSINOPHIL NFR BLD AUTO: 1 %
ERYTHROCYTE [DISTWIDTH] IN BLOOD BY AUTOMATED COUNT: 3.61 M/UL (ref 3.8–5.4)
ERYTHROCYTE [DISTWIDTH] IN BLOOD: 15.3 % (ref 11.5–15.5)
GLUCOSE BLD-MCNC: 127 MG/DL (ref 75–99)
GLUCOSE BLD-MCNC: 141 MG/DL (ref 75–99)
GLUCOSE BLD-MCNC: 179 MG/DL (ref 75–99)
GLUCOSE BLD-MCNC: 215 MG/DL (ref 75–99)
GLUCOSE BLD-MCNC: 216 MG/DL (ref 75–99)
GLUCOSE BLD-MCNC: 267 MG/DL (ref 75–99)
GLUCOSE BLD-MCNC: 380 MG/DL (ref 75–99)
GLUCOSE BLD-MCNC: 582 MG/DL (ref 75–99)
GLUCOSE BLD-MCNC: 97 MG/DL (ref 75–99)
GLUCOSE BLD-MCNC: >600 MG/DL (ref 75–99)
GLUCOSE SERPL-MCNC: 595 MG/DL (ref 74–99)
HCT VFR BLD AUTO: 34.4 % (ref 34–46)
HGB BLD-MCNC: 10.5 GM/DL (ref 11.4–16)
INR PPP: 0.9 (ref ?–1.2)
LYMPHOCYTES # SPEC AUTO: 1 K/UL (ref 1–4.8)
LYMPHOCYTES NFR SPEC AUTO: 18 %
MCH RBC QN AUTO: 29.1 PG (ref 25–35)
MCHC RBC AUTO-ENTMCNC: 30.6 G/DL (ref 31–37)
MCV RBC AUTO: 95.1 FL (ref 80–100)
MONOCYTES # BLD AUTO: 0.2 K/UL (ref 0–1)
MONOCYTES NFR BLD AUTO: 3 %
NEUTROPHILS # BLD AUTO: 4.5 K/UL (ref 1.3–7.7)
NEUTROPHILS NFR BLD AUTO: 77 %
PLATELET # BLD AUTO: 263 K/UL (ref 150–450)
POTASSIUM SERPL-SCNC: 4.6 MMOL/L (ref 3.5–5.1)
PROT SERPL-MCNC: 8.5 G/DL (ref 6.3–8.2)
PT BLD: 9.6 SEC (ref 9–12)
SODIUM SERPL-SCNC: 137 MMOL/L (ref 137–145)
WBC # BLD AUTO: 5.8 K/UL (ref 3.8–10.6)

## 2020-07-28 PROCEDURE — 74150 CT ABDOMEN W/O CONTRAST: CPT

## 2020-07-28 PROCEDURE — 80053 COMPREHEN METABOLIC PANEL: CPT

## 2020-07-28 PROCEDURE — 99285 EMERGENCY DEPT VISIT HI MDM: CPT

## 2020-07-28 PROCEDURE — 84484 ASSAY OF TROPONIN QUANT: CPT

## 2020-07-28 PROCEDURE — 80048 BASIC METABOLIC PNL TOTAL CA: CPT

## 2020-07-28 PROCEDURE — 82150 ASSAY OF AMYLASE: CPT

## 2020-07-28 PROCEDURE — 83690 ASSAY OF LIPASE: CPT

## 2020-07-28 PROCEDURE — 96360 HYDRATION IV INFUSION INIT: CPT

## 2020-07-28 PROCEDURE — 85730 THROMBOPLASTIN TIME PARTIAL: CPT

## 2020-07-28 PROCEDURE — 82140 ASSAY OF AMMONIA: CPT

## 2020-07-28 PROCEDURE — 83036 HEMOGLOBIN GLYCOSYLATED A1C: CPT

## 2020-07-28 PROCEDURE — 96361 HYDRATE IV INFUSION ADD-ON: CPT

## 2020-07-28 PROCEDURE — 71046 X-RAY EXAM CHEST 2 VIEWS: CPT

## 2020-07-28 PROCEDURE — 36415 COLL VENOUS BLD VENIPUNCTURE: CPT

## 2020-07-28 PROCEDURE — 83605 ASSAY OF LACTIC ACID: CPT

## 2020-07-28 PROCEDURE — 85025 COMPLETE CBC W/AUTO DIFF WBC: CPT

## 2020-07-28 PROCEDURE — 82947 ASSAY GLUCOSE BLOOD QUANT: CPT

## 2020-07-28 PROCEDURE — 93005 ELECTROCARDIOGRAM TRACING: CPT

## 2020-07-28 PROCEDURE — 76937 US GUIDE VASCULAR ACCESS: CPT

## 2020-07-28 PROCEDURE — 82009 KETONE BODYS QUAL: CPT

## 2020-07-28 PROCEDURE — 85610 PROTHROMBIN TIME: CPT

## 2020-07-28 PROCEDURE — 82805 BLOOD GASES W/O2 SATURATION: CPT

## 2020-07-28 PROCEDURE — 36410 VNPNXR 3YR/> PHY/QHP DX/THER: CPT

## 2020-07-28 PROCEDURE — 36600 WITHDRAWAL OF ARTERIAL BLOOD: CPT

## 2020-07-28 RX ADMIN — INSULIN HUMAN SCH MLS/HR: 100 INJECTION, SOLUTION PARENTERAL at 15:10

## 2020-07-28 RX ADMIN — CEFAZOLIN SCH: 330 INJECTION, POWDER, FOR SOLUTION INTRAMUSCULAR; INTRAVENOUS at 18:31

## 2020-07-28 RX ADMIN — CEFAZOLIN SCH MLS/HR: 330 INJECTION, POWDER, FOR SOLUTION INTRAMUSCULAR; INTRAVENOUS at 21:20

## 2020-07-28 RX ADMIN — DEXTROSE MONOHYDRATE, SODIUM CHLORIDE, AND POTASSIUM CHLORIDE SCH MLS/HR: 50; 4.5; 1.49 INJECTION, SOLUTION INTRAVENOUS at 17:31

## 2020-07-28 NOTE — ED
General Adult HPI





- General


Chief complaint: Recheck/Abnormal Lab/Rx


Stated complaint: hyperglycemia


Time Seen by Provider: 20 14:08


Source: patient, RN notes reviewed


Mode of arrival: ambulatory


Limitations: no limitations





- History of Present Illness


Initial comments: 





Patient is a pleasant 60-year-old female presenting to the emergency Department 

with concerns for hyperglycemia.  Patient states her blood sugar has been high 

on her readings for the past 3 days.  Patient complains of fatigue, polyuria and

polydipsia.  Patient feels somewhat achy.  No fever or recent illness.  No chest

pain or abdominal pain.  Patient does have history of diabetes with similar 

symptoms previously.





- Related Data


                                Home Medications











 Medication  Instructions  Recorded  Confirmed


 


Albuterol Inhaler (Mhu) [Ventolin 2 puff INHALATION RT-Q4H PRN 02/19/16 03/15/20





Hfa Inhaler (Mhu)]   


 


Famotidine [Pepcid] 20 mg PO DAILY@0900 02/19/16 03/15/20


 


Ergocalciferol [Vitamin D2 50,000 unit PO SA@0900 10/06/16 03/15/20





(DRISDOL)]   


 


HYDROcodone/APAP 10-325MG [Norco 1 tab PO Q6H PRN 05/06/17 03/15/20





]   


 


DULoxetine HCL [Cymbalta] 60 mg PO DAILY@0900 09/19/17 03/15/20


 


Ondansetron [Zofran] 4 mg PO DAILY PRN 04/09/18 03/15/20


 


Atorvastatin [Lipitor] 20 mg PO DAILY@2100 07/31/19 03/15/20


 


QUEtiapine [SEROquel] 100 mg PO HS@2100 07/31/19 03/15/20


 


Vitamin B Complex With Vit C 1 tab PO DAILY 01/02/20 03/15/20


 


Aspirin [Adult Low Dose Aspirin EC] 81 mg PO DAILY@0900 01/10/20 03/15/20


 


Ferrous Sulfate [Iron (65  mg PO DAILY@0900 01/10/20 03/15/20





Elemental)]   


 


INSULIN LISPRO (humaLOG) [humaLOG] See Protocol SQ ACHS 03/15/20 03/15/20


 


Insulin Glargine,Hum.rec.anlog 20 unit SQ HS 03/15/20 03/15/20





[Basaglar Kwikpen U-100]   


 


QUEtiapine [SEROquel] 25 mg PO DAILY@0900 03/15/20 03/15/20








                                  Previous Rx's











 Medication  Instructions  Recorded


 


ALPRAZolam [Xanax] 0.5 mg PO Q8HR PRN  tab 20


 


Divalproex [Depakote] 250 mg PO TID #90 tablet. 20











                                    Allergies











Allergy/AdvReac Type Severity Reaction Status Date / Time


 


Barbiturates Allergy  Rash/Hives Verified 20 13:55


 


cephalexin monohydrate Allergy  Rash/Hives Verified 20 13:55





[From Keflex]     


 


morphine Allergy  Rash/Hives Verified 20 13:55


 


Penicillins Allergy  Rash/Hives Verified 20 13:55


 


phenobarbital Allergy  Swelling Verified 20 13:55


 


venom-honey bee Allergy  Swelling Verified 20 13:55





[bee venom (honey bee)]     


 


amlodipine besylate AdvReac  Vomiting Verified 20 13:55





[From Norvasc]     














Review of Systems


ROS Statement: 


Those systems with pertinent positive or pertinent negative responses have been 

documented in the HPI.





ROS Other: All systems not noted in ROS Statement are negative.


Constitutional: Denies: fever


Eyes: Denies: eye pain


ENT: Denies: ear pain


Respiratory: Denies: cough


Cardiovascular: Denies: chest pain


Endocrine: Reports: fatigue, polydipsia, polyuria


Gastrointestinal: Denies: abdominal pain


Genitourinary: Denies: dysuria


Musculoskeletal: Denies: back pain


Skin: Denies: rash


Neurological: Denies: weakness





Past Medical History


Past Medical History: Asthma, Coronary Artery Disease (CAD), Chest Pain / 

Angina, Heart Failure, COPD, CVA/TIA, Diabetes Mellitus, Deep Vein Thrombosis 

(DVT), Eye Disorder, GERD/Reflux, Hyperlipidemia, Hypertension, Myocardial 

Infarction (MI), Neurologic Disorder, Osteoarthritis (OA), Pneumonia, Renal 

Disease


Additional Past Medical History / Comment(s): IDDM (brittle), DKAs, neuropathy 

bilateral hands/feet, retinopathy bilateral eyes, cellulitis R foot, R great toe

and 2nd toe infections/amputations, current wound R foot-being seen in [facility], 

renal failure, anemia, CVAs with L sided paralysis, headaches started after 

CVAs, brain lesions, DVT R axillae, low back pain, varicosities, seizure many 

years ago (), hypothyroid, constipation, bilateral tinnitis occasionally, 

sinus problems.


Last Myocardial Infarction Date:: 


History of Any Multi-Drug Resistant Organisms: MRSA


Date of last positivie culture/infection: 18


MDRO Source:: Right Foot


Past Surgical History: Appendectomy,  Section, Cholecystectomy, Heart 

Catheterization With Stent, Hysterectomy, Orthopedic Surgery


Additional Past Surgical History / Comment(s): PCI with multiple stents, R great

toe and 2nd toe amps, debridements R foot ulcer, L shoulder surgery to remove 

bone, bronchoscopy, EGD, colonoscopy, R arm port since removed, bilateral 

cataract removals/lens implants.


Past Anesthesia/Blood Transfusion Reactions: No Reported Reaction


Additional Past Anesthesia/Blood Transfusion Reaction / Comment(s): HX OF BLOOD 

TRANSFUSION- NO REACTION


Date of Last Stent Placement:: 2013


Past Psychological History: Anxiety, Bipolar, Depression


Smoking Status: Former smoker


Past Alcohol Use History: None Reported


Past Drug Use History: Marijuana





- Past Family History


  ** Father


Family Medical History: Unable to Obtain, Coronary Artery Disease (CAD), 

Diabetes Mellitus





  ** Mother


Family Medical History: COPD





General Exam


Limitations: no limitations


General appearance: alert, in no apparent distress


Head exam: Present: normocephalic


Eye exam: Present: normal appearance


ENT exam: Present: normal oropharynx


Neck exam: Present: normal inspection


Respiratory exam: Present: normal lung sounds bilaterally


Cardiovascular Exam: Present: regular rate, normal rhythm


GI/Abdominal exam: Present: soft.  Absent: tenderness


Extremities exam: Present: other (Right foot with recent incision is clean and 

dry and intact.  No significant erythema or swelling.)


Neurological exam: Present: alert


Psychiatric exam: Present: normal affect, normal mood


Skin exam: Present: normal color.  Absent: erythema





Course


                                   Vital Signs











  20





  13:47


 


Pulse Rate 116 H


 


Respiratory 18





Rate 


 


Blood Pressure 151/83


 


O2 Sat by Pulse 99





Oximetry 














EKG Findings





- EKG Comments:


EKG Findings:: Normal sinus rhythm 94.  .  QRS 84.  .  .  

Normal axis.  Normal QRS.  No acute ST change.





Medical Decision Making





- Medical Decision Making





Patient reevaluated and updated.  Case discussed in detail with Dr. Cabral who 

is familiar with this patient and will admit with IV insulin.





- Lab Data


Result diagrams: 


                                 20 14:40





                                 20 14:40


                                   Lab Results











  20 Range/Units





  13:52 14:40 14:40 


 


WBC   5.8   (3.8-10.6)  k/uL


 


RBC   3.61 L   (3.80-5.40)  m/uL


 


Hgb   10.5 L   (11.4-16.0)  gm/dL


 


Hct   34.4   (34.0-46.0)  %


 


MCV   95.1   (80.0-100.0)  fL


 


MCH   29.1   (25.0-35.0)  pg


 


MCHC   30.6 L   (31.0-37.0)  g/dL


 


RDW   15.3   (11.5-15.5)  %


 


Plt Count   263   (150-450)  k/uL


 


Neutrophils %   77   %


 


Lymphocytes %   18   %


 


Monocytes %   3   %


 


Eosinophils %   1   %


 


Basophils %   0   %


 


Neutrophils #   4.5   (1.3-7.7)  k/uL


 


Lymphocytes #   1.0   (1.0-4.8)  k/uL


 


Monocytes #   0.2   (0-1.0)  k/uL


 


Eosinophils #   0.0   (0-0.7)  k/uL


 


Basophils #   0.0   (0-0.2)  k/uL


 


Hypochromasia   Marked   


 


PT    9.6  (9.0-12.0)  sec


 


INR    0.9  (<1.2)  


 


APTT    22.1  (22.0-30.0)  sec


 


Sodium     (137-145)  mmol/L


 


Potassium     (3.5-5.1)  mmol/L


 


Chloride     ()  mmol/L


 


Carbon Dioxide     (22-30)  mmol/L


 


Anion Gap     mmol/L


 


BUN     (7-17)  mg/dL


 


Creatinine     (0.52-1.04)  mg/dL


 


Est GFR (CKD-EPI)AfAm     (>60 ml/min/1.73 sqM)  


 


Est GFR (CKD-EPI)NonAf     (>60 ml/min/1.73 sqM)  


 


Glucose     (74-99)  mg/dL


 


POC Glucose (mg/dL)  >600 H    (75-99)  mg/dL


 


POC Glu Operater ID  Ray Pereyra    


 


Calcium     (8.4-10.2)  mg/dL


 


Total Bilirubin     (0.2-1.3)  mg/dL


 


AST     (14-36)  U/L


 


ALT     (4-34)  U/L


 


Alkaline Phosphatase     ()  U/L


 


Troponin I     (0.000-0.034)  ng/mL


 


Total Protein     (6.3-8.2)  g/dL


 


Albumin     (3.5-5.0)  g/dL


 


Acetone, Qual     (Negative)  














  20 Range/Units





  14:40 14:40 15:05 


 


WBC     (3.8-10.6)  k/uL


 


RBC     (3.80-5.40)  m/uL


 


Hgb     (11.4-16.0)  gm/dL


 


Hct     (34.0-46.0)  %


 


MCV     (80.0-100.0)  fL


 


MCH     (25.0-35.0)  pg


 


MCHC     (31.0-37.0)  g/dL


 


RDW     (11.5-15.5)  %


 


Plt Count     (150-450)  k/uL


 


Neutrophils %     %


 


Lymphocytes %     %


 


Monocytes %     %


 


Eosinophils %     %


 


Basophils %     %


 


Neutrophils #     (1.3-7.7)  k/uL


 


Lymphocytes #     (1.0-4.8)  k/uL


 


Monocytes #     (0-1.0)  k/uL


 


Eosinophils #     (0-0.7)  k/uL


 


Basophils #     (0-0.2)  k/uL


 


Hypochromasia     


 


PT     (9.0-12.0)  sec


 


INR     (<1.2)  


 


APTT     (22.0-30.0)  sec


 


Sodium  137    (137-145)  mmol/L


 


Potassium  4.6    (3.5-5.1)  mmol/L


 


Chloride  101    ()  mmol/L


 


Carbon Dioxide  22    (22-30)  mmol/L


 


Anion Gap  14    mmol/L


 


BUN  50 H    (7-17)  mg/dL


 


Creatinine  1.13 H    (0.52-1.04)  mg/dL


 


Est GFR (CKD-EPI)AfAm  62    (>60 ml/min/1.73 sqM)  


 


Est GFR (CKD-EPI)NonAf  53    (>60 ml/min/1.73 sqM)  


 


Glucose  595 H*    (74-99)  mg/dL


 


POC Glucose (mg/dL)    582 H  (75-99)  mg/dL


 


POC Glu Operater ID    Annika Ruiz  


 


Calcium  9.5    (8.4-10.2)  mg/dL


 


Total Bilirubin  0.5    (0.2-1.3)  mg/dL


 


AST  23    (14-36)  U/L


 


ALT  23    (4-34)  U/L


 


Alkaline Phosphatase  89    ()  U/L


 


Troponin I   <0.012   (0.000-0.034)  ng/mL


 


Total Protein  8.5 H    (6.3-8.2)  g/dL


 


Albumin  4.0    (3.5-5.0)  g/dL


 


Acetone, Qual  Negative    (Negative)  














- Radiology Data


Radiology results: image reviewed (Chest x-ray shows no acute process)





Disposition


Clinical Impression: 


 Hyperglycemia





Disposition: ADMITTED AS IP TO THIS HOSP


Is patient prescribed a controlled substance at d/c from ED?: No


Referrals: 


Saniya Cabral MD [Primary Care Provider] - 1-2 days


Decision Time: 16:09

## 2020-07-28 NOTE — XR
EXAMINATION TYPE: XR chest 2V

 

DATE OF EXAM: 7/28/2020

 

COMPARISON: 3/14/2020

 

HISTORY: Shortness of breath

 

TECHNIQUE:  Frontal and lateral views of the chest are obtained.

 

FINDINGS:

 

Scattered senescent parenchymal changes noted. Hyperinflation compatible with COPD. 

 

No evidence for infiltrate. No evidence for atelectasis.

 

Heart size is stable.

 

Mediastinal structures are stable and grossly unremarkable.

 

No evidence for hilar prominence.

 

Degenerative changes dorsal spine. 

 

IMPRESSION:

1. No evidence for acute pulmonary disease.

## 2020-07-29 LAB
ALBUMIN SERPL-MCNC: 3.2 G/DL (ref 3.5–5)
ALBUMIN SERPL-MCNC: 3.8 G/DL (ref 3.5–5)
ALP SERPL-CCNC: 102 U/L (ref 38–126)
ALP SERPL-CCNC: 74 U/L (ref 38–126)
ALT SERPL-CCNC: 21 U/L (ref 4–34)
ALT SERPL-CCNC: 24 U/L (ref 4–34)
AMYLASE SERPL-CCNC: 37 U/L (ref 30–110)
ANION GAP SERPL CALC-SCNC: 10 MMOL/L
ANION GAP SERPL CALC-SCNC: 8 MMOL/L
AST SERPL-CCNC: 27 U/L (ref 14–36)
AST SERPL-CCNC: 30 U/L (ref 14–36)
BASOPHILS # BLD AUTO: 0 K/UL (ref 0–0.2)
BASOPHILS NFR BLD AUTO: 0 %
BUN SERPL-SCNC: 22 MG/DL (ref 7–17)
BUN SERPL-SCNC: 41 MG/DL (ref 7–17)
CALCIUM SPEC-MCNC: 8.4 MG/DL (ref 8.4–10.2)
CALCIUM SPEC-MCNC: 8.9 MG/DL (ref 8.4–10.2)
CHLORIDE SERPL-SCNC: 102 MMOL/L (ref 98–107)
CHLORIDE SERPL-SCNC: 108 MMOL/L (ref 98–107)
CO2 SERPL-SCNC: 21 MMOL/L (ref 22–30)
CO2 SERPL-SCNC: 22 MMOL/L (ref 22–30)
EOSINOPHIL # BLD AUTO: 0 K/UL (ref 0–0.7)
EOSINOPHIL NFR BLD AUTO: 0 %
ERYTHROCYTE [DISTWIDTH] IN BLOOD BY AUTOMATED COUNT: 3.48 M/UL (ref 3.8–5.4)
ERYTHROCYTE [DISTWIDTH] IN BLOOD: 15.5 % (ref 11.5–15.5)
GLUCOSE BLD-MCNC: 114 MG/DL (ref 75–99)
GLUCOSE BLD-MCNC: 122 MG/DL (ref 75–99)
GLUCOSE BLD-MCNC: 137 MG/DL (ref 75–99)
GLUCOSE BLD-MCNC: 149 MG/DL (ref 75–99)
GLUCOSE BLD-MCNC: 153 MG/DL (ref 75–99)
GLUCOSE BLD-MCNC: 156 MG/DL (ref 75–99)
GLUCOSE BLD-MCNC: 165 MG/DL (ref 75–99)
GLUCOSE BLD-MCNC: 185 MG/DL (ref 75–99)
GLUCOSE BLD-MCNC: 194 MG/DL (ref 75–99)
GLUCOSE BLD-MCNC: 198 MG/DL (ref 75–99)
GLUCOSE BLD-MCNC: 203 MG/DL (ref 75–99)
GLUCOSE BLD-MCNC: 210 MG/DL (ref 75–99)
GLUCOSE BLD-MCNC: 222 MG/DL (ref 75–99)
GLUCOSE BLD-MCNC: 241 MG/DL (ref 75–99)
GLUCOSE BLD-MCNC: 242 MG/DL (ref 75–99)
GLUCOSE BLD-MCNC: 248 MG/DL (ref 75–99)
GLUCOSE BLD-MCNC: 258 MG/DL (ref 75–99)
GLUCOSE BLD-MCNC: 264 MG/DL (ref 75–99)
GLUCOSE BLD-MCNC: 273 MG/DL (ref 75–99)
GLUCOSE BLD-MCNC: 325 MG/DL (ref 75–99)
GLUCOSE BLD-MCNC: 332 MG/DL (ref 75–99)
GLUCOSE BLD-MCNC: 351 MG/DL (ref 75–99)
GLUCOSE BLD-MCNC: 409 MG/DL (ref 75–99)
GLUCOSE BLD-MCNC: 409 MG/DL (ref 75–99)
GLUCOSE SERPL-MCNC: 235 MG/DL (ref 74–99)
GLUCOSE SERPL-MCNC: 412 MG/DL (ref 74–99)
HCT VFR BLD AUTO: 32.2 % (ref 34–46)
HGB BLD-MCNC: 10.2 GM/DL (ref 11.4–16)
LYMPHOCYTES # SPEC AUTO: 1.7 K/UL (ref 1–4.8)
LYMPHOCYTES NFR SPEC AUTO: 21 %
MCH RBC QN AUTO: 29.2 PG (ref 25–35)
MCHC RBC AUTO-ENTMCNC: 31.5 G/DL (ref 31–37)
MCV RBC AUTO: 92.6 FL (ref 80–100)
MONOCYTES # BLD AUTO: 0.3 K/UL (ref 0–1)
MONOCYTES NFR BLD AUTO: 3 %
NEUTROPHILS # BLD AUTO: 5.9 K/UL (ref 1.3–7.7)
NEUTROPHILS NFR BLD AUTO: 73 %
PLATELET # BLD AUTO: 280 K/UL (ref 150–450)
POTASSIUM SERPL-SCNC: 4.7 MMOL/L (ref 3.5–5.1)
POTASSIUM SERPL-SCNC: 4.8 MMOL/L (ref 3.5–5.1)
PROT SERPL-MCNC: 6.9 G/DL (ref 6.3–8.2)
PROT SERPL-MCNC: 8.1 G/DL (ref 6.3–8.2)
SODIUM SERPL-SCNC: 134 MMOL/L (ref 137–145)
SODIUM SERPL-SCNC: 137 MMOL/L (ref 137–145)
WBC # BLD AUTO: 8 K/UL (ref 3.8–10.6)

## 2020-07-29 RX ADMIN — TRIMETHOBENZAMIDE HYDROCHLORIDE PRN MG: 100 INJECTION INTRAMUSCULAR at 14:57

## 2020-07-29 RX ADMIN — DIVALPROEX SODIUM SCH: 250 TABLET, DELAYED RELEASE ORAL at 17:56

## 2020-07-29 RX ADMIN — ONDANSETRON PRN MG: 2 INJECTION INTRAMUSCULAR; INTRAVENOUS at 17:37

## 2020-07-29 RX ADMIN — METOCLOPRAMIDE SCH MG: 5 INJECTION, SOLUTION INTRAMUSCULAR; INTRAVENOUS at 15:12

## 2020-07-29 RX ADMIN — INSULIN DETEMIR SCH: 100 INJECTION, SOLUTION SUBCUTANEOUS at 22:58

## 2020-07-29 RX ADMIN — VALPROIC ACID SCH: 250 SOLUTION ORAL at 17:22

## 2020-07-29 RX ADMIN — CEFAZOLIN SCH: 330 INJECTION, POWDER, FOR SOLUTION INTRAMUSCULAR; INTRAVENOUS at 00:10

## 2020-07-29 RX ADMIN — INSULIN HUMAN SCH MLS/HR: 100 INJECTION, SOLUTION PARENTERAL at 17:49

## 2020-07-29 RX ADMIN — INSULIN ASPART SCH: 100 INJECTION, SOLUTION INTRAVENOUS; SUBCUTANEOUS at 17:56

## 2020-07-29 RX ADMIN — TRIMETHOBENZAMIDE HYDROCHLORIDE PRN MG: 100 INJECTION INTRAMUSCULAR at 20:37

## 2020-07-29 RX ADMIN — DEXTROSE MONOHYDRATE, SODIUM CHLORIDE, AND POTASSIUM CHLORIDE SCH MLS/HR: 50; 4.5; 1.49 INJECTION, SOLUTION INTRAVENOUS at 19:48

## 2020-07-29 RX ADMIN — METOCLOPRAMIDE SCH MG: 5 INJECTION, SOLUTION INTRAMUSCULAR; INTRAVENOUS at 23:04

## 2020-07-29 RX ADMIN — PANTOPRAZOLE SODIUM SCH MG: 40 INJECTION, POWDER, FOR SOLUTION INTRAVENOUS at 20:36

## 2020-07-29 RX ADMIN — ATORVASTATIN CALCIUM SCH: 20 TABLET, FILM COATED ORAL at 23:01

## 2020-07-29 RX ADMIN — ONDANSETRON PRN MG: 2 INJECTION INTRAMUSCULAR; INTRAVENOUS at 13:20

## 2020-07-29 RX ADMIN — DEXTROSE MONOHYDRATE, SODIUM CHLORIDE, AND POTASSIUM CHLORIDE SCH MLS/HR: 50; 4.5; 1.49 INJECTION, SOLUTION INTRAVENOUS at 07:19

## 2020-07-29 RX ADMIN — PANTOPRAZOLE SODIUM SCH MG: 40 INJECTION, POWDER, FOR SOLUTION INTRAVENOUS at 17:59

## 2020-07-29 RX ADMIN — DEXTROSE MONOHYDRATE, SODIUM CHLORIDE, AND POTASSIUM CHLORIDE SCH MLS/HR: 50; 4.5; 1.49 INJECTION, SOLUTION INTRAVENOUS at 00:55

## 2020-07-29 RX ADMIN — DEXTROSE MONOHYDRATE, SODIUM CHLORIDE, AND POTASSIUM CHLORIDE SCH MLS/HR: 50; 4.5; 1.49 INJECTION, SOLUTION INTRAVENOUS at 17:59

## 2020-07-29 RX ADMIN — DIVALPROEX SODIUM SCH: 250 TABLET, DELAYED RELEASE ORAL at 23:01

## 2020-07-29 RX ADMIN — METOCLOPRAMIDE SCH MG: 5 INJECTION, SOLUTION INTRAMUSCULAR; INTRAVENOUS at 17:37

## 2020-07-29 RX ADMIN — INSULIN ASPART SCH: 100 INJECTION, SOLUTION INTRAVENOUS; SUBCUTANEOUS at 20:26

## 2020-07-29 NOTE — P.HPIM
History of Present Illness


H&P Date: 20





Morenita Ramirez, is a 60-year-old female who presented to [facility] emergency room due to uncontrolled blood sugar, caregiver states that 

sugar has been up above 600 for about 3 days, she has been giving her extra 

insulin without ability of getting sugar under control.  Patient was evaluated 

in the emergency room she was started on IV insulin drip and was admitted to 

medical floor.


On the medical floor patient started developing nausea and vomiting, otherwise 

she denies any complaints there is no fever or chills no headache or dizziness 

no chest pain no shortness of breath no cough no burning was urination no 

frequency or urgency and no hematuria





Past Medical History


Past Medical History: Asthma, Coronary Artery Disease (CAD), Chest Pain / 

Angina, Heart Failure, COPD, CVA/TIA, Diabetes Mellitus, Deep Vein Thrombosis 

(DVT), Eye Disorder, GERD/Reflux, Hyperlipidemia, Hypertension, Myocardial 

Infarction (MI), Neurologic Disorder, Osteoarthritis (OA), Pneumonia, Renal 

Disease


Additional Past Medical History / Comment(s): IDDM (brittle), DKAs, neuropathy 

bilateral hands/feet, retinopathy bilateral eyes, cellulitis R foot, R great toe

and 2nd toe infections/amputations, current wound R foot-being seen in [facility], 

renal failure, anemia, CVAs with L sided paralysis, headaches started after 

CVAs, brain lesions, DVT R axillae, low back pain, varicosities, seizure many 

years ago (), hypothyroid, constipation, bilateral tinnitis occasionally, 

sinus problems.


Last Myocardial Infarction Date:: 


History of Any Multi-Drug Resistant Organisms: MRSA


Date of last positivie culture/infection: 18


MDRO Source:: Right Foot


Past Surgical History: Appendectomy,  Section, Cholecystectomy, Heart 

Catheterization With Stent, Hysterectomy, Orthopedic Surgery


Additional Past Surgical History / Comment(s): PCI with multiple stents, R great

toe and 2nd toe amps, debridements R foot ulcer, L shoulder surgery to remove 

bone, bronchoscopy, EGD, colonoscopy, R arm port since removed, bilateral 

cataract removals/lens implants.


Past Anesthesia/Blood Transfusion Reactions: No Reported Reaction


Additional Past Anesthesia/Blood Transfusion Reaction / Comment(s): HX OF BLOOD 

TRANSFUSION- NO REACTION


Date of Last Stent Placement:: 2013


Past Psychological History: Anxiety, Bipolar, Depression


Smoking Status: Former smoker


Past Alcohol Use History: None Reported


Past Drug Use History: Marijuana





- Past Family History


  ** Father


Family Medical History: Unable to Obtain, Coronary Artery Disease (CAD), 

Diabetes Mellitus





  ** Mother


Family Medical History: COPD





Medications and Allergies


                                Home Medications











 Medication  Instructions  Recorded  Confirmed  Type


 


Famotidine [Pepcid] 20 mg PO DAILY@0900 16 History


 


Ergocalciferol [Vitamin D2 50,000 unit PO SA@0900 10/06/16 07/28/20 History





(DRISDOL)]    


 


HYDROcodone/APAP 10-325MG [Norco 1 tab PO Q6H PRN 17 History





]    


 


DULoxetine HCL [Cymbalta] 60 mg PO DAILY@17 History


 


Atorvastatin [Lipitor] 20 mg PO DAILY@19 History


 


QUEtiapine [SEROquel] 100 mg PO HS@19 History


 


Vitamin B Complex With Vit C 1 tab PO DAILY 20 History


 


Aspirin [Adult Low Dose Aspirin EC] 81 mg PO DAILY@0900 01/10/20 07/28/20 

History


 


Ferrous Sulfate [Iron (65  mg PO DAILY@0900 01/10/20 07/28/20 History





Elemental)]    


 


INSULIN LISPRO (humaLOG) [humaLOG] See Protocol SQ ACHS 03/15/20 07/28/20 His

tory


 


Insulin Glargine,Hum.rec.anlog 20 unit SQ HS 03/15/20 07/28/20 History





[Basaglar Kwikpen U-100]    


 


Divalproex [Depakote] 250 mg PO TID #90 tablet. 20 Rx


 


ALPRAZolam [Xanax] 1 mg PO TID PRN 20 History


 


Albuterol Sulfate [Ventolin HFA] 2 puff INHALATION RT-Q4H PRN 20 

History


 


Ondansetron Odt [Zofran Odt] 4 mg PO DAILY PRN 20 History


 


Valproic Acid [Depakene] 250 mg PO DAILY 20 History








                                    Allergies











Allergy/AdvReac Type Severity Reaction Status Date / Time


 


Barbiturates Allergy  Rash/Hives Verified 20 17:26


 


cephalexin monohydrate Allergy  Rash/Hives Verified 20 17:26





[From Keflex]     


 


morphine Allergy  Rash/Hives Verified 20 17:26


 


Penicillins Allergy  Rash/Hives Verified 20 17:26


 


phenobarbital Allergy  Swelling Verified 20 17:26


 


venom-honey bee Allergy  Swelling Verified 20 17:26





[bee venom (honey bee)]     


 


amlodipine besylate AdvReac  Vomiting Verified 20 17:26





[From Norvasc]     














Physical Exam


Vitals: 


                                   Vital Signs











  Temp Pulse Pulse Resp BP BP Pulse Ox


 


 20 04:00  98.3 F   86  18   145/69  99


 


 20 23:19    91  19   


 


 20 23:14  98.1 F   91  19   116/98  98


 


 20 21:22      120/70  


 


 20 19:19  97.7 F  89   18  85/55   100


 


 20 17:00   96   23  123/52   100


 


 20 16:30   87   20  112/65   99


 


 20 16:00   90   27 H  95/81   99


 


 20 15:00   102 H   20  110/62   98


 


 20 14:30   96   21  143/80   98


 


 20 13:47   116 H   18  151/83   99








                                Intake and Output











 20





 22:59 06:59 14:59


 


Intake Total 25.741 859.884 6834.975


 


Balance 25.741 847.588 4626.975


 


Intake:   


 


  Intake, IV Titration 25.741 000.989 6383.975





  Amount   


 


    D5-0.45% NaCl with KCl  150 1200





    20Meq/l 1,000 ml @ 150   





    mls/hr IV .Q6H40M TYRON Rx#   





    :[id number]   


 


    Insulin Regular 100 unit 25.741 15.242 1.975





    In Sodium Chloride 0.9%   





    100 ml @ 0.1 UNITS/KG/HR   





    4.536 mls/hr IV .[id number]   





    TYRON Rx#:[id number]   


 


Other:   


 


  Voiding Method  Diaper 





  Incontinent 


 


  # Voids  1 2


 


  Weight  52.5 kg 














In general patient is alert and oriented 3 in mild distress due to continuous 

nausea and vomiting


HEENT head normocephalic and atraumatic


Neck is supple no JVD no goiter


Chest exam reveals a few scattered crackles bilaterally no wheezing


Cardiac exam reveals regular heart sounds no gallops no murmurs


Abdomen is soft with mild diffuse tenderness no organomegaly no palpable masses 

no rigidity or rebound


Extremity exam reveals no edema no cyanosis or clubbing patient has multiple toe

amputation in the past





Results


CBC & Chem 7: 


                                 20 15:09





                                 20 15:09


Labs: 


                  Abnormal Lab Results - Last 24 Hours (Table)











  20 Range/Units





  13:52 14:40 14:40 


 


RBC   3.61 L   (3.80-5.40)  m/uL


 


Hgb   10.5 L   (11.4-16.0)  gm/dL


 


MCHC   30.6 L   (31.0-37.0)  g/dL


 


Chloride     ()  mmol/L


 


Carbon Dioxide     (22-30)  mmol/L


 


BUN    50 H  (7-17)  mg/dL


 


Creatinine    1.13 H  (0.52-1.04)  mg/dL


 


Glucose    595 H*  (74-99)  mg/dL


 


POC Glucose (mg/dL)  >600 H    (75-99)  mg/dL


 


Total Protein    8.5 H  (6.3-8.2)  g/dL


 


Albumin     (3.5-5.0)  g/dL














  20 Range/Units





  15:05 16:06 17:16 


 


RBC     (3.80-5.40)  m/uL


 


Hgb     (11.4-16.0)  gm/dL


 


MCHC     (31.0-37.0)  g/dL


 


Chloride     ()  mmol/L


 


Carbon Dioxide     (22-30)  mmol/L


 


BUN     (7-17)  mg/dL


 


Creatinine     (0.52-1.04)  mg/dL


 


Glucose     (74-99)  mg/dL


 


POC Glucose (mg/dL)  582 H  380 H  267 H  (75-99)  mg/dL


 


Total Protein     (6.3-8.2)  g/dL


 


Albumin     (3.5-5.0)  g/dL














  20 Range/Units





  18:25 19:17 20:20 


 


RBC     (3.80-5.40)  m/uL


 


Hgb     (11.4-16.0)  gm/dL


 


MCHC     (31.0-37.0)  g/dL


 


Chloride     ()  mmol/L


 


Carbon Dioxide     (22-30)  mmol/L


 


BUN     (7-17)  mg/dL


 


Creatinine     (0.52-1.04)  mg/dL


 


Glucose     (74-99)  mg/dL


 


POC Glucose (mg/dL)  179 H  216 H  141 H  (75-99)  mg/dL


 


Total Protein     (6.3-8.2)  g/dL


 


Albumin     (3.5-5.0)  g/dL














  20 Range/Units





  21:52 22:47 23:38 


 


RBC     (3.80-5.40)  m/uL


 


Hgb     (11.4-16.0)  gm/dL


 


MCHC     (31.0-37.0)  g/dL


 


Chloride    108 H  ()  mmol/L


 


Carbon Dioxide    21 L  (22-30)  mmol/L


 


BUN    41 H  (7-17)  mg/dL


 


Creatinine    1.19 H  (0.52-1.04)  mg/dL


 


Glucose    235 H  (74-99)  mg/dL


 


POC Glucose (mg/dL)  127 H  215 H   (75-99)  mg/dL


 


Total Protein     (6.3-8.2)  g/dL


 


Albumin    3.2 L  (3.5-5.0)  g/dL














  20 Range/Units





  00:03 00:59 01:59 


 


RBC     (3.80-5.40)  m/uL


 


Hgb     (11.4-16.0)  gm/dL


 


MCHC     (31.0-37.0)  g/dL


 


Chloride     ()  mmol/L


 


Carbon Dioxide     (22-30)  mmol/L


 


BUN     (7-17)  mg/dL


 


Creatinine     (0.52-1.04)  mg/dL


 


Glucose     (74-99)  mg/dL


 


POC Glucose (mg/dL)  264 H  248 H  222 H  (75-99)  mg/dL


 


Total Protein     (6.3-8.2)  g/dL


 


Albumin     (3.5-5.0)  g/dL














  20 Range/Units





  02:59 04:00 04:59 


 


RBC     (3.80-5.40)  m/uL


 


Hgb     (11.4-16.0)  gm/dL


 


MCHC     (31.0-37.0)  g/dL


 


Chloride     ()  mmol/L


 


Carbon Dioxide     (22-30)  mmol/L


 


BUN     (7-17)  mg/dL


 


Creatinine     (0.52-1.04)  mg/dL


 


Glucose     (74-99)  mg/dL


 


POC Glucose (mg/dL)  198 H  194 H  185 H  (75-99)  mg/dL


 


Total Protein     (6.3-8.2)  g/dL


 


Albumin     (3.5-5.0)  g/dL














  20 Range/Units





  06:00 07:16 08:02 


 


RBC     (3.80-5.40)  m/uL


 


Hgb     (11.4-16.0)  gm/dL


 


MCHC     (31.0-37.0)  g/dL


 


Chloride     ()  mmol/L


 


Carbon Dioxide     (22-30)  mmol/L


 


BUN     (7-17)  mg/dL


 


Creatinine     (0.52-1.04)  mg/dL


 


Glucose     (74-99)  mg/dL


 


POC Glucose (mg/dL)  149 H  137 H  153 H  (75-99)  mg/dL


 


Total Protein     (6.3-8.2)  g/dL


 


Albumin     (3.5-5.0)  g/dL














Assessment and Plan


Plan: 





1.  Severe hyperglycemia improving was insulin drip





2.  Intractable nausea and vomiting, attempt to put NG tube failed to due to 

multiplefractures in the past, we are treating symptomatically, consultation for

general surgery initiated





3.  Underlying history of hypertension blood pressure is severely elevated we 

will use IV hydralazine when necessary





4.  Underlying history of hyperlipidemia





5.  Underlying history of depression and anxiety disorder





6.  Previous history of stroke





At this time we are treating symptomatically for nausea and vomiting Will 

attempt to obtain computed tomography scan of the abdomen


Will continue insulin drip at this time will recheck labs in a.m.

## 2020-07-30 LAB
ALBUMIN SERPL-MCNC: 2.5 G/DL (ref 3.5–5)
ALP SERPL-CCNC: 108 U/L (ref 38–126)
ALT SERPL-CCNC: 117 U/L (ref 4–34)
ANION GAP SERPL CALC-SCNC: 5 MMOL/L
AST SERPL-CCNC: 225 U/L (ref 14–36)
BASOPHILS # BLD AUTO: 0 K/UL (ref 0–0.2)
BASOPHILS NFR BLD AUTO: 0 %
BUN SERPL-SCNC: 23 MG/DL (ref 7–17)
CALCIUM SPEC-MCNC: 8.1 MG/DL (ref 8.4–10.2)
CHLORIDE SERPL-SCNC: 107 MMOL/L (ref 98–107)
CO2 BLDA-SCNC: 27 MMOL/L (ref 19–24)
CO2 SERPL-SCNC: 24 MMOL/L (ref 22–30)
EOSINOPHIL # BLD AUTO: 0 K/UL (ref 0–0.7)
EOSINOPHIL NFR BLD AUTO: 0 %
ERYTHROCYTE [DISTWIDTH] IN BLOOD BY AUTOMATED COUNT: 2.86 M/UL (ref 3.8–5.4)
ERYTHROCYTE [DISTWIDTH] IN BLOOD: 15.6 % (ref 11.5–15.5)
GLUCOSE BLD-MCNC: 109 MG/DL (ref 75–99)
GLUCOSE BLD-MCNC: 114 MG/DL (ref 75–99)
GLUCOSE BLD-MCNC: 190 MG/DL (ref 75–99)
GLUCOSE BLD-MCNC: 205 MG/DL (ref 75–99)
GLUCOSE BLD-MCNC: 386 MG/DL (ref 75–99)
GLUCOSE BLD-MCNC: 411 MG/DL (ref 75–99)
GLUCOSE BLD-MCNC: 475 MG/DL (ref 75–99)
GLUCOSE BLD-MCNC: 91 MG/DL (ref 75–99)
GLUCOSE BLD-MCNC: 94 MG/DL (ref 75–99)
GLUCOSE BLD-MCNC: 98 MG/DL (ref 75–99)
GLUCOSE SERPL-MCNC: 161 MG/DL (ref 74–99)
HCO3 BLDA-SCNC: 26 MMOL/L (ref 21–25)
HCT VFR BLD AUTO: 26.5 % (ref 34–46)
HGB BLD-MCNC: 8.2 GM/DL (ref 11.4–16)
INR PPP: 1 (ref ?–1.2)
LYMPHOCYTES # SPEC AUTO: 2.7 K/UL (ref 1–4.8)
LYMPHOCYTES NFR SPEC AUTO: 53 %
MCH RBC QN AUTO: 28.6 PG (ref 25–35)
MCHC RBC AUTO-ENTMCNC: 30.8 G/DL (ref 31–37)
MCV RBC AUTO: 92.8 FL (ref 80–100)
MONOCYTES # BLD AUTO: 0.4 K/UL (ref 0–1)
MONOCYTES NFR BLD AUTO: 7 %
NEUTROPHILS # BLD AUTO: 2 K/UL (ref 1.3–7.7)
NEUTROPHILS NFR BLD AUTO: 38 %
PCO2 BLDA: 38 MMHG (ref 35–45)
PH BLDA: 7.44 [PH] (ref 7.35–7.45)
PLATELET # BLD AUTO: 208 K/UL (ref 150–450)
PO2 BLDA: 86 MMHG (ref 83–108)
POTASSIUM SERPL-SCNC: 4.2 MMOL/L (ref 3.5–5.1)
PROT SERPL-MCNC: 5.7 G/DL (ref 6.3–8.2)
PT BLD: 10.7 SEC (ref 9–12)
SODIUM SERPL-SCNC: 136 MMOL/L (ref 137–145)
WBC # BLD AUTO: 5.2 K/UL (ref 3.8–10.6)

## 2020-07-30 RX ADMIN — METOCLOPRAMIDE SCH MG: 5 INJECTION, SOLUTION INTRAMUSCULAR; INTRAVENOUS at 22:38

## 2020-07-30 RX ADMIN — DIVALPROEX SODIUM SCH MG: 250 TABLET, DELAYED RELEASE ORAL at 10:01

## 2020-07-30 RX ADMIN — INSULIN ASPART SCH UNIT: 100 INJECTION, SOLUTION INTRAVENOUS; SUBCUTANEOUS at 20:18

## 2020-07-30 RX ADMIN — PANTOPRAZOLE SODIUM SCH MG: 40 TABLET, DELAYED RELEASE ORAL at 18:48

## 2020-07-30 RX ADMIN — DIVALPROEX SODIUM SCH MG: 250 TABLET, DELAYED RELEASE ORAL at 15:35

## 2020-07-30 RX ADMIN — INSULIN ASPART SCH UNIT: 100 INJECTION, SOLUTION INTRAVENOUS; SUBCUTANEOUS at 06:31

## 2020-07-30 RX ADMIN — INSULIN ASPART SCH: 100 INJECTION, SOLUTION INTRAVENOUS; SUBCUTANEOUS at 06:33

## 2020-07-30 RX ADMIN — INSULIN ASPART SCH: 100 INJECTION, SOLUTION INTRAVENOUS; SUBCUTANEOUS at 18:49

## 2020-07-30 RX ADMIN — DEXTROSE MONOHYDRATE, SODIUM CHLORIDE, AND POTASSIUM CHLORIDE SCH: 50; 4.5; 1.49 INJECTION, SOLUTION INTRAVENOUS at 01:30

## 2020-07-30 RX ADMIN — ATORVASTATIN CALCIUM SCH MG: 20 TABLET, FILM COATED ORAL at 19:39

## 2020-07-30 RX ADMIN — Medication SCH MG: at 15:34

## 2020-07-30 RX ADMIN — METOCLOPRAMIDE SCH: 5 INJECTION, SOLUTION INTRAMUSCULAR; INTRAVENOUS at 05:18

## 2020-07-30 RX ADMIN — INSULIN ASPART SCH UNIT: 100 INJECTION, SOLUTION INTRAVENOUS; SUBCUTANEOUS at 20:17

## 2020-07-30 RX ADMIN — INSULIN ASPART SCH: 100 INJECTION, SOLUTION INTRAVENOUS; SUBCUTANEOUS at 12:06

## 2020-07-30 RX ADMIN — FAMOTIDINE SCH MG: 20 TABLET, FILM COATED ORAL at 15:34

## 2020-07-30 RX ADMIN — DIVALPROEX SODIUM SCH MG: 250 TABLET, DELAYED RELEASE ORAL at 19:39

## 2020-07-30 RX ADMIN — METOCLOPRAMIDE SCH: 5 INJECTION, SOLUTION INTRAMUSCULAR; INTRAVENOUS at 15:30

## 2020-07-30 RX ADMIN — METOCLOPRAMIDE SCH MG: 5 INJECTION, SOLUTION INTRAMUSCULAR; INTRAVENOUS at 18:48

## 2020-07-30 RX ADMIN — DULOXETINE SCH MG: 60 CAPSULE, DELAYED RELEASE ORAL at 15:34

## 2020-07-30 RX ADMIN — ASPIRIN 81 MG CHEWABLE TABLET SCH MG: 81 TABLET CHEWABLE at 15:34

## 2020-07-30 RX ADMIN — PANTOPRAZOLE SODIUM SCH MG: 40 INJECTION, POWDER, FOR SOLUTION INTRAVENOUS at 10:03

## 2020-07-30 RX ADMIN — INSULIN DETEMIR SCH UNIT: 100 INJECTION, SOLUTION SUBCUTANEOUS at 20:17

## 2020-07-30 RX ADMIN — VALPROIC ACID SCH MG: 250 SOLUTION ORAL at 09:59

## 2020-07-30 NOTE — CT
EXAMINATION TYPE: CT abdomen wo con

 

DATE OF EXAM: 7/30/2020

 

COMPARISON: 10/16/2014

 

HISTORY: vomiting

 

CT DLP: 422.2 mGycm

Automated exposure control for dose reduction was used.

 

Images were obtained from the diaphragm to the floor the pelvis with no contrast.

 

FINDINGS:

Lung bases are clear. There is no pleural effusion. Heart size is normal. There is no pericardial eff
usion.

 

Liver shows no focal defect. Spleen is intact. Stomach is intact. There is no pancreatic mass. There 
is no adrenal mass. There is some atherosclerotic vascular calcification.

 

Kidneys have normal size. The ureters are not dilated. There is some mild fullness of the left and ri
ght renal pelvis. There is no retroperitoneal adenopathy. Abdominal aorta is atheromatous. There is a
therosclerotic vascular calcification in the iliac and femoral arteries. Bladder distends smoothly. T
here is no inguinal hernia. There is old healed fracture left ischium. There is no evidence of a pelv
ic mass. There is hysterectomy. Lumbar vertebra have normal alignment. Posterior elements are intact.
 Disc spaces are fairly normal. Bony pelvis is intact. There is no lumbar compression fracture.

 

Appendix is not seen. There is no sign of thickened appendix. There is no mesenteric edema. There is 
no ascites or free air. There is no sign of a bowel obstruction.

 

There are small air bubbles in the anterior subcutaneous fat probably from injection site.

 

IMPRESSION:

Mild fullness of the left and right renal pelvis is a change compared to old exam. No obstructing kia
culus seen. Atherosclerotic vascular disease.

 

There is clearing of the subcutaneous edema around the abdomen compared to old exam. I do not see a c
ause for vomiting.

## 2020-07-30 NOTE — P.GSCN
History of Present Illness


Consult date: 20


Reason for Consult: 





Nausea and abdominal pain


History of present illness: 





This 60-year-old female admitted to the medical service.  The patient complaints

of nausea and abdominal pain she came through the emergency room.  The patient 

is unable to give any significant medical history.  She is not complaining of 

any significant abdominal pain or nausea currently.





Past Medical History


Past Medical History: Asthma, Coronary Artery Disease (CAD), Chest Pain / 

Angina, Heart Failure, COPD, CVA/TIA, Diabetes Mellitus, Deep Vein Thrombosis 

(DVT), Eye Disorder, GERD/Reflux, Hyperlipidemia, Hypertension, Myocardial 

Infarction (MI), Neurologic Disorder, Osteoarthritis (OA), Pneumonia, Renal 

Disease


Additional Past Medical History / Comment(s): IDDM (brittle), DKAs, neuropathy 

bilateral hands/feet, retinopathy bilateral eyes, cellulitis R foot, R great toe

and 2nd toe infections/amputations, current wound R foot-being seen in [facility], 

renal failure, anemia, CVAs with L sided paralysis, headaches started after 

CVAs, brain lesions, DVT R axillae, low back pain, varicosities, seizure many 

years ago (), hypothyroid, constipation, bilateral tinnitis occasionally, 

sinus problems.


Last Myocardial Infarction Date:: 


History of Any Multi-Drug Resistant Organisms: MRSA


Year Discovered:: 18


MDRO Source:: Right Foot


Past Surgical History: Appendectomy,  Section, Cholecystectomy, Heart 

Catheterization With Stent, Hysterectomy, Orthopedic Surgery


Additional Past Surgical History / Comment(s): PCI with multiple stents, R great

toe and 2nd toe amps, debridements R foot ulcer, L shoulder surgery to remove 

bone, bronchoscopy, EGD, colonoscopy, R arm port since removed, bilateral 

cataract removals/lens implants.


Past Anesthesia/Blood Transfusion Reactions: No Reported Reaction


Additional Past Anesthesia/Blood Transfusion Reaction / Comm: HX OF BLOOD 

TRANSFUSION- NO REACTION


Date of Last Stent Placement:: 2013


Past Psychological History: Anxiety, Bipolar, Depression


Additional Psychological History / Comment(s): Pt has a legal guardian, Noelle Menon, who is pt's sister.  Currently her legal guardian is hospitalized.  Pt 

has a caregiver, Eleanor, who resides with her.  Pt is wheelchair bound d/t CVA 

with L sided paralysis arm and leg.  She has a shower chair and a glucometer.  

Her sister or caregiver drive her to appts.  [email or web address].


Smoking Status: Former smoker


Past Alcohol Use History: None Reported


Additional Past Alcohol Use History / Comment(s): Pt started smoking in  and

quit in 2018.   Using marijuana edibles occasionally but none for a "long time"


Past Drug Use History: Marijuana


Additional Drug Use History / Comment(s): using marijuana edibles





- Past Family History


  ** Father


Family Medical History: Unable to Obtain, Coronary Artery Disease (CAD), 

Diabetes Mellitus





  ** Mother


Family Medical History: COPD





Medications and Allergies


                                Home Medications











 Medication  Instructions  Recorded  Confirmed  Type


 


Famotidine [Pepcid] 20 mg PO DAILY@0900 16 History


 


Ergocalciferol [Vitamin D2 50,000 unit PO SA@0900 10/06/16 07/28/20 History





(DRISDOL)]    


 


HYDROcodone/APAP 10-325MG [Norco 1 tab PO Q6H PRN 17 History





]    


 


DULoxetine HCL [Cymbalta] 60 mg PO DAILY@17 History


 


Atorvastatin [Lipitor] 20 mg PO DAILY@19 History


 


QUEtiapine [SEROquel] 100 mg PO HS@19 History


 


Vitamin B Complex With Vit C 1 tab PO DAILY 20 History


 


Aspirin [Adult Low Dose Aspirin EC] 81 mg PO DAILY@0900 01/10/20 07/28/20 

History


 


Ferrous Sulfate [Iron (65  mg PO DAILY@0900 01/10/20 07/28/20 History





Elemental)]    


 


INSULIN LISPRO (humaLOG) [humaLOG] See Protocol SQ ACHS 03/15/20 07/28/20 Histor

y


 


Insulin Glargine,Hum.rec.anlog 20 unit SQ HS 03/15/20 07/28/20 History





[Basaglar Isabelikpen U-100]    


 


Divalproex [Depakote] 250 mg PO TID #90 tablet. 20 Rx


 


ALPRAZolam [Xanax] 1 mg PO TID PRN 20 History


 


Albuterol Sulfate [Ventolin HFA] 2 puff INHALATION RT-Q4H PRN 20 

History


 


Ondansetron Odt [Zofran Odt] 4 mg PO DAILY PRN 20 History


 


Valproic Acid [Depakene] 250 mg PO DAILY 20 History








                                    Allergies











Allergy/AdvReac Type Severity Reaction Status Date / Time


 


Barbiturates Allergy  Rash/Hives Verified 20 17:26


 


cephalexin monohydrate Allergy  Rash/Hives Verified 20 17:26





[From Keflex]     


 


morphine Allergy  Rash/Hives Verified 20 17:26


 


Penicillins Allergy  Rash/Hives Verified 20 17:26


 


phenobarbital Allergy  Swelling Verified 20 17:26


 


venom-honey bee Allergy  Swelling Verified 20 17:26





[bee venom (honey bee)]     


 


amlodipine besylate AdvReac  Vomiting Verified 20 17:26





[From [facility]]     














Surgical - Exam


                                   Vital Signs











Pulse Resp BP Pulse Ox


 


 116 H  18   151/83   99 


 


 20 13:47  20 13:47  20 13:47  20 13:47














- General


no distress





- Eyes


PERRL





- ENT


normal pinna





- Neck


no masses





- Respiratory


normal expansion





- Cardiovascular


Rhythm: regular





- Abdomen


Abdomen: soft, non tender





Results





- Labs





                                 20 15:09





                                 20 15:09


                  Abnormal Lab Results - Last 24 Hours (Table)











  20 Range/Units





  17:43 18:42 20:00 


 


POC Glucose (mg/dL)  325 H  273 H  156 H  (75-99)  mg/dL














  20 Range/Units





  20:54 21:56 22:57 


 


POC Glucose (mg/dL)  114 H  122 H  165 H  (75-99)  mg/dL














  20 Range/Units





  23:53 00:55 01:57 


 


POC Glucose (mg/dL)  203 H  386 H  475 H  (75-99)  mg/dL














  20 Range/Units





  06:29 11:46 12:05 


 


POC Glucose (mg/dL)  411 H  54 L  205 H  (75-99)  mg/dL














  20 Range/Units





  15:38 16:34 


 


POC Glucose (mg/dL)  109 H  114 H  (75-99)  mg/dL














Assessment and Plan


Assessment: 


Patient's abdominal pain and nausea.  Resolved.  No surgical intervention is 

planned.  She will start diet.

## 2020-07-31 LAB
ALBUMIN SERPL-MCNC: 3 G/DL (ref 3.5–5)
ALP SERPL-CCNC: 123 U/L (ref 38–126)
ALT SERPL-CCNC: 137 U/L (ref 4–34)
ANION GAP SERPL CALC-SCNC: 4 MMOL/L
AST SERPL-CCNC: 222 U/L (ref 14–36)
BASOPHILS # BLD AUTO: 0 K/UL (ref 0–0.2)
BASOPHILS NFR BLD AUTO: 0 %
BUN SERPL-SCNC: 23 MG/DL (ref 7–17)
CALCIUM SPEC-MCNC: 8.5 MG/DL (ref 8.4–10.2)
CHLORIDE SERPL-SCNC: 112 MMOL/L (ref 98–107)
CO2 SERPL-SCNC: 25 MMOL/L (ref 22–30)
EOSINOPHIL # BLD AUTO: 0 K/UL (ref 0–0.7)
EOSINOPHIL NFR BLD AUTO: 0 %
ERYTHROCYTE [DISTWIDTH] IN BLOOD BY AUTOMATED COUNT: 3.31 M/UL (ref 3.8–5.4)
ERYTHROCYTE [DISTWIDTH] IN BLOOD: 15.7 % (ref 11.5–15.5)
GLUCOSE BLD-MCNC: 113 MG/DL (ref 75–99)
GLUCOSE BLD-MCNC: 158 MG/DL (ref 75–99)
GLUCOSE BLD-MCNC: 282 MG/DL (ref 75–99)
GLUCOSE BLD-MCNC: 315 MG/DL (ref 75–99)
GLUCOSE BLD-MCNC: 32 MG/DL (ref 75–99)
GLUCOSE BLD-MCNC: 320 MG/DL (ref 75–99)
GLUCOSE BLD-MCNC: 35 MG/DL (ref 75–99)
GLUCOSE BLD-MCNC: 37 MG/DL (ref 75–99)
GLUCOSE BLD-MCNC: 44 MG/DL (ref 75–99)
GLUCOSE BLD-MCNC: 59 MG/DL (ref 75–99)
GLUCOSE BLD-MCNC: 78 MG/DL (ref 75–99)
GLUCOSE BLD-MCNC: 84 MG/DL (ref 75–99)
GLUCOSE BLD-MCNC: 85 MG/DL (ref 75–99)
GLUCOSE BLD-MCNC: 90 MG/DL (ref 75–99)
GLUCOSE SERPL-MCNC: 29 MG/DL (ref 74–99)
HBA1C MFR BLD: 9.1 % (ref 4–6)
HCT VFR BLD AUTO: 30.8 % (ref 34–46)
HGB BLD-MCNC: 9.6 GM/DL (ref 11.4–16)
LYMPHOCYTES # SPEC AUTO: 2.3 K/UL (ref 1–4.8)
LYMPHOCYTES NFR SPEC AUTO: 48 %
MCH RBC QN AUTO: 28.9 PG (ref 25–35)
MCHC RBC AUTO-ENTMCNC: 31.1 G/DL (ref 31–37)
MCV RBC AUTO: 93.2 FL (ref 80–100)
MONOCYTES # BLD AUTO: 0.3 K/UL (ref 0–1)
MONOCYTES NFR BLD AUTO: 7 %
NEUTROPHILS # BLD AUTO: 2.1 K/UL (ref 1.3–7.7)
NEUTROPHILS NFR BLD AUTO: 42 %
PLATELET # BLD AUTO: 231 K/UL (ref 150–450)
POTASSIUM SERPL-SCNC: 4.4 MMOL/L (ref 3.5–5.1)
PROT SERPL-MCNC: 6.7 G/DL (ref 6.3–8.2)
SODIUM SERPL-SCNC: 141 MMOL/L (ref 137–145)
WBC # BLD AUTO: 4.9 K/UL (ref 3.8–10.6)

## 2020-07-31 RX ADMIN — PANTOPRAZOLE SODIUM SCH MG: 40 TABLET, DELAYED RELEASE ORAL at 17:05

## 2020-07-31 RX ADMIN — ATORVASTATIN CALCIUM SCH MG: 20 TABLET, FILM COATED ORAL at 21:59

## 2020-07-31 RX ADMIN — METOCLOPRAMIDE SCH: 5 INJECTION, SOLUTION INTRAMUSCULAR; INTRAVENOUS at 08:50

## 2020-07-31 RX ADMIN — ASPIRIN 81 MG CHEWABLE TABLET SCH: 81 TABLET CHEWABLE at 09:02

## 2020-07-31 RX ADMIN — Medication SCH: at 09:04

## 2020-07-31 RX ADMIN — DIVALPROEX SODIUM SCH MG: 250 TABLET, DELAYED RELEASE ORAL at 22:51

## 2020-07-31 RX ADMIN — HEPARIN SODIUM SCH UNIT: 5000 INJECTION, SOLUTION INTRAVENOUS; SUBCUTANEOUS at 09:21

## 2020-07-31 RX ADMIN — INSULIN DETEMIR SCH UNIT: 100 INJECTION, SOLUTION SUBCUTANEOUS at 22:24

## 2020-07-31 RX ADMIN — DIVALPROEX SODIUM SCH: 250 TABLET, DELAYED RELEASE ORAL at 09:02

## 2020-07-31 RX ADMIN — INSULIN ASPART SCH: 100 INJECTION, SOLUTION INTRAVENOUS; SUBCUTANEOUS at 14:26

## 2020-07-31 RX ADMIN — FAMOTIDINE SCH: 20 TABLET, FILM COATED ORAL at 09:02

## 2020-07-31 RX ADMIN — DIVALPROEX SODIUM SCH MG: 250 TABLET, DELAYED RELEASE ORAL at 17:05

## 2020-07-31 RX ADMIN — DULOXETINE SCH: 60 CAPSULE, DELAYED RELEASE ORAL at 09:02

## 2020-07-31 RX ADMIN — VALPROIC ACID SCH: 250 SOLUTION ORAL at 09:04

## 2020-07-31 RX ADMIN — ATORVASTATIN CALCIUM SCH MG: 20 TABLET, FILM COATED ORAL at 22:01

## 2020-07-31 RX ADMIN — INSULIN ASPART SCH: 100 INJECTION, SOLUTION INTRAVENOUS; SUBCUTANEOUS at 22:05

## 2020-07-31 RX ADMIN — PANTOPRAZOLE SODIUM SCH: 40 TABLET, DELAYED RELEASE ORAL at 09:21

## 2020-07-31 RX ADMIN — INSULIN ASPART SCH: 100 INJECTION, SOLUTION INTRAVENOUS; SUBCUTANEOUS at 17:05

## 2020-07-31 RX ADMIN — HEPARIN SODIUM SCH UNIT: 5000 INJECTION, SOLUTION INTRAVENOUS; SUBCUTANEOUS at 22:04

## 2020-07-31 RX ADMIN — INSULIN ASPART SCH: 100 INJECTION, SOLUTION INTRAVENOUS; SUBCUTANEOUS at 08:53

## 2020-07-31 NOTE — CONS
CONSULTATION



PULMONARY/CRITICAL CARE CONSULTATION:



REASON FOR CONSULTATION:

Mental status changes.



DATE OF SERVICE:

2020



This is a patient who presented to the emergency room on .  She is 60 
years of

age.  She apparently was admitted with a diagnosis of hyperglycemia and possible

diabetic ketoacidosis.  Her blood sugar readings apparently have been high for 
the last

3 days.  She was complaining of fatigue as well as polyuria and polydipsia.  She
also

was feeling quite achy all over.  There was no fever or recent illness.  She 
denied any

chest pain or chest discomfort.  There was no abdominal pain.  There was no 
nausea,

vomiting or diarrhea.  The patient apparently has had previous episodes of 
diabetic

ketoacidosis in the past.  Apparently yesterday, she was evaluated.  An A-team 
was

called.  She came to the ICU primarily because of lethargy, somnolence, 
inability to be

aroused and hypotension.  She received 1.5 L of fluid between the floor and the 
ICU.

Currently, she is on room air.  She is getting saline at 50 mL an hour.  She 
seemed to

be fully recovered.  It is not exactly clear what happened.  According to the 
nurse on

the A-team, she was not hypoglycemic.  She did receive some dextrose.  That did 
not

seem to cause her to be aroused.



It may relate to her other medications including Depakene and Seroquel.  She 
apparently

had not been getting those and recently got those here in the hospital and that 
may

have caused mental status changes.  Finally, she had been receiving some Norco 
from

time to time.



HOME MEDICATIONS:

Reviewed.  She is on albuterol inhaler, Pepcid, vitamin D2, Norco, Cymbalta, 
Zofran,

Lipitor, Seroquel, vitamin B, aspirin, iron, and insulin.  Other medications 
include

Xanax and Depakote.



ALLERGIES:

Were multiple included BARBITURATES, KEFLEX, MORPHINE, PENICILLIN, 
PHENOBARBITAL, HONEY

BEE VENOM, and AMLODIPINE.



PAST MEDICAL HISTORY:

Includes asthma, CAD, chest pain/angina, heart failure, COPD, diabetes, CVA, 
DVT, GERD,

hyperlipidemia, hypertension, myocardial infarction, osteoarthritis, and 
pneumonia.



Other medical problems include multiple episodes of diabetic ketoacidosis, 
diabetic

neuropathy, diabetic retinopathy, cellulitis, right great toe and second toe

amputation, CVA with left-sided paralysis/weakness, headaches, tendinitis, lower

extremity varicosities, seizure disorder, and hypothyroidism.



SURGICAL HISTORY:

Includes among other things appendectomy, , cholecystectomy, heart

catheterization with stent, hysterectomy, right great toe and second toe 
amputation,

debridement right foot also, left shoulder surgery, bronchoscopy, EGD, and 
bilateral

cataract surgery with lens implants.



SOCIAL HISTORY:

Positive for previous heavy tobacco use.  She apparently does not smoke 
currently.

Denies alcohol use.  She does use marijuana.



FAMILY HISTORY:

Positive for father with CAD and diabetes and a mother with COPD.



REVIEW OF SYSTEMS:

CONSTITUTIONAL:  Negative.

NEUROLOGIC: Negative.

HEENT:  Negative.

CARDIOVASCULAR:  Negative.

PULMONARY:  Negative.

GI: Negative.

: Negative.

RHEUMATOLOGIC: Negative.

IMMUNOLOGIC:  Negative.

ENDOCRINOLOGIC:  Negative.

DERMATOLOGIC:  Negative.



The patient does not really have any recall or memory of what happened to her 
yesterday.



Current vital signs include a temperature of 98, heart rate 85, respiratory rate
12,

blood pressure 130/66 mean 87 and saturation on room air 97%.  Appears in no 
acute

distress.

HEENT: Examination is grossly unremarkable.

NECK:  Supple, full range of motion.  No adenopathy or thyromegaly.  Neck veins 
are

flat.

CARDIOVASCULAR: Examination reveals regular rhythm and rate.  Heart rate 85.  
S1, S2

normal.

LUNGS: Reveal relatively clear breath sounds.  No wheezes, rhonchi, or crackles.

ABDOMEN:  Soft.

EXTREMITIES:  Intact.  She does have a prior amputation of the right great toe 
and

right second toe.

SKIN: Without rash.

NEUROLOGIC: Examination is brief but nonfocal.



LABS:

Reviewed.  White count 4.9, hemoglobin 9.6, hematocrit 30.8, platelet count 
231,000,

PT, INR was 10.7 and 1.  Blood gas was done at the time of her A-team evaluation
with a

pO2 of 86, pCO2 of 38 and a pH is 7.44.  Sodium 141, potassium 4.4, chloride 
112, CO2

is 25, anion gap is 4.  BUN and creatinine were 23 and 1.13.  , .

Ammonia level is 41.



Microbiology is currently pending or negative.



CT of the abdomen and pelvis shows mild fullness of the left and right renal 
pelvis.

There is no obstructing calculus.  Medications are reviewed.  Currently, the 
patient is

on albuterol inhaler, Xanax, aspirin, atorvastatin, Depakote, Cymbalta, vitamin 
D2,

Pepcid, iron, subcu heparin, hydralazine, Norco, insulin, Narcan, Zofran, 
Protonix,

Tigan, and valproic acid.



ASSESSMENT:

1. Mental status changes, of unclear etiology.

2. Admission, on  for hyperglycemia and DKA.

3. 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

4. Mental status changes of unclear etiology, currently being evaluated.

5. History of asthma.

6. History of coronary artery disease.

7. History of angina pectoris.

8. History of congestive heart failure.

9. History of CVA.

10.History of COPD.

11.Diabetes mellitus with diabetic end organ involvement.

12.History of deep venous thrombosis.

13.Gastroesophageal reflux disease.

14.Bilateral cataract removal with lens implant.

15.Hyperlipidemia.

16.History of hypertension.

17.History of myocardial infarction.

18.History of CVA with left-sided paralysis/weakness.

19.Multiple other medical problems and comorbidities.



PLAN:

The patient looks much better.  If her neurologic status continues to remain 
stable,

will plan on transferring around to the general medical floor later today.  No

additional recommendations are made.  Will cut back on the unnecessary 
medications

which may have a profound effect on her respiratory status and cause respiratory

depression or mental status changes.





MMODL / IJN: [id number] / Job#: [id number]

ANNALEE

## 2020-07-31 NOTE — P.PN
Subjective


Progress Note Date: 07/30/20








Morenita Ramirez, is a 60-year-old female who presented to [facility] emergency room due to uncontrolled blood sugar, caregiver states that 

sugar has been up above 600 for about 3 days, she has been giving her extra 

insulin without ability of getting sugar under control.  Patient was evaluated 

in the emergency room she was started on IV insulin drip and was admitted to 

medical floor.


On the medical floor patient started developing nausea and vomiting, otherwise 

she denies any complaints there is no fever or chills no headache or dizziness 

no chest pain no shortness of breath no cough no burning was urination no 

frequency or urgency and no hematuria








On 07/30/2020 patient was seen and examined on the medical floor she is feeling 

somewhat better nausea and vomiting is subsiding abdominal pain improving there 

is no fever or chills no headache or dizziness no chest pain no shortness of 

breath no cough no nausea or vomiting no abdominal pain no diarrhea no burning 

was urination no frequency or urgency and no hematuria, patient is improving 

insulin drip was discontinued and patient was restarted on her home insulin dose





Objective





- Vital Signs


Vital signs: 


                                   Vital Signs











Temp  98.3 F   07/30/20 11:30


 


Pulse  88   07/30/20 11:30


 


Resp  18   07/30/20 11:30


 


BP  129/69   07/30/20 11:30


 


Pulse Ox  94 L  07/30/20 11:30








                                 Intake & Output











 07/29/20 07/30/20 07/30/20





 18:59 06:59 18:59


 


Intake Total 5740.124 7166.517 0


 


Balance 5585.847 9152.517 0


 


Weight 52.5 kg 49.5 kg 49.5 kg


 


Intake:   


 


  Intake, IV Titration 6375.937 3732.517 





  Amount   


 


    D5-0.45% NaCl with KCl 1200 1200 





    20Meq/l 1,000 ml @ 50 mls   





    /hr IV .Q20H [facility]ON Rx#:   





    [id number]   


 


    Insulin Regular 100 unit 18.926 14.517 





    In Sodium Chloride 0.9%   





    100 ml @ 0.1 UNITS/KG/HR   





    4.536 mls/hr IV .[id number]   





    [facility] Rx#:[id number]   


 


  Oral 0  0


 


Other:   


 


  Voiding Method Diaper Diaper Diaper





 Incontinent Incontinent Incontinent


 


  # Voids 3 1 1


 


  # Bowel Movements 1 1 1














- Exam











In general patient is alert and oriented 3 in mild distress due to continuous 

nausea and vomiting


HEENT head normocephalic and atraumatic


Neck is supple no JVD no goiter


Chest exam reveals a few scattered crackles bilaterally no wheezing


Cardiac exam reveals regular heart sounds no gallops no murmurs


Abdomen is soft with mild diffuse tenderness no organomegaly no palpable masses 

no rigidity or rebound


Extremity exam reveals no edema no cyanosis or clubbing patient has multiple toe

amputation in the past








- Labs


CBC & Chem 7: 


                                 07/31/20 04:27





                                 07/31/20 04:27


Labs: 


                  Abnormal Lab Results - Last 24 Hours (Table)











  07/29/20 07/29/20 07/29/20 Range/Units





  14:13 15:06 15:09 


 


RBC    3.48 L  (3.80-5.40)  m/uL


 


Hgb    10.2 L  (11.4-16.0)  gm/dL


 


Hct    32.2 L  (34.0-46.0)  %


 


Sodium     (137-145)  mmol/L


 


BUN     (7-17)  mg/dL


 


Glucose     (74-99)  mg/dL


 


POC Glucose (mg/dL)  351 H  409 H   (75-99)  mg/dL














  07/29/20 07/29/20 07/29/20 Range/Units





  15:09 16:00 17:43 


 


RBC     (3.80-5.40)  m/uL


 


Hgb     (11.4-16.0)  gm/dL


 


Hct     (34.0-46.0)  %


 


Sodium  134 L    (137-145)  mmol/L


 


BUN  22 H    (7-17)  mg/dL


 


Glucose  412 H    (74-99)  mg/dL


 


POC Glucose (mg/dL)   409 H  325 H  (75-99)  mg/dL














  07/29/20 07/29/20 07/29/20 Range/Units





  18:42 20:00 20:54 


 


RBC     (3.80-5.40)  m/uL


 


Hgb     (11.4-16.0)  gm/dL


 


Hct     (34.0-46.0)  %


 


Sodium     (137-145)  mmol/L


 


BUN     (7-17)  mg/dL


 


Glucose     (74-99)  mg/dL


 


POC Glucose (mg/dL)  273 H  156 H  114 H  (75-99)  mg/dL














  07/29/20 07/29/20 07/29/20 Range/Units





  21:56 22:57 23:53 


 


RBC     (3.80-5.40)  m/uL


 


Hgb     (11.4-16.0)  gm/dL


 


Hct     (34.0-46.0)  %


 


Sodium     (137-145)  mmol/L


 


BUN     (7-17)  mg/dL


 


Glucose     (74-99)  mg/dL


 


POC Glucose (mg/dL)  122 H  165 H  203 H  (75-99)  mg/dL














  07/30/20 07/30/20 07/30/20 Range/Units





  00:55 01:57 06:29 


 


RBC     (3.80-5.40)  m/uL


 


Hgb     (11.4-16.0)  gm/dL


 


Hct     (34.0-46.0)  %


 


Sodium     (137-145)  mmol/L


 


BUN     (7-17)  mg/dL


 


Glucose     (74-99)  mg/dL


 


POC Glucose (mg/dL)  386 H  475 H  411 H  (75-99)  mg/dL














  07/30/20 07/30/20 Range/Units





  11:46 12:05 


 


RBC    (3.80-5.40)  m/uL


 


Hgb    (11.4-16.0)  gm/dL


 


Hct    (34.0-46.0)  %


 


Sodium    (137-145)  mmol/L


 


BUN    (7-17)  mg/dL


 


Glucose    (74-99)  mg/dL


 


POC Glucose (mg/dL)  54 L  205 H  (75-99)  mg/dL














Assessment and Plan


Plan: 





1.  Severe hyperglycemia improving was insulin drip





2.  Intractable nausea and vomiting, attempt to put NG tube failed to due to 

multiplefractures in the past, we are treating symptomatically, consultation for

general surgery initiated





3.  Underlying history of hypertension blood pressure is severely elevated we 

will use IV hydralazine when necessary





4.  Underlying history of hyperlipidemia





5.  Underlying history of depression and anxiety disorder





6.  Previous history of stroke





At this time we are treating symptomatically for nausea and vomiting Will att

empt to obtain computed tomography scan of the abdomen


Will continue insulin drip at this time will recheck labs in a.m.

## 2020-07-31 NOTE — P.PN
<Constanza Hernandez A - Last Filed: 07/31/20 11:01>





Subjective


Progress Note Date: 07/31/20





CHIEF COMPLAINT: Abdominal pain





HISTORY OF PRESENT ILLNESS: Patient examined in the intensive care unit with Dr. Lopez (covering for Dr. Angelo).  Patient is lethargic at the time of ex

amination.  She awakens to verbal stimuli.  She denies abdominal pain.  No 

nausea or vomiting.  WBC 4.9.  Hemoglobin 9.6.  Vital signs are stable.  She is 

afebrile.





PHYSICAL EXAM: 


VITAL SIGNS: Reviewed


GENERAL: Well-developed in no acute distress. 


HEENT:  No sclera icterus. Extraocular movements grossly intact.  Moist buccal 

mucosa. 


Head is atraumatic, normocephalic. Hears conversational speech. No nasal 

drainage.


NECK:  Supple without lymphadenopathy.


CHEST:  Non-labored respirations and equal bilateral excursions. 


CARDIOVASCULAR:  Regular rate with regular rhythm.  Palpable 2+ radial pulses.


ABDOMEN:  Soft.  Nondistended. Nontender.


MUSCULOSKELETAL:  No clubbing or cyanosis.


NEUROLOGIC:  No focal or lateralizing signs.  Cranial nerves II through XII 

grossly intact.


PSYCH:  Patient lethargic.  Awakens to verbal stimuli.


SKIN: Well perfused.  Good skin turgor. 





ASSESSMENT: 


1.  Abdominal pain, resolved





PLAN: 


Patients abdominal pain has resolved. Continue diet as tolerated. No surgical 

intervention recommended. Continue management per medicine. We will sign off. 

Please re-consult if needed.





Nurse practitioner note has been reviewed by physician. Signing provider agrees 

with the documented findings, assessment, and plan of care. 








Objective





- Vital Signs


Vital signs: 


                                   Vital Signs











Temp  98.0 F   07/31/20 08:00


 


Pulse  85   07/31/20 08:00


 


Resp  12   07/31/20 08:00


 


BP  130/66   07/31/20 09:00


 


Pulse Ox  96   07/31/20 09:00








                                 Intake & Output











 07/30/20 07/31/20 07/31/20





 18:59 06:59 18:59


 


Intake Total 0 1730 100


 


Output Total  555 60


 


Balance 0 1175 40


 


Weight 49.5 kg 50.9 kg 


 


Intake:   


 


  IV   100


 


    Sodium Chloride 0.9% 1,   100





    000 ml @ 50 mls/hr IV .   





    Q20H [facility] Rx#:[id number]   


 


  Intake, IV Titration  1250 





  Amount   


 


    Sodium Chloride 0.9% 1,  250 





    000 ml @ 50 mls/hr IV .   





    Q20H [facility] Rx#:[id number]   


 


    Sodium Chloride 0.9% 1,  1000 





    000 ml @ 999 mls/hr IV .   





    Q1H1M [facility] Rx#:[id number]   


 


  Oral 0 480 


 


Output:   


 


  Urine  555 60


 


Other:   


 


  Voiding Method Diaper Indwelling Catheter 





 Incontinent  


 


  # Voids 1 1 


 


  # Bowel Movements 1  














- Labs


CBC & Chem 7: 


                                 07/31/20 04:27





                                 07/31/20 04:27


Labs: 


                  Abnormal Lab Results - Last 24 Hours (Table)











  07/30/20 07/30/20 07/30/20 Range/Units





  11:46 12:05 15:38 


 


RBC     (3.80-5.40)  m/uL


 


Hgb     (11.4-16.0)  gm/dL


 


Hct     (34.0-46.0)  %


 


MCHC     (31.0-37.0)  g/dL


 


RDW     (11.5-15.5)  %


 


ABG HCO3     (21-25)  mmol/L


 


ABG Total CO2     (19-24)  mmol/L


 


Sodium     (137-145)  mmol/L


 


Chloride     ()  mmol/L


 


BUN     (7-17)  mg/dL


 


Creatinine     (0.52-1.04)  mg/dL


 


Glucose     (74-99)  mg/dL


 


POC Glucose (mg/dL)  54 L  205 H  109 H  (75-99)  mg/dL


 


Plasma Lactic Acid Carmelo     (0.7-2.0)  mmol/L


 


Calcium     (8.4-10.2)  mg/dL


 


AST     (14-36)  U/L


 


ALT     (4-34)  U/L


 


Ammonia     (<30)  umol/L


 


Total Protein     (6.3-8.2)  g/dL


 


Albumin     (3.5-5.0)  g/dL














  07/30/20 07/30/20 07/30/20 Range/Units





  16:34 20:12 23:12 


 


RBC     (3.80-5.40)  m/uL


 


Hgb     (11.4-16.0)  gm/dL


 


Hct     (34.0-46.0)  %


 


MCHC     (31.0-37.0)  g/dL


 


RDW     (11.5-15.5)  %


 


ABG HCO3    26 H  (21-25)  mmol/L


 


ABG Total CO2    27 H  (19-24)  mmol/L


 


Sodium     (137-145)  mmol/L


 


Chloride     ()  mmol/L


 


BUN     (7-17)  mg/dL


 


Creatinine     (0.52-1.04)  mg/dL


 


Glucose     (74-99)  mg/dL


 


POC Glucose (mg/dL)  114 H  190 H   (75-99)  mg/dL


 


Plasma Lactic Acid Carmelo     (0.7-2.0)  mmol/L


 


Calcium     (8.4-10.2)  mg/dL


 


AST     (14-36)  U/L


 


ALT     (4-34)  U/L


 


Ammonia     (<30)  umol/L


 


Total Protein     (6.3-8.2)  g/dL


 


Albumin     (3.5-5.0)  g/dL














  07/30/20 07/30/20 07/30/20 Range/Units





  23:13 23:13 23:13 


 


RBC  2.86 L    (3.80-5.40)  m/uL


 


Hgb  8.2 L D    (11.4-16.0)  gm/dL


 


Hct  26.5 L    (34.0-46.0)  %


 


MCHC  30.8 L    (31.0-37.0)  g/dL


 


RDW  15.6 H    (11.5-15.5)  %


 


ABG HCO3     (21-25)  mmol/L


 


ABG Total CO2     (19-24)  mmol/L


 


Sodium   136 L   (137-145)  mmol/L


 


Chloride     ()  mmol/L


 


BUN   23 H   (7-17)  mg/dL


 


Creatinine   1.29 H   (0.52-1.04)  mg/dL


 


Glucose   161 H   (74-99)  mg/dL


 


POC Glucose (mg/dL)     (75-99)  mg/dL


 


Plasma Lactic Acid Carmelo    2.3 H*  (0.7-2.0)  mmol/L


 


Calcium   8.1 L   (8.4-10.2)  mg/dL


 


AST   225 H   (14-36)  U/L


 


ALT   117 H   (4-34)  U/L


 


Ammonia     (<30)  umol/L


 


Total Protein   5.7 L   (6.3-8.2)  g/dL


 


Albumin   2.5 L   (3.5-5.0)  g/dL














  07/31/20 07/31/20 07/31/20 Range/Units





  00:00 04:27 04:27 


 


RBC    3.31 L  (3.80-5.40)  m/uL


 


Hgb    9.6 L  (11.4-16.0)  gm/dL


 


Hct    30.8 L  (34.0-46.0)  %


 


MCHC     (31.0-37.0)  g/dL


 


RDW    15.7 H  (11.5-15.5)  %


 


ABG HCO3     (21-25)  mmol/L


 


ABG Total CO2     (19-24)  mmol/L


 


Sodium     (137-145)  mmol/L


 


Chloride   112 H   ()  mmol/L


 


BUN   23 H   (7-17)  mg/dL


 


Creatinine   1.13 H   (0.52-1.04)  mg/dL


 


Glucose   29 L*   (74-99)  mg/dL


 


POC Glucose (mg/dL)     (75-99)  mg/dL


 


Plasma Lactic Acid Carmelo     (0.7-2.0)  mmol/L


 


Calcium     (8.4-10.2)  mg/dL


 


AST   222 H   (14-36)  U/L


 


ALT   137 H   (4-34)  U/L


 


Ammonia  41 H    (<30)  umol/L


 


Total Protein     (6.3-8.2)  g/dL


 


Albumin   3.0 L   (3.5-5.0)  g/dL














  07/31/20 07/31/20 07/31/20 Range/Units





  05:13 05:33 05:35 


 


RBC     (3.80-5.40)  m/uL


 


Hgb     (11.4-16.0)  gm/dL


 


Hct     (34.0-46.0)  %


 


MCHC     (31.0-37.0)  g/dL


 


RDW     (11.5-15.5)  %


 


ABG HCO3     (21-25)  mmol/L


 


ABG Total CO2     (19-24)  mmol/L


 


Sodium     (137-145)  mmol/L


 


Chloride     ()  mmol/L


 


BUN     (7-17)  mg/dL


 


Creatinine     (0.52-1.04)  mg/dL


 


Glucose     (74-99)  mg/dL


 


POC Glucose (mg/dL)  32 L  44 L  44 L  (75-99)  mg/dL


 


Plasma Lactic Acid Carmelo     (0.7-2.0)  mmol/L


 


Calcium     (8.4-10.2)  mg/dL


 


AST     (14-36)  U/L


 


ALT     (4-34)  U/L


 


Ammonia     (<30)  umol/L


 


Total Protein     (6.3-8.2)  g/dL


 


Albumin     (3.5-5.0)  g/dL














  07/31/20 07/31/20 07/31/20 Range/Units





  07:10 10:43 10:54 


 


RBC     (3.80-5.40)  m/uL


 


Hgb     (11.4-16.0)  gm/dL


 


Hct     (34.0-46.0)  %


 


MCHC     (31.0-37.0)  g/dL


 


RDW     (11.5-15.5)  %


 


ABG HCO3     (21-25)  mmol/L


 


ABG Total CO2     (19-24)  mmol/L


 


Sodium     (137-145)  mmol/L


 


Chloride     ()  mmol/L


 


BUN     (7-17)  mg/dL


 


Creatinine     (0.52-1.04)  mg/dL


 


Glucose     (74-99)  mg/dL


 


POC Glucose (mg/dL)  158 H  35 L  37 L  (75-99)  mg/dL


 


Plasma Lactic Acid Carmelo     (0.7-2.0)  mmol/L


 


Calcium     (8.4-10.2)  mg/dL


 


AST     (14-36)  U/L


 


ALT     (4-34)  U/L


 


Ammonia     (<30)  umol/L


 


Total Protein     (6.3-8.2)  g/dL


 


Albumin     (3.5-5.0)  g/dL














<Jazmine Lopez N - Last Filed: 08/02/20 23:24>





Subjective


Patient seen and evaluated.  Agree with above.  At this time, patient is stable.

 Diet as tolerated.








Objective





- Vital Signs


Vital signs: 


                                   Vital Signs











Temp  98.2 F   08/02/20 21:00


 


Pulse  98   08/02/20 21:00


 


Resp  16   08/02/20 21:00


 


BP  129/77   08/02/20 21:00


 


Pulse Ox  99   08/02/20 21:00








                                 Intake & Output











 08/02/20 08/02/20 08/03/20





 06:59 18:59 06:59


 


Intake Total 700  


 


Output Total 2600 675 


 


Balance -1900 -675 


 


Weight 54 kg  


 


Intake:   


 


  Oral 700  


 


Output:   


 


  Urine 2600 675 


 


    Uretheral (Springer) 2600 675 


 


Other:   


 


  Voiding Method Indwelling Catheter Incontinent 


 


  # Voids  1 


 


  # Bowel Movements  0 














- Labs


CBC & Chem 7: 


                                 08/02/20 06:25





                                 08/02/20 20:24


Labs: 


                  Abnormal Lab Results - Last 24 Hours (Table)











  08/02/20 08/02/20 08/02/20 Range/Units





  02:00 06:25 06:25 


 


RBC   3.52 L   (3.80-5.40)  m/uL


 


Hgb   10.2 L   (11.4-16.0)  gm/dL


 


Hct   32.5 L   (34.0-46.0)  %


 


RDW   15.7 H   (11.5-15.5)  %


 


Sodium    136 L  (137-145)  mmol/L


 


BUN    21 H  (7-17)  mg/dL


 


Creatinine    1.07 H  (0.52-1.04)  mg/dL


 


Glucose    309 H  (74-99)  mg/dL


 


POC Glucose (mg/dL)  448 H    (75-99)  mg/dL


 


ALT    58 H  (4-34)  U/L


 


Albumin    3.2 L  (3.5-5.0)  g/dL














  08/02/20 08/02/20 08/02/20 Range/Units





  07:14 11:24 17:20 


 


RBC     (3.80-5.40)  m/uL


 


Hgb     (11.4-16.0)  gm/dL


 


Hct     (34.0-46.0)  %


 


RDW     (11.5-15.5)  %


 


Sodium     (137-145)  mmol/L


 


BUN     (7-17)  mg/dL


 


Creatinine     (0.52-1.04)  mg/dL


 


Glucose     (74-99)  mg/dL


 


POC Glucose (mg/dL)  308 H  285 H  >600 H  (75-99)  mg/dL


 


ALT     (4-34)  U/L


 


Albumin     (3.5-5.0)  g/dL














  08/02/20 08/02/20 08/02/20 Range/Units





  17:21 17:35 20:00 


 


RBC     (3.80-5.40)  m/uL


 


Hgb     (11.4-16.0)  gm/dL


 


Hct     (34.0-46.0)  %


 


RDW     (11.5-15.5)  %


 


Sodium     (137-145)  mmol/L


 


BUN     (7-17)  mg/dL


 


Creatinine     (0.52-1.04)  mg/dL


 


Glucose   617 H*   (74-99)  mg/dL


 


POC Glucose (mg/dL)  >600 H   >600 H  (75-99)  mg/dL


 


ALT     (4-34)  U/L


 


Albumin     (3.5-5.0)  g/dL














  08/02/20 Range/Units





  20:24 


 


RBC   (3.80-5.40)  m/uL


 


Hgb   (11.4-16.0)  gm/dL


 


Hct   (34.0-46.0)  %


 


RDW   (11.5-15.5)  %


 


Sodium   (137-145)  mmol/L


 


BUN   (7-17)  mg/dL


 


Creatinine   (0.52-1.04)  mg/dL


 


Glucose  561 H*  (74-99)  mg/dL


 


POC Glucose (mg/dL)   (75-99)  mg/dL


 


ALT   (4-34)  U/L


 


Albumin   (3.5-5.0)  g/dL

## 2020-07-31 NOTE — P.PN
Subjective


Progress Note Date: 07/31/20








Morenita Ramirez, is a 60-year-old female who presented to [facility] emergency room due to uncontrolled blood sugar, caregiver states that 

sugar has been up above 600 for about 3 days, she has been giving her extra 

insulin without ability of getting sugar under control.  Patient was evaluated 

in the emergency room she was started on IV insulin drip and was admitted to 

medical floor.


On the medical floor patient started developing nausea and vomiting, otherwise 

she denies any complaints there is no fever or chills no headache or dizziness 

no chest pain no shortness of breath no cough no burning was urination no 

frequency or urgency and no hematuria








On 07/30/2020 patient was seen and examined on the medical floor she is feeling 

somewhat better nausea and vomiting is subsiding abdominal pain improving there 

is no fever or chills no headache or dizziness no chest pain no shortness of 

breath no cough no nausea or vomiting no abdominal pain no diarrhea no burning 

was urination no frequency or urgency and no hematuria, patient is improving 

insulin drip was discontinued and patient was restarted on her home insulin 

dose.





On 07/31/2020 patient was seen and examined in the ICU she is alert and oriented

3 in no apparent distress last night patient had an episode of hypotension 

likely related to dehydration due to nausea and vomiting A team was called, and 

patient was transferred to ICU she was given IV fluid and her blood pressure is 

up to normal range today there was no need to use any vasopressors, today 

patient is feeling well she had episodes of hypoglycemia this morning she was 

started on D5W and her sugar is in normal range at this time otherwise she 

denies any complaints there is no fever or chills no headache or dizziness no 

chest pain no shortness of breath no cough no nausea or vomiting no abdominal 

pain no diarrhea no burning with urination no frequency or urgency and no 

hematuria





Objective





- Vital Signs


Vital signs: 


                                   Vital Signs











Temp  98.0 F   07/31/20 08:00


 


Pulse  85   07/31/20 08:00


 


Resp  12   07/31/20 08:00


 


BP  130/66   07/31/20 09:00


 


Pulse Ox  96   07/31/20 09:00








                                 Intake & Output











 07/30/20 07/31/20 07/31/20





 18:59 06:59 18:59


 


Intake Total 0 1730 250


 


Output Total  555 410


 


Balance 0 1175 -160


 


Weight 49.5 kg 50.9 kg 


 


Intake:   


 


  IV   250


 


    Dextrose 5% in Water 1,   150





    000 ml @ 50 mls/hr IV .   





    Q20H [facility] Rx#:[id number]   


 


    Sodium Chloride 0.9% 1,   100





    000 ml @ 50 mls/hr IV .   





    Q20H [facility] Rx#:[id number]   


 


  Intake, IV Titration  1250 





  Amount   


 


    Sodium Chloride 0.9% 1,  250 





    000 ml @ 50 mls/hr IV .   





    Q20H [facility] Rx#:[id number]   


 


    Sodium Chloride 0.9% 1,  1000 





    000 ml @ 999 mls/hr IV .   





    Q1H1M [facility] Rx#:[id number]   


 


  Oral 0 480 


 


Output:   


 


  Urine  555 410


 


Other:   


 


  Voiding Method Diaper Indwelling Catheter 





 Incontinent  


 


  # Voids 1 1 


 


  # Bowel Movements 1  














- Exam











In general patient is alert and oriented 3 in mild distress due to continuous 

nausea and vomiting


HEENT head normocephalic and atraumatic


Neck is supple no JVD no goiter


Chest exam reveals a few scattered crackles bilaterally no wheezing


Cardiac exam reveals regular heart sounds no gallops no murmurs


Abdomen is soft with mild diffuse tenderness no organomegaly no palpable masses 

no rigidity or rebound


Extremity exam reveals no edema no cyanosis or clubbing patient has multiple toe

amputation in the past








- Labs


CBC & Chem 7: 


                                 07/31/20 04:27





                                 07/31/20 04:27


Labs: 


                  Abnormal Lab Results - Last 24 Hours (Table)











  07/30/20 07/30/20 07/30/20 Range/Units





  15:38 16:34 20:12 


 


RBC     (3.80-5.40)  m/uL


 


Hgb     (11.4-16.0)  gm/dL


 


Hct     (34.0-46.0)  %


 


MCHC     (31.0-37.0)  g/dL


 


RDW     (11.5-15.5)  %


 


ABG HCO3     (21-25)  mmol/L


 


ABG Total CO2     (19-24)  mmol/L


 


Sodium     (137-145)  mmol/L


 


Chloride     ()  mmol/L


 


BUN     (7-17)  mg/dL


 


Creatinine     (0.52-1.04)  mg/dL


 


Glucose     (74-99)  mg/dL


 


POC Glucose (mg/dL)  109 H  114 H  190 H  (75-99)  mg/dL


 


Plasma Lactic Acid Carmelo     (0.7-2.0)  mmol/L


 


Calcium     (8.4-10.2)  mg/dL


 


AST     (14-36)  U/L


 


ALT     (4-34)  U/L


 


Ammonia     (<30)  umol/L


 


Total Protein     (6.3-8.2)  g/dL


 


Albumin     (3.5-5.0)  g/dL














  07/30/20 07/30/20 07/30/20 Range/Units





  23:12 23:13 23:13 


 


RBC   2.86 L   (3.80-5.40)  m/uL


 


Hgb   8.2 L D   (11.4-16.0)  gm/dL


 


Hct   26.5 L   (34.0-46.0)  %


 


MCHC   30.8 L   (31.0-37.0)  g/dL


 


RDW   15.6 H   (11.5-15.5)  %


 


ABG HCO3  26 H    (21-25)  mmol/L


 


ABG Total CO2  27 H    (19-24)  mmol/L


 


Sodium    136 L  (137-145)  mmol/L


 


Chloride     ()  mmol/L


 


BUN    23 H  (7-17)  mg/dL


 


Creatinine    1.29 H  (0.52-1.04)  mg/dL


 


Glucose    161 H  (74-99)  mg/dL


 


POC Glucose (mg/dL)     (75-99)  mg/dL


 


Plasma Lactic Acid Carmelo     (0.7-2.0)  mmol/L


 


Calcium    8.1 L  (8.4-10.2)  mg/dL


 


AST    225 H  (14-36)  U/L


 


ALT    117 H  (4-34)  U/L


 


Ammonia     (<30)  umol/L


 


Total Protein    5.7 L  (6.3-8.2)  g/dL


 


Albumin    2.5 L  (3.5-5.0)  g/dL














  07/30/20 07/31/20 07/31/20 Range/Units





  23:13 00:00 04:27 


 


RBC     (3.80-5.40)  m/uL


 


Hgb     (11.4-16.0)  gm/dL


 


Hct     (34.0-46.0)  %


 


MCHC     (31.0-37.0)  g/dL


 


RDW     (11.5-15.5)  %


 


ABG HCO3     (21-25)  mmol/L


 


ABG Total CO2     (19-24)  mmol/L


 


Sodium     (137-145)  mmol/L


 


Chloride    112 H  ()  mmol/L


 


BUN    23 H  (7-17)  mg/dL


 


Creatinine    1.13 H  (0.52-1.04)  mg/dL


 


Glucose    29 L*  (74-99)  mg/dL


 


POC Glucose (mg/dL)     (75-99)  mg/dL


 


Plasma Lactic Acid Carmelo  2.3 H*    (0.7-2.0)  mmol/L


 


Calcium     (8.4-10.2)  mg/dL


 


AST    222 H  (14-36)  U/L


 


ALT    137 H  (4-34)  U/L


 


Ammonia   41 H   (<30)  umol/L


 


Total Protein     (6.3-8.2)  g/dL


 


Albumin    3.0 L  (3.5-5.0)  g/dL














  07/31/20 07/31/20 07/31/20 Range/Units





  04:27 05:13 05:33 


 


RBC  3.31 L    (3.80-5.40)  m/uL


 


Hgb  9.6 L    (11.4-16.0)  gm/dL


 


Hct  30.8 L    (34.0-46.0)  %


 


MCHC     (31.0-37.0)  g/dL


 


RDW  15.7 H    (11.5-15.5)  %


 


ABG HCO3     (21-25)  mmol/L


 


ABG Total CO2     (19-24)  mmol/L


 


Sodium     (137-145)  mmol/L


 


Chloride     ()  mmol/L


 


BUN     (7-17)  mg/dL


 


Creatinine     (0.52-1.04)  mg/dL


 


Glucose     (74-99)  mg/dL


 


POC Glucose (mg/dL)   32 L  44 L  (75-99)  mg/dL


 


Plasma Lactic Acid Carmelo     (0.7-2.0)  mmol/L


 


Calcium     (8.4-10.2)  mg/dL


 


AST     (14-36)  U/L


 


ALT     (4-34)  U/L


 


Ammonia     (<30)  umol/L


 


Total Protein     (6.3-8.2)  g/dL


 


Albumin     (3.5-5.0)  g/dL














  07/31/20 07/31/20 07/31/20 Range/Units





  05:35 07:10 10:43 


 


RBC     (3.80-5.40)  m/uL


 


Hgb     (11.4-16.0)  gm/dL


 


Hct     (34.0-46.0)  %


 


MCHC     (31.0-37.0)  g/dL


 


RDW     (11.5-15.5)  %


 


ABG HCO3     (21-25)  mmol/L


 


ABG Total CO2     (19-24)  mmol/L


 


Sodium     (137-145)  mmol/L


 


Chloride     ()  mmol/L


 


BUN     (7-17)  mg/dL


 


Creatinine     (0.52-1.04)  mg/dL


 


Glucose     (74-99)  mg/dL


 


POC Glucose (mg/dL)  44 L  158 H  35 L  (75-99)  mg/dL


 


Plasma Lactic Acid Carmelo     (0.7-2.0)  mmol/L


 


Calcium     (8.4-10.2)  mg/dL


 


AST     (14-36)  U/L


 


ALT     (4-34)  U/L


 


Ammonia     (<30)  umol/L


 


Total Protein     (6.3-8.2)  g/dL


 


Albumin     (3.5-5.0)  g/dL














  07/31/20 07/31/20 07/31/20 Range/Units





  10:54 11:06 11:16 


 


RBC     (3.80-5.40)  m/uL


 


Hgb     (11.4-16.0)  gm/dL


 


Hct     (34.0-46.0)  %


 


MCHC     (31.0-37.0)  g/dL


 


RDW     (11.5-15.5)  %


 


ABG HCO3     (21-25)  mmol/L


 


ABG Total CO2     (19-24)  mmol/L


 


Sodium     (137-145)  mmol/L


 


Chloride     ()  mmol/L


 


BUN     (7-17)  mg/dL


 


Creatinine     (0.52-1.04)  mg/dL


 


Glucose     (74-99)  mg/dL


 


POC Glucose (mg/dL)  37 L  59 L  320 H  (75-99)  mg/dL


 


Plasma Lactic Acid Carmelo     (0.7-2.0)  mmol/L


 


Calcium     (8.4-10.2)  mg/dL


 


AST     (14-36)  U/L


 


ALT     (4-34)  U/L


 


Ammonia     (<30)  umol/L


 


Total Protein     (6.3-8.2)  g/dL


 


Albumin     (3.5-5.0)  g/dL














  07/31/20 07/31/20 Range/Units





  13:00 14:11 


 


RBC    (3.80-5.40)  m/uL


 


Hgb    (11.4-16.0)  gm/dL


 


Hct    (34.0-46.0)  %


 


MCHC    (31.0-37.0)  g/dL


 


RDW    (11.5-15.5)  %


 


ABG HCO3    (21-25)  mmol/L


 


ABG Total CO2    (19-24)  mmol/L


 


Sodium    (137-145)  mmol/L


 


Chloride    ()  mmol/L


 


BUN    (7-17)  mg/dL


 


Creatinine    (0.52-1.04)  mg/dL


 


Glucose    (74-99)  mg/dL


 


POC Glucose (mg/dL)  315 H  282 H  (75-99)  mg/dL


 


Plasma Lactic Acid Carmelo    (0.7-2.0)  mmol/L


 


Calcium    (8.4-10.2)  mg/dL


 


AST    (14-36)  U/L


 


ALT    (4-34)  U/L


 


Ammonia    (<30)  umol/L


 


Total Protein    (6.3-8.2)  g/dL


 


Albumin    (3.5-5.0)  g/dL














Assessment and Plan


Plan: 





1.  Severe hyperglycemia improving was insulin drip





2.  Intractable nausea and vomiting, attempt to put NG tube failed to due to 

multiplefractures in the past, we are treating symptomatically, consultation for

general surgery initiated





3.  Underlying history of hypertension blood pressure is severely elevated we 

will use IV hydralazine when necessary





4.  Underlying history of hyperlipidemia





5.  Underlying history of depression and anxiety disorder





6.  Previous history of stroke





At this time we are treating symptomatically for nausea and vomiting Will 

attempt to obtain computed tomography scan of the abdomen


Will continue insulin drip at this time will recheck labs in a.m.

## 2020-08-01 LAB
ANION GAP SERPL CALC-SCNC: 5 MMOL/L
BASOPHILS # BLD AUTO: 0 K/UL (ref 0–0.2)
BASOPHILS NFR BLD AUTO: 0 %
BUN SERPL-SCNC: 15 MG/DL (ref 7–17)
CALCIUM SPEC-MCNC: 8.5 MG/DL (ref 8.4–10.2)
CHLORIDE SERPL-SCNC: 105 MMOL/L (ref 98–107)
CO2 SERPL-SCNC: 29 MMOL/L (ref 22–30)
EOSINOPHIL # BLD AUTO: 0.1 K/UL (ref 0–0.7)
EOSINOPHIL NFR BLD AUTO: 1 %
ERYTHROCYTE [DISTWIDTH] IN BLOOD BY AUTOMATED COUNT: 3.29 M/UL (ref 3.8–5.4)
ERYTHROCYTE [DISTWIDTH] IN BLOOD: 15.9 % (ref 11.5–15.5)
GLUCOSE BLD-MCNC: 105 MG/DL (ref 75–99)
GLUCOSE BLD-MCNC: 203 MG/DL (ref 75–99)
GLUCOSE BLD-MCNC: 294 MG/DL (ref 75–99)
GLUCOSE BLD-MCNC: 304 MG/DL (ref 75–99)
GLUCOSE BLD-MCNC: 317 MG/DL (ref 75–99)
GLUCOSE BLD-MCNC: 40 MG/DL (ref 75–99)
GLUCOSE BLD-MCNC: 43 MG/DL (ref 75–99)
GLUCOSE BLD-MCNC: 90 MG/DL (ref 75–99)
GLUCOSE BLD-MCNC: 95 MG/DL (ref 75–99)
GLUCOSE SERPL-MCNC: 157 MG/DL (ref 74–99)
HCT VFR BLD AUTO: 30.3 % (ref 34–46)
HGB BLD-MCNC: 9.4 GM/DL (ref 11.4–16)
LYMPHOCYTES # SPEC AUTO: 2.6 K/UL (ref 1–4.8)
LYMPHOCYTES NFR SPEC AUTO: 32 %
MCH RBC QN AUTO: 28.5 PG (ref 25–35)
MCHC RBC AUTO-ENTMCNC: 31 G/DL (ref 31–37)
MCV RBC AUTO: 92.1 FL (ref 80–100)
MONOCYTES # BLD AUTO: 0.5 K/UL (ref 0–1)
MONOCYTES NFR BLD AUTO: 6 %
NEUTROPHILS # BLD AUTO: 4.6 K/UL (ref 1.3–7.7)
NEUTROPHILS NFR BLD AUTO: 58 %
PLATELET # BLD AUTO: 246 K/UL (ref 150–450)
POTASSIUM SERPL-SCNC: 5 MMOL/L (ref 3.5–5.1)
SODIUM SERPL-SCNC: 139 MMOL/L (ref 137–145)
WBC # BLD AUTO: 7.9 K/UL (ref 3.8–10.6)

## 2020-08-01 RX ADMIN — ASPIRIN 81 MG CHEWABLE TABLET SCH MG: 81 TABLET CHEWABLE at 08:25

## 2020-08-01 RX ADMIN — DIVALPROEX SODIUM SCH MG: 250 TABLET, DELAYED RELEASE ORAL at 16:40

## 2020-08-01 RX ADMIN — INSULIN ASPART SCH: 100 INJECTION, SOLUTION INTRAVENOUS; SUBCUTANEOUS at 17:58

## 2020-08-01 RX ADMIN — DIVALPROEX SODIUM SCH MG: 250 TABLET, DELAYED RELEASE ORAL at 08:26

## 2020-08-01 RX ADMIN — INSULIN ASPART SCH UNIT: 100 INJECTION, SOLUTION INTRAVENOUS; SUBCUTANEOUS at 12:55

## 2020-08-01 RX ADMIN — INSULIN ASPART SCH UNIT: 100 INJECTION, SOLUTION INTRAVENOUS; SUBCUTANEOUS at 20:41

## 2020-08-01 RX ADMIN — INSULIN ASPART SCH UNIT: 100 INJECTION, SOLUTION INTRAVENOUS; SUBCUTANEOUS at 07:51

## 2020-08-01 RX ADMIN — PANTOPRAZOLE SODIUM SCH MG: 40 TABLET, DELAYED RELEASE ORAL at 07:51

## 2020-08-01 RX ADMIN — ATORVASTATIN CALCIUM SCH MG: 20 TABLET, FILM COATED ORAL at 20:41

## 2020-08-01 RX ADMIN — DIVALPROEX SODIUM SCH MG: 250 TABLET, DELAYED RELEASE ORAL at 22:38

## 2020-08-01 RX ADMIN — INSULIN DETEMIR SCH: 100 INJECTION, SOLUTION SUBCUTANEOUS at 20:41

## 2020-08-01 RX ADMIN — DULOXETINE SCH MG: 60 CAPSULE, DELAYED RELEASE ORAL at 08:26

## 2020-08-01 RX ADMIN — HEPARIN SODIUM SCH UNIT: 5000 INJECTION, SOLUTION INTRAVENOUS; SUBCUTANEOUS at 20:41

## 2020-08-01 RX ADMIN — VALPROIC ACID SCH MG: 250 SOLUTION ORAL at 08:26

## 2020-08-01 RX ADMIN — HEPARIN SODIUM SCH UNIT: 5000 INJECTION, SOLUTION INTRAVENOUS; SUBCUTANEOUS at 08:26

## 2020-08-01 RX ADMIN — Medication SCH MG: at 08:25

## 2020-08-01 RX ADMIN — PANTOPRAZOLE SODIUM SCH MG: 40 TABLET, DELAYED RELEASE ORAL at 16:40

## 2020-08-01 RX ADMIN — FAMOTIDINE SCH MG: 20 TABLET, FILM COATED ORAL at 08:25

## 2020-08-01 NOTE — P.PN
Subjective


Progress Note Date: 08/01/20








Morenita Ramirez, is a 60-year-old female who presented to [facility] emergency room due to uncontrolled blood sugar, caregiver states that 

sugar has been up above 600 for about 3 days, she has been giving her extra 

insulin without ability of getting sugar under control.  Patient was evaluated 

in the emergency room she was started on IV insulin drip and was admitted to 

medical floor.


On the medical floor patient started developing nausea and vomiting, otherwise 

she denies any complaints there is no fever or chills no headache or dizziness 

no chest pain no shortness of breath no cough no burning was urination no 

frequency or urgency and no hematuria








On 07/30/2020 patient was seen and examined on the medical floor she is feeling 

somewhat better nausea and vomiting is subsiding abdominal pain improving there 

is no fever or chills no headache or dizziness no chest pain no shortness of 

breath no cough no nausea or vomiting no abdominal pain no diarrhea no burning 

was urination no frequency or urgency and no hematuria, patient is improving 

insulin drip was discontinued and patient was restarted on her home insulin 

dose.





On 07/31/2020 patient was seen and examined in the ICU she is alert and oriented

3 in no apparent distress last night patient had an episode of hypotension 

likely related to dehydration due to nausea and vomiting A team was called, and 

patient was transferred to ICU she was given IV fluid and her blood pressure is 

up to normal range today there was no need to use any vasopressors, today 

patient is feeling well she had episodes of hypoglycemia this morning she was 

started on D5W and her sugar is in normal range at this time otherwise she 

denies any complaints there is no fever or chills no headache or dizziness no 

chest pain no shortness of breath no cough no nausea or vomiting no abdominal 

pain no diarrhea no burning with urination no frequency or urgency and no 

hematuria





On 08/01/2020 patient was seen and examined on the medical floor she is alert 

and oriented 3 in no apparent distress there is no fever or chills no headache 

or dizziness no chest pain no shortness of breath no cough no nausea or vomiting

no abdominal pain no diarrhea no burning with urination no frequency or urgency 

and no hematuria she is tolerating diet well last night she had episodes of 

hypoglycemia which resolved this time will continue to monitor glucose levels 

and adjust medications and insulin as needed





Objective





- Vital Signs


Vital signs: 


                                   Vital Signs











Temp  97.4 F L  08/01/20 05:00


 


Pulse  94   08/01/20 05:00


 


Resp  18   08/01/20 05:00


 


BP  177/81   08/01/20 05:00


 


Pulse Ox  100   08/01/20 05:00








                                 Intake & Output











 07/31/20 08/01/20 08/01/20





 18:59 06:59 18:59


 


Intake Total 250 660 


 


Output Total 685 2500 


 


Balance -435 -1840 


 


Weight  54 kg 


 


Intake:   


 


   160 


 


    Dextrose 5% in Water 1, 150  





    000 ml @ 50 mls/hr IV .   





    Q20H ONE Rx#:[id number]   


 


    Sodium Chloride 0.9% 1, 100 160 





    000 ml @ 50 mls/hr IV .   





    Q20H TYRON Rx#:[id number]   


 


  Oral  500 


 


Output:   


 


  Urine 685 2500 


 


Other:   


 


  Voiding Method Indwelling Catheter Indwelling Catheter Indwelling Catheter














- Exam











In general patient is alert and oriented 3 in mild distress due to continuous 

nausea and vomiting


HEENT head normocephalic and atraumatic


Neck is supple no JVD no goiter


Chest exam reveals a few scattered crackles bilaterally no wheezing


Cardiac exam reveals regular heart sounds no gallops no murmurs


Abdomen is soft with mild diffuse tenderness no organomegaly no palpable masses 

no rigidity or rebound


Extremity exam reveals no edema no cyanosis or clubbing patient has multiple toe

amputation in the past








- Labs


CBC & Chem 7: 


                                 08/01/20 06:21





                                 08/01/20 06:21


Labs: 


                  Abnormal Lab Results - Last 24 Hours (Table)











  07/31/20 07/31/20 07/31/20 Range/Units





  04:27 13:00 14:11 


 


RBC     (3.80-5.40)  m/uL


 


Hgb     (11.4-16.0)  gm/dL


 


Hct     (34.0-46.0)  %


 


RDW     (11.5-15.5)  %


 


Glucose     (74-99)  mg/dL


 


POC Glucose (mg/dL)   315 H  282 H  (75-99)  mg/dL


 


Hemoglobin A1c  9.1 H    (4.0-6.0)  %














  07/31/20 08/01/20 08/01/20 Range/Units





  17:03 01:54 05:06 


 


RBC     (3.80-5.40)  m/uL


 


Hgb     (11.4-16.0)  gm/dL


 


Hct     (34.0-46.0)  %


 


RDW     (11.5-15.5)  %


 


Glucose     (74-99)  mg/dL


 


POC Glucose (mg/dL)  113 H  105 H  40 L  (75-99)  mg/dL


 


Hemoglobin A1c     (4.0-6.0)  %














  08/01/20 08/01/20 08/01/20 Range/Units





  05:20 06:21 06:21 


 


RBC   3.29 L   (3.80-5.40)  m/uL


 


Hgb   9.4 L   (11.4-16.0)  gm/dL


 


Hct   30.3 L   (34.0-46.0)  %


 


RDW   15.9 H   (11.5-15.5)  %


 


Glucose    157 H  (74-99)  mg/dL


 


POC Glucose (mg/dL)  43 L    (75-99)  mg/dL


 


Hemoglobin A1c     (4.0-6.0)  %














  08/01/20 08/01/20 08/01/20 Range/Units





  07:14 09:01 11:33 


 


RBC     (3.80-5.40)  m/uL


 


Hgb     (11.4-16.0)  gm/dL


 


Hct     (34.0-46.0)  %


 


RDW     (11.5-15.5)  %


 


Glucose     (74-99)  mg/dL


 


POC Glucose (mg/dL)  203 H  294 H  304 H  (75-99)  mg/dL


 


Hemoglobin A1c     (4.0-6.0)  %














Assessment and Plan


Plan: 





1.  Severe hyperglycemia improving was insulin drip





2.  Intractable nausea and vomiting, attempt to put NG tube failed to due to 

multiplefractures in the past, we are treating symptomatically, consultation for

general surgery initiated





3.  Underlying history of hypertension blood pressure is severely elevated we 

will use IV hydralazine when necessary





4.  Underlying history of hyperlipidemia





5.  Underlying history of depression and anxiety disorder





6.  Previous history of stroke





At this time we are treating symptomatically for nausea and vomiting Will 

attempt to obtain computed tomography scan of the abdomen


Will continue insulin drip at this time will recheck labs in a.m.

## 2020-08-02 LAB
ALBUMIN SERPL-MCNC: 3.2 G/DL (ref 3.5–5)
ALP SERPL-CCNC: 112 U/L (ref 38–126)
ALT SERPL-CCNC: 58 U/L (ref 4–34)
ANION GAP SERPL CALC-SCNC: 7 MMOL/L
AST SERPL-CCNC: 25 U/L (ref 14–36)
BASOPHILS # BLD AUTO: 0 K/UL (ref 0–0.2)
BASOPHILS NFR BLD AUTO: 1 %
BUN SERPL-SCNC: 21 MG/DL (ref 7–17)
CALCIUM SPEC-MCNC: 8.7 MG/DL (ref 8.4–10.2)
CHLORIDE SERPL-SCNC: 99 MMOL/L (ref 98–107)
CO2 SERPL-SCNC: 30 MMOL/L (ref 22–30)
EOSINOPHIL # BLD AUTO: 0.1 K/UL (ref 0–0.7)
EOSINOPHIL NFR BLD AUTO: 1 %
ERYTHROCYTE [DISTWIDTH] IN BLOOD BY AUTOMATED COUNT: 3.52 M/UL (ref 3.8–5.4)
ERYTHROCYTE [DISTWIDTH] IN BLOOD: 15.7 % (ref 11.5–15.5)
GLUCOSE BLD-MCNC: 285 MG/DL (ref 75–99)
GLUCOSE BLD-MCNC: 308 MG/DL (ref 75–99)
GLUCOSE BLD-MCNC: 448 MG/DL (ref 75–99)
GLUCOSE BLD-MCNC: >600 MG/DL (ref 75–99)
GLUCOSE SERPL-MCNC: 309 MG/DL (ref 74–99)
HCT VFR BLD AUTO: 32.5 % (ref 34–46)
HGB BLD-MCNC: 10.2 GM/DL (ref 11.4–16)
LYMPHOCYTES # SPEC AUTO: 3 K/UL (ref 1–4.8)
LYMPHOCYTES NFR SPEC AUTO: 50 %
MCH RBC QN AUTO: 28.9 PG (ref 25–35)
MCHC RBC AUTO-ENTMCNC: 31.2 G/DL (ref 31–37)
MCV RBC AUTO: 92.4 FL (ref 80–100)
MONOCYTES # BLD AUTO: 0.4 K/UL (ref 0–1)
MONOCYTES NFR BLD AUTO: 6 %
NEUTROPHILS # BLD AUTO: 2.4 K/UL (ref 1.3–7.7)
NEUTROPHILS NFR BLD AUTO: 40 %
PLATELET # BLD AUTO: 246 K/UL (ref 150–450)
POTASSIUM SERPL-SCNC: 5.1 MMOL/L (ref 3.5–5.1)
PROT SERPL-MCNC: 6.8 G/DL (ref 6.3–8.2)
SODIUM SERPL-SCNC: 136 MMOL/L (ref 137–145)
WBC # BLD AUTO: 5.9 K/UL (ref 3.8–10.6)

## 2020-08-02 RX ADMIN — DIVALPROEX SODIUM SCH MG: 250 TABLET, DELAYED RELEASE ORAL at 07:49

## 2020-08-02 RX ADMIN — FAMOTIDINE SCH MG: 20 TABLET, FILM COATED ORAL at 07:49

## 2020-08-02 RX ADMIN — HEPARIN SODIUM SCH UNIT: 5000 INJECTION, SOLUTION INTRAVENOUS; SUBCUTANEOUS at 07:48

## 2020-08-02 RX ADMIN — Medication SCH MG: at 07:49

## 2020-08-02 RX ADMIN — ONDANSETRON PRN MG: 2 INJECTION INTRAMUSCULAR; INTRAVENOUS at 07:48

## 2020-08-02 RX ADMIN — INSULIN DETEMIR SCH UNIT: 100 INJECTION, SOLUTION SUBCUTANEOUS at 22:06

## 2020-08-02 RX ADMIN — PANTOPRAZOLE SODIUM SCH MG: 40 TABLET, DELAYED RELEASE ORAL at 07:49

## 2020-08-02 RX ADMIN — VALPROIC ACID SCH MG: 250 SOLUTION ORAL at 07:48

## 2020-08-02 RX ADMIN — INSULIN ASPART SCH UNIT: 100 INJECTION, SOLUTION INTRAVENOUS; SUBCUTANEOUS at 11:51

## 2020-08-02 RX ADMIN — DULOXETINE SCH MG: 60 CAPSULE, DELAYED RELEASE ORAL at 07:48

## 2020-08-02 RX ADMIN — INSULIN ASPART SCH UNIT: 100 INJECTION, SOLUTION INTRAVENOUS; SUBCUTANEOUS at 07:49

## 2020-08-02 RX ADMIN — PANTOPRAZOLE SODIUM SCH MG: 40 TABLET, DELAYED RELEASE ORAL at 17:21

## 2020-08-02 RX ADMIN — INSULIN ASPART SCH UNIT: 100 INJECTION, SOLUTION INTRAVENOUS; SUBCUTANEOUS at 18:23

## 2020-08-02 RX ADMIN — DIVALPROEX SODIUM SCH MG: 250 TABLET, DELAYED RELEASE ORAL at 17:22

## 2020-08-02 RX ADMIN — HEPARIN SODIUM SCH UNIT: 5000 INJECTION, SOLUTION INTRAVENOUS; SUBCUTANEOUS at 22:06

## 2020-08-02 RX ADMIN — DIVALPROEX SODIUM SCH MG: 250 TABLET, DELAYED RELEASE ORAL at 22:06

## 2020-08-02 RX ADMIN — ASPIRIN 81 MG CHEWABLE TABLET SCH MG: 81 TABLET CHEWABLE at 07:48

## 2020-08-02 RX ADMIN — INSULIN ASPART SCH: 100 INJECTION, SOLUTION INTRAVENOUS; SUBCUTANEOUS at 21:31

## 2020-08-02 NOTE — P.PN
Subjective


Progress Note Date: 08/02/20








Morenita Ramirez, is a 60-year-old female who presented to [facility] emergency room due to uncontrolled blood sugar, caregiver states that 

sugar has been up above 600 for about 3 days, she has been giving her extra 

insulin without ability of getting sugar under control.  Patient was evaluated 

in the emergency room she was started on IV insulin drip and was admitted to 

medical floor.


On the medical floor patient started developing nausea and vomiting, otherwise 

she denies any complaints there is no fever or chills no headache or dizziness 

no chest pain no shortness of breath no cough no burning was urination no 

frequency or urgency and no hematuria








On 07/30/2020 patient was seen and examined on the medical floor she is feeling 

somewhat better nausea and vomiting is subsiding abdominal pain improving there 

is no fever or chills no headache or dizziness no chest pain no shortness of 

breath no cough no nausea or vomiting no abdominal pain no diarrhea no burning 

was urination no frequency or urgency and no hematuria, patient is improving 

insulin drip was discontinued and patient was restarted on her home insulin 

dose.





On 07/31/2020 patient was seen and examined in the ICU she is alert and oriented

3 in no apparent distress last night patient had an episode of hypotension 

likely related to dehydration due to nausea and vomiting A team was called, and 

patient was transferred to ICU she was given IV fluid and her blood pressure is 

up to normal range today there was no need to use any vasopressors, today 

patient is feeling well she had episodes of hypoglycemia this morning she was 

started on D5W and her sugar is in normal range at this time otherwise she 

denies any complaints there is no fever or chills no headache or dizziness no 

chest pain no shortness of breath no cough no nausea or vomiting no abdominal 

pain no diarrhea no burning with urination no frequency or urgency and no 

hematuria





On 08/01/2020 patient was seen and examined on the medical floor she is alert 

and oriented 3 in no apparent distress there is no fever or chills no headache 

or dizziness no chest pain no shortness of breath no cough no nausea or vomiting

no abdominal pain no diarrhea no burning with urination no frequency or urgency 

and no hematuria she is tolerating diet well last night she had episodes of 

hypoglycemia which resolved this time will continue to monitor glucose levels 

and adjust medications and insulin as needed





On 08/02/2020 patient was seen and examined on the medical floor she is alert 

and oriented 3 in no apparent distress there is no fever or chills no headache 

or dizziness no chest pain no shortness of breath no cough no nausea or vomiting

no abdominal pain no diarrhea no burning with urination no frequency or urgency 

and no hematuria, glucose level is wildly fluctuating, at this time we are 

counting insulin requirement, will discontinue Springer catheter, possible 

discharge to home tomorrow





Objective





- Vital Signs


Vital signs: 


                                   Vital Signs











Temp  98.2 F   08/02/20 11:23


 


Pulse  96   08/02/20 11:23


 


Resp  17   08/02/20 11:23


 


BP  129/89   08/02/20 11:23


 


Pulse Ox  100   08/02/20 11:23








                                 Intake & Output











 08/01/20 08/02/20 08/02/20





 18:59 06:59 18:59


 


Intake Total 600 700 


 


Output Total  2600 


 


Balance 600 -1900 


 


Weight  54 kg 


 


Intake:   


 


  Oral 600 700 


 


Output:   


 


  Urine  2600 


 


    Uretheral (Springer)  2600 


 


Other:   


 


  Voiding Method Indwelling Catheter Indwelling Catheter 














- Exam











In general patient is alert and oriented 3 in mild distress due to continuous 

nausea and vomiting


HEENT head normocephalic and atraumatic


Neck is supple no JVD no goiter


Chest exam reveals a few scattered crackles bilaterally no wheezing


Cardiac exam reveals regular heart sounds no gallops no murmurs


Abdomen is soft with mild diffuse tenderness no organomegaly no palpable masses 

no rigidity or rebound


Extremity exam reveals no edema no cyanosis or clubbing patient has multiple toe

amputation in the past








- Labs


CBC & Chem 7: 


                                 08/02/20 06:25





                                 08/02/20 06:25


Labs: 


                  Abnormal Lab Results - Last 24 Hours (Table)











  08/01/20 08/02/20 08/02/20 Range/Units





  20:00 02:00 06:25 


 


RBC    3.52 L  (3.80-5.40)  m/uL


 


Hgb    10.2 L  (11.4-16.0)  gm/dL


 


Hct    32.5 L  (34.0-46.0)  %


 


RDW    15.7 H  (11.5-15.5)  %


 


Sodium     (137-145)  mmol/L


 


BUN     (7-17)  mg/dL


 


Creatinine     (0.52-1.04)  mg/dL


 


Glucose     (74-99)  mg/dL


 


POC Glucose (mg/dL)  317 H  448 H   (75-99)  mg/dL


 


ALT     (4-34)  U/L


 


Albumin     (3.5-5.0)  g/dL














  08/02/20 08/02/20 08/02/20 Range/Units





  06:25 07:14 11:24 


 


RBC     (3.80-5.40)  m/uL


 


Hgb     (11.4-16.0)  gm/dL


 


Hct     (34.0-46.0)  %


 


RDW     (11.5-15.5)  %


 


Sodium  136 L    (137-145)  mmol/L


 


BUN  21 H    (7-17)  mg/dL


 


Creatinine  1.07 H    (0.52-1.04)  mg/dL


 


Glucose  309 H    (74-99)  mg/dL


 


POC Glucose (mg/dL)   308 H  285 H  (75-99)  mg/dL


 


ALT  58 H    (4-34)  U/L


 


Albumin  3.2 L    (3.5-5.0)  g/dL














Assessment and Plan


Plan: 





1.  Severe hyperglycemia improving was insulin drip





2.  Intractable nausea and vomiting, attempt to put NG tube failed to due to 

multiplefractures in the past, we are treating symptomatically, consultation for

general surgery initiated





3.  Underlying history of hypertension blood pressure is severely elevated we w

ill use IV hydralazine when necessary





4.  Underlying history of hyperlipidemia





5.  Underlying history of depression and anxiety disorder





6.  Previous history of stroke








Continue with Levemir 20 units once daily at bedtime


Continue with insulin sliding scale before meals, we are counting insulin 

requirement glucose level is wildly fluctuating


Possible discharge to home tomorrow if stable

## 2020-08-03 LAB
GLUCOSE BLD-MCNC: 189 MG/DL (ref 75–99)
GLUCOSE BLD-MCNC: 191 MG/DL (ref 75–99)
GLUCOSE BLD-MCNC: 225 MG/DL (ref 75–99)
GLUCOSE BLD-MCNC: 329 MG/DL (ref 75–99)
GLUCOSE BLD-MCNC: 64 MG/DL (ref 75–99)
GLUCOSE BLD-MCNC: 71 MG/DL (ref 75–99)
GLUCOSE BLD-MCNC: 82 MG/DL (ref 75–99)

## 2020-08-03 RX ADMIN — PANTOPRAZOLE SODIUM SCH MG: 40 TABLET, DELAYED RELEASE ORAL at 17:40

## 2020-08-03 RX ADMIN — INSULIN ASPART SCH: 100 INJECTION, SOLUTION INTRAVENOUS; SUBCUTANEOUS at 08:09

## 2020-08-03 RX ADMIN — INSULIN ASPART SCH UNIT: 100 INJECTION, SOLUTION INTRAVENOUS; SUBCUTANEOUS at 21:07

## 2020-08-03 RX ADMIN — DIVALPROEX SODIUM SCH MG: 250 TABLET, DELAYED RELEASE ORAL at 21:08

## 2020-08-03 RX ADMIN — INSULIN DETEMIR SCH UNIT: 100 INJECTION, SOLUTION SUBCUTANEOUS at 21:07

## 2020-08-03 RX ADMIN — FAMOTIDINE SCH MG: 20 TABLET, FILM COATED ORAL at 10:11

## 2020-08-03 RX ADMIN — HEPARIN SODIUM SCH UNIT: 5000 INJECTION, SOLUTION INTRAVENOUS; SUBCUTANEOUS at 10:13

## 2020-08-03 RX ADMIN — DIVALPROEX SODIUM SCH MG: 250 TABLET, DELAYED RELEASE ORAL at 10:13

## 2020-08-03 RX ADMIN — Medication SCH MG: at 10:11

## 2020-08-03 RX ADMIN — ASPIRIN 81 MG CHEWABLE TABLET SCH MG: 81 TABLET CHEWABLE at 10:09

## 2020-08-03 RX ADMIN — VALPROIC ACID SCH MG: 250 SOLUTION ORAL at 10:08

## 2020-08-03 RX ADMIN — DIVALPROEX SODIUM SCH MG: 250 TABLET, DELAYED RELEASE ORAL at 17:41

## 2020-08-03 RX ADMIN — DULOXETINE SCH MG: 60 CAPSULE, DELAYED RELEASE ORAL at 10:12

## 2020-08-03 RX ADMIN — ATORVASTATIN CALCIUM SCH MG: 20 TABLET, FILM COATED ORAL at 21:07

## 2020-08-03 RX ADMIN — PANTOPRAZOLE SODIUM SCH MG: 40 TABLET, DELAYED RELEASE ORAL at 09:34

## 2020-08-03 RX ADMIN — HEPARIN SODIUM SCH UNIT: 5000 INJECTION, SOLUTION INTRAVENOUS; SUBCUTANEOUS at 21:07

## 2020-08-03 RX ADMIN — INSULIN ASPART SCH UNIT: 100 INJECTION, SOLUTION INTRAVENOUS; SUBCUTANEOUS at 17:41

## 2020-08-03 RX ADMIN — INSULIN ASPART SCH UNIT: 100 INJECTION, SOLUTION INTRAVENOUS; SUBCUTANEOUS at 12:41

## 2020-08-03 NOTE — CDI
Documentation Clarification Form



Date: 08/04/2020 01:45:12 PM

From: Telma Wolff RN, CCDS

Phone: [phone]

MRN: [id number]

Admit Date: 07/28/2020 04:09:00 PM

Patient Name: Morenita Ramirez

Visit Number: [id number]



ATTENTION: The Clinical Documentation Specialists (CDI) and [facility] Coding Staff 
appreciate your assistance in clarifying documentation. Please respond to the 
clarification below the line at the bottom and electronically sign. The CDI & 
[facility] Coding staff will review the response and follow-up if needed. Please note: 
Queries are made part of the Legal Health Record. If you have any questions, 
please contact the author of this message via ITS.



Dr. Saniya Cabral



Anemia is documented in the PMH, pt is noted to have low Hgb on current 
admission.  Please provide specificity.



History/Risk Factors: 

Renal failure, anemia, Multiple Diabetic Complications, CVA with Left sided 
paralysis



Clinical indicators: 

Hemoglobin: 10.5/10.2/8.2/9.6/9.4

Hematocrit: 34.4/32.2/26.5/30.8/30.3



Treatment:        

Feosol 325 mg PO QD

7/28 1 L 0.9% NS IVF Bolus

7/30 1L 0.9% NS IVF Bolus



In order to capture the severity of condition, please clarify the type of anemia
and etiology if known.



 Chronic blood loss anemia

 Iron deficiency anemia

 Anemia due to malignancy

 Nutritional anemia

 Anemia of chronic disease

 Unable to determine

 Other, please specify ___________



(Last Form Revision: March 2020)

___________________________________________________________________________

Anemia of chronic disease

MTDD

## 2020-08-03 NOTE — CDI
Documentation Clarification Form



Date: 08/04/2020 01:45:12 PM

From: Telma Wolff RN, CCDS

Phone: [phone]

MRN: [id number]

Admit Date: 07/28/2020 04:09:00 PM

Patient Name: Morenita Ramirez

Visit Number: [id number]



ATTENTION: The Clinical Documentation Specialists (CDI) and [facility] Coding Staff 
appreciate your assistance in clarifying documentation. Please respond to the 
clarification below the line at the bottom and electronically sign. The CDI & 
[facility] Coding staff will review the response and follow-up if needed. Please note: 
Queries are made part of the Legal Health Record. If you have any questions, 
please contact the author of this message via ITS.



Dr. Saniya Cabral



Anemia is documented in the PMH, pt is noted to have low Hgb on current 
admission.  Please provide specificity.



History/Risk Factors: 

Renal failure, anemia, Multiple Diabetic Complications, CVA with Left sided 
paralysis



Clinical indicators: 

Hemoglobin: 10.5/10.2/8.2/9.6/9.4

Hematocrit: 34.4/32.2/26.5/30.8/30.3



Treatment:        

Feosol 325 mg PO QD

7/28 1 L 0.9% NS IVF Bolus

7/30 1L 0.9% NS IVF Bolus



In order to capture the severity of condition, please clarify the type of anemia
and etiology if known.



 Chronic blood loss anemia

 Iron deficiency anemia

 Anemia due to malignancy

 Nutritional anemia

 Anemia of chronic disease

 Unable to determine

 Other, please specify ___________



(Last Form Revision: March 2020)

___________________________________________________________________________

Anemia of chronic disease

MTDD

## 2020-08-04 LAB
ALBUMIN SERPL-MCNC: 3.2 G/DL (ref 3.5–5)
ALP SERPL-CCNC: 86 U/L (ref 38–126)
ALT SERPL-CCNC: 28 U/L (ref 4–34)
ANION GAP SERPL CALC-SCNC: 7 MMOL/L
AST SERPL-CCNC: 16 U/L (ref 14–36)
BASOPHILS # BLD AUTO: 0 K/UL (ref 0–0.2)
BASOPHILS NFR BLD AUTO: 0 %
BUN SERPL-SCNC: 19 MG/DL (ref 7–17)
CALCIUM SPEC-MCNC: 8.5 MG/DL (ref 8.4–10.2)
CHLORIDE SERPL-SCNC: 102 MMOL/L (ref 98–107)
CO2 SERPL-SCNC: 28 MMOL/L (ref 22–30)
EOSINOPHIL # BLD AUTO: 0.1 K/UL (ref 0–0.7)
EOSINOPHIL NFR BLD AUTO: 1 %
ERYTHROCYTE [DISTWIDTH] IN BLOOD BY AUTOMATED COUNT: 3.55 M/UL (ref 3.8–5.4)
ERYTHROCYTE [DISTWIDTH] IN BLOOD: 15.8 % (ref 11.5–15.5)
GLUCOSE BLD-MCNC: 103 MG/DL (ref 75–99)
GLUCOSE BLD-MCNC: 117 MG/DL (ref 75–99)
GLUCOSE BLD-MCNC: 190 MG/DL (ref 75–99)
GLUCOSE BLD-MCNC: 193 MG/DL (ref 75–99)
GLUCOSE BLD-MCNC: 292 MG/DL (ref 75–99)
GLUCOSE BLD-MCNC: 305 MG/DL (ref 75–99)
GLUCOSE BLD-MCNC: 372 MG/DL (ref 75–99)
GLUCOSE BLD-MCNC: 38 MG/DL (ref 75–99)
GLUCOSE BLD-MCNC: 47 MG/DL (ref 75–99)
GLUCOSE BLD-MCNC: 89 MG/DL (ref 75–99)
GLUCOSE SERPL-MCNC: 145 MG/DL (ref 74–99)
HCT VFR BLD AUTO: 32.9 % (ref 34–46)
HGB BLD-MCNC: 10.1 GM/DL (ref 11.4–16)
LYMPHOCYTES # SPEC AUTO: 2.4 K/UL (ref 1–4.8)
LYMPHOCYTES NFR SPEC AUTO: 40 %
MCH RBC QN AUTO: 28.4 PG (ref 25–35)
MCHC RBC AUTO-ENTMCNC: 30.7 G/DL (ref 31–37)
MCV RBC AUTO: 92.5 FL (ref 80–100)
MONOCYTES # BLD AUTO: 0.3 K/UL (ref 0–1)
MONOCYTES NFR BLD AUTO: 5 %
NEUTROPHILS # BLD AUTO: 3.1 K/UL (ref 1.3–7.7)
NEUTROPHILS NFR BLD AUTO: 52 %
PLATELET # BLD AUTO: 238 K/UL (ref 150–450)
POTASSIUM SERPL-SCNC: 4.6 MMOL/L (ref 3.5–5.1)
PROT SERPL-MCNC: 6.7 G/DL (ref 6.3–8.2)
SODIUM SERPL-SCNC: 137 MMOL/L (ref 137–145)
WBC # BLD AUTO: 6 K/UL (ref 3.8–10.6)

## 2020-08-04 RX ADMIN — FAMOTIDINE SCH MG: 20 TABLET, FILM COATED ORAL at 08:32

## 2020-08-04 RX ADMIN — DIVALPROEX SODIUM SCH MG: 250 TABLET, DELAYED RELEASE ORAL at 20:43

## 2020-08-04 RX ADMIN — DIVALPROEX SODIUM SCH MG: 250 TABLET, DELAYED RELEASE ORAL at 08:38

## 2020-08-04 RX ADMIN — Medication SCH MG: at 08:36

## 2020-08-04 RX ADMIN — DIVALPROEX SODIUM SCH MG: 250 TABLET, DELAYED RELEASE ORAL at 17:28

## 2020-08-04 RX ADMIN — INSULIN ASPART SCH UNIT: 100 INJECTION, SOLUTION INTRAVENOUS; SUBCUTANEOUS at 09:28

## 2020-08-04 RX ADMIN — INSULIN ASPART SCH UNIT: 100 INJECTION, SOLUTION INTRAVENOUS; SUBCUTANEOUS at 17:31

## 2020-08-04 RX ADMIN — ASPIRIN 81 MG CHEWABLE TABLET SCH MG: 81 TABLET CHEWABLE at 08:32

## 2020-08-04 RX ADMIN — PANTOPRAZOLE SODIUM SCH MG: 40 TABLET, DELAYED RELEASE ORAL at 17:28

## 2020-08-04 RX ADMIN — INSULIN ASPART SCH UNIT: 100 INJECTION, SOLUTION INTRAVENOUS; SUBCUTANEOUS at 20:44

## 2020-08-04 RX ADMIN — INSULIN ASPART SCH: 100 INJECTION, SOLUTION INTRAVENOUS; SUBCUTANEOUS at 11:26

## 2020-08-04 RX ADMIN — DULOXETINE SCH MG: 60 CAPSULE, DELAYED RELEASE ORAL at 08:35

## 2020-08-04 RX ADMIN — HEPARIN SODIUM SCH UNIT: 5000 INJECTION, SOLUTION INTRAVENOUS; SUBCUTANEOUS at 20:43

## 2020-08-04 RX ADMIN — ATORVASTATIN CALCIUM SCH MG: 20 TABLET, FILM COATED ORAL at 20:43

## 2020-08-04 RX ADMIN — VALPROIC ACID SCH MG: 250 SOLUTION ORAL at 08:37

## 2020-08-04 RX ADMIN — HEPARIN SODIUM SCH UNIT: 5000 INJECTION, SOLUTION INTRAVENOUS; SUBCUTANEOUS at 08:36

## 2020-08-04 RX ADMIN — PANTOPRAZOLE SODIUM SCH MG: 40 TABLET, DELAYED RELEASE ORAL at 08:32

## 2020-08-04 NOTE — P.PN
Subjective


Progress Note Date: 08/03/20








Morenita Ramirez, is a 60-year-old female who presented to [facility] emergency room due to uncontrolled blood sugar, caregiver states that 

sugar has been up above 600 for about 3 days, she has been giving her extra 

insulin without ability of getting sugar under control.  Patient was evaluated 

in the emergency room she was started on IV insulin drip and was admitted to 

medical floor.


On the medical floor patient started developing nausea and vomiting, otherwise 

she denies any complaints there is no fever or chills no headache or dizziness 

no chest pain no shortness of breath no cough no burning was urination no 

frequency or urgency and no hematuria








On 07/30/2020 patient was seen and examined on the medical floor she is feeling 

somewhat better nausea and vomiting is subsiding abdominal pain improving there 

is no fever or chills no headache or dizziness no chest pain no shortness of 

breath no cough no nausea or vomiting no abdominal pain no diarrhea no burning 

was urination no frequency or urgency and no hematuria, patient is improving 

insulin drip was discontinued and patient was restarted on her home insulin 

dose.





On 07/31/2020 patient was seen and examined in the ICU she is alert and oriented

3 in no apparent distress last night patient had an episode of hypotension 

likely related to dehydration due to nausea and vomiting A team was called, and 

patient was transferred to ICU she was given IV fluid and her blood pressure is 

up to normal range today there was no need to use any vasopressors, today 

patient is feeling well she had episodes of hypoglycemia this morning she was 

started on D5W and her sugar is in normal range at this time otherwise she 

denies any complaints there is no fever or chills no headache or dizziness no 

chest pain no shortness of breath no cough no nausea or vomiting no abdominal 

pain no diarrhea no burning with urination no frequency or urgency and no 

hematuria





On 08/01/2020 patient was seen and examined on the medical floor she is alert 

and oriented 3 in no apparent distress there is no fever or chills no headache 

or dizziness no chest pain no shortness of breath no cough no nausea or vomiting

no abdominal pain no diarrhea no burning with urination no frequency or urgency 

and no hematuria she is tolerating diet well last night she had episodes of 

hypoglycemia which resolved this time will continue to monitor glucose levels 

and adjust medications and insulin as needed





On 08/02/2020 patient was seen and examined on the medical floor she is alert 

and oriented 3 in no apparent distress there is no fever or chills no headache 

or dizziness no chest pain no shortness of breath no cough no nausea or vomiting

no abdominal pain no diarrhea no burning with urination no frequency or urgency 

and no hematuria, glucose level is wildly fluctuating, at this time we are 

counting insulin requirement, will discontinue Springer catheter, possible 

discharge to home tomorrow





On 08/03/2020 patient was seen and examined on the medical floor she is alert 

and oriented 3 in no apparent distress she had hypoglycemia this morning other

wise no complaints there is no fever or chills no headache or dizziness no chest

pain no shortness of breath no cough no nausea or vomiting no abdominal pain no 

diarrhea no burning with urination no frequency or urgency no hematuria





Objective





- Vital Signs


Vital signs: 


                                   Vital Signs











Temp  97.8 F   08/03/20 11:55


 


Pulse  89   08/03/20 11:55


 


Resp  21   08/03/20 11:55


 


BP  159/72   08/03/20 11:55


 


Pulse Ox  96   08/03/20 11:55








                                 Intake & Output











 08/02/20 08/03/20 08/03/20





 18:59 06:59 18:59


 


Intake Total  580 


 


Output Total 675  


 


Balance -675 580 


 


Weight  54 kg 54 kg


 


Intake:   


 


  Oral  580 


 


Output:   


 


  Urine 675  


 


    Uretheral (Springer) 675  


 


Other:   


 


  Voiding Method Incontinent Diaper 





  Incontinent 


 


  # Voids 1 1 1


 


  # Bowel Movements 0  2














- Exam











In general patient is alert and oriented 3 in mild distress due to continuous 

nausea and vomiting


HEENT head normocephalic and atraumatic


Neck is supple no JVD no goiter


Chest exam reveals a few scattered crackles bilaterally no wheezing


Cardiac exam reveals regular heart sounds no gallops no murmurs


Abdomen is soft with mild diffuse tenderness no organomegaly no palpable masses 

no rigidity or rebound


Extremity exam reveals no edema no cyanosis or clubbing patient has multiple toe

amputation in the past








- Labs


CBC & Chem 7: 


                                 08/02/20 06:25





                                 08/02/20 20:24


Labs: 


                  Abnormal Lab Results - Last 24 Hours (Table)











  08/02/20 08/02/20 08/02/20 Range/Units





  17:35 20:00 20:24 


 


Glucose  617 H*   561 H*  (74-99)  mg/dL


 


POC Glucose (mg/dL)   >600 H   (75-99)  mg/dL














  08/03/20 08/03/20 08/03/20 Range/Units





  02:28 07:04 10:39 


 


Glucose     (74-99)  mg/dL


 


POC Glucose (mg/dL)  64 L  71 L  189 H  (75-99)  mg/dL














  08/03/20 08/03/20 Range/Units





  11:21 17:23 


 


Glucose    (74-99)  mg/dL


 


POC Glucose (mg/dL)  191 H  329 H  (75-99)  mg/dL














Assessment and Plan


Plan: 





1.  Severe hyperglycemia improving was insulin drip





2.  Intractable nausea and vomiting, attempt to put NG tube failed to due to 

multiplefractures in the past, we are treating symptomatically, consultation for

general surgery initiated





3.  Underlying history of hypertension blood pressure is severely elevated we 

will use IV hydralazine when necessary





4.  Underlying history of hyperlipidemia





5.  Underlying history of depression and anxiety disorder





6.  Previous history of stroke








Continue with Levemir 20 units once daily at bedtime


Continue with insulin sliding scale before meals, we are counting insulin 

requirement glucose level is wildly fluctuating


Possible discharge to home tomorrow if stable

## 2020-08-05 VITALS
DIASTOLIC BLOOD PRESSURE: 69 MMHG | RESPIRATION RATE: 19 BRPM | SYSTOLIC BLOOD PRESSURE: 129 MMHG | HEART RATE: 98 BPM | TEMPERATURE: 97.7 F

## 2020-08-05 LAB
GLUCOSE BLD-MCNC: 153 MG/DL (ref 75–99)
GLUCOSE BLD-MCNC: 177 MG/DL (ref 75–99)
GLUCOSE BLD-MCNC: 216 MG/DL (ref 75–99)

## 2020-08-05 RX ADMIN — INSULIN ASPART SCH UNIT: 100 INJECTION, SOLUTION INTRAVENOUS; SUBCUTANEOUS at 12:52

## 2020-08-05 RX ADMIN — ASPIRIN 81 MG CHEWABLE TABLET SCH MG: 81 TABLET CHEWABLE at 09:58

## 2020-08-05 RX ADMIN — HEPARIN SODIUM SCH UNIT: 5000 INJECTION, SOLUTION INTRAVENOUS; SUBCUTANEOUS at 09:59

## 2020-08-05 RX ADMIN — INSULIN ASPART SCH UNIT: 100 INJECTION, SOLUTION INTRAVENOUS; SUBCUTANEOUS at 08:10

## 2020-08-05 RX ADMIN — FAMOTIDINE SCH MG: 20 TABLET, FILM COATED ORAL at 09:58

## 2020-08-05 RX ADMIN — Medication SCH MG: at 09:59

## 2020-08-05 RX ADMIN — PANTOPRAZOLE SODIUM SCH MG: 40 TABLET, DELAYED RELEASE ORAL at 08:11

## 2020-08-05 RX ADMIN — VALPROIC ACID SCH MG: 250 SOLUTION ORAL at 09:59

## 2020-08-05 RX ADMIN — DIVALPROEX SODIUM SCH MG: 250 TABLET, DELAYED RELEASE ORAL at 09:58

## 2020-08-05 RX ADMIN — DULOXETINE SCH MG: 60 CAPSULE, DELAYED RELEASE ORAL at 09:59

## 2020-08-05 NOTE — P.DS
Providers


Date of admission: 


07/28/20 16:09





Expected date of discharge: 08/05/20


Attending physician: 


Saniya Cabral





Consults: 





                                        





07/30/20 23:14


Consult Physician Routine 


   Consulting Provider: Yobani Zamora


   Consult Reason/Comments: low bp, ateam, icu tx


   Do you want consulting provider notified?: Yes











Primary care physician: 


Saniya Cabral





[facility] Course: 











Diagnoses on discharge:














1.  Severe hyperglycemia improving was insulin drip





2.  Intractable nausea and vomiting, attempt to put NG tube failed to due to 

multiplefractures in the past, we are treating symptomatically, consultation for

general surgery initiated





3.  Underlying history of hypertension blood pressure is severely elevated we 

will use IV hydralazine when necessary





4.  Underlying history of hyperlipidemia





5.  Underlying history of depression and anxiety disorder





6.  Previous history of stroke





7.  Multiple episodes of hypoglycemia


























Hospital course:








Morenita Ramirez, is a 60-year-old female who presented to [facility] emergency room due to uncontrolled blood sugar, caregiver states that 

sugar has been up above 600 for about 3 days, she has been giving her extra 

insulin without ability of getting sugar under control.  Patient was evaluated 

in the emergency room she was started on IV insulin drip and was admitted to 

medical floor.


On the medical floor patient started developing nausea and vomiting, otherwise 

she denies any complaints there is no fever or chills no headache or dizziness 

no chest pain no shortness of breath no cough no burning was urination no 

frequency or urgency and no hematuria








On 07/30/2020 patient was seen and examined on the medical floor she is feeling 

somewhat better nausea and vomiting is subsiding abdominal pain improving there 

is no fever or chills no headache or dizziness no chest pain no shortness of 

breath no cough no nausea or vomiting no abdominal pain no diarrhea no burning 

was urination no frequency or urgency and no hematuria, patient is improving 

insulin drip was discontinued and patient was restarted on her home insulin 

dose.





On 07/31/2020 patient was seen and examined in the ICU she is alert and oriented

3 in no apparent distress last night patient had an episode of hypotension 

likely related to dehydration due to nausea and vomiting A team was called, and 

patient was transferred to ICU she was given IV fluid and her blood pressure is 

up to normal range today there was no need to use any vasopressors, today 

patient is feeling well she had episodes of hypoglycemia this morning she was 

started on D5W and her sugar is in normal range at this time otherwise she 

denies any complaints there is no fever or chills no headache or dizziness no 

chest pain no shortness of breath no cough no nausea or vomiting no abdominal 

pain no diarrhea no burning with urination no frequency or urgency and no 

hematuria





On 08/01/2020 patient was seen and examined on the medical floor she is alert 

and oriented 3 in no apparent distress there is no fever or chills no headache 

or dizziness no chest pain no shortness of breath no cough no nausea or vomiting

no abdominal pain no diarrhea no burning with urination no frequency or urgency 

and no hematuria she is tolerating diet well last night she had episodes of 

hypoglycemia which resolved this time will continue to monitor glucose levels 

and adjust medications and insulin as needed





On 08/02/2020 patient was seen and examined on the medical floor she is alert 

and oriented 3 in no apparent distress there is no fever or chills no headache 

or dizziness no chest pain no shortness of breath no cough no nausea or vomiting

no abdominal pain no diarrhea no burning with urination no frequency or urgency 

and no hematuria, glucose level is wildly fluctuating, at this time we are 

counting insulin requirement, will discontinue Springer catheter, possible 

discharge to home tomorrow





On 08/03/2020 patient was seen and examined on the medical floor she is alert 

and oriented 3 in no apparent distress she had hypoglycemia this morning 

otherwise no complaints there is no fever or chills no headache or dizziness no 

chest pain no shortness of breath no cough no nausea or vomiting no abdominal 

pain no diarrhea no burning with urination no frequency or urgency no hematuria.








On 08/04/2020 patient was seen and examined on the medical floor she had another

episode of hypoglycemia this morning, otherwise there is no complaints there is 

no fever or chills no headache or dizziness no chest pain no shortness of breath

no cough no nausea or vomiting no abdominal pain no diarrhea no burning with 

urination no frequency or urgency and no hematuria time will decrease Levemir to

16 units at bedtime continue to monitor glucose level and adjust medications as 

needed








On 08/05/2020 patient was seen and examined on the medical floor she is alert 

and oriented 3 in no apparent distress there is no fever or chills no headache 

or dizziness no chest pain no shortness of breath no cough no nausea or vomiting

no abdominal pain no diarrhea no burning was urination no frequency or urgency 

and no hematuria.


Patient is very sensitive to insulin, and her glucose level fluctuated fairly 

wildly, at the time of discharge she was sent home on Levemir or Lantus 16 units

at bedtime, and NovoLog or Humalog 5 units before each meal, she will be 

followed in our office on Friday at 10:30 AM





Plan - Discharge Summary


New Discharge Prescriptions: 


New


   INSULIN ASPART (NovoLOG) [NovoLOG (formulary)] 5 unit SQ ACHS  vial


   Pantoprazole [Protonix] 40 mg PO AC-BID  tablet.dr Chakraborty


   Famotidine [Pepcid] 20 mg PO DAILY@0900


   HYDROcodone/APAP 10-325MG [Norco ] 1 tab PO Q6H PRN


     PRN Reason: Pain


   DULoxetine HCL [Cymbalta] 60 mg PO DAILY@0900


   QUEtiapine [SEROquel] 100 mg PO HS@2100


   Atorvastatin [Lipitor] 20 mg PO DAILY@2100


   Aspirin [Adult Low Dose Aspirin EC] 81 mg PO DAILY@0900


   Ferrous Sulfate [Iron (65 MG Elemental)] 325 mg PO DAILY@0900


   Divalproex [Depakote] 250 mg PO TID #90 tablet.


   ALPRAZolam [Xanax] 1 mg PO TID PRN


     PRN Reason: Anxiety


   Albuterol Sulfate [Ventolin HFA] 2 puff INHALATION RT-Q4H PRN


     PRN Reason: Shortness Of Breath


   Valproic Acid [Depakene] 250 mg PO DAILY


   Ondansetron Odt [Zofran ODT] 4 mg PO DAILY PRN


     PRN Reason: Nausea





Changed


   Insulin Glargine,Hum.rec.anlog [Basaglar Kwikpen U-100] 16 unit SQ HS #0





No Action


   Ergocalciferol [Vitamin D2 (DRISDOL)] 50,000 unit PO SA@0900


   Vitamin B Complex With Vit C 1 tab PO DAILY


   INSULIN LISPRO (humaLOG) [humaLOG] See Protocol SQ ACHS


Discharge Medication List





Famotidine [Pepcid] 20 mg PO DAILY@0900 02/19/16 [History]


Ergocalciferol [Vitamin D2 (DRISDOL)] 50,000 unit PO SA@0900 10/06/16 [History]


HYDROcodone/APAP 10-325MG [Norco ] 1 tab PO Q6H PRN 05/06/17 [History]


DULoxetine HCL [Cymbalta] 60 mg PO DAILY@0900 09/19/17 [History]


Atorvastatin [Lipitor] 20 mg PO DAILY@2100 07/31/19 [History]


QUEtiapine [SEROquel] 100 mg PO HS@2100 07/31/19 [History]


Vitamin B Complex With Vit C 1 tab PO DAILY 01/02/20 [History]


Aspirin [Adult Low Dose Aspirin EC] 81 mg PO DAILY@0900 01/10/20 [History]


Ferrous Sulfate [Iron (65 MG Elemental)] 325 mg PO DAILY@0900 01/10/20 [History]


INSULIN LISPRO (humaLOG) [humaLOG] See Protocol SQ ACHS 03/15/20 [History]


Divalproex [Depakote] 250 mg PO TID #90 tablet. 03/19/20 [Rx]


ALPRAZolam [Xanax] 1 mg PO TID PRN 07/28/20 [History]


Albuterol Sulfate [Ventolin HFA] 2 puff INHALATION RT-Q4H PRN 07/28/20 [History]


Ondansetron Odt [Zofran ODT] 4 mg PO DAILY PRN 07/28/20 [History]


Valproic Acid [Depakene] 250 mg PO DAILY 07/28/20 [History]


INSULIN ASPART (NovoLOG) [NovoLOG (formulary)] 5 unit SQ ACHS  vial 08/05/20 

[Rx]


Insulin Glargine,Hum.rec.anlog [Basaglar Kwikpen U-100] 16 unit SQ HS #0 

08/05/20 [Rx]


Pantoprazole [Protonix] 40 mg PO AC-BID  tablet. 08/05/20 [Rx]








Follow up Appointment(s)/Referral(s): 


[facility], [NON-STAFF] - 


Saniya Cabral MD [Primary Care Provider] - 08/07/20 10:30 am

## 2020-08-07 NOTE — CDI
Documentation Clarification Form



Date: 08/07/2020 11:05:01 AM

From: Sofi Gregory

Phone: If you have a question about this query, please contact [place] [place] 
 at [phone] between 8am and 5pm.

MRN: [id number]

Admit Date: 07/28/2020 04:09:00 PM

Patient Name: Morenita Ramirez

Visit Number: [id number]

Discharge Date:  08/05/2020 03:45:00 PM



ATTENTION: The Clinical Documentation Specialists (CDI) and [facility] Coding Staff 
appreciate your assistance in clarifying documentation. Please respond to the 
clarification below the line at the bottom and electronically sign. The CDI & 
[facility] Coding staff will review the response and follow-up if needed. Please note: 
Queries are made part of the Legal Health Record. If you have any questions, 
please contact the author of this message via ITS.



Dr. Saniya Cabral



Heart failure is documented in PMH of H and P and consults.  Please clarify if 
patient has CHF and if so, please specify type.



History/Risk Factors:  HTN DM    PMH of heart failure, heart disease, Hx of MI

Clinical Indicators: SOB

VS/Pulse OX: 116 pbm,    18,     151/83,    99 RA

BNP: not taken

Chest X Ray:  Heart size stable

  

In your professional opinion, can you please clarify if patient has CHF?  If so,
type.

Systolic Heart Failure:

 Acute 

 Chronic

 Acute on Chronic  



Diastolic Heart Failure:

 Acute 

 Chronic

 Acute on Chronic



Systolic & Diastolic Heart Failure:

 Acute 

 Chronic

 Acute on Chronic Heart Failure



Unable to Determine

Other, please specify________________



___________________________________________________________________________



Chronic diastolic heart failure

MTDD

## 2020-08-07 NOTE — CDI
Documentation Clarification Form



Date: 08/07/2020 10:51:28 AM

From: Sofi Gregory

Phone: If you have a question about this query, please contact [facility] [place] 
 at [phone] between 8am and 5pm.

MRN: [id number]

Admit Date: 07/28/2020 04:09:00 PM

Patient Name: Morenita Ramirez

Visit Number: [id number]

Discharge Date:  08/05/2020 03:45:00 PM





ATTENTION: The Clinical Documentation Specialists (CDI) and [facility] Coding Staff 
appreciate your assistance in clarifying documentation. Please respond to the 
clarification below the line at the bottom and electronically sign. The CDI & 
[facility] Coding staff will review the response and follow-up if needed. Please note: 
Queries are made part of the Legal Health Record. If you have any questions, 
please contact the author of this message via ITS.



Dr. Saniya Cabral





PMH for H and P documents renal disease and surgery consult documents PMH of 
renal failure. Please clarify if patient has CKD and if so, stage.



History/Risk Factors:  DM, HTN

   

Clinical Indicators: 

    Current BUN/CR/GFR:  BUN -  50  CREAT.  1.13     GFR   53 on admit



In order to capture the severity of condition, please clarify if patient has CDK
and if so, the stage of the CKD, if known:



CKD Stage 1 (GFR > 90)

CKD Stage 2 (GFR 60-89)

CKD Stage 3 (GFR 30-59)

CKD Stage 4 (GFR 15-29)

CKD Stage 5 (GFR <15)

ESRD

Other, please specify  ___________

Unable to determine

___________________________________________________________________________

CKD stage 3

MTDD

## 2020-08-12 ENCOUNTER — HOSPITAL ENCOUNTER (INPATIENT)
Dept: HOSPITAL 47 - EC | Age: 60
LOS: 8 days | Discharge: HOME HEALTH | DRG: 637 | End: 2020-08-20
Attending: INTERNAL MEDICINE | Admitting: INTERNAL MEDICINE
Payer: COMMERCIAL

## 2020-08-12 VITALS — BODY MASS INDEX: 20.6 KG/M2

## 2020-08-12 DIAGNOSIS — Z89.421: ICD-10-CM

## 2020-08-12 DIAGNOSIS — M19.90: ICD-10-CM

## 2020-08-12 DIAGNOSIS — E03.9: ICD-10-CM

## 2020-08-12 DIAGNOSIS — Z83.3: ICD-10-CM

## 2020-08-12 DIAGNOSIS — E10.10: Primary | ICD-10-CM

## 2020-08-12 DIAGNOSIS — Z99.3: ICD-10-CM

## 2020-08-12 DIAGNOSIS — E10.51: ICD-10-CM

## 2020-08-12 DIAGNOSIS — Z82.49: ICD-10-CM

## 2020-08-12 DIAGNOSIS — Z98.41: ICD-10-CM

## 2020-08-12 DIAGNOSIS — J44.0: ICD-10-CM

## 2020-08-12 DIAGNOSIS — I25.10: ICD-10-CM

## 2020-08-12 DIAGNOSIS — J98.11: ICD-10-CM

## 2020-08-12 DIAGNOSIS — Z79.4: ICD-10-CM

## 2020-08-12 DIAGNOSIS — I65.21: ICD-10-CM

## 2020-08-12 DIAGNOSIS — I25.2: ICD-10-CM

## 2020-08-12 DIAGNOSIS — E10.43: ICD-10-CM

## 2020-08-12 DIAGNOSIS — Z79.82: ICD-10-CM

## 2020-08-12 DIAGNOSIS — E10.319: ICD-10-CM

## 2020-08-12 DIAGNOSIS — F31.9: ICD-10-CM

## 2020-08-12 DIAGNOSIS — Z91.030: ICD-10-CM

## 2020-08-12 DIAGNOSIS — F41.9: ICD-10-CM

## 2020-08-12 DIAGNOSIS — Z98.42: ICD-10-CM

## 2020-08-12 DIAGNOSIS — R32: ICD-10-CM

## 2020-08-12 DIAGNOSIS — I69.398: ICD-10-CM

## 2020-08-12 DIAGNOSIS — G93.89: ICD-10-CM

## 2020-08-12 DIAGNOSIS — Z96.1: ICD-10-CM

## 2020-08-12 DIAGNOSIS — I10: ICD-10-CM

## 2020-08-12 DIAGNOSIS — R41.0: ICD-10-CM

## 2020-08-12 DIAGNOSIS — Z86.79: ICD-10-CM

## 2020-08-12 DIAGNOSIS — Z82.5: ICD-10-CM

## 2020-08-12 DIAGNOSIS — Z88.5: ICD-10-CM

## 2020-08-12 DIAGNOSIS — K31.84: ICD-10-CM

## 2020-08-12 DIAGNOSIS — K21.9: ICD-10-CM

## 2020-08-12 DIAGNOSIS — Z90.710: ICD-10-CM

## 2020-08-12 DIAGNOSIS — Z86.14: ICD-10-CM

## 2020-08-12 DIAGNOSIS — I69.354: ICD-10-CM

## 2020-08-12 DIAGNOSIS — Z88.0: ICD-10-CM

## 2020-08-12 DIAGNOSIS — E78.5: ICD-10-CM

## 2020-08-12 DIAGNOSIS — Z95.5: ICD-10-CM

## 2020-08-12 DIAGNOSIS — Z79.899: ICD-10-CM

## 2020-08-12 DIAGNOSIS — Z89.411: ICD-10-CM

## 2020-08-12 DIAGNOSIS — E10.42: ICD-10-CM

## 2020-08-12 DIAGNOSIS — N17.9: ICD-10-CM

## 2020-08-12 DIAGNOSIS — G40.209: ICD-10-CM

## 2020-08-12 DIAGNOSIS — Z90.49: ICD-10-CM

## 2020-08-12 DIAGNOSIS — J18.9: ICD-10-CM

## 2020-08-12 DIAGNOSIS — Z86.718: ICD-10-CM
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
## 2020-08-12 DIAGNOSIS — Z88.8: ICD-10-CM

## 2020-08-12 DIAGNOSIS — Z87.01: ICD-10-CM

## 2020-08-12 DIAGNOSIS — Z87.891: ICD-10-CM

## 2020-08-12 LAB
ALBUMIN SERPL-MCNC: 4.4 G/DL (ref 3.5–5)
ALP SERPL-CCNC: 104 U/L (ref 38–126)
ALT SERPL-CCNC: 24 U/L (ref 4–34)
ANION GAP SERPL CALC-SCNC: 21 MMOL/L
ANION GAP SERPL CALC-SCNC: 7 MMOL/L
ANION GAP SERPL CALC-SCNC: 9 MMOL/L
APTT BLD: 20 SEC (ref 22–30)
AST SERPL-CCNC: 32 U/L (ref 14–36)
BASOPHILS # BLD AUTO: 0 K/UL (ref 0–0.2)
BASOPHILS NFR BLD AUTO: 0 %
BUN SERPL-SCNC: 49 MG/DL (ref 7–17)
CALCIUM SPEC-MCNC: 10.4 MG/DL (ref 8.4–10.2)
CHLORIDE SERPL-SCNC: 101 MMOL/L (ref 98–107)
CHLORIDE SERPL-SCNC: 109 MMOL/L (ref 98–107)
CHLORIDE SERPL-SCNC: 110 MMOL/L (ref 98–107)
CO2 SERPL-SCNC: 17 MMOL/L (ref 22–30)
CO2 SERPL-SCNC: 23 MMOL/L (ref 22–30)
CO2 SERPL-SCNC: 25 MMOL/L (ref 22–30)
EOSINOPHIL # BLD AUTO: 0 K/UL (ref 0–0.7)
EOSINOPHIL NFR BLD AUTO: 0 %
ERYTHROCYTE [DISTWIDTH] IN BLOOD BY AUTOMATED COUNT: 4.15 M/UL (ref 3.8–5.4)
ERYTHROCYTE [DISTWIDTH] IN BLOOD: 15 % (ref 11.5–15.5)
GLUCOSE BLD-MCNC: 140 MG/DL (ref 75–99)
GLUCOSE BLD-MCNC: 149 MG/DL (ref 75–99)
GLUCOSE BLD-MCNC: 165 MG/DL (ref 75–99)
GLUCOSE BLD-MCNC: 169 MG/DL (ref 75–99)
GLUCOSE BLD-MCNC: 177 MG/DL (ref 75–99)
GLUCOSE BLD-MCNC: 181 MG/DL (ref 75–99)
GLUCOSE BLD-MCNC: 191 MG/DL (ref 75–99)
GLUCOSE BLD-MCNC: 213 MG/DL (ref 75–99)
GLUCOSE BLD-MCNC: 251 MG/DL (ref 75–99)
GLUCOSE BLD-MCNC: 295 MG/DL (ref 75–99)
GLUCOSE BLD-MCNC: 313 MG/DL (ref 75–99)
GLUCOSE BLD-MCNC: 558 MG/DL (ref 75–99)
GLUCOSE SERPL-MCNC: 542 MG/DL (ref 74–99)
GLUCOSE UR QL: (no result)
HCO3 BLDV-SCNC: 24 MMOL/L (ref 24–28)
HCT VFR BLD AUTO: 39.1 % (ref 34–46)
HGB BLD-MCNC: 12.1 GM/DL (ref 11.4–16)
HYALINE CASTS UR QL AUTO: 26 /LPF (ref 0–2)
INR PPP: 0.9 (ref ?–1.2)
KETONES UR QL STRIP.AUTO: (no result)
LYMPHOCYTES # SPEC AUTO: 0.8 K/UL (ref 1–4.8)
LYMPHOCYTES NFR SPEC AUTO: 8 %
MCH RBC QN AUTO: 29.2 PG (ref 25–35)
MCHC RBC AUTO-ENTMCNC: 31 G/DL (ref 31–37)
MCV RBC AUTO: 94.2 FL (ref 80–100)
MONOCYTES # BLD AUTO: 0.4 K/UL (ref 0–1)
MONOCYTES NFR BLD AUTO: 4 %
NEUTROPHILS # BLD AUTO: 9.4 K/UL (ref 1.3–7.7)
NEUTROPHILS NFR BLD AUTO: 87 %
PCO2 BLDV: 57 MMHG (ref 37–51)
PH BLDV: 7.25 [PH] (ref 7.31–7.41)
PH UR: 5 [PH] (ref 5–8)
PLATELET # BLD AUTO: 366 K/UL (ref 150–450)
POTASSIUM SERPL-SCNC: 4.2 MMOL/L (ref 3.5–5.1)
POTASSIUM SERPL-SCNC: 4.2 MMOL/L (ref 3.5–5.1)
POTASSIUM SERPL-SCNC: 5.4 MMOL/L (ref 3.5–5.1)
PROT SERPL-MCNC: 8.7 G/DL (ref 6.3–8.2)
PT BLD: 9.9 SEC (ref 9–12)
RBC UR QL: 2 /HPF (ref 0–5)
SODIUM SERPL-SCNC: 139 MMOL/L (ref 137–145)
SODIUM SERPL-SCNC: 141 MMOL/L (ref 137–145)
SODIUM SERPL-SCNC: 142 MMOL/L (ref 137–145)
SP GR UR: 1.01 (ref 1–1.03)
SQUAMOUS UR QL AUTO: <1 /HPF (ref 0–4)
UROBILINOGEN UR QL STRIP: <2 MG/DL (ref ?–2)
WBC # BLD AUTO: 10.7 K/UL (ref 3.8–10.6)
WBC # UR AUTO: <1 /HPF (ref 0–5)

## 2020-08-12 PROCEDURE — 93005 ELECTROCARDIOGRAM TRACING: CPT

## 2020-08-12 PROCEDURE — 80053 COMPREHEN METABOLIC PANEL: CPT

## 2020-08-12 PROCEDURE — 81001 URINALYSIS AUTO W/SCOPE: CPT

## 2020-08-12 PROCEDURE — 95816 EEG AWAKE AND DROWSY: CPT

## 2020-08-12 PROCEDURE — 80048 BASIC METABOLIC PNL TOTAL CA: CPT

## 2020-08-12 PROCEDURE — 96361 HYDRATE IV INFUSION ADD-ON: CPT

## 2020-08-12 PROCEDURE — 84484 ASSAY OF TROPONIN QUANT: CPT

## 2020-08-12 PROCEDURE — 85027 COMPLETE CBC AUTOMATED: CPT

## 2020-08-12 PROCEDURE — 85730 THROMBOPLASTIN TIME PARTIAL: CPT

## 2020-08-12 PROCEDURE — 84145 PROCALCITONIN (PCT): CPT

## 2020-08-12 PROCEDURE — 83690 ASSAY OF LIPASE: CPT

## 2020-08-12 PROCEDURE — 80164 ASSAY DIPROPYLACETIC ACD TOT: CPT

## 2020-08-12 PROCEDURE — 82803 BLOOD GASES ANY COMBINATION: CPT

## 2020-08-12 PROCEDURE — 85610 PROTHROMBIN TIME: CPT

## 2020-08-12 PROCEDURE — 80051 ELECTROLYTE PANEL: CPT

## 2020-08-12 PROCEDURE — 94640 AIRWAY INHALATION TREATMENT: CPT

## 2020-08-12 PROCEDURE — 99285 EMERGENCY DEPT VISIT HI MDM: CPT

## 2020-08-12 PROCEDURE — 82009 KETONE BODYS QUAL: CPT

## 2020-08-12 PROCEDURE — 96374 THER/PROPH/DIAG INJ IV PUSH: CPT

## 2020-08-12 PROCEDURE — 71045 X-RAY EXAM CHEST 1 VIEW: CPT

## 2020-08-12 PROCEDURE — 70498 CT ANGIOGRAPHY NECK: CPT

## 2020-08-12 PROCEDURE — 83605 ASSAY OF LACTIC ACID: CPT

## 2020-08-12 PROCEDURE — 70496 CT ANGIOGRAPHY HEAD: CPT

## 2020-08-12 PROCEDURE — 85025 COMPLETE CBC W/AUTO DIFF WBC: CPT

## 2020-08-12 PROCEDURE — 74018 RADEX ABDOMEN 1 VIEW: CPT

## 2020-08-12 PROCEDURE — 70450 CT HEAD/BRAIN W/O DYE: CPT

## 2020-08-12 PROCEDURE — 76937 US GUIDE VASCULAR ACCESS: CPT

## 2020-08-12 PROCEDURE — 36415 COLL VENOUS BLD VENIPUNCTURE: CPT

## 2020-08-12 PROCEDURE — 36410 VNPNXR 3YR/> PHY/QHP DX/THER: CPT

## 2020-08-12 RX ADMIN — ONDANSETRON PRN MG: 2 INJECTION INTRAMUSCULAR; INTRAVENOUS at 20:55

## 2020-08-12 RX ADMIN — DEXTROSE MONOHYDRATE, SODIUM CHLORIDE, AND POTASSIUM CHLORIDE SCH MLS/HR: 50; 4.5; 1.49 INJECTION, SOLUTION INTRAVENOUS at 16:49

## 2020-08-12 NOTE — ED
Abdominal Pain HPI





- General


Source: patient, EMS


Mode of arrival: EMS


Limitations: no limitations





<Ernestina Ceja - Last Filed: 20 12:51>





<Onesimo Plasencia - Last Filed: 20 14:43>





- General


Chief Complaint: Abdominal Pain


Stated Complaint: Nausea and Vomiting


Time Seen by Provider: 20 11:21





- History of Present Illness


Initial Comments: 





Patient is a 60-year-old female, with multiple comorbidities including diabetes,

heart disease, presenting to the emergency department via EMS with complaints of

nausea and vomiting since approximately 7:30 this morning.  Family checked her 

sugar and it was high so she was given 20 units of insulin as well as 8 mg of 

Zofran.  Patient states she has not had a bowel movement in approximately 4 days

and is having some lower abdominal discomfort.  Patient was just recently 

discharged from the hospital for hyperglycemia.  Patient has had no vomiting sin

ce arrival to the ER.  She denies any recent fever, chills, diarrhea.  She 

denies any chest pain or shortness of breath.  She has no further complaints at 

this time.  Upon arrival to the ER, patient slightly tachycardia at 107, blood 

pressure is 170/90, 19 respiratory rate, 98% of room air, afebrile. 

(Ernestina Ceja)





- Related Data


                                Home Medications











 Medication  Instructions  Recorded  Confirmed


 


Famotidine [Pepcid] 20 mg PO DAILY@16


 


HYDROcodone/APAP 10-325MG [Norco 1 tab PO Q6H PRN 17





]   


 


DULoxetine HCL [Cymbalta] 60 mg PO DAILY@17


 


Atorvastatin [Lipitor] 20 mg PO DAILY@19


 


QUEtiapine [SEROquel] 100 mg PO HS@19


 


Vitamin B Complex With Vit C 1 tab PO DAILY 20


 


Aspirin [Adult Low Dose Aspirin EC] 81 mg PO DAILY@0900 01/10/20 08/12/20


 


Ferrous Sulfate [Iron (65  mg PO DAILY@0900 01/10/20 08/12/20





Elemental)]   


 


INSULIN LISPRO (humaLOG) [humaLOG] See Protocol SQ ACHS 03/15/20 08/12/20


 


ALPRAZolam [Xanax] 1 mg PO TID PRN 20


 


Albuterol Sulfate [Ventolin HFA] 2 puff INHALATION RT-Q4H PRN 20


 


Ondansetron Odt [Zofran ODT] 4 mg PO DAILY PRN 20


 


Valproic Acid [Depakene] 250 mg PO DAILY 20


 


INSULIN LISPRO (humaLOG) [humaLOG] 5 units SQ ACHS 20


 


Pantoprazole Sodium [Protonix] 20 mg PO DAILY 20


 


QUEtiapine [SEROquel] 25 mg PO BID 20








                                  Previous Rx's











 Medication  Instructions  Recorded


 


Divalproex [Depakote] 250 mg PO TID #90 tablet. 20


 


Insulin Glargine,Hum.rec.anlog 16 unit SQ HS #0 20





[Basaglar Kwikpen U-100]  











                                    Allergies











Allergy/AdvReac Type Severity Reaction Status Date / Time


 


Barbiturates Allergy  Rash/Hives Verified 20 12:28


 


cephalexin monohydrate Allergy  Rash/Hives Verified 20 12:28





[From Keflex]     


 


morphine Allergy  Rash/Hives Verified 20 12:28


 


Penicillins Allergy  Rash/Hives Verified 20 12:28


 


phenobarbital Allergy  Swelling Verified 20 12:28


 


venom-honey bee Allergy  Swelling Verified 20 12:28





[bee venom (honey bee)]     


 


amlodipine besylate AdvReac  Vomiting Verified 20 12:28





[From Norvasc]     














Review of Systems


ROS Other: All systems not noted in ROS Statement are negative.





<Ernestina Ceja - Last Filed: 20 12:51>


ROS Other: All systems not noted in ROS Statement are negative.





<Onesimo Plasencia - Last Filed: 20 14:43>


ROS Statement: 


Those systems with pertinent positive or pertinent negative responses have been 

documented in the HPI.








Past Medical History


Past Medical History: Asthma, Coronary Artery Disease (CAD), Chest Pain / 

Angina, Heart Failure, COPD, CVA/TIA, Diabetes Mellitus, Deep Vein Thrombosis 

(DVT), Eye Disorder, GERD/Reflux, Hyperlipidemia, Hypertension, Myocardial 

Infarction (MI), Neurologic Disorder, Osteoarthritis (OA), Pneumonia, Renal 

Disease


Additional Past Medical History / Comment(s): IDDM (brittle), DKAs, neuropathy 

bilateral hands/feet, retinopathy bilateral eyes, cellulitis R foot, R great toe

and 2nd toe infections/amputations, current wound R foot-being seen in [facility], 

renal failure, anemia, CVAs with L sided paralysis, headaches started after 

CVAs, brain lesions, DVT R axillae, low back pain, varicosities, seizure many 

years ago (), hypothyroid, constipation, bilateral tinnitis occasionally, 

sinus problems.


Last Myocardial Infarction Date:: 


History of Any Multi-Drug Resistant Organisms: MRSA


Date of last positivie culture/infection: 18


MDRO Source:: Right Foot


Past Surgical History: Appendectomy,  Section, Cholecystectomy, Heart 

Catheterization With Stent, Hysterectomy, Orthopedic Surgery


Additional Past Surgical History / Comment(s): PCI with multiple stents, R great

toe and 2nd toe amps, debridements R foot ulcer, L shoulder surgery to remove 

bone, bronchoscopy, EGD, colonoscopy, R arm port since removed, bilateral 

cataract removals/lens implants.


Past Anesthesia/Blood Transfusion Reactions: No Reported Reaction


Additional Past Anesthesia/Blood Transfusion Reaction / Comment(s): HX OF BLOOD 

TRANSFUSION- NO REACTION


Date of Last Stent Placement:: 2013


Past Psychological History: Anxiety, Bipolar, Depression


Smoking Status: Former smoker


Past Alcohol Use History: None Reported


Past Drug Use History: None Reported





- Past Family History


  ** Father


Family Medical History: Unable to Obtain, Coronary Artery Disease (CAD), 

Diabetes Mellitus





  ** Mother


Family Medical History: COPD





<rEnestina Ceja - Last Filed: 20 12:51>





General Exam


Limitations: no limitations





<Ernestina Ceja - Last Filed: 20 12:51>





- General Exam Comments


Initial Comments: 





GENERAL: 


Patient appears pale, dry, in no acute distress.  





HEAD: 


Atraumatic, normocephalic.





EYES:


Pupils equal round and reactive to light, extraocular movements intact, sclera 

anicteric, conjunctiva are normal.  Eyelids were unremarkable.





ENT: 


TMs normal, nares patent, oropharynx clear without exudates.  Dry mucous 

membranes.





NECK: 


Normal range of motion, supple without lymphadenopathy or JVD.





LUNGS:


Unlabored respirations.  Breath sounds clear to auscultation bilaterally and 

equal.  No wheezes rales or rhonchi.





HEART:


Regular rate and rhythm without murmurs, rubs or gallops.





ABDOMEN: 


Diffuse lower abdominal discomfort on palpation, no sharp pains.  Soft, 

normoactive bowel sounds.  No guarding, no rebound.  No masses appreciated.





: Deferred 





MUSCULOSKELETAL: 


Multiple toe amputations in the past.  No clubbing or cyanosis.





NEUROLOGICAL: 


Patient is alert and oriented x 3.  Motor and sensory are also intact.  Cranial 

nerves II through XII grossly intact.  Symmetrical smile.  Normal speech.  





PSYCH:


Normal mood, normal affect.





SKIN:


 Warm, Dry, normal turgor, no rashes.  (Ernestina Ceja)





Course





                                   Vital Signs











  20





  11:11 13:38


 


Temperature 97.6 F 


 


Pulse Rate 107 H 110 H


 


Respiratory 19 18





Rate  


 


Blood Pressure 170/90 167/90


 


O2 Sat by Pulse 98 98





Oximetry  














Medical Decision Making





<Ernestina Ceja - Last Filed: 20 12:51>





- Lab Data


Result diagrams: 


                                 20 13:27





                                 20 11:52





<Onesimo Plasencia - Last Filed: 20 14:43>





- Medical Decision Making





Patient is a 60-year-old female with multiple comorbidities presenting via EMS 

with nausea and vomiting since this morning as well as elevated blood sugar.  

Patient was recently admitted to hospital for same complaint.  She is afebrile 

upon arrival, slightly tachycardia. (Ernestina Ceja)


Clinical presentation consistent with diabetic ketoacidosis.  Patient be placed 

in ICU.  Insulin protocol ordered. (Onesimo Plasencia)





- Lab Data





                                   Lab Results











  20 Range/Units





  11:49 11:52 11:52 


 


WBC     (3.8-10.6)  k/uL


 


RBC     (3.80-5.40)  m/uL


 


Hgb     (11.4-16.0)  gm/dL


 


Hct     (34.0-46.0)  %


 


MCV     (80.0-100.0)  fL


 


MCH     (25.0-35.0)  pg


 


MCHC     (31.0-37.0)  g/dL


 


RDW     (11.5-15.5)  %


 


Plt Count     (150-450)  k/uL


 


Neutrophils %     %


 


Lymphocytes %     %


 


Monocytes %     %


 


Eosinophils %     %


 


Basophils %     %


 


Neutrophils #     (1.3-7.7)  k/uL


 


Lymphocytes #     (1.0-4.8)  k/uL


 


Monocytes #     (0-1.0)  k/uL


 


Eosinophils #     (0-0.7)  k/uL


 


Basophils #     (0-0.2)  k/uL


 


Hypochromasia     


 


PT     (9.0-12.0)  sec


 


INR     (<1.2)  


 


APTT     (22.0-30.0)  sec


 


VBG pH     (7.31-7.41)  


 


VBG pCO2     (37-51)  mmHg


 


VBG HCO3     (24-28)  mmol/L


 


Sodium   139   (137-145)  mmol/L


 


Potassium   5.4 H   (3.5-5.1)  mmol/L


 


Chloride   101   ()  mmol/L


 


Carbon Dioxide   17 L   (22-30)  mmol/L


 


Anion Gap   21   mmol/L


 


BUN   49 H   (7-17)  mg/dL


 


Creatinine   1.54 H   (0.52-1.04)  mg/dL


 


Est GFR (CKD-EPI)AfAm   42   (>60 ml/min/1.73 sqM)  


 


Est GFR (CKD-EPI)NonAf   37   (>60 ml/min/1.73 sqM)  


 


Glucose   542 H*   (74-99)  mg/dL


 


POC Glucose (mg/dL)  558 H    (75-99)  mg/dL


 


POC Glu Operater ID  Rhoda Nunez    


 


Lactic Ac Sepsis Rflx     


 


Plasma Lactic Acid Carmelo    3.7 H*  (0.7-2.0)  mmol/L


 


Calcium   10.4 H   (8.4-10.2)  mg/dL


 


Total Bilirubin   0.6   (0.2-1.3)  mg/dL


 


AST   32   (14-36)  U/L


 


ALT   24   (4-34)  U/L


 


Alkaline Phosphatase   104   ()  U/L


 


Total Protein   8.7 H   (6.3-8.2)  g/dL


 


Albumin   4.4   (3.5-5.0)  g/dL


 


Lipase   15 L   ()  U/L


 


Acetone, Qual   Positive   (Negative)  














  20 Range/Units





  12:37 13:27 13:27 


 


WBC    10.7 H  (3.8-10.6)  k/uL


 


RBC    4.15  (3.80-5.40)  m/uL


 


Hgb    12.1  (11.4-16.0)  gm/dL


 


Hct    39.1  (34.0-46.0)  %


 


MCV    94.2  (80.0-100.0)  fL


 


MCH    29.2  (25.0-35.0)  pg


 


MCHC    31.0  (31.0-37.0)  g/dL


 


RDW    15.0  (11.5-15.5)  %


 


Plt Count    366  (150-450)  k/uL


 


Neutrophils %    87  %


 


Lymphocytes %    8  %


 


Monocytes %    4  %


 


Eosinophils %    0  %


 


Basophils %    0  %


 


Neutrophils #    9.4 H  (1.3-7.7)  k/uL


 


Lymphocytes #    0.8 L  (1.0-4.8)  k/uL


 


Monocytes #    0.4  (0-1.0)  k/uL


 


Eosinophils #    0.0  (0-0.7)  k/uL


 


Basophils #    0.0  (0-0.2)  k/uL


 


Hypochromasia    Slight  


 


PT   9.9   (9.0-12.0)  sec


 


INR   0.9   (<1.2)  


 


APTT   20.0 L   (22.0-30.0)  sec


 


VBG pH     (7.31-7.41)  


 


VBG pCO2     (37-51)  mmHg


 


VBG HCO3     (24-28)  mmol/L


 


Sodium     (137-145)  mmol/L


 


Potassium     (3.5-5.1)  mmol/L


 


Chloride     ()  mmol/L


 


Carbon Dioxide     (22-30)  mmol/L


 


Anion Gap     mmol/L


 


BUN     (7-17)  mg/dL


 


Creatinine     (0.52-1.04)  mg/dL


 


Est GFR (CKD-EPI)AfAm     (>60 ml/min/1.73 sqM)  


 


Est GFR (CKD-EPI)NonAf     (>60 ml/min/1.73 sqM)  


 


Glucose     (74-99)  mg/dL


 


POC Glucose (mg/dL)     (75-99)  mg/dL


 


POC Glu Operater ID     


 


Lactic Ac Sepsis Rflx  Y    


 


Plasma Lactic Acid Carmelo     (0.7-2.0)  mmol/L


 


Calcium     (8.4-10.2)  mg/dL


 


Total Bilirubin     (0.2-1.3)  mg/dL


 


AST     (14-36)  U/L


 


ALT     (4-34)  U/L


 


Alkaline Phosphatase     ()  U/L


 


Total Protein     (6.3-8.2)  g/dL


 


Albumin     (3.5-5.0)  g/dL


 


Lipase     ()  U/L


 


Acetone, Qual     (Negative)  














  20 Range/Units





  13:27 14:26 


 


WBC    (3.8-10.6)  k/uL


 


RBC    (3.80-5.40)  m/uL


 


Hgb    (11.4-16.0)  gm/dL


 


Hct    (34.0-46.0)  %


 


MCV    (80.0-100.0)  fL


 


MCH    (25.0-35.0)  pg


 


MCHC    (31.0-37.0)  g/dL


 


RDW    (11.5-15.5)  %


 


Plt Count    (150-450)  k/uL


 


Neutrophils %    %


 


Lymphocytes %    %


 


Monocytes %    %


 


Eosinophils %    %


 


Basophils %    %


 


Neutrophils #    (1.3-7.7)  k/uL


 


Lymphocytes #    (1.0-4.8)  k/uL


 


Monocytes #    (0-1.0)  k/uL


 


Eosinophils #    (0-0.7)  k/uL


 


Basophils #    (0-0.2)  k/uL


 


Hypochromasia    


 


PT    (9.0-12.0)  sec


 


INR    (<1.2)  


 


APTT    (22.0-30.0)  sec


 


VBG pH  7.25 L   (7.31-7.41)  


 


VBG pCO2  57 H   (37-51)  mmHg


 


VBG HCO3  24   (24-28)  mmol/L


 


Sodium    (137-145)  mmol/L


 


Potassium    (3.5-5.1)  mmol/L


 


Chloride    ()  mmol/L


 


Carbon Dioxide    (22-30)  mmol/L


 


Anion Gap    mmol/L


 


BUN    (7-17)  mg/dL


 


Creatinine    (0.52-1.04)  mg/dL


 


Est GFR (CKD-EPI)AfAm    (>60 ml/min/1.73 sqM)  


 


Est GFR (CKD-EPI)NonAf    (>60 ml/min/1.73 sqM)  


 


Glucose    (74-99)  mg/dL


 


POC Glucose (mg/dL)   313 H  (75-99)  mg/dL


 


POC Glu Operater ID   Ju Moore  


 


Lactic Ac Sepsis Rflx    


 


Plasma Lactic Acid Carmelo    (0.7-2.0)  mmol/L


 


Calcium    (8.4-10.2)  mg/dL


 


Total Bilirubin    (0.2-1.3)  mg/dL


 


AST    (14-36)  U/L


 


ALT    (4-34)  U/L


 


Alkaline Phosphatase    ()  U/L


 


Total Protein    (6.3-8.2)  g/dL


 


Albumin    (3.5-5.0)  g/dL


 


Lipase    ()  U/L


 


Acetone, Qual    (Negative)  














- EKG Data


EKG Comments: 





Sinus tach with occasional PVCs, nonspecific T-wave abnormalities, no signs of 

acute ischemia, ventricular rate 105, VT interval 134, . (Ernestina Ceja)





Disposition





<Ernestina Ceja - Last Filed: 20 12:51>


Decision Time: 14:43





<Onesimo Plasencia - Last Filed: 20 14:43>


Clinical Impression: 


 DKA (diabetic ketoacidoses)





Disposition: ADMITTED AS IP TO THIS [facility]


Condition: Critical


Referrals: 


Saniya Cabral MD [Primary Care Provider] - 1-2 days

## 2020-08-12 NOTE — XR
EXAMINATION TYPE: XR chest 1V portable

 

DATE OF EXAM: 8/12/2020

 

CLINICAL HISTORY: Blood gas abnormality. Weakness. 

 

TECHNIQUE:  Portable upright view of the chest obtained.

 

COMPARISON: 7/20/2020 chest radiograph

 

FINDINGS:  The cardiomediastinal silhouette is within normal limits for size. Pulmonary vasculature i
s normal. There is hazy asymmetric airspace opacity of the left lung base. No pleural effusion or pne
umothorax seen. The osseous structures are intact.

 

IMPRESSION: Hazy airspace opacity of the left lung base represent developing pneumonia.

## 2020-08-12 NOTE — XR
EXAMINATION TYPE: XR abdomen 1V

 

DATE OF EXAM: 8/12/2020

 

Comparison: 3/14/2020

 

Clinical History: 60-year-old female n/v, constipation

 

Findings:

Lung bases are clear.

 

Scattered vascular calcifications are present. Prominent phleboliths in the pelvis.

 

No dilated small bowel. Mild stool throughout the colon especially on the left side of the abdomen.

 

No dilated small bowel.

 

Supine imaging limited for assessment of free air.

 

 

Impression:

Nonobstructive bowel gas pattern. Prominent vascular calcifications in the pelvis. Mild overall stool
 burden especially in the left side of the abdomen.

## 2020-08-13 LAB
ALBUMIN SERPL-MCNC: 3.4 G/DL (ref 3.5–5)
ALP SERPL-CCNC: 86 U/L (ref 38–126)
ALT SERPL-CCNC: 16 U/L (ref 4–34)
ANION GAP SERPL CALC-SCNC: 3 MMOL/L
ANION GAP SERPL CALC-SCNC: 6 MMOL/L
AST SERPL-CCNC: 18 U/L (ref 14–36)
BUN SERPL-SCNC: 22 MG/DL (ref 7–17)
CALCIUM SPEC-MCNC: 8.7 MG/DL (ref 8.4–10.2)
CHLORIDE SERPL-SCNC: 106 MMOL/L (ref 98–107)
CHLORIDE SERPL-SCNC: 108 MMOL/L (ref 98–107)
CO2 SERPL-SCNC: 25 MMOL/L (ref 22–30)
CO2 SERPL-SCNC: 27 MMOL/L (ref 22–30)
ERYTHROCYTE [DISTWIDTH] IN BLOOD BY AUTOMATED COUNT: 3.3 M/UL (ref 3.8–5.4)
ERYTHROCYTE [DISTWIDTH] IN BLOOD: 15.8 % (ref 11.5–15.5)
GLUCOSE BLD-MCNC: 159 MG/DL (ref 75–99)
GLUCOSE BLD-MCNC: 224 MG/DL (ref 75–99)
GLUCOSE BLD-MCNC: 239 MG/DL (ref 75–99)
GLUCOSE BLD-MCNC: 244 MG/DL (ref 75–99)
GLUCOSE BLD-MCNC: 258 MG/DL (ref 75–99)
GLUCOSE BLD-MCNC: 268 MG/DL (ref 75–99)
GLUCOSE BLD-MCNC: 278 MG/DL (ref 75–99)
GLUCOSE BLD-MCNC: 335 MG/DL (ref 75–99)
GLUCOSE BLD-MCNC: 362 MG/DL (ref 75–99)
GLUCOSE BLD-MCNC: 72 MG/DL (ref 75–99)
GLUCOSE BLD-MCNC: 83 MG/DL (ref 75–99)
GLUCOSE SERPL-MCNC: 262 MG/DL (ref 74–99)
HCT VFR BLD AUTO: 30.8 % (ref 34–46)
HGB BLD-MCNC: 10 GM/DL (ref 11.4–16)
MCH RBC QN AUTO: 30.4 PG (ref 25–35)
MCHC RBC AUTO-ENTMCNC: 32.6 G/DL (ref 31–37)
MCV RBC AUTO: 93.3 FL (ref 80–100)
PLATELET # BLD AUTO: 270 K/UL (ref 150–450)
POTASSIUM SERPL-SCNC: 4.4 MMOL/L (ref 3.5–5.1)
POTASSIUM SERPL-SCNC: 4.5 MMOL/L (ref 3.5–5.1)
PROT SERPL-MCNC: 6.9 G/DL (ref 6.3–8.2)
SODIUM SERPL-SCNC: 137 MMOL/L (ref 137–145)
SODIUM SERPL-SCNC: 138 MMOL/L (ref 137–145)
WBC # BLD AUTO: 11 K/UL (ref 3.8–10.6)

## 2020-08-13 RX ADMIN — INSULIN ASPART SCH UNIT: 100 INJECTION, SOLUTION INTRAVENOUS; SUBCUTANEOUS at 09:27

## 2020-08-13 RX ADMIN — ONDANSETRON PRN MG: 2 INJECTION INTRAMUSCULAR; INTRAVENOUS at 02:21

## 2020-08-13 RX ADMIN — PANTOPRAZOLE SODIUM SCH MG: 40 TABLET, DELAYED RELEASE ORAL at 19:09

## 2020-08-13 RX ADMIN — INSULIN DETEMIR SCH UNIT: 100 INJECTION, SOLUTION SUBCUTANEOUS at 10:06

## 2020-08-13 RX ADMIN — DIVALPROEX SODIUM SCH MG: 250 TABLET, DELAYED RELEASE ORAL at 23:54

## 2020-08-13 RX ADMIN — ONDANSETRON PRN MG: 2 INJECTION INTRAMUSCULAR; INTRAVENOUS at 07:18

## 2020-08-13 RX ADMIN — INSULIN ASPART SCH UNIT: 100 INJECTION, SOLUTION INTRAVENOUS; SUBCUTANEOUS at 12:14

## 2020-08-13 RX ADMIN — INSULIN ASPART SCH: 100 INJECTION, SOLUTION INTRAVENOUS; SUBCUTANEOUS at 19:05

## 2020-08-13 RX ADMIN — METOCLOPRAMIDE SCH: 10 TABLET ORAL at 12:14

## 2020-08-13 RX ADMIN — ATORVASTATIN CALCIUM SCH MG: 20 TABLET, FILM COATED ORAL at 23:54

## 2020-08-13 RX ADMIN — CEFAZOLIN SCH MLS/HR: 330 INJECTION, POWDER, FOR SOLUTION INTRAMUSCULAR; INTRAVENOUS at 09:08

## 2020-08-13 RX ADMIN — DIVALPROEX SODIUM SCH MG: 250 TABLET, DELAYED RELEASE ORAL at 19:09

## 2020-08-13 RX ADMIN — INSULIN ASPART SCH: 100 INJECTION, SOLUTION INTRAVENOUS; SUBCUTANEOUS at 21:06

## 2020-08-13 RX ADMIN — DEXTROSE MONOHYDRATE, SODIUM CHLORIDE, AND POTASSIUM CHLORIDE SCH MLS/HR: 50; 4.5; 1.49 INJECTION, SOLUTION INTRAVENOUS at 07:00

## 2020-08-13 RX ADMIN — ONDANSETRON PRN MG: 2 INJECTION INTRAMUSCULAR; INTRAVENOUS at 09:28

## 2020-08-13 RX ADMIN — METOCLOPRAMIDE SCH MG: 10 TABLET ORAL at 19:09

## 2020-08-13 RX ADMIN — CEFAZOLIN SCH MLS/HR: 330 INJECTION, POWDER, FOR SOLUTION INTRAMUSCULAR; INTRAVENOUS at 23:54

## 2020-08-13 RX ADMIN — DEXTROSE MONOHYDRATE, SODIUM CHLORIDE, AND POTASSIUM CHLORIDE SCH MLS/HR: 50; 4.5; 1.49 INJECTION, SOLUTION INTRAVENOUS at 02:30

## 2020-08-13 RX ADMIN — ENOXAPARIN SODIUM SCH MG: 40 INJECTION SUBCUTANEOUS at 19:09

## 2020-08-13 NOTE — P.HPIM
History of Present Illness


H&P Date: 20





Morenita Ramirez, is a 60-year-old female who was brought in to [facility] emergency room via EMS, was elevated blood glucose of 542, elevated BUN

and creatinine of 49 and 1.54 and positive ketones, patient was started on IV 

fluid, IV insulin drip and was admitted to intensive care unit for diabetic 

ketoacidosis.


Patient was complaining of nausea, otherwise she denies any complaints there is 

no fever or chills no headache or dizziness no chest pain no shortness of breath

no cough no abdominal pain no diarrhea no burning with urination no frequency or

urgency and no hematuria.


Patient has known history of diabetes mellitus type 1 she also has known history

of peripheral vascular disease with previous history of toe amputation, history 

of bipolar disorder, history of hyperlipidemia, and history of anxiety disorder





Past Medical History


Past Medical History: Asthma, Coronary Artery Disease (CAD), Chest Pain / 

Angina, Heart Failure, COPD, CVA/TIA, Diabetes Mellitus, Deep Vein Thrombosis 

(DVT), Eye Disorder, GERD/Reflux, Hyperlipidemia, Hypertension, Myocardial 

Infarction (MI), Neurologic Disorder, Osteoarthritis (OA), Pneumonia, Renal 

Disease


Additional Past Medical History / Comment(s): IDDM (brittle), DKAs, neuropathy 

bilateral hands/feet, retinopathy bilateral eyes, cellulitis R foot, R great toe

and 2nd toe infections/amputations, current wound R foot-being seen in [facility], 

renal failure, anemia, CVAs with L sided paralysis, headaches started after 

CVAs, brain lesions, DVT R axillae, low back pain, varicosities, seizure many 

years ago (), hypothyroid, constipation, bilateral tinnitis occasionally, 

sinus problems.


Last Myocardial Infarction Date:: 


History of Any Multi-Drug Resistant Organisms: MRSA


Date of last positivie culture/infection: 18


MDRO Source:: Right Foot


Past Surgical History: Appendectomy,  Section, Cholecystectomy, Heart 

Catheterization With Stent, Hysterectomy, Orthopedic Surgery


Additional Past Surgical History / Comment(s): PCI with multiple stents, R great

toe and 2nd toe amps, debridements R foot ulcer, L shoulder surgery to remove 

bone, bronchoscopy, EGD, colonoscopy, R arm port since removed, bilateral 

cataract removals/lens implants.


Past Anesthesia/Blood Transfusion Reactions: No Reported Reaction


Additional Past Anesthesia/Blood Transfusion Reaction / Comment(s): HX OF BLOOD 

TRANSFUSION- NO REACTION


Date of Last Stent Placement:: 2013


Past Psychological History: Anxiety, Bipolar, Depression


Additional Psychological History / Comment(s): Pt has a legal guardian, Noelle Menon, who is pt's sister.  Currently her legal guardian is hospitalized.  Pt 

has a caregiver, Eleanor, who resides with her.  Pt is wheelchair bound d/t CVA 

with L sided paralysis arm and leg.  She has a shower chair and a glucometer.  

Her sister or caregiver drive her to appts.  [email or web address].


Smoking Status: Former smoker


Past Alcohol Use History: None Reported


Additional Past Alcohol Use History / Comment(s): Pt started smoking in  and

quit in .   Using marijuana edibles occasionally but none for a "long time"


Past Drug Use History: None Reported


Additional Drug Use History / Comment(s): using marijuana edibles





- Past Family History


  ** Father


Family Medical History: Unable to Obtain, Coronary Artery Disease (CAD), 

Diabetes Mellitus





  ** Mother


Family Medical History: COPD





Medications and Allergies


                                Home Medications











 Medication  Instructions  Recorded  Confirmed  Type


 


Famotidine [Pepcid] 20 mg PO DAILY@16 History


 


HYDROcodone/APAP 10-325MG [Norco 1 tab PO Q6H PRN 17 History





]    


 


DULoxetine HCL [Cymbalta] 60 mg PO DAILY@17 History


 


Atorvastatin [Lipitor] 20 mg PO DAILY@19 History


 


QUEtiapine [SEROquel] 100 mg PO HS@19 History


 


Vitamin B Complex With Vit C 1 tab PO DAILY 20 History


 


Aspirin [Adult Low Dose Aspirin EC] 81 mg PO DAILY@0900 01/10/20 08/12/20 

History


 


Ferrous Sulfate [Iron (65  mg PO DAILY@0900 01/10/20 08/12/20 History





Elemental)]    


 


INSULIN LISPRO (humaLOG) [humaLOG] See Protocol SQ ACHS 03/15/20 08/12/20 

History


 


Divalproex [Depakote] 250 mg PO TID #90 tablet. 20 Rx


 


ALPRAZolam [Xanax] 1 mg PO TID PRN 20 History


 


Albuterol Sulfate [Ventolin HFA] 2 puff INHALATION RT-Q4H PRN 20 

History


 


Ondansetron Odt [Zofran ODT] 4 mg PO DAILY PRN 20 History


 


Valproic Acid [Depakene] 250 mg PO DAILY 20 History


 


Insulin Glargine,Hum.rec.anlog 16 unit SQ HS #0 20 Rx





[Basaglar Kwikpen U-100]    


 


INSULIN LISPRO (humaLOG) [humaLOG] 5 units SQ ACHS 20 History


 


Pantoprazole Sodium [Protonix] 20 mg PO DAILY 20 History


 


QUEtiapine [SEROquel] 25 mg PO BID 20 History








                                    Allergies











Allergy/AdvReac Type Severity Reaction Status Date / Time


 


Barbiturates Allergy  Rash/Hives Verified 20 12:28


 


cephalexin monohydrate Allergy  Rash/Hives Verified 20 12:28





[From Keflex]     


 


morphine Allergy  Rash/Hives Verified 20 12:28


 


Penicillins Allergy  Rash/Hives Verified 20 12:28


 


phenobarbital Allergy  Swelling Verified 20 12:28


 


venom-honey bee Allergy  Swelling Verified 20 12:28





[bee venom (honey bee)]     


 


amlodipine besylate AdvReac  Vomiting Verified 20 12:28





[From Norvasc]     














Physical Exam


Vitals: 


                                   Vital Signs











  Temp Pulse Pulse Resp BP Pulse Ox


 


 20 12:00  98.6 F  104 H   15  182/91  97


 


 20 11:00   109 H   14  


 


 20 10:00   117 H   15  


 


 20 09:00   118 H   14   96


 


 20 08:00  98.6 F  118 H   18  132/65  98


 


 20 07:00   125 H   24  161/84  96


 


 20 06:00   115 H   25 H  135/87  97


 


 20 05:00   122 H   17  157/70  97


 


 20 04:00  98.5 F  120 H   25 H  169/94  95


 


 20 03:00   91   21  151/96  94 L


 


 20 02:00   97   24  132/65  95


 


 20 01:00   105 H   24  157/85  95


 


 20 00:27   90   19  112/49  94 L


 


 20 00:00  98.4 F  96   17  137/95  91 L


 


 20 23:00   99   15  111/60  96


 


 20 22:00   95   18  114/59  93 L


 


 20 21:00   112 H   19  173/88  97


 


 20 20:00  98.3 F  95   18  123/57  94 L


 


 20 19:00   91   17  108/98  95


 


 20 18:00   106 H   22  157/82  96


 


 20 17:00   102 H   18  178/87  95


 


 20 16:00  98.0 F  98  102 H  22  159/74  96


 


 20 15:08   103 H   17  149/84  98








                                Intake and Output











 20





 22:59 06:59 14:59


 


Intake Total 905.161 5210.557 450


 


Output Total 845 625 650


 


Balance 87.117 589.557 -200


 


Intake:   


 


   1200 150


 


    D5-0.45% NaCl with KCl 900 1200 150





    20Meq/l 1,000 ml @ 150   





    mls/hr IV .Q6H40M [facility] Rx#   





    :[id number]   


 


  Intake, IV Titration 32.117 14.557 300





  Amount   


 


    Insulin Regular 100 unit 32.117 14.557 





    In Sodium Chloride 0.9%   





    100 ml @ 0.1 UNITS/KG/HR   





    4.696 mls/hr IV .[id number]   





    [facility] Rx#:[id number]   


 


    Sodium Chloride 0.9% 1,   300





    000 ml @ 100 mls/hr IV .   





    Q10H [facility] Rx#:[id number]   


 


Output:   


 


  Urine 845 625 650


 


Other:   


 


  Voiding Method Indwelling Catheter Indwelling Catheter Indwelling Catheter


 


  Weight 46.493 kg 51.1 kg 














In general patient is alert and oriented 3 in no apparent distress


HEENT head normocephalic and atraumatic


Neck is supple no JVD no goiter no lymphadenopathy


Chest exam reveals a few scattered rhonchi no wheezing


Cardiac exam reveals regular heart sounds no gallops no murmurs


Abdomen is soft nontender no organomegaly with normal bowel sounds


Extremity exam reveals no edema no cyanosis or clubbing





Results


CBC & Chem 7: 


                                 20 05:00





                                 20 05:00


Labs: 


                  Abnormal Lab Results - Last 24 Hours (Table)











  20 Range/Units





  13:27 14:26 14:37 


 


WBC     (3.8-10.6)  k/uL


 


RBC     (3.80-5.40)  m/uL


 


Hgb     (11.4-16.0)  gm/dL


 


Hct     (34.0-46.0)  %


 


RDW     (11.5-15.5)  %


 


APTT  20.0 L    (22.0-30.0)  sec


 


Chloride     ()  mmol/L


 


BUN     (7-17)  mg/dL


 


Glucose     (74-99)  mg/dL


 


POC Glucose (mg/dL)   313 H   (75-99)  mg/dL


 


Albumin     (3.5-5.0)  g/dL


 


Urine Glucose (UA)    4+ H  (Negative)  


 


Urine Ketones    2+ H  (Negative)  


 


Urine Blood    Trace H  (Negative)  


 


Hyaline Casts    26 H  (0-2)  /lpf


 


Urine Mucus    Rare H  (None)  /hpf














  20 Range/Units





  15:18 15:42 16:47 


 


WBC     (3.8-10.6)  k/uL


 


RBC     (3.80-5.40)  m/uL


 


Hgb     (11.4-16.0)  gm/dL


 


Hct     (34.0-46.0)  %


 


RDW     (11.5-15.5)  %


 


APTT     (22.0-30.0)  sec


 


Chloride     ()  mmol/L


 


BUN     (7-17)  mg/dL


 


Glucose     (74-99)  mg/dL


 


POC Glucose (mg/dL)  295 H  251 H  213 H  (75-99)  mg/dL


 


Albumin     (3.5-5.0)  g/dL


 


Urine Glucose (UA)     (Negative)  


 


Urine Ketones     (Negative)  


 


Urine Blood     (Negative)  


 


Hyaline Casts     (0-2)  /lpf


 


Urine Mucus     (None)  /hpf














  20 Range/Units





  17:24 17:46 19:07 


 


WBC     (3.8-10.6)  k/uL


 


RBC     (3.80-5.40)  m/uL


 


Hgb     (11.4-16.0)  gm/dL


 


Hct     (34.0-46.0)  %


 


RDW     (11.5-15.5)  %


 


APTT     (22.0-30.0)  sec


 


Chloride  110 H    ()  mmol/L


 


BUN     (7-17)  mg/dL


 


Glucose     (74-99)  mg/dL


 


POC Glucose (mg/dL)   181 H  169 H  (75-99)  mg/dL


 


Albumin     (3.5-5.0)  g/dL


 


Urine Glucose (UA)     (Negative)  


 


Urine Ketones     (Negative)  


 


Urine Blood     (Negative)  


 


Hyaline Casts     (0-2)  /lpf


 


Urine Mucus     (None)  /hospitals














  20 Range/Units





  20:08 20:57 21:02 


 


WBC     (3.8-10.6)  k/uL


 


RBC     (3.80-5.40)  m/uL


 


Hgb     (11.4-16.0)  gm/dL


 


Hct     (34.0-46.0)  %


 


RDW     (11.5-15.5)  %


 


APTT     (22.0-30.0)  sec


 


Chloride   109 H   ()  mmol/L


 


BUN     (7-17)  mg/dL


 


Glucose     (74-99)  mg/dL


 


POC Glucose (mg/dL)  149 H   140 H  (75-99)  mg/dL


 


Albumin     (3.5-5.0)  g/dL


 


Urine Glucose (UA)     (Negative)  


 


Urine Ketones     (Negative)  


 


Urine Blood     (Negative)  


 


Hyaline Casts     (0-2)  /lpf


 


Urine Mucus     (None)  /hospitals














  20 Range/Units





  22:18 23:08 23:16 


 


WBC     (3.8-10.6)  k/uL


 


RBC     (3.80-5.40)  m/uL


 


Hgb     (11.4-16.0)  gm/dL


 


Hct     (34.0-46.0)  %


 


RDW     (11.5-15.5)  %


 


APTT     (22.0-30.0)  sec


 


Chloride     ()  mmol/L


 


BUN     (7-17)  mg/dL


 


Glucose     (74-99)  mg/dL


 


POC Glucose (mg/dL)  177 H  165 H  191 H  (75-99)  mg/dL


 


Albumin     (3.5-5.0)  g/dL


 


Urine Glucose (UA)     (Negative)  


 


Urine Ketones     (Negative)  


 


Urine Blood     (Negative)  


 


Hyaline Casts     (0-2)  /lpf


 


Urine Mucus     (None)  /hpf














  20 Range/Units





  01:05 02:00 02:04 


 


WBC     (3.8-10.6)  k/uL


 


RBC     (3.80-5.40)  m/uL


 


Hgb     (11.4-16.0)  gm/dL


 


Hct     (34.0-46.0)  %


 


RDW     (11.5-15.5)  %


 


APTT     (22.0-30.0)  sec


 


Chloride   108 H   ()  mmol/L


 


BUN     (7-17)  mg/dL


 


Glucose     (74-99)  mg/dL


 


POC Glucose (mg/dL)  239 H   224 H  (75-99)  mg/dL


 


Albumin     (3.5-5.0)  g/dL


 


Urine Glucose (UA)     (Negative)  


 


Urine Ketones     (Negative)  


 


Urine Blood     (Negative)  


 


Hyaline Casts     (0-2)  /lpf


 


Urine Mucus     (None)  /hpf














  20 Range/Units





  03:11 04:52 05:00 


 


WBC    11.0 H  (3.8-10.6)  k/uL


 


RBC    3.30 L  (3.80-5.40)  m/uL


 


Hgb    10.0 L D  (11.4-16.0)  gm/dL


 


Hct    30.8 L  (34.0-46.0)  %


 


RDW    15.8 H  (11.5-15.5)  %


 


APTT     (22.0-30.0)  sec


 


Chloride     ()  mmol/L


 


BUN     (7-17)  mg/dL


 


Glucose     (74-99)  mg/dL


 


POC Glucose (mg/dL)  244 H  258 H   (75-99)  mg/dL


 


Albumin     (3.5-5.0)  g/dL


 


Urine Glucose (UA)     (Negative)  


 


Urine Ketones     (Negative)  


 


Urine Blood     (Negative)  


 


Hyaline Casts     (0-2)  /lpf


 


Urine Mucus     (None)  /hpf














  20 Range/Units





  05:00 06:19 07:19 


 


WBC     (3.8-10.6)  k/uL


 


RBC     (3.80-5.40)  m/uL


 


Hgb     (11.4-16.0)  gm/dL


 


Hct     (34.0-46.0)  %


 


RDW     (11.5-15.5)  %


 


APTT     (22.0-30.0)  sec


 


Chloride     ()  mmol/L


 


BUN  22 H    (7-17)  mg/dL


 


Glucose  262 H    (74-99)  mg/dL


 


POC Glucose (mg/dL)   268 H  278 H  (75-99)  mg/dL


 


Albumin  3.4 L    (3.5-5.0)  g/dL


 


Urine Glucose (UA)     (Negative)  


 


Urine Ketones     (Negative)  


 


Urine Blood     (Negative)  


 


Hyaline Casts     (0-2)  /lpf


 


Urine Mucus     (None)  /hpf














  20 Range/Units





  08:06 11:07 


 


WBC    (3.8-10.6)  k/uL


 


RBC    (3.80-5.40)  m/uL


 


Hgb    (11.4-16.0)  gm/dL


 


Hct    (34.0-46.0)  %


 


RDW    (11.5-15.5)  %


 


APTT    (22.0-30.0)  sec


 


Chloride    ()  mmol/L


 


BUN    (7-17)  mg/dL


 


Glucose    (74-99)  mg/dL


 


POC Glucose (mg/dL)  335 H  362 H  (75-99)  mg/dL


 


Albumin    (3.5-5.0)  g/dL


 


Urine Glucose (UA)    (Negative)  


 


Urine Ketones    (Negative)  


 


Urine Blood    (Negative)  


 


Hyaline Casts    (0-2)  /lpf


 


Urine Mucus    (None)  /hpf














Assessment and Plan


Plan: 





1.  Diabetic ketoacidosis


2.  Underlying history of diabetes mellitus type 1 maintained on insulin


3.  Underlying history of peripheral vascular disease


4.  Underlying history of hyperlipidemia


5.  Underlying history of bipolar disorder and anxiety disorder


6.  Gastroesophageal reflux disease





At this time patient is maintained on IV fluid and IV insulin drip


Will monitor closely and switched to subcu insulin when appropriate


For DVT prophylaxis patient on subcu Lovenox


For GI prophylaxis patient on famotidine


Will follow closely

## 2020-08-13 NOTE — P.CNPUL
History of Present Illness


Consult date: 20


Reason for consult: other (DK)


Chief complaint: Abdominal pain


History of present illness: 





This is a 60-year-old female with multiple comorbidities including type 1 

diabetes, brittle, coronary artery disease, COPD, recurrent admissions with 

diabetic ketoacidosis, history of DVT, dyslipidemia, hypertension, previous MI, 

diabetic foot ulcers, previous history of osteomyelitis, history of CVA, chronic

kidney disease, patient was brought into the hospital with a few days' history 

of nausea vomiting and abdominal pain.  Workup revealed evidence of 

hyperglycemia and diabetic ketoacidosis with positive ketones.  Blood sugar was 

as high as 542.  Patient had a significant anion gap metabolic acidosis, 

admitted to the ICU, placed on the DKA protocol, and I was asked to see her on 

consultation.  Overnight, the patient responded well to the DKA protocol, and 

this morning her anion gap has closed, I have discontinued her IV insulin, and 

recommended insulin as per sliding scale subcu, and started the patient back on 

her usual Lantus insulin dose daily of 16 units.  Patient is not a great 

historian, noted to be relatively asymptomatic this morning, hence I plan to 

transfer the patient out of the ICU to a regular medical floor, and to be 

followed by her admitting physician.





Review of Systems





Unable to obtain patient is a poor historian, and poor mental status,


ROS unobtainable: due to mental status





Past Medical History


Past Medical History: Asthma, Coronary Artery Disease (CAD), Chest Pain / 

Angina, Heart Failure, COPD, CVA/TIA, Diabetes Mellitus, Deep Vein Thrombosis 

(DVT), Eye Disorder, GERD/Reflux, Hyperlipidemia, Hypertension, Myocardial 

Infarction (MI), Neurologic Disorder, Osteoarthritis (OA), Pneumonia, Renal 

Disease


Additional Past Medical History / Comment(s): IDDM (brittle), DKAs, neuropathy 

bilateral hands/feet, retinopathy bilateral eyes, cellulitis R foot, R great toe

and 2nd toe infections/amputations, current wound R foot-being seen in [facility], 

renal failure, anemia, CVAs with L sided paralysis, headaches started after 

CVAs, brain lesions, DVT R axillae, low back pain, varicosities, seizure many 

years ago (), hypothyroid, constipation, bilateral tinnitis occasionally, 

sinus problems.


Last Myocardial Infarction Date:: 


History of Any Multi-Drug Resistant Organisms: MRSA


Date of last positivie culture/infection: 18


MDRO Source:: Right Foot


Past Surgical History: Appendectomy,  Section, Cholecystectomy, Heart 

Catheterization With Stent, Hysterectomy, Orthopedic Surgery


Additional Past Surgical History / Comment(s): PCI with multiple stents, R great

toe and 2nd toe amps, debridements R foot ulcer, L shoulder surgery to remove 

bone, bronchoscopy, EGD, colonoscopy, R arm port since removed, bilateral 

cataract removals/lens implants.


Past Anesthesia/Blood Transfusion Reactions: No Reported Reaction


Additional Past Anesthesia/Blood Transfusion Reaction / Comment(s): HX OF BLOOD 

TRANSFUSION- NO REACTION


Date of Last Stent Placement:: 2013


Past Psychological History: Anxiety, Bipolar, Depression


Additional Psychological History / Comment(s): Pt has a legal guardian, Noelle Menon, who is pt's sister.  Currently her legal guardian is hospitalized.  Pt 

has a caregiver, Eleanor, who resides with her.  Pt is wheelchair bound d/t CVA 

with L sided paralysis arm and leg.  She has a shower chair and a glucometer.  

Her sister or caregiver drive her to appts.  [email or web address].


Smoking Status: Former smoker


Past Alcohol Use History: None Reported


Additional Past Alcohol Use History / Comment(s): Pt started smoking in  and

quit in .   Using marijuana edibles occasionally but none for a "long time"


Past Drug Use History: None Reported


Additional Drug Use History / Comment(s): using marijuana edibles





- Past Family History


  ** Father


Family Medical History: Unable to Obtain, Coronary Artery Disease (CAD), 

Diabetes Mellitus





  ** Mother


Family Medical History: COPD





Medications and Allergies


                                Home Medications











 Medication  Instructions  Recorded  Confirmed  Type


 


Famotidine [Pepcid] 20 mg PO DAILY@16 History


 


HYDROcodone/APAP 10-325MG [Norco 1 tab PO Q6H PRN 17 History





]    


 


DULoxetine HCL [Cymbalta] 60 mg PO DAILY@17 History


 


Atorvastatin [Lipitor] 20 mg PO DAILY@19 History


 


QUEtiapine [SEROquel] 100 mg PO HS@19 History


 


Vitamin B Complex With Vit C 1 tab PO DAILY 20 History


 


Aspirin [Adult Low Dose Aspirin EC] 81 mg PO DAILY@0900 01/10/20 08/12/20 

History


 


Ferrous Sulfate [Iron (65  mg PO DAILY@0900 01/10/20 08/12/20 History





Elemental)]    


 


INSULIN LISPRO (humaLOG) [humaLOG] See Protocol SQ ACHS 03/15/20 08/12/20 

History


 


Divalproex [Depakote] 250 mg PO TID #90 tablet. 20 Rx


 


ALPRAZolam [Xanax] 1 mg PO TID PRN 20 History


 


Albuterol Sulfate [Ventolin HFA] 2 puff INHALATION RT-Q4H PRN 20 

History


 


Ondansetron Odt [Zofran ODT] 4 mg PO DAILY PRN 20 History


 


Valproic Acid [Depakene] 250 mg PO DAILY 20 History


 


Insulin Glargine,Hum.rec.anlog 16 unit SQ HS #0 20 Rx





[Basaglar Kwikpen U-100]    


 


INSULIN LISPRO (humaLOG) [humaLOG] 5 units SQ ACHS 20 History


 


Pantoprazole Sodium [Protonix] 20 mg PO DAILY 20 History


 


QUEtiapine [SEROquel] 25 mg PO BID 20 History








                                    Allergies











Allergy/AdvReac Type Severity Reaction Status Date / Time


 


Barbiturates Allergy  Rash/Hives Verified 20 12:28


 


cephalexin monohydrate Allergy  Rash/Hives Verified 20 12:28





[From Keflex]     


 


morphine Allergy  Rash/Hives Verified 20 12:28


 


Penicillins Allergy  Rash/Hives Verified 20 12:28


 


phenobarbital Allergy  Swelling Verified 20 12:28


 


venom-honey bee Allergy  Swelling Verified 20 12:28





[bee venom (honey bee)]     


 


amlodipine besylate AdvReac  Vomiting Verified 20 12:28





[From Norvasc]     














Physical Exam


Vitals: 


                                   Vital Signs











  Temp Pulse Pulse Resp BP Pulse Ox


 


 20 12:00  98.6 F  104 H   15  182/91  97


 


 20 11:00   109 H   14  


 


 20 10:00   117 H   15  


 


 20 09:00   118 H   14   96


 


 20 08:00  98.6 F  118 H   18  132/65  98


 


 20 07:00   125 H   24  161/84  96


 


 20 06:00   115 H   25 H  135/87  97


 


 20 05:00   122 H   17  157/70  97


 


 20 04:00  98.5 F  120 H   25 H  169/94  95


 


 20 03:00   91   21  151/96  94 L


 


 20 02:00   97   24  132/65  95


 


 20 01:00   105 H   24  157/85  95


 


 20 00:27   90   19  112/49  94 L


 


 20 00:00  98.4 F  96   17  137/95  91 L


 


 20 23:00   99   15  111/60  96


 


 20 22:00   95   18  114/59  93 L


 


 20 21:00   112 H   19  173/88  97


 


 20 20:00  98.3 F  95   18  123/57  94 L


 


 20 19:00   91   17  108/98  95


 


 20 18:00   106 H   22  157/82  96


 


 20 17:00   102 H   18  178/87  95


 


 20 16:00  98.0 F  98  102 H  22  159/74  96


 


 20 15:08   103 H   17  149/84  98


 


 20 13:38   110 H   18  167/90  98








                                Intake and Output











 20





 22:59 06:59 14:59


 


Intake Total 047.635 3052.557 450


 


Output Total 845 625 650


 


Balance 87.117 589.557 -200


 


Intake:   


 


   1200 150


 


    D5-0.45% NaCl with KCl 900 1200 150





    20Meq/l 1,000 ml @ 150   





    mls/hr IV .Q6H40M [facility] Rx#   





    :[id number]   


 


  Intake, IV Titration 32.117 14.557 300





  Amount   


 


    Insulin Regular 100 unit 32.117 14.557 





    In Sodium Chloride 0.9%   





    100 ml @ 0.1 UNITS/KG/HR   





    4.696 mls/hr IV .[id number]   





    [facility] Rx#:[id number]   


 


    Sodium Chloride 0.9% 1,   300





    000 ml @ 100 mls/hr IV .   





    Q10H [facility] Rx#:[id number]   


 


Output:   


 


  Urine 845 625 650


 


Other:   


 


  Voiding Method Indwelling Catheter Indwelling Catheter Indwelling Catheter


 


  Weight 46.493 kg 51.1 kg 














Physical Exam: Revealed 60-year-old female in no distress.


HEENT:[Neck is supple.] [No neck masses.] [No thyromegaly.] [No JVD.]  PERRLA, 

EOMI, dry mucous membranes noted.


Chest: [Clear throughout, no crackles, no rhonchi, no wheezes.]


Cardiac Exam: [Normal S1 and S2, no S3 gallop, no murmur.]


Abdomen: [Soft, minimal lower abdominal tenderness, no rebound, no guarding, 

positive bowel sounds.


Extremities: [No clubbing, no edema, no cyanosis.]  Multiple toes amputations 

noted in the past.


Neurological Exam: Patient is oriented 1, confused, moves all extremities.


Psychiatric: Depressed mood, blunt affect, questionable mental status.





Results





- Laboratory Findings


CBC and BMP: 


                                 20 05:00





                                 20 05:00


PT/INR, D-dimer











PT  9.9 sec (9.0-12.0)   20  13:27    


 


INR  0.9  (<1.2)   20  13:27    








Abnormal lab findings: 


                                  Abnormal Labs











  20





  11:49 11:52 11:52


 


WBC   


 


RBC   


 


Hgb   


 


Hct   


 


RDW   


 


Neutrophils #   


 


Lymphocytes #   


 


APTT   


 


VBG pH   


 


VBG pCO2   


 


Potassium   5.4 H 


 


Chloride   


 


Carbon Dioxide   17 L 


 


BUN   49 H 


 


Creatinine   1.54 H 


 


Glucose   542 H* 


 


POC Glucose (mg/dL)  558 H  


 


Plasma Lactic Acid Carmelo    3.7 H*


 


Calcium   10.4 H 


 


Total Protein   8.7 H 


 


Albumin   


 


Lipase   15 L 


 


Urine Glucose (UA)   


 


Urine Ketones   


 


Urine Blood   


 


Hyaline Casts   


 


Urine Mucus   














  20





  13:27 13:27 13:27


 


WBC   10.7 H 


 


RBC   


 


Hgb   


 


Hct   


 


RDW   


 


Neutrophils #   9.4 H 


 


Lymphocytes #   0.8 L 


 


APTT  20.0 L  


 


VBG pH    7.25 L


 


VBG pCO2    57 H


 


Potassium   


 


Chloride   


 


Carbon Dioxide   


 


BUN   


 


Creatinine   


 


Glucose   


 


POC Glucose (mg/dL)   


 


Plasma Lactic Acid Carmelo   


 


Calcium   


 


Total Protein   


 


Albumin   


 


Lipase   


 


Urine Glucose (UA)   


 


Urine Ketones   


 


Urine Blood   


 


Hyaline Casts   


 


Urine Mucus   














  20





  14:26 14:37 15:18


 


WBC   


 


RBC   


 


Hgb   


 


Hct   


 


RDW   


 


Neutrophils #   


 


Lymphocytes #   


 


APTT   


 


VBG pH   


 


VBG pCO2   


 


Potassium   


 


Chloride   


 


Carbon Dioxide   


 


BUN   


 


Creatinine   


 


Glucose   


 


POC Glucose (mg/dL)  313 H   295 H


 


Plasma Lactic Acid Carmelo   


 


Calcium   


 


Total Protein   


 


Albumin   


 


Lipase   


 


Urine Glucose (UA)   4+ H 


 


Urine Ketones   2+ H 


 


Urine Blood   Trace H 


 


Hyaline Casts   26 H 


 


Urine Mucus   Rare H 














  20





  15:42 16:47 17:24


 


WBC   


 


RBC   


 


Hgb   


 


Hct   


 


RDW   


 


Neutrophils #   


 


Lymphocytes #   


 


APTT   


 


VBG pH   


 


VBG pCO2   


 


Potassium   


 


Chloride    110 H


 


Carbon Dioxide   


 


BUN   


 


Creatinine   


 


Glucose   


 


POC Glucose (mg/dL)  251 H  213 H 


 


Plasma Lactic Acid Carmelo   


 


Calcium   


 


Total Protein   


 


Albumin   


 


Lipase   


 


Urine Glucose (UA)   


 


Urine Ketones   


 


Urine Blood   


 


Hyaline Casts   


 


Urine Mucus   














  20





  17:46 19:07 20:08


 


WBC   


 


RBC   


 


Hgb   


 


Hct   


 


RDW   


 


Neutrophils #   


 


Lymphocytes #   


 


APTT   


 


VBG pH   


 


VBG pCO2   


 


Potassium   


 


Chloride   


 


Carbon Dioxide   


 


BUN   


 


Creatinine   


 


Glucose   


 


POC Glucose (mg/dL)  181 H  169 H  149 H


 


Plasma Lactic Acid Carmelo   


 


Calcium   


 


Total Protein   


 


Albumin   


 


Lipase   


 


Urine Glucose (UA)   


 


Urine Ketones   


 


Urine Blood   


 


Hyaline Casts   


 


Urine Mucus   














  20





  20:57 21:02 22:18


 


WBC   


 


RBC   


 


Hgb   


 


Hct   


 


RDW   


 


Neutrophils #   


 


Lymphocytes #   


 


APTT   


 


VBG pH   


 


VBG pCO2   


 


Potassium   


 


Chloride  109 H  


 


Carbon Dioxide   


 


BUN   


 


Creatinine   


 


Glucose   


 


POC Glucose (mg/dL)   140 H  177 H


 


Plasma Lactic Acid Carmelo   


 


Calcium   


 


Total Protein   


 


Albumin   


 


Lipase   


 


Urine Glucose (UA)   


 


Urine Ketones   


 


Urine Blood   


 


Hyaline Casts   


 


Urine Mucus   














  20





  23:08 23:16 01:05


 


WBC   


 


RBC   


 


Hgb   


 


Hct   


 


RDW   


 


Neutrophils #   


 


Lymphocytes #   


 


APTT   


 


VBG pH   


 


VBG pCO2   


 


Potassium   


 


Chloride   


 


Carbon Dioxide   


 


BUN   


 


Creatinine   


 


Glucose   


 


POC Glucose (mg/dL)  165 H  191 H  239 H


 


Plasma Lactic Acid Carmelo   


 


Calcium   


 


Total Protein   


 


Albumin   


 


Lipase   


 


Urine Glucose (UA)   


 


Urine Ketones   


 


Urine Blood   


 


Hyaline Casts   


 


Urine Mucus   














  20





  02:00 02:04 03:11


 


WBC   


 


RBC   


 


Hgb   


 


Hct   


 


RDW   


 


Neutrophils #   


 


Lymphocytes #   


 


APTT   


 


VBG pH   


 


VBG pCO2   


 


Potassium   


 


Chloride  108 H  


 


Carbon Dioxide   


 


BUN   


 


Creatinine   


 


Glucose   


 


POC Glucose (mg/dL)   224 H  244 H


 


Plasma Lactic Acid Carmelo   


 


Calcium   


 


Total Protein   


 


Albumin   


 


Lipase   


 


Urine Glucose (UA)   


 


Urine Ketones   


 


Urine Blood   


 


Hyaline Casts   


 


Urine Mucus   














  20





  04:52 05:00 05:00


 


WBC   11.0 H 


 


RBC   3.30 L 


 


Hgb   10.0 L D 


 


Hct   30.8 L 


 


RDW   15.8 H 


 


Neutrophils #   


 


Lymphocytes #   


 


APTT   


 


VBG pH   


 


VBG pCO2   


 


Potassium   


 


Chloride   


 


Carbon Dioxide   


 


BUN    22 H


 


Creatinine   


 


Glucose    262 H


 


POC Glucose (mg/dL)  258 H  


 


Plasma Lactic Acid Carmelo   


 


Calcium   


 


Total Protein   


 


Albumin    3.4 L


 


Lipase   


 


Urine Glucose (UA)   


 


Urine Ketones   


 


Urine Blood   


 


Hyaline Casts   


 


Urine Mucus   














  20





  06:19 07:19 08:06


 


WBC   


 


RBC   


 


Hgb   


 


Hct   


 


RDW   


 


Neutrophils #   


 


Lymphocytes #   


 


APTT   


 


VBG pH   


 


VBG pCO2   


 


Potassium   


 


Chloride   


 


Carbon Dioxide   


 


BUN   


 


Creatinine   


 


Glucose   


 


POC Glucose (mg/dL)  268 H  278 H  335 H


 


Plasma Lactic Acid Carmelo   


 


Calcium   


 


Total Protein   


 


Albumin   


 


Lipase   


 


Urine Glucose (UA)   


 


Urine Ketones   


 


Urine Blood   


 


Hyaline Casts   


 


Urine Mucus   














  20





  11:07


 


WBC 


 


RBC 


 


Hgb 


 


Hct 


 


RDW 


 


Neutrophils # 


 


Lymphocytes # 


 


APTT 


 


VBG pH 


 


VBG pCO2 


 


Potassium 


 


Chloride 


 


Carbon Dioxide 


 


BUN 


 


Creatinine 


 


Glucose 


 


POC Glucose (mg/dL)  362 H


 


Plasma Lactic Acid Carmelo 


 


Calcium 


 


Total Protein 


 


Albumin 


 


Lipase 


 


Urine Glucose (UA) 


 


Urine Ketones 


 


Urine Blood 


 


Hyaline Casts 


 


Urine Mucus 














- Diagnostic Findings


Chest x-ray: image reviewed (Left lower lobe atelectasis, possible limited 

pneumonia/infiltrate involving the left lower lobe, new compared to most recent 

chest x-ray)





Assessment and Plan


Assessment: 





Impression:


Acute diabetic ketoacidosis.


Left lower lobe atelectasis, possible healthcare acquired pneumonia,


History of hypertension.


History of depression.


History of CVA.  Involving MCA/JESSICA vascular territory with history of left 

hemiparesis arm greater than leg.


Brittle type 1 diabetes.


Benign essential hypertension


History of peripheral vessel occlusive disease involving the lower extremities.


History of coronary artery disease and previous coronary stenting.


History of COPD, presently in active.


History of right foot osteomyelitis.





Recommendation:


Patient responded well to the DKA protocol.


Continue insulin however will switch the patient to subcu insulin based on 

sliding scale.


Lantus insulin once daily.


Resume home meds.


Pro-calcitonin level and decide whether the patient would require antibiotics 

for her left lower lobe suspicious infiltrate.  Clinically doubt pneumonia.  

Patient has multiple ALLERGIES to different antibiotics.


We'll continue to follow.


Time with Patient: Greater than 30

## 2020-08-14 LAB
ALBUMIN SERPL-MCNC: 3.3 G/DL (ref 3.5–5)
ALP SERPL-CCNC: 80 U/L (ref 38–126)
ALT SERPL-CCNC: 17 U/L (ref 4–34)
ANION GAP SERPL CALC-SCNC: 8 MMOL/L
AST SERPL-CCNC: 23 U/L (ref 14–36)
BASOPHILS # BLD AUTO: 0 K/UL (ref 0–0.2)
BASOPHILS NFR BLD AUTO: 0 %
BUN SERPL-SCNC: 23 MG/DL (ref 7–17)
CALCIUM SPEC-MCNC: 8.9 MG/DL (ref 8.4–10.2)
CHLORIDE SERPL-SCNC: 104 MMOL/L (ref 98–107)
CO2 SERPL-SCNC: 24 MMOL/L (ref 22–30)
EOSINOPHIL # BLD AUTO: 0 K/UL (ref 0–0.7)
EOSINOPHIL NFR BLD AUTO: 0 %
ERYTHROCYTE [DISTWIDTH] IN BLOOD BY AUTOMATED COUNT: 3.45 M/UL (ref 3.8–5.4)
ERYTHROCYTE [DISTWIDTH] IN BLOOD: 15.6 % (ref 11.5–15.5)
GLUCOSE BLD-MCNC: 103 MG/DL (ref 75–99)
GLUCOSE BLD-MCNC: 114 MG/DL (ref 75–99)
GLUCOSE BLD-MCNC: 222 MG/DL (ref 75–99)
GLUCOSE BLD-MCNC: 374 MG/DL (ref 75–99)
GLUCOSE SERPL-MCNC: 310 MG/DL (ref 74–99)
HCT VFR BLD AUTO: 32.2 % (ref 34–46)
HGB BLD-MCNC: 10.1 GM/DL (ref 11.4–16)
LYMPHOCYTES # SPEC AUTO: 2 K/UL (ref 1–4.8)
LYMPHOCYTES NFR SPEC AUTO: 23 %
MCH RBC QN AUTO: 29.1 PG (ref 25–35)
MCHC RBC AUTO-ENTMCNC: 31.2 G/DL (ref 31–37)
MCV RBC AUTO: 93.3 FL (ref 80–100)
MONOCYTES # BLD AUTO: 0.6 K/UL (ref 0–1)
MONOCYTES NFR BLD AUTO: 7 %
NEUTROPHILS # BLD AUTO: 6 K/UL (ref 1.3–7.7)
NEUTROPHILS NFR BLD AUTO: 68 %
PLATELET # BLD AUTO: 264 K/UL (ref 150–450)
POTASSIUM SERPL-SCNC: 4.4 MMOL/L (ref 3.5–5.1)
PROT SERPL-MCNC: 6.7 G/DL (ref 6.3–8.2)
SODIUM SERPL-SCNC: 136 MMOL/L (ref 137–145)
WBC # BLD AUTO: 8.7 K/UL (ref 3.8–10.6)

## 2020-08-14 PROCEDURE — 05H933Z INSERTION OF INFUSION DEVICE INTO RIGHT BRACHIAL VEIN, PERCUTANEOUS APPROACH: ICD-10-PCS

## 2020-08-14 RX ADMIN — DIVALPROEX SODIUM SCH MG: 250 TABLET, DELAYED RELEASE ORAL at 21:41

## 2020-08-14 RX ADMIN — DULOXETINE SCH MG: 60 CAPSULE, DELAYED RELEASE ORAL at 09:35

## 2020-08-14 RX ADMIN — METOCLOPRAMIDE SCH MG: 10 TABLET ORAL at 17:23

## 2020-08-14 RX ADMIN — INSULIN DETEMIR SCH UNIT: 100 INJECTION, SOLUTION SUBCUTANEOUS at 06:58

## 2020-08-14 RX ADMIN — ASPIRIN 81 MG CHEWABLE TABLET SCH MG: 81 TABLET CHEWABLE at 09:34

## 2020-08-14 RX ADMIN — INSULIN ASPART SCH: 100 INJECTION, SOLUTION INTRAVENOUS; SUBCUTANEOUS at 20:41

## 2020-08-14 RX ADMIN — INSULIN ASPART SCH UNIT: 100 INJECTION, SOLUTION INTRAVENOUS; SUBCUTANEOUS at 11:27

## 2020-08-14 RX ADMIN — DIVALPROEX SODIUM SCH MG: 250 TABLET, DELAYED RELEASE ORAL at 09:34

## 2020-08-14 RX ADMIN — INSULIN ASPART SCH: 100 INJECTION, SOLUTION INTRAVENOUS; SUBCUTANEOUS at 17:15

## 2020-08-14 RX ADMIN — INSULIN ASPART SCH UNIT: 100 INJECTION, SOLUTION INTRAVENOUS; SUBCUTANEOUS at 06:58

## 2020-08-14 RX ADMIN — METOCLOPRAMIDE SCH MG: 10 TABLET ORAL at 11:27

## 2020-08-14 RX ADMIN — ATORVASTATIN CALCIUM SCH MG: 20 TABLET, FILM COATED ORAL at 21:41

## 2020-08-14 RX ADMIN — CEFAZOLIN SCH MLS/HR: 330 INJECTION, POWDER, FOR SOLUTION INTRAMUSCULAR; INTRAVENOUS at 23:55

## 2020-08-14 RX ADMIN — ISODIUM CHLORIDE PRN MG: 0.03 SOLUTION RESPIRATORY (INHALATION) at 07:22

## 2020-08-14 RX ADMIN — DIVALPROEX SODIUM SCH MG: 250 TABLET, DELAYED RELEASE ORAL at 17:23

## 2020-08-14 RX ADMIN — ENOXAPARIN SODIUM SCH MG: 40 INJECTION SUBCUTANEOUS at 09:35

## 2020-08-14 RX ADMIN — FAMOTIDINE SCH MG: 20 TABLET, FILM COATED ORAL at 09:35

## 2020-08-14 RX ADMIN — CEFAZOLIN SCH: 330 INJECTION, POWDER, FOR SOLUTION INTRAMUSCULAR; INTRAVENOUS at 04:45

## 2020-08-14 RX ADMIN — LOSARTAN POTASSIUM SCH MG: 50 TABLET, FILM COATED ORAL at 09:35

## 2020-08-14 RX ADMIN — CEFAZOLIN SCH MLS/HR: 330 INJECTION, POWDER, FOR SOLUTION INTRAMUSCULAR; INTRAVENOUS at 17:22

## 2020-08-14 RX ADMIN — LEVOFLOXACIN SCH MLS/HR: 250 INJECTION, SOLUTION INTRAVENOUS at 23:54

## 2020-08-14 RX ADMIN — PANTOPRAZOLE SODIUM SCH MG: 40 TABLET, DELAYED RELEASE ORAL at 06:58

## 2020-08-14 RX ADMIN — ONDANSETRON PRN MG: 2 INJECTION INTRAMUSCULAR; INTRAVENOUS at 00:01

## 2020-08-14 RX ADMIN — METOCLOPRAMIDE SCH MG: 10 TABLET ORAL at 06:58

## 2020-08-14 NOTE — CONS
CONSULTATION



DATE OF DICTATION:

2020.



REASON FOR CONSULTATION:

Nausea and vomiting.



HISTORY OF PRESENT ILLNESS:

The patient is a 60-year-old pleasant white female with type 2 diabetes mellitus

admitted to hospital with uncontrolled hyperglycemia and DKA.  She was having intense

abdominal pain associated with nausea, vomiting, not feeling well and in the ER was

noted to have a blood sugar of 552 and was in DKA.  Presently in the intensive care

unit.  She is feeling better.  She is on a clear liquid diet.  As per the nursing

staff, she had 2 episodes of clear bilious emesis this morning.  Abdominal pain is

improving.  No coffee-ground emesis.



PAST MEDICAL HISTORY:

Significant for diabetes mellitus, hypothyroidism, hypertension, coronary artery

disease, congestive heart failure, history of DVT in the past, gastroesophageal reflux

disease, degenerative joint disease.



PAST SURGICAL HISTORY:

, appendectomy, cholecystectomy, hysterectomy, cardiac cath with stent

placement.



MEDICATIONS:

Medications at home include Cymbalta, Lipitor, Seroquel, vitamin C, aspirin, iron

sulfate, Humalog, Depakote, Xanax, Ventolin, Zofran, Protonix, Seroquel, hydrocodone.



FAMILY HISTORY:

Father coronary artery disease.  Mother COPD.



SOCIAL HISTORY:

Chronic smoker.  No alcohol use.



ALLERGIES:

KEFLEX, MORPHINE, PENICILLIN, PHENOBARB, AMLODIPINE.



REVIEW OF SYSTEMS:

CARDIOPULMONARY:  No chest pain or shortness of breath.  No dysuria or hematuria.

Musculoskeletal: Chronic back pain.

Neurology:  Anxiety, depression.

ENT:  Vision unremarkable.

CONSTITUTIONAL: No recent weight loss.  No fever, chills, night sweats.

GI: As mentioned above.

HEMATOLOGY unremarkable.

PSYCHIATRIC:  History of anxiety, depression.



PHYSICAL EXAMINATION:

She appears comfortable.  No apparent distress.

VITAL SIGNS:  Stable.  Blood pressure 120/69, pulse rate 72, temperature 97.7.

HEENT examination unremarkable. Conjunctivae pink.  Sclerae anicteric.  Oral cavity no

lesions.

NECK no JVD or lymph node enlargement.

CHEST was clear to auscultation.

HEART:  Regular rate and rhythm.

ABDOMEN:  Soft.  There was very minimal tenderness in the epigastric area.  Bowel

sounds are positive.  No organomegaly.

EXTREMITIES:  No pedal edema.

SKIN no rashes.

NEURO:  She is alert and oriented x3.  No focal deficits.



LABS:

WBC 10.7, hemoglobin 12.1, platelets normal.  Basic metabolic panel showed a blood

sugar of 562 and today is 310, BUN 23, creatinine 1.06.  Lipase is normal.  WBC today

8.7, hemoglobin 10.9, platelets 264.



IMPRESSION:

1. Acute DKA, improving.

2. Nausea, vomiting for the last 3 days duration, most likely related to acute DKA.

    The patient may have a component of diabetic gastroparesis.  She still has some

    nausea today, but able to clear liquids well. Presently remains on IV Protonix as

    well as Reglan.

3. Longstanding history of type 2 diabetes mellitus.

4. Acute kidney injury with mild elevation of BUN and creatinine, which is improving.



RECOMMENDATIONS:

1. Continue with a clear liquid diet.

2. Continue Protonix 40 mg twice daily.

3. Small frequent meals.

4. We will advance the diet as tolerated based on symptoms.

5. No plans on any endoscopic intervention at the present time.

6. We will follow with you closely.

Thank you for this consultation.





AMY / MIKAYLA: [id number] / Job#: [id number]

## 2020-08-14 NOTE — P.PN
Subjective


Progress Note Date: 08/14/20


Principal diagnosis: 





Acute DKA.





This is a 60-year-old female with multiple comorbidities including type 1 

diabetes, brittle, coronary artery disease, COPD, recurrent admissions with 

diabetic ketoacidosis, history of DVT, dyslipidemia, hypertension, previous MI, 

diabetic foot ulcers, previous history of osteomyelitis, history of CVA, chronic

kidney disease, patient was brought into the hospital with a few days' history 

of nausea vomiting and abdominal pain.  Workup revealed evidence of 

hyperglycemia and diabetic ketoacidosis with positive ketones.  Blood sugar was 

as high as 542.  Patient had a significant anion gap metabolic acidosis, 

admitted to the ICU, placed on the DKA protocol, and I was asked to see her on 

consultation.  Overnight, the patient responded well to the DKA protocol, and 

this morning her anion gap has closed, I have discontinued her IV insulin, and 

recommended insulin as per sliding scale subcu, and started the patient back on 

her usual Lantus insulin dose daily of 16 units.  Patient is not a great 

historian, noted to be relatively asymptomatic this morning, hence I plan to 

transfer the patient out of the ICU to a regular medical floor, and to be 

followed by her admitting physician.





Reevaluated today on 8/14/20, patient remains in the ICU as an overflow.  

Feeling better, she is on room air, she is off IV fluids because she has a very 

poor peripheral venous access.  And patient will have IV access/midline will be 

placed today.  If not I will go ahead and place a central line.  Patient is 

presently an overflow in the ICU, doing well, pro-calcitonin level is elevated, 

hence will continue empiric antibiotics for her presumptive pneumonia.  This is 

likely healthcare acquired pneumonia because the patient was recently and out of

the hospital quite frequently.  CBC is normal electrolytes are normal.











Objective





- Vital Signs


Vital signs: 


                                   Vital Signs











Temp  97.8 F   08/14/20 12:00


 


Pulse  86   08/14/20 12:00


 


Resp  14   08/14/20 12:00


 


BP  129/93   08/14/20 12:00


 


Pulse Ox  95   08/14/20 06:00








                                 Intake & Output











 08/13/20 08/14/20 08/14/20





 18:59 06:59 18:59


 


Intake Total 450 350 200


 


Output Total 775 670 490


 


Balance -325 -320 -290


 


Weight   51.1 kg


 


Intake:   


 


    


 


    D5-0.45% NaCl with KCl 150  





    20Meq/l 1,000 ml @ 150   





    mls/hr IV .Q6H40M [facility] Rx#   





    :[id number]   


 


  Intake, IV Titration 300 350 200





  Amount   


 


    Sodium Chloride 0.9% 1, 300 350 200





    000 ml @ 100 mls/hr IV .   





    Q10H [facility] Rx#:[id number]   


 


Output:   


 


  Urine 775 670 490


 


Other:   


 


  Voiding Method Indwelling Catheter Indwelling Catheter Indwelling Catheter


 


  # Bowel Movements  1 














- Exam





Physical Exam: Revealed 60-year-old female in no distress.


HEENT:[Neck is supple.] [No neck masses.] [No thyromegaly.] [No JVD.]  PERRLA, 

EOMI, dry mucous membranes noted.


Chest: [Clear throughout, no crackles, no rhonchi, no wheezes.]


Cardiac Exam: [Normal S1 and S2, no S3 gallop, no murmur.]


Abdomen: [Soft, minimal lower abdominal tenderness, no rebound, no guarding, 

positive bowel sounds.


Extremities: [No clubbing, no edema, no cyanosis.]  Multiple toes amputations 

noted in the past.


Neurological Exam: Patient is oriented 1, confused, moves all extremities.


Psychiatric: Depressed mood, blunt affect, questionable mental status.











- Labs


CBC & Chem 7: 


                                 08/14/20 04:39





                                 08/14/20 04:39


Labs: 


                  Abnormal Lab Results - Last 24 Hours (Table)











  08/13/20 08/13/20 08/13/20 Range/Units





  05:00 19:05 20:43 


 


RBC     (3.80-5.40)  m/uL


 


Hgb     (11.4-16.0)  gm/dL


 


Hct     (34.0-46.0)  %


 


RDW     (11.5-15.5)  %


 


Sodium     (137-145)  mmol/L


 


BUN     (7-17)  mg/dL


 


Creatinine     (0.52-1.04)  mg/dL


 


Glucose     (74-99)  mg/dL


 


POC Glucose (mg/dL)   72 L  159 H  (75-99)  mg/dL


 


Albumin     (3.5-5.0)  g/dL


 


Procalcitonin  1.79 H    (0.02-0.09)  ng/mL














  08/14/20 08/14/20 08/14/20 Range/Units





  04:39 04:39 06:41 


 


RBC  3.45 L    (3.80-5.40)  m/uL


 


Hgb  10.1 L    (11.4-16.0)  gm/dL


 


Hct  32.2 L    (34.0-46.0)  %


 


RDW  15.6 H    (11.5-15.5)  %


 


Sodium   136 L   (137-145)  mmol/L


 


BUN   23 H   (7-17)  mg/dL


 


Creatinine   1.06 H   (0.52-1.04)  mg/dL


 


Glucose   310 H   (74-99)  mg/dL


 


POC Glucose (mg/dL)    374 H  (75-99)  mg/dL


 


Albumin   3.3 L   (3.5-5.0)  g/dL


 


Procalcitonin     (0.02-0.09)  ng/mL














  08/14/20 Range/Units





  11:16 


 


RBC   (3.80-5.40)  m/uL


 


Hgb   (11.4-16.0)  gm/dL


 


Hct   (34.0-46.0)  %


 


RDW   (11.5-15.5)  %


 


Sodium   (137-145)  mmol/L


 


BUN   (7-17)  mg/dL


 


Creatinine   (0.52-1.04)  mg/dL


 


Glucose   (74-99)  mg/dL


 


POC Glucose (mg/dL)  222 H  (75-99)  mg/dL


 


Albumin   (3.5-5.0)  g/dL


 


Procalcitonin   (0.02-0.09)  ng/mL














Assessment and Plan


Assessment: 





Impression:


Acute diabetic ketoacidosis.  Resolved.


Acute healthcare acquired pneumonia involving left lower lobe.


History of hypertension.


History of depression.


History of CVA.  Involving MCA/JESSICA vascular territory with history of left 

hemiparesis arm greater than leg.


Brittle type 1 diabetes.


Benign essential hypertension


History of peripheral vessel occlusive disease involving the lower extremities.


History of coronary artery disease and previous coronary stenting.


History of COPD, presently in active.


History of right foot osteomyelitis.





Recommendation:


Patient responded well to the DKA protocol.


Continue insulin subcutaneously as per scale.


Lantus insulin once daily.


Resume home meds.


Continue antibiotics for left lower lobe pneumonia, could be switched to oral 

antibiotics possibly Levaquin upon discharge.


Patient will be cleared from my perspective to transfer out of the ICU and 

possible discharge home in the next 24 hours.


Time with Patient: Less than 30

## 2020-08-14 NOTE — P.PN
Subjective


Progress Note Date: 08/14/20











Morenita Ramirez, is a 60-year-old female who was brought in to [facility] emergency room via EMS, was elevated blood glucose of 542, elevated BUN

and creatinine of 49 and 1.54 and positive ketones, patient was started on IV 

fluid, IV insulin drip and was admitted to intensive care unit for diabetic 

ketoacidosis.


Patient was complaining of nausea, otherwise she denies any complaints there is 

no fever or chills no headache or dizziness no chest pain no shortness of breath

no cough no abdominal pain no diarrhea no burning with urination no frequency or

urgency and no hematuria.


Patient has known history of diabetes mellitus type 1 she also has known history

of peripheral vascular disease with previous history of toe amputation, history 

of bipolar disorder, history of hyperlipidemia, and history of anxiety disorder








On 08/14/2020, patient was seen and examined in the ICU, she is alert and 

oriented 3 in no apparent distress, she is complaining of nausea and vomiting 

and very poor oral intake, otherwise she denies any complaints, there is no 

fever or chills no headache or dizziness no chest pain no shortness of breath no

cough no abdominal pain no diarrhea no burning with urination no frequency or 

urgency and no hematuria





Objective





- Vital Signs


Vital signs: 


                                   Vital Signs











Temp  97.8 F   08/14/20 12:00


 


Pulse  86   08/14/20 12:00


 


Resp  14   08/14/20 12:00


 


BP  129/93   08/14/20 12:00


 


Pulse Ox  95   08/14/20 06:00








                                 Intake & Output











 08/13/20 08/14/20 08/14/20





 18:59 06:59 18:59


 


Intake Total 450 350 200


 


Output Total 775 670 490


 


Balance -325 -320 -290


 


Weight   51.1 kg


 


Intake:   


 


    


 


    D5-0.45% NaCl with KCl 150  





    20Meq/l 1,000 ml @ 150   





    mls/hr IV .Q6H40M [facility] Rx#   





    :[id number]   


 


  Intake, IV Titration 300 350 200





  Amount   


 


    Sodium Chloride 0.9% 1, 300 350 200





    000 ml @ 100 mls/hr IV .   





    Q10H [facility] Rx#:[id number]   


 


Output:   


 


  Urine 775 670 490


 


Other:   


 


  Voiding Method Indwelling Catheter Indwelling Catheter Indwelling Catheter


 


  # Bowel Movements  1 














- Exam








In general patient is alert and oriented 3 in no apparent distress


HEENT head normocephalic and atraumatic


Neck is supple no JVD no goiter no lymphadenopathy


Chest exam reveals a few scattered rhonchi no wheezing


Cardiac exam reveals regular heart sounds no gallops no murmurs


Abdomen is soft nontender no organomegaly with normal bowel sounds


Extremity exam reveals no edema no cyanosis or clubbing








- Labs


CBC & Chem 7: 


                                 08/14/20 04:39





                                 08/14/20 04:39


Labs: 


                  Abnormal Lab Results - Last 24 Hours (Table)











  08/13/20 08/13/20 08/13/20 Range/Units





  05:00 19:05 20:43 


 


RBC     (3.80-5.40)  m/uL


 


Hgb     (11.4-16.0)  gm/dL


 


Hct     (34.0-46.0)  %


 


RDW     (11.5-15.5)  %


 


Sodium     (137-145)  mmol/L


 


BUN     (7-17)  mg/dL


 


Creatinine     (0.52-1.04)  mg/dL


 


Glucose     (74-99)  mg/dL


 


POC Glucose (mg/dL)   72 L  159 H  (75-99)  mg/dL


 


Albumin     (3.5-5.0)  g/dL


 


Procalcitonin  1.79 H    (0.02-0.09)  ng/mL














  08/14/20 08/14/20 08/14/20 Range/Units





  04:39 04:39 06:41 


 


RBC  3.45 L    (3.80-5.40)  m/uL


 


Hgb  10.1 L    (11.4-16.0)  gm/dL


 


Hct  32.2 L    (34.0-46.0)  %


 


RDW  15.6 H    (11.5-15.5)  %


 


Sodium   136 L   (137-145)  mmol/L


 


BUN   23 H   (7-17)  mg/dL


 


Creatinine   1.06 H   (0.52-1.04)  mg/dL


 


Glucose   310 H   (74-99)  mg/dL


 


POC Glucose (mg/dL)    374 H  (75-99)  mg/dL


 


Albumin   3.3 L   (3.5-5.0)  g/dL


 


Procalcitonin     (0.02-0.09)  ng/mL














  08/14/20 Range/Units





  11:16 


 


RBC   (3.80-5.40)  m/uL


 


Hgb   (11.4-16.0)  gm/dL


 


Hct   (34.0-46.0)  %


 


RDW   (11.5-15.5)  %


 


Sodium   (137-145)  mmol/L


 


BUN   (7-17)  mg/dL


 


Creatinine   (0.52-1.04)  mg/dL


 


Glucose   (74-99)  mg/dL


 


POC Glucose (mg/dL)  222 H  (75-99)  mg/dL


 


Albumin   (3.5-5.0)  g/dL


 


Procalcitonin   (0.02-0.09)  ng/mL














Assessment and Plan


Plan: 





1.  Diabetic ketoacidosis


2.  Underlying history of diabetes mellitus type 1 maintained on insulin


3.  Underlying history of peripheral vascular disease


4.  Underlying history of hyperlipidemia


5.  Underlying history of bipolar disorder and anxiety disorder


6.  Gastroesophageal reflux disease


7.  Nausea and vomiting likely related to gastroparesis Will consult 

gastroenterology, continue with Reglan and Zofran at this time





At this time patient is maintained on IV subcu insulin


Consult gastroenterology for nausea and vomiting


For DVT prophylaxis patient on subcu Lovenox


For GI prophylaxis patient on famotidine


Will follow closely

## 2020-08-15 LAB
ALBUMIN SERPL-MCNC: 3 G/DL (ref 3.5–5)
ALP SERPL-CCNC: 74 U/L (ref 38–126)
ALT SERPL-CCNC: 16 U/L (ref 4–34)
ANION GAP SERPL CALC-SCNC: 5 MMOL/L
AST SERPL-CCNC: 21 U/L (ref 14–36)
BASOPHILS # BLD AUTO: 0 K/UL (ref 0–0.2)
BASOPHILS NFR BLD AUTO: 0 %
BUN SERPL-SCNC: 28 MG/DL (ref 7–17)
CALCIUM SPEC-MCNC: 8.7 MG/DL (ref 8.4–10.2)
CHLORIDE SERPL-SCNC: 107 MMOL/L (ref 98–107)
CO2 SERPL-SCNC: 24 MMOL/L (ref 22–30)
EOSINOPHIL # BLD AUTO: 0.1 K/UL (ref 0–0.7)
EOSINOPHIL NFR BLD AUTO: 1 %
ERYTHROCYTE [DISTWIDTH] IN BLOOD BY AUTOMATED COUNT: 3.37 M/UL (ref 3.8–5.4)
ERYTHROCYTE [DISTWIDTH] IN BLOOD: 15.7 % (ref 11.5–15.5)
GLUCOSE BLD-MCNC: 101 MG/DL (ref 75–99)
GLUCOSE BLD-MCNC: 175 MG/DL (ref 75–99)
GLUCOSE BLD-MCNC: 294 MG/DL (ref 75–99)
GLUCOSE BLD-MCNC: 43 MG/DL (ref 75–99)
GLUCOSE BLD-MCNC: 52 MG/DL (ref 75–99)
GLUCOSE BLD-MCNC: 55 MG/DL (ref 75–99)
GLUCOSE BLD-MCNC: 58 MG/DL (ref 75–99)
GLUCOSE BLD-MCNC: 78 MG/DL (ref 75–99)
GLUCOSE BLD-MCNC: 91 MG/DL (ref 75–99)
GLUCOSE BLD-MCNC: 97 MG/DL (ref 75–99)
GLUCOSE SERPL-MCNC: 172 MG/DL (ref 74–99)
HCT VFR BLD AUTO: 32 % (ref 34–46)
HGB BLD-MCNC: 9.9 GM/DL (ref 11.4–16)
LYMPHOCYTES # SPEC AUTO: 2.8 K/UL (ref 1–4.8)
LYMPHOCYTES NFR SPEC AUTO: 41 %
MCH RBC QN AUTO: 29.4 PG (ref 25–35)
MCHC RBC AUTO-ENTMCNC: 30.9 G/DL (ref 31–37)
MCV RBC AUTO: 95.1 FL (ref 80–100)
MONOCYTES # BLD AUTO: 0.4 K/UL (ref 0–1)
MONOCYTES NFR BLD AUTO: 5 %
NEUTROPHILS # BLD AUTO: 3.5 K/UL (ref 1.3–7.7)
NEUTROPHILS NFR BLD AUTO: 51 %
PLATELET # BLD AUTO: 244 K/UL (ref 150–450)
POTASSIUM SERPL-SCNC: 4.3 MMOL/L (ref 3.5–5.1)
PROT SERPL-MCNC: 6.4 G/DL (ref 6.3–8.2)
SODIUM SERPL-SCNC: 136 MMOL/L (ref 137–145)
WBC # BLD AUTO: 6.8 K/UL (ref 3.8–10.6)

## 2020-08-15 RX ADMIN — INSULIN ASPART SCH: 100 INJECTION, SOLUTION INTRAVENOUS; SUBCUTANEOUS at 19:18

## 2020-08-15 RX ADMIN — CEFAZOLIN SCH MLS/HR: 330 INJECTION, POWDER, FOR SOLUTION INTRAMUSCULAR; INTRAVENOUS at 09:58

## 2020-08-15 RX ADMIN — METOCLOPRAMIDE SCH MG: 10 TABLET ORAL at 06:43

## 2020-08-15 RX ADMIN — INSULIN ASPART SCH: 100 INJECTION, SOLUTION INTRAVENOUS; SUBCUTANEOUS at 20:40

## 2020-08-15 RX ADMIN — DIVALPROEX SODIUM SCH MG: 250 TABLET, DELAYED RELEASE ORAL at 16:36

## 2020-08-15 RX ADMIN — ATORVASTATIN CALCIUM SCH MG: 20 TABLET, FILM COATED ORAL at 20:48

## 2020-08-15 RX ADMIN — METOCLOPRAMIDE SCH MG: 10 TABLET ORAL at 12:43

## 2020-08-15 RX ADMIN — INSULIN DETEMIR SCH UNIT: 100 INJECTION, SOLUTION SUBCUTANEOUS at 21:15

## 2020-08-15 RX ADMIN — INSULIN ASPART SCH: 100 INJECTION, SOLUTION INTRAVENOUS; SUBCUTANEOUS at 12:40

## 2020-08-15 RX ADMIN — LOSARTAN POTASSIUM SCH MG: 50 TABLET, FILM COATED ORAL at 08:13

## 2020-08-15 RX ADMIN — PANTOPRAZOLE SODIUM SCH MG: 40 TABLET, DELAYED RELEASE ORAL at 06:43

## 2020-08-15 RX ADMIN — DULOXETINE SCH MG: 60 CAPSULE, DELAYED RELEASE ORAL at 08:21

## 2020-08-15 RX ADMIN — FAMOTIDINE SCH MG: 20 TABLET, FILM COATED ORAL at 08:13

## 2020-08-15 RX ADMIN — DIVALPROEX SODIUM SCH MG: 250 TABLET, DELAYED RELEASE ORAL at 08:13

## 2020-08-15 RX ADMIN — ASPIRIN 81 MG CHEWABLE TABLET SCH MG: 81 TABLET CHEWABLE at 08:13

## 2020-08-15 RX ADMIN — INSULIN DETEMIR SCH UNIT: 100 INJECTION, SOLUTION SUBCUTANEOUS at 06:43

## 2020-08-15 RX ADMIN — METOCLOPRAMIDE SCH MG: 10 TABLET ORAL at 17:39

## 2020-08-15 RX ADMIN — CEFAZOLIN SCH MLS/HR: 330 INJECTION, POWDER, FOR SOLUTION INTRAMUSCULAR; INTRAVENOUS at 20:49

## 2020-08-15 RX ADMIN — ENOXAPARIN SODIUM SCH MG: 40 INJECTION SUBCUTANEOUS at 08:13

## 2020-08-15 RX ADMIN — INSULIN ASPART SCH UNIT: 100 INJECTION, SOLUTION INTRAVENOUS; SUBCUTANEOUS at 06:43

## 2020-08-15 RX ADMIN — DIVALPROEX SODIUM SCH MG: 250 TABLET, DELAYED RELEASE ORAL at 20:48

## 2020-08-15 NOTE — P.PN
Subjective


Progress Note Date: 08/15/20











Morenita Ramirez, is a 60-year-old female who was brought in to [facility] emergency room via EMS, was elevated blood glucose of 542, elevated BUN

and creatinine of 49 and 1.54 and positive ketones, patient was started on IV 

fluid, IV insulin drip and was admitted to intensive care unit for diabetic 

ketoacidosis.


Patient was complaining of nausea, otherwise she denies any complaints there is 

no fever or chills no headache or dizziness no chest pain no shortness of breath

no cough no abdominal pain no diarrhea no burning with urination no frequency or

urgency and no hematuria.


Patient has known history of diabetes mellitus type 1 she also has known history

of peripheral vascular disease with previous history of toe amputation, history 

of bipolar disorder, history of hyperlipidemia, and history of anxiety disorder








On 08/14/2020, patient was seen and examined in the ICU, she is alert and 

oriented 3 in no apparent distress, she is complaining of nausea and vomiting 

and very poor oral intake, otherwise she denies any complaints, there is no 

fever or chills no headache or dizziness no chest pain no shortness of breath no

cough no abdominal pain no diarrhea no burning with urination no frequency or 

urgency and no hematuria.








On 08/15/2020 patient was seen and examined in the ICU she is alert and oriented

3 in no distress she had an episode of hypoglycemia this morning her glucose 

was slightly elevated and she received 5 units of NovoLog before her breakfast 

however patient refused to eat breakfast and subsequently she had an episode of 

hypoglycemia.  Case was discussed in details with her nurse, at this time will 

check glucose prior to each meal but give the sliding scale insulin only after 

she eats at least 50% of her meal otherwise cancel the insulin dose and recheck 

again prior to the next meal.  Patient is still complaining of some nausea 

otherwise no vomiting she has some abdominal pain no fever or chills no headache

or dizziness no cough no chest pain no shortness of breath no diarrhea no 

burning with urination no frequency or urgency and no hematuria





Objective





- Vital Signs


Vital signs: 


                                   Vital Signs











Temp  97.7 F   08/15/20 08:00


 


Pulse  92   08/15/20 08:00


 


Resp  18   08/15/20 08:00


 


BP  128/80   08/15/20 08:00


 


Pulse Ox  96   08/15/20 08:00








                                 Intake & Output











 08/14/20 08/15/20 08/15/20





 18:59 06:59 18:59


 


Intake Total 770 1140 800


 


Output Total 710 340 475


 


Balance 60 800 325


 


Weight 51.1 kg  


 


Intake:   


 


  IV  900 800


 


    Sodium Chloride 0.9% 1,  900 800





    000 ml @ 100 mls/hr IV .   





    Q10H [facility] Rx#:[id number]   


 


  Intake, IV Titration 600  





  Amount   


 


    Sodium Chloride 0.9% 1, 600  





    000 ml @ 100 mls/hr IV .   





    Q10H [facility] Rx#:[id number]   


 


  Oral 170 240 


 


Output:   


 


  Urine 710 340 475


 


Other:   


 


  Voiding Method Indwelling Catheter Indwelling Catheter Indwelling Catheter














- Exam








In general patient is alert and oriented 3 in no apparent distress


HEENT head normocephalic and atraumatic


Neck is supple no JVD no goiter no lymphadenopathy


Chest exam reveals a few scattered rhonchi no wheezing


Cardiac exam reveals regular heart sounds no gallops no murmurs


Abdomen is soft nontender no organomegaly with normal bowel sounds


Extremity exam reveals no edema no cyanosis or clubbing








- Labs


CBC & Chem 7: 


                                 08/15/20 04:40





                                 08/15/20 04:40


Labs: 


                  Abnormal Lab Results - Last 24 Hours (Table)











  08/14/20 08/14/20 08/15/20 Range/Units





  17:12 20:24 04:40 


 


RBC    3.37 L  (3.80-5.40)  m/uL


 


Hgb    9.9 L  (11.4-16.0)  gm/dL


 


Hct    32.0 L  (34.0-46.0)  %


 


MCHC    30.9 L  (31.0-37.0)  g/dL


 


RDW    15.7 H  (11.5-15.5)  %


 


Sodium     (137-145)  mmol/L


 


BUN     (7-17)  mg/dL


 


Creatinine     (0.52-1.04)  mg/dL


 


Glucose     (74-99)  mg/dL


 


POC Glucose (mg/dL)  114 H  103 H   (75-99)  mg/dL


 


Albumin     (3.5-5.0)  g/dL














  08/15/20 08/15/20 08/15/20 Range/Units





  04:40 06:39 11:44 


 


RBC     (3.80-5.40)  m/uL


 


Hgb     (11.4-16.0)  gm/dL


 


Hct     (34.0-46.0)  %


 


MCHC     (31.0-37.0)  g/dL


 


RDW     (11.5-15.5)  %


 


Sodium  136 L    (137-145)  mmol/L


 


BUN  28 H    (7-17)  mg/dL


 


Creatinine  1.24 H    (0.52-1.04)  mg/dL


 


Glucose  172 H    (74-99)  mg/dL


 


POC Glucose (mg/dL)   294 H  58 L  (75-99)  mg/dL


 


Albumin  3.0 L    (3.5-5.0)  g/dL














  08/15/20 08/15/20 08/15/20 Range/Units





  12:06 12:21 12:37 


 


RBC     (3.80-5.40)  m/uL


 


Hgb     (11.4-16.0)  gm/dL


 


Hct     (34.0-46.0)  %


 


MCHC     (31.0-37.0)  g/dL


 


RDW     (11.5-15.5)  %


 


Sodium     (137-145)  mmol/L


 


BUN     (7-17)  mg/dL


 


Creatinine     (0.52-1.04)  mg/dL


 


Glucose     (74-99)  mg/dL


 


POC Glucose (mg/dL)  55 L  101 H  175 H  (75-99)  mg/dL


 


Albumin     (3.5-5.0)  g/dL














Assessment and Plan


Plan: 





1.  Diabetic ketoacidosis


2.  Underlying history of diabetes mellitus type 1 maintained on insulin


3.  Underlying history of peripheral vascular disease


4.  Underlying history of hyperlipidemia


5.  Underlying history of bipolar disorder and anxiety disorder


6.  Gastroesophageal reflux disease


7.  Nausea and vomiting likely related to gastroparesis Will consult 

gastroenterology, continue with Reglan and Zofran at this time





At this time patient is maintained on IV subcu insulin


Consult gastroenterology for nausea and vomiting


For DVT prophylaxis patient on subcu Lovenox


For GI prophylaxis patient on famotidine


Will follow closely

## 2020-08-15 NOTE — P.PN
Subjective


Progress Note Date: 08/15/20


Principal diagnosis: 





Nausea and vomiting, DKA





Patient was seen lying in bed reporting that she is tolerated diet today.  No 

reports of nausea or vomiting today.





Objective





- Vital Signs


Vital signs: 


                                   Vital Signs











Temp  97.7 F   08/15/20 08:00


 


Pulse  92   08/15/20 08:00


 


Resp  18   08/15/20 08:00


 


BP  128/80   08/15/20 08:00


 


Pulse Ox  96   08/15/20 08:00








                                 Intake & Output











 08/14/20 08/15/20 08/15/20





 18:59 06:59 18:59


 


Intake Total 770 1140 


 


Output Total 710 340 


 


Balance 60 800 


 


Weight 51.1 kg  


 


Intake:   


 


  IV  900 


 


    Sodium Chloride 0.9% 1,  900 





    000 ml @ 100 mls/hr IV .   





    Q10H [facility] Rx#:[id number]   


 


  Intake, IV Titration 600  





  Amount   


 


    Sodium Chloride 0.9% 1, 600  





    000 ml @ 100 mls/hr IV .   





    Q10H [facility] Rx#:[id number]   


 


  Oral 170 240 


 


Output:   


 


  Urine 710 340 


 


Other:   


 


  Voiding Method Indwelling Catheter Indwelling Catheter Indwelling Catheter














- Exam





On physical examination, patient appears comfortable in no apparent distress. 


HEAD: Normocephalic, atraumatic. 


EYES: No scleral icterus. No conjunctival injection. 


MOUTH: No lesions, tongue midline. 


NECK: Trachea midline, no gross abnormalities. 


ABDOMEN: Soft, nontender to palpation. Bowel sounds are positive. No 

organomegaly.  No guarding or rigidity.


EXTREMITIES: No pedal edema. 


SKIN: No rashes, no jaundice. 


NEUROLOGIC: Alert and oriented.  No focal deficits. 





- Labs


CBC & Chem 7: 


                                 08/15/20 04:40





                                 08/15/20 04:40


Labs: 


                  Abnormal Lab Results - Last 24 Hours (Table)











  08/14/20 08/14/20 08/15/20 Range/Units





  17:12 20:24 04:40 


 


RBC    3.37 L  (3.80-5.40)  m/uL


 


Hgb    9.9 L  (11.4-16.0)  gm/dL


 


Hct    32.0 L  (34.0-46.0)  %


 


MCHC    30.9 L  (31.0-37.0)  g/dL


 


RDW    15.7 H  (11.5-15.5)  %


 


Sodium     (137-145)  mmol/L


 


BUN     (7-17)  mg/dL


 


Creatinine     (0.52-1.04)  mg/dL


 


Glucose     (74-99)  mg/dL


 


POC Glucose (mg/dL)  114 H  103 H   (75-99)  mg/dL


 


Albumin     (3.5-5.0)  g/dL














  08/15/20 08/15/20 08/15/20 Range/Units





  04:40 06:39 11:44 


 


RBC     (3.80-5.40)  m/uL


 


Hgb     (11.4-16.0)  gm/dL


 


Hct     (34.0-46.0)  %


 


MCHC     (31.0-37.0)  g/dL


 


RDW     (11.5-15.5)  %


 


Sodium  136 L    (137-145)  mmol/L


 


BUN  28 H    (7-17)  mg/dL


 


Creatinine  1.24 H    (0.52-1.04)  mg/dL


 


Glucose  172 H    (74-99)  mg/dL


 


POC Glucose (mg/dL)   294 H  58 L  (75-99)  mg/dL


 


Albumin  3.0 L    (3.5-5.0)  g/dL














Assessment and Plan


(1) Nausea & vomiting


Narrative/Plan: 


60-year-old female receiving treatment for diabetic ketoacidosis with 

consultation for nausea and vomiting.  Suspicion a component of complaints are 

secondary to underlying gastroparesis, symptoms improved today with improved 

glycemic control.


Current Visit: Yes   Status: Acute   Code(s): R11.2 - NAUSEA WITH VOMITING, 

UNSPECIFIED   SNOMED Code(s): 64791210


   





(2) Abdominal pain


Current Visit: No   Status: Acute   Code(s): R10.9 - UNSPECIFIED ABDOMINAL PAIN 

 SNOMED Code(s): 62562698


   


Plan: 





Supportive care


Okay to advance diet to low fiber low fat consistent carbohydrates as tolerated


Discussed dietary modifications including small frequent meals with the patient


Continue Protonix therapy


Continue antiemetics


Continue tight glycemic control


No plans for endoscopy at this time


Thank you for allowing us to participate in the care of the patient

## 2020-08-15 NOTE — XR
EXAMINATION TYPE: XR chest 1V portable

 

DATE OF EXAM: 8/15/2020

 

CLINICAL HISTORY: Left lower lobe pneumonia progress study.  

 

TECHNIQUE: Single AP portable upright view of the chest is obtained.

 

COMPARISON: Chest x-ray from 3 days earlier

 

FINDINGS:  There is persistent patchy but improved left basilar opacity. No new focal airspace opacit
y, pleural effusion, or pneumothorax seen bilaterally. Cardiac silhouette size remains within normal 
limits. Osseous structures are intact.

 

IMPRESSION: Improving left basilar acute infiltrate. No new infiltrates seen.

## 2020-08-15 NOTE — P.PN
Subjective


Progress Note Date: 08/15/20


Principal diagnosis: 





Acute DKA.





This is a 60-year-old female with multiple comorbidities including type 1 

diabetes, brittle, coronary artery disease, COPD, recurrent admissions with 

diabetic ketoacidosis, history of DVT, dyslipidemia, hypertension, previous MI, 

diabetic foot ulcers, previous history of osteomyelitis, history of CVA, chronic

kidney disease, patient was brought into the hospital with a few days' history 

of nausea vomiting and abdominal pain.  Workup revealed evidence of 

hyperglycemia and diabetic ketoacidosis with positive ketones.  Blood sugar was 

as high as 542.  Patient had a significant anion gap metabolic acidosis, 

admitted to the ICU, placed on the DKA protocol, and I was asked to see her on 

consultation.  Overnight, the patient responded well to the DKA protocol, and 

this morning her anion gap has closed, I have discontinued her IV insulin, and 

recommended insulin as per sliding scale subcu, and started the patient back on 

her usual Lantus insulin dose daily of 16 units.  Patient is not a great 

historian, noted to be relatively asymptomatic this morning, hence I plan to 

transfer the patient out of the ICU to a regular medical floor, and to be 

followed by her admitting physician.





Reevaluated today on 8/14/20, patient remains in the ICU as an overflow.  

Feeling better, she is on room air, she is off IV fluids because she has a very 

poor peripheral venous access.  And patient will have IV access/midline will be 

placed today.  If not I will go ahead and place a central line.  Patient is 

presently an overflow in the ICU, doing well, pro-calcitonin level is elevated, 

hence will continue empiric antibiotics for her presumptive pneumonia.  This is 

likely healthcare acquired pneumonia because the patient was recently and out of

the hospital quite frequently.  CBC is normal electrolytes are normal.





Reevaluated today on 8/15/20, patient remains in the ICU as an overflow, her 

sugars are under control.  Patient is on room air, in no distress, she had a 

midline placed yesterday, and did not require a central line placement.  She is 

on IV fluid at 100 mL per hour.  And she is still receiving antibiotics for 

presumptive left lower lobe pneumonia.  Patient has no active pulmonary 

symptoms.  CBC showed improvement in her leukocytosis.  Electrolytes are normal.

 BUN is 28 creatinine is 1.4.











Objective





- Vital Signs


Vital signs: 


                                   Vital Signs











Temp  97.7 F   08/15/20 08:00


 


Pulse  92   08/15/20 08:00


 


Resp  18   08/15/20 08:00


 


BP  128/80   08/15/20 08:00


 


Pulse Ox  96   08/15/20 08:00








                                 Intake & Output











 08/14/20 08/15/20 08/15/20





 18:59 06:59 18:59


 


Intake Total 770 1140 800


 


Output Total 710 340 475


 


Balance 60 800 325


 


Weight 51.1 kg  


 


Intake:   


 


  IV  900 800


 


    Sodium Chloride 0.9% 1,  900 800





    000 ml @ 100 mls/hr IV .   





    Q10H [facility] Rx#:[id number]   


 


  Intake, IV Titration 600  





  Amount   


 


    Sodium Chloride 0.9% 1, 600  





    000 ml @ 100 mls/hr IV .   





    Q10H [facility] Rx#:[id number]   


 


  Oral 170 240 


 


Output:   


 


  Urine 710 340 475


 


Other:   


 


  Voiding Method Indwelling Catheter Indwelling Catheter Indwelling Catheter














- Exam





Physical Exam: Revealed 60-year-old female in no distress.  On room air.


HEENT:[Neck is supple.] [No neck masses.] [No thyromegaly.] [No JVD.]  PERRLA, 

EOMI, dry mucous membranes noted.


Chest: [Clear throughout, no crackles, no rhonchi, no wheezes.]


Cardiac Exam: [Normal S1 and S2, no S3 gallop, no murmur.]


Abdomen: [Soft, minimal lower abdominal tenderness, no rebound, no guarding, 

positive bowel sounds.


Extremities: [No clubbing, no edema, no cyanosis.]  Multiple toes amputations 

noted in the past.


Neurological Exam: Patient is oriented 1, confused, moves all extremities.


Psychiatric: Depressed mood, blunt affect, questionable mental status.











- Labs


CBC & Chem 7: 


                                 08/15/20 04:40





                                 08/15/20 04:40


Labs: 


                  Abnormal Lab Results - Last 24 Hours (Table)











  08/14/20 08/14/20 08/15/20 Range/Units





  17:12 20:24 04:40 


 


RBC    3.37 L  (3.80-5.40)  m/uL


 


Hgb    9.9 L  (11.4-16.0)  gm/dL


 


Hct    32.0 L  (34.0-46.0)  %


 


MCHC    30.9 L  (31.0-37.0)  g/dL


 


RDW    15.7 H  (11.5-15.5)  %


 


Sodium     (137-145)  mmol/L


 


BUN     (7-17)  mg/dL


 


Creatinine     (0.52-1.04)  mg/dL


 


Glucose     (74-99)  mg/dL


 


POC Glucose (mg/dL)  114 H  103 H   (75-99)  mg/dL


 


Albumin     (3.5-5.0)  g/dL














  08/15/20 08/15/20 08/15/20 Range/Units





  04:40 06:39 11:44 


 


RBC     (3.80-5.40)  m/uL


 


Hgb     (11.4-16.0)  gm/dL


 


Hct     (34.0-46.0)  %


 


MCHC     (31.0-37.0)  g/dL


 


RDW     (11.5-15.5)  %


 


Sodium  136 L    (137-145)  mmol/L


 


BUN  28 H    (7-17)  mg/dL


 


Creatinine  1.24 H    (0.52-1.04)  mg/dL


 


Glucose  172 H    (74-99)  mg/dL


 


POC Glucose (mg/dL)   294 H  58 L  (75-99)  mg/dL


 


Albumin  3.0 L    (3.5-5.0)  g/dL














  08/15/20 08/15/20 08/15/20 Range/Units





  12:06 12:21 12:37 


 


RBC     (3.80-5.40)  m/uL


 


Hgb     (11.4-16.0)  gm/dL


 


Hct     (34.0-46.0)  %


 


MCHC     (31.0-37.0)  g/dL


 


RDW     (11.5-15.5)  %


 


Sodium     (137-145)  mmol/L


 


BUN     (7-17)  mg/dL


 


Creatinine     (0.52-1.04)  mg/dL


 


Glucose     (74-99)  mg/dL


 


POC Glucose (mg/dL)  55 L  101 H  175 H  (75-99)  mg/dL


 


Albumin     (3.5-5.0)  g/dL














Assessment and Plan


Assessment: 





Impression:


Acute diabetic ketoacidosis.  Resolved.


Acute healthcare acquired pneumonia involving left lower lobe.


History of hypertension.


History of depression.


History of CVA.  Involving MCA/JESSICA vascular territory with history of left 

hemiparesis arm greater than leg.


Brittle type 1 diabetes.


Benign essential hypertension


History of peripheral vessel occlusive disease involving the lower extremities.


History of coronary artery disease and previous coronary stenting.


History of COPD, presently in active.


History of right foot osteomyelitis.





Recommendation:


Continue insulin subcutaneously as per scale.


Lantus insulin once daily.


Resume home meds.


Continue antibiotics for left lower lobe pneumonia, could be switched to oral 

antibiotics possibly Levaquin upon discharge.


Transfer patient out of the ICU to a regular medical floor.  We'll continue to 

follow as needed


Time with Patient: Less than 30

## 2020-08-16 LAB
ALBUMIN SERPL-MCNC: 2.7 G/DL (ref 3.5–5)
ALP SERPL-CCNC: 65 U/L (ref 38–126)
ALT SERPL-CCNC: 20 U/L (ref 4–34)
ANION GAP SERPL CALC-SCNC: 4 MMOL/L
AST SERPL-CCNC: 38 U/L (ref 14–36)
BASOPHILS # BLD AUTO: 0 K/UL (ref 0–0.2)
BASOPHILS NFR BLD AUTO: 0 %
BUN SERPL-SCNC: 15 MG/DL (ref 7–17)
CALCIUM SPEC-MCNC: 8.5 MG/DL (ref 8.4–10.2)
CHLORIDE SERPL-SCNC: 108 MMOL/L (ref 98–107)
CO2 SERPL-SCNC: 25 MMOL/L (ref 22–30)
EOSINOPHIL # BLD AUTO: 0 K/UL (ref 0–0.7)
EOSINOPHIL NFR BLD AUTO: 1 %
ERYTHROCYTE [DISTWIDTH] IN BLOOD BY AUTOMATED COUNT: 3.25 M/UL (ref 3.8–5.4)
ERYTHROCYTE [DISTWIDTH] IN BLOOD: 15.4 % (ref 11.5–15.5)
GLUCOSE BLD-MCNC: 100 MG/DL (ref 75–99)
GLUCOSE BLD-MCNC: 210 MG/DL (ref 75–99)
GLUCOSE BLD-MCNC: 254 MG/DL (ref 75–99)
GLUCOSE BLD-MCNC: 58 MG/DL (ref 75–99)
GLUCOSE BLD-MCNC: 76 MG/DL (ref 75–99)
GLUCOSE BLD-MCNC: 90 MG/DL (ref 75–99)
GLUCOSE SERPL-MCNC: 41 MG/DL (ref 74–99)
HCT VFR BLD AUTO: 30.4 % (ref 34–46)
HGB BLD-MCNC: 9.5 GM/DL (ref 11.4–16)
LYMPHOCYTES # SPEC AUTO: 2.9 K/UL (ref 1–4.8)
LYMPHOCYTES NFR SPEC AUTO: 49 %
MCH RBC QN AUTO: 29.2 PG (ref 25–35)
MCHC RBC AUTO-ENTMCNC: 31.3 G/DL (ref 31–37)
MCV RBC AUTO: 93.3 FL (ref 80–100)
MONOCYTES # BLD AUTO: 0.3 K/UL (ref 0–1)
MONOCYTES NFR BLD AUTO: 6 %
NEUTROPHILS # BLD AUTO: 2.6 K/UL (ref 1.3–7.7)
NEUTROPHILS NFR BLD AUTO: 43 %
PLATELET # BLD AUTO: 210 K/UL (ref 150–450)
POTASSIUM SERPL-SCNC: 3.3 MMOL/L (ref 3.5–5.1)
PROT SERPL-MCNC: 5.7 G/DL (ref 6.3–8.2)
SODIUM SERPL-SCNC: 137 MMOL/L (ref 137–145)
WBC # BLD AUTO: 6 K/UL (ref 3.8–10.6)

## 2020-08-16 RX ADMIN — INSULIN ASPART SCH UNIT: 100 INJECTION, SOLUTION INTRAVENOUS; SUBCUTANEOUS at 12:22

## 2020-08-16 RX ADMIN — DIVALPROEX SODIUM SCH MG: 250 TABLET, DELAYED RELEASE ORAL at 08:43

## 2020-08-16 RX ADMIN — POTASSIUM CHLORIDE SCH MEQ: 20 TABLET, EXTENDED RELEASE ORAL at 06:29

## 2020-08-16 RX ADMIN — ASPIRIN 81 MG CHEWABLE TABLET SCH MG: 81 TABLET CHEWABLE at 08:42

## 2020-08-16 RX ADMIN — PANTOPRAZOLE SODIUM SCH MG: 40 TABLET, DELAYED RELEASE ORAL at 06:30

## 2020-08-16 RX ADMIN — METOCLOPRAMIDE SCH MG: 10 TABLET ORAL at 17:57

## 2020-08-16 RX ADMIN — LEVOFLOXACIN SCH MLS/HR: 250 INJECTION, SOLUTION INTRAVENOUS at 00:05

## 2020-08-16 RX ADMIN — DULOXETINE SCH MG: 60 CAPSULE, DELAYED RELEASE ORAL at 08:42

## 2020-08-16 RX ADMIN — DIVALPROEX SODIUM SCH MG: 250 TABLET, DELAYED RELEASE ORAL at 21:00

## 2020-08-16 RX ADMIN — CEFAZOLIN SCH: 330 INJECTION, POWDER, FOR SOLUTION INTRAMUSCULAR; INTRAVENOUS at 17:57

## 2020-08-16 RX ADMIN — ATORVASTATIN CALCIUM SCH MG: 20 TABLET, FILM COATED ORAL at 21:01

## 2020-08-16 RX ADMIN — INSULIN ASPART SCH: 100 INJECTION, SOLUTION INTRAVENOUS; SUBCUTANEOUS at 17:57

## 2020-08-16 RX ADMIN — METOCLOPRAMIDE SCH MG: 10 TABLET ORAL at 06:29

## 2020-08-16 RX ADMIN — CEFAZOLIN SCH: 330 INJECTION, POWDER, FOR SOLUTION INTRAMUSCULAR; INTRAVENOUS at 10:11

## 2020-08-16 RX ADMIN — METOCLOPRAMIDE SCH MG: 10 TABLET ORAL at 12:22

## 2020-08-16 RX ADMIN — FAMOTIDINE SCH MG: 20 TABLET, FILM COATED ORAL at 08:42

## 2020-08-16 RX ADMIN — INSULIN ASPART SCH: 100 INJECTION, SOLUTION INTRAVENOUS; SUBCUTANEOUS at 06:02

## 2020-08-16 RX ADMIN — ISODIUM CHLORIDE PRN MG: 0.03 SOLUTION RESPIRATORY (INHALATION) at 20:49

## 2020-08-16 RX ADMIN — DIVALPROEX SODIUM SCH MG: 250 TABLET, DELAYED RELEASE ORAL at 17:57

## 2020-08-16 RX ADMIN — POTASSIUM CHLORIDE SCH MEQ: 20 TABLET, EXTENDED RELEASE ORAL at 08:42

## 2020-08-16 RX ADMIN — LOSARTAN POTASSIUM SCH MG: 50 TABLET, FILM COATED ORAL at 08:42

## 2020-08-16 RX ADMIN — CEFAZOLIN SCH: 330 INJECTION, POWDER, FOR SOLUTION INTRAMUSCULAR; INTRAVENOUS at 10:15

## 2020-08-16 RX ADMIN — INSULIN DETEMIR SCH UNIT: 100 INJECTION, SOLUTION SUBCUTANEOUS at 21:01

## 2020-08-16 RX ADMIN — LEVOFLOXACIN SCH MG: 500 TABLET, FILM COATED ORAL at 21:01

## 2020-08-16 RX ADMIN — INSULIN ASPART SCH UNIT: 100 INJECTION, SOLUTION INTRAVENOUS; SUBCUTANEOUS at 21:01

## 2020-08-16 RX ADMIN — ENOXAPARIN SODIUM SCH MG: 40 INJECTION SUBCUTANEOUS at 08:42

## 2020-08-16 RX ADMIN — CEFAZOLIN SCH MLS/HR: 330 INJECTION, POWDER, FOR SOLUTION INTRAMUSCULAR; INTRAVENOUS at 06:30

## 2020-08-16 NOTE — P.PN
Subjective


Progress Note Date: 08/16/20











Morenita Ramirez, is a 60-year-old female who was brought in to [facility] emergency room via EMS, was elevated blood glucose of 542, elevated BUN

and creatinine of 49 and 1.54 and positive ketones, patient was started on IV 

fluid, IV insulin drip and was admitted to intensive care unit for diabetic 

ketoacidosis.


Patient was complaining of nausea, otherwise she denies any complaints there is 

no fever or chills no headache or dizziness no chest pain no shortness of breath

no cough no abdominal pain no diarrhea no burning with urination no frequency or

urgency and no hematuria.


Patient has known history of diabetes mellitus type 1 she also has known history

of peripheral vascular disease with previous history of toe amputation, history 

of bipolar disorder, history of hyperlipidemia, and history of anxiety disorder








On 08/14/2020, patient was seen and examined in the ICU, she is alert and 

oriented 3 in no apparent distress, she is complaining of nausea and vomiting 

and very poor oral intake, otherwise she denies any complaints, there is no 

fever or chills no headache or dizziness no chest pain no shortness of breath no

cough no abdominal pain no diarrhea no burning with urination no frequency or 

urgency and no hematuria.








On 08/15/2020 patient was seen and examined in the ICU she is alert and oriented

3 in no distress she had an episode of hypoglycemia this morning her glucose 

was slightly elevated and she received 5 units of NovoLog before her breakfast 

however patient refused to eat breakfast and subsequently she had an episode of 

hypoglycemia.  Case was discussed in details with her nurse, at this time will 

check glucose prior to each meal but give the sliding scale insulin only after 

she eats at least 50% of her meal otherwise cancel the insulin dose and recheck 

again prior to the next meal.  Patient is still complaining of some nausea 

otherwise no vomiting she has some abdominal pain no fever or chills no headache

or dizziness no cough no chest pain no shortness of breath no diarrhea no 

burning with urination no frequency or urgency and no hematuria








On 08/16/2020 patient was seen and examined on the medical floor she is alert 

and oriented 3 in no apparent distress there is no fever or chills no headache 

or dizziness no chest pain no shortness of breath no cough no nausea or vomiting

no abdominal pain no diarrhea no burning with urination no frequency or urgency 

and no hematuria, glucose levels are better controlled there are no new episodes

of hypoglycemia





Objective





- Vital Signs


Vital signs: 


                                   Vital Signs











Temp  98.5 F   08/16/20 10:04


 


Pulse  84   08/16/20 10:04


 


Resp  16   08/16/20 10:04


 


BP  116/78   08/16/20 10:04


 


Pulse Ox  95   08/16/20 10:04








                                 Intake & Output











 08/15/20 08/16/20 08/16/20





 18:59 06:59 18:59


 


Intake Total 1500 1440 


 


Output Total 825 1100 


 


Balance 675 340 


 


Intake:   


 


  IV 1200 1200 


 


    Sodium Chloride 0.9% 1, 1200 1200 





    000 ml @ 100 mls/hr IV .   





    Q10H [facility] Rx#:[id number]   


 


  Oral 300 240 


 


Output:   


 


  Urine 825 1100 


 


Other:   


 


  Voiding Method Indwelling Catheter Indwelling Catheter Indwelling Catheter


 


  # Bowel Movements 1  














- Exam








In general patient is alert and oriented 3 in no apparent distress


HEENT head normocephalic and atraumatic


Neck is supple no JVD no goiter no lymphadenopathy


Chest exam reveals a few scattered rhonchi no wheezing


Cardiac exam reveals regular heart sounds no gallops no murmurs


Abdomen is soft nontender no organomegaly with normal bowel sounds


Extremity exam reveals no edema no cyanosis or clubbing








- Labs


CBC & Chem 7: 


                                 08/16/20 05:04





                                 08/16/20 05:04


Labs: 


                  Abnormal Lab Results - Last 24 Hours (Table)











  08/15/20 08/15/20 08/16/20 Range/Units





  17:03 17:18 05:04 


 


RBC    3.25 L  (3.80-5.40)  m/uL


 


Hgb    9.5 L  (11.4-16.0)  gm/dL


 


Hct    30.4 L  (34.0-46.0)  %


 


Potassium     (3.5-5.1)  mmol/L


 


Chloride     ()  mmol/L


 


Glucose     (74-99)  mg/dL


 


POC Glucose (mg/dL)  43 L  52 L   (75-99)  mg/dL


 


AST     (14-36)  U/L


 


Total Protein     (6.3-8.2)  g/dL


 


Albumin     (3.5-5.0)  g/dL














  08/16/20 08/16/20 08/16/20 Range/Units





  05:04 06:00 11:05 


 


RBC     (3.80-5.40)  m/uL


 


Hgb     (11.4-16.0)  gm/dL


 


Hct     (34.0-46.0)  %


 


Potassium  3.3 L    (3.5-5.1)  mmol/L


 


Chloride  108 H    ()  mmol/L


 


Glucose  41 L*    (74-99)  mg/dL


 


POC Glucose (mg/dL)   58 L  210 H  (75-99)  mg/dL


 


AST  38 H    (14-36)  U/L


 


Total Protein  5.7 L    (6.3-8.2)  g/dL


 


Albumin  2.7 L    (3.5-5.0)  g/dL














Assessment and Plan


Plan: 





1.  Diabetic ketoacidosis


2.  Underlying history of diabetes mellitus type 1 maintained on insulin


3.  Underlying history of peripheral vascular disease


4.  Underlying history of hyperlipidemia


5.  Underlying history of bipolar disorder and anxiety disorder


6.  Gastroesophageal reflux disease


7.  Nausea and vomiting likely related to gastroparesis Will consult 

gastroenterology, continue with Reglan and Zofran at this time





At this time patient is maintained on IV subcu insulin


Consult gastroenterology for nausea and vomiting


For DVT prophylaxis patient on subcu Lovenox


For GI prophylaxis patient on famotidine


Will follow closely

## 2020-08-16 NOTE — P.PN
Subjective


Progress Note Date: 08/16/20


Principal diagnosis: 





Nausea and vomiting, DKA





Patient was seen lying in bed reporting that she is tolerated diet.  No nausea 

or vomiting.  No abdominal pain reported.





Objective





- Vital Signs


Vital signs: 


                                   Vital Signs











Temp  98.5 F   08/16/20 10:04


 


Pulse  84   08/16/20 10:04


 


Resp  16   08/16/20 10:04


 


BP  116/78   08/16/20 10:04


 


Pulse Ox  95   08/16/20 10:04








                                 Intake & Output











 08/15/20 08/16/20 08/16/20





 18:59 06:59 18:59


 


Intake Total 1500 1440 


 


Output Total 825 1100 


 


Balance 675 340 


 


Intake:   


 


  IV 1200 1200 


 


    Sodium Chloride 0.9% 1, 1200 1200 





    000 ml @ 100 mls/hr IV .   





    Q10H [facility] Rx#:[id number]   


 


  Oral 300 240 


 


Output:   


 


  Urine 825 1100 


 


Other:   


 


  Voiding Method Indwelling Catheter Indwelling Catheter Indwelling Catheter


 


  # Bowel Movements 1  














- Exam





On physical examination, patient appears comfortable in no apparent distress. 


HEAD: Normocephalic, atraumatic. 


EYES: No scleral icterus. No conjunctival injection. 


MOUTH: No lesions, tongue midline. 


NECK: Trachea midline, no gross abnormalities. 


ABDOMEN: Soft, nontender to palpation. Bowel sounds are positive. No 

organomegaly.  No guarding or rigidity.


EXTREMITIES: No pedal edema. 


SKIN: No rashes, no jaundice. 


NEUROLOGIC: Alert and oriented.  No focal deficits. 





- Labs


CBC & Chem 7: 


                                 08/16/20 05:04





                                 08/16/20 05:04


Labs: 


                  Abnormal Lab Results - Last 24 Hours (Table)











  08/15/20 08/15/20 08/15/20 Range/Units





  11:44 12:06 12:21 


 


RBC     (3.80-5.40)  m/uL


 


Hgb     (11.4-16.0)  gm/dL


 


Hct     (34.0-46.0)  %


 


Potassium     (3.5-5.1)  mmol/L


 


Chloride     ()  mmol/L


 


Glucose     (74-99)  mg/dL


 


POC Glucose (mg/dL)  58 L  55 L  101 H  (75-99)  mg/dL


 


AST     (14-36)  U/L


 


Total Protein     (6.3-8.2)  g/dL


 


Albumin     (3.5-5.0)  g/dL














  08/15/20 08/15/20 08/15/20 Range/Units





  12:37 17:03 17:18 


 


RBC     (3.80-5.40)  m/uL


 


Hgb     (11.4-16.0)  gm/dL


 


Hct     (34.0-46.0)  %


 


Potassium     (3.5-5.1)  mmol/L


 


Chloride     ()  mmol/L


 


Glucose     (74-99)  mg/dL


 


POC Glucose (mg/dL)  175 H  43 L  52 L  (75-99)  mg/dL


 


AST     (14-36)  U/L


 


Total Protein     (6.3-8.2)  g/dL


 


Albumin     (3.5-5.0)  g/dL














  08/16/20 08/16/20 08/16/20 Range/Units





  05:04 05:04 06:00 


 


RBC  3.25 L    (3.80-5.40)  m/uL


 


Hgb  9.5 L    (11.4-16.0)  gm/dL


 


Hct  30.4 L    (34.0-46.0)  %


 


Potassium   3.3 L   (3.5-5.1)  mmol/L


 


Chloride   108 H   ()  mmol/L


 


Glucose   41 L*   (74-99)  mg/dL


 


POC Glucose (mg/dL)    58 L  (75-99)  mg/dL


 


AST   38 H   (14-36)  U/L


 


Total Protein   5.7 L   (6.3-8.2)  g/dL


 


Albumin   2.7 L   (3.5-5.0)  g/dL














  08/16/20 Range/Units





  11:05 


 


RBC   (3.80-5.40)  m/uL


 


Hgb   (11.4-16.0)  gm/dL


 


Hct   (34.0-46.0)  %


 


Potassium   (3.5-5.1)  mmol/L


 


Chloride   ()  mmol/L


 


Glucose   (74-99)  mg/dL


 


POC Glucose (mg/dL)  210 H  (75-99)  mg/dL


 


AST   (14-36)  U/L


 


Total Protein   (6.3-8.2)  g/dL


 


Albumin   (3.5-5.0)  g/dL














Assessment and Plan


(1) Nausea & vomiting


Narrative/Plan: 


60-year-old female receiving treatment for diabetic ketoacidosis with 

consultation for nausea and vomiting.  Suspicion a component of complaints are 

secondary to underlying gastroparesis, symptoms improved today with improved 

glycemic control.


Current Visit: Yes   Status: Acute   Code(s): R11.2 - NAUSEA WITH VOMITING, 

UNSPECIFIED   SNOMED Code(s): 64199542


   





(2) Abdominal pain


Current Visit: No   Status: Acute   Code(s): R10.9 - UNSPECIFIED ABDOMINAL PAIN 

 SNOMED Code(s): 14496373


   


Plan: 





Supportive care


Okay for low fiber low fat consistent carbohydrates as tolerated


Discussed dietary modifications including small frequent meals with the patient


Continue Protonix therapy


Continue antiemetics


Continue tight glycemic control


No plans for endoscopy at this time


Thank you for allowing us to participate in the care of the patient, the GI 

service will stand by please call us back with any questions or concerns

## 2020-08-16 NOTE — P.PN
Subjective


Progress Note Date: 08/16/20


Principal diagnosis: 





DKA





The patient is seen today 08/16/2020 in follow-up in the intensive care unit.  

She is currently up in a wheelchair.  Planning transfer to the regular medical 

floor.  No acute events overnight.  She is on room air.  No shortness of breath 

cough or congestion.  White count 6.0.  Hemoglobin 9.5.  Sodium 137.  Potassium 

3.3.  Creatinine 0.76.  She has had drops in her blood glucose down to 41.  

Currently 210.  Levemir and Humalog being adjusted.





Objective





- Vital Signs


Vital signs: 


                                   Vital Signs











Temp  98.5 F   08/16/20 10:04


 


Pulse  84   08/16/20 10:04


 


Resp  16   08/16/20 10:04


 


BP  116/78   08/16/20 10:04


 


Pulse Ox  95   08/16/20 10:04








                                 Intake & Output











 08/15/20 08/16/20 08/16/20





 18:59 06:59 18:59


 


Intake Total 1500 1440 


 


Output Total 825 1100 


 


Balance 675 340 


 


Intake:   


 


  IV 1200 1200 


 


    Sodium Chloride 0.9% 1, 1200 1200 





    000 ml @ 100 mls/hr IV .   





    Q10H [facility] Rx#:[id number]   


 


  Oral 300 240 


 


Output:   


 


  Urine 825 1100 


 


Other:   


 


  Voiding Method Indwelling Catheter Indwelling Catheter Indwelling Catheter


 


  # Bowel Movements 1  














- Exam





Physical Exam: Revealed 60-year-old female in no distress.  On room air.


HEENT:[Neck is supple.] [No neck masses.] [No thyromegaly.] [No JVD.]  PERRLA, 

EOMI, dry mucous membranes noted.


Chest: [Clear throughout, no crackles, no rhonchi, no wheezes.]


Cardiac Exam: [Normal S1 and S2, no S3 gallop, no murmur.]


Abdomen: [Soft, minimal lower abdominal tenderness, no rebound, no guarding, 

positive bowel sounds.


Extremities: [No clubbing, no edema, no cyanosis.]  Multiple toes amputations 

noted in the past.


Neurological Exam: Patient is oriented 1, confused, moves all extremities.


Psychiatric: Depressed mood, blunt affect, questionable mental status.





- Labs


CBC & Chem 7: 


                                 08/16/20 05:04





                                 08/16/20 05:04


Labs: 


                  Abnormal Lab Results - Last 24 Hours (Table)











  08/15/20 08/15/20 08/15/20 Range/Units





  11:44 12:06 12:21 


 


RBC     (3.80-5.40)  m/uL


 


Hgb     (11.4-16.0)  gm/dL


 


Hct     (34.0-46.0)  %


 


Potassium     (3.5-5.1)  mmol/L


 


Chloride     ()  mmol/L


 


Glucose     (74-99)  mg/dL


 


POC Glucose (mg/dL)  58 L  55 L  101 H  (75-99)  mg/dL


 


AST     (14-36)  U/L


 


Total Protein     (6.3-8.2)  g/dL


 


Albumin     (3.5-5.0)  g/dL














  08/15/20 08/15/20 08/15/20 Range/Units





  12:37 17:03 17:18 


 


RBC     (3.80-5.40)  m/uL


 


Hgb     (11.4-16.0)  gm/dL


 


Hct     (34.0-46.0)  %


 


Potassium     (3.5-5.1)  mmol/L


 


Chloride     ()  mmol/L


 


Glucose     (74-99)  mg/dL


 


POC Glucose (mg/dL)  175 H  43 L  52 L  (75-99)  mg/dL


 


AST     (14-36)  U/L


 


Total Protein     (6.3-8.2)  g/dL


 


Albumin     (3.5-5.0)  g/dL














  08/16/20 08/16/20 08/16/20 Range/Units





  05:04 05:04 06:00 


 


RBC  3.25 L    (3.80-5.40)  m/uL


 


Hgb  9.5 L    (11.4-16.0)  gm/dL


 


Hct  30.4 L    (34.0-46.0)  %


 


Potassium   3.3 L   (3.5-5.1)  mmol/L


 


Chloride   108 H   ()  mmol/L


 


Glucose   41 L*   (74-99)  mg/dL


 


POC Glucose (mg/dL)    58 L  (75-99)  mg/dL


 


AST   38 H   (14-36)  U/L


 


Total Protein   5.7 L   (6.3-8.2)  g/dL


 


Albumin   2.7 L   (3.5-5.0)  g/dL














  08/16/20 Range/Units





  11:05 


 


RBC   (3.80-5.40)  m/uL


 


Hgb   (11.4-16.0)  gm/dL


 


Hct   (34.0-46.0)  %


 


Potassium   (3.5-5.1)  mmol/L


 


Chloride   ()  mmol/L


 


Glucose   (74-99)  mg/dL


 


POC Glucose (mg/dL)  210 H  (75-99)  mg/dL


 


AST   (14-36)  U/L


 


Total Protein   (6.3-8.2)  g/dL


 


Albumin   (3.5-5.0)  g/dL














Assessment and Plan


Assessment: 





Impression:


Acute diabetic ketoacidosis.  Resolved.


Acute healthcare acquired pneumonia involving left lower lobe.


History of hypertension.


History of depression.


History of CVA.  Involving MCA/JESSICA vascular territory with history of left 

hemiparesis arm greater than leg.


Brittle type 1 diabetes.


Benign essential hypertension


History of peripheral vessel occlusive disease involving the lower extremities.


History of coronary artery disease and previous coronary stenting.


History of COPD, presently in active.


History of right foot osteomyelitis.





Recommendation:





The patient was seen and evaluated by Dr. Shetty


Continue current treatment plan


On oral Levaquin


Home once cleared medically





I, the cosigning physician, performed a history & physical examination of the 

patient. Lungs sounds are clear.  Maintaining good O2 saturations in the 90s on 

room air.  I discussed the assessment and plan of care with my nurse 

practitioner, Charo Guerrero. I attest to the above note as dictated by her.

## 2020-08-17 LAB
ALBUMIN SERPL-MCNC: 2.3 G/DL (ref 3.5–5)
ALP SERPL-CCNC: 51 U/L (ref 38–126)
ALT SERPL-CCNC: 16 U/L (ref 4–34)
ANION GAP SERPL CALC-SCNC: 4 MMOL/L
AST SERPL-CCNC: 23 U/L (ref 14–36)
BASOPHILS # BLD AUTO: 0 K/UL (ref 0–0.2)
BASOPHILS NFR BLD AUTO: 0 %
BUN SERPL-SCNC: 12 MG/DL (ref 7–17)
CALCIUM SPEC-MCNC: 8.2 MG/DL (ref 8.4–10.2)
CHLORIDE SERPL-SCNC: 110 MMOL/L (ref 98–107)
CO2 SERPL-SCNC: 26 MMOL/L (ref 22–30)
EOSINOPHIL # BLD AUTO: 0.1 K/UL (ref 0–0.7)
EOSINOPHIL NFR BLD AUTO: 2 %
ERYTHROCYTE [DISTWIDTH] IN BLOOD BY AUTOMATED COUNT: 2.94 M/UL (ref 3.8–5.4)
ERYTHROCYTE [DISTWIDTH] IN BLOOD: 15.5 % (ref 11.5–15.5)
GLUCOSE BLD-MCNC: 110 MG/DL (ref 75–99)
GLUCOSE BLD-MCNC: 130 MG/DL (ref 75–99)
GLUCOSE BLD-MCNC: 157 MG/DL (ref 75–99)
GLUCOSE BLD-MCNC: 286 MG/DL (ref 75–99)
GLUCOSE BLD-MCNC: 48 MG/DL (ref 75–99)
GLUCOSE BLD-MCNC: 64 MG/DL (ref 75–99)
GLUCOSE BLD-MCNC: 76 MG/DL (ref 75–99)
GLUCOSE BLD-MCNC: 79 MG/DL (ref 75–99)
GLUCOSE BLD-MCNC: 98 MG/DL (ref 75–99)
GLUCOSE SERPL-MCNC: 84 MG/DL (ref 74–99)
HCT VFR BLD AUTO: 28.3 % (ref 34–46)
HGB BLD-MCNC: 8.6 GM/DL (ref 11.4–16)
LYMPHOCYTES # SPEC AUTO: 2 K/UL (ref 1–4.8)
LYMPHOCYTES NFR SPEC AUTO: 50 %
MCH RBC QN AUTO: 29.4 PG (ref 25–35)
MCHC RBC AUTO-ENTMCNC: 30.5 G/DL (ref 31–37)
MCV RBC AUTO: 96.2 FL (ref 80–100)
MONOCYTES # BLD AUTO: 0.2 K/UL (ref 0–1)
MONOCYTES NFR BLD AUTO: 5 %
NEUTROPHILS # BLD AUTO: 1.7 K/UL (ref 1.3–7.7)
NEUTROPHILS NFR BLD AUTO: 41 %
PLATELET # BLD AUTO: 204 K/UL (ref 150–450)
POTASSIUM SERPL-SCNC: 4.5 MMOL/L (ref 3.5–5.1)
PROT SERPL-MCNC: 5.1 G/DL (ref 6.3–8.2)
SODIUM SERPL-SCNC: 140 MMOL/L (ref 137–145)
WBC # BLD AUTO: 4.1 K/UL (ref 3.8–10.6)

## 2020-08-17 RX ADMIN — CEFAZOLIN SCH: 330 INJECTION, POWDER, FOR SOLUTION INTRAMUSCULAR; INTRAVENOUS at 12:59

## 2020-08-17 RX ADMIN — DULOXETINE SCH MG: 60 CAPSULE, DELAYED RELEASE ORAL at 09:01

## 2020-08-17 RX ADMIN — ATORVASTATIN CALCIUM SCH MG: 20 TABLET, FILM COATED ORAL at 22:28

## 2020-08-17 RX ADMIN — DIVALPROEX SODIUM SCH MG: 250 TABLET, DELAYED RELEASE ORAL at 18:02

## 2020-08-17 RX ADMIN — PANTOPRAZOLE SODIUM SCH MG: 40 TABLET, DELAYED RELEASE ORAL at 09:01

## 2020-08-17 RX ADMIN — ENOXAPARIN SODIUM SCH MG: 40 INJECTION SUBCUTANEOUS at 09:02

## 2020-08-17 RX ADMIN — INSULIN ASPART SCH UNIT: 100 INJECTION, SOLUTION INTRAVENOUS; SUBCUTANEOUS at 12:59

## 2020-08-17 RX ADMIN — ASPIRIN 81 MG CHEWABLE TABLET SCH MG: 81 TABLET CHEWABLE at 09:01

## 2020-08-17 RX ADMIN — DIVALPROEX SODIUM SCH MG: 250 TABLET, DELAYED RELEASE ORAL at 22:30

## 2020-08-17 RX ADMIN — INSULIN ASPART SCH: 100 INJECTION, SOLUTION INTRAVENOUS; SUBCUTANEOUS at 07:43

## 2020-08-17 RX ADMIN — DIVALPROEX SODIUM SCH MG: 250 TABLET, DELAYED RELEASE ORAL at 09:02

## 2020-08-17 RX ADMIN — INSULIN ASPART SCH: 100 INJECTION, SOLUTION INTRAVENOUS; SUBCUTANEOUS at 22:29

## 2020-08-17 RX ADMIN — METOCLOPRAMIDE SCH MG: 10 TABLET ORAL at 12:59

## 2020-08-17 RX ADMIN — INSULIN DETEMIR SCH UNIT: 100 INJECTION, SOLUTION SUBCUTANEOUS at 22:29

## 2020-08-17 RX ADMIN — METOCLOPRAMIDE SCH MG: 10 TABLET ORAL at 18:02

## 2020-08-17 RX ADMIN — CEFAZOLIN SCH: 330 INJECTION, POWDER, FOR SOLUTION INTRAMUSCULAR; INTRAVENOUS at 02:35

## 2020-08-17 RX ADMIN — LEVOFLOXACIN SCH MG: 500 TABLET, FILM COATED ORAL at 22:29

## 2020-08-17 RX ADMIN — INSULIN ASPART SCH UNIT: 100 INJECTION, SOLUTION INTRAVENOUS; SUBCUTANEOUS at 18:02

## 2020-08-17 RX ADMIN — LOSARTAN POTASSIUM SCH: 50 TABLET, FILM COATED ORAL at 08:26

## 2020-08-17 RX ADMIN — METOCLOPRAMIDE SCH MG: 10 TABLET ORAL at 09:01

## 2020-08-17 RX ADMIN — FAMOTIDINE SCH MG: 20 TABLET, FILM COATED ORAL at 09:01

## 2020-08-17 NOTE — P.PN
Subjective


Progress Note Date: 08/17/20











Morenita Ramirez, is a 60-year-old female who was brought in to [facility] emergency room via EMS, was elevated blood glucose of 542, elevated BUN

and creatinine of 49 and 1.54 and positive ketones, patient was started on IV 

fluid, IV insulin drip and was admitted to intensive care unit for diabetic 

ketoacidosis.


Patient was complaining of nausea, otherwise she denies any complaints there is 

no fever or chills no headache or dizziness no chest pain no shortness of breath

no cough no abdominal pain no diarrhea no burning with urination no frequency or

urgency and no hematuria.


Patient has known history of diabetes mellitus type 1 she also has known history

of peripheral vascular disease with previous history of toe amputation, history 

of bipolar disorder, history of hyperlipidemia, and history of anxiety disorder








On 08/14/2020, patient was seen and examined in the ICU, she is alert and 

oriented 3 in no apparent distress, she is complaining of nausea and vomiting 

and very poor oral intake, otherwise she denies any complaints, there is no 

fever or chills no headache or dizziness no chest pain no shortness of breath no

cough no abdominal pain no diarrhea no burning with urination no frequency or 

urgency and no hematuria.








On 08/15/2020 patient was seen and examined in the ICU she is alert and oriented

3 in no distress she had an episode of hypoglycemia this morning her glucose 

was slightly elevated and she received 5 units of NovoLog before her breakfast 

however patient refused to eat breakfast and subsequently she had an episode of 

hypoglycemia.  Case was discussed in details with her nurse, at this time will 

check glucose prior to each meal but give the sliding scale insulin only after 

she eats at least 50% of her meal otherwise cancel the insulin dose and recheck 

again prior to the next meal.  Patient is still complaining of some nausea 

otherwise no vomiting she has some abdominal pain no fever or chills no headache

or dizziness no cough no chest pain no shortness of breath no diarrhea no 

burning with urination no frequency or urgency and no hematuria








On 08/16/2020 patient was seen and examined on the medical floor she is alert 

and oriented 3 in no apparent distress there is no fever or chills no headache 

or dizziness no chest pain no shortness of breath no cough no nausea or vomiting

no abdominal pain no diarrhea no burning with urination no frequency or urgency 

and no hematuria, glucose levels are better controlled there are no new episodes

of hypoglycemia.





On 08/17/2020 patient was seen and examined on the medical floor she is alert 

and oriented 3 in no distress there is no fever or chills no headache or 

dizziness no chest pain no shortness of breath no cough, neck pain and vomiting 

has improved significantly patient is tolerating diet, there is no diarrhea no 

burning with urination no frequency or urgency and no hematuria





Objective





- Vital Signs


Vital signs: 


                                   Vital Signs











Temp  98.6 F   08/17/20 07:58


 


Pulse  61   08/17/20 07:58


 


Resp  20   08/17/20 07:58


 


BP  95/59   08/17/20 07:58


 


Pulse Ox  97   08/17/20 07:58








                                 Intake & Output











 08/16/20 08/17/20 08/17/20





 18:59 06:59 18:59


 


Intake Total 320 300 200


 


Output Total 1000 300 


 


Balance -680 0 200


 


Intake:   


 


  IV  300 


 


    Sodium Chloride 0.9% 1,  300 





    000 ml @ 100 mls/hr IV .   





    Q10H [facility] Rx#:[id number]   


 


  Oral 320  200


 


Output:   


 


  Urine 1000 300 


 


Other:   


 


  Voiding Method Indwelling Catheter Indwelling Catheter Indwelling Catheter


 


  # Voids 0 1 


 


  # Bowel Movements  1 














- Exam








In general patient is alert and oriented 3 in no apparent distress


HEENT head normocephalic and atraumatic


Neck is supple no JVD no goiter no lymphadenopathy


Chest exam reveals a few scattered rhonchi no wheezing


Cardiac exam reveals regular heart sounds no gallops no murmurs


Abdomen is soft nontender no organomegaly with normal bowel sounds


Extremity exam reveals no edema no cyanosis or clubbing








- Labs


CBC & Chem 7: 


                                 08/17/20 07:20





                                 08/17/20 07:20


Labs: 


                  Abnormal Lab Results - Last 24 Hours (Table)











  08/16/20 08/16/20 08/16/20 Range/Units





  17:15 17:34 20:13 


 


RBC     (3.80-5.40)  m/uL


 


Hgb     (11.4-16.0)  gm/dL


 


Hct     (34.0-46.0)  %


 


MCHC     (31.0-37.0)  g/dL


 


Chloride     ()  mmol/L


 


POC Glucose (mg/dL)  46 L  100 H  254 H  (75-99)  mg/dL


 


Calcium     (8.4-10.2)  mg/dL


 


Total Protein     (6.3-8.2)  g/dL


 


Albumin     (3.5-5.0)  g/dL














  08/17/20 08/17/20 08/17/20 Range/Units





  02:36 02:51 03:03 


 


RBC     (3.80-5.40)  m/uL


 


Hgb     (11.4-16.0)  gm/dL


 


Hct     (34.0-46.0)  %


 


MCHC     (31.0-37.0)  g/dL


 


Chloride     ()  mmol/L


 


POC Glucose (mg/dL)  48 L  64 L  130 H  (75-99)  mg/dL


 


Calcium     (8.4-10.2)  mg/dL


 


Total Protein     (6.3-8.2)  g/dL


 


Albumin     (3.5-5.0)  g/dL














  08/17/20 08/17/20 08/17/20 Range/Units





  07:15 07:20 07:20 


 


RBC   2.94 L   (3.80-5.40)  m/uL


 


Hgb   8.6 L   (11.4-16.0)  gm/dL


 


Hct   28.3 L   (34.0-46.0)  %


 


MCHC   30.5 L   (31.0-37.0)  g/dL


 


Chloride    110 H  ()  mmol/L


 


POC Glucose (mg/dL)  110 H    (75-99)  mg/dL


 


Calcium    8.2 L  (8.4-10.2)  mg/dL


 


Total Protein    5.1 L  (6.3-8.2)  g/dL


 


Albumin    2.3 L  (3.5-5.0)  g/dL














  08/17/20 Range/Units





  11:53 


 


RBC   (3.80-5.40)  m/uL


 


Hgb   (11.4-16.0)  gm/dL


 


Hct   (34.0-46.0)  %


 


MCHC   (31.0-37.0)  g/dL


 


Chloride   ()  mmol/L


 


POC Glucose (mg/dL)  157 H  (75-99)  mg/dL


 


Calcium   (8.4-10.2)  mg/dL


 


Total Protein   (6.3-8.2)  g/dL


 


Albumin   (3.5-5.0)  g/dL














Assessment and Plan


Plan: 





1.  Diabetic ketoacidosis


2.  Underlying history of diabetes mellitus type 1 maintained on insulin


3.  Underlying history of peripheral vascular disease


4.  Underlying history of hyperlipidemia


5.  Underlying history of bipolar disorder and anxiety disorder


6.  Gastroesophageal reflux disease


7.  Nausea and vomiting likely related to gastroparesis Will consult 

gastroenterology, continue with Reglan and Zofran at this time





At this time patient is maintained on IV subcu insulin


Consult gastroenterology for nausea and vomiting


For DVT prophylaxis patient on subcu Lovenox


For GI prophylaxis patient on famotidine


DC Springer catheter, possible discharge to home tomorrow


Will follow closely

## 2020-08-18 LAB
ANION GAP SERPL CALC-SCNC: 1 MMOL/L
BASOPHILS # BLD AUTO: 0 K/UL (ref 0–0.2)
BASOPHILS NFR BLD AUTO: 0 %
BUN SERPL-SCNC: 17 MG/DL (ref 7–17)
CALCIUM SPEC-MCNC: 8 MG/DL (ref 8.4–10.2)
CHLORIDE SERPL-SCNC: 108 MMOL/L (ref 98–107)
CO2 SERPL-SCNC: 30 MMOL/L (ref 22–30)
EOSINOPHIL # BLD AUTO: 0.1 K/UL (ref 0–0.7)
EOSINOPHIL NFR BLD AUTO: 2 %
ERYTHROCYTE [DISTWIDTH] IN BLOOD BY AUTOMATED COUNT: 2.95 M/UL (ref 3.8–5.4)
ERYTHROCYTE [DISTWIDTH] IN BLOOD: 15.8 % (ref 11.5–15.5)
GLUCOSE BLD-MCNC: 112 MG/DL (ref 75–99)
GLUCOSE BLD-MCNC: 124 MG/DL (ref 75–99)
GLUCOSE BLD-MCNC: 146 MG/DL (ref 75–99)
GLUCOSE BLD-MCNC: 150 MG/DL (ref 75–99)
GLUCOSE BLD-MCNC: 234 MG/DL (ref 75–99)
GLUCOSE BLD-MCNC: 235 MG/DL (ref 75–99)
GLUCOSE BLD-MCNC: 82 MG/DL (ref 75–99)
GLUCOSE SERPL-MCNC: 94 MG/DL (ref 74–99)
HCT VFR BLD AUTO: 28.1 % (ref 34–46)
HGB BLD-MCNC: 8.5 GM/DL (ref 11.4–16)
LYMPHOCYTES # SPEC AUTO: 2.7 K/UL (ref 1–4.8)
LYMPHOCYTES NFR SPEC AUTO: 59 %
MCH RBC QN AUTO: 28.8 PG (ref 25–35)
MCHC RBC AUTO-ENTMCNC: 30.3 G/DL (ref 31–37)
MCV RBC AUTO: 95.2 FL (ref 80–100)
MONOCYTES # BLD AUTO: 0.2 K/UL (ref 0–1)
MONOCYTES NFR BLD AUTO: 5 %
NEUTROPHILS # BLD AUTO: 1.5 K/UL (ref 1.3–7.7)
NEUTROPHILS NFR BLD AUTO: 32 %
PLATELET # BLD AUTO: 182 K/UL (ref 150–450)
POTASSIUM SERPL-SCNC: 4.3 MMOL/L (ref 3.5–5.1)
SODIUM SERPL-SCNC: 139 MMOL/L (ref 137–145)
WBC # BLD AUTO: 4.6 K/UL (ref 3.8–10.6)

## 2020-08-18 RX ADMIN — LEVOFLOXACIN SCH MG: 500 TABLET, FILM COATED ORAL at 20:48

## 2020-08-18 RX ADMIN — METOCLOPRAMIDE SCH: 10 TABLET ORAL at 17:51

## 2020-08-18 RX ADMIN — CEFAZOLIN SCH: 330 INJECTION, POWDER, FOR SOLUTION INTRAMUSCULAR; INTRAVENOUS at 09:25

## 2020-08-18 RX ADMIN — CEFAZOLIN SCH: 330 INJECTION, POWDER, FOR SOLUTION INTRAMUSCULAR; INTRAVENOUS at 04:47

## 2020-08-18 RX ADMIN — PANTOPRAZOLE SODIUM SCH: 40 TABLET, DELAYED RELEASE ORAL at 10:34

## 2020-08-18 RX ADMIN — DULOXETINE SCH: 60 CAPSULE, DELAYED RELEASE ORAL at 10:34

## 2020-08-18 RX ADMIN — LOSARTAN POTASSIUM SCH: 50 TABLET, FILM COATED ORAL at 09:18

## 2020-08-18 RX ADMIN — METOCLOPRAMIDE SCH MG: 10 TABLET ORAL at 09:01

## 2020-08-18 RX ADMIN — CEFAZOLIN ONE MLS/HR: 330 INJECTION, POWDER, FOR SOLUTION INTRAMUSCULAR; INTRAVENOUS at 10:41

## 2020-08-18 RX ADMIN — DULOXETINE SCH MG: 60 CAPSULE, DELAYED RELEASE ORAL at 09:01

## 2020-08-18 RX ADMIN — ISODIUM CHLORIDE PRN MG: 0.03 SOLUTION RESPIRATORY (INHALATION) at 16:14

## 2020-08-18 RX ADMIN — FAMOTIDINE SCH MG: 20 TABLET, FILM COATED ORAL at 09:01

## 2020-08-18 RX ADMIN — CEFAZOLIN SCH: 330 INJECTION, POWDER, FOR SOLUTION INTRAMUSCULAR; INTRAVENOUS at 18:59

## 2020-08-18 RX ADMIN — FAMOTIDINE SCH: 20 TABLET, FILM COATED ORAL at 10:35

## 2020-08-18 RX ADMIN — METOCLOPRAMIDE SCH: 10 TABLET ORAL at 10:34

## 2020-08-18 RX ADMIN — ATORVASTATIN CALCIUM SCH MG: 20 TABLET, FILM COATED ORAL at 20:49

## 2020-08-18 RX ADMIN — INSULIN ASPART SCH: 100 INJECTION, SOLUTION INTRAVENOUS; SUBCUTANEOUS at 09:09

## 2020-08-18 RX ADMIN — LOSARTAN POTASSIUM SCH MG: 50 TABLET, FILM COATED ORAL at 09:01

## 2020-08-18 RX ADMIN — INSULIN ASPART SCH: 100 INJECTION, SOLUTION INTRAVENOUS; SUBCUTANEOUS at 12:28

## 2020-08-18 RX ADMIN — ENOXAPARIN SODIUM SCH MG: 40 INJECTION SUBCUTANEOUS at 09:00

## 2020-08-18 RX ADMIN — DIVALPROEX SODIUM SCH: 250 TABLET, DELAYED RELEASE ORAL at 10:34

## 2020-08-18 RX ADMIN — DIVALPROEX SODIUM SCH MG: 250 TABLET, DELAYED RELEASE ORAL at 16:46

## 2020-08-18 RX ADMIN — ASPIRIN 81 MG CHEWABLE TABLET SCH MG: 81 TABLET CHEWABLE at 09:01

## 2020-08-18 RX ADMIN — INSULIN ASPART SCH UNIT: 100 INJECTION, SOLUTION INTRAVENOUS; SUBCUTANEOUS at 20:49

## 2020-08-18 RX ADMIN — PANTOPRAZOLE SODIUM SCH MG: 40 TABLET, DELAYED RELEASE ORAL at 09:01

## 2020-08-18 RX ADMIN — DIVALPROEX SODIUM SCH MG: 250 TABLET, DELAYED RELEASE ORAL at 20:49

## 2020-08-18 RX ADMIN — ASPIRIN 81 MG CHEWABLE TABLET SCH: 81 TABLET CHEWABLE at 10:34

## 2020-08-18 RX ADMIN — INSULIN ASPART SCH UNIT: 100 INJECTION, SOLUTION INTRAVENOUS; SUBCUTANEOUS at 17:51

## 2020-08-18 RX ADMIN — DIVALPROEX SODIUM SCH MG: 250 TABLET, DELAYED RELEASE ORAL at 09:01

## 2020-08-18 RX ADMIN — METOCLOPRAMIDE SCH: 10 TABLET ORAL at 13:11

## 2020-08-18 RX ADMIN — INSULIN DETEMIR SCH UNIT: 100 INJECTION, SOLUTION SUBCUTANEOUS at 20:49

## 2020-08-18 NOTE — P.PN
Subjective


Progress Note Date: 08/18/20











Morenita Ramirez, is a 60-year-old female who was brought in to [facility] emergency room via EMS, was elevated blood glucose of 542, elevated BUN

and creatinine of 49 and 1.54 and positive ketones, patient was started on IV 

fluid, IV insulin drip and was admitted to intensive care unit for diabetic 

ketoacidosis.


Patient was complaining of nausea, otherwise she denies any complaints there is 

no fever or chills no headache or dizziness no chest pain no shortness of breath

no cough no abdominal pain no diarrhea no burning with urination no frequency or

urgency and no hematuria.


Patient has known history of diabetes mellitus type 1 she also has known history

of peripheral vascular disease with previous history of toe amputation, history 

of bipolar disorder, history of hyperlipidemia, and history of anxiety disorder








On 08/14/2020, patient was seen and examined in the ICU, she is alert and 

oriented 3 in no apparent distress, she is complaining of nausea and vomiting 

and very poor oral intake, otherwise she denies any complaints, there is no 

fever or chills no headache or dizziness no chest pain no shortness of breath no

cough no abdominal pain no diarrhea no burning with urination no frequency or 

urgency and no hematuria.








On 08/15/2020 patient was seen and examined in the ICU she is alert and oriented

3 in no distress she had an episode of hypoglycemia this morning her glucose 

was slightly elevated and she received 5 units of NovoLog before her breakfast 

however patient refused to eat breakfast and subsequently she had an episode of 

hypoglycemia.  Case was discussed in details with her nurse, at this time will 

check glucose prior to each meal but give the sliding scale insulin only after 

she eats at least 50% of her meal otherwise cancel the insulin dose and recheck 

again prior to the next meal.  Patient is still complaining of some nausea 

otherwise no vomiting she has some abdominal pain no fever or chills no headache

or dizziness no cough no chest pain no shortness of breath no diarrhea no 

burning with urination no frequency or urgency and no hematuria








On 08/16/2020 patient was seen and examined on the medical floor she is alert 

and oriented 3 in no apparent distress there is no fever or chills no headache 

or dizziness no chest pain no shortness of breath no cough no nausea or vomiting

no abdominal pain no diarrhea no burning with urination no frequency or urgency 

and no hematuria, glucose levels are better controlled there are no new episodes

of hypoglycemia.





On 08/17/2020 patient was seen and examined on the medical floor she is alert 

and oriented 3 in no distress there is no fever or chills no headache or 

dizziness no chest pain no shortness of breath no cough, neck pain and vomiting 

has improved significantly patient is tolerating diet, there is no diarrhea no 

burning with urination no frequency or urgency and no hematuria.





On 08/18/2020 patient was seen and examined on the medical floor, I came to see 

this patient in the morning and she was obtunded and nonresponsive which is a 

major change from her condition yesterday glucose level was checked stat and was

112 code stroke was called patient underwent computed tomography scan of the 

brain which did not reveal any evidence of acute intracranial abnormality, 

possibly patient had a seizure with postictal state, neurology consultation was 

requested and seizure precautions initiated.





Objective





- Vital Signs


Vital signs: 


                                   Vital Signs











Temp  97.5 F L  08/18/20 13:31


 


Pulse  85   08/18/20 16:23


 


Resp  16   08/18/20 13:31


 


BP  115/75   08/18/20 13:31


 


Pulse Ox  95   08/18/20 13:31








                                 Intake & Output











 08/17/20 08/18/20 08/18/20





 18:59 06:59 18:59


 


Intake Total 400  240


 


Output Total 800  


 


Balance -400  240


 


Intake:   


 


  Oral 400  240


 


Output:   


 


  Urine 800  


 


Other:   


 


  Voiding Method Indwelling Catheter Diaper Diaper


 


  # Voids  2 2


 


  # Bowel Movements   0














- Exam








In general patient is obtunded, responsive to stimuli, nonverbal,  in no 

apparent distress


HEENT head normocephalic and atraumatic


Neck is supple no JVD no goiter no lymphadenopathy


Chest exam reveals a few scattered rhonchi no wheezing


Cardiac exam reveals regular heart sounds no gallops no murmurs


Abdomen is soft nontender no organomegaly with normal bowel sounds


Extremity exam reveals no edema no cyanosis or clubbing








- Labs


CBC & Chem 7: 


                                 08/18/20 11:14





                                 08/18/20 11:14


Labs: 


                  Abnormal Lab Results - Last 24 Hours (Table)











  08/18/20 08/18/20 08/18/20 Range/Units





  01:58 07:28 09:06 


 


RBC     (3.80-5.40)  m/uL


 


Hgb     (11.4-16.0)  gm/dL


 


Hct     (34.0-46.0)  %


 


MCHC     (31.0-37.0)  g/dL


 


RDW     (11.5-15.5)  %


 


Chloride     ()  mmol/L


 


POC Glucose (mg/dL)  235 H  146 H  124 H  (75-99)  mg/dL


 


Calcium     (8.4-10.2)  mg/dL














  08/18/20 08/18/20 08/18/20 Range/Units





  10:30 11:14 11:14 


 


RBC   2.95 L   (3.80-5.40)  m/uL


 


Hgb   8.5 L   (11.4-16.0)  gm/dL


 


Hct   28.1 L   (34.0-46.0)  %


 


MCHC   30.3 L   (31.0-37.0)  g/dL


 


RDW   15.8 H   (11.5-15.5)  %


 


Chloride    108 H  ()  mmol/L


 


POC Glucose (mg/dL)  112 H    (75-99)  mg/dL


 


Calcium    8.0 L  (8.4-10.2)  mg/dL














  08/18/20 Range/Units





  16:47 


 


RBC   (3.80-5.40)  m/uL


 


Hgb   (11.4-16.0)  gm/dL


 


Hct   (34.0-46.0)  %


 


MCHC   (31.0-37.0)  g/dL


 


RDW   (11.5-15.5)  %


 


Chloride   ()  mmol/L


 


POC Glucose (mg/dL)  150 H  (75-99)  mg/dL


 


Calcium   (8.4-10.2)  mg/dL














Assessment and Plan


Plan: 





1.  Diabetic ketoacidosis


2.  Underlying history of diabetes mellitus type 1 maintained on insulin


3.  Underlying history of peripheral vascular disease


4.  Underlying history of hyperlipidemia


5.  Underlying history of bipolar disorder and anxiety disorder


6.  Gastroesophageal reflux disease


7.  Nausea and vomiting likely related to gastroparesis Will consult 

gastroenterology, continue with Reglan and Zofran at this time


8.  Mental status changes, with somnolence, awaiting urology input.





At this time patient is maintained on IV subcu insulin


Consult gastroenterology for nausea and vomiting


For DVT prophylaxis patient on subcu Lovenox


For GI prophylaxis patient on famotidine


DC Springer catheter, possible discharge to home tomorrow


Will follow closely

## 2020-08-18 NOTE — P.CNNES
History of Present Illness


Consult date: 20


Requesting physician: Saniya Cabral


Reason for Consult: Code Stroke


History of Present Illness: 





The patient is a 60-year-old female who came to the hospital initially on 

2020 for nausea vomiting.  Her blood sugar was 558 on arrival and was 

diagnosed with DKA.  Patient has history of diabetes, heart disease.  Patient 

was having lower abdominal discomfort and severe constipation.





Patient today at 10:30 AM was noted to have change in level of consciousness.  

Patient was lethargic, arouses briefly with stimulus, oriented 3 but slow to 

respond and drifts right back to sleep.  Her blood pressure was 76/51, heart 

rate 78.  Blood sugar checked was 112.  100 mL of fluid bolus was given.  

Patient has left hemiparesis, which was essentially unchanged.  Stroke 

neurologist was contacted, who recommended no TPA or any other intervention 

after testing were completed as below.  Patient slowly came back to baseline.  I

spoke to patient's brother, who feels she is back to baseline.





CT head showed no acute intracranial process.  Unchanged large encephalomalacia 

of the right frontal, parietal and temporal lobes.  CTA of head and neck not 

significantly changed from 2017, with chronic occlusion of the right ICA 

and diminutive right intracranial anterior circulation with old large right 

sided encephalomalacia.  Patient's last hemoglobin A1c 9.1 on 2020.  

Hepatic panel is normal.  Total cholesterol 194, LDL 96, HDL 65, B12 367 on 

10/13/2014.  





Patient apparently had a seizure a few months ago.  She was placed on Depakote 

by her primary physician.  She was recommended to be seen by neurologist, but 

the appointment was rescheduled because of the pandemic.  Patient has history of

diabetes for last 6 years.  She had history of a stroke affected the left side 

of the body 5 years ago.  Patient has history of smoking 2 packs per day from 

age 19, until she had her stroke 5 years ago.  Thereafter she was smoking 

likely, but completely quit only 2 or 3 years ago.





Review of Systems





Patient denies headache.  Denies any new focal weakness.  Continues to have left

hemiparesis paresis.  Patient cannot walk, drags her left leg.  She mostly sits 

in the wheelchair or her recliner.  She denies any chest pain or abdominal pain.

 All other review of systems unremarkable.





Past Medical History


Past Medical History: Asthma, Coronary Artery Disease (CAD), Chest Pain / 

Angina, Heart Failure, COPD, CVA/TIA, Diabetes Mellitus, Deep Vein Thrombosis 

(DVT), Eye Disorder, GERD/Reflux, Hyperlipidemia, Hypertension, Myocardial 

Infarction (MI), Neurologic Disorder, Osteoarthritis (OA), Pneumonia, Renal 

Disease


Additional Past Medical History / Comment(s): IDDM (brittle), DKAs, neuropathy 

bilateral hands/feet, retinopathy bilateral eyes, cellulitis R foot, R great toe

and 2nd toe infections/amputations, current wound R foot-being seen in [facility], 

renal failure, anemia, CVAs with L sided paralysis, headaches started after 

CVAs, brain lesions, DVT R axillae, low back pain, varicosities, seizure many 

years ago (), hypothyroid, constipation, bilateral tinnitis occasionally, 

sinus problems.


Last Myocardial Infarction Date:: 


History of Any Multi-Drug Resistant Organisms: MRSA


Date of last positivie culture/infection: 18


MDRO Source:: Right Foot


Past Surgical History: Appendectomy,  Section, Cholecystectomy, Heart 

Catheterization With Stent, Hysterectomy, Orthopedic Surgery


Additional Past Surgical History / Comment(s): PCI with multiple stents, R great

toe and 2nd toe amps, debridements R foot ulcer, L shoulder surgery to remove 

bone, bronchoscopy, EGD, colonoscopy, R arm port since removed, bilateral 

cataract removals/lens implants.


Past Anesthesia/Blood Transfusion Reactions: No Reported Reaction


Additional Past Anesthesia/Blood Transfusion Reaction / Comment(s): HX OF BLOOD 

TRANSFUSION- NO REACTION


Date of Last Stent Placement:: 2013


Past Psychological History: Anxiety, Bipolar, Depression


Additional Psychological History / Comment(s): Pt has a legal guardian, Noelle Menon, who is pt's sister.  Currently her legal guardian is hospitalized.  Pt 

has a caregiver, Eleanor, who resides with her.  Pt is wheelchair bound d/t CVA 

with L sided paralysis arm and leg.  She has a shower chair and a glucometer.  

Her sister or caregiver drive her to appts.  [email or web address].


Smoking Status: Former smoker


Past Alcohol Use History: None Reported


Additional Past Alcohol Use History / Comment(s): Pt started smoking in  and

quit in 2018.   Using marijuana edibles occasionally but none for a "long time"


Past Drug Use History: None Reported


Additional Drug Use History / Comment(s): using marijuana edibles





- Past Family History


  ** Father


Family Medical History: Unable to Obtain, Coronary Artery Disease (CAD), 

Diabetes Mellitus





  ** Mother


Family Medical History: COPD





Medications and Allergies


                                Home Medications











 Medication  Instructions  Recorded  Confirmed  Type


 


Famotidine [Pepcid] 20 mg PO DAILY@16 History


 


HYDROcodone/APAP 10-325MG [Norco 1 tab PO Q6H PRN 17 History





]    


 


DULoxetine HCL [Cymbalta] 60 mg PO DAILY@17 History


 


Atorvastatin [Lipitor] 20 mg PO DAILY@19 History


 


QUEtiapine [SEROquel] 100 mg PO HS@19 History


 


Vitamin B Complex With Vit C 1 tab PO DAILY 20 History


 


Aspirin [Adult Low Dose Aspirin EC] 81 mg PO DAILY@0900 01/10/20 08/12/20 

History


 


Ferrous Sulfate [Iron (65  mg PO DAILY@0900 01/10/20 08/12/20 History





Elemental)]    


 


INSULIN LISPRO (humaLOG) [humaLOG] See Protocol SQ ACHS 03/15/20 08/12/20 

History


 


Divalproex [Depakote] 250 mg PO TID #90 tablet. 20 Rx


 


ALPRAZolam [Xanax] 1 mg PO TID PRN 20 History


 


Albuterol Sulfate [Ventolin HFA] 2 puff INHALATION RT-Q4H PRN 20 

History


 


Ondansetron Odt [Zofran ODT] 4 mg PO DAILY PRN 20 History


 


Valproic Acid [Depakene] 250 mg PO DAILY 20 History


 


Insulin Glargine,Hum.rec.anlog 16 unit SQ HS #0 20 Rx





[Basaglar Kwikpen U-100]    


 


INSULIN LISPRO (humaLOG) [humaLOG] 5 units SQ ACHS 20 History


 


Pantoprazole Sodium [Protonix] 20 mg PO DAILY 20 History


 


QUEtiapine [SEROquel] 25 mg PO BID 20 History








                                    Allergies











Allergy/AdvReac Type Severity Reaction Status Date / Time


 


Barbiturates Allergy  Rash/Hives Verified 20 12:28


 


cephalexin monohydrate Allergy  Rash/Hives Verified 20 12:28





[From Keflex]     


 


morphine Allergy  Rash/Hives Verified 20 12:28


 


Penicillins Allergy  Rash/Hives Verified 20 12:28


 


phenobarbital Allergy  Swelling Verified 20 12:28


 


venom-honey bee Allergy  Swelling Verified 20 12:28





[bee venom (honey bee)]     


 


amlodipine besylate AdvReac  Vomiting Verified 20 12:28





[From Norvasc]     














Physical Examination





- Vital Signs


Vital Signs: 


                                   Vital Signs











  Temp Pulse Resp BP BP Pulse Ox


 


 20 11:15      141/90 


 


 20 10:45   78    91/57  96


 


 20 09:20   77    100/65  96


 


 20 07:00  97.8 F  89  18   163/84  97


 


 20 02:36  97.9 F  90  18  115/64   92 L


 


 20 23:14    19   


 


 20 20:00   96  19   


 


 20 19:20  98.4 F  96  19  100/65   99


 


 20 15:00  98.6 F  94  20  130/75   96








                                Intake and Output











 20





 22:59 06:59 14:59


 


Other:   


 


  Voiding Method Diaper Diaper Diaper


 


  # Voids 2 2 














On examination patient is a middle aged  female, appears older than her

stated age.  She is alert and awake.  She states it 2020.  She knows 

that she is in [facility].  She knows name of the current president.  

Speech and language function appears normal.  On cranial nerve examination pu

pils are round and reactive to light.  Visual fields revealed a homonymous 

visual field defect involving the left lower quadrant.  Face is symmetric, 

tongue protrudes the midline.  Palatal elevation is normal hearing and shoulder 

shrug normal.  On muscle strength testing the strength is normal in the right 

arm and right leg.  She is amputated big and lateral small toes in the right 

foot.  Patient is spastic hemiparetic on the left side, left arm is much worse 

than leg.  Patient can move the left leg at the hip region antigravity, with 

some resistance.  Patient does not move her left ankle.  Patient has Babinski on

the left side.  Sensory decreased on the left.  Cerebellar function is normal on

the right, cannot perform on left.  Patient is wheelchair bound.  There is no 

obvious bruit, S1 and S2 audible.  Peripheral pulses present.  Abdomen soft 

nontender chest is clear.





Results





- Laboratory Findings


CBC and BMP: 


                                 20 11:14





                                 20 11:14


Abnormal Lab Findings: 


                                  Abnormal Labs











  20





  11:49 11:52 11:52


 


WBC   


 


RBC   


 


Hgb   


 


Hct   


 


MCHC   


 


RDW   


 


Neutrophils #   


 


Lymphocytes #   


 


APTT   


 


VBG pH   


 


VBG pCO2   


 


Sodium   


 


Potassium   5.4 H 


 


Chloride   


 


Carbon Dioxide   17 L 


 


BUN   49 H 


 


Creatinine   1.54 H 


 


Glucose   542 H* 


 


POC Glucose (mg/dL)  558 H  


 


Plasma Lactic Acid Carmelo    3.7 H*


 


Calcium   10.4 H 


 


AST   


 


Total Protein   8.7 H 


 


Albumin   


 


Lipase   15 L 


 


Procalcitonin   


 


Urine Glucose (UA)   


 


Urine Ketones   


 


Urine Blood   


 


Hyaline Casts   


 


Urine Mucus   














  20





  13:27 13:27 13:27


 


WBC   10.7 H 


 


RBC   


 


Hgb   


 


Hct   


 


MCHC   


 


RDW   


 


Neutrophils #   9.4 H 


 


Lymphocytes #   0.8 L 


 


APTT  20.0 L  


 


VBG pH    7.25 L


 


VBG pCO2    57 H


 


Sodium   


 


Potassium   


 


Chloride   


 


Carbon Dioxide   


 


BUN   


 


Creatinine   


 


Glucose   


 


POC Glucose (mg/dL)   


 


Plasma Lactic Acid Carmelo   


 


Calcium   


 


AST   


 


Total Protein   


 


Albumin   


 


Lipase   


 


Procalcitonin   


 


Urine Glucose (UA)   


 


Urine Ketones   


 


Urine Blood   


 


Hyaline Casts   


 


Urine Mucus   














  20





  14:26 14:37 15:18


 


WBC   


 


RBC   


 


Hgb   


 


Hct   


 


MCHC   


 


RDW   


 


Neutrophils #   


 


Lymphocytes #   


 


APTT   


 


VBG pH   


 


VBG pCO2   


 


Sodium   


 


Potassium   


 


Chloride   


 


Carbon Dioxide   


 


BUN   


 


Creatinine   


 


Glucose   


 


POC Glucose (mg/dL)  313 H   295 H


 


Plasma Lactic Acid Carmelo   


 


Calcium   


 


AST   


 


Total Protein   


 


Albumin   


 


Lipase   


 


Procalcitonin   


 


Urine Glucose (UA)   4+ H 


 


Urine Ketones   2+ H 


 


Urine Blood   Trace H 


 


Hyaline Casts   26 H 


 


Urine Mucus   Rare H 














  20





  15:42 16:47 17:24


 


WBC   


 


RBC   


 


Hgb   


 


Hct   


 


MCHC   


 


RDW   


 


Neutrophils #   


 


Lymphocytes #   


 


APTT   


 


VBG pH   


 


VBG pCO2   


 


Sodium   


 


Potassium   


 


Chloride    110 H


 


Carbon Dioxide   


 


BUN   


 


Creatinine   


 


Glucose   


 


POC Glucose (mg/dL)  251 H  213 H 


 


Plasma Lactic Acid Carmelo   


 


Calcium   


 


AST   


 


Total Protein   


 


Albumin   


 


Lipase   


 


Procalcitonin   


 


Urine Glucose (UA)   


 


Urine Ketones   


 


Urine Blood   


 


Hyaline Casts   


 


Urine Mucus   














  20





  17:46 19:07 20:08


 


WBC   


 


RBC   


 


Hgb   


 


Hct   


 


MCHC   


 


RDW   


 


Neutrophils #   


 


Lymphocytes #   


 


APTT   


 


VBG pH   


 


VBG pCO2   


 


Sodium   


 


Potassium   


 


Chloride   


 


Carbon Dioxide   


 


BUN   


 


Creatinine   


 


Glucose   


 


POC Glucose (mg/dL)  181 H  169 H  149 H


 


Plasma Lactic Acid Carmelo   


 


Calcium   


 


AST   


 


Total Protein   


 


Albumin   


 


Lipase   


 


Procalcitonin   


 


Urine Glucose (UA)   


 


Urine Ketones   


 


Urine Blood   


 


Hyaline Casts   


 


Urine Mucus   














  20





  20:57 21:02 22:18


 


WBC   


 


RBC   


 


Hgb   


 


Hct   


 


MCHC   


 


RDW   


 


Neutrophils #   


 


Lymphocytes #   


 


APTT   


 


VBG pH   


 


VBG pCO2   


 


Sodium   


 


Potassium   


 


Chloride  109 H  


 


Carbon Dioxide   


 


BUN   


 


Creatinine   


 


Glucose   


 


POC Glucose (mg/dL)   140 H  177 H


 


Plasma Lactic Acid Carmelo   


 


Calcium   


 


AST   


 


Total Protein   


 


Albumin   


 


Lipase   


 


Procalcitonin   


 


Urine Glucose (UA)   


 


Urine Ketones   


 


Urine Blood   


 


Hyaline Casts   


 


Urine Mucus   














  20





  23:08 23:16 01:05


 


WBC   


 


RBC   


 


Hgb   


 


Hct   


 


MCHC   


 


RDW   


 


Neutrophils #   


 


Lymphocytes #   


 


APTT   


 


VBG pH   


 


VBG pCO2   


 


Sodium   


 


Potassium   


 


Chloride   


 


Carbon Dioxide   


 


BUN   


 


Creatinine   


 


Glucose   


 


POC Glucose (mg/dL)  165 H  191 H  239 H


 


Plasma Lactic Acid Carmelo   


 


Calcium   


 


AST   


 


Total Protein   


 


Albumin   


 


Lipase   


 


Procalcitonin   


 


Urine Glucose (UA)   


 


Urine Ketones   


 


Urine Blood   


 


Hyaline Casts   


 


Urine Mucus   














  20





  02:00 02:04 03:11


 


WBC   


 


RBC   


 


Hgb   


 


Hct   


 


MCHC   


 


RDW   


 


Neutrophils #   


 


Lymphocytes #   


 


APTT   


 


VBG pH   


 


VBG pCO2   


 


Sodium   


 


Potassium   


 


Chloride  108 H  


 


Carbon Dioxide   


 


BUN   


 


Creatinine   


 


Glucose   


 


POC Glucose (mg/dL)   224 H  244 H


 


Plasma Lactic Acid Carmelo   


 


Calcium   


 


AST   


 


Total Protein   


 


Albumin   


 


Lipase   


 


Procalcitonin   


 


Urine Glucose (UA)   


 


Urine Ketones   


 


Urine Blood   


 


Hyaline Casts   


 


Urine Mucus   














  20





  04:52 05:00 05:00


 


WBC   11.0 H 


 


RBC   3.30 L 


 


Hgb   10.0 L D 


 


Hct   30.8 L 


 


MCHC   


 


RDW   15.8 H 


 


Neutrophils #   


 


Lymphocytes #   


 


APTT   


 


VBG pH   


 


VBG pCO2   


 


Sodium   


 


Potassium   


 


Chloride   


 


Carbon Dioxide   


 


BUN    22 H


 


Creatinine   


 


Glucose    262 H


 


POC Glucose (mg/dL)  258 H  


 


Plasma Lactic Acid Carmelo   


 


Calcium   


 


AST   


 


Total Protein   


 


Albumin    3.4 L


 


Lipase   


 


Procalcitonin   


 


Urine Glucose (UA)   


 


Urine Ketones   


 


Urine Blood   


 


Hyaline Casts   


 


Urine Mucus   














  20





  05:00 06:19 07:19


 


WBC   


 


RBC   


 


Hgb   


 


Hct   


 


MCHC   


 


RDW   


 


Neutrophils #   


 


Lymphocytes #   


 


APTT   


 


VBG pH   


 


VBG pCO2   


 


Sodium   


 


Potassium   


 


Chloride   


 


Carbon Dioxide   


 


BUN   


 


Creatinine   


 


Glucose   


 


POC Glucose (mg/dL)   268 H  278 H


 


Plasma Lactic Acid Carmelo   


 


Calcium   


 


AST   


 


Total Protein   


 


Albumin   


 


Lipase   


 


Procalcitonin  1.79 H  


 


Urine Glucose (UA)   


 


Urine Ketones   


 


Urine Blood   


 


Hyaline Casts   


 


Urine Mucus   














  20





  08:06 11:07 19:05


 


WBC   


 


RBC   


 


Hgb   


 


Hct   


 


MCHC   


 


RDW   


 


Neutrophils #   


 


Lymphocytes #   


 


APTT   


 


VBG pH   


 


VBG pCO2   


 


Sodium   


 


Potassium   


 


Chloride   


 


Carbon Dioxide   


 


BUN   


 


Creatinine   


 


Glucose   


 


POC Glucose (mg/dL)  335 H  362 H  72 L


 


Plasma Lactic Acid Carmelo   


 


Calcium   


 


AST   


 


Total Protein   


 


Albumin   


 


Lipase   


 


Procalcitonin   


 


Urine Glucose (UA)   


 


Urine Ketones   


 


Urine Blood   


 


Hyaline Casts   


 


Urine Mucus   














  20





  20:43 04:39 04:39


 


WBC   


 


RBC   3.45 L 


 


Hgb   10.1 L 


 


Hct   32.2 L 


 


MCHC   


 


RDW   15.6 H 


 


Neutrophils #   


 


Lymphocytes #   


 


APTT   


 


VBG pH   


 


VBG pCO2   


 


Sodium    136 L


 


Potassium   


 


Chloride   


 


Carbon Dioxide   


 


BUN    23 H


 


Creatinine    1.06 H


 


Glucose    310 H


 


POC Glucose (mg/dL)  159 H  


 


Plasma Lactic Acid Carmelo   


 


Calcium   


 


AST   


 


Total Protein   


 


Albumin    3.3 L


 


Lipase   


 


Procalcitonin   


 


Urine Glucose (UA)   


 


Urine Ketones   


 


Urine Blood   


 


Hyaline Casts   


 


Urine Mucus   














  20





  06:41 11:16 17:12


 


WBC   


 


RBC   


 


Hgb   


 


Hct   


 


MCHC   


 


RDW   


 


Neutrophils #   


 


Lymphocytes #   


 


APTT   


 


VBG pH   


 


VBG pCO2   


 


Sodium   


 


Potassium   


 


Chloride   


 


Carbon Dioxide   


 


BUN   


 


Creatinine   


 


Glucose   


 


POC Glucose (mg/dL)  374 H  222 H  114 H


 


Plasma Lactic Acid Carmelo   


 


Calcium   


 


AST   


 


Total Protein   


 


Albumin   


 


Lipase   


 


Procalcitonin   


 


Urine Glucose (UA)   


 


Urine Ketones   


 


Urine Blood   


 


Hyaline Casts   


 


Urine Mucus   














  08/14/20 08/15/20 08/15/20





  20:24 04:40 04:40


 


WBC   


 


RBC   3.37 L 


 


Hgb   9.9 L 


 


Hct   32.0 L 


 


MCHC   30.9 L 


 


RDW   15.7 H 


 


Neutrophils #   


 


Lymphocytes #   


 


APTT   


 


VBG pH   


 


VBG pCO2   


 


Sodium    136 L


 


Potassium   


 


Chloride   


 


Carbon Dioxide   


 


BUN    28 H


 


Creatinine    1.24 H


 


Glucose    172 H


 


POC Glucose (mg/dL)  103 H  


 


Plasma Lactic Acid Carmelo   


 


Calcium   


 


AST   


 


Total Protein   


 


Albumin    3.0 L


 


Lipase   


 


Procalcitonin   


 


Urine Glucose (UA)   


 


Urine Ketones   


 


Urine Blood   


 


Hyaline Casts   


 


Urine Mucus   














  08/15/20 08/15/20 08/15/20





  06:39 11:44 12:06


 


WBC   


 


RBC   


 


Hgb   


 


Hct   


 


MCHC   


 


RDW   


 


Neutrophils #   


 


Lymphocytes #   


 


APTT   


 


VBG pH   


 


VBG pCO2   


 


Sodium   


 


Potassium   


 


Chloride   


 


Carbon Dioxide   


 


BUN   


 


Creatinine   


 


Glucose   


 


POC Glucose (mg/dL)  294 H  58 L  55 L


 


Plasma Lactic Acid Carmelo   


 


Calcium   


 


AST   


 


Total Protein   


 


Albumin   


 


Lipase   


 


Procalcitonin   


 


Urine Glucose (UA)   


 


Urine Ketones   


 


Urine Blood   


 


Hyaline Casts   


 


Urine Mucus   














  08/15/20 08/15/20 08/15/20





  12:21 12:37 17:03


 


WBC   


 


RBC   


 


Hgb   


 


Hct   


 


MCHC   


 


RDW   


 


Neutrophils #   


 


Lymphocytes #   


 


APTT   


 


VBG pH   


 


VBG pCO2   


 


Sodium   


 


Potassium   


 


Chloride   


 


Carbon Dioxide   


 


BUN   


 


Creatinine   


 


Glucose   


 


POC Glucose (mg/dL)  101 H  175 H  43 L


 


Plasma Lactic Acid Carmelo   


 


Calcium   


 


AST   


 


Total Protein   


 


Albumin   


 


Lipase   


 


Procalcitonin   


 


Urine Glucose (UA)   


 


Urine Ketones   


 


Urine Blood   


 


Hyaline Casts   


 


Urine Mucus   














  08/15/20 08/16/20 08/16/20





  17:18 05:04 05:04


 


WBC   


 


RBC   3.25 L 


 


Hgb   9.5 L 


 


Hct   30.4 L 


 


MCHC   


 


RDW   


 


Neutrophils #   


 


Lymphocytes #   


 


APTT   


 


VBG pH   


 


VBG pCO2   


 


Sodium   


 


Potassium    3.3 L


 


Chloride    108 H


 


Carbon Dioxide   


 


BUN   


 


Creatinine   


 


Glucose    41 L*


 


POC Glucose (mg/dL)  52 L  


 


Plasma Lactic Acid Carmelo   


 


Calcium   


 


AST    38 H


 


Total Protein    5.7 L


 


Albumin    2.7 L


 


Lipase   


 


Procalcitonin   


 


Urine Glucose (UA)   


 


Urine Ketones   


 


Urine Blood   


 


Hyaline Casts   


 


Urine Mucus   














  20





  06:00 11:05 17:15


 


WBC   


 


RBC   


 


Hgb   


 


Hct   


 


MCHC   


 


RDW   


 


Neutrophils #   


 


Lymphocytes #   


 


APTT   


 


VBG pH   


 


VBG pCO2   


 


Sodium   


 


Potassium   


 


Chloride   


 


Carbon Dioxide   


 


BUN   


 


Creatinine   


 


Glucose   


 


POC Glucose (mg/dL)  58 L  210 H  46 L


 


Plasma Lactic Acid Carmelo   


 


Calcium   


 


AST   


 


Total Protein   


 


Albumin   


 


Lipase   


 


Procalcitonin   


 


Urine Glucose (UA)   


 


Urine Ketones   


 


Urine Blood   


 


Hyaline Casts   


 


Urine Mucus   














  20





  17:34 20:13 02:36


 


WBC   


 


RBC   


 


Hgb   


 


Hct   


 


MCHC   


 


RDW   


 


Neutrophils #   


 


Lymphocytes #   


 


APTT   


 


VBG pH   


 


VBG pCO2   


 


Sodium   


 


Potassium   


 


Chloride   


 


Carbon Dioxide   


 


BUN   


 


Creatinine   


 


Glucose   


 


POC Glucose (mg/dL)  100 H  254 H  48 L


 


Plasma Lactic Acid Carmelo   


 


Calcium   


 


AST   


 


Total Protein   


 


Albumin   


 


Lipase   


 


Procalcitonin   


 


Urine Glucose (UA)   


 


Urine Ketones   


 


Urine Blood   


 


Hyaline Casts   


 


Urine Mucus   














  20





  02:51 03:03 07:15


 


WBC   


 


RBC   


 


Hgb   


 


Hct   


 


MCHC   


 


RDW   


 


Neutrophils #   


 


Lymphocytes #   


 


APTT   


 


VBG pH   


 


VBG pCO2   


 


Sodium   


 


Potassium   


 


Chloride   


 


Carbon Dioxide   


 


BUN   


 


Creatinine   


 


Glucose   


 


POC Glucose (mg/dL)  64 L  130 H  110 H


 


Plasma Lactic Acid Carmelo   


 


Calcium   


 


AST   


 


Total Protein   


 


Albumin   


 


Lipase   


 


Procalcitonin   


 


Urine Glucose (UA)   


 


Urine Ketones   


 


Urine Blood   


 


Hyaline Casts   


 


Urine Mucus   














  20





  07:20 07:20 11:53


 


WBC   


 


RBC  2.94 L  


 


Hgb  8.6 L  


 


Hct  28.3 L  


 


MCHC  30.5 L  


 


RDW   


 


Neutrophils #   


 


Lymphocytes #   


 


APTT   


 


VBG pH   


 


VBG pCO2   


 


Sodium   


 


Potassium   


 


Chloride   110 H 


 


Carbon Dioxide   


 


BUN   


 


Creatinine   


 


Glucose   


 


POC Glucose (mg/dL)    157 H


 


Plasma Lactic Acid Carmelo   


 


Calcium   8.2 L 


 


AST   


 


Total Protein   5.1 L 


 


Albumin   2.3 L 


 


Lipase   


 


Procalcitonin   


 


Urine Glucose (UA)   


 


Urine Ketones   


 


Urine Blood   


 


Hyaline Casts   


 


Urine Mucus   














  20





  17:13 01:58 07:28


 


WBC   


 


RBC   


 


Hgb   


 


Hct   


 


MCHC   


 


RDW   


 


Neutrophils #   


 


Lymphocytes #   


 


APTT   


 


VBG pH   


 


VBG pCO2   


 


Sodium   


 


Potassium   


 


Chloride   


 


Carbon Dioxide   


 


BUN   


 


Creatinine   


 


Glucose   


 


POC Glucose (mg/dL)  286 H  235 H  146 H


 


Plasma Lactic Acid Carmelo   


 


Calcium   


 


AST   


 


Total Protein   


 


Albumin   


 


Lipase   


 


Procalcitonin   


 


Urine Glucose (UA)   


 


Urine Ketones   


 


Urine Blood   


 


Hyaline Casts   


 


Urine Mucus   














  20





  09:06 10:30 11:14


 


WBC   


 


RBC    2.95 L


 


Hgb    8.5 L


 


Hct    28.1 L


 


MCHC    30.3 L


 


RDW    15.8 H


 


Neutrophils #   


 


Lymphocytes #   


 


APTT   


 


VBG pH   


 


VBG pCO2   


 


Sodium   


 


Potassium   


 


Chloride   


 


Carbon Dioxide   


 


BUN   


 


Creatinine   


 


Glucose   


 


POC Glucose (mg/dL)  124 H  112 H 


 


Plasma Lactic Acid Carmelo   


 


Calcium   


 


AST   


 


Total Protein   


 


Albumin   


 


Lipase   


 


Procalcitonin   


 


Urine Glucose (UA)   


 


Urine Ketones   


 


Urine Blood   


 


Hyaline Casts   


 


Urine Mucus   














  20





  11:14


 


WBC 


 


RBC 


 


Hgb 


 


Hct 


 


MCHC 


 


RDW 


 


Neutrophils # 


 


Lymphocytes # 


 


APTT 


 


VBG pH 


 


VBG pCO2 


 


Sodium 


 


Potassium 


 


Chloride  108 H


 


Carbon Dioxide 


 


BUN 


 


Creatinine 


 


Glucose 


 


POC Glucose (mg/dL) 


 


Plasma Lactic Acid Carmelo 


 


Calcium  8.0 L


 


AST 


 


Total Protein 


 


Albumin 


 


Lipase 


 


Procalcitonin 


 


Urine Glucose (UA) 


 


Urine Ketones 


 


Urine Blood 


 


Hyaline Casts 


 


Urine Mucus 














Assessment and Plan


Assessment: 





* Episode of altered mental status, with unresponsiveness.  Rule out unwitnessed

  complex partial seizure with subsequent postictal state.  Patient's blood 

  sugar reported was normal, 112 at that time, although her blood pressure was 

  low 76/51, which may be contributing to this episode.


* History of right MCA territory CVA, with left hemiplegia.


* Diabetes, poorly controlled.


* History of chronic heavy tobacco use in the past.


Plan: 





* EEG to evaluate for any interictal epileptiform activity.


* Patient currently is on Depakote 250 mg 3 times a day, and level is 

  therapeutic 56.1


* CTA of head and neck are normal.


* Neurology will follow.

## 2020-08-18 NOTE — CT
EXAMINATION TYPE: CODE STROKE: CT brain wo contr

 

DATE OF EXAM: 8/18/2020

 

HISTORY: CODE STROKE

 

CT DLP: 1090.4 mGycm.  Automated Exposure Control for Dose Reduction was Utilized.

 

TECHNIQUE: CT scan of the head is performed without contrast.

 

COMPARISON: CT brain 3/15/2020

 

FINDINGS:   

 

There is no acute intracranial hemorrhage, midline shift, or mass effect identified. There is unchang
ed extensive right MCA territory encephalomalacia of the right frontal, parietal, and temporal lobes.
 The ventricles, sulci, and cisterns are prominent with redemonstrated diffuse volume loss. No extra-
axial fluid collection. Redemonstrated calcifications of the distal internal carotid arteries bilater
ally. Bones and extracranial soft tissues are intact. The globes are grossly symmetric. Visualized si
nuses and mastoid air cells are clear.   

 

IMPRESSION:  

1. No acute intracranial hemorrhage, midline shift, or mass effect.

2. Unchanged large encephalomalacia of the right frontal, parietal, and temporal lobes.

 

 

 

Dr. Sumi Ferrell discussed findings with Karyn Grajeda RN via the phone on 8/18/2020 at 11:28 
AM, and results were acknowledged.

 

Dr. Sumi Ferrell notified Dr. Saniya Cabral via [employer] paging at on 8/18/2020 at 11:26 AM. A 
Document Only message has been documented for Saniya Cabral MD in the [employer] system on 8/18/2020 11:26 AM, Message ID [id number].

## 2020-08-18 NOTE — CT
EXAMINATION TYPE: CODE STROKE: CTA head neck

 

DATE OF EXAM: 8/18/2020

 

HISTORY: CODE STROKE

 

COMPARISON: CT brain 8/18/2020. CTA neck 11/20/2017.

 

CT DLP: 357.5 mGycm.  Automated Exposure Control for Dose Reduction was Utilized.

 

TECHNIQUE:  CTA scan of the neck is performed without and with IV Contrast, patient injected with 65 
ML mL of Isovue 370, axial images are obtained, coronal and sagittal reformatted images are reviewed.
 Three-D reconstructed images are created on an independent workstation and reviewed.

 

FINDINGS:

 

Carotid/Vascular Structures: There is classic branching pattern of the great vessels of the aortic ar
ch. The bilateral common carotid arteries are patent. There is redemonstrated occlusion of the proxim
al right internal carotid artery. The left internal carotid artery is patent.

 

Old encephalomalacia of the right MCA territory with redemonstrated diminutive right intracranial ant
erior circulation, likely receiving collateral flow from the left [place] and posterior circ
ulation. No evidence of aneurysm or dissection.

 

 

IMPRESSION:  

CTA examination of the head and neck not significantly changed versus 11/28/2017, with chronic occlus
ion of the right internal carotid artery and diminutive right intracranial anterior circulation with 
old large right-sided encephalomalacia.

## 2020-08-19 LAB
ALBUMIN SERPL-MCNC: 2.4 G/DL (ref 3.5–5)
ALP SERPL-CCNC: 53 U/L (ref 38–126)
ALT SERPL-CCNC: 14 U/L (ref 4–34)
ANION GAP SERPL CALC-SCNC: 3 MMOL/L
AST SERPL-CCNC: 17 U/L (ref 14–36)
BASOPHILS # BLD AUTO: 0 K/UL (ref 0–0.2)
BASOPHILS NFR BLD AUTO: 0 %
BUN SERPL-SCNC: 24 MG/DL (ref 7–17)
CALCIUM SPEC-MCNC: 8.4 MG/DL (ref 8.4–10.2)
CHLORIDE SERPL-SCNC: 108 MMOL/L (ref 98–107)
CO2 SERPL-SCNC: 28 MMOL/L (ref 22–30)
EOSINOPHIL # BLD AUTO: 0.1 K/UL (ref 0–0.7)
EOSINOPHIL NFR BLD AUTO: 2 %
ERYTHROCYTE [DISTWIDTH] IN BLOOD BY AUTOMATED COUNT: 2.89 M/UL (ref 3.8–5.4)
ERYTHROCYTE [DISTWIDTH] IN BLOOD: 15.5 % (ref 11.5–15.5)
GLUCOSE BLD-MCNC: 103 MG/DL (ref 75–99)
GLUCOSE BLD-MCNC: 105 MG/DL (ref 75–99)
GLUCOSE BLD-MCNC: 149 MG/DL (ref 75–99)
GLUCOSE BLD-MCNC: 186 MG/DL (ref 75–99)
GLUCOSE BLD-MCNC: 283 MG/DL (ref 75–99)
GLUCOSE BLD-MCNC: 318 MG/DL (ref 75–99)
GLUCOSE BLD-MCNC: 44 MG/DL (ref 75–99)
GLUCOSE BLD-MCNC: 63 MG/DL (ref 75–99)
GLUCOSE SERPL-MCNC: 90 MG/DL (ref 74–99)
HCT VFR BLD AUTO: 27.7 % (ref 34–46)
HGB BLD-MCNC: 8.5 GM/DL (ref 11.4–16)
LYMPHOCYTES # SPEC AUTO: 2.4 K/UL (ref 1–4.8)
LYMPHOCYTES NFR SPEC AUTO: 51 %
MCH RBC QN AUTO: 29.4 PG (ref 25–35)
MCHC RBC AUTO-ENTMCNC: 30.7 G/DL (ref 31–37)
MCV RBC AUTO: 95.8 FL (ref 80–100)
MONOCYTES # BLD AUTO: 0.2 K/UL (ref 0–1)
MONOCYTES NFR BLD AUTO: 4 %
NEUTROPHILS # BLD AUTO: 1.9 K/UL (ref 1.3–7.7)
NEUTROPHILS NFR BLD AUTO: 41 %
PLATELET # BLD AUTO: 192 K/UL (ref 150–450)
POTASSIUM SERPL-SCNC: 4.6 MMOL/L (ref 3.5–5.1)
PROT SERPL-MCNC: 5.2 G/DL (ref 6.3–8.2)
SODIUM SERPL-SCNC: 139 MMOL/L (ref 137–145)
WBC # BLD AUTO: 4.7 K/UL (ref 3.8–10.6)

## 2020-08-19 RX ADMIN — INSULIN DETEMIR SCH UNIT: 100 INJECTION, SOLUTION SUBCUTANEOUS at 21:35

## 2020-08-19 RX ADMIN — DIVALPROEX SODIUM SCH MG: 250 TABLET, DELAYED RELEASE ORAL at 07:53

## 2020-08-19 RX ADMIN — INSULIN ASPART SCH UNIT: 100 INJECTION, SOLUTION INTRAVENOUS; SUBCUTANEOUS at 12:27

## 2020-08-19 RX ADMIN — METOCLOPRAMIDE SCH: 10 TABLET ORAL at 12:27

## 2020-08-19 RX ADMIN — PANTOPRAZOLE SODIUM SCH MG: 40 TABLET, DELAYED RELEASE ORAL at 07:52

## 2020-08-19 RX ADMIN — LEVOFLOXACIN SCH MG: 500 TABLET, FILM COATED ORAL at 21:34

## 2020-08-19 RX ADMIN — DULOXETINE SCH MG: 60 CAPSULE, DELAYED RELEASE ORAL at 07:53

## 2020-08-19 RX ADMIN — DIVALPROEX SODIUM SCH MG: 250 TABLET, DELAYED RELEASE ORAL at 17:07

## 2020-08-19 RX ADMIN — ATORVASTATIN CALCIUM SCH MG: 20 TABLET, FILM COATED ORAL at 21:34

## 2020-08-19 RX ADMIN — LACOSAMIDE SCH MG: 50 TABLET, FILM COATED ORAL at 21:34

## 2020-08-19 RX ADMIN — CEFAZOLIN SCH: 330 INJECTION, POWDER, FOR SOLUTION INTRAMUSCULAR; INTRAVENOUS at 05:15

## 2020-08-19 RX ADMIN — ASPIRIN 81 MG CHEWABLE TABLET SCH MG: 81 TABLET CHEWABLE at 07:52

## 2020-08-19 RX ADMIN — INSULIN ASPART SCH UNIT: 100 INJECTION, SOLUTION INTRAVENOUS; SUBCUTANEOUS at 21:34

## 2020-08-19 RX ADMIN — INSULIN ASPART SCH UNIT: 100 INJECTION, SOLUTION INTRAVENOUS; SUBCUTANEOUS at 17:08

## 2020-08-19 RX ADMIN — LOSARTAN POTASSIUM SCH MG: 50 TABLET, FILM COATED ORAL at 07:54

## 2020-08-19 RX ADMIN — CEFAZOLIN SCH MLS/HR: 330 INJECTION, POWDER, FOR SOLUTION INTRAMUSCULAR; INTRAVENOUS at 19:45

## 2020-08-19 RX ADMIN — DIVALPROEX SODIUM SCH MG: 250 TABLET, DELAYED RELEASE ORAL at 21:34

## 2020-08-19 RX ADMIN — METOCLOPRAMIDE SCH: 10 TABLET ORAL at 17:03

## 2020-08-19 RX ADMIN — ENOXAPARIN SODIUM SCH MG: 40 INJECTION SUBCUTANEOUS at 07:53

## 2020-08-19 RX ADMIN — CEFAZOLIN SCH MLS/HR: 330 INJECTION, POWDER, FOR SOLUTION INTRAMUSCULAR; INTRAVENOUS at 17:07

## 2020-08-19 RX ADMIN — METOCLOPRAMIDE SCH: 10 TABLET ORAL at 07:34

## 2020-08-19 RX ADMIN — INSULIN ASPART SCH: 100 INJECTION, SOLUTION INTRAVENOUS; SUBCUTANEOUS at 07:32

## 2020-08-19 RX ADMIN — FAMOTIDINE SCH MG: 20 TABLET, FILM COATED ORAL at 07:54

## 2020-08-19 NOTE — P.PN
Subjective


Progress Note Date: 08/19/20





Patient was seen for a follow-up.  Patient was undergoing EEG at that time.  

Patient very alert and awake.  Feeling better.





Objective





- Vital Signs


Vital signs: 


                                   Vital Signs











Temp  98.4 F   08/19/20 14:54


 


Pulse  97   08/19/20 14:54


 


Resp  20   08/19/20 14:54


 


BP  118/67   08/19/20 14:54


 


Pulse Ox  100   08/19/20 14:54








                                 Intake & Output











 08/18/20 08/19/20 08/19/20





 18:59 06:59 18:59


 


Intake Total 240  


 


Balance 240  


 


Intake:   


 


  Oral 240  


 


Other:   


 


  Voiding Method Diaper Diaper Diaper


 


  # Voids 1 2 1


 


  # Bowel Movements 0  














- Exam





Deferred.





- Labs


CBC & Chem 7: 


                                 08/19/20 06:44





                                 08/19/20 06:44


Labs: 


                  Abnormal Lab Results - Last 24 Hours (Table)











  08/18/20 08/19/20 08/19/20 Range/Units





  20:18 01:36 01:57 


 


RBC     (3.80-5.40)  m/uL


 


Hgb     (11.4-16.0)  gm/dL


 


Hct     (34.0-46.0)  %


 


MCHC     (31.0-37.0)  g/dL


 


Chloride     ()  mmol/L


 


BUN     (7-17)  mg/dL


 


POC Glucose (mg/dL)  234 H  44 L  63 L  (75-99)  mg/dL


 


Total Protein     (6.3-8.2)  g/dL


 


Albumin     (3.5-5.0)  g/dL














  08/19/20 08/19/20 08/19/20 Range/Units





  02:09 04:25 06:44 


 


RBC    2.89 L  (3.80-5.40)  m/uL


 


Hgb    8.5 L  (11.4-16.0)  gm/dL


 


Hct    27.7 L  (34.0-46.0)  %


 


MCHC    30.7 L  (31.0-37.0)  g/dL


 


Chloride     ()  mmol/L


 


BUN     (7-17)  mg/dL


 


POC Glucose (mg/dL)  105 H  149 H   (75-99)  mg/dL


 


Total Protein     (6.3-8.2)  g/dL


 


Albumin     (3.5-5.0)  g/dL














  08/19/20 08/19/20 08/19/20 Range/Units





  06:44 06:49 11:32 


 


RBC     (3.80-5.40)  m/uL


 


Hgb     (11.4-16.0)  gm/dL


 


Hct     (34.0-46.0)  %


 


MCHC     (31.0-37.0)  g/dL


 


Chloride  108 H    ()  mmol/L


 


BUN  24 H    (7-17)  mg/dL


 


POC Glucose (mg/dL)   103 H  318 H  (75-99)  mg/dL


 


Total Protein  5.2 L    (6.3-8.2)  g/dL


 


Albumin  2.4 L    (3.5-5.0)  g/dL














  08/19/20 Range/Units





  16:39 


 


RBC   (3.80-5.40)  m/uL


 


Hgb   (11.4-16.0)  gm/dL


 


Hct   (34.0-46.0)  %


 


MCHC   (31.0-37.0)  g/dL


 


Chloride   ()  mmol/L


 


BUN   (7-17)  mg/dL


 


POC Glucose (mg/dL)  283 H  (75-99)  mg/dL


 


Total Protein   (6.3-8.2)  g/dL


 


Albumin   (3.5-5.0)  g/dL














Assessment and Plan


Assessment: 





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  


* History of right MCA territory CVA, with left hemiplegia.


* Diabetes, poorly controlled.


* History of chronic heavy tobacco use in the past.


Plan: 





* EEG was significantly abnormal with presence of continuous focal slowing and 

  frequent epileptiform discharges involving the right temporal region.  This is

  suggestive of focal cortical neuronal dysfunction with underlying cortical 

  irritability and tendency for seizure.  This patient has predisposition for 

  complex partial and secondary generalized seizures.  


* Patient currently is on Depakote 250 mg 3 times a day, and level is 

  therapeutic 56.1.  We will add Vimpat 50 mg twice a day.  Suggest patient 

  follow-up with neurologist locally for management of seizure disorder.


* CTA of head and neck are normal.


* Clear for discharge in a.m. if stays stable overnight.

## 2020-08-19 NOTE — EEG
ELECTROENCEPHALOGRAM REPORT



DATE OF SERVICE:

08/19/2020.



PREAMBLE:

This is a 60-year-old female with altered mental status, possible seizure.  This
study

is performed to evaluate for any epileptiform activity.



EEG FINDINGS:

This is a 21 channel routine EEG recording patient utilizing 10/20 international
system

with referential and bipolar montages.  The background consists of well-
developed and

regulated moderate voltage activity seen mainly in the left hemispheric region 
in 8-9

hertz alpha, and reactive to eye opening and closing.  There is continuous 
polymorphic

delta slowing in the right frontotemporal region.  Frequent right mid temporal 
sharp

and slow waves were seen.  No electrographic seizure was recorded.  Different 
stages of

sleep were not seen.  Photic driving response was not seen.



IMPRESSION:

This is an abnormal EEG due to presence of continuous focal slowing and frequent
epileptiform discharges

involving the right temporal region.  This is suggestive of focal cortical 
neuronal dysfunction 

with cortical irritability and tendency for seizures.  This patient has a

predisposition for complex partial and secondary generalized seizures.

Clinical correlation is recommended.





MMHUNG / MIKAYLA: [id number] / Job#: [id number]

ANNALEE

## 2020-08-19 NOTE — P.PN
Subjective


Progress Note Date: 08/19/20











Morenita Ramirez, is a 60-year-old female who was brought in to [facility] emergency room via EMS, was elevated blood glucose of 542, elevated BUN

and creatinine of 49 and 1.54 and positive ketones, patient was started on IV 

fluid, IV insulin drip and was admitted to intensive care unit for diabetic 

ketoacidosis.


Patient was complaining of nausea, otherwise she denies any complaints there is 

no fever or chills no headache or dizziness no chest pain no shortness of breath

no cough no abdominal pain no diarrhea no burning with urination no frequency or

urgency and no hematuria.


Patient has known history of diabetes mellitus type 1 she also has known history

of peripheral vascular disease with previous history of toe amputation, history 

of bipolar disorder, history of hyperlipidemia, and history of anxiety disorder








On 08/14/2020, patient was seen and examined in the ICU, she is alert and 

oriented 3 in no apparent distress, she is complaining of nausea and vomiting 

and very poor oral intake, otherwise she denies any complaints, there is no 

fever or chills no headache or dizziness no chest pain no shortness of breath no

cough no abdominal pain no diarrhea no burning with urination no frequency or 

urgency and no hematuria.








On 08/15/2020 patient was seen and examined in the ICU she is alert and oriented

3 in no distress she had an episode of hypoglycemia this morning her glucose 

was slightly elevated and she received 5 units of NovoLog before her breakfast 

however patient refused to eat breakfast and subsequently she had an episode of 

hypoglycemia.  Case was discussed in details with her nurse, at this time will 

check glucose prior to each meal but give the sliding scale insulin only after 

she eats at least 50% of her meal otherwise cancel the insulin dose and recheck 

again prior to the next meal.  Patient is still complaining of some nausea 

otherwise no vomiting she has some abdominal pain no fever or chills no headache

or dizziness no cough no chest pain no shortness of breath no diarrhea no 

burning with urination no frequency or urgency and no hematuria








On 08/16/2020 patient was seen and examined on the medical floor she is alert 

and oriented 3 in no apparent distress there is no fever or chills no headache 

or dizziness no chest pain no shortness of breath no cough no nausea or vomiting

no abdominal pain no diarrhea no burning with urination no frequency or urgency 

and no hematuria, glucose levels are better controlled there are no new episodes

of hypoglycemia.





On 08/17/2020 patient was seen and examined on the medical floor she is alert 

and oriented 3 in no distress there is no fever or chills no headache or 

dizziness no chest pain no shortness of breath no cough, neck pain and vomiting 

has improved significantly patient is tolerating diet, there is no diarrhea no 

burning with urination no frequency or urgency and no hematuria.





On 08/18/2020 patient was seen and examined on the medical floor, I came to see 

this patient in the morning and she was obtunded and nonresponsive which is a 

major change from her condition yesterday glucose level was checked stat and was

112 code stroke was called patient underwent computed tomography scan of the 

brain which did not reveal any evidence of acute intracranial abnormality, 

possibly patient had a seizure with postictal state, neurology consultation was 

requested and seizure precautions initiated.





On 08/19/2020 patient was seen and examined on the medical floor she is alert 

and oriented 3 in no apparent distress until status improved significantly 

since yesterday there is no fever or chills no headache or dizziness no chest 

pain no shortness of breath no cough no nausea or vomiting no abdominal pain no 

diarrhea no burning with urination no frequency or urgency and no hematuria.


At this time we are awaiting EEG results and input from neurology to assess if 

patient should be continued on Depakote or have any other seizure medications.  

Possible discharge to home tomorrow. 





Objective





- Vital Signs


Vital signs: 


                                   Vital Signs











Temp  98.4 F   08/19/20 14:54


 


Pulse  97   08/19/20 14:54


 


Resp  20   08/19/20 14:54


 


BP  118/67   08/19/20 14:54


 


Pulse Ox  100   08/19/20 14:54








                                 Intake & Output











 08/18/20 08/19/20 08/19/20





 18:59 06:59 18:59


 


Intake Total 240  


 


Balance 240  


 


Intake:   


 


  Oral 240  


 


Other:   


 


  Voiding Method Diaper Diaper Diaper


 


  # Voids 1 2 1


 


  # Bowel Movements 0  














- Exam








In general patient is obtunded, responsive to stimuli, nonverbal,  in no 

apparent distress


HEENT head normocephalic and atraumatic


Neck is supple no JVD no goiter no lymphadenopathy


Chest exam reveals a few scattered rhonchi no wheezing


Cardiac exam reveals regular heart sounds no gallops no murmurs


Abdomen is soft nontender no organomegaly with normal bowel sounds


Extremity exam reveals no edema no cyanosis or clubbing








- Labs


CBC & Chem 7: 


                                 08/19/20 06:44





                                 08/19/20 06:44


Labs: 


                  Abnormal Lab Results - Last 24 Hours (Table)











  08/18/20 08/18/20 08/19/20 Range/Units





  16:47 20:18 01:36 


 


RBC     (3.80-5.40)  m/uL


 


Hgb     (11.4-16.0)  gm/dL


 


Hct     (34.0-46.0)  %


 


MCHC     (31.0-37.0)  g/dL


 


Chloride     ()  mmol/L


 


BUN     (7-17)  mg/dL


 


POC Glucose (mg/dL)  150 H  234 H  44 L  (75-99)  mg/dL


 


Total Protein     (6.3-8.2)  g/dL


 


Albumin     (3.5-5.0)  g/dL














  08/19/20 08/19/20 08/19/20 Range/Units





  01:57 02:09 04:25 


 


RBC     (3.80-5.40)  m/uL


 


Hgb     (11.4-16.0)  gm/dL


 


Hct     (34.0-46.0)  %


 


MCHC     (31.0-37.0)  g/dL


 


Chloride     ()  mmol/L


 


BUN     (7-17)  mg/dL


 


POC Glucose (mg/dL)  63 L  105 H  149 H  (75-99)  mg/dL


 


Total Protein     (6.3-8.2)  g/dL


 


Albumin     (3.5-5.0)  g/dL














  08/19/20 08/19/20 08/19/20 Range/Units





  06:44 06:44 06:49 


 


RBC  2.89 L    (3.80-5.40)  m/uL


 


Hgb  8.5 L    (11.4-16.0)  gm/dL


 


Hct  27.7 L    (34.0-46.0)  %


 


MCHC  30.7 L    (31.0-37.0)  g/dL


 


Chloride   108 H   ()  mmol/L


 


BUN   24 H   (7-17)  mg/dL


 


POC Glucose (mg/dL)    103 H  (75-99)  mg/dL


 


Total Protein   5.2 L   (6.3-8.2)  g/dL


 


Albumin   2.4 L   (3.5-5.0)  g/dL














  08/19/20 Range/Units





  11:32 


 


RBC   (3.80-5.40)  m/uL


 


Hgb   (11.4-16.0)  gm/dL


 


Hct   (34.0-46.0)  %


 


MCHC   (31.0-37.0)  g/dL


 


Chloride   ()  mmol/L


 


BUN   (7-17)  mg/dL


 


POC Glucose (mg/dL)  318 H  (75-99)  mg/dL


 


Total Protein   (6.3-8.2)  g/dL


 


Albumin   (3.5-5.0)  g/dL














Assessment and Plan


Plan: 





1.  Diabetic ketoacidosis


2.  Underlying history of diabetes mellitus type 1 maintained on insulin


3.  Underlying history of peripheral vascular disease


4.  Underlying history of hyperlipidemia


5.  Underlying history of bipolar disorder and anxiety disorder


6.  Gastroesophageal reflux disease


7.  Nausea and vomiting likely related to gastroparesis Will consult 

gastroenterology, continue with Reglan and Zofran at this time


8.  Mental status changes, with somnolence, awaiting urology input.





At this time patient is maintained on IV subcu insulin


Consult gastroenterology for nausea and vomiting


For DVT prophylaxis patient on subcu Lovenox


For GI prophylaxis patient on famotidine


DC Springer catheter, possible discharge to home tomorrow


Will follow closely

## 2020-08-20 VITALS — SYSTOLIC BLOOD PRESSURE: 108 MMHG | HEART RATE: 82 BPM | DIASTOLIC BLOOD PRESSURE: 66 MMHG | RESPIRATION RATE: 16 BRPM

## 2020-08-20 VITALS — TEMPERATURE: 97.7 F

## 2020-08-20 LAB
GLUCOSE BLD-MCNC: 201 MG/DL (ref 75–99)
GLUCOSE BLD-MCNC: 353 MG/DL (ref 75–99)
GLUCOSE BLD-MCNC: 388 MG/DL (ref 75–99)
GLUCOSE BLD-MCNC: 416 MG/DL (ref 75–99)

## 2020-08-20 RX ADMIN — ENOXAPARIN SODIUM SCH MG: 40 INJECTION SUBCUTANEOUS at 08:10

## 2020-08-20 RX ADMIN — LOSARTAN POTASSIUM SCH: 50 TABLET, FILM COATED ORAL at 08:10

## 2020-08-20 RX ADMIN — LACOSAMIDE SCH MG: 50 TABLET, FILM COATED ORAL at 08:10

## 2020-08-20 RX ADMIN — ASPIRIN 81 MG CHEWABLE TABLET SCH MG: 81 TABLET CHEWABLE at 08:10

## 2020-08-20 RX ADMIN — PANTOPRAZOLE SODIUM SCH MG: 40 TABLET, DELAYED RELEASE ORAL at 08:10

## 2020-08-20 RX ADMIN — INSULIN ASPART SCH UNIT: 100 INJECTION, SOLUTION INTRAVENOUS; SUBCUTANEOUS at 08:09

## 2020-08-20 RX ADMIN — FAMOTIDINE SCH MG: 20 TABLET, FILM COATED ORAL at 08:10

## 2020-08-20 RX ADMIN — DULOXETINE SCH MG: 60 CAPSULE, DELAYED RELEASE ORAL at 08:10

## 2020-08-20 RX ADMIN — METOCLOPRAMIDE SCH MG: 10 TABLET ORAL at 12:08

## 2020-08-20 RX ADMIN — METOCLOPRAMIDE SCH MG: 10 TABLET ORAL at 08:11

## 2020-08-20 RX ADMIN — DIVALPROEX SODIUM SCH MG: 250 TABLET, DELAYED RELEASE ORAL at 08:11

## 2020-08-20 RX ADMIN — CEFAZOLIN SCH: 330 INJECTION, POWDER, FOR SOLUTION INTRAMUSCULAR; INTRAVENOUS at 12:08

## 2020-08-20 RX ADMIN — INSULIN ASPART SCH UNIT: 100 INJECTION, SOLUTION INTRAVENOUS; SUBCUTANEOUS at 12:08

## 2020-08-20 NOTE — CDI
Documentation Clarification Form



Date: 08/20/2020 01:24:57 PM

From: Thalia Pena CCS, CCDS

Phone:  [phone]

MRN: [id number]

Admit Date: 08/12/2020 02:26:00 PM

Patient Name: Morenita Ramirez

Visit Number: [id number]

Discharge Date:  





ATTENTION: The Clinical Documentation Specialists (CDI) and [facility] Coding Staff 
appreciate your assistance in clarifying documentation. Please respond to the 
clarification below the line at the bottom and electronically sign. The CDI & 
[facility] Coding staff will review the response and follow-up if needed. Please note: 
Queries are made part of the Legal Health Record. If you have any questions, 
please contact the author of this message via ITS.



Dr. Saniya Cabral: 



Chronic Kidney Disease is documented in the 8/13 Pulmonary Consult & subsequent 
8/14 & 8/15 Progress Notes in the patient's history.  



History/Risk Factors: Brittle DM I with previous admissions for DKA, Diabetic 
Foot Ulcers, PVD status post toe amputation, CAD, COPD, DVT, Hyperlipidemia, 
Hypertension, Osteomyelitis, CVA, Bipolar & Anxiety.   



Clinical Indicators: Patient presented to the ED on 8/12 with abdominal pain, 
nausea & vomiting. Recently discharged from the hospital, treated for 
hyperglycemia. Admitted with DM I with DKA. 

Patients Historical BUN/CR/GFR 1/11/2018 labs: GFR 53 - >60

GFR this admit: 8/12: 37 - 8/19: 63   



Treatment: IV fluid 1,000 @ 999 mls/hr Q1H, IV Zofran, IV Insulin, IV Kcl

Nephrology is not consulted on this admission.  



In order to capture the severity of condition, please clarify the stage of the 
CKD, if known:



    CKD is ruled out

    CKD Stage 2 (GFR 60-89)

    CKD Stage 3 (GFR 30-59)

    Other, please specify  ___________

    Unable to determine





(Last Revision: February 2020)

___________________________________________________________________________

CKD is ruled out

MTDD

## 2020-08-20 NOTE — P.PN
Subjective


Progress Note Date: 08/20/20





Patient was seen for a follow-up.  Patient was seen earlier before her 

discharge.  Patient very much alert and awake.  In no distress.  Patient 

speaking clearly.  Denies any side effects of Vimpat.  No seizures reported 

overnight.





Objective





- Vital Signs


Vital signs: 


                                   Vital Signs











Temp  97.7 F   08/20/20 14:07


 


Pulse  82   08/20/20 14:07


 


Resp  16   08/20/20 14:07


 


BP  108/66   08/20/20 14:07


 


Pulse Ox  98   08/20/20 14:07








                                 Intake & Output











 08/19/20 08/20/20 08/20/20





 18:59 06:59 18:59


 


Intake Total   480


 


Balance   480


 


Weight   51.1 kg


 


Intake:   


 


  Oral   480


 


Other:   


 


  Voiding Method Diaper Diaper Diaper





   Incontinent


 


  # Voids 2 2 2














- Exam





Patient is alert and awake in no distress.  Speech is mildly dysarthric but his 

baseline.  Continues to be left hemiplegic.





- Labs


CBC & Chem 7: 


                                 08/19/20 06:44





                                 08/19/20 06:44


Labs: 


                  Abnormal Lab Results - Last 24 Hours (Table)











  08/19/20 08/19/20 08/20/20 Range/Units





  16:39 20:38 01:20 


 


POC Glucose (mg/dL)  283 H  186 H  416 H  (75-99)  mg/dL














  08/20/20 08/20/20 08/20/20 Range/Units





  05:03 07:08 11:21 


 


POC Glucose (mg/dL)  388 H  353 H  201 H  (75-99)  mg/dL














Assessment and Plan


Assessment: 





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  


* History of right MCA territory CVA, with left hemiplegia.


* Diabetes, poorly controlled.


* History of chronic heavy tobacco use in the past.


Plan: 





* EEG was significantly abnormal with presence of continuous focal slowing and 

  frequent epileptiform discharges involving the right temporal region.  This is

  suggestive of focal cortical neuronal dysfunction with underlying cortical 

  irritability and tendency for seizure.  This patient has predisposition for 

  complex partial and secondary generalized seizures.  


* Patient currently is on Depakote 250 mg 3 times a day, and level is 

  therapeutic 56.1.  Vimpat 50 mg twice a day has been added to the regimen.  So

  far patient tolerating medication well.  Suggest patient follow-up with 

  neurologist locally for management of seizure disorder.


* CTA of head and neck are normal.


* Clear for discharge from neurology point.

## 2020-08-22 NOTE — CDI
Documentation Clarification Form



Date: 8/22/20

From: Ernestina Genao

Phone: If you have a question about this query, please contact [place]e [place], 
 at [phone] between 8am and 5pm.

Admit Date: 8/12/20

Discharge Date:8/20/20

Patient Name: Morenita Ramirez

Visit Number: [id number]



ATTENTION: The Clinical Documentation Specialists (CDI) and [facility] Coding Staff 
appreciate your assistance in clarifying documentation. Please respond to the 
clarification below the line at the bottom and electronically sign. The CDI & 
[facility] Coding staff will review the response and follow-up if needed. Please note: 
Queries are made part of the Legal Health Record. If you have any questions, 
please contact the author of this message via ITS.



Dear Dr. Cabral



Episode of altered mental status was documented in the neurology consult note.  
Patient admitted with DKA and probably had a complex partial seizure on 8/18.  
Patient has post stroke epilepsy per neurology 8/19 & 8/20 progress notes.



History/Risk Factors: History of CVA with left hemiplegia, seizure years ago, 
DKA, occlusion of right internal carotid artery, hypoglycemia

Clinical Indicators: altered mental status with unresponsiveness

CT:  Brain w/o contrast 8/18/20:  No intracranial hemorrhage, midline shift or 
mass effect.  Unchanged encephalomalacia of the right frontal, parietal and 
temporal lobes.

CTA: Head and neck 8/18/20:  Not significantly changed versus 11/28/17, with 
chronic occlusion of the right internal carotid artery and diminutive right 
intracranial anterior circulation with old large right sided encephalomalacia.

EEG 8/19/20:  Abnormal EEG due to presence of continuous focal slowing and 
frequent epileptiform discharges involving the right temporal region.  This is 
suggestive of focal cortical neuronal dysfunction with cortical irritability and
tendency for seizures.  This patient has a predisposition for complex partial 
and secondary generalized seizures.

Treatment: Depakote 250 mg tid, added Vimpat 50 mg bid



In your professional opinion, please clarify the etiology of the Altered Mental 
Status, if known.



Delirium (specify cause): ___________________________________

Encephalopathy (specify Type and Underlying Medical Illness)___________

Other condition (please specify)_________________

Unable to determine

________________________________________________________________________Delirium

MTDD

## 2020-08-22 NOTE — CDI
Documentation Clarification Form



Date: 8/22/20

From: Ernestina Genao

Phone: If you have a question about this query, please contact Tracee [place] 
 at [phone] between 8am and 5pm.

Admit Date: 8/12/20

Discharge Date:8/20/20

Patient Name:  Morenita Ramirez

Visit Number: [id number]



ATTENTION: The Clinical Documentation Specialists (CDI) and [facility] Coding Staff 
appreciate your assistance in clarifying documentation. Please respond to the 
clarification below the line at the bottom and electronically sign. The CDI & 
[facility] Coding staff will review the response and follow-up if needed. Please note: 
Queries are made part of the Legal Health Record. If you have any questions, 
please contact the author of this message via ITS.



Dear Dr. Cabral



Conflicting documentation has been found in the medical record:  You have 
documented type 1 DM in your documentation.  Dr. Barcenas documented longstanding 
history of type 2 diabetes mellitus in his consult note.  Documentation in Dr. Lance's consult note states patient has a history of diabetes for last 6 years



History/Risk Factors: Diabetic ketoacidosis, brittle diabetes, insulin dependent

Clinical Indicators: Elevated glucose, positive ketones, hypoglycemia

Treatment: IV Insulin drip, IV fluids



In your opinion, what is the most clinically appropriate diagnosis for this 
patient?



Type I diabetes mellitus

Type II diabetes mellitus  

Other explanation of clinical findings

Unable to determine (no explanation for clinical findings)

___________________________________________________________________________

Type I diabetes Mellitus

MTDD

## 2020-08-30 ENCOUNTER — HOSPITAL ENCOUNTER (INPATIENT)
Dept: HOSPITAL 47 - EC | Age: 60
LOS: 5 days | Discharge: HOME HEALTH | DRG: 638 | End: 2020-09-04
Attending: INTERNAL MEDICINE | Admitting: INTERNAL MEDICINE
Payer: COMMERCIAL

## 2020-08-30 VITALS — BODY MASS INDEX: 31 KG/M2

## 2020-08-30 DIAGNOSIS — Z89.411: ICD-10-CM

## 2020-08-30 DIAGNOSIS — E78.5: ICD-10-CM

## 2020-08-30 DIAGNOSIS — L97.511: ICD-10-CM

## 2020-08-30 DIAGNOSIS — E03.9: ICD-10-CM

## 2020-08-30 DIAGNOSIS — Z98.41: ICD-10-CM

## 2020-08-30 DIAGNOSIS — M86.9: ICD-10-CM

## 2020-08-30 DIAGNOSIS — Z87.891: ICD-10-CM

## 2020-08-30 DIAGNOSIS — M19.90: ICD-10-CM
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
## 2020-08-30 DIAGNOSIS — Z98.42: ICD-10-CM

## 2020-08-30 DIAGNOSIS — I25.10: ICD-10-CM

## 2020-08-30 DIAGNOSIS — Z90.710: ICD-10-CM

## 2020-08-30 DIAGNOSIS — E10.42: ICD-10-CM

## 2020-08-30 DIAGNOSIS — E10.628: Primary | ICD-10-CM

## 2020-08-30 DIAGNOSIS — E10.319: ICD-10-CM

## 2020-08-30 DIAGNOSIS — I70.203: ICD-10-CM

## 2020-08-30 DIAGNOSIS — K21.9: ICD-10-CM

## 2020-08-30 DIAGNOSIS — L03.115: ICD-10-CM

## 2020-08-30 DIAGNOSIS — Z96.1: ICD-10-CM

## 2020-08-30 DIAGNOSIS — Z79.899: ICD-10-CM

## 2020-08-30 DIAGNOSIS — Z79.4: ICD-10-CM

## 2020-08-30 DIAGNOSIS — Z82.5: ICD-10-CM

## 2020-08-30 DIAGNOSIS — I25.2: ICD-10-CM

## 2020-08-30 DIAGNOSIS — I11.0: ICD-10-CM

## 2020-08-30 DIAGNOSIS — Z90.49: ICD-10-CM

## 2020-08-30 DIAGNOSIS — S92.311A: ICD-10-CM

## 2020-08-30 DIAGNOSIS — Z83.3: ICD-10-CM

## 2020-08-30 DIAGNOSIS — E10.69: ICD-10-CM

## 2020-08-30 DIAGNOSIS — F31.9: ICD-10-CM

## 2020-08-30 DIAGNOSIS — J44.9: ICD-10-CM

## 2020-08-30 DIAGNOSIS — E10.621: ICD-10-CM

## 2020-08-30 DIAGNOSIS — Z98.890: ICD-10-CM

## 2020-08-30 DIAGNOSIS — Z87.01: ICD-10-CM

## 2020-08-30 DIAGNOSIS — Z82.49: ICD-10-CM

## 2020-08-30 DIAGNOSIS — Z88.1: ICD-10-CM

## 2020-08-30 DIAGNOSIS — Z95.5: ICD-10-CM

## 2020-08-30 DIAGNOSIS — R32: ICD-10-CM

## 2020-08-30 DIAGNOSIS — Z86.14: ICD-10-CM

## 2020-08-30 DIAGNOSIS — Z71.3: ICD-10-CM

## 2020-08-30 DIAGNOSIS — I50.9: ICD-10-CM

## 2020-08-30 DIAGNOSIS — D64.9: ICD-10-CM

## 2020-08-30 DIAGNOSIS — F41.9: ICD-10-CM

## 2020-08-30 DIAGNOSIS — N39.0: ICD-10-CM

## 2020-08-30 DIAGNOSIS — I95.9: ICD-10-CM

## 2020-08-30 DIAGNOSIS — B95.7: ICD-10-CM

## 2020-08-30 DIAGNOSIS — Z79.82: ICD-10-CM

## 2020-08-30 DIAGNOSIS — E10.65: ICD-10-CM

## 2020-08-30 DIAGNOSIS — Z88.5: ICD-10-CM

## 2020-08-30 DIAGNOSIS — Z87.448: ICD-10-CM

## 2020-08-30 DIAGNOSIS — Z89.421: ICD-10-CM

## 2020-08-30 DIAGNOSIS — I69.354: ICD-10-CM

## 2020-08-30 DIAGNOSIS — I65.23: ICD-10-CM

## 2020-08-30 DIAGNOSIS — Z86.718: ICD-10-CM

## 2020-08-30 DIAGNOSIS — Z88.8: ICD-10-CM

## 2020-08-30 DIAGNOSIS — Z99.3: ICD-10-CM

## 2020-08-30 DIAGNOSIS — G93.89: ICD-10-CM

## 2020-08-30 DIAGNOSIS — G40.909: ICD-10-CM

## 2020-08-30 DIAGNOSIS — Z91.030: ICD-10-CM

## 2020-08-30 LAB
ALBUMIN SERPL-MCNC: 2.6 G/DL (ref 3.5–5)
ALP SERPL-CCNC: 59 U/L (ref 38–126)
ALT SERPL-CCNC: 14 U/L (ref 4–34)
ANION GAP SERPL CALC-SCNC: 3 MMOL/L
APTT BLD: 22.8 SEC (ref 22–30)
AST SERPL-CCNC: 17 U/L (ref 14–36)
BASOPHILS # BLD AUTO: 0 K/UL (ref 0–0.2)
BASOPHILS NFR BLD AUTO: 1 %
BUN SERPL-SCNC: 32 MG/DL (ref 7–17)
CALCIUM SPEC-MCNC: 8 MG/DL (ref 8.4–10.2)
CHLORIDE SERPL-SCNC: 109 MMOL/L (ref 98–107)
CK SERPL-CCNC: 25 U/L (ref 30–135)
CO2 SERPL-SCNC: 26 MMOL/L (ref 22–30)
EOSINOPHIL # BLD AUTO: 0.1 K/UL (ref 0–0.7)
EOSINOPHIL NFR BLD AUTO: 1 %
ERYTHROCYTE [DISTWIDTH] IN BLOOD BY AUTOMATED COUNT: 2.63 M/UL (ref 3.8–5.4)
ERYTHROCYTE [DISTWIDTH] IN BLOOD: 15.2 % (ref 11.5–15.5)
GLUCOSE BLD-MCNC: 127 MG/DL (ref 75–99)
GLUCOSE SERPL-MCNC: 120 MG/DL (ref 74–99)
GLUCOSE UR QL: (no result)
HCT VFR BLD AUTO: 24.7 % (ref 34–46)
HGB BLD-MCNC: 7.9 GM/DL (ref 11.4–16)
HYALINE CASTS UR QL AUTO: 6 /LPF (ref 0–2)
INR PPP: 1 (ref ?–1.2)
LYMPHOCYTES # SPEC AUTO: 2.3 K/UL (ref 1–4.8)
LYMPHOCYTES NFR SPEC AUTO: 47 %
MCH RBC QN AUTO: 29.9 PG (ref 25–35)
MCHC RBC AUTO-ENTMCNC: 31.8 G/DL (ref 31–37)
MCV RBC AUTO: 93.9 FL (ref 80–100)
MONOCYTES # BLD AUTO: 0.2 K/UL (ref 0–1)
MONOCYTES NFR BLD AUTO: 5 %
NEUTROPHILS # BLD AUTO: 2.3 K/UL (ref 1.3–7.7)
NEUTROPHILS NFR BLD AUTO: 46 %
PH UR: 6 [PH] (ref 5–8)
PLATELET # BLD AUTO: 184 K/UL (ref 150–450)
POTASSIUM SERPL-SCNC: 4.4 MMOL/L (ref 3.5–5.1)
PROT SERPL-MCNC: 5.3 G/DL (ref 6.3–8.2)
PT BLD: 10 SEC (ref 9–12)
RBC UR QL: 2 /HPF (ref 0–5)
SODIUM SERPL-SCNC: 138 MMOL/L (ref 137–145)
SP GR UR: 1.01 (ref 1–1.03)
SQUAMOUS UR QL AUTO: <1 /HPF (ref 0–4)
UROBILINOGEN UR QL STRIP: <2 MG/DL (ref ?–2)
WBC # BLD AUTO: 4.9 K/UL (ref 3.8–10.6)
WBC # UR AUTO: 77 /HPF (ref 0–5)

## 2020-08-30 PROCEDURE — 87086 URINE CULTURE/COLONY COUNT: CPT

## 2020-08-30 PROCEDURE — 71046 X-RAY EXAM CHEST 2 VIEWS: CPT

## 2020-08-30 PROCEDURE — 80048 BASIC METABOLIC PNL TOTAL CA: CPT

## 2020-08-30 PROCEDURE — 86140 C-REACTIVE PROTEIN: CPT

## 2020-08-30 PROCEDURE — 85025 COMPLETE CBC W/AUTO DIFF WBC: CPT

## 2020-08-30 PROCEDURE — 82009 KETONE BODYS QUAL: CPT

## 2020-08-30 PROCEDURE — 80053 COMPREHEN METABOLIC PANEL: CPT

## 2020-08-30 PROCEDURE — 85610 PROTHROMBIN TIME: CPT

## 2020-08-30 PROCEDURE — 93880 EXTRACRANIAL BILAT STUDY: CPT

## 2020-08-30 PROCEDURE — 87077 CULTURE AEROBIC IDENTIFY: CPT

## 2020-08-30 PROCEDURE — 82550 ASSAY OF CK (CPK): CPT

## 2020-08-30 PROCEDURE — 99285 EMERGENCY DEPT VISIT HI MDM: CPT

## 2020-08-30 PROCEDURE — 82565 ASSAY OF CREATININE: CPT

## 2020-08-30 PROCEDURE — 82947 ASSAY GLUCOSE BLOOD QUANT: CPT

## 2020-08-30 PROCEDURE — 96365 THER/PROPH/DIAG IV INF INIT: CPT

## 2020-08-30 PROCEDURE — 80202 ASSAY OF VANCOMYCIN: CPT

## 2020-08-30 PROCEDURE — 85730 THROMBOPLASTIN TIME PARTIAL: CPT

## 2020-08-30 PROCEDURE — 96361 HYDRATE IV INFUSION ADD-ON: CPT

## 2020-08-30 PROCEDURE — 70450 CT HEAD/BRAIN W/O DYE: CPT

## 2020-08-30 PROCEDURE — 93005 ELECTROCARDIOGRAM TRACING: CPT

## 2020-08-30 PROCEDURE — 87186 SC STD MICRODIL/AGAR DIL: CPT

## 2020-08-30 PROCEDURE — 81001 URINALYSIS AUTO W/SCOPE: CPT

## 2020-08-30 PROCEDURE — 82140 ASSAY OF AMMONIA: CPT

## 2020-08-30 PROCEDURE — 85652 RBC SED RATE AUTOMATED: CPT

## 2020-08-30 PROCEDURE — 82330 ASSAY OF CALCIUM: CPT

## 2020-08-30 PROCEDURE — 84484 ASSAY OF TROPONIN QUANT: CPT

## 2020-08-30 PROCEDURE — 36415 COLL VENOUS BLD VENIPUNCTURE: CPT

## 2020-08-30 PROCEDURE — 96366 THER/PROPH/DIAG IV INF ADDON: CPT

## 2020-08-30 PROCEDURE — 87040 BLOOD CULTURE FOR BACTERIA: CPT

## 2020-08-30 PROCEDURE — 80164 ASSAY DIPROPYLACETIC ACD TOT: CPT

## 2020-08-30 RX ADMIN — CEFAZOLIN SCH MLS/HR: 330 INJECTION, POWDER, FOR SOLUTION INTRAMUSCULAR; INTRAVENOUS at 23:08

## 2020-08-30 NOTE — ED
Altered Mental Status HPI





- General


Chief Complaint: Altered Mental Status


Stated Complaint: Seizure


Time Seen by Provider: 20 21:20


Source: patient, EMS


Mode of arrival: EMS


Limitations: altered mental status





- History of Present Illness


Initial Comments: 





Patient is a 60-year-old female with past medical history of CVA, diabetes, 

hypertension, seizure disorder who presents to the emergency department after an

episode of altered mental status.  He was reported the patient was found 

unresponsive at home.  The episode was unwitnessed.  When EMS arrived they found

that the patient was only responsive to tactile stimuli however she was 

nonverbal.  Blood pressure was notably low.  She was transferred to the hospital

became more alert upon transfer.  Vitals were obtained and she continued to be 

hypotensive.  She was given a 200 mL bolus.  Patient arising cannot provide a 

history.  States that she doesn't know what happened.  Brother also arrives to 

the hospital and states that he was not present when the patient had this 

episode.  Physical exam demonstrates the patient does have a red, swollen right 

foot where there are previous amputations.  Patient states that he has been more

red in nature.  She denies cough or hemoptysis.  No melanic stools or 

hematochezia.  No changes in her urination.  Denies any headaches or visual 

changes.  No trauma noted.  The remainder of the HPI is limited because she is a

very poor historian and family at bedside cannot provide history





- Related Data


                                Home Medications











 Medication  Instructions  Recorded  Confirmed


 


Famotidine [Pepcid] 20 mg PO DAILY 16


 


HYDROcodone/APAP 10-325MG [Norco 1 tab PO TID PRN 17





]   


 


DULoxetine HCL [Cymbalta] 60 mg PO DAILY 17


 


Atorvastatin [Lipitor] 20 mg PO DAILY 19


 


QUEtiapine [SEROquel] 100 mg PO HS 19


 


Aspirin [Adult Low Dose Aspirin EC] 81 mg PO DAILY 01/10/20 08/31/20


 


Ferrous Sulfate [Iron (65  mg PO DAILY 01/10/20 08/31/20





Elemental)]   


 


INSULIN LISPRO (humaLOG) [humaLOG] See Protocol SQ ACHS 03/15/20 08/31/20


 


ALPRAZolam [Xanax] 1 mg PO Q8H PRN 20


 


Albuterol Sulfate [Ventolin HFA] 2 puff INHALATION RT-Q4H PRN 20


 


Ondansetron Odt [Zofran ODT] 4 mg PO DAILY PRN 20


 


Valproic Acid [Depakene] 250 mg PO DAILY 20


 


Pantoprazole Sodium [Protonix] 20 mg PO DAILY 20


 


QUEtiapine [SEROquel] 25 mg PO BID 20


 


Ascorbic Acid [Vitamin C] 500 mg PO DAILY 20


 


Insulin Glargine,Hum.rec.anlog 15 unit SQ HS 20





[Basaglar Kwikpen U-100]   


 


Vitamin B Complex 1 tab PO DAILY 20








                                  Previous Rx's











 Medication  Instructions  Recorded


 


Divalproex [Depakote] 250 mg PO TID #90 tablet. 20


 


Lacosamide [Vimpat] 50 mg PO BID  tablet 20


 


Losartan [Cozaar] 50 mg PO DAILY  tab 20


 


Metoclopramide [Reglan] 10 mg PO AC-TID  tab 20











                                    Allergies











Allergy/AdvReac Type Severity Reaction Status Date / Time


 


Barbiturates Allergy  Rash/Hives Verified 20 10:04


 


cephalexin monohydrate Allergy  Rash/Hives Verified 20 10:04





[From Keflex]     


 


morphine Allergy  Rash/Hives Verified 20 10:04


 


Penicillins Allergy  Rash/Hives Verified 20 10:04


 


phenobarbital Allergy  Swelling Verified 20 10:04


 


venom-honey bee Allergy  Swelling Verified 20 10:04





[bee venom (honey bee)]     


 


amlodipine besylate AdvReac  Vomiting Verified 20 10:04





[From Norvasc]     














Review of Systems


ROS Statement: 


Those systems with pertinent positive or pertinent negative responses have been 

documented in the HPI.





ROS Other: All systems not noted in ROS Statement are negative.





Past Medical History


Past Medical History: Asthma, Coronary Artery Disease (CAD), Chest Pain / 

Angina, Heart Failure, COPD, CVA/TIA, Diabetes Mellitus, Deep Vein Thrombosis 

(DVT), Eye Disorder, GERD/Reflux, Hyperlipidemia, Hypertension, Myocardial 

Infarction (MI), Neurologic Disorder, Osteoarthritis (OA), Pneumonia, Renal 

Disease


Additional Past Medical History / Comment(s): IDDM (brittle), DKAs, neuropathy 

bilateral hands/feet, retinopathy bilateral eyes, cellulitis R foot, R great toe

and 2nd toe infections/amputations, current wound R foot-being seen in [facility], 

renal failure, anemia, CVAs with L sided paralysis, headaches started after 

CVAs, brain lesions, DVT R axillae, low back pain, varicosities, seizure many 

years ago (), hypothyroid, constipation, bilateral tinnitis occasionally, 

sinus problems.


Last Myocardial Infarction Date:: 


History of Any Multi-Drug Resistant Organisms: MRSA


Date of last positivie culture/infection: 18


MDRO Source:: Right Foot


Past Surgical History: Appendectomy,  Section, Cholecystectomy, Heart 

Catheterization With Stent, Hysterectomy, Orthopedic Surgery


Additional Past Surgical History / Comment(s): PCI with multiple stents, R great

toe and 2nd toe amps, debridements R foot ulcer, L shoulder surgery to remove 

bone, bronchoscopy, EGD, colonoscopy, R arm port since removed, bilateral 

cataract removals/lens implants.


Past Anesthesia/Blood Transfusion Reactions: No Reported Reaction


Additional Past Anesthesia/Blood Transfusion Reaction / Comment(s): HX OF BLOOD 

TRANSFUSION- NO REACTION


Date of Last Stent Placement:: 2013


Past Psychological History: Anxiety, Bipolar, Depression


Smoking Status: Never smoker


Past Alcohol Use History: None Reported


Past Drug Use History: Marijuana





- Past Family History


  ** Father


Family Medical History: Unable to Obtain, Coronary Artery Disease (CAD), 

Diabetes Mellitus





  ** Mother


Family Medical History: COPD





General Exam


Limitations: altered mental status


General appearance: alert, in no apparent distress


Head exam: Present: atraumatic, normocephalic, normal inspection


Eye exam: Present: normal appearance, PERRL, EOMI.  Absent: scleral icterus, 

conjunctival injection, periorbital swelling


ENT exam: Present: mucous membranes dry


Neck exam: Present: normal inspection.  Absent: tenderness, meningismus, lymp

hadenopathy


Respiratory exam: Present: normal lung sounds bilaterally.  Absent: respiratory 

distress, wheezes, rales, rhonchi, stridor


Cardiovascular Exam: Present: regular rate, normal rhythm, normal heart sounds. 

Absent: systolic murmur, diastolic murmur, rubs, gallop, clicks


GI/Abdominal exam: Present: soft, normal bowel sounds.  Absent: distended, 

tenderness, guarding, rebound, rigid


Extremities exam: Present: other (previous amputations right foot. erythema and 

edema over medial aspect of digit overlying abrasions. 2+ DP and PT pulses)





Course


                                   Vital Signs











  20





  21:17 21:23 22:23


 


Temperature 97.3 F L  


 


Pulse Rate 69 70 59 L


 


Respiratory 16 16 16





Rate   


 


Blood Pressure 67/55 99/48 111/60


 


O2 Sat by Pulse 99 100 99





Oximetry   














  20





  23:08 01:00 02:03


 


Temperature   


 


Pulse Rate 57 L 52 L 85


 


Respiratory 16 16 16





Rate   


 


Blood Pressure 93/51 88/49 102/56


 


O2 Sat by Pulse 99 99 96





Oximetry   














Medical Decision Making





- Medical Decision Making





Upon arrival the patient was placed into trauma bay 1.  A thorough history and 

physical exam was performed.  Blood pressure is noted to be 67/55.  Patient is 

given a liter bolus of normal saline with rapid improvement in blood pressure 

and mentation.  Laboratory studies were conducted.  Patient went for an x-ray of

her right foot and a CT of her brain.  Hemoglobin noted to be 7.9.  Patient is 

chronically anemic.  Urinalysis does demonstrate 77 white blood cells and 

moderate leukocyte esterase.  Valproic acid level is subtherapeutic.  Acetone 

negative.  Foot x-ray does demonstrate diffuse soft tissue swelling suggesting 

cellulitis.  Multiple prior toe amputations.  Comminuted fracture the first 

metatarsal shaft.  Fracture margins are ill-defined and erosive change relating 

to superimposed Govind myelitis is not excluded.  Suspected small needle 

fragments retained within the heel and also along the plantar soft tissues the 

fourth toe.  Blood cultures obtained.  Patient is initiated on Vanco.  She does 

have multiple antibiotic ALLERGIES.  I did recommend hospital admission for the 

patient did agree to.  Discussed case with Dr. Torres who accepted admission.  

Patient's currently awaiting a bed on the floor





- Lab Data


Result diagrams: 


                                 20 05:35





                                 20 05:35


                                   Lab Results











  08/30/20 08/30/20 08/30/20 Range/Units





  21:18 21:36 21:36 


 


WBC   4.9   (3.8-10.6)  k/uL


 


RBC   2.63 L   (3.80-5.40)  m/uL


 


Hgb   7.9 L   (11.4-16.0)  gm/dL


 


Hct   24.7 L   (34.0-46.0)  %


 


MCV   93.9   (80.0-100.0)  fL


 


MCH   29.9   (25.0-35.0)  pg


 


MCHC   31.8   (31.0-37.0)  g/dL


 


RDW   15.2   (11.5-15.5)  %


 


Plt Count   184   (150-450)  k/uL


 


Neutrophils %   46   %


 


Lymphocytes %   47   %


 


Monocytes %   5   %


 


Eosinophils %   1   %


 


Basophils %   1   %


 


Neutrophils #   2.3   (1.3-7.7)  k/uL


 


Lymphocytes #   2.3   (1.0-4.8)  k/uL


 


Monocytes #   0.2   (0-1.0)  k/uL


 


Eosinophils #   0.1   (0-0.7)  k/uL


 


Basophils #   0.0   (0-0.2)  k/uL


 


PT    10.0  (9.0-12.0)  sec


 


INR    1.0  (<1.2)  


 


APTT    22.8  (22.0-30.0)  sec


 


Sodium     (137-145)  mmol/L


 


Potassium     (3.5-5.1)  mmol/L


 


Chloride     ()  mmol/L


 


Carbon Dioxide     (22-30)  mmol/L


 


Anion Gap     mmol/L


 


BUN     (7-17)  mg/dL


 


Creatinine     (0.52-1.04)  mg/dL


 


Est GFR (CKD-EPI)AfAm     (>60 ml/min/1.73 sqM)  


 


Est GFR (CKD-EPI)NonAf     (>60 ml/min/1.73 sqM)  


 


Glucose     (74-99)  mg/dL


 


POC Glucose (mg/dL)  127 H    (75-99)  mg/dL


 


POC Glu Operater ID  Benson Vick    


 


Calcium     (8.4-10.2)  mg/dL


 


Total Bilirubin     (0.2-1.3)  mg/dL


 


AST     (14-36)  U/L


 


ALT     (4-34)  U/L


 


Alkaline Phosphatase     ()  U/L


 


Ammonia     (<30)  umol/L


 


Creatine Kinase     ()  U/L


 


Troponin I     (0.000-0.034)  ng/mL


 


C-Reactive Protein     (<10.0)  mg/L


 


Total Protein     (6.3-8.2)  g/dL


 


Albumin     (3.5-5.0)  g/dL


 


Urine Color     


 


Urine Appearance     (Clear)  


 


Urine pH     (5.0-8.0)  


 


Ur Specific Gravity     (1.001-1.035)  


 


Urine Protein     (Negative)  


 


Urine Glucose (UA)     (Negative)  


 


Urine Ketones     (Negative)  


 


Urine Blood     (Negative)  


 


Urine Nitrite     (Negative)  


 


Urine Bilirubin     (Negative)  


 


Urine Urobilinogen     (<2.0)  mg/dL


 


Ur Leukocyte Esterase     (Negative)  


 


Urine RBC     (0-5)  /hpf


 


Urine WBC     (0-5)  /hpf


 


Ur Squamous Epith Cells     (0-4)  /hpf


 


Hyaline Casts     (0-2)  /lpf


 


Valproic Acid     ug/mL


 


Acetone, Qual     (Negative)  














  20 Range/Units





  21:36 21:36 21:36 


 


WBC     (3.8-10.6)  k/uL


 


RBC     (3.80-5.40)  m/uL


 


Hgb     (11.4-16.0)  gm/dL


 


Hct     (34.0-46.0)  %


 


MCV     (80.0-100.0)  fL


 


MCH     (25.0-35.0)  pg


 


MCHC     (31.0-37.0)  g/dL


 


RDW     (11.5-15.5)  %


 


Plt Count     (150-450)  k/uL


 


Neutrophils %     %


 


Lymphocytes %     %


 


Monocytes %     %


 


Eosinophils %     %


 


Basophils %     %


 


Neutrophils #     (1.3-7.7)  k/uL


 


Lymphocytes #     (1.0-4.8)  k/uL


 


Monocytes #     (0-1.0)  k/uL


 


Eosinophils #     (0-0.7)  k/uL


 


Basophils #     (0-0.2)  k/uL


 


PT     (9.0-12.0)  sec


 


INR     (<1.2)  


 


APTT     (22.0-30.0)  sec


 


Sodium   138   (137-145)  mmol/L


 


Potassium   4.4   (3.5-5.1)  mmol/L


 


Chloride   109 H   ()  mmol/L


 


Carbon Dioxide   26   (22-30)  mmol/L


 


Anion Gap   3   mmol/L


 


BUN   32 H   (7-17)  mg/dL


 


Creatinine   1.11 H   (0.52-1.04)  mg/dL


 


Est GFR (CKD-EPI)AfAm   63   (>60 ml/min/1.73 sqM)  


 


Est GFR (CKD-EPI)NonAf   55   (>60 ml/min/1.73 sqM)  


 


Glucose   120 H   (74-99)  mg/dL


 


POC Glucose (mg/dL)     (75-99)  mg/dL


 


POC Glu Operater ID     


 


Calcium   8.0 L   (8.4-10.2)  mg/dL


 


Total Bilirubin   0.1 L   (0.2-1.3)  mg/dL


 


AST   17   (14-36)  U/L


 


ALT   14   (4-34)  U/L


 


Alkaline Phosphatase   59   ()  U/L


 


Ammonia     (<30)  umol/L


 


Creatine Kinase   25 L   ()  U/L


 


Troponin I    <0.012  (0.000-0.034)  ng/mL


 


C-Reactive Protein   <5.0   (<10.0)  mg/L


 


Total Protein   5.3 L   (6.3-8.2)  g/dL


 


Albumin   2.6 L   (3.5-5.0)  g/dL


 


Urine Color  Yellow    


 


Urine Appearance  Clear    (Clear)  


 


Urine pH  6.0    (5.0-8.0)  


 


Ur Specific Gravity  1.013    (1.001-1.035)  


 


Urine Protein  Negative    (Negative)  


 


Urine Glucose (UA)  2+ H    (Negative)  


 


Urine Ketones  Negative    (Negative)  


 


Urine Blood  Negative    (Negative)  


 


Urine Nitrite  Negative    (Negative)  


 


Urine Bilirubin  Negative    (Negative)  


 


Urine Urobilinogen  <2.0    (<2.0)  mg/dL


 


Ur Leukocyte Esterase  Moderate H    (Negative)  


 


Urine RBC  2    (0-5)  /hpf


 


Urine WBC  77 H    (0-5)  /hpf


 


Ur Squamous Epith Cells  <1    (0-4)  /hpf


 


Hyaline Casts  6 H    (0-2)  /lpf


 


Valproic Acid     ug/mL


 


Acetone, Qual   Negative   (Negative)  














  20 Range/Units





  21:36 21:36 


 


WBC    (3.8-10.6)  k/uL


 


RBC    (3.80-5.40)  m/uL


 


Hgb    (11.4-16.0)  gm/dL


 


Hct    (34.0-46.0)  %


 


MCV    (80.0-100.0)  fL


 


MCH    (25.0-35.0)  pg


 


MCHC    (31.0-37.0)  g/dL


 


RDW    (11.5-15.5)  %


 


Plt Count    (150-450)  k/uL


 


Neutrophils %    %


 


Lymphocytes %    %


 


Monocytes %    %


 


Eosinophils %    %


 


Basophils %    %


 


Neutrophils #    (1.3-7.7)  k/uL


 


Lymphocytes #    (1.0-4.8)  k/uL


 


Monocytes #    (0-1.0)  k/uL


 


Eosinophils #    (0-0.7)  k/uL


 


Basophils #    (0-0.2)  k/uL


 


PT    (9.0-12.0)  sec


 


INR    (<1.2)  


 


APTT    (22.0-30.0)  sec


 


Sodium    (137-145)  mmol/L


 


Potassium    (3.5-5.1)  mmol/L


 


Chloride    ()  mmol/L


 


Carbon Dioxide    (22-30)  mmol/L


 


Anion Gap    mmol/L


 


BUN    (7-17)  mg/dL


 


Creatinine    (0.52-1.04)  mg/dL


 


Est GFR (CKD-EPI)AfAm    (>60 ml/min/1.73 sqM)  


 


Est GFR (CKD-EPI)NonAf    (>60 ml/min/1.73 sqM)  


 


Glucose    (74-99)  mg/dL


 


POC Glucose (mg/dL)    (75-99)  mg/dL


 


POC Glu Operater ID    


 


Calcium    (8.4-10.2)  mg/dL


 


Total Bilirubin    (0.2-1.3)  mg/dL


 


AST    (14-36)  U/L


 


ALT    (4-34)  U/L


 


Alkaline Phosphatase    ()  U/L


 


Ammonia  <9   (<30)  umol/L


 


Creatine Kinase    ()  U/L


 


Troponin I    (0.000-0.034)  ng/mL


 


C-Reactive Protein    (<10.0)  mg/L


 


Total Protein    (6.3-8.2)  g/dL


 


Albumin    (3.5-5.0)  g/dL


 


Urine Color    


 


Urine Appearance    (Clear)  


 


Urine pH    (5.0-8.0)  


 


Ur Specific Gravity    (1.001-1.035)  


 


Urine Protein    (Negative)  


 


Urine Glucose (UA)    (Negative)  


 


Urine Ketones    (Negative)  


 


Urine Blood    (Negative)  


 


Urine Nitrite    (Negative)  


 


Urine Bilirubin    (Negative)  


 


Urine Urobilinogen    (<2.0)  mg/dL


 


Ur Leukocyte Esterase    (Negative)  


 


Urine RBC    (0-5)  /hpf


 


Urine WBC    (0-5)  /hpf


 


Ur Squamous Epith Cells    (0-4)  /hpf


 


Hyaline Casts    (0-2)  /lpf


 


Valproic Acid   48.3  ug/mL


 


Acetone, Qual    (Negative)  














- EKG Data


EKG Comments: 





EKG demonstrates normal sinus rhythm with a ventricular rate of 68.  LA interval

148.  QRS 82.  QTC of 484.  Prolonged QT.  No acute ST segment elevations or 

depressions





Disposition


Clinical Impression: 


 Diabetic foot infection, Acute encephalopathy, Acute hypotension





Disposition: ADMITTED AS IP TO THIS HOSP


Condition: Stable


Is patient prescribed a controlled substance at d/c from ED?: No


Decision to Admit Reason: Admit from EC


Decision Date: 20


Decision Time: 22:46

## 2020-08-30 NOTE — CT
EXAMINATION TYPE: CT brain wo con

 

DATE OF EXAM: 8/30/2020

 

COMPARISON: 3/15/2020

 

HISTORY: 60 year-old female altered mental status, confusion, possible seizure.

 

TECHNIQUE:  Examination was done in axial plane without intravenous contrast.  Coronal and sagittal r
econstructions performed.

 

CT DLP: 1047.4 mGycm

Automated exposure control for dose reduction was used.

 

FINDINGS:

There is no evidence of  acute intracranial hemorrhage, acute ischemic changes, mass, mass-effect, or
 extra-axial fluid collection.  There is no effacement of cerebral sulci or basal subarachnoid cister
ns.  There is no hydrocephalus.  There is no midline shift.  Gray-white matter distinction is preserv
ed.

 

Paranasal sinuses and mastoid air cells are well pneumatized. Orbits and globes are intact.

 

Stable central cerebral atrophy and mild ventriculomegaly. Large area of encephalomalacia right front
al parietal and temporal lobes unchanged. No mass effect or midline shift. Scattered dystrophic calci
fications are unchanged.

 

No evidence for acute intracranial hemorrhage.

 

No effacement of basal subarachnoid cisterns. No extra axial fluid collection.

 

 

IMPRESSION:

 

1. Unchanged large area of encephalomalacia right MCA distribution suggesting prior large infarct.

2. Stable mild ventriculomegaly which could related to central cerebral atrophy or a component of NPH
. Clinically correlate. No acute intracranial abnormality seen.

## 2020-08-30 NOTE — XR
EXAMINATION TYPE: XR chest 2V, XR foot complete 3 views RT

 

DATE OF EXAM: 8/30/2020

 

COMPARISON: Chest 8/15/2020

 

HISTORY: 60 year-old female altered mental status, low blood pressure, confusion, right foot infectio
n and pain.

 

 

FINDINGS:  

Chest:

Heart upper limits of normal in size. Mild interstitial prominence is unchanged. Patient is rotated t
oward the left. No consolidation or pleural effusion seen.

 

Right foot:

Diffuse soft tissue swelling of the foot. Prior amputation of most of the fifth ray. A fragment of th
e proximal phalangeal base and the proximal metatarsal remains. At the second ray, there is some resi
dual bone relating to the proximal phalangeal base. The remainder of the toe is absent.

 

There is a comminuted fracture of the proximal first metatarsal shaft and at the level of the metatar
sal head and neck area the big toe is otherwise absent. Fracture margins are ill-defined and some ero
sive changes difficult to exclude.

 

There is a 8 mm linear density retained in the plantar heel soft tissues. Additional retained 7 mm lo
ng density in the plantar fourth toe soft tissues.

 

 

 

 

IMPRESSION:  

 

1. Chest: Rotated exam. Chronic appearing changes without acute process.

 

2. Right foot: Diffuse soft tissue swelling suggesting cellulitis. Multiple prior toe amputations. Th
ere is comminuted fracture of the first metatarsal shaft. Some of these fracture margins are ill-defi
lópez and erosive change relating to superimposed osteomyelitis is not excluded. Direct comparison to a
ny available outside priors may be helpful to assess for any interval changes.

 

3. Right foot: Suspect small needle fragments retained within the planter heel soft tissues and also 
along the plantar soft tissues of the fourth toe.

## 2020-08-31 LAB
ANION GAP SERPL CALC-SCNC: 3 MMOL/L
BASOPHILS # BLD AUTO: 0 K/UL (ref 0–0.2)
BASOPHILS NFR BLD AUTO: 1 %
BUN SERPL-SCNC: 25 MG/DL (ref 7–17)
CALCIUM SPEC-MCNC: 7.9 MG/DL (ref 8.4–10.2)
CHLORIDE SERPL-SCNC: 116 MMOL/L (ref 98–107)
CO2 SERPL-SCNC: 22 MMOL/L (ref 22–30)
EOSINOPHIL # BLD AUTO: 0.1 K/UL (ref 0–0.7)
EOSINOPHIL NFR BLD AUTO: 2 %
ERYTHROCYTE [DISTWIDTH] IN BLOOD BY AUTOMATED COUNT: 3.02 M/UL (ref 3.8–5.4)
ERYTHROCYTE [DISTWIDTH] IN BLOOD: 15.2 % (ref 11.5–15.5)
GLUCOSE BLD-MCNC: 275 MG/DL (ref 75–99)
GLUCOSE BLD-MCNC: 559 MG/DL (ref 75–99)
GLUCOSE BLD-MCNC: 598 MG/DL (ref 75–99)
GLUCOSE BLD-MCNC: 93 MG/DL (ref 75–99)
GLUCOSE BLD-MCNC: >600 MG/DL (ref 75–99)
GLUCOSE SERPL-MCNC: 80 MG/DL (ref 74–99)
HCT VFR BLD AUTO: 29.5 % (ref 34–46)
HGB BLD-MCNC: 8.9 GM/DL (ref 11.4–16)
LYMPHOCYTES # SPEC AUTO: 2.5 K/UL (ref 1–4.8)
LYMPHOCYTES NFR SPEC AUTO: 59 %
MCH RBC QN AUTO: 29.4 PG (ref 25–35)
MCHC RBC AUTO-ENTMCNC: 30.1 G/DL (ref 31–37)
MCV RBC AUTO: 97.9 FL (ref 80–100)
MONOCYTES # BLD AUTO: 0.2 K/UL (ref 0–1)
MONOCYTES NFR BLD AUTO: 4 %
NEUTROPHILS # BLD AUTO: 1.4 K/UL (ref 1.3–7.7)
NEUTROPHILS NFR BLD AUTO: 33 %
PLATELET # BLD AUTO: 171 K/UL (ref 150–450)
POTASSIUM SERPL-SCNC: 4.8 MMOL/L (ref 3.5–5.1)
SODIUM SERPL-SCNC: 141 MMOL/L (ref 137–145)
WBC # BLD AUTO: 4.2 K/UL (ref 3.8–10.6)

## 2020-08-31 RX ADMIN — SODIUM CHLORIDE SCH MLS/HR: 9 INJECTION, SOLUTION INTRAVENOUS at 15:29

## 2020-08-31 RX ADMIN — DULOXETINE SCH MG: 60 CAPSULE, DELAYED RELEASE ORAL at 15:28

## 2020-08-31 RX ADMIN — INSULIN ASPART SCH: 100 INJECTION, SOLUTION INTRAVENOUS; SUBCUTANEOUS at 17:48

## 2020-08-31 RX ADMIN — LACOSAMIDE SCH MG: 50 TABLET, FILM COATED ORAL at 20:52

## 2020-08-31 RX ADMIN — DIVALPROEX SODIUM SCH MG: 250 TABLET, DELAYED RELEASE ORAL at 17:11

## 2020-08-31 RX ADMIN — METOCLOPRAMIDE SCH MG: 10 TABLET ORAL at 17:10

## 2020-08-31 RX ADMIN — ASPIRIN 81 MG CHEWABLE TABLET SCH MG: 81 TABLET CHEWABLE at 15:28

## 2020-08-31 RX ADMIN — INSULIN DETEMIR SCH UNIT: 100 INJECTION, SOLUTION SUBCUTANEOUS at 20:54

## 2020-08-31 RX ADMIN — LOSARTAN POTASSIUM SCH MG: 50 TABLET, FILM COATED ORAL at 15:28

## 2020-08-31 RX ADMIN — DIVALPROEX SODIUM SCH MG: 250 TABLET, DELAYED RELEASE ORAL at 20:53

## 2020-08-31 RX ADMIN — FAMOTIDINE SCH MG: 20 TABLET, FILM COATED ORAL at 15:28

## 2020-08-31 NOTE — P.CONS
History of Present Illness





- Reason for Consult


Consult date: 20


Right foot cellulitis


Requesting physician: Saniya Cabral





- Chief Complaint


Mental status changes unresponsive x one day





- History of Present Illness


Patient is 60-year-old  female with a past medical history significant 

for right diabetic foot infection requiring amputation of her toes and prolonged

IV antibiotic therapy, patient who was brought into the ER at [facility] for evaluation of episode of mental status changes patient currently 

was found to be unresponsive at home when EMS arrived at home patient was 

noticed to be mildly milliliters positive tactile stimuli however the patient 

was normal but he was noticed to be hypotensive requiring 200 mL fluid bolus on 

arrival to the hospital patient was awake and did not remember what happened 

patient was noticed to have a red swollen right foot.  The patient has been 

previous amputation patient also have x-rays of the foot which did show diffuse 

swelling suggestive of cellulitis patient was started on vancomycin and admitted

to the hospital for nonspecific patient denies having any pain to the right foot

area the currently there is no open wound or any drainage.  Denies having had no

chest pain shortness of breath or cough.  No diarrhea








Review of Systems


Positive point has been  mentioned in the HPI rest of the systems are negative








Past Medical History


Past Medical History: Asthma, Coronary Artery Disease (CAD), Chest Pain / 

Angina, Heart Failure, COPD, CVA/TIA, Diabetes Mellitus, Deep Vein Thrombosis 

(DVT), Eye Disorder, GERD/Reflux, Hyperlipidemia, Hypertension, Myocardial 

Infarction (MI), Neurologic Disorder, Osteoarthritis (OA), Pneumonia, Renal 

Disease


Additional Past Medical History / Comment(s): IDDM (brittle), DKAs, neuropathy 

bilateral hands/feet, retinopathy bilateral eyes, cellulitis R foot, R great toe

and 2nd toe infections/amputations, current wound R foot-being seen in [facility], 

renal failure, anemia, CVAs with L sided paralysis, headaches started after 

CVAs, brain lesions, DVT R axillae, low back pain, varicosities, seizure many 

years ago (), hypothyroid, constipation, bilateral tinnitis occasionally, 

sinus problems.


Last Myocardial Infarction Date:: 


History of Any Multi-Drug Resistant Organisms: MRSA


Year Discovered:: 18


MDRO Source:: Right Foot


Past Surgical History: Appendectomy,  Section, Cholecystectomy, Heart 

Catheterization With Stent, Hysterectomy, Orthopedic Surgery


Additional Past Surgical History / Comment(s): PCI with multiple stents, R great

toe and 2nd toe amps, debridements R foot ulcer, L shoulder surgery to remove 

bone, bronchoscopy, EGD, colonoscopy, R arm port since removed, bilateral 

cataract removals/lens implants.


Past Anesthesia/Blood Transfusion Reactions: No Reported Reaction


Additional Past Anesthesia/Blood Transfusion Reaction / Comm: HX OF BLOOD 

TRANSFUSION- NO REACTION


Date of Last Stent Placement:: 2013


Past Psychological History: Anxiety, Bipolar, Depression


Additional Psychological History / Comment(s): Pt has a legal guardian, Noelle Menon, who is pt's sister.  Currently her legal guardian is hospitalized.  Pt 

has a caregiver, Eleanor, who resides with her.  Pt is wheelchair bound d/t CVA 

with L sided paralysis arm and leg.  She has a shower chair and a glucometer.  

Her sister or caregiver drive her to appts.  [email or web address].


Smoking Status: Never smoker


Past Alcohol Use History: None Reported


Additional Past Alcohol Use History / Comment(s): Pt started smoking in  and

quit in .   Using marijuana edibles occasionally but none for a "long time"


Past Drug Use History: Marijuana


Additional Drug Use History / Comment(s): using marijuana edibles





- Past Family History


  ** Father


Family Medical History: Unable to Obtain, Coronary Artery Disease (CAD), 

Diabetes Mellitus





  ** Mother


Family Medical History: COPD





Medications and Allergies


                                Home Medications











 Medication  Instructions  Recorded  Confirmed  Type


 


Famotidine [Pepcid] 20 mg PO DAILY 16 History


 


HYDROcodone/APAP 10-325MG [Norco 1 tab PO TID PRN 17 History





]    


 


DULoxetine HCL [Cymbalta] 60 mg PO DAILY 17 History


 


Atorvastatin [Lipitor] 20 mg PO DAILY 19 History


 


QUEtiapine [SEROquel] 100 mg PO HS 19 History


 


Aspirin [Adult Low Dose Aspirin EC] 81 mg PO DAILY 01/10/20 08/31/20 History


 


Ferrous Sulfate [Iron (65  mg PO DAILY 01/10/20 08/31/20 History





Elemental)]    


 


INSULIN LISPRO (humaLOG) [humaLOG] See Protocol SQ ACHS 03/15/20 08/31/20 

History


 


Divalproex [Depakote] 250 mg PO TID #90 tablet. 20 Rx


 


ALPRAZolam [Xanax] 1 mg PO Q8H PRN 20 History


 


Albuterol Sulfate [Ventolin HFA] 2 puff INHALATION RT-Q4H PRN 20 

History


 


Ondansetron Odt [Zofran ODT] 4 mg PO DAILY PRN 20 History


 


Valproic Acid [Depakene] 250 mg PO DAILY 20 History


 


Pantoprazole Sodium [Protonix] 20 mg PO DAILY 20 History


 


QUEtiapine [SEROquel] 25 mg PO BID 20 History


 


Lacosamide [Vimpat] 50 mg PO BID  tablet 20 Rx


 


Losartan [Cozaar] 50 mg PO DAILY  tab 20 Rx


 


Metoclopramide [Reglan] 10 mg PO AC-TID  tab 20 Rx


 


Ascorbic Acid [Vitamin C] 500 mg PO DAILY 20 History


 


Insulin Glargine,Hum.rec.anlog 15 unit SQ HS 20 History





[Basaglar Kwikpen U-100]    


 


Vitamin B Complex 1 tab PO DAILY 20 History








                                    Allergies











Allergy/AdvReac Type Severity Reaction Status Date / Time


 


Barbiturates Allergy  Rash/Hives Verified 20 10:04


 


cephalexin monohydrate Allergy  Rash/Hives Verified 20 10:04





[From Keflex]     


 


morphine Allergy  Rash/Hives Verified 20 10:04


 


Penicillins Allergy  Rash/Hives Verified 20 10:04


 


phenobarbital Allergy  Swelling Verified 20 10:04


 


venom-honey bee Allergy  Swelling Verified 20 10:04





[bee venom (honey bee)]     


 


amlodipine besylate AdvReac  Vomiting Verified 20 10:04





[From Norvasc]     














Physical Exam


Vitals: 


                                   Vital Signs











  Temp Pulse Pulse Resp BP BP Pulse Ox


 


 20 20:22  98.6 F   87  18   152/65  98


 


 20 15:25  97.8 F   90  18   141/99  99


 


 20 11:20  97.5 F L   76  18   127/97  99


 


 20 08:15  97.5 F L   79  18   129/95  98


 


 20 03:33  98.2 F   58 L  16   104/55  98


 


 20 03:14  98.2 F   59 L  16   104/55  98


 


 20 02:03   85   16  102/56   96


 


 20 01:00   52 L   16  88/49   99


 


 20 23:08   57 L   16  93/51   99


 


 20 22:23   59 L   16  111/60   99








                                Intake and Output











 20





 06:59 14:59 22:59


 


Intake Total 0 236 120


 


Balance 0 236 120


 


Intake:   


 


  Oral 0 236 120


 


Other:   


 


  Voiding Method Diaper Bedside Commode Bedside Commode





 Incontinent  Bedpan


 


  # Voids  2 2


 


  # Bowel Movements   1


 


  Weight 56.699 kg  











GENERAL DESCRIPTION: Middle-aged female lying in bed, no distress. No tachypnea 

or accessory muscle of respiration use.


HEENT: Shows Pallor , no scleral icterus. Oral mucous membrane is dry. No 

pharyngeal erythema or thrush


NECK: Trachea central, no thyromegaly.


LUNGS: Unlabored breathing.  Decreased breaths in the bases. No wheeze or 

crackle.


HEART: S1, S2, regular rate and rhythm. No loud murmur


ABDOMEN: Soft, no tenderness , guarding or rigidity, no organomegaly


EXTREMITIES: Bilateral currently with no open wound minimal swelling no 

significant redness was noticed or any drainage


SKIN: No rash, no masses palpable.


NEUROLOGICAL: The patient is awake, alert, oriented x2, mood and affect normal.

















Results


CBC & Chem 7: 


                                 20 07:04





                                 20 17:13


Labs: 


                  Abnormal Lab Results - Last 24 Hours (Table)











  20 Range/Units





  21:36 07:03 07:04 


 


RBC    3.02 L  (3.80-5.40)  m/uL


 


Hgb    8.9 L  (11.4-16.0)  gm/dL


 


Hct    29.5 L  (34.0-46.0)  %


 


MCHC    30.1 L  (31.0-37.0)  g/dL


 


Chloride   116 H   ()  mmol/L


 


BUN   25 H   (7-17)  mg/dL


 


Glucose     (74-99)  mg/dL


 


POC Glucose (mg/dL)     (75-99)  mg/dL


 


Calcium   7.9 L   (8.4-10.2)  mg/dL


 


Urine Glucose (UA)  2+ H    (Negative)  


 


Ur Leukocyte Esterase  Moderate H    (Negative)  


 


Urine WBC  77 H    (0-5)  /hpf


 


Hyaline Casts  6 H    (0-2)  /lpf














  20 Range/Units





  11:55 16:54 16:56 


 


RBC     (3.80-5.40)  m/uL


 


Hgb     (11.4-16.0)  gm/dL


 


Hct     (34.0-46.0)  %


 


MCHC     (31.0-37.0)  g/dL


 


Chloride     ()  mmol/L


 


BUN     (7-17)  mg/dL


 


Glucose     (74-99)  mg/dL


 


POC Glucose (mg/dL)  275 H  598 H  >600 H  (75-99)  mg/dL


 


Calcium     (8.4-10.2)  mg/dL


 


Urine Glucose (UA)     (Negative)  


 


Ur Leukocyte Esterase     (Negative)  


 


Urine WBC     (0-5)  /hpf


 


Hyaline Casts     (0-2)  /lpf














  20 Range/Units





  17:13 20:32 20:32 


 


RBC     (3.80-5.40)  m/uL


 


Hgb     (11.4-16.0)  gm/dL


 


Hct     (34.0-46.0)  %


 


MCHC     (31.0-37.0)  g/dL


 


Chloride     ()  mmol/L


 


BUN     (7-17)  mg/dL


 


Glucose  582 H*    (74-99)  mg/dL


 


POC Glucose (mg/dL)   559 H  546 H  (75-99)  mg/dL


 


Calcium     (8.4-10.2)  mg/dL


 


Urine Glucose (UA)     (Negative)  


 


Ur Leukocyte Esterase     (Negative)  


 


Urine WBC     (0-5)  /hpf


 


Hyaline Casts     (0-2)  /lpf








                      Microbiology - Last 24 Hours (Table)











 20 21:36 Urine Culture - Preliminary





 Urine,Clean Catch 














Assessment and Plan


Assessment: 


1- patient presented to the hospital with an episode of unresponsiveness and 

mental status changes this patient currently do not have any fever or elevated 

white count ER physician reported erythema to the right foot, and some swelling 

was also reported by the radiologist however clinically I do not see any 

significant cellulitis to the right foot this patient previous history of ostial

myelitis however those wounds are currently healed


2- Patient with multiple antibiotic ALLERGIES that would limit the number of 

antibiotic safe to use





(1) Cellulitis of right foot


Current Visit: No   Status: Acute   Code(s): L03.115 - CELLULITIS OF RIGHT LOWER

LIMB   SNOMED Code(s): 183073311


   


Plan: 


1- we will check a CRP and sed rate and repeat the CBC tomorrow 


2- Vancomycin pharmacy to dose target trough of 15 while watching her kidney 

function and Vanco trough closely


3- if inflammation markers are normal as well will recommend discontinuation of 

antibiotic therapy as clinically doubt significant cellulitis


We will follow on clinical condition and cultures to further adjust medication 

if needed


Thank you for this consultation will follow this patient with you





Time with Patient: Greater than 30

## 2020-08-31 NOTE — P.HPIM
History of Present Illness


H&P Date: 20





Morenita Ramirez, is a 60-year-old female, who was found unresponsive at home and 

was brought in to [facility] emergency room for evaluation and 

treatment, patient was evaluated in the emergency room, her vital exam reveals a

temperature of 97.3 pulse 69 respiration 16 blood pressure 67/55 and pulse ox 

99% on room air, computed tomography scan of the brain without contrast was done

on presentation and revealed large area of encephalomalacia in the right MCA 

distribution suggestive of previous large infarct and evidence of mild 

ventriculomegaly, patient has a known history of stroke, she also has known 

history of seizure disorder in the past, she did not have any witnessed seizure 

on the day of presentation however she had the sudden change in her mental 

status, patient was admitted to telemetry floor for further evaluation.  And 

neurology consultation.


Patient also has a known history of insulin-dependent diabetes mellitus type 1, 

that is very sensitive to insulin, she has history of anemia and history of 

right foot cellulitis with osteomyelitis with multiple toe amputations, at this 

time there is evidence of new area of erythema in the right foot suggestive of 

acute cellulitis, she was started on IV antibiotic in the emergency room, 

infectious disease and vascular surgery consultation were requested.


Clinically patient at this time is alert and responsive in no apparent distress 

she is denying any complaints.





Past Medical History


Past Medical History: Asthma, Coronary Artery Disease (CAD), Chest Pain / 

Angina, Heart Failure, COPD, CVA/TIA, Diabetes Mellitus, Deep Vein Thrombosis 

(DVT), Eye Disorder, GERD/Reflux, Hyperlipidemia, Hypertension, Myocardial 

Infarction (MI), Neurologic Disorder, Osteoarthritis (OA), Pneumonia, Renal 

Disease


Additional Past Medical History / Comment(s): IDDM (brittle), DKAs, neuropathy 

bilateral hands/feet, retinopathy bilateral eyes, cellulitis R foot, R great toe

and 2nd toe infections/amputations, current wound R foot-being seen in [facility], 

renal failure, anemia, CVAs with L sided paralysis, headaches started after 

CVAs, brain lesions, DVT R axillae, low back pain, varicosities, seizure many 

years ago (), hypothyroid, constipation, bilateral tinnitis occasionally, 

sinus problems.


Last Myocardial Infarction Date:: 


History of Any Multi-Drug Resistant Organisms: MRSA


Date of last positivie culture/infection: 18


MDRO Source:: Right Foot


Past Surgical History: Appendectomy,  Section, Cholecystectomy, Heart Ca

theterization With Stent, Hysterectomy, Orthopedic Surgery


Additional Past Surgical History / Comment(s): PCI with multiple stents, R great

toe and 2nd toe amps, debridements R foot ulcer, L shoulder surgery to remove 

bone, bronchoscopy, EGD, colonoscopy, R arm port since removed, bilateral 

cataract removals/lens implants.


Past Anesthesia/Blood Transfusion Reactions: No Reported Reaction


Additional Past Anesthesia/Blood Transfusion Reaction / Comment(s): HX OF BLOOD 

TRANSFUSION- NO REACTION


Date of Last Stent Placement:: 2013


Past Psychological History: Anxiety, Bipolar, Depression


Additional Psychological History / Comment(s): Pt has a legal guardian, Noelle Menon, who is pt's sister.  Currently her legal guardian is hospitalized.  Pt 

has a caregiver, Eleanor, who resides with her.  Pt is wheelchair bound d/t CVA 

with L sided paralysis arm and leg.  She has a shower chair and a glucometer.  

Her sister or caregiver drive her to appts.  [email or web address].


Smoking Status: Never smoker


Past Alcohol Use History: None Reported


Additional Past Alcohol Use History / Comment(s): Pt started smoking in  and

quit in .   Using marijuana edibles occasionally but none for a "long time"


Past Drug Use History: Marijuana


Additional Drug Use History / Comment(s): using marijuana edibles





- Past Family History


  ** Father


Family Medical History: Unable to Obtain, Coronary Artery Disease (CAD), 

Diabetes Mellitus





  ** Mother


Family Medical History: COPD





Medications and Allergies


                                Home Medications











 Medication  Instructions  Recorded  Confirmed  Type


 


Famotidine [Pepcid] 20 mg PO DAILY 16 History


 


HYDROcodone/APAP 10-325MG [Norco 1 tab PO TID PRN 17 History





]    


 


DULoxetine HCL [Cymbalta] 60 mg PO DAILY 17 History


 


Atorvastatin [Lipitor] 20 mg PO DAILY 19 History


 


QUEtiapine [SEROquel] 100 mg PO HS 19 History


 


Aspirin [Adult Low Dose Aspirin EC] 81 mg PO DAILY 01/10/20 08/31/20 History


 


Ferrous Sulfate [Iron (65  mg PO DAILY 01/10/20 08/31/20 History





Elemental)]    


 


INSULIN LISPRO (humaLOG) [humaLOG] See Protocol SQ ACHS 03/15/20 08/31/20 

History


 


Divalproex [Depakote] 250 mg PO TID #90 tablet. 20 Rx


 


ALPRAZolam [Xanax] 1 mg PO Q8H PRN 20 History


 


Albuterol Sulfate [Ventolin HFA] 2 puff INHALATION RT-Q4H PRN 20 

History


 


Ondansetron Odt [Zofran ODT] 4 mg PO DAILY PRN 20 History


 


Valproic Acid [Depakene] 250 mg PO DAILY 20 History


 


Pantoprazole Sodium [Protonix] 20 mg PO DAILY 20 History


 


QUEtiapine [SEROquel] 25 mg PO BID 20 History


 


Lacosamide [Vimpat] 50 mg PO BID  tablet 20 Rx


 


Losartan [Cozaar] 50 mg PO DAILY  tab 20 Rx


 


Metoclopramide [Reglan] 10 mg PO AC-TID  tab 20 Rx


 


Ascorbic Acid [Vitamin C] 500 mg PO DAILY 20 History


 


Insulin Glargine,Hum.rec.anlog 15 unit SQ HS 20 History





[Basaglar Kwikpen U-100]    


 


Vitamin B Complex 1 tab PO DAILY 20 History








                                    Allergies











Allergy/AdvReac Type Severity Reaction Status Date / Time


 


Barbiturates Allergy  Rash/Hives Verified 20 10:04


 


cephalexin monohydrate Allergy  Rash/Hives Verified 20 10:04





[From Keflex]     


 


morphine Allergy  Rash/Hives Verified 20 10:04


 


Penicillins Allergy  Rash/Hives Verified 20 10:04


 


phenobarbital Allergy  Swelling Verified 20 10:04


 


venom-honey bee Allergy  Swelling Verified 20 10:04





[bee venom (honey bee)]     


 


amlodipine besylate AdvReac  Vomiting Verified 20 10:04





[From Norvasc]     














Physical Exam


Vitals: 


                                   Vital Signs











  Temp Pulse Pulse Resp BP BP Pulse Ox


 


 20 08:15  97.5 F L   79  18   129/95  98


 


 20 03:33  98.2 F   58 L  16   104/55  98


 


 20 03:14  98.2 F   59 L  16   104/55  98


 


 20 02:03   85   16  102/56   96


 


 20 01:00   52 L   16  88/49   99


 


 20 23:08   57 L   16  93/51   99


 


 20 22:23   59 L   16  111/60   99


 


 20 21:23   70   16  99/48   100


 


 20 21:17  97.3 F L  69   16  67/55   99








                                Intake and Output











 20





 22:59 06:59 14:59


 


Intake Total  0 


 


Balance  0 


 


Intake:   


 


  Oral  0 


 


Other:   


 


  Voiding Method  Diaper Bedside Commode





  Incontinent 


 


  Weight 56.699 kg 56.699 kg 














In general patient is alert responsive in no apparent distress


HEENT head normocephalic and atraumatic


Neck is supple no JVD no goiter no lymphadenopathy


Chest exam reveals a few scattered rhonchi no wheezing


Cardiac exam reveals regular heart sounds no gallops no murmurs


Abdomen is soft nontender no organomegaly


Extremity exam reveals no edema no cyanosis or clubbing


Right foot has previous amputation of the great toe there is area of erythema in

the base of the great toe with tenderness





Results


CBC & Chem 7: 


                                 20 07:04





                                 20 17:13


Labs: 


                  Abnormal Lab Results - Last 24 Hours (Table)











  20 Range/Units





  21:18 21:36 21:36 


 


RBC   2.63 L   (3.80-5.40)  m/uL


 


Hgb   7.9 L   (11.4-16.0)  gm/dL


 


Hct   24.7 L   (34.0-46.0)  %


 


MCHC     (31.0-37.0)  g/dL


 


Chloride     ()  mmol/L


 


BUN     (7-17)  mg/dL


 


Creatinine     (0.52-1.04)  mg/dL


 


Glucose     (74-99)  mg/dL


 


POC Glucose (mg/dL)  127 H    (75-99)  mg/dL


 


Calcium     (8.4-10.2)  mg/dL


 


Total Bilirubin     (0.2-1.3)  mg/dL


 


Creatine Kinase     ()  U/L


 


Total Protein     (6.3-8.2)  g/dL


 


Albumin     (3.5-5.0)  g/dL


 


Urine Glucose (UA)    2+ H  (Negative)  


 


Ur Leukocyte Esterase    Moderate H  (Negative)  


 


Urine WBC    77 H  (0-5)  /hpf


 


Hyaline Casts    6 H  (0-2)  /lpf














  20 Range/Units





  21:36 07:03 07:04 


 


RBC    3.02 L  (3.80-5.40)  m/uL


 


Hgb    8.9 L  (11.4-16.0)  gm/dL


 


Hct    29.5 L  (34.0-46.0)  %


 


MCHC    30.1 L  (31.0-37.0)  g/dL


 


Chloride  109 H  116 H   ()  mmol/L


 


BUN  32 H  25 H   (7-17)  mg/dL


 


Creatinine  1.11 H    (0.52-1.04)  mg/dL


 


Glucose  120 H    (74-99)  mg/dL


 


POC Glucose (mg/dL)     (75-99)  mg/dL


 


Calcium  8.0 L  7.9 L   (8.4-10.2)  mg/dL


 


Total Bilirubin  0.1 L    (0.2-1.3)  mg/dL


 


Creatine Kinase  25 L    ()  U/L


 


Total Protein  5.3 L    (6.3-8.2)  g/dL


 


Albumin  2.6 L    (3.5-5.0)  g/dL


 


Urine Glucose (UA)     (Negative)  


 


Ur Leukocyte Esterase     (Negative)  


 


Urine WBC     (0-5)  /hpf


 


Hyaline Casts     (0-2)  /lpf














  20 Range/Units





  11:55 


 


RBC   (3.80-5.40)  m/uL


 


Hgb   (11.4-16.0)  gm/dL


 


Hct   (34.0-46.0)  %


 


MCHC   (31.0-37.0)  g/dL


 


Chloride   ()  mmol/L


 


BUN   (7-17)  mg/dL


 


Creatinine   (0.52-1.04)  mg/dL


 


Glucose   (74-99)  mg/dL


 


POC Glucose (mg/dL)  275 H  (75-99)  mg/dL


 


Calcium   (8.4-10.2)  mg/dL


 


Total Bilirubin   (0.2-1.3)  mg/dL


 


Creatine Kinase   ()  U/L


 


Total Protein   (6.3-8.2)  g/dL


 


Albumin   (3.5-5.0)  g/dL


 


Urine Glucose (UA)   (Negative)  


 


Ur Leukocyte Esterase   (Negative)  


 


Urine WBC   (0-5)  /hpf


 


Hyaline Casts   (0-2)  /lpf








                      Microbiology - Last 24 Hours (Table)











 20 21:36 Urine Culture - Preliminary





 Urine,Clean Catch 














Thrombosis Risk Factor Assmnt





- Choose All That Apply


Any of the Below Risk Factors Present?: Yes


Each Factor Represents 1 point: Age 41-60 years


Other Risk Factors: Yes


Each Risk Factor Represents 3 Points: History of DVT/PE


Other congenital or acquired thrombophilia - If yes, enter type in comment: No


Thrombosis Risk Factor Assessment Total Risk Factor Score: 4


Thrombosis Risk Factor Assessment Level: Moderate Risk





Assessment and Plan


Plan: 





1.  Episode of altered mental status, possible postictal state, neurology 

consultation was requested to assess and review seizure medications


2.  Previous history of  stroke, with area of large encephalomalacic in the 

right MCA distribution.


3.  Previous history of seizure disorder, patient was maintained on Vimpat and 

valproic acid


4. Insulin-dependent diabetes mellitus type 1, maintained on insulin


5.  Evidence of right foot cellulitis, possible osteomyelitis, patient was 

started on IV antibiotic infectious disease consultation was requested





At this time will monitor closely on telemetry floor


Infectious disease consultation and neurology consultation requested


Will follow closely

## 2020-08-31 NOTE — P.CNNES
History of Present Illness


Consult date: 20


Requesting physician: Saniya Cabral


Reason for Consult: concern for possible new seizure since unaroused at home


History of Present Illness: 


This is a 60-year-old female with medical history of stroke in  (right 

frontal, parietal and temporal lobe encephalomalacia) with hemiparesis arm>left 

over the left side, seizure disorder, diabetes, DKA, hypertension, ex tobacco 

use (stopped ) who presented to [facility] emergency department on 

the 2024 episode of being found unresponsive at home.  The episode was 

unwitnessed.  When the EMS arrived and found the patient was only responsive to 

tactile stimuli [place] she was nonverbal.  Her blood pressure was notably low.  

She was stressed to the hospital and she was more alert.  Patient does not 

recall what happened.





I contacted the patient's sister as well as the caretaker for further history.  

Per the caregiver the patient was sitting in the chair and she became 

unresponsive for approximately 10 minutes she was just looking the past the the 

caregiver her eyes were open she did have any gaze deviation.  No jerk in of any

of the extremities no foaming around the mouth no urinary incontinence.  The 

caregiver called the patient's sister will told her to call the emergency 

department.  Per the caregiver she feels like the patient the seizure medication

is not working the Depakote she feels like it's making her shaky.  She is on 250

mg 1 tablet 3 times a day.  She said that she has not missed her medication.  

Currently the patient is on Depakote 250 mg 3 times a day as well as Vimpat 50  

twice a day.  Per the patient's nurse who had her yesterday, she felt the 

patient feet were black in coloration and felt she might be not properly taken c

are of at home and notified the patient's brother of that.





Workup in the hospital consisted of:


Vital signs blood pressure of 67/55 with a heart rate of 69, temperature of 

97.3, pulse ox of  99 at room air.


Brain CT of the head on changed large areas of inflammation over the right MCA 

distribution suggestive of prior large infarct.  Stable mild intra-or megaly 

which could relate to central cerebral atrophy or component of NPH.


Foot x-ray was reported as diffuse soft tissue swelling suggestive of 

cellulitis.  Multiple prior toe amputation.  There is a commuted fracture of the

first metatarsal shaft. 


Chest x-ray was reported as chronic appearing changes without acute process.


Urinalysis was showed that was the color was yellow it appeared that clear the 

nitrite was negative leukocyte esterase was moderate urine white blood cell was 

77.


Depakote level on 2020 was for 8.3 which is subtherapeutic.


On initial presentation her glucose was 127 on the currently her last glucose 

was within 600





Patient was the seen by Neurohospitalist, Dr. Hazel at this facility on 

2020 for a code stroke in which she had the change in level of 

consciousness.  At that time her blood pressure was 76/51.  and did not get TPA 

at that time.  She had CTA of the head and neck which was not changed from 

2017 with chronic occlusion of the right ICA and diminutive right 

intracranial anterior circulation with old large right sided encephalomalacia.


According to Dr. Hazel's note it's mentioned that the patient had a seizure a 

few month ago.  She was placed on Depakote by her primary care physician.


Patient's last routine EEG in our facility was on 2020 which was reported 

as an abnormal EEG due to the presence of continuous focal slowing and frequent 

epileptiform discharges involving the right temporal region.  This is suggestive

focal cortical neuronal dysfunction with cortical mobility and tendency for 

seizure.  This patient has a predisposition for comp with partial and secondary 

generalized seizure.


He recommended to continue Depakote 250 mg 3 times a day which also will help 

with her behavioral issues.  Patient is off of Keppra.




















Review of Systems


Review of system:  The 12 point system was reviewed and apparent positive and 

negative per HPI.








Past Medical History


Past Medical History: Asthma, Coronary Artery Disease (CAD), Chest Pain / 

Angina, Heart Failure, COPD, CVA/TIA, Diabetes Mellitus, Deep Vein Thrombosis 

(DVT), Eye Disorder, GERD/Reflux, Hyperlipidemia, Hypertension, Myocardial 

Infarction (MI), Neurologic Disorder, Osteoarthritis (OA), Pneumonia, Renal 

Disease


Additional Past Medical History / Comment(s): IDDM (brittle), DKAs, neuropathy 

bilateral hands/feet, retinopathy bilateral eyes, cellulitis R foot, R great toe

and 2nd toe infections/amputations, current wound R foot-being seen in [facility], 

renal failure, anemia, CVAs with L sided paralysis, headaches started after 

CVAs, brain lesions, DVT R axillae, low back pain, varicosities, seizure many 

years ago (), hypothyroid, constipation, bilateral tinnitis occasionally, 

sinus problems.


Last Myocardial Infarction Date:: 


History of Any Multi-Drug Resistant Organisms: MRSA


Date of last positivie culture/infection: 18


MDRO Source:: Right Foot


Past Surgical History: Appendectomy,  Section, Cholecystectomy, Heart 

Catheterization With Stent, Hysterectomy, Orthopedic Surgery


Additional Past Surgical History / Comment(s): PCI with multiple stents, R great

toe and 2nd toe amps, debridements R foot ulcer, L shoulder surgery to remove 

bone, bronchoscopy, EGD, colonoscopy, R arm port since removed, bilateral 

cataract removals/lens implants.


Past Anesthesia/Blood Transfusion Reactions: No Reported Reaction


Additional Past Anesthesia/Blood Transfusion Reaction / Comment(s): HX OF BLOOD 

TRANSFUSION- NO REACTION


Date of Last Stent Placement:: 2013


Past Psychological History: Anxiety, Bipolar, Depression


Additional Psychological History / Comment(s): Pt has a legal guardian, Noelle Menon, who is pt's sister.  Currently her legal guardian is hospitalized.  Pt 

has a caregiver, Eleanor, who resides with her.  Pt is wheelchair bound d/t CVA 

with L sided paralysis arm and leg.  She has a shower chair and a glucometer.  

Her sister or caregiver drive her to appts.  [email or web address].


Smoking Status: Never smoker


Past Alcohol Use History: None Reported


Additional Past Alcohol Use History / Comment(s): Pt started smoking in  and

quit in .   Using marijuana edibles occasionally but none for a "long time"


Past Drug Use History: Marijuana


Additional Drug Use History / Comment(s): using marijuana edibles





- Past Family History


  ** Father


Family Medical History: Unable to Obtain, Coronary Artery Disease (CAD), 

Diabetes Mellitus





  ** Mother


Family Medical History: COPD





Medications and Allergies


                                Home Medications











 Medication  Instructions  Recorded  Confirmed  Type


 


Famotidine [Pepcid] 20 mg PO DAILY 16 History


 


HYDROcodone/APAP 10-325MG [Norco 1 tab PO TID PRN 17 History





]    


 


DULoxetine HCL [Cymbalta] 60 mg PO DAILY 17 History


 


Atorvastatin [Lipitor] 20 mg PO DAILY 19 History


 


QUEtiapine [SEROquel] 100 mg PO HS 19 History


 


Aspirin [Adult Low Dose Aspirin EC] 81 mg PO DAILY 01/10/20 08/31/20 History


 


Ferrous Sulfate [Iron (65  mg PO DAILY 01/10/20 08/31/20 History





Elemental)]    


 


INSULIN LISPRO (humaLOG) [humaLOG] See Protocol SQ ACHS 03/15/20 08/31/20 

History


 


Divalproex [Depakote] 250 mg PO TID #90 tablet. 20 Rx


 


ALPRAZolam [Xanax] 1 mg PO Q8H PRN 20 History


 


Albuterol Sulfate [Ventolin HFA] 2 puff INHALATION RT-Q4H PRN 20 

History


 


Ondansetron Odt [Zofran ODT] 4 mg PO DAILY PRN 20 History


 


Valproic Acid [Depakene] 250 mg PO DAILY 20 History


 


Pantoprazole Sodium [Protonix] 20 mg PO DAILY 20 History


 


QUEtiapine [SEROquel] 25 mg PO BID 20 History


 


Lacosamide [Vimpat] 50 mg PO BID  tablet 20 Rx


 


Losartan [Cozaar] 50 mg PO DAILY  tab 20 Rx


 


Metoclopramide [Reglan] 10 mg PO AC-TID  tab 20 Rx


 


Ascorbic Acid [Vitamin C] 500 mg PO DAILY 20 History


 


Insulin Glargine,Hum.rec.anlog 15 unit SQ HS 20 History





[Basaglar Kwikpen U-100]    


 


Vitamin B Complex 1 tab PO DAILY 20 History








                                    Allergies











Allergy/AdvReac Type Severity Reaction Status Date / Time


 


Barbiturates Allergy  Rash/Hives Verified 20 10:04


 


cephalexin monohydrate Allergy  Rash/Hives Verified 20 10:04





[From Keflex]     


 


morphine Allergy  Rash/Hives Verified 20 10:04


 


Penicillins Allergy  Rash/Hives Verified 20 10:04


 


phenobarbital Allergy  Swelling Verified 20 10:04


 


venom-honey bee Allergy  Swelling Verified 20 10:04





[bee venom (honey bee)]     


 


amlodipine besylate AdvReac  Vomiting Verified 20 10:04





[From Norvasc]     














Physical Examination





- Vital Signs


Vital Signs: 


                                   Vital Signs











  Temp Pulse Pulse Resp BP BP Pulse Ox


 


 20 15:25  97.8 F   90  18   141/99  99


 


 20 11:20  97.5 F L   76  18   127/97  99


 


 20 08:15  97.5 F L   79  18   129/95  98


 


 20 03:33  98.2 F   58 L  16   104/55  98


 


 20 03:14  98.2 F   59 L  16   104/55  98


 


 20 02:03   85   16  102/56   96


 


 20 01:00   52 L   16  88/49   99


 


 20 23:08   57 L   16  93/51   99


 


 20 22:23   59 L   16  111/60   99


 


 20 21:23   70   16  99/48   100


 


 20 21:17  97.3 F L  69   16  67/55   99








                                Intake and Output











 20





 06:59 14:59 22:59


 


Intake Total 0 236 


 


Balance 0 236 


 


Intake:   


 


  Oral 0 236 


 


Other:   


 


  Voiding Method Diaper Bedside Commode Bedside Commode





 Incontinent  


 


  # Voids  2 5


 


  # Bowel Movements   1


 


  Weight 56.699 kg  











GENERAL: The patient is lying in bed and is not in acute distress.


CHEST: The heart rate is regular rate rhythm.   No murmurs to auscultation.  No 

carotid bruit bilaterally----.


LUNG: Clear to auscultation bilaterally no wheezing noted throughout.  Not 

labored breathing.


ABDOMEN/GI: Bowel sounds present in all 4 quadrants. No tenderness to palpation 

throughout.





NEUROLOGICAL:


Higher mental function: The patient is awake, alert, oriented to self, place and

time.  Patient is following commands.  No aphasia and no neglect.


Cranial nerves: The pupils are round, equal and reactive to light and 

accommodation.  Visual fields: Revealed a left homonymous lower visual field cut

on confrontation.  Extraocular movement is intact no nystagmus is noted.  Facial

sensation is normal to touch throughout.  The facial strength is normal 

throughout.  Hearing is normal bilaterally to hand rub.  Tongue is midline and 

moved side-to-side without any difficulty.  No dysarthria is noted.  Shoulder 

shrug is normal bilaterally.


Motor: Gait is defered.  Left upper extremity: 0/5 while left lower extremity 

4/5.   Otherwsie stregnth is 5/5 over the right.    Increase tone over the left 

upper extremity.


Sensation: Sensation is normal to touch throughout.


Reflexes (right/left): Brisk over the left upper extremity otherwise 1+ th

roughout.


Plantars is mute over the left.  The right toe is amputated.  


Extremities: Right foot digit 1-2 is amputated as well as 5th digit.








Results


Ammonia less than 9.  AST of 17 ALT of 14.


Calcium of 8.0.


Initially her BUN over creatinine was 32 over 1.11 last repeated was 25 over 

0.80








- Laboratory Findings


CBC and BMP: 


                                 20 07:04





                                 20 17:13


Abnormal Lab Findings: 


                                  Abnormal Labs











  20





  21:18 21:36 21:36


 


RBC   2.63 L 


 


Hgb   7.9 L 


 


Hct   24.7 L 


 


MCHC   


 


Chloride   


 


BUN   


 


Creatinine   


 


Glucose   


 


POC Glucose (mg/dL)  127 H  


 


Calcium   


 


Total Bilirubin   


 


Creatine Kinase   


 


Total Protein   


 


Albumin   


 


Urine Glucose (UA)    2+ H


 


Ur Leukocyte Esterase    Moderate H


 


Urine WBC    77 H


 


Hyaline Casts    6 H














  20





  21:36 07:03 07:04


 


RBC    3.02 L


 


Hgb    8.9 L


 


Hct    29.5 L


 


MCHC    30.1 L


 


Chloride  109 H  116 H 


 


BUN  32 H  25 H 


 


Creatinine  1.11 H  


 


Glucose  120 H  


 


POC Glucose (mg/dL)   


 


Calcium  8.0 L  7.9 L 


 


Total Bilirubin  0.1 L  


 


Creatine Kinase  25 L  


 


Total Protein  5.3 L  


 


Albumin  2.6 L  


 


Urine Glucose (UA)   


 


Ur Leukocyte Esterase   


 


Urine WBC   


 


Hyaline Casts   














  20





  11:55 16:54 16:56


 


RBC   


 


Hgb   


 


Hct   


 


MCHC   


 


Chloride   


 


BUN   


 


Creatinine   


 


Glucose   


 


POC Glucose (mg/dL)  275 H  598 H  >600 H


 


Calcium   


 


Total Bilirubin   


 


Creatine Kinase   


 


Total Protein   


 


Albumin   


 


Urine Glucose (UA)   


 


Ur Leukocyte Esterase   


 


Urine WBC   


 


Hyaline Casts   














  20





  17:13


 


RBC 


 


Hgb 


 


Hct 


 


MCHC 


 


Chloride 


 


BUN 


 


Creatinine 


 


Glucose  582 H*


 


POC Glucose (mg/dL) 


 


Calcium 


 


Total Bilirubin 


 


Creatine Kinase 


 


Total Protein 


 


Albumin 


 


Urine Glucose (UA) 


 


Ur Leukocyte Esterase 


 


Urine WBC 


 


Hyaline Casts 














Assessment and Plan


Assessment: 











Possibly the patient had the provoked seizure due to underlying infection: 

Urinary tract infection, Cellulitis of lower extremity as well as subtherapeutic

Depakote.


Hypotensive likely due to sepsis from underlying infection: Urinary tract 

infection, Cellulitis of lower extremity


Urinary tract infection


Cellulitis


History right MCA/JESSICA stroke with residual left hemiparesis


Uncontrolled diabetes


X tobacco use





Plan: 


Patient is currently on Depakote 2050 mg at 3 times a day at.  As well as she is

on currently on Vimpat 50 mg twice a day.


Regarding her seizure medication I will not modify her  Depakote because 

according to the caregiver it's making her to be a little bit shaky we'll keep 

it at the same dose 250 mg 1 tablet 3 times a day and on .  Regarding Vimpat I 

will increased to 75mg bid especially with subtherapeutic depakote.


An EEG is not warranted at this time since the patient is back to his be at her 

baseline and she is known to have history of seizure.





For secondary stroke prophylaxis the patient is on aspirin 81 mg as well as 

Lipitor 20 mg.


Regarding the patient underlying cellulitis she currently is on vancomycin.  

We'll defer the management to the primary team as well as the ID team.


Regarding the patient's uncontrolled the diabetes will defer the management to 

the primary team.





Thanks for the consult.


Destin Zamora M.D.


Neuro-hospitalist


Time with Patient: Greater than 30

## 2020-09-01 LAB
ALBUMIN SERPL-MCNC: 2 G/DL (ref 3.5–5)
ALP SERPL-CCNC: 45 U/L (ref 38–126)
ALT SERPL-CCNC: 11 U/L (ref 4–34)
ANION GAP SERPL CALC-SCNC: 1 MMOL/L
AST SERPL-CCNC: 15 U/L (ref 14–36)
BASOPHILS # BLD AUTO: 0 K/UL (ref 0–0.2)
BASOPHILS NFR BLD AUTO: 0 %
BUN SERPL-SCNC: 22 MG/DL (ref 7–17)
CALCIUM SPEC-MCNC: 6.4 MG/DL (ref 8.4–10.2)
CHLORIDE SERPL-SCNC: 117 MMOL/L (ref 98–107)
CO2 SERPL-SCNC: 19 MMOL/L (ref 22–30)
EOSINOPHIL # BLD AUTO: 0.1 K/UL (ref 0–0.7)
EOSINOPHIL NFR BLD AUTO: 2 %
ERYTHROCYTE [DISTWIDTH] IN BLOOD BY AUTOMATED COUNT: 2.43 M/UL (ref 3.8–5.4)
ERYTHROCYTE [DISTWIDTH] IN BLOOD: 15.3 % (ref 11.5–15.5)
GLUCOSE BLD-MCNC: 265 MG/DL (ref 75–99)
GLUCOSE BLD-MCNC: 309 MG/DL (ref 75–99)
GLUCOSE BLD-MCNC: 333 MG/DL (ref 75–99)
GLUCOSE BLD-MCNC: 347 MG/DL (ref 75–99)
GLUCOSE BLD-MCNC: 508 MG/DL (ref 75–99)
GLUCOSE BLD-MCNC: 508 MG/DL (ref 75–99)
GLUCOSE BLD-MCNC: 568 MG/DL (ref 75–99)
GLUCOSE SERPL-MCNC: 242 MG/DL (ref 74–99)
HCT VFR BLD AUTO: 23.4 % (ref 34–46)
HGB BLD-MCNC: 7.2 GM/DL (ref 11.4–16)
LYMPHOCYTES # SPEC AUTO: 1.4 K/UL (ref 1–4.8)
LYMPHOCYTES NFR SPEC AUTO: 49 %
MCH RBC QN AUTO: 29.7 PG (ref 25–35)
MCHC RBC AUTO-ENTMCNC: 30.8 G/DL (ref 31–37)
MCV RBC AUTO: 96.3 FL (ref 80–100)
MONOCYTES # BLD AUTO: 0.1 K/UL (ref 0–1)
MONOCYTES NFR BLD AUTO: 5 %
NEUTROPHILS # BLD AUTO: 1.2 K/UL (ref 1.3–7.7)
NEUTROPHILS NFR BLD AUTO: 42 %
PLATELET # BLD AUTO: 122 K/UL (ref 150–450)
POTASSIUM SERPL-SCNC: 3.8 MMOL/L (ref 3.5–5.1)
PROT SERPL-MCNC: 4.2 G/DL (ref 6.3–8.2)
SODIUM SERPL-SCNC: 137 MMOL/L (ref 137–145)
WBC # BLD AUTO: 2.9 K/UL (ref 3.8–10.6)

## 2020-09-01 RX ADMIN — ASPIRIN 81 MG CHEWABLE TABLET SCH MG: 81 TABLET CHEWABLE at 08:46

## 2020-09-01 RX ADMIN — DIVALPROEX SODIUM SCH MG: 250 TABLET, DELAYED RELEASE ORAL at 08:47

## 2020-09-01 RX ADMIN — ASPIRIN SCH: 325 TABLET ORAL at 08:48

## 2020-09-01 RX ADMIN — Medication SCH MG: at 08:47

## 2020-09-01 RX ADMIN — VALPROIC ACID SCH MG: 250 SOLUTION ORAL at 08:47

## 2020-09-01 RX ADMIN — DIVALPROEX SODIUM SCH MG: 250 TABLET, DELAYED RELEASE ORAL at 19:17

## 2020-09-01 RX ADMIN — SODIUM CHLORIDE SCH MLS/HR: 9 INJECTION, SOLUTION INTRAVENOUS at 05:28

## 2020-09-01 RX ADMIN — INSULIN ASPART SCH: 100 INJECTION, SOLUTION INTRAVENOUS; SUBCUTANEOUS at 13:40

## 2020-09-01 RX ADMIN — CEFAZOLIN SCH: 330 INJECTION, POWDER, FOR SOLUTION INTRAMUSCULAR; INTRAVENOUS at 00:13

## 2020-09-01 RX ADMIN — DULOXETINE SCH MG: 60 CAPSULE, DELAYED RELEASE ORAL at 08:46

## 2020-09-01 RX ADMIN — ATORVASTATIN CALCIUM SCH MG: 20 TABLET, FILM COATED ORAL at 08:47

## 2020-09-01 RX ADMIN — INSULIN ASPART SCH UNIT: 100 INJECTION, SOLUTION INTRAVENOUS; SUBCUTANEOUS at 19:17

## 2020-09-01 RX ADMIN — LACOSAMIDE SCH MG: 50 TABLET, FILM COATED ORAL at 21:36

## 2020-09-01 RX ADMIN — METOCLOPRAMIDE SCH MG: 10 TABLET ORAL at 19:18

## 2020-09-01 RX ADMIN — INSULIN ASPART SCH: 100 INJECTION, SOLUTION INTRAVENOUS; SUBCUTANEOUS at 13:39

## 2020-09-01 RX ADMIN — PANTOPRAZOLE SODIUM SCH MG: 40 TABLET, DELAYED RELEASE ORAL at 06:34

## 2020-09-01 RX ADMIN — CEFAZOLIN SCH: 330 INJECTION, POWDER, FOR SOLUTION INTRAMUSCULAR; INTRAVENOUS at 20:47

## 2020-09-01 RX ADMIN — METOCLOPRAMIDE SCH MG: 10 TABLET ORAL at 06:34

## 2020-09-01 RX ADMIN — SODIUM CHLORIDE SCH MLS/HR: 9 INJECTION, SOLUTION INTRAVENOUS at 21:37

## 2020-09-01 RX ADMIN — FAMOTIDINE SCH MG: 20 TABLET, FILM COATED ORAL at 08:47

## 2020-09-01 RX ADMIN — OXYCODONE HYDROCHLORIDE AND ACETAMINOPHEN SCH MG: 500 TABLET ORAL at 08:46

## 2020-09-01 RX ADMIN — CEFAZOLIN SCH MLS/HR: 330 INJECTION, POWDER, FOR SOLUTION INTRAMUSCULAR; INTRAVENOUS at 21:37

## 2020-09-01 RX ADMIN — LOSARTAN POTASSIUM SCH MG: 50 TABLET, FILM COATED ORAL at 08:46

## 2020-09-01 RX ADMIN — DIVALPROEX SODIUM SCH MG: 250 TABLET, DELAYED RELEASE ORAL at 21:36

## 2020-09-01 RX ADMIN — METOCLOPRAMIDE SCH MG: 10 TABLET ORAL at 13:39

## 2020-09-01 RX ADMIN — LACOSAMIDE SCH MG: 50 TABLET, FILM COATED ORAL at 08:50

## 2020-09-01 RX ADMIN — INSULIN DETEMIR SCH UNIT: 100 INJECTION, SOLUTION SUBCUTANEOUS at 21:36

## 2020-09-01 NOTE — CDI
Documentation Clarification Form



Date: 09/01/2020 01:51:40 PM

From: Lana Guthrie RN CCDS 

Phone: [phone]

MRN: [id number]

Admit Date: 08/30/2020 10:46:00 PM

Patient Name: Morenita Ramirez

Visit Number: [id number]

Discharge Date:  





ATTENTION: The Clinical Documentation Specialists (CDI) and [facility] Coding Staff 
appreciate your assistance in clarifying documentation. Please respond to the 
clarification below the line at the bottom and electronically sign. The CDI & 
[facility] Coding staff will review the response and follow-up if needed. Please note: 
Queries are made part of the Legal Health Record. If you have any questions, 
please contact the author of this message via ITS.



Dr. Saniya Cabral





The patient has Type 1 DM as documented in H&P 8/31.



History/Risk Factors: 60-year-old female presents to the ED via EMS after 
patient was found unresponsive at home. Medical history DM type1, Seizures, 
Right foot cellulitis, infections and amputations. Admitting diagnosis Right 
foot cellulitis possible osteomyelitis. 

Clinical Indicators: 

8/30 Labs: Wbc 4.9; Hgb 7.9; BUN 32, Cr 1.11, Calcium 8.0, Total bili 0.1, 
Creatinine kinase 25; total protein 5.3; albumin 2.6; 

8/31 ID consult: Cellulitis of the right foot. 

Treatment: 8/30 Vancomycin IVPB; 0.9ns 1L bolus followed by 100cc/hr, 8/31 
Novolog 6 units x1; Novolog sliding scale AC TID; Levemir 15 Units HS; 



Please document any body system complications or specific manifestations related
to the diabetes:



Right Foot Cellulitis 

     Associated with Diabetes 

     No association with Diabetes

Other condition 

Unable to Determine 







(Last Revision: October 2017)

___________________________________________________________________________



Right foot Cellulitis associated with diabetes

MTDD
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
## 2020-09-01 NOTE — CDI
Documentation Clarification Form



Date: 09/01/2020 01:28:09 PM

From: Lana Guthrie RN CCDS

Phone: [phone]

MRN: [id number]

Admit Date: 08/30/2020 10:46:00 PM

Patient Name: Morenita Ramirez

Visit Number: [id number]

Discharge Date:  





ATTENTION: The Clinical Documentation Specialists (CDI) and [facility] Coding Staff 
appreciate your assistance in clarifying documentation. Please respond to the 
clarification below the line at the bottom and electronically sign. The CDI & 
[facility] Coding staff will review the response and follow-up if needed. Please note: 
Queries are made part of the Legal Health Record. If you have any questions, 
please contact the author of this message via ITS.



Dr. Saniya Cabral





The diagnosis Sepsis was documented in the Neurology consult 8/31.



History/Risk Factors: 60-year-old female presents to the ED via EMS after 
patient was found unresponsive at home. Medical history DM type1, Seizures, 
Right foot cellulitis, infections and amputations. Admitting diagnosis Right 
foot cellulitis possible osteomyelitis. 

Clinical Indicators:  

8/30 Admission VSS: B/P: 67/55; HR: 69; Temp: 97.3; RR: 16; SpO2 99% room air 

8/30 Labs: Wbc 4.9; Hgb 7.9; BUN 32, Cr 1.11, Calcium 8.0, Total bili 0.1, 
Creatinine kinase 25; total protein 5.3; albumin 2.6; UA Culture: Coagulase 
negative staph 

8/31 Neurology Consult: Hypotensive likely due to sepsis from underlying 
infection: Urinary tract infection, cellulitis of lower extremity. 

Treatment:  8/30 Vancomycin IVPB; 0.9ns 1L bolus followed by 100cc/hr, 



Please clarify if Sepsis was:

   

   Ruled out

   Present/active this admission

   Treated and resolved this admission

   Other, please specify _______________

   Clinically unable to determine









(Last Query Form Revision: September 2019)

___________________________________________________________________________



Sepsis ruled out

MTDD

## 2020-09-01 NOTE — P.PN
Subjective


Progress Note Date: 09/01/20








Morenita Ramirez, is a 60-year-old female, who was found unresponsive at home and 

was brought in to [facility] emergency room for evaluation and 

treatment, patient was evaluated in the emergency room, her vital exam reveals a

temperature of 97.3 pulse 69 respiration 16 blood pressure 67/55 and pulse ox 

99% on room air, computed tomography scan of the brain without contrast was done

on presentation and revealed large area of encephalomalacia in the right MCA 

distribution suggestive of previous large infarct and evidence of mild 

ventriculomegaly, patient has a known history of stroke, she also has known 

history of seizure disorder in the past, she did not have any witnessed seizure 

on the day of presentation however she had the sudden change in her mental 

status, patient was admitted to telemetry floor for further evaluation.  And 

neurology consultation.


Patient also has a known history of insulin-dependent diabetes mellitus type 1, 

that is very sensitive to insulin, she has history of anemia and history of 

right foot cellulitis with osteomyelitis with multiple toe amputations, at this 

time there is evidence of new area of erythema in the right foot suggestive of 

acute cellulitis, she was started on IV antibiotic in the emergency room, 

infectious disease and vascular surgery consultation were requested.


Clinically patient at this time is alert and responsive in no apparent distress 

she is denying any complaints.








On 09/01/2020 patient was seen and examined on the medical floor she is alert 

and oriented 3 in no apparent distress there is no fever or chills no headache 

or dizziness no chest pain no shortness of breath no cough no nausea or vomiting

no abdominal pain no diarrhea no burning with urination no frequency or urgency 

and no hematuria.  Erythema on the right foot is improving mental status changes

is back to normal possibly patient had a seizure at home and was in postictal 

state arrival to emergency room.





Objective





- Vital Signs


Vital signs: 


                                   Vital Signs











Temp  98.0 F   09/01/20 08:00


 


Pulse  69   09/01/20 08:00


 


Resp  16   09/01/20 08:00


 


BP  103/56   09/01/20 08:00


 


Pulse Ox  95   09/01/20 08:00








                                 Intake & Output











 08/31/20 09/01/20 09/01/20





 18:59 06:59 18:59


 


Intake Total 236 1260 240


 


Balance 236 1260 240


 


Weight  76.5 kg 


 


Intake:   


 


  Intake, IV Titration  900 





  Amount   


 


    Sodium Chloride 0.9% 1,  900 





    000 ml @ 100 mls/hr IV .   





    Q10H [facility] Rx#:[id number]   


 


  Oral 236 360 240


 


Other:   


 


  Voiding Method Bedside Commode Bedside Commode Bedside Commode





  Diaper Diaper





  Incontinent Incontinent


 


  # Voids 5 2 2


 


  # Bowel Movements 1  














- Exam











In general patient is alert responsive in no apparent distress


HEENT head normocephalic and atraumatic


Neck is supple no JVD no goiter no lymphadenopathy


Chest exam reveals a few scattered rhonchi no wheezing


Cardiac exam reveals regular heart sounds no gallops no murmurs


Abdomen is soft nontender no organomegaly


Extremity exam reveals no edema no cyanosis or clubbing


Right foot has previous amputation of the great toe there is area of erythema in

the base of the great toe with tenderness








- Labs


CBC & Chem 7: 


                                 09/01/20 06:24





                                 09/01/20 06:24


Labs: 


                  Abnormal Lab Results - Last 24 Hours (Table)











  08/31/20 08/31/20 08/31/20 Range/Units





  16:54 16:56 17:13 


 


WBC     (3.8-10.6)  k/uL


 


RBC     (3.80-5.40)  m/uL


 


Hgb     (11.4-16.0)  gm/dL


 


Hct     (34.0-46.0)  %


 


MCHC     (31.0-37.0)  g/dL


 


Plt Count     (150-450)  k/uL


 


Neutrophils #     (1.3-7.7)  k/uL


 


Chloride     ()  mmol/L


 


Carbon Dioxide     (22-30)  mmol/L


 


BUN     (7-17)  mg/dL


 


Glucose    582 H*  (74-99)  mg/dL


 


POC Glucose (mg/dL)  598 H  >600 H   (75-99)  mg/dL


 


Calcium     (8.4-10.2)  mg/dL


 


Total Protein     (6.3-8.2)  g/dL


 


Albumin     (3.5-5.0)  g/dL














  08/31/20 08/31/20 09/01/20 Range/Units





  20:32 20:32 04:28 


 


WBC     (3.8-10.6)  k/uL


 


RBC     (3.80-5.40)  m/uL


 


Hgb     (11.4-16.0)  gm/dL


 


Hct     (34.0-46.0)  %


 


MCHC     (31.0-37.0)  g/dL


 


Plt Count     (150-450)  k/uL


 


Neutrophils #     (1.3-7.7)  k/uL


 


Chloride     ()  mmol/L


 


Carbon Dioxide     (22-30)  mmol/L


 


BUN     (7-17)  mg/dL


 


Glucose     (74-99)  mg/dL


 


POC Glucose (mg/dL)  559 H  546 H  333 H  (75-99)  mg/dL


 


Calcium     (8.4-10.2)  mg/dL


 


Total Protein     (6.3-8.2)  g/dL


 


Albumin     (3.5-5.0)  g/dL














  09/01/20 09/01/20 09/01/20 Range/Units





  06:21 06:24 06:24 


 


WBC   2.9 L   (3.8-10.6)  k/uL


 


RBC   2.43 L   (3.80-5.40)  m/uL


 


Hgb   7.2 L D   (11.4-16.0)  gm/dL


 


Hct   23.4 L   (34.0-46.0)  %


 


MCHC   30.8 L   (31.0-37.0)  g/dL


 


Plt Count   122 L   (150-450)  k/uL


 


Neutrophils #   1.2 L   (1.3-7.7)  k/uL


 


Chloride    117 H  ()  mmol/L


 


Carbon Dioxide    19 L  (22-30)  mmol/L


 


BUN    22 H  (7-17)  mg/dL


 


Glucose    242 H  (74-99)  mg/dL


 


POC Glucose (mg/dL)  347 H    (75-99)  mg/dL


 


Calcium    6.4 L*  (8.4-10.2)  mg/dL


 


Total Protein    4.2 L  (6.3-8.2)  g/dL


 


Albumin    2.0 L  (3.5-5.0)  g/dL














  09/01/20 Range/Units





  11:18 


 


WBC   (3.8-10.6)  k/uL


 


RBC   (3.80-5.40)  m/uL


 


Hgb   (11.4-16.0)  gm/dL


 


Hct   (34.0-46.0)  %


 


MCHC   (31.0-37.0)  g/dL


 


Plt Count   (150-450)  k/uL


 


Neutrophils #   (1.3-7.7)  k/uL


 


Chloride   ()  mmol/L


 


Carbon Dioxide   (22-30)  mmol/L


 


BUN   (7-17)  mg/dL


 


Glucose   (74-99)  mg/dL


 


POC Glucose (mg/dL)  265 H  (75-99)  mg/dL


 


Calcium   (8.4-10.2)  mg/dL


 


Total Protein   (6.3-8.2)  g/dL


 


Albumin   (3.5-5.0)  g/dL








                      Microbiology - Last 24 Hours (Table)











 08/30/20 21:36 Urine Culture - Preliminary





 Urine,Clean Catch    Coagulase Negative Staph


 


 08/30/20 21:36 Blood Culture - Preliminary





 Blood    No Growth after 24 hours














Assessment and Plan


Plan: 





1.  Episode of altered mental status, possible postictal state, neurology 

consultation was requested to assess and review seizure medications


2.  Previous history of  stroke, with area of large encephalomalacic in the 

right MCA distribution.


3.  Previous history of seizure disorder, patient was maintained on Vimpat and 

valproic acid


4. Insulin-dependent diabetes mellitus type 1, maintained on insulin


5.  Evidence of right foot cellulitis, possible osteomyelitis, patient was 

started on IV antibiotic infectious disease consultation was requested





At this time will monitor closely on telemetry floor


Infectious disease consultation and neurology consultation requested


Will follow closely

## 2020-09-01 NOTE — P.GSCN
History of Present Illness


Consult date: 20


Reason for Consult: 





Cellulitis to the right foot, right fourth toe ulcer


History of present illness: 





Tubes 60-year-old female with a past medical history includes CVA, diabetes, 

hypertension, seizure disorder who presented to the emergency department 

yesterday with altered mental status.  Apparently when EMS had arrived they 

found the patient only responsive to tactile stimuli and nonverbal.  Her blood 

pressure had been notably low.  The patient is somewhat of a poor historian.  

She states does state that she has had some redness to the right foot and had 

been seen in the past by Dr. Ferrell.  She is unsure when she had her 

amputations, however has had a right first second and fifth toe amputation.  He 

was noted in the emergency department that she had swelling and redness to the 

right foot.  She states she does have some pain in the right foot.  She is 

unsure if she has had any fevers or chills.  After reviewing her chart it does 

appear that she has been seeing Dr. Laci grant in the wound care clinic.  She 

had an arterial study of the lower extremities 2019 showing mild to 

moderate bilateral femoral-popliteal disease.  Is also a note in 2019 

showing amputation of the right great toe through the still phalanx.





Review of Systems





A limited review of systems was completed as patient was very drowsy and poor 

historian.





Past Medical History


Past Medical History: Asthma, Coronary Artery Disease (CAD), Chest Pain / 

Angina, Heart Failure, COPD, CVA/TIA, Diabetes Mellitus, Deep Vein Thrombosis 

(DVT), Eye Disorder, GERD/Reflux, Hyperlipidemia, Hypertension, Myocardial 

Infarction (MI), Neurologic Disorder, Osteoarthritis (OA), Pneumonia, Renal 

Disease


Additional Past Medical History / Comment(s): IDDM (brittle), DKAs, neuropathy 

bilateral hands/feet, retinopathy bilateral eyes, cellulitis R foot, R great toe

and 2nd toe infections/amputations, current wound R foot-being seen in [facility], 

renal failure, anemia, CVAs with L sided paralysis, headaches started after 

CVAs, brain lesions, DVT R axillae, low back pain, varicosities, seizure many 

years ago (), hypothyroid, constipation, bilateral tinnitis occasionally, 

sinus problems.


Last Myocardial Infarction Date:: 


History of Any Multi-Drug Resistant Organisms: MRSA


Year Discovered:: 18


MDRO Source:: Right Foot


Past Surgical History: Appendectomy,  Section, Cholecystectomy, Heart 

Catheterization With Stent, Hysterectomy, Orthopedic Surgery


Additional Past Surgical History / Comment(s): PCI with multiple stents, R great

toe and 2nd toe amps, debridements R foot ulcer, L shoulder surgery to remove 

bone, bronchoscopy, EGD, colonoscopy, R arm port since removed, bilateral 

cataract removals/lens implants.


Past Anesthesia/Blood Transfusion Reactions: No Reported Reaction


Additional Past Anesthesia/Blood Transfusion Reaction / Comm: HX OF BLOOD 

TRANSFUSION- NO REACTION


Date of Last Stent Placement:: 2013


Past Psychological History: Anxiety, Bipolar, Depression


Additional Psychological History / Comment(s): Pt has a legal guardian, Noelle Menon, who is pt's sister.  Currently her legal guardian is hospitalized.  Pt 

has a caregiver, Eleanor, who resides with her.  Pt is wheelchair bound d/t CVA 

with L sided paralysis arm and leg.  She has a shower chair and a glucometer.  

Her sister or caregiver drive her to appts.  [email or web address].


Smoking Status: Never smoker


Past Alcohol Use History: None Reported


Additional Past Alcohol Use History / Comment(s): Pt started smoking in  and

quit in .   Using marijuana edibles occasionally but none for a "long time"


Past Drug Use History: Marijuana


Additional Drug Use History / Comment(s): using marijuana edibles





- Past Family History


  ** Father


Family Medical History: Unable to Obtain, Coronary Artery Disease (CAD), Diab

etes Mellitus





  ** Mother


Family Medical History: COPD





Medications and Allergies


                                Home Medications











 Medication  Instructions  Recorded  Confirmed  Type


 


Famotidine [Pepcid] 20 mg PO DAILY 16 History


 


HYDROcodone/APAP 10-325MG [Norco 1 tab PO TID PRN 17 History





]    


 


DULoxetine HCL [Cymbalta] 60 mg PO DAILY 17 History


 


Atorvastatin [Lipitor] 20 mg PO DAILY 19 History


 


QUEtiapine [SEROquel] 100 mg PO HS 19 History


 


Aspirin [Adult Low Dose Aspirin EC] 81 mg PO DAILY 01/10/20 08/31/20 History


 


Ferrous Sulfate [Iron (65  mg PO DAILY 01/10/20 08/31/20 History





Elemental)]    


 


INSULIN LISPRO (humaLOG) [humaLOG] See Protocol SQ ACHS 03/15/20 08/31/20 

History


 


Divalproex [Depakote] 250 mg PO TID #90 tablet. 20 Rx


 


ALPRAZolam [Xanax] 1 mg PO Q8H PRN 20 History


 


Albuterol Sulfate [Ventolin HFA] 2 puff INHALATION RT-Q4H PRN 20 

History


 


Ondansetron Odt [Zofran ODT] 4 mg PO DAILY PRN 20 History


 


Valproic Acid [Depakene] 250 mg PO DAILY 20 History


 


Pantoprazole Sodium [Protonix] 20 mg PO DAILY 20 History


 


QUEtiapine [SEROquel] 25 mg PO BID 20 History


 


Lacosamide [Vimpat] 50 mg PO BID  tablet 20 Rx


 


Losartan [Cozaar] 50 mg PO DAILY  tab 20 Rx


 


Metoclopramide [Reglan] 10 mg PO AC-TID  tab 20 Rx


 


Ascorbic Acid [Vitamin C] 500 mg PO DAILY 20 History


 


Insulin Glargine,Hum.rec.anlog 15 unit SQ HS 20 History





[Basaglar Kwikpen U-100]    


 


Vitamin B Complex 1 tab PO DAILY 20 History








                                    Allergies











Allergy/AdvReac Type Severity Reaction Status Date / Time


 


Barbiturates Allergy  Rash/Hives Verified 20 10:04


 


cephalexin monohydrate Allergy  Rash/Hives Verified 20 10:04





[From Keflex]     


 


morphine Allergy  Rash/Hives Verified 20 10:04


 


Penicillins Allergy  Rash/Hives Verified 20 10:04


 


phenobarbital Allergy  Swelling Verified 20 10:04


 


venom-honey bee Allergy  Swelling Verified 20 10:04





[bee venom (honey bee)]     


 


amlodipine besylate AdvReac  Vomiting Verified 20 10:04





[From [facility]]     














Surgical - Exam


                                   Vital Signs











Temp Pulse Resp BP Pulse Ox


 


 97.3 F L  69   16   67/55   99 


 


 20 21:17  20 21:17  20 21:17  20 21:17  20 21:17














General appearance: The patient is asleep but arousable, disorientated, in no 

acute distress.  She is curled up in a fetal position in the bed with blankets 

covering her head.


HET: Head is normocephalic and atraumatic.  


Neck: Supple without lymphadenopathy.  Trachea midline.


Heart: S1 S2.  Regular rate and rhythm.


Lungs: No crackles or wheezes are heard.


Abdomen: Soft, nontender, nondistended with  bowel sounds.  


Extremities: Normal skin color and turgor.  No edema bilaterally.  Bilateral 

lower extremities warm to the touch with good capillary refill.  The right lower

extremity with a first and second and fourth toe prior amputation.  The fourth 

toe lateral aspect with an ulcer without drainage or erythema.  There is mild 

erythema surrounding the great toe amputation site which has healed well.  There

is no drainage or open wound noted.


Neurological: Was somewhat disoriented, drowsy but fully arousable.





Results





Right foot x-ray shows diffuse soft tissue swelling of the foot.  Prior 

amputation of most of the fifth ray.  A fragment of the proximal phalangeal base

in the proximal metatarsal remains.  At the second ray, there is some residual 

bone relating to the proximal phalangeal base.  The remainder of the toe is 

absent.  There is a communuted x-ray of the proximal first metatarsal shaft at 

the level of the metatarsal head and neck area, the big toe otherwise absent.  

Fracture margins are ill-defined and some erosive changes difficult to exclude. 

There is an 8mm linear density retainable plantar heel soft tissue.  Additional 

retained 7 mm long density in the plantar fourth toe soft tissues





- Labs





                                 20 06:24





                                 20 06:24


                  Abnormal Lab Results - Last 24 Hours (Table)











  20 Range/Units





  16:54 16:56 17:13 


 


WBC     (3.8-10.6)  k/uL


 


RBC     (3.80-5.40)  m/uL


 


Hgb     (11.4-16.0)  gm/dL


 


Hct     (34.0-46.0)  %


 


MCHC     (31.0-37.0)  g/dL


 


Plt Count     (150-450)  k/uL


 


Neutrophils #     (1.3-7.7)  k/uL


 


Chloride     ()  mmol/L


 


Carbon Dioxide     (22-30)  mmol/L


 


BUN     (7-17)  mg/dL


 


Glucose    582 H*  (74-99)  mg/dL


 


POC Glucose (mg/dL)  598 H  >600 H   (75-99)  mg/dL


 


Calcium     (8.4-10.2)  mg/dL


 


Total Protein     (6.3-8.2)  g/dL


 


Albumin     (3.5-5.0)  g/dL














  20 Range/Units





  20:32 20:32 04:28 


 


WBC     (3.8-10.6)  k/uL


 


RBC     (3.80-5.40)  m/uL


 


Hgb     (11.4-16.0)  gm/dL


 


Hct     (34.0-46.0)  %


 


MCHC     (31.0-37.0)  g/dL


 


Plt Count     (150-450)  k/uL


 


Neutrophils #     (1.3-7.7)  k/uL


 


Chloride     ()  mmol/L


 


Carbon Dioxide     (22-30)  mmol/L


 


BUN     (7-17)  mg/dL


 


Glucose     (74-99)  mg/dL


 


POC Glucose (mg/dL)  559 H  546 H  333 H  (75-99)  mg/dL


 


Calcium     (8.4-10.2)  mg/dL


 


Total Protein     (6.3-8.2)  g/dL


 


Albumin     (3.5-5.0)  g/dL














  20 Range/Units





  06:21 06:24 06:24 


 


WBC   2.9 L   (3.8-10.6)  k/uL


 


RBC   2.43 L   (3.80-5.40)  m/uL


 


Hgb   7.2 L D   (11.4-16.0)  gm/dL


 


Hct   23.4 L   (34.0-46.0)  %


 


MCHC   30.8 L   (31.0-37.0)  g/dL


 


Plt Count   122 L   (150-450)  k/uL


 


Neutrophils #   1.2 L   (1.3-7.7)  k/uL


 


Chloride    117 H  ()  mmol/L


 


Carbon Dioxide    19 L  (22-30)  mmol/L


 


BUN    22 H  (7-17)  mg/dL


 


Glucose    242 H  (74-99)  mg/dL


 


POC Glucose (mg/dL)  347 H    (75-99)  mg/dL


 


Calcium    6.4 L*  (8.4-10.2)  mg/dL


 


Total Protein    4.2 L  (6.3-8.2)  g/dL


 


Albumin    2.0 L  (3.5-5.0)  g/dL














  20 Range/Units





  11:18 


 


WBC   (3.8-10.6)  k/uL


 


RBC   (3.80-5.40)  m/uL


 


Hgb   (11.4-16.0)  gm/dL


 


Hct   (34.0-46.0)  %


 


MCHC   (31.0-37.0)  g/dL


 


Plt Count   (150-450)  k/uL


 


Neutrophils #   (1.3-7.7)  k/uL


 


Chloride   ()  mmol/L


 


Carbon Dioxide   (22-30)  mmol/L


 


BUN   (7-17)  mg/dL


 


Glucose   (74-99)  mg/dL


 


POC Glucose (mg/dL)  265 H  (75-99)  mg/dL


 


Calcium   (8.4-10.2)  mg/dL


 


Total Protein   (6.3-8.2)  g/dL


 


Albumin   (3.5-5.0)  g/dL








                      Microbiology - Last 24 Hours (Table)











 20 21:36 Urine Culture - Preliminary





 Urine,Clean Catch    Coagulase Negative Staph


 


 20 21:36 Blood Culture - Preliminary





 Blood    No Growth after 24 hours








                                 Diabetes panel











  20 Range/Units





  17:13 06:24 


 


Sodium   137  (137-145)  mmol/L


 


Potassium   3.8  (3.5-5.1)  mmol/L


 


Chloride   117 H  ()  mmol/L


 


Carbon Dioxide   19 L  (22-30)  mmol/L


 


BUN   22 H  (7-17)  mg/dL


 


Creatinine   0.74  (0.52-1.04)  mg/dL


 


Glucose  582 H*  242 H  (74-99)  mg/dL


 


Calcium   6.4 L*  (8.4-10.2)  mg/dL


 


AST   15  (14-36)  U/L


 


ALT   11  (4-34)  U/L


 


Alkaline Phosphatase   45  ()  U/L


 


Total Protein   4.2 L  (6.3-8.2)  g/dL


 


Albumin   2.0 L  (3.5-5.0)  g/dL








                                  Calcium panel











  20 Range/Units





  06:24 


 


Calcium  6.4 L*  (8.4-10.2)  mg/dL


 


Albumin  2.0 L  (3.5-5.0)  g/dL








                                 Pituitary panel











  20 Range/Units





  17:13 06:24 


 


Sodium   137  (137-145)  mmol/L


 


Potassium   3.8  (3.5-5.1)  mmol/L


 


Chloride   117 H  ()  mmol/L


 


Carbon Dioxide   19 L  (22-30)  mmol/L


 


BUN   22 H  (7-17)  mg/dL


 


Creatinine   0.74  (0.52-1.04)  mg/dL


 


Glucose  582 H*  242 H  (74-99)  mg/dL


 


Calcium   6.4 L*  (8.4-10.2)  mg/dL








                                  Adrenal panel











  20 Range/Units





  17:13 06:24 


 


Sodium   137  (137-145)  mmol/L


 


Potassium   3.8  (3.5-5.1)  mmol/L


 


Chloride   117 H  ()  mmol/L


 


Carbon Dioxide   19 L  (22-30)  mmol/L


 


BUN   22 H  (7-17)  mg/dL


 


Creatinine   0.74  (0.52-1.04)  mg/dL


 


Glucose  582 H*  242 H  (74-99)  mg/dL


 


Calcium   6.4 L*  (8.4-10.2)  mg/dL


 


Total Bilirubin   0.4  (0.2-1.3)  mg/dL


 


AST   15  (14-36)  U/L


 


ALT   11  (4-34)  U/L


 


Alkaline Phosphatase   45  ()  U/L


 


Total Protein   4.2 L  (6.3-8.2)  g/dL


 


Albumin   2.0 L  (3.5-5.0)  g/dL














Assessment and Plan


Assessment: 





1.  Cellulitis of the right foot


2.  Diabetic ulcer on the right foot fourth toe


3.  Diabetes mellitus


Plan: 





Dr. Springer reviewed ABIs from 2019 which shows adequate flow for healing. 

There is no indications for any vascular surgical interventions.  Continue with 

recommendations of IV antibiotics per infectious disease.  Continue follow up in

wound care clinic as needed.











Thank you for this consultation allowing us take part in the plan of care of the

patient during her hospital stay.











The above dictated assessment and findings were discussed with Dr. Springer.  The 

impression and plan of care have been directed as dictated.

## 2020-09-01 NOTE — PN
PROGRESS NOTE



DATE OF SERVICE:

09/01/2020



REASON FOR FOLLOWUP:

Right foot cellulitis.



INTERVAL HISTORY:

The patient is currently afebrile.  The patient is breathing comfortably.  Denies

having any chest pain or cough.  No nausea.  No vomiting. No abdominal pain or pain to

the right foot.



PHYSICAL EXAMINATION:

Blood pressure 103/56, pulse of 65, temperature 98. She is 95% on room air.

General description is a middle-aged female lying in bed in no distress.

RESPIRATORY SYSTEM: Unlabored breathing. Clear to auscultation anteriorly.

HEART: S1, S2.  Regular rate and rhythm.

ABDOMEN: Soft. No tenderness.

Right foot with minimal redness, some swelling.  No fluctuation or drainage.



LABS:

Hemoglobin 7.2, white count 2.9, BUN of 22, creatinine 0.74.



DIAGNOSTIC IMPRESSION AND PLAN:

Patient with right foot cellulitis. Previous history of osteomyelitis, status post

amputation.  Positive UA. Urine showing coagulase-negative Staph. Patient is covered

with vancomycin; to continue while her kidney function closely.  Continue with

supportive care.





MMODL / IJN: [id number] / Job#: [id number]

## 2020-09-02 LAB
GLUCOSE BLD-MCNC: 141 MG/DL (ref 75–99)
GLUCOSE BLD-MCNC: 143 MG/DL (ref 75–99)
GLUCOSE BLD-MCNC: 173 MG/DL (ref 75–99)
GLUCOSE BLD-MCNC: 236 MG/DL (ref 75–99)
GLUCOSE BLD-MCNC: 237 MG/DL (ref 75–99)

## 2020-09-02 RX ADMIN — ATORVASTATIN CALCIUM SCH MG: 20 TABLET, FILM COATED ORAL at 09:33

## 2020-09-02 RX ADMIN — INSULIN ASPART SCH UNIT: 100 INJECTION, SOLUTION INTRAVENOUS; SUBCUTANEOUS at 17:58

## 2020-09-02 RX ADMIN — INSULIN ASPART SCH: 100 INJECTION, SOLUTION INTRAVENOUS; SUBCUTANEOUS at 06:36

## 2020-09-02 RX ADMIN — FAMOTIDINE SCH MG: 20 TABLET, FILM COATED ORAL at 09:33

## 2020-09-02 RX ADMIN — LACOSAMIDE SCH MG: 50 TABLET, FILM COATED ORAL at 09:33

## 2020-09-02 RX ADMIN — LOSARTAN POTASSIUM SCH MG: 50 TABLET, FILM COATED ORAL at 09:33

## 2020-09-02 RX ADMIN — CEFAZOLIN SCH: 330 INJECTION, POWDER, FOR SOLUTION INTRAMUSCULAR; INTRAVENOUS at 04:49

## 2020-09-02 RX ADMIN — METOCLOPRAMIDE SCH MG: 10 TABLET ORAL at 06:41

## 2020-09-02 RX ADMIN — ASPIRIN SCH: 325 TABLET ORAL at 12:32

## 2020-09-02 RX ADMIN — OXYCODONE HYDROCHLORIDE AND ACETAMINOPHEN SCH MG: 500 TABLET ORAL at 09:33

## 2020-09-02 RX ADMIN — SODIUM CHLORIDE SCH MLS/HR: 9 INJECTION, SOLUTION INTRAVENOUS at 14:52

## 2020-09-02 RX ADMIN — DIVALPROEX SODIUM SCH MG: 250 TABLET, DELAYED RELEASE ORAL at 23:37

## 2020-09-02 RX ADMIN — INSULIN ASPART SCH UNIT: 100 INJECTION, SOLUTION INTRAVENOUS; SUBCUTANEOUS at 12:38

## 2020-09-02 RX ADMIN — DULOXETINE SCH MG: 60 CAPSULE, DELAYED RELEASE ORAL at 09:33

## 2020-09-02 RX ADMIN — DIVALPROEX SODIUM SCH MG: 250 TABLET, DELAYED RELEASE ORAL at 09:33

## 2020-09-02 RX ADMIN — METOCLOPRAMIDE SCH MG: 10 TABLET ORAL at 17:58

## 2020-09-02 RX ADMIN — METOCLOPRAMIDE SCH MG: 10 TABLET ORAL at 12:38

## 2020-09-02 RX ADMIN — DIVALPROEX SODIUM SCH MG: 250 TABLET, DELAYED RELEASE ORAL at 14:54

## 2020-09-02 RX ADMIN — INSULIN DETEMIR SCH UNIT: 100 INJECTION, SOLUTION SUBCUTANEOUS at 22:26

## 2020-09-02 RX ADMIN — VALPROIC ACID SCH MG: 250 SOLUTION ORAL at 09:33

## 2020-09-02 RX ADMIN — Medication SCH MG: at 09:33

## 2020-09-02 RX ADMIN — PANTOPRAZOLE SODIUM SCH MG: 40 TABLET, DELAYED RELEASE ORAL at 06:41

## 2020-09-02 RX ADMIN — ASPIRIN 81 MG CHEWABLE TABLET SCH MG: 81 TABLET CHEWABLE at 09:33

## 2020-09-02 RX ADMIN — CEFAZOLIN SCH: 330 INJECTION, POWDER, FOR SOLUTION INTRAMUSCULAR; INTRAVENOUS at 21:01

## 2020-09-02 RX ADMIN — Medication SCH EACH: at 17:58

## 2020-09-02 RX ADMIN — LACOSAMIDE SCH MG: 50 TABLET, FILM COATED ORAL at 22:24

## 2020-09-03 LAB
ALBUMIN SERPL-MCNC: 2.4 G/DL (ref 3.5–5)
ALP SERPL-CCNC: 54 U/L (ref 38–126)
ALT SERPL-CCNC: 12 U/L (ref 4–34)
ANION GAP SERPL CALC-SCNC: 3 MMOL/L
AST SERPL-CCNC: 14 U/L (ref 14–36)
BASOPHILS # BLD AUTO: 0 K/UL (ref 0–0.2)
BASOPHILS NFR BLD AUTO: 1 %
BUN SERPL-SCNC: 21 MG/DL (ref 7–17)
CALCIUM SPEC-MCNC: 8.1 MG/DL (ref 8.4–10.2)
CHLORIDE SERPL-SCNC: 110 MMOL/L (ref 98–107)
CO2 SERPL-SCNC: 26 MMOL/L (ref 22–30)
EOSINOPHIL # BLD AUTO: 0.1 K/UL (ref 0–0.7)
EOSINOPHIL NFR BLD AUTO: 1 %
ERYTHROCYTE [DISTWIDTH] IN BLOOD BY AUTOMATED COUNT: 2.64 M/UL (ref 3.8–5.4)
ERYTHROCYTE [DISTWIDTH] IN BLOOD: 15.4 % (ref 11.5–15.5)
GLUCOSE BLD-MCNC: 157 MG/DL (ref 75–99)
GLUCOSE BLD-MCNC: 174 MG/DL (ref 75–99)
GLUCOSE BLD-MCNC: 242 MG/DL (ref 75–99)
GLUCOSE BLD-MCNC: 98 MG/DL (ref 75–99)
GLUCOSE SERPL-MCNC: 246 MG/DL (ref 74–99)
HCT VFR BLD AUTO: 25.3 % (ref 34–46)
HGB BLD-MCNC: 7.8 GM/DL (ref 11.4–16)
LYMPHOCYTES # SPEC AUTO: 1.9 K/UL (ref 1–4.8)
LYMPHOCYTES NFR SPEC AUTO: 46 %
MCH RBC QN AUTO: 29.6 PG (ref 25–35)
MCHC RBC AUTO-ENTMCNC: 30.8 G/DL (ref 31–37)
MCV RBC AUTO: 96 FL (ref 80–100)
MONOCYTES # BLD AUTO: 0.2 K/UL (ref 0–1)
MONOCYTES NFR BLD AUTO: 4 %
NEUTROPHILS # BLD AUTO: 1.8 K/UL (ref 1.3–7.7)
NEUTROPHILS NFR BLD AUTO: 46 %
PLATELET # BLD AUTO: 129 K/UL (ref 150–450)
POTASSIUM SERPL-SCNC: 4.3 MMOL/L (ref 3.5–5.1)
PROT SERPL-MCNC: 5 G/DL (ref 6.3–8.2)
SODIUM SERPL-SCNC: 139 MMOL/L (ref 137–145)
WBC # BLD AUTO: 4 K/UL (ref 3.8–10.6)

## 2020-09-03 RX ADMIN — CEFAZOLIN SCH: 330 INJECTION, POWDER, FOR SOLUTION INTRAMUSCULAR; INTRAVENOUS at 22:08

## 2020-09-03 RX ADMIN — CEFAZOLIN SCH MLS/HR: 330 INJECTION, POWDER, FOR SOLUTION INTRAMUSCULAR; INTRAVENOUS at 22:07

## 2020-09-03 RX ADMIN — Medication SCH MG: at 08:49

## 2020-09-03 RX ADMIN — DIVALPROEX SODIUM SCH MG: 250 TABLET, DELAYED RELEASE ORAL at 17:57

## 2020-09-03 RX ADMIN — DULOXETINE SCH MG: 60 CAPSULE, DELAYED RELEASE ORAL at 08:49

## 2020-09-03 RX ADMIN — INSULIN ASPART SCH UNIT: 100 INJECTION, SOLUTION INTRAVENOUS; SUBCUTANEOUS at 06:59

## 2020-09-03 RX ADMIN — INSULIN DETEMIR SCH UNIT: 100 INJECTION, SOLUTION SUBCUTANEOUS at 21:44

## 2020-09-03 RX ADMIN — VALPROIC ACID SCH MG: 250 SOLUTION ORAL at 08:49

## 2020-09-03 RX ADMIN — LOSARTAN POTASSIUM SCH MG: 50 TABLET, FILM COATED ORAL at 08:49

## 2020-09-03 RX ADMIN — SODIUM CHLORIDE SCH MLS/HR: 9 INJECTION, SOLUTION INTRAVENOUS at 21:53

## 2020-09-03 RX ADMIN — SODIUM CHLORIDE SCH MLS/HR: 9 INJECTION, SOLUTION INTRAVENOUS at 07:09

## 2020-09-03 RX ADMIN — INSULIN ASPART SCH: 100 INJECTION, SOLUTION INTRAVENOUS; SUBCUTANEOUS at 12:22

## 2020-09-03 RX ADMIN — METOCLOPRAMIDE SCH MG: 10 TABLET ORAL at 18:11

## 2020-09-03 RX ADMIN — DIVALPROEX SODIUM SCH MG: 250 TABLET, DELAYED RELEASE ORAL at 08:49

## 2020-09-03 RX ADMIN — LACOSAMIDE SCH MG: 50 TABLET, FILM COATED ORAL at 21:42

## 2020-09-03 RX ADMIN — Medication SCH EACH: at 17:57

## 2020-09-03 RX ADMIN — Medication SCH EACH: at 06:59

## 2020-09-03 RX ADMIN — CEFAZOLIN SCH MLS/HR: 330 INJECTION, POWDER, FOR SOLUTION INTRAMUSCULAR; INTRAVENOUS at 03:00

## 2020-09-03 RX ADMIN — METOCLOPRAMIDE SCH: 10 TABLET ORAL at 12:25

## 2020-09-03 RX ADMIN — METOCLOPRAMIDE SCH MG: 10 TABLET ORAL at 06:59

## 2020-09-03 RX ADMIN — PANTOPRAZOLE SODIUM SCH MG: 40 TABLET, DELAYED RELEASE ORAL at 06:59

## 2020-09-03 RX ADMIN — INSULIN ASPART SCH UNIT: 100 INJECTION, SOLUTION INTRAVENOUS; SUBCUTANEOUS at 17:57

## 2020-09-03 RX ADMIN — FAMOTIDINE SCH MG: 20 TABLET, FILM COATED ORAL at 08:48

## 2020-09-03 RX ADMIN — ASPIRIN 81 MG CHEWABLE TABLET SCH MG: 81 TABLET CHEWABLE at 08:49

## 2020-09-03 RX ADMIN — ASPIRIN SCH: 325 TABLET ORAL at 12:19

## 2020-09-03 RX ADMIN — DIVALPROEX SODIUM SCH MG: 250 TABLET, DELAYED RELEASE ORAL at 21:42

## 2020-09-03 RX ADMIN — LACOSAMIDE SCH MG: 50 TABLET, FILM COATED ORAL at 08:49

## 2020-09-03 RX ADMIN — ATORVASTATIN CALCIUM SCH MG: 20 TABLET, FILM COATED ORAL at 08:49

## 2020-09-03 RX ADMIN — OXYCODONE HYDROCHLORIDE AND ACETAMINOPHEN SCH MG: 500 TABLET ORAL at 08:49

## 2020-09-03 NOTE — PN
PROGRESS NOTE



DATE OF SERVICE:

09/03/2020



REASON FOR FOLLOWUP:

Right foot cellulitis.



INTERVAL HISTORY:

The patient is currently afebrile.  The patient is breathing comfortably.  Denies

having any chest pain or shortness of breath or cough. No abdominal pain or pain to the

right foot.



PHYSICAL EXAMINATION:

Blood pressure 137/49 with a pulse 58, temperature 97.7. He is 98% on room air.

General description is a middle-aged female up in the chair in no distress.

RESPIRATORY SYSTEM: Unlabored breathing. Clear to auscultation anteriorly.

HEART: S1, S2.  Regular rate and rhythm.

ABDOMEN: Soft. No tenderness.

Right foot with minimal swelling and redness. No drainage.



LABS:

Hemoglobin 7.9, white count 4.0, BUN of 21, creatinine 0.83.



DIAGNOSTIC IMPRESSION AND PLAN:

Patient with right foot cellulitis and Staphylococcus epidermidis and urinary tract

infection. Patient is covered with vancomycin; to finish therapy with doxycycline and

close outpatient followup.





MMODL / IJN: [id number] / Job#: [id number]

## 2020-09-03 NOTE — P.PN
Subjective


Progress Note Date: 09/03/20








Morenita Ramirez, is a 60-year-old female, who was found unresponsive at home and 

was brought in to [facility] emergency room for evaluation and 

treatment, patient was evaluated in the emergency room, her vital exam reveals a

temperature of 97.3 pulse 69 respiration 16 blood pressure 67/55 and pulse ox 

99% on room air, computed tomography scan of the brain without contrast was done

on presentation and revealed large area of encephalomalacia in the right MCA 

distribution suggestive of previous large infarct and evidence of mild 

ventriculomegaly, patient has a known history of stroke, she also has known 

history of seizure disorder in the past, she did not have any witnessed seizure 

on the day of presentation however she had the sudden change in her mental 

status, patient was admitted to telemetry floor for further evaluation.  And 

neurology consultation.


Patient also has a known history of insulin-dependent diabetes mellitus type 1, 

that is very sensitive to insulin, she has history of anemia and history of 

right foot cellulitis with osteomyelitis with multiple toe amputations, at this 

time there is evidence of new area of erythema in the right foot suggestive of 

acute cellulitis, she was started on IV antibiotic in the emergency room, 

infectious disease and vascular surgery consultation were requested.


Clinically patient at this time is alert and responsive in no apparent distress 

she is denying any complaints.








On 09/01/2020 patient was seen and examined on the medical floor she is alert 

and oriented 3 in no apparent distress there is no fever or chills no headache 

or dizziness no chest pain no shortness of breath no cough no nausea or vomiting

no abdominal pain no diarrhea no burning with urination no frequency or urgency 

and no hematuria.  Erythema on the right foot is improving mental status changes

is back to normal possibly patient had a seizure at home and was in postictal 

state arrival to emergency room.





On 09/2/2020 patient was seen and examined on the medical floor she is alert and

oriented 3 in no apparent distress there is no fever or chills no headache or 

dizziness no chest pain no shortness of breath no cough no nausea or vomiting no

abdominal pain no diarrhea no burning with urination no frequency or urgency and

no hematuria, right foot erythema is slightly better today





On 09/03/2020 patient was seen and examined on the medical floor she is alert 

and oriented in no distress no evidence of any seizure activity no evidence of 

postictal state, foot erythema is stable with probably slight improvement 

otherwise patient denies any complaints





Objective





- Vital Signs


Vital signs: 


                                   Vital Signs











Temp  97.7 F   09/03/20 12:51


 


Pulse  75   09/03/20 12:51


 


Resp  18   09/03/20 12:51


 


BP  122/60   09/03/20 12:51


 


Pulse Ox  99   09/03/20 12:51








                                 Intake & Output











 09/02/20 09/03/20 09/03/20





 18:59 06:59 18:59


 


Intake Total 832  90


 


Balance 832  90


 


Weight  76.5 kg 


 


Intake:   


 


  Oral 832  90


 


Other:   


 


  Voiding Method Bedside Commode Bedside Commode Bedside Commode





 Diaper Diaper Diaper





 Incontinent Incontinent Incontinent


 


  # Voids 3  0


 


  # Bowel Movements 1  














- Exam











In general patient is alert responsive in no apparent distress


HEENT head normocephalic and atraumatic


Neck is supple no JVD no goiter no lymphadenopathy


Chest exam reveals a few scattered rhonchi no wheezing


Cardiac exam reveals regular heart sounds no gallops no murmurs


Abdomen is soft nontender no organomegaly


Extremity exam reveals no edema no cyanosis or clubbing


Right foot has previous amputation of the great toe there is area of erythema in

the base of the great toe with tenderness








- Labs


CBC & Chem 7: 


                                 09/03/20 05:35





                                 09/03/20 05:35


Labs: 


                  Abnormal Lab Results - Last 24 Hours (Table)











  09/02/20 09/02/20 09/03/20 Range/Units





  16:30 19:35 05:35 


 


RBC     (3.80-5.40)  m/uL


 


Hgb     (11.4-16.0)  gm/dL


 


Hct     (34.0-46.0)  %


 


MCHC     (31.0-37.0)  g/dL


 


Plt Count     (150-450)  k/uL


 


Chloride    110 H  ()  mmol/L


 


BUN    21 H  (7-17)  mg/dL


 


Glucose    246 H  (74-99)  mg/dL


 


POC Glucose (mg/dL)  173 H  143 H   (75-99)  mg/dL


 


Calcium    8.1 L  (8.4-10.2)  mg/dL


 


Total Bilirubin    0.1 L  (0.2-1.3)  mg/dL


 


Total Protein    5.0 L  (6.3-8.2)  g/dL


 


Albumin    2.4 L  (3.5-5.0)  g/dL














  09/03/20 09/03/20 Range/Units





  05:35 06:29 


 


RBC  2.64 L   (3.80-5.40)  m/uL


 


Hgb  7.8 L   (11.4-16.0)  gm/dL


 


Hct  25.3 L   (34.0-46.0)  %


 


MCHC  30.8 L   (31.0-37.0)  g/dL


 


Plt Count  129 L   (150-450)  k/uL


 


Chloride    ()  mmol/L


 


BUN    (7-17)  mg/dL


 


Glucose    (74-99)  mg/dL


 


POC Glucose (mg/dL)   242 H  (75-99)  mg/dL


 


Calcium    (8.4-10.2)  mg/dL


 


Total Bilirubin    (0.2-1.3)  mg/dL


 


Total Protein    (6.3-8.2)  g/dL


 


Albumin    (3.5-5.0)  g/dL








                      Microbiology - Last 24 Hours (Table)











 08/30/20 21:36 Blood Culture - Preliminary





 Blood    No Growth after 72 hours


 


 08/30/20 21:36 Urine Culture - Final





 Urine,Clean Catch    Staphylococcus epidermidis














Assessment and Plan


Plan: 





1.  Episode of altered mental status, possible postictal state, neurology 

consultation was requested to assess and review seizure medications


2.  Previous history of  stroke, with area of large encephalomalacic in the 

right MCA distribution.


3.  Previous history of seizure disorder, patient was maintained on Vimpat and 

valproic acid


4. Insulin-dependent diabetes mellitus type 1, maintained on insulin


5.  Evidence of right foot cellulitis, possible osteomyelitis, patient was 

started on IV antibiotic infectious disease consultation was requested





At this time will monitor closely on telemetry floor


Infectious disease consultation and neurology consultation requested


Will follow closely

## 2020-09-04 VITALS — TEMPERATURE: 97.5 F | SYSTOLIC BLOOD PRESSURE: 139 MMHG | DIASTOLIC BLOOD PRESSURE: 67 MMHG | RESPIRATION RATE: 17 BRPM

## 2020-09-04 VITALS — HEART RATE: 77 BPM

## 2020-09-04 LAB
GLUCOSE BLD-MCNC: 174 MG/DL (ref 75–99)
GLUCOSE BLD-MCNC: 198 MG/DL (ref 75–99)

## 2020-09-04 RX ADMIN — ASPIRIN SCH: 325 TABLET ORAL at 09:44

## 2020-09-04 RX ADMIN — LOSARTAN POTASSIUM SCH MG: 50 TABLET, FILM COATED ORAL at 09:42

## 2020-09-04 RX ADMIN — OXYCODONE HYDROCHLORIDE AND ACETAMINOPHEN SCH MG: 500 TABLET ORAL at 09:42

## 2020-09-04 RX ADMIN — METOCLOPRAMIDE SCH MG: 10 TABLET ORAL at 13:24

## 2020-09-04 RX ADMIN — ASPIRIN 81 MG CHEWABLE TABLET SCH MG: 81 TABLET CHEWABLE at 09:42

## 2020-09-04 RX ADMIN — DIVALPROEX SODIUM SCH MG: 250 TABLET, DELAYED RELEASE ORAL at 09:43

## 2020-09-04 RX ADMIN — CEFAZOLIN SCH: 330 INJECTION, POWDER, FOR SOLUTION INTRAMUSCULAR; INTRAVENOUS at 17:16

## 2020-09-04 RX ADMIN — LACOSAMIDE SCH MG: 50 TABLET, FILM COATED ORAL at 09:42

## 2020-09-04 RX ADMIN — INSULIN ASPART SCH UNIT: 100 INJECTION, SOLUTION INTRAVENOUS; SUBCUTANEOUS at 17:17

## 2020-09-04 RX ADMIN — SODIUM CHLORIDE SCH MLS/HR: 9 INJECTION, SOLUTION INTRAVENOUS at 13:25

## 2020-09-04 RX ADMIN — DULOXETINE SCH MG: 60 CAPSULE, DELAYED RELEASE ORAL at 09:42

## 2020-09-04 RX ADMIN — PANTOPRAZOLE SODIUM SCH MG: 40 TABLET, DELAYED RELEASE ORAL at 07:06

## 2020-09-04 RX ADMIN — Medication SCH MG: at 09:42

## 2020-09-04 RX ADMIN — INSULIN ASPART SCH UNIT: 100 INJECTION, SOLUTION INTRAVENOUS; SUBCUTANEOUS at 07:06

## 2020-09-04 RX ADMIN — DIVALPROEX SODIUM SCH MG: 250 TABLET, DELAYED RELEASE ORAL at 17:16

## 2020-09-04 RX ADMIN — VALPROIC ACID SCH MG: 250 SOLUTION ORAL at 09:43

## 2020-09-04 RX ADMIN — METOCLOPRAMIDE SCH MG: 10 TABLET ORAL at 17:16

## 2020-09-04 RX ADMIN — METOCLOPRAMIDE SCH MG: 10 TABLET ORAL at 07:06

## 2020-09-04 RX ADMIN — FAMOTIDINE SCH MG: 20 TABLET, FILM COATED ORAL at 09:42

## 2020-09-04 RX ADMIN — Medication SCH EACH: at 17:16

## 2020-09-04 RX ADMIN — ATORVASTATIN CALCIUM SCH MG: 20 TABLET, FILM COATED ORAL at 09:42

## 2020-09-04 RX ADMIN — CEFAZOLIN SCH MLS/HR: 330 INJECTION, POWDER, FOR SOLUTION INTRAMUSCULAR; INTRAVENOUS at 07:14

## 2020-09-04 RX ADMIN — INSULIN ASPART SCH: 100 INJECTION, SOLUTION INTRAVENOUS; SUBCUTANEOUS at 13:25

## 2020-09-04 RX ADMIN — Medication SCH EACH: at 07:06

## 2020-09-04 NOTE — P.DS
Providers


Date of admission: 


08/30/20 22:46





Expected date of discharge: 09/04/20


Attending physician: 


Saniya Cabral





Consults: 





                                        





08/31/20 13:58


Consult Physician Routine 


   Consulting Provider: Mian Snyder


   Consult Reason/Comments: foot cellulitis


   Do you want consulting provider notified?: Yes





08/31/20 13:59


Consult Physician Routine 


   Consulting Provider: Gem Springer


   Consult Reason/Comments: foot cellulitis


   Do you want consulting provider notified?: Yes





08/31/20 16:23


Consult Physician Routine 


   Consulting Provider: Chloe Lance


   Consult Reason/Comments: possible new seizure, patient was unarousable at 

home


   Do you want consulting provider notified?: Yes











Primary care physician: 


Sainyalloyd Cabral





[facility] Course: 











Diagnosis on discharge:




















1.  Episode of altered mental status, possible postictal state, neurology 

consultation was requested to assess and review seizure medications.  Patient 

was evaluated by neurology, dose of Vimpat was increased to 75 mg twice daily.





2.  Previous history of  stroke, with area of large encephalomalacic in the 

right MCA distribution.





3.  Previous history of seizure disorder, patient was maintained on Vimpat and 

valproic acid





4. Insulin-dependent diabetes mellitus type 1, maintained on insulin, continue 

with Levemir 15 units at bedtime and NovoLog 5 units with each meal


 


5.  Evidence of right foot cellulitis, possible osteomyelitis, patient was 

started on IV antibiotic infectious disease consultation was requested, patient 

was evaluated by infectious disease Dr. Snyder, she received IV vancomycin during

this admission, she was cleared for discharge on Bactrim DS 1 twice a day for 7 

days





























Hospital course:








Morenita Ramirez, is a 60-year-old female, who was found unresponsive at home and 

was brought in to [facility] emergency room for evaluation and 

treatment, patient was evaluated in the emergency room, her vital exam reveals a

temperature of 97.3 pulse 69 respiration 16 blood pressure 67/55 and pulse ox 

99% on room air, computed tomography scan of the brain without contrast was done

on presentation and revealed large area of encephalomalacia in the right MCA 

distribution suggestive of previous large infarct and evidence of mild 

ventriculomegaly, patient has a known history of stroke, she also has known 

history of seizure disorder in the past, she did not have any witnessed seizure 

on the day of presentation however she had the sudden change in her mental 

status, patient was admitted to telemetry floor for further evaluation.  And 

neurology consultation.


Patient also has a known history of insulin-dependent diabetes mellitus type 1, 

that is very sensitive to insulin, she has history of anemia and history of 

right foot cellulitis with osteomyelitis with multiple toe amputations, at this 

time there is evidence of new area of erythema in the right foot suggestive of 

acute cellulitis, she was started on IV antibiotic in the emergency room, 

infectious disease and vascular surgery consultation were requested.


Clinically patient at this time is alert and responsive in no apparent distress 

she is denying any complaints.








On 09/01/2020 patient was seen and examined on the medical floor she is alert 

and oriented 3 in no apparent distress there is no fever or chills no headache 

or dizziness no chest pain no shortness of breath no cough no nausea or vomiting

no abdominal pain no diarrhea no burning with urination no frequency or urgency 

and no hematuria.  Erythema on the right foot is improving mental status changes

is back to normal possibly patient had a seizure at home and was in postictal 

state arrival to emergency room.





On 09/2/2020 patient was seen and examined on the medical floor she is alert and

oriented 3 in no apparent distress there is no fever or chills no headache or 

dizziness no chest pain no shortness of breath no cough no nausea or vomiting no

abdominal pain no diarrhea no burning with urination no frequency or urgency and

no hematuria, right foot erythema is slightly better today





On 09/03/2020 patient was seen and examined on the medical floor she is alert 

and oriented in no distress no evidence of any seizure activity no evidence of 

postictal state, foot erythema is stable with probably slight improvement 

otherwise patient denies any complaints





On 09/04/2020 patient was seen and examined on the medical floor she is alert 

and oriented in no distress, she is denying any symptoms at this time that is no

fever or chills no headache or dizziness no chest pain no shortness of breath no

cough no nausea or vomiting no abdominal pain no diarrhea no burning with 

urination no frequency or urgency and no hematuria, she was evaluated by 

vascular surgery, and will be followed by them as outpatient for further 

evaluation and treatment, she was seen this morning by Dr. Snyder infectious 

disease, and was cleared for discharge on Bactrim DS 1 pill twice daily for 7 

more days, will follow in the office within one week for further evaluation.


Patient Condition at Discharge: Stable





Plan - Discharge Summary


Discharge Rx Participant: No


New Discharge Prescriptions: 


New


   Sulfamethox-Tmp 800-160Mg [Bactrim -160 mg] 1 tab PO Q12HR 7 Days #14 

tab


   INSULIN ASPART (NovoLOG) [NovoLOG (formulary)] 0 unit SQ AC-TID  vial


   Calcium Carb-Vit D 500Mg-200Un [Oscal 500+D] 1 each PO BID-W/MEALS  tab


   Clopidogrel [Plavix] 75 mg PO DAILY  tab


   Lacosamide [Vimpat] 75 mg PO BID  tablet





Continue


   Famotidine [Pepcid] 20 mg PO DAILY


   HYDROcodone/APAP 10-325MG [Norco ] 1 tab PO TID PRN


     PRN Reason: Pain


   DULoxetine HCL [Cymbalta] 60 mg PO DAILY


   QUEtiapine [SEROquel] 100 mg PO HS


   Atorvastatin [Lipitor] 20 mg PO DAILY


   Aspirin [Adult Low Dose Aspirin EC] 81 mg PO DAILY


   Ferrous Sulfate [Iron (65 MG Elemental)] 325 mg PO DAILY


   Divalproex [Depakote] 250 mg PO TID #90 tablet.


   ALPRAZolam [Xanax] 1 mg PO Q8H PRN


     PRN Reason: Anxiety


   Albuterol Sulfate [Ventolin HFA] 2 puff INHALATION RT-Q4H PRN


     PRN Reason: Shortness Of Breath


   Valproic Acid [Depakene] 250 mg PO DAILY


   Ondansetron Odt [Zofran ODT] 4 mg PO DAILY PRN


     PRN Reason: Nausea


   QUEtiapine [SEROquel] 25 mg PO BID


   Pantoprazole Sodium [Protonix] 20 mg PO DAILY


   Losartan [Cozaar] 50 mg PO DAILY  tab


   Metoclopramide [Reglan] 10 mg PO AC-TID  tab


   Insulin Glargine,Hum.rec.anlog [Basaglar Kwikpen U-100] 15 unit SQ HS


   Ascorbic Acid [Vitamin C] 500 mg PO DAILY


   Vitamin B Complex 1 tab PO DAILY





Discontinued


   INSULIN LISPRO (humaLOG) [humaLOG] See Protocol SQ ACHS


   Lacosamide [Vimpat] 50 mg PO BID  tablet


Discharge Medication List





Famotidine [Pepcid] 20 mg PO DAILY 02/19/16 [History]


HYDROcodone/APAP 10-325MG [Norco ] 1 tab PO TID PRN 05/06/17 [History]


DULoxetine HCL [Cymbalta] 60 mg PO DAILY 09/19/17 [History]


Atorvastatin [Lipitor] 20 mg PO DAILY 07/31/19 [History]


QUEtiapine [SEROquel] 100 mg PO HS 07/31/19 [History]


Aspirin [Adult Low Dose Aspirin EC] 81 mg PO DAILY 01/10/20 [History]


Ferrous Sulfate [Iron (65 MG Elemental)] 325 mg PO DAILY 01/10/20 [History]


Divalproex [Depakote] 250 mg PO TID #90 tablet. 03/19/20 [Rx]


ALPRAZolam [Xanax] 1 mg PO Q8H PRN 07/28/20 [History]


Albuterol Sulfate [Ventolin HFA] 2 puff INHALATION RT-Q4H PRN 07/28/20 [History]


Ondansetron Odt [Zofran ODT] 4 mg PO DAILY PRN 07/28/20 [History]


Valproic Acid [Depakene] 250 mg PO DAILY 07/28/20 [History]


Pantoprazole Sodium [Protonix] 20 mg PO DAILY 08/12/20 [History]


QUEtiapine [SEROquel] 25 mg PO BID 08/12/20 [History]


Losartan [Cozaar] 50 mg PO DAILY  tab 08/20/20 [Rx]


Metoclopramide [Reglan] 10 mg PO AC-TID  tab 08/20/20 [Rx]


Ascorbic Acid [Vitamin C] 500 mg PO DAILY 08/31/20 [History]


Insulin Glargine,Hum.rec.anlog [Basaglar Kwikpen U-100] 15 unit SQ HS 08/31/20 

[History]


Vitamin B Complex 1 tab PO DAILY 08/31/20 [History]


Calcium Carb-Vit D 500Mg-200Un [Oscal 500+D] 1 each PO BID-W/MEALS  tab 09/04/20

[Rx]


Clopidogrel [Plavix] 75 mg PO DAILY  tab 09/04/20 [Rx]


INSULIN ASPART (NovoLOG) [NovoLOG (formulary)] 0 unit SQ AC-TID  vial 09/04/20 

[Rx]


Lacosamide [Vimpat] 75 mg PO BID  tablet 09/04/20 [Rx]


Sulfamethox-Tmp 800-160Mg [Bactrim -160 mg] 1 tab PO Q12HR 7 Days #14 tab 

09/04/20 [Rx]








Follow up Appointment(s)/Referral(s): 


Saniya Cabral MD [Primary Care Provider] - 1-2 days
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
## 2020-09-04 NOTE — P.DS
Providers


Date of admission: 


08/12/20 14:26





Expected date of discharge: 08/20/20


Attending physician: 


Saniya Cabral





Consults: 





                                        





08/12/20 14:26


Consult Physician Stat 


   Consulting Provider: Sandy Shetty


   Consult Reason/Comments: icu, dka


   Do you want consulting provider notified?: Yes





08/18/20 11:11


Consult Physician Stat 


   Consulting Provider: Chloe Lance


   Consult Reason/Comments: code stroke


   Do you want consulting provider notified?: Yes











Primary care physician: 


Saniya Cabral





[facility] Course: 








Diagnoses on discharge:











1.  Diabetic ketoacidosis





2.  Underlying history of diabetes mellitus type 1 maintained on insulin





3.  Underlying history of peripheral vascular disease





4.  Underlying history of hyperlipidemia





5.  Underlying history of bipolar disorder and anxiety disorder





6.  Gastroesophageal reflux disease





7.  Nausea and vomiting likely related to gastroparesis Will consult 

gastroenterology, continue with Reglan and Zofran at this time





8.  Mental status changes, with somnolence, awaiting urology input.


























Hospital course:








Morenita Ramirez, is a 60-year-old female who was brought in to [facility] emergency room via EMS, was elevated blood glucose of 542, elevated BUN

and creatinine of 49 and 1.54 and positive ketones, patient was started on IV 

fluid, IV insulin drip and was admitted to intensive care unit for diabetic 

ketoacidosis.


Patient was complaining of nausea, otherwise she denies any complaints there is 

no fever or chills no headache or dizziness no chest pain no shortness of breath

no cough no abdominal pain no diarrhea no burning with urination no frequency or

urgency and no hematuria.


Patient has known history of diabetes mellitus type 1 she also has known history

of peripheral vascular disease with previous history of toe amputation, history 

of bipolar disorder, history of hyperlipidemia, and history of anxiety disorder








On 08/14/2020, patient was seen and examined in the ICU, she is alert and 

oriented 3 in no apparent distress, she is complaining of nausea and vomiting 

and very poor oral intake, otherwise she denies any complaints, there is no 

fever or chills no headache or dizziness no chest pain no shortness of breath no

cough no abdominal pain no diarrhea no burning with urination no frequency or 

urgency and no hematuria.








On 08/15/2020 patient was seen and examined in the ICU she is alert and oriented

3 in no distress she had an episode of hypoglycemia this morning her glucose 

was slightly elevated and she received 5 units of NovoLog before her breakfast 

however patient refused to eat breakfast and subsequently she had an episode of 

hypoglycemia.  Case was discussed in details with her nurse, at this time will 

check glucose prior to each meal but give the sliding scale insulin only after 

she eats at least 50% of her meal otherwise cancel the insulin dose and recheck 

again prior to the next meal.  Patient is still complaining of some nausea 

otherwise no vomiting she has some abdominal pain no fever or chills no headache

or dizziness no cough no chest pain no shortness of breath no diarrhea no 

burning with urination no frequency or urgency and no hematuria








On 08/16/2020 patient was seen and examined on the medical floor she is alert 

and oriented 3 in no apparent distress there is no fever or chills no headache 

or dizziness no chest pain no shortness of breath no cough no nausea or vomiting

no abdominal pain no diarrhea no burning with urination no frequency or urgency 

and no hematuria, glucose levels are better controlled there are no new episodes

of hypoglycemia.





On 08/17/2020 patient was seen and examined on the medical floor she is alert 

and oriented 3 in no distress there is no fever or chills no headache or di

zziness no chest pain no shortness of breath no cough, neck pain and vomiting 

has improved significantly patient is tolerating diet, there is no diarrhea no 

burning with urination no frequency or urgency and no hematuria.





On 08/18/2020 patient was seen and examined on the medical floor, I came to see 

this patient in the morning and she was obtunded and nonresponsive which is a 

major change from her condition yesterday glucose level was checked stat and was

112 code stroke was called patient underwent computed tomography scan of the 

brain which did not reveal any evidence of acute intracranial abnormality, 

possibly patient had a seizure with postictal state, neurology consultation was 

requested and seizure precautions initiated.





On 08/19/2020 patient was seen and examined on the medical floor she is alert 

and oriented 3 in no apparent distress until status improved significantly 

since yesterday there is no fever or chills no headache or dizziness no chest 

pain no shortness of breath no cough no nausea or vomiting no abdominal pain no 

diarrhea no burning with urination no frequency or urgency and no hematuria.


At this time we are awaiting EEG results and input from neurology to assess if 

patient should be continued on Depakote or have any other seizure medications.  

Possible discharge to home tomorrow. 





Patient Condition at Discharge: Critical





Plan - Discharge Summary


Discharge Rx Participant: No


New Discharge Prescriptions: 


New


   Losartan [Cozaar] 50 mg PO DAILY  tab


   Metoclopramide [Reglan] 10 mg PO AC-TID  tab


   Lacosamide [Vimpat] 50 mg PO BID  tablet





Continue


   Famotidine [Pepcid] 20 mg PO DAILY


   HYDROcodone/APAP 10-325MG [Norco ] 1 tab PO TID PRN


     PRN Reason: Pain


   DULoxetine HCL [Cymbalta] 60 mg PO DAILY


   QUEtiapine [SEROquel] 100 mg PO HS


   Atorvastatin [Lipitor] 20 mg PO DAILY


   Aspirin [Adult Low Dose Aspirin EC] 81 mg PO DAILY


   Ferrous Sulfate [Iron (65 MG Elemental)] 325 mg PO DAILY


   INSULIN LISPRO (humaLOG) [humaLOG] See Protocol SQ ACHS


   Divalproex [Depakote] 250 mg PO TID #90 tablet.


   ALPRAZolam [Xanax] 1 mg PO Q8H PRN


     PRN Reason: Anxiety


   Albuterol Sulfate [Ventolin HFA] 2 puff INHALATION RT-Q4H PRN


     PRN Reason: Shortness Of Breath


   Valproic Acid [Depakene] 250 mg PO DAILY


   Ondansetron Odt [Zofran ODT] 4 mg PO DAILY PRN


     PRN Reason: Nausea


   QUEtiapine [SEROquel] 25 mg PO BID


   Pantoprazole Sodium [Protonix] 20 mg PO DAILY





No Action


   Insulin Glargine,Hum.rec.anlog [Basaglar Kwikpen U-100] 15 unit SQ HS


   Ascorbic Acid [Vitamin C] 500 mg PO DAILY


   Vitamin B Complex 1 tab PO DAILY


Discharge Medication List





Famotidine [Pepcid] 20 mg PO DAILY 02/19/16 [History]


HYDROcodone/APAP 10-325MG [Norco ] 1 tab PO TID PRN 05/06/17 [History]


DULoxetine HCL [Cymbalta] 60 mg PO DAILY 09/19/17 [History]


Atorvastatin [Lipitor] 20 mg PO DAILY 07/31/19 [History]


QUEtiapine [SEROquel] 100 mg PO HS 07/31/19 [History]


Aspirin [Adult Low Dose Aspirin EC] 81 mg PO DAILY 01/10/20 [History]


Ferrous Sulfate [Iron (65 MG Elemental)] 325 mg PO DAILY 01/10/20 [History]


INSULIN LISPRO (humaLOG) [humaLOG] See Protocol SQ ACHS 03/15/20 [History]


Divalproex [Depakote] 250 mg PO TID #90 tablet. 03/19/20 [Rx]


ALPRAZolam [Xanax] 1 mg PO Q8H PRN 07/28/20 [History]


Albuterol Sulfate [Ventolin HFA] 2 puff INHALATION RT-Q4H PRN 07/28/20 [History]


Ondansetron Odt [Zofran ODT] 4 mg PO DAILY PRN 07/28/20 [History]


Valproic Acid [Depakene] 250 mg PO DAILY 07/28/20 [History]


Pantoprazole Sodium [Protonix] 20 mg PO DAILY 08/12/20 [History]


QUEtiapine [SEROquel] 25 mg PO BID 08/12/20 [History]


Lacosamide [Vimpat] 50 mg PO BID  tablet 08/20/20 [Rx]


Losartan [Cozaar] 50 mg PO DAILY  tab 08/20/20 [Rx]


Metoclopramide [Reglan] 10 mg PO AC-TID  tab 08/20/20 [Rx]


Ascorbic Acid [Vitamin C] 500 mg PO DAILY 08/31/20 [History]


Insulin Glargine,Hum.rec.anlog [Basaglar Kwikpen U-100] 15 unit SQ HS 08/31/20 

[History]


Vitamin B Complex 1 tab PO DAILY 08/31/20 [History]








Follow up Appointment(s)/Referral(s): 


[facility], [NON-STAFF] - 1-2 Days


Saniya Cabral MD [Primary Care Provider] - 1-2 days


Patient Instructions/Handouts:  Diabetic Ketoacidosis (DC)


Discharge Disposition: HOME WITH HOME HEALTH SERVICES

## 2020-09-04 NOTE — P.GSCN
History of Present Illness


Consult date: 20


Reason for Consult: 





Carotid stenosis.


History of present illness: 





Patient is a 60-year-old female who was recently admitted to [facility] with seizure-like activity.  Concern was expressed for possible 

cerebrovascular accident as she had to right arm and leg weakness in motor 

function.  During her emergency room stay CTA of her carotid arteries was 

performed which demonstrated 60-70% left ICA stenosis and approximate 60% right 

ICA stenosis.  The patient has a history of a large right cerebral infarct 

dating back a 4-5 years with resulting residual left hemiparesis.  The patient 

does not have any specific recollection of the events surrounding her admission 

to the hospital.





Social history: Significant for tobacco use although stopped smoking approximate

4-5 years prior.





Medical history: Is significant for coronary disease status post coronary bypass

grafting as well as lower extremity femoral arterial occlusive disease, previous

renovascular accident, diabetes mellitus, hypertension and sterile vascular 

accident.











My examination today revealed a female who appeared older than her stated age 

who was alert cooperative in no significant distress.





Examination the neck demonstrated a right carotid bruit.


Cranial nerves II through XII are grossly intact.


Heart: Is regular without murmur.


Lungs: Clear to auscultation bilaterally.


Abdomen: Soft without palpable mass nor tenderness.


Neurologic exam: Cranial nerves II through XII are grossly intact.  The left arm

and leg demonstrate residual paresis with a flexion contracture of the left hand

noted.  The patient does have decreased motor function of both the right upper 

and lower extremity.





Vascular examination demonstrates femoral pulses to be intact bilaterally while 

the pedal pulses are absent bilaterally.





I did review the results of the CTA of the carotid arteries.  This demonstrated 

6070% left ICA stenosis and 60% right ICA stenosis.











Discussion: It appears the patient may have symptomatic right carotid stenosis. 

Currently she is on both aspirin and statin therapy.  I taken the liberty of 

ordering a carotid duplex to further evaluate her carotid anatomy.  Eventually 

the patient might require carotid intervention.











Thank you very much for allowing me to spin the care of your patient.  The 

patient can be further worked up in the office as an outpatient.





Past Medical History


Past Medical History: Asthma, Coronary Artery Disease (CAD), Chest Pain / 

Angina, Heart Failure, COPD, CVA/TIA, Diabetes Mellitus, Deep Vein Thrombosis 

(DVT), Eye Disorder, GERD/Reflux, Hyperlipidemia, Hypertension, Myocardial 

Infarction (MI), Neurologic Disorder, Osteoarthritis (OA), Pneumonia, Renal 

Disease


Additional Past Medical History / Comment(s): IDDM (brittle), DKAs, neuropathy 

bilateral hands/feet, retinopathy bilateral eyes, cellulitis R foot, R great toe

and 2nd toe infections/amputations, current wound R foot-being seen in [facility], 

renal failure, anemia, CVAs with L sided paralysis, headaches started after 

CVAs, brain lesions, DVT R axillae, low back pain, varicosities, seizure many 

years ago (), hypothyroid, constipation, bilateral tinnitis occasionally, 

sinus problems.


Last Myocardial Infarction Date:: 


History of Any Multi-Drug Resistant Organisms: MRSA


Year Discovered:: 18


MDRO Source:: Right Foot


Past Surgical History: Appendectomy,  Section, Cholecystectomy, Heart 

Catheterization With Stent, Hysterectomy, Orthopedic Surgery


Additional Past Surgical History / Comment(s): PCI with multiple stents, R great

toe and 2nd toe amps, debridements R foot ulcer, L shoulder surgery to remove 

bone, bronchoscopy, EGD, colonoscopy, R arm port since removed, bilateral 

cataract removals/lens implants.


Past Anesthesia/Blood Transfusion Reactions: No Reported Reaction


Additional Past Anesthesia/Blood Transfusion Reaction / Comm: HX OF BLOOD 

TRANSFUSION- NO REACTION


Date of Last Stent Placement:: 2013


Past Psychological History: Anxiety, Bipolar, Depression


Smoking Status: Never smoker


Past Alcohol Use History: None Reported


Past Drug Use History: Marijuana





- Past Family History


  ** Father


Family Medical History: Unable to Obtain, Coronary Artery Disease (CAD), 

Diabetes Mellitus





  ** Mother


Family Medical History: COPD





Medications and Allergies


                                Home Medications











 Medication  Instructions  Recorded  Confirmed  Type


 


Famotidine [Pepcid] 20 mg PO DAILY 16 History


 


HYDROcodone/APAP 10-325MG [Norco 1 tab PO TID PRN 17 History





]    


 


DULoxetine HCL [Cymbalta] 60 mg PO DAILY 17 History


 


Atorvastatin [Lipitor] 20 mg PO DAILY 19 History


 


QUEtiapine [SEROquel] 100 mg PO HS 19 History


 


Aspirin [Adult Low Dose Aspirin EC] 81 mg PO DAILY 01/10/20 08/31/20 History


 


Ferrous Sulfate [Iron (65  mg PO DAILY 01/10/20 08/31/20 History





Elemental)]    


 


INSULIN LISPRO (humaLOG) [humaLOG] See Protocol SQ ACHS 03/15/20 08/31/20 

History


 


Divalproex [Depakote] 250 mg PO TID #90 tablet. 20 Rx


 


ALPRAZolam [Xanax] 1 mg PO Q8H PRN 20 History


 


Albuterol Sulfate [Ventolin HFA] 2 puff INHALATION RT-Q4H PRN 20 

History


 


Ondansetron Odt [Zofran ODT] 4 mg PO DAILY PRN 20 History


 


Valproic Acid [Depakene] 250 mg PO DAILY 20 History


 


Pantoprazole Sodium [Protonix] 20 mg PO DAILY 20 History


 


QUEtiapine [SEROquel] 25 mg PO BID 20 History


 


Lacosamide [Vimpat] 50 mg PO BID  tablet 20 Rx


 


Losartan [Cozaar] 50 mg PO DAILY  tab 20 Rx


 


Metoclopramide [Reglan] 10 mg PO AC-TID  tab 20 Rx


 


Ascorbic Acid [Vitamin C] 500 mg PO DAILY 20 History


 


Insulin Glargine,Hum.rec.anlog 15 unit SQ HS 20 History





[Basaglar Kwikpen U-100]    


 


Vitamin B Complex 1 tab PO DAILY 20 History








                                    Allergies











Allergy/AdvReac Type Severity Reaction Status Date / Time


 


Barbiturates Allergy  Rash/Hives Verified 20 10:04


 


cephalexin monohydrate Allergy  Rash/Hives Verified 20 10:04





[From Keflex]     


 


morphine Allergy  Rash/Hives Verified 20 10:04


 


Penicillins Allergy  Rash/Hives Verified 20 10:04


 


phenobarbital Allergy  Swelling Verified 20 10:04


 


venom-honey bee Allergy  Swelling Verified 20 10:04





[bee venom (honey bee)]     


 


amlodipine besylate AdvReac  Vomiting Verified 20 10:04





[From [facility]]     














Surgical - Exam


Osteopathic Statement: *.  No significant issues noted on an osteopathic 

structural exam other than those noted in the History and Physical/Consult.


                                   Vital Signs











Temp Pulse Resp BP Pulse Ox


 


 97.3 F L  69   16   67/55   99 


 


 20 21:17  20 21:17  20 21:17  20 21:17  20 21:17














Results





- Labs





                                 20 05:35





                                 20 06:35


                  Abnormal Lab Results - Last 24 Hours (Table)











  20 Range/Units





  16:58 19:57 06:22 


 


POC Glucose (mg/dL)  174 H  157 H  198 H  (75-99)  mg/dL








                      Microbiology - Last 24 Hours (Table)











 20 21:36 Blood Culture - Preliminary





 Blood    No Growth after 96 hours








                                 Diabetes panel











  20 Range/Units





  06:35 


 


Creatinine  0.89  (0.52-1.04)  mg/dL








                                 Pituitary panel











  20 Range/Units





  06:35 


 


Creatinine  0.89  (0.52-1.04)  mg/dL








                                  Adrenal panel











  20 Range/Units





  06:35 


 


Creatinine  0.89  (0.52-1.04)  mg/dL

## 2020-09-04 NOTE — PN
PROGRESS NOTE



DATE OF SERVICE:

09/04/2020



REASON FOR FOLLOWUP:

1. Right foot cellulitis.

2. UTI.



INTERVAL HISTORY:

Patient is currently afebrile.  The patient is breathing comfortably.  Denies having

any chest pain, no cough.  No nausea, no vomiting, no abdominal pain.  No burning or

frequency of urine or pain to the right foot.



PHYSICAL EXAMINATION:

Blood pressure 139/67, pulse 67, temp 97.5, she is 100% on room air.

General description is a middle-aged female, lying in bed in no distress.

RESPIRATORY SYSTEM: Unlabored breathing, clear to auscultation anteriorly.

HEART:  S1, S2.  Regular rate and rhythm.

ABDOMEN:  Soft, no tenderness.

The right foot swelling and redness has almost resolved.



LABS:

Creatinine 0.89.  Blood culture negative.



DIAGNOSTIC IMPRESSION AND PLAN:

1. Patient with Staph epi urinary tract infection, adequately treated.

2. Right foot cellulitis, overall clinical improvement on vancomycin to finish therapy

    with oral Bactrim DS one b.i.d. for 7 days and close outpatient followup. Plan of

    care discussed with admitting physician who was working on discharge.





MMODL / IJN: [id number] / Job#: [id number]

## 2020-09-04 NOTE — US
EXAMINATION TYPE: US carotid duplex BILAT

 

DATE OF EXAM: 9/4/2020

 

COMPARISON: US 11/27/2017, CT 11/28/2017

 

CLINICAL HISTORY: stroke right internal carotid artery occlusion.

 

EXAM MEASUREMENTS: 

 

RIGHT:  Peak Systolic Velocity (PSV) cm/sec

----- Right CCA:  24.7  

----- Right ICA:  Occluded      

----- Right ECA:  103.2   

ICA/CCA ratio: 0

 

RIGHT:  End Diastole cm/sec

----- Right CCA:  0.0   

----- Right ICA:  0.0      

----- Right ECA:  14.4     

 

LEFT:  Peak Systolic Velocity (PSV) cm/sec

----- Left CCA:  64.4  

----- Left ICA:  69.8   

----- Left ECA:  55.5  

ICA/CCA ratio:  1.1  

 

LEFT:  End Diastole cm/sec

----- Left CCA:  16.0  

----- Left ICA:  25.8   

----- Left ECA:  12.8 

 

VERTEBRALS (direction of flow):

Right Vertebral: Antegrade

Left Vertebral: Antegrade

 

Rhythm:  Normal

 

Sonographer notes: Very difficult and limited exam due to patient movement and patient mental status.
 Unable to obtain any flow in right ICA, probable occlusion as seen on previous exams. Intimal thicke
mau visualized bilaterally. 

 

 

 

IMPRESSION:  

1. Redemonstrated chronic occlusion of the right ICA.

2. Moderate atherosclerotic change at the left bifurcation without hemodynamically significant stenos
is.   

 

 

Criteria for Assigning % of Stenosis / Diameter reduction

(Estimation based on the indirect measurements of the internal carotid artery velocities (ICA PSV).

1.  Normal (no stenosis)=ICA PSV < 125 cm/s: ratio < 2.0: ICA EDV<40 cm/s.

2. Less than 50% stenosis=ICA PSV < 125 cm/s: ratio < 2.0: ICA EDV<40 cm/s.

3.  50 to 69% stenosis=ICA PSV of 125 to 230 cm/s: ration 2.0 ? 4.0: ICA EDV  cm/s.

4.  Greater than 70% stenosis to near occlusion= ICA PSV > 230 cm/s: ratio > 4.0: ICA EDV > 100 cm/s.
 

5.  Near occlusion= ICA PSV velocities may be low or undetectable: variable ratio and ICA EDV.

6.  Total occlusion=unable to detect flow.

## 2020-09-08 NOTE — CDI
Documentation Clarification Form

Date: 9/8/20

From: Shanta Domingo CCS

Phone: If you have a question about this query, please contact [place], 
 at [phone] between 8am and 5pm.

Admit Date: 8/30/20

Discharge Date:9/4/20 

Patient Name: Morenita Ramirez

Visit Number: [id number]



ATTENTION: The Clinical Documentation Specialists (CDI) and [facility] Coding Staff 
appreciate your assistance in clarifying documentation. Please respond to the 
clarification below the line at the bottom and electronically sign. The CDI & 
[facility] Coding staff will review the response and follow-up if needed. Please note: 
Queries are made part of the Legal Health Record. If you have any questions, 
please contact the author of this message via ITS.



Dear Dr. Cabral, 



Uncontrolled diabetes type I is documented in the Consult (8/31), PNs.



History/Risk Factors: DM type 1 with complications, HTN, COPD, Seizure D/O, CAD,
HX CVA

Clinical Indicators: Uncontrolled diabetes

Glucose: 120, 582, 242

Treatment: Novolog SQ, Levemir SQ, HulaLOG SQ



In order to capture the severity of Illness and necessary documentation 
specificity, please clarify:

Uncontrolled DM Type 1 with hyperglycemia

Uncontrolled DM Type 1 with hypoglycemia

Other, please specify______________

Unable to Determine

___________________________________________________________________________

uncontrolled DM type 1 with hyperglycemia

MTDD

## 2020-09-08 NOTE — P.PN
Subjective


Progress Note Date: 11/04/19





59-year-old woman who is known to the infectious disease service from prior 

diabetic lower extremity ulcerations.  She is following the wound healing Center

most recently for the left heel ulceration that healed.  He is now presenting 

from her care setting with the significant change to the right foot at the great

toe.  There is  ulceration that has progressively worsened and now she has 

evidence of some gangrenous changes of the toe at the time of her presentation. 

She's been seen by vascular surgery and infectious disease consultation was 

requested.  The patient has mental compromise but is able to relate that her 

pain is under good control.  She is denying severe fevers or chills but just 

does not feel well.


11/04/2019 the patient is now successfully undergone amputation to the grossly 

osteomyelitic great toe that actually had fracture.  The surgeon has noted that 

a tissue proximal to the grossly infected tissue is healthy.  The patient is 

sleepy after surgery but has no other new acute complaints.  Her family who are 

present,are understanding of the situation.





Objective





- Vital Signs


Vital signs: 


                                   Vital Signs











Temp  99.0 F   11/04/19 19:46


 


Pulse  80   11/04/19 19:46


 


Resp  18   11/04/19 19:46


 


BP  122/70   11/04/19 19:46


 


Pulse Ox  93 L  11/04/19 19:46








                                 Intake & Output











 11/04/19 11/04/19 11/05/19





 06:59 18:59 06:59


 


Intake Total 296 1050 


 


Output Total  50 


 


Balance 296 1000 


 


Intake:   


 


  IV  1050 


 


    Sodium Chloride 0.9% 1,  400 





    000 ml @ 100 mls/hr IV .   





    Q10H Replaced by [facility] Rx#:[id number]   


 


  Oral 296  


 


Output:   


 


  Estimated Blood Loss  50 


 


Other:   


 


  Voiding Method Bedpan  


 


  # Voids 2  


 


  # Bowel Movements 1  














- Exam





Gen: This is a 89-year-old  female sitting up in bed and appears to be

comfortable and in no acute distress.  


HEENT: Head is atraumatic, normocephalic. Pupils equal, round. Sclerae is 

anicteric. 


NECK: Supple. No JVD. No lymphadenopathy. No thyromegaly. 


LUNGS: Clear to auscultation. No wheezes or rhonchi.  No intercostal 

retractions.


HEART: Regular rate and rhythm. No murmur. 


ABDOMEN: Soft. Bowel sounds are present. No masses.  No tenderness.


EXTREMITIES: No pedal edema.  No calf tenderness.  To the right Great toe bulky 

postoperative dressing is in place


NEUROLOGICAL: Patient is awake, alert and oriented x3 recognizably observe her 

by name does have difficulty with her speech poststroke








- Labs


CBC & Chem 7: 


                                 11/04/19 05:05





                                 11/04/19 05:05


Labs: 


                  Abnormal Lab Results - Last 24 Hours (Table)











  11/04/19 11/04/19 11/04/19 Range/Units





  05:05 05:05 06:58 


 


RBC   2.60 L   (3.80-5.40)  m/uL


 


Hgb   8.0 L   (11.4-16.0)  gm/dL


 


Hct   25.2 L   (34.0-46.0)  %


 


Chloride  112 H    ()  mmol/L


 


POC Glucose (mg/dL)    138 H  (75-99)  mg/dL


 


Calcium  8.2 L    (8.4-10.2)  mg/dL


 


Total Protein  5.7 L    (6.3-8.2)  g/dL


 


Albumin  2.6 L    (3.5-5.0)  g/dL














  11/04/19 11/04/19 11/04/19 Range/Units





  10:47 12:38 16:57 


 


RBC     (3.80-5.40)  m/uL


 


Hgb     (11.4-16.0)  gm/dL


 


Hct     (34.0-46.0)  %


 


Chloride     ()  mmol/L


 


POC Glucose (mg/dL)  277 H  248 H  264 H  (75-99)  mg/dL


 


Calcium     (8.4-10.2)  mg/dL


 


Total Protein     (6.3-8.2)  g/dL


 


Albumin     (3.5-5.0)  g/dL














  11/04/19 Range/Units





  20:57 


 


RBC   (3.80-5.40)  m/uL


 


Hgb   (11.4-16.0)  gm/dL


 


Hct   (34.0-46.0)  %


 


Chloride   ()  mmol/L


 


POC Glucose (mg/dL)  269 H  (75-99)  mg/dL


 


Calcium   (8.4-10.2)  mg/dL


 


Total Protein   (6.3-8.2)  g/dL


 


Albumin   (3.5-5.0)  g/dL








                      Microbiology - Last 24 Hours (Table)











 11/01/19 16:00 Blood Culture - Preliminary





 Blood    No Growth after 72 hours


 


 11/04/19 12:27 Anaerobic Culture - Preliminary





 Toe - Right First 


 


 11/04/19 12:27 Tissue Culture - Preliminary





 Toe - Right First 


 


 11/01/19 16:00 Gram Stain - Final





 Foot - Right Wound Culture - Final








                               Laboratory Results











WBC  5.3 k/uL (3.8-10.6)   11/04/19  05:05    


 


RBC  2.60 m/uL (3.80-5.40)  L  11/04/19  05:05    


 


Hgb  8.0 gm/dL (11.4-16.0)  L  11/04/19  05:05    


 


Hct  25.2 % (34.0-46.0)  L  11/04/19  05:05    


 


MCV  97.2 fL (80.0-100.0)   11/04/19  05:05    


 


MCH  30.7 pg (25.0-35.0)   11/04/19  05:05    


 


MCHC  31.6 g/dL (31.0-37.0)   11/04/19  05:05    


 


RDW  12.2 % (11.5-15.5)   11/04/19  05:05    


 


Plt Count  259 k/uL (150-450)   11/04/19  05:05    


 


Neutrophils %  64 %  11/04/19  05:05    


 


Lymphocytes %  23 %  11/04/19  05:05    


 


Monocytes %  7 %  11/04/19  05:05    


 


Eosinophils %  2 %  11/04/19  05:05    


 


Basophils %  1 %  11/04/19  05:05    


 


Neutrophils #  3.4 k/uL (1.3-7.7)   11/04/19  05:05    


 


Lymphocytes #  1.2 k/uL (1.0-4.8)   11/04/19  05:05    


 


Monocytes #  0.4 k/uL (0-1.0)   11/04/19  05:05    


 


Eosinophils #  0.1 k/uL (0-0.7)   11/04/19  05:05    


 


Basophils #  0.0 k/uL (0-0.2)   11/04/19  05:05    


 


Hypochromasia  Slight   11/04/19  05:05    


 


Sodium  143 mmol/L (137-145)   11/04/19  05:05    


 


Potassium  4.6 mmol/L (3.5-5.1)   11/04/19  05:05    


 


Chloride  112 mmol/L ()  H  11/04/19  05:05    


 


Carbon Dioxide  28 mmol/L (22-30)   11/04/19  05:05    


 


Anion Gap  3 mmol/L  11/04/19  05:05    


 


BUN  12 mg/dL (7-17)   11/04/19  05:05    


 


Creatinine  0.75 mg/dL (0.52-1.04)   11/04/19  05:05    


 


Est GFR (CKD-EPI)AfAm  >90  (>60 ml/min/1.73 sqM)   11/04/19  05:05    


 


Est GFR (CKD-EPI)NonAf  88  (>60 ml/min/1.73 sqM)   11/04/19  05:05    


 


Glucose  95 mg/dL (74-99)   11/04/19  05:05    


 


POC Glucose (mg/dL)  269 mg/dL (75-99)  H  11/04/19  20:57    


 


POC Glu Operater ID  Winnie Jenkins   11/04/19  20:57    


 


Plasma Lactic Acid Carmelo  0.9 mmol/L (0.7-2.0)   11/01/19  16:00    


 


Calcium  8.2 mg/dL (8.4-10.2)  L  11/04/19  05:05    


 


Total Bilirubin  0.3 mg/dL (0.2-1.3)   11/04/19  05:05    


 


AST  21 U/L (14-36)   11/04/19  05:05    


 


ALT  21 U/L (9-52)   11/04/19  05:05    


 


Alkaline Phosphatase  52 U/L ()   11/04/19  05:05    


 


Total Protein  5.7 g/dL (6.3-8.2)  L  11/04/19  05:05    


 


Albumin  2.6 g/dL (3.5-5.0)  L  11/04/19  05:05    


 


Urine Color  Yellow   11/02/19  06:05    


 


Urine Appearance  Clear  (Clear)   11/02/19  06:05    


 


Urine pH  5.5  (5.0-8.0)   11/02/19  06:05    


 


Ur Specific Gravity  1.013  (1.001-1.035)   11/02/19  06:05    


 


Urine Protein  Negative  (Negative)   11/02/19  06:05    


 


Urine Glucose (UA)  3+  (Negative)  H  11/02/19  06:05    


 


Urine Ketones  Negative  (Negative)   11/02/19  06:05    


 


Urine Blood  Negative  (Negative)   11/02/19  06:05    


 


Urine Nitrite  Negative  (Negative)   11/02/19  06:05    


 


Urine Bilirubin  Negative  (Negative)   11/02/19  06:05    


 


Urine Urobilinogen  <2.0 mg/dL (<2.0)   11/02/19  06:05    


 


Ur Leukocyte Esterase  Small  (Negative)  H  11/02/19  06:05    


 


Urine RBC  4 /hpf (0-5)   11/02/19  06:05    


 


Urine WBC  8 /hpf (0-5)  H  11/02/19  06:05    


 


Hyaline Casts  3 /lpf (0-2)  H  11/02/19  06:05    


 


Vancomycin Trough  12.0 ug/mL  11/04/19  05:05    








                                  Microbiology





11/01/19 16:00   Blood   Blood Culture - Preliminary


                            No Growth after 72 hours


11/04/19 12:27   Toe - Right First   Anaerobic Culture - Preliminary


11/04/19 12:27   Toe - Right First   Tissue Culture - Preliminary


11/01/19 16:00   Foot - Right   Gram Stain - Final


11/01/19 16:00   Foot - Right   Wound Culture - Final











Assessment and Plan


(1) Diabetic ulcer of foot associated with type 2 diabetes mellitus, with necro

sis of bone


Narrative/Plan: 


59 -year-old woman who has multiple medical troubles including stroke, diabetes 

hypertension peripheral vascular disease with prior diabetic foot ulcerations 

presents to hospital with worsening ulceration to the right foot great toe.  The

patient has been followed in the outpatient clinic.  However recently there's 

been no significant change to the right great toe now with evidence of the 

gangrenous changes to the toe.  She's been seen by the vascular surgeon with 

plans for at least distal toe amputation hoping to save the first metatarsal 

head given her difficulties with transfer from her prior stroke.  Antibiotic 

therapy is with vancomycin and Zosyn until further cultures are available but 

she does have recent MRSA infection noted.  Local wound care as absorptive 

dressing until surgical intervention.  Glucose control adequate protein intake 

and a multivitamin are all important.


11/04/2019 the patient is now status post amputation of the grossly 

osteomyelitic distal right great toe.  It is the surgeon that the proximal 

tissue was very healthy.  Patient likely has MRSA at that site.  She likely will

be ready for discharge home by tomorrow which point in time antibiotic therapy 

with trimethoprim sulfamethoxazole may be initiated in the home setting.  I 

double strength tablet twice per day until she is followed in the wound center 

in 10 days would be adequate.  Local wound care as per the surgeon.  Certainly 

needs offloading in the postoperative time frame.


Current Visit: Yes   Status: Acute   Code(s): E11.621 - TYPE 2 DIABETES MELLITUS

WITH FOOT ULCER; L97.504 - NON-PRS CHRONIC ULCER OTH PRT UNSP FOOT W NECROSIS OF

BONE   SNOMED Code(s): 1251580027133


   





(2) Cellulitis of right foot


Current Visit: Yes   Status: Acute   Code(s): L03.115 - CELLULITIS OF RIGHT 

LOWER LIMB   SNOMED Code(s): 105150786


   





(3) MRSA (methicillin resistant Staphylococcus aureus) infection


Current Visit: Yes   Status: Acute   Code(s): A49.02 - METHICILLIN RESIS STAPH 

INFECTION, UNSP SITE   SNOMED Code(s): 999187247 No

## 2020-10-02 ENCOUNTER — HOSPITAL ENCOUNTER (INPATIENT)
Dept: HOSPITAL 47 - EC | Age: 60
LOS: 12 days | Discharge: HOME HEALTH | DRG: 871 | End: 2020-10-14
Attending: INTERNAL MEDICINE | Admitting: INTERNAL MEDICINE
Payer: COMMERCIAL

## 2020-10-02 VITALS — BODY MASS INDEX: 20.3 KG/M2

## 2020-10-02 DIAGNOSIS — D63.1: ICD-10-CM

## 2020-10-02 DIAGNOSIS — Z89.421: ICD-10-CM

## 2020-10-02 DIAGNOSIS — E10.10: ICD-10-CM

## 2020-10-02 DIAGNOSIS — Z90.49: ICD-10-CM

## 2020-10-02 DIAGNOSIS — R13.10: ICD-10-CM

## 2020-10-02 DIAGNOSIS — I95.9: ICD-10-CM

## 2020-10-02 DIAGNOSIS — G93.89: ICD-10-CM

## 2020-10-02 DIAGNOSIS — Z87.01: ICD-10-CM

## 2020-10-02 DIAGNOSIS — J69.0: ICD-10-CM

## 2020-10-02 DIAGNOSIS — I50.9: ICD-10-CM

## 2020-10-02 DIAGNOSIS — K59.09: ICD-10-CM

## 2020-10-02 DIAGNOSIS — Z98.42: ICD-10-CM

## 2020-10-02 DIAGNOSIS — A41.89: Primary | ICD-10-CM

## 2020-10-02 DIAGNOSIS — Z82.5: ICD-10-CM

## 2020-10-02 DIAGNOSIS — Z88.8: ICD-10-CM

## 2020-10-02 DIAGNOSIS — Z98.890: ICD-10-CM

## 2020-10-02 DIAGNOSIS — E86.0: ICD-10-CM

## 2020-10-02 DIAGNOSIS — Z86.718: ICD-10-CM

## 2020-10-02 DIAGNOSIS — F41.9: ICD-10-CM

## 2020-10-02 DIAGNOSIS — I65.23: ICD-10-CM

## 2020-10-02 DIAGNOSIS — I83.93: ICD-10-CM

## 2020-10-02 DIAGNOSIS — Z88.5: ICD-10-CM

## 2020-10-02 DIAGNOSIS — I13.0: ICD-10-CM

## 2020-10-02 DIAGNOSIS — G92: ICD-10-CM

## 2020-10-02 DIAGNOSIS — Z96.1: ICD-10-CM

## 2020-10-02 DIAGNOSIS — E78.5: ICD-10-CM

## 2020-10-02 DIAGNOSIS — Z88.1: ICD-10-CM

## 2020-10-02 DIAGNOSIS — E03.9: ICD-10-CM

## 2020-10-02 DIAGNOSIS — M54.5: ICD-10-CM

## 2020-10-02 DIAGNOSIS — E10.22: ICD-10-CM

## 2020-10-02 DIAGNOSIS — E10.649: ICD-10-CM

## 2020-10-02 DIAGNOSIS — I25.2: ICD-10-CM

## 2020-10-02 DIAGNOSIS — I25.10: ICD-10-CM

## 2020-10-02 DIAGNOSIS — N18.30: ICD-10-CM

## 2020-10-02 DIAGNOSIS — Z79.899: ICD-10-CM

## 2020-10-02 DIAGNOSIS — Z82.49: ICD-10-CM

## 2020-10-02 DIAGNOSIS — M19.90: ICD-10-CM

## 2020-10-02 DIAGNOSIS — G89.29: ICD-10-CM

## 2020-10-02 DIAGNOSIS — E10.319: ICD-10-CM

## 2020-10-02 DIAGNOSIS — N17.9: ICD-10-CM

## 2020-10-02 DIAGNOSIS — Z86.14: ICD-10-CM

## 2020-10-02 DIAGNOSIS — Z79.02: ICD-10-CM

## 2020-10-02 DIAGNOSIS — L89.322: ICD-10-CM

## 2020-10-02 DIAGNOSIS — Z79.4: ICD-10-CM

## 2020-10-02 DIAGNOSIS — G40.909: ICD-10-CM

## 2020-10-02 DIAGNOSIS — J44.9: ICD-10-CM

## 2020-10-02 DIAGNOSIS — Z95.5: ICD-10-CM

## 2020-10-02 DIAGNOSIS — Z83.3: ICD-10-CM

## 2020-10-02 DIAGNOSIS — D72.819: ICD-10-CM

## 2020-10-02 DIAGNOSIS — I69.354: ICD-10-CM

## 2020-10-02 DIAGNOSIS — K21.9: ICD-10-CM

## 2020-10-02 DIAGNOSIS — E10.42: ICD-10-CM

## 2020-10-02 DIAGNOSIS — Z87.891: ICD-10-CM

## 2020-10-02 DIAGNOSIS — Z88.0: ICD-10-CM

## 2020-10-02 DIAGNOSIS — Z90.710: ICD-10-CM

## 2020-10-02 DIAGNOSIS — Z79.82: ICD-10-CM

## 2020-10-02 DIAGNOSIS — F31.9: ICD-10-CM

## 2020-10-02 DIAGNOSIS — E10.51: ICD-10-CM

## 2020-10-02 DIAGNOSIS — Z89.411: ICD-10-CM

## 2020-10-02 DIAGNOSIS — Z91.030: ICD-10-CM

## 2020-10-02 DIAGNOSIS — H93.13: ICD-10-CM

## 2020-10-02 DIAGNOSIS — R32: ICD-10-CM

## 2020-10-02 DIAGNOSIS — L89.210: ICD-10-CM

## 2020-10-02 DIAGNOSIS — Z98.41: ICD-10-CM

## 2020-10-02 LAB
ALBUMIN SERPL-MCNC: 3.7 G/DL (ref 3.5–5)
ALP SERPL-CCNC: 67 U/L (ref 38–126)
ALT SERPL-CCNC: 24 U/L (ref 4–34)
APTT BLD: 22.4 SEC (ref 22–30)
AST SERPL-CCNC: 27 U/L (ref 14–36)
BASOPHILS # BLD AUTO: 0 K/UL (ref 0–0.2)
BASOPHILS NFR BLD AUTO: 0 %
BILIRUB UR QL STRIP.AUTO: (no result)
BUN SERPL-SCNC: 53 MG/DL (ref 7–17)
CALCIUM SPEC-MCNC: 9.5 MG/DL (ref 8.4–10.2)
CHLORIDE SERPL-SCNC: 101 MMOL/L (ref 98–107)
CK SERPL-CCNC: 196 U/L (ref 30–135)
CO2 SERPL-SCNC: <5 MMOL/L (ref 22–30)
EOSINOPHIL # BLD AUTO: 0 K/UL (ref 0–0.7)
EOSINOPHIL NFR BLD AUTO: 0 %
ERYTHROCYTE [DISTWIDTH] IN BLOOD BY AUTOMATED COUNT: 3.28 M/UL (ref 3.8–5.4)
ERYTHROCYTE [DISTWIDTH] IN BLOOD: 14.3 % (ref 11.5–15.5)
GLUCOSE BLD-MCNC: >600 MG/DL (ref 75–99)
GLUCOSE SERPL-MCNC: 935 MG/DL (ref 74–99)
GLUCOSE UR QL: (no result)
HCO3 BLDV-SCNC: 5 MMOL/L (ref 24–28)
HCT VFR BLD AUTO: 35.9 % (ref 34–46)
HGB BLD-MCNC: 9.6 GM/DL (ref 11.4–16)
HYALINE CASTS UR QL AUTO: 1 /LPF (ref 0–2)
INR PPP: 1 (ref ?–1.2)
KETONES UR QL STRIP.AUTO: (no result)
LYMPHOCYTES # SPEC AUTO: 0.9 K/UL (ref 1–4.8)
LYMPHOCYTES NFR SPEC AUTO: 8 %
MAGNESIUM SPEC-SCNC: 2.9 MG/DL (ref 1.6–2.3)
MCH RBC QN AUTO: 29.4 PG (ref 25–35)
MCHC RBC AUTO-ENTMCNC: 26.9 G/DL (ref 31–37)
MCV RBC AUTO: 109.3 FL (ref 80–100)
MONOCYTES # BLD AUTO: 0.4 K/UL (ref 0–1)
MONOCYTES NFR BLD AUTO: 3 %
NEUTROPHILS # BLD AUTO: 10.3 K/UL (ref 1.3–7.7)
NEUTROPHILS NFR BLD AUTO: 88 %
PCO2 BLDV: 19 MMHG (ref 37–51)
PH BLDV: 7.05 [PH] (ref 7.31–7.41)
PH UR: 5 [PH] (ref 5–8)
PH UR: 5.5 [PH] (ref 5–8)
PLATELET # BLD AUTO: 156 K/UL (ref 150–450)
POTASSIUM SERPL-SCNC: 4.2 MMOL/L (ref 3.5–5.1)
PROT SERPL-MCNC: 6.6 G/DL (ref 6.3–8.2)
PT BLD: 10.3 SEC (ref 9–12)
RBC UR QL: 3 /HPF (ref 0–5)
SODIUM SERPL-SCNC: 139 MMOL/L (ref 137–145)
SP GR UR: 1.02 (ref 1–1.03)
SP GR UR: 1.02 (ref 1–1.03)
SQUAMOUS UR QL AUTO: 1 /HPF (ref 0–4)
UROBILINOGEN UR QL STRIP: <2 MG/DL (ref ?–2)
UROBILINOGEN UR QL STRIP: <2 MG/DL (ref ?–2)
WBC # BLD AUTO: 11.6 K/UL (ref 3.8–10.6)
WBC # UR AUTO: 2 /HPF (ref 0–5)

## 2020-10-02 PROCEDURE — 81003 URINALYSIS AUTO W/O SCOPE: CPT

## 2020-10-02 PROCEDURE — 86900 BLOOD TYPING SEROLOGIC ABO: CPT

## 2020-10-02 PROCEDURE — 86901 BLOOD TYPING SEROLOGIC RH(D): CPT

## 2020-10-02 PROCEDURE — 80051 ELECTROLYTE PANEL: CPT

## 2020-10-02 PROCEDURE — 85025 COMPLETE CBC W/AUTO DIFF WBC: CPT

## 2020-10-02 PROCEDURE — 83735 ASSAY OF MAGNESIUM: CPT

## 2020-10-02 PROCEDURE — 82565 ASSAY OF CREATININE: CPT

## 2020-10-02 PROCEDURE — 82947 ASSAY GLUCOSE BLOOD QUANT: CPT

## 2020-10-02 PROCEDURE — 71045 X-RAY EXAM CHEST 1 VIEW: CPT

## 2020-10-02 PROCEDURE — 84100 ASSAY OF PHOSPHORUS: CPT

## 2020-10-02 PROCEDURE — 85730 THROMBOPLASTIN TIME PARTIAL: CPT

## 2020-10-02 PROCEDURE — 86920 COMPATIBILITY TEST SPIN: CPT

## 2020-10-02 PROCEDURE — 86850 RBC ANTIBODY SCREEN: CPT

## 2020-10-02 PROCEDURE — 99291 CRITICAL CARE FIRST HOUR: CPT

## 2020-10-02 PROCEDURE — 93005 ELECTROCARDIOGRAM TRACING: CPT

## 2020-10-02 PROCEDURE — 85610 PROTHROMBIN TIME: CPT

## 2020-10-02 PROCEDURE — 82803 BLOOD GASES ANY COMBINATION: CPT

## 2020-10-02 PROCEDURE — 80320 DRUG SCREEN QUANTALCOHOLS: CPT

## 2020-10-02 PROCEDURE — 82607 VITAMIN B-12: CPT

## 2020-10-02 PROCEDURE — 82140 ASSAY OF AMMONIA: CPT

## 2020-10-02 PROCEDURE — 80048 BASIC METABOLIC PNL TOTAL CA: CPT

## 2020-10-02 PROCEDURE — 83605 ASSAY OF LACTIC ACID: CPT

## 2020-10-02 PROCEDURE — 95819 EEG AWAKE AND ASLEEP: CPT

## 2020-10-02 PROCEDURE — 84520 ASSAY OF UREA NITROGEN: CPT

## 2020-10-02 PROCEDURE — 94760 N-INVAS EAR/PLS OXIMETRY 1: CPT

## 2020-10-02 PROCEDURE — 82550 ASSAY OF CK (CPK): CPT

## 2020-10-02 PROCEDURE — 70450 CT HEAD/BRAIN W/O DYE: CPT

## 2020-10-02 PROCEDURE — 82746 ASSAY OF FOLIC ACID SERUM: CPT

## 2020-10-02 PROCEDURE — 83540 ASSAY OF IRON: CPT

## 2020-10-02 PROCEDURE — 80164 ASSAY DIPROPYLACETIC ACD TOT: CPT

## 2020-10-02 PROCEDURE — 94640 AIRWAY INHALATION TREATMENT: CPT

## 2020-10-02 PROCEDURE — 80165 DIPROPYLACETIC ACID FREE: CPT

## 2020-10-02 PROCEDURE — 80053 COMPREHEN METABOLIC PANEL: CPT

## 2020-10-02 PROCEDURE — 83550 IRON BINDING TEST: CPT

## 2020-10-02 PROCEDURE — 82009 KETONE BODYS QUAL: CPT

## 2020-10-02 PROCEDURE — 36415 COLL VENOUS BLD VENIPUNCTURE: CPT

## 2020-10-02 PROCEDURE — 74230 X-RAY XM SWLNG FUNCJ C+: CPT

## 2020-10-02 PROCEDURE — 87077 CULTURE AEROBIC IDENTIFY: CPT

## 2020-10-02 PROCEDURE — 96374 THER/PROPH/DIAG INJ IV PUSH: CPT

## 2020-10-02 PROCEDURE — 87040 BLOOD CULTURE FOR BACTERIA: CPT

## 2020-10-02 PROCEDURE — 87186 SC STD MICRODIL/AGAR DIL: CPT

## 2020-10-02 PROCEDURE — 72100 X-RAY EXAM L-S SPINE 2/3 VWS: CPT

## 2020-10-02 PROCEDURE — 81001 URINALYSIS AUTO W/SCOPE: CPT

## 2020-10-02 PROCEDURE — 96361 HYDRATE IV INFUSION ADD-ON: CPT

## 2020-10-02 RX ADMIN — INSULIN HUMAN SCH MLS/HR: 100 INJECTION, SOLUTION PARENTERAL at 20:40

## 2020-10-02 RX ADMIN — CEFAZOLIN SCH MLS/HR: 330 INJECTION, POWDER, FOR SOLUTION INTRAMUSCULAR; INTRAVENOUS at 20:28

## 2020-10-02 NOTE — ED
General Adult HPI





- General


Chief complaint: Recheck/Abnormal Lab/Rx


Stated complaint: NVD


Time Seen by Provider: 10/02/20 19:18


Source: patient, EMS, RN notes reviewed, old records reviewed


Mode of arrival: EMS


Limitations: physical limitation





- History of Present Illness


Initial comments: 





History very limited, 60-year-old presenting from assisted living or group home 

where she resides.  History of previous CVA.  Hypertension, CAD, kidney disease,

diabetes with frequent episodes of DKA.  History is obtained from EMS according 

to EMS she had several days of nausea vomiting and diarrhea.  She was noted to 

have high sugar by EMS reading greater than 600.  Patient is unable to 

significantly contribute to the history.  She will follow simple commands and 

answer simple questions.





- Related Data


                                Home Medications











 Medication  Instructions  Recorded  Confirmed


 


Famotidine [Pepcid] 20 mg PO DAILY 02/19/16 10/02/20


 


HYDROcodone/APAP 10-325MG [Norco 1 tab PO TID PRN 05/06/17 10/02/20





]   


 


DULoxetine HCL [Cymbalta] 60 mg PO DAILY 09/19/17 10/02/20


 


Atorvastatin [Lipitor] 20 mg PO DAILY 07/31/19 10/02/20


 


QUEtiapine [SEROquel] 100 mg PO HS 07/31/19 10/02/20


 


Aspirin [Adult Low Dose Aspirin EC] 81 mg PO DAILY 01/10/20 10/02/20


 


Ferrous Sulfate [Iron (65  mg PO DAILY 01/10/20 10/02/20





Elemental)]   


 


ALPRAZolam [Xanax] 1 mg PO Q8H PRN 07/28/20 10/02/20


 


Albuterol Sulfate [Ventolin HFA] 2 puff INHALATION RT-Q4H PRN 07/28/20 10/02/20


 


Ondansetron Odt [Zofran ODT] 4 mg PO DAILY PRN 07/28/20 10/02/20


 


Valproic Acid [Depakene] 250 mg PO DAILY 07/28/20 10/02/20


 


QUEtiapine [SEROquel] 25 mg PO BID 08/12/20 10/02/20


 


Ascorbic Acid [Vitamin C] 500 mg PO DAILY 08/31/20 10/02/20


 


Insulin Glargine,Hum.rec.anlog 20 unit SQ HS 08/31/20 10/02/20





[Basaglar Kwikpen U-100]   


 


Vitamin B Complex 1 tab PO DAILY 08/31/20 10/02/20


 


Lacosamide [Vimpat] 50 mg PO HS 10/02/20 10/02/20


 


Lacosamide [Vimpat] 100 mg PO QAM 10/02/20 10/02/20








                                  Previous Rx's











 Medication  Instructions  Recorded


 


Divalproex [Depakote] 250 mg PO TID #90 tablet. 20


 


Losartan [Cozaar] 50 mg PO DAILY  tab 20


 


Metoclopramide [Reglan] 10 mg PO AC-TID  tab 20


 


Calcium Carb-Vit D 500Mg-200Un 1 each PO BID-W/MEALS  tab 20





[Oscal 500+D]  


 


Clopidogrel [Plavix] 75 mg PO DAILY  tab 20


 


INSULIN ASPART (NovoLOG) [NovoLOG 0 unit SQ AC-TID  vial 20





(formulary)]  











                                    Allergies











Allergy/AdvReac Type Severity Reaction Status Date / Time


 


Barbiturates Allergy  Rash/Hives Verified 10/02/20 21:31


 


cephalexin monohydrate Allergy  Rash/Hives Verified 10/02/20 21:31





[From Keflex]     


 


morphine Allergy  Rash/Hives Verified 10/02/20 21:31


 


Penicillins Allergy  Rash/Hives Verified 10/02/20 21:31


 


phenobarbital Allergy  Swelling Verified 10/02/20 21:31


 


venom-honey bee Allergy  Swelling Verified 10/02/20 21:31





[bee venom (honey bee)]     


 


amlodipine besylate AdvReac  Vomiting Verified 10/02/20 21:31





[From Norvasc]     














Review of Systems


ROS Statement: 


Those systems with pertinent positive or pertinent negative responses have been 

documented in the HPI.





ROS Other: All systems not noted in ROS Statement are negative.





Past Medical History


Past Medical History: Asthma, Coronary Artery Disease (CAD), Chest Pain / 

Angina, Heart Failure, COPD, CVA/TIA, Diabetes Mellitus, Deep Vein Thrombosis 

(DVT), Eye Disorder, GERD/Reflux, Hyperlipidemia, Hypertension, Myocardial 

Infarction (MI), Neurologic Disorder, Osteoarthritis (OA), Pneumonia, Renal 

Disease


Additional Past Medical History / Comment(s): IDDM (brittle), DKAs, neuropathy 

bilateral hands/feet, retinopathy bilateral eyes, cellulitis R foot, R great toe

and 2nd toe infections/amputations, current wound R foot-being seen in [facility], 

renal failure, anemia, CVAs with L sided paralysis, headaches started after 

CVAs, brain lesions, DVT R axillae, low back pain, varicosities, seizure many 

years ago (), hypothyroid, constipation, bilateral tinnitis occasionally, 

sinus problems.


Last Myocardial Infarction Date:: 


History of Any Multi-Drug Resistant Organisms: MRSA


Date of last positivie culture/infection: 18


MDRO Source:: Right Foot


Past Surgical History: Appendectomy,  Section, Cholecystectomy, Heart 

Catheterization With Stent, Hysterectomy, Orthopedic Surgery


Additional Past Surgical History / Comment(s): PCI with multiple stents, R great

toe and 2nd toe amps, debridements R foot ulcer, L shoulder surgery to remove 

bone, bronchoscopy, EGD, colonoscopy, R arm port since removed, bilateral 

cataract removals/lens implants.


Past Anesthesia/Blood Transfusion Reactions: No Reported Reaction


Additional Past Anesthesia/Blood Transfusion Reaction / Comment(s): HX OF BLOOD 

TRANSFUSION- NO REACTION


Date of Last Stent Placement:: 2013


Past Psychological History: Anxiety, Bipolar, Depression


Smoking Status: Never smoker


Past Drug Use History: Marijuana





- Past Family History


  ** Father


Family Medical History: Unable to Obtain, Coronary Artery Disease (CAD), 

Diabetes Mellitus





  ** Mother


Family Medical History: COPD





General Exam


Limitations: physical limitation


General appearance: lethargic, in distress


Head exam: Present: atraumatic, normocephalic


Eye exam: Absent: PERRL (left 2 mm, right pupil 3 mm)


ENT exam: Present: mucous membranes dry


Neck exam: Present: normal inspection.  Absent: lymphadenopathy


Respiratory exam: Present: respiratory distress (Tachypnea, shallow breathing). 

Absent: wheezes, rales


Cardiovascular Exam: Present: regular rate, normal rhythm


GI/Abdominal exam: Present: soft.  Absent: distended, tenderness, guarding, 

rebound


Extremities exam: Present: normal capillary refill


Neurological exam: Present: alert, oriented X3, motor sensory deficit (Baseline 

left sided weakness)


Skin exam: Present: warm, dry, intact.  Absent: cyanosis, diaphoretic





Course


                                   Vital Signs











  10/02/20 10/02/20 10/02/20





  19:19 19:50 20:14


 


Temperature 90.1 F L 89.6 F L 


 


Pulse Rate 75 72 72


 


Respiratory 18 16 16





Rate   


 


Blood Pressure 79/51 83/34 91/54


 


O2 Sat by Pulse 94 L 93 L 94 L





Oximetry   














  10/02/20 10/02/20 10/02/20





  20:20 20:46 20:55


 


Temperature 89.1 F L 31.9 F L 89.2 F L


 


Pulse Rate 72 78 61


 


Respiratory 16 16 16





Rate   


 


Blood Pressure 101/62 102/52 97/55


 


O2 Sat by Pulse 94 L 94 L 93 L





Oximetry   














EKG Findings





- EKG Comments:


EKG Findings:: EKG: Normal sinus rhythm, possible left atrial enlargement, no ST

segment elevation, rate of 75, ID interval 154, QRS duration 98,  no ST 

segment elevation





Medical Decision Making





- Medical Decision Making





60-year-old female with diabetes presenting in extremis, hypotensive, 

hypothermic, tachypneic, concern for diabetic ketoacidosis.  Workup was 

initiated, mild leukocytosis 11.6, hemoglobin 9.6 which is improved from 

previous, likely a degree of hemoconcentration.  She has significant metabolic 

abnormalities, she is in diabetic ketoacidosis with an nondetectable CO2.  She 

has a pH is 7.05 on venous blood gas.  She is in acute renal failure with a 

creatinine of 1.98.  Her sugar is 935.  She is acetone positive.  Head CT shows 

an old right hemispheric infarct, no change, no acute findings.  Chest x-ray 

questionable right lower lobe pneumonia.  Patient given 2 L of IV normal saline 

started on 200 mL an hour.  Case discussed with both Dr. Cabral, who will admit 

and Dr. Zamora who will accept the patient to the ICU.











Patient with severe diabetic ketoacidosis, dehydration, acute renal failure, 

metabolic acidosis.





- Lab Data


Result diagrams: 


                                 10/05/20 04:45





                                 10/05/20 04:45


                                   Lab Results











  10/02/20 10/02/20 10/02/20 Range/Units





  19:24 19:26 19:35 


 


WBC    11.6 H  (3.8-10.6)  k/uL


 


RBC    3.28 L  (3.80-5.40)  m/uL


 


Hgb    9.6 L D  (11.4-16.0)  gm/dL


 


Hct    35.9  (34.0-46.0)  %


 


MCV    109.3 H D  (80.0-100.0)  fL


 


MCH    29.4  (25.0-35.0)  pg


 


MCHC    26.9 L  (31.0-37.0)  g/dL


 


RDW    14.3  (11.5-15.5)  %


 


Plt Count    156  (150-450)  k/uL


 


Neutrophils %    88  %


 


Lymphocytes %    8  %


 


Monocytes %    3  %


 


Eosinophils %    0  %


 


Basophils %    0  %


 


Neutrophils #    10.3 H  (1.3-7.7)  k/uL


 


Lymphocytes #    0.9 L  (1.0-4.8)  k/uL


 


Monocytes #    0.4  (0-1.0)  k/uL


 


Eosinophils #    0.0  (0-0.7)  k/uL


 


Basophils #    0.0  (0-0.2)  k/uL


 


Hypochromasia    Marked  


 


Macrocytosis    Marked A  


 


PT     (9.0-12.0)  sec


 


INR     (<1.2)  


 


APTT     (22.0-30.0)  sec


 


VBG pH     (7.31-7.41)  


 


VBG pCO2     (37-51)  mmHg


 


VBG HCO3     (24-28)  mmol/L


 


Sodium     (137-145)  mmol/L


 


Potassium     (3.5-5.1)  mmol/L


 


Chloride     ()  mmol/L


 


Carbon Dioxide     (22-30)  mmol/L


 


Anion Gap     mmol/L


 


BUN     (7-17)  mg/dL


 


Creatinine     (0.52-1.04)  mg/dL


 


Est GFR (CKD-EPI)AfAm     (>60 ml/min/1.73 sqM)  


 


Est GFR (CKD-EPI)NonAf     (>60 ml/min/1.73 sqM)  


 


Glucose     (74-99)  mg/dL


 


POC Glucose (mg/dL)   >600 H   (75-99)  mg/dL


 


POC Glu Operater ID   Rhoda Nunez   


 


Plasma Lactic Acid Carmelo     (0.7-2.0)  mmol/L


 


Calcium     (8.4-10.2)  mg/dL


 


Magnesium     (1.6-2.3)  mg/dL


 


Total Bilirubin     (0.2-1.3)  mg/dL


 


AST     (14-36)  U/L


 


ALT     (4-34)  U/L


 


Alkaline Phosphatase     ()  U/L


 


Creatine Kinase     ()  U/L


 


Total Protein     (6.3-8.2)  g/dL


 


Albumin     (3.5-5.0)  g/dL


 


Urine Color  Light Yellow    


 


Urine Appearance  Clear    (Clear)  


 


Urine pH  5.0    (5.0-8.0)  


 


Ur Specific Gravity  1.016    (1.001-1.035)  


 


Urine Protein  Negative    (Negative)  


 


Urine Glucose (UA)  4+ H    (Negative)  


 


Urine Ketones  4+ H    (Negative)  


 


Urine Blood  Negative    (Negative)  


 


Urine Nitrite  Negative    (Negative)  


 


Urine Bilirubin  Negative    (Negative)  


 


Urine Urobilinogen  <2.0    (<2.0)  mg/dL


 


Ur Leukocyte Esterase  Negative    (Negative)  


 


Urine RBC     (0-5)  /hpf


 


Urine WBC     (0-5)  /hpf


 


Ur Squamous Epith Cells     (0-4)  /hpf


 


Amorphous Sediment     (None)  /hpf


 


Urine Bacteria     (None)  /hpf


 


Hyaline Casts     (0-2)  /lpf


 


Urine Mucus     (None)  /hpf


 


Serum Alcohol     mg/dL


 


Acetone, Qual     (Negative)  


 


Blood Type     


 


Blood Type Recheck     


 


Bld Type Recheck Status     


 


Antibody Screen     


 


Crossmatch     


 


Spec Expiration Date     














  10/02/20 10/02/20 10/02/20 Range/Units





  19:35 19:35 19:35 


 


WBC     (3.8-10.6)  k/uL


 


RBC     (3.80-5.40)  m/uL


 


Hgb     (11.4-16.0)  gm/dL


 


Hct     (34.0-46.0)  %


 


MCV     (80.0-100.0)  fL


 


MCH     (25.0-35.0)  pg


 


MCHC     (31.0-37.0)  g/dL


 


RDW     (11.5-15.5)  %


 


Plt Count     (150-450)  k/uL


 


Neutrophils %     %


 


Lymphocytes %     %


 


Monocytes %     %


 


Eosinophils %     %


 


Basophils %     %


 


Neutrophils #     (1.3-7.7)  k/uL


 


Lymphocytes #     (1.0-4.8)  k/uL


 


Monocytes #     (0-1.0)  k/uL


 


Eosinophils #     (0-0.7)  k/uL


 


Basophils #     (0-0.2)  k/uL


 


Hypochromasia     


 


Macrocytosis     


 


PT  10.3    (9.0-12.0)  sec


 


INR  1.0    (<1.2)  


 


APTT  22.4    (22.0-30.0)  sec


 


VBG pH     (7.31-7.41)  


 


VBG pCO2     (37-51)  mmHg


 


VBG HCO3     (24-28)  mmol/L


 


Sodium    139  (137-145)  mmol/L


 


Potassium    4.2  (3.5-5.1)  mmol/L


 


Chloride    101  ()  mmol/L


 


Carbon Dioxide    <5 L*  (22-30)  mmol/L


 


Anion Gap      mmol/L


 


BUN    53 H  (7-17)  mg/dL


 


Creatinine    1.98 H  (0.52-1.04)  mg/dL


 


Est GFR (CKD-EPI)AfAm    31  (>60 ml/min/1.73 sqM)  


 


Est GFR (CKD-EPI)NonAf    27  (>60 ml/min/1.73 sqM)  


 


Glucose    935 H*  (74-99)  mg/dL


 


POC Glucose (mg/dL)     (75-99)  mg/dL


 


POC Glu Operater ID     


 


Plasma Lactic Acid Carmelo     (0.7-2.0)  mmol/L


 


Calcium    9.5  (8.4-10.2)  mg/dL


 


Magnesium    2.9 H  (1.6-2.3)  mg/dL


 


Total Bilirubin    0.3  (0.2-1.3)  mg/dL


 


AST    27  (14-36)  U/L


 


ALT    24  (4-34)  U/L


 


Alkaline Phosphatase    67  ()  U/L


 


Creatine Kinase    196 H  ()  U/L


 


Total Protein    6.6  (6.3-8.2)  g/dL


 


Albumin    3.7  (3.5-5.0)  g/dL


 


Urine Color   Dark Yellow   


 


Urine Appearance   Clear   (Clear)  


 


Urine pH   5.5   (5.0-8.0)  


 


Ur Specific Gravity   1.018   (1.001-1.035)  


 


Urine Protein   Negative   (Negative)  


 


Urine Glucose (UA)   Negative   (Negative)  


 


Urine Ketones   Negative   (Negative)  


 


Urine Blood   Small H   (Negative)  


 


Urine Nitrite   Negative   (Negative)  


 


Urine Bilirubin   1+ H   (Negative)  


 


Urine Urobilinogen   <2.0   (<2.0)  mg/dL


 


Ur Leukocyte Esterase   Negative   (Negative)  


 


Urine RBC   3   (0-5)  /hpf


 


Urine WBC   2   (0-5)  /hpf


 


Ur Squamous Epith Cells   1   (0-4)  /hpf


 


Amorphous Sediment   Rare H   (None)  /hpf


 


Urine Bacteria   Rare H   (None)  /hpf


 


Hyaline Casts   1   (0-2)  /lpf


 


Urine Mucus   Few H   (None)  /hpf


 


Serum Alcohol    <10  mg/dL


 


Acetone, Qual    Positive  (Negative)  


 


Blood Type     


 


Blood Type Recheck     


 


Bld Type Recheck Status     


 


Antibody Screen     


 


Crossmatch     


 


Spec Expiration Date     














  10/02/20 10/02/20 10/02/20 Range/Units





  19:35 19:35 19:45 


 


WBC     (3.8-10.6)  k/uL


 


RBC     (3.80-5.40)  m/uL


 


Hgb     (11.4-16.0)  gm/dL


 


Hct     (34.0-46.0)  %


 


MCV     (80.0-100.0)  fL


 


MCH     (25.0-35.0)  pg


 


MCHC     (31.0-37.0)  g/dL


 


RDW     (11.5-15.5)  %


 


Plt Count     (150-450)  k/uL


 


Neutrophils %     %


 


Lymphocytes %     %


 


Monocytes %     %


 


Eosinophils %     %


 


Basophils %     %


 


Neutrophils #     (1.3-7.7)  k/uL


 


Lymphocytes #     (1.0-4.8)  k/uL


 


Monocytes #     (0-1.0)  k/uL


 


Eosinophils #     (0-0.7)  k/uL


 


Basophils #     (0-0.2)  k/uL


 


Hypochromasia     


 


Macrocytosis     


 


PT     (9.0-12.0)  sec


 


INR     (<1.2)  


 


APTT     (22.0-30.0)  sec


 


VBG pH   7.05 L*   (7.31-7.41)  


 


VBG pCO2   19 L   (37-51)  mmHg


 


VBG HCO3   5 L*   (24-28)  mmol/L


 


Sodium     (137-145)  mmol/L


 


Potassium     (3.5-5.1)  mmol/L


 


Chloride     ()  mmol/L


 


Carbon Dioxide     (22-30)  mmol/L


 


Anion Gap     mmol/L


 


BUN     (7-17)  mg/dL


 


Creatinine     (0.52-1.04)  mg/dL


 


Est GFR (CKD-EPI)AfAm     (>60 ml/min/1.73 sqM)  


 


Est GFR (CKD-EPI)NonAf     (>60 ml/min/1.73 sqM)  


 


Glucose     (74-99)  mg/dL


 


POC Glucose (mg/dL)     (75-99)  mg/dL


 


POC Glu Operater ID     


 


Plasma Lactic Acid Carmelo  1.9    (0.7-2.0)  mmol/L


 


Calcium     (8.4-10.2)  mg/dL


 


Magnesium     (1.6-2.3)  mg/dL


 


Total Bilirubin     (0.2-1.3)  mg/dL


 


AST     (14-36)  U/L


 


ALT     (4-34)  U/L


 


Alkaline Phosphatase     ()  U/L


 


Creatine Kinase     ()  U/L


 


Total Protein     (6.3-8.2)  g/dL


 


Albumin     (3.5-5.0)  g/dL


 


Urine Color     


 


Urine Appearance     (Clear)  


 


Urine pH     (5.0-8.0)  


 


Ur Specific Gravity     (1.001-1.035)  


 


Urine Protein     (Negative)  


 


Urine Glucose (UA)     (Negative)  


 


Urine Ketones     (Negative)  


 


Urine Blood     (Negative)  


 


Urine Nitrite     (Negative)  


 


Urine Bilirubin     (Negative)  


 


Urine Urobilinogen     (<2.0)  mg/dL


 


Ur Leukocyte Esterase     (Negative)  


 


Urine RBC     (0-5)  /hpf


 


Urine WBC     (0-5)  /hpf


 


Ur Squamous Epith Cells     (0-4)  /hpf


 


Amorphous Sediment     (None)  /hpf


 


Urine Bacteria     (None)  /hpf


 


Hyaline Casts     (0-2)  /lpf


 


Urine Mucus     (None)  /hpf


 


Serum Alcohol     mg/dL


 


Acetone, Qual     (Negative)  


 


Blood Type    A Positive  


 


Blood Type Recheck    A Pos  


 


Bld Type Recheck Status    No  


 


Antibody Screen    NEGATIVE  


 


Crossmatch    See Detail  


 


Spec Expiration Date    10/05/2020 - 2809  














Critical Care Time


Critical Care Time: Yes


Total Critical Care Time: 35





Disposition


Clinical Impression: 


 Metabolic acidosis, NA (acute kidney injury), DKA (diabetic ketoacidoses)





Disposition: ADMITTED AS IP TO THIS [facility]


Condition: Serious


Is patient prescribed a controlled substance at d/c from ED?: No


Decision to Admit Reason: Admit from EC


Decision Date: 10/02/20


Decision Time: 20:38

## 2020-10-02 NOTE — CT
EXAMINATION TYPE: CT brain wo con

 

DATE OF EXAM: 10/2/2020

 

COMPARISON: 8/30/2020

 

HISTORY: Weakness

 

CT DLP: mGycm

Automated exposure control for dose reduction was used.

 

There is cerebral atrophy. There is large area of hypodensity involving the right frontal lobe and ri
ght anterior temporal lobe related to old infarct. There is no midline shift. There is no mass effect
. There is no evidence of intracranial hemorrhage. The calvarium is intact.

 

IMPRESSION:

Old large right hemisphere infarct. No acute intracranial abnormality. No change compared to recent e
xam.

## 2020-10-02 NOTE — XR
EXAMINATION TYPE: XR chest 1V portable

 

DATE OF EXAM: 10/2/2020

 

COMPARISON: 8/30/2020

 

HISTORY: Altered mental status

 

 There is some poorly marginated nodular infiltrate right upper lobe. The other lung fields are clear
. There is no heart failure. Heart size is normal. There are chest leads. Costophrenic angles are sonia
ar.

 

 

 

IMPRESSION: There is some new nodular infiltrate right upper lobe compared to recent exam and consist
ent with pneumonia. No heart failure seen. Normal heart.

## 2020-10-03 LAB
ANION GAP SERPL CALC-SCNC: 14 MMOL/L
ANION GAP SERPL CALC-SCNC: 6 MMOL/L
BASOPHILS # BLD AUTO: 0 K/UL (ref 0–0.2)
BASOPHILS NFR BLD AUTO: 0 %
BUN SERPL-SCNC: 56 MG/DL (ref 7–17)
BUN SERPL-SCNC: 57 MG/DL (ref 7–17)
CHLORIDE SERPL-SCNC: 112 MMOL/L (ref 98–107)
CHLORIDE SERPL-SCNC: 115 MMOL/L (ref 98–107)
CO2 SERPL-SCNC: 15 MMOL/L (ref 22–30)
CO2 SERPL-SCNC: 22 MMOL/L (ref 22–30)
EOSINOPHIL # BLD AUTO: 0 K/UL (ref 0–0.7)
EOSINOPHIL NFR BLD AUTO: 0 %
ERYTHROCYTE [DISTWIDTH] IN BLOOD BY AUTOMATED COUNT: 3.07 M/UL (ref 3.8–5.4)
ERYTHROCYTE [DISTWIDTH] IN BLOOD: 14.8 % (ref 11.5–15.5)
GLUCOSE BLD-MCNC: 128 MG/DL (ref 75–99)
GLUCOSE BLD-MCNC: 181 MG/DL (ref 75–99)
GLUCOSE BLD-MCNC: 188 MG/DL (ref 75–99)
GLUCOSE BLD-MCNC: 194 MG/DL (ref 75–99)
GLUCOSE BLD-MCNC: 211 MG/DL (ref 75–99)
GLUCOSE BLD-MCNC: 276 MG/DL (ref 75–99)
GLUCOSE BLD-MCNC: 287 MG/DL (ref 75–99)
GLUCOSE BLD-MCNC: 347 MG/DL (ref 75–99)
GLUCOSE BLD-MCNC: 366 MG/DL (ref 75–99)
GLUCOSE BLD-MCNC: 368 MG/DL (ref 75–99)
GLUCOSE BLD-MCNC: 382 MG/DL (ref 75–99)
GLUCOSE BLD-MCNC: 446 MG/DL (ref 75–99)
GLUCOSE BLD-MCNC: 490 MG/DL (ref 75–99)
GLUCOSE BLD-MCNC: 522 MG/DL (ref 75–99)
GLUCOSE SERPL-MCNC: 357 MG/DL (ref 74–99)
GLUCOSE SERPL-MCNC: 555 MG/DL (ref 74–99)
HCT VFR BLD AUTO: 28.8 % (ref 34–46)
HGB BLD-MCNC: 9.1 GM/DL (ref 11.4–16)
LYMPHOCYTES # SPEC AUTO: 0.5 K/UL (ref 1–4.8)
LYMPHOCYTES NFR SPEC AUTO: 18 %
MCH RBC QN AUTO: 29.6 PG (ref 25–35)
MCHC RBC AUTO-ENTMCNC: 31.5 G/DL (ref 31–37)
MCV RBC AUTO: 93.8 FL (ref 80–100)
MONOCYTES # BLD AUTO: 0.3 K/UL (ref 0–1)
MONOCYTES NFR BLD AUTO: 9 %
NEUTROPHILS # BLD AUTO: 2 K/UL (ref 1.3–7.7)
NEUTROPHILS NFR BLD AUTO: 72 %
PLATELET # BLD AUTO: 137 K/UL (ref 150–450)
POTASSIUM SERPL-SCNC: 3.9 MMOL/L (ref 3.5–5.1)
POTASSIUM SERPL-SCNC: 4.3 MMOL/L (ref 3.5–5.1)
SODIUM SERPL-SCNC: 141 MMOL/L (ref 137–145)
SODIUM SERPL-SCNC: 143 MMOL/L (ref 137–145)
WBC # BLD AUTO: 2.8 K/UL (ref 3.8–10.6)

## 2020-10-03 RX ADMIN — METOCLOPRAMIDE SCH: 10 TABLET ORAL at 19:31

## 2020-10-03 RX ADMIN — DEXTROSE MONOHYDRATE, SODIUM CHLORIDE, AND POTASSIUM CHLORIDE SCH: 50; 4.5; 1.49 INJECTION, SOLUTION INTRAVENOUS at 20:00

## 2020-10-03 RX ADMIN — INSULIN ASPART SCH: 100 INJECTION, SOLUTION INTRAVENOUS; SUBCUTANEOUS at 10:30

## 2020-10-03 RX ADMIN — LACOSAMIDE SCH MLS/HR: 10 INJECTION INTRAVENOUS at 21:05

## 2020-10-03 RX ADMIN — IPRATROPIUM BROMIDE AND ALBUTEROL SULFATE SCH ML: .5; 3 SOLUTION RESPIRATORY (INHALATION) at 15:25

## 2020-10-03 RX ADMIN — ONDANSETRON PRN MG: 2 INJECTION INTRAMUSCULAR; INTRAVENOUS at 17:41

## 2020-10-03 RX ADMIN — CEFAZOLIN SCH: 330 INJECTION, POWDER, FOR SOLUTION INTRAMUSCULAR; INTRAVENOUS at 06:44

## 2020-10-03 RX ADMIN — DEXTROSE MONOHYDRATE, SODIUM CHLORIDE, AND POTASSIUM CHLORIDE SCH MLS/HR: 50; 4.5; 1.49 INJECTION, SOLUTION INTRAVENOUS at 19:52

## 2020-10-03 RX ADMIN — DEXTROSE MONOHYDRATE, SODIUM CHLORIDE, AND POTASSIUM CHLORIDE SCH MLS/HR: 50; 4.5; 1.49 INJECTION, SOLUTION INTRAVENOUS at 06:43

## 2020-10-03 RX ADMIN — CEFAZOLIN SCH MLS/HR: 330 INJECTION, POWDER, FOR SOLUTION INTRAMUSCULAR; INTRAVENOUS at 01:26

## 2020-10-03 RX ADMIN — INSULIN ASPART SCH UNIT: 100 INJECTION, SOLUTION INTRAVENOUS; SUBCUTANEOUS at 20:22

## 2020-10-03 RX ADMIN — ONDANSETRON PRN MG: 2 INJECTION INTRAMUSCULAR; INTRAVENOUS at 09:26

## 2020-10-03 RX ADMIN — IPRATROPIUM BROMIDE AND ALBUTEROL SULFATE SCH: .5; 3 SOLUTION RESPIRATORY (INHALATION) at 13:30

## 2020-10-03 RX ADMIN — INSULIN ASPART SCH: 100 INJECTION, SOLUTION INTRAVENOUS; SUBCUTANEOUS at 19:31

## 2020-10-03 RX ADMIN — LACOSAMIDE SCH MLS/HR: 10 INJECTION INTRAVENOUS at 14:34

## 2020-10-03 RX ADMIN — INSULIN HUMAN SCH: 100 INJECTION, SOLUTION PARENTERAL at 19:31

## 2020-10-03 RX ADMIN — INSULIN ASPART SCH UNIT: 100 INJECTION, SOLUTION INTRAVENOUS; SUBCUTANEOUS at 12:36

## 2020-10-03 RX ADMIN — IPRATROPIUM BROMIDE AND ALBUTEROL SULFATE SCH: .5; 3 SOLUTION RESPIRATORY (INHALATION) at 20:29

## 2020-10-03 RX ADMIN — METOCLOPRAMIDE SCH: 10 TABLET ORAL at 14:40

## 2020-10-03 NOTE — P.HPIM
History of Present Illness


H&P Date: 10/03/20





Morenita Ramirez, is a 60-year-old female who presented to [facility] emergency room due to nausea vomiting and diarrhea, and elevated 

glucose level, she was evaluated in the emergency room, her temperature on 

presentation was 90.1, pulse 75 respiration 18 blood pressure 79/51 pulse ox 94%

on room air, her white blood count was elevated at 11.6 hemoglobin 9.6 platelet 

count 156, venous blood gas pH was 7.05 glucose level was 744 and serum acetone 

was positive, patient was admitted to ICU she was started on IV fluid and on IV 

insulin drip.


Patient has a known history of insulin-dependent diabetes mellitus type 1 

maintained on insulin he had previous episodes of DKA, she also had a history of

stroke, history of hypertension, hyperlipidemia, coronary artery disease with 

myocardial infarction, peripheral vascular disease with multiple toe amputation,

history of COPD.


On review of systems patient is alert responsive in no apparent distress she was

seen in ICU she is still complaining of nausea and occasional episodes of 

vomiting there is no fever or chills no headache or dizziness no chest pain no 

shortness of breath no cough she is having some abdominal discomfort no no 

burning with urination no frequency or urgency no hematuria.





Past Medical History


Past Medical History: Asthma, Coronary Artery Disease (CAD), Chest Pain / Daniela

na, Heart Failure, COPD, CVA/TIA, Diabetes Mellitus, Deep Vein Thrombosis (DVT),

Eye Disorder, GERD/Reflux, Hyperlipidemia, Hypertension, Myocardial Infarction 

(MI), Neurologic Disorder, Osteoarthritis (OA), Pneumonia, Renal Disease


Additional Past Medical History / Comment(s): IDDM (brittle), DKAs, neuropathy 

bilateral hands/feet, retinopathy bilateral eyes, cellulitis R foot, R great toe

and 2nd toe infections/amputations, current wound R foot-being seen in [facility], 

renal failure, anemia, CVAs with L sided paralysis, headaches started after 

CVAs, brain lesions, DVT R axillae, low back pain, varicosities, seizure many 

years ago (), hypothyroid, constipation, bilateral tinnitis occasionally, 

sinus problems.


Last Myocardial Infarction Date:: 


History of Any Multi-Drug Resistant Organisms: MRSA


Date of last positivie culture/infection: 18


MDRO Source:: Right Foot


Past Surgical History: Appendectomy,  Section, Cholecystectomy, Heart 

Catheterization With Stent, Hysterectomy, Orthopedic Surgery


Additional Past Surgical History / Comment(s): PCI with multiple stents, R great

toe and 2nd toe amps, debridements R foot ulcer, L shoulder surgery to remove 

bone, bronchoscopy, EGD, colonoscopy, R arm port since removed, bilateral 

cataract removals/lens implants.


Past Anesthesia/Blood Transfusion Reactions: No Reported Reaction


Additional Past Anesthesia/Blood Transfusion Reaction / Comment(s): HX OF BLOOD 

TRANSFUSION- NO REACTION


Date of Last Stent Placement:: 2013


Past Psychological History: Anxiety, Bipolar, Depression


Smoking Status: Never smoker


Past Alcohol Use History: None Reported


Past Drug Use History: Marijuana





- Past Family History


  ** Father


Family Medical History: Unable to Obtain, Coronary Artery Disease (CAD), 

Diabetes Mellitus





  ** Mother


Family Medical History: COPD





Medications and Allergies


                                Home Medications











 Medication  Instructions  Recorded  Confirmed  Type


 


Famotidine [Pepcid] 20 mg PO DAILY 02/19/16 10/02/20 History


 


HYDROcodone/APAP 10-325MG [Norco 1 tab PO TID PRN 05/06/17 10/02/20 History





]    


 


DULoxetine HCL [Cymbalta] 60 mg PO DAILY 09/19/17 10/02/20 History


 


Atorvastatin [Lipitor] 20 mg PO DAILY 07/31/19 10/02/20 History


 


QUEtiapine [SEROquel] 100 mg PO HS 07/31/19 10/02/20 History


 


Aspirin [Adult Low Dose Aspirin EC] 81 mg PO DAILY 01/10/20 10/02/20 History


 


Ferrous Sulfate [Iron (65  mg PO DAILY 01/10/20 10/02/20 History





Elemental)]    


 


Divalproex [Depakote] 250 mg PO TID #90 tablet. 03/19/20 10/02/20 Rx


 


ALPRAZolam [Xanax] 1 mg PO Q8H PRN 07/28/20 10/02/20 History


 


Albuterol Sulfate [Ventolin HFA] 2 puff INHALATION RT-Q4H PRN 07/28/20 10/02/20 

History


 


Ondansetron Odt [Zofran ODT] 4 mg PO DAILY PRN 07/28/20 10/02/20 History


 


Valproic Acid [Depakene] 250 mg PO DAILY 07/28/20 10/02/20 History


 


QUEtiapine [SEROquel] 25 mg PO BID 08/12/20 10/02/20 History


 


Losartan [Cozaar] 50 mg PO DAILY  tab 08/20/20 10/02/20 Rx


 


Metoclopramide [Reglan] 10 mg PO AC-TID  tab 08/20/20 10/02/20 Rx


 


Ascorbic Acid [Vitamin C] 500 mg PO DAILY 08/31/20 10/02/20 History


 


Insulin Glargine,Hum.rec.anlog 20 unit SQ HS 08/31/20 10/02/20 History





[Basaglar Kwikpen U-100]    


 


Vitamin B Complex 1 tab PO DAILY 08/31/20 10/02/20 History


 


Calcium Carb-Vit D 500Mg-200Un 1 each PO BID-W/MEALS  tab 09/04/20 10/02/20 Rx





[Oscal 500+D]    


 


Clopidogrel [Plavix] 75 mg PO DAILY  tab 09/04/20 10/02/20 Rx


 


INSULIN ASPART (NovoLOG) [NovoLOG 0 unit SQ AC-TID  vial 09/04/20 10/02/20 Rx





(formulary)]    


 


Lacosamide [Vimpat] 50 mg PO HS 10/02/20 10/02/20 History


 


Lacosamide [Vimpat] 100 mg PO QAM 10/02/20 10/02/20 History








                                    Allergies











Allergy/AdvReac Type Severity Reaction Status Date / Time


 


Barbiturates Allergy  Rash/Hives Verified 10/02/20 21:31


 


cephalexin monohydrate Allergy  Rash/Hives Verified 10/02/20 21:31





[From Keflex]     


 


morphine Allergy  Rash/Hives Verified 10/02/20 21:31


 


Penicillins Allergy  Rash/Hives Verified 10/02/20 21:31


 


phenobarbital Allergy  Swelling Verified 10/02/20 21:31


 


venom-honey bee Allergy  Swelling Verified 10/02/20 21:31





[bee venom (honey bee)]     


 


amlodipine besylate AdvReac  Vomiting Verified 10/02/20 21:31





[From Norvasc]     














Physical Exam


Vitals: 


                                   Vital Signs











  Temp Pulse Resp BP Pulse Ox


 


 10/03/20 09:00   101 H  18   92 L


 


 10/03/20 08:00  99.5 F  100  14   92 L


 


 10/03/20 07:00   97  24   96


 


 10/03/20 06:00   96  24   96


 


 10/03/20 05:00   104 H  24   94 L


 


 10/03/20 04:00  98.6 F  110 H  22   94 L


 


 10/03/20 03:00   102 H  22   94 L


 


 10/03/20 02:00   100  22   94 L


 


 10/03/20 01:00  98.6 F  98  22  96/50  94 L


 


 10/03/20 00:00  98.2 F  94  18  96/50  94 L


 


 10/02/20 23:00  95 F L  85  22  96/50  96


 


 10/02/20 22:00  90.9 F L  76  16  92/54  92 L


 


 10/02/20 20:55  89.2 F L  61  16  97/55  93 L


 


 10/02/20 20:46  31.9 F L  78  16  102/52  94 L


 


 10/02/20 20:20  89.1 F L  72  16  101/62  94 L


 


 10/02/20 20:14   72  16  91/54  94 L


 


 10/02/20 19:50  89.6 F L  72  16  83/34  93 L


 


 10/02/20 19:19  90.1 F L  75  18  79/51  94 L








                                Intake and Output











 10/02/20 10/03/20 10/03/20





 22:59 06:59 14:59


 


Intake Total 200 1600 150


 


Output Total 200 450 60


 


Balance 0 1150 90


 


Intake:   


 


  Intake, IV Titration 200 1600 150





  Amount   


 


    D5-0.45% NaCl with KCl   150





    20Meq/l 1,000 ml @ 150   





    mls/hr IV .Q6H40M TYRON Rx#   





    :[id number]   


 


    Sodium Chloride 0.9% 1, 200 1600 





    000 ml @ 200 mls/hr IV .   





    Q5H TYRON Rx#:[id number]   


 


Output:   


 


  Urine 200 450 60


 


Other:   


 


  Voiding Method  Indwelling Catheter 


 


  Weight 47.627 kg 50.8 kg 








                         ABP, PAP, CO, CI - Last 8 Hours











Arterial Blood Pressure        145/53


 


Arterial Blood Pressure        101/46


 


Arterial Blood Pressure        101/47


 


Arterial Blood Pressure        128/42


 


Arterial Blood Pressure        125/36


 


Arterial Blood Pressure        106/36

















In general patient is alert responsive in no apparent distress


HEENT head normocephalic and atraumatic


Neck is supple no JVD no goiter no lymphadenopathy


Chest exam reveals a few scattered rhonchi no wheezing


Cardiac exam reveals regular heart sounds S1 and S2 with mild tachycardia no 

gallops no murmurs


Abdomen is soft nontender no rigidity or rebound no palpable masses with 

hyperactive bowel sounds


Extremity exam reveals no edema no cyanosis or clubbing


Neurological examination reveals no gross new focal deficit





Results


CBC & Chem 7: 


                                 10/03/20 04:30





                                 10/03/20 04:30


Labs: 


                  Abnormal Lab Results - Last 24 Hours (Table)











  10/02/20 10/02/20 10/02/20 Range/Units





  19:24 19:26 19:35 


 


WBC    11.6 H  (3.8-10.6)  k/uL


 


RBC    3.28 L  (3.80-5.40)  m/uL


 


Hgb    9.6 L D  (11.4-16.0)  gm/dL


 


Hct     (34.0-46.0)  %


 


MCV    109.3 H D  (80.0-100.0)  fL


 


MCHC    26.9 L  (31.0-37.0)  g/dL


 


Plt Count     (150-450)  k/uL


 


Neutrophils #    10.3 H  (1.3-7.7)  k/uL


 


Lymphocytes #    0.9 L  (1.0-4.8)  k/uL


 


Macrocytosis    Marked A  


 


VBG pH     (7.31-7.41)  


 


VBG pCO2     (37-51)  mmHg


 


VBG HCO3     (24-28)  mmol/L


 


Chloride     ()  mmol/L


 


Carbon Dioxide     (22-30)  mmol/L


 


BUN     (7-17)  mg/dL


 


Creatinine     (0.52-1.04)  mg/dL


 


Glucose     (74-99)  mg/dL


 


POC Glucose (mg/dL)   >600 H   (75-99)  mg/dL


 


Phosphorus     (2.5-4.5)  mg/dL


 


Magnesium     (1.6-2.3)  mg/dL


 


Creatine Kinase     ()  U/L


 


Urine Glucose (UA)  4+ H    (Negative)  


 


Urine Ketones  4+ H    (Negative)  


 


Urine Blood     (Negative)  


 


Urine Bilirubin     (Negative)  


 


Amorphous Sediment     (None)  /hpf


 


Urine Bacteria     (None)  /hpf


 


Urine Mucus     (None)  /hpf














  10/02/20 10/02/20 10/02/20 Range/Units





  19:35 19:35 19:35 


 


WBC     (3.8-10.6)  k/uL


 


RBC     (3.80-5.40)  m/uL


 


Hgb     (11.4-16.0)  gm/dL


 


Hct     (34.0-46.0)  %


 


MCV     (80.0-100.0)  fL


 


MCHC     (31.0-37.0)  g/dL


 


Plt Count     (150-450)  k/uL


 


Neutrophils #     (1.3-7.7)  k/uL


 


Lymphocytes #     (1.0-4.8)  k/uL


 


Macrocytosis     


 


VBG pH    7.05 L*  (7.31-7.41)  


 


VBG pCO2    19 L  (37-51)  mmHg


 


VBG HCO3    5 L*  (24-28)  mmol/L


 


Chloride     ()  mmol/L


 


Carbon Dioxide   <5 L*   (22-30)  mmol/L


 


BUN   53 H   (7-17)  mg/dL


 


Creatinine   1.98 H   (0.52-1.04)  mg/dL


 


Glucose   935 H*   (74-99)  mg/dL


 


POC Glucose (mg/dL)     (75-99)  mg/dL


 


Phosphorus     (2.5-4.5)  mg/dL


 


Magnesium   2.9 H   (1.6-2.3)  mg/dL


 


Creatine Kinase   196 H   ()  U/L


 


Urine Glucose (UA)     (Negative)  


 


Urine Ketones     (Negative)  


 


Urine Blood  Small H    (Negative)  


 


Urine Bilirubin  1+ H    (Negative)  


 


Amorphous Sediment  Rare H    (None)  /hpf


 


Urine Bacteria  Rare H    (None)  /hpf


 


Urine Mucus  Few H    (None)  /hpf














  10/02/20 10/02/20 10/02/20 Range/Units





  21:13 21:47 22:56 


 


WBC     (3.8-10.6)  k/uL


 


RBC     (3.80-5.40)  m/uL


 


Hgb     (11.4-16.0)  gm/dL


 


Hct     (34.0-46.0)  %


 


MCV     (80.0-100.0)  fL


 


MCHC     (31.0-37.0)  g/dL


 


Plt Count     (150-450)  k/uL


 


Neutrophils #     (1.3-7.7)  k/uL


 


Lymphocytes #     (1.0-4.8)  k/uL


 


Macrocytosis     


 


VBG pH     (7.31-7.41)  


 


VBG pCO2     (37-51)  mmHg


 


VBG HCO3     (24-28)  mmol/L


 


Chloride     ()  mmol/L


 


Carbon Dioxide     (22-30)  mmol/L


 


BUN     (7-17)  mg/dL


 


Creatinine     (0.52-1.04)  mg/dL


 


Glucose   744 H*   (74-99)  mg/dL


 


POC Glucose (mg/dL)  >600 H   >600 H  (75-99)  mg/dL


 


Phosphorus     (2.5-4.5)  mg/dL


 


Magnesium     (1.6-2.3)  mg/dL


 


Creatine Kinase     ()  U/L


 


Urine Glucose (UA)     (Negative)  


 


Urine Ketones     (Negative)  


 


Urine Blood     (Negative)  


 


Urine Bilirubin     (Negative)  


 


Amorphous Sediment     (None)  /hpf


 


Urine Bacteria     (None)  /hpf


 


Urine Mucus     (None)  /hpf














  10/03/20 10/03/20 10/03/20 Range/Units





  01:08 01:10 02:00 


 


WBC     (3.8-10.6)  k/uL


 


RBC     (3.80-5.40)  m/uL


 


Hgb     (11.4-16.0)  gm/dL


 


Hct     (34.0-46.0)  %


 


MCV     (80.0-100.0)  fL


 


MCHC     (31.0-37.0)  g/dL


 


Plt Count     (150-450)  k/uL


 


Neutrophils #     (1.3-7.7)  k/uL


 


Lymphocytes #     (1.0-4.8)  k/uL


 


Macrocytosis     


 


VBG pH     (7.31-7.41)  


 


VBG pCO2     (37-51)  mmHg


 


VBG HCO3     (24-28)  mmol/L


 


Chloride   112 H   ()  mmol/L


 


Carbon Dioxide   15 L   (22-30)  mmol/L


 


BUN   56 H   (7-17)  mg/dL


 


Creatinine   1.57 H   (0.52-1.04)  mg/dL


 


Glucose   555 H*   (74-99)  mg/dL


 


POC Glucose (mg/dL)  522 H   490 H  (75-99)  mg/dL


 


Phosphorus     (2.5-4.5)  mg/dL


 


Magnesium     (1.6-2.3)  mg/dL


 


Creatine Kinase     ()  U/L


 


Urine Glucose (UA)     (Negative)  


 


Urine Ketones     (Negative)  


 


Urine Blood     (Negative)  


 


Urine Bilirubin     (Negative)  


 


Amorphous Sediment     (None)  /hpf


 


Urine Bacteria     (None)  /hpf


 


Urine Mucus     (None)  /hpf














  10/03/20 10/03/20 10/03/20 Range/Units





  03:02 03:52 04:30 


 


WBC     (3.8-10.6)  k/uL


 


RBC     (3.80-5.40)  m/uL


 


Hgb     (11.4-16.0)  gm/dL


 


Hct     (34.0-46.0)  %


 


MCV     (80.0-100.0)  fL


 


MCHC     (31.0-37.0)  g/dL


 


Plt Count     (150-450)  k/uL


 


Neutrophils #     (1.3-7.7)  k/uL


 


Lymphocytes #     (1.0-4.8)  k/uL


 


Macrocytosis     


 


VBG pH     (7.31-7.41)  


 


VBG pCO2     (37-51)  mmHg


 


VBG HCO3     (24-28)  mmol/L


 


Chloride    115 H  ()  mmol/L


 


Carbon Dioxide     (22-30)  mmol/L


 


BUN    57 H  (7-17)  mg/dL


 


Creatinine    1.51 H  (0.52-1.04)  mg/dL


 


Glucose    357 H  (74-99)  mg/dL


 


POC Glucose (mg/dL)  446 H  382 H   (75-99)  mg/dL


 


Phosphorus    2.2 L  (2.5-4.5)  mg/dL


 


Magnesium     (1.6-2.3)  mg/dL


 


Creatine Kinase     ()  U/L


 


Urine Glucose (UA)     (Negative)  


 


Urine Ketones     (Negative)  


 


Urine Blood     (Negative)  


 


Urine Bilirubin     (Negative)  


 


Amorphous Sediment     (None)  /hpf


 


Urine Bacteria     (None)  /hpf


 


Urine Mucus     (None)  /hpf














  10/03/20 10/03/20 10/03/20 Range/Units





  04:30 05:05 06:15 


 


WBC  2.8 L    (3.8-10.6)  k/uL


 


RBC  3.07 L    (3.80-5.40)  m/uL


 


Hgb  9.1 L    (11.4-16.0)  gm/dL


 


Hct  28.8 L    (34.0-46.0)  %


 


MCV     (80.0-100.0)  fL


 


MCHC     (31.0-37.0)  g/dL


 


Plt Count  137 L    (150-450)  k/uL


 


Neutrophils #     (1.3-7.7)  k/uL


 


Lymphocytes #  0.5 L    (1.0-4.8)  k/uL


 


Macrocytosis     


 


VBG pH     (7.31-7.41)  


 


VBG pCO2     (37-51)  mmHg


 


VBG HCO3     (24-28)  mmol/L


 


Chloride     ()  mmol/L


 


Carbon Dioxide     (22-30)  mmol/L


 


BUN     (7-17)  mg/dL


 


Creatinine     (0.52-1.04)  mg/dL


 


Glucose     (74-99)  mg/dL


 


POC Glucose (mg/dL)   347 H  287 H  (75-99)  mg/dL


 


Phosphorus     (2.5-4.5)  mg/dL


 


Magnesium     (1.6-2.3)  mg/dL


 


Creatine Kinase     ()  U/L


 


Urine Glucose (UA)     (Negative)  


 


Urine Ketones     (Negative)  


 


Urine Blood     (Negative)  


 


Urine Bilirubin     (Negative)  


 


Amorphous Sediment     (None)  /hpf


 


Urine Bacteria     (None)  /hpf


 


Urine Mucus     (None)  /hpf














  10/03/20 10/03/20 10/03/20 Range/Units





  07:10 08:16 09:21 


 


WBC     (3.8-10.6)  k/uL


 


RBC     (3.80-5.40)  m/uL


 


Hgb     (11.4-16.0)  gm/dL


 


Hct     (34.0-46.0)  %


 


MCV     (80.0-100.0)  fL


 


MCHC     (31.0-37.0)  g/dL


 


Plt Count     (150-450)  k/uL


 


Neutrophils #     (1.3-7.7)  k/uL


 


Lymphocytes #     (1.0-4.8)  k/uL


 


Macrocytosis     


 


VBG pH     (7.31-7.41)  


 


VBG pCO2     (37-51)  mmHg


 


VBG HCO3     (24-28)  mmol/L


 


Chloride     ()  mmol/L


 


Carbon Dioxide     (22-30)  mmol/L


 


BUN     (7-17)  mg/dL


 


Creatinine     (0.52-1.04)  mg/dL


 


Glucose     (74-99)  mg/dL


 


POC Glucose (mg/dL)  276 H  211 H  181 H  (75-99)  mg/dL


 


Phosphorus     (2.5-4.5)  mg/dL


 


Magnesium     (1.6-2.3)  mg/dL


 


Creatine Kinase     ()  U/L


 


Urine Glucose (UA)     (Negative)  


 


Urine Ketones     (Negative)  


 


Urine Blood     (Negative)  


 


Urine Bilirubin     (Negative)  


 


Amorphous Sediment     (None)  /hpf


 


Urine Bacteria     (None)  /hpf


 


Urine Mucus     (None)  /hpf














  10/03/20 Range/Units





  10:46 


 


WBC   (3.8-10.6)  k/uL


 


RBC   (3.80-5.40)  m/uL


 


Hgb   (11.4-16.0)  gm/dL


 


Hct   (34.0-46.0)  %


 


MCV   (80.0-100.0)  fL


 


MCHC   (31.0-37.0)  g/dL


 


Plt Count   (150-450)  k/uL


 


Neutrophils #   (1.3-7.7)  k/uL


 


Lymphocytes #   (1.0-4.8)  k/uL


 


Macrocytosis   


 


VBG pH   (7.31-7.41)  


 


VBG pCO2   (37-51)  mmHg


 


VBG HCO3   (24-28)  mmol/L


 


Chloride   ()  mmol/L


 


Carbon Dioxide   (22-30)  mmol/L


 


BUN   (7-17)  mg/dL


 


Creatinine   (0.52-1.04)  mg/dL


 


Glucose   (74-99)  mg/dL


 


POC Glucose (mg/dL)  188 H  (75-99)  mg/dL


 


Phosphorus   (2.5-4.5)  mg/dL


 


Magnesium   (1.6-2.3)  mg/dL


 


Creatine Kinase   ()  U/L


 


Urine Glucose (UA)   (Negative)  


 


Urine Ketones   (Negative)  


 


Urine Blood   (Negative)  


 


Urine Bilirubin   (Negative)  


 


Amorphous Sediment   (None)  /hpf


 


Urine Bacteria   (None)  /hpf


 


Urine Mucus   (None)  /hpf














Assessment and Plan


Plan: 





1.  Acute diabetic ketoacidosis with severe hyperglycemia and positive serum 

acetone patient is improving with IV fluid and IV insulin drip





2.  Underlying history of diabetes mellitus type 1 maintained on insulin, her 

primary care provider is Eleanor Rosas, she was contacted and she stated that she 

was recently hospitalized at [facility], a different relative was 

caring for patient and apparently glucose level has been extremely elevated for 

several days.





3.  Underlying history of hypertension





4.  Underlying history of hyperlipidemia





5.  Underlying history of seizure disorder, will place patient on IV Keppra 

until she is able to take her oral medications





6.  Underlying history of COPD stable no evidence of exacerbation





7.  Underlying history of coronary artery disease stable at this time patient 

denies any chest pain





8.  Underlying history of peripheral vascular disease with previous history of 

multiple toe amputations





9.  Previous history of stroke.





At this time patient is admitted to intensive care unit continue with IV fluids 

and insulin management


Home medication reviewed, will be switched to IV medications when possible due 

to continuous nausea and vomiting at this time


Pulmonary critical care are following

## 2020-10-03 NOTE — CONS
CONSULTATION



PULMONARY/CRITICAL CARE CONSULTATION:



DATE OF SERVICE:

This is a 60-year-old female who presents from an assisted-living home where she

resides.  She apparently came in with complaints of nausea, vomiting, and diarrhea with

profound dehydration and on laboratory analysis was discovered to have severe diabetic

ketoacidosis.  The patient does have a history of CVA, hypertension, CAD, chronic

kidney disease, and diabetes.  She apparently also has a history of frequent episodes

of DKA.  The patient apparently has had several days of nausea, vomiting, and diarrhea.

Blood sugars by EMS were greater than 600.  The patient was admitted with a diagnosis

of diabetic ketoacidosis.  I was called by the ER physician last night.  Currently, the

patient is getting O2 at 6 L by nasal cannula.  She is on D5 0.45 with 20 of potassium

at 150 mL an hour and insulin drip of 4.81 units an hour.  The patient was admitted on

.  Currently her most recent glucose is 211, most recent bicarbonate

concentration is 22 and her anion gap is normal at 6.  I did tell the nurse that the

patient could get some regular subcu insulin 10 units now and then in 45 minutes the

drip could be discontinued and the patient could be placed on NovoLog sliding scale,

either q.4 hours or q.6 hours.  Currently, the patient is on Levaquin IV for right-

sided pneumonia.



HOME MEDICATIONS:

Include Pepcid, Norco Cymbalta, Lipitor, Seroquel, aspirin, and iron.  The patient also

has a history of being on Xanax, Ventolin, Zofran, Depakene, Protonix, Seroquel,

vitamin C, insulin, vitamin B complex, Depakote, Cozaar, Reglan, Plavix, Vimpat, and

Bactrim.



ALLERGIES:

Allergies included BARBITURATES, KEFLEX, MORPHINE, PENICILLIN, PHENOBARBITAL, HONEY BEE

VENOM, AND AMLODIPINE.



PAST MEDICAL HISTORY:

Reviewed.  She has a history of asthma, CAD, angina, heart failure, COPD, CVA,

diabetes, deep venous thrombosis, GERD, hyperlipidemia, hypertension, myocardial

infarction, osteoarthritis, and chronic kidney disease.  In addition, the patient has a

history of diabetic neuropathy, retinopathy, and has had episodes of cellulitis of the

right foot and right great toe.  Other medical issues include lower extremity

varicosities, and chronic low back pain, as well as hypothyroidism, chronic

constipation, and chronic sinus disease.



SURGICAL HISTORY:

Includes appendectomy, , cholecystectomy, heart catheterization with stent,

hysterectomy, toe amputation, right foot debridement, left shoulder surgery,

bronchoscopy, EGD, colonoscopy, bilateral cataract surgery.



SOCIAL HISTORY:

Negative for tobacco.  She does use marijuana.  She does drink alcohol occasionally.



FAMILY HISTORY:

Apparently positive for a father with CAD and diabetes and a mother with COPD.



REVIEW OF SYSTEMS:

CONSTITUTIONAL: Weakness.

NEUROLOGIC: Negative.

HEENT: Negative.

CARDIOVASCULAR: Negative.

PULMONARY: Negative.

GI: Nausea, vomiting, diarrhea.

: Negative.

RHEUMATOLOGIC: Negative.

IMMUNOLOGIC: Negative.

ENDOCRINOLOGIC: Negative.

DERMATOLOGIC: Negative.



PHYSICAL EXAMINATION:

VITAL SIGNS: Current vital signs are reviewed. Temperature 99.5, heart rate 100,

respiratory rate 18, blood pressure 101/46, 6 L saturation 92%.  Appears in no acute

distress.

HEENT: Examination is grossly unremarkable.  Nasal O2 noted.  Mucous membranes are dry.

NECK:  Supple. Full range of motion.  No adenopathy.  Neck veins are flat.

CARDIOVASCULAR: Examination reveals regular rhythm and rate.  Heart rate 101.  S1, S2

normal.  Heart sounds are distant.

LUNGS:  Reveal mostly clear breath sounds.  A few scattered rhonchi on the right.  No

wheezes or crackles.

ABDOMEN:  Soft. Bowel sounds are heard.

EXTREMITIES: Intact.  No edema.

SKIN: Without rash.

NEUROLOGIC: Examination is difficult to assess but appears to be relatively nonfocal.

She does move all 4 extremities.



LABORATORY DATA:

Reviewed.  Currently, white count 2.8, hemoglobin 9.1, hematocrit 28.8, platelet count

137,000. PT/INR and PTT normal. Sodium 143, potassium 3.9, chloride 115. CO2 22. Anion

gap is 6. BUN and creatinine were 57 and 1.51.  Most recent glucose was 211.

Phosphorus is 2.2, magnesium 2.9, alkaline phosphatase 196.  Urine shows small amount

of blood, 1+ bilirubin, and rare bacteria and sediment.



Acetone qualitatively was positive.  Serum alcohol was negative.



Microbiology is currently pending or negative.



IMAGING:

Brain CT was done.  It was negative for anything acute.  There was an old large right

hemisphere infarct.  Nothing new or acute noted.  A chest x-ray shows right-sided

diffuse infiltrates, consistent with pneumonia.  There also may be some atelectasis

and/or infiltrate in the left lower lobe.



CURRENT MEDICATIONS:

Reviewed.  She is on D5 0.45 with 20 of KCl at 150 an hour, NovoLog sliding scale q.4

hours, she was on an insulin drip but that has been discontinued, Levaquin 500 mg IV

daily, Zofran, potassium per protocol, and saline.



ASSESSMENT:

1. Diabetic ketoacidosis, currently on insulin protocol, with significant improvement

    in the patient's blood sugar, anion gap, and bicarbonate concentration.

2. Right-sided pneumonia, rule out aspiration.

3. History of long-standing diabetes, with multiple episodes of diabetic ketoacidosis.

4. Diabetic neuropathy.

5. History of chronic obstructive pulmonary disease/asthma.

6. History of coronary artery disease.

7. Angina pectoris.

8. History of congestive heart failure.

9. Previous history of cerebrovascular accident.

10.Deep venous thrombosis.

11.Gastroesophageal reflux disease.

12.Hyperlipidemia.

13.Hypertension.

14.Prior history of myocardial infarction.

15.Multiple other medical problems and comorbidities as listed.



PLAN:

Currently, the patient is doing much better.  We are going to give her some regular

insulin subcu, and we are going to, in 45 or so minutes, stop the insulin drip.  The

patient could be started on NovoLog sliding scale every 4 hours.  We will want to

continue Levaquin 500 mg IV daily.  I have asked some Zofran for nausea and vomiting.

Additional recommendations and suggestions are forthcoming.  I did ask the nurse to

call the primary about reordering some of her home medications that are essential.  We

will continue to follow.  Prognosis is guarded.





MMODL / IJN: [id number] / Job#: [id number]

## 2020-10-03 NOTE — XR
EXAMINATION TYPE: XR chest 1V portable

 

DATE OF EXAM: 10/3/2020

 

COMPARISON: Prior chest x-ray 10/2/2020

 

HISTORY: Respiratory distress

 

TECHNIQUE: Single frontal view of the chest is obtained.

 

FINDINGS:  There is been progression of airspace disease within the right hemithorax, possibly left l
ower lobe. No evident pneumothorax or pleural effusion. No other significant interval change.

 

IMPRESSION:  Correlate for pneumonia, edema not excluded.

## 2020-10-04 LAB
ANION GAP SERPL CALC-SCNC: 0 MMOL/L
BUN SERPL-SCNC: 39 MG/DL (ref 7–17)
CALCIUM SPEC-MCNC: 8 MG/DL (ref 8.4–10.2)
CELLS COUNTED: 200
CHLORIDE SERPL-SCNC: 119 MMOL/L (ref 98–107)
CO2 SERPL-SCNC: 29 MMOL/L (ref 22–30)
ERYTHROCYTE [DISTWIDTH] IN BLOOD BY AUTOMATED COUNT: 2.57 M/UL (ref 3.8–5.4)
ERYTHROCYTE [DISTWIDTH] IN BLOOD: 15.3 % (ref 11.5–15.5)
GLUCOSE BLD-MCNC: 106 MG/DL (ref 75–99)
GLUCOSE BLD-MCNC: 127 MG/DL (ref 75–99)
GLUCOSE BLD-MCNC: 129 MG/DL (ref 75–99)
GLUCOSE BLD-MCNC: 132 MG/DL (ref 75–99)
GLUCOSE BLD-MCNC: 133 MG/DL (ref 75–99)
GLUCOSE BLD-MCNC: 133 MG/DL (ref 75–99)
GLUCOSE BLD-MCNC: 20 MG/DL (ref 75–99)
GLUCOSE BLD-MCNC: 200 MG/DL (ref 75–99)
GLUCOSE BLD-MCNC: 205 MG/DL (ref 75–99)
GLUCOSE BLD-MCNC: 206 MG/DL (ref 75–99)
GLUCOSE BLD-MCNC: 225 MG/DL (ref 75–99)
GLUCOSE BLD-MCNC: 236 MG/DL (ref 75–99)
GLUCOSE BLD-MCNC: 254 MG/DL (ref 75–99)
GLUCOSE BLD-MCNC: 254 MG/DL (ref 75–99)
GLUCOSE BLD-MCNC: 275 MG/DL (ref 75–99)
GLUCOSE BLD-MCNC: 279 MG/DL (ref 75–99)
GLUCOSE BLD-MCNC: 61 MG/DL (ref 75–99)
GLUCOSE BLD-MCNC: 64 MG/DL (ref 75–99)
GLUCOSE BLD-MCNC: 65 MG/DL (ref 75–99)
GLUCOSE BLD-MCNC: 95 MG/DL (ref 75–99)
GLUCOSE BLD-MCNC: 97 MG/DL (ref 75–99)
GLUCOSE SERPL-MCNC: 276 MG/DL (ref 74–99)
HCT VFR BLD AUTO: 24.1 % (ref 34–46)
HGB BLD-MCNC: 7.5 GM/DL (ref 11.4–16)
IRON SERPL-MCNC: 14 UG/DL (ref 50–170)
LYMPHOCYTES # BLD MANUAL: 3.18 K/UL (ref 1–4.8)
MCH RBC QN AUTO: 29.2 PG (ref 25–35)
MCHC RBC AUTO-ENTMCNC: 31.1 G/DL (ref 31–37)
MCV RBC AUTO: 93.8 FL (ref 80–100)
METAMYELOCYTES # BLD: 0.17 K/UL
MONOCYTES # BLD MANUAL: 0.17 K/UL (ref 0–1)
NEUTROPHILS NFR BLD MANUAL: 26 %
NEUTS SEG # BLD MANUAL: 5.2 K/UL (ref 1.3–7.7)
PLATELET # BLD AUTO: 97 K/UL (ref 150–450)
POTASSIUM SERPL-SCNC: 3.5 MMOL/L (ref 3.5–5.1)
SODIUM SERPL-SCNC: 148 MMOL/L (ref 137–145)
TIBC SERPL-MCNC: 192 UG/DL (ref 228–460)
VIT B12 SERPL-MCNC: 1527 PG/ML (ref 200–944)
WBC # BLD AUTO: 8.6 K/UL (ref 3.8–10.6)

## 2020-10-04 PROCEDURE — 02H633Z INSERTION OF INFUSION DEVICE INTO RIGHT ATRIUM, PERCUTANEOUS APPROACH: ICD-10-PCS

## 2020-10-04 RX ADMIN — DEXTROSE MONOHYDRATE, SODIUM CHLORIDE, AND POTASSIUM CHLORIDE SCH MLS/HR: 50; 4.5; 1.49 INJECTION, SOLUTION INTRAVENOUS at 22:43

## 2020-10-04 RX ADMIN — DEXTROSE MONOHYDRATE AND SODIUM CHLORIDE SCH MLS/HR: 5; .9 INJECTION, SOLUTION INTRAVENOUS at 06:32

## 2020-10-04 RX ADMIN — INSULIN ASPART SCH: 100 INJECTION, SOLUTION INTRAVENOUS; SUBCUTANEOUS at 11:50

## 2020-10-04 RX ADMIN — POTASSIUM CHLORIDE SCH MLS/HR: 7.46 INJECTION, SOLUTION INTRAVENOUS at 12:16

## 2020-10-04 RX ADMIN — IPRATROPIUM BROMIDE AND ALBUTEROL SULFATE SCH ML: .5; 3 SOLUTION RESPIRATORY (INHALATION) at 15:49

## 2020-10-04 RX ADMIN — IPRATROPIUM BROMIDE AND ALBUTEROL SULFATE SCH ML: .5; 3 SOLUTION RESPIRATORY (INHALATION) at 11:04

## 2020-10-04 RX ADMIN — LEVOFLOXACIN SCH MLS/HR: 250 INJECTION, SOLUTION INTRAVENOUS at 12:18

## 2020-10-04 RX ADMIN — ASPIRIN 81 MG CHEWABLE TABLET SCH: 81 TABLET CHEWABLE at 09:10

## 2020-10-04 RX ADMIN — SODIUM CHLORIDE SCH MLS/HR: 9 INJECTION, SOLUTION INTRAVENOUS at 12:19

## 2020-10-04 RX ADMIN — POTASSIUM CHLORIDE SCH MLS/HR: 7.46 INJECTION, SOLUTION INTRAVENOUS at 09:12

## 2020-10-04 RX ADMIN — POTASSIUM CHLORIDE SCH: 7.46 INJECTION, SOLUTION INTRAVENOUS at 18:07

## 2020-10-04 RX ADMIN — METOCLOPRAMIDE SCH: 10 TABLET ORAL at 06:57

## 2020-10-04 RX ADMIN — LACOSAMIDE SCH MLS/HR: 10 INJECTION INTRAVENOUS at 12:17

## 2020-10-04 RX ADMIN — INSULIN ASPART SCH UNIT: 100 INJECTION, SOLUTION INTRAVENOUS; SUBCUTANEOUS at 18:16

## 2020-10-04 RX ADMIN — INSULIN ASPART SCH UNIT: 100 INJECTION, SOLUTION INTRAVENOUS; SUBCUTANEOUS at 00:15

## 2020-10-04 RX ADMIN — POTASSIUM CHLORIDE SCH MLS/HR: 7.46 INJECTION, SOLUTION INTRAVENOUS at 05:55

## 2020-10-04 RX ADMIN — INSULIN ASPART SCH: 100 INJECTION, SOLUTION INTRAVENOUS; SUBCUTANEOUS at 13:34

## 2020-10-04 RX ADMIN — LACOSAMIDE SCH MLS/HR: 10 INJECTION INTRAVENOUS at 22:31

## 2020-10-04 RX ADMIN — METOCLOPRAMIDE SCH: 10 TABLET ORAL at 18:07

## 2020-10-04 RX ADMIN — METOCLOPRAMIDE SCH: 10 TABLET ORAL at 13:33

## 2020-10-04 RX ADMIN — DEXTROSE MONOHYDRATE, SODIUM CHLORIDE, AND POTASSIUM CHLORIDE SCH MLS/HR: 50; 4.5; 1.49 INJECTION, SOLUTION INTRAVENOUS at 02:34

## 2020-10-04 RX ADMIN — INSULIN ASPART SCH UNIT: 100 INJECTION, SOLUTION INTRAVENOUS; SUBCUTANEOUS at 22:38

## 2020-10-04 RX ADMIN — IPRATROPIUM BROMIDE AND ALBUTEROL SULFATE SCH ML: .5; 3 SOLUTION RESPIRATORY (INHALATION) at 07:17

## 2020-10-04 RX ADMIN — DEXTROSE MONOHYDRATE, SODIUM CHLORIDE, AND POTASSIUM CHLORIDE SCH MLS/HR: 50; 4.5; 1.49 INJECTION, SOLUTION INTRAVENOUS at 21:58

## 2020-10-04 RX ADMIN — IPRATROPIUM BROMIDE AND ALBUTEROL SULFATE SCH ML: .5; 3 SOLUTION RESPIRATORY (INHALATION) at 21:00

## 2020-10-04 RX ADMIN — POTASSIUM CHLORIDE SCH MLS/HR: 7.46 INJECTION, SOLUTION INTRAVENOUS at 13:30

## 2020-10-04 RX ADMIN — INSULIN ASPART SCH: 100 INJECTION, SOLUTION INTRAVENOUS; SUBCUTANEOUS at 04:28

## 2020-10-04 NOTE — XR
EXAMINATION TYPE: XR chest 1V portable

 

DATE OF EXAM: 10/4/2020

 

COMPARISON: 10/3/2020

 

INDICATION: Short of breath, pneumonia

 

TECHNIQUE: Single frontal view of the chest is obtained.

 

FINDINGS:  

The heart size is normal.  

The pulmonary vasculature is normal.  

There is diffuse infiltrate throughout the right upper lobe. Correlate for pneumonia. Right lower lob
e is improving.  

 

 

IMPRESSION:  

1. Able Infiltrates within the right upper lobe and improving within the right lower lobe

## 2020-10-04 NOTE — P.PN
Subjective


Progress Note Date: 10/04/20








Morenita Ramirez, is a 60-year-old female who presented to [facility] emergency room due to nausea vomiting and diarrhea, and elevated 

glucose level, she was evaluated in the emergency room, her temperature on 

presentation was 90.1, pulse 75 respiration 18 blood pressure 79/51 pulse ox 94%

on room air, her white blood count was elevated at 11.6 hemoglobin 9.6 platelet 

count 156, venous blood gas pH was 7.05 glucose level was 744 and serum acetone 

was positive, patient was admitted to ICU she was started on IV fluid and on IV 

insulin drip.


Patient has a known history of insulin-dependent diabetes mellitus type 1 

maintained on insulin he had previous episodes of DKA, she also had a history of

stroke, history of hypertension, hyperlipidemia, coronary artery disease with 

myocardial infarction, peripheral vascular disease with multiple toe amputation,

history of COPD.


On review of systems patient is alert responsive in no apparent distress she was

seen in ICU she is still complaining of nausea and occasional episodes of 

vomiting there is no fever or chills no headache or dizziness no chest pain no 

shortness of breath no cough she is having some abdominal discomfort no no burni

ng with urination no frequency or urgency no hematuria.





On 10/04/2020 patient remains in the intensive care unit.  Patient does not 

appear in any kind of distress.  She currently getting treated for pneumonia 

critical care services are following.  Patient remains on Levaquin.  Patient has

been transitioned to subcu insulin patient did have episodes of low sugar this 

a.m. treated per protocol.





Objective





- Vital Signs


Vital signs: 


                                   Vital Signs











Temp  98.4 F   10/04/20 08:00


 


Pulse  85   10/04/20 09:00


 


Resp  14   10/04/20 09:00


 


BP  127/75   10/04/20 09:00


 


Pulse Ox  100   10/04/20 09:00








                                 Intake & Output











 10/03/20 10/04/20 10/04/20





 18:59 06:59 18:59


 


Intake Total 1861 1011 116


 


Output Total 1005 945 266


 


Balance 856 66 -150


 


Weight 50.8 kg 46.8 kg 


 


Intake:   


 


  IV 1461 661 116


 


    D5-0.45% NaCl with KCl 1275 625 110





    20Meq/l 1,000 ml @ 150   





    mls/hr IV .Q6H40M [facility] Rx#   





    :[id number]   


 


    Lacosamide IV 50 mg In 50  





    Sodium Chloride 0.9% 50   





    ml @ 100 mls/hr IVPB BID   





    [facility] Rx#:[id number]   


 


    Levofloxacin 500Mg-D5w 100  





    Pmx 500 mg In Dextrose/   





    Water 1 100ml.bag @ 100   





    mls/hr IVPB ONCE ONE Rx#:   





    [id number]   


 


    Pressure Bag 36 36 6


 


  Intake, IV Titration 150 350 





  Amount   


 


    D5-0.45% NaCl with KCl 150  





    20Meq/l 1,000 ml @ 150   





    mls/hr IV .Q6H40M [facility] Rx#   





    :[id number]   


 


    Lacosamide IV 50 mg In  100 





    Sodium Chloride 0.9% 50   





    ml @ 100 mls/hr IVPB BID   





    [facility] Rx#:[id number]   


 


    Vancomycin 750 mg In  250 





    Sodium Chloride 0.9% 250   





    ml @ 125 mls/hr IVPB Q24H   





    [facility] Rx#:[id number]   


 


  Oral 250  


 


Output:   


 


  Urine 975 945 266


 


  Emesis 30  


 


Other:   


 


  Voiding Method Indwelling Catheter Indwelling Catheter 








                       ABP, PAP, CO, CI - Last Documented











Arterial Blood Pressure        136/54

















- Exam





In general patient is alert responsive in no apparent distress


HEENT head normocephalic and atraumatic


Neck is supple no JVD no goiter no lymphadenopathy


Chest exam reveals a few scattered rhonchi no wheezing


Cardiac exam reveals regular heart sounds S1 and S2 with mild tachycardia no 

gallops no murmurs


Abdomen is soft nontender no rigidity or rebound no palpable masses with 

hyperactive bowel sounds


Extremity exam reveals no edema no cyanosis or clubbing


Neurological examination reveals no gross new focal deficit








- Labs


CBC & Chem 7: 


                                 10/04/20 04:10





                                 10/04/20 04:10


Labs: 


                  Abnormal Lab Results - Last 24 Hours (Table)











  10/03/20 10/03/20 10/03/20 Range/Units





  10:46 11:54 16:08 


 


RBC     (3.80-5.40)  m/uL


 


Hgb     (11.4-16.0)  gm/dL


 


Hct     (34.0-46.0)  %


 


Plt Count     (150-450)  k/uL


 


Metamyelocytes # (Man)     (0)  k/uL


 


Sodium     (137-145)  mmol/L


 


Chloride     ()  mmol/L


 


BUN     (7-17)  mg/dL


 


Creatinine     (0.52-1.04)  mg/dL


 


Glucose     (74-99)  mg/dL


 


POC Glucose (mg/dL)  188 H  194 H  128 H  (75-99)  mg/dL


 


Calcium     (8.4-10.2)  mg/dL














  10/03/20 10/03/20 10/04/20 Range/Units





  20:04 20:14 00:10 


 


RBC     (3.80-5.40)  m/uL


 


Hgb     (11.4-16.0)  gm/dL


 


Hct     (34.0-46.0)  %


 


Plt Count     (150-450)  k/uL


 


Metamyelocytes # (Man)     (0)  k/uL


 


Sodium     (137-145)  mmol/L


 


Chloride     ()  mmol/L


 


BUN     (7-17)  mg/dL


 


Creatinine     (0.52-1.04)  mg/dL


 


Glucose     (74-99)  mg/dL


 


POC Glucose (mg/dL)  368 H  366 H  225 H  (75-99)  mg/dL


 


Calcium     (8.4-10.2)  mg/dL














  10/04/20 10/04/20 10/04/20 Range/Units





  04:00 04:02 04:10 


 


RBC    2.57 L  (3.80-5.40)  m/uL


 


Hgb    7.5 L D  (11.4-16.0)  gm/dL


 


Hct    24.1 L  (34.0-46.0)  %


 


Plt Count    97 L  (150-450)  k/uL


 


Metamyelocytes # (Man)    0.17 H  (0)  k/uL


 


Sodium     (137-145)  mmol/L


 


Chloride     ()  mmol/L


 


BUN     (7-17)  mg/dL


 


Creatinine     (0.52-1.04)  mg/dL


 


Glucose     (74-99)  mg/dL


 


POC Glucose (mg/dL)  22 L  20 L   (75-99)  mg/dL


 


Calcium     (8.4-10.2)  mg/dL














  10/04/20 10/04/20 10/04/20 Range/Units





  04:10 04:13 04:46 


 


RBC     (3.80-5.40)  m/uL


 


Hgb     (11.4-16.0)  gm/dL


 


Hct     (34.0-46.0)  %


 


Plt Count     (150-450)  k/uL


 


Metamyelocytes # (Man)     (0)  k/uL


 


Sodium  148 H    (137-145)  mmol/L


 


Chloride  119 H    ()  mmol/L


 


BUN  39 H    (7-17)  mg/dL


 


Creatinine  1.07 H    (0.52-1.04)  mg/dL


 


Glucose  276 H    (74-99)  mg/dL


 


POC Glucose (mg/dL)   205 H  106 H  (75-99)  mg/dL


 


Calcium  8.0 L    (8.4-10.2)  mg/dL














  10/04/20 10/04/20 10/04/20 Range/Units





  05:22 05:39 06:10 


 


RBC     (3.80-5.40)  m/uL


 


Hgb     (11.4-16.0)  gm/dL


 


Hct     (34.0-46.0)  %


 


Plt Count     (150-450)  k/uL


 


Metamyelocytes # (Man)     (0)  k/uL


 


Sodium     (137-145)  mmol/L


 


Chloride     ()  mmol/L


 


BUN     (7-17)  mg/dL


 


Creatinine     (0.52-1.04)  mg/dL


 


Glucose     (74-99)  mg/dL


 


POC Glucose (mg/dL)  61 L  279 H  129 H  (75-99)  mg/dL


 


Calcium     (8.4-10.2)  mg/dL














  10/04/20 10/04/20 10/04/20 Range/Units





  07:01 07:16 08:06 


 


RBC     (3.80-5.40)  m/uL


 


Hgb     (11.4-16.0)  gm/dL


 


Hct     (34.0-46.0)  %


 


Plt Count     (150-450)  k/uL


 


Metamyelocytes # (Man)     (0)  k/uL


 


Sodium     (137-145)  mmol/L


 


Chloride     ()  mmol/L


 


BUN     (7-17)  mg/dL


 


Creatinine     (0.52-1.04)  mg/dL


 


Glucose     (74-99)  mg/dL


 


POC Glucose (mg/dL)  65 L  275 H  127 H  (75-99)  mg/dL


 


Calcium     (8.4-10.2)  mg/dL














  10/04/20 Range/Units





  10:13 


 


RBC   (3.80-5.40)  m/uL


 


Hgb   (11.4-16.0)  gm/dL


 


Hct   (34.0-46.0)  %


 


Plt Count   (150-450)  k/uL


 


Metamyelocytes # (Man)   (0)  k/uL


 


Sodium   (137-145)  mmol/L


 


Chloride   ()  mmol/L


 


BUN   (7-17)  mg/dL


 


Creatinine   (0.52-1.04)  mg/dL


 


Glucose   (74-99)  mg/dL


 


POC Glucose (mg/dL)  64 L  (75-99)  mg/dL


 


Calcium   (8.4-10.2)  mg/dL








                      Microbiology - Last 24 Hours (Table)











 10/02/20 19:45 Blood Culture Gram Stain - Preliminary





 Blood 


 


 10/02/20 19:45 Blood Culture - Final





 Blood 














Assessment and Plan


Assessment: 





1.  Acute diabetic ketoacidosis with severe hyperglycemia and positive serum 

acetone patient is improving with IV fluid and IV insulin drip.  She has been 

transitioned to subcu insulin.  Patient having an episode of hypoglycemia.  

Protocol followed per nursing staff





2.  Underlying history of diabetes mellitus type 1 maintained on insulin, her 

primary care provider is Eleanor Rosas, she was contacted and she stated that she 

was recently hospitalized at [facility], a different relative was 

caring for patient and apparently glucose level has been extremely elevated for 

several days.





3.  Underlying history of hypertension





4.  Underlying history of hyperlipidemia





5.  Underlying history of seizure disorder, will place patient on IV Keppra 

until she is able to take her oral medications





6.  Underlying history of COPD stable no evidence of exacerbation





7.  Underlying history of coronary artery disease stable at this time patient 

denies any chest pain





8.  Underlying history of peripheral vascular disease with previous history of 

multiple toe amputations





9.  Previous history of stroke.





10.  Right-sided pneumonia.  Patient currently on Levaquin.  Pulmonary and 

critical care service is following.  Chest x-ray this a.m. showing infiltrates 

within the right upper lobe and improving within the  right lower lobe





At this time patient is admitted to intensive care unit continue with IV fluids 

and insulin management


Home medication reviewed, will be switched to IV medications when possible due 

to continuous nausea and vomiting at this time


Pulmonary critical care are following





I performed an examination of the patient and discussed their management with 

the Nurse Practitioner.  I have reviewed the Nurse Practitioner's notes and 

agree with the documented findings and plan of care

## 2020-10-04 NOTE — XR
EXAMINATION TYPE: XR chest 1V portable

 

DATE OF EXAM: 10/4/2020

 

COMPARISON: 10/4/2020

 

INDICATION: Line placement

 

TECHNIQUE: Single frontal view of the chest is obtained.

 

FINDINGS:  

The heart size is normal.  

The pulmonary vasculature is normal.  

There is a right upper lobe consolidation. Correlate for worsening pneumonia. Some mild infiltrate ma
y be at the right base.  

There is a left central venous catheter present with the tip in the right atrium.

 

IMPRESSION:  

1. 

Right upper lobe infiltrate.

2. Mild right lower lobe infiltrate

## 2020-10-04 NOTE — PN
PROGRESS NOTE



PULMONARY/CRITICAL CARE PROGRESS NOTE:



DATE OF SERVICE:

October 4, 2020



HISTORY:

This is a 60-year-old female who presents from assisted living home where she resides.

She apparently came in with complaints of nausea, vomiting, diarrhea with profound

dehydration and laboratory analysis discovered severe diabetic ketoacidosis.  The

patient does have a history of CVA, hypertension, CAD, chronic kidney disease, and

diabetes.  She also apparently carries with her a history of frequent episodes of DKA.

Currently, the patient is on D5 0.45 with 20 of potassium at 55 mL an hour.  She is

getting O2 of 4 L.  Her chest x-ray shows right basilar greater than left basilar

pneumonia.  She is getting sliding scale insulin q.4 hours, NovoLog, and she is on

Levaquin and vancomycin.  Today, because of no IV access, I had to place a central

line.  We did a left internal jugular triple-lumen catheter.



PHYSICAL EXAMINATION:

VITAL SIGNS: Current vital signs are reviewed.

Temperature is 98.4, heart rate 90, respiratory rate 14, blood pressure 138/99, mean

112, 4 L saturation is 100%.  Appears in no acute distress.

HEENT: Examination is grossly unremarkable.

NECK:  Supple.  Full range of motion.  Neck veins are flat.

CARDIOVASCULAR: Examination reveals a regular rhythm rate.  Heart rate 90 beats per

minute.  S1, S2 normal.

LUNGS:  A few scattered coarse rhonchi.  No wheezes or crackles.

ABDOMEN:  Soft.  Extremities are intact.  No edema.

SKIN: Without rash.

NEUROLOGIC is difficult to assess but she does arouse and she does move all 4

extremities.  She is somewhat lethargic and somnolent but that has been her mental

status since she has been here.



LABS:

Reviewed.  White count 8.6, hemoglobin 7.5, hematocrit 24.1, platelet count 97,000.

The patient's sodium is 148, potassium 3.5, chloride 119, CO2 29, BUN and creatinine

were 39 and 1.07 down from 57 and 1.51.  Urine is reviewed.



Microbiologic studies including all blood cultures, urine and sputum are negative.



Her most recent chest x-ray from October 4 shows relatively clear left lung, but a

right upper lobe infiltrate and possibly a right middle lobe right lower lobe

infiltrate as well.



CURRENT MEDICATIONS:

Reviewed.  Currently, she is on aspirin, the IV I previously mentioned, and NovoLog

sliding scale q.4 hours, DuoNeb, Vimpat, Levaquin Reglan, Zofran, potassium

replacement, Seroquel, and vancomycin.



ASSESSMENT:

1. Diabetic ketoacidosis, improved, currently off the insulin drip.

2. Right-sided pneumonia, mostly involving right upper lobe, right mid lung, right

    lower lobe, possibly related to aspiration.

3. History of long-standing diabetes with multiple episodes of diabetic ketoacidosis.

4. Diabetic neuropathy.

5. History of chronic obstructive pulmonary disease/asthma.

6. History of coronary artery disease.

7. Angina pectoris.

8. History of congestive heart failure.

9. Prior history of cerebrovascular accident.

10.Deep venous thrombosis.

11.Gastroesophageal reflux disease.

12.Hyperlipidemia.

13.Essential hypertension.

14.Prior history of myocardial infarction.



PLAN:

Currently, the patient is doing a bit better.  Chest x-ray still shows a pretty dense

consolidation right upper lobe.  She remains on antibiotics.  The left internal jugular

triple-lumen catheter was placed by myself today because she had no IV access.  She

remains on antibiotics.  Additional recommendations and suggestions are forthcoming.

We will continue to follow.  Prognosis is guarded.





MMODL / IJN: [id number] / Job#: [id number]

## 2020-10-04 NOTE — PCN
PROCEDURE NOTE



PROCEDURE:

Left internal jugular triple-lumen catheter.



OPERATORS:

Dr. Zamora and Dr. Guerrero



PREOP DIAGNOSIS:

Administration of fluids and pressors.



POSTOP DIAGNOSIS:

Administration of fluids and pressors.



I have discussed the risks, benefits and alternative therapies for the above-mentioned

procedure and for both sedation/analgesia as well as necessary blood product

administration, if indicated, as they pertain to this patient.  The patient has

indicated his or her understanding and acceptance of the risks and procedures

discussed.



TRIPLE LUMEN CATHETER PLACEMENT:

Indication:  Hemodynamic monitoring/Intravenous access.



A time-out was completed verifying correct patient, procedure, site, positioning, and

implant(s) or special equipment if applicable.



The patient was placed in a dependent position appropriate for triple lumen catheter

placement based on the vein to be cannulated.  The patient's left neck was prepped and

draped in sterile fashion.  1% Lidocaine was used to anesthetize the surrounding skin

area.  A triple lumen 9F Cordis catheter was introduced into the left internal jugular

vein using Seldinger technique.  The catheter was threaded smoothly over the guide wire

and appropriate blood return was obtained.  Each lumen of the catheter was evacuated of

air and flushed with sterile saline.  The catheter was then sutured in place to the

skin and a sterile dressing applied.  Perfusion to the extremity distal to the point of

catheter insertion was checked and found to be adequate.



We did use some topical lidocaine at the site of the puncture.  We used a posterior

approach for the internal jugular vein on the left.  There was no immediate

complication. There was good blood return from all three ports.  The catheter was

sutured in place.  Sterile dressing was applied by the nurse.  A chest x-ray was

ordered.  There was no immediate complication.  The patient tolerated the procedure

well.





MMODL / IJN: [id number] / Job#: [id number]

## 2020-10-05 LAB
ANION GAP SERPL CALC-SCNC: 0 MMOL/L
BASOPHILS # BLD AUTO: 0 K/UL (ref 0–0.2)
BASOPHILS # BLD AUTO: 0 K/UL (ref 0–0.2)
BASOPHILS NFR BLD AUTO: 0 %
BASOPHILS NFR BLD AUTO: 0 %
BUN SERPL-SCNC: 18 MG/DL (ref 7–17)
CALCIUM SPEC-MCNC: 7.8 MG/DL (ref 8.4–10.2)
CHLORIDE SERPL-SCNC: 115 MMOL/L (ref 98–107)
CO2 SERPL-SCNC: 29 MMOL/L (ref 22–30)
EOSINOPHIL # BLD AUTO: 0 K/UL (ref 0–0.7)
EOSINOPHIL # BLD AUTO: 0 K/UL (ref 0–0.7)
EOSINOPHIL NFR BLD AUTO: 0 %
EOSINOPHIL NFR BLD AUTO: 0 %
ERYTHROCYTE [DISTWIDTH] IN BLOOD BY AUTOMATED COUNT: 2.27 M/UL (ref 3.8–5.4)
ERYTHROCYTE [DISTWIDTH] IN BLOOD BY AUTOMATED COUNT: 3.18 M/UL (ref 3.8–5.4)
ERYTHROCYTE [DISTWIDTH] IN BLOOD: 15.3 % (ref 11.5–15.5)
ERYTHROCYTE [DISTWIDTH] IN BLOOD: 16.5 % (ref 11.5–15.5)
GLUCOSE BLD-MCNC: 106 MG/DL (ref 75–99)
GLUCOSE BLD-MCNC: 140 MG/DL (ref 75–99)
GLUCOSE BLD-MCNC: 271 MG/DL (ref 75–99)
GLUCOSE BLD-MCNC: 347 MG/DL (ref 75–99)
GLUCOSE BLD-MCNC: 388 MG/DL (ref 75–99)
GLUCOSE SERPL-MCNC: 260 MG/DL (ref 74–99)
HCT VFR BLD AUTO: 21.3 % (ref 34–46)
HCT VFR BLD AUTO: 29 % (ref 34–46)
HGB BLD-MCNC: 6.8 GM/DL (ref 11.4–16)
HGB BLD-MCNC: 9.5 GM/DL (ref 11.4–16)
LYMPHOCYTES # SPEC AUTO: 1.4 K/UL (ref 1–4.8)
LYMPHOCYTES # SPEC AUTO: 1.6 K/UL (ref 1–4.8)
LYMPHOCYTES NFR SPEC AUTO: 22 %
LYMPHOCYTES NFR SPEC AUTO: 28 %
MCH RBC QN AUTO: 30 PG (ref 25–35)
MCH RBC QN AUTO: 30.1 PG (ref 25–35)
MCHC RBC AUTO-ENTMCNC: 32.1 G/DL (ref 31–37)
MCHC RBC AUTO-ENTMCNC: 32.9 G/DL (ref 31–37)
MCV RBC AUTO: 91.2 FL (ref 80–100)
MCV RBC AUTO: 93.9 FL (ref 80–100)
MONOCYTES # BLD AUTO: 0.2 K/UL (ref 0–1)
MONOCYTES # BLD AUTO: 0.3 K/UL (ref 0–1)
MONOCYTES NFR BLD AUTO: 4 %
MONOCYTES NFR BLD AUTO: 4 %
NEUTROPHILS # BLD AUTO: 3.2 K/UL (ref 1.3–7.7)
NEUTROPHILS # BLD AUTO: 5.5 K/UL (ref 1.3–7.7)
NEUTROPHILS NFR BLD AUTO: 67 %
NEUTROPHILS NFR BLD AUTO: 73 %
PLATELET # BLD AUTO: 53 K/UL (ref 150–450)
PLATELET # BLD AUTO: 58 K/UL (ref 150–450)
POTASSIUM SERPL-SCNC: 3.8 MMOL/L (ref 3.5–5.1)
SODIUM SERPL-SCNC: 144 MMOL/L (ref 137–145)
WBC # BLD AUTO: 4.8 K/UL (ref 3.8–10.6)
WBC # BLD AUTO: 7.5 K/UL (ref 3.8–10.6)

## 2020-10-05 PROCEDURE — 30243N1 TRANSFUSION OF NONAUTOLOGOUS RED BLOOD CELLS INTO CENTRAL VEIN, PERCUTANEOUS APPROACH: ICD-10-PCS

## 2020-10-05 RX ADMIN — METOCLOPRAMIDE SCH: 10 TABLET ORAL at 18:01

## 2020-10-05 RX ADMIN — SODIUM CHLORIDE SCH MLS/HR: 9 INJECTION, SOLUTION INTRAVENOUS at 01:08

## 2020-10-05 RX ADMIN — DEXTROSE MONOHYDRATE AND SODIUM CHLORIDE SCH MLS/HR: 5; .9 INJECTION, SOLUTION INTRAVENOUS at 23:20

## 2020-10-05 RX ADMIN — DEXTROSE MONOHYDRATE, SODIUM CHLORIDE, AND POTASSIUM CHLORIDE SCH MLS/HR: 50; 4.5; 1.49 INJECTION, SOLUTION INTRAVENOUS at 23:22

## 2020-10-05 RX ADMIN — VALPROATE SODIUM SCH MLS/HR: 100 INJECTION, SOLUTION INTRAVENOUS at 10:24

## 2020-10-05 RX ADMIN — ONDANSETRON PRN MG: 2 INJECTION INTRAMUSCULAR; INTRAVENOUS at 15:33

## 2020-10-05 RX ADMIN — DULOXETINE SCH: 60 CAPSULE, DELAYED RELEASE ORAL at 09:39

## 2020-10-05 RX ADMIN — LOSARTAN POTASSIUM SCH: 50 TABLET, FILM COATED ORAL at 09:39

## 2020-10-05 RX ADMIN — DEXTROSE SCH MLS/HR: 50 INJECTION, SOLUTION INTRAVENOUS at 23:20

## 2020-10-05 RX ADMIN — IPRATROPIUM BROMIDE AND ALBUTEROL SULFATE SCH: .5; 3 SOLUTION RESPIRATORY (INHALATION) at 10:50

## 2020-10-05 RX ADMIN — INSULIN ASPART SCH: 100 INJECTION, SOLUTION INTRAVENOUS; SUBCUTANEOUS at 01:08

## 2020-10-05 RX ADMIN — DEXTROSE MONOHYDRATE, SODIUM CHLORIDE, AND POTASSIUM CHLORIDE SCH MLS/HR: 50; 4.5; 1.49 INJECTION, SOLUTION INTRAVENOUS at 03:16

## 2020-10-05 RX ADMIN — LACOSAMIDE SCH: 10 INJECTION INTRAVENOUS at 12:22

## 2020-10-05 RX ADMIN — METOCLOPRAMIDE SCH: 10 TABLET ORAL at 09:43

## 2020-10-05 RX ADMIN — LEVOFLOXACIN SCH MLS/HR: 250 INJECTION, SOLUTION INTRAVENOUS at 11:09

## 2020-10-05 RX ADMIN — INSULIN ASPART SCH UNIT: 100 INJECTION, SOLUTION INTRAVENOUS; SUBCUTANEOUS at 20:25

## 2020-10-05 RX ADMIN — IPRATROPIUM BROMIDE AND ALBUTEROL SULFATE SCH ML: .5; 3 SOLUTION RESPIRATORY (INHALATION) at 19:02

## 2020-10-05 RX ADMIN — VALPROATE SODIUM SCH MLS/HR: 100 INJECTION, SOLUTION INTRAVENOUS at 23:21

## 2020-10-05 RX ADMIN — LACOSAMIDE SCH MLS/HR: 10 INJECTION INTRAVENOUS at 20:25

## 2020-10-05 RX ADMIN — ATORVASTATIN CALCIUM SCH: 80 TABLET, FILM COATED ORAL at 22:03

## 2020-10-05 RX ADMIN — DEXTROSE SCH MLS/HR: 50 INJECTION, SOLUTION INTRAVENOUS at 09:34

## 2020-10-05 RX ADMIN — INSULIN ASPART SCH: 100 INJECTION, SOLUTION INTRAVENOUS; SUBCUTANEOUS at 18:01

## 2020-10-05 RX ADMIN — DEXTROSE MONOHYDRATE, SODIUM CHLORIDE, AND POTASSIUM CHLORIDE SCH MLS/HR: 50; 4.5; 1.49 INJECTION, SOLUTION INTRAVENOUS at 05:32

## 2020-10-05 RX ADMIN — METOCLOPRAMIDE SCH: 10 TABLET ORAL at 12:29

## 2020-10-05 RX ADMIN — IPRATROPIUM BROMIDE AND ALBUTEROL SULFATE SCH ML: .5; 3 SOLUTION RESPIRATORY (INHALATION) at 07:25

## 2020-10-05 RX ADMIN — INSULIN ASPART SCH UNIT: 100 INJECTION, SOLUTION INTRAVENOUS; SUBCUTANEOUS at 12:21

## 2020-10-05 RX ADMIN — CLOPIDOGREL BISULFATE SCH: 75 TABLET ORAL at 09:39

## 2020-10-05 RX ADMIN — LACOSAMIDE SCH MLS/HR: 10 INJECTION INTRAVENOUS at 10:19

## 2020-10-05 RX ADMIN — ASPIRIN 81 MG CHEWABLE TABLET SCH: 81 TABLET CHEWABLE at 09:39

## 2020-10-05 RX ADMIN — DEXTROSE MONOHYDRATE AND SODIUM CHLORIDE SCH MLS/HR: 5; .9 INJECTION, SOLUTION INTRAVENOUS at 03:16

## 2020-10-05 RX ADMIN — DEXTROSE SCH MLS/HR: 50 INJECTION, SOLUTION INTRAVENOUS at 12:25

## 2020-10-05 RX ADMIN — IPRATROPIUM BROMIDE AND ALBUTEROL SULFATE SCH ML: .5; 3 SOLUTION RESPIRATORY (INHALATION) at 16:32

## 2020-10-05 RX ADMIN — VALPROATE SODIUM SCH MLS/HR: 100 INJECTION, SOLUTION INTRAVENOUS at 18:03

## 2020-10-05 RX ADMIN — INSULIN ASPART SCH UNIT: 100 INJECTION, SOLUTION INTRAVENOUS; SUBCUTANEOUS at 05:31

## 2020-10-05 RX ADMIN — DEXTROSE SCH MLS/HR: 50 INJECTION, SOLUTION INTRAVENOUS at 18:04

## 2020-10-05 RX ADMIN — DEXTROSE MONOHYDRATE, SODIUM CHLORIDE, AND POTASSIUM CHLORIDE SCH MLS/HR: 50; 4.5; 1.49 INJECTION, SOLUTION INTRAVENOUS at 10:22

## 2020-10-05 NOTE — EEG
ELECTROENCEPHALOGRAM REPORT



This is a 60-year-old woman with a history of a stroke (right frontal

temporoparietal region) and seizure who is admitted to the hospital on 
10/02/2020 for

diabetic ketoacidosis.  Currently she has altered mental status during the 
hospital

stay.  This video EEG was obtained to evaluate for seizure and epileptiform 
activity.



RELEVANT MEDICATION:

Vimpat and Depakote.



EEG TYPE:

A routine 21-channel EEG was performed with video using the 10/20 electrode 
placement

system.



DESCRIPTION:

Only wakefulness is obtained.  During wakefulness, there is no posterior-
dominant

rhythm seen over both hemisphere.  

 During unstimulated state, the left hemisphere background consists of low to 

moderate voltage of 6 to 7 hertz theta activity and sometimes intermixed with 
delta activity. 

During stimulated state, the left hemisphere background is consisted of mild to 
moderate voltage of 

polymorphic 1.5 to 2.5 hertz delta activity intermixed with theta activity.  

While there is continuos low to moderate voltage of 1 to 2 hertz delta activity 
that

is polymorphic over the right frontal temporoparietal region.  

There is no physiological stage II sleep seen.



There is significant myogenic artifact over the bilateral hemispheres since the 
patient

was restless.



INTERICTAL AND ICTAL: The patient had rare to occasional low to moderate sharp 
and slow

waves over the right mid temporal region without any evolution to seizures.



ACTIVATION PROCEDURES: 

Photic stimulation did not evoke a posterior driving response.

Hyperventilation was not performed because of the patient's clinical history.



CLINICAL INTERPRETATION:

This is an abnormal routine awake EEG.  The background slowing over the left 
hemisphere

is consistent with mild to moderate encephalopathy.  There is focal slowing over
the

right frontal temporal region consistent with the patient's history of prior 
stroke.

The epileptiform discharges over the right temporal increase the risk of focal 
seizure.

There is no seizure activity seen during the study.  

There is myogenic artifact obscuring the background during the study.

Clinical correlation is recommended.





MMODL / IJN: [id number] / Job#: [id number]

MTDD

## 2020-10-05 NOTE — XR
Lumbar spine

 

HISTORY: Low back pain

 

3 views the lumbar spine, correlation to prior exam 2/25/2013

 

There is a gentle spinal curvature. Lumbar vertebral bodies show preserved height, alignment, and bon
e mineralization. Some mild multilevel spondylosis is present, some loss of disc height L4-5 and L5-S
1. Atherosclerotic vascular calcifications are present.

 

IMPRESSION: Degenerative disc disease.

## 2020-10-05 NOTE — P.PN
Subjective


Progress Note Date: 10/05/20








Morenita Ramirez, is a 60-year-old female who presented to [facility] emergency room due to nausea vomiting and diarrhea, and elevated 

glucose level, she was evaluated in the emergency room, her temperature on 

presentation was 90.1, pulse 75 respiration 18 blood pressure 79/51 pulse ox 94%

on room air, her white blood count was elevated at 11.6 hemoglobin 9.6 platelet 

count 156, venous blood gas pH was 7.05 glucose level was 744 and serum acetone 

was positive, patient was admitted to ICU she was started on IV fluid and on IV 

insulin drip.


Patient has a known history of insulin-dependent diabetes mellitus type 1 

maintained on insulin he had previous episodes of DKA, she also had a history of

stroke, history of hypertension, hyperlipidemia, coronary artery disease with 

myocardial infarction, peripheral vascular disease with multiple toe amputation,

history of COPD.


On review of systems patient is alert responsive in no apparent distress she was

seen in ICU she is still complaining of nausea and occasional episodes of 

vomiting there is no fever or chills no headache or dizziness no chest pain no 

shortness of breath no cough she is having some abdominal discomfort no no burni

ng with urination no frequency or urgency no hematuria.





On 10/04/2020 patient remains in the intensive care unit.  Patient does not 

appear in any kind of distress.  She currently getting treated for pneumonia 

critical care services are following.  Patient remains on Levaquin.  Patient has

been transitioned to subcu insulin patient did have episodes of low sugar this 

a.m. treated per protocol.





On 10/05/2020 patient was seen and examined in the ICU, she was very drowsy this

morning she failed swallow evaluation and is kept nothing by mouth at this time,

now patient is more alert and responsive, glucose level is better controlled, 

she is complaining of low back pain otherwise she denies any complaints





Objective





- Vital Signs


Vital signs: 


                                   Vital Signs











Temp  98.4 F   10/05/20 16:00


 


Pulse  102 H  10/05/20 18:00


 


Resp  15   10/05/20 18:00


 


BP  127/41   10/05/20 18:00


 


Pulse Ox  99   10/05/20 18:00








                                 Intake & Output











 10/04/20 10/05/20 10/05/20





 18:59 06:59 18:59


 


Intake Total 


 


Output Total 1426 1795 1550


 


Balance -648 -856 46


 


Weight  50.4 kg 


 


Intake:   


 


   939 986


 


    D5-0.45% NaCl with KCl 745 903 900





    20Meq/l 1,000 ml @ 150   





    mls/hr IV .Q6H40M [facility] Rx#   





    :[id number]   


 


    Lacosamide IV 50 mg In   50





    Sodium Chloride 0.9% 50   





    ml @ 100 mls/hr IVPB BID   





    [facility] Rx#:[id number]   


 


    Pressure Bag 33 36 36


 


  Intake, IV Titration   300





  Amount   


 


    Clindamycin 300 mg In   100





    Dextrose 5% in Water 50   





    ml @ 50 mls/hr IVPB Q6HR   





    [facility] Rx#:[id number]   


 


    Valproate Sodium 250 mg   200





    In Sodium Chloride 0.9%   





    100 ml @ 100 mls/hr IVPB   





    Q8HR [facility] Rx#:[id number]   


 


  Blood Product   310


 


    Rc As-3  Unit   310





    [id number]   


 


Output:   


 


  Urine 1426 1795 1550


 


Other:   


 


  Voiding Method Indwelling Catheter Indwelling Catheter Indwelling Catheter








                       ABP, PAP, CO, CI - Last Documented











Arterial Blood Pressure        124/87

















- Exam








In general patient is alert responsive in no apparent distress


HEENT head normocephalic and atraumatic


Neck is supple no JVD no goiter no lymphadenopathy


Chest exam reveals a few scattered rhonchi no wheezing


Cardiac exam reveals regular heart sounds S1 and S2 with mild tachycardia no 

gallops no murmurs


Abdomen is soft nontender no rigidity or rebound no palpable masses with 

hyperactive bowel sounds


Extremity exam reveals no edema no cyanosis or clubbing


Neurological examination reveals no gross new focal deficit











- Labs


CBC & Chem 7: 


                                 10/05/20 16:25





                                 10/05/20 04:45


Labs: 


                  Abnormal Lab Results - Last 24 Hours (Table)











  10/02/20 10/04/20 10/04/20 Range/Units





  19:45 19:13 22:34 


 


RBC     (3.80-5.40)  m/uL


 


Hgb     (11.4-16.0)  gm/dL


 


Hct     (34.0-46.0)  %


 


RDW     (11.5-15.5)  %


 


Plt Count     (150-450)  k/uL


 


Chloride     ()  mmol/L


 


BUN     (7-17)  mg/dL


 


Glucose     (74-99)  mg/dL


 


POC Glucose (mg/dL)   236 H  254 H  (75-99)  mg/dL


 


Calcium     (8.4-10.2)  mg/dL


 


Crossmatch  See Detail    














  10/05/20 10/05/20 10/05/20 Range/Units





  01:05 04:45 04:45 


 


RBC   2.27 L   (3.80-5.40)  m/uL


 


Hgb   6.8 L*   (11.4-16.0)  gm/dL


 


Hct   21.3 L   (34.0-46.0)  %


 


RDW     (11.5-15.5)  %


 


Plt Count   58 L   (150-450)  k/uL


 


Chloride    115 H  ()  mmol/L


 


BUN    18 H  (7-17)  mg/dL


 


Glucose    260 H  (74-99)  mg/dL


 


POC Glucose (mg/dL)  106 H    (75-99)  mg/dL


 


Calcium    7.8 L  (8.4-10.2)  mg/dL


 


Crossmatch     














  10/05/20 10/05/20 10/05/20 Range/Units





  04:47 11:52 11:53 


 


RBC     (3.80-5.40)  m/uL


 


Hgb     (11.4-16.0)  gm/dL


 


Hct     (34.0-46.0)  %


 


RDW     (11.5-15.5)  %


 


Plt Count     (150-450)  k/uL


 


Chloride     ()  mmol/L


 


BUN     (7-17)  mg/dL


 


Glucose     (74-99)  mg/dL


 


POC Glucose (mg/dL)  271 H  345 H  347 H  (75-99)  mg/dL


 


Calcium     (8.4-10.2)  mg/dL


 


Crossmatch     














  10/05/20 10/05/20 Range/Units





  16:04 16:25 


 


RBC   3.18 L  (3.80-5.40)  m/uL


 


Hgb   9.5 L D  (11.4-16.0)  gm/dL


 


Hct   29.0 L  (34.0-46.0)  %


 


RDW   16.5 H  (11.5-15.5)  %


 


Plt Count   53 L  (150-450)  k/uL


 


Chloride    ()  mmol/L


 


BUN    (7-17)  mg/dL


 


Glucose    (74-99)  mg/dL


 


POC Glucose (mg/dL)  140 H   (75-99)  mg/dL


 


Calcium    (8.4-10.2)  mg/dL


 


Crossmatch    














Assessment and Plan


Plan: 





1.  Acute diabetic ketoacidosis with severe hyperglycemia and positive serum 

acetone patient is improving with IV fluid and IV insulin drip





2.  Underlying history of diabetes mellitus type 1 maintained on insulin, her 

primary care provider is Eleanor Rosas, she was contacted and she stated that she 

was recently hospitalized at [facility], a different relative was 

caring for patient and apparently glucose level has been extremely elevated for 

several days.





3.  Underlying history of hypertension





4.  Underlying history of hyperlipidemia





5.  Underlying history of seizure disorder, will place patient on IV Keppra 

until she is able to take her oral medications





6.  Underlying history of COPD stable no evidence of exacerbation





7.  Underlying history of coronary artery disease stable at this time patient 

denies any chest pain





8.  Underlying history of peripheral vascular disease with previous history of 

multiple toe amputations





9.  Previous history of stroke.





At this time patient is admitted to intensive care unit continue with IV fluids 

and insulin management


Home medication reviewed, will be switched to IV medications when possible due 

to continuous nausea and vomiting at this time


Pulmonary critical care are following

## 2020-10-05 NOTE — P.CNNES
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   














  10/04/20 10/04/20 10/05/20





  19:13 22:34 01:05


 


WBC   


 


RBC   


 


Hgb   


 


Hct   


 


MCV   


 


MCHC   


 


Plt Count   


 


Neutrophils #   


 


Lymphocytes #   


 


Metamyelocytes # (Man)   


 


Macrocytosis   


 


VBG pH   


 


VBG pCO2   


 


VBG HCO3   


 


Sodium   


 


Chloride   


 


Carbon Dioxide   


 


BUN   


 


Creatinine   


 


Glucose   


 


POC Glucose (mg/dL)  236 H  254 H  106 H


 


Calcium   


 


Phosphorus   


 


Magnesium   


 


Iron   


 


TIBC   


 


% Saturation   


 


Creatine Kinase   


 


Vitamin B12   


 


Urine Glucose (UA)   


 


Urine Ketones   


 


Urine Blood   


 


Urine Bilirubin   


 


Amorphous Sediment   


 


Urine Bacteria   


 


Urine Mucus   


 


Crossmatch   














  10/05/20 10/05/20 10/05/20





  04:45 04:45 04:47


 


WBC   


 


RBC  2.27 L  


 


Hgb  6.8 L*  


 


Hct  21.3 L  


 


MCV   


 


MCHC   


 


Plt Count  58 L  


 


Neutrophils #   


 


Lymphocytes #   


 


Metamyelocytes # (Man)   


 


Macrocytosis   


 


VBG pH   


 


VBG pCO2   


 


VBG HCO3   


 


Sodium   


 


Chloride   115 H 


 


Carbon Dioxide   


 


BUN   18 H 


 


Creatinine   


 


Glucose   260 H 


 


POC Glucose (mg/dL)    271 H


 


Calcium   7.8 L 


 


Phosphorus   


 


Magnesium   


 


Iron   


 


TIBC   


 


% Saturation   


 


Creatine Kinase   


 


Vitamin B12   


 


Urine Glucose (UA)   


 


Urine Ketones   


 


Urine Blood   


 


Urine Bilirubin   


 


Amorphous Sediment   


 


Urine Bacteria   


 


Urine Mucus   


 


Crossmatch   














Assessment and Plan


Assessment: 


Toxic metabolic encephalopathy (DKA and right sided pneumonia)


Seizure


Right hemispheric stroke with residual left-sided weakness


Symptomatic right ICA stenosis


Asymptomatic left ICA stenosis


Diabetic ketoacidosis


Right sided pneumonia


NA--resolved


X tobacco use


Hyperlipidemia


Hypertension


History of myocardial infarction or


History of coronary artery disease











Plan: 


Increased the Vimpat from 50 mg to 75 mg since she was supposed to be on 75 mg 

upon discharge from last visit.


Continue Depakote 250 mg 3 times a day will not modify the medication since the 

patient has history of restless per the caregiver from last visit.


I'll get a routine EEG to rule out any active seizure.


Ordered ammonia level.





Regarding the patient history of stroke continue aspirin 81 mg daily as well as 

Plavix 75mg daily for secondary stroke for prophylaxis.  Currently on 

Atorvastatin 20 g daily and will increase to 80mg daily.





Regarding the management of diabetic ketoacidosis defer it to the ICU and the 

primary team.


We'll defer the management of pneumonia to ICU and primary team.





Once the patient is stable then the will consider reconsulting the vascular team

to address the patient ICA stenosis and the one the plan of revascularization.





The plan was discussed with the patient nurse.





Thank you for the consultation.





Destin Zamora M.D.


Neuro-hospitalist








Time with Patient: Greater than 30

## 2020-10-05 NOTE — CDI
Documentation Clarification Form



Date: 10/12/2020 01:48:42 PM

From: Lana Guthrie RN CCDS

Phone: [phone]

MRN: [id number]

Admit Date: 10/02/2020 08:15:00 PM

Patient Name: Morenita Ramirez

Visit Number: [id number]

Discharge Date:  



ATTENTION: The Clinical Documentation Specialists (CDI) and [facility] Coding Staff 
appreciate your assistance in clarifying documentation. Please respond to the 
clarification below the line at the bottom and electronically sign. The CDI & 
[facility] Coding staff will review the response and follow-up if needed. Please note: 
Queries are made part of the Legal Health Record. If you have any questions, 
please contact the author of this message via ITS.



Dr. Saniya Cabral



The patient presented with the nausea, vomiting and diarrhea 



History/Risk Factors: 60-year-old female presents to the ED via EMS for nausea, 
vomiting and diarrhea. Medical History: HTN, CAD, CKD, MI, CVA and Type 1 DM. 

Clinical Indicators:

10/2 Wbc 11.6   

10/4 Blood cultures: Gram Positive Cocci

10/2 Vital signs on admission: B/P: 79/51; HR 75; RR: 18; Temp: 90.1 F Rectal; 
SpO2: 94% room air 

Treatment:

10/3 Critical Care Consult: Diabetic ketoacidosis, right sided pneumonia, rule 
out aspiration 

Antibiotics: 10/5 Clindamycin Ivpb q 6 hrs; 10/2 Levaquin Ivpb Daily, 10/4 
Vancomycin Ivpb Q 12 hrs 

IV Bolus: 10/2 0.9ns 2L bolus 



In your professional opinion, please clarify if these findings signify one of 
the following conditions, whether the condition is POA, and cause, if known:

   

   Sepsis POA

   Severe Sepsis POA

   Septic Shock POA 

   Sepsis Ruled out

   Other, please specify   _____________

   Unable to determine



Identify the (suspected) organism______________

Link or clarify if there is associated (due to/with): 

     Organ failure

     Shock

 

SIRS Criteria (2 or more of the following may indicate SIRS):

-Temperature   < 96.8F (36C) or > 101.0F (38.3C)

-Heart Rate   > 90 bpm

-Respiratory Rate   > 20 breaths/min or PaCO2 < 32 mmHg

-White Blood Cell Count   > 12,000 or < 4,000 cells/mm3 or > 10% bands

-Lactate   >2.0 mmol/L (>4.0 is equivalent to septic shock)



(Last Revision: April 2018)

___________________________________________________________________________



MTDD

## 2020-10-05 NOTE — P.PN
Subjective


Progress Note Date: 10/05/20














this is a 60-year-old female patient who came in with nausea vomiting and 

diarrhea and profound dehydration the patient wasiagnosed having DKA. She has 

previous history of  CVA, hypertension, coronary artery dase, chronic kidney 

disease.  The patient has had previous admissions for DKA.  She was also d

iagnosed having some bilateral low as the patient had bilateral lower lobe 

pulmonary infiltrate right more than left.  She was given Levaquin and 

vancomycin.  The patient was treated with an insulin drip regarding her DKA.  

She has multiple comorbidities.  This morning, the patient is still on IV fl

uids.  She is on D5 half-normal saline with 20 mEq of potassium chloride the 

rate of 75 mL an hour.  She had a follow-up blood work that showed resolution of

her anion gap metabolic acidosis.  A serum bicarbonate of 29 with anion gap of 

the right this point in time.  Her hemoglobin was found to be at 6.8 and the 

patient was given a unit of packed RBC.  No evidence of any bleeding at this 

point in time.  The chest x-rays clear showing a right upper lobe 

consolidation/pneumonia.  There is a central blood culture that showing also 

gram-positive cocci.  Based on that, I kept the Levaquin and vancomycin and 

added clindamycin regarding the possibility of a right upper lobe aspiration 

pneumonia.  The patient was also lethargic and confused and based on that and 

based on history of seizure activity, and neurology consultation was requested. 

Noted the patient was taking a combination of Vimpat and Depakote on outpatient 

basis.  At this point in time she is unable to take her medications orally as 

the patient has failed a swallow bedside evaluation.  The Depakote level has not

been checked yet.  Noted the patient also has a critical internal carotid artery

stenosis on the left and the patient remains on aspirin on outpatient basis and 

vascular surgery has been requested to see this patient the past.





Objective





- Vital Signs


Vital signs: 


                                   Vital Signs











Temp  98.4 F   10/05/20 12:44


 


Pulse  109 H  10/05/20 13:00


 


Resp  18   10/05/20 13:00


 


BP  135/72   10/05/20 13:00


 


Pulse Ox  86 L  10/05/20 13:00








                                 Intake & Output











 10/04/20 10/05/20 10/05/20





 18:59 06:59 18:59


 


Intake Total 778 939 934


 


Output Total 1426 1795 1075


 


Balance -648 -856 -141


 


Weight  50.4 kg 


 


Intake:   


 


   939 624


 


    D5-0.45% NaCl with KCl 745 903 600





    20Meq/l 1,000 ml @ 150   





    mls/hr IV .Q6H40M [facility] Rx#   





    [id number]   


 


    Pressure Bag 33 36 24


 


  Blood Product   310


 


    Rc As-3  Unit   310





    [id number]   


 


Output:   


 


  Urine 1426 1795 1075


 


Other:   


 


  Voiding Method Indwelling Catheter Indwelling Catheter 








                       ABP, PAP, CO, CI - Last Documented











Arterial Blood Pressure        115/82

















- Exam








Gen. appearance she is calm and comfortable likely distress and she is laying 

comfortably in bed


Head exam was generally normal. There was no scleral icterus or corneal arcus. 

Mucous membranes were moist.


Neck was supple and without jugular venous distension, thyromegaly, or carotid 

bruits. Carotids were easily palpable bilaterally. There was no adenopathy.


Lungs were clear to auscultation and percussion, and with normal diaphragmatic 

excursion. No wheezes or rales were noted. 


Cardiac exam revealed the PMI to be normally situated and sized. The rhythm was 

regular and no extrasystoles were noted during several minutes of auscultation. 

The first and second heart sounds were normal and physiologic splitting of the 

second heart sound was noted. There were no murmurs, rubs, clicks, or gallops.


Abdominal exam revealed normal bowel sounds. The abdomen was soft, non-tender, 

and without masses, organomegaly, or appreciable enlargement of the abdominal 

aorta.


Examination of the extremities revealed easily palpable radial, femoral and 

pedal pulses. There was no cyanosis, clubbing or edema.


Examination of the skin revealed no evidence of significant rashes, suspicious 

appearing nevi or other concerning lesions.  The patient has had previous 

amputation of the toes on the left and the first second and the fifth toes are 

missing at this point in time.


Neurologically the patient is awake and alert and oriented to self only.  

Cranial nerves were essentially intact.  The patient had a weakness in the left 

upper extremity.  Gait was not assessed.  Reflexes were brisk over the left 

upper extremity.  Babinski is upgoing on the left.  





- Labs


CBC & Chem 7: 


                                 10/05/20 04:45





                                 10/05/20 04:45


Labs: 


                  Abnormal Lab Results - Last 24 Hours (Table)











  10/02/20 10/04/20 10/04/20 Range/Units





  19:45 04:10 15:25 


 


RBC     (3.80-5.40)  m/uL


 


Hgb     (11.4-16.0)  gm/dL


 


Hct     (34.0-46.0)  %


 


Plt Count     (150-450)  k/uL


 


Chloride     ()  mmol/L


 


BUN     (7-17)  mg/dL


 


Glucose     (74-99)  mg/dL


 


POC Glucose (mg/dL)    133 H  (75-99)  mg/dL


 


Calcium     (8.4-10.2)  mg/dL


 


Iron   14 L   ()  ug/dL


 


TIBC   192 L   (228-460)  ug/dL


 


% Saturation   7.29 L   (12.00-45.00)   


 


Vitamin B12   1527.0 H   (200.0-944.0)  pg/mL


 


Crossmatch  See Detail    














  10/04/20 10/04/20 10/04/20 Range/Units





  17:21 18:02 19:13 


 


RBC     (3.80-5.40)  m/uL


 


Hgb     (11.4-16.0)  gm/dL


 


Hct     (34.0-46.0)  %


 


Plt Count     (150-450)  k/uL


 


Chloride     ()  mmol/L


 


BUN     (7-17)  mg/dL


 


Glucose     (74-99)  mg/dL


 


POC Glucose (mg/dL)  200 H  254 H  236 H  (75-99)  mg/dL


 


Calcium     (8.4-10.2)  mg/dL


 


Iron     ()  ug/dL


 


TIBC     (228-460)  ug/dL


 


% Saturation     (12.00-45.00)   


 


Vitamin B12     (200.0-944.0)  pg/mL


 


Crossmatch     














  10/04/20 10/05/20 10/05/20 Range/Units





  22:34 01:05 04:45 


 


RBC    2.27 L  (3.80-5.40)  m/uL


 


Hgb    6.8 L*  (11.4-16.0)  gm/dL


 


Hct    21.3 L  (34.0-46.0)  %


 


Plt Count    58 L  (150-450)  k/uL


 


Chloride     ()  mmol/L


 


BUN     (7-17)  mg/dL


 


Glucose     (74-99)  mg/dL


 


POC Glucose (mg/dL)  254 H  106 H   (75-99)  mg/dL


 


Calcium     (8.4-10.2)  mg/dL


 


Iron     ()  ug/dL


 


TIBC     (228-460)  ug/dL


 


% Saturation     (12.00-45.00)   


 


Vitamin B12     (200.0-944.0)  pg/mL


 


Crossmatch     














  10/05/20 10/05/20 10/05/20 Range/Units





  04:45 04:47 11:52 


 


RBC     (3.80-5.40)  m/uL


 


Hgb     (11.4-16.0)  gm/dL


 


Hct     (34.0-46.0)  %


 


Plt Count     (150-450)  k/uL


 


Chloride  115 H    ()  mmol/L


 


BUN  18 H    (7-17)  mg/dL


 


Glucose  260 H    (74-99)  mg/dL


 


POC Glucose (mg/dL)   271 H  345 H  (75-99)  mg/dL


 


Calcium  7.8 L    (8.4-10.2)  mg/dL


 


Iron     ()  ug/dL


 


TIBC     (228-460)  ug/dL


 


% Saturation     (12.00-45.00)   


 


Vitamin B12     (200.0-944.0)  pg/mL


 


Crossmatch     














  10/05/20 Range/Units





  11:53 


 


RBC   (3.80-5.40)  m/uL


 


Hgb   (11.4-16.0)  gm/dL


 


Hct   (34.0-46.0)  %


 


Plt Count   (150-450)  k/uL


 


Chloride   ()  mmol/L


 


BUN   (7-17)  mg/dL


 


Glucose   (74-99)  mg/dL


 


POC Glucose (mg/dL)  347 H  (75-99)  mg/dL


 


Calcium   (8.4-10.2)  mg/dL


 


Iron   ()  ug/dL


 


TIBC   (228-460)  ug/dL


 


% Saturation   (12.00-45.00)   


 


Vitamin B12   (200.0-944.0)  pg/mL


 


Crossmatch   














Assessment and Plan


Plan: 











1 diabetic ketoacidosis, treated with an insulin drip, currently off the insulin

drip and the patient's blood sugars being monitored.  Anion gap has closed and 

the patient currently is on D5 half-normal saline today to 50 mL an hour in 

addition to ascites care coverage.





2 right-sided pneumonia mainly involving the right upper lobe, consider 

aspiration





3 diabetes mellitus with previous episodes of DKA





4 diabetic peripheral neuropathy, retinopathy





5 coronary artery disease  with a preserved LV function  based on 

echocardiogramfrom 2019





6 COPD





7 history of  DVT , axilla , on the right





9 hyperlipidemia





10 hypertension





11 chronic kidney disease, with a component of an acute kidney injury, improved





12 peripheral artery disease





13 hypothyroidism





14 previous history of second toe infection/amputation and previous history of 

right foot infection requiring a wound VAC





15 CVA with someresidual left-sided weakness, and the CAT scan of the brain 

showed an old large right hemispheric infarcts.  No other acute abnormality is 

seen.





16 history of seizures, maintained on a combination of Vimpat and Depakote on 

outpatient basis





17 chronic anemia, with interval drop in hemoglobin down to 6.8 and the patient 

will be receiving a unit of packed RBC





18 bipolar disorder, depression, anxiety





19 carotid artery stenosis, critical on the right





20 altered mental status and neurologist on the case





Plan





Consult neurology regarding the altered mentation


Monitor the blood sugar and cover the patient with a slight scale coverage for 

now and may utilize long-acting insulin if needed


Keep the patient nothing by mouth for now pending some improvement in the mental

status and the swallowing ability


Switch the patient's oral medication to IV including the Depakote and the Vimpat


Give the patient unit of packed RBC


A swallow evaluation later stage once the patient is more awake


Continue current antibiotic coverage including a combination of Levaquin and 

clindamycin and vancomycin.  The vancomycin can be dropped at a later stage if 

the blood culture turns out to be contaminant


We'll continue to follow

## 2020-10-06 LAB
ALBUMIN SERPL-MCNC: 2.5 G/DL (ref 3.5–5)
ALP SERPL-CCNC: 73 U/L (ref 38–126)
ALT SERPL-CCNC: 18 U/L (ref 4–34)
ANION GAP SERPL CALC-SCNC: 7 MMOL/L
AST SERPL-CCNC: 19 U/L (ref 14–36)
BASOPHILS # BLD AUTO: 0 K/UL (ref 0–0.2)
BASOPHILS NFR BLD AUTO: 0 %
BUN SERPL-SCNC: 12 MG/DL (ref 7–17)
CALCIUM SPEC-MCNC: 8.1 MG/DL (ref 8.4–10.2)
CHLORIDE SERPL-SCNC: 107 MMOL/L (ref 98–107)
CO2 SERPL-SCNC: 26 MMOL/L (ref 22–30)
EOSINOPHIL # BLD AUTO: 0 K/UL (ref 0–0.7)
EOSINOPHIL NFR BLD AUTO: 0 %
ERYTHROCYTE [DISTWIDTH] IN BLOOD BY AUTOMATED COUNT: 3.11 M/UL (ref 3.8–5.4)
ERYTHROCYTE [DISTWIDTH] IN BLOOD: 16.5 % (ref 11.5–15.5)
GLUCOSE BLD-MCNC: 117 MG/DL (ref 75–99)
GLUCOSE BLD-MCNC: 121 MG/DL (ref 75–99)
GLUCOSE BLD-MCNC: 211 MG/DL (ref 75–99)
GLUCOSE BLD-MCNC: 236 MG/DL (ref 75–99)
GLUCOSE BLD-MCNC: 305 MG/DL (ref 75–99)
GLUCOSE BLD-MCNC: 425 MG/DL (ref 75–99)
GLUCOSE BLD-MCNC: 483 MG/DL (ref 75–99)
GLUCOSE SERPL-MCNC: 425 MG/DL (ref 74–99)
HCT VFR BLD AUTO: 29.2 % (ref 34–46)
HGB BLD-MCNC: 9.2 GM/DL (ref 11.4–16)
LYMPHOCYTES # SPEC AUTO: 1 K/UL (ref 1–4.8)
LYMPHOCYTES NFR SPEC AUTO: 11 %
MCH RBC QN AUTO: 29.6 PG (ref 25–35)
MCHC RBC AUTO-ENTMCNC: 31.5 G/DL (ref 31–37)
MCV RBC AUTO: 93.8 FL (ref 80–100)
MONOCYTES # BLD AUTO: 0.3 K/UL (ref 0–1)
MONOCYTES NFR BLD AUTO: 3 %
NEUTROPHILS # BLD AUTO: 7.6 K/UL (ref 1.3–7.7)
NEUTROPHILS NFR BLD AUTO: 84 %
PLATELET # BLD AUTO: 48 K/UL (ref 150–450)
POTASSIUM SERPL-SCNC: 3.9 MMOL/L (ref 3.5–5.1)
PROT SERPL-MCNC: 5.3 G/DL (ref 6.3–8.2)
SODIUM SERPL-SCNC: 140 MMOL/L (ref 137–145)
WBC # BLD AUTO: 9 K/UL (ref 3.8–10.6)

## 2020-10-06 RX ADMIN — METOCLOPRAMIDE SCH: 10 TABLET ORAL at 12:00

## 2020-10-06 RX ADMIN — LACOSAMIDE SCH MLS/HR: 10 INJECTION INTRAVENOUS at 23:14

## 2020-10-06 RX ADMIN — INSULIN ASPART SCH UNIT: 100 INJECTION, SOLUTION INTRAVENOUS; SUBCUTANEOUS at 09:18

## 2020-10-06 RX ADMIN — DEXTROSE SCH MLS/HR: 50 INJECTION, SOLUTION INTRAVENOUS at 17:42

## 2020-10-06 RX ADMIN — METOCLOPRAMIDE SCH MG: 10 TABLET ORAL at 16:43

## 2020-10-06 RX ADMIN — DEXTROSE MONOHYDRATE, SODIUM CHLORIDE, AND POTASSIUM CHLORIDE SCH MLS/HR: 50; 4.5; 1.49 INJECTION, SOLUTION INTRAVENOUS at 09:22

## 2020-10-06 RX ADMIN — INSULIN ASPART SCH UNIT: 100 INJECTION, SOLUTION INTRAVENOUS; SUBCUTANEOUS at 00:17

## 2020-10-06 RX ADMIN — LOSARTAN POTASSIUM SCH: 50 TABLET, FILM COATED ORAL at 09:22

## 2020-10-06 RX ADMIN — CLOPIDOGREL BISULFATE SCH: 75 TABLET ORAL at 09:21

## 2020-10-06 RX ADMIN — INSULIN ASPART SCH UNIT: 100 INJECTION, SOLUTION INTRAVENOUS; SUBCUTANEOUS at 20:19

## 2020-10-06 RX ADMIN — CEFAZOLIN SCH MLS/HR: 330 INJECTION, POWDER, FOR SOLUTION INTRAMUSCULAR; INTRAVENOUS at 16:43

## 2020-10-06 RX ADMIN — DEXTROSE SCH MLS/HR: 50 INJECTION, SOLUTION INTRAVENOUS at 06:58

## 2020-10-06 RX ADMIN — LEVOFLOXACIN SCH MLS/HR: 250 INJECTION, SOLUTION INTRAVENOUS at 09:22

## 2020-10-06 RX ADMIN — VALPROATE SODIUM SCH MLS/HR: 100 INJECTION, SOLUTION INTRAVENOUS at 16:42

## 2020-10-06 RX ADMIN — LACOSAMIDE SCH MLS/HR: 10 INJECTION INTRAVENOUS at 09:46

## 2020-10-06 RX ADMIN — INSULIN ASPART SCH UNIT: 100 INJECTION, SOLUTION INTRAVENOUS; SUBCUTANEOUS at 12:52

## 2020-10-06 RX ADMIN — IPRATROPIUM BROMIDE AND ALBUTEROL SULFATE SCH ML: .5; 3 SOLUTION RESPIRATORY (INHALATION) at 08:39

## 2020-10-06 RX ADMIN — IPRATROPIUM BROMIDE AND ALBUTEROL SULFATE SCH ML: .5; 3 SOLUTION RESPIRATORY (INHALATION) at 16:17

## 2020-10-06 RX ADMIN — VALPROATE SODIUM SCH MLS/HR: 100 INJECTION, SOLUTION INTRAVENOUS at 09:21

## 2020-10-06 RX ADMIN — DEXTROSE MONOHYDRATE, SODIUM CHLORIDE, AND POTASSIUM CHLORIDE SCH: 50; 4.5; 1.49 INJECTION, SOLUTION INTRAVENOUS at 16:40

## 2020-10-06 RX ADMIN — DULOXETINE SCH: 60 CAPSULE, DELAYED RELEASE ORAL at 09:22

## 2020-10-06 RX ADMIN — INSULIN ASPART SCH: 100 INJECTION, SOLUTION INTRAVENOUS; SUBCUTANEOUS at 16:40

## 2020-10-06 RX ADMIN — ATORVASTATIN CALCIUM SCH MG: 80 TABLET, FILM COATED ORAL at 20:19

## 2020-10-06 RX ADMIN — IPRATROPIUM BROMIDE AND ALBUTEROL SULFATE SCH ML: .5; 3 SOLUTION RESPIRATORY (INHALATION) at 11:49

## 2020-10-06 RX ADMIN — INSULIN ASPART SCH: 100 INJECTION, SOLUTION INTRAVENOUS; SUBCUTANEOUS at 04:21

## 2020-10-06 RX ADMIN — METOCLOPRAMIDE SCH: 10 TABLET ORAL at 09:19

## 2020-10-06 RX ADMIN — ASPIRIN 81 MG CHEWABLE TABLET SCH: 81 TABLET CHEWABLE at 09:21

## 2020-10-06 RX ADMIN — DEXTROSE SCH MLS/HR: 50 INJECTION, SOLUTION INTRAVENOUS at 14:05

## 2020-10-06 RX ADMIN — IPRATROPIUM BROMIDE AND ALBUTEROL SULFATE SCH: .5; 3 SOLUTION RESPIRATORY (INHALATION) at 19:19

## 2020-10-06 NOTE — P.PN
Subjective


Progress Note Date: 10/06/20








Morenita Ramirez, is a 60-year-old female who presented to [facility] emergency room due to nausea vomiting and diarrhea, and elevated 

glucose level, she was evaluated in the emergency room, her temperature on 

presentation was 90.1, pulse 75 respiration 18 blood pressure 79/51 pulse ox 94%

on room air, her white blood count was elevated at 11.6 hemoglobin 9.6 platelet 

count 156, venous blood gas pH was 7.05 glucose level was 744 and serum acetone 

was positive, patient was admitted to ICU she was started on IV fluid and on IV 

insulin drip.


Patient has a known history of insulin-dependent diabetes mellitus type 1 

maintained on insulin he had previous episodes of DKA, she also had a history of

stroke, history of hypertension, hyperlipidemia, coronary artery disease with 

myocardial infarction, peripheral vascular disease with multiple toe amputation,

history of COPD.


On review of systems patient is alert responsive in no apparent distress she was

seen in ICU she is still complaining of nausea and occasional episodes of 

vomiting there is no fever or chills no headache or dizziness no chest pain no 

shortness of breath no cough she is having some abdominal discomfort no no burni

ng with urination no frequency or urgency no hematuria.





On 10/04/2020 patient remains in the intensive care unit.  Patient does not 

appear in any kind of distress.  She currently getting treated for pneumonia 

critical care services are following.  Patient remains on Levaquin.  Patient has

been transitioned to subcu insulin patient did have episodes of low sugar this 

a.m. treated per protocol.





On 10/05/2020 patient was seen and examined in the ICU, she was very drowsy this

morning she failed swallow evaluation and is kept nothing by mouth at this time,

now patient is more alert and responsive, glucose level is better controlled, 

she is complaining of low back pain otherwise she denies any complaints.





On 10/06/2020 patient was seen and examined in the ICU she is more alert and 

responsive at this time she is complaining of feeling hungry however she failed 

swallow evaluation again this morning otherwise she denies any complaints there 

is no fever or chills no headache or dizziness no chest pain no shortness of 

breath no cough no nausea or vomiting no abdominal pain no diarrhea and no 

urinary symptoms.  At this time plan is to repeat swallow evaluation in the 

morning again before discussing alternative feeding options.





Objective





- Vital Signs


Vital signs: 


                                   Vital Signs











Temp  98.2 F   10/06/20 12:00


 


Pulse  92   10/06/20 16:27


 


Resp  18   10/06/20 16:27


 


BP  112/57   10/06/20 14:00


 


Pulse Ox  98   10/06/20 14:00








                                 Intake & Output











 10/05/20 10/06/20 10/06/20





 18:59 06:59 18:59


 


Intake Total 1596 1486 649


 


Output Total 1550 1865 1550


 


Balance 46 379 901


 


Weight   50.4 kg


 


Intake:   


 


   1486 499


 


    Clindamycin 300 mg In  50 





    Dextrose 5% in Water 50   





    ml @ 50 mls/hr IVPB Q6HR   





    [facility] Rx#:[id number]   


 


    D5-0.45% NaCl with KCl 900 900 275





    20Meq/l 1,000 ml @ 50 mls   





    /hr IV .Q20H [facility] Rx#:   





    [id number]   


 


    Dextrose 5%-0.9% NaCl 1,  350 50





    000 ml @ 50 mls/hr IV .   





    Q20H [facility] Rx#:[id number]   


 


    Lacosamide IV 50 mg In 50 50 





    Sodium Chloride 0.9% 50   





    ml @ 100 mls/hr IVPB BID   





    [facility] Rx#:[id number]   


 


    Pressure Bag 36 36 24


 


    Sodium Chloride 0.9% 1,   150





    000 ml @ 50 mls/hr IV .   





    Q20H [facility] Rx#:[id number]   


 


    Valproate Sodium 250 mg  100 





    In Sodium Chloride 0.9%   





    100 ml @ 100 mls/hr IVPB   





    Q8HR [facility] Rx#:[id number]   


 


  Intake, IV Titration 300  150





  Amount   


 


    Clindamycin 300 mg In 100  





    Dextrose 5% in Water 50   





    ml @ 50 mls/hr IVPB Q6HR   





    [facility] Rx#:[id number]   


 


    Lacosamide IV 75 mg In   50





    Sodium Chloride 0.9% 50   





    ml @ 100 mls/hr IVPB BID   





    [facility] Rx#:[id number]   


 


    Valproate Sodium 250 mg 200  100





    In Sodium Chloride 0.9%   





    100 ml @ 100 mls/hr IVPB   





    Q8HR [facility] Rx#:[id number]   


 


  Blood Product 310  


 


    Rc As-3  Unit 310  





    [id number]   


 


Output:   


 


  Urine 1550 1865 1550


 


Other:   


 


  Voiding Method Indwelling Catheter Indwelling Catheter Indwelling Catheter








                       ABP, PAP, CO, CI - Last Documented











Arterial Blood Pressure        92/57

















- Exam








In general patient is alert responsive in no apparent distress


HEENT head normocephalic and atraumatic


Neck is supple no JVD no goiter no lymphadenopathy


Chest exam reveals a few scattered rhonchi no wheezing


Cardiac exam reveals regular heart sounds S1 and S2 with mild tachycardia no 

gallops no murmurs


Abdomen is soft nontender no rigidity or rebound no palpable masses with 

hyperactive bowel sounds


Extremity exam reveals no edema no cyanosis or clubbing


Neurological examination reveals no gross new focal deficit











- Labs


CBC & Chem 7: 


                                 10/06/20 07:50





                                 10/06/20 07:50


Labs: 


                  Abnormal Lab Results - Last 24 Hours (Table)











  10/05/20 10/06/20 10/06/20 Range/Units





  19:55 00:02 03:55 


 


RBC     (3.80-5.40)  m/uL


 


Hgb     (11.4-16.0)  gm/dL


 


Hct     (34.0-46.0)  %


 


RDW     (11.5-15.5)  %


 


Plt Count     (150-450)  k/uL


 


Glucose     (74-99)  mg/dL


 


POC Glucose (mg/dL)  388 H  211 H  121 H  (75-99)  mg/dL


 


Calcium     (8.4-10.2)  mg/dL


 


Total Protein     (6.3-8.2)  g/dL


 


Albumin     (3.5-5.0)  g/dL














  10/06/20 10/06/20 10/06/20 Range/Units





  07:50 07:50 09:15 


 


RBC  3.11 L    (3.80-5.40)  m/uL


 


Hgb  9.2 L    (11.4-16.0)  gm/dL


 


Hct  29.2 L    (34.0-46.0)  %


 


RDW  16.5 H    (11.5-15.5)  %


 


Plt Count  48 L    (150-450)  k/uL


 


Glucose   425 H   (74-99)  mg/dL


 


POC Glucose (mg/dL)    483 H  (75-99)  mg/dL


 


Calcium   8.1 L   (8.4-10.2)  mg/dL


 


Total Protein   5.3 L   (6.3-8.2)  g/dL


 


Albumin   2.5 L   (3.5-5.0)  g/dL














  10/06/20 10/06/20 10/06/20 Range/Units





  09:59 12:49 16:32 


 


RBC     (3.80-5.40)  m/uL


 


Hgb     (11.4-16.0)  gm/dL


 


Hct     (34.0-46.0)  %


 


RDW     (11.5-15.5)  %


 


Plt Count     (150-450)  k/uL


 


Glucose     (74-99)  mg/dL


 


POC Glucose (mg/dL)  425 H  305 H  117 H  (75-99)  mg/dL


 


Calcium     (8.4-10.2)  mg/dL


 


Total Protein     (6.3-8.2)  g/dL


 


Albumin     (3.5-5.0)  g/dL








                      Microbiology - Last 24 Hours (Table)











 10/02/20 19:45 Blood Culture Gram Stain - Preliminary





 Blood Blood Culture - Preliminary





    Gram Positive Cocci Isolated














Assessment and Plan


Plan: 





1.  Acute diabetic ketoacidosis with severe hyperglycemia and positive serum 

acetone patient is improving with IV fluid and IV insulin drip





2.  Underlying history of diabetes mellitus type 1 maintained on insulin, her 

primary care provider is Eleanor Rosas, she was contacted and she stated that she 

was recently hospitalized at [facility], a different relative was 

caring for patient and apparently glucose level has been extremely elevated for 

several days.





3.  Underlying history of hypertension





4.  Underlying history of hyperlipidemia





5.  Underlying history of seizure disorder, will place patient on IV Keppra 

until she is able to take her oral medications





6.  Underlying history of COPD stable no evidence of exacerbation





7.  Underlying history of coronary artery disease stable at this time patient 

denies any chest pain





8.  Underlying history of peripheral vascular disease with previous history of 

multiple toe amputations





9.  Previous history of stroke.





At this time patient is admitted to intensive care unit continue with IV fluids 

and insulin management


Home medication reviewed, will be switched to IV medications when possible due 

to continuous nausea and vomiting at this time


Pulmonary critical care are following

## 2020-10-06 NOTE — CDI
Documentation Clarification Form



Date: 10/12/2020 01:48:42 PM

From: Lana Guthrie RN CCDS

Phone: [phone]

MRN: [id number]

Admit Date: 10/02/2020 08:15:00 PM

Patient Name: Morenita Ramirez

Visit Number: [id number]

Discharge Date:  



ATTENTION: The Clinical Documentation Specialists (CDI) and [facility] Coding Staff 
appreciate your assistance in clarifying documentation. Please respond to the 
clarification below the line at the bottom and electronically sign. The CDI & 
[facility] Coding staff will review the response and follow-up if needed. Please note: 
Queries are made part of the Legal Health Record. If you have any questions, 
please contact the author of this message via ITS.



Dr. Saniya Cabral



The patient presented with the nausea, vomiting and diarrhea 



History/Risk Factors: 60-year-old female presents to the ED via EMS for nausea, 
vomiting and diarrhea. Medical History: HTN, CAD, CKD, MI, CVA and Type 1 DM. 

Clinical Indicators:

10/2 Wbc 11.6   

10/4 Blood cultures: Gram Positive Cocci

10/2 Vital signs on admission: B/P: 79/51; HR 75; RR: 18; Temp: 90.1 F Rectal; 
SpO2: 94% room air 

Treatment:

10/3 Critical Care Consult: Diabetic ketoacidosis, right sided pneumonia, rule 
out aspiration 

Antibiotics: 10/5 Clindamycin Ivpb q 6 hrs; 10/2 Levaquin Ivpb Daily, 10/4 
Vancomycin Ivpb Q 12 hrs 

IV Bolus: 10/2 0.9ns 2L bolus 



In your professional opinion, please clarify if these findings signify one of 
the following conditions, whether the condition is POA, and cause, if known:

   

   Sepsis POA

   Severe Sepsis POA

   Septic Shock POA 

   Sepsis Ruled out

   Other, please specify   _____________

   Unable to determine



Identify the (suspected) organism______________

Link or clarify if there is associated (due to/with): 

     Organ failure

     Shock

 

SIRS Criteria (2 or more of the following may indicate SIRS):

-Temperature   < 96.8F (36C) or > 101.0F (38.3C)

-Heart Rate   > 90 bpm

-Respiratory Rate   > 20 breaths/min or PaCO2 < 32 mmHg

-White Blood Cell Count   > 12,000 or < 4,000 cells/mm3 or > 10% bands

-Lactate   >2.0 mmol/L (>4.0 is equivalent to septic shock)



(Last Revision: April 2018)

___________________________________________________________________________

sepsis present on admission related to aspiration pneumonia

MTDD

## 2020-10-06 NOTE — P.PN
Subjective


Progress Note Date: 10/06/20








this is a 60-year-old female patient who came in with nausea vomiting and 

diarrhea and profound dehydration the patient wasiagnosed having DKA. She has 

previous history of  CVA, hypertension, coronary artery dase, chronic kidney 

disease.  The patient has had previous admissions for DKA.  She was also robert

gnosed having some bilateral low as the patient had bilateral lower lobe 

pulmonary infiltrate right more than left.  She was given Levaquin and 

vancomycin.  The patient was treated with an insulin drip regarding her DKA.  

She has multiple comorbidities.  This morning, the patient is still on IV flui

ds.  She is on D5 half-normal saline with 20 mEq of potassium chloride the rate 

of 75 mL an hour.  She had a follow-up blood work that showed resolution of her 

anion gap metabolic acidosis.  A serum bicarbonate of 29 with anion gap of the 

right this point in time.  Her hemoglobin was found to be at 6.8 and the patient

was given a unit of packed RBC.  No evidence of any bleeding at this point in ti

me.  The chest x-rays clear showing a right upper lobe consolidation/pneumonia. 

There is a central blood culture that showing also gram-positive cocci.  Based 

on that, I kept the Levaquin and vancomycin and added clindamycin regarding the 

possibility of a right upper lobe aspiration pneumonia.  The patient was also 

lethargic and confused and based on that and based on history of seizure 

activity, and neurology consultation was requested.  Noted the patient was 

taking a combination of Vimpat and Depakote on outpatient basis.  At this point 

in time she is unable to take her medications orally as the patient has failed a

swallow bedside evaluation.  The Depakote level has not been checked yet.  Noted

the patient also has a critical internal carotid artery stenosis on the left and

the patient remains on aspirin on outpatient basis and vascular surgery has been

requested to see this patient the past.





On 10/06/2020, the patient is doing well.  She is much more awake and alert 

compared to yesterday.  His swallow evaluation will be repeated today.  The 

patient seems to be able to pass her swallow evaluation patient is currently on 

2 L about 2 by nasal cannula with a pulse of 97%.  She was on a D5 half-normal 

saline at the rate of 150 mL an hour and this can be obviously cut down further.

 Chest exit shows improvement in the right upper lobe pneumonia as the patient 

is a combination of Levaquin and clindamycin.  No cough.  No sputum production. 

No chest that is no wheezing.  The neurologist evaluated the patient and the 

patient had an EGD that showed no evidence of any seizure activity and will 

continue the antiepileptic medication which include a combination of Vimpat 75 

mg by mouth twice a day and Depakote 250 mg 3 times a day.  The patient would 

also need a follow-up evaluation with vascular surgery regarding her carotid 

artery stenosis which is significant on the left.





Objective





- Vital Signs


Vital signs: 


                                   Vital Signs











Temp  98.2 F   10/06/20 08:00


 


Pulse  101 H  10/06/20 11:49


 


Resp  19   10/06/20 11:00


 


BP  130/72   10/06/20 11:00


 


Pulse Ox  93 L  10/06/20 11:00








                                 Intake & Output











 10/05/20 10/06/20 10/06/20





 18:59 06:59 18:59


 


Intake Total 1596 1486 490


 


Output Total 1550 1865 1200


 


Balance 46 -379 -710


 


Weight   50.4 kg


 


Intake:   


 


   1486 340


 


    Clindamycin 300 mg In  50 





    Dextrose 5% in Water 50   





    ml @ 50 mls/hr IVPB Q6HR   





    [facility] Rx#:[id number]   


 


    D5-0.45% NaCl with KCl 900 900 275





    20Meq/l 1,000 ml @ 50 mls   





    /hr IV .Q20H [facility] Rx#:   





    [id number]   


 


    Dextrose 5%-0.9% NaCl 1,  350 50





    000 ml @ 50 mls/hr IV .   





    Q20H [facility] Rx#:[id number]   


 


    Lacosamide IV 50 mg In 50 50 





    Sodium Chloride 0.9% 50   





    ml @ 100 mls/hr IVPB BID   





    [facility] Rx#:[id number]   


 


    Pressure Bag 36 36 15


 


    Valproate Sodium 250 mg  100 





    In Sodium Chloride 0.9%   





    100 ml @ 100 mls/hr IVPB   





    Q8HR [facility] Rx#:[id number]   


 


  Intake, IV Titration 300  150





  Amount   


 


    Clindamycin 300 mg In 100  





    Dextrose 5% in Water 50   





    ml @ 50 mls/hr IVPB Q6HR   





    [facility] Rx#:[id number]   


 


    Lacosamide IV 75 mg In   50





    Sodium Chloride 0.9% 50   





    ml @ 100 mls/hr IVPB BID   





    [facility] Rx#:[id number]   


 


    Valproate Sodium 250 mg 200  100





    In Sodium Chloride 0.9%   





    100 ml @ 100 mls/hr IVPB   





    Q8HR [facility] Rx#:[id number]   


 


  Blood Product 310  


 


    Rc As-3  Unit 310  





    [id number]   


 


Output:   


 


  Urine 1550 1865 1200


 


Other:   


 


  Voiding Method Indwelling Catheter Indwelling Catheter Indwelling Catheter








                       ABP, PAP, CO, CI - Last Documented











Arterial Blood Pressure        92/57

















- Exam








Gen. appearance she is calm and comfortable likely distress and she is laying 

comfortably in bed


Head exam was generally normal. There was no scleral icterus or corneal arcus. 

Mucous membranes were moist.


Neck was supple and without jugular venous distension, thyromegaly, or carotid 

bruits. Carotids were easily palpable bilaterally. There was no adenopathy.


Lungs were clear to auscultation and percussion, and with normal diaphragmatic 

excursion. No wheezes or rales were noted. 


Cardiac exam revealed the PMI to be normally situated and sized. The rhythm was 

regular and no extrasystoles were noted during several minutes of auscultation. 

The first and second heart sounds were normal and physiologic splitting of the 

second heart sound was noted. There were no murmurs, rubs, clicks, or gallops.


Abdominal exam revealed normal bowel sounds. The abdomen was soft, non-tender, 

and without masses, organomegaly, or appreciable enlargement of the abdominal 

aorta.


Examination of the extremities revealed easily palpable radial, femoral and 

pedal pulses. There was no cyanosis, clubbing or edema.


Examination of the skin revealed no evidence of significant rashes, suspicious 

appearing nevi or other concerning lesions.  The patient has had previous 

amputation of the toes on the left and the first second and the fifth toes are 

missing at this point in time.


Neurologically the patient is awake and alert and oriented and she is alert and 

oriented 3 on today's evaluation..  Cranial nerves were essentially intact.  

The patient had a weakness in the left upper extremity.  Gait was not assessed. 

Reflexes were brisk over the left upper extremity.  Babinski is upgoing on the 

left.  





- Labs


CBC & Chem 7: 


                                 10/06/20 07:50





                                 10/06/20 07:50


Labs: 


                  Abnormal Lab Results - Last 24 Hours (Table)











  10/02/20 10/05/20 10/05/20 Range/Units





  19:45 16:04 16:25 


 


RBC    3.18 L  (3.80-5.40)  m/uL


 


Hgb    9.5 L D  (11.4-16.0)  gm/dL


 


Hct    29.0 L  (34.0-46.0)  %


 


RDW    16.5 H  (11.5-15.5)  %


 


Plt Count    53 L  (150-450)  k/uL


 


Glucose     (74-99)  mg/dL


 


POC Glucose (mg/dL)   140 H   (75-99)  mg/dL


 


Calcium     (8.4-10.2)  mg/dL


 


Total Protein     (6.3-8.2)  g/dL


 


Albumin     (3.5-5.0)  g/dL


 


Crossmatch  See Detail    














  10/05/20 10/06/20 10/06/20 Range/Units





  19:55 00:02 03:55 


 


RBC     (3.80-5.40)  m/uL


 


Hgb     (11.4-16.0)  gm/dL


 


Hct     (34.0-46.0)  %


 


RDW     (11.5-15.5)  %


 


Plt Count     (150-450)  k/uL


 


Glucose     (74-99)  mg/dL


 


POC Glucose (mg/dL)  388 H  211 H  121 H  (75-99)  mg/dL


 


Calcium     (8.4-10.2)  mg/dL


 


Total Protein     (6.3-8.2)  g/dL


 


Albumin     (3.5-5.0)  g/dL


 


Crossmatch     














  10/06/20 10/06/20 10/06/20 Range/Units





  07:50 07:50 09:15 


 


RBC  3.11 L    (3.80-5.40)  m/uL


 


Hgb  9.2 L    (11.4-16.0)  gm/dL


 


Hct  29.2 L    (34.0-46.0)  %


 


RDW  16.5 H    (11.5-15.5)  %


 


Plt Count  48 L    (150-450)  k/uL


 


Glucose   425 H   (74-99)  mg/dL


 


POC Glucose (mg/dL)    483 H  (75-99)  mg/dL


 


Calcium   8.1 L   (8.4-10.2)  mg/dL


 


Total Protein   5.3 L   (6.3-8.2)  g/dL


 


Albumin   2.5 L   (3.5-5.0)  g/dL


 


Crossmatch     














  10/06/20 Range/Units





  09:59 


 


RBC   (3.80-5.40)  m/uL


 


Hgb   (11.4-16.0)  gm/dL


 


Hct   (34.0-46.0)  %


 


RDW   (11.5-15.5)  %


 


Plt Count   (150-450)  k/uL


 


Glucose   (74-99)  mg/dL


 


POC Glucose (mg/dL)  425 H  (75-99)  mg/dL


 


Calcium   (8.4-10.2)  mg/dL


 


Total Protein   (6.3-8.2)  g/dL


 


Albumin   (3.5-5.0)  g/dL


 


Crossmatch   








                      Microbiology - Last 24 Hours (Table)











 10/02/20 19:45 Blood Culture Gram Stain - Preliminary





 Blood Blood Culture - Preliminary





    Gram Positive Cocci Isolated














Assessment and Plan


Plan: 











1 diabetic ketoacidosis, recovered and the patient is currently on D5 half-

normal saline at the rate of 150 mL an hour.  Long-acting insulin has not been 

started as the patient has not passed her swallow evaluation and she hasn't been




 


2 right-sided pneumonia mainly involving the right upper lobe, consider 

aspiration, and the patient is currently on accommodation of Levaquin and 

clindamycin and there is improvement in the right upper lobe consolidation on 

today's chest x-ray findings.  





3 diabetes mellitus with previous episodes of DKA





4 diabetic peripheral neuropathy, retinopathy





5 coronary artery disease  with a preserved LV function  based on 

echocardiogramfrom 2019





6 COPD





7 history of  DVT , axilla , on the right





9 hyperlipidemia





10 hypertension





11 chronic kidney disease, with a component of an acute kidney injury, improved





12 peripheral artery disease





13 hypothyroidism





14 previous history of second toe infection/amputation and previous history of 

right foot infection requiring a wound VAC





15 CVA with someresidual left-sided weakness, and the CAT scan of the brain 

showed an old large right hemispheric infarcts.  No other acute abnormality is 

seen.





16 history of seizures, maintained on a combination of Vimpat and Depakote on 

outpatient basis





17 chronic anemia, with interval drop in hemoglobin down to 6.8 and the patient 

will be receiving a unit of packed RBC and follow-up hemoglobin is at 9.2





18 bipolar disorder, depression, anxiety





19 carotid artery stenosis, critical on the right





20 altered mental status and neurologist on the case, and the patient is  

clinically improving and seizure activity has been ruled out.  





 plan








complete a swallow evaluation 


If this patient is able to passed a swallow eval, we are going to restart the 

long-acting insulin and the slight scale coverage.


Will also switch the antiepileptic medication to oral once the patient is able 

to swallow appropriately.  Continue same antibiotic coverage.  We'll resume the 

home oral medications the patient is able to swallow properly.


Neurology consultation appreciated and the patient has no ongoing seizure 

activity at this point in time


We'll continue to follow.

## 2020-10-06 NOTE — P.PN
Subjective


Progress Note Date: 10/06/20


Patient was seen at bedside and per the patient's nurse She seems to be doing 

got somewhat better today compared to yesterday mentation-wise.


She has failed her swallow and she is getting barium swallow test today.


Otherwise no seizure-like activity the or episodes of loss of consciousness 

during hospital stay.








Objective





- Vital Signs


Vital signs: 


                                   Vital Signs











Temp  98.2 F   10/06/20 08:00


 


Pulse  101 H  10/06/20 09:00


 


Resp  21   10/06/20 09:00


 


BP  161/77   10/06/20 09:00


 


Pulse Ox  96   10/06/20 09:00








                                 Intake & Output











 10/05/20 10/06/20 10/06/20





 18:59 06:59 18:59


 


Intake Total 1596 1486 384


 


Output Total 1550 1865 800


 


Balance 46 -379 -416


 


Intake:   


 


   1486 234


 


    Clindamycin 300 mg In  50 





    Dextrose 5% in Water 50   





    ml @ 50 mls/hr IVPB Q6HR   





    [facility] Rx#:[id number]   


 


    D5-0.45% NaCl with KCl 900 900 175





    20Meq/l 1,000 ml @ 50 mls   





    /hr IV .Q20H [facility] Rx#:   





    [id number]   


 


    Dextrose 5%-0.9% NaCl 1,  350 50





    000 ml @ 50 mls/hr IV .   





    Q20H [facility] Rx#:[id number]   


 


    Lacosamide IV 50 mg In 50 50 





    Sodium Chloride 0.9% 50   





    ml @ 100 mls/hr IVPB BID   





    [facility] Rx#:[id number]   


 


    Pressure Bag 36 36 9


 


    Valproate Sodium 250 mg  100 





    In Sodium Chloride 0.9%   





    100 ml @ 100 mls/hr IVPB   





    Q8HR [facility] Rx#:[id number]   


 


  Intake, IV Titration 300  150





  Amount   


 


    Clindamycin 300 mg In 100  





    Dextrose 5% in Water 50   





    ml @ 50 mls/hr IVPB Q6HR   





    [facility] Rx#:[id number]   


 


    Lacosamide IV 75 mg In   50





    Sodium Chloride 0.9% 50   





    ml @ 100 mls/hr IVPB BID   





    [facility] Rx#:[id number]   


 


    Valproate Sodium 250 mg 200  100





    In Sodium Chloride 0.9%   





    100 ml @ 100 mls/hr IVPB   





    Q8HR [facility] Rx#:[id number]   


 


  Blood Product 310  


 


    Rc As-3  Unit 310  





    [id number]   


 


Output:   


 


  Urine 1550 1865 800


 


Other:   


 


  Voiding Method Indwelling Catheter Indwelling Catheter Indwelling Catheter








                       ABP, PAP, CO, CI - Last Documented











Arterial Blood Pressure        133/116

















- Exam


GENERAL: The patient is lying in bed and is not in acute distress.  She seems 

restless.


CHEST: The heart rate is regular rate rhythm.   No murmurs to auscultation.  


LUNG: Clear to auscultation bilaterally no wheezing noted throughout.  Not 

labored breathing.


ABDOMEN/GI: Bowel sounds present in all 4 quadrants. No tenderness to palpation 

throughout.





NEUROLOGICAL:


Higher mental function: The patient is awake, alert, oriented to self, place and

time.  Patient is following simple commands.  No aphasia and no neglect.


Cranial nerves: The pupils are round, equal (4mm bilaterally) and reactive to 

light..  Visual fields are Left Homonymous lower visual field cut to threat.   

Extraocular movement is intact no nystagmus is noted.  Facial sensation unable 

to assess because of patient cooperation.  The facial strength: left nasolabial 

flattening .  Hearing is normal bilaterally to hand rub.  Tongue is midline and 

moved side-to-side without any difficulty.  No dysarthria is noted.  Shoulder 

unable to assess because of coopoeration.


Motor: Gait is defered.  Right upper and lower extremity is 5/5.  Left upper 

extremity is 0/5.  Left lower extremity is 4+/5.  Increased tone of left upper 

extremity especially at hand.  


Cerebellum: Unable to assess because of patient cooperation.


Sensation: Unable to assess because of cooperation.


Reflexes (right/left): Brisk over the left upper extremity (3+) otherwise 1+ 

throughout. 


Plantars: Unable to assess right foot since digit 1-2 are amputated (as well as 

5th digit).  Left is upgoing.








- Labs


CBC & Chem 7: 


                                 10/06/20 07:50





                                 10/06/20 07:50


Labs: 


                  Abnormal Lab Results - Last 24 Hours (Table)











  10/02/20 10/05/20 10/05/20 Range/Units





  19:45 11:52 11:53 


 


RBC     (3.80-5.40)  m/uL


 


Hgb     (11.4-16.0)  gm/dL


 


Hct     (34.0-46.0)  %


 


RDW     (11.5-15.5)  %


 


Plt Count     (150-450)  k/uL


 


Glucose     (74-99)  mg/dL


 


POC Glucose (mg/dL)   345 H  347 H  (75-99)  mg/dL


 


Calcium     (8.4-10.2)  mg/dL


 


Total Protein     (6.3-8.2)  g/dL


 


Albumin     (3.5-5.0)  g/dL


 


Crossmatch  See Detail    














  10/05/20 10/05/20 10/05/20 Range/Units





  16:04 16:25 19:55 


 


RBC   3.18 L   (3.80-5.40)  m/uL


 


Hgb   9.5 L D   (11.4-16.0)  gm/dL


 


Hct   29.0 L   (34.0-46.0)  %


 


RDW   16.5 H   (11.5-15.5)  %


 


Plt Count   53 L   (150-450)  k/uL


 


Glucose     (74-99)  mg/dL


 


POC Glucose (mg/dL)  140 H   388 H  (75-99)  mg/dL


 


Calcium     (8.4-10.2)  mg/dL


 


Total Protein     (6.3-8.2)  g/dL


 


Albumin     (3.5-5.0)  g/dL


 


Crossmatch     














  10/06/20 10/06/20 10/06/20 Range/Units





  00:02 03:55 07:50 


 


RBC    3.11 L  (3.80-5.40)  m/uL


 


Hgb    9.2 L  (11.4-16.0)  gm/dL


 


Hct    29.2 L  (34.0-46.0)  %


 


RDW    16.5 H  (11.5-15.5)  %


 


Plt Count    48 L  (150-450)  k/uL


 


Glucose     (74-99)  mg/dL


 


POC Glucose (mg/dL)  211 H  121 H   (75-99)  mg/dL


 


Calcium     (8.4-10.2)  mg/dL


 


Total Protein     (6.3-8.2)  g/dL


 


Albumin     (3.5-5.0)  g/dL


 


Crossmatch     














  10/06/20 10/06/20 10/06/20 Range/Units





  07:50 09:15 09:59 


 


RBC     (3.80-5.40)  m/uL


 


Hgb     (11.4-16.0)  gm/dL


 


Hct     (34.0-46.0)  %


 


RDW     (11.5-15.5)  %


 


Plt Count     (150-450)  k/uL


 


Glucose  425 H    (74-99)  mg/dL


 


POC Glucose (mg/dL)   483 H  425 H  (75-99)  mg/dL


 


Calcium  8.1 L    (8.4-10.2)  mg/dL


 


Total Protein  5.3 L    (6.3-8.2)  g/dL


 


Albumin  2.5 L    (3.5-5.0)  g/dL


 


Crossmatch     














Assessment and Plan


Assessment: 


Toxic metabolic encephalopathy (DKA and right sided pneumonia)--improving


Seizure


Right hemispheric stroke with residual left-sided weakness


Right ICA occlusion. 


Asymptomatic left ICA stenosis


Diabetic ketoacidosis


Right sided pneumonia


NA--resolved


X tobacco use


Hyperlipidemia


Hypertension


History of myocardial infarction or


History of coronary artery disease











Plan: 


Continue Vimpat 75 mg bid. Continue Depakote 250 mg 3 times a day will not 

modify the medication since the patient has history of restless per the 

caregiver from last visit.


Routine EEG:  The background slowing over the left hemisphere is consistent with

mild to moderate encephalopathy.  The focal slwoing over the right 

baifyt-tpliviz-lfscyszz region is consistent with patient history of Right MCA 

stroke.  There is rare to occasional slowing over the right mid-temporal which 

can increase risk of focal epilepsy.  There is no active seizure.


Ammonia level: <9.





Regarding the patient history of stroke continue aspirin 81 mg daily as well as 

Plavix 75mg daily for secondary stroke for prophylaxis (dual antiplatelet since 

has ICA stenosis and occlusion).  Continue Atorvastatin 80 mg daily.





Regarding the management of diabetic ketoacidosis defer it to the ICU and the 

primary team.


We'll defer the management of pneumonia to ICU and primary team.





patient was seen by Dr. Ricky Vang for right ICA occlusion 09/04/20.  Dr. Vang I recommended that carotid duplex and was completed on 09/04/20 and 

reported as chronic occlusion of the right ICA.  Moderate atherosclerotic change

of the left bifurcation without hemodynamic significant stenosis.  Recommend the

patient to follow-up with Dr. Vang or his colleagues as an outpatient.





The plan was discussed with the patient nurse.





Destin Zamora M.D.


Neuro-hospitalist








Time with Patient: Less than 30

## 2020-10-06 NOTE — XR
EXAMINATION TYPE: XR chest 1V portable

 

DATE OF EXAM: 10/6/2020

 

CLINICAL HISTORY: Difficulty breathing progress study.  

 

TECHNIQUE: Single AP portable upright view of the chest is obtained.

 

COMPARISON: Chest x-ray from 2 days earlier and older studies.

 

FINDINGS: Stable left internal jugular central venous catheter. Persistent right upper lobe acute inf
iltrate slightly improved from most recent x-ray. There is worsened lateral right basilar airspace op
acity however. Left lung remains clear. Cardiac silhouette size stable and within normal limits. Osse
ous structures are intact.

 

IMPRESSION: Persistent but improving right upper lobe acute infiltrate. Worsening lateral right basil
ar acute infiltrate.

## 2020-10-06 NOTE — FL
EXAMINATION TYPE: FL barium swallow w video

 

DATE OF EXAM: 10/6/2020

 

MODIFIED SWALLOW / DEGLUTITION STUDY

 

CLINICAL HISTORY: Dysphagia.

 

TECHNIQUE:  Deglutition study is performed utilizing honey and nectar thick liquid barium and barium 
thick applesauce.

 

COMPARISON: None.

 

FINDINGS: Suboptimal as patient unable to hold still. The oral and pharyngeal phases show mild delay 
in initiation and propagation with all modalities tested.  There is deep penetration with nectar thic
k liquid barium, patient clears with coughing. There is subsequent silent aspiration which does not i
nitiate cough reflex. Other more viscous modalities show no penetration or aspiration. Mild-to-modera
te pharyngeal residuals noted of more viscous modalities tested.

 

IMPRESSION: Aspiration of nectar thick liquid barium.  Please refer to speech therapist notes for fur
ther details if necessary.

## 2020-10-07 LAB
ALBUMIN SERPL-MCNC: 2.1 G/DL (ref 3.5–5)
ALP SERPL-CCNC: 60 U/L (ref 38–126)
ALT SERPL-CCNC: 14 U/L (ref 4–34)
ANION GAP SERPL CALC-SCNC: -1 MMOL/L
AST SERPL-CCNC: 13 U/L (ref 14–36)
BASOPHILS # BLD AUTO: 0 K/UL (ref 0–0.2)
BASOPHILS NFR BLD AUTO: 0 %
BUN SERPL-SCNC: 19 MG/DL (ref 7–17)
CALCIUM SPEC-MCNC: 8 MG/DL (ref 8.4–10.2)
CHLORIDE SERPL-SCNC: 113 MMOL/L (ref 98–107)
CO2 SERPL-SCNC: 33 MMOL/L (ref 22–30)
EOSINOPHIL # BLD AUTO: 0 K/UL (ref 0–0.7)
EOSINOPHIL NFR BLD AUTO: 0 %
ERYTHROCYTE [DISTWIDTH] IN BLOOD BY AUTOMATED COUNT: 2.6 M/UL (ref 3.8–5.4)
ERYTHROCYTE [DISTWIDTH] IN BLOOD: 16.5 % (ref 11.5–15.5)
GLUCOSE BLD-MCNC: 109 MG/DL (ref 75–99)
GLUCOSE BLD-MCNC: 126 MG/DL (ref 75–99)
GLUCOSE BLD-MCNC: 136 MG/DL (ref 75–99)
GLUCOSE BLD-MCNC: 182 MG/DL (ref 75–99)
GLUCOSE BLD-MCNC: 202 MG/DL (ref 75–99)
GLUCOSE BLD-MCNC: 207 MG/DL (ref 75–99)
GLUCOSE BLD-MCNC: 212 MG/DL (ref 75–99)
GLUCOSE BLD-MCNC: 219 MG/DL (ref 75–99)
GLUCOSE BLD-MCNC: 34 MG/DL (ref 75–99)
GLUCOSE BLD-MCNC: 42 MG/DL (ref 75–99)
GLUCOSE BLD-MCNC: 51 MG/DL (ref 75–99)
GLUCOSE BLD-MCNC: 68 MG/DL (ref 75–99)
GLUCOSE BLD-MCNC: 82 MG/DL (ref 75–99)
GLUCOSE BLD-MCNC: 93 MG/DL (ref 75–99)
GLUCOSE BLD-MCNC: 95 MG/DL (ref 75–99)
GLUCOSE SERPL-MCNC: 87 MG/DL (ref 74–99)
HCT VFR BLD AUTO: 23.5 % (ref 34–46)
HGB BLD-MCNC: 7.5 GM/DL (ref 11.4–16)
LYMPHOCYTES # SPEC AUTO: 1.8 K/UL (ref 1–4.8)
LYMPHOCYTES NFR SPEC AUTO: 43 %
MCH RBC QN AUTO: 28.9 PG (ref 25–35)
MCHC RBC AUTO-ENTMCNC: 32 G/DL (ref 31–37)
MCV RBC AUTO: 90.2 FL (ref 80–100)
MONOCYTES # BLD AUTO: 0.2 K/UL (ref 0–1)
MONOCYTES NFR BLD AUTO: 4 %
NEUTROPHILS # BLD AUTO: 2.2 K/UL (ref 1.3–7.7)
NEUTROPHILS NFR BLD AUTO: 51 %
PLATELET # BLD AUTO: 57 K/UL (ref 150–450)
POTASSIUM SERPL-SCNC: 2.9 MMOL/L (ref 3.5–5.1)
PROT SERPL-MCNC: 4.7 G/DL (ref 6.3–8.2)
SODIUM SERPL-SCNC: 145 MMOL/L (ref 137–145)
WBC # BLD AUTO: 4.2 K/UL (ref 3.8–10.6)

## 2020-10-07 RX ADMIN — VALPROATE SODIUM SCH MLS/HR: 100 INJECTION, SOLUTION INTRAVENOUS at 01:30

## 2020-10-07 RX ADMIN — CEFAZOLIN SCH: 330 INJECTION, POWDER, FOR SOLUTION INTRAMUSCULAR; INTRAVENOUS at 10:19

## 2020-10-07 RX ADMIN — INSULIN ASPART SCH UNIT: 100 INJECTION, SOLUTION INTRAVENOUS; SUBCUTANEOUS at 17:27

## 2020-10-07 RX ADMIN — IPRATROPIUM BROMIDE AND ALBUTEROL SULFATE SCH ML: .5; 3 SOLUTION RESPIRATORY (INHALATION) at 14:50

## 2020-10-07 RX ADMIN — VALPROATE SODIUM SCH MLS/HR: 100 INJECTION, SOLUTION INTRAVENOUS at 17:31

## 2020-10-07 RX ADMIN — CLINDAMYCIN HYDROCHLORIDE SCH MG: 150 CAPSULE ORAL at 17:28

## 2020-10-07 RX ADMIN — DEXTROSE SCH MLS/HR: 50 INJECTION, SOLUTION INTRAVENOUS at 01:00

## 2020-10-07 RX ADMIN — Medication SCH MG: at 21:55

## 2020-10-07 RX ADMIN — METOCLOPRAMIDE SCH MG: 10 TABLET ORAL at 11:31

## 2020-10-07 RX ADMIN — IPRATROPIUM BROMIDE AND ALBUTEROL SULFATE SCH ML: .5; 3 SOLUTION RESPIRATORY (INHALATION) at 08:32

## 2020-10-07 RX ADMIN — POTASSIUM CHLORIDE SCH MLS/HR: 14.9 INJECTION, SOLUTION INTRAVENOUS at 10:15

## 2020-10-07 RX ADMIN — IPRATROPIUM BROMIDE AND ALBUTEROL SULFATE SCH ML: .5; 3 SOLUTION RESPIRATORY (INHALATION) at 12:01

## 2020-10-07 RX ADMIN — INSULIN ASPART SCH: 100 INJECTION, SOLUTION INTRAVENOUS; SUBCUTANEOUS at 04:09

## 2020-10-07 RX ADMIN — DEXTROSE MONOHYDRATE ONE ML: 25 INJECTION, SOLUTION INTRAVENOUS at 08:36

## 2020-10-07 RX ADMIN — CLINDAMYCIN HYDROCHLORIDE SCH MG: 150 CAPSULE ORAL at 21:55

## 2020-10-07 RX ADMIN — METOCLOPRAMIDE SCH MG: 10 TABLET ORAL at 08:39

## 2020-10-07 RX ADMIN — INSULIN ASPART SCH: 100 INJECTION, SOLUTION INTRAVENOUS; SUBCUTANEOUS at 08:38

## 2020-10-07 RX ADMIN — LEVOFLOXACIN SCH MG: 500 TABLET, FILM COATED ORAL at 08:51

## 2020-10-07 RX ADMIN — POTASSIUM CHLORIDE SCH MLS/HR: 14.9 INJECTION, SOLUTION INTRAVENOUS at 08:39

## 2020-10-07 RX ADMIN — ATORVASTATIN CALCIUM SCH MG: 80 TABLET, FILM COATED ORAL at 21:55

## 2020-10-07 RX ADMIN — DEXTROSE MONOHYDRATE ONE ML: 25 INJECTION, SOLUTION INTRAVENOUS at 06:15

## 2020-10-07 RX ADMIN — METOCLOPRAMIDE SCH MG: 10 TABLET ORAL at 17:31

## 2020-10-07 RX ADMIN — INSULIN ASPART SCH: 100 INJECTION, SOLUTION INTRAVENOUS; SUBCUTANEOUS at 21:37

## 2020-10-07 RX ADMIN — POTASSIUM CHLORIDE SCH MLS/HR: 14.9 INJECTION, SOLUTION INTRAVENOUS at 06:08

## 2020-10-07 RX ADMIN — LACOSAMIDE SCH MLS/HR: 10 INJECTION INTRAVENOUS at 11:27

## 2020-10-07 RX ADMIN — IPRATROPIUM BROMIDE AND ALBUTEROL SULFATE SCH ML: .5; 3 SOLUTION RESPIRATORY (INHALATION) at 19:15

## 2020-10-07 RX ADMIN — ASPIRIN 81 MG CHEWABLE TABLET SCH MG: 81 TABLET CHEWABLE at 08:39

## 2020-10-07 RX ADMIN — DEXTROSE SCH MLS/HR: 50 INJECTION, SOLUTION INTRAVENOUS at 07:09

## 2020-10-07 RX ADMIN — CLINDAMYCIN HYDROCHLORIDE SCH MG: 150 CAPSULE ORAL at 11:29

## 2020-10-07 RX ADMIN — CLOPIDOGREL BISULFATE SCH MG: 75 TABLET ORAL at 08:39

## 2020-10-07 RX ADMIN — INSULIN ASPART SCH: 100 INJECTION, SOLUTION INTRAVENOUS; SUBCUTANEOUS at 11:33

## 2020-10-07 RX ADMIN — POTASSIUM CHLORIDE SCH MLS/HR: 14.9 INJECTION, SOLUTION INTRAVENOUS at 08:27

## 2020-10-07 RX ADMIN — INSULIN ASPART SCH: 100 INJECTION, SOLUTION INTRAVENOUS; SUBCUTANEOUS at 01:08

## 2020-10-07 RX ADMIN — LOSARTAN POTASSIUM SCH: 50 TABLET, FILM COATED ORAL at 10:07

## 2020-10-07 RX ADMIN — DULOXETINE SCH: 60 CAPSULE, DELAYED RELEASE ORAL at 08:51

## 2020-10-07 RX ADMIN — VALPROATE SODIUM SCH MLS/HR: 100 INJECTION, SOLUTION INTRAVENOUS at 10:14

## 2020-10-07 NOTE — P.PN
Subjective


Progress Note Date: 10/07/20








Morenita Ramirez, is a 60-year-old female who presented to [facility] emergency room due to nausea vomiting and diarrhea, and elevated 

glucose level, she was evaluated in the emergency room, her temperature on 

presentation was 90.1, pulse 75 respiration 18 blood pressure 79/51 pulse ox 94%

on room air, her white blood count was elevated at 11.6 hemoglobin 9.6 platelet 

count 156, venous blood gas pH was 7.05 glucose level was 744 and serum acetone 

was positive, patient was admitted to ICU she was started on IV fluid and on IV 

insulin drip.


Patient has a known history of insulin-dependent diabetes mellitus type 1 

maintained on insulin he had previous episodes of DKA, she also had a history of

stroke, history of hypertension, hyperlipidemia, coronary artery disease with 

myocardial infarction, peripheral vascular disease with multiple toe amputation,

history of COPD.


On review of systems patient is alert responsive in no apparent distress she was

seen in ICU she is still complaining of nausea and occasional episodes of 

vomiting there is no fever or chills no headache or dizziness no chest pain no 

shortness of breath no cough she is having some abdominal discomfort no no burni

ng with urination no frequency or urgency no hematuria.





On 10/04/2020 patient remains in the intensive care unit.  Patient does not 

appear in any kind of distress.  She currently getting treated for pneumonia 

critical care services are following.  Patient remains on Levaquin.  Patient has

been transitioned to subcu insulin patient did have episodes of low sugar this 

a.m. treated per protocol.





On 10/05/2020 patient was seen and examined in the ICU, she was very drowsy this

morning she failed swallow evaluation and is kept nothing by mouth at this time,

now patient is more alert and responsive, glucose level is better controlled, 

she is complaining of low back pain otherwise she denies any complaints.





On 10/06/2020 patient was seen and examined in the ICU she is more alert and 

responsive at this time she is complaining of feeling hungry however she failed 

swallow evaluation again this morning otherwise she denies any complaints there 

is no fever or chills no headache or dizziness no chest pain no shortness of 

breath no cough no nausea or vomiting no abdominal pain no diarrhea and no 

urinary symptoms.  At this time plan is to repeat swallow evaluation in the 

morning again before discussing alternative feeding options.





On 10/07/2020 patient is alert and oriented 2.  Patient is much more alert than

previous days.  Patient does seem fidgety in bed but this is similar to her base

line.  Blood sugars continue to fluctuate.  Patient having low blood sugar this 

a.m. requiring amp of D50.  Patient also failed swallow.  Currently on nectar 

thick and plans to eventually reassess swallowing the next few days per speech. 

Patient has been ordered out of the intensive care unit to [facility] floor.  

Patient denies chest pain or shortness of breath.  Patient denies nausea 

vomiting or diarrhea.  Patient denies any urinary burning or frequency





Objective





- Vital Signs


Vital signs: 


                                   Vital Signs











Temp  98.6 F   10/07/20 07:00


 


Pulse  96   10/07/20 12:09


 


Resp  17   10/07/20 07:00


 


BP  83/47   10/06/20 23:00


 


Pulse Ox  100   10/07/20 07:00








                                 Intake & Output











 10/06/20 10/07/20 10/07/20





 18:59 06:59 18:59


 


Intake Total 649 100 


 


Output Total 1550 340 


 


Balance -901 -240 


 


Weight 50.4 kg  


 


Intake:   


 


   100 


 


    D5-0.45% NaCl with KCl 275  





    20Meq/l 1,000 ml @ 50 mls   





    /hr IV .Q20H [facility] Rx#:   





    [id number]   


 


    Dextrose 5%-0.9% NaCl 1, 50  





    000 ml @ 50 mls/hr IV .   





    Q20H [facility] Rx#:[id number]   


 


    Pressure Bag 24  


 


    Sodium Chloride 0.9% 1, 150 100 





    000 ml @ 50 mls/hr IV .   





    Q20H [facility] Rx#:[id number]   


 


  Intake, IV Titration 150  





  Amount   


 


    Lacosamide IV 75 mg In 50  





    Sodium Chloride 0.9% 50   





    ml @ 100 mls/hr IVPB BID   





    [facility] Rx#:[id number]   


 


    Valproate Sodium 250 mg 100  





    In Sodium Chloride 0.9%   





    100 ml @ 100 mls/hr IVPB   





    Q8HR [facility] Rx#:[id number]   


 


Output:   


 


  Urine 1550 340 


 


Other:   


 


  Voiding Method Indwelling Catheter Indwelling Catheter Indwelling Catheter








                       ABP, PAP, CO, CI - Last Documented











Arterial Blood Pressure        92/57

















- Exam





In general patient is alert responsive in no apparent distress


HEENT head normocephalic and atraumatic


Neck is supple no JVD no goiter no lymphadenopathy


Chest exam reveals a few scattered rhonchi no wheezing


Cardiac exam reveals regular heart sounds S1 and S2 with mild tachycardia no 

gallops no murmurs


Abdomen is soft nontender no rigidity or rebound no palpable masses with 

hyperactive bowel sounds


Extremity exam reveals no edema no cyanosis or clubbing


Neurological examination reveals no gross new focal deficit








- Labs


CBC & Chem 7: 


                                 10/07/20 03:10





                                 10/07/20 03:10


Labs: 


                  Abnormal Lab Results - Last 24 Hours (Table)











  10/06/20 10/06/20 10/06/20 Range/Units





  12:49 16:32 19:43 


 


RBC     (3.80-5.40)  m/uL


 


Hgb     (11.4-16.0)  gm/dL


 


Hct     (34.0-46.0)  %


 


RDW     (11.5-15.5)  %


 


Plt Count     (150-450)  k/uL


 


Potassium     (3.5-5.1)  mmol/L


 


Chloride     ()  mmol/L


 


Carbon Dioxide     (22-30)  mmol/L


 


BUN     (7-17)  mg/dL


 


POC Glucose (mg/dL)  305 H  117 H  236 H  (75-99)  mg/dL


 


Calcium     (8.4-10.2)  mg/dL


 


AST     (14-36)  U/L


 


Total Protein     (6.3-8.2)  g/dL


 


Albumin     (3.5-5.0)  g/dL














  10/07/20 10/07/20 10/07/20 Range/Units





  01:51 02:10 02:31 


 


RBC     (3.80-5.40)  m/uL


 


Hgb     (11.4-16.0)  gm/dL


 


Hct     (34.0-46.0)  %


 


RDW     (11.5-15.5)  %


 


Plt Count     (150-450)  k/uL


 


Potassium     (3.5-5.1)  mmol/L


 


Chloride     ()  mmol/L


 


Carbon Dioxide     (22-30)  mmol/L


 


BUN     (7-17)  mg/dL


 


POC Glucose (mg/dL)  42 L  212 H  136 H  (75-99)  mg/dL


 


Calcium     (8.4-10.2)  mg/dL


 


AST     (14-36)  U/L


 


Total Protein     (6.3-8.2)  g/dL


 


Albumin     (3.5-5.0)  g/dL














  10/07/20 10/07/20 10/07/20 Range/Units





  03:10 03:10 03:55 


 


RBC  2.60 L    (3.80-5.40)  m/uL


 


Hgb  7.5 L D    (11.4-16.0)  gm/dL


 


Hct  23.5 L    (34.0-46.0)  %


 


RDW  16.5 H    (11.5-15.5)  %


 


Plt Count  57 L    (150-450)  k/uL


 


Potassium   2.9 L   (3.5-5.1)  mmol/L


 


Chloride   113 H   ()  mmol/L


 


Carbon Dioxide   33 H   (22-30)  mmol/L


 


BUN   19 H   (7-17)  mg/dL


 


POC Glucose (mg/dL)    58 L  (75-99)  mg/dL


 


Calcium   8.0 L   (8.4-10.2)  mg/dL


 


AST   13 L   (14-36)  U/L


 


Total Protein   4.7 L   (6.3-8.2)  g/dL


 


Albumin   2.1 L   (3.5-5.0)  g/dL














  10/07/20 10/07/20 10/07/20 Range/Units





  03:56 04:47 05:58 


 


RBC     (3.80-5.40)  m/uL


 


Hgb     (11.4-16.0)  gm/dL


 


Hct     (34.0-46.0)  %


 


RDW     (11.5-15.5)  %


 


Plt Count     (150-450)  k/uL


 


Potassium     (3.5-5.1)  mmol/L


 


Chloride     ()  mmol/L


 


Carbon Dioxide     (22-30)  mmol/L


 


BUN     (7-17)  mg/dL


 


POC Glucose (mg/dL)  51 L  126 H  68 L  (75-99)  mg/dL


 


Calcium     (8.4-10.2)  mg/dL


 


AST     (14-36)  U/L


 


Total Protein     (6.3-8.2)  g/dL


 


Albumin     (3.5-5.0)  g/dL














  10/07/20 10/07/20 10/07/20 Range/Units





  06:30 08:31 08:46 


 


RBC     (3.80-5.40)  m/uL


 


Hgb     (11.4-16.0)  gm/dL


 


Hct     (34.0-46.0)  %


 


RDW     (11.5-15.5)  %


 


Plt Count     (150-450)  k/uL


 


Potassium     (3.5-5.1)  mmol/L


 


Chloride     ()  mmol/L


 


Carbon Dioxide     (22-30)  mmol/L


 


BUN     (7-17)  mg/dL


 


POC Glucose (mg/dL)  202 H  34 L  182 H  (75-99)  mg/dL


 


Calcium     (8.4-10.2)  mg/dL


 


AST     (14-36)  U/L


 


Total Protein     (6.3-8.2)  g/dL


 


Albumin     (3.5-5.0)  g/dL














  10/07/20 Range/Units





  11:33 


 


RBC   (3.80-5.40)  m/uL


 


Hgb   (11.4-16.0)  gm/dL


 


Hct   (34.0-46.0)  %


 


RDW   (11.5-15.5)  %


 


Plt Count   (150-450)  k/uL


 


Potassium   (3.5-5.1)  mmol/L


 


Chloride   ()  mmol/L


 


Carbon Dioxide   (22-30)  mmol/L


 


BUN   (7-17)  mg/dL


 


POC Glucose (mg/dL)  109 H  (75-99)  mg/dL


 


Calcium   (8.4-10.2)  mg/dL


 


AST   (14-36)  U/L


 


Total Protein   (6.3-8.2)  g/dL


 


Albumin   (3.5-5.0)  g/dL








                      Microbiology - Last 24 Hours (Table)











 10/02/20 19:45 Blood Culture Gram Stain - Preliminary





 Blood Blood Culture - Preliminary





    Gram Positive Cocci Isolated














Assessment and Plan


Assessment: 





1.  Acute diabetic ketoacidosis with severe hyperglycemia and positive serum 

acetone patient is improving with IV fluid and IV insulin drip.  She has been 

transitioned to subcu insulin.  Patient having an episode of hypoglycemia.  

Protocol followed per nursing staff





2.  Underlying history of diabetes mellitus type 1 maintained on insulin, her 

primary care provider is Eleanor Rosas, she was contacted and she stated that she 

was recently hospitalized at [facility], a different relative was are[place] for patient and apparently glucose level has been extremely elevated for 

several days.





3.  Underlying history of hypertension





4.  Underlying history of hyperlipidemia





5.  Underlying history of seizure disorder, will place patient on IV Keppra 

until she is able to take her oral medications





6.  Underlying history of COPD stable no evidence of exacerbation





7.  Underlying history of coronary artery disease stable at this time patient 

denies any chest pain





8.  Underlying history of peripheral vascular disease with previous history of 

multiple toe amputations





9.  Previous history of stroke.





10.  Right-sided pneumonia.  Patient currently on Levaquin.  Pulmonary and 

critical care service is following.  Chest x-ray this a.m. showing infiltrates 

within the right upper lobe and improving within the  right lower lobe.  

currently on Levaquin and Cleocin





11.  Failed barium swallow.  Patient currently on nectar thick.  Speech to 

reevaluate swallow in the next few days





12. anemia.  Hemoglobin low at 7.5 total IBC low at 192.  Will start patient on 

ferrous sulfate.  She did require 1 unit of PRBCs on 10/06/2020 will order stool

for occult blood 





GI prophylaxis Protonix.  DVT prophylaxis SCDs at this time until stool for 

occult blood is completed


PT OT consulted








I performed an examination of the patient and discussed their management with 

the Nurse Practitioner.  I have reviewed the Nurse Practitioner's notes and 

agree with the documented findings and plan of care

## 2020-10-07 NOTE — P.CONS
History of Present Illness





- Reason for Consult


Consult date: 10/07/20


Positive blood culture


Requesting physician: Saniya Cabral





- Chief Complaint


Nausea vomiting and elevated blood sugar x few days





- History of Present Illness


Patient is a 60 year old  female with past medical history significant 

for diabetes mellitus history diabetic foot infection with Osteomyelitis patient

was brought into the ER at [facility] about 5 days ago on 

10/02/2020 for evaluation of several days of nausea vomiting diarrhea and the 

patient also noticed to have a elevated blood sugar more than 600 was diagnosed 

with a DKA and has been admitted to the ICU, patient of admission Hospital was 

slightly hypothermic subsequently normal temperature she did spike a fever 100.9

on 10/05/2020 however has been afebrile since then, patient was mildly 

leukopenic on admission however the white count has been normal since then, the 

patient use has been negative chest x-ray did some elevated right upper lobe 

compared to recent exam consistent with pneumonia patient has been treated with 

Levaquin blood cultures on admission and subsequently came positive with gram-

positive cocci vancomycin was added and infectious disease was consulted for 

further management of antibiotic therapy patient at time of evaluation is 

pleasantly confused and her nonspecific denies having any chest pain and 

headache no further vomiting and no diarrhea overall the patient is not a good 

historian so most information has been obtained from review the chart








Review of Systems


Positive points has been mentioned in HPI complete review could not be obtained 

because of his underlying mental status








Past Medical History


Past Medical History: Asthma, Coronary Artery Disease (CAD), Chest Pain / An

lauren, Heart Failure, COPD, CVA/TIA, Diabetes Mellitus, Deep Vein Thrombosis 

(DVT), Eye Disorder, GERD/Reflux, Hyperlipidemia, Hypertension, Myocardial 

Infarction (MI), Neurologic Disorder, Osteoarthritis (OA), Pneumonia, Renal 

Disease


Additional Past Medical History / Comment(s): IDDM (brittle), DKAs, neuropathy 

bilateral hands/feet, retinopathy bilateral eyes, cellulitis R foot, R great toe

and 2nd toe infections/amputations, current wound R foot-being seen in [facility], [place]

l failure, anemia, CVAs with L sided paralysis, headaches started after CVAs, 

brain lesions, DVT R axillae, low back pain, varicosities, seizure many years 

ago (), hypothyroid, constipation, bilateral tinnitis occasionally, sinus 

problems.


Last Myocardial Infarction Date:: 


History of Any Multi-Drug Resistant Organisms: MRSA


Year Discovered:: 18


MDRO Source:: Right Foot


Past Surgical History: Appendectomy,  Section, Cholecystectomy, Heart 

Catheterization With Stent, Hysterectomy, Orthopedic Surgery


Additional Past Surgical History / Comment(s): PCI with multiple stents, R great

toe and 2nd toe amps, debridements R foot ulcer, L shoulder surgery to remove 

bone, bronchoscopy, EGD, colonoscopy, R arm port since removed, bilateral 

cataract removals/lens implants.


Past Anesthesia/Blood Transfusion Reactions: No Reported Reaction


Additional Past Anesthesia/Blood Transfusion Reaction / Comm: HX OF BLOOD 

TRANSFUSION- NO REACTION


Date of Last Stent Placement:: 2013


Past Psychological History: Anxiety, Bipolar, Depression


Smoking Status: Never smoker


Past Alcohol Use History: None Reported


Past Drug Use History: Marijuana





- Past Family History


  ** Father


Family Medical History: Unable to Obtain, Coronary Artery Disease (CAD), 

Diabetes Mellitus





  ** Mother


Family Medical History: COPD





Medications and Allergies


                                Home Medications











 Medication  Instructions  Recorded  Confirmed  Type


 


Famotidine [Pepcid] 20 mg PO DAILY 02/19/16 10/02/20 History


 


HYDROcodone/APAP 10-325MG [Norco 1 tab PO TID PRN 05/06/17 10/02/20 History





]    


 


DULoxetine HCL [Cymbalta] 60 mg PO DAILY 09/19/17 10/02/20 History


 


Atorvastatin [Lipitor] 20 mg PO DAILY 07/31/19 10/02/20 History


 


QUEtiapine [SEROquel] 100 mg PO HS 07/31/19 10/02/20 History


 


Aspirin [Adult Low Dose Aspirin EC] 81 mg PO DAILY 01/10/20 10/02/20 History


 


Ferrous Sulfate [Iron (65  mg PO DAILY 01/10/20 10/02/20 History





Elemental)]    


 


Divalproex [Depakote] 250 mg PO TID #90 tablet. 03/19/20 10/02/20 Rx


 


ALPRAZolam [Xanax] 1 mg PO Q8H PRN 07/28/20 10/02/20 History


 


Albuterol Sulfate [Ventolin HFA] 2 puff INHALATION RT-Q4H PRN 07/28/20 10/02/20 

History


 


Ondansetron Odt [Zofran ODT] 4 mg PO DAILY PRN 07/28/20 10/02/20 History


 


Valproic Acid [Depakene] 250 mg PO DAILY 07/28/20 10/02/20 History


 


QUEtiapine [SEROquel] 25 mg PO BID 08/12/20 10/02/20 History


 


Losartan [Cozaar] 50 mg PO DAILY  tab 08/20/20 10/02/20 Rx


 


Metoclopramide [Reglan] 10 mg PO AC-TID  tab 08/20/20 10/02/20 Rx


 


Ascorbic Acid [Vitamin C] 500 mg PO DAILY 08/31/20 10/02/20 History


 


Insulin Glargine,Hum.rec.anlog 20 unit SQ HS 08/31/20 10/02/20 History





[Basaglar Kwikpen U-100]    


 


Vitamin B Complex 1 tab PO DAILY 08/31/20 10/02/20 History


 


Calcium Carb-Vit D 500Mg-200Un 1 each PO BID-W/MEALS  tab 09/04/20 10/02/20 Rx





[Oscal 500+D]    


 


Clopidogrel [Plavix] 75 mg PO DAILY  tab 09/04/20 10/02/20 Rx


 


INSULIN ASPART (NovoLOG) [NovoLOG 0 unit SQ AC-TID  vial 09/04/20 10/02/20 Rx





(formulary)]    


 


Lacosamide [Vimpat] 50 mg PO HS 10/02/20 10/02/20 History


 


Lacosamide [Vimpat] 100 mg PO QAM 10/02/20 10/02/20 History








                                    Allergies











Allergy/AdvReac Type Severity Reaction Status Date / Time


 


Barbiturates Allergy  Rash/Hives Verified 10/02/20 21:31


 


cephalexin monohydrate Allergy  Rash/Hives Verified 10/02/20 21:31





[From Keflex]     


 


morphine Allergy  Rash/Hives Verified 10/02/20 21:31


 


Penicillins Allergy  Rash/Hives Verified 10/02/20 21:31


 


phenobarbital Allergy  Swelling Verified 10/02/20 21:31


 


venom-honey bee Allergy  Swelling Verified 10/02/20 21:31





[bee venom (honey bee)]     


 


amlodipine besylate AdvReac  Vomiting Verified 10/02/20 21:31





[From Norvasc]     














Physical Exam


Vitals: 


                                   Vital Signs











  Temp Pulse Pulse Resp BP Pulse Ox


 


 10/07/20 15:02   81    


 


 10/07/20 14:50   84    


 


 10/07/20 12:09   96    


 


 10/07/20 11:58   95    


 


 10/07/20 08:39   96    


 


 10/07/20 08:30   92    


 


 10/07/20 07:00  98.6 F   94  17   100


 


 10/06/20 23:00  98.7 F   91  19  83/47  100


 


 10/06/20 16:27   92   18  


 


 10/06/20 16:17   90   18  








                                Intake and Output











 10/07/20 10/07/20 10/07/20





 06:59 14:59 22:59


 


Intake Total 50  


 


Output Total 100  


 


Balance -50  


 


Intake:   


 


  IV 50  


 


    Sodium Chloride 0.9% 1, 50  





    000 ml @ 50 mls/hr IV .   





    Q20H [facility] Rx#:[id number]   


 


Output:   


 


  Urine 100  


 


Other:   


 


  Voiding Method Indwelling Catheter Indwelling Catheter 











GENERAL DESCRIPTION: Middle-aged female lying in bed, no distress. No tachypnea 

or accessory muscle of respiration use.


HEENT: Shows Pallor , no scleral icterus. Oral mucous membrane is dry. No 

pharyngeal erythema or thrush


NECK: Trachea central, no thyromegaly.


LUNGS: Unlabored breathing.  Decreased breath sound at the base No wheeze or 

crackle.


HEART: S1, S2, regular rate and rhythm. No loud murmur


ABDOMEN: Soft, no tenderness , guarding or rigidity, no organomegaly


EXTREMITIES: No edema of feet.


SKIN: No rash, no masses palpable.


NEUROLOGICAL: The patient is awake, but pleasantly confused orientation could 

not be determined.

















Results


CBC & Chem 7: 


                                 10/07/20 03:10





                                 10/07/20 03:10


Labs: 


                  Abnormal Lab Results - Last 24 Hours (Table)











  10/06/20 10/06/20 10/07/20 Range/Units





  16:32 19:43 01:51 


 


RBC     (3.80-5.40)  m/uL


 


Hgb     (11.4-16.0)  gm/dL


 


Hct     (34.0-46.0)  %


 


RDW     (11.5-15.5)  %


 


Plt Count     (150-450)  k/uL


 


Potassium     (3.5-5.1)  mmol/L


 


Chloride     ()  mmol/L


 


Carbon Dioxide     (22-30)  mmol/L


 


BUN     (7-17)  mg/dL


 


POC Glucose (mg/dL)  117 H  236 H  42 L  (75-99)  mg/dL


 


Calcium     (8.4-10.2)  mg/dL


 


AST     (14-36)  U/L


 


Total Protein     (6.3-8.2)  g/dL


 


Albumin     (3.5-5.0)  g/dL














  10/07/20 10/07/20 10/07/20 Range/Units





  02:10 02:31 03:10 


 


RBC    2.60 L  (3.80-5.40)  m/uL


 


Hgb    7.5 L D  (11.4-16.0)  gm/dL


 


Hct    23.5 L  (34.0-46.0)  %


 


RDW    16.5 H  (11.5-15.5)  %


 


Plt Count    57 L  (150-450)  k/uL


 


Potassium     (3.5-5.1)  mmol/L


 


Chloride     ()  mmol/L


 


Carbon Dioxide     (22-30)  mmol/L


 


BUN     (7-17)  mg/dL


 


POC Glucose (mg/dL)  212 H  136 H   (75-99)  mg/dL


 


Calcium     (8.4-10.2)  mg/dL


 


AST     (14-36)  U/L


 


Total Protein     (6.3-8.2)  g/dL


 


Albumin     (3.5-5.0)  g/dL














  10/07/20 10/07/20 10/07/20 Range/Units





  03:10 03:55 03:56 


 


RBC     (3.80-5.40)  m/uL


 


Hgb     (11.4-16.0)  gm/dL


 


Hct     (34.0-46.0)  %


 


RDW     (11.5-15.5)  %


 


Plt Count     (150-450)  k/uL


 


Potassium  2.9 L    (3.5-5.1)  mmol/L


 


Chloride  113 H    ()  mmol/L


 


Carbon Dioxide  33 H    (22-30)  mmol/L


 


BUN  19 H    (7-17)  mg/dL


 


POC Glucose (mg/dL)   58 L  51 L  (75-99)  mg/dL


 


Calcium  8.0 L    (8.4-10.2)  mg/dL


 


AST  13 L    (14-36)  U/L


 


Total Protein  4.7 L    (6.3-8.2)  g/dL


 


Albumin  2.1 L    (3.5-5.0)  g/dL














  10/07/20 10/07/20 10/07/20 Range/Units





  04:47 05:58 06:30 


 


RBC     (3.80-5.40)  m/uL


 


Hgb     (11.4-16.0)  gm/dL


 


Hct     (34.0-46.0)  %


 


RDW     (11.5-15.5)  %


 


Plt Count     (150-450)  k/uL


 


Potassium     (3.5-5.1)  mmol/L


 


Chloride     ()  mmol/L


 


Carbon Dioxide     (22-30)  mmol/L


 


BUN     (7-17)  mg/dL


 


POC Glucose (mg/dL)  126 H  68 L  202 H  (75-99)  mg/dL


 


Calcium     (8.4-10.2)  mg/dL


 


AST     (14-36)  U/L


 


Total Protein     (6.3-8.2)  g/dL


 


Albumin     (3.5-5.0)  g/dL














  10/07/20 10/07/20 10/07/20 Range/Units





  08:31 08:46 11:33 


 


RBC     (3.80-5.40)  m/uL


 


Hgb     (11.4-16.0)  gm/dL


 


Hct     (34.0-46.0)  %


 


RDW     (11.5-15.5)  %


 


Plt Count     (150-450)  k/uL


 


Potassium     (3.5-5.1)  mmol/L


 


Chloride     ()  mmol/L


 


Carbon Dioxide     (22-30)  mmol/L


 


BUN     (7-17)  mg/dL


 


POC Glucose (mg/dL)  34 L  182 H  109 H  (75-99)  mg/dL


 


Calcium     (8.4-10.2)  mg/dL


 


AST     (14-36)  U/L


 


Total Protein     (6.3-8.2)  g/dL


 


Albumin     (3.5-5.0)  g/dL








                      Microbiology - Last 24 Hours (Table)











 10/02/20 19:45 Blood Culture Gram Stain - Final





 Blood Blood Culture - Final





    Staph hominis sub sp. hominis














Assessment and Plan


Assessment: 


1- patient with positive blood culture with Streptococcus epidermidis in this 

patient presented to the hospital with nausea no vomiting this patient also have

elevated blood sugar can be treated for DKA, more likely representing skin c

ontamination as the patient has regular visits to go along with it


2-patient with a low-grade fever and chest x-ray suspicious for pneumonia seems 

to be clinically responding to Levaquin


3-Patient with multiple antibiotic ALLERGIES that would limit the number of 

antibiotic safe to use





(1) Positive blood culture


Current Visit: Yes   Status: Acute   Code(s): R78.81 - BACTEREMIA   SNOMED 

Code(s): 964714584


   





(2) Pneumonia


Current Visit: Yes   Status: Acute   Code(s): J18.9 - PNEUMONIA, UNSPECIFIED 

ORGANISM   SNOMED Code(s): 156369057


   





(3) Allergy to multiple antibiotics


Current Visit: Yes   Status: Acute   Code(s): Z88.1 - ALLERGY STATUS TO OTHER 

ANTIBIOTIC AGENTS STATUS   SNOMED Code(s): 162634332


   


Plan: 


1- vancomycin has been discontinued


2-continue with Levaquin for possible underlying pneumonia


We will follow on clinical condition and cultures to further adjust medication 

if needed


Thank you for this consultation will follow this patient with you





Time with Patient: Greater than 30

## 2020-10-07 NOTE — P.PN
Subjective


Progress Note Date: 10/07/20


she was seen at bedside and the she continues to be in ICU.  Per the patient's 

nurse she stated that the patient failed her barium swallow yesterday.


Patient stated that she's doing well, she denies of any new weakness, numbness 

or visual disturbance.





patient is having brittle glucose levels.  At 831 her glucose was 34.








Objective





- Vital Signs


Vital signs: 


                                   Vital Signs











Temp  98.6 F   10/07/20 07:00


 


Pulse  96   10/07/20 12:09


 


Resp  17   10/07/20 07:00


 


BP  83/47   10/06/20 23:00


 


Pulse Ox  100   10/07/20 07:00








                                 Intake & Output











 10/06/20 10/07/20 10/07/20





 18:59 06:59 18:59


 


Intake Total 649 100 


 


Output Total 1550 340 


 


Balance -901 -240 


 


Weight 50.4 kg  


 


Intake:   


 


   100 


 


    D5-0.45% NaCl with KCl 275  





    20Meq/l 1,000 ml @ 50 mls   





    /hr IV .Q20H [facility] Rx#:   





    [id number]   


 


    Dextrose 5%-0.9% NaCl 1, 50  





    000 ml @ 50 mls/hr IV .   





    Q20H [facility] Rx#:[id number]   


 


    Pressure Bag 24  


 


    Sodium Chloride 0.9% 1, 150 100 





    000 ml @ 50 mls/hr IV .   





    Q20H [facility] Rx#:[id number]   


 


  Intake, IV Titration 150  





  Amount   


 


    Lacosamide IV 75 mg In 50  





    Sodium Chloride 0.9% 50   





    ml @ 100 mls/hr IVPB BID   





    [facility] Rx#:[id number]   


 


    Valproate Sodium 250 mg 100  





    In Sodium Chloride 0.9%   





    100 ml @ 100 mls/hr IVPB   





    Q8HR [facility] Rx#:[id number]   


 


Output:   


 


  Urine 1550 340 


 


Other:   


 


  Voiding Method Indwelling Catheter Indwelling Catheter Indwelling Catheter








                       ABP, PAP, CO, CI - Last Documented











Arterial Blood Pressure        92/57

















- Exam


GENERAL: The patient is lying in bed and is not in acute distress.  She seems 

restless.


CHEST: The heart rate is regular rate rhythm.   No murmurs to auscultation.  


LUNG: Clear to auscultation bilaterally no wheezing noted throughout.  Not 

labored breathing.


ABDOMEN/GI: Bowel sounds present in all 4 quadrants. No tenderness to palpation 

throughout.





NEUROLOGICAL:


Higher mental function: The patient is awake, alert, oriented to self, place and

time.  Patient is following simple commands.  No aphasia and no neglect.


Cranial nerves: The pupils are round, equal (4mm bilaterally) and reactive to 

light..  Visual fields are Left Homonymous lower visual field cut to threat.   

Extraocular movement is intact no nystagmus is noted.  Facial sensation unable 

to assess because of patient cooperation.  The facial strength: left nasolabial 

flattening .  Hearing is normal bilaterally to hand rub.  Tongue is midline and 

moved side-to-side without any difficulty.  Mild dysarthria is noted.  Shoulder 

unable to assess because of coopoeration.


Motor: Gait is defered.  Right upper and lower extremity is 5/5.  Left upper 

extremity is 0/5.  Left lower extremity is 4+/5.  Increased tone of left upper 

extremity especially at hand.  


Cerebellum: Unable to assess because of patient cooperation.


Sensation: Unable to assess because of cooperation.


Reflexes (right/left): Brisk over the left upper extremity (3+) otherwise 1+ 

throughout. 


Plantars: Unable to assess right foot since digit 1-2 are amputated (as well as 

5th digit).  Left is upgoing.








- Labs


CBC & Chem 7: 


                                 10/07/20 03:10





                                 10/07/20 03:10


Labs: 


                  Abnormal Lab Results - Last 24 Hours (Table)











  10/06/20 10/06/20 10/06/20 Range/Units





  12:49 16:32 19:43 


 


RBC     (3.80-5.40)  m/uL


 


Hgb     (11.4-16.0)  gm/dL


 


Hct     (34.0-46.0)  %


 


RDW     (11.5-15.5)  %


 


Plt Count     (150-450)  k/uL


 


Potassium     (3.5-5.1)  mmol/L


 


Chloride     ()  mmol/L


 


Carbon Dioxide     (22-30)  mmol/L


 


BUN     (7-17)  mg/dL


 


POC Glucose (mg/dL)  305 H  117 H  236 H  (75-99)  mg/dL


 


Calcium     (8.4-10.2)  mg/dL


 


AST     (14-36)  U/L


 


Total Protein     (6.3-8.2)  g/dL


 


Albumin     (3.5-5.0)  g/dL














  10/07/20 10/07/20 10/07/20 Range/Units





  01:51 02:10 02:31 


 


RBC     (3.80-5.40)  m/uL


 


Hgb     (11.4-16.0)  gm/dL


 


Hct     (34.0-46.0)  %


 


RDW     (11.5-15.5)  %


 


Plt Count     (150-450)  k/uL


 


Potassium     (3.5-5.1)  mmol/L


 


Chloride     ()  mmol/L


 


Carbon Dioxide     (22-30)  mmol/L


 


BUN     (7-17)  mg/dL


 


POC Glucose (mg/dL)  42 L  212 H  136 H  (75-99)  mg/dL


 


Calcium     (8.4-10.2)  mg/dL


 


AST     (14-36)  U/L


 


Total Protein     (6.3-8.2)  g/dL


 


Albumin     (3.5-5.0)  g/dL














  10/07/20 10/07/20 10/07/20 Range/Units





  03:10 03:10 03:55 


 


RBC  2.60 L    (3.80-5.40)  m/uL


 


Hgb  7.5 L D    (11.4-16.0)  gm/dL


 


Hct  23.5 L    (34.0-46.0)  %


 


RDW  16.5 H    (11.5-15.5)  %


 


Plt Count  57 L    (150-450)  k/uL


 


Potassium   2.9 L   (3.5-5.1)  mmol/L


 


Chloride   113 H   ()  mmol/L


 


Carbon Dioxide   33 H   (22-30)  mmol/L


 


BUN   19 H   (7-17)  mg/dL


 


POC Glucose (mg/dL)    58 L  (75-99)  mg/dL


 


Calcium   8.0 L   (8.4-10.2)  mg/dL


 


AST   13 L   (14-36)  U/L


 


Total Protein   4.7 L   (6.3-8.2)  g/dL


 


Albumin   2.1 L   (3.5-5.0)  g/dL














  10/07/20 10/07/20 10/07/20 Range/Units





  03:56 04:47 05:58 


 


RBC     (3.80-5.40)  m/uL


 


Hgb     (11.4-16.0)  gm/dL


 


Hct     (34.0-46.0)  %


 


RDW     (11.5-15.5)  %


 


Plt Count     (150-450)  k/uL


 


Potassium     (3.5-5.1)  mmol/L


 


Chloride     ()  mmol/L


 


Carbon Dioxide     (22-30)  mmol/L


 


BUN     (7-17)  mg/dL


 


POC Glucose (mg/dL)  51 L  126 H  68 L  (75-99)  mg/dL


 


Calcium     (8.4-10.2)  mg/dL


 


AST     (14-36)  U/L


 


Total Protein     (6.3-8.2)  g/dL


 


Albumin     (3.5-5.0)  g/dL














  10/07/20 10/07/20 10/07/20 Range/Units





  06:30 08:31 08:46 


 


RBC     (3.80-5.40)  m/uL


 


Hgb     (11.4-16.0)  gm/dL


 


Hct     (34.0-46.0)  %


 


RDW     (11.5-15.5)  %


 


Plt Count     (150-450)  k/uL


 


Potassium     (3.5-5.1)  mmol/L


 


Chloride     ()  mmol/L


 


Carbon Dioxide     (22-30)  mmol/L


 


BUN     (7-17)  mg/dL


 


POC Glucose (mg/dL)  202 H  34 L  182 H  (75-99)  mg/dL


 


Calcium     (8.4-10.2)  mg/dL


 


AST     (14-36)  U/L


 


Total Protein     (6.3-8.2)  g/dL


 


Albumin     (3.5-5.0)  g/dL














  10/07/20 Range/Units





  11:33 


 


RBC   (3.80-5.40)  m/uL


 


Hgb   (11.4-16.0)  gm/dL


 


Hct   (34.0-46.0)  %


 


RDW   (11.5-15.5)  %


 


Plt Count   (150-450)  k/uL


 


Potassium   (3.5-5.1)  mmol/L


 


Chloride   ()  mmol/L


 


Carbon Dioxide   (22-30)  mmol/L


 


BUN   (7-17)  mg/dL


 


POC Glucose (mg/dL)  109 H  (75-99)  mg/dL


 


Calcium   (8.4-10.2)  mg/dL


 


AST   (14-36)  U/L


 


Total Protein   (6.3-8.2)  g/dL


 


Albumin   (3.5-5.0)  g/dL








                      Microbiology - Last 24 Hours (Table)











 10/02/20 19:45 Blood Culture Gram Stain - Preliminary





 Blood Blood Culture - Preliminary





    Gram Positive Cocci Isolated














Assessment and Plan


Assessment: 


Toxic metabolic encephalopathy (DKA and right sided pneumonia)--improving


Seizure


Dysphagia


Right hemispheric stroke with residual left-sided weakness


Right ICA occlusion. 


Asymptomatic left ICA stenosis


brittle diabetes


Diabetic ketoacidosis---resolved


Right sided pneumonia


NA--resolved


X tobacco use


Hyperlipidemia


Hypertension


History of myocardial infarction or


History of coronary artery disease











Plan: 


Continue Vimpat 75 mg bid. Continue Depakote 250 mg 3 times a day will not 

modify the medication since the patient has history of restless per the 

caregiver from last visit.


Telma valproic acid was less than 10.  Even if her valproic acid is 

subtherapeutic we'll continue with the same medication.  I'll check a free 

valproic acid level


Routine EEG:  The background slowing over the left hemisphere is consistent with

mild to moderate encephalopathy.  The focal slwoing over the right 

aldzjq-eilnrkz-mfvaevmu region is consistent with patient history of Right MCA s

troke.  There is rare to occasional slowing over the right mid-temporal which 

can increase risk of focal epilepsy.  There is no active seizure.


Ammonia level: <9.





Regarding the patient history of stroke continue aspirin 81 mg daily as well as 

Plavix 75mg daily for secondary stroke for prophylaxis (dual antiplatelet since 

has ICA stenosis and occlusion).  Continue Atorvastatin 80 mg daily.





Regarding the management of total diabetes will defer management to the ICU and 

the primary team.


We'll defer the management of pneumonia to ICU and primary team.





patient was seen by Dr. Ricky Vang for right ICA occlusion 09/04/20.  Dr. Vang I recommended that carotid duplex and was completed on 09/04/20 and 

reported as chronic occlusion of the right ICA.  Moderate atherosclerotic change

of the left bifurcation without hemodynamic significant stenosis.  Recommend the

patient to follow-up with Dr. Vang or his colleagues as an outpatient.





The plan was discussed with the patient nurse.





Destin Zamora M.D.


Neuro-hospitalist








Time with Patient: Less than 30

## 2020-10-07 NOTE — P.PN
Subjective


Progress Note Date: 10/07/20








this is a 60-year-old female patient who came in with nausea vomiting and 

diarrhea and profound dehydration the patient wasiagnosed having DKA. She has 

previous history of  CVA, hypertension, coronary artery dase, chronic kidney 

disease.  The patient has had previous admissions for DKA.  She was also robert

gnosed having some bilateral low as the patient had bilateral lower lobe 

pulmonary infiltrate right more than left.  She was given Levaquin and 

vancomycin.  The patient was treated with an insulin drip regarding her DKA.  

She has multiple comorbidities.  This morning, the patient is still on IV flui

ds.  She is on D5 half-normal saline with 20 mEq of potassium chloride the rate 

of 75 mL an hour.  She had a follow-up blood work that showed resolution of her 

anion gap metabolic acidosis.  A serum bicarbonate of 29 with anion gap of the 

right this point in time.  Her hemoglobin was found to be at 6.8 and the patient

was given a unit of packed RBC.  No evidence of any bleeding at this point in ti

me.  The chest x-rays clear showing a right upper lobe consolidation/pneumonia. 

There is a central blood culture that showing also gram-positive cocci.  Based 

on that, I kept the Levaquin and vancomycin and added clindamycin regarding the 

possibility of a right upper lobe aspiration pneumonia.  The patient was also 

lethargic and confused and based on that and based on history of seizure 

activity, and neurology consultation was requested.  Noted the patient was 

taking a combination of Vimpat and Depakote on outpatient basis.  At this point 

in time she is unable to take her medications orally as the patient has failed a

swallow bedside evaluation.  The Depakote level has not been checked yet.  Noted

the patient also has a critical internal carotid artery stenosis on the left and

the patient remains on aspirin on outpatient basis and vascular surgery has been

requested to see this patient the past.





On 10/06/2020, the patient is doing well.  She is much more awake and alert 

compared to yesterday.  His swallow evaluation will be repeated today.  The 

patient seems to be able to pass her swallow evaluation patient is currently on 

2 L about 2 by nasal cannula with a pulse of 97%.  She was on a D5 half-normal 

saline at the rate of 150 mL an hour and this can be obviously cut down further.

 Chest exit shows improvement in the right upper lobe pneumonia as the patient 

is a combination of Levaquin and clindamycin.  No cough.  No sputum production. 

No chest that is no wheezing.  The neurologist evaluated the patient and the 

patient had an EGD that showed no evidence of any seizure activity and will 

continue the antiepileptic medication which include a combination of Vimpat 75 

mg by mouth twice a day and Depakote 250 mg 3 times a day.  The patient would 

also need a follow-up evaluation with vascular surgery regarding her carotid 

artery stenosis which is significant on the left.





10/07/2020, the patient is having another swallow evaluation.  She remains 

nothing by mouth based on the failed swallow evaluation done earlier.  Earlier 

this morning she also had few episodes of hypoglycemia.  I lowered the Lantus 

dose down to 10 units a day.  I also started the patient on D5 half-normal at 

the rate of 50 mL An hour.  We are awaiting another swallow evaluation. The 

patient is showing some silent aspiration on nectar thick material based on a 

swallow evaluation.  The patient otherwise is resting comfortably in bed.  No 

signs of an acute pneumonia.  She continues to take a combination of Levaquin 

and clindamycin which can be switched to oral crushed medication regarding her 

aspiration pneumonia which involves the right upper lobe.  No other significant 

events overnight.  No seizure activity.  She remains on her antiepileptic 

medication.  Awaiting another swallow evaluation for now.





Objective





- Vital Signs


Vital signs: 


                                   Vital Signs











Temp  98.6 F   10/07/20 07:00


 


Pulse  96   10/07/20 12:09


 


Resp  17   10/07/20 07:00


 


BP  83/47   10/06/20 23:00


 


Pulse Ox  100   10/07/20 07:00








                                 Intake & Output











 10/06/20 10/07/20 10/07/20





 18:59 06:59 18:59


 


Intake Total 649 100 


 


Output Total 1550 340 


 


Balance -901 -240 


 


Weight 50.4 kg  


 


Intake:   


 


   100 


 


    D5-0.45% NaCl with KCl 275  





    20Meq/l 1,000 ml @ 50 mls   





    /hr IV .Q20H [facility] Rx#:   





    [id number]   


 


    Dextrose 5%-0.9% NaCl 1, 50  





    000 ml @ 50 mls/hr IV .   





    Q20H [facility] Rx#:[id number]   


 


    Pressure Bag 24  


 


    Sodium Chloride 0.9% 1, 150 100 





    000 ml @ 50 mls/hr IV .   





    Q20H [facility] Rx#:[id number]   


 


  Intake, IV Titration 150  





  Amount   


 


    Lacosamide IV 75 mg In 50  





    Sodium Chloride 0.9% 50   





    ml @ 100 mls/hr IVPB BID   





    [facility] Rx#:[id number]   


 


    Valproate Sodium 250 mg 100  





    In Sodium Chloride 0.9%   





    100 ml @ 100 mls/hr IVPB   





    Q8HR [facility] Rx#:[id number]   


 


Output:   


 


  Urine 1550 340 


 


Other:   


 


  Voiding Method Indwelling Catheter Indwelling Catheter Indwelling Catheter








                       ABP, PAP, CO, CI - Last Documented











Arterial Blood Pressure        92/57

















- Exam








Gen. appearance she is calm and comfortable likely distress and she is laying 

comfortably in bed


Head exam was generally normal. There was no scleral icterus or corneal arcus. M

ucous membranes were moist.


Neck was supple and without jugular venous distension, thyromegaly, or carotid 

bruits. Carotids were easily palpable bilaterally. There was no adenopathy.


Lungs were clear to auscultation and percussion, and with normal diaphragmatic 

excursion. No wheezes or rales were noted. 


Cardiac exam revealed the PMI to be normally situated and sized. The rhythm was 

regular and no extrasystoles were noted during several minutes of auscultation. 

The first and second heart sounds were normal and physiologic splitting of the 

second heart sound was noted. There were no murmurs, rubs, clicks, or gallops.


Abdominal exam revealed normal bowel sounds. The abdomen was soft, non-tender, 

and without masses, organomegaly, or appreciable enlargement of the abdominal 

aorta.


Examination of the extremities revealed easily palpable radial, femoral and 

pedal pulses. There was no cyanosis, clubbing or edema.


Examination of the skin revealed no evidence of significant rashes, suspicious a

ppearing nevi or other concerning lesions.  The patient has had previous 

amputation of the toes on the left and the first second and the fifth toes are 

missing at this point in time.


Neurologically the patient is awake and alert and oriented and she is alert and 

oriented 3 on today's evaluation..  Cranial nerves were essentially intact.  

The patient had a weakness in the left upper extremity.  Gait was not assessed. 

Reflexes were brisk over the left upper extremity.  Babinski is upgoing on the 

left.  





- Labs


CBC & Chem 7: 


                                 10/07/20 03:10





                                 10/07/20 03:10


Labs: 


                  Abnormal Lab Results - Last 24 Hours (Table)











  10/06/20 10/06/20 10/07/20 Range/Units





  16:32 19:43 01:51 


 


RBC     (3.80-5.40)  m/uL


 


Hgb     (11.4-16.0)  gm/dL


 


Hct     (34.0-46.0)  %


 


RDW     (11.5-15.5)  %


 


Plt Count     (150-450)  k/uL


 


Potassium     (3.5-5.1)  mmol/L


 


Chloride     ()  mmol/L


 


Carbon Dioxide     (22-30)  mmol/L


 


BUN     (7-17)  mg/dL


 


POC Glucose (mg/dL)  117 H  236 H  42 L  (75-99)  mg/dL


 


Calcium     (8.4-10.2)  mg/dL


 


AST     (14-36)  U/L


 


Total Protein     (6.3-8.2)  g/dL


 


Albumin     (3.5-5.0)  g/dL














  10/07/20 10/07/20 10/07/20 Range/Units





  02:10 02:31 03:10 


 


RBC    2.60 L  (3.80-5.40)  m/uL


 


Hgb    7.5 L D  (11.4-16.0)  gm/dL


 


Hct    23.5 L  (34.0-46.0)  %


 


RDW    16.5 H  (11.5-15.5)  %


 


Plt Count    57 L  (150-450)  k/uL


 


Potassium     (3.5-5.1)  mmol/L


 


Chloride     ()  mmol/L


 


Carbon Dioxide     (22-30)  mmol/L


 


BUN     (7-17)  mg/dL


 


POC Glucose (mg/dL)  212 H  136 H   (75-99)  mg/dL


 


Calcium     (8.4-10.2)  mg/dL


 


AST     (14-36)  U/L


 


Total Protein     (6.3-8.2)  g/dL


 


Albumin     (3.5-5.0)  g/dL














  10/07/20 10/07/20 10/07/20 Range/Units





  03:10 03:55 03:56 


 


RBC     (3.80-5.40)  m/uL


 


Hgb     (11.4-16.0)  gm/dL


 


Hct     (34.0-46.0)  %


 


RDW     (11.5-15.5)  %


 


Plt Count     (150-450)  k/uL


 


Potassium  2.9 L    (3.5-5.1)  mmol/L


 


Chloride  113 H    ()  mmol/L


 


Carbon Dioxide  33 H    (22-30)  mmol/L


 


BUN  19 H    (7-17)  mg/dL


 


POC Glucose (mg/dL)   58 L  51 L  (75-99)  mg/dL


 


Calcium  8.0 L    (8.4-10.2)  mg/dL


 


AST  13 L    (14-36)  U/L


 


Total Protein  4.7 L    (6.3-8.2)  g/dL


 


Albumin  2.1 L    (3.5-5.0)  g/dL














  10/07/20 10/07/20 10/07/20 Range/Units





  04:47 05:58 06:30 


 


RBC     (3.80-5.40)  m/uL


 


Hgb     (11.4-16.0)  gm/dL


 


Hct     (34.0-46.0)  %


 


RDW     (11.5-15.5)  %


 


Plt Count     (150-450)  k/uL


 


Potassium     (3.5-5.1)  mmol/L


 


Chloride     ()  mmol/L


 


Carbon Dioxide     (22-30)  mmol/L


 


BUN     (7-17)  mg/dL


 


POC Glucose (mg/dL)  126 H  68 L  202 H  (75-99)  mg/dL


 


Calcium     (8.4-10.2)  mg/dL


 


AST     (14-36)  U/L


 


Total Protein     (6.3-8.2)  g/dL


 


Albumin     (3.5-5.0)  g/dL














  10/07/20 10/07/20 10/07/20 Range/Units





  08:31 08:46 11:33 


 


RBC     (3.80-5.40)  m/uL


 


Hgb     (11.4-16.0)  gm/dL


 


Hct     (34.0-46.0)  %


 


RDW     (11.5-15.5)  %


 


Plt Count     (150-450)  k/uL


 


Potassium     (3.5-5.1)  mmol/L


 


Chloride     ()  mmol/L


 


Carbon Dioxide     (22-30)  mmol/L


 


BUN     (7-17)  mg/dL


 


POC Glucose (mg/dL)  34 L  182 H  109 H  (75-99)  mg/dL


 


Calcium     (8.4-10.2)  mg/dL


 


AST     (14-36)  U/L


 


Total Protein     (6.3-8.2)  g/dL


 


Albumin     (3.5-5.0)  g/dL








                      Microbiology - Last 24 Hours (Table)











 10/02/20 19:45 Blood Culture Gram Stain - Final





 Blood Blood Culture - Final





    Staph hominis sub sp. hominis














Assessment and Plan


Plan: 











1 diabetic ketoacidosis, recovered and the patient is on long-acting insulin the

patient developed some episodes of hypoglycemia overnight.  Insulin dose will be

adjusted.  The patient is remaining nothing by mouth based on the failed swallow

evaluation.  This will evaluation will be done and repeated today.


 


2 right-sided pneumonia mainly involving the right upper lobe, consider 

aspiration, and the patient is currently on accommodation of Levaquin and 

clindamycin and there is improvement in the right upper lobe consolidation on to

day's chest x-ray findings.  





3 diabetes mellitus with previous episodes of DKA, see above dictation





4 diabetic peripheral neuropathy, retinopathy





5 coronary artery disease  with a preserved LV function  based on 

echocardiogramfrom 2019





6 COPD





7 history of  DVT , axilla , on the right





9 hyperlipidemia





10 hypertension





11 chronic kidney disease, with a component of an acute kidney injury, improved





12 peripheral artery disease





13 hypothyroidism





14 previous history of second toe infection/amputation and previous history of 

right foot infection requiring a wound VAC





15 CVA with someresidual left-sided weakness, and the CAT scan of the brain 

showed an old large right hemispheric infarcts.  No other acute abnormality is 

seen.





16 history of seizures, maintained on a combination of Vimpat and Depakote on 

outpatient basis





17 chronic anemia, with interval drop in hemoglobin down to 6.8 and the patient 

will be receiving a unit of packed RBC and follow-up hemoglobin is at 9.2





18 bipolar disorder, depression, anxiety





19 carotid artery stenosis, critical on the right





20 altered mental status and neurologist on the case, and the patient is  

clinically improving and seizure activity has been ruled out.  





 plan








complete a another swallow evaluation today based on a failed evaluation done 

yesterday


Lower the Levemir dose to 10 units at nighttime


Start the patient D5 half-normal saline today to 50 mL an hour and monitor the 

blood sugars


Continue Levaquin and clindamycin switched antibiotics to oral


We'll continue to follow make further recommendations based on her progress.

## 2020-10-08 LAB
ALBUMIN SERPL-MCNC: 2.3 G/DL (ref 3.5–5)
ALP SERPL-CCNC: 65 U/L (ref 38–126)
ALT SERPL-CCNC: 14 U/L (ref 4–34)
ANION GAP SERPL CALC-SCNC: 5 MMOL/L
AST SERPL-CCNC: 21 U/L (ref 14–36)
BASOPHILS # BLD AUTO: 0 K/UL (ref 0–0.2)
BASOPHILS NFR BLD AUTO: 0 %
BUN SERPL-SCNC: 13 MG/DL (ref 7–17)
CALCIUM SPEC-MCNC: 8.4 MG/DL (ref 8.4–10.2)
CHLORIDE SERPL-SCNC: 113 MMOL/L (ref 98–107)
CO2 SERPL-SCNC: 26 MMOL/L (ref 22–30)
EOSINOPHIL # BLD AUTO: 0 K/UL (ref 0–0.7)
EOSINOPHIL NFR BLD AUTO: 1 %
ERYTHROCYTE [DISTWIDTH] IN BLOOD BY AUTOMATED COUNT: 2.6 M/UL (ref 3.8–5.4)
ERYTHROCYTE [DISTWIDTH] IN BLOOD: 16.3 % (ref 11.5–15.5)
GLUCOSE BLD-MCNC: 146 MG/DL (ref 75–99)
GLUCOSE BLD-MCNC: 150 MG/DL (ref 75–99)
GLUCOSE BLD-MCNC: 283 MG/DL (ref 75–99)
GLUCOSE BLD-MCNC: 385 MG/DL (ref 75–99)
GLUCOSE BLD-MCNC: 387 MG/DL (ref 75–99)
GLUCOSE BLD-MCNC: 50 MG/DL (ref 75–99)
GLUCOSE BLD-MCNC: 53 MG/DL (ref 75–99)
GLUCOSE BLD-MCNC: 73 MG/DL (ref 75–99)
GLUCOSE SERPL-MCNC: 79 MG/DL (ref 74–99)
HCT VFR BLD AUTO: 24.4 % (ref 34–46)
HGB BLD-MCNC: 7.7 GM/DL (ref 11.4–16)
LYMPHOCYTES # SPEC AUTO: 1.3 K/UL (ref 1–4.8)
LYMPHOCYTES NFR SPEC AUTO: 36 %
MCH RBC QN AUTO: 29.7 PG (ref 25–35)
MCHC RBC AUTO-ENTMCNC: 31.7 G/DL (ref 31–37)
MCV RBC AUTO: 93.6 FL (ref 80–100)
MONOCYTES # BLD AUTO: 0.3 K/UL (ref 0–1)
MONOCYTES NFR BLD AUTO: 7 %
NEUTROPHILS # BLD AUTO: 2 K/UL (ref 1.3–7.7)
NEUTROPHILS NFR BLD AUTO: 55 %
PLATELET # BLD AUTO: 60 K/UL (ref 150–450)
POTASSIUM SERPL-SCNC: 3.5 MMOL/L (ref 3.5–5.1)
PROT SERPL-MCNC: 5 G/DL (ref 6.3–8.2)
SODIUM SERPL-SCNC: 144 MMOL/L (ref 137–145)
WBC # BLD AUTO: 3.7 K/UL (ref 3.8–10.6)

## 2020-10-08 RX ADMIN — DIVALPROEX SODIUM SCH MG: 250 TABLET, DELAYED RELEASE ORAL at 21:56

## 2020-10-08 RX ADMIN — PANTOPRAZOLE SODIUM SCH MG: 40 INJECTION, POWDER, FOR SOLUTION INTRAVENOUS at 10:37

## 2020-10-08 RX ADMIN — METOCLOPRAMIDE SCH: 10 TABLET ORAL at 13:31

## 2020-10-08 RX ADMIN — METOCLOPRAMIDE SCH MG: 10 TABLET ORAL at 16:58

## 2020-10-08 RX ADMIN — IPRATROPIUM BROMIDE AND ALBUTEROL SULFATE SCH ML: .5; 3 SOLUTION RESPIRATORY (INHALATION) at 07:59

## 2020-10-08 RX ADMIN — METOCLOPRAMIDE SCH MG: 10 TABLET ORAL at 10:38

## 2020-10-08 RX ADMIN — DULOXETINE SCH MG: 60 CAPSULE, DELAYED RELEASE ORAL at 10:37

## 2020-10-08 RX ADMIN — CLINDAMYCIN HYDROCHLORIDE SCH MG: 150 CAPSULE ORAL at 10:38

## 2020-10-08 RX ADMIN — IPRATROPIUM BROMIDE AND ALBUTEROL SULFATE SCH ML: .5; 3 SOLUTION RESPIRATORY (INHALATION) at 19:44

## 2020-10-08 RX ADMIN — LACOSAMIDE SCH MG: 50 TABLET, FILM COATED ORAL at 21:48

## 2020-10-08 RX ADMIN — INSULIN ASPART SCH: 100 INJECTION, SOLUTION INTRAVENOUS; SUBCUTANEOUS at 05:23

## 2020-10-08 RX ADMIN — VALPROATE SODIUM SCH MLS/HR: 100 INJECTION, SOLUTION INTRAVENOUS at 00:14

## 2020-10-08 RX ADMIN — ASPIRIN 81 MG CHEWABLE TABLET SCH MG: 81 TABLET CHEWABLE at 10:36

## 2020-10-08 RX ADMIN — DIVALPROEX SODIUM SCH MG: 250 TABLET, DELAYED RELEASE ORAL at 16:58

## 2020-10-08 RX ADMIN — IPRATROPIUM BROMIDE AND ALBUTEROL SULFATE SCH ML: .5; 3 SOLUTION RESPIRATORY (INHALATION) at 12:01

## 2020-10-08 RX ADMIN — Medication SCH MG: at 21:48

## 2020-10-08 RX ADMIN — Medication SCH MG: at 10:37

## 2020-10-08 RX ADMIN — INSULIN ASPART SCH: 100 INJECTION, SOLUTION INTRAVENOUS; SUBCUTANEOUS at 16:58

## 2020-10-08 RX ADMIN — INSULIN ASPART SCH UNIT: 100 INJECTION, SOLUTION INTRAVENOUS; SUBCUTANEOUS at 00:20

## 2020-10-08 RX ADMIN — LACOSAMIDE SCH MLS/HR: 10 INJECTION INTRAVENOUS at 00:14

## 2020-10-08 RX ADMIN — INSULIN ASPART SCH UNIT: 100 INJECTION, SOLUTION INTRAVENOUS; SUBCUTANEOUS at 21:48

## 2020-10-08 RX ADMIN — INSULIN ASPART SCH UNIT: 100 INJECTION, SOLUTION INTRAVENOUS; SUBCUTANEOUS at 12:26

## 2020-10-08 RX ADMIN — IPRATROPIUM BROMIDE AND ALBUTEROL SULFATE SCH ML: .5; 3 SOLUTION RESPIRATORY (INHALATION) at 15:51

## 2020-10-08 RX ADMIN — INSULIN ASPART SCH UNIT: 100 INJECTION, SOLUTION INTRAVENOUS; SUBCUTANEOUS at 10:35

## 2020-10-08 RX ADMIN — CLOPIDOGREL BISULFATE SCH MG: 75 TABLET ORAL at 10:37

## 2020-10-08 RX ADMIN — CLINDAMYCIN HYDROCHLORIDE SCH MG: 150 CAPSULE ORAL at 16:57

## 2020-10-08 RX ADMIN — DIVALPROEX SODIUM SCH MG: 250 TABLET, DELAYED RELEASE ORAL at 12:26

## 2020-10-08 RX ADMIN — INSULIN DETEMIR SCH UNIT: 100 INJECTION, SOLUTION SUBCUTANEOUS at 10:43

## 2020-10-08 RX ADMIN — ATORVASTATIN CALCIUM SCH MG: 80 TABLET, FILM COATED ORAL at 21:48

## 2020-10-08 RX ADMIN — CLINDAMYCIN HYDROCHLORIDE SCH MG: 150 CAPSULE ORAL at 21:56

## 2020-10-08 RX ADMIN — LACOSAMIDE SCH MG: 50 TABLET, FILM COATED ORAL at 10:37

## 2020-10-08 RX ADMIN — POTASSIUM CHLORIDE SCH: 14.9 INJECTION, SOLUTION INTRAVENOUS at 17:00

## 2020-10-08 RX ADMIN — LOSARTAN POTASSIUM SCH MG: 50 TABLET, FILM COATED ORAL at 10:37

## 2020-10-08 RX ADMIN — LEVOFLOXACIN SCH MG: 500 TABLET, FILM COATED ORAL at 10:38

## 2020-10-08 NOTE — PN
PROGRESS NOTE



DATE OF SERVICE:

10/08/2020



REASON FOR FOLLOWUP:

Positive blood culture.



INTERVAL HISTORY:

The patient is currently afebrile.  The patient remains pleasantly confused but no

agitation. No nausea, no vomiting.  No abdominal pain or any diarrhea.



PHYSICAL EXAMINATION:

Blood pressure 92/47, pulse of 103, temperature 98. She is 98% on room air.

General description is a middle-aged female lying in bed in no distress.

RESPIRATORY SYSTEM: Unlabored breathing. Clear to auscultation anteriorly.

HEART: S1, S2.  Regular rate and rhythm.

ABDOMEN: Soft. No tenderness.

EXTREMITIES: Wounds are currently healed. No cellulitis.



LABS:

Hemoglobin 7.7, white count 3.7, creatinine 0.80. Blood culture with Staphylococcus

hominis.



DIAGNOSTIC IMPRESSION AND PLAN:

1. Patient with a positive blood culture with Staphylococcus hominis, likely skin

    contamination, as the patient has no clinical _____ to go along with it.  Blood

    culture will be repeated to document clearance.

2. Possible aspiration pneumonia, for which the patient is currently being treated

    with Levaquin and clindamycin; to continue. Monitor her clinical course closely.





MMODL / IJN: [id number] / Job#: [id number]

## 2020-10-08 NOTE — P.PN
Subjective


Progress Note Date: 10/08/20





Patient is seen at bedside and states is doing well.  Denies new weakness, 

numbness and visual disturbance.


SLP is following up with patient regarding patient dysphagia.


Regarding her pneumonia she is on Levaquin as well as clindamycin.








Objective





- Vital Signs


Vital signs: 


                                   Vital Signs











Temp  98 F   10/08/20 06:25


 


Pulse  90   10/08/20 08:00


 


Resp  18   10/08/20 06:25


 


BP  127/59   10/08/20 06:25


 


Pulse Ox  95   10/08/20 06:25








                                 Intake & Output











 10/07/20 10/08/20 10/08/20





 18:59 06:59 18:59


 


Intake Total 400 500 


 


Output Total 450 800 


 


Balance -50 -300 


 


Intake:   


 


   500 


 


    D5-0.45% NaCl with KCl 400 400 





    20Meq/l 1,000 ml @ 50 mls   





    /hr IV .Q20H [facility] Rx#:   





    [id number]   


 


    Sodium Chloride 0.9% 1,  100 





    000 ml @ 50 mls/hr IV .   





    Q20H [facility] Rx#:[id number]   


 


Output:   


 


  Urine 450 800 


 


Other:   


 


  Voiding Method Indwelling Catheter Indwelling Catheter 








                       ABP, PAP, CO, CI - Last Documented











Arterial Blood Pressure        92/57

















- Exam


GENERAL: The patient is lying in bed and is not in acute distress.  


CHEST: The heart rate is regular rate rhythm.   No murmurs to auscultation.  


LUNG: Clear to auscultation bilaterally no wheezing noted throughout.  Not 

labored breathing.


ABDOMEN/GI: Bowel sounds present in all 4 quadrants. No tenderness to palpation 

throughout.





NEUROLOGICAL:


Higher mental function: The patient is awake, alert, oriented to self, place and

time.  Patient is following simple commands.  No aphasia and no neglect.


Cranial nerves: The pupils are round, equal (4mm bilaterally) and reactive to 

light..  Visual fields are Left Homonymous lower visual field cut to 

confrontation.   Extraocular movement is intact no nystagmus is noted.  Facial 

sensation is normal to touch throughout.  The facial strength: left nasolabial 

flattening .  Hearing is normal bilaterally to hand rub.  Tongue is midline and 

moved side-to-side without any difficulty.  Mild dysarthria is noted.  


Motor: Gait is defered.  Right upper and lower extremity is 5/5.  Left upper 

extremity is 0/5.  Left lower extremity is 4+/5.  Increased tone of left upper 

extremity especially at hand.  


Sensation: Normal sensation to touch throughout.


Reflexes (right/left): Brisk over the left upper extremity (3+) otherwise 1+ 

throughout. 


Plantars: Unable to assess right foot since digit 1-2 are amputated (as well as 

5th digit).  Left is upgoing.








- Labs


CBC & Chem 7: 


                                 10/08/20 03:42





                                 10/08/20 03:42


Labs: 


                  Abnormal Lab Results - Last 24 Hours (Table)











  10/07/20 10/07/20 10/07/20 Range/Units





  08:31 08:46 11:33 


 


WBC     (3.8-10.6)  k/uL


 


RBC     (3.80-5.40)  m/uL


 


Hgb     (11.4-16.0)  gm/dL


 


Hct     (34.0-46.0)  %


 


RDW     (11.5-15.5)  %


 


Plt Count     (150-450)  k/uL


 


Chloride     ()  mmol/L


 


POC Glucose (mg/dL)  34 L  182 H  109 H  (75-99)  mg/dL


 


Total Protein     (6.3-8.2)  g/dL


 


Albumin     (3.5-5.0)  g/dL














  10/07/20 10/07/20 10/08/20 Range/Units





  17:08 22:17 00:19 


 


WBC     (3.8-10.6)  k/uL


 


RBC     (3.80-5.40)  m/uL


 


Hgb     (11.4-16.0)  gm/dL


 


Hct     (34.0-46.0)  %


 


RDW     (11.5-15.5)  %


 


Plt Count     (150-450)  k/uL


 


Chloride     ()  mmol/L


 


POC Glucose (mg/dL)  219 H  207 H  387 H  (75-99)  mg/dL


 


Total Protein     (6.3-8.2)  g/dL


 


Albumin     (3.5-5.0)  g/dL














  10/08/20 10/08/20 10/08/20 Range/Units





  03:42 03:42 04:02 


 


WBC  3.7 L    (3.8-10.6)  k/uL


 


RBC  2.60 L    (3.80-5.40)  m/uL


 


Hgb  7.7 L    (11.4-16.0)  gm/dL


 


Hct  24.4 L    (34.0-46.0)  %


 


RDW  16.3 H    (11.5-15.5)  %


 


Plt Count  60 L    (150-450)  k/uL


 


Chloride   113 H   ()  mmol/L


 


POC Glucose (mg/dL)    73 L  (75-99)  mg/dL


 


Total Protein   5.0 L   (6.3-8.2)  g/dL


 


Albumin   2.3 L   (3.5-5.0)  g/dL








                      Microbiology - Last 24 Hours (Table)











 10/02/20 19:45 Blood Culture Gram Stain - Final





 Blood Blood Culture - Final





    Staph hominis sub sp. hominis














Assessment and Plan


Assessment: 


Toxic metabolic encephalopathy (DKA and right sided pneumonia)--improving


Seizure


Dysphagia


Right hemispheric stroke with residual left-sided weakness


Right ICA occlusion. 


Asymptomatic left ICA stenosis


brittle diabetes


Diabetic ketoacidosis---resolved


Right sided pneumonia


NA--resolved


X tobacco use


Hyperlipidemia


Hypertension


History of myocardial infarction or


History of coronary artery disease





Plan: 


Continue Vimpat 75 mg bid. Continue Depakote 250 mg 3 times a day will not 

modify the medication since the patient has history of restless per the 

caregiver from last visit.


Total valproic acid was less than 10.  Even if her valproic acid is 

subtherapeutic we'll continue with the same medication.  Pending free valproic 

acid level


Routine EEG:  The background slowing over the left hemisphere is consistent with

mild to moderate encephalopathy.  The focal slwoing over the right 

osecfu-aziraxd-[id number] region is consistent with patient history of Right MCA 

stroke.  There is rare to occasional slowing over the right mid-temporal which 

can increase risk of focal epilepsy.  There is no active seizure.


Ammonia level: <9. 


Regarding the patient history of stroke continue aspirin 81 mg daily as well as 

Plavix 75mg daily for secondary stroke for prophylaxis (dual antiplatelet since 

has ICA stenosis and occlusion).  Continue Atorvastatin 80 mg daily.





Regarding the management of total diabetes will defer management to the ICU and 

the primary team.


We'll defer the management of pneumonia to ID team.  





patient was seen by Dr. Ricky Vang for right ICA occlusion 09/04/20.  Dr. Vang I recommended that carotid duplex and was completed on 09/04/20 and 

reported as chronic occlusion of the right ICA.  Moderate atherosclerotic change

of the left bifurcation without hemodynamic significant stenosis.  In my opinion

she does not need any intervention at this time.  I attempted to contact Dr. Nagy office and left message.  Recommend the patient to follow-up with Dr. Vang or his colleagues as an outpatient.





The plan was discussed with the patient nurse.





Destin Zamora M.D.


Neuro-hospitalist








Time with Patient: Less than 30

## 2020-10-08 NOTE — P.PN
Subjective


Progress Note Date: 10/08/20








Morenita Ramirez, is a 60-year-old female who presented to [facility] emergency room due to nausea vomiting and diarrhea, and elevated 

glucose level, she was evaluated in the emergency room, her temperature on 

presentation was 90.1, pulse 75 respiration 18 blood pressure 79/51 pulse ox 94%

on room air, her white blood count was elevated at 11.6 hemoglobin 9.6 platelet 

count 156, venous blood gas pH was 7.05 glucose level was 744 and serum acetone 

was positive, patient was admitted to ICU she was started on IV fluid and on IV 

insulin drip.


Patient has a known history of insulin-dependent diabetes mellitus type 1 

maintained on insulin he had previous episodes of DKA, she also had a history of

stroke, history of hypertension, hyperlipidemia, coronary artery disease with 

myocardial infarction, peripheral vascular disease with multiple toe amputation,

history of COPD.


On review of systems patient is alert responsive in no apparent distress she was

seen in ICU she is still complaining of nausea and occasional episodes of 

vomiting there is no fever or chills no headache or dizziness no chest pain no 

shortness of breath no cough she is having some abdominal discomfort no no burni

ng with urination no frequency or urgency no hematuria.





On 10/04/2020 patient remains in the intensive care unit.  Patient does not 

appear in any kind of distress.  She currently getting treated for pneumonia 

critical care services are following.  Patient remains on Levaquin.  Patient has

been transitioned to subcu insulin patient did have episodes of low sugar this 

a.m. treated per protocol.





On 10/05/2020 patient was seen and examined in the ICU, she was very drowsy this

morning she failed swallow evaluation and is kept nothing by mouth at this time,

now patient is more alert and responsive, glucose level is better controlled, 

she is complaining of low back pain otherwise she denies any complaints.





On 10/06/2020 patient was seen and examined in the ICU she is more alert and 

responsive at this time she is complaining of feeling hungry however she failed 

swallow evaluation again this morning otherwise she denies any complaints there 

is no fever or chills no headache or dizziness no chest pain no shortness of 

breath no cough no nausea or vomiting no abdominal pain no diarrhea and no 

urinary symptoms.  At this time plan is to repeat swallow evaluation in the 

morning again before discussing alternative feeding options.





On 10/07/2020 patient is alert and oriented 2.  Patient is much more alert than

previous days.  Patient does seem fidgety in bed but this is similar to her base

line.  Blood sugars continue to fluctuate.  Patient having low blood sugar this 

a.m. requiring amp of D50.  Patient also failed swallow.  Currently on nectar 

thick and plans to eventually reassess swallowing the next few days per speech. 

Patient has been ordered out of the intensive care unit to [facility].  

Patient denies chest pain or shortness of breath.  Patient denies nausea 

vomiting or diarrhea.  Patient denies any urinary burning or frequency





On 10/08/2020 patient was seen and examined in the ICU she is alert and oriented

in no apparent distress she is tolerating pured diet well, there is no fever or

chills no headache or dizziness no chest pain no shortness of breath no cough no

nausea or vomiting no abdominal pain no diarrhea no blood in the stool Springer 

catheter is in place.





Objective





- Vital Signs


Vital signs: 


                                   Vital Signs











Temp  98 F   10/08/20 06:25


 


Pulse  96   10/08/20 08:11


 


Resp  18   10/08/20 06:25


 


BP  127/59   10/08/20 06:25


 


Pulse Ox  95   10/08/20 06:25








                                 Intake & Output











 10/07/20 10/08/20 10/08/20





 18:59 06:59 18:59


 


Intake Total 400 500 


 


Output Total 450 800 


 


Balance -50 -300 


 


Intake:   


 


   500 


 


    D5-0.45% NaCl with KCl 400 400 





    20Meq/l 1,000 ml @ 50 mls   





    /hr IV .Q20H [facility] Rx#:   





    [id number]   


 


    Sodium Chloride 0.9% 1,  100 





    000 ml @ 50 mls/hr IV .   





    Q20H [facility] Rx#:[id number]   


 


Output:   


 


  Urine 450 800 


 


Other:   


 


  Voiding Method Indwelling Catheter Indwelling Catheter 








                       ABP, PAP, CO, CI - Last Documented











Arterial Blood Pressure        92/57

















- Exam








In general patient is alert responsive in no apparent distress


HEENT head normocephalic and atraumatic


Neck is supple no JVD no goiter no lymphadenopathy


Chest exam reveals a few scattered rhonchi no wheezing


Cardiac exam reveals regular heart sounds S1 and S2 with mild tachycardia no 

gallops no murmurs


Abdomen is soft nontender no rigidity or rebound no palpable masses with 

hyperactive bowel sounds


Extremity exam reveals no edema no cyanosis or clubbing


Neurological examination reveals no gross new focal deficit











- Labs


CBC & Chem 7: 


                                 10/08/20 03:42





                                 10/08/20 03:42


Labs: 


                  Abnormal Lab Results - Last 24 Hours (Table)











  10/07/20 10/07/20 10/07/20 Range/Units





  11:33 17:08 22:17 


 


WBC     (3.8-10.6)  k/uL


 


RBC     (3.80-5.40)  m/uL


 


Hgb     (11.4-16.0)  gm/dL


 


Hct     (34.0-46.0)  %


 


RDW     (11.5-15.5)  %


 


Plt Count     (150-450)  k/uL


 


Chloride     ()  mmol/L


 


POC Glucose (mg/dL)  109 H  219 H  207 H  (75-99)  mg/dL


 


Total Protein     (6.3-8.2)  g/dL


 


Albumin     (3.5-5.0)  g/dL














  10/08/20 10/08/20 10/08/20 Range/Units





  00:19 03:42 03:42 


 


WBC   3.7 L   (3.8-10.6)  k/uL


 


RBC   2.60 L   (3.80-5.40)  m/uL


 


Hgb   7.7 L   (11.4-16.0)  gm/dL


 


Hct   24.4 L   (34.0-46.0)  %


 


RDW   16.3 H   (11.5-15.5)  %


 


Plt Count   60 L   (150-450)  k/uL


 


Chloride    113 H  ()  mmol/L


 


POC Glucose (mg/dL)  387 H    (75-99)  mg/dL


 


Total Protein    5.0 L  (6.3-8.2)  g/dL


 


Albumin    2.3 L  (3.5-5.0)  g/dL














  10/08/20 Range/Units





  04:02 


 


WBC   (3.8-10.6)  k/uL


 


RBC   (3.80-5.40)  m/uL


 


Hgb   (11.4-16.0)  gm/dL


 


Hct   (34.0-46.0)  %


 


RDW   (11.5-15.5)  %


 


Plt Count   (150-450)  k/uL


 


Chloride   ()  mmol/L


 


POC Glucose (mg/dL)  73 L  (75-99)  mg/dL


 


Total Protein   (6.3-8.2)  g/dL


 


Albumin   (3.5-5.0)  g/dL








                      Microbiology - Last 24 Hours (Table)











 10/02/20 19:45 Blood Culture Gram Stain - Final





 Blood Blood Culture - Final





    Staph hominis sub sp. hominis














Assessment and Plan


Plan: 





1.  Acute diabetic ketoacidosis with severe hyperglycemia and positive serum 

acetone patient is improving with IV fluid and IV insulin drip





2.  Underlying history of diabetes mellitus type 1 maintained on insulin, her 

primary care provider is Eleanor Rosas, she was contacted and she stated that she 

was recently hospitalized at [facility], a different relative was 

caring for patient and apparently glucose level has been extremely elevated for 

several days.





3.  Underlying history of hypertension





4.  Underlying history of hyperlipidemia





5.  Underlying history of seizure disorder, will place patient on IV Keppra 

until she is able to take her oral medications





6.  Underlying history of COPD stable no evidence of exacerbation





7.  Underlying history of coronary artery disease stable at this time patient 

denies any chest pain





8.  Underlying history of peripheral vascular disease with previous history of 

multiple toe amputations





9.  Previous history of stroke.





At this time patient is admitted to intensive care unit continue with IV fluids 

and insulin management


Home medication reviewed, will be switched to IV medications when possible due 

to continuous nausea and vomiting at this time


Pulmonary critical care are following

## 2020-10-08 NOTE — P.PN
Subjective


Progress Note Date: 10/08/20








this is a 60-year-old female patient who came in with nausea vomiting and 

diarrhea and profound dehydration the patient wasiagnosed having DKA. She has 

previous history of  CVA, hypertension, coronary artery dase, chronic kidney 

disease.  The patient has had previous admissions for DKA.  She was also robert

gnosed having some bilateral low as the patient had bilateral lower lobe 

pulmonary infiltrate right more than left.  She was given Levaquin and 

vancomycin.  The patient was treated with an insulin drip regarding her DKA.  

She has multiple comorbidities.  This morning, the patient is still on IV flui

ds.  She is on D5 half-normal saline with 20 mEq of potassium chloride the rate 

of 75 mL an hour.  She had a follow-up blood work that showed resolution of her 

anion gap metabolic acidosis.  A serum bicarbonate of 29 with anion gap of the 

right this point in time.  Her hemoglobin was found to be at 6.8 and the patient

was given a unit of packed RBC.  No evidence of any bleeding at this point in ti

me.  The chest x-rays clear showing a right upper lobe consolidation/pneumonia. 

There is a central blood culture that showing also gram-positive cocci.  Based 

on that, I kept the Levaquin and vancomycin and added clindamycin regarding the 

possibility of a right upper lobe aspiration pneumonia.  The patient was also 

lethargic and confused and based on that and based on history of seizure 

activity, and neurology consultation was requested.  Noted the patient was 

taking a combination of Vimpat and Depakote on outpatient basis.  At this point 

in time she is unable to take her medications orally as the patient has failed a

swallow bedside evaluation.  The Depakote level has not been checked yet.  Noted

the patient also has a critical internal carotid artery stenosis on the left and

the patient remains on aspirin on outpatient basis and vascular surgery has been

requested to see this patient the past.





On 10/06/2020, the patient is doing well.  She is much more awake and alert 

compared to yesterday.  His swallow evaluation will be repeated today.  The 

patient seems to be able to pass her swallow evaluation patient is currently on 

2 L about 2 by nasal cannula with a pulse of 97%.  She was on a D5 half-normal 

saline at the rate of 150 mL an hour and this can be obviously cut down further.

 Chest exit shows improvement in the right upper lobe pneumonia as the patient 

is a combination of Levaquin and clindamycin.  No cough.  No sputum production. 

No chest that is no wheezing.  The neurologist evaluated the patient and the 

patient had an EGD that showed no evidence of any seizure activity and will 

continue the antiepileptic medication which include a combination of Vimpat 75 

mg by mouth twice a day and Depakote 250 mg 3 times a day.  The patient would 

also need a follow-up evaluation with vascular surgery regarding her carotid 

artery stenosis which is significant on the left.





10/07/2020, the patient is having another swallow evaluation.  She remains 

nothing by mouth based on the failed swallow evaluation done earlier.  Earlier 

this morning she also had few episodes of hypoglycemia.  I lowered the Lantus 

dose down to 10 units a day.  I also started the patient on D5 half-normal at 

the rate of 50 mL An hour.  We are awaiting another swallow evaluation. The 

patient is showing some silent aspiration on nectar thick material based on a 

swallow evaluation.  The patient otherwise is resting comfortably in bed.  No 

signs of an acute pneumonia.  She continues to take a combination of Levaquin 

and clindamycin which can be switched to oral crushed medication regarding her 

aspiration pneumonia which involves the right upper lobe.  No other significant 

events overnight.  No seizure activity.  She remains on her antiepileptic 

medication.  Awaiting another swallow evaluation for now.





10/08/2020, the patient is having some pured honey thick diet.  She was able to

pass a swallow evaluation.  Doing well.  No specific complaints.  The patient is

still being cheered for right upper lobe aspiration pneumonia with accommodation

Levaquin and clindamycin.  The blood culture was positive for staph hominis a 

contaminant.  The patient's white cell count is at 3.7.  The blood sugar still 

fluctuating being as low as 73 and as high as 85.  I'm going to give her based 

on her medicine of 10 units on a daily basis and addition to a scale coverage.  

No seizure activity has been noted.  Antiepileptic medication was switched to 

oral form.  She is on a combination of Vimpat and Depakote.  Otherwise, she is 

afebrile and she is hemodynamic is stable.  Her anion gap is down to 5 and the 

patient has a bicarb of 26.  No other significant events otherwise for now.





Objective





- Vital Signs


Vital signs: 


                                   Vital Signs











Temp  98 F   10/08/20 06:25


 


Pulse  94   10/08/20 12:14


 


Resp  18   10/08/20 06:25


 


BP  127/59   10/08/20 06:25


 


Pulse Ox  95   10/08/20 06:25








                                 Intake & Output











 10/07/20 10/08/20 10/08/20





 18:59 06:59 18:59


 


Intake Total 400 500 


 


Output Total 450 800 


 


Balance -50 -300 


 


Intake:   


 


   500 


 


    D5-0.45% NaCl with KCl 400 400 





    20Meq/l 1,000 ml @ 50 mls   





    /hr IV .Q20H [facility] Rx#:   





    [id number]   


 


    Sodium Chloride 0.9% 1,  100 





    000 ml @ 50 mls/hr IV .   





    Q20H [facility] Rx#:[id number]   


 


Output:   


 


  Urine 450 800 


 


Other:   


 


  Voiding Method Indwelling Catheter Indwelling Catheter 








                       ABP, PAP, CO, CI - Last Documented











Arterial Blood Pressure        92/57

















- Exam








Gen. appearance she is calm and comfortable likely distress and she is laying 

comfortably in bed


Head exam was generally normal. There was no scleral icterus or corneal arcus. 

Mucous membranes were moist.


Neck was supple and without jugular venous distension, thyromegaly, or carotid 

bruits. Carotids were easily palpable bilaterally. There was no adenopathy.


Lungs were clear to auscultation and percussion, and with normal diaphragmatic 

excursion. No wheezes or rales were noted. 


Cardiac exam revealed the PMI to be normally situated and sized. The rhythm was 

regular and no extrasystoles were noted during several minutes of auscultation. 

The first and second heart sounds were normal and physiologic splitting of the 

second heart sound was noted. There were no murmurs, rubs, clicks, or gallops.


Abdominal exam revealed normal bowel sounds. The abdomen was soft, non-tender, 

and without masses, organomegaly, or appreciable enlargement of the abdominal 

aorta.


Examination of the extremities revealed easily palpable radial, femoral and 

pedal pulses. There was no cyanosis, clubbing or edema.


Examination of the skin revealed no evidence of significant rashes, suspicious 

appearing nevi or other concerning lesions.  The patient has had previous 

amputation of the toes on the left and the first second and the fifth toes are 

missing at this point in time.


Neurologically the patient is awake and alert and oriented and she is alert and 

oriented 3 on today's evaluation..  Cranial nerves were essentially intact.  

The patient had a weakness in the left upper extremity.  Gait was not assessed. 

Reflexes were brisk over the left upper extremity.  Babinski is upgoing on the 

left.  





- Labs


CBC & Chem 7: 


                                 10/08/20 03:42





                                 10/08/20 03:42


Labs: 


                  Abnormal Lab Results - Last 24 Hours (Table)











  10/07/20 10/07/20 10/08/20 Range/Units





  17:08 22:17 00:19 


 


WBC     (3.8-10.6)  k/uL


 


RBC     (3.80-5.40)  m/uL


 


Hgb     (11.4-16.0)  gm/dL


 


Hct     (34.0-46.0)  %


 


RDW     (11.5-15.5)  %


 


Plt Count     (150-450)  k/uL


 


Chloride     ()  mmol/L


 


POC Glucose (mg/dL)  219 H  207 H  387 H  (75-99)  mg/dL


 


Total Protein     (6.3-8.2)  g/dL


 


Albumin     (3.5-5.0)  g/dL














  10/08/20 10/08/20 10/08/20 Range/Units





  03:42 03:42 04:02 


 


WBC  3.7 L    (3.8-10.6)  k/uL


 


RBC  2.60 L    (3.80-5.40)  m/uL


 


Hgb  7.7 L    (11.4-16.0)  gm/dL


 


Hct  24.4 L    (34.0-46.0)  %


 


RDW  16.3 H    (11.5-15.5)  %


 


Plt Count  60 L    (150-450)  k/uL


 


Chloride   113 H   ()  mmol/L


 


POC Glucose (mg/dL)    73 L  (75-99)  mg/dL


 


Total Protein   5.0 L   (6.3-8.2)  g/dL


 


Albumin   2.3 L   (3.5-5.0)  g/dL














  10/08/20 10/08/20 Range/Units





  10:26 12:01 


 


WBC    (3.8-10.6)  k/uL


 


RBC    (3.80-5.40)  m/uL


 


Hgb    (11.4-16.0)  gm/dL


 


Hct    (34.0-46.0)  %


 


RDW    (11.5-15.5)  %


 


Plt Count    (150-450)  k/uL


 


Chloride    ()  mmol/L


 


POC Glucose (mg/dL)  385 H  283 H  (75-99)  mg/dL


 


Total Protein    (6.3-8.2)  g/dL


 


Albumin    (3.5-5.0)  g/dL








                      Microbiology - Last 24 Hours (Table)











 10/02/20 19:45 Blood Culture Gram Stain - Final





 Blood Blood Culture - Final





    Staph hominis sub sp. hominis














Assessment and Plan


Plan: 











1 diabetic ketoacidosis, recovered and the patient is having significant fluct

uation of blood sugar.  She was started on oral diet and the patient be started 

on low-dose Levemir insulin in addition to sinus care coverage.  She has a very 

brittle diabetic condition.  This will be monitored very closely.  Her anion gap

metabolic acidosis recovered.


 


2 right-sided pneumonia mainly involving the right upper lobe, consider 

aspiration, and the patient is currently on accommodation of Levaquin and 

clindamycin and there is improvement in the right upper lobe consolidation on 

today's chest x-ray findings.  The patient is currently taking a combination of 

oral Levaquin and clindamycin.  Blood culture was positive for staph hominis 

which is a contaminant.





3 diabetes mellitus with previous episodes of DKA, see above dictation





4 diabetic peripheral neuropathy, retinopathy





5 coronary artery disease  with a preserved LV function  based on 

echocardiogramfrom 2019





6 COPD





7 history of  DVT , axilla , on the right





9 hyperlipidemia





10 hypertension





11 chronic kidney disease, with a component of an acute kidney injury, improved





12 peripheral artery disease





13 hypothyroidism





14 previous history of second toe infection/amputation and previous history of 

right foot infection requiring a wound VAC





15 CVA with someresidual left-sided weakness, and the CAT scan of the brain 

showed an old large right hemispheric infarcts.  No other acute abnormality is 

seen.





16 history of seizures, maintained on a combination of Vimpat and Depakote on 

outpatient basis





17 chronic anemia, with interval drop in hemoglobin down to 6.8 and the patient 

will be receiving a unit of packed RBC and follow-up hemoglobin is at 9.2





18 bipolar disorder, depression, anxiety





19 carotid artery stenosis, critical on the right





20 altered mental status and neurologist on the case, and the patient is  

clinically improving and seizure activity has been ruled out.  The patient is 

normalized in terms of her mentation and the patient was able to pass a swallow 

evaluation and currently she is taking.  Honey thick  thick material.





 plan








 the swallow evaluation was noted and the patient is allowed to take some Honey 

thickened. 


Levemir dose to 10 units on a daily basis and addition to a sliding scale 

coverage


IV fluids to KVO and the patient will be allowed to advance her diet


Switch the antibiotics to oral form  Levaquin and clindamycin switched 

antibiotics to oral


We'll continue to follow make further recommendations based on her progress.

## 2020-10-09 LAB
ALBUMIN SERPL-MCNC: 2.1 G/DL (ref 3.5–5)
ALP SERPL-CCNC: 66 U/L (ref 38–126)
ALT SERPL-CCNC: 13 U/L (ref 4–34)
ANION GAP SERPL CALC-SCNC: 3 MMOL/L
AST SERPL-CCNC: 17 U/L (ref 14–36)
BUN SERPL-SCNC: 7 MG/DL (ref 7–17)
CALCIUM SPEC-MCNC: 8.2 MG/DL (ref 8.4–10.2)
CELLS COUNTED: 100
CHLORIDE SERPL-SCNC: 111 MMOL/L (ref 98–107)
CO2 SERPL-SCNC: 28 MMOL/L (ref 22–30)
ERYTHROCYTE [DISTWIDTH] IN BLOOD BY AUTOMATED COUNT: 2.63 M/UL (ref 3.8–5.4)
ERYTHROCYTE [DISTWIDTH] IN BLOOD: 16.3 % (ref 11.5–15.5)
GLUCOSE BLD-MCNC: 145 MG/DL (ref 75–99)
GLUCOSE BLD-MCNC: 163 MG/DL (ref 75–99)
GLUCOSE BLD-MCNC: 164 MG/DL (ref 75–99)
GLUCOSE BLD-MCNC: 171 MG/DL (ref 75–99)
GLUCOSE BLD-MCNC: 76 MG/DL (ref 75–99)
GLUCOSE BLD-MCNC: 78 MG/DL (ref 75–99)
GLUCOSE SERPL-MCNC: 140 MG/DL (ref 74–99)
HCT VFR BLD AUTO: 24.1 % (ref 34–46)
HGB BLD-MCNC: 7.6 GM/DL (ref 11.4–16)
LYMPHOCYTES # BLD MANUAL: 1.57 K/UL (ref 1–4.8)
MCH RBC QN AUTO: 28.9 PG (ref 25–35)
MCHC RBC AUTO-ENTMCNC: 31.6 G/DL (ref 31–37)
MCV RBC AUTO: 91.6 FL (ref 80–100)
MONOCYTES # BLD MANUAL: 0.27 K/UL (ref 0–1)
NEUTROPHILS NFR BLD MANUAL: 30 %
NEUTS SEG # BLD MANUAL: 0.8 K/UL (ref 1.3–7.7)
PLATELET # BLD AUTO: 80 K/UL (ref 150–450)
POTASSIUM SERPL-SCNC: 3.5 MMOL/L (ref 3.5–5.1)
PROT SERPL-MCNC: 4.8 G/DL (ref 6.3–8.2)
SODIUM SERPL-SCNC: 142 MMOL/L (ref 137–145)
WBC # BLD AUTO: 2.7 K/UL (ref 3.8–10.6)

## 2020-10-09 RX ADMIN — CLINDAMYCIN HYDROCHLORIDE SCH MG: 150 CAPSULE ORAL at 21:22

## 2020-10-09 RX ADMIN — DIVALPROEX SODIUM SCH MG: 250 TABLET, DELAYED RELEASE ORAL at 21:24

## 2020-10-09 RX ADMIN — IPRATROPIUM BROMIDE AND ALBUTEROL SULFATE SCH: .5; 3 SOLUTION RESPIRATORY (INHALATION) at 07:28

## 2020-10-09 RX ADMIN — INSULIN ASPART SCH UNIT: 100 INJECTION, SOLUTION INTRAVENOUS; SUBCUTANEOUS at 13:05

## 2020-10-09 RX ADMIN — IPRATROPIUM BROMIDE AND ALBUTEROL SULFATE SCH ML: .5; 3 SOLUTION RESPIRATORY (INHALATION) at 16:52

## 2020-10-09 RX ADMIN — LACOSAMIDE SCH MG: 50 TABLET, FILM COATED ORAL at 21:21

## 2020-10-09 RX ADMIN — DIVALPROEX SODIUM SCH MG: 250 TABLET, DELAYED RELEASE ORAL at 09:36

## 2020-10-09 RX ADMIN — Medication SCH MG: at 21:21

## 2020-10-09 RX ADMIN — ASPIRIN 81 MG CHEWABLE TABLET SCH MG: 81 TABLET CHEWABLE at 09:34

## 2020-10-09 RX ADMIN — DIVALPROEX SODIUM SCH MG: 250 TABLET, DELAYED RELEASE ORAL at 16:16

## 2020-10-09 RX ADMIN — LEVOFLOXACIN SCH MG: 500 TABLET, FILM COATED ORAL at 09:37

## 2020-10-09 RX ADMIN — CLINDAMYCIN HYDROCHLORIDE SCH MG: 150 CAPSULE ORAL at 16:15

## 2020-10-09 RX ADMIN — IPRATROPIUM BROMIDE AND ALBUTEROL SULFATE SCH ML: .5; 3 SOLUTION RESPIRATORY (INHALATION) at 20:23

## 2020-10-09 RX ADMIN — IPRATROPIUM BROMIDE AND ALBUTEROL SULFATE SCH ML: .5; 3 SOLUTION RESPIRATORY (INHALATION) at 11:32

## 2020-10-09 RX ADMIN — INSULIN ASPART SCH UNIT: 100 INJECTION, SOLUTION INTRAVENOUS; SUBCUTANEOUS at 08:45

## 2020-10-09 RX ADMIN — Medication SCH MG: at 09:36

## 2020-10-09 RX ADMIN — CLINDAMYCIN HYDROCHLORIDE SCH MG: 150 CAPSULE ORAL at 09:34

## 2020-10-09 RX ADMIN — POTASSIUM CHLORIDE SCH MLS/HR: 14.9 INJECTION, SOLUTION INTRAVENOUS at 01:02

## 2020-10-09 RX ADMIN — METOCLOPRAMIDE SCH MG: 10 TABLET ORAL at 17:31

## 2020-10-09 RX ADMIN — PANTOPRAZOLE SODIUM SCH MG: 40 INJECTION, POWDER, FOR SOLUTION INTRAVENOUS at 09:37

## 2020-10-09 RX ADMIN — INSULIN ASPART SCH UNIT: 100 INJECTION, SOLUTION INTRAVENOUS; SUBCUTANEOUS at 21:28

## 2020-10-09 RX ADMIN — DULOXETINE SCH MG: 60 CAPSULE, DELAYED RELEASE ORAL at 09:36

## 2020-10-09 RX ADMIN — INSULIN ASPART SCH UNIT: 100 INJECTION, SOLUTION INTRAVENOUS; SUBCUTANEOUS at 04:45

## 2020-10-09 RX ADMIN — CLOPIDOGREL BISULFATE SCH MG: 75 TABLET ORAL at 09:36

## 2020-10-09 RX ADMIN — ATORVASTATIN CALCIUM SCH MG: 80 TABLET, FILM COATED ORAL at 21:21

## 2020-10-09 RX ADMIN — LOSARTAN POTASSIUM SCH MG: 50 TABLET, FILM COATED ORAL at 09:39

## 2020-10-09 RX ADMIN — INSULIN DETEMIR SCH UNIT: 100 INJECTION, SOLUTION SUBCUTANEOUS at 08:45

## 2020-10-09 RX ADMIN — METOCLOPRAMIDE SCH MG: 10 TABLET ORAL at 13:05

## 2020-10-09 RX ADMIN — INSULIN ASPART SCH: 100 INJECTION, SOLUTION INTRAVENOUS; SUBCUTANEOUS at 17:30

## 2020-10-09 RX ADMIN — ENOXAPARIN SODIUM SCH MG: 40 INJECTION SUBCUTANEOUS at 16:14

## 2020-10-09 RX ADMIN — INSULIN ASPART SCH: 100 INJECTION, SOLUTION INTRAVENOUS; SUBCUTANEOUS at 00:59

## 2020-10-09 RX ADMIN — LACOSAMIDE SCH MG: 50 TABLET, FILM COATED ORAL at 09:37

## 2020-10-09 RX ADMIN — METOCLOPRAMIDE SCH MG: 10 TABLET ORAL at 09:34

## 2020-10-09 NOTE — P.PN
Subjective


Progress Note Date: 10/09/20


Principal diagnosis: 





Anemia





The patient seen and examined in the ICU.  She is sitting up in the recliner.  

She states she is more fatigued today.  She has not had a bowel movement today, 

however yesterday she had a normal brown bowel movement.  She is denying any 

abdominal pain, nausea, or vomiting.  Her hemoglobin is stable at 7.7.  She is 

status post 1 unit of packed red blood cells earlier this admission.  She is 

being maintained on iron 325 mg by mouth twice a day.  She had one dose of IV 

Ferrlecit yesterday.





Objective





- Vital Signs


Vital signs: 


                                   Vital Signs











Temp  97.4 F L  10/09/20 08:10


 


Pulse  84   10/09/20 08:10


 


Resp  12   10/09/20 08:10


 


BP  84/54   10/09/20 08:10


 


Pulse Ox  90 L  10/09/20 08:10








                                 Intake & Output











 10/08/20 10/09/20 10/09/20





 18:59 06:59 18:59


 


Intake Total  100 


 


Output Total 480 480 


 


Balance -480 -380 


 


Intake:   


 


  IV  100 


 


    Sodium Chloride 0.9% 1,  100 





    000 ml @ 50 mls/hr IV .   





    Q20H [facility] Rx#:[id number]   


 


Output:   


 


  Urine 480 480 


 


Other:   


 


  Voiding Method Indwelling Catheter Indwelling Catheter 


 


  # Voids  3 








                       ABP, PAP, CO, CI - Last Documented











Arterial Blood Pressure        92/57

















- Exam





General appearance: The patient is drowsy but easily possible.


HET: Head is normocephalic and atraumatic.  Conjunctiva pink.  Sclera and 

icteric.


Neck: Supple without lymphadenopathy.  


Abdomen: Soft, mild diffuse tenderness, nondistended with  bowel sounds.  No 

guarding or rigidity.


Extremities: Normal skin color and turgor.  No pedal edema


Neurological: No focal deficits.  Alert and oriented 3.





- Labs


CBC & Chem 7: 


                                 10/09/20 04:38





                                 10/09/20 04:38


Labs: 


                  Abnormal Lab Results - Last 24 Hours (Table)











  10/08/20 10/08/20 10/08/20 Range/Units





  10:26 12:01 15:08 


 


WBC     (3.8-10.6)  k/uL


 


RBC     (3.80-5.40)  m/uL


 


Hgb     (11.4-16.0)  gm/dL


 


Hct     (34.0-46.0)  %


 


RDW     (11.5-15.5)  %


 


Plt Count     (150-450)  k/uL


 


Neutrophils # (Manual)     (1.3-7.7)  k/uL


 


Chloride     ()  mmol/L


 


Glucose     (74-99)  mg/dL


 


POC Glucose (mg/dL)  385 H  283 H  50 L  (75-99)  mg/dL


 


Calcium     (8.4-10.2)  mg/dL


 


Total Protein     (6.3-8.2)  g/dL


 


Albumin     (3.5-5.0)  g/dL














  10/08/20 10/08/20 10/08/20 Range/Units





  15:48 16:12 21:28 


 


WBC     (3.8-10.6)  k/uL


 


RBC     (3.80-5.40)  m/uL


 


Hgb     (11.4-16.0)  gm/dL


 


Hct     (34.0-46.0)  %


 


RDW     (11.5-15.5)  %


 


Plt Count     (150-450)  k/uL


 


Neutrophils # (Manual)     (1.3-7.7)  k/uL


 


Chloride     ()  mmol/L


 


Glucose     (74-99)  mg/dL


 


POC Glucose (mg/dL)  53 L  146 H  150 H  (75-99)  mg/dL


 


Calcium     (8.4-10.2)  mg/dL


 


Total Protein     (6.3-8.2)  g/dL


 


Albumin     (3.5-5.0)  g/dL














  10/09/20 10/09/20 10/09/20 Range/Units





  04:16 04:38 04:38 


 


WBC   2.7 L   (3.8-10.6)  k/uL


 


RBC   2.63 L   (3.80-5.40)  m/uL


 


Hgb   7.6 L   (11.4-16.0)  gm/dL


 


Hct   24.1 L   (34.0-46.0)  %


 


RDW   16.3 H   (11.5-15.5)  %


 


Plt Count   80 L   (150-450)  k/uL


 


Neutrophils # (Manual)   0.80 L   (1.3-7.7)  k/uL


 


Chloride    111 H  ()  mmol/L


 


Glucose    140 H  (74-99)  mg/dL


 


POC Glucose (mg/dL)  145 H    (75-99)  mg/dL


 


Calcium    8.2 L  (8.4-10.2)  mg/dL


 


Total Protein    4.8 L  (6.3-8.2)  g/dL


 


Albumin    2.1 L  (3.5-5.0)  g/dL














  10/09/20 Range/Units





  08:19 


 


WBC   (3.8-10.6)  k/uL


 


RBC   (3.80-5.40)  m/uL


 


Hgb   (11.4-16.0)  gm/dL


 


Hct   (34.0-46.0)  %


 


RDW   (11.5-15.5)  %


 


Plt Count   (150-450)  k/uL


 


Neutrophils # (Manual)   (1.3-7.7)  k/uL


 


Chloride   ()  mmol/L


 


Glucose   (74-99)  mg/dL


 


POC Glucose (mg/dL)  163 H  (75-99)  mg/dL


 


Calcium   (8.4-10.2)  mg/dL


 


Total Protein   (6.3-8.2)  g/dL


 


Albumin   (3.5-5.0)  g/dL














Assessment and Plan


(1) Acute on chronic anemia


Narrative/Plan: 


60-year-old female with multiple medical comorbiditiesincluding anemia of 

chronic disease with a baseline hemoglobin of approximately 9-10 presents to the

hospital and is being treated for right lung pneumonia as well as diabetic 

ketoacidosis.  Currently she is in the ICU.  The GI service was consult to see 

the patient due to an acute fall in her hemoglobin from baseline.  Hemoglobin 

currently 7.7 after 1 unit of red blood cells.  Iron studies are consistent with

an iron deficiency anemia.  Patient denies any abdominal pain and there've been 

no signs or symptoms of GI bleeding during hospitalization.  She reports brown 

bowel movement today.  She does report questionable dark bowel movements prior 

to presentation.  She believes her last endoscopic evaluation was approximately 

8-9 years ago with EGD and colonoscopy significant for polypectomy on 

colonoscopy. Unknown cause of anemia but suspicion is for multifactorial 

etiology given anemia of chronic disease,current treatment for pneumonia, cannot

rule out a component of bleeding from GI tract or urinary tract or other 

etiology although the patient is denying any signs or symptoms at this time.











Current Visit: Yes   Status: Acute   Code(s): D64.9 - ANEMIA, UNSPECIFIED   

SNOMED Code(s): 198017462


   





(2) Diabetes


Current Visit: No   Status: Acute   Code(s): E11.9 - TYPE 2 DIABETES MELLITUS 

WITHOUT COMPLICATIONS   SNOMED Code(s): 48878421


   


Plan: 





supportive care


Okay for diet as per recommendations by speech language pathology


Continue to monitor hemoglobin and hematocrit and transfuse as needed


Continue iron supplementation


Continue to monitor for any signs or symptoms of GI bleed


Continue Protonix therapy


Patient would likely benefit from endoscopic evaluation when medically stable 

either prior to discharge or in the outpatient setting with EGD 

and/orcolonoscopy, we'll continue to follow and decision on timing will be made 

based on clinical course and laboratory evaluation


Thank you for allowing us to participate in the care of the patient

















The impression and plan of care has been dictated as directed.








I performed a history and examination of this patient,  discussed the same with 

the dictator.  I agree with the dictator's note ,documented as a scribe.  Any 

additional findings or plans will be noted.

## 2020-10-09 NOTE — P.PN
Subjective


Progress Note Date: 10/09/20


Patient was seen at bedside and she stated she is doing well.  She denies of any

new weakness, numbness.


She is tolerating her diet well.  She's been treated for aspiration pneumonia.








Objective





- Vital Signs


Vital signs: 


                                   Vital Signs











Temp  97.6 F   10/09/20 16:05


 


Pulse  98   10/09/20 17:07


 


Resp  22   10/09/20 16:05


 


BP  101/90   10/09/20 16:05


 


Pulse Ox  95   10/09/20 16:05








                                 Intake & Output











 10/08/20 10/09/20 10/09/20





 18:59 06:59 18:59


 


Intake Total  100 370


 


Output Total 480 480 


 


Balance -480 -380 370


 


Weight   50.4 kg


 


Intake:   


 


  IV  100 250


 


    D5-0.45% NaCl with KCl   250





    20Meq/l 1,000 ml @ 50 mls   





    /hr IV .Q20H [facility] Rx#:   





    [id number]   


 


    Sodium Chloride 0.9% 1,  100 





    000 ml @ 50 mls/hr IV .   





    Q20H [facility] Rx#:[id number]   


 


  Oral   120


 


Output:   


 


  Urine 480 480 


 


Other:   


 


  Voiding Method Indwelling Catheter Indwelling Catheter Indwelling Catheter


 


  # Voids  3 








                       ABP, PAP, CO, CI - Last Documented











Arterial Blood Pressure        92/57

















- Exam


GENERAL: The patient is lying in bed and is not in acute distress.  


CHEST: The heart rate is regular rate rhythm.   No murmurs to auscultation.  


LUNG: Clear to auscultation bilaterally no wheezing noted throughout.  Not 

labored breathing.





NEUROLOGICAL:


Higher mental function: The patient is awake, alert, oriented to self, place and

time.  Patient is following simple commands.  No aphasia and no neglect.


Cranial nerves: The pupils are round, equal (4mm bilaterally) and reactive to 

light..  Visual fields are Left Homonymous lower visual field cut to 

confrontation.   Extraocular movement is intact no nystagmus is noted.  Facial 

sensation is normal to touch throughout.  The facial strength: left nasolabial 

flattening .  Hearing is normal bilaterally to hand rub.  Tongue is midline and 

moved side-to-side without any difficulty.  Minimal dysarthria is noted 

(improved compared to yesterday).  


Motor: Gait is defered.  Right upper and lower extremity is 5/5.  Left upper 

extremity is 0/5.  Left lower extremity is 4+/5.  Increased tone of left upper 

extremity especially at hand.  


Sensation: Normal sensation to touch throughout.


Reflexes (right/left): Brisk over the left upper extremity (3+) otherwise 1+ 

throughout. 


Plantars: Unable to assess right foot since digit 1-2 are amputated (as well as 

5th digit).  Left is upgoing.








- Labs


CBC & Chem 7: 


                                 10/09/20 04:38





                                 10/09/20 04:38


Labs: 


                  Abnormal Lab Results - Last 24 Hours (Table)











  10/08/20 10/09/20 10/09/20 Range/Units





  21:28 04:16 04:38 


 


WBC    2.7 L  (3.8-10.6)  k/uL


 


RBC    2.63 L  (3.80-5.40)  m/uL


 


Hgb    7.6 L  (11.4-16.0)  gm/dL


 


Hct    24.1 L  (34.0-46.0)  %


 


RDW    16.3 H  (11.5-15.5)  %


 


Plt Count    80 L  (150-450)  k/uL


 


Neutrophils # (Manual)    0.80 L  (1.3-7.7)  k/uL


 


Chloride     ()  mmol/L


 


Glucose     (74-99)  mg/dL


 


POC Glucose (mg/dL)  150 H  145 H   (75-99)  mg/dL


 


Calcium     (8.4-10.2)  mg/dL


 


Total Protein     (6.3-8.2)  g/dL


 


Albumin     (3.5-5.0)  g/dL














  10/09/20 10/09/20 10/09/20 Range/Units





  04:38 08:19 12:10 


 


WBC     (3.8-10.6)  k/uL


 


RBC     (3.80-5.40)  m/uL


 


Hgb     (11.4-16.0)  gm/dL


 


Hct     (34.0-46.0)  %


 


RDW     (11.5-15.5)  %


 


Plt Count     (150-450)  k/uL


 


Neutrophils # (Manual)     (1.3-7.7)  k/uL


 


Chloride  111 H    ()  mmol/L


 


Glucose  140 H    (74-99)  mg/dL


 


POC Glucose (mg/dL)   163 H  164 H  (75-99)  mg/dL


 


Calcium  8.2 L    (8.4-10.2)  mg/dL


 


Total Protein  4.8 L    (6.3-8.2)  g/dL


 


Albumin  2.1 L    (3.5-5.0)  g/dL














Assessment and Plan


Assessment: 


Toxic metabolic encephalopathy (DKA and right sided pneumonia)--improving


Seizure


Dysphagia


Right hemispheric stroke with residual left-sided weakness


Right ICA occlusion. 


Asymptomatic left ICA stenosis


brittle diabetes


Diabetic ketoacidosis---resolved


Aspiration pneumonia


NA--resolved


X tobacco use


Hyperlipidemia


Hypertension


History of myocardial infarction or


History of coronary artery disease





Plan: 


Continue Vimpat 75 mg bid. Continue Depakote 250 mg 3 times a day will not 

modify the medication since the patient has history of restless per the 

caregiver from last visit.


Total valproic acid was less than 10.  Even if her valproic acid is 

subtherapeutic we'll continue with the same medication.  Pending free valproic 

acid level


Routine EEG:  The background slowing over the left hemisphere is consistent with

mild to moderate encephalopathy.  The focal slwoing over the right 

xsnuog-zflfhwz-yzntkezz region is consistent with patient history of Right MCA 

stroke.  There is rare to occasional slowing over the right mid-temporal which 

can increase risk of focal epilepsy.  There is no active seizure.


Ammonia level: <9. 


Regarding the patient history of stroke continue aspirin 81 mg daily as well as 

Plavix 75mg daily for secondary stroke for prophylaxis (dual antiplatelet since 

has ICA stenosis and occlusion).  Continue Atorvastatin 80 mg daily.





Regarding the management of total diabetes will defer management to the ICU and 

the primary team.


We'll defer the management of pneumonia to ID team.  





patient was seen by Dr. Ricky Vang for right ICA occlusion 09/04/20.  Dr. Vang recommended carotid duplex and was completed on 09/04/20 and reported 

as chronic occlusion of the right ICA.  Moderate atherosclerotic change of the 

left bifurcation without hemodynamic significant stenosis.  In my opinion she 

does not need any intervention at this time.  I attempted to contact Dr. Nagy

office and left message.  Recommend the patient to follow-up with Dr. Vang 

or his colleagues as an outpatient.





We will follow-up with patient intermittently








Destin Zamora M.D.


Neuro-hospitalist








Time with Patient: Less than 30

## 2020-10-09 NOTE — P.CONS
History of Present Illness





- Reason for Consult


Consult date: 10/08/20


anemia


Requesting physician: Saniya Cabral





- Chief Complaint


nausea and vomiting and elevated blood sugars





- History of Present Illness





60-year-old female with multiple medical comorbidities including insulin-

dependent diabetes mellitus, prior CVA, hypertension, hyperlipidemia, coronary 

artery disease, peripheral vascular disease and COPD who presented to the 

hospital due to complaints of nausea, vomiting and elevated blood sugars.  

Patient was treated in the intensive care unit for diabetic ketoacidosis.  She 

is also receiving treatment at this time for suspected pneumonia.  Currently she

is receiving broad-spectrum antibiotic therapy  The gastroenterology servicewas 

consulted to see the patient for evaluation of anemia.  The patient has a known 

history of chronic anemia likely secondary to anemia of chronic disease and 

underlying medical comorbidities didn't kidney disease however she was found to 

have an acute fall in her hemoglobin during her hospitalization.  Patient is on 

aspirin and Plavix therapy at baseline.  As per her recollection she previously 

underwent endoscopic evaluation approximately 8-9 years ago with EGD and 

colonoscopy significant for polypectomy.  Hemoglobin currently 7.7 after 

transfusion 1 unit of PRBCs.  There've been no signs or symptoms of GI bleeding 

and the patient reports a brown bowel movement this morning.  She does note that

at times bowel movements have been dark.  He is denying any abdominal pain at 

this time.





Review of Systems





REVIEW OF SYSTEMS:


CONSTITUTIONAL: Denies any fevers, chills, weight change or fatigue.


CARDIOVASCULAR: Denies any chest pain, palpitations high or low blood pressures


RESPIRATORY: Denies any shortness of breath, hemoptysis or cough.  


GENITOURINARY:  No dysuria or hematuria. 


MUSCULOSKELETAL: No weakness reported. 


SKIN: Denies any new rashes or lesions, jaundice or pallor. 


PSYCHIATRIC: she does have a history of bipolar depression. 


NEUROLOGY: Denies headache, denies any new focal deficits, does have residual 

left-sided weakness after CVA. 


EARS/NOSE/THROAT: No recent hearing change, congestion, nasal discharge or sore 

throat.


EYES: No pain in eyes, discharge or change in vision. 


GASTROINTESTINAL: As per HPI.





Past Medical History


Past Medical History: Asthma, Coronary Artery Disease (CAD), Chest Pain / 

Angina, Heart Failure, COPD, CVA/TIA, Diabetes Mellitus, Deep Vein Thrombosis 

(DVT), Eye Disorder, GERD/Reflux, Hyperlipidemia, Hypertension, Myocardial 

Infarction (MI), Neurologic Disorder, Osteoarthritis (OA), Pneumonia, Renal 

Disease


Additional Past Medical History / Comment(s): IDDM (brittle), DKAs, neuropathy 

bilateral hands/feet, retinopathy bilateral eyes, cellulitis R foot, R great toe

and 2nd toe infections/amputations, current wound R foot-being seen in [facility], 

renal failure, anemia, CVAs with L sided paralysis, headaches started after 

CVAs, brain lesions, DVT R axillae, low back pain, varicosities, seizure many 

years ago (), hypothyroid, constipation, bilateral tinnitis occasionally, 

sinus problems.


Last Myocardial Infarction Date:: 


History of Any Multi-Drug Resistant Organisms: MRSA


Year Discovered:: 18


MDRO Source:: Right Foot


Past Surgical History: Appendectomy,  Section, Cholecystectomy, Heart 

Catheterization With Stent, Hysterectomy, Orthopedic Surgery


Additional Past Surgical History / Comment(s): PCI with multiple stents, R great

toe and 2nd toe amps, debridements R foot ulcer, L shoulder surgery to remove 

bone, bronchoscopy, EGD, colonoscopy, R arm port since removed, bilateral 

cataract removals/lens implants.


Past Anesthesia/Blood Transfusion Reactions: No Reported Reaction


Additional Past Anesthesia/Blood Transfusion Reaction / Comm: HX OF BLOOD 

TRANSFUSION- NO REACTION


Date of Last Stent Placement:: 2013


Past Psychological History: Anxiety, Bipolar, Depression


Smoking Status: Never smoker


Past Alcohol Use History: None Reported


Past Drug Use History: Marijuana





- Past Family History


  ** Father


Family Medical History: Unable to Obtain, Coronary Artery Disease (CAD), 

Diabetes Mellitus





  ** Mother


Family Medical History: COPD





Medications and Allergies


                                Home Medications











 Medication  Instructions  Recorded  Confirmed  Type


 


Famotidine [Pepcid] 20 mg PO DAILY 02/19/16 10/02/20 History


 


HYDROcodone/APAP 10-325MG [Norco 1 tab PO TID PRN 05/06/17 10/02/20 History





]    


 


DULoxetine HCL [Cymbalta] 60 mg PO DAILY 09/19/17 10/02/20 History


 


Atorvastatin [Lipitor] 20 mg PO DAILY 07/31/19 10/02/20 History


 


QUEtiapine [SEROquel] 100 mg PO HS 07/31/19 10/02/20 History


 


Aspirin [Adult Low Dose Aspirin EC] 81 mg PO DAILY 01/10/20 10/02/20 History


 


Ferrous Sulfate [Iron (65  mg PO DAILY 01/10/20 10/02/20 History





Elemental)]    


 


Divalproex [Depakote] 250 mg PO TID #90 tablet. 03/19/20 10/02/20 Rx


 


ALPRAZolam [Xanax] 1 mg PO Q8H PRN 07/28/20 10/02/20 History


 


Albuterol Sulfate [Ventolin HFA] 2 puff INHALATION RT-Q4H PRN 07/28/20 10/02/20 

History


 


Ondansetron Odt [Zofran ODT] 4 mg PO DAILY PRN 07/28/20 10/02/20 History


 


Valproic Acid [Depakene] 250 mg PO DAILY 07/28/20 10/02/20 History


 


QUEtiapine [SEROquel] 25 mg PO BID 08/12/20 10/02/20 History


 


Losartan [Cozaar] 50 mg PO DAILY  tab 08/20/20 10/02/20 Rx


 


Metoclopramide [Reglan] 10 mg PO AC-TID  tab 08/20/20 10/02/20 Rx


 


Ascorbic Acid [Vitamin C] 500 mg PO DAILY 08/31/20 10/02/20 History


 


Insulin Glargine,Hum.rec.anlog 20 unit SQ HS 08/31/20 10/02/20 History





[Basaglar Kwikpen U-100]    


 


Vitamin B Complex 1 tab PO DAILY 08/31/20 10/02/20 History


 


Calcium Carb-Vit D 500Mg-200Un 1 each PO BID-W/MEALS  tab 09/04/20 10/02/20 Rx





[Oscal 500+D]    


 


Clopidogrel [Plavix] 75 mg PO DAILY  tab 09/04/20 10/02/20 Rx


 


INSULIN ASPART (NovoLOG) [NovoLOG 0 unit SQ AC-TID  vial 09/04/20 10/02/20 Rx





(formulary)]    


 


Lacosamide [Vimpat] 50 mg PO HS 10/02/20 10/02/20 History


 


Lacosamide [Vimpat] 100 mg PO QAM 10/02/20 10/02/20 History








                                    Allergies











Allergy/AdvReac Type Severity Reaction Status Date / Time


 


Barbiturates Allergy  Rash/Hives Verified 10/02/20 21:31


 


cephalexin monohydrate Allergy  Rash/Hives Verified 10/02/20 21:31





[From Keflex]     


 


morphine Allergy  Rash/Hives Verified 10/02/20 21:31


 


Penicillins Allergy  Rash/Hives Verified 10/02/20 21:31


 


phenobarbital Allergy  Swelling Verified 10/02/20 21:31


 


venom-honey bee Allergy  Swelling Verified 10/02/20 21:31





[bee venom (honey bee)]     


 


amlodipine besylate AdvReac  Vomiting Verified 10/02/20 21:31





[From Norvasc]     














Physical Exam


Vitals: 


                                   Vital Signs











  Temp Pulse Pulse Resp BP Pulse Ox


 


 10/08/20 12:14   94    


 


 10/08/20 11:59   90    


 


 10/08/20 08:11   96    


 


 10/08/20 08:00   90    


 


 10/08/20 06:25  98 F   87  18  127/59  95


 


 10/07/20 22:31  98.3 F   106 H  20  131/64  96


 


 10/07/20 19:15   82   16  








                                Intake and Output











 10/08/20 10/08/20 10/08/20





 06:59 14:59 22:59


 


Intake Total 50  


 


Output Total 800  


 


Balance -750  


 


Intake:   


 


  IV 50  


 


    Sodium Chloride 0.9% 1, 50  





    000 ml @ 50 mls/hr IV .   





    Q20H [facility] Rx#:[id number]   


 


Output:   


 


  Urine 800  


 


Other:   


 


  Voiding Method Indwelling Catheter Indwelling Catheter 














On physical examination, patient appears comfortable in no apparent distress. 


HEAD: Normocephalic, atraumatic. 


EYES: No scleral icterus. No conjunctival injection. 


MOUTH: No lesions, tongue midline. 


NECK: Trachea midline, no gross abnormalities. 


CHEST: decreased air entry in all lung fields. 


HEART: S1-S2 appreciated. 


ABDOMEN: Soft, and nontender to palpation. Bowel sounds are positive. No 

organomegaly.  No guarding or rigidity.


EXTREMITIES: No pedal edema. 


SKIN: No rashes, no jaundice. 


NEUROLOGIC: Alert and oriented xerson and place. 





Results


CBC & Chem 7: 


                                 10/09/20 04:38





                                 10/09/20 04:38


Labs: 


                  Abnormal Lab Results - Last 24 Hours (Table)











  10/07/20 10/07/20 10/08/20 Range/Units





  17:08 22:17 00:19 


 


WBC     (3.8-10.6)  k/uL


 


RBC     (3.80-5.40)  m/uL


 


Hgb     (11.4-16.0)  gm/dL


 


Hct     (34.0-46.0)  %


 


RDW     (11.5-15.5)  %


 


Plt Count     (150-450)  k/uL


 


Chloride     ()  mmol/L


 


POC Glucose (mg/dL)  219 H  207 H  387 H  (75-99)  mg/dL


 


Total Protein     (6.3-8.2)  g/dL


 


Albumin     (3.5-5.0)  g/dL














  10/08/20 10/08/20 10/08/20 Range/Units





  03:42 03:42 04:02 


 


WBC  3.7 L    (3.8-10.6)  k/uL


 


RBC  2.60 L    (3.80-5.40)  m/uL


 


Hgb  7.7 L    (11.4-16.0)  gm/dL


 


Hct  24.4 L    (34.0-46.0)  %


 


RDW  16.3 H    (11.5-15.5)  %


 


Plt Count  60 L    (150-450)  k/uL


 


Chloride   113 H   ()  mmol/L


 


POC Glucose (mg/dL)    73 L  (75-99)  mg/dL


 


Total Protein   5.0 L   (6.3-8.2)  g/dL


 


Albumin   2.3 L   (3.5-5.0)  g/dL














  10/08/20 10/08/20 10/08/20 Range/Units





  10:26 12:01 15:08 


 


WBC     (3.8-10.6)  k/uL


 


RBC     (3.80-5.40)  m/uL


 


Hgb     (11.4-16.0)  gm/dL


 


Hct     (34.0-46.0)  %


 


RDW     (11.5-15.5)  %


 


Plt Count     (150-450)  k/uL


 


Chloride     ()  mmol/L


 


POC Glucose (mg/dL)  385 H  283 H  50 L  (75-99)  mg/dL


 


Total Protein     (6.3-8.2)  g/dL


 


Albumin     (3.5-5.0)  g/dL








                      Microbiology - Last 24 Hours (Table)











 10/02/20 19:45 Blood Culture Gram Stain - Final





 Blood Blood Culture - Final





    Staph hominis sub sp. hominis











Chest x-ray: report reviewed (some aspiration of thick anemia on swallow 

evaluation with more solid foods showing no aspiration.)





Assessment and Plan


(1) Acute on chronic anemia


Narrative/Plan: 


60-year-old female with multiple medical comorbiditiesincluding anemia of 

chronic disease with a baseline hemoglobin of approximately 9-10 presents to the

hospital and is being treated for right lung pneumonia as well as diabetic 

ketoacidosis.  Currently she is in the ICU.  The GI service was consult to see 

the patient due to an acute fall in her hemoglobin from baseline.  Hemoglobin 

currently 7.7 after 1 unit of red blood cells.  Iron studies are consistent with

an iron deficiency anemia.  Patient denies any abdominal pain and there've been 

no signs or symptoms of GI bleeding during hospitalization.  She reports brown 

bowel movement today.  She does report questionable dark bowel movements prior 

to presentation.  She believes her last endoscopic evaluation was approximately 

8-9 years ago with EGD and colonoscopy significant for polypectomy on 

colonoscopy. Unknown cause of anemia but suspicion is for multifactorial 

etiology given anemia of chronic disease,current treatment for pneumonia, cannot

rule out a component of bleeding from GI tract or urinary tract or other 

etiology although the patient is denying any signs or symptoms at this time.


Current Visit: Yes   Status: Acute   Code(s): D64.9 - ANEMIA, UNSPECIFIED   

SNOMED Code(s): 192517985


   





(2) Diabetes


Current Visit: No   Status: Acute   Code(s): E11.9 - TYPE 2 DIABETES MELLITUS 

WITHOUT COMPLICATIONS   SNOMED Code(s): 89271304


   


Plan: 





supportive care


Okay for diet as per recommendations by speech language pathology


Continue to monitor hemoglobin and hematocrit and transfuse as needed


Continue iron supplementation


Continue to monitor for any signs or symptoms of GI bleed


Continue Protonix therapy


Patient would likely benefit from endoscopic evaluation when medically stable 

either prior to discharge or in the outpatient setting with EGD 

and/orcolonoscopy, we'll continue to follow and decision on timing will be made 

based on clinical course and laboratory evaluation


Thank you for allowing us to participate in the care of the patient

## 2020-10-09 NOTE — PN
PROGRESS NOTE



DATE OF SERVICE:

10/09/2020



REASON FOR FOLLOWUP:

1. Positive blood culture likely contamination.

2. Possible aspiration pneumonitis.



INTERVAL HISTORY:

The patient is currently afebrile, patient is breathing comfortably, she is more awake

and alert today, denies having any chest pain, shortness of breath, abdominal pain or

diarrhea.



PHYSICAL EXAMINATION:

Blood pressure 101/90 with a pulse of 90, temperature 97.6.  She is 95% on room.

General description is an is middle-aged female, lying in bed in no distress.

RESPIRATORY SYSTEM: Unlabored breathing, clear to auscultation anteriorly.

HEART:  S1, S2.  Regular rate and rhythm.

ABDOMEN:  Soft, no tenderness.



LABS:

Hemoglobin 7.8, white count 2.7, BUN of 7, creatinine 0.78.



DIAGNOSTIC IMPRESSION AND PLAN:

1. Patient with positive blood culture with Staph epidermidis, likely contamination.

2. Patient with possible aspiration pneumonitis, covered with Levaquin and clindamycin

    to continue and monitor clinical course closely.





MMODL / IJN: [id number] / Job#: [id number]

## 2020-10-09 NOTE — P.PN
Subjective


Progress Note Date: 10/09/20








this is a 60-year-old female patient who came in with nausea vomiting and 

diarrhea and profound dehydration the patient wasiagnosed having DKA. She has 

previous history of  CVA, hypertension, coronary artery dase, chronic kidney 

disease.  The patient has had previous admissions for DKA.  She was also robert

gnosed having some bilateral low as the patient had bilateral lower lobe 

pulmonary infiltrate right more than left.  She was given Levaquin and 

vancomycin.  The patient was treated with an insulin drip regarding her DKA.  

She has multiple comorbidities.  This morning, the patient is still on IV flui

ds.  She is on D5 half-normal saline with 20 mEq of potassium chloride the rate 

of 75 mL an hour.  She had a follow-up blood work that showed resolution of her 

anion gap metabolic acidosis.  A serum bicarbonate of 29 with anion gap of the 

right this point in time.  Her hemoglobin was found to be at 6.8 and the patient

was given a unit of packed RBC.  No evidence of any bleeding at this point in ti

me.  The chest x-rays clear showing a right upper lobe consolidation/pneumonia. 

There is a central blood culture that showing also gram-positive cocci.  Based 

on that, I kept the Levaquin and vancomycin and added clindamycin regarding the 

possibility of a right upper lobe aspiration pneumonia.  The patient was also 

lethargic and confused and based on that and based on history of seizure 

activity, and neurology consultation was requested.  Noted the patient was 

taking a combination of Vimpat and Depakote on outpatient basis.  At this point 

in time she is unable to take her medications orally as the patient has failed a

swallow bedside evaluation.  The Depakote level has not been checked yet.  Noted

the patient also has a critical internal carotid artery stenosis on the left and

the patient remains on aspirin on outpatient basis and vascular surgery has been

requested to see this patient the past.





On 10/06/2020, the patient is doing well.  She is much more awake and alert 

compared to yesterday.  His swallow evaluation will be repeated today.  The 

patient seems to be able to pass her swallow evaluation patient is currently on 

2 L about 2 by nasal cannula with a pulse of 97%.  She was on a D5 half-normal 

saline at the rate of 150 mL an hour and this can be obviously cut down further.

 Chest exit shows improvement in the right upper lobe pneumonia as the patient 

is a combination of Levaquin and clindamycin.  No cough.  No sputum production. 

No chest that is no wheezing.  The neurologist evaluated the patient and the 

patient had an EGD that showed no evidence of any seizure activity and will 

continue the antiepileptic medication which include a combination of Vimpat 75 

mg by mouth twice a day and Depakote 250 mg 3 times a day.  The patient would 

also need a follow-up evaluation with vascular surgery regarding her carotid 

artery stenosis which is significant on the left.





10/07/2020, the patient is having another swallow evaluation.  She remains 

nothing by mouth based on the failed swallow evaluation done earlier.  Earlier 

this morning she also had few episodes of hypoglycemia.  I lowered the Lantus 

dose down to 10 units a day.  I also started the patient on D5 half-normal at 

the rate of 50 mL An hour.  We are awaiting another swallow evaluation. The 

patient is showing some silent aspiration on nectar thick material based on a 

swallow evaluation.  The patient otherwise is resting comfortably in bed.  No 

signs of an acute pneumonia.  She continues to take a combination of Levaquin 

and clindamycin which can be switched to oral crushed medication regarding her 

aspiration pneumonia which involves the right upper lobe.  No other significant 

events overnight.  No seizure activity.  She remains on her antiepileptic 

medication.  Awaiting another swallow evaluation for now.





10/08/2020, the patient is having some pured honey thick diet.  She was able to

pass a swallow evaluation.  Doing well.  No specific complaints.  The patient is

still being cheered for right upper lobe aspiration pneumonia with accommodation

Levaquin and clindamycin.  The blood culture was positive for staph hominis a 

contaminant.  The patient's white cell count is at 3.7.  The blood sugar still 

fluctuating being as low as 73 and as high as 85.  I'm going to give her based 

on her medicine of 10 units on a daily basis and addition to a scale coverage.  

No seizure activity has been noted.  Antiepileptic medication was switched to 

oral form.  She is on a combination of Vimpat and Depakote.  Otherwise, she is 

afebrile and she is hemodynamic is stable.  Her anion gap is down to 5 and the 

patient has a bicarb of 26.  No other significant events otherwise for now.





10/09/2020, the patient is doing well.  She was a bit sleepy earlier this 

morning and felt that this was a residual "effect from nighttime Seroquel 

intake..  The patient's otherwise is tolerating her diet.  She has no specific 

complaints.  She is on a D5 half-normal infusion to date of 50 mL an hour.  On 

room air oxygen, the pulse is above 90%.  She is on a Levemir insulin 10 units 

along with a slight scale coverage.  She is being treated for an aspiration 

pneumonia with a combination of Levaquin and tobramycin.  No other issues for 

now.  The white cell count is at 2.7 with a hemoglobin of 7.6.  Serum bicarb

monika 29 gap of 3.  The BUN is at 7 with a creatinine of 0.78.








Objective





- Vital Signs


Vital signs: 


                                   Vital Signs











Temp  97.4 F L  10/09/20 08:10


 


Pulse  90   10/09/20 11:44


 


Resp  12   10/09/20 08:10


 


BP  84/54   10/09/20 08:10


 


Pulse Ox  90 L  10/09/20 08:10








                                 Intake & Output











 10/08/20 10/09/20 10/09/20





 18:59 06:59 18:59


 


Intake Total  100 


 


Output Total 480 480 


 


Balance -480 -380 


 


Intake:   


 


  IV  100 


 


    Sodium Chloride 0.9% 1,  100 





    000 ml @ 50 mls/hr IV .   





    Q20H [facility] Rx#:[id number]   


 


Output:   


 


  Urine 480 480 


 


Other:   


 


  Voiding Method Indwelling Catheter Indwelling Catheter Indwelling Catheter


 


  # Voids  3 








                       ABP, PAP, CO, CI - Last Documented











Arterial Blood Pressure        92/57

















- Exam








Gen. appearance she is calm and comfortable likely distress and she is laying c

omfortably in bed


Head exam was generally normal. There was no scleral icterus or corneal arcus. 

Mucous membranes were moist.


Neck was supple and without jugular venous distension, thyromegaly, or carotid 

bruits. Carotids were easily palpable bilaterally. There was no adenopathy.


Lungs were clear to auscultation and percussion, and with normal diaphragmatic 

excursion. No wheezes or rales were noted. 


Cardiac exam revealed the PMI to be normally situated and sized. The rhythm was 

regular and no extrasystoles were noted during several minutes of auscultation. 

The first and second heart sounds were normal and physiologic splitting of the 

second heart sound was noted. There were no murmurs, rubs, clicks, or gallops.


Abdominal exam revealed normal bowel sounds. The abdomen was soft, non-tender, 

and without masses, organomegaly, or appreciable enlargement of the abdominal 

aorta.


Examination of the extremities revealed easily palpable radial, femoral and 

pedal pulses. There was no cyanosis, clubbing or edema.


Examination of the skin revealed no evidence of significant rashes, suspicious 

appearing nevi or other concerning lesions.  The patient has had previous 

amputation of the toes on the left and the first second and the fifth toes are 

missing at this point in time.


Neurologically the patient is awake and alert and oriented and she is alert and 

oriented 3 on today's evaluation..  Cranial nerves were essentially intact.  

The patient had a weakness in the left upper extremity.  Gait was not assessed. 

Reflexes were brisk over the left upper extremity.  Babinski is upgoing on the l

eft.  





- Labs


CBC & Chem 7: 


                                 10/09/20 04:38





                                 10/09/20 04:38


Labs: 


                  Abnormal Lab Results - Last 24 Hours (Table)











  10/08/20 10/08/20 10/08/20 Range/Units





  15:08 15:48 16:12 


 


WBC     (3.8-10.6)  k/uL


 


RBC     (3.80-5.40)  m/uL


 


Hgb     (11.4-16.0)  gm/dL


 


Hct     (34.0-46.0)  %


 


RDW     (11.5-15.5)  %


 


Plt Count     (150-450)  k/uL


 


Neutrophils # (Manual)     (1.3-7.7)  k/uL


 


Chloride     ()  mmol/L


 


Glucose     (74-99)  mg/dL


 


POC Glucose (mg/dL)  50 L  53 L  146 H  (75-99)  mg/dL


 


Calcium     (8.4-10.2)  mg/dL


 


Total Protein     (6.3-8.2)  g/dL


 


Albumin     (3.5-5.0)  g/dL














  10/08/20 10/09/20 10/09/20 Range/Units





  21:28 04:16 04:38 


 


WBC    2.7 L  (3.8-10.6)  k/uL


 


RBC    2.63 L  (3.80-5.40)  m/uL


 


Hgb    7.6 L  (11.4-16.0)  gm/dL


 


Hct    24.1 L  (34.0-46.0)  %


 


RDW    16.3 H  (11.5-15.5)  %


 


Plt Count    80 L  (150-450)  k/uL


 


Neutrophils # (Manual)    0.80 L  (1.3-7.7)  k/uL


 


Chloride     ()  mmol/L


 


Glucose     (74-99)  mg/dL


 


POC Glucose (mg/dL)  150 H  145 H   (75-99)  mg/dL


 


Calcium     (8.4-10.2)  mg/dL


 


Total Protein     (6.3-8.2)  g/dL


 


Albumin     (3.5-5.0)  g/dL














  10/09/20 10/09/20 10/09/20 Range/Units





  04:38 08:19 12:10 


 


WBC     (3.8-10.6)  k/uL


 


RBC     (3.80-5.40)  m/uL


 


Hgb     (11.4-16.0)  gm/dL


 


Hct     (34.0-46.0)  %


 


RDW     (11.5-15.5)  %


 


Plt Count     (150-450)  k/uL


 


Neutrophils # (Manual)     (1.3-7.7)  k/uL


 


Chloride  111 H    ()  mmol/L


 


Glucose  140 H    (74-99)  mg/dL


 


POC Glucose (mg/dL)   163 H  164 H  (75-99)  mg/dL


 


Calcium  8.2 L    (8.4-10.2)  mg/dL


 


Total Protein  4.8 L    (6.3-8.2)  g/dL


 


Albumin  2.1 L    (3.5-5.0)  g/dL














Assessment and Plan


Plan: 











1 diabetic ketoacidosis, recovered  


 


2 right-sided pneumonia mainly involving the right upper lobe, consider 

aspiration, and the patient is currently on accommodation of Levaquin and 

clindamycin and there is improvement in the right upper lobe consolidation on 

today's chest x-ray findings.  The patient is currently taking a combination of 

oral Levaquin and clindamycin.  Blood culture was positive for staph hominis 

which is a contaminant.





3 diabetes mellitus with previous episodes of DKA, currently on Levemir insulin 

10 units along with Effexor coverage.  Blood sugars under adequate control.





4 diabetic peripheral neuropathy, retinopathy





5 coronary artery disease  with a preserved LV function  based on 

echocardiogramfrom 2019





6 COPD





7 history of  DVT , axilla , on the right





9 hyperlipidemia





10 hypertension





11 chronic kidney disease, with a component of an acute kidney injury, improved





12 peripheral artery disease





13 hypothyroidism





14 previous history of second toe infection/amputation and previous history of 

right foot infection requiring a wound VAC





15 CVA with someresidual left-sided weakness, and the CAT scan of the brain 

showed an old large right hemispheric infarcts.  No other acute abnormality is 

seen.





16 history of seizures, maintained on a combination of Vimpat and Depakote on 

outpatient basis





17 chronic anemia, with interval drop in hemoglobin down to 6.8 and the patient 

will be receiving a unit of packed RBC and follow-up hemoglobin is at 9.2





18 bipolar disorder, depression, anxiety





19 carotid artery stenosis, critical on the right





20 altered mental status and neurologist on the case, and the patient is  

clinically improving and seizure activity has been ruled out.  The patient is 

normalized in terms of her mentation and the patient was able to pass a swallow 

evaluation and currently she is taking.  Honey thick  thick material.  On 

today's evaluation the patient was found to be a bit somnolent and sleepy.  The 

sacral dose will be modified.





 plan








Oral intake with Honey thickened. 


Levemir dose to 10 units on a daily basis and addition to a sliding scale 

coverage


IV fluids to KVO and the patient will be allowed to advance her diet


Levaquin and clindamycin switched antibiotics to oral


We'll continue to follow make further recommendations based on her progress.  

She should be able to transfer out of the intensive care unit.

## 2020-10-09 NOTE — P.PN
Subjective


Progress Note Date: 10/09/20








Morenita Ramirez, is a 60-year-old female who presented to [facility] emergency room due to nausea vomiting and diarrhea, and elevated 

glucose level, she was evaluated in the emergency room, her temperature on 

presentation was 90.1, pulse 75 respiration 18 blood pressure 79/51 pulse ox 94%

on room air, her white blood count was elevated at 11.6 hemoglobin 9.6 platelet 

count 156, venous blood gas pH was 7.05 glucose level was 744 and serum acetone 

was positive, patient was admitted to ICU she was started on IV fluid and on IV 

insulin drip.


Patient has a known history of insulin-dependent diabetes mellitus type 1 

maintained on insulin he had previous episodes of DKA, she also had a history of

stroke, history of hypertension, hyperlipidemia, coronary artery disease with 

myocardial infarction, peripheral vascular disease with multiple toe amputation,

history of COPD.


On review of systems patient is alert responsive in no apparent distress she was

seen in ICU she is still complaining of nausea and occasional episodes of 

vomiting there is no fever or chills no headache or dizziness no chest pain no 

shortness of breath no cough she is having some abdominal discomfort no no burni

ng with urination no frequency or urgency no hematuria.





On 10/04/2020 patient remains in the intensive care unit.  Patient does not 

appear in any kind of distress.  She currently getting treated for pneumonia 

critical care services are following.  Patient remains on Levaquin.  Patient has

been transitioned to subcu insulin patient did have episodes of low sugar this 

a.m. treated per protocol.





On 10/05/2020 patient was seen and examined in the ICU, she was very drowsy this

morning she failed swallow evaluation and is kept nothing by mouth at this time,

now patient is more alert and responsive, glucose level is better controlled, 

she is complaining of low back pain otherwise she denies any complaints.





On 10/06/2020 patient was seen and examined in the ICU she is more alert and 

responsive at this time she is complaining of feeling hungry however she failed 

swallow evaluation again this morning otherwise she denies any complaints there 

is no fever or chills no headache or dizziness no chest pain no shortness of 

breath no cough no nausea or vomiting no abdominal pain no diarrhea and no 

urinary symptoms.  At this time plan is to repeat swallow evaluation in the 

morning again before discussing alternative feeding options.





On 10/07/2020 patient is alert and oriented 2.  Patient is much more alert than

previous days.  Patient does seem fidgety in bed but this is similar to her base

line.  Blood sugars continue to fluctuate.  Patient having low blood sugar this 

a.m. requiring amp of D50.  Patient also failed swallow.  Currently on nectar 

thick and plans to eventually reassess swallowing the next few days per speech. 

Patient has been ordered out of the intensive care unit to [facility].  

Patient denies chest pain or shortness of breath.  Patient denies nausea 

vomiting or diarrhea.  Patient denies any urinary burning or frequency





On 10/08/2020 patient was seen and examined in the ICU she is alert and oriented

in no apparent distress she is tolerating pured diet well, there is no fever or

chills no headache or dizziness no chest pain no shortness of breath no cough no

nausea or vomiting no abdominal pain no diarrhea no blood in the stool Springer 

catheter is in place.





On 10/09/2020 patient was seen and examined in the ICU she is alert and oriented

3 and answering questions appropriately , glucose levels has been better 

controlled in the last 24 hours she is tolerating pured diet well, there is no 

fever or chills no headache or dizziness, no chest pain no shortness of breath 

no cough no nausea or vomiting no abdominal pain no diarrhea and no urinary 

symptoms.





Objective





- Vital Signs


Vital signs: 


                                   Vital Signs











Temp  97.4 F L  10/09/20 08:10


 


Pulse  90   10/09/20 11:44


 


Resp  12   10/09/20 08:10


 


BP  84/54   10/09/20 08:10


 


Pulse Ox  90 L  10/09/20 08:10








                                 Intake & Output











 10/08/20 10/09/20 10/09/20





 18:59 06:59 18:59


 


Intake Total  100 370


 


Output Total 480 480 


 


Balance -480 -380 370


 


Weight   50.4 kg


 


Intake:   


 


  IV  100 250


 


    D5-0.45% NaCl with KCl   250





    20Meq/l 1,000 ml @ 50 mls   





    /hr IV .Q20H [facility] Rx#:   





    [id number]   


 


    Sodium Chloride 0.9% 1,  100 





    000 ml @ 50 mls/hr IV .   





    Q20H [facility] Rx#:[id number]   


 


  Oral   120


 


Output:   


 


  Urine 480 480 


 


Other:   


 


  Voiding Method Indwelling Catheter Indwelling Catheter Indwelling Catheter


 


  # Voids  3 








                       ABP, PAP, CO, CI - Last Documented











Arterial Blood Pressure        92/57

















- Exam








In general patient is alert responsive in no apparent distress


HEENT head normocephalic and atraumatic


Neck is supple no JVD no goiter no lymphadenopathy


Chest exam reveals a few scattered rhonchi no wheezing


Cardiac exam reveals regular heart sounds S1 and S2 with mild tachycardia no 

gallops no murmurs


Abdomen is soft nontender no rigidity or rebound no palpable masses with hyp

eractive bowel sounds


Extremity exam reveals no edema no cyanosis or clubbing


Neurological examination reveals no gross new focal deficit











- Labs


CBC & Chem 7: 


                                 10/09/20 04:38





                                 10/09/20 04:38


Labs: 


                  Abnormal Lab Results - Last 24 Hours (Table)











  10/08/20 10/08/20 10/08/20 Range/Units





  15:08 15:48 16:12 


 


WBC     (3.8-10.6)  k/uL


 


RBC     (3.80-5.40)  m/uL


 


Hgb     (11.4-16.0)  gm/dL


 


Hct     (34.0-46.0)  %


 


RDW     (11.5-15.5)  %


 


Plt Count     (150-450)  k/uL


 


Neutrophils # (Manual)     (1.3-7.7)  k/uL


 


Chloride     ()  mmol/L


 


Glucose     (74-99)  mg/dL


 


POC Glucose (mg/dL)  50 L  53 L  146 H  (75-99)  mg/dL


 


Calcium     (8.4-10.2)  mg/dL


 


Total Protein     (6.3-8.2)  g/dL


 


Albumin     (3.5-5.0)  g/dL














  10/08/20 10/09/20 10/09/20 Range/Units





  21:28 04:16 04:38 


 


WBC    2.7 L  (3.8-10.6)  k/uL


 


RBC    2.63 L  (3.80-5.40)  m/uL


 


Hgb    7.6 L  (11.4-16.0)  gm/dL


 


Hct    24.1 L  (34.0-46.0)  %


 


RDW    16.3 H  (11.5-15.5)  %


 


Plt Count    80 L  (150-450)  k/uL


 


Neutrophils # (Manual)    0.80 L  (1.3-7.7)  k/uL


 


Chloride     ()  mmol/L


 


Glucose     (74-99)  mg/dL


 


POC Glucose (mg/dL)  150 H  145 H   (75-99)  mg/dL


 


Calcium     (8.4-10.2)  mg/dL


 


Total Protein     (6.3-8.2)  g/dL


 


Albumin     (3.5-5.0)  g/dL














  10/09/20 10/09/20 10/09/20 Range/Units





  04:38 08:19 12:10 


 


WBC     (3.8-10.6)  k/uL


 


RBC     (3.80-5.40)  m/uL


 


Hgb     (11.4-16.0)  gm/dL


 


Hct     (34.0-46.0)  %


 


RDW     (11.5-15.5)  %


 


Plt Count     (150-450)  k/uL


 


Neutrophils # (Manual)     (1.3-7.7)  k/uL


 


Chloride  111 H    ()  mmol/L


 


Glucose  140 H    (74-99)  mg/dL


 


POC Glucose (mg/dL)   163 H  164 H  (75-99)  mg/dL


 


Calcium  8.2 L    (8.4-10.2)  mg/dL


 


Total Protein  4.8 L    (6.3-8.2)  g/dL


 


Albumin  2.1 L    (3.5-5.0)  g/dL














Assessment and Plan


Plan: 





1.  Acute diabetic ketoacidosis with severe hyperglycemia and positive serum 

acetone patient is improving with IV fluid and IV insulin drip





2.  Underlying history of diabetes mellitus type 1 maintained on insulin, her 

primary care provider is Eleanor Rosas, she was contacted and she stated that she 

was recently hospitalized at [facility], a different relative was 

caring for patient and apparently glucose level has been extremely elevated for 

several days.





3.  Underlying history of hypertension





4.  Underlying history of hyperlipidemia





5.  Underlying history of seizure disorder, will place patient on IV Keppra 

until she is able to take her oral medications





6.  Underlying history of COPD stable no evidence of exacerbation





7.  Underlying history of coronary artery disease stable at this time patient 

denies any chest pain





8.  Underlying history of peripheral vascular disease with previous history of 

multiple toe amputations





9.  Previous history of stroke.





10.  Pneumonia maintained on oral antibiotics at this time





At this time patient is admitted to intensive care unit continue with IV fluids 

and insulin management


Home medication reviewed, will be switched to IV medications when possible due 

to continuous nausea and vomiting at this time


Pulmonary critical care are following

## 2020-10-10 LAB
ALBUMIN SERPL-MCNC: 2.2 G/DL (ref 3.5–5)
ALP SERPL-CCNC: 68 U/L (ref 38–126)
ALT SERPL-CCNC: 15 U/L (ref 4–34)
ANION GAP SERPL CALC-SCNC: 4 MMOL/L
AST SERPL-CCNC: 22 U/L (ref 14–36)
BASOPHILS # BLD AUTO: 0 K/UL (ref 0–0.2)
BASOPHILS NFR BLD AUTO: 1 %
BUN SERPL-SCNC: 8 MG/DL (ref 7–17)
CALCIUM SPEC-MCNC: 8 MG/DL (ref 8.4–10.2)
CHLORIDE SERPL-SCNC: 110 MMOL/L (ref 98–107)
CO2 SERPL-SCNC: 27 MMOL/L (ref 22–30)
EOSINOPHIL # BLD AUTO: 0.1 K/UL (ref 0–0.7)
EOSINOPHIL NFR BLD AUTO: 4 %
ERYTHROCYTE [DISTWIDTH] IN BLOOD BY AUTOMATED COUNT: 2.85 M/UL (ref 3.8–5.4)
ERYTHROCYTE [DISTWIDTH] IN BLOOD: 16.1 % (ref 11.5–15.5)
GLUCOSE BLD-MCNC: 145 MG/DL (ref 75–99)
GLUCOSE BLD-MCNC: 169 MG/DL (ref 75–99)
GLUCOSE BLD-MCNC: 279 MG/DL (ref 75–99)
GLUCOSE BLD-MCNC: 373 MG/DL (ref 75–99)
GLUCOSE BLD-MCNC: 38 MG/DL (ref 75–99)
GLUCOSE BLD-MCNC: 41 MG/DL (ref 75–99)
GLUCOSE BLD-MCNC: 43 MG/DL (ref 75–99)
GLUCOSE BLD-MCNC: 48 MG/DL (ref 75–99)
GLUCOSE BLD-MCNC: 52 MG/DL (ref 75–99)
GLUCOSE SERPL-MCNC: 223 MG/DL (ref 74–99)
HCT VFR BLD AUTO: 27 % (ref 34–46)
HGB BLD-MCNC: 8.7 GM/DL (ref 11.4–16)
LYMPHOCYTES # SPEC AUTO: 1.7 K/UL (ref 1–4.8)
LYMPHOCYTES NFR SPEC AUTO: 55 %
MCH RBC QN AUTO: 30.5 PG (ref 25–35)
MCHC RBC AUTO-ENTMCNC: 32.2 G/DL (ref 31–37)
MCV RBC AUTO: 94.7 FL (ref 80–100)
MONOCYTES # BLD AUTO: 0.1 K/UL (ref 0–1)
MONOCYTES NFR BLD AUTO: 4 %
NEUTROPHILS # BLD AUTO: 1 K/UL (ref 1.3–7.7)
NEUTROPHILS NFR BLD AUTO: 34 %
PLATELET # BLD AUTO: 102 K/UL (ref 150–450)
POTASSIUM SERPL-SCNC: 3.9 MMOL/L (ref 3.5–5.1)
PROT SERPL-MCNC: 5.1 G/DL (ref 6.3–8.2)
SODIUM SERPL-SCNC: 141 MMOL/L (ref 137–145)
WBC # BLD AUTO: 3 K/UL (ref 3.8–10.6)

## 2020-10-10 RX ADMIN — METOCLOPRAMIDE SCH MG: 10 TABLET ORAL at 17:30

## 2020-10-10 RX ADMIN — DIVALPROEX SODIUM SCH MG: 250 TABLET, DELAYED RELEASE ORAL at 21:08

## 2020-10-10 RX ADMIN — ATORVASTATIN CALCIUM SCH MG: 80 TABLET, FILM COATED ORAL at 21:08

## 2020-10-10 RX ADMIN — INSULIN ASPART SCH UNIT: 100 INJECTION, SOLUTION INTRAVENOUS; SUBCUTANEOUS at 09:05

## 2020-10-10 RX ADMIN — LACOSAMIDE SCH MG: 50 TABLET, FILM COATED ORAL at 21:42

## 2020-10-10 RX ADMIN — CEFAZOLIN SCH: 330 INJECTION, POWDER, FOR SOLUTION INTRAMUSCULAR; INTRAVENOUS at 06:59

## 2020-10-10 RX ADMIN — IPRATROPIUM BROMIDE AND ALBUTEROL SULFATE SCH ML: .5; 3 SOLUTION RESPIRATORY (INHALATION) at 07:47

## 2020-10-10 RX ADMIN — ASPIRIN 81 MG CHEWABLE TABLET SCH MG: 81 TABLET CHEWABLE at 09:04

## 2020-10-10 RX ADMIN — PANTOPRAZOLE SODIUM SCH MG: 40 TABLET, DELAYED RELEASE ORAL at 09:05

## 2020-10-10 RX ADMIN — CLOPIDOGREL BISULFATE SCH MG: 75 TABLET ORAL at 09:04

## 2020-10-10 RX ADMIN — DIVALPROEX SODIUM SCH MG: 250 TABLET, DELAYED RELEASE ORAL at 09:04

## 2020-10-10 RX ADMIN — DIVALPROEX SODIUM SCH MG: 250 TABLET, DELAYED RELEASE ORAL at 16:00

## 2020-10-10 RX ADMIN — CLINDAMYCIN HYDROCHLORIDE SCH MG: 150 CAPSULE ORAL at 21:08

## 2020-10-10 RX ADMIN — INSULIN ASPART SCH: 100 INJECTION, SOLUTION INTRAVENOUS; SUBCUTANEOUS at 20:55

## 2020-10-10 RX ADMIN — DULOXETINE SCH MG: 60 CAPSULE, DELAYED RELEASE ORAL at 09:04

## 2020-10-10 RX ADMIN — LEVOFLOXACIN SCH MG: 500 TABLET, FILM COATED ORAL at 09:04

## 2020-10-10 RX ADMIN — CLINDAMYCIN HYDROCHLORIDE SCH MG: 150 CAPSULE ORAL at 16:00

## 2020-10-10 RX ADMIN — INSULIN DETEMIR SCH UNIT: 100 INJECTION, SOLUTION SUBCUTANEOUS at 09:44

## 2020-10-10 RX ADMIN — ENOXAPARIN SODIUM SCH MG: 40 INJECTION SUBCUTANEOUS at 09:05

## 2020-10-10 RX ADMIN — METOCLOPRAMIDE SCH MG: 10 TABLET ORAL at 12:43

## 2020-10-10 RX ADMIN — Medication SCH MG: at 09:04

## 2020-10-10 RX ADMIN — INSULIN ASPART SCH: 100 INJECTION, SOLUTION INTRAVENOUS; SUBCUTANEOUS at 06:58

## 2020-10-10 RX ADMIN — INSULIN ASPART SCH: 100 INJECTION, SOLUTION INTRAVENOUS; SUBCUTANEOUS at 07:00

## 2020-10-10 RX ADMIN — IPRATROPIUM BROMIDE AND ALBUTEROL SULFATE SCH: .5; 3 SOLUTION RESPIRATORY (INHALATION) at 11:29

## 2020-10-10 RX ADMIN — Medication SCH MG: at 21:08

## 2020-10-10 RX ADMIN — LACOSAMIDE SCH MG: 50 TABLET, FILM COATED ORAL at 09:04

## 2020-10-10 RX ADMIN — INSULIN ASPART SCH: 100 INJECTION, SOLUTION INTRAVENOUS; SUBCUTANEOUS at 17:28

## 2020-10-10 RX ADMIN — IPRATROPIUM BROMIDE AND ALBUTEROL SULFATE SCH: .5; 3 SOLUTION RESPIRATORY (INHALATION) at 19:09

## 2020-10-10 RX ADMIN — VALPROATE SODIUM SCH: 100 INJECTION, SOLUTION INTRAVENOUS at 06:59

## 2020-10-10 RX ADMIN — IPRATROPIUM BROMIDE AND ALBUTEROL SULFATE SCH ML: .5; 3 SOLUTION RESPIRATORY (INHALATION) at 16:18

## 2020-10-10 RX ADMIN — CLINDAMYCIN HYDROCHLORIDE SCH MG: 150 CAPSULE ORAL at 09:04

## 2020-10-10 RX ADMIN — INSULIN ASPART SCH UNIT: 100 INJECTION, SOLUTION INTRAVENOUS; SUBCUTANEOUS at 12:43

## 2020-10-10 RX ADMIN — LOSARTAN POTASSIUM SCH MG: 50 TABLET, FILM COATED ORAL at 09:04

## 2020-10-10 RX ADMIN — METOCLOPRAMIDE SCH MG: 10 TABLET ORAL at 09:05

## 2020-10-10 NOTE — PN
PROGRESS NOTE



DATE OF SERVICE:

10/10/2020



REASON FOR FOLLOWUP:

1. Positive blood culture likely contamination.

2. Possible aspiration pneumonia.



INTERVAL HISTORY:

Patient is currently afebrile.  The patient is breathing more comfortably.  The patient

denies having any chest pain.  No shortness of breath or cough.  No abdominal pain or

diarrhea.



PHYSICAL EXAMINATION:

Blood pressure is 101/57, pulse of 110.  Temperature 98.2.  She is 98% on room air.

General description: The patient is a middle-aged female lying in bed in no distress.

Respiratory system: Unlabored breathing, clear to auscultation anteriorly.

Heart S1, S2.  Regular rate and rhythm.

ABDOMEN:  Soft, no tenderness.



LABS:

No new labs have been obtained today.



DIAGNOSTIC IMPRESSION AND PLAN:

1. Patient with a positive blood culture with concern for possible skin contamination.

    The patient is currently off vancomycin.

2. Patient with possible aspiration pneumonitis for which the patient currently on

    Levaquin and clindamycin to continue and monitor clinical course closely.





MMODL / IJN: [id number] / Job#: [id number]

## 2020-10-10 NOTE — P.PN
Subjective


Progress Note Date: 10/10/20


Principal diagnosis: 





Diabetic ketoacidosis, recovered.  Right-sided pneumonia.





this is a 60-year-old female patient who came in with nausea vomiting and 

diarrhea and profound dehydration the patient wasiagnosed having DKA. She has 

previous history of  CVA, hypertension, coronary artery dase, chronic kidney 

disease.  The patient has had previous admissions for DKA.  She was also 

diagnosed having some bilateral low as the patient had bilateral lower lobe 

pulmonary infiltrate right more than left.  She was given Levaquin and 

vancomycin.  The patient was treated with an insulin drip regarding her DKA.  

She has multiple comorbidities.  This morning, the patient is still on IV 

fluids.  She is on D5 half-normal saline with 20 mEq of potassium chloride the 

rate of 75 mL an hour.  She had a follow-up blood work that showed resolution of

her anion gap metabolic acidosis.  A serum bicarbonate of 29 with anion gap of 

the right this point in time.  Her hemoglobin was found to be at 6.8 and the 

patient was given a unit of packed RBC.  No evidence of any bleeding at this 

point in time.  The chest x-rays clear showing a right upper lobe 

consolidation/pneumonia.  There is a central blood culture that showing also 

gram-positive cocci.  Based on that, I kept the Levaquin and vancomycin and 

added clindamycin regarding the possibility of a right upper lobe aspiration 

pneumonia.  The patient was also lethargic and confused and based on that and 

based on history of seizure activity, and neurology consultation was requested. 

Noted the patient was taking a combination of Vimpat and Depakote on outpatient 

basis.  At this point in time she is unable to take her medications orally as 

the patient has failed a swallow bedside evaluation.  The Depakote level has not

been checked yet.  Noted the patient also has a critical internal carotid artery

stenosis on the left and the patient remains on aspirin on outpatient basis and 

vascular surgery has been requested to see this patient the past.





On 10/06/2020, the patient is doing well.  She is much more awake and alert 

compared to yesterday.  His swallow evaluation will be repeated today.  The 

patient seems to be able to pass her swallow evaluation patient is currently on 

2 L about 2 by nasal cannula with a pulse of 97%.  She was on a D5 half-normal 

saline at the rate of 150 mL an hour and this can be obviously cut down further.

 Chest exit shows improvement in the right upper lobe pneumonia as the patient 

is a combination of Levaquin and clindamycin.  No cough.  No sputum production. 

No chest that is no wheezing.  The neurologist evaluated the patient and the 

patient had an EGD that showed no evidence of any seizure activity and will 

continue the antiepileptic medication which include a combination of Vimpat 75 

mg by mouth twice a day and Depakote 250 mg 3 times a day.  The patient would 

also need a follow-up evaluation with vascular surgery regarding her carotid 

artery stenosis which is significant on the left.





10/07/2020, the patient is having another swallow evaluation.  She remains 

nothing by mouth based on the failed swallow evaluation done earlier.  Earlier 

this morning she also had few episodes of hypoglycemia.  I lowered the Lantus 

dose down to 10 units a day.  I also started the patient on D5 half-normal at 

the rate of 50 mL An hour.  We are awaiting another swallow evaluation. The 

patient is showing some silent aspiration on nectar thick material based on a 

swallow evaluation.  The patient otherwise is resting comfortably in bed.  No 

signs of an acute pneumonia.  She continues to take a combination of Levaquin 

and clindamycin which can be switched to oral crushed medication regarding her 

aspiration pneumonia which involves the right upper lobe.  No other significant 

events overnight.  No seizure activity.  She remains on her antiepileptic 

medication.  Awaiting another swallow evaluation for now.





10/08/2020, the patient is having some pured honey thick diet.  She was able to

pass a swallow evaluation.  Doing well.  No specific complaints.  The patient is

still being cheered for right upper lobe aspiration pneumonia with accommodation

Levaquin and clindamycin.  The blood culture was positive for staph hominis a 

contaminant.  The patient's white cell count is at 3.7.  The blood sugar still 

fluctuating being as low as 73 and as high as 85.  I'm going to give her based 

on her medicine of 10 units on a daily basis and addition to a scale coverage.  

No seizure activity has been noted.  Antiepileptic medication was switched to 

oral form.  She is on a combination of Vimpat and Depakote.  Otherwise, she is 

afebrile and she is hemodynamic is stable.  Her anion gap is down to 5 and the 

patient has a bicarb of 26.  No other significant events otherwise for now.





10/09/2020, the patient is doing well.  She was a bit sleepy earlier this 

morning and felt that this was a residual "effect from nighttime Seroquel 

intake..  The patient's otherwise is tolerating her diet.  She has no specific 

complaints.  She is on a D5 half-normal infusion to date of 50 mL an hour.  On 

room air oxygen, the pulse is above 90%.  She is on a Levemir insulin 10 units 

along with a slight scale coverage.  She is being treated for an aspiration 

pneumonia with a combination of Levaquin and tobramycin.  No other issues for 

now.  The white cell count is at 2.7 with a hemoglobin of 7.6.  Serum b

icarbonate 29 gap of 3.  The BUN is at 7 with a creatinine of 0.78.





The patient is seen today 10/10/2020 in follow-up on the regular medical floor. 

She is currently resting comfortably in bed.  More awake and alert.  Denies any 

worsening shortness of breath, cough or congestion.  She is maintaining good O2 

saturations in the 90s on room air.  She's been afebrile.  Status post 1 unit of

packed red blood cells this admission.  Current hemoglobin 8.7.  White count 

3.0.  Platelets 102.  Sodium 141.  Potassium 3.9.  Creatinine 0.74.  She remains

on Cleocin, Levaquin, bronchodilators.  She is anxious to go home.  Stating she 

lives with her sister.





Objective





- Vital Signs


Vital signs: 


                                   Vital Signs











Temp  98.1 F   10/10/20 07:00


 


Pulse  98   10/10/20 07:55


 


Resp  20   10/10/20 07:00


 


BP  118/59   10/10/20 07:00


 


Pulse Ox  92 L  10/10/20 07:00








                                 Intake & Output











 10/09/20 10/10/20 10/10/20





 18:59 06:59 18:59


 


Intake Total 370 500 


 


Balance 370 500 


 


Weight 50.4 kg  


 


Intake:   


 


    


 


    D5-0.45% NaCl with KCl 250  





    20Meq/l 1,000 ml @ 50 mls   





    /hr IV .Q20H [facility] Rx#:   





    [id number]   


 


  Oral 120 500 


 


Other:   


 


  Voiding Method Indwelling Catheter Incontinent Incontinent


 


  # Voids  1 








                       ABP, PAP, CO, CI - Last Documented











Arterial Blood Pressure        92/57

















- Exam





Gen. appearance: Alert, pleasant 60-year-old female patient, she is calm and 

comfortable, no acute distress and she is laying comfortably in bed


Head exam was generally normal. There was no scleral icterus or corneal arcus. 

Mucous membranes were moist.


Neck was supple and without jugular venous distension, thyromegaly, or carotid 

bruits. Carotids were easily palpable bilaterally. There was no adenopathy.


Lungs were clear to auscultation and percussion, and with normal diaphragmatic 

excursion. No wheezes or rales were noted. 


Cardiac exam revealed the PMI to be normally situated and sized. The rhythm was 

regular and no extrasystoles were noted during several minutes of auscultation. 

The first and second heart sounds were normal and physiologic splitting of the 

second heart sound was noted. There were no murmurs, rubs, clicks, or gallops.


Abdominal exam revealed normal bowel sounds. The abdomen was soft, non-tender, 

and without masses, organomegaly, or appreciable enlargement of the abdominal ao

rta.


Examination of the extremities revealed easily palpable radial, femoral and 

pedal pulses. There was no cyanosis, clubbing or edema.


Examination of the skin revealed no evidence of significant rashes, suspicious 

appearing nevi or other concerning lesions.  The patient has had previous 

amputation of the toes on the left and the first second and the fifth toes are 

missing at this point in time.


Neurologically the patient is awake and alert and oriented and she is alert and 

oriented 3 on today's evaluation..  Cranial nerves were essentially intact.  

The patient had a weakness in the left upper extremity.  Gait was not assessed. 

Reflexes were brisk over the left upper extremity.  Babinski is upgoing on the 

left.  





- Labs


CBC & Chem 7: 


                                 10/10/20 05:51





                                 10/10/20 05:51


Labs: 


                  Abnormal Lab Results - Last 24 Hours (Table)











  10/09/20 10/09/20 10/10/20 Range/Units





  12:10 21:26 05:51 


 


WBC    3.0 L  (3.8-10.6)  k/uL


 


RBC    2.85 L  (3.80-5.40)  m/uL


 


Hgb    8.7 L  (11.4-16.0)  gm/dL


 


Hct    27.0 L  (34.0-46.0)  %


 


RDW    16.1 H  (11.5-15.5)  %


 


Plt Count    102 L  (150-450)  k/uL


 


Neutrophils #    1.0 L  (1.3-7.7)  k/uL


 


Chloride     ()  mmol/L


 


Glucose     (74-99)  mg/dL


 


POC Glucose (mg/dL)  164 H  171 H   (75-99)  mg/dL


 


Calcium     (8.4-10.2)  mg/dL


 


Total Protein     (6.3-8.2)  g/dL


 


Albumin     (3.5-5.0)  g/dL














  10/10/20 10/10/20 Range/Units





  05:51 07:35 


 


WBC    (3.8-10.6)  k/uL


 


RBC    (3.80-5.40)  m/uL


 


Hgb    (11.4-16.0)  gm/dL


 


Hct    (34.0-46.0)  %


 


RDW    (11.5-15.5)  %


 


Plt Count    (150-450)  k/uL


 


Neutrophils #    (1.3-7.7)  k/uL


 


Chloride  110 H   ()  mmol/L


 


Glucose  223 H   (74-99)  mg/dL


 


POC Glucose (mg/dL)   279 H  (75-99)  mg/dL


 


Calcium  8.0 L   (8.4-10.2)  mg/dL


 


Total Protein  5.1 L   (6.3-8.2)  g/dL


 


Albumin  2.2 L   (3.5-5.0)  g/dL














Assessment and Plan


Assessment: 





1 diabetic ketoacidosis, recovered  


 


2 right-sided pneumonia mainly involving the right upper lobe, consider 

aspiration, and the patient is currently on accommodation of Levaquin and 

clindamycin and there is improvement in the right upper lobe consolidation on 

today's chest x-ray findings.  The patient is currently taking a combination of 

oral Levaquin and clindamycin.  Blood culture was positive for staph hominis whi

ch is a contaminant.  Recovered and on room air





3 diabetes mellitus with previous episodes of DKA, currently on Levemir insulin 

10 units along with Effexor coverage.  Blood sugars under adequate control.





4 diabetic peripheral neuropathy, retinopathy





5 coronary artery disease  with a preserved LV function  based on 

echocardiogramfrom 2019





6 COPD





7 history of  DVT , axilla , on the right





9 hyperlipidemia





10 hypertension





11 chronic kidney disease, with a component of an acute kidney injury, improved





12 peripheral artery disease





13 hypothyroidism





14 previous history of second toe infection/amputation and previous history of 

right foot infection requiring a wound VAC





15 CVA with someresidual left-sided weakness, and the CAT scan of the brain 

showed an old large right hemispheric infarcts.  No other acute abnormality is 

seen.





16 history of seizures, maintained on a combination of Vimpat and Depakote on 

outpatient basis





17 chronic anemia, with interval drop in hemoglobin down to 6.8 and the patient 

will be receiving a unit of packed RBC and follow-up hemoglobin is at 9.2





18 bipolar disorder, depression, anxiety





19 carotid artery stenosis, critical on the right





20 altered mental status and neurologist on the case, and the patient is  

clinically improving and seizure activity has been ruled out.  The patient is 

normalized in terms of her mentation and the patient was able to pass a swallow 

evaluation and currently she is taking.  Honey thick  thick material.  On today

's evaluation the patient was found to be a bit somnolent and sleepy.  The 

sacral dose will be modified.





Thank you:





The patient was seen and evaluated by Dr. Hurley


She is currently stable from the pulmonary and critical care standpoint


She is hoping to go home and lives with her sister according to the patient


Home once cleared medically





I, the cosigning physician, performed a history & physical examination of the 

patient. Lungs sounds are clear.  Maintaining good O2 saturations in the 90s on 

room air.  I discussed the assessment and plan of care with my nurse practitio

giovanny, Charo Guerrero. I attest to the above note as dictated by her.

## 2020-10-10 NOTE — P.PN
Subjective


Progress Note Date: 10/10/20








Morenita Ramirez, is a 60-year-old female who presented to [facility] emergency room due to nausea vomiting and diarrhea, and elevated 

glucose level, she was evaluated in the emergency room, her temperature on 

presentation was 90.1, pulse 75 respiration 18 blood pressure 79/51 pulse ox 94%

on room air, her white blood count was elevated at 11.6 hemoglobin 9.6 platelet 

count 156, venous blood gas pH was 7.05 glucose level was 744 and serum acetone 

was positive, patient was admitted to ICU she was started on IV fluid and on IV 

insulin drip.


Patient has a known history of insulin-dependent diabetes mellitus type 1 

maintained on insulin he had previous episodes of DKA, she also had a history of

stroke, history of hypertension, hyperlipidemia, coronary artery disease with 

myocardial infarction, peripheral vascular disease with multiple toe amputation,

history of COPD.


On review of systems patient is alert responsive in no apparent distress she was

seen in ICU she is still complaining of nausea and occasional episodes of 

vomiting there is no fever or chills no headache or dizziness no chest pain no 

shortness of breath no cough she is having some abdominal discomfort no no burni

ng with urination no frequency or urgency no hematuria.





On 10/04/2020 patient remains in the intensive care unit.  Patient does not 

appear in any kind of distress.  She currently getting treated for pneumonia 

critical care services are following.  Patient remains on Levaquin.  Patient has

been transitioned to subcu insulin patient did have episodes of low sugar this 

a.m. treated per protocol.





On 10/05/2020 patient was seen and examined in the ICU, she was very drowsy this

morning she failed swallow evaluation and is kept nothing by mouth at this time,

now patient is more alert and responsive, glucose level is better controlled, 

she is complaining of low back pain otherwise she denies any complaints.





On 10/06/2020 patient was seen and examined in the ICU she is more alert and 

responsive at this time she is complaining of feeling hungry however she failed 

swallow evaluation again this morning otherwise she denies any complaints there 

is no fever or chills no headache or dizziness no chest pain no shortness of 

breath no cough no nausea or vomiting no abdominal pain no diarrhea and no 

urinary symptoms.  At this time plan is to repeat swallow evaluation in the 

morning again before discussing alternative feeding options.





On 10/07/2020 patient is alert and oriented 2.  Patient is much more alert than

previous days.  Patient does seem fidgety in bed but this is similar to her base

line.  Blood sugars continue to fluctuate.  Patient having low blood sugar this 

a.m. requiring amp of D50.  Patient also failed swallow.  Currently on nectar 

thick and plans to eventually reassess swallowing the next few days per speech. 

Patient has been ordered out of the intensive care unit to [facility] floor.  

Patient denies chest pain or shortness of breath.  Patient denies nausea 

vomiting or diarrhea.  Patient denies any urinary burning or frequency





On 10/08/2020 patient was seen and examined in the ICU she is alert and oriented

in no apparent distress she is tolerating pured diet well, there is no fever or

chills no headache or dizziness no chest pain no shortness of breath no cough no

nausea or vomiting no abdominal pain no diarrhea no blood in the stool Springer 

catheter is in place.





On 10/09/2020 patient was seen and examined in the ICU she is alert and oriented

3 and answering questions appropriately , glucose levels has been better 

controlled in the last 24 hours she is tolerating pured diet well, there is no 

fever or chills no headache or dizziness, no chest pain no shortness of breath 

no cough no nausea or vomiting no abdominal pain no diarrhea and no urinary 

symptoms.





On 10/10/2020 patient was seen and examined on the medical floor she is alert 

and oriented 3 in no apparent distress there is no fever or chills no headache 

or dizziness no chest pain no shortness of breath no cough no nausea or vomiting

no abdominal pain no diarrhea no blood in the stools no burning with urination 

no frequency or urgency and no hematuria





Objective





- Vital Signs


Vital signs: 


                                   Vital Signs











Temp  98.1 F   10/10/20 07:00


 


Pulse  98   10/10/20 07:55


 


Resp  20   10/10/20 07:00


 


BP  118/59   10/10/20 07:00


 


Pulse Ox  92 L  10/10/20 07:00








                                 Intake & Output











 10/09/20 10/10/20 10/10/20





 18:59 06:59 18:59


 


Intake Total 370 500 


 


Balance 370 500 


 


Weight 50.4 kg  


 


Intake:   


 


    


 


    D5-0.45% NaCl with KCl 250  





    20Meq/l 1,000 ml @ 50 mls   





    /hr IV .Q20H [facility] Rx#:   





    [id number]   


 


  Oral 120 500 


 


Other:   


 


  Voiding Method Indwelling Catheter Incontinent Incontinent


 


  # Voids  1 








                       ABP, PAP, CO, CI - Last Documented











Arterial Blood Pressure        92/57

















- Exam








In general patient is alert responsive in no apparent distress


HEENT head normocephalic and atraumatic


Neck is supple no JVD no goiter no lymphadenopathy


Chest exam reveals a few scattered rhonchi no wheezing


Cardiac exam reveals regular heart sounds S1 and S2 with mild tachycardia no 

gallops no murmurs


Abdomen is soft nontender no rigidity or rebound no palpable masses with 

hyperactive bowel sounds


Extremity exam reveals no edema no cyanosis or clubbing


Neurological examination reveals no gross new focal deficit











- Labs


CBC & Chem 7: 


                                 10/10/20 05:51





                                 10/10/20 05:51


Labs: 


                  Abnormal Lab Results - Last 24 Hours (Table)











  10/09/20 10/09/20 10/10/20 Range/Units





  12:10 21:26 05:51 


 


WBC    3.0 L  (3.8-10.6)  k/uL


 


RBC    2.85 L  (3.80-5.40)  m/uL


 


Hgb    8.7 L  (11.4-16.0)  gm/dL


 


Hct    27.0 L  (34.0-46.0)  %


 


RDW    16.1 H  (11.5-15.5)  %


 


Plt Count    102 L  (150-450)  k/uL


 


Neutrophils #    1.0 L  (1.3-7.7)  k/uL


 


Chloride     ()  mmol/L


 


Glucose     (74-99)  mg/dL


 


POC Glucose (mg/dL)  164 H  171 H   (75-99)  mg/dL


 


Calcium     (8.4-10.2)  mg/dL


 


Total Protein     (6.3-8.2)  g/dL


 


Albumin     (3.5-5.0)  g/dL














  10/10/20 10/10/20 Range/Units





  05:51 07:35 


 


WBC    (3.8-10.6)  k/uL


 


RBC    (3.80-5.40)  m/uL


 


Hgb    (11.4-16.0)  gm/dL


 


Hct    (34.0-46.0)  %


 


RDW    (11.5-15.5)  %


 


Plt Count    (150-450)  k/uL


 


Neutrophils #    (1.3-7.7)  k/uL


 


Chloride  110 H   ()  mmol/L


 


Glucose  223 H   (74-99)  mg/dL


 


POC Glucose (mg/dL)   279 H  (75-99)  mg/dL


 


Calcium  8.0 L   (8.4-10.2)  mg/dL


 


Total Protein  5.1 L   (6.3-8.2)  g/dL


 


Albumin  2.2 L   (3.5-5.0)  g/dL














Assessment and Plan


Plan: 





1.  Acute diabetic ketoacidosis with severe hyperglycemia and positive serum 

acetone patient improved with IV fluid and IV insulin drip currently she is back

on Levemir with a decreased dose to 10 units daily and sliding scale





2.  Underlying history of diabetes mellitus type 1 maintained on insulin.





3.  Underlying history of hypertension





4.  Underlying history of hyperlipidemia





5.  Underlying history of seizure disorder, patient is currently on Depakote 250

mg 3 times a day, and Vimpat 50 mg at bedtime





6.  Underlying history of COPD stable no evidence of exacerbation





7.  Underlying history of coronary artery disease stable at this time patient 

denies any chest pain





8.  Underlying history of peripheral vascular disease with previous history of 

multiple toe amputations





9.  Previous history of stroke.





10.  Pneumonia , most likely related to aspiration , maintained on oral 

antibiotics at this time, Levaquin and clindamycin, pulmonary and infectious 

disease following





11.  Positive blood cultures most likely contaminant.








Patient is improving currently she is out of ICU on the medical floor she is 

tolerating pured diet well


Continue with current management possible discharge to home on Monday if stable

## 2020-10-10 NOTE — P.PN
Subjective


Progress Note Date: 10/10/20


Principal diagnosis: 





acute on chronic anemia





patient is seen lying in bed.  Tolerating diet.  No nausea or vomiting.  No 

bowel movements today or signs or symptoms of GI bleed.





Objective





- Vital Signs


Vital signs: 


                                   Vital Signs











Temp  98.1 F   10/10/20 07:00


 


Pulse  98   10/10/20 07:55


 


Resp  20   10/10/20 07:00


 


BP  118/59   10/10/20 07:00


 


Pulse Ox  92 L  10/10/20 07:00








                                 Intake & Output











 10/09/20 10/10/20 10/10/20





 18:59 06:59 18:59


 


Intake Total 370 500 


 


Balance 370 500 


 


Weight 50.4 kg  


 


Intake:   


 


    


 


    D5-0.45% NaCl with KCl 250  





    20Meq/l 1,000 ml @ 50 mls   





    /hr IV .Q20H TYRON Rx#:   





    [id number]   


 


  Oral 120 500 


 


Other:   


 


  Voiding Method Indwelling Catheter Incontinent Incontinent


 


  # Voids  1 








                       ABP, PAP, CO, CI - Last Documented











Arterial Blood Pressure        92/57

















- Exam





On physical examination, patient appears comfortable in no apparent distress. 


HEAD: Normocephalic, atraumatic. 


EYES: No scleral icterus. No conjunctival injection. 


MOUTH: No lesions, tongue midline. 


NECK: Trachea midline, no gross abnormalities. 


ABDOMEN: Soft, thin and nontender. Bowel sounds are positive. No organomegaly.  

No guarding or rigidity.


EXTREMITIES: No pedal edema. 


SKIN: No rashes, no jaundice. 


NEUROLOGIC: Alert and oriented to person and place. 





- Labs


CBC & Chem 7: 


                                 10/10/20 05:51





                                 10/10/20 05:51


Labs: 


                  Abnormal Lab Results - Last 24 Hours (Table)











  10/09/20 10/09/20 10/10/20 Range/Units





  12:10 21:26 05:51 


 


WBC    3.0 L  (3.8-10.6)  k/uL


 


RBC    2.85 L  (3.80-5.40)  m/uL


 


Hgb    8.7 L  (11.4-16.0)  gm/dL


 


Hct    27.0 L  (34.0-46.0)  %


 


RDW    16.1 H  (11.5-15.5)  %


 


Plt Count    102 L  (150-450)  k/uL


 


Neutrophils #    1.0 L  (1.3-7.7)  k/uL


 


Chloride     ()  mmol/L


 


Glucose     (74-99)  mg/dL


 


POC Glucose (mg/dL)  164 H  171 H   (75-99)  mg/dL


 


Calcium     (8.4-10.2)  mg/dL


 


Total Protein     (6.3-8.2)  g/dL


 


Albumin     (3.5-5.0)  g/dL














  10/10/20 10/10/20 Range/Units





  05:51 07:35 


 


WBC    (3.8-10.6)  k/uL


 


RBC    (3.80-5.40)  m/uL


 


Hgb    (11.4-16.0)  gm/dL


 


Hct    (34.0-46.0)  %


 


RDW    (11.5-15.5)  %


 


Plt Count    (150-450)  k/uL


 


Neutrophils #    (1.3-7.7)  k/uL


 


Chloride  110 H   ()  mmol/L


 


Glucose  223 H   (74-99)  mg/dL


 


POC Glucose (mg/dL)   279 H  (75-99)  mg/dL


 


Calcium  8.0 L   (8.4-10.2)  mg/dL


 


Total Protein  5.1 L   (6.3-8.2)  g/dL


 


Albumin  2.2 L   (3.5-5.0)  g/dL














Assessment and Plan


(1) Acute on chronic anemia


Narrative/Plan: 


60-year-old female with multiple medical comorbiditiesincluding anemia of 

chronic disease with a baseline hemoglobin of approximately 9-10 presents to the

hospital and is being treated for right lung pneumonia as well as diabetic 

ketoacidosis.  Currently she is in the ICU.  The GI service was consult to see 

the patient due to an acute fall in her hemoglobin from baseline.  Hemoglobin 

currently 7.7 after 1 unit of red blood cells.  Iron studies are consistent with

an iron deficiency anemia.  Patient denies any abdominal pain and there've been 

no signs or symptoms of GI bleeding during hospitalization.  She reports brown 

bowel movement today.  She does report questionable dark bowel movements prior 

to presentation.  She believes her last endoscopic evaluation was approximately 

8-9 years ago with EGD and colonoscopy significant for polypectomy on 

colonoscopy. Unknown cause of anemia but suspicion is for multifactorial 

etiology given anemia of chronic disease,current treatment for pneumonia, cannot

rule out a component of bleeding from GI tract or urinary tract or other 

etiology although the patient is denying any signs or symptoms at this time.





hemoglobin remained stable today at 8.7.  She continues to deny any signs or 

symptoms of GI bleeding.


Current Visit: Yes   Status: Acute   Code(s): D64.9 - ANEMIA, UNSPECIFIED   

SNOMED Code(s): 279012445


   





(2) Diabetes


Current Visit: No   Status: Acute   Code(s): E11.9 - TYPE 2 DIABETES MELLITUS 

WITHOUT COMPLICATIONS   SNOMED Code(s): 95149684


   


Plan: 





supportive care


Okay for diet as per recommendations by speech language pathology


Continue to monitor hemoglobin and hematocrit and transfuse as needed


Continue iron supplementation


Continue to monitor for any signs or symptoms of GI bleed


Continue Protonix therapy


Patient would likely benefit from endoscopic evaluation when medically stable 

either prior to discharge or in the outpatient setting with EGD 

and/orcolonoscopy, we'll continue to follow and decision on timing will be made 

based on clinical course and laboratory evaluation


Thank you for allowing us to participate in the care of the patient

## 2020-10-11 LAB
GLUCOSE BLD-MCNC: 138 MG/DL (ref 75–99)
GLUCOSE BLD-MCNC: 151 MG/DL (ref 75–99)
GLUCOSE BLD-MCNC: 200 MG/DL (ref 75–99)
GLUCOSE BLD-MCNC: 274 MG/DL (ref 75–99)
GLUCOSE BLD-MCNC: 311 MG/DL (ref 75–99)

## 2020-10-11 RX ADMIN — METOCLOPRAMIDE SCH MG: 10 TABLET ORAL at 17:15

## 2020-10-11 RX ADMIN — LACOSAMIDE SCH MG: 50 TABLET, FILM COATED ORAL at 20:37

## 2020-10-11 RX ADMIN — CLINDAMYCIN HYDROCHLORIDE SCH MG: 150 CAPSULE ORAL at 16:18

## 2020-10-11 RX ADMIN — LOSARTAN POTASSIUM SCH MG: 50 TABLET, FILM COATED ORAL at 08:14

## 2020-10-11 RX ADMIN — DIVALPROEX SODIUM SCH MG: 250 TABLET, DELAYED RELEASE ORAL at 08:14

## 2020-10-11 RX ADMIN — LACOSAMIDE SCH MG: 50 TABLET, FILM COATED ORAL at 08:14

## 2020-10-11 RX ADMIN — LEVOFLOXACIN SCH MG: 500 TABLET, FILM COATED ORAL at 08:13

## 2020-10-11 RX ADMIN — DIVALPROEX SODIUM SCH MG: 250 TABLET, DELAYED RELEASE ORAL at 16:18

## 2020-10-11 RX ADMIN — DIVALPROEX SODIUM SCH MG: 250 TABLET, DELAYED RELEASE ORAL at 20:37

## 2020-10-11 RX ADMIN — METOCLOPRAMIDE SCH MG: 10 TABLET ORAL at 08:13

## 2020-10-11 RX ADMIN — Medication SCH MG: at 20:36

## 2020-10-11 RX ADMIN — CLINDAMYCIN HYDROCHLORIDE SCH MG: 150 CAPSULE ORAL at 20:37

## 2020-10-11 RX ADMIN — CLOPIDOGREL BISULFATE SCH MG: 75 TABLET ORAL at 08:13

## 2020-10-11 RX ADMIN — Medication SCH MG: at 08:13

## 2020-10-11 RX ADMIN — INSULIN DETEMIR SCH UNIT: 100 INJECTION, SOLUTION SUBCUTANEOUS at 08:14

## 2020-10-11 RX ADMIN — ATORVASTATIN CALCIUM SCH MG: 80 TABLET, FILM COATED ORAL at 20:36

## 2020-10-11 RX ADMIN — ENOXAPARIN SODIUM SCH MG: 40 INJECTION SUBCUTANEOUS at 08:13

## 2020-10-11 RX ADMIN — METOCLOPRAMIDE SCH MG: 10 TABLET ORAL at 11:54

## 2020-10-11 RX ADMIN — ASPIRIN 81 MG CHEWABLE TABLET SCH MG: 81 TABLET CHEWABLE at 08:14

## 2020-10-11 RX ADMIN — INSULIN ASPART SCH UNIT: 100 INJECTION, SOLUTION INTRAVENOUS; SUBCUTANEOUS at 08:14

## 2020-10-11 RX ADMIN — CLINDAMYCIN HYDROCHLORIDE SCH MG: 150 CAPSULE ORAL at 08:13

## 2020-10-11 RX ADMIN — IPRATROPIUM BROMIDE AND ALBUTEROL SULFATE SCH ML: .5; 3 SOLUTION RESPIRATORY (INHALATION) at 19:25

## 2020-10-11 RX ADMIN — IPRATROPIUM BROMIDE AND ALBUTEROL SULFATE SCH ML: .5; 3 SOLUTION RESPIRATORY (INHALATION) at 10:45

## 2020-10-11 RX ADMIN — PANTOPRAZOLE SODIUM SCH MG: 40 TABLET, DELAYED RELEASE ORAL at 08:13

## 2020-10-11 RX ADMIN — INSULIN ASPART SCH: 100 INJECTION, SOLUTION INTRAVENOUS; SUBCUTANEOUS at 17:14

## 2020-10-11 RX ADMIN — INSULIN ASPART SCH UNIT: 100 INJECTION, SOLUTION INTRAVENOUS; SUBCUTANEOUS at 11:54

## 2020-10-11 RX ADMIN — INSULIN ASPART SCH UNIT: 100 INJECTION, SOLUTION INTRAVENOUS; SUBCUTANEOUS at 20:37

## 2020-10-11 RX ADMIN — IPRATROPIUM BROMIDE AND ALBUTEROL SULFATE SCH: .5; 3 SOLUTION RESPIRATORY (INHALATION) at 16:06

## 2020-10-11 RX ADMIN — IPRATROPIUM BROMIDE AND ALBUTEROL SULFATE SCH ML: .5; 3 SOLUTION RESPIRATORY (INHALATION) at 07:09

## 2020-10-11 RX ADMIN — DULOXETINE SCH MG: 60 CAPSULE, DELAYED RELEASE ORAL at 08:13

## 2020-10-11 NOTE — P.PN
Subjective


Progress Note Date: 10/11/20


Principal diagnosis: 





acute on chronic anemia





Patient is seen lying in bed currently eating lunch with no complaints of 

abdominal pain.  No signs or symptoms of GI bleeding with brown nonbloody bowel 

movement yesterday.





Objective





- Vital Signs


Vital signs: 


                                   Vital Signs











Temp  97.9 F   10/11/20 08:00


 


Pulse  88   10/11/20 08:00


 


Resp  16   10/11/20 08:00


 


BP  120/71   10/11/20 08:00


 


Pulse Ox  92 L  10/11/20 08:00








                                 Intake & Output











 10/10/20 10/11/20 10/11/20





 18:59 06:59 18:59


 


Other:   


 


  Voiding Method Incontinent Incontinent Incontinent


 


  # Voids 3 3 








                       ABP, PAP, CO, CI - Last Documented











Arterial Blood Pressure        92/57

















- Exam





On physical examination, patient appears comfortable in no apparent distress. 


HEAD: Normocephalic, atraumatic. 


EYES: No scleral icterus. No conjunctival injection. 


MOUTH: No lesions, tongue midline. 


NECK: Trachea midline, no gross abnormalities. 


ABDOMEN: Soft, thin and nontender. Bowel sounds are positive. No organomegaly.  

No guarding or rigidity.


EXTREMITIES: No pedal edema. 


SKIN: No rashes, no jaundice. 


NEUROLOGIC: Alert and oriented to person and place. 





- Labs


CBC & Chem 7: 


                                 10/10/20 05:51





                                 10/10/20 05:51


Labs: 


                  Abnormal Lab Results - Last 24 Hours (Table)











  10/10/20 10/10/20 10/10/20 Range/Units





  11:55 16:39 16:57 


 


POC Glucose (mg/dL)  373 H  38 L  41 L  (75-99)  mg/dL














  10/10/20 10/10/20 10/10/20 Range/Units





  17:16 17:28 17:46 


 


POC Glucose (mg/dL)  48 L  52 L  43 L  (75-99)  mg/dL














  10/10/20 10/10/20 10/11/20 Range/Units





  18:20 20:53 02:33 


 


POC Glucose (mg/dL)  169 H  145 H  151 H  (75-99)  mg/dL














  10/11/20 Range/Units





  06:50 


 


POC Glucose (mg/dL)  311 H  (75-99)  mg/dL














Assessment and Plan


(1) Acute on chronic anemia


Narrative/Plan: 


60-year-old female with multiple medical comorbiditiesincluding anemia of 

chronic disease with a baseline hemoglobin of approximately 9-10 presents to the

hospital and is being treated for right lung pneumonia as well as diabetic ket

oacidosis.  Currently she is in the ICU.  The GI service was consult to see the 

patient due to an acute fall in her hemoglobin from baseline.  Hemoglobin 

currently 7.7 after 1 unit of red blood cells.  Iron studies are consistent with

an iron deficiency anemia.  Patient denies any abdominal pain and there've been 

no signs or symptoms of GI bleeding during hospitalization.  She reports brown 

bowel movement today.  She does report questionable dark bowel movements prior 

to presentation.  She believes her last endoscopic evaluation was approximately 

8-9 years ago with EGD and colonoscopy significant for polypectomy on 

colonoscopy. Unknown cause of anemia but suspicion is for multifactorial etio

logy given anemia of chronic disease,current treatment for pneumonia, cannot 

rule out a component of bleeding from GI tract or urinary tract or other 

etiology although the patient is denying any signs or symptoms at this time.





hemoglobin remained stable at 8.7.  She continues to deny any signs or symptoms 

of GI bleeding, with brown bowel movement last.


Current Visit: Yes   Status: Acute   Code(s): D64.9 - ANEMIA, UNSPECIFIED   

SNOMED Code(s): 833778254


   





(2) Diabetes


Current Visit: No   Status: Acute   Code(s): E11.9 - TYPE 2 DIABETES MELLITUS 

WITHOUT COMPLICATIONS   SNOMED Code(s): 74022549


   


Plan: 





supportive care


Okay for diet as per recommendations by speech language pathology


Continue to monitor hemoglobin and hematocrit and transfuse as needed


Continue iron supplementation


Continue to monitor for any signs or symptoms of GI bleed


Continue Protonix therapy


Okay for discharge when otherwise medically stable


Patient in follow-up for discussion about endoscopic evaluation in the 

outpatient setting


Thank you for allowing us to participate in the care of the patient, please call

us back with any questions or concerns

## 2020-10-11 NOTE — P.PN
Subjective


Progress Note Date: 10/11/20








Morenita Ramirez, is a 60-year-old female who presented to [facility] emergency room due to nausea vomiting and diarrhea, and elevated 

glucose level, she was evaluated in the emergency room, her temperature on 

presentation was 90.1, pulse 75 respiration 18 blood pressure 79/51 pulse ox 94%

on room air, her white blood count was elevated at 11.6 hemoglobin 9.6 platelet 

count 156, venous blood gas pH was 7.05 glucose level was 744 and serum acetone 

was positive, patient was admitted to ICU she was started on IV fluid and on IV 

insulin drip.


Patient has a known history of insulin-dependent diabetes mellitus type 1 

maintained on insulin he had previous episodes of DKA, she also had a history of

stroke, history of hypertension, hyperlipidemia, coronary artery disease with 

myocardial infarction, peripheral vascular disease with multiple toe amputation,

history of COPD.


On review of systems patient is alert responsive in no apparent distress she was

seen in ICU she is still complaining of nausea and occasional episodes of 

vomiting there is no fever or chills no headache or dizziness no chest pain no 

shortness of breath no cough she is having some abdominal discomfort no no burni

ng with urination no frequency or urgency no hematuria.





On 10/04/2020 patient remains in the intensive care unit.  Patient does not 

appear in any kind of distress.  She currently getting treated for pneumonia 

critical care services are following.  Patient remains on Levaquin.  Patient has

been transitioned to subcu insulin patient did have episodes of low sugar this 

a.m. treated per protocol.





On 10/05/2020 patient was seen and examined in the ICU, she was very drowsy this

morning she failed swallow evaluation and is kept nothing by mouth at this time,

now patient is more alert and responsive, glucose level is better controlled, 

she is complaining of low back pain otherwise she denies any complaints.





On 10/06/2020 patient was seen and examined in the ICU she is more alert and 

responsive at this time she is complaining of feeling hungry however she failed 

swallow evaluation again this morning otherwise she denies any complaints there 

is no fever or chills no headache or dizziness no chest pain no shortness of 

breath no cough no nausea or vomiting no abdominal pain no diarrhea and no 

urinary symptoms.  At this time plan is to repeat swallow evaluation in the 

morning again before discussing alternative feeding options.





On 10/07/2020 patient is alert and oriented 2.  Patient is much more alert than

previous days.  Patient does seem fidgety in bed but this is similar to her base

line.  Blood sugars continue to fluctuate.  Patient having low blood sugar this 

a.m. requiring amp of D50.  Patient also failed swallow.  Currently on nectar 

thick and plans to eventually reassess swallowing the next few days per speech. 

Patient has been ordered out of the intensive care unit to [facility].  

Patient denies chest pain or shortness of breath.  Patient denies nausea 

vomiting or diarrhea.  Patient denies any urinary burning or frequency





On 10/08/2020 patient was seen and examined in the ICU she is alert and oriented

in no apparent distress she is tolerating pured diet well, there is no fever or

chills no headache or dizziness no chest pain no shortness of breath no cough no

nausea or vomiting no abdominal pain no diarrhea no blood in the stool Springer 

catheter is in place.





On 10/09/2020 patient was seen and examined in the ICU she is alert and oriented

3 and answering questions appropriately , glucose levels has been better 

controlled in the last 24 hours she is tolerating pured diet well, there is no 

fever or chills no headache or dizziness, no chest pain no shortness of breath 

no cough no nausea or vomiting no abdominal pain no diarrhea and no urinary 

symptoms.





On 10/10/2020 patient was seen and examined on the medical floor she is alert 

and oriented 3 in no apparent distress there is no fever or chills no headache 

or dizziness no chest pain no shortness of breath no cough no nausea or vomiting

no abdominal pain no diarrhea no blood in the stools no burning with urination 

no frequency or urgency and no hematuria.








on 10/11/2020 patient was seen and examined on the medical floor, she is alert 

and oriented 3 in no apparent distress, she is receiving pured diet and is 

tolerating well, there is no fever or chills no headache or dizziness no chest 

pain no shortness of breath no cough no nausea or vomiting no abdominal pain no 

diarrhea no blood in the stools no burning with urination no frequency or 

urgency no hematuria, yesterday she had hyperglycemia followed by hypoglycemia, 

patient has an ulcer on the left hip area with scabbing, there is also redness 

on both buttocks.





Objective





- Vital Signs


Vital signs: 


                                   Vital Signs











Temp  97.9 F   10/11/20 08:00


 


Pulse  88   10/11/20 08:00


 


Resp  16   10/11/20 08:00


 


BP  120/71   10/11/20 08:00


 


Pulse Ox  92 L  10/11/20 08:00








                                 Intake & Output











 10/10/20 10/11/20 10/11/20





 18:59 06:59 18:59


 


Other:   


 


  Voiding Method Incontinent Incontinent Incontinent


 


  # Voids 3 3 








                       ABP, PAP, CO, CI - Last Documented











Arterial Blood Pressure        92/57

















- Exam








In general patient is alert responsive in no apparent distress


HEENT head normocephalic and atraumatic


Neck is supple no JVD no goiter no lymphadenopathy


Chest exam reveals a few scattered rhonchi no wheezing


Cardiac exam reveals regular heart sounds S1 and S2 with mild tachycardia no 

gallops no murmurs


Abdomen is soft nontender no rigidity or rebound no palpable masses with 

hyperactive bowel sounds


Extremity exam reveals no edema no cyanosis or clubbing


Neurological examination reveals no gross new focal deficit











- Labs


CBC & Chem 7: 


                                 10/10/20 05:51





                                 10/10/20 05:51


Labs: 


                  Abnormal Lab Results - Last 24 Hours (Table)











  10/10/20 10/10/20 10/10/20 Range/Units





  11:55 16:39 16:57 


 


POC Glucose (mg/dL)  373 H  38 L  41 L  (75-99)  mg/dL














  10/10/20 10/10/20 10/10/20 Range/Units





  17:16 17:28 17:46 


 


POC Glucose (mg/dL)  48 L  52 L  43 L  (75-99)  mg/dL














  10/10/20 10/10/20 10/11/20 Range/Units





  18:20 20:53 02:33 


 


POC Glucose (mg/dL)  169 H  145 H  151 H  (75-99)  mg/dL














  10/11/20 Range/Units





  06:50 


 


POC Glucose (mg/dL)  311 H  (75-99)  mg/dL














Assessment and Plan


Plan: 





1.  Acute diabetic ketoacidosis with severe hyperglycemia and positive serum 

acetone patient improved with IV fluid and IV insulin drip currently she is back

on Levemir with a decreased dose to 10 units daily and sliding scale





2.  Underlying history of diabetes mellitus type 1 maintained on insulin.





3.  Underlying history of hypertension





4.  Underlying history of hyperlipidemia





5.  Underlying history of seizure disorder, patient is currently on Depakote 250

mg 3 times a day, and Vimpat 50 mg at bedtime





6.  Underlying history of COPD stable no evidence of exacerbation





7.  Underlying history of coronary artery disease stable at this time patient 

denies any chest pain





8.  Underlying history of peripheral vascular disease with previous history of 

multiple toe amputations





9.  Previous history of stroke.





10.  Pneumonia , most likely related to aspiration , maintained on oral 

antibiotics at this time, Levaquin and clindamycin, pulmonary and infectious 

disease following





11.  Positive blood cultures most likely contaminant.








Patient is improving currently she is out of ICU on the medical floor she is 

tolerating pured diet well


Continue with current management possible discharge to home on Monday if stable

## 2020-10-12 LAB
GLUCOSE BLD-MCNC: 180 MG/DL (ref 75–99)
GLUCOSE BLD-MCNC: 244 MG/DL (ref 75–99)
GLUCOSE BLD-MCNC: 289 MG/DL (ref 75–99)
GLUCOSE BLD-MCNC: 443 MG/DL (ref 75–99)
GLUCOSE BLD-MCNC: 81 MG/DL (ref 75–99)

## 2020-10-12 RX ADMIN — METOCLOPRAMIDE SCH MG: 10 TABLET ORAL at 11:59

## 2020-10-12 RX ADMIN — LACOSAMIDE SCH MG: 50 TABLET, FILM COATED ORAL at 07:40

## 2020-10-12 RX ADMIN — DULOXETINE SCH MG: 60 CAPSULE, DELAYED RELEASE ORAL at 07:39

## 2020-10-12 RX ADMIN — IPRATROPIUM BROMIDE AND ALBUTEROL SULFATE SCH ML: .5; 3 SOLUTION RESPIRATORY (INHALATION) at 20:19

## 2020-10-12 RX ADMIN — IPRATROPIUM BROMIDE AND ALBUTEROL SULFATE SCH ML: .5; 3 SOLUTION RESPIRATORY (INHALATION) at 08:18

## 2020-10-12 RX ADMIN — Medication SCH MG: at 20:35

## 2020-10-12 RX ADMIN — DIVALPROEX SODIUM SCH MG: 250 TABLET, DELAYED RELEASE ORAL at 07:40

## 2020-10-12 RX ADMIN — INSULIN ASPART SCH UNIT: 100 INJECTION, SOLUTION INTRAVENOUS; SUBCUTANEOUS at 17:32

## 2020-10-12 RX ADMIN — METOCLOPRAMIDE SCH MG: 10 TABLET ORAL at 07:39

## 2020-10-12 RX ADMIN — DIVALPROEX SODIUM SCH MG: 250 TABLET, DELAYED RELEASE ORAL at 17:32

## 2020-10-12 RX ADMIN — IPRATROPIUM BROMIDE AND ALBUTEROL SULFATE SCH ML: .5; 3 SOLUTION RESPIRATORY (INHALATION) at 11:46

## 2020-10-12 RX ADMIN — INSULIN ASPART SCH UNIT: 100 INJECTION, SOLUTION INTRAVENOUS; SUBCUTANEOUS at 07:38

## 2020-10-12 RX ADMIN — LACOSAMIDE SCH MG: 50 TABLET, FILM COATED ORAL at 20:35

## 2020-10-12 RX ADMIN — METOCLOPRAMIDE SCH MG: 10 TABLET ORAL at 17:32

## 2020-10-12 RX ADMIN — INSULIN ASPART SCH: 100 INJECTION, SOLUTION INTRAVENOUS; SUBCUTANEOUS at 11:59

## 2020-10-12 RX ADMIN — INSULIN DETEMIR SCH UNIT: 100 INJECTION, SOLUTION SUBCUTANEOUS at 07:38

## 2020-10-12 RX ADMIN — INSULIN ASPART SCH: 100 INJECTION, SOLUTION INTRAVENOUS; SUBCUTANEOUS at 20:19

## 2020-10-12 RX ADMIN — INSULIN ASPART SCH UNIT: 100 INJECTION, SOLUTION INTRAVENOUS; SUBCUTANEOUS at 12:34

## 2020-10-12 RX ADMIN — PANTOPRAZOLE SODIUM SCH MG: 40 TABLET, DELAYED RELEASE ORAL at 07:39

## 2020-10-12 RX ADMIN — LOSARTAN POTASSIUM SCH MG: 50 TABLET, FILM COATED ORAL at 07:41

## 2020-10-12 RX ADMIN — IPRATROPIUM BROMIDE AND ALBUTEROL SULFATE SCH ML: .5; 3 SOLUTION RESPIRATORY (INHALATION) at 15:40

## 2020-10-12 RX ADMIN — ENOXAPARIN SODIUM SCH MG: 40 INJECTION SUBCUTANEOUS at 07:40

## 2020-10-12 RX ADMIN — CLOPIDOGREL BISULFATE SCH MG: 75 TABLET ORAL at 07:39

## 2020-10-12 RX ADMIN — Medication SCH MG: at 07:40

## 2020-10-12 RX ADMIN — LEVOFLOXACIN SCH MG: 500 TABLET, FILM COATED ORAL at 07:40

## 2020-10-12 RX ADMIN — ASPIRIN 81 MG CHEWABLE TABLET SCH MG: 81 TABLET CHEWABLE at 07:39

## 2020-10-12 RX ADMIN — ATORVASTATIN CALCIUM SCH MG: 80 TABLET, FILM COATED ORAL at 20:35

## 2020-10-12 RX ADMIN — DIVALPROEX SODIUM SCH MG: 250 TABLET, DELAYED RELEASE ORAL at 20:35

## 2020-10-12 NOTE — PN
PROGRESS NOTE



DATE OF SERVICE:

10/11/2020



REASON FOR FOLLOWUP:

1. Possible aspiration pneumonia.

2. Pressure ulcer.



INTERVAL HISTORY:

Patient is currently afebrile.  The patient is breathing comfortably.  Denies having

any chest pain.  No shortness of breath or cough.  No nausea, no vomiting. No abdominal

pain or diarrhea, but the patient was constipated requiring laxative. She was noticed

to have a pressure ulcer to the right hip area by the nursing staff.  The patient did

not have a significant symptom to the area.



PHYSICAL EXAMINATION:

Blood pressure 100/62 with a pulse of 101, temperature 98.4. She is 98% on room air.

General description is a middle-aged female lying in bed in no distress.

RESPIRATORY SYSTEM: Unlabored breathing, clear to auscultation anteriorly.

HEART: S1, S2.  Regular rate and rhythm.

ABDOMEN:  Soft, no tenderness.



LABS:

No new labs have been obtained today.



DIAGNOSTIC IMPRESSION AND PLAN:

1. Patient with positive blood culture, likely skin contaminant.

2. Pneumonia, possible aspiration.  Overall improvement on Levaquin and plan to

    continue Levaquin.  Discontinue the daptomycin.

3. Hip area unstageable pressure ulcer.  Local care with [facility] followed by moist

    dressing to be changed daily and keep the area off the pressure.





MMODL / IJN: [id number] / Job#: [id number]

## 2020-10-12 NOTE — P.PN
Subjective


Progress Note Date: 10/12/20


Principal diagnosis: 


 Diabetic ketoacidosis, recovered, right-sided pneumonia, improved





this is a 60-year-old female patient who came in with nausea vomiting and 

diarrhea and profound dehydration the patient wasiagnosed having DKA. She has 

previous history of  CVA, hypertension, coronary artery dase, chronic kidney 

disease.  The patient has had previous admissions for DKA.  She was also 

diagnosed having some bilateral low as the patient had bilateral lower lobe 

pulmonary infiltrate right more than left.  She was given Levaquin and 

vancomycin.  The patient was treated with an insulin drip regarding her DKA.  

She has multiple comorbidities.  This morning, the patient is still on IV 

fluids.  She is on D5 half-normal saline with 20 mEq of potassium chloride the 

rate of 75 mL an hour.  She had a follow-up blood work that showed resolution of

her anion gap metabolic acidosis.  A serum bicarbonate of 29 with anion gap of 

the right this point in time.  Her hemoglobin was found to be at 6.8 and the 

patient was given a unit of packed RBC.  No evidence of any bleeding at this 

point in time.  The chest x-rays clear showing a right upper lobe 

consolidation/pneumonia.  There is a central blood culture that showing also 

gram-positive cocci.  Based on that, I kept the Levaquin and vancomycin and 

added clindamycin regarding the possibility of a right upper lobe aspiration pn

eumonia.  The patient was also lethargic and confused and based on that and 

based on history of seizure activity, and neurology consultation was requested. 

Noted the patient was taking a combination of Vimpat and Depakote on outpatient 

basis.  At this point in time she is unable to take her medications orally as 

the patient has failed a swallow bedside evaluation.  The Depakote level has not

been checked yet.  Noted the patient also has a critical internal carotid artery

stenosis on the left and the patient remains on aspirin on outpatient basis and 

vascular surgery has been requested to see this patient the past.





On 10/06/2020, the patient is doing well.  She is much more awake and alert 

compared to yesterday.  His swallow evaluation will be repeated today.  The 

patient seems to be able to pass her swallow evaluation patient is currently on 

2 L about 2 by nasal cannula with a pulse of 97%.  She was on a D5 half-normal 

saline at the rate of 150 mL an hour and this can be obviously cut down further.

 Chest exit shows improvement in the right upper lobe pneumonia as the patient 

is a combination of Levaquin and clindamycin.  No cough.  No sputum production. 

No chest that is no wheezing.  The neurologist evaluated the patient and the 

patient had an EGD that showed no evidence of any seizure activity and will 

continue the antiepileptic medication which include a combination of Vimpat 75 

mg by mouth twice a day and Depakote 250 mg 3 times a day.  The patient would 

also need a follow-up evaluation with vascular surgery regarding her carotid 

artery stenosis which is significant on the left.





10/07/2020, the patient is having another swallow evaluation.  She remains 

nothing by mouth based on the failed swallow evaluation done earlier.  Earlier 

this morning she also had few episodes of hypoglycemia.  I lowered the Lantus 

dose down to 10 units a day.  I also started the patient on D5 half-normal at 

the rate of 50 mL An hour.  We are awaiting another swallow evaluation. The 

patient is showing some silent aspiration on nectar thick material based on a 

swallow evaluation.  The patient otherwise is resting comfortably in bed.  No 

signs of an acute pneumonia.  She continues to take a combination of Levaquin 

and clindamycin which can be switched to oral crushed medication regarding her 

aspiration pneumonia which involves the right upper lobe.  No other significant 

events overnight.  No seizure activity.  She remains on her antiepileptic 

medication.  Awaiting another swallow evaluation for now.





10/08/2020, the patient is having some pured honey thick diet.  She was able to

pass a swallow evaluation.  Doing well.  No specific complaints.  The patient is

still being cheered for right upper lobe aspiration pneumonia with accommodation

Levaquin and clindamycin.  The blood culture was positive for staph hominis a 

contaminant.  The patient's white cell count is at 3.7.  The blood sugar still 

fluctuating being as low as 73 and as high as 85.  I'm going to give her based 

on her medicine of 10 units on a daily basis and addition to a scale coverage.  

No seizure activity has been noted.  Antiepileptic medication was switched to 

oral form.  She is on a combination of Vimpat and Depakote.  Otherwise, she is 

afebrile and she is hemodynamic is stable.  Her anion gap is down to 5 and the 

patient has a bicarb of 26.  No other significant events otherwise for now.





10/09/2020, the patient is doing well.  She was a bit sleepy earlier this 

morning and felt that this was a residual "effect from nighttime Seroquel 

intake..  The patient's otherwise is tolerating her diet.  She has no specific 

complaints.  She is on a D5 half-normal infusion to date of 50 mL an hour.  On 

room air oxygen, the pulse is above 90%.  She is on a Levemir insulin 10 units 

along with a slight scale coverage.  She is being treated for an aspiration 

pneumonia with a combination of Levaquin and tobramycin.  No other issues for 

now.  The white cell count is at 2.7 with a hemoglobin of 7.6.  Serum 

bicarbonate 29 gap of 3.  The BUN is at 7 with a creatinine of 0.78.





The patient is seen today 10/10/2020 in follow-up on the regular medical floor. 

She is currently resting comfortably in bed.  More awake and alert.  Denies any 

worsening shortness of breath, cough or congestion.  She is maintaining good O2 

saturations in the 90s on room air.  She's been afebrile.  Status post 1 unit of

packed red blood cells this admission.  Current hemoglobin 8.7.  White count 

3.0.  Platelets 102.  Sodium 141.  Potassium 3.9.  Creatinine 0.74.  She remains

on Cleocin, Levaquin, bronchodilators.  She is anxious to go home.  Stating she 

lives with her sister.





On 10/12/2020 patient seen in follow-up on the jaw medical surgical floor.  She 

is awake and alert, she is quite restless in bed, but no acute distress, no 

difficulty breathing, she is currently on room air with pulse ox of 94-97%, she 

is afebrile, breathing is nonlabored, lung sounds are clear to auscultation, 

she's been tolerating oral intake, she is on a modified diet per speech therapy 

recommendations.  She's had no acute events overnight, he continues on oral 

Levaquin for pneumonia, vital signs have been stable.  Patient is calm and 

cooperative, alert and awake oriented 3, no cough or congestion, no chest pain 

or hemoptysis, ID service is following, daptomycin has been discontinued, and 

patient continues on Levaquin, patient had one positive blood culture that was 

thought to be related to skin contaminant.





Objective





- Vital Signs


Vital signs: 


                                   Vital Signs











Temp  98.0 F   10/12/20 07:00


 


Pulse  88   10/12/20 11:54


 


Resp  16   10/12/20 07:00


 


BP  151/84   10/12/20 07:00


 


Pulse Ox  97   10/12/20 07:00








                                 Intake & Output











 10/11/20 10/12/20 10/12/20





 18:59 06:59 18:59


 


Intake Total 180  


 


Balance 180  


 


Intake:   


 


  Oral 180  


 


Other:   


 


  Voiding Method Incontinent Incontinent Incontinent


 


  # Voids 1 2 


 


  # Bowel Movements 1 2 








                       ABP, PAP, CO, CI - Last Documented











Arterial Blood Pressure        92/57

















- Exam


 GENERAL EXAM: Alert, very pleasant, 60-year-old frail looking female, laying 

across the bed, slightly restless in bed, fairly cooperative, with left-sided 

weakness, and chronic contracture of the left upper extremity, comfortable in no

apparent distress.


HEAD: Normocephalic/atraumatic.


EYES: Normal reaction of pupils, equal size.  Conjunctiva pink, sclera white.


NOSE: Clear with pink turbinates.


THROAT: No erythema or exudates.


NECK: No masses, no JVD, no thyroid enlargement, no adenopathy.


CHEST: No chest wall deformity.  Symmetrical expansion. 


LUNGS: Equal air entry with no crackles, wheeze, rhonchi or dullness.


CVS: Regular rate and rhythm, normal S1 and S2, no gallops, no murmurs, no rubs


ABDOMEN: Soft, nontender.  No hepatosplenomegaly, normal bowel sounds, no 

guarding or rigidity.


EXTREMITIES: No clubbing, no edema, no cyanosis, 2+ pulses and upper and lower 

extremities.


MUSCULOSKELETAL: Muscle strength and tone normal.  Chronic contracture of left 

upper extremity and left hand


SPINE: No scoliosis or deformity


SKIN: No rashes


CENTRAL NERVOUS SYSTEM: Alert and oriented -3.  No focal deficits, tone is 

normal in all 4 extremities.


PSYCHIATRIC: Alert and oriented -3.  Appropriate affect.  Intact judgment and 

insight.











- Labs


CBC & Chem 7: 


                                 10/10/20 05:51





                                 10/10/20 05:51


Labs: 


                  Abnormal Lab Results - Last 24 Hours (Table)











  10/11/20 10/11/20 10/12/20 Range/Units





  16:41 20:16 02:13 


 


POC Glucose (mg/dL)  138 H  200 H  244 H  (75-99)  mg/dL














  10/12/20 10/12/20 Range/Units





  06:45 11:25 


 


POC Glucose (mg/dL)  443 H  289 H  (75-99)  mg/dL














Assessment and Plan


Plan: 


 Assessment:





1 diabetic ketoacidosis, recovered  


 


2 right-sided pneumonia mainly involving the right upper lobe, consider 

aspiration, and the patient is currently on accommodation of Levaquin and 

clindamycin and there is improvement in the right upper lobe consolidation on 

today's chest x-ray findings.  The patient is currently taking a combination of 

oral Levaquin and clindamycin.  Blood culture was positive for staph hominis 

which is a contaminant.  Recovered and on room air





3 diabetes mellitus with previous episodes of DKA, currently on Levemir insulin 

10 units along with Effexor coverage.  Blood sugars under adequate control.





4 diabetic peripheral neuropathy, retinopathy





5 coronary artery disease  with a preserved LV function  based on 

echocardiogramfrom 2019





6 COPD





7 history of  DVT , axilla , on the right





9 hyperlipidemia





10 hypertension





11 chronic kidney disease, with a component of an acute kidney injury, improved





12 peripheral artery disease





13 hypothyroidism





14 previous history of second toe infection/amputation and previous history of 

right foot infection requiring a wound VAC





15 CVA with someresidual left-sided weakness, and the CAT scan of the brain ever

wed an old large right hemispheric infarcts.  No other acute abnormality is 

seen.





16 history of seizures, maintained on a combination of Vimpat and Depakote on 

outpatient basis





17 chronic anemia, with interval drop in hemoglobin down to 6.8 and the patient 

will be receiving a unit of packed RBC and follow-up hemoglobin is at 9.2





18 bipolar disorder, depression, anxiety





19 carotid artery stenosis, critical on the right





20 altered mental status and neurologist on the case, and the patient is  

clinically improving and seizure activity has been ruled out.  The patient is 

normalized in terms of her mentation and the patient was able to pass a swallow 

evaluation and currently she is taking.  Honey thick  thick material.  On 

today's evaluation the patient was found to be a bit somnolent and sleepy.  The 

sacral dose will be modified.





Plan:





Patient is doing well, no difficulty breathing, no cough congestion, no 

hemoptysis or chest pain, has been afebrile, she continues on oral Levaquin, no 

acute events overnight, maintain aspiration precautions, from pulmonary 

perspective patient is stable and could be considered for discharge home, were 

told the patient resides with a caregiver who stays with her 24/7.  She will 

need outpatient follow-up in the office in 7-10 days. 





I performed a history & physical examination of the patient and discussed their 

management with my nurse practitioner, Amber Abraham.  I reviewed the nurse 

practitioner's note and agree with the documented findings and plan of care.  

Lung sounds are positive for diminished breath sounds.  The findings and the i

mpression was discussed with the patient.  I attest to the documentation by the 

nurse practitioner. 








Time with Patient: Less than 30

## 2020-10-12 NOTE — P.PN
Subjective


Progress Note Date: 10/12/20








Morenita aRmirez, is a 60-year-old female who presented to [facility] emergency room due to nausea vomiting and diarrhea, and elevated 

glucose level, she was evaluated in the emergency room, her temperature on 

presentation was 90.1, pulse 75 respiration 18 blood pressure 79/51 pulse ox 94%

on room air, her white blood count was elevated at 11.6 hemoglobin 9.6 platelet 

count 156, venous blood gas pH was 7.05 glucose level was 744 and serum acetone 

was positive, patient was admitted to ICU she was started on IV fluid and on IV 

insulin drip.


Patient has a known history of insulin-dependent diabetes mellitus type 1 

maintained on insulin he had previous episodes of DKA, she also had a history of

stroke, history of hypertension, hyperlipidemia, coronary artery disease with 

myocardial infarction, peripheral vascular disease with multiple toe amputation,

history of COPD.


On review of systems patient is alert responsive in no apparent distress she was

seen in ICU she is still complaining of nausea and occasional episodes of 

vomiting there is no fever or chills no headache or dizziness no chest pain no 

shortness of breath no cough she is having some abdominal discomfort no no burni

ng with urination no frequency or urgency no hematuria.





On 10/04/2020 patient remains in the intensive care unit.  Patient does not 

appear in any kind of distress.  She currently getting treated for pneumonia 

critical care services are following.  Patient remains on Levaquin.  Patient has

been transitioned to subcu insulin patient did have episodes of low sugar this 

a.m. treated per protocol.





On 10/05/2020 patient was seen and examined in the ICU, she was very drowsy this

morning she failed swallow evaluation and is kept nothing by mouth at this time,

now patient is more alert and responsive, glucose level is better controlled, 

she is complaining of low back pain otherwise she denies any complaints.





On 10/06/2020 patient was seen and examined in the ICU she is more alert and 

responsive at this time she is complaining of feeling hungry however she failed 

swallow evaluation again this morning otherwise she denies any complaints there 

is no fever or chills no headache or dizziness no chest pain no shortness of 

breath no cough no nausea or vomiting no abdominal pain no diarrhea and no 

urinary symptoms.  At this time plan is to repeat swallow evaluation in the 

morning again before discussing alternative feeding options.





On 10/07/2020 patient is alert and oriented 2.  Patient is much more alert than

previous days.  Patient does seem fidgety in bed but this is similar to her base

line.  Blood sugars continue to fluctuate.  Patient having low blood sugar this 

a.m. requiring amp of D50.  Patient also failed swallow.  Currently on nectar 

thick and plans to eventually reassess swallowing the next few days per speech. 

Patient has been ordered out of the intensive care unit to [facility].  

Patient denies chest pain or shortness of breath.  Patient denies nausea 

vomiting or diarrhea.  Patient denies any urinary burning or frequency





On 10/08/2020 patient was seen and examined in the ICU she is alert and oriented

in no apparent distress she is tolerating pured diet well, there is no fever or

chills no headache or dizziness no chest pain no shortness of breath no cough no

nausea or vomiting no abdominal pain no diarrhea no blood in the stool Springer 

catheter is in place.





On 10/09/2020 patient was seen and examined in the ICU she is alert and oriented

3 and answering questions appropriately , glucose levels has been better 

controlled in the last 24 hours she is tolerating pured diet well, there is no 

fever or chills no headache or dizziness, no chest pain no shortness of breath 

no cough no nausea or vomiting no abdominal pain no diarrhea and no urinary 

symptoms.





On 10/10/2020 patient was seen and examined on the medical floor she is alert 

and oriented 3 in no apparent distress there is no fever or chills no headache 

or dizziness no chest pain no shortness of breath no cough no nausea or vomiting

no abdominal pain no diarrhea no blood in the stools no burning with urination 

no frequency or urgency and no hematuria.








on 10/11/2020 patient was seen and examined on the medical floor, she is alert 

and oriented 3 in no apparent distress, she is receiving pured diet and is 

tolerating well, there is no fever or chills no headache or dizziness no chest 

pain no shortness of breath no cough no nausea or vomiting no abdominal pain no 

diarrhea no blood in the stools no burning with urination no frequency or 

urgency no hematuria, yesterday she had hyperglycemia followed by hypoglycemia, 

patient has an ulcer on the left hip area with scabbing, there is also redness 

on both buttocks.





On 10/12/2020 patient was seen and examined on the medical floor, she is alert 

and oriented 3 in no distress there is no fever or chills no headache or 

dizziness.  Chest pain no shortness of breath no cough no nausea or vomiting no 

abdominal pain no diarrhea no blood in the stools no burning with urination no 

frequency or urgency no hematuria, patient is tolerating pured diet well no 

evidence of aspiration at this time





Objective





- Vital Signs


Vital signs: 


                                   Vital Signs











Temp  97.6 F   10/12/20 14:49


 


Pulse  100   10/12/20 15:49


 


Resp  18   10/12/20 14:49


 


BP  104/51   10/12/20 14:49


 


Pulse Ox  99   10/12/20 14:49








                                 Intake & Output











 10/11/20 10/12/20 10/12/20





 18:59 06:59 18:59


 


Intake Total 180  


 


Balance 180  


 


Weight   50.4 kg


 


Intake:   


 


  Oral 180  


 


Other:   


 


  Voiding Method Incontinent Incontinent Incontinent


 


  # Voids 1 2 3


 


  # Bowel Movements 1 2 1








                       ABP, PAP, CO, CI - Last Documented











Arterial Blood Pressure        92/57

















- Exam








In general patient is alert responsive in no apparent distress


HEENT head normocephalic and atraumatic


Neck is supple no JVD no goiter no lymphadenopathy


Chest exam reveals a few scattered rhonchi no wheezing


Cardiac exam reveals regular heart sounds S1 and S2 with mild tachycardia no 

gallops no murmurs


Abdomen is soft nontender no rigidity or rebound no palpable masses with 

hyperactive bowel sounds


Extremity exam reveals no edema no cyanosis or clubbing


Neurological examination reveals no gross new focal deficit











- Labs


CBC & Chem 7: 


                                 10/10/20 05:51





                                 10/10/20 05:51


Labs: 


                  Abnormal Lab Results - Last 24 Hours (Table)











  10/11/20 10/12/20 10/12/20 Range/Units





  20:16 02:13 06:45 


 


POC Glucose (mg/dL)  200 H  244 H  443 H  (75-99)  mg/dL














  10/12/20 10/12/20 Range/Units





  11:25 16:41 


 


POC Glucose (mg/dL)  289 H  180 H  (75-99)  mg/dL














Assessment and Plan


Plan: 





1.  Acute diabetic ketoacidosis with severe hyperglycemia and positive serum 

acetone patient improved with IV fluid and IV insulin drip currently she is back

on Levemir with a decreased dose to 10 units daily and sliding scale





2.  Underlying history of diabetes mellitus type 1 maintained on insulin.





3.  Underlying history of hypertension





4.  Underlying history of hyperlipidemia





5.  Underlying history of seizure disorder, patient is currently on Depakote 250

mg 3 times a day, and Vimpat 50 mg at bedtime





6.  Underlying history of COPD stable no evidence of exacerbation





7.  Underlying history of coronary artery disease stable at this time patient 

denies any chest pain





8.  Underlying history of peripheral vascular disease with previous history of 

multiple toe amputations





9.  Previous history of stroke.





10.  Pneumonia , most likely related to aspiration , maintained on oral 

antibiotics at this time, Levaquin and clindamycin, pulmonary and infectious 

disease following





11.  Positive blood cultures most likely contaminant.








Patient is improving currently she is out of ICU on the medical floor she is 

tolerating pured diet well


Continue with current management possible discharge to home on Monday if stable

## 2020-10-12 NOTE — P.PN
Subjective


Progress Note Date: 10/12/20





Patient was seen for a follow-up.  Patient seen by Dr. Destin Zamora in neurology 

consultation on 10/05/2020.  Please refer to his notes for detail.  This is the 

covering note.  Patient has history of mild residual left hemiparesis from right

MCA CVA.  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 

epilepsy.  Patient came to the hospital for altered mental status.  Patient 

diagnosed with toxic metabolic encephalopathy related to DKA and right-sided 

pneumonia.  Patient has not had any seizures on this admission.  She is 

currently on Depakote 250 mg 3 times a day and Vimpat 100 mg a.m. and 50 mg at 

bedtime.  Patient's free Depakote level is 14.9 (4.8-17.3).  Patient offers no 

complaints.  Patient admits that she has "little headache".  She is asking for 

food and water.





Objective





- Vital Signs


Vital signs: 


                                   Vital Signs











Temp  97.6 F   10/12/20 14:49


 


Pulse  100   10/12/20 15:49


 


Resp  18   10/12/20 14:49


 


BP  104/51   10/12/20 14:49


 


Pulse Ox  99   10/12/20 14:49








                                 Intake & Output











 10/12/20 10/12/20 10/13/20





 06:59 18:59 06:59


 


Weight  50.4 kg 


 


Other:   


 


  Voiding Method Incontinent Incontinent 


 


  # Voids 2 3 


 


  # Bowel Movements 2 1 








                       ABP, PAP, CO, CI - Last Documented











Arterial Blood Pressure        92/57

















- Exam





Patient is alert and awake.  She could not tell what month and year is it.  She 

however knows that she is in [facility] in [facility].  She 

knows name of the current president.  Speech is clear.  She states that she is 

legally blind.  Her face is symmetric.  Muscle strength is normal in the arms 

and legs.  She did not cooperate well with lower extremity and ankle testing for

muscle strength.  Tone and bulk of muscles normal.





- Labs


CBC & Chem 7: 


                                 10/10/20 05:51





                                 10/10/20 05:51


Labs: 


                  Abnormal Lab Results - Last 24 Hours (Table)











  10/11/20 10/12/20 10/12/20 Range/Units





  20:16 02:13 06:45 


 


POC Glucose (mg/dL)  200 H  244 H  443 H  (75-99)  mg/dL














  10/12/20 10/12/20 Range/Units





  11:25 16:41 


 


POC Glucose (mg/dL)  289 H  180 H  (75-99)  mg/dL














Assessment and Plan


Assessment: 





* Toxic metabolic encephalopathy due to DKA and right-sided pneumonia.


* History of seizure disorder, currently in remission.


* History of CVA right MCA territory CVA with mild residual deficits.


* Asymptomatic left ICA stenosis, with chronic right ICA occlusion.


* Diabetes


* Hypertension


* Hyperlipidemia 


* coronary artery disease.


Plan: 





* Patient's seizures are well controlled.  Patient currently on Vimpat 100 mg in

  a.m. and 50 mg at bedtime and Depakote 250 mg 3 times a day.  Her free 

  Depakote level is 14.9 (4.8-17.3) on 10/07/2020.  If patient has any 

  breakthrough seizures, would recommend increasing dose of Vimpat to 100 mg 

  twice a day.


* Patient also on dual antiplatelet medication for atherosclerotic cerebrovas

  cular disease, and high-dose statins.


* Your medical management.


* Neurology will sign off.  Please call neurology if any concerns.

## 2020-10-13 LAB
ALBUMIN SERPL-MCNC: 2.9 G/DL (ref 3.5–5)
ALBUMIN/GLOB SERPL: 0.9 {RATIO}
ALP SERPL-CCNC: 82 U/L (ref 38–126)
ALT SERPL-CCNC: 17 U/L (ref 4–34)
ANION GAP SERPL CALC-SCNC: 4 MMOL/L
AST SERPL-CCNC: 28 U/L (ref 14–36)
BASOPHILS # BLD AUTO: 0 K/UL (ref 0–0.2)
BASOPHILS NFR BLD AUTO: 0 %
BUN SERPL-SCNC: 24 MG/DL (ref 7–17)
CALCIUM SPEC-MCNC: 8.7 MG/DL (ref 8.4–10.2)
CHLORIDE SERPL-SCNC: 101 MMOL/L (ref 98–107)
CO2 SERPL-SCNC: 35 MMOL/L (ref 22–30)
EOSINOPHIL # BLD AUTO: 0 K/UL (ref 0–0.7)
EOSINOPHIL NFR BLD AUTO: 1 %
ERYTHROCYTE [DISTWIDTH] IN BLOOD BY AUTOMATED COUNT: 3.31 M/UL (ref 3.8–5.4)
ERYTHROCYTE [DISTWIDTH] IN BLOOD: 16.3 % (ref 11.5–15.5)
GLOBULIN SER CALC-MCNC: 3.3 G/DL
GLUCOSE BLD-MCNC: 153 MG/DL (ref 75–99)
GLUCOSE BLD-MCNC: 168 MG/DL (ref 75–99)
GLUCOSE BLD-MCNC: 279 MG/DL (ref 75–99)
GLUCOSE BLD-MCNC: 461 MG/DL (ref 75–99)
GLUCOSE BLD-MCNC: 91 MG/DL (ref 75–99)
GLUCOSE SERPL-MCNC: 198 MG/DL (ref 74–99)
HCT VFR BLD AUTO: 31.8 % (ref 34–46)
HGB BLD-MCNC: 9.7 GM/DL (ref 11.4–16)
LYMPHOCYTES # SPEC AUTO: 1.7 K/UL (ref 1–4.8)
LYMPHOCYTES NFR SPEC AUTO: 38 %
MCH RBC QN AUTO: 29.4 PG (ref 25–35)
MCHC RBC AUTO-ENTMCNC: 30.6 G/DL (ref 31–37)
MCV RBC AUTO: 96.1 FL (ref 80–100)
MONOCYTES # BLD AUTO: 0.2 K/UL (ref 0–1)
MONOCYTES NFR BLD AUTO: 5 %
NEUTROPHILS # BLD AUTO: 2.4 K/UL (ref 1.3–7.7)
NEUTROPHILS NFR BLD AUTO: 55 %
PLATELET # BLD AUTO: 247 K/UL (ref 150–450)
POTASSIUM SERPL-SCNC: 4.6 MMOL/L (ref 3.5–5.1)
PROT SERPL-MCNC: 6.2 G/DL (ref 6.3–8.2)
SODIUM SERPL-SCNC: 140 MMOL/L (ref 137–145)
WBC # BLD AUTO: 4.5 K/UL (ref 3.8–10.6)

## 2020-10-13 RX ADMIN — ASPIRIN 81 MG CHEWABLE TABLET SCH MG: 81 TABLET CHEWABLE at 08:25

## 2020-10-13 RX ADMIN — INSULIN ASPART SCH UNIT: 100 INJECTION, SOLUTION INTRAVENOUS; SUBCUTANEOUS at 12:50

## 2020-10-13 RX ADMIN — INSULIN ASPART SCH UNIT: 100 INJECTION, SOLUTION INTRAVENOUS; SUBCUTANEOUS at 21:23

## 2020-10-13 RX ADMIN — LACOSAMIDE SCH MG: 50 TABLET, FILM COATED ORAL at 08:25

## 2020-10-13 RX ADMIN — Medication SCH MG: at 08:24

## 2020-10-13 RX ADMIN — IPRATROPIUM BROMIDE AND ALBUTEROL SULFATE SCH ML: .5; 3 SOLUTION RESPIRATORY (INHALATION) at 08:41

## 2020-10-13 RX ADMIN — DULOXETINE SCH MG: 60 CAPSULE, DELAYED RELEASE ORAL at 08:24

## 2020-10-13 RX ADMIN — Medication SCH MG: at 21:23

## 2020-10-13 RX ADMIN — PANTOPRAZOLE SODIUM SCH MG: 40 TABLET, DELAYED RELEASE ORAL at 08:36

## 2020-10-13 RX ADMIN — IPRATROPIUM BROMIDE AND ALBUTEROL SULFATE SCH ML: .5; 3 SOLUTION RESPIRATORY (INHALATION) at 21:05

## 2020-10-13 RX ADMIN — INSULIN ASPART SCH: 100 INJECTION, SOLUTION INTRAVENOUS; SUBCUTANEOUS at 17:23

## 2020-10-13 RX ADMIN — ENOXAPARIN SODIUM SCH MG: 40 INJECTION SUBCUTANEOUS at 08:23

## 2020-10-13 RX ADMIN — LOSARTAN POTASSIUM SCH MG: 50 TABLET, FILM COATED ORAL at 08:35

## 2020-10-13 RX ADMIN — DIVALPROEX SODIUM SCH MG: 250 TABLET, DELAYED RELEASE ORAL at 17:22

## 2020-10-13 RX ADMIN — METOCLOPRAMIDE SCH MG: 10 TABLET ORAL at 12:50

## 2020-10-13 RX ADMIN — ATORVASTATIN CALCIUM SCH MG: 80 TABLET, FILM COATED ORAL at 21:23

## 2020-10-13 RX ADMIN — DIVALPROEX SODIUM SCH MG: 250 TABLET, DELAYED RELEASE ORAL at 08:24

## 2020-10-13 RX ADMIN — INSULIN ASPART SCH UNIT: 100 INJECTION, SOLUTION INTRAVENOUS; SUBCUTANEOUS at 08:23

## 2020-10-13 RX ADMIN — LACOSAMIDE SCH MG: 50 TABLET, FILM COATED ORAL at 21:23

## 2020-10-13 RX ADMIN — DIVALPROEX SODIUM SCH MG: 250 TABLET, DELAYED RELEASE ORAL at 21:23

## 2020-10-13 RX ADMIN — IPRATROPIUM BROMIDE AND ALBUTEROL SULFATE SCH: .5; 3 SOLUTION RESPIRATORY (INHALATION) at 12:34

## 2020-10-13 RX ADMIN — INSULIN DETEMIR SCH UNIT: 100 INJECTION, SOLUTION SUBCUTANEOUS at 08:24

## 2020-10-13 RX ADMIN — METOCLOPRAMIDE SCH MG: 10 TABLET ORAL at 17:22

## 2020-10-13 RX ADMIN — CLOPIDOGREL BISULFATE SCH MG: 75 TABLET ORAL at 08:25

## 2020-10-13 RX ADMIN — METOCLOPRAMIDE SCH MG: 10 TABLET ORAL at 08:24

## 2020-10-13 RX ADMIN — LEVOFLOXACIN SCH MG: 500 TABLET, FILM COATED ORAL at 08:35

## 2020-10-13 RX ADMIN — IPRATROPIUM BROMIDE AND ALBUTEROL SULFATE SCH ML: .5; 3 SOLUTION RESPIRATORY (INHALATION) at 16:39

## 2020-10-13 NOTE — P.PN
Subjective


Progress Note Date: 10/13/20





Patient was seen for a follow-up.  Patient seen by Dr. Destin Zamora in neurology 

consultation on 10/05/2020.  Please refer to his notes for detail.  This is the 

covering note.  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  

Patient came to the hospital for altered mental status.  Patient diagnosed with 

toxic metabolic encephalopathy related to DKA and right-sided pneumonia.  

Patient has not had any seizures on this admission.  She is currently on 

Depakote 250 mg 3 times a day and Vimpat 100 mg a.m. and 50 mg at bedtime.  

Patient's free Depakote level is 14.9 (4.8-17.3).  Patient offers no complaints.

 patient is feeling better.  Apparently patient had a seizure type spells today.

 Patient was working with the physical therapy when she suddenly had a blank 

stare, and was unresponsive for about 60 seconds.





Objective





- Vital Signs


Vital signs: 


                                   Vital Signs











Temp  97.9 F   10/13/20 14:22


 


Pulse  88   10/13/20 16:50


 


Resp  18   10/13/20 15:27


 


BP  102/68   10/13/20 14:22


 


Pulse Ox  97   10/13/20 14:22








                                 Intake & Output











 10/12/20 10/13/20 10/13/20





 18:59 06:59 18:59


 


Intake Total   460


 


Balance   460


 


Weight 50.4 kg  


 


Intake:   


 


  Oral   460


 


Other:   


 


  Voiding Method Incontinent Incontinent Diaper





   Incontinent


 


  # Voids 3 3 1


 


  # Bowel Movements 1 2 








                       ABP, PAP, CO, CI - Last Documented











Arterial Blood Pressure        92/57

















- Exam





Patient is alert and awake.  Patient is laying diagonally across the bed with 

legs flexed.  Patient has left spastic hemiparesis from previous CVA.





- Labs


CBC & Chem 7: 


                                 10/13/20 11:18





                                 10/13/20 11:18


Labs: 


                  Abnormal Lab Results - Last 24 Hours (Table)











  10/13/20 10/13/20 10/13/20 Range/Units





  01:17 06:50 11:18 


 


RBC    3.31 L  (3.80-5.40)  m/uL


 


Hgb    9.7 L  (11.4-16.0)  gm/dL


 


Hct    31.8 L  (34.0-46.0)  %


 


MCHC    30.6 L  (31.0-37.0)  g/dL


 


RDW    16.3 H  (11.5-15.5)  %


 


Carbon Dioxide     (22-30)  mmol/L


 


BUN     (7-17)  mg/dL


 


Creatinine     (0.52-1.04)  mg/dL


 


Glucose     (74-99)  mg/dL


 


POC Glucose (mg/dL)  279 H  461 H   (75-99)  mg/dL


 


Total Protein     (6.3-8.2)  g/dL


 


Albumin     (3.5-5.0)  g/dL














  10/13/20 10/13/20 Range/Units





  11:18 12:04 


 


RBC    (3.80-5.40)  m/uL


 


Hgb    (11.4-16.0)  gm/dL


 


Hct    (34.0-46.0)  %


 


MCHC    (31.0-37.0)  g/dL


 


RDW    (11.5-15.5)  %


 


Carbon Dioxide  35 H   (22-30)  mmol/L


 


BUN  24 H   (7-17)  mg/dL


 


Creatinine  1.30 H   (0.52-1.04)  mg/dL


 


Glucose  198 H   (74-99)  mg/dL


 


POC Glucose (mg/dL)   153 H  (75-99)  mg/dL


 


Total Protein  6.2 L   (6.3-8.2)  g/dL


 


Albumin  2.9 L   (3.5-5.0)  g/dL














Assessment and Plan


Assessment: 





* Toxic metabolic encephalopathy due to DKA and right-sided pneumonia.  

  encephalopathy now resolved.


* Breakthrough seizure in a patient with history of seizure disorder.


* History of CVA right MCA territory CVA spastic left hemiparesis.


* Asymptomatic left ICA stenosis, with chronic right ICA occlusion.


* Diabetes


* Hypertension


* Hyperlipidemia 


* Coronary artery disease.


Plan: 





* Patient had a breakthrough seizure today. Continue Depakote 250 mg 3 times a 

  day.  Her free Depakote level is 14.9 (4.8-17.3) on 10/07/2020.  Increase 

  Vimpat to 100 mg twice a day.


* Patient also on dual antiplatelet medication for atherosclerotic 

  cerebrovascular disease, and high-dose statins.


* Your medical management.

## 2020-10-13 NOTE — PN
PROGRESS NOTE



DATE OF SERVICE:

10/12/2020



REASON FOR FOLLOWUP:

Pneumonia and a pressure ulcer on the hip.



INTERVAL HISTORY:

The patient is currently afebrile. She is breathing comfortably on room air.  No chest

pain, shortness of breath or cough.  No nausea, vomiting or diarrhea reported.



PHYSICAL EXAMINATION:

Her blood pressure is 104/51 with a pulse of 97, temperature 97.6. She is 99% on room

air.

General description is a middle-aged female lying in bed in no distress.

RESPIRATORY SYSTEM: Unlabored breathing, decreased intensity of breath sounds. No

wheeze.

HEART: S1, S2.  Regular rate and rhythm.

ABDOMEN: Soft, no tenderness.



LABS:

No new labs have been obtained today.



DIAGNOSTIC IMPRESSION AND PLAN:

1. Patient with pneumonia, possible aspiration, community acquired. Covered with

    Levaquin, continue for a few days.

2. Right pressure ulcer.  Local wound care with Medihoney followed by moist dressing.

    Continue supportive care.





MMODL / IJN: [id number] / Job#: [id number]

## 2020-10-13 NOTE — P.PN
Subjective


Progress Note Date: 10/13/20








Morenita Ramirez, is a 60-year-old female who presented to [facility] emergency room due to nausea vomiting and diarrhea, and elevated 

glucose level, she was evaluated in the emergency room, her temperature on 

presentation was 90.1, pulse 75 respiration 18 blood pressure 79/51 pulse ox 94%

on room air, her white blood count was elevated at 11.6 hemoglobin 9.6 platelet 

count 156, venous blood gas pH was 7.05 glucose level was 744 and serum acetone 

was positive, patient was admitted to ICU she was started on IV fluid and on IV 

insulin drip.


Patient has a known history of insulin-dependent diabetes mellitus type 1 

maintained on insulin he had previous episodes of DKA, she also had a history of

stroke, history of hypertension, hyperlipidemia, coronary artery disease with 

myocardial infarction, peripheral vascular disease with multiple toe amputation,

history of COPD.


On review of systems patient is alert responsive in no apparent distress she was

seen in ICU she is still complaining of nausea and occasional episodes of 

vomiting there is no fever or chills no headache or dizziness no chest pain no 

shortness of breath no cough she is having some abdominal discomfort no no burni

ng with urination no frequency or urgency no hematuria.





On 10/04/2020 patient remains in the intensive care unit.  Patient does not 

appear in any kind of distress.  She currently getting treated for pneumonia 

critical care services are following.  Patient remains on Levaquin.  Patient has

been transitioned to subcu insulin patient did have episodes of low sugar this 

a.m. treated per protocol.





On 10/05/2020 patient was seen and examined in the ICU, she was very drowsy this

morning she failed swallow evaluation and is kept nothing by mouth at this time,

now patient is more alert and responsive, glucose level is better controlled, 

she is complaining of low back pain otherwise she denies any complaints.





On 10/06/2020 patient was seen and examined in the ICU she is more alert and 

responsive at this time she is complaining of feeling hungry however she failed 

swallow evaluation again this morning otherwise she denies any complaints there 

is no fever or chills no headache or dizziness no chest pain no shortness of 

breath no cough no nausea or vomiting no abdominal pain no diarrhea and no 

urinary symptoms.  At this time plan is to repeat swallow evaluation in the 

morning again before discussing alternative feeding options.





On 10/07/2020 patient is alert and oriented 2.  Patient is much more alert than

previous days.  Patient does seem fidgety in bed but this is similar to her base

line.  Blood sugars continue to fluctuate.  Patient having low blood sugar this 

a.m. requiring amp of D50.  Patient also failed swallow.  Currently on nectar 

thick and plans to eventually reassess swallowing the next few days per speech. 

Patient has been ordered out of the intensive care unit to [facility].  

Patient denies chest pain or shortness of breath.  Patient denies nausea 

vomiting or diarrhea.  Patient denies any urinary burning or frequency





On 10/08/2020 patient was seen and examined in the ICU she is alert and oriented

in no apparent distress she is tolerating pured diet well, there is no fever or

chills no headache or dizziness no chest pain no shortness of breath no cough no

nausea or vomiting no abdominal pain no diarrhea no blood in the stool Springer 

catheter is in place.





On 10/09/2020 patient was seen and examined in the ICU she is alert and oriented

3 and answering questions appropriately , glucose levels has been better 

controlled in the last 24 hours she is tolerating pured diet well, there is no 

fever or chills no headache or dizziness, no chest pain no shortness of breath 

no cough no nausea or vomiting no abdominal pain no diarrhea and no urinary 

symptoms.





On 10/10/2020 patient was seen and examined on the medical floor she is alert 

and oriented 3 in no apparent distress there is no fever or chills no headache 

or dizziness no chest pain no shortness of breath no cough no nausea or vomiting

no abdominal pain no diarrhea no blood in the stools no burning with urination 

no frequency or urgency and no hematuria.








on 10/11/2020 patient was seen and examined on the medical floor, she is alert 

and oriented 3 in no apparent distress, she is receiving pured diet and is 

tolerating well, there is no fever or chills no headache or dizziness no chest 

pain no shortness of breath no cough no nausea or vomiting no abdominal pain no 

diarrhea no blood in the stools no burning with urination no frequency or 

urgency no hematuria, yesterday she had hyperglycemia followed by hypoglycemia, 

patient has an ulcer on the left hip area with scabbing, there is also redness 

on both buttocks.





On 10/12/2020 patient was seen and examined on the medical floor, she is alert 

and oriented 3 in no distress there is no fever or chills no headache or 

dizziness.  Chest pain no shortness of breath no cough no nausea or vomiting no 

abdominal pain no diarrhea no blood in the stools no burning with urination no 

frequency or urgency no hematuria, patient is tolerating pured diet well no 

evidence of aspiration at this time.





on 10/13/2020 patient was seen and examined on the medical floor she is alert 

and oriented 3, earlier his morning she had an episode of unresponsiveness with

staring that lasted about 60 seconds, possible petit mal seizure, Depakote level

was checked, and neurology were asked to see her again to assess her seizure 

medications, at this time patient is alert and oriented 3 in no apparent 

distress, there is no fever or chills no headache or dizziness no chest pain no 

shortness of breath no cough no nausea or vomiting no abdominal pain no diarrhea

no blood in the stools no burning with urination no frequency or urgency and no 

hematuria





Objective





- Vital Signs


Vital signs: 


                                   Vital Signs











Temp  98.7 F   10/13/20 07:14


 


Pulse  88   10/13/20 08:54


 


Resp  17   10/13/20 07:14


 


BP  153/70   10/13/20 07:14


 


Pulse Ox  95   10/13/20 07:14








                                 Intake & Output











 10/12/20 10/13/20 10/13/20





 18:59 06:59 18:59


 


Weight 50.4 kg  


 


Other:   


 


  Voiding Method Incontinent Incontinent Incontinent


 


  # Voids 3 3 


 


  # Bowel Movements 1 2 








                       ABP, PAP, CO, CI - Last Documented











Arterial Blood Pressure        92/57

















- Exam








In general patient is alert responsive in no apparent distress


HEENT head normocephalic and atraumatic


Neck is supple no JVD no goiter no lymphadenopathy


Chest exam reveals a few scattered rhonchi no wheezing


Cardiac exam reveals regular heart sounds S1 and S2 with mild tachycardia no 

gallops no murmurs


Abdomen is soft nontender no rigidity or rebound no palpable masses with 

hyperactive bowel sounds


Extremity exam reveals no edema no cyanosis or clubbing


Neurological examination reveals no gross new focal deficit











- Labs


CBC & Chem 7: 


                                 10/13/20 11:18





                                 10/13/20 11:18


Labs: 


                  Abnormal Lab Results - Last 24 Hours (Table)











  10/12/20 10/12/20 10/13/20 Range/Units





  11:25 16:41 01:17 


 


POC Glucose (mg/dL)  289 H  180 H  279 H  (75-99)  mg/dL














  10/13/20 Range/Units





  06:50 


 


POC Glucose (mg/dL)  461 H  (75-99)  mg/dL














Assessment and Plan


Plan: 





1.  Acute diabetic ketoacidosis with severe hyperglycemia and positive serum 

acetone patient improved with IV fluid and IV insulin drip currently she is back

on Levemir with a decreased dose to 10 units daily and sliding scale





2.  Underlying history of diabetes mellitus type 1 maintained on insulin.





3.  Underlying history of hypertension





4.  Underlying history of hyperlipidemia





5.  Underlying history of seizure disorder, patient is currently on Depakote 250

mg 3 times a day, and Vimpat 50 mg at bedtime





6.  Underlying history of COPD stable no evidence of exacerbation





7.  Underlying history of coronary artery disease stable at this time patient 

denies any chest pain





8.  Underlying history of peripheral vascular disease with previous history of 

multiple toe amputations





9.  Previous history of stroke.





10.  Pneumonia , most likely related to aspiration , maintained on oral 

antibiotics at this time, Levaquin and clindamycin, pulmonary and infectious 

disease following





11.  Positive blood cultures most likely contaminant.








Patient is improving currently she is out of ICU on the medical floor she is 

tolerating pured diet well


Continue with current management possible discharge to home on Monday if stable

## 2020-10-14 VITALS — DIASTOLIC BLOOD PRESSURE: 48 MMHG | TEMPERATURE: 98.3 F | RESPIRATION RATE: 16 BRPM | SYSTOLIC BLOOD PRESSURE: 91 MMHG

## 2020-10-14 VITALS — HEART RATE: 82 BPM

## 2020-10-14 LAB
GLUCOSE BLD-MCNC: 153 MG/DL (ref 75–99)
GLUCOSE BLD-MCNC: 221 MG/DL (ref 75–99)
GLUCOSE BLD-MCNC: 268 MG/DL (ref 75–99)
GLUCOSE BLD-MCNC: 368 MG/DL (ref 75–99)

## 2020-10-14 RX ADMIN — CLOPIDOGREL BISULFATE SCH MG: 75 TABLET ORAL at 09:57

## 2020-10-14 RX ADMIN — INSULIN DETEMIR SCH UNIT: 100 INJECTION, SOLUTION SUBCUTANEOUS at 07:45

## 2020-10-14 RX ADMIN — ASPIRIN 81 MG CHEWABLE TABLET SCH MG: 81 TABLET CHEWABLE at 09:56

## 2020-10-14 RX ADMIN — DULOXETINE SCH MG: 60 CAPSULE, DELAYED RELEASE ORAL at 09:58

## 2020-10-14 RX ADMIN — IPRATROPIUM BROMIDE AND ALBUTEROL SULFATE SCH ML: .5; 3 SOLUTION RESPIRATORY (INHALATION) at 15:36

## 2020-10-14 RX ADMIN — METOCLOPRAMIDE SCH MG: 10 TABLET ORAL at 07:50

## 2020-10-14 RX ADMIN — METOCLOPRAMIDE SCH MG: 10 TABLET ORAL at 12:24

## 2020-10-14 RX ADMIN — INSULIN ASPART SCH UNIT: 100 INJECTION, SOLUTION INTRAVENOUS; SUBCUTANEOUS at 07:47

## 2020-10-14 RX ADMIN — IPRATROPIUM BROMIDE AND ALBUTEROL SULFATE SCH ML: .5; 3 SOLUTION RESPIRATORY (INHALATION) at 09:00

## 2020-10-14 RX ADMIN — DIVALPROEX SODIUM SCH MG: 250 TABLET, DELAYED RELEASE ORAL at 09:59

## 2020-10-14 RX ADMIN — IPRATROPIUM BROMIDE AND ALBUTEROL SULFATE SCH ML: .5; 3 SOLUTION RESPIRATORY (INHALATION) at 12:02

## 2020-10-14 RX ADMIN — LACOSAMIDE SCH MG: 50 TABLET, FILM COATED ORAL at 09:56

## 2020-10-14 RX ADMIN — INSULIN ASPART SCH UNIT: 100 INJECTION, SOLUTION INTRAVENOUS; SUBCUTANEOUS at 12:52

## 2020-10-14 RX ADMIN — Medication SCH MG: at 09:57

## 2020-10-14 RX ADMIN — PANTOPRAZOLE SODIUM SCH MG: 40 TABLET, DELAYED RELEASE ORAL at 07:49

## 2020-10-14 RX ADMIN — ENOXAPARIN SODIUM SCH MG: 40 INJECTION SUBCUTANEOUS at 09:53

## 2020-10-14 RX ADMIN — INSULIN ASPART SCH: 100 INJECTION, SOLUTION INTRAVENOUS; SUBCUTANEOUS at 11:46

## 2020-10-14 RX ADMIN — LOSARTAN POTASSIUM SCH MG: 50 TABLET, FILM COATED ORAL at 09:57

## 2020-10-14 NOTE — P.DS
Providers


Date of admission: 


10/02/20 20:15





Expected date of discharge: 10/14/20


Attending physician: 


Saniya Cabral





Consults: 





                                        





10/02/20 20:16


Consult Physician Routine 


   Consulting Provider: Yobani Zamora


   Consult Reason/Comments: DKA


   Do you want consulting provider notified?: Already Contacted





10/05/20 08:27


Consult Physician Urgent 


   Consulting Provider: Destin Zamora


   Consult Reason/Comments: AMS


   Do you want consulting provider notified?: Yes





10/07/20 13:44


Consult Physician Routine 


   Consulting Provider: Mian Snyder


   Consult Reason/Comments: Positive blood culture


   Do you want consulting provider notified?: Yes





10/13/20 10:59


Consult Physician Routine 


   Consulting Provider: Chloe Lance


   Consult Reason/Comments: possible new seizure


   Do you want consulting provider notified?: Yes











Primary care physician: 


Saniya Cabral





[facility] Course: 





Discharge diagnosis





1.  Acute diabetic ketoacidosis with severe hyperglycemia and positive serum 

acetone patient improved with IV fluid and IV insulin drip currently she is back

on Levemir with a decreased dose to 10 units daily and sliding scale





2.  Underlying history of diabetes mellitus type 1 maintained on insulin.  

Lantus decreased to 10 units daily due to hypoglycemia





3.  Underlying history of hypertension





4.  Underlying history of hyperlipidemia





5.  Underlying history of seizure disorder, patient is currently on Depakote 250

mg 3 times a day, and Vimpat 50 mg at bedtime.  Patient had seizure type spell 

on 10/13/2020 patient was reevaluated by neurology services per neurology 

continued Depakote 1250 mg 3 times a day and Vimpat increased to 100 mg twice a 

day





6.  Underlying history of COPD stable no evidence of exacerbation





7.  Underlying history of coronary artery disease stable at this time patient 

denies any chest pain





8.  Underlying history of peripheral vascular disease with previous history of 

multiple toe amputations





9.  Previous history of stroke.





10.  Pneumonia , most likely related to aspiration , maintained on oral 

antibiotics at this time, Levaquin and clindamycin, pulmonary and infectious 

disease following





11.  Positive blood cultures most likely contaminant.





12.  Acute on chronic anemia.  Patient was evaluated by GI services to your iron

supplement per GI patient to follow-up outpatient to discuss about endoscopic 

evaluation





Hospital course





Morenita Ramirez, is a 60-year-old female who presented to [facility] emergency room due to nausea vomiting and diarrhea, and elevated 

glucose level, she was evaluated in the emergency room, her temperature on 

presentation was 90.1, pulse 75 respiration 18 blood pressure 79/51 pulse ox 94%

on room air, her white blood count was elevated at 11.6 hemoglobin 9.6 platelet 

count 156, venous blood gas pH was 7.05 glucose level was 744 and serum acetone 

was positive, patient was admitted to ICU she was started on IV fluid and on IV 

insulin drip.


Patient has a known history of insulin-dependent diabetes mellitus type 1 

maintained on insulin he had previous episodes of DKA, she also had a history of

stroke, history of hypertension, hyperlipidemia, coronary artery disease with 

myocardial infarction, peripheral vascular disease with multiple toe amputation,

history of COPD.


On review of systems patient is alert responsive in no apparent distress she was

seen in ICU she is still complaining of nausea and occasional episodes of 

vomiting there is no fever or chills no headache or dizziness no chest pain no 

shortness of breath no cough she is having some abdominal discomfort no no 

burning with urination no frequency or urgency no hematuria.





On 10/04/2020 patient remains in the intensive care unit.  Patient does not 

appear in any kind of distress.  She currently getting treated for pneumonia 

critical care services are following.  Patient remains on Levaquin.  Patient has

been transitioned to subcu insulin patient did have episodes of low sugar this 

a.m. treated per protocol.





On 10/05/2020 patient was seen and examined in the ICU, she was very drowsy this

morning she failed swallow evaluation and is kept nothing by mouth at this time,

now patient is more alert and responsive, glucose level is better controlled, 

she is complaining of low back pain otherwise she denies any complaints.





On 10/06/2020 patient was seen and examined in the ICU she is more alert and 

responsive at this time she is complaining of feeling hungry however she failed 

swallow evaluation again this morning otherwise she denies any complaints there 

is no fever or chills no headache or dizziness no chest pain no shortness of 

breath no cough no nausea or vomiting no abdominal pain no diarrhea and no 

urinary symptoms.  At this time plan is to repeat swallow evaluation in the morn

ing again before discussing alternative feeding options.





On 10/07/2020 patient is alert and oriented 2.  Patient is much more alert than

previous days.  Patient does seem fidgety in bed but this is similar to her 

baseline.  Blood sugars continue to fluctuate.  Patient having low blood sugar 

this a.m. requiring amp of D50.  Patient also failed swallow.  Currently on 

nectar thick and plans to eventually reassess swallowing the next few days per 

speech.  Patient has been ordered out of the intensive care unit to [facility].  Patient denies chest pain or shortness of breath.  Patient denies nausea

vomiting or diarrhea.  Patient denies any urinary burning or frequency





On 10/08/2020 patient was seen and examined in the ICU she is alert and oriented

in no apparent distress she is tolerating pured diet well, there is no fever or

chills no headache or dizziness no chest pain no shortness of breath no cough no

nausea or vomiting no abdominal pain no diarrhea no blood in the stool Springer ca

theter is in place.





On 10/09/2020 patient was seen and examined in the ICU she is alert and oriented

3 and answering questions appropriately , glucose levels has been better 

controlled in the last 24 hours she is tolerating pured diet well, there is no 

fever or chills no headache or dizziness, no chest pain no shortness of breath 

no cough no nausea or vomiting no abdominal pain no diarrhea and no urinary 

symptoms.





On 10/10/2020 patient was seen and examined on the medical floor she is alert 

and oriented 3 in no apparent distress there is no fever or chills no headache 

or dizziness no chest pain no shortness of breath no cough no nausea or vomiting

no abdominal pain no diarrhea no blood in the stools no burning with urination 

no frequency or urgency and no hematuria.








on 10/11/2020 patient was seen and examined on the medical floor, she is alert 

and oriented 3 in no apparent distress, she is receiving pured diet and is 

tolerating well, there is no fever or chills no headache or dizziness no chest 

pain no shortness of breath no cough no nausea or vomiting no abdominal pain no 

diarrhea no blood in the stools no burning with urination no frequency or 

urgency no hematuria, yesterday she had hyperglycemia followed by hypoglycemia, 

patient has an ulcer on the left hip area with scabbing, there is also redness 

on both buttocks.





On 10/12/2020 patient was seen and examined on the medical floor, she is alert 

and oriented 3 in no distress there is no fever or chills no headache or 

dizziness.  Chest pain no shortness of breath no cough no nausea or vomiting no 

abdominal pain no diarrhea no blood in the stools no burning with urination no 

frequency or urgency no hematuria, patient is tolerating pured diet well no 

evidence of aspiration at this time.





on 10/13/2020 patient was seen and examined on the medical floor she is alert 

and oriented 3, earlier his morning she had an episode of unresponsiveness with

staring that lasted about 60 seconds, possible petit mal seizure, Depakote level

was checked, and neurology were asked to see her again to assess her seizure 

medications, at this time patient is alert and oriented 3 in no apparent 

distress, there is no fever or chills no headache or dizziness no chest pain no 

shortness of breath no cough no nausea or vomiting no abdominal pain no diarrhea

no blood in the stools no burning with urination no frequency or urgency and no 

hematuria





On 10/14/2020 patient's alert and oriented 3 patient did have breakthrough 

seizure yesterday was reevaluated by neurology services and the pad increased 

100 twice a day.  Patient has been cleared for discharge from infectious 

disease.  Patient denies any chest pain or shortness of breath.  Patient denies 

nausea vomiting or diarrhea.  Patient denies any urinary burning or frequency. 





I performed an examination of the patient and discussed their management with 

the Nurse Practitioner.  I have reviewed the Nurse Practitioner's notes and 

agree with the documented findings and plan of care


Patient Condition at Discharge: Stable





Plan - Discharge Summary


Discharge Rx Participant: No


New Discharge Prescriptions: 


New


   QUEtiapine [SEROquel] 50 mg PO HS 30 Days #30 tab


   Lacosamide [Vimpat] 100 mg PO BID 30 Days #60 tablet





Continue


   Famotidine [Pepcid] 20 mg PO DAILY


   HYDROcodone/APAP 10-325MG [Norco ] 1 tab PO TID PRN


     PRN Reason: Pain


   DULoxetine HCL [Cymbalta] 60 mg PO DAILY


   Atorvastatin [Lipitor] 20 mg PO DAILY


   Aspirin [Adult Low Dose Aspirin EC] 81 mg PO DAILY


   Ferrous Sulfate [Iron (65 MG Elemental)] 325 mg PO DAILY


   Divalproex [Depakote] 250 mg PO TID #90 tablet.


   ALPRAZolam [Xanax] 1 mg PO Q8H PRN


     PRN Reason: Anxiety


   Albuterol Sulfate [Ventolin HFA] 2 puff INHALATION RT-Q4H PRN


     PRN Reason: Shortness Of Breath


   Ondansetron Odt [Zofran ODT] 4 mg PO DAILY PRN


     PRN Reason: Nausea


   Losartan [Cozaar] 50 mg PO DAILY  tab


   Metoclopramide [Reglan] 10 mg PO AC-TID  tab


   Ascorbic Acid [Vitamin C] 500 mg PO DAILY


   Vitamin B Complex 1 tab PO DAILY


   INSULIN ASPART (NovoLOG) [NovoLOG (formulary)] 0 unit SQ AC-TID  vial


   Calcium Carb-Vit D 500Mg-200Un [Oscal 500+D] 1 each PO BID-W/MEALS  tab


   Clopidogrel [Plavix] 75 mg PO DAILY  tab





Changed


   Insulin Glargine,Hum.rec.anlog [Basaglar Kwikpen U-100] 10 unit SQ HS #0





Discontinued


   QUEtiapine [SEROquel] 100 mg PO HS


   Lacosamide [Vimpat] 100 mg PO QAM


   Lacosamide [Vimpat] 50 mg PO HS





No Action


   Valproic Acid [Depakene] 250 mg PO DAILY


   QUEtiapine [SEROquel] 25 mg PO BID


Discharge Medication List





Famotidine [Pepcid] 20 mg PO DAILY 02/19/16 [History]


HYDROcodone/APAP 10-325MG [Norco ] 1 tab PO TID PRN 05/06/17 [History]


DULoxetine HCL [Cymbalta] 60 mg PO DAILY 09/19/17 [History]


Atorvastatin [Lipitor] 20 mg PO DAILY 07/31/19 [History]


Aspirin [Adult Low Dose Aspirin EC] 81 mg PO DAILY 01/10/20 [History]


Ferrous Sulfate [Iron (65 MG Elemental)] 325 mg PO DAILY 01/10/20 [History]


Divalproex [Depakote] 250 mg PO TID #90 tablet. 03/19/20 [Rx]


ALPRAZolam [Xanax] 1 mg PO Q8H PRN 07/28/20 [History]


Albuterol Sulfate [Ventolin HFA] 2 puff INHALATION RT-Q4H PRN 07/28/20 [History]


Ondansetron Odt [Zofran ODT] 4 mg PO DAILY PRN 07/28/20 [History]


Valproic Acid [Depakene] 250 mg PO DAILY 07/28/20 [History]


QUEtiapine [SEROquel] 25 mg PO BID 08/12/20 [History]


Losartan [Cozaar] 50 mg PO DAILY  tab 08/20/20 [Rx]


Metoclopramide [Reglan] 10 mg PO AC-TID  tab 08/20/20 [Rx]


Ascorbic Acid [Vitamin C] 500 mg PO DAILY 08/31/20 [History]


Vitamin B Complex 1 tab PO DAILY 08/31/20 [History]


Calcium Carb-Vit D 500Mg-200Un [Oscal 500+D] 1 each PO BID-W/MEALS  tab 09/04/20

[Rx]


Clopidogrel [Plavix] 75 mg PO DAILY  tab 09/04/20 [Rx]


INSULIN ASPART (NovoLOG) [NovoLOG (formulary)] 0 unit SQ AC-TID  vial 09/04/20 

[Rx]


Insulin Glargine,Hum.rec.anlog [Basaglar Kwikpen U-100] 10 unit SQ HS #0 

10/14/20 [Rx]


Lacosamide [Vimpat] 100 mg PO BID 30 Days #60 tablet 10/14/20 [Rx]


QUEtiapine [SEROquel] 50 mg PO HS 30 Days #30 tab 10/14/20 [Rx]








Follow up Appointment(s)/Referral(s): 


[facility], [NON-STAFF] - As Needed


Saniya Cabral MD [Primary Care Provider] - 1-2 days

## 2020-10-14 NOTE — CDI
Documentation Clarification Form



Date: 10/14/2020 09:40:10 AM

From: Lana Guthrie

Phone: [phone]

MRN: [id number]

Admit Date: 10/02/2020 08:15:00 PM

Patient Name: Morenita Ramirez

Visit Number: [id number]

Discharge Date:  





ATTENTION: The Clinical Documentation Specialists (CDI) and [facility] Coding Staff 
appreciate your assistance in clarifying documentation. Please respond to the 
clarification below the line at the bottom and electronically sign. The CDI & 
[facility] Coding staff will review the response and follow-up if needed. Please note: 
Queries are made part of the Legal Health Record. If you have any questions, 
please contact the author of this message via ITS.



Dr. Pappas Claudio



10/11 your progress note Hip area unstageable pressure ulcer. pressure ulcer 
to right hip 

10/9 Nursing documentation in Physical assessment  left buttock stage 2 
pressure ulcer  Right buttock stage 2 pressure ulcer.

.  

History/Risk Factors: 60-year-old female presented to the ED with nausea 
vomiting and diarrhea and elevated glucose level. Medical history: Type 1 DM 
insulin dependent, COPD, PVD, Asthma and HTN. 

Clinical Indicators:

Location: Left Buttock 

Wound description: Stage II warm, dry, intact smooth, Erythema

Location: Right Buttock 

Wound description: Stage II warm, dry, intact smooth, Erythema 

Treatment: Right hip pressure ulcer Medihoney followed by moist dressing to be 
changed daily. Left hip dressing. Oral nutrition supplement. Keeping pressure 
off ulcers



Elements for accurate and compliant documentation of an ulcer:

   *The location/laterality of the ulcer

    *Etiology (decubitus/pressure, diabetic, PVD)

    *Stage I-IV, Unstageable, Suspected Deep Tissue Injury (To the deepest 
stage)

     *If the ulcer was present at admission (POA) or occurred after admission



In your professional opinion, can you please clarify the diagnosis, location, 
laterality and whether present on admission (POA):

     

*      Stage 2 Pressure/Decubitus Ulcer Left and Right Buttock POA (Partial 
  thickness, 

          loss of   dermis, pink wound bed)

*      Unstageable Pressure ulcer right hip and Stage 2 pressure ulcer left hip 
  POA

*      Unstageable Pressure ulcer right hip 

*      Other condition, please specify ____________

*      Unable to determine



Please indicate etiology of pressure ulcer (if known).

right hip unstagable pressure ulcer and left gluteal stage 2 pressure ulcer

(Last Revision: October 2017)

MTDD

## 2020-10-14 NOTE — PN
PROGRESS NOTE



DATE OF SERVICE:

10/13/2020



REASON FOR FOLLOWUP:

Pneumonia and pressure ulcer.



INTERVAL HISTORY:

The patient remains to be afebrile. She is breathing comfortably. _____No chest pain,

shortness of breath or cough.  No abdominal pain, no diarrhea.



PHYSICAL EXAMINATION:

Blood pressure 102/68 with a pulse of 91, temperature 97.9.  She is 97% on room air.

General description is a middle-aged female lying in bed in no distress.

RESPIRATORY SYSTEM: Unlabored breathing, decreased breath sounds in the bases, no

wheeze.

HEART: S1, S2.  Regular rate and rhythm.

ABDOMEN: Soft, no tenderness.



LABS:

Hemoglobin 9.7, white count 4.5, BUN of 24, creatinine is 1.30.



DIAGNOSTIC IMPRESSION AND PLAN:

1. Patient with positive blood culture likely skin contamination.

2. Possible pneumonia.  Currently covered with Levaquin for short course.

3. Pressure ulcer.  Local wound care with Medihoney moist dressing keep the area off

    the pressure.





MMODL / IJN: [id number] / Job#: [id number]

## 2020-10-14 NOTE — PN
PROGRESS NOTE



DATE OF SERVICE:

10/14/2020



REASON FOR FOLLOWUP:

Pneumonia and pressure ulcer.



INTERVAL HISTORY:

The patient is currently afebrile,

patient is breathing comfortably on room air.  Denies having any chest pain or cough.

No abdominal pain, no diarrhea.



PHYSICAL EXAMINATION:

Blood pressure 91/48 with a pulse of 76, temperature 98.3, she is 92% on room air.

General description is a middle-aged female, lying in bed in no distress.

RESPIRATORY SYSTEM: Unlabored breathing, clear to auscultation anteriorly.

HEART:  S1, S2.  Regular rate and rhythm.

ABDOMEN: Soft. No tenderness.



LABS:

No new labs have been obtained today.



DIAGNOSTIC IMPRESSION AND PLAN:

1. Patient with a possible component of pneumonia and history almost 10 days of

    antibiotic should be more than enough. No need for antibiotic on discharge.

2. Pressure ulcer to the heel area.  Local wound care with Medihoney followed by moist

    dressing, keep the area off the pressure.  Discussed with the admitting team

    working on this patient.





MMODL / IJN: [id number] / Job#: [id number]

## 2020-10-14 NOTE — P.PN
Subjective


Progress Note Date: 10/14/20





10/14/2020: Patient fully alert and awake, in no distress.  No further seizures 

reported.  No side effects of medication.





10/13/2020: Patient was seen for a follow-up.  Patient seen by Dr. Destin Zamora in

neurology consultation on 10/05/2020.  Please refer to his notes for detail.  

This is the covering note.  Patient has history of left hemiparesis from right 

MCA CVA.  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 

epilepsy.  Patient came to the hospital for altered mental status.  Patient 

diagnosed with toxic metabolic encephalopathy related to DKA and right-sided 

pneumonia.  Patient has not had any seizures on this admission.  She is 

currently on Depakote 250 mg 3 times a day and Vimpat 100 mg a.m. and 50 mg at 

bedtime.  Patient's free Depakote level is 14.9 (4.8-17.3).  Patient offers no 

complaints.  patient is feeling better.  Apparently patient had a seizure type 

spells today.  Patient was working with the physical therapy when she suddenly 

had a blank stare, and was unresponsive for about 60 seconds.





Objective





- Vital Signs


Vital signs: 


                                   Vital Signs











Temp  98.3 F   10/14/20 07:21


 


Pulse  82   10/14/20 15:46


 


Resp  16   10/14/20 07:21


 


BP  91/48   10/14/20 07:21


 


Pulse Ox  92 L  10/14/20 01:03








                                 Intake & Output











 10/13/20 10/14/20 10/14/20





 18:59 06:59 18:59


 


Intake Total 460 300 


 


Balance 460 300 


 


Intake:   


 


  Oral 460 300 


 


Other:   


 


  Voiding Method Diaper Diaper Diaper





 Incontinent Incontinent Incontinent


 


  # Voids 1 2 1


 


  # Bowel Movements   1








                       ABP, PAP, CO, CI - Last Documented











Arterial Blood Pressure        92/57

















- Exam





Patient is alert and awake.  Patient appears to be very restless, some 

dyskinesias.  Patient has spastic left hemiplegia.  Patient has left visual 

field cut.  Pupils are round and reacting.  





- Labs


CBC & Chem 7: 


                                 10/13/20 11:18





                                 10/13/20 11:18


Labs: 


                  Abnormal Lab Results - Last 24 Hours (Table)











  10/13/20 10/14/20 10/14/20 Range/Units





  21:19 04:15 06:48 


 


POC Glucose (mg/dL)  168 H  221 H  368 H  (75-99)  mg/dL














  10/14/20 Range/Units





  11:57 


 


POC Glucose (mg/dL)  153 H  (75-99)  mg/dL














Assessment and Plan


Assessment: 





* Toxic metabolic encephalopathy due to DKA and right-sided pneumonia.  

  encephalopathy now resolved.


* Breakthrough seizure in a patient with history of seizure disorder.


* History of CVA right MCA territory CVA spastic left hemiparesis.


* Asymptomatic left ICA stenosis, with chronic right ICA occlusion.


* Diabetes


* Hypertension


* Hyperlipidemia 


* Coronary artery disease.


Plan: 





* Continue Depakote 250 mg 3 times a day.  Her free Depakote level is 14.9 

  (4.8-17.3) on 10/07/2020.  Increase Vimpat to 100 mg twice a day.


* Patient also on dual antiplatelet medication for atherosclerotic 

  cerebrovascular disease, and high-dose statins.


* Neurologically clear for discharge.

## 2020-10-15 ENCOUNTER — HOSPITAL ENCOUNTER (INPATIENT)
Dept: HOSPITAL 47 - EC | Age: 60
LOS: 12 days | Discharge: HOME HEALTH | DRG: 637 | End: 2020-10-27
Attending: INTERNAL MEDICINE | Admitting: INTERNAL MEDICINE
Payer: COMMERCIAL

## 2020-10-15 VITALS — BODY MASS INDEX: 23.8 KG/M2

## 2020-10-15 DIAGNOSIS — K59.00: ICD-10-CM

## 2020-10-15 DIAGNOSIS — F29: ICD-10-CM

## 2020-10-15 DIAGNOSIS — Z88.5: ICD-10-CM

## 2020-10-15 DIAGNOSIS — E03.9: ICD-10-CM

## 2020-10-15 DIAGNOSIS — I11.0: ICD-10-CM

## 2020-10-15 DIAGNOSIS — I25.10: ICD-10-CM

## 2020-10-15 DIAGNOSIS — Z88.0: ICD-10-CM

## 2020-10-15 DIAGNOSIS — I25.2: ICD-10-CM

## 2020-10-15 DIAGNOSIS — I50.9: ICD-10-CM

## 2020-10-15 DIAGNOSIS — N28.9: ICD-10-CM

## 2020-10-15 DIAGNOSIS — E78.00: ICD-10-CM

## 2020-10-15 DIAGNOSIS — M54.5: ICD-10-CM

## 2020-10-15 DIAGNOSIS — E10.319: ICD-10-CM

## 2020-10-15 DIAGNOSIS — Z88.1: ICD-10-CM

## 2020-10-15 DIAGNOSIS — Z83.3: ICD-10-CM

## 2020-10-15 DIAGNOSIS — I69.354: ICD-10-CM

## 2020-10-15 DIAGNOSIS — Z98.42: ICD-10-CM

## 2020-10-15 DIAGNOSIS — Z98.41: ICD-10-CM

## 2020-10-15 DIAGNOSIS — Z88.8: ICD-10-CM

## 2020-10-15 DIAGNOSIS — Z82.5: ICD-10-CM

## 2020-10-15 DIAGNOSIS — Z87.891: ICD-10-CM

## 2020-10-15 DIAGNOSIS — Z79.82: ICD-10-CM

## 2020-10-15 DIAGNOSIS — E86.0: ICD-10-CM

## 2020-10-15 DIAGNOSIS — G40.909: ICD-10-CM

## 2020-10-15 DIAGNOSIS — J34.9: ICD-10-CM

## 2020-10-15 DIAGNOSIS — M19.90: ICD-10-CM

## 2020-10-15 DIAGNOSIS — F41.9: ICD-10-CM

## 2020-10-15 DIAGNOSIS — I83.90: ICD-10-CM

## 2020-10-15 DIAGNOSIS — Z91.030: ICD-10-CM

## 2020-10-15 DIAGNOSIS — Z79.02: ICD-10-CM

## 2020-10-15 DIAGNOSIS — K21.9: ICD-10-CM

## 2020-10-15 DIAGNOSIS — J44.9: ICD-10-CM

## 2020-10-15 DIAGNOSIS — Z86.14: ICD-10-CM

## 2020-10-15 DIAGNOSIS — Z96.1: ICD-10-CM

## 2020-10-15 DIAGNOSIS — Z79.4: ICD-10-CM

## 2020-10-15 DIAGNOSIS — E78.5: ICD-10-CM

## 2020-10-15 DIAGNOSIS — E10.649: Primary | ICD-10-CM

## 2020-10-15 DIAGNOSIS — D50.9: ICD-10-CM

## 2020-10-15 DIAGNOSIS — Z82.49: ICD-10-CM

## 2020-10-15 DIAGNOSIS — Z87.01: ICD-10-CM

## 2020-10-15 DIAGNOSIS — Z86.718: ICD-10-CM

## 2020-10-15 DIAGNOSIS — Z79.899: ICD-10-CM

## 2020-10-15 DIAGNOSIS — H93.13: ICD-10-CM

## 2020-10-15 DIAGNOSIS — G93.41: ICD-10-CM

## 2020-10-15 DIAGNOSIS — Z99.3: ICD-10-CM

## 2020-10-15 DIAGNOSIS — Z90.49: ICD-10-CM

## 2020-10-15 DIAGNOSIS — Z89.421: ICD-10-CM

## 2020-10-15 DIAGNOSIS — S91.301A: ICD-10-CM

## 2020-10-15 DIAGNOSIS — E10.51: ICD-10-CM

## 2020-10-15 DIAGNOSIS — Z90.710: ICD-10-CM

## 2020-10-15 DIAGNOSIS — F31.9: ICD-10-CM

## 2020-10-15 LAB
ALBUMIN SERPL-MCNC: 2.6 G/DL (ref 3.5–5)
ALP SERPL-CCNC: 76 U/L (ref 38–126)
ALT SERPL-CCNC: 17 U/L (ref 4–34)
ANION GAP SERPL CALC-SCNC: 3 MMOL/L
AST SERPL-CCNC: 37 U/L (ref 14–36)
BASOPHILS # BLD AUTO: 0 K/UL (ref 0–0.2)
BASOPHILS NFR BLD AUTO: 1 %
BUN SERPL-SCNC: 25 MG/DL (ref 7–17)
CALCIUM SPEC-MCNC: 7.7 MG/DL (ref 8.4–10.2)
CHLORIDE SERPL-SCNC: 109 MMOL/L (ref 98–107)
CK SERPL-CCNC: 65 U/L (ref 30–135)
CO2 SERPL-SCNC: 29 MMOL/L (ref 22–30)
EOSINOPHIL # BLD AUTO: 0.1 K/UL (ref 0–0.7)
EOSINOPHIL NFR BLD AUTO: 1 %
ERYTHROCYTE [DISTWIDTH] IN BLOOD BY AUTOMATED COUNT: 2.87 M/UL (ref 3.8–5.4)
ERYTHROCYTE [DISTWIDTH] IN BLOOD: 16.9 % (ref 11.5–15.5)
GLUCOSE BLD-MCNC: 159 MG/DL (ref 75–99)
GLUCOSE BLD-MCNC: 215 MG/DL (ref 75–99)
GLUCOSE BLD-MCNC: 54 MG/DL (ref 75–99)
GLUCOSE SERPL-MCNC: 91 MG/DL (ref 74–99)
HCT VFR BLD AUTO: 27.8 % (ref 34–46)
HGB BLD-MCNC: 8.6 GM/DL (ref 11.4–16)
LYMPHOCYTES # SPEC AUTO: 1.6 K/UL (ref 1–4.8)
LYMPHOCYTES NFR SPEC AUTO: 47 %
MAGNESIUM SPEC-SCNC: 2.7 MG/DL (ref 1.6–2.3)
MCH RBC QN AUTO: 29.8 PG (ref 25–35)
MCHC RBC AUTO-ENTMCNC: 30.8 G/DL (ref 31–37)
MCV RBC AUTO: 96.9 FL (ref 80–100)
MONOCYTES # BLD AUTO: 0.2 K/UL (ref 0–1)
MONOCYTES NFR BLD AUTO: 6 %
NEUTROPHILS # BLD AUTO: 1.4 K/UL (ref 1.3–7.7)
NEUTROPHILS NFR BLD AUTO: 42 %
PLATELET # BLD AUTO: 269 K/UL (ref 150–450)
POTASSIUM SERPL-SCNC: 4.1 MMOL/L (ref 3.5–5.1)
PROT SERPL-MCNC: 5.6 G/DL (ref 6.3–8.2)
SODIUM SERPL-SCNC: 141 MMOL/L (ref 137–145)
WBC # BLD AUTO: 3.5 K/UL (ref 3.8–10.6)

## 2020-10-15 PROCEDURE — 96374 THER/PROPH/DIAG INJ IV PUSH: CPT

## 2020-10-15 PROCEDURE — 80320 DRUG SCREEN QUANTALCOHOLS: CPT

## 2020-10-15 PROCEDURE — 99285 EMERGENCY DEPT VISIT HI MDM: CPT

## 2020-10-15 PROCEDURE — 93005 ELECTROCARDIOGRAM TRACING: CPT

## 2020-10-15 PROCEDURE — 85025 COMPLETE CBC W/AUTO DIFF WBC: CPT

## 2020-10-15 PROCEDURE — 82550 ASSAY OF CK (CPK): CPT

## 2020-10-15 PROCEDURE — 80053 COMPREHEN METABOLIC PANEL: CPT

## 2020-10-15 PROCEDURE — 81001 URINALYSIS AUTO W/SCOPE: CPT

## 2020-10-15 PROCEDURE — 96361 HYDRATE IV INFUSION ADD-ON: CPT

## 2020-10-15 PROCEDURE — 81003 URINALYSIS AUTO W/O SCOPE: CPT

## 2020-10-15 PROCEDURE — 51701 INSERT BLADDER CATHETER: CPT

## 2020-10-15 PROCEDURE — 82947 ASSAY GLUCOSE BLOOD QUANT: CPT

## 2020-10-15 PROCEDURE — 36410 VNPNXR 3YR/> PHY/QHP DX/THER: CPT

## 2020-10-15 PROCEDURE — 36415 COLL VENOUS BLD VENIPUNCTURE: CPT

## 2020-10-15 PROCEDURE — 71046 X-RAY EXAM CHEST 2 VIEWS: CPT

## 2020-10-15 PROCEDURE — 82140 ASSAY OF AMMONIA: CPT

## 2020-10-15 PROCEDURE — 83735 ASSAY OF MAGNESIUM: CPT

## 2020-10-15 PROCEDURE — 76937 US GUIDE VASCULAR ACCESS: CPT

## 2020-10-15 RX ADMIN — POTASSIUM CHLORIDE SCH MLS/HR: 14.9 INJECTION, SOLUTION INTRAVENOUS at 23:03

## 2020-10-15 NOTE — ED
Altered Mental Status HPI





- General


Stated Complaint: hypoglycemia


Time Seen by Provider: 10/15/20 20:25


Source: patient, EMS, RN notes reviewed, old records reviewed


Mode of arrival: EMS





- History of Present Illness


Initial Comments: 





Is a 60-year-old female history diabetes stroke hypertension and hyper

cholesterol who was brought in by EMS after being found be unresponsive at home.

 She apparently had an elevated blood sugar today was given 4 units Humalog and 

15 minutes he became and responsive and diaphoretic.  EMS was summoned Accu-Chek

at that time was 69.  She was given 1 amp of D50 and is slowly regaining her 

normal mentation.  Upon arrival she denies any complaints other than the 

diaphoresis.


MD Complaint: altered mental status, confusion, decreased responsiveness





- Related Data


                                Home Medications











 Medication  Instructions  Recorded  Confirmed


 


Famotidine [Pepcid] 20 mg PO DAILY 02/19/16 10/02/20


 


HYDROcodone/APAP 10-325MG [Norco 1 tab PO TID PRN 05/06/17 10/02/20





]   


 


DULoxetine HCL [Cymbalta] 60 mg PO DAILY 09/19/17 10/02/20


 


Atorvastatin [Lipitor] 20 mg PO DAILY 07/31/19 10/02/20


 


Aspirin [Adult Low Dose Aspirin EC] 81 mg PO DAILY 01/10/20 10/02/20


 


Ferrous Sulfate [Iron (65  mg PO DAILY 01/10/20 10/02/20





Elemental)]   


 


ALPRAZolam [Xanax] 1 mg PO Q8H PRN 07/28/20 10/02/20


 


Albuterol Sulfate [Ventolin HFA] 2 puff INHALATION RT-Q4H PRN 07/28/20 10/02/20


 


Ondansetron Odt [Zofran ODT] 4 mg PO DAILY PRN 07/28/20 10/02/20


 


Ascorbic Acid [Vitamin C] 500 mg PO DAILY 08/31/20 10/02/20


 


Vitamin B Complex 1 tab PO DAILY 08/31/20 10/02/20








                                  Previous Rx's











 Medication  Instructions  Recorded


 


Divalproex [Depakote] 250 mg PO TID #90 tablet. 20


 


Losartan [Cozaar] 50 mg PO DAILY  tab 20


 


Metoclopramide [Reglan] 10 mg PO AC-TID  tab 20


 


Calcium Carb-Vit D 500Mg-200Un 1 each PO BID-W/MEALS  tab 20





[Oscal 500+D]  


 


Clopidogrel [Plavix] 75 mg PO DAILY  tab 20


 


INSULIN ASPART (NovoLOG) [NovoLOG 0 unit SQ AC-TID  vial 20





(formulary)]  


 


Insulin Glargine,Hum.rec.anlog 10 unit SQ HS #0 10/14/20





[Basaglar Kwikpen U-100]  


 


Lacosamide [Vimpat] 100 mg PO BID 30 Days #60 tablet 10/14/20


 


QUEtiapine [SEROquel] 50 mg PO HS 30 Days #30 tab 10/14/20











                                    Allergies











Allergy/AdvReac Type Severity Reaction Status Date / Time


 


Barbiturates Allergy  Rash/Hives Verified 10/15/20 22:22


 


cephalexin monohydrate Allergy  Rash/Hives Verified 10/15/20 22:22





[From Keflex]     


 


morphine Allergy  Rash/Hives Verified 10/15/20 22:22


 


Penicillins Allergy  Rash/Hives Verified 10/15/20 22:22


 


phenobarbital Allergy  Swelling Verified 10/15/20 22:22


 


venom-honey bee Allergy  Swelling Verified 10/15/20 22:22





[bee venom (honey bee)]     


 


amlodipine besylate AdvReac  Vomiting Verified 10/15/20 22:22





[From Norvasc]     














Review of Systems


ROS Statement: 


Those systems with pertinent positive or pertinent negative responses have been 

documented in the HPI.





ROS Other: All systems not noted in ROS Statement are negative.





Past Medical History


Past Medical History: Asthma, Coronary Artery Disease (CAD), Chest Pain / 

Angina, Heart Failure, COPD, CVA/TIA, Diabetes Mellitus, Deep Vein Thrombosis 

(DVT), Eye Disorder, GERD/Reflux, Hyperlipidemia, Hypertension, Myocardial 

Infarction (MI), Neurologic Disorder, Osteoarthritis (OA), Pneumonia, Renal 

Disease


Additional Past Medical History / Comment(s): IDDM (brittle), DKAs, neuropathy 

bilateral hands/feet, retinopathy bilateral eyes, cellulitis R foot, R great toe

and 2nd toe infections/amputations, current wound R foot-being seen in [facility], 

renal failure, anemia, CVAs with L sided paralysis, headaches started after 

CVAs, brain lesions, DVT R axillae, low back pain, varicosities, seizure many 

years ago (), hypothyroid, constipation, bilateral tinnitis occasionally, 

sinus problems.


Last Myocardial Infarction Date:: 


History of Any Multi-Drug Resistant Organisms: MRSA


Date of last positivie culture/infection: 18


MDRO Source:: Right Foot


Past Surgical History: Appendectomy,  Section, Cholecystectomy, Heart 

Catheterization With Stent, Hysterectomy, Orthopedic Surgery


Additional Past Surgical History / Comment(s): PCI with multiple stents, R great

toe and 2nd toe amps, debridements R foot ulcer, L shoulder surgery to remove 

bone, bronchoscopy, EGD, colonoscopy, R arm port since removed, bilateral 

cataract removals/lens implants.


Past Anesthesia/Blood Transfusion Reactions: No Reported Reaction


Additional Past Anesthesia/Blood Transfusion Reaction / Comment(s): HX OF BLOOD 

TRANSFUSION- NO REACTION


Date of Last Stent Placement:: 2013


Past Psychological History: Anxiety, Bipolar, Depression


Smoking Status: Never smoker


Past Alcohol Use History: None Reported


Past Drug Use History: Marijuana





- Past Family History


  ** Father


Family Medical History: Unable to Obtain, Coronary Artery Disease (CAD), 

Diabetes Mellitus





  ** Mother


Family Medical History: COPD





General Exam





- General Exam Comments


Initial Comments: 





This is a well-developed sec appearing female who is awake alert oriented 3


General appearance: alert


Head exam: Present: atraumatic, normocephalic, normal inspection


Eye exam: Present: normal appearance, PERRL, EOMI.  Absent: scleral icterus, 

conjunctival injection, periorbital swelling


ENT exam: Present: mucous membranes moist, other (Poor dentition)


Neck exam: Present: normal inspection.  Absent: tenderness, meningismus, 

lymphadenopathy


Respiratory exam: Present: normal lung sounds bilaterally.  Absent: respiratory 

distress, wheezes, rales, rhonchi, stridor


Cardiovascular Exam: Present: regular rate, normal rhythm, normal heart sounds. 

Absent: systolic murmur, diastolic murmur, rubs, gallop, clicks


GI/Abdominal exam: Present: soft, normal bowel sounds.  Absent: distended, 

tenderness, guarding, rebound, rigid


Extremities exam: Present: normal inspection, full ROM, normal capillary refill.

 Absent: tenderness, pedal edema, joint swelling, calf tenderness


Back exam: Present: normal inspection


Neurological exam: Present: alert, oriented X3, CN II-XII intact


Psychiatric exam: Present: normal affect, normal mood


Skin exam: Present: warm, intact, normal color, diaphoretic.  Absent: dry, rash





Course


                                   Vital Signs











  10/15/20 10/15/20





  20:40 22:24


 


Temperature 98.7 F 


 


Pulse Rate 80 78


 


Respiratory 18 18





Rate  


 


Blood Pressure 107/97 115/91


 


O2 Sat by Pulse 100 98





Oximetry  














- Reevaluation(s)


Reevaluation #1: 





10/15/20 22:26


The patient did remain awake and alert however on recheck her blood sugar again 

was low in the 50s.  Due to the unknown exact amount of insulin the patient was 

given and the recurrent low blood sugar the patient will be admitted the case is

discussed with Dr. Cabral.





Medical Decision Making





- Lab Data


Result diagrams: 


                                 10/15/20 21:00





                                 10/15/20 21:00


                                   Lab Results











  10/15/20 10/15/20 10/15/20 Range/Units





  20:33 21:00 21:00 


 


WBC   3.5 L   (3.8-10.6)  k/uL


 


RBC   2.87 L   (3.80-5.40)  m/uL


 


Hgb   8.6 L   (11.4-16.0)  gm/dL


 


Hct   27.8 L   (34.0-46.0)  %


 


MCV   96.9   (80.0-100.0)  fL


 


MCH   29.8   (25.0-35.0)  pg


 


MCHC   30.8 L   (31.0-37.0)  g/dL


 


RDW   16.9 H   (11.5-15.5)  %


 


Plt Count   269   (150-450)  k/uL


 


Neutrophils %   42   %


 


Lymphocytes %   47   %


 


Monocytes %   6   %


 


Eosinophils %   1   %


 


Basophils %   1   %


 


Neutrophils #   1.4   (1.3-7.7)  k/uL


 


Lymphocytes #   1.6   (1.0-4.8)  k/uL


 


Monocytes #   0.2   (0-1.0)  k/uL


 


Eosinophils #   0.1   (0-0.7)  k/uL


 


Basophils #   0.0   (0-0.2)  k/uL


 


Hypochromasia   Slight   


 


Anisocytosis   Slight   


 


Macrocytosis   Slight   


 


Sodium    141  (137-145)  mmol/L


 


Potassium    4.1  (3.5-5.1)  mmol/L


 


Chloride    109 H  ()  mmol/L


 


Carbon Dioxide    29  (22-30)  mmol/L


 


Anion Gap    3  mmol/L


 


BUN    25 H  (7-17)  mg/dL


 


Creatinine    1.05 H  (0.52-1.04)  mg/dL


 


Est GFR (CKD-EPI)AfAm    67  (>60 ml/min/1.73 sqM)  


 


Est GFR (CKD-EPI)NonAf    58  (>60 ml/min/1.73 sqM)  


 


Glucose    91  (74-99)  mg/dL


 


POC Glucose (mg/dL)  159 H    (75-99)  mg/dL


 


POC Glu Operater ID  Karyn Melgar    


 


Calcium    7.7 L  (8.4-10.2)  mg/dL


 


Magnesium    2.7 H  (1.6-2.3)  mg/dL


 


Total Bilirubin    0.3  (0.2-1.3)  mg/dL


 


AST    37 H  (14-36)  U/L


 


ALT    17  (4-34)  U/L


 


Alkaline Phosphatase    76  ()  U/L


 


Ammonia     (<30)  umol/L


 


Creatine Kinase    65  ()  U/L


 


Total Protein    5.6 L  (6.3-8.2)  g/dL


 


Albumin    2.6 L  (3.5-5.0)  g/dL


 


Serum Alcohol    <10  mg/dL














  10/15/20 Range/Units





  21:00 


 


WBC   (3.8-10.6)  k/uL


 


RBC   (3.80-5.40)  m/uL


 


Hgb   (11.4-16.0)  gm/dL


 


Hct   (34.0-46.0)  %


 


MCV   (80.0-100.0)  fL


 


MCH   (25.0-35.0)  pg


 


MCHC   (31.0-37.0)  g/dL


 


RDW   (11.5-15.5)  %


 


Plt Count   (150-450)  k/uL


 


Neutrophils %   %


 


Lymphocytes %   %


 


Monocytes %   %


 


Eosinophils %   %


 


Basophils %   %


 


Neutrophils #   (1.3-7.7)  k/uL


 


Lymphocytes #   (1.0-4.8)  k/uL


 


Monocytes #   (0-1.0)  k/uL


 


Eosinophils #   (0-0.7)  k/uL


 


Basophils #   (0-0.2)  k/uL


 


Hypochromasia   


 


Anisocytosis   


 


Macrocytosis   


 


Sodium   (137-145)  mmol/L


 


Potassium   (3.5-5.1)  mmol/L


 


Chloride   ()  mmol/L


 


Carbon Dioxide   (22-30)  mmol/L


 


Anion Gap   mmol/L


 


BUN   (7-17)  mg/dL


 


Creatinine   (0.52-1.04)  mg/dL


 


Est GFR (CKD-EPI)AfAm   (>60 ml/min/1.73 sqM)  


 


Est GFR (CKD-EPI)NonAf   (>60 ml/min/1.73 sqM)  


 


Glucose   (74-99)  mg/dL


 


POC Glucose (mg/dL)   (75-99)  mg/dL


 


POC Glu Operater ID   


 


Calcium   (8.4-10.2)  mg/dL


 


Magnesium   (1.6-2.3)  mg/dL


 


Total Bilirubin   (0.2-1.3)  mg/dL


 


AST   (14-36)  U/L


 


ALT   (4-34)  U/L


 


Alkaline Phosphatase   ()  U/L


 


Ammonia  <9  (<30)  umol/L


 


Creatine Kinase   ()  U/L


 


Total Protein   (6.3-8.2)  g/dL


 


Albumin   (3.5-5.0)  g/dL


 


Serum Alcohol   mg/dL














Disposition


Clinical Impression: 


 Hypoglycemia, Altered mental status





Disposition: ADMITTED AS IP TO THIS HOSP


Condition: Fair


Referrals: 


Saniya Cabral MD [Primary Care Provider] - 1-2 days

## 2020-10-15 NOTE — XR
EXAMINATION TYPE: XR chest 2V

 

DATE OF EXAM: 10/15/2020

 

COMPARISON: 10/6/2020

 

HISTORY: Short of breath

 

TECHNIQUE:

 

FINDINGS: Heart is normal. Lungs are clear of consolidation.. There is poorly marginated 2  cm area o
f increased density over the right upper lung field There is no heart failure. There are no hilar mas
ses. Costophrenic angles are clear.

 

IMPRESSION: No active cardiopulmonary disease. There is almost complete clearing of the right side pa
tchy pulmonary infiltrates compared to old exam.

## 2020-10-15 NOTE — CDI
Documentation Clarification Form

Date: 10/15/20

From: Shanta Domingo CCS

Phone: If you have a question about this query, please contact [place], 
 at [phone] between 8am and 5pm.

Admit Date: 10/2/20

Discharge Date:10/14/20

Patient Name: Morenita Ramirez

Visit Number: [id number]



ATTENTION: The Clinical Documentation Specialists (CDI) and [facility] Coding Staff 
appreciate your assistance in clarifying documentation. Please respond to the 
clarification below the line at the bottom and electronically sign. The CDI & 
[facility] Coding staff will review the response and follow-up if needed. Please note: 
Queries are made part of the Legal Health Record. If you have any questions, 
please contact the author of this message via ITS.



Dear Dr. Cabral, 



CKD is documented in the Consult 10/3, PNs . 



History/Risk Factors: HTN, DM CAD, COPD, Anemia

     Clinical Indicators: 

    Current BUN: 53, 56, 39

                CR: 1.98, 1.51, 1.07

                GFR: 27, 36, 57

Treatment: Monitor, IV fluids



In order to capture the severity of condition, please clarify the stage of the 
CKD, if known:



CKD Stage 1 (GFR > 90)

CKD Stage 2 (GFR 60-89)

CKD Stage 3 (GFR 30-59)

CKD Stage 4 (GFR 15-29)

CKD Stage 5 (GFR <15)

ESRD

Other, please specify  ___________

Unable to determine

___________________________________________________________________________

CKD stage 3

MTDD

## 2020-10-16 LAB
ALBUMIN SERPL-MCNC: 3.2 G/DL (ref 3.8–4.9)
ALBUMIN/GLOB SERPL: 1.1 G/DL (ref 1.6–3.17)
ALP SERPL-CCNC: 86 U/L (ref 41–126)
ALT SERPL-CCNC: 23 U/L (ref 8–44)
ANION GAP SERPL CALC-SCNC: 7.1 MMOL/L (ref 4–12)
AST SERPL-CCNC: 29 U/L (ref 13–35)
BASOPHILS # BLD AUTO: 0 K/UL (ref 0–0.2)
BASOPHILS NFR BLD AUTO: 1 %
BUN SERPL-SCNC: 22 MG/DL (ref 9–27)
BUN/CREAT SERPL: 18.33 RATIO (ref 12–20)
CALCIUM SPEC-MCNC: 8.4 MG/DL (ref 8.7–10.3)
CHLORIDE SERPL-SCNC: 102 MMOL/L (ref 96–109)
CO2 SERPL-SCNC: 30.9 MMOL/L (ref 21.6–31.8)
EOSINOPHIL # BLD AUTO: 0 K/UL (ref 0–0.7)
EOSINOPHIL NFR BLD AUTO: 1 %
ERYTHROCYTE [DISTWIDTH] IN BLOOD BY AUTOMATED COUNT: 3.24 M/UL (ref 3.8–5.4)
ERYTHROCYTE [DISTWIDTH] IN BLOOD: 16.4 % (ref 11.5–15.5)
GLOBULIN SER CALC-MCNC: 2.9 G/DL (ref 1.6–3.3)
GLUCOSE BLD-MCNC: 123 MG/DL (ref 75–99)
GLUCOSE BLD-MCNC: 380 MG/DL (ref 75–99)
GLUCOSE BLD-MCNC: 495 MG/DL (ref 75–99)
GLUCOSE BLD-MCNC: 517 MG/DL (ref 75–99)
GLUCOSE BLD-MCNC: 518 MG/DL (ref 75–99)
GLUCOSE BLD-MCNC: 547 MG/DL (ref 75–99)
GLUCOSE BLD-MCNC: 84 MG/DL (ref 75–99)
GLUCOSE BLD-MCNC: >600 MG/DL (ref 75–99)
GLUCOSE SERPL-MCNC: 392 MG/DL (ref 70–110)
GLUCOSE UR QL: (no result)
HCT VFR BLD AUTO: 31.9 % (ref 34–46)
HGB BLD-MCNC: 9.6 GM/DL (ref 11.4–16)
HYALINE CASTS UR QL AUTO: 4 /LPF (ref 0–2)
LYMPHOCYTES # SPEC AUTO: 1.4 K/UL (ref 1–4.8)
LYMPHOCYTES NFR SPEC AUTO: 38 %
MCH RBC QN AUTO: 29.6 PG (ref 25–35)
MCHC RBC AUTO-ENTMCNC: 30 G/DL (ref 31–37)
MCV RBC AUTO: 98.5 FL (ref 80–100)
MONOCYTES # BLD AUTO: 0.3 K/UL (ref 0–1)
MONOCYTES NFR BLD AUTO: 8 %
NEUTROPHILS # BLD AUTO: 1.9 K/UL (ref 1.3–7.7)
NEUTROPHILS NFR BLD AUTO: 50 %
PH UR: 6 [PH] (ref 5–8)
PLATELET # BLD AUTO: 326 K/UL (ref 150–450)
POTASSIUM SERPL-SCNC: 4.9 MMOL/L (ref 3.5–5.5)
PROT SERPL-MCNC: 6.1 G/DL (ref 6.2–8.2)
RBC UR QL: 4 /HPF (ref 0–5)
SODIUM SERPL-SCNC: 140 MMOL/L (ref 135–145)
SP GR UR: 1.02 (ref 1–1.03)
UROBILINOGEN UR QL STRIP: <2 MG/DL (ref ?–2)
WBC # BLD AUTO: 3.7 K/UL (ref 3.8–10.6)
WBC # UR AUTO: 3 /HPF (ref 0–5)

## 2020-10-16 RX ADMIN — Medication SCH EACH: at 16:47

## 2020-10-16 RX ADMIN — OXYCODONE HYDROCHLORIDE AND ACETAMINOPHEN SCH MG: 500 TABLET ORAL at 08:40

## 2020-10-16 RX ADMIN — INSULIN ASPART SCH UNIT: 100 INJECTION, SOLUTION INTRAVENOUS; SUBCUTANEOUS at 08:40

## 2020-10-16 RX ADMIN — DIVALPROEX SODIUM SCH MG: 250 TABLET, DELAYED RELEASE ORAL at 16:47

## 2020-10-16 RX ADMIN — Medication SCH EACH: at 08:40

## 2020-10-16 RX ADMIN — CLOPIDOGREL BISULFATE SCH MG: 75 TABLET ORAL at 08:41

## 2020-10-16 RX ADMIN — ATORVASTATIN CALCIUM SCH MG: 20 TABLET, FILM COATED ORAL at 08:40

## 2020-10-16 RX ADMIN — METOCLOPRAMIDE SCH MG: 10 TABLET ORAL at 08:42

## 2020-10-16 RX ADMIN — INSULIN ASPART SCH: 100 INJECTION, SOLUTION INTRAVENOUS; SUBCUTANEOUS at 12:06

## 2020-10-16 RX ADMIN — INSULIN ASPART SCH UNIT: 100 INJECTION, SOLUTION INTRAVENOUS; SUBCUTANEOUS at 20:50

## 2020-10-16 RX ADMIN — DIVALPROEX SODIUM SCH MG: 250 TABLET, DELAYED RELEASE ORAL at 08:41

## 2020-10-16 RX ADMIN — INSULIN ASPART SCH UNIT: 100 INJECTION, SOLUTION INTRAVENOUS; SUBCUTANEOUS at 18:33

## 2020-10-16 RX ADMIN — METOCLOPRAMIDE SCH MG: 10 TABLET ORAL at 13:31

## 2020-10-16 RX ADMIN — METOCLOPRAMIDE SCH MG: 10 TABLET ORAL at 16:47

## 2020-10-16 RX ADMIN — LACOSAMIDE SCH MG: 50 TABLET, FILM COATED ORAL at 08:40

## 2020-10-16 RX ADMIN — INSULIN DETEMIR SCH UNIT: 100 INJECTION, SOLUTION SUBCUTANEOUS at 20:50

## 2020-10-16 RX ADMIN — DULOXETINE SCH MG: 60 CAPSULE, DELAYED RELEASE ORAL at 08:40

## 2020-10-16 RX ADMIN — INSULIN ASPART SCH UNIT: 100 INJECTION, SOLUTION INTRAVENOUS; SUBCUTANEOUS at 08:45

## 2020-10-16 RX ADMIN — LOSARTAN POTASSIUM SCH MG: 50 TABLET, FILM COATED ORAL at 08:41

## 2020-10-16 RX ADMIN — INSULIN ASPART SCH UNIT: 100 INJECTION, SOLUTION INTRAVENOUS; SUBCUTANEOUS at 05:55

## 2020-10-16 RX ADMIN — ASPIRIN 81 MG CHEWABLE TABLET SCH MG: 81 TABLET CHEWABLE at 08:40

## 2020-10-16 RX ADMIN — DIVALPROEX SODIUM SCH MG: 250 TABLET, DELAYED RELEASE ORAL at 20:48

## 2020-10-16 RX ADMIN — FAMOTIDINE SCH MG: 20 TABLET, FILM COATED ORAL at 08:39

## 2020-10-16 RX ADMIN — Medication SCH MG: at 08:41

## 2020-10-16 RX ADMIN — LACOSAMIDE SCH MG: 50 TABLET, FILM COATED ORAL at 20:48

## 2020-10-16 NOTE — P.HPIM
History of Present Illness


H&P Date: 10/16/20


Chief Complaint: Mental status changes hypoglycemia


This is 60-year-old female patient well-known to my services.  Patient was 

recently discharged she was admitted to the hospital for DKA and pneumonia.  

Patient has underlying history of diabetes mellitus type 1 which is a brittle 

diabetic.  Lantus was decreased upon discharge.  Additional medical history 

includes stroke and seizures.  She was discharged home with her sister.  

According to ER report patient was found to have a low blood sugar with altered 

mental status changes.  Blood sugar was corrected and alt mental status changes 

recovered.  There is questionable compliance with diet.  Consult placed for 

possible ECF placement on discharge.  Chest x-ray was completed showing no 

active cardiopulmonary disease.  There is almost complete clearing of the right 

side patchy pulmonary infiltrate compared to old exam.  We'll continue to 

monitor blood sugar closely and make adjustments to home medication 








Review of Systems





please refer to HPI otherwise unremarkable





Past Medical History


Past Medical History: Asthma, Coronary Artery Disease (CAD), Chest Pain / 

Angina, Heart Failure, COPD, CVA/TIA, Diabetes Mellitus, Deep Vein Thrombosis 

(DVT), Eye Disorder, GERD/Reflux, Hyperlipidemia, Hypertension, Myocardial 

Infarction (MI), Neurologic Disorder, Osteoarthritis (OA), Pneumonia, Renal 

Disease


Additional Past Medical History / Comment(s): IDDM (brittle), DKAs, neuropathy 

bilateral hands/feet, retinopathy bilateral eyes, cellulitis R foot, R great toe

and 2nd toe infections/amputations, current wound R foot-being seen in [facility], 

renal failure, anemia, CVAs with L sided paralysis, headaches started after 

CVAs, brain lesions, DVT R axillae, low back pain, varicosities, seizure many 

years ago (), hypothyroid, constipation, bilateral tinnitis occasionally, 

sinus problems.


Last Myocardial Infarction Date:: 


History of Any Multi-Drug Resistant Organisms: MRSA


Date of last positivie culture/infection: 18


MDRO Source:: Right Foot


Past Surgical History: Appendectomy,  Section, Cholecystectomy, Heart 

Catheterization With Stent, Hysterectomy, Orthopedic Surgery


Additional Past Surgical History / Comment(s): PCI with multiple stents, R great

toe and 2nd toe amps, debridements R foot ulcer, L shoulder surgery to remove 

bone, bronchoscopy, EGD, colonoscopy, R arm port since removed, bilateral 

cataract removals/lens implants.


Past Anesthesia/Blood Transfusion Reactions: No Reported Reaction


Additional Past Anesthesia/Blood Transfusion Reaction / Comment(s): HX OF BLOOD 

TRANSFUSION- NO REACTION


Date of Last Stent Placement:: 2013


Past Psychological History: Anxiety, Bipolar, Depression


Additional Psychological History / Comment(s): Pt has a legal guardian, Noelle Menon, who is pt's sister.  Currently her legal guardian is hospitalized.  Pt 

has a caregiver, Eleanor, who resides with her.  Pt is wheelchair bound d/t CVA 

with L sided paralysis arm and leg.  She has a shower chair and a glucometer.  

Her sister or caregiver drive her to appts.  [email or web address].


Smoking Status: Former smoker


Past Alcohol Use History: None Reported


Additional Past Alcohol Use History / Comment(s): Pt started smoking in  and

quit in .   Using marijuana edibles occasionally but none for a "long time"


Past Drug Use History: Marijuana


Additional Drug Use History / Comment(s): using marijuana edibles





- Past Family History


  ** Father


Family Medical History: Unable to Obtain, Coronary Artery Disease (CAD), 

Diabetes Mellitus





  ** Mother


Family Medical History: COPD





Medications and Allergies


                                Home Medications











 Medication  Instructions  Recorded  Confirmed  Type


 


Famotidine [Pepcid] 20 mg PO DAILY 02/19/16 10/15/20 History


 


HYDROcodone/APAP 10-325MG [Norco 1 tab PO TID PRN 05/06/17 10/15/20 History





]    


 


DULoxetine HCL [Cymbalta] 60 mg PO DAILY 09/19/17 10/15/20 History


 


Atorvastatin [Lipitor] 20 mg PO DAILY 07/31/19 10/15/20 History


 


Aspirin [Adult Low Dose Aspirin EC] 81 mg PO DAILY 01/10/20 10/15/20 History


 


Ferrous Sulfate [Iron (65  mg PO DAILY 01/10/20 10/15/20 History





Elemental)]    


 


Divalproex [Depakote] 250 mg PO TID #90 tablet. 03/19/20 10/15/20 Rx


 


ALPRAZolam [Xanax] 1 mg PO Q8H PRN 07/28/20 10/15/20 History


 


Albuterol Sulfate [Ventolin HFA] 2 puff INHALATION RT-Q4H PRN 07/28/20 10/15/20 

History


 


Ondansetron Odt [Zofran ODT] 4 mg PO DAILY PRN 07/28/20 10/15/20 History


 


Losartan [Cozaar] 50 mg PO DAILY  tab 08/20/20 10/15/20 Rx


 


Metoclopramide [Reglan] 10 mg PO AC-TID  tab 08/20/20 10/15/20 Rx


 


Ascorbic Acid [Vitamin C] 500 mg PO DAILY 08/31/20 10/15/20 History


 


Vitamin B Complex 1 tab PO DAILY 08/31/20 10/15/20 History


 


Calcium Carb-Vit D 500Mg-200Un 1 each PO BID-W/MEALS  tab 09/04/20 10/15/20 Rx





[Oscal 500+D]    


 


Clopidogrel [Plavix] 75 mg PO DAILY  tab 09/04/20 10/15/20 Rx


 


INSULIN ASPART (NovoLOG) [NovoLOG 0 unit SQ AC-TID  vial 09/04/20 10/15/20 Rx





(formulary)]    


 


Insulin Glargine,Hum.rec.anlog 10 unit SQ HS #0 10/14/20 10/15/20 Rx





[Basaglar Kwikpen U-100]    


 


Lacosamide [Vimpat] 100 mg PO BID 30 Days #60 tablet 10/14/20 10/15/20 Rx


 


QUEtiapine [SEROquel] 50 mg PO HS 30 Days #30 tab 10/14/20 10/15/20 Rx


 


QUEtiapine [SEROquel] 25 mg PO BID 10/15/20 10/15/20 History








                                    Allergies











Allergy/AdvReac Type Severity Reaction Status Date / Time


 


Barbiturates Allergy  Rash/Hives Verified 10/15/20 22:22


 


cephalexin monohydrate Allergy  Rash/Hives Verified 10/15/20 22:22





[From Keflex]     


 


morphine Allergy  Rash/Hives Verified 10/15/20 22:22


 


Penicillins Allergy  Rash/Hives Verified 10/15/20 22:22


 


phenobarbital Allergy  Swelling Verified 10/15/20 22:22


 


venom-honey bee Allergy  Swelling Verified 10/15/20 22:22





[bee venom (honey bee)]     


 


amlodipine besylate AdvReac  Vomiting Verified 10/15/20 22:22





[From Norvasc]     














Physical Exam


Vitals: 


                                   Vital Signs











  Temp Pulse Pulse Pulse Resp BP BP


 


 10/16/20 04:29  98.2 F   91   16   147/74


 


 10/16/20 00:45  97.7 F    87  18   107/69


 


 10/15/20 22:24   78    18  115/91 


 


 10/15/20 20:40  98.7 F  80    18  107/97 














  Pulse Ox


 


 10/16/20 04:29  97


 


 10/16/20 00:45  95


 


 10/15/20 22:24  98


 


 10/15/20 20:40  100








                                Intake and Output











 10/15/20 10/16/20 10/16/20





 22:59 06:59 14:59


 


Intake Total  310 


 


Balance  310 


 


Intake:   


 


  Intake, IV Titration  60 





  Amount   


 


    Dextrose 5%-0.45% NaCl 1,  60 





    000 ml @ 20 mls/hr IV .   





    [facility] Rx[facility][id number]   


 


  Oral  250 


 


Other:   


 


  Voiding Method  Toilet 





  Diaper 


 


  # Voids  4 


 


  Weight 58.967 kg 58.967 kg 














Head normocephalic


Neck supple


Lungs clear to auscultation bilaterally no wheezing or crackles


Heart regular rate and rhythm S1-S2, no rub or gallop


Abdomen is soft nontender nondistended positive bowel sounds no hepatosp

lenomegaly


Extremities no edema


Neuro alert and orientated to 3





Results


CBC & Chem 7: 


                                 10/16/20 09:11





                                 10/15/20 21:00


Labs: 


                  Abnormal Lab Results - Last 24 Hours (Table)











  10/15/20 10/15/20 10/15/20 Range/Units





  20:33 21:00 21:00 


 


WBC   3.5 L   (3.8-10.6)  k/uL


 


RBC   2.87 L   (3.80-5.40)  m/uL


 


Hgb   8.6 L   (11.4-16.0)  gm/dL


 


Hct   27.8 L   (34.0-46.0)  %


 


MCHC   30.8 L   (31.0-37.0)  g/dL


 


RDW   16.9 H   (11.5-15.5)  %


 


Chloride    109 H  ()  mmol/L


 


BUN    25 H  (7-17)  mg/dL


 


Creatinine    1.05 H  (0.52-1.04)  mg/dL


 


POC Glucose (mg/dL)  159 H    (75-99)  mg/dL


 


Calcium    7.7 L  (8.4-10.2)  mg/dL


 


Magnesium    2.7 H  (1.6-2.3)  mg/dL


 


AST    37 H  (14-36)  U/L


 


Total Protein    5.6 L  (6.3-8.2)  g/dL


 


Albumin    2.6 L  (3.5-5.0)  g/dL














  10/15/20 10/15/20 10/16/20 Range/Units





  22:15 23:38 00:42 


 


WBC     (3.8-10.6)  k/uL


 


RBC     (3.80-5.40)  m/uL


 


Hgb     (11.4-16.0)  gm/dL


 


Hct     (34.0-46.0)  %


 


MCHC     (31.0-37.0)  g/dL


 


RDW     (11.5-15.5)  %


 


Chloride     ()  mmol/L


 


BUN     (7-17)  mg/dL


 


Creatinine     (0.52-1.04)  mg/dL


 


POC Glucose (mg/dL)  54 L  215 H  380 H  (75-99)  mg/dL


 


Calcium     (8.4-10.2)  mg/dL


 


Magnesium     (1.6-2.3)  mg/dL


 


AST     (14-36)  U/L


 


Total Protein     (6.3-8.2)  g/dL


 


Albumin     (3.5-5.0)  g/dL














  10/16/20 10/16/20 10/16/20 Range/Units





  04:46 07:12 09:11 


 


WBC    3.7 L  (3.8-10.6)  k/uL


 


RBC    3.24 L  (3.80-5.40)  m/uL


 


Hgb    9.6 L  (11.4-16.0)  gm/dL


 


Hct    31.9 L  (34.0-46.0)  %


 


MCHC    30.0 L  (31.0-37.0)  g/dL


 


RDW    16.4 H  (11.5-15.5)  %


 


Chloride     ()  mmol/L


 


BUN     (7-17)  mg/dL


 


Creatinine     (0.52-1.04)  mg/dL


 


POC Glucose (mg/dL)  518 H  495 H   (75-99)  mg/dL


 


Calcium     (8.4-10.2)  mg/dL


 


Magnesium     (1.6-2.3)  mg/dL


 


AST     (14-36)  U/L


 


Total Protein     (6.3-8.2)  g/dL


 


Albumin     (3.5-5.0)  g/dL














Thrombosis Risk Factor Assmnt





- Choose All That Apply


Each Factor Represents 1 point: Age 41-60 years


Thrombosis Risk Factor Assessment Total Risk Factor Score: 1


Thrombosis Risk Factor Assessment Level: Low Risk





Assessment and Plan


Assessment: 





1.  Altered mental status is with hypoglycemia.  Hypoglycemia was corrected 

altered mental status changes have resolved





2.  Diabetes mellitus type 1.  Patient is known to be a brittle diabetic with 

fluctuating blood sugars





3.  Recent hospitalization for pneumonia.  Chest x-ray completed showing 

improvement





4.  History of seizures.  Patient was evaluated by neurology services and 

medications adjusted during previous day





5.  History of stroke





6.  Essential hypertension





7.  History of hyperlipidemia.  Patient obtained on statin





8.  History of COPD no exacerbation at this time





9.  History of coronary artery disease





10.  History of peripheral vascular disease with previous history of multiple 

toe amputations





11.  Iron deficiency anemia








DVT prophylaxis Lovenox.  GI prophylaxis Pepcid


PT OT consulted for possible ECF placement














Time with Patient: Greater than 30 (Greater than 60% of the total time spent in 

counseling and coordination of care. I performed an examination of the patient 

and discussed their management with the Nurse Practitioner.  I have reviewed the

Nurse Practitioner's notes and agree with the documented findings and plan of 

care)

## 2020-10-17 LAB
ALBUMIN SERPL-MCNC: 3 G/DL (ref 3.8–4.9)
ALBUMIN/GLOB SERPL: 1.11 G/DL (ref 1.6–3.17)
ALP SERPL-CCNC: 72 U/L (ref 41–126)
ALT SERPL-CCNC: 19 U/L (ref 8–44)
ANION GAP SERPL CALC-SCNC: 6.7 MMOL/L (ref 4–12)
AST SERPL-CCNC: 21 U/L (ref 13–35)
BASOPHILS # BLD AUTO: 0 K/UL (ref 0–0.2)
BASOPHILS NFR BLD AUTO: 1 %
BUN SERPL-SCNC: 22 MG/DL (ref 9–27)
BUN/CREAT SERPL: 22 RATIO (ref 12–20)
CALCIUM SPEC-MCNC: 8.7 MG/DL (ref 8.7–10.3)
CHLORIDE SERPL-SCNC: 103 MMOL/L (ref 96–109)
CO2 SERPL-SCNC: 30.3 MMOL/L (ref 21.6–31.8)
EOSINOPHIL # BLD AUTO: 0 K/UL (ref 0–0.7)
EOSINOPHIL NFR BLD AUTO: 1 %
ERYTHROCYTE [DISTWIDTH] IN BLOOD BY AUTOMATED COUNT: 3.03 M/UL (ref 3.8–5.4)
ERYTHROCYTE [DISTWIDTH] IN BLOOD: 16.7 % (ref 11.5–15.5)
GLOBULIN SER CALC-MCNC: 2.7 G/DL (ref 1.6–3.3)
GLUCOSE BLD-MCNC: 109 MG/DL (ref 75–99)
GLUCOSE BLD-MCNC: 115 MG/DL (ref 75–99)
GLUCOSE BLD-MCNC: 173 MG/DL (ref 75–99)
GLUCOSE BLD-MCNC: 175 MG/DL (ref 75–99)
GLUCOSE BLD-MCNC: 396 MG/DL (ref 75–99)
GLUCOSE BLD-MCNC: 425 MG/DL (ref 75–99)
GLUCOSE BLD-MCNC: 77 MG/DL (ref 75–99)
GLUCOSE SERPL-MCNC: 92 MG/DL (ref 70–110)
HCT VFR BLD AUTO: 29.1 % (ref 34–46)
HGB BLD-MCNC: 8.9 GM/DL (ref 11.4–16)
LYMPHOCYTES # SPEC AUTO: 2.2 K/UL (ref 1–4.8)
LYMPHOCYTES NFR SPEC AUTO: 54 %
MCH RBC QN AUTO: 29.5 PG (ref 25–35)
MCHC RBC AUTO-ENTMCNC: 30.7 G/DL (ref 31–37)
MCV RBC AUTO: 96 FL (ref 80–100)
MONOCYTES # BLD AUTO: 0.4 K/UL (ref 0–1)
MONOCYTES NFR BLD AUTO: 9 %
NEUTROPHILS # BLD AUTO: 1.4 K/UL (ref 1.3–7.7)
NEUTROPHILS NFR BLD AUTO: 34 %
PLATELET # BLD AUTO: 272 K/UL (ref 150–450)
POTASSIUM SERPL-SCNC: 4.9 MMOL/L (ref 3.5–5.5)
PROT SERPL-MCNC: 5.7 G/DL (ref 6.2–8.2)
SODIUM SERPL-SCNC: 140 MMOL/L (ref 135–145)
WBC # BLD AUTO: 4.1 K/UL (ref 3.8–10.6)

## 2020-10-17 RX ADMIN — LACOSAMIDE SCH MG: 50 TABLET, FILM COATED ORAL at 09:05

## 2020-10-17 RX ADMIN — DIVALPROEX SODIUM SCH MG: 250 TABLET, DELAYED RELEASE ORAL at 21:53

## 2020-10-17 RX ADMIN — ATORVASTATIN CALCIUM SCH MG: 20 TABLET, FILM COATED ORAL at 09:04

## 2020-10-17 RX ADMIN — HYDROCODONE BITARTRATE AND ACETAMINOPHEN PRN EACH: 10; 325 TABLET ORAL at 09:51

## 2020-10-17 RX ADMIN — LACOSAMIDE SCH MG: 50 TABLET, FILM COATED ORAL at 21:53

## 2020-10-17 RX ADMIN — INSULIN ASPART SCH: 100 INJECTION, SOLUTION INTRAVENOUS; SUBCUTANEOUS at 09:05

## 2020-10-17 RX ADMIN — ASPIRIN 81 MG CHEWABLE TABLET SCH MG: 81 TABLET CHEWABLE at 09:04

## 2020-10-17 RX ADMIN — CLOPIDOGREL BISULFATE SCH MG: 75 TABLET ORAL at 09:04

## 2020-10-17 RX ADMIN — Medication SCH EACH: at 17:01

## 2020-10-17 RX ADMIN — INSULIN ASPART SCH UNIT: 100 INJECTION, SOLUTION INTRAVENOUS; SUBCUTANEOUS at 21:53

## 2020-10-17 RX ADMIN — POTASSIUM CHLORIDE SCH: 14.9 INJECTION, SOLUTION INTRAVENOUS at 22:00

## 2020-10-17 RX ADMIN — DIVALPROEX SODIUM SCH MG: 250 TABLET, DELAYED RELEASE ORAL at 09:18

## 2020-10-17 RX ADMIN — HYDROCODONE BITARTRATE AND ACETAMINOPHEN PRN EACH: 10; 325 TABLET ORAL at 23:03

## 2020-10-17 RX ADMIN — OXYCODONE HYDROCHLORIDE AND ACETAMINOPHEN SCH MG: 500 TABLET ORAL at 09:05

## 2020-10-17 RX ADMIN — METOCLOPRAMIDE SCH MG: 10 TABLET ORAL at 09:18

## 2020-10-17 RX ADMIN — Medication SCH MG: at 09:18

## 2020-10-17 RX ADMIN — METOCLOPRAMIDE SCH MG: 10 TABLET ORAL at 12:37

## 2020-10-17 RX ADMIN — LOSARTAN POTASSIUM SCH MG: 50 TABLET, FILM COATED ORAL at 09:05

## 2020-10-17 RX ADMIN — INSULIN ASPART SCH UNIT: 100 INJECTION, SOLUTION INTRAVENOUS; SUBCUTANEOUS at 12:36

## 2020-10-17 RX ADMIN — METOCLOPRAMIDE SCH MG: 10 TABLET ORAL at 17:01

## 2020-10-17 RX ADMIN — ENOXAPARIN SODIUM SCH MG: 40 INJECTION SUBCUTANEOUS at 09:05

## 2020-10-17 RX ADMIN — INSULIN DETEMIR SCH UNIT: 100 INJECTION, SOLUTION SUBCUTANEOUS at 21:53

## 2020-10-17 RX ADMIN — Medication SCH EACH: at 09:05

## 2020-10-17 RX ADMIN — FAMOTIDINE SCH MG: 20 TABLET, FILM COATED ORAL at 09:05

## 2020-10-17 RX ADMIN — INSULIN ASPART SCH UNIT: 100 INJECTION, SOLUTION INTRAVENOUS; SUBCUTANEOUS at 17:25

## 2020-10-17 RX ADMIN — POTASSIUM CHLORIDE SCH: 14.9 INJECTION, SOLUTION INTRAVENOUS at 01:26

## 2020-10-17 RX ADMIN — DULOXETINE SCH MG: 60 CAPSULE, DELAYED RELEASE ORAL at 09:05

## 2020-10-17 RX ADMIN — DIVALPROEX SODIUM SCH MG: 250 TABLET, DELAYED RELEASE ORAL at 17:02

## 2020-10-17 NOTE — P.PN
Subjective


Progress Note Date: 10/17/20








Morenita Ramirez, is a 60-year-old female patient well-known to my services.  

Patient was recently discharged she was admitted to the hospital for DKA and 

pneumonia.  Patient has underlying history of diabetes mellitus type 1 which is 

a brittle diabetic.  Lantus was decreased upon discharge.  Additional medical 

history includes stroke and seizures.  She was discharged home with her sister. 

According to ER report patient was found to have a low blood sugar with altered 

mental status changes.  Blood sugar was corrected and alt mental status changes 

recovered.  There is questionable compliance with diet.  Consult placed for 

possible ECF placement on discharge.  Chest x-ray was completed showing no ac

tive cardiopulmonary disease.  There is almost complete clearing of the right 

side patchy pulmonary infiltrate compared to old exam.  We'll continue to 

monitor blood sugar closely and make adjustments to home medication .





On 10/17/2020 patient was seen and examined on the medical floor she is alert 

and oriented 3 in no distress there is no fever or chills no headache or 

dizziness no chest pain no shortness of breath no cough no nausea or vomiting no

abdominal pain no diarrhea no blood in the stools no burning with urination no 

frequency or urgency and no hematuria glucose levels are much better controlled 

today she is receiving Levemir 10 units at bedtime and sliding scale before 

meals








Objective





- Vital Signs


Vital signs: 


                                   Vital Signs











Temp  98 F   10/17/20 07:06


 


Pulse  76   10/17/20 07:06


 


Resp  20   10/17/20 07:06


 


BP  112/71   10/17/20 07:06


 


Pulse Ox  95   10/17/20 07:06








                                 Intake & Output











 10/16/20 10/17/20 10/17/20





 18:59 06:59 18:59


 


Weight 58.967 kg  


 


Other:   


 


  Voiding Method Diaper Diaper Diaper





 Incontinent Incontinent Incontinent


 


  # Voids 4 2 


 


  # Bowel Movements 1  














- Exam








Head normocephalic and atraumatic


Neck supple no JVD no goiter


Lungs clear to auscultation bilaterally no wheezing or crackles


Heart regular rate and rhythm S1-S2, no rub or gallop


Abdomen is soft nontender nondistended positive bowel sounds no 

hepatosplenomegaly


Extremities no edema no cyanosis or clubbing


Neuro alert and orientated to 3








- Labs


CBC & Chem 7: 


                                 10/17/20 05:21





                                 10/17/20 05:21


Labs: 


                  Abnormal Lab Results - Last 24 Hours (Table)











  10/16/20 10/16/20 10/16/20 Range/Units





  09:11 11:14 17:08 


 


RBC     (3.80-5.40)  m/uL


 


Hgb     (11.4-16.0)  gm/dL


 


Hct     (34.0-46.0)  %


 


MCHC     (31.0-37.0)  g/dL


 


RDW     (11.5-15.5)  %


 


Est GFR (CKD-EPI)AfAm  57.3 L    (60.0-200.0)   


 


Est GFR (CKD-EPI)NonAf  49.4 L    (60.0-200.0)   


 


BUN/Creatinine Ratio     (12.00-20.00)  Ratio


 


Glucose  392 H    ()  mg/dL


 


POC Glucose (mg/dL)    517 H  (75-99)  mg/dL


 


Calcium  8.4 L    (8.7-10.3)  mg/dL


 


Total Bilirubin     (0.2-1.2)  mg/dL


 


Total Protein  6.1 L    (6.2-8.2)  g/dL


 


Albumin  3.20 L    (3.80-4.90)  g/dL


 


Albumin/Globulin Ratio  1.10 L    (1.60-3.17)  g/dL


 


Urine Glucose (UA)   4+ H   (Negative)  


 


Urine Blood   Trace H   (Negative)  


 


Ur Leukocyte Esterase   Small H   (Negative)  


 


Urine Bacteria   Rare H   (None)  /hpf


 


Hyaline Casts   4 H   (0-2)  /lpf


 


Urine Mucus   Rare H   (None)  /hpf














  10/16/20 10/16/20 10/16/20 Range/Units





  17:09 17:26 19:10 


 


RBC     (3.80-5.40)  m/uL


 


Hgb     (11.4-16.0)  gm/dL


 


Hct     (34.0-46.0)  %


 


MCHC     (31.0-37.0)  g/dL


 


RDW     (11.5-15.5)  %


 


Est GFR (CKD-EPI)AfAm     (60.0-200.0)   


 


Est GFR (CKD-EPI)NonAf     (60.0-200.0)   


 


BUN/Creatinine Ratio     (12.00-20.00)  Ratio


 


Glucose   617 H*   ()  mg/dL


 


POC Glucose (mg/dL)  547 H   >600 H  (75-99)  mg/dL


 


Calcium     (8.7-10.3)  mg/dL


 


Total Bilirubin     (0.2-1.2)  mg/dL


 


Total Protein     (6.2-8.2)  g/dL


 


Albumin     (3.80-4.90)  g/dL


 


Albumin/Globulin Ratio     (1.60-3.17)  g/dL


 


Urine Glucose (UA)     (Negative)  


 


Urine Blood     (Negative)  


 


Ur Leukocyte Esterase     (Negative)  


 


Urine Bacteria     (None)  /hpf


 


Hyaline Casts     (0-2)  /lpf


 


Urine Mucus     (None)  /hpf














  10/16/20 10/17/20 10/17/20 Range/Units





  23:24 02:19 05:21 


 


RBC    3.03 L  (3.80-5.40)  m/uL


 


Hgb    8.9 L  (11.4-16.0)  gm/dL


 


Hct    29.1 L  (34.0-46.0)  %


 


MCHC    30.7 L  (31.0-37.0)  g/dL


 


RDW    16.7 H  (11.5-15.5)  %


 


Est GFR (CKD-EPI)AfAm     (60.0-200.0)   


 


Est GFR (CKD-EPI)NonAf     (60.0-200.0)   


 


BUN/Creatinine Ratio     (12.00-20.00)  Ratio


 


Glucose     ()  mg/dL


 


POC Glucose (mg/dL)  123 H  115 H   (75-99)  mg/dL


 


Calcium     (8.7-10.3)  mg/dL


 


Total Bilirubin     (0.2-1.2)  mg/dL


 


Total Protein     (6.2-8.2)  g/dL


 


Albumin     (3.80-4.90)  g/dL


 


Albumin/Globulin Ratio     (1.60-3.17)  g/dL


 


Urine Glucose (UA)     (Negative)  


 


Urine Blood     (Negative)  


 


Ur Leukocyte Esterase     (Negative)  


 


Urine Bacteria     (None)  /hpf


 


Hyaline Casts     (0-2)  /lpf


 


Urine Mucus     (None)  /hpf














  10/17/20 10/17/20 Range/Units





  05:21 07:10 


 


RBC    (3.80-5.40)  m/uL


 


Hgb    (11.4-16.0)  gm/dL


 


Hct    (34.0-46.0)  %


 


MCHC    (31.0-37.0)  g/dL


 


RDW    (11.5-15.5)  %


 


Est GFR (CKD-EPI)AfAm    (60.0-200.0)   


 


Est GFR (CKD-EPI)NonAf    (60.0-200.0)   


 


BUN/Creatinine Ratio  22.00 H   (12.00-20.00)  Ratio


 


Glucose    ()  mg/dL


 


POC Glucose (mg/dL)   109 H  (75-99)  mg/dL


 


Calcium    (8.7-10.3)  mg/dL


 


Total Bilirubin  0.1 L   (0.2-1.2)  mg/dL


 


Total Protein  5.7 L   (6.2-8.2)  g/dL


 


Albumin  3.00 L   (3.80-4.90)  g/dL


 


Albumin/Globulin Ratio  1.11 L   (1.60-3.17)  g/dL


 


Urine Glucose (UA)    (Negative)  


 


Urine Blood    (Negative)  


 


Ur Leukocyte Esterase    (Negative)  


 


Urine Bacteria    (None)  /hpf


 


Hyaline Casts    (0-2)  /lpf


 


Urine Mucus    (None)  /hpf














Assessment and Plan


Plan: 








1.  Altered mental status is with hypoglycemia.  Hypoglycemia was corrected 

altered mental status changes have resolved





2.  Diabetes mellitus type 1.  Patient is known to be a brittle diabetic with 

fluctuating blood sugars





3.  Recent hospitalization for pneumonia.  Chest x-ray completed showing 

improvement





4.  History of seizures.  Patient was evaluated by neurology services and 

medications adjusted during previous day





5.  History of stroke





6.  Essential hypertension





7.  History of hyperlipidemia.  Patient obtained on statin





8.  History of COPD no exacerbation at this time





9.  History of coronary artery disease





10.  History of peripheral vascular disease with previous history of multiple 

toe amputations





11.  Iron deficiency anemia








DVT prophylaxis Lovenox.  GI prophylaxis Pepcid


PT OT consulted for possible ECF placement

## 2020-10-18 LAB
ALBUMIN SERPL-MCNC: 3.2 G/DL (ref 3.8–4.9)
ALBUMIN/GLOB SERPL: 1.1 G/DL (ref 1.6–3.17)
ALP SERPL-CCNC: 261 U/L (ref 41–126)
ALT SERPL-CCNC: 258 U/L (ref 8–44)
ANION GAP SERPL CALC-SCNC: 4.8 MMOL/L (ref 4–12)
AST SERPL-CCNC: 1100 U/L (ref 13–35)
BASOPHILS # BLD AUTO: 0 K/UL (ref 0–0.2)
BASOPHILS NFR BLD AUTO: 1 %
BUN SERPL-SCNC: 31 MG/DL (ref 9–27)
BUN/CREAT SERPL: 19.38 RATIO (ref 12–20)
CALCIUM SPEC-MCNC: 9 MG/DL (ref 8.7–10.3)
CHLORIDE SERPL-SCNC: 103 MMOL/L (ref 96–109)
CO2 SERPL-SCNC: 32.2 MMOL/L (ref 21.6–31.8)
EOSINOPHIL # BLD AUTO: 0 K/UL (ref 0–0.7)
EOSINOPHIL NFR BLD AUTO: 1 %
ERYTHROCYTE [DISTWIDTH] IN BLOOD BY AUTOMATED COUNT: 3.28 M/UL (ref 3.8–5.4)
ERYTHROCYTE [DISTWIDTH] IN BLOOD: 16.4 % (ref 11.5–15.5)
GLOBULIN SER CALC-MCNC: 2.9 G/DL (ref 1.6–3.3)
GLUCOSE BLD-MCNC: 150 MG/DL (ref 75–99)
GLUCOSE BLD-MCNC: 432 MG/DL (ref 75–99)
GLUCOSE BLD-MCNC: 464 MG/DL (ref 75–99)
GLUCOSE BLD-MCNC: 62 MG/DL (ref 75–99)
GLUCOSE BLD-MCNC: 63 MG/DL (ref 75–99)
GLUCOSE BLD-MCNC: 77 MG/DL (ref 75–99)
GLUCOSE BLD-MCNC: 86 MG/DL (ref 75–99)
GLUCOSE SERPL-MCNC: 65 MG/DL (ref 70–110)
HCT VFR BLD AUTO: 31.8 % (ref 34–46)
HGB BLD-MCNC: 9.6 GM/DL (ref 11.4–16)
LYMPHOCYTES # SPEC AUTO: 2 K/UL (ref 1–4.8)
LYMPHOCYTES NFR SPEC AUTO: 62 %
MCH RBC QN AUTO: 29.3 PG (ref 25–35)
MCHC RBC AUTO-ENTMCNC: 30.2 G/DL (ref 31–37)
MCV RBC AUTO: 97.1 FL (ref 80–100)
MONOCYTES # BLD AUTO: 0.3 K/UL (ref 0–1)
MONOCYTES NFR BLD AUTO: 8 %
NEUTROPHILS # BLD AUTO: 0.8 K/UL (ref 1.3–7.7)
NEUTROPHILS NFR BLD AUTO: 26 %
PLATELET # BLD AUTO: 281 K/UL (ref 150–450)
POTASSIUM SERPL-SCNC: 4.9 MMOL/L (ref 3.5–5.5)
PROT SERPL-MCNC: 6.1 G/DL (ref 6.2–8.2)
SODIUM SERPL-SCNC: 140 MMOL/L (ref 135–145)
WBC # BLD AUTO: 3.2 K/UL (ref 3.8–10.6)

## 2020-10-18 RX ADMIN — DIVALPROEX SODIUM SCH MG: 250 TABLET, DELAYED RELEASE ORAL at 21:28

## 2020-10-18 RX ADMIN — INSULIN ASPART SCH UNIT: 100 INJECTION, SOLUTION INTRAVENOUS; SUBCUTANEOUS at 21:29

## 2020-10-18 RX ADMIN — LACOSAMIDE SCH MG: 50 TABLET, FILM COATED ORAL at 09:04

## 2020-10-18 RX ADMIN — METOCLOPRAMIDE SCH MG: 10 TABLET ORAL at 09:05

## 2020-10-18 RX ADMIN — ASPIRIN 81 MG CHEWABLE TABLET SCH MG: 81 TABLET CHEWABLE at 09:04

## 2020-10-18 RX ADMIN — HYDROCODONE BITARTRATE AND ACETAMINOPHEN PRN EACH: 10; 325 TABLET ORAL at 17:15

## 2020-10-18 RX ADMIN — FAMOTIDINE SCH MG: 20 TABLET, FILM COATED ORAL at 09:04

## 2020-10-18 RX ADMIN — INSULIN ASPART SCH: 100 INJECTION, SOLUTION INTRAVENOUS; SUBCUTANEOUS at 09:06

## 2020-10-18 RX ADMIN — Medication SCH EACH: at 17:16

## 2020-10-18 RX ADMIN — DULOXETINE SCH MG: 60 CAPSULE, DELAYED RELEASE ORAL at 09:05

## 2020-10-18 RX ADMIN — POTASSIUM CHLORIDE SCH: 14.9 INJECTION, SOLUTION INTRAVENOUS at 21:29

## 2020-10-18 RX ADMIN — OXYCODONE HYDROCHLORIDE AND ACETAMINOPHEN SCH MG: 500 TABLET ORAL at 09:04

## 2020-10-18 RX ADMIN — Medication SCH MG: at 09:06

## 2020-10-18 RX ADMIN — DIVALPROEX SODIUM SCH MG: 250 TABLET, DELAYED RELEASE ORAL at 09:06

## 2020-10-18 RX ADMIN — ENOXAPARIN SODIUM SCH MG: 40 INJECTION SUBCUTANEOUS at 09:06

## 2020-10-18 RX ADMIN — Medication SCH EACH: at 09:06

## 2020-10-18 RX ADMIN — METOCLOPRAMIDE SCH MG: 10 TABLET ORAL at 17:16

## 2020-10-18 RX ADMIN — INSULIN ASPART SCH: 100 INJECTION, SOLUTION INTRAVENOUS; SUBCUTANEOUS at 17:16

## 2020-10-18 RX ADMIN — INSULIN ASPART SCH UNIT: 100 INJECTION, SOLUTION INTRAVENOUS; SUBCUTANEOUS at 12:51

## 2020-10-18 RX ADMIN — METOCLOPRAMIDE SCH MG: 10 TABLET ORAL at 12:50

## 2020-10-18 RX ADMIN — LOSARTAN POTASSIUM SCH MG: 50 TABLET, FILM COATED ORAL at 09:06

## 2020-10-18 RX ADMIN — ATORVASTATIN CALCIUM SCH MG: 20 TABLET, FILM COATED ORAL at 09:04

## 2020-10-18 RX ADMIN — DIVALPROEX SODIUM SCH MG: 250 TABLET, DELAYED RELEASE ORAL at 17:16

## 2020-10-18 RX ADMIN — LACOSAMIDE SCH MG: 50 TABLET, FILM COATED ORAL at 21:28

## 2020-10-18 RX ADMIN — INSULIN DETEMIR SCH UNIT: 100 INJECTION, SOLUTION SUBCUTANEOUS at 21:29

## 2020-10-18 RX ADMIN — CLOPIDOGREL BISULFATE SCH MG: 75 TABLET ORAL at 09:05

## 2020-10-18 NOTE — P.PN
Subjective


Progress Note Date: 10/18/20








Morenita Ramirez, is a 60-year-old female patient well-known to my services.  

Patient was recently discharged she was admitted to the hospital for DKA and 

pneumonia.  Patient has underlying history of diabetes mellitus type 1 which is 

a brittle diabetic.  Lantus was decreased upon discharge.  Additional medical 

history includes stroke and seizures.  She was discharged home with her sister. 

According to ER report patient was found to have a low blood sugar with altered 

mental status changes.  Blood sugar was corrected and alt mental status changes 

recovered.  There is questionable compliance with diet.  Consult placed for 

possible ECF placement on discharge.  Chest x-ray was completed showing no ac

tive cardiopulmonary disease.  There is almost complete clearing of the right 

side patchy pulmonary infiltrate compared to old exam.  We'll continue to 

monitor blood sugar closely and make adjustments to home medication .





On 10/17/2020 patient was seen and examined on the medical floor she is alert 

and oriented 3 in no distress there is no fever or chills no headache or 

dizziness no chest pain no shortness of breath no cough no nausea or vomiting no

abdominal pain no diarrhea no blood in the stools no burning with urination no 

frequency or urgency and no hematuria glucose levels are much better controlled 

today she is receiving Levemir 10 units at bedtime and sliding scale before 

meals








On 10/18/2020 patient was seen and examined on the medical floor she is alert 

and oriented 3 in no distress there is no fever or chills no headache or 

dizziness no chest pain no shortness of breath no cough no nausea or vomiting no

abdominal pain no diarrhea no blood in the stools no burning with urination no 

frequency or urgency and no hematuria.  Patient to glucose level is still 

fluctuating she was 88 before breakfast this morning no short-acting insulin was

given patient was given breakfast her glucose level was 464 before lunch she is 

being given 8 units of NovoLog before lunch will continue to monitor and adjust 

insulin, possible transfer to rehab tomorrow





Objective





- Vital Signs


Vital signs: 


                                   Vital Signs











Temp  98.8 F   10/18/20 12:06


 


Pulse  72   10/18/20 12:06


 


Resp  16   10/18/20 12:06


 


BP  101/51   10/18/20 12:06


 


Pulse Ox  95   10/18/20 12:06








                                 Intake & Output











 10/17/20 10/18/20 10/18/20





 18:59 06:59 18:59


 


Intake Total  60 


 


Balance  60 


 


Intake:   


 


  Oral  60 


 


Other:   


 


  Voiding Method Diaper Bedpan Bedpan





 Incontinent Diaper Diaper





  Incontinent Incontinent


 


  # Voids 1 2 


 


  # Bowel Movements  1 














- Exam








Head normocephalic and atraumatic


Neck supple no JVD no goiter


Lungs clear to auscultation bilaterally no wheezing or crackles


Heart regular rate and rhythm S1-S2, no rub or gallop


Abdomen is soft nontender nondistended positive bowel sounds no 

hepatosplenomegaly


Extremities no edema no cyanosis or clubbing


Neuro alert and orientated to 3








- Labs


CBC & Chem 7: 


                                 10/18/20 06:09





                                 10/18/20 06:09


Labs: 


                  Abnormal Lab Results - Last 24 Hours (Table)











  10/17/20 10/17/20 10/17/20 Range/Units





  12:24 14:12 17:02 


 


WBC     (3.8-10.6)  k/uL


 


RBC     (3.80-5.40)  m/uL


 


Hgb     (11.4-16.0)  gm/dL


 


Hct     (34.0-46.0)  %


 


MCHC     (31.0-37.0)  g/dL


 


RDW     (11.5-15.5)  %


 


Neutrophils #     (1.3-7.7)  k/uL


 


Carbon Dioxide     (21.6-31.8)  mmol/L


 


BUN     (9.0-27.0)  mg/dL


 


Creatinine     (0.6-1.5)  mg/dL


 


Est GFR (CKD-EPI)AfAm     (60.0-200.0)   


 


Est GFR (CKD-EPI)NonAf     (60.0-200.0)   


 


Glucose     ()  mg/dL


 


POC Glucose (mg/dL)  425 H  396 H  173 H  (75-99)  mg/dL


 


AST     (13-35)  U/L


 


ALT     (8-44)  U/L


 


Alkaline Phosphatase     ()  U/L


 


Total Protein     (6.2-8.2)  g/dL


 


Albumin     (3.80-4.90)  g/dL


 


Albumin/Globulin Ratio     (1.60-3.17)  g/dL














  10/17/20 10/18/20 10/18/20 Range/Units





  20:10 06:09 06:09 


 


WBC   3.2 L   (3.8-10.6)  k/uL


 


RBC   3.28 L   (3.80-5.40)  m/uL


 


Hgb   9.6 L   (11.4-16.0)  gm/dL


 


Hct   31.8 L   (34.0-46.0)  %


 


MCHC   30.2 L   (31.0-37.0)  g/dL


 


RDW   16.4 H   (11.5-15.5)  %


 


Neutrophils #   0.8 L   (1.3-7.7)  k/uL


 


Carbon Dioxide    32.2 H  (21.6-31.8)  mmol/L


 


BUN    31.0 H  (9.0-27.0)  mg/dL


 


Creatinine    1.6 H  (0.6-1.5)  mg/dL


 


Est GFR (CKD-EPI)AfAm    40.5 L  (60.0-200.0)   


 


Est GFR (CKD-EPI)NonAf    34.9 L  (60.0-200.0)   


 


Glucose    65 L  ()  mg/dL


 


POC Glucose (mg/dL)  175 H    (75-99)  mg/dL


 


AST    1100 H  (13-35)  U/L


 


ALT    258 H  (8-44)  U/L


 


Alkaline Phosphatase    261 H  ()  U/L


 


Total Protein    6.1 L  (6.2-8.2)  g/dL


 


Albumin    3.20 L  (3.80-4.90)  g/dL


 


Albumin/Globulin Ratio    1.10 L  (1.60-3.17)  g/dL














  10/18/20 10/18/20 10/18/20 Range/Units





  06:28 06:31 06:50 


 


WBC     (3.8-10.6)  k/uL


 


RBC     (3.80-5.40)  m/uL


 


Hgb     (11.4-16.0)  gm/dL


 


Hct     (34.0-46.0)  %


 


MCHC     (31.0-37.0)  g/dL


 


RDW     (11.5-15.5)  %


 


Neutrophils #     (1.3-7.7)  k/uL


 


Carbon Dioxide     (21.6-31.8)  mmol/L


 


BUN     (9.0-27.0)  mg/dL


 


Creatinine     (0.6-1.5)  mg/dL


 


Est GFR (CKD-EPI)AfAm     (60.0-200.0)   


 


Est GFR (CKD-EPI)NonAf     (60.0-200.0)   


 


Glucose     ()  mg/dL


 


POC Glucose (mg/dL)  67 L  63 L  62 L  (75-99)  mg/dL


 


AST     (13-35)  U/L


 


ALT     (8-44)  U/L


 


Alkaline Phosphatase     ()  U/L


 


Total Protein     (6.2-8.2)  g/dL


 


Albumin     (3.80-4.90)  g/dL


 


Albumin/Globulin Ratio     (1.60-3.17)  g/dL














  10/18/20 Range/Units





  11:15 


 


WBC   (3.8-10.6)  k/uL


 


RBC   (3.80-5.40)  m/uL


 


Hgb   (11.4-16.0)  gm/dL


 


Hct   (34.0-46.0)  %


 


MCHC   (31.0-37.0)  g/dL


 


RDW   (11.5-15.5)  %


 


Neutrophils #   (1.3-7.7)  k/uL


 


Carbon Dioxide   (21.6-31.8)  mmol/L


 


BUN   (9.0-27.0)  mg/dL


 


Creatinine   (0.6-1.5)  mg/dL


 


Est GFR (CKD-EPI)AfAm   (60.0-200.0)   


 


Est GFR (CKD-EPI)NonAf   (60.0-200.0)   


 


Glucose   ()  mg/dL


 


POC Glucose (mg/dL)  464 H  (75-99)  mg/dL


 


AST   (13-35)  U/L


 


ALT   (8-44)  U/L


 


Alkaline Phosphatase   ()  U/L


 


Total Protein   (6.2-8.2)  g/dL


 


Albumin   (3.80-4.90)  g/dL


 


Albumin/Globulin Ratio   (1.60-3.17)  g/dL














Assessment and Plan


Plan: 








1.  Altered mental status is with hypoglycemia.  Hypoglycemia was corrected 

altered mental status changes have resolved





2.  Diabetes mellitus type 1.  Patient is known to be a brittle diabetic with 

fluctuating blood sugars





3.  Recent hospitalization for pneumonia.  Chest x-ray completed showing 

improvement





4.  History of seizures.  Patient was evaluated by neurology services and 

medications adjusted during previous day





5.  History of stroke





6.  Essential hypertension





7.  History of hyperlipidemia.  Patient obtained on statin





8.  History of COPD no exacerbation at this time





9.  History of coronary artery disease





10.  History of peripheral vascular disease with previous history of multiple 

toe amputations





11.  Iron deficiency anemia








DVT prophylaxis Lovenox.  GI prophylaxis Pepcid


PT OT consulted for possible ECF placement

## 2020-10-19 LAB
ALBUMIN SERPL-MCNC: 3.2 G/DL (ref 3.8–4.9)
ALBUMIN/GLOB SERPL: 1.1 G/DL (ref 1.6–3.17)
ALP SERPL-CCNC: 333 U/L (ref 41–126)
ALT SERPL-CCNC: 296 U/L (ref 8–44)
ANION GAP SERPL CALC-SCNC: 12.7 MMOL/L (ref 4–12)
AST SERPL-CCNC: 596 U/L (ref 13–35)
BASOPHILS # BLD AUTO: 0 K/UL (ref 0–0.2)
BASOPHILS NFR BLD AUTO: 1 %
BUN SERPL-SCNC: 31 MG/DL (ref 9–27)
BUN/CREAT SERPL: 22.14 RATIO (ref 12–20)
CALCIUM SPEC-MCNC: 9.2 MG/DL (ref 8.7–10.3)
CHLORIDE SERPL-SCNC: 103 MMOL/L (ref 96–109)
CO2 SERPL-SCNC: 24.3 MMOL/L (ref 21.6–31.8)
EOSINOPHIL # BLD AUTO: 0 K/UL (ref 0–0.7)
EOSINOPHIL NFR BLD AUTO: 2 %
ERYTHROCYTE [DISTWIDTH] IN BLOOD BY AUTOMATED COUNT: 3.4 M/UL (ref 3.8–5.4)
ERYTHROCYTE [DISTWIDTH] IN BLOOD: 16.3 % (ref 11.5–15.5)
GLOBULIN SER CALC-MCNC: 2.9 G/DL (ref 1.6–3.3)
GLUCOSE BLD-MCNC: 112 MG/DL (ref 75–99)
GLUCOSE BLD-MCNC: 134 MG/DL (ref 75–99)
GLUCOSE BLD-MCNC: 164 MG/DL (ref 75–99)
GLUCOSE BLD-MCNC: 217 MG/DL (ref 75–99)
GLUCOSE BLD-MCNC: 332 MG/DL (ref 75–99)
GLUCOSE BLD-MCNC: 47 MG/DL (ref 75–99)
GLUCOSE BLD-MCNC: 59 MG/DL (ref 75–99)
GLUCOSE BLD-MCNC: 60 MG/DL (ref 75–99)
GLUCOSE BLD-MCNC: 85 MG/DL (ref 75–99)
GLUCOSE SERPL-MCNC: 44 MG/DL (ref 70–110)
HCT VFR BLD AUTO: 32.7 % (ref 34–46)
HGB BLD-MCNC: 10.3 GM/DL (ref 11.4–16)
LYMPHOCYTES # SPEC AUTO: 1.7 K/UL (ref 1–4.8)
LYMPHOCYTES NFR SPEC AUTO: 58 %
MCH RBC QN AUTO: 30.3 PG (ref 25–35)
MCHC RBC AUTO-ENTMCNC: 31.5 G/DL (ref 31–37)
MCV RBC AUTO: 96.2 FL (ref 80–100)
MONOCYTES # BLD AUTO: 0.4 K/UL (ref 0–1)
MONOCYTES NFR BLD AUTO: 12 %
NEUTROPHILS # BLD AUTO: 0.7 K/UL (ref 1.3–7.7)
NEUTROPHILS NFR BLD AUTO: 24 %
PLATELET # BLD AUTO: 245 K/UL (ref 150–450)
POTASSIUM SERPL-SCNC: 4.9 MMOL/L (ref 3.5–5.5)
PROT SERPL-MCNC: 6.1 G/DL (ref 6.2–8.2)
SODIUM SERPL-SCNC: 140 MMOL/L (ref 135–145)
WBC # BLD AUTO: 2.9 K/UL (ref 3.8–10.6)

## 2020-10-19 RX ADMIN — HYDROCODONE BITARTRATE AND ACETAMINOPHEN PRN EACH: 10; 325 TABLET ORAL at 22:05

## 2020-10-19 RX ADMIN — ATORVASTATIN CALCIUM SCH MG: 20 TABLET, FILM COATED ORAL at 08:26

## 2020-10-19 RX ADMIN — ASPIRIN 81 MG CHEWABLE TABLET SCH MG: 81 TABLET CHEWABLE at 08:26

## 2020-10-19 RX ADMIN — DULOXETINE SCH MG: 60 CAPSULE, DELAYED RELEASE ORAL at 08:26

## 2020-10-19 RX ADMIN — POTASSIUM CHLORIDE SCH: 14.9 INJECTION, SOLUTION INTRAVENOUS at 20:58

## 2020-10-19 RX ADMIN — CLOPIDOGREL BISULFATE SCH MG: 75 TABLET ORAL at 08:26

## 2020-10-19 RX ADMIN — LACOSAMIDE SCH MG: 50 TABLET, FILM COATED ORAL at 20:54

## 2020-10-19 RX ADMIN — OXYCODONE HYDROCHLORIDE AND ACETAMINOPHEN SCH MG: 500 TABLET ORAL at 08:26

## 2020-10-19 RX ADMIN — ENOXAPARIN SODIUM SCH MG: 40 INJECTION SUBCUTANEOUS at 08:27

## 2020-10-19 RX ADMIN — INSULIN ASPART SCH: 100 INJECTION, SOLUTION INTRAVENOUS; SUBCUTANEOUS at 19:31

## 2020-10-19 RX ADMIN — HYDROCODONE BITARTRATE AND ACETAMINOPHEN PRN EACH: 10; 325 TABLET ORAL at 12:23

## 2020-10-19 RX ADMIN — METOCLOPRAMIDE SCH MG: 10 TABLET ORAL at 08:27

## 2020-10-19 RX ADMIN — INSULIN ASPART SCH: 100 INJECTION, SOLUTION INTRAVENOUS; SUBCUTANEOUS at 07:54

## 2020-10-19 RX ADMIN — Medication SCH EACH: at 17:20

## 2020-10-19 RX ADMIN — Medication SCH MG: at 08:26

## 2020-10-19 RX ADMIN — DIVALPROEX SODIUM SCH MG: 250 TABLET, DELAYED RELEASE ORAL at 20:54

## 2020-10-19 RX ADMIN — LACOSAMIDE SCH MG: 50 TABLET, FILM COATED ORAL at 08:26

## 2020-10-19 RX ADMIN — FAMOTIDINE SCH MG: 20 TABLET, FILM COATED ORAL at 08:26

## 2020-10-19 RX ADMIN — Medication SCH EACH: at 08:26

## 2020-10-19 RX ADMIN — LOSARTAN POTASSIUM SCH MG: 50 TABLET, FILM COATED ORAL at 08:26

## 2020-10-19 RX ADMIN — METOCLOPRAMIDE SCH MG: 10 TABLET ORAL at 17:20

## 2020-10-19 RX ADMIN — DIVALPROEX SODIUM SCH MG: 250 TABLET, DELAYED RELEASE ORAL at 08:27

## 2020-10-19 RX ADMIN — INSULIN ASPART SCH UNIT: 100 INJECTION, SOLUTION INTRAVENOUS; SUBCUTANEOUS at 12:24

## 2020-10-19 RX ADMIN — DIVALPROEX SODIUM SCH MG: 250 TABLET, DELAYED RELEASE ORAL at 17:20

## 2020-10-19 RX ADMIN — METOCLOPRAMIDE SCH MG: 10 TABLET ORAL at 12:23

## 2020-10-19 RX ADMIN — HYDROCODONE BITARTRATE AND ACETAMINOPHEN PRN EACH: 10; 325 TABLET ORAL at 00:39

## 2020-10-19 RX ADMIN — INSULIN ASPART SCH UNIT: 100 INJECTION, SOLUTION INTRAVENOUS; SUBCUTANEOUS at 18:05

## 2020-10-19 NOTE — CDI
Documentation Clarification Form



Date: 10/19/2020 01:15:57 PM

From: Kat Jacobson RN, CCDS

Phone: [phone]

MRN: [id number]

Admit Date: 10/17/2020 01:42:00 PM

Patient Name: Morenita Ramirez

Visit Number: [id number]

Discharge Date:  





ATTENTION: The Clinical Documentation Specialists (CDI) and [facility] Coding Staff 
appreciate your assistance in clarifying documentation. Please respond to the 
clarification below the line at the bottom and electronically sign. The CDI & 
[facility] Coding staff will review the response and follow-up if needed. Please note: 
Queries are made part of the Legal Health Record. If you have any questions, 
please contact the author of this message via ITS.



Dr. Saniya Cabral





Altered Mental Status was documented in the ED assessment, H/P and subsequent 
progress notes. Please provide further specificity for the altered mental 
status. 



History/Risk Factors: Hypertension, Diabetes mellitus type 1, Seizures, COPD,



Clinical Indicators:60-year-old female was brought to ED on 10/15  by EMS after 
being found unresponsive at home. 

Accu-Check by EMS was 69.  She was given 1 amp of D50 and slowly regaining her 
normal mentation. 

10/15 Labs: HGB 8.6, HCT 27.8 BUN 25, CR 1.05

10/15 Vital signs 107/97 80 18 98.7

10/17 07:00: Nursing neurological assessment:  arousable to name, Facial 
Expression alert/appropriate, Verbal response Confused. Glucose 92

CXR: no active cardiopulmonary disease



Treatment: 

Hypoglycemia protocol, BS AC/HS with coverage

Neurological Assessment Q4 hrs with Novolog

Levemir 10 SQ HS



In your professional opinion, please clarify the etiology of the Altered Mental 
Status, if known.



Metabolic Encephalopathy due to hypoglycemia

Other condition (please specify)

Unable to determine



(Last Revision: April 2018)

___________________________________________________________________________

Metabolic encephalopathy related to hypoglycemia

MTDD

## 2020-10-20 LAB
ALBUMIN SERPL-MCNC: 3.1 G/DL (ref 3.5–5)
ALBUMIN/GLOB SERPL: 0.9 {RATIO}
ALP SERPL-CCNC: 318 U/L (ref 38–126)
ALT SERPL-CCNC: 239 U/L (ref 4–34)
ANION GAP SERPL CALC-SCNC: 7 MMOL/L
AST SERPL-CCNC: 171 U/L (ref 14–36)
BASOPHILS # BLD AUTO: 0 K/UL (ref 0–0.2)
BASOPHILS NFR BLD AUTO: 1 %
BUN SERPL-SCNC: 32 MG/DL (ref 7–17)
CALCIUM SPEC-MCNC: 8.7 MG/DL (ref 8.4–10.2)
CHLORIDE SERPL-SCNC: 99 MMOL/L (ref 98–107)
CO2 SERPL-SCNC: 25 MMOL/L (ref 22–30)
EOSINOPHIL # BLD AUTO: 0 K/UL (ref 0–0.7)
EOSINOPHIL NFR BLD AUTO: 0 %
ERYTHROCYTE [DISTWIDTH] IN BLOOD BY AUTOMATED COUNT: 3.22 M/UL (ref 3.8–5.4)
ERYTHROCYTE [DISTWIDTH] IN BLOOD: 16.2 % (ref 11.5–15.5)
GLOBULIN SER CALC-MCNC: 3.3 G/DL
GLUCOSE BLD-MCNC: 365 MG/DL (ref 75–99)
GLUCOSE BLD-MCNC: 443 MG/DL (ref 75–99)
GLUCOSE BLD-MCNC: 480 MG/DL (ref 75–99)
GLUCOSE BLD-MCNC: 49 MG/DL (ref 75–99)
GLUCOSE BLD-MCNC: 546 MG/DL (ref 75–99)
GLUCOSE BLD-MCNC: 78 MG/DL (ref 75–99)
GLUCOSE BLD-MCNC: >600 MG/DL (ref 75–99)
GLUCOSE SERPL-MCNC: 659 MG/DL (ref 74–99)
HCT VFR BLD AUTO: 33 % (ref 34–46)
HGB BLD-MCNC: 9.9 GM/DL (ref 11.4–16)
LYMPHOCYTES # SPEC AUTO: 1.7 K/UL (ref 1–4.8)
LYMPHOCYTES NFR SPEC AUTO: 51 %
MCH RBC QN AUTO: 30.6 PG (ref 25–35)
MCHC RBC AUTO-ENTMCNC: 29.9 G/DL (ref 31–37)
MCV RBC AUTO: 102.3 FL (ref 80–100)
MONOCYTES # BLD AUTO: 0.3 K/UL (ref 0–1)
MONOCYTES NFR BLD AUTO: 9 %
NEUTROPHILS # BLD AUTO: 1.2 K/UL (ref 1.3–7.7)
NEUTROPHILS NFR BLD AUTO: 36 %
PLATELET # BLD AUTO: 234 K/UL (ref 150–450)
POTASSIUM SERPL-SCNC: 6.3 MMOL/L (ref 3.5–5.1)
PROT SERPL-MCNC: 6.4 G/DL (ref 6.3–8.2)
SODIUM SERPL-SCNC: 131 MMOL/L (ref 137–145)
WBC # BLD AUTO: 3.3 K/UL (ref 3.8–10.6)

## 2020-10-20 RX ADMIN — INSULIN ASPART SCH UNIT: 100 INJECTION, SOLUTION INTRAVENOUS; SUBCUTANEOUS at 12:05

## 2020-10-20 RX ADMIN — Medication SCH EACH: at 08:25

## 2020-10-20 RX ADMIN — INSULIN ASPART SCH: 100 INJECTION, SOLUTION INTRAVENOUS; SUBCUTANEOUS at 16:55

## 2020-10-20 RX ADMIN — INSULIN ASPART SCH UNIT: 100 INJECTION, SOLUTION INTRAVENOUS; SUBCUTANEOUS at 08:24

## 2020-10-20 RX ADMIN — Medication SCH MG: at 08:25

## 2020-10-20 RX ADMIN — METOCLOPRAMIDE SCH: 10 TABLET ORAL at 12:03

## 2020-10-20 RX ADMIN — METOCLOPRAMIDE SCH MG: 10 TABLET ORAL at 17:26

## 2020-10-20 RX ADMIN — HYDROCODONE BITARTRATE AND ACETAMINOPHEN PRN EACH: 10; 325 TABLET ORAL at 19:17

## 2020-10-20 RX ADMIN — POTASSIUM CHLORIDE SCH: 14.9 INJECTION, SOLUTION INTRAVENOUS at 20:55

## 2020-10-20 RX ADMIN — LACOSAMIDE SCH MG: 50 TABLET, FILM COATED ORAL at 20:52

## 2020-10-20 RX ADMIN — ATORVASTATIN CALCIUM SCH MG: 20 TABLET, FILM COATED ORAL at 08:25

## 2020-10-20 RX ADMIN — DIVALPROEX SODIUM SCH MG: 250 TABLET, DELAYED RELEASE ORAL at 16:51

## 2020-10-20 RX ADMIN — ASPIRIN 81 MG CHEWABLE TABLET SCH MG: 81 TABLET CHEWABLE at 08:25

## 2020-10-20 RX ADMIN — HYDROCODONE BITARTRATE AND ACETAMINOPHEN PRN EACH: 10; 325 TABLET ORAL at 04:48

## 2020-10-20 RX ADMIN — FAMOTIDINE SCH MG: 20 TABLET, FILM COATED ORAL at 08:25

## 2020-10-20 RX ADMIN — DIVALPROEX SODIUM SCH MG: 250 TABLET, DELAYED RELEASE ORAL at 21:31

## 2020-10-20 RX ADMIN — ENOXAPARIN SODIUM SCH MG: 40 INJECTION SUBCUTANEOUS at 08:26

## 2020-10-20 RX ADMIN — Medication SCH EACH: at 17:26

## 2020-10-20 RX ADMIN — INSULIN DETEMIR SCH UNIT: 100 INJECTION, SOLUTION SUBCUTANEOUS at 20:52

## 2020-10-20 RX ADMIN — LOSARTAN POTASSIUM SCH MG: 50 TABLET, FILM COATED ORAL at 08:25

## 2020-10-20 RX ADMIN — OXYCODONE HYDROCHLORIDE AND ACETAMINOPHEN SCH MG: 500 TABLET ORAL at 08:25

## 2020-10-20 RX ADMIN — METOCLOPRAMIDE SCH MG: 10 TABLET ORAL at 08:26

## 2020-10-20 RX ADMIN — LACOSAMIDE SCH MG: 50 TABLET, FILM COATED ORAL at 08:25

## 2020-10-20 RX ADMIN — DULOXETINE SCH MG: 60 CAPSULE, DELAYED RELEASE ORAL at 08:24

## 2020-10-20 RX ADMIN — DIVALPROEX SODIUM SCH MG: 250 TABLET, DELAYED RELEASE ORAL at 08:26

## 2020-10-20 RX ADMIN — CLOPIDOGREL BISULFATE SCH MG: 75 TABLET ORAL at 08:25

## 2020-10-20 NOTE — P.PN
Subjective


Progress Note Date: 10/20/20








Morenita Ramirez, is a 60-year-old female patient well-known to my services.  

Patient was recently discharged she was admitted to the hospital for DKA and 

pneumonia.  Patient has underlying history of diabetes mellitus type 1 which is 

a brittle diabetic.  Lantus was decreased upon discharge.  Additional medical 

history includes stroke and seizures.  She was discharged home with her sister. 

According to ER report patient was found to have a low blood sugar with altered 

mental status changes.  Blood sugar was corrected and alt mental status changes 

recovered.  There is questionable compliance with diet.  Consult placed for 

possible ECF placement on discharge.  Chest x-ray was completed showing no ac

tive cardiopulmonary disease.  There is almost complete clearing of the right 

side patchy pulmonary infiltrate compared to old exam.  We'll continue to 

monitor blood sugar closely and make adjustments to home medication .





On 10/17/2020 patient was seen and examined on the medical floor she is alert 

and oriented 3 in no distress there is no fever or chills no headache or 

dizziness no chest pain no shortness of breath no cough no nausea or vomiting no

abdominal pain no diarrhea no blood in the stools no burning with urination no 

frequency or urgency and no hematuria glucose levels are much better controlled 

today she is receiving Levemir 10 units at bedtime and sliding scale before 

meals








On 10/18/2020 patient was seen and examined on the medical floor she is alert 

and oriented 3 in no distress there is no fever or chills no headache or 

dizziness no chest pain no shortness of breath no cough no nausea or vomiting no

abdominal pain no diarrhea no blood in the stools no burning with urination no 

frequency or urgency and no hematuria.  Patient to glucose level is still 

fluctuating she was 88 before breakfast this morning no short-acting insulin was

given patient was given breakfast her glucose level was 464 before lunch she is 

being given 8 units of NovoLog before lunch will continue to monitor and adjust 

insulin, possible transfer to rehab tomorrow.





On 10/19/2020 patient was seen and examined on the medical floor she is alert 

and oriented in no distress, glucose levels are better controlled however this 

morning patient had another episode of hypoglycemia, at this time will decrease 

Levemir dose to 8 units daily and continue was NovoLog before meals, and give a 

bedtime snack, but no short-acting insulin at bedtime.





On 10/20/2020 patient was seen and examined on the medical floor she is alert 

and oriented 3 in no apparent distress she had significantly elevated glucose 

level of 600 this morning, otherwise she denies any complaints at this point 

will increase Levemir again to 10 units at bedtime, avoid NovoLog before 

bedtime, and give NovoLog only before meals will continue to monitor glucose 

level and adjust insulin accordingly





Objective





- Vital Signs


Vital signs: 


                                   Vital Signs











Temp  97.5 F L  10/20/20 11:00


 


Pulse  81   10/20/20 11:00


 


Resp  16   10/20/20 11:00


 


BP  96/54   10/20/20 11:00


 


Pulse Ox  97   10/20/20 11:00








                                 Intake & Output











 10/19/20 10/20/20 10/20/20





 18:59 06:59 18:59


 


Intake Total  490 400


 


Balance  490 400


 


Weight   58.967 kg


 


Intake:   


 


  Oral  490 400


 


Other:   


 


  Voiding Method Bedpan Bedside Commode Bedside Commode





 Diaper Diaper Diaper





 Incontinent Incontinent Incontinent


 


  # Voids 4 2 2














- Exam








Head normocephalic and atraumatic


Neck supple no JVD no goiter


Lungs clear to auscultation bilaterally no wheezing or crackles


Heart regular rate and rhythm S1-S2, no rub or gallop


Abdomen is soft nontender nondistended positive bowel sounds no 

hepatosplenomegaly


Extremities no edema no cyanosis or clubbing


Neuro alert and orientated to 3








- Labs


CBC & Chem 7: 


                                 10/20/20 04:19





                                 10/20/20 04:19


Labs: 


                  Abnormal Lab Results - Last 24 Hours (Table)











  10/19/20 10/19/20 10/20/20 Range/Units





  17:24 21:05 03:59 


 


WBC     (3.8-10.6)  k/uL


 


RBC     (3.80-5.40)  m/uL


 


Hgb     (11.4-16.0)  gm/dL


 


Hct     (34.0-46.0)  %


 


MCV     (80.0-100.0)  fL


 


MCHC     (31.0-37.0)  g/dL


 


RDW     (11.5-15.5)  %


 


Neutrophils #     (1.3-7.7)  k/uL


 


Sodium     (137-145)  mmol/L


 


Potassium     (3.5-5.1)  mmol/L


 


BUN     (7-17)  mg/dL


 


Creatinine     (0.52-1.04)  mg/dL


 


Glucose     (74-99)  mg/dL


 


POC Glucose (mg/dL)  332 H  217 H  >600 H  (75-99)  mg/dL


 


AST     (14-36)  U/L


 


ALT     (4-34)  U/L


 


Alkaline Phosphatase     ()  U/L


 


Albumin     (3.5-5.0)  g/dL














  10/20/20 10/20/20 10/20/20 Range/Units





  04:00 04:03 04:19 


 


WBC    3.3 L  (3.8-10.6)  k/uL


 


RBC    3.22 L  (3.80-5.40)  m/uL


 


Hgb    9.9 L  (11.4-16.0)  gm/dL


 


Hct    33.0 L  (34.0-46.0)  %


 


MCV    102.3 H D  (80.0-100.0)  fL


 


MCHC    29.9 L  (31.0-37.0)  g/dL


 


RDW    16.2 H  (11.5-15.5)  %


 


Neutrophils #    1.2 L  (1.3-7.7)  k/uL


 


Sodium     (137-145)  mmol/L


 


Potassium     (3.5-5.1)  mmol/L


 


BUN     (7-17)  mg/dL


 


Creatinine     (0.52-1.04)  mg/dL


 


Glucose     (74-99)  mg/dL


 


POC Glucose (mg/dL)  >600 H  >600 H   (75-99)  mg/dL


 


AST     (14-36)  U/L


 


ALT     (4-34)  U/L


 


Alkaline Phosphatase     ()  U/L


 


Albumin     (3.5-5.0)  g/dL














  10/20/20 10/20/20 10/20/20 Range/Units





  04:19 06:48 06:51 


 


WBC     (3.8-10.6)  k/uL


 


RBC     (3.80-5.40)  m/uL


 


Hgb     (11.4-16.0)  gm/dL


 


Hct     (34.0-46.0)  %


 


MCV     (80.0-100.0)  fL


 


MCHC     (31.0-37.0)  g/dL


 


RDW     (11.5-15.5)  %


 


Neutrophils #     (1.3-7.7)  k/uL


 


Sodium  131 L    (137-145)  mmol/L


 


Potassium  6.3 H*    (3.5-5.1)  mmol/L


 


BUN  32 H    (7-17)  mg/dL


 


Creatinine  1.23 H    (0.52-1.04)  mg/dL


 


Glucose  659 H*    (74-99)  mg/dL


 


POC Glucose (mg/dL)   588 H  546 H  (75-99)  mg/dL


 


AST  171 H    (14-36)  U/L


 


ALT  239 H    (4-34)  U/L


 


Alkaline Phosphatase  318 H    ()  U/L


 


Albumin  3.1 L    (3.5-5.0)  g/dL














  10/20/20 10/20/20 Range/Units





  07:56 11:04 


 


WBC    (3.8-10.6)  k/uL


 


RBC    (3.80-5.40)  m/uL


 


Hgb    (11.4-16.0)  gm/dL


 


Hct    (34.0-46.0)  %


 


MCV    (80.0-100.0)  fL


 


MCHC    (31.0-37.0)  g/dL


 


RDW    (11.5-15.5)  %


 


Neutrophils #    (1.3-7.7)  k/uL


 


Sodium    (137-145)  mmol/L


 


Potassium    (3.5-5.1)  mmol/L


 


BUN    (7-17)  mg/dL


 


Creatinine    (0.52-1.04)  mg/dL


 


Glucose    (74-99)  mg/dL


 


POC Glucose (mg/dL)  443 H  365 H  (75-99)  mg/dL


 


AST    (14-36)  U/L


 


ALT    (4-34)  U/L


 


Alkaline Phosphatase    ()  U/L


 


Albumin    (3.5-5.0)  g/dL














Assessment and Plan


Plan: 








1.  Altered mental status is with hypoglycemia.  Hypoglycemia was corrected 

altered mental status changes have resolved





2.  Diabetes mellitus type 1.  Patient is known to be a brittle diabetic with 

fluctuating blood sugars





3.  Recent hospitalization for pneumonia.  Chest x-ray completed showing 

improvement





4.  History of seizures.  Patient was evaluated by neurology services and 

medications adjusted during previous day





5.  History of stroke





6.  Essential hypertension





7.  History of hyperlipidemia.  Patient obtained on statin





8.  History of COPD no exacerbation at this time





9.  History of coronary artery disease





10.  History of peripheral vascular disease with previous history of multiple 

toe amputations





11.  Iron deficiency anemia








DVT prophylaxis Lovenox.  GI prophylaxis Pepcid


PT OT consulted for possible ECF placement

## 2020-10-21 LAB
ALBUMIN SERPL-MCNC: 2.9 G/DL (ref 3.5–5)
ALBUMIN/GLOB SERPL: 0.9 {RATIO}
ALP SERPL-CCNC: 255 U/L (ref 38–126)
ALT SERPL-CCNC: 122 U/L (ref 4–34)
ANION GAP SERPL CALC-SCNC: 7 MMOL/L
AST SERPL-CCNC: 46 U/L (ref 14–36)
BASOPHILS # BLD AUTO: 0 K/UL (ref 0–0.2)
BASOPHILS NFR BLD AUTO: 0 %
BUN SERPL-SCNC: 27 MG/DL (ref 7–17)
CALCIUM SPEC-MCNC: 8.8 MG/DL (ref 8.4–10.2)
CHLORIDE SERPL-SCNC: 99 MMOL/L (ref 98–107)
CO2 SERPL-SCNC: 30 MMOL/L (ref 22–30)
EOSINOPHIL # BLD AUTO: 0 K/UL (ref 0–0.7)
EOSINOPHIL NFR BLD AUTO: 0 %
ERYTHROCYTE [DISTWIDTH] IN BLOOD BY AUTOMATED COUNT: 2.85 M/UL (ref 3.8–5.4)
ERYTHROCYTE [DISTWIDTH] IN BLOOD: 16.2 % (ref 11.5–15.5)
GLOBULIN SER CALC-MCNC: 3.1 G/DL
GLUCOSE BLD-MCNC: 217 MG/DL (ref 75–99)
GLUCOSE BLD-MCNC: 227 MG/DL (ref 75–99)
GLUCOSE BLD-MCNC: 259 MG/DL (ref 75–99)
GLUCOSE BLD-MCNC: 266 MG/DL (ref 75–99)
GLUCOSE BLD-MCNC: 394 MG/DL (ref 75–99)
GLUCOSE BLD-MCNC: 483 MG/DL (ref 75–99)
GLUCOSE BLD-MCNC: 511 MG/DL (ref 75–99)
GLUCOSE BLD-MCNC: 524 MG/DL (ref 75–99)
GLUCOSE SERPL-MCNC: 395 MG/DL (ref 74–99)
HCT VFR BLD AUTO: 28.3 % (ref 34–46)
HGB BLD-MCNC: 8.6 GM/DL (ref 11.4–16)
LYMPHOCYTES # SPEC AUTO: 1.2 K/UL (ref 1–4.8)
LYMPHOCYTES NFR SPEC AUTO: 46 %
MCH RBC QN AUTO: 30.4 PG (ref 25–35)
MCHC RBC AUTO-ENTMCNC: 30.5 G/DL (ref 31–37)
MCV RBC AUTO: 99.5 FL (ref 80–100)
MONOCYTES # BLD AUTO: 0.3 K/UL (ref 0–1)
MONOCYTES NFR BLD AUTO: 10 %
NEUTROPHILS # BLD AUTO: 1.1 K/UL (ref 1.3–7.7)
NEUTROPHILS NFR BLD AUTO: 41 %
PLATELET # BLD AUTO: 220 K/UL (ref 150–450)
POTASSIUM SERPL-SCNC: 4.6 MMOL/L (ref 3.5–5.1)
PROT SERPL-MCNC: 6 G/DL (ref 6.3–8.2)
SODIUM SERPL-SCNC: 136 MMOL/L (ref 137–145)
WBC # BLD AUTO: 2.6 K/UL (ref 3.8–10.6)

## 2020-10-21 RX ADMIN — Medication SCH EACH: at 18:25

## 2020-10-21 RX ADMIN — ATORVASTATIN CALCIUM SCH MG: 20 TABLET, FILM COATED ORAL at 08:29

## 2020-10-21 RX ADMIN — CLOPIDOGREL BISULFATE SCH MG: 75 TABLET ORAL at 08:29

## 2020-10-21 RX ADMIN — LACOSAMIDE SCH MG: 50 TABLET, FILM COATED ORAL at 20:17

## 2020-10-21 RX ADMIN — Medication SCH MG: at 08:29

## 2020-10-21 RX ADMIN — INSULIN DETEMIR SCH UNIT: 100 INJECTION, SOLUTION SUBCUTANEOUS at 20:52

## 2020-10-21 RX ADMIN — DIVALPROEX SODIUM SCH MG: 250 TABLET, DELAYED RELEASE ORAL at 08:29

## 2020-10-21 RX ADMIN — ASPIRIN 81 MG CHEWABLE TABLET SCH MG: 81 TABLET CHEWABLE at 08:29

## 2020-10-21 RX ADMIN — METOCLOPRAMIDE SCH MG: 10 TABLET ORAL at 13:07

## 2020-10-21 RX ADMIN — HYDROCODONE BITARTRATE AND ACETAMINOPHEN PRN EACH: 10; 325 TABLET ORAL at 20:18

## 2020-10-21 RX ADMIN — LACOSAMIDE SCH MG: 50 TABLET, FILM COATED ORAL at 08:28

## 2020-10-21 RX ADMIN — INSULIN ASPART SCH UNIT: 100 INJECTION, SOLUTION INTRAVENOUS; SUBCUTANEOUS at 13:06

## 2020-10-21 RX ADMIN — INSULIN ASPART SCH UNIT: 100 INJECTION, SOLUTION INTRAVENOUS; SUBCUTANEOUS at 08:27

## 2020-10-21 RX ADMIN — ENOXAPARIN SODIUM SCH MG: 40 INJECTION SUBCUTANEOUS at 08:29

## 2020-10-21 RX ADMIN — LOSARTAN POTASSIUM SCH MG: 50 TABLET, FILM COATED ORAL at 08:28

## 2020-10-21 RX ADMIN — FAMOTIDINE SCH MG: 20 TABLET, FILM COATED ORAL at 08:29

## 2020-10-21 RX ADMIN — METOCLOPRAMIDE SCH MG: 10 TABLET ORAL at 18:25

## 2020-10-21 RX ADMIN — METOCLOPRAMIDE SCH MG: 10 TABLET ORAL at 08:29

## 2020-10-21 RX ADMIN — DIVALPROEX SODIUM SCH MG: 250 TABLET, DELAYED RELEASE ORAL at 18:25

## 2020-10-21 RX ADMIN — INSULIN ASPART SCH UNIT: 100 INJECTION, SOLUTION INTRAVENOUS; SUBCUTANEOUS at 18:31

## 2020-10-21 RX ADMIN — HYDROCODONE BITARTRATE AND ACETAMINOPHEN PRN EACH: 10; 325 TABLET ORAL at 04:41

## 2020-10-21 RX ADMIN — OXYCODONE HYDROCHLORIDE AND ACETAMINOPHEN SCH MG: 500 TABLET ORAL at 08:28

## 2020-10-21 RX ADMIN — Medication SCH EACH: at 08:29

## 2020-10-21 RX ADMIN — POTASSIUM CHLORIDE SCH: 14.9 INJECTION, SOLUTION INTRAVENOUS at 20:28

## 2020-10-21 RX ADMIN — DULOXETINE SCH MG: 60 CAPSULE, DELAYED RELEASE ORAL at 08:28

## 2020-10-21 RX ADMIN — DIVALPROEX SODIUM SCH MG: 250 TABLET, DELAYED RELEASE ORAL at 22:02

## 2020-10-21 NOTE — P.PN
Subjective


Progress Note Date: 10/21/20








Morenita Ramirez, is a 60-year-old female patient well-known to my services.  

Patient was recently discharged she was admitted to the hospital for DKA and 

pneumonia.  Patient has underlying history of diabetes mellitus type 1 which is 

a brittle diabetic.  Lantus was decreased upon discharge.  Additional medical 

history includes stroke and seizures.  She was discharged home with her sister. 

According to ER report patient was found to have a low blood sugar with altered 

mental status changes.  Blood sugar was corrected and alt mental status changes 

recovered.  There is questionable compliance with diet.  Consult placed for 

possible ECF placement on discharge.  Chest x-ray was completed showing no ac

tive cardiopulmonary disease.  There is almost complete clearing of the right 

side patchy pulmonary infiltrate compared to old exam.  We'll continue to 

monitor blood sugar closely and make adjustments to home medication .





On 10/17/2020 patient was seen and examined on the medical floor she is alert 

and oriented 3 in no distress there is no fever or chills no headache or 

dizziness no chest pain no shortness of breath no cough no nausea or vomiting no

abdominal pain no diarrhea no blood in the stools no burning with urination no 

frequency or urgency and no hematuria glucose levels are much better controlled 

today she is receiving Levemir 10 units at bedtime and sliding scale before 

meals








On 10/18/2020 patient was seen and examined on the medical floor she is alert 

and oriented 3 in no distress there is no fever or chills no headache or 

dizziness no chest pain no shortness of breath no cough no nausea or vomiting no

abdominal pain no diarrhea no blood in the stools no burning with urination no 

frequency or urgency and no hematuria.  Patient to glucose level is still 

fluctuating she was 88 before breakfast this morning no short-acting insulin was

given patient was given breakfast her glucose level was 464 before lunch she is 

being given 8 units of NovoLog before lunch will continue to monitor and adjust 

insulin, possible transfer to rehab tomorrow.





On 10/19/2020 patient was seen and examined on the medical floor she is alert 

and oriented in no distress, glucose levels are better controlled however this 

morning patient had another episode of hypoglycemia, at this time will decrease 

Levemir dose to 8 units daily and continue was NovoLog before meals, and give a 

bedtime snack, but no short-acting insulin at bedtime.





On 10/20/2020 patient was seen and examined on the medical floor she is alert 

and oriented 3 in no apparent distress she had significantly elevated glucose 

level of 600 this morning, otherwise she denies any complaints at this point 

will increase Levemir again to 10 units at bedtime, avoid NovoLog before 

bedtime, and give NovoLog only before meals will continue to monitor glucose 

level and adjust insulin accordingly





On 10/21/2020 Patient is more sleepy today. Blood sugar in the 400's this AM.  

corrected per scale. Will continue to monitor for the next 24-48 hours. Denies 

any specific complaints





Objective





- Vital Signs


Vital signs: 


                                   Vital Signs











Temp  97.6 F   10/21/20 11:13


 


Pulse  81   10/21/20 11:13


 


Resp  16   10/21/20 11:13


 


BP  112/72   10/21/20 11:13


 


Pulse Ox  96   10/21/20 11:13








                                 Intake & Output











 10/20/20 10/21/20 10/21/20





 18:59 06:59 18:59


 


Intake Total 400 980 


 


Balance 400 980 


 


Weight 58.967 kg  


 


Intake:   


 


  Oral 400 980 


 


Other:   


 


  Voiding Method Bedside Commode Bedside Commode Bedside Commode





 Diaper Diaper Diaper





 Incontinent Incontinent Incontinent


 


  # Voids 2 1 1


 


  # Bowel Movements  1 














- Exam








Head normocephalic and atraumatic


Neck supple no JVD no goiter


Lungs clear to auscultation bilaterally no wheezing or crackles


Heart regular rate and rhythm S1-S2, no rub or gallop


Abdomen is soft nontender nondistended positive bowel sounds no 

hepatosplenomegaly


Extremities no edema no cyanosis or clubbing


Neuro alert and orientated to 3. sleepy








- Labs


CBC & Chem 7: 


                                 10/21/20 09:50





                                 10/21/20 09:50


Labs: 


                  Abnormal Lab Results - Last 24 Hours (Table)











  10/20/20 10/20/20 10/20/20 Range/Units





  16:54 17:09 20:37 


 


WBC     (3.8-10.6)  k/uL


 


RBC     (3.80-5.40)  m/uL


 


Hgb     (11.4-16.0)  gm/dL


 


Hct     (34.0-46.0)  %


 


MCHC     (31.0-37.0)  g/dL


 


RDW     (11.5-15.5)  %


 


Neutrophils #     (1.3-7.7)  k/uL


 


Sodium     (137-145)  mmol/L


 


BUN     (7-17)  mg/dL


 


Creatinine     (0.52-1.04)  mg/dL


 


Glucose     (74-99)  mg/dL


 


POC Glucose (mg/dL)  54 L  49 L  480 H  (75-99)  mg/dL


 


AST     (14-36)  U/L


 


ALT     (4-34)  U/L


 


Alkaline Phosphatase     ()  U/L


 


Total Protein     (6.3-8.2)  g/dL


 


Albumin     (3.5-5.0)  g/dL














  10/21/20 10/21/20 10/21/20 Range/Units





  02:10 02:12 07:08 


 


WBC     (3.8-10.6)  k/uL


 


RBC     (3.80-5.40)  m/uL


 


Hgb     (11.4-16.0)  gm/dL


 


Hct     (34.0-46.0)  %


 


MCHC     (31.0-37.0)  g/dL


 


RDW     (11.5-15.5)  %


 


Neutrophils #     (1.3-7.7)  k/uL


 


Sodium     (137-145)  mmol/L


 


BUN     (7-17)  mg/dL


 


Creatinine     (0.52-1.04)  mg/dL


 


Glucose     (74-99)  mg/dL


 


POC Glucose (mg/dL)  513 H  524 H  483 H  (75-99)  mg/dL


 


AST     (14-36)  U/L


 


ALT     (4-34)  U/L


 


Alkaline Phosphatase     ()  U/L


 


Total Protein     (6.3-8.2)  g/dL


 


Albumin     (3.5-5.0)  g/dL














  10/21/20 10/21/20 10/21/20 Range/Units





  08:55 09:50 09:50 


 


WBC   2.6 L   (3.8-10.6)  k/uL


 


RBC   2.85 L   (3.80-5.40)  m/uL


 


Hgb   8.6 L   (11.4-16.0)  gm/dL


 


Hct   28.3 L   (34.0-46.0)  %


 


MCHC   30.5 L   (31.0-37.0)  g/dL


 


RDW   16.2 H   (11.5-15.5)  %


 


Neutrophils #   1.1 L   (1.3-7.7)  k/uL


 


Sodium    136 L  (137-145)  mmol/L


 


BUN    27 H  (7-17)  mg/dL


 


Creatinine    1.20 H  (0.52-1.04)  mg/dL


 


Glucose    395 H  (74-99)  mg/dL


 


POC Glucose (mg/dL)  511 H    (75-99)  mg/dL


 


AST    46 H  (14-36)  U/L


 


ALT    122 H  (4-34)  U/L


 


Alkaline Phosphatase    255 H  ()  U/L


 


Total Protein    6.0 L  (6.3-8.2)  g/dL


 


Albumin    2.9 L  (3.5-5.0)  g/dL














  10/21/20 10/21/20 Range/Units





  10:09 11:14 


 


WBC    (3.8-10.6)  k/uL


 


RBC    (3.80-5.40)  m/uL


 


Hgb    (11.4-16.0)  gm/dL


 


Hct    (34.0-46.0)  %


 


MCHC    (31.0-37.0)  g/dL


 


RDW    (11.5-15.5)  %


 


Neutrophils #    (1.3-7.7)  k/uL


 


Sodium    (137-145)  mmol/L


 


BUN    (7-17)  mg/dL


 


Creatinine    (0.52-1.04)  mg/dL


 


Glucose    (74-99)  mg/dL


 


POC Glucose (mg/dL)  394 H  266 H  (75-99)  mg/dL


 


AST    (14-36)  U/L


 


ALT    (4-34)  U/L


 


Alkaline Phosphatase    ()  U/L


 


Total Protein    (6.3-8.2)  g/dL


 


Albumin    (3.5-5.0)  g/dL














Assessment and Plan


Assessment: 








1.  Altered mental status with hypoglycemia.  Hypoglycemia was corrected altered

mental status changes have resolved





2.  Diabetes mellitus type 1.  Patient is known to be a brittle diabetic with 

fluctuating blood sugars





3.  Recent hospitalization for pneumonia.  Chest x-ray completed showing 

improvement





4.  History of seizures.  Patient was evaluated by neurology services and 

medications adjusted during previous day





5.  History of stroke





6.  Essential hypertension





7.  History of hyperlipidemia.  Patient obtained on statin





8.  History of COPD no exacerbation at this time





9.  History of coronary artery disease





10.  History of peripheral vascular disease with previous history of multiple 

toe amputations





11.  Iron deficiency anemia








DVT prophylaxis Lovenox.  GI prophylaxis Pepcid


PT OT consulted for possible ECF placement

## 2020-10-22 LAB
GLUCOSE BLD-MCNC: 125 MG/DL (ref 75–99)
GLUCOSE BLD-MCNC: 134 MG/DL (ref 75–99)
GLUCOSE BLD-MCNC: 156 MG/DL (ref 75–99)
GLUCOSE BLD-MCNC: 181 MG/DL (ref 75–99)
GLUCOSE BLD-MCNC: 210 MG/DL (ref 75–99)
GLUCOSE BLD-MCNC: 278 MG/DL (ref 75–99)
GLUCOSE BLD-MCNC: 320 MG/DL (ref 75–99)
GLUCOSE UR QL: (no result)
PH UR: 6 [PH] (ref 5–8)
SP GR UR: 1.02 (ref 1–1.03)
UROBILINOGEN UR QL STRIP: <2 MG/DL (ref ?–2)

## 2020-10-22 RX ADMIN — INSULIN ASPART SCH: 100 INJECTION, SOLUTION INTRAVENOUS; SUBCUTANEOUS at 08:04

## 2020-10-22 RX ADMIN — LOSARTAN POTASSIUM SCH MG: 50 TABLET, FILM COATED ORAL at 09:59

## 2020-10-22 RX ADMIN — DULOXETINE SCH MG: 60 CAPSULE, DELAYED RELEASE ORAL at 09:59

## 2020-10-22 RX ADMIN — Medication SCH EACH: at 09:59

## 2020-10-22 RX ADMIN — METOCLOPRAMIDE SCH MG: 10 TABLET ORAL at 10:00

## 2020-10-22 RX ADMIN — CLOPIDOGREL BISULFATE SCH MG: 75 TABLET ORAL at 09:59

## 2020-10-22 RX ADMIN — INSULIN ASPART SCH: 100 INJECTION, SOLUTION INTRAVENOUS; SUBCUTANEOUS at 13:49

## 2020-10-22 RX ADMIN — LACOSAMIDE SCH MG: 50 TABLET, FILM COATED ORAL at 09:58

## 2020-10-22 RX ADMIN — OXYCODONE HYDROCHLORIDE AND ACETAMINOPHEN SCH MG: 500 TABLET ORAL at 09:59

## 2020-10-22 RX ADMIN — ATORVASTATIN CALCIUM SCH MG: 20 TABLET, FILM COATED ORAL at 09:59

## 2020-10-22 RX ADMIN — Medication SCH: at 17:50

## 2020-10-22 RX ADMIN — INSULIN DETEMIR SCH UNIT: 100 INJECTION, SOLUTION SUBCUTANEOUS at 20:29

## 2020-10-22 RX ADMIN — VALPROATE SODIUM SCH MLS/HR: 100 INJECTION, SOLUTION INTRAVENOUS at 23:29

## 2020-10-22 RX ADMIN — DIVALPROEX SODIUM SCH MG: 250 TABLET, DELAYED RELEASE ORAL at 10:00

## 2020-10-22 RX ADMIN — METOCLOPRAMIDE SCH: 5 TABLET ORAL at 17:50

## 2020-10-22 RX ADMIN — FAMOTIDINE SCH MG: 20 TABLET, FILM COATED ORAL at 09:59

## 2020-10-22 RX ADMIN — POTASSIUM CHLORIDE SCH MLS/HR: 14.9 INJECTION, SOLUTION INTRAVENOUS at 23:30

## 2020-10-22 RX ADMIN — ENOXAPARIN SODIUM SCH MG: 40 INJECTION SUBCUTANEOUS at 09:59

## 2020-10-22 RX ADMIN — ASPIRIN 81 MG CHEWABLE TABLET SCH MG: 81 TABLET CHEWABLE at 09:59

## 2020-10-22 RX ADMIN — INSULIN ASPART SCH UNIT: 100 INJECTION, SOLUTION INTRAVENOUS; SUBCUTANEOUS at 18:00

## 2020-10-22 RX ADMIN — METOCLOPRAMIDE SCH: 10 TABLET ORAL at 14:22

## 2020-10-22 RX ADMIN — Medication SCH MG: at 09:59

## 2020-10-22 RX ADMIN — DIVALPROEX SODIUM SCH: 250 TABLET, DELAYED RELEASE ORAL at 18:09

## 2020-10-22 NOTE — P.CNNES
History of Present Illness


Consult date: 10/22/20


Requesting physician: Ella Douglas


Reason for Consult: Altered mental status


History of Present Illness: 





Patient is a 60-year-old female came to the hospital at 8:25 PM on 10/15/2020 

because of hypoglycemia.  Patient is well known to me from previous admissions 

to the hospital.  Patient has history of diabetes, chronic stroke with left 

spastic hemiplegia, hypertension hyperlipidemia brought by EMS after being found

unresponsive.  Her blood sugar was 69.  She was given 1 amp of D50 and she 

slowly regained her normal mentation.  Patient was recently admitted to the 

hospital for DKA and pneumonia.  Patient has brittle diabetes.  Patient also has

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  Patient is 

currently on Vimpat 100 mg twice a day, Depakote 250 mg 3 times a day.





As per EMS flow sheet, family states they checked her sugar about 30 minutes 

prior before calling 911 and it was 329 and that was high for her.  Then they 

administered 4 units of Humalog insulin per doctor's discharge instruction.  

Then approximately 15 minutes later she became altered and called 911.  

Patient's initial glucose was low to her normal that usually runs in the 200s.  

Family stated they can never get an IV on her and they have to place IVs in her 

neck.  Patient's blood glucose was 69 at 8:02 p.m., and she was given dextrose 

injection and then her blood sugar went up to 259 at 8:21 PM.  Her mentation 

also improved back to baseline.





Per nursing report, since patient has been in the hospital, she has been either 

sleeping, or when she wakes up, she is yelling and noncooperative.  Patient was 

seen by psychiatrist as well, and started on Seroquel.  Her medications were 

adjusted by the psychiatrist.





Review of Systems


ROS unobtainable: due to mental status





Past Medical History


Past Medical History: Asthma, Coronary Artery Disease (CAD), Chest Pain / 

Angina, Heart Failure, COPD, CVA/TIA, Diabetes Mellitus, Deep Vein Thrombosis 

(DVT), Eye Disorder, GERD/Reflux, Hyperlipidemia, Hypertension, Myocardial 

Infarction (MI), Neurologic Disorder, Osteoarthritis (OA), Pneumonia, Renal 

Disease


Additional Past Medical History / Comment(s): IDDM (brittle), DKAs, neuropathy 

bilateral hands/feet, retinopathy bilateral eyes, cellulitis R foot, R great toe

and 2nd toe infections/amputations, current wound R foot-being seen in [facility], 

renal failure, anemia, CVAs with L sided paralysis, headaches started after 

CVAs, brain lesions, DVT R axillae, low back pain, varicosities, seizure many 

years ago (), hypothyroid, constipation, bilateral tinnitis occasionally, 

sinus problems.


Last Myocardial Infarction Date:: 


History of Any Multi-Drug Resistant Organisms: MRSA


Date of last positivie culture/infection: 18


MDRO Source:: Right Foot


Past Surgical History: Appendectomy,  Section, Cholecystectomy, Heart 

Catheterization With Stent, Hysterectomy, Orthopedic Surgery


Additional Past Surgical History / Comment(s): PCI with multiple stents, R great

toe and 2nd toe amps, debridements R foot ulcer, L shoulder surgery to remove 

bone, bronchoscopy, EGD, colonoscopy, R arm port since removed, bilateral 

cataract removals/lens implants.


Past Anesthesia/Blood Transfusion Reactions: No Reported Reaction


Additional Past Anesthesia/Blood Transfusion Reaction / Comment(s): HX OF BLOOD 

TRANSFUSION- NO REACTION


Date of Last Stent Placement:: 2013


Past Psychological History: Anxiety, Bipolar, Depression


Additional Psychological History / Comment(s): Pt has a legal guardian, Noelle Menon, who is pt's sister.  Currently her legal guardian is hospitalized.  Pt 

has a caregiver, Eleanor, who resides with her.  Pt is wheelchair bound d/t CVA 

with L sided paralysis arm and leg.  She has a shower chair and a glucometer.  

Her sister or caregiver drive her to appts.  [email or web address].


Smoking Status: Former smoker


Past Alcohol Use History: None Reported


Additional Past Alcohol Use History / Comment(s): Pt started smoking in  and

quit in .   Using marijuana edibles occasionally but none for a "long time"


Past Drug Use History: Marijuana


Additional Drug Use History / Comment(s): using marijuana edibles





- Past Family History


  ** Father


Family Medical History: Unable to Obtain, Coronary Artery Disease (CAD), 

Diabetes Mellitus





  ** Mother


Family Medical History: COPD





Medications and Allergies


                                Home Medications











 Medication  Instructions  Recorded  Confirmed  Type


 


Famotidine [Pepcid] 20 mg PO DAILY 02/19/16 10/15/20 History


 


HYDROcodone/APAP 10-325MG [Norco 1 tab PO TID PRN 05/06/17 10/15/20 History





]    


 


DULoxetine HCL [Cymbalta] 60 mg PO DAILY 09/19/17 10/15/20 History


 


Atorvastatin [Lipitor] 20 mg PO DAILY 07/31/19 10/15/20 History


 


Aspirin [Adult Low Dose Aspirin EC] 81 mg PO DAILY 01/10/20 10/15/20 History


 


Ferrous Sulfate [Iron (65  mg PO DAILY 01/10/20 10/15/20 History





Elemental)]    


 


Divalproex [Depakote] 250 mg PO TID #90 tablet.dr 03/19/20 10/15/20 Rx


 


ALPRAZolam [Xanax] 1 mg PO Q8H PRN 07/28/20 10/15/20 History


 


Albuterol Sulfate [Ventolin HFA] 2 puff INHALATION RT-Q4H PRN 07/28/20 10/15/20 

History


 


Ondansetron Odt [Zofran ODT] 4 mg PO DAILY PRN 07/28/20 10/15/20 History


 


Losartan [Cozaar] 50 mg PO DAILY  tab 08/20/20 10/15/20 Rx


 


Metoclopramide [Reglan] 10 mg PO AC-TID  tab 08/20/20 10/15/20 Rx


 


Ascorbic Acid [Vitamin C] 500 mg PO DAILY 08/31/20 10/15/20 History


 


Vitamin B Complex 1 tab PO DAILY 08/31/20 10/15/20 History


 


Calcium Carb-Vit D 500Mg-200Un 1 each PO BID-W/MEALS  tab 09/04/20 10/15/20 Rx





[Oscal 500+D]    


 


Clopidogrel [Plavix] 75 mg PO DAILY  tab 09/04/20 10/15/20 Rx


 


INSULIN ASPART (NovoLOG) [NovoLOG 0 unit SQ AC-TID  vial 09/04/20 10/15/20 Rx





(formulary)]    


 


Insulin Glargine,Hum.rec.anlog 10 unit SQ HS #0 10/14/20 10/15/20 Rx





[Basaglar Kwikpen U-100]    


 


Lacosamide [Vimpat] 100 mg PO BID 30 Days #60 tablet 10/14/20 10/15/20 Rx


 


QUEtiapine [SEROquel] 50 mg PO HS 30 Days #30 tab 10/14/20 10/15/20 Rx


 


QUEtiapine [SEROquel] 25 mg PO BID 10/15/20 10/15/20 History








                                    Allergies











Allergy/AdvReac Type Severity Reaction Status Date / Time


 


Barbiturates Allergy  Rash/Hives Verified 10/15/20 22:22


 


cephalexin monohydrate Allergy  Rash/Hives Verified 10/15/20 22:22





[From Keflex]     


 


morphine Allergy  Rash/Hives Verified 10/15/20 22:22


 


Penicillins Allergy  Rash/Hives Verified 10/15/20 22:22


 


phenobarbital Allergy  Swelling Verified 10/15/20 22:22


 


venom-honey bee Allergy  Swelling Verified 10/15/20 22:22





[bee venom (honey bee)]     


 


amlodipine besylate AdvReac  Vomiting Verified 10/15/20 22:22





[From Norvasc]     














Physical Examination





- Vital Signs


Vital Signs: 


                                   Vital Signs











  Temp Pulse Resp BP Pulse Ox


 


 10/22/20 12:04  98.6 F  117 H  18  142/68  99


 


 10/22/20 05:00  97.5 F L  89  16  132/65  97


 


 10/21/20 20:15  97.8 F  88  20  154/87  95








                                Intake and Output











 10/22/20 10/22/20 10/22/20





 06:59 14:59 22:59


 


Other:   


 


  Voiding Method Diaper Diaper Diaper





 Incontinent Incontinent Incontinent


 


  # Voids 2 2 














On examination patient is sleeping.  On trying to wake her up, she does try to 

open her eyes slightly but then again goes back to sleep.  Patient continues to 

have left hemiplegia, spastic.  The right side appears normal.  Patient has 

Babinski on the left side.  Rest of the examination could not be performed.





Results





- Laboratory Findings


CBC and BMP: 


                                 10/21/20 09:50





                                 10/21/20 09:50


Abnormal Lab Findings: 


                                  Abnormal Labs











  10/15/20 10/15/20 10/15/20





  20:33 21:00 21:00


 


WBC   3.5 L 


 


RBC   2.87 L 


 


Hgb   8.6 L 


 


Hct   27.8 L 


 


MCV   


 


MCHC   30.8 L 


 


RDW   16.9 H 


 


Neutrophils #   


 


Sodium   


 


Potassium   


 


Chloride    109 H


 


Carbon Dioxide   


 


Anion Gap   


 


BUN    25 H


 


Creatinine    1.05 H


 


Est GFR (CKD-EPI)AfAm   


 


Est GFR (CKD-EPI)NonAf   


 


BUN/Creatinine Ratio   


 


Glucose   


 


POC Glucose (mg/dL)  159 H  


 


Calcium    7.7 L


 


Magnesium    2.7 H


 


Total Bilirubin   


 


AST    37 H


 


ALT   


 


Alkaline Phosphatase   


 


Total Protein    5.6 L


 


Albumin    2.6 L


 


Albumin/Globulin Ratio   


 


Urine Protein   


 


Urine Glucose (UA)   


 


Urine Blood   


 


Ur Leukocyte Esterase   


 


Urine Bacteria   


 


Hyaline Casts   


 


Urine Mucus   














  10/15/20 10/15/20 10/16/20





  22:15 23:38 00:42


 


WBC   


 


RBC   


 


Hgb   


 


Hct   


 


MCV   


 


MCHC   


 


RDW   


 


Neutrophils #   


 


Sodium   


 


Potassium   


 


Chloride   


 


Carbon Dioxide   


 


Anion Gap   


 


BUN   


 


Creatinine   


 


Est GFR (CKD-EPI)AfAm   


 


Est GFR (CKD-EPI)NonAf   


 


BUN/Creatinine Ratio   


 


Glucose   


 


POC Glucose (mg/dL)  54 L  215 H  380 H


 


Calcium   


 


Magnesium   


 


Total Bilirubin   


 


AST   


 


ALT   


 


Alkaline Phosphatase   


 


Total Protein   


 


Albumin   


 


Albumin/Globulin Ratio   


 


Urine Protein   


 


Urine Glucose (UA)   


 


Urine Blood   


 


Ur Leukocyte Esterase   


 


Urine Bacteria   


 


Hyaline Casts   


 


Urine Mucus   














  10/16/20 10/16/20 10/16/20





  04:46 07:12 09:11


 


WBC    3.7 L


 


RBC    3.24 L


 


Hgb    9.6 L


 


Hct    31.9 L


 


MCV   


 


MCHC    30.0 L


 


RDW    16.4 H


 


Neutrophils #   


 


Sodium   


 


Potassium   


 


Chloride   


 


Carbon Dioxide   


 


Anion Gap   


 


BUN   


 


Creatinine   


 


Est GFR (CKD-EPI)AfAm   


 


Est GFR (CKD-EPI)NonAf   


 


BUN/Creatinine Ratio   


 


Glucose   


 


POC Glucose (mg/dL)  518 H  495 H 


 


Calcium   


 


Magnesium   


 


Total Bilirubin   


 


AST   


 


ALT   


 


Alkaline Phosphatase   


 


Total Protein   


 


Albumin   


 


Albumin/Globulin Ratio   


 


Urine Protein   


 


Urine Glucose (UA)   


 


Urine Blood   


 


Ur Leukocyte Esterase   


 


Urine Bacteria   


 


Hyaline Casts   


 


Urine Mucus   














  10/16/20 10/16/20 10/16/20





  09:11 11:14 17:08


 


WBC   


 


RBC   


 


Hgb   


 


Hct   


 


MCV   


 


MCHC   


 


RDW   


 


Neutrophils #   


 


Sodium   


 


Potassium   


 


Chloride   


 


Carbon Dioxide   


 


Anion Gap   


 


BUN   


 


Creatinine   


 


Est GFR (CKD-EPI)AfAm  57.3 L  


 


Est GFR (CKD-EPI)NonAf  49.4 L  


 


BUN/Creatinine Ratio   


 


Glucose  392 H  


 


POC Glucose (mg/dL)    517 H


 


Calcium  8.4 L  


 


Magnesium   


 


Total Bilirubin   


 


AST   


 


ALT   


 


Alkaline Phosphatase   


 


Total Protein  6.1 L  


 


Albumin  3.20 L  


 


Albumin/Globulin Ratio  1.10 L  


 


Urine Protein   


 


Urine Glucose (UA)   4+ H 


 


Urine Blood   Trace H 


 


Ur Leukocyte Esterase   Small H 


 


Urine Bacteria   Rare H 


 


Hyaline Casts   4 H 


 


Urine Mucus   Rare H 














  10/16/20 10/16/20 10/16/20





  17:09 17:26 19:10


 


WBC   


 


RBC   


 


Hgb   


 


Hct   


 


MCV   


 


MCHC   


 


RDW   


 


Neutrophils #   


 


Sodium   


 


Potassium   


 


Chloride   


 


Carbon Dioxide   


 


Anion Gap   


 


BUN   


 


Creatinine   


 


Est GFR (CKD-EPI)AfAm   


 


Est GFR (CKD-EPI)NonAf   


 


BUN/Creatinine Ratio   


 


Glucose   617 H* 


 


POC Glucose (mg/dL)  547 H   >600 H


 


Calcium   


 


Magnesium   


 


Total Bilirubin   


 


AST   


 


ALT   


 


Alkaline Phosphatase   


 


Total Protein   


 


Albumin   


 


Albumin/Globulin Ratio   


 


Urine Protein   


 


Urine Glucose (UA)   


 


Urine Blood   


 


Ur Leukocyte Esterase   


 


Urine Bacteria   


 


Hyaline Casts   


 


Urine Mucus   














  10/16/20 10/17/20 10/17/20





  23:24 02:19 05:21


 


WBC   


 


RBC    3.03 L


 


Hgb    8.9 L


 


Hct    29.1 L


 


MCV   


 


MCHC    30.7 L


 


RDW    16.7 H


 


Neutrophils #   


 


Sodium   


 


Potassium   


 


Chloride   


 


Carbon Dioxide   


 


Anion Gap   


 


BUN   


 


Creatinine   


 


Est GFR (CKD-EPI)AfAm   


 


Est GFR (CKD-EPI)NonAf   


 


BUN/Creatinine Ratio   


 


Glucose   


 


POC Glucose (mg/dL)  123 H  115 H 


 


Calcium   


 


Magnesium   


 


Total Bilirubin   


 


AST   


 


ALT   


 


Alkaline Phosphatase   


 


Total Protein   


 


Albumin   


 


Albumin/Globulin Ratio   


 


Urine Protein   


 


Urine Glucose (UA)   


 


Urine Blood   


 


Ur Leukocyte Esterase   


 


Urine Bacteria   


 


Hyaline Casts   


 


Urine Mucus   














  10/17/20 10/17/20 10/17/20





  05:21 07:10 12:24


 


WBC   


 


RBC   


 


Hgb   


 


Hct   


 


MCV   


 


MCHC   


 


RDW   


 


Neutrophils #   


 


Sodium   


 


Potassium   


 


Chloride   


 


Carbon Dioxide   


 


Anion Gap   


 


BUN   


 


Creatinine   


 


Est GFR (CKD-EPI)AfAm   


 


Est GFR (CKD-EPI)NonAf   


 


BUN/Creatinine Ratio  22.00 H  


 


Glucose   


 


POC Glucose (mg/dL)   109 H  425 H


 


Calcium   


 


Magnesium   


 


Total Bilirubin  0.1 L  


 


AST   


 


ALT   


 


Alkaline Phosphatase   


 


Total Protein  5.7 L  


 


Albumin  3.00 L  


 


Albumin/Globulin Ratio  1.11 L  


 


Urine Protein   


 


Urine Glucose (UA)   


 


Urine Blood   


 


Ur Leukocyte Esterase   


 


Urine Bacteria   


 


Hyaline Casts   


 


Urine Mucus   














  10/17/20 10/17/20 10/17/20





  14:12 17:02 20:10


 


WBC   


 


RBC   


 


Hgb   


 


Hct   


 


MCV   


 


MCHC   


 


RDW   


 


Neutrophils #   


 


Sodium   


 


Potassium   


 


Chloride   


 


Carbon Dioxide   


 


Anion Gap   


 


BUN   


 


Creatinine   


 


Est GFR (CKD-EPI)AfAm   


 


Est GFR (CKD-EPI)NonAf   


 


BUN/Creatinine Ratio   


 


Glucose   


 


POC Glucose (mg/dL)  396 H  173 H  175 H


 


Calcium   


 


Magnesium   


 


Total Bilirubin   


 


AST   


 


ALT   


 


Alkaline Phosphatase   


 


Total Protein   


 


Albumin   


 


Albumin/Globulin Ratio   


 


Urine Protein   


 


Urine Glucose (UA)   


 


Urine Blood   


 


Ur Leukocyte Esterase   


 


Urine Bacteria   


 


Hyaline Casts   


 


Urine Mucus   














  10/18/20 10/18/20 10/18/20





  06:09 06:09 06:28


 


WBC  3.2 L  


 


RBC  3.28 L  


 


Hgb  9.6 L  


 


Hct  31.8 L  


 


MCV   


 


MCHC  30.2 L  


 


RDW  16.4 H  


 


Neutrophils #  0.8 L  


 


Sodium   


 


Potassium   


 


Chloride   


 


Carbon Dioxide   32.2 H 


 


Anion Gap   


 


BUN   31.0 H 


 


Creatinine   1.6 H 


 


Est GFR (CKD-EPI)AfAm   40.5 L 


 


Est GFR (CKD-EPI)NonAf   34.9 L 


 


BUN/Creatinine Ratio   


 


Glucose   65 L 


 


POC Glucose (mg/dL)    67 L


 


Calcium   


 


Magnesium   


 


Total Bilirubin   


 


AST   1100 H 


 


ALT   258 H 


 


Alkaline Phosphatase   261 H 


 


Total Protein   6.1 L 


 


Albumin   3.20 L 


 


Albumin/Globulin Ratio   1.10 L 


 


Urine Protein   


 


Urine Glucose (UA)   


 


Urine Blood   


 


Ur Leukocyte Esterase   


 


Urine Bacteria   


 


Hyaline Casts   


 


Urine Mucus   














  10/18/20 10/18/20 10/18/20





  06:31 06:50 11:15


 


WBC   


 


RBC   


 


Hgb   


 


Hct   


 


MCV   


 


MCHC   


 


RDW   


 


Neutrophils #   


 


Sodium   


 


Potassium   


 


Chloride   


 


Carbon Dioxide   


 


Anion Gap   


 


BUN   


 


Creatinine   


 


Est GFR (CKD-EPI)AfAm   


 


Est GFR (CKD-EPI)NonAf   


 


BUN/Creatinine Ratio   


 


Glucose   


 


POC Glucose (mg/dL)  63 L  62 L  464 H


 


Calcium   


 


Magnesium   


 


Total Bilirubin   


 


AST   


 


ALT   


 


Alkaline Phosphatase   


 


Total Protein   


 


Albumin   


 


Albumin/Globulin Ratio   


 


Urine Protein   


 


Urine Glucose (UA)   


 


Urine Blood   


 


Ur Leukocyte Esterase   


 


Urine Bacteria   


 


Hyaline Casts   


 


Urine Mucus   














  10/18/20 10/18/20 10/19/20





  17:05 20:38 00:50


 


WBC   


 


RBC   


 


Hgb   


 


Hct   


 


MCV   


 


MCHC   


 


RDW   


 


Neutrophils #   


 


Sodium   


 


Potassium   


 


Chloride   


 


Carbon Dioxide   


 


Anion Gap   


 


BUN   


 


Creatinine   


 


Est GFR (CKD-EPI)AfAm   


 


Est GFR (CKD-EPI)NonAf   


 


BUN/Creatinine Ratio   


 


Glucose   


 


POC Glucose (mg/dL)  150 H  432 H  134 H


 


Calcium   


 


Magnesium   


 


Total Bilirubin   


 


AST   


 


ALT   


 


Alkaline Phosphatase   


 


Total Protein   


 


Albumin   


 


Albumin/Globulin Ratio   


 


Urine Protein   


 


Urine Glucose (UA)   


 


Urine Blood   


 


Ur Leukocyte Esterase   


 


Urine Bacteria   


 


Hyaline Casts   


 


Urine Mucus   














  10/19/20 10/19/20 10/19/20





  04:24 04:47 04:47


 


WBC   2.9 L 


 


RBC   3.40 L 


 


Hgb   10.3 L 


 


Hct   32.7 L 


 


MCV   


 


MCHC   


 


RDW   16.3 H 


 


Neutrophils #   0.7 L 


 


Sodium   


 


Potassium   


 


Chloride   


 


Carbon Dioxide   


 


Anion Gap    12.70 H


 


BUN    31.0 H


 


Creatinine   


 


Est GFR (CKD-EPI)AfAm    47.5 L


 


Est GFR (CKD-EPI)NonAf    41.0 L


 


BUN/Creatinine Ratio    22.14 H


 


Glucose    44 L*


 


POC Glucose (mg/dL)  60 L  


 


Calcium   


 


Magnesium   


 


Total Bilirubin    0.2 L


 


AST    596 H


 


ALT    296 H


 


Alkaline Phosphatase    333 H


 


Total Protein    6.1 L


 


Albumin    3.20 L


 


Albumin/Globulin Ratio    1.10 L


 


Urine Protein   


 


Urine Glucose (UA)   


 


Urine Blood   


 


Ur Leukocyte Esterase   


 


Urine Bacteria   


 


Hyaline Casts   


 


Urine Mucus   














  10/19/20 10/19/20 10/19/20





  04:47 04:59 07:03


 


WBC   


 


RBC   


 


Hgb   


 


Hct   


 


MCV   


 


MCHC   


 


RDW   


 


Neutrophils #   


 


Sodium   


 


Potassium   


 


Chloride   


 


Carbon Dioxide   


 


Anion Gap   


 


BUN   


 


Creatinine   


 


Est GFR (CKD-EPI)AfAm   


 


Est GFR (CKD-EPI)NonAf   


 


BUN/Creatinine Ratio   


 


Glucose   


 


POC Glucose (mg/dL)  47 L  59 L  112 H


 


Calcium   


 


Magnesium   


 


Total Bilirubin   


 


AST   


 


ALT   


 


Alkaline Phosphatase   


 


Total Protein   


 


Albumin   


 


Albumin/Globulin Ratio   


 


Urine Protein   


 


Urine Glucose (UA)   


 


Urine Blood   


 


Ur Leukocyte Esterase   


 


Urine Bacteria   


 


Hyaline Casts   


 


Urine Mucus   














  10/19/20 10/19/20 10/19/20





  11:28 17:24 21:05


 


WBC   


 


RBC   


 


Hgb   


 


Hct   


 


MCV   


 


MCHC   


 


RDW   


 


Neutrophils #   


 


Sodium   


 


Potassium   


 


Chloride   


 


Carbon Dioxide   


 


Anion Gap   


 


BUN   


 


Creatinine   


 


Est GFR (CKD-EPI)AfAm   


 


Est GFR (CKD-EPI)NonAf   


 


BUN/Creatinine Ratio   


 


Glucose   


 


POC Glucose (mg/dL)  164 H  332 H  217 H


 


Calcium   


 


Magnesium   


 


Total Bilirubin   


 


AST   


 


ALT   


 


Alkaline Phosphatase   


 


Total Protein   


 


Albumin   


 


Albumin/Globulin Ratio   


 


Urine Protein   


 


Urine Glucose (UA)   


 


Urine Blood   


 


Ur Leukocyte Esterase   


 


Urine Bacteria   


 


Hyaline Casts   


 


Urine Mucus   














  10/20/20 10/20/20 10/20/20





  03:59 04:00 04:03


 


WBC   


 


RBC   


 


Hgb   


 


Hct   


 


MCV   


 


MCHC   


 


RDW   


 


Neutrophils #   


 


Sodium   


 


Potassium   


 


Chloride   


 


Carbon Dioxide   


 


Anion Gap   


 


BUN   


 


Creatinine   


 


Est GFR (CKD-EPI)AfAm   


 


Est GFR (CKD-EPI)NonAf   


 


BUN/Creatinine Ratio   


 


Glucose   


 


POC Glucose (mg/dL)  >600 H  >600 H  >600 H


 


Calcium   


 


Magnesium   


 


Total Bilirubin   


 


AST   


 


ALT   


 


Alkaline Phosphatase   


 


Total Protein   


 


Albumin   


 


Albumin/Globulin Ratio   


 


Urine Protein   


 


Urine Glucose (UA)   


 


Urine Blood   


 


Ur Leukocyte Esterase   


 


Urine Bacteria   


 


Hyaline Casts   


 


Urine Mucus   














  10/20/20 10/20/20 10/20/20





  04:19 04:19 06:48


 


WBC  3.3 L  


 


RBC  3.22 L  


 


Hgb  9.9 L  


 


Hct  33.0 L  


 


MCV  102.3 H D  


 


MCHC  29.9 L  


 


RDW  16.2 H  


 


Neutrophils #  1.2 L  


 


Sodium   131 L 


 


Potassium   6.3 H* 


 


Chloride   


 


Carbon Dioxide   


 


Anion Gap   


 


BUN   32 H 


 


Creatinine   1.23 H 


 


Est GFR (CKD-EPI)AfAm   


 


Est GFR (CKD-EPI)NonAf   


 


BUN/Creatinine Ratio   


 


Glucose   659 H* 


 


POC Glucose (mg/dL)    588 H


 


Calcium   


 


Magnesium   


 


Total Bilirubin   


 


AST   171 H 


 


ALT   239 H 


 


Alkaline Phosphatase   318 H 


 


Total Protein   


 


Albumin   3.1 L 


 


Albumin/Globulin Ratio   


 


Urine Protein   


 


Urine Glucose (UA)   


 


Urine Blood   


 


Ur Leukocyte Esterase   


 


Urine Bacteria   


 


Hyaline Casts   


 


Urine Mucus   














  10/20/20 10/20/20 10/20/20





  06:51 07:56 11:04


 


WBC   


 


RBC   


 


Hgb   


 


Hct   


 


MCV   


 


MCHC   


 


RDW   


 


Neutrophils #   


 


Sodium   


 


Potassium   


 


Chloride   


 


Carbon Dioxide   


 


Anion Gap   


 


BUN   


 


Creatinine   


 


Est GFR (CKD-EPI)AfAm   


 


Est GFR (CKD-EPI)NonAf   


 


BUN/Creatinine Ratio   


 


Glucose   


 


POC Glucose (mg/dL)  546 H  443 H  365 H


 


Calcium   


 


Magnesium   


 


Total Bilirubin   


 


AST   


 


ALT   


 


Alkaline Phosphatase   


 


Total Protein   


 


Albumin   


 


Albumin/Globulin Ratio   


 


Urine Protein   


 


Urine Glucose (UA)   


 


Urine Blood   


 


Ur Leukocyte Esterase   


 


Urine Bacteria   


 


Hyaline Casts   


 


Urine Mucus   














  10/20/20 10/20/20 10/20/20





  16:54 17:09 20:37


 


WBC   


 


RBC   


 


Hgb   


 


Hct   


 


MCV   


 


MCHC   


 


RDW   


 


Neutrophils #   


 


Sodium   


 


Potassium   


 


Chloride   


 


Carbon Dioxide   


 


Anion Gap   


 


BUN   


 


Creatinine   


 


Est GFR (CKD-EPI)AfAm   


 


Est GFR (CKD-EPI)NonAf   


 


BUN/Creatinine Ratio   


 


Glucose   


 


POC Glucose (mg/dL)  54 L  49 L  480 H


 


Calcium   


 


Magnesium   


 


Total Bilirubin   


 


AST   


 


ALT   


 


Alkaline Phosphatase   


 


Total Protein   


 


Albumin   


 


Albumin/Globulin Ratio   


 


Urine Protein   


 


Urine Glucose (UA)   


 


Urine Blood   


 


Ur Leukocyte Esterase   


 


Urine Bacteria   


 


Hyaline Casts   


 


Urine Mucus   














  10/21/20 10/21/20 10/21/20





  02:10 02:12 07:08


 


WBC   


 


RBC   


 


Hgb   


 


Hct   


 


MCV   


 


MCHC   


 


RDW   


 


Neutrophils #   


 


Sodium   


 


Potassium   


 


Chloride   


 


Carbon Dioxide   


 


Anion Gap   


 


BUN   


 


Creatinine   


 


Est GFR (CKD-EPI)AfAm   


 


Est GFR (CKD-EPI)NonAf   


 


BUN/Creatinine Ratio   


 


Glucose   


 


POC Glucose (mg/dL)  513 H  524 H  483 H


 


Calcium   


 


Magnesium   


 


Total Bilirubin   


 


AST   


 


ALT   


 


Alkaline Phosphatase   


 


Total Protein   


 


Albumin   


 


Albumin/Globulin Ratio   


 


Urine Protein   


 


Urine Glucose (UA)   


 


Urine Blood   


 


Ur Leukocyte Esterase   


 


Urine Bacteria   


 


Hyaline Casts   


 


Urine Mucus   














  10/21/20 10/21/20 10/21/20





  08:55 09:50 09:50


 


WBC   2.6 L 


 


RBC   2.85 L 


 


Hgb   8.6 L 


 


Hct   28.3 L 


 


MCV   


 


MCHC   30.5 L 


 


RDW   16.2 H 


 


Neutrophils #   1.1 L 


 


Sodium    136 L


 


Potassium   


 


Chloride   


 


Carbon Dioxide   


 


Anion Gap   


 


BUN    27 H


 


Creatinine    1.20 H


 


Est GFR (CKD-EPI)AfAm   


 


Est GFR (CKD-EPI)NonAf   


 


BUN/Creatinine Ratio   


 


Glucose    395 H


 


POC Glucose (mg/dL)  511 H  


 


Calcium   


 


Magnesium   


 


Total Bilirubin   


 


AST    46 H


 


ALT    122 H


 


Alkaline Phosphatase    255 H


 


Total Protein    6.0 L


 


Albumin    2.9 L


 


Albumin/Globulin Ratio   


 


Urine Protein   


 


Urine Glucose (UA)   


 


Urine Blood   


 


Ur Leukocyte Esterase   


 


Urine Bacteria   


 


Hyaline Casts   


 


Urine Mucus   














  10/21/20 10/21/20 10/21/20





  10:09 11:14 14:50


 


WBC   


 


RBC   


 


Hgb   


 


Hct   


 


MCV   


 


MCHC   


 


RDW   


 


Neutrophils #   


 


Sodium   


 


Potassium   


 


Chloride   


 


Carbon Dioxide   


 


Anion Gap   


 


BUN   


 


Creatinine   


 


Est GFR (CKD-EPI)AfAm   


 


Est GFR (CKD-EPI)NonAf   


 


BUN/Creatinine Ratio   


 


Glucose   


 


POC Glucose (mg/dL)  394 H  266 H  227 H


 


Calcium   


 


Magnesium   


 


Total Bilirubin   


 


AST   


 


ALT   


 


Alkaline Phosphatase   


 


Total Protein   


 


Albumin   


 


Albumin/Globulin Ratio   


 


Urine Protein   


 


Urine Glucose (UA)   


 


Urine Blood   


 


Ur Leukocyte Esterase   


 


Urine Bacteria   


 


Hyaline Casts   


 


Urine Mucus   














  10/21/20 10/21/20 10/22/20





  17:06 20:38 01:18


 


WBC   


 


RBC   


 


Hgb   


 


Hct   


 


MCV   


 


MCHC   


 


RDW   


 


Neutrophils #   


 


Sodium   


 


Potassium   


 


Chloride   


 


Carbon Dioxide   


 


Anion Gap   


 


BUN   


 


Creatinine   


 


Est GFR (CKD-EPI)AfAm   


 


Est GFR (CKD-EPI)NonAf   


 


BUN/Creatinine Ratio   


 


Glucose   


 


POC Glucose (mg/dL)  259 H  217 H  278 H


 


Calcium   


 


Magnesium   


 


Total Bilirubin   


 


AST   


 


ALT   


 


Alkaline Phosphatase   


 


Total Protein   


 


Albumin   


 


Albumin/Globulin Ratio   


 


Urine Protein   


 


Urine Glucose (UA)   


 


Urine Blood   


 


Ur Leukocyte Esterase   


 


Urine Bacteria   


 


Hyaline Casts   


 


Urine Mucus   














  10/22/20 10/22/20 10/22/20





  01:18 06:17 07:06


 


WBC   


 


RBC   


 


Hgb   


 


Hct   


 


MCV   


 


MCHC   


 


RDW   


 


Neutrophils #   


 


Sodium   


 


Potassium   


 


Chloride   


 


Carbon Dioxide   


 


Anion Gap   


 


BUN   


 


Creatinine   


 


Est GFR (CKD-EPI)AfAm   


 


Est GFR (CKD-EPI)NonAf   


 


BUN/Creatinine Ratio   


 


Glucose   


 


POC Glucose (mg/dL)   125 H  134 H


 


Calcium   


 


Magnesium   


 


Total Bilirubin   


 


AST   


 


ALT   


 


Alkaline Phosphatase   


 


Total Protein   


 


Albumin   


 


Albumin/Globulin Ratio   


 


Urine Protein  Trace H  


 


Urine Glucose (UA)  3+ H  


 


Urine Blood   


 


Ur Leukocyte Esterase   


 


Urine Bacteria   


 


Hyaline Casts   


 


Urine Mucus   














  10/22/20 10/22/20





  11:41 17:14


 


WBC  


 


RBC  


 


Hgb  


 


Hct  


 


MCV  


 


MCHC  


 


RDW  


 


Neutrophils #  


 


Sodium  


 


Potassium  


 


Chloride  


 


Carbon Dioxide  


 


Anion Gap  


 


BUN  


 


Creatinine  


 


Est GFR (CKD-EPI)AfAm  


 


Est GFR (CKD-EPI)NonAf  


 


BUN/Creatinine Ratio  


 


Glucose  


 


POC Glucose (mg/dL)  156 H  320 H


 


Calcium  


 


Magnesium  


 


Total Bilirubin  


 


AST  


 


ALT  


 


Alkaline Phosphatase  


 


Total Protein  


 


Albumin  


 


Albumin/Globulin Ratio  


 


Urine Protein  


 


Urine Glucose (UA)  


 


Urine Blood  


 


Ur Leukocyte Esterase  


 


Urine Bacteria  


 


Hyaline Casts  


 


Urine Mucus  














Assessment and Plan


Assessment: 





* Altered mental status, possibly due to hypoglycemia although her blood sugar 

  was 69, which is not significant to produce altered mental status.  Concern 

  about complex partial seizure.


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


* Diabetes, poorly controlled


* History of tobacco abuse.


Plan: 





* Patient continues to have episodic altered mental status.  I suspect patient 

  may have intermittent complex partial seizures.  We will increase dose of V

  impat to 150 mg twice a day.  Continue same dose of Depakote 250 mg 3 times a 

  day.


* Psychiatrist also seen the patient and adjusted her psych medication.


* We will follow.

## 2020-10-22 NOTE — P.CN
Psychiatric Consult





- .


Consult date: 10/22/20


Consult:: 


IDENTIFYING DATA: This patient is a 60-year-old  female with 

significant history of IDDM, DKA, CVAs with left-sided paralysis, seizure 

disorder, hypothyroidism, and CAD who was admitted on 10/16/2020 with mental 

status changes and hypoglycemia. 





HISTORY OF PRESENT ILLNESS: The patient presented to the hospital on 10/15/2020 

after being found unresponsive in her home.  Patient was noted to have elevated 

blood sugar and was administered 4 units of Humalog and became responsive and 

diaphoretic with a blood sugar of 69.  Patient was transferred to the medical 

floor for management of hypoglycemia and altered mental status.  Patient is 

currently very somnolent at this time and is unarousable despite multiple 

attempts by this provider.  Most of the history currently provided has been 

provided by the patient's nurse.  The patient's nurse reports that the patient 

has been intermittently agitated often yelling and repeating words told to her. 

She is currently sedated after receiving Seroquel this morning.  Patient has als

o been noted to move around excessively and flailing her body when she is awake.

 Upon review of the medical chart, the patient has been noted to be alert and 

oriented 3 during this hospital stay.  She appears to have fluctuating 

cognition at times with intermittent episodes of agitation.  She is currently 

being managed with Haldol 0.5 mg IM every 8 hours when necessary last given on 

10/21/2020 at 2311, Ativan 0.5 mg IV every 3 hours when necessary was 

administered at 1:32 AM this morning, furthermore she is also receiving her home

medication of Xanax last given yesterday at 2208.  Patient is also receiving 

Depakote 250 mg by mouth 3 times a day for seizure disorder.  Patient's QTC is 

prolonged at 492 ms.





PAST PSYCHIATRIC HISTORY: Unable to assess at this time.  Patient is currently 

prescribed Depakote for seizure disorder.  She is also receiving Cymbalta 60 mg 

by mouth daily, and Seroquel 25 mg by mouth twice a day





PAST MEDICAL HISTORY: Asthma, Coronary Artery Disease (CAD), Chest Pain / 

Angina, Heart Failure, COPD, CVA/TIA, Diabetes Mellitus, Deep Vein Thrombosis 

(DVT), Eye Disorder, GERD/Reflux, Hyperlipidemia, Hypertension, Myocardial 

Infarction (MI), Neurologic Disorder, Osteoarthritis (OA), Pneumonia, Renal 

Disease


 IDDM (brittle), DKAs, neuropathy bilateral hands/feet, retinopathy bilateral 

eyes, cellulitis R foot, R great toe and 2nd toe infections/amputations, current

wound R foot-being seen in [facility], renal failure, anemia, CVAs with L sided 

paralysis, headaches started after CVAs, brain lesions, DVT R axillae, low back 

pain, varicosities, seizure many years ago (2002), hypothyroid, constipation, 

bilateral tinnitis occasionally, sinus problems.. 





ALLERGIES: as per EMR. 





CHEMICAL DEPENDENCY HISTORY: Unable to assess





FAMILY PSYCHIATRIC/SUBSTANCE USE HISTORY: Unable to assess





SOCIAL HISTORY: From chart review, the patient is a former smoker.  She has used

marijuana edibles in the past.  Patient has a legal guardian, Noelle Menon who 

is her sister.  Patient has a caregiver named Eleanor resides with her.  Patient 

is wheelchair-bound due to CVA with left-sided paralysis in her upper and lower 

extremity.  Her sister or caregiver drive her to her appointments.





MENTAL STATUS EXAM: 


General Appearance: Patient appears to be stated age is somnolent and 

unarousable.  Hygiene and grooming appears fair.  Patient is dressed in hospital

gown.


Behavior: Patient is calmly lying in bed without any agitated behavior.


Speech: Unable to assess as patient is sleeping.


Mood/Affect: Unable to assess as patient is sleeping.


Suicidality/Homicidality:  Unable to assess as patient is sleeping.


Perceptions: Unable to assess as patient is sleeping.


Though content/process: Unable to assess as patient is sleeping.


Memory and concentration: Unable to assess this patient is sleeping.


Judgment and insight: Unable to assess as patient is sleeping.





IMPRESSIONS: 


Altered mental status with hypoglycemia, corrected - as per chart review 

resolved


Psychosis, unspecified 





PLAN: 


-Patient DOES NOT have decision making capacity at this time and is unable to 

reason through  and communicate/appreciate the risks, benefits and alternatives 

to treatment.


-Delirium precautions recommended with patient including - avoiding use of 

narcotics and CNS sedatives, limit anticholinergic medications when possible, 

frequent re-orientation, minimize use of restraints, open window shades during 

the day and close them at night


-Will order Ammonia as patient is receiving depakote and is displaying AMS. 


-Will monitor patient's QTc following medication adjustments. Current QTc is 

elevated at 492 ms.


-Would recommend the following medication changes/additions:





We will change Seroquel to 50 mg by mouth at bedtime so as to avoid daytime 

sedation.


We will decrease Cymbalta to 30 mg by mouth daily to avoid over activation which

may be contributing to polypharmacy and agitation as well as poor kidney 

function.


Highly recommend limiting the use of Reglan as the medication may contribute to 

psychosis with frequent use due to dopaminergic properties.


May continue Ativan 0.5 mg IV every 3 hours when necessary for 

agitation/anxiety, Haldol 0.5 mg IM every 8 hours when necessary for agitation, 

and Xanax 1 mg by mouth every 8 hours when necessary for anxiety.





-Psychiatry will continue to follow along with the patient and reevaluate when 

the patient is more appropriate for psychiatric evaluation.











10/22/20 12:45

## 2020-10-22 NOTE — P.PN
Subjective


Progress Note Date: 10/22/20








Morenita Ramirez, is a 60-year-old female patient well-known to my services.  

Patient was recently discharged she was admitted to the hospital for DKA and 

pneumonia.  Patient has underlying history of diabetes mellitus type 1 which is 

a brittle diabetic.  Lantus was decreased upon discharge.  Additional medical 

history includes stroke and seizures.  She was discharged home with her sister. 

According to ER report patient was found to have a low blood sugar with altered 

mental status changes.  Blood sugar was corrected and alt mental status changes 

recovered.  There is questionable compliance with diet.  Consult placed for 

possible ECF placement on discharge.  Chest x-ray was completed showing no ac

tive cardiopulmonary disease.  There is almost complete clearing of the right 

side patchy pulmonary infiltrate compared to old exam.  We'll continue to 

monitor blood sugar closely and make adjustments to home medication .





On 10/17/2020 patient was seen and examined on the medical floor she is alert 

and oriented 3 in no distress there is no fever or chills no headache or 

dizziness no chest pain no shortness of breath no cough no nausea or vomiting no

abdominal pain no diarrhea no blood in the stools no burning with urination no 

frequency or urgency and no hematuria glucose levels are much better controlled 

today she is receiving Levemir 10 units at bedtime and sliding scale before 

meals








On 10/18/2020 patient was seen and examined on the medical floor she is alert 

and oriented 3 in no distress there is no fever or chills no headache or 

dizziness no chest pain no shortness of breath no cough no nausea or vomiting no

abdominal pain no diarrhea no blood in the stools no burning with urination no 

frequency or urgency and no hematuria.  Patient to glucose level is still 

fluctuating she was 88 before breakfast this morning no short-acting insulin was

given patient was given breakfast her glucose level was 464 before lunch she is 

being given 8 units of NovoLog before lunch will continue to monitor and adjust 

insulin, possible transfer to rehab tomorrow.





On 10/19/2020 patient was seen and examined on the medical floor she is alert 

and oriented in no distress, glucose levels are better controlled however this 

morning patient had another episode of hypoglycemia, at this time will decrease 

Levemir dose to 8 units daily and continue was NovoLog before meals, and give a 

bedtime snack, but no short-acting insulin at bedtime.





On 10/20/2020 patient was seen and examined on the medical floor she is alert 

and oriented 3 in no apparent distress she had significantly elevated glucose 

level of 600 this morning, otherwise she denies any complaints at this point 

will increase Levemir again to 10 units at bedtime, avoid NovoLog before 

bedtime, and give NovoLog only before meals will continue to monitor glucose 

level and adjust insulin accordingly








On 10/21/2020 Patient is more sleepy today. Blood sugar in the 400's this AM.  

corrected per scale. Will continue to monitor for the next 24-48 hours. Denies 

any specific complaints








On 10/22/2020 patient was seen and examined on the medical floor she is 

somnolent, arousable for a few seconds and she returns to sleep, she was up all 

night very agitated and screaming, she received IV Ativan and IM Haldol, 

consultation for psychiatry and neurology were initiated in that regard, 

otherwise no events since yesterday glucose level is better controlled





Objective





- Vital Signs


Vital signs: 


                                   Vital Signs











Temp  98.6 F   10/22/20 12:04


 


Pulse  117 H  10/22/20 12:04


 


Resp  18   10/22/20 12:04


 


BP  142/68   10/22/20 12:04


 


Pulse Ox  99   10/22/20 12:04








                                 Intake & Output











 10/21/20 10/22/20 10/22/20





 18:59 06:59 18:59


 


Intake Total  300 


 


Balance  300 


 


Intake:   


 


  Oral  300 


 


Other:   


 


  Voiding Method Bedside Commode Diaper Diaper





 Diaper Incontinent Incontinent





 Incontinent  


 


  # Voids 1 2 2














- Exam








Head normocephalic and atraumatic


Neck supple no JVD no goiter


Lungs clear to auscultation bilaterally no wheezing or crackles


Heart regular rate and rhythm S1-S2, no rub or gallop


Abdomen is soft nontender nondistended positive bowel sounds no 

hepatosplenomegaly


Extremities no edema no cyanosis or clubbing


Neuro alert and orientated to 3








- Labs


CBC & Chem 7: 


                                 10/21/20 09:50





                                 10/21/20 09:50


Labs: 


                  Abnormal Lab Results - Last 24 Hours (Table)











  10/21/20 10/22/20 10/22/20 Range/Units





  20:38 01:18 01:18 


 


POC Glucose (mg/dL)  217 H  278 H   (75-99)  mg/dL


 


Urine Protein    Trace H  (Negative)  


 


Urine Glucose (UA)    3+ H  (Negative)  














  10/22/20 10/22/20 10/22/20 Range/Units





  06:17 07:06 11:41 


 


POC Glucose (mg/dL)  125 H  134 H  156 H  (75-99)  mg/dL


 


Urine Protein     (Negative)  


 


Urine Glucose (UA)     (Negative)  














  10/22/20 Range/Units





  17:14 


 


POC Glucose (mg/dL)  320 H  (75-99)  mg/dL


 


Urine Protein   (Negative)  


 


Urine Glucose (UA)   (Negative)  














Assessment and Plan


Plan: 








1.  Altered mental status is with hypoglycemia.  Hypoglycemia was corrected 

altered mental status changes have resolved





2.  Diabetes mellitus type 1.  Patient is known to be a brittle diabetic with 

fluctuating blood sugars





3.  Recent hospitalization for pneumonia.  Chest x-ray completed showing 

improvement





4.  History of seizures.  Patient was evaluated by neurology services and 

medications adjusted during previous day





5.  History of stroke





6.  Essential hypertension





7.  History of hyperlipidemia.  Patient obtained on statin





8.  History of COPD no exacerbation at this time





9.  History of coronary artery disease





10.  History of peripheral vascular disease with previous history of multiple 

toe amputations





11.  Iron deficiency anemia





12.  Episodes of agitation and screaming, patient received Haldol and Ativan, 

psychiatry and neurology consultation requested








DVT prophylaxis Lovenox.  GI prophylaxis Pepcid


PT OT consulted for possible ECF placement

## 2020-10-23 LAB
ALBUMIN SERPL-MCNC: 2.8 G/DL (ref 3.8–4.9)
ALBUMIN/GLOB SERPL: 1.08 G/DL (ref 1.6–3.17)
ALP SERPL-CCNC: 200 U/L (ref 41–126)
ALT SERPL-CCNC: 75 U/L (ref 8–44)
ANION GAP SERPL CALC-SCNC: 8.2 MMOL/L (ref 4–12)
AST SERPL-CCNC: 42 U/L (ref 13–35)
BASOPHILS # BLD AUTO: 0 K/UL (ref 0–0.2)
BASOPHILS NFR BLD AUTO: 0 %
BUN SERPL-SCNC: 21 MG/DL (ref 9–27)
BUN/CREAT SERPL: 19.09 RATIO (ref 12–20)
CALCIUM SPEC-MCNC: 8.5 MG/DL (ref 8.7–10.3)
CHLORIDE SERPL-SCNC: 104 MMOL/L (ref 96–109)
CO2 SERPL-SCNC: 27.8 MMOL/L (ref 21.6–31.8)
EOSINOPHIL # BLD AUTO: 0 K/UL (ref 0–0.7)
EOSINOPHIL NFR BLD AUTO: 0 %
ERYTHROCYTE [DISTWIDTH] IN BLOOD BY AUTOMATED COUNT: 2.88 M/UL (ref 3.8–5.4)
ERYTHROCYTE [DISTWIDTH] IN BLOOD: 16.4 % (ref 11.5–15.5)
GLOBULIN SER CALC-MCNC: 2.6 G/DL (ref 1.6–3.3)
GLUCOSE BLD-MCNC: 140 MG/DL (ref 75–99)
GLUCOSE BLD-MCNC: 219 MG/DL (ref 75–99)
GLUCOSE BLD-MCNC: 228 MG/DL (ref 75–99)
GLUCOSE BLD-MCNC: 266 MG/DL (ref 75–99)
GLUCOSE BLD-MCNC: 293 MG/DL (ref 75–99)
GLUCOSE SERPL-MCNC: 205 MG/DL (ref 70–110)
HCT VFR BLD AUTO: 27.7 % (ref 34–46)
HGB BLD-MCNC: 9.4 GM/DL (ref 11.4–16)
LYMPHOCYTES # SPEC AUTO: 1.1 K/UL (ref 1–4.8)
LYMPHOCYTES NFR SPEC AUTO: 55 %
MCH RBC QN AUTO: 32.5 PG (ref 25–35)
MCHC RBC AUTO-ENTMCNC: 33.8 G/DL (ref 31–37)
MCV RBC AUTO: 96.3 FL (ref 80–100)
MONOCYTES # BLD AUTO: 0.2 K/UL (ref 0–1)
MONOCYTES NFR BLD AUTO: 12 %
NEUTROPHILS # BLD AUTO: 0.6 K/UL (ref 1.3–7.7)
NEUTROPHILS NFR BLD AUTO: 30 %
PLATELET # BLD AUTO: 178 K/UL (ref 150–450)
POTASSIUM SERPL-SCNC: 4.5 MMOL/L (ref 3.5–5.5)
PROT SERPL-MCNC: 5.4 G/DL (ref 6.2–8.2)
SODIUM SERPL-SCNC: 140 MMOL/L (ref 135–145)
WBC # BLD AUTO: 1.9 K/UL (ref 3.8–10.6)

## 2020-10-23 RX ADMIN — HYDROCODONE BITARTRATE AND ACETAMINOPHEN PRN EACH: 10; 325 TABLET ORAL at 05:20

## 2020-10-23 RX ADMIN — FAMOTIDINE SCH MG: 20 TABLET, FILM COATED ORAL at 08:21

## 2020-10-23 RX ADMIN — OXYCODONE HYDROCHLORIDE AND ACETAMINOPHEN SCH MG: 500 TABLET ORAL at 08:20

## 2020-10-23 RX ADMIN — INSULIN ASPART SCH UNIT: 100 INJECTION, SOLUTION INTRAVENOUS; SUBCUTANEOUS at 13:54

## 2020-10-23 RX ADMIN — CLOPIDOGREL BISULFATE SCH MG: 75 TABLET ORAL at 08:20

## 2020-10-23 RX ADMIN — LACOSAMIDE SCH MLS/HR: 10 INJECTION INTRAVENOUS at 14:28

## 2020-10-23 RX ADMIN — Medication SCH EACH: at 17:56

## 2020-10-23 RX ADMIN — INSULIN DETEMIR SCH UNIT: 100 INJECTION, SOLUTION SUBCUTANEOUS at 21:32

## 2020-10-23 RX ADMIN — INSULIN ASPART SCH UNIT: 100 INJECTION, SOLUTION INTRAVENOUS; SUBCUTANEOUS at 08:12

## 2020-10-23 RX ADMIN — ASPIRIN 81 MG CHEWABLE TABLET SCH MG: 81 TABLET CHEWABLE at 08:20

## 2020-10-23 RX ADMIN — METOCLOPRAMIDE SCH MG: 5 TABLET ORAL at 08:13

## 2020-10-23 RX ADMIN — METOCLOPRAMIDE SCH MG: 5 TABLET ORAL at 13:54

## 2020-10-23 RX ADMIN — VALPROATE SODIUM SCH MLS/HR: 100 INJECTION, SOLUTION INTRAVENOUS at 21:32

## 2020-10-23 RX ADMIN — INSULIN ASPART SCH UNIT: 100 INJECTION, SOLUTION INTRAVENOUS; SUBCUTANEOUS at 17:56

## 2020-10-23 RX ADMIN — VALPROATE SODIUM SCH MLS/HR: 100 INJECTION, SOLUTION INTRAVENOUS at 08:15

## 2020-10-23 RX ADMIN — ATORVASTATIN CALCIUM SCH MG: 20 TABLET, FILM COATED ORAL at 08:20

## 2020-10-23 RX ADMIN — ENOXAPARIN SODIUM SCH MG: 40 INJECTION SUBCUTANEOUS at 08:20

## 2020-10-23 RX ADMIN — Medication SCH EACH: at 08:12

## 2020-10-23 RX ADMIN — Medication SCH MG: at 08:21

## 2020-10-23 RX ADMIN — METOCLOPRAMIDE SCH MG: 5 TABLET ORAL at 17:56

## 2020-10-23 RX ADMIN — LOSARTAN POTASSIUM SCH MG: 50 TABLET, FILM COATED ORAL at 08:21

## 2020-10-23 RX ADMIN — HYDROCODONE BITARTRATE AND ACETAMINOPHEN PRN EACH: 10; 325 TABLET ORAL at 14:40

## 2020-10-23 NOTE — P.PN
Progress Note - Text


Progress Note Date: 10/23/20





 Identifying Data: This patient is a 60  female with a significant 

history of IDDM, DKA, CVAs with left-sided paralysis, seizure disorder, 

hypothyroidism, and CAD was admitted on 10/16/2020 with mental status changes 

and hyperglycemia.





Interval History: 


Patient was seen in bed and was mainly somnolent and could not provide adequate 

psychiatric history.  At bedside as the patient's nurse.  During the evaluation,

the patient only commented that she has been feeling "like cr*p." She reports 

she has pain all over her body. She then was able to identify her name, where 

she is currently located but did not answer the date.  As per history provided 

by the patient's nurse, the patient has been mainly somnolent and but was able 

to sleep through the night.  She has had waking moments of clarity and calmness 

but has also had episodes where she would be yelling and screaming 

intermittently.  She has been responsive to redirection and pain management.





Mental Status Exam:


General Appearance: Patient appears older than her stated age, thin and frail.  

Dressed in a hospital gown. 


Behavior: Patient is somnolent, would awaken for a few questions and then go 

back to sleep.


Speech: Patient's speech is low in volume, nonspontaneous, minimal.


Mood/Affect: Mood is "like cr*p", affect is somnolent and sedated. 


Suicidality/Homicidality: Unable to assess


Perceptions: Unable to assess


Though content/process: Unable to assess


Memory and concentration: The patient is alert and oriented to person and place 

only.  Concentration is poor.


Judgment and insight: Poor





Assessment


Altered Mental Status with hypoglycemia


Psychosis, unspecified 





Plan:


-Ammonia level was reviewed and was within normal limits.


-We will make no medication changes at this time.


- Continue Seroquel 50 mg by mouth at bedtime, Cymbalta 30 mg by mouth daily, 

Haldol/Ativan when necessary, Xanax when necessary.


-Delirium precautions recommended with patient including - avoiding use of 

narcotics and CNS sedatives, limit anticholinergic medications when possible, 

frequent reorientation, minimize use of restraints, open window shades during 

the day and close them at night


- Psychiatry will continue to follow.

## 2020-10-23 NOTE — P.PN
Subjective


Progress Note Date: 10/23/20





Patient was seen for follow-up.  No further seizures reported.  Per nursing 

report, patient states hard, but when she wakes up, she is often appropriate.  

Sometimes she gets upset easily.  Sometimes she screams for no reason.





Objective





- Vital Signs


Vital signs: 


                                   Vital Signs











Temp  98.3 F   10/23/20 05:00


 


Pulse  89   10/23/20 12:36


 


Resp  15   10/23/20 12:36


 


BP  110/65   10/23/20 12:36


 


Pulse Ox  94 L  10/23/20 12:36








                                 Intake & Output











 10/22/20 10/23/20 10/23/20





 18:59 06:59 18:59


 


Intake Total  0 540


 


Balance  0 540


 


Intake:   


 


  Intake, IV Titration  0 





  Amount   


 


    Valproate Sodium 250 mg  0 





    In Sodium Chloride 0.9%   





    100 ml @ 100 mls/hr IVPB   





    Q8HR [facility] Rx#:[id number]   


 


  Oral  0 540


 


Other:   


 


  Voiding Method Diaper Diaper Diaper





 Incontinent Incontinent Incontinent


 


  # Voids 2 2 3


 


  # Bowel Movements  0 














- Exam





Patient was sleeping, however when I woke her up, she was appropriate.  Offers 

no complaints.  Continues to be left hemiplegic.





- Labs


CBC & Chem 7: 


                                 10/23/20 07:28





                                 10/23/20 07:28


Labs: 


                  Abnormal Lab Results - Last 24 Hours (Table)











  10/22/20 10/22/20 10/22/20 Range/Units





  17:14 20:01 20:29 


 


WBC     (3.8-10.6)  k/uL


 


RBC     (3.80-5.40)  m/uL


 


Hgb     (11.4-16.0)  gm/dL


 


Hct     (34.0-46.0)  %


 


RDW     (11.5-15.5)  %


 


Neutrophils #     (1.3-7.7)  k/uL


 


Est GFR (CKD-EPI)NonAf     (60.0-200.0)   


 


Glucose     ()  mg/dL


 


POC Glucose (mg/dL)  320 H  210 H  181 H  (75-99)  mg/dL


 


Calcium     (8.7-10.3)  mg/dL


 


AST     (13-35)  U/L


 


ALT     (8-44)  U/L


 


Alkaline Phosphatase     ()  U/L


 


Total Protein     (6.2-8.2)  g/dL


 


Albumin     (3.80-4.90)  g/dL


 


Albumin/Globulin Ratio     (1.60-3.17)  g/dL














  10/23/20 10/23/20 10/23/20 Range/Units





  02:14 06:59 07:28 


 


WBC    1.9 L  (3.8-10.6)  k/uL


 


RBC    2.88 L  (3.80-5.40)  m/uL


 


Hgb    9.4 L  (11.4-16.0)  gm/dL


 


Hct    27.7 L  (34.0-46.0)  %


 


RDW    16.4 H  (11.5-15.5)  %


 


Neutrophils #    0.6 L  (1.3-7.7)  k/uL


 


Est GFR (CKD-EPI)NonAf     (60.0-200.0)   


 


Glucose     ()  mg/dL


 


POC Glucose (mg/dL)  228 H  219 H   (75-99)  mg/dL


 


Calcium     (8.7-10.3)  mg/dL


 


AST     (13-35)  U/L


 


ALT     (8-44)  U/L


 


Alkaline Phosphatase     ()  U/L


 


Total Protein     (6.2-8.2)  g/dL


 


Albumin     (3.80-4.90)  g/dL


 


Albumin/Globulin Ratio     (1.60-3.17)  g/dL














  10/23/20 10/23/20 Range/Units





  07:28 11:53 


 


WBC    (3.8-10.6)  k/uL


 


RBC    (3.80-5.40)  m/uL


 


Hgb    (11.4-16.0)  gm/dL


 


Hct    (34.0-46.0)  %


 


RDW    (11.5-15.5)  %


 


Neutrophils #    (1.3-7.7)  k/uL


 


Est GFR (CKD-EPI)NonAf  54.9 L   (60.0-200.0)   


 


Glucose  205 H   ()  mg/dL


 


POC Glucose (mg/dL)   266 H  (75-99)  mg/dL


 


Calcium  8.5 L   (8.7-10.3)  mg/dL


 


AST  42 H   (13-35)  U/L


 


ALT  75 H   (8-44)  U/L


 


Alkaline Phosphatase  200 H   ()  U/L


 


Total Protein  5.4 L   (6.2-8.2)  g/dL


 


Albumin  2.80 L   (3.80-4.90)  g/dL


 


Albumin/Globulin Ratio  1.08 L   (1.60-3.17)  g/dL














Assessment and Plan


Assessment: 





* Altered mental status, possibly due to hypoglycemia although her blood sugar 

  was 69, which is not significant to produce altered mental status.  Concern 

  about complex partial seizure.


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


* Diabetes, poorly controlled


* History of tobacco abuse.


Plan: 





* Continue Vimpat 150 mg twice a day.  Continue same dose of Depakote 250 mg 3 

  times a day.


* Psychiatrist also seen the patient and adjusted her psych medication.


* Patient clear from Neurology standpoint.

## 2020-10-24 LAB
ALBUMIN SERPL-MCNC: 2.7 G/DL (ref 3.8–4.9)
ALBUMIN/GLOB SERPL: 1 G/DL (ref 1.6–3.17)
ALP SERPL-CCNC: 174 U/L (ref 41–126)
ALT SERPL-CCNC: 61 U/L (ref 8–44)
ANION GAP SERPL CALC-SCNC: 6.1 MMOL/L (ref 4–12)
AST SERPL-CCNC: 39 U/L (ref 13–35)
BASOPHILS # BLD AUTO: 0 K/UL (ref 0–0.2)
BASOPHILS NFR BLD AUTO: 0 %
BUN SERPL-SCNC: 14 MG/DL (ref 9–27)
BUN/CREAT SERPL: 17.5 RATIO (ref 12–20)
CALCIUM SPEC-MCNC: 8.3 MG/DL (ref 8.7–10.3)
CHLORIDE SERPL-SCNC: 109 MMOL/L (ref 96–109)
CO2 SERPL-SCNC: 30.9 MMOL/L (ref 21.6–31.8)
EOSINOPHIL # BLD AUTO: 0 K/UL (ref 0–0.7)
EOSINOPHIL NFR BLD AUTO: 0 %
ERYTHROCYTE [DISTWIDTH] IN BLOOD BY AUTOMATED COUNT: 2.91 M/UL (ref 3.8–5.4)
ERYTHROCYTE [DISTWIDTH] IN BLOOD: 16.2 % (ref 11.5–15.5)
GLOBULIN SER CALC-MCNC: 2.7 G/DL (ref 1.6–3.3)
GLUCOSE BLD-MCNC: 110 MG/DL (ref 75–99)
GLUCOSE BLD-MCNC: 137 MG/DL (ref 75–99)
GLUCOSE BLD-MCNC: 160 MG/DL (ref 75–99)
GLUCOSE BLD-MCNC: 34 MG/DL (ref 75–99)
GLUCOSE BLD-MCNC: 447 MG/DL (ref 75–99)
GLUCOSE BLD-MCNC: 69 MG/DL (ref 75–99)
HCT VFR BLD AUTO: 28.3 % (ref 34–46)
HGB BLD-MCNC: 8.8 GM/DL (ref 11.4–16)
LYMPHOCYTES # SPEC AUTO: 2.3 K/UL (ref 1–4.8)
LYMPHOCYTES NFR SPEC AUTO: 54 %
MCH RBC QN AUTO: 30.3 PG (ref 25–35)
MCHC RBC AUTO-ENTMCNC: 31.1 G/DL (ref 31–37)
MCV RBC AUTO: 97.4 FL (ref 80–100)
MONOCYTES # BLD AUTO: 0.4 K/UL (ref 0–1)
MONOCYTES NFR BLD AUTO: 10 %
NEUTROPHILS # BLD AUTO: 1.4 K/UL (ref 1.3–7.7)
NEUTROPHILS NFR BLD AUTO: 33 %
PLATELET # BLD AUTO: 169 K/UL (ref 150–450)
POTASSIUM SERPL-SCNC: 4 MMOL/L (ref 3.5–5.5)
PROT SERPL-MCNC: 5.4 G/DL (ref 6.2–8.2)
SODIUM SERPL-SCNC: 146 MMOL/L (ref 135–145)
WBC # BLD AUTO: 4.2 K/UL (ref 3.8–10.6)

## 2020-10-24 RX ADMIN — HYDROCODONE BITARTRATE AND ACETAMINOPHEN PRN EACH: 10; 325 TABLET ORAL at 19:43

## 2020-10-24 RX ADMIN — Medication SCH EACH: at 18:03

## 2020-10-24 RX ADMIN — FAMOTIDINE SCH MG: 20 TABLET, FILM COATED ORAL at 08:26

## 2020-10-24 RX ADMIN — Medication SCH EACH: at 08:27

## 2020-10-24 RX ADMIN — ATORVASTATIN CALCIUM SCH MG: 20 TABLET, FILM COATED ORAL at 08:26

## 2020-10-24 RX ADMIN — METOCLOPRAMIDE SCH MG: 5 TABLET ORAL at 08:26

## 2020-10-24 RX ADMIN — INSULIN ASPART SCH UNIT: 100 INJECTION, SOLUTION INTRAVENOUS; SUBCUTANEOUS at 12:27

## 2020-10-24 RX ADMIN — VALPROATE SODIUM SCH MLS/HR: 100 INJECTION, SOLUTION INTRAVENOUS at 21:29

## 2020-10-24 RX ADMIN — ENOXAPARIN SODIUM SCH MG: 40 INJECTION SUBCUTANEOUS at 08:27

## 2020-10-24 RX ADMIN — VALPROATE SODIUM SCH MLS/HR: 100 INJECTION, SOLUTION INTRAVENOUS at 12:26

## 2020-10-24 RX ADMIN — INSULIN ASPART SCH: 100 INJECTION, SOLUTION INTRAVENOUS; SUBCUTANEOUS at 08:27

## 2020-10-24 RX ADMIN — Medication SCH MG: at 08:26

## 2020-10-24 RX ADMIN — ASPIRIN 81 MG CHEWABLE TABLET SCH MG: 81 TABLET CHEWABLE at 08:26

## 2020-10-24 RX ADMIN — VALPROATE SODIUM SCH MLS/HR: 100 INJECTION, SOLUTION INTRAVENOUS at 05:00

## 2020-10-24 RX ADMIN — LOSARTAN POTASSIUM SCH MG: 50 TABLET, FILM COATED ORAL at 08:27

## 2020-10-24 RX ADMIN — INSULIN ASPART SCH: 100 INJECTION, SOLUTION INTRAVENOUS; SUBCUTANEOUS at 17:58

## 2020-10-24 RX ADMIN — LACOSAMIDE SCH MLS/HR: 10 INJECTION INTRAVENOUS at 15:59

## 2020-10-24 RX ADMIN — CLOPIDOGREL BISULFATE SCH MG: 75 TABLET ORAL at 08:26

## 2020-10-24 RX ADMIN — INSULIN DETEMIR SCH UNIT: 100 INJECTION, SOLUTION SUBCUTANEOUS at 21:29

## 2020-10-24 RX ADMIN — LACOSAMIDE SCH MLS/HR: 10 INJECTION INTRAVENOUS at 03:22

## 2020-10-24 RX ADMIN — OXYCODONE HYDROCHLORIDE AND ACETAMINOPHEN SCH MG: 500 TABLET ORAL at 08:26

## 2020-10-24 RX ADMIN — POTASSIUM CHLORIDE SCH: 14.9 INJECTION, SOLUTION INTRAVENOUS at 06:25

## 2020-10-24 RX ADMIN — METOCLOPRAMIDE SCH MG: 5 TABLET ORAL at 19:44

## 2020-10-24 RX ADMIN — METOCLOPRAMIDE SCH MG: 5 TABLET ORAL at 12:27

## 2020-10-24 NOTE — P.PN
Subjective


Progress Note Date: 10/23/20








Morenita Ramirez, is a 60-year-old female patient well-known to my services.  

Patient was recently discharged she was admitted to the hospital for DKA and 

pneumonia.  Patient has underlying history of diabetes mellitus type 1 which is 

a brittle diabetic.  Lantus was decreased upon discharge.  Additional medical 

history includes stroke and seizures.  She was discharged home with her sister. 

According to ER report patient was found to have a low blood sugar with altered 

mental status changes.  Blood sugar was corrected and alt mental status changes 

recovered.  There is questionable compliance with diet.  Consult placed for 

possible ECF placement on discharge.  Chest x-ray was completed showing no ac

tive cardiopulmonary disease.  There is almost complete clearing of the right 

side patchy pulmonary infiltrate compared to old exam.  We'll continue to 

monitor blood sugar closely and make adjustments to home medication .





On 10/17/2020 patient was seen and examined on the medical floor she is alert 

and oriented 3 in no distress there is no fever or chills no headache or 

dizziness no chest pain no shortness of breath no cough no nausea or vomiting no

abdominal pain no diarrhea no blood in the stools no burning with urination no 

frequency or urgency and no hematuria glucose levels are much better controlled 

today she is receiving Levemir 10 units at bedtime and sliding scale before 

meals








On 10/18/2020 patient was seen and examined on the medical floor she is alert 

and oriented 3 in no distress there is no fever or chills no headache or 

dizziness no chest pain no shortness of breath no cough no nausea or vomiting no

abdominal pain no diarrhea no blood in the stools no burning with urination no 

frequency or urgency and no hematuria.  Patient to glucose level is still 

fluctuating she was 88 before breakfast this morning no short-acting insulin was

given patient was given breakfast her glucose level was 464 before lunch she is 

being given 8 units of NovoLog before lunch will continue to monitor and adjust 

insulin, possible transfer to rehab tomorrow.





On 10/19/2020 patient was seen and examined on the medical floor she is alert 

and oriented in no distress, glucose levels are better controlled however this 

morning patient had another episode of hypoglycemia, at this time will decrease 

Levemir dose to 8 units daily and continue was NovoLog before meals, and give a 

bedtime snack, but no short-acting insulin at bedtime.





On 10/20/2020 patient was seen and examined on the medical floor she is alert 

and oriented 3 in no apparent distress she had significantly elevated glucose 

level of 600 this morning, otherwise she denies any complaints at this point 

will increase Levemir again to 10 units at bedtime, avoid NovoLog before 

bedtime, and give NovoLog only before meals will continue to monitor glucose 

level and adjust insulin accordingly








On 10/21/2020 Patient is more sleepy today. Blood sugar in the 400's this AM.  

corrected per scale. Will continue to monitor for the next 24-48 hours. Denies 

any specific complaints








On 10/22/2020 patient was seen and examined on the medical floor she is 

somnolent, arousable for a few seconds and she returns to sleep, she was up all 

night very agitated and screaming, she received IV Ativan and IM Haldol, 

consultation for psychiatry and neurology were initiated in that regard, 

otherwise no events since yesterday glucose level is better controlled.





On 10/23/2020 patient was seen and examined on the medical floor she is somn

olent arousable in no apparent distress she has been evaluated by psychiatry and

neurology medications are being adjusted she was started on IV fluid due to mild

dehydration





Objective





- Vital Signs


Vital signs: 


                                   Vital Signs











Temp  98.0 F   10/24/20 04:49


 


Pulse  80   10/24/20 04:49


 


Resp  16   10/24/20 04:49


 


BP  111/70   10/24/20 04:49


 


Pulse Ox  92 L  10/24/20 04:49








                                 Intake & Output











 10/23/20 10/24/20 10/24/20





 18:59 06:59 18:59


 


Intake Total 540 475 


 


Balance 540 475 


 


Intake:   


 


  Intake, IV Titration  225 





  Amount   


 


    Sodium Chloride 0.9% 1,  225 





    000 ml @ 75 mls/hr IV .   





    [id number] STA Rx#:[id number]   


 


  Oral 540 250 


 


Other:   


 


  Voiding Method Diaper Diaper Diaper





 Incontinent Incontinent Incontinent


 


  # Voids 3 3 1














- Exam








Head normocephalic and atraumatic


Neck supple no JVD no goiter


Lungs clear to auscultation bilaterally no wheezing or crackles


Heart regular rate and rhythm S1-S2, no rub or gallop


Abdomen is soft nontender nondistended positive bowel sounds no 

hepatosplenomegaly


Extremities no edema no cyanosis or clubbing


Neuro alert and orientated to 3








- Labs


CBC & Chem 7: 


                                 10/24/20 05:57





                                 10/24/20 05:57


Labs: 


                  Abnormal Lab Results - Last 24 Hours (Table)











  10/23/20 10/23/20 10/24/20 Range/Units





  17:24 22:09 03:35 


 


RBC     (3.80-5.40)  m/uL


 


Hgb     (11.4-16.0)  gm/dL


 


Hct     (34.0-46.0)  %


 


RDW     (11.5-15.5)  %


 


Sodium     (135-145)  mmol/L


 


Glucose     ()  mg/dL


 


POC Glucose (mg/dL)  293 H  140 H  69 L  (75-99)  mg/dL


 


Calcium     (8.7-10.3)  mg/dL


 


Total Bilirubin     (0.2-1.2)  mg/dL


 


AST     (13-35)  U/L


 


ALT     (8-44)  U/L


 


Alkaline Phosphatase     ()  U/L


 


Total Protein     (6.2-8.2)  g/dL


 


Albumin     (3.80-4.90)  g/dL


 


Albumin/Globulin Ratio     (1.60-3.17)  g/dL














  10/24/20 10/24/20 10/24/20 Range/Units





  05:57 05:57 07:13 


 


RBC  2.91 L    (3.80-5.40)  m/uL


 


Hgb  8.8 L    (11.4-16.0)  gm/dL


 


Hct  28.3 L    (34.0-46.0)  %


 


RDW  16.2 H    (11.5-15.5)  %


 


Sodium   146 H   (135-145)  mmol/L


 


Glucose   39 L*   ()  mg/dL


 


POC Glucose (mg/dL)    37 L  (75-99)  mg/dL


 


Calcium   8.3 L   (8.7-10.3)  mg/dL


 


Total Bilirubin   0.1 L   (0.2-1.2)  mg/dL


 


AST   39 H   (13-35)  U/L


 


ALT   61 H   (8-44)  U/L


 


Alkaline Phosphatase   174 H   ()  U/L


 


Total Protein   5.4 L   (6.2-8.2)  g/dL


 


Albumin   2.70 L   (3.80-4.90)  g/dL


 


Albumin/Globulin Ratio   1.00 L   (1.60-3.17)  g/dL














  10/24/20 10/24/20 10/24/20 Range/Units





  07:15 07:34 12:13 


 


RBC     (3.80-5.40)  m/uL


 


Hgb     (11.4-16.0)  gm/dL


 


Hct     (34.0-46.0)  %


 


RDW     (11.5-15.5)  %


 


Sodium     (135-145)  mmol/L


 


Glucose     ()  mg/dL


 


POC Glucose (mg/dL)  34 L  137 H  160 H  (75-99)  mg/dL


 


Calcium     (8.7-10.3)  mg/dL


 


Total Bilirubin     (0.2-1.2)  mg/dL


 


AST     (13-35)  U/L


 


ALT     (8-44)  U/L


 


Alkaline Phosphatase     ()  U/L


 


Total Protein     (6.2-8.2)  g/dL


 


Albumin     (3.80-4.90)  g/dL


 


Albumin/Globulin Ratio     (1.60-3.17)  g/dL














Assessment and Plan


Plan: 








1.  Altered mental status is with hypoglycemia.  Hypoglycemia was corrected 

altered mental status changes have resolved





2.  Diabetes mellitus type 1.  Patient is known to be a brittle diabetic with 

fluctuating blood sugars





3.  Recent hospitalization for pneumonia.  Chest x-ray completed showing 

improvement





4.  History of seizures.  Patient was evaluated by neurology services and 

medications adjusted during previous day





5.  History of stroke





6.  Essential hypertension





7.  History of hyperlipidemia.  Patient obtained on statin





8.  History of COPD no exacerbation at this time





9.  History of coronary artery disease





10.  History of peripheral vascular disease with previous history of multiple 

toe amputations





11.  Iron deficiency anemia





12.  Episodes of agitation and screaming, patient received Haldol and Ativan, 

psychiatry and neurology consultation requested








DVT prophylaxis Lovenox.  GI prophylaxis Pepcid


PT OT consulted for possible ECF placement

## 2020-10-24 NOTE — P.PN
Subjective


Progress Note Date: 10/24/20








Morenita Ramirez, is a 60-year-old female patient well-known to my services.  

Patient was recently discharged she was admitted to the hospital for DKA and 

pneumonia.  Patient has underlying history of diabetes mellitus type 1 which is 

a brittle diabetic.  Lantus was decreased upon discharge.  Additional medical 

history includes stroke and seizures.  She was discharged home with her sister. 

According to ER report patient was found to have a low blood sugar with altered 

mental status changes.  Blood sugar was corrected and alt mental status changes 

recovered.  There is questionable compliance with diet.  Consult placed for 

possible ECF placement on discharge.  Chest x-ray was completed showing no ac

tive cardiopulmonary disease.  There is almost complete clearing of the right 

side patchy pulmonary infiltrate compared to old exam.  We'll continue to 

monitor blood sugar closely and make adjustments to home medication .





On 10/17/2020 patient was seen and examined on the medical floor she is alert 

and oriented 3 in no distress there is no fever or chills no headache or 

dizziness no chest pain no shortness of breath no cough no nausea or vomiting no

abdominal pain no diarrhea no blood in the stools no burning with urination no 

frequency or urgency and no hematuria glucose levels are much better controlled 

today she is receiving Levemir 10 units at bedtime and sliding scale before 

meals








On 10/18/2020 patient was seen and examined on the medical floor she is alert 

and oriented 3 in no distress there is no fever or chills no headache or 

dizziness no chest pain no shortness of breath no cough no nausea or vomiting no

abdominal pain no diarrhea no blood in the stools no burning with urination no 

frequency or urgency and no hematuria.  Patient to glucose level is still 

fluctuating she was 88 before breakfast this morning no short-acting insulin was

given patient was given breakfast her glucose level was 464 before lunch she is 

being given 8 units of NovoLog before lunch will continue to monitor and adjust 

insulin, possible transfer to rehab tomorrow.





On 10/19/2020 patient was seen and examined on the medical floor she is alert 

and oriented in no distress, glucose levels are better controlled however this 

morning patient had another episode of hypoglycemia, at this time will decrease 

Levemir dose to 8 units daily and continue was NovoLog before meals, and give a 

bedtime snack, but no short-acting insulin at bedtime.





On 10/20/2020 patient was seen and examined on the medical floor she is alert 

and oriented 3 in no apparent distress she had significantly elevated glucose 

level of 600 this morning, otherwise she denies any complaints at this point 

will increase Levemir again to 10 units at bedtime, avoid NovoLog before 

bedtime, and give NovoLog only before meals will continue to monitor glucose 

level and adjust insulin accordingly








On 10/21/2020 Patient is more sleepy today. Blood sugar in the 400's this AM.  

corrected per scale. Will continue to monitor for the next 24-48 hours. Denies 

any specific complaints








On 10/22/2020 patient was seen and examined on the medical floor she is 

somnolent, arousable for a few seconds and she returns to sleep, she was up all 

night very agitated and screaming, she received IV Ativan and IM Haldol, 

consultation for psychiatry and neurology were initiated in that regard, 

otherwise no events since yesterday glucose level is better controlled.





On 10/23/2020 patient was seen and examined on the medical floor she is somn

olent arousable in no apparent distress she has been evaluated by psychiatry and

neurology medications are being adjusted she was started on IV fluid due to mild

dehydration





On 10/24/2020 patient was seen and examined on the medical floor she is more 

alert today she was able to tolerate her breakfast well glucose was very low 

this morning she received half an amp of D50 otherwise she denies any complaints

at this time





Objective





- Vital Signs


Vital signs: 


                                   Vital Signs











Temp  98.0 F   10/24/20 04:49


 


Pulse  80   10/24/20 04:49


 


Resp  16   10/24/20 04:49


 


BP  111/70   10/24/20 04:49


 


Pulse Ox  92 L  10/24/20 04:49








                                 Intake & Output











 10/23/20 10/24/20 10/24/20





 18:59 06:59 18:59


 


Intake Total 540 475 


 


Balance 540 475 


 


Intake:   


 


  Intake, IV Titration  225 





  Amount   


 


    Sodium Chloride 0.9% 1,  225 





    000 ml @ 75 mls/hr IV .   





    [id number] STA Rx#:[id number]   


 


  Oral 540 250 


 


Other:   


 


  Voiding Method Diaper Diaper Diaper





 Incontinent Incontinent Incontinent


 


  # Voids 3 3 1














- Exam








Head normocephalic and atraumatic


Neck supple no JVD no goiter


Lungs clear to auscultation bilaterally no wheezing or crackles


Heart regular rate and rhythm S1-S2, no rub or gallop


Abdomen is soft nontender nondistended positive bowel sounds no 

hepatosplenomegaly


Extremities no edema no cyanosis or clubbing


Neuro alert and orientated to 3








- Labs


CBC & Chem 7: 


                                 10/24/20 05:57





                                 10/24/20 05:57


Labs: 


                  Abnormal Lab Results - Last 24 Hours (Table)











  10/23/20 10/23/20 10/24/20 Range/Units





  17:24 22:09 03:35 


 


RBC     (3.80-5.40)  m/uL


 


Hgb     (11.4-16.0)  gm/dL


 


Hct     (34.0-46.0)  %


 


RDW     (11.5-15.5)  %


 


Sodium     (135-145)  mmol/L


 


Glucose     ()  mg/dL


 


POC Glucose (mg/dL)  293 H  140 H  69 L  (75-99)  mg/dL


 


Calcium     (8.7-10.3)  mg/dL


 


Total Bilirubin     (0.2-1.2)  mg/dL


 


AST     (13-35)  U/L


 


ALT     (8-44)  U/L


 


Alkaline Phosphatase     ()  U/L


 


Total Protein     (6.2-8.2)  g/dL


 


Albumin     (3.80-4.90)  g/dL


 


Albumin/Globulin Ratio     (1.60-3.17)  g/dL














  10/24/20 10/24/20 10/24/20 Range/Units





  05:57 05:57 07:13 


 


RBC  2.91 L    (3.80-5.40)  m/uL


 


Hgb  8.8 L    (11.4-16.0)  gm/dL


 


Hct  28.3 L    (34.0-46.0)  %


 


RDW  16.2 H    (11.5-15.5)  %


 


Sodium   146 H   (135-145)  mmol/L


 


Glucose   39 L*   ()  mg/dL


 


POC Glucose (mg/dL)    37 L  (75-99)  mg/dL


 


Calcium   8.3 L   (8.7-10.3)  mg/dL


 


Total Bilirubin   0.1 L   (0.2-1.2)  mg/dL


 


AST   39 H   (13-35)  U/L


 


ALT   61 H   (8-44)  U/L


 


Alkaline Phosphatase   174 H   ()  U/L


 


Total Protein   5.4 L   (6.2-8.2)  g/dL


 


Albumin   2.70 L   (3.80-4.90)  g/dL


 


Albumin/Globulin Ratio   1.00 L   (1.60-3.17)  g/dL














  10/24/20 10/24/20 10/24/20 Range/Units





  07:15 07:34 12:13 


 


RBC     (3.80-5.40)  m/uL


 


Hgb     (11.4-16.0)  gm/dL


 


Hct     (34.0-46.0)  %


 


RDW     (11.5-15.5)  %


 


Sodium     (135-145)  mmol/L


 


Glucose     ()  mg/dL


 


POC Glucose (mg/dL)  34 L  137 H  160 H  (75-99)  mg/dL


 


Calcium     (8.7-10.3)  mg/dL


 


Total Bilirubin     (0.2-1.2)  mg/dL


 


AST     (13-35)  U/L


 


ALT     (8-44)  U/L


 


Alkaline Phosphatase     ()  U/L


 


Total Protein     (6.2-8.2)  g/dL


 


Albumin     (3.80-4.90)  g/dL


 


Albumin/Globulin Ratio     (1.60-3.17)  g/dL














Assessment and Plan


Plan: 








1.  Altered mental status is with hypoglycemia.  Hypoglycemia was corrected 

altered mental status changes have resolved





2.  Diabetes mellitus type 1.  Patient is known to be a brittle diabetic with 

fluctuating blood sugars





3.  Recent hospitalization for pneumonia.  Chest x-ray completed showing 

improvement





4.  History of seizures.  Patient was evaluated by neurology services and 

medications adjusted during previous day





5.  History of stroke





6.  Essential hypertension





7.  History of hyperlipidemia.  Patient obtained on statin





8.  History of COPD no exacerbation at this time





9.  History of coronary artery disease





10.  History of peripheral vascular disease with previous history of multiple 

toe amputations





11.  Iron deficiency anemia





12.  Episodes of agitation and screaming, patient received Haldol and Ativan, 

psychiatry and neurology consultation requested








DVT prophylaxis Lovenox.  GI prophylaxis Pepcid


PT OT consulted for possible ECF placement

## 2020-10-25 LAB
ALBUMIN SERPL-MCNC: 2.7 G/DL (ref 3.8–4.9)
ALBUMIN/GLOB SERPL: 1.13 G/DL (ref 1.6–3.17)
ALP SERPL-CCNC: 152 U/L (ref 41–126)
ALT SERPL-CCNC: 44 U/L (ref 8–44)
ANION GAP SERPL CALC-SCNC: 4.2 MMOL/L (ref 4–12)
AST SERPL-CCNC: 23 U/L (ref 13–35)
BUN SERPL-SCNC: 17 MG/DL (ref 9–27)
BUN/CREAT SERPL: 18.89 RATIO (ref 12–20)
CALCIUM SPEC-MCNC: 9 MG/DL (ref 8.7–10.3)
CELLS COUNTED: 100
CHLORIDE SERPL-SCNC: 105 MMOL/L (ref 96–109)
CO2 SERPL-SCNC: 32.8 MMOL/L (ref 21.6–31.8)
ERYTHROCYTE [DISTWIDTH] IN BLOOD BY AUTOMATED COUNT: 2.64 M/UL (ref 3.8–5.4)
ERYTHROCYTE [DISTWIDTH] IN BLOOD: 16 % (ref 11.5–15.5)
GLOBULIN SER CALC-MCNC: 2.4 G/DL (ref 1.6–3.3)
GLUCOSE BLD-MCNC: 145 MG/DL (ref 75–99)
GLUCOSE BLD-MCNC: 154 MG/DL (ref 75–99)
GLUCOSE BLD-MCNC: 172 MG/DL (ref 75–99)
GLUCOSE BLD-MCNC: 207 MG/DL (ref 75–99)
GLUCOSE BLD-MCNC: 236 MG/DL (ref 75–99)
GLUCOSE BLD-MCNC: 315 MG/DL (ref 75–99)
GLUCOSE BLD-MCNC: 447 MG/DL (ref 75–99)
GLUCOSE SERPL-MCNC: 252 MG/DL (ref 70–110)
HCT VFR BLD AUTO: 25.8 % (ref 34–46)
HGB BLD-MCNC: 8.2 GM/DL (ref 11.4–16)
LYMPHOCYTES # BLD MANUAL: 1.41 K/UL (ref 1–4.8)
MCH RBC QN AUTO: 31.2 PG (ref 25–35)
MCHC RBC AUTO-ENTMCNC: 31.9 G/DL (ref 31–37)
MCV RBC AUTO: 97.9 FL (ref 80–100)
MONOCYTES # BLD MANUAL: 0.22 K/UL (ref 0–1)
NEUTROPHILS NFR BLD MANUAL: 26 %
NEUTS SEG # BLD MANUAL: 0.57 K/UL (ref 1.3–7.7)
PLATELET # BLD AUTO: 158 K/UL (ref 150–450)
POTASSIUM SERPL-SCNC: 4.2 MMOL/L (ref 3.5–5.5)
PROT SERPL-MCNC: 5.1 G/DL (ref 6.2–8.2)
SODIUM SERPL-SCNC: 142 MMOL/L (ref 135–145)
WBC # BLD AUTO: 2.2 K/UL (ref 3.8–10.6)

## 2020-10-25 RX ADMIN — LACOSAMIDE SCH MLS/HR: 10 INJECTION INTRAVENOUS at 13:35

## 2020-10-25 RX ADMIN — Medication SCH MG: at 08:43

## 2020-10-25 RX ADMIN — VALPROATE SODIUM SCH MLS/HR: 100 INJECTION, SOLUTION INTRAVENOUS at 04:16

## 2020-10-25 RX ADMIN — METOCLOPRAMIDE SCH MG: 5 TABLET ORAL at 17:39

## 2020-10-25 RX ADMIN — INSULIN ASPART SCH UNIT: 100 INJECTION, SOLUTION INTRAVENOUS; SUBCUTANEOUS at 13:19

## 2020-10-25 RX ADMIN — Medication SCH EACH: at 17:39

## 2020-10-25 RX ADMIN — INSULIN DETEMIR SCH UNIT: 100 INJECTION, SOLUTION SUBCUTANEOUS at 22:37

## 2020-10-25 RX ADMIN — INSULIN ASPART SCH UNIT: 100 INJECTION, SOLUTION INTRAVENOUS; SUBCUTANEOUS at 08:43

## 2020-10-25 RX ADMIN — OXYCODONE HYDROCHLORIDE AND ACETAMINOPHEN SCH MG: 500 TABLET ORAL at 08:43

## 2020-10-25 RX ADMIN — INSULIN ASPART SCH UNIT: 100 INJECTION, SOLUTION INTRAVENOUS; SUBCUTANEOUS at 17:39

## 2020-10-25 RX ADMIN — POTASSIUM CHLORIDE SCH MLS/HR: 14.9 INJECTION, SOLUTION INTRAVENOUS at 22:39

## 2020-10-25 RX ADMIN — METOCLOPRAMIDE SCH MG: 5 TABLET ORAL at 11:14

## 2020-10-25 RX ADMIN — CLOPIDOGREL BISULFATE SCH MG: 75 TABLET ORAL at 08:43

## 2020-10-25 RX ADMIN — ASPIRIN 81 MG CHEWABLE TABLET SCH MG: 81 TABLET CHEWABLE at 08:43

## 2020-10-25 RX ADMIN — VALPROATE SODIUM SCH MLS/HR: 100 INJECTION, SOLUTION INTRAVENOUS at 11:13

## 2020-10-25 RX ADMIN — Medication SCH EACH: at 08:43

## 2020-10-25 RX ADMIN — LACOSAMIDE SCH MLS/HR: 10 INJECTION INTRAVENOUS at 02:30

## 2020-10-25 RX ADMIN — ENOXAPARIN SODIUM SCH MG: 40 INJECTION SUBCUTANEOUS at 08:44

## 2020-10-25 RX ADMIN — POTASSIUM CHLORIDE SCH: 14.9 INJECTION, SOLUTION INTRAVENOUS at 00:09

## 2020-10-25 RX ADMIN — LOSARTAN POTASSIUM SCH MG: 50 TABLET, FILM COATED ORAL at 08:43

## 2020-10-25 RX ADMIN — HYDROCODONE BITARTRATE AND ACETAMINOPHEN PRN EACH: 10; 325 TABLET ORAL at 08:44

## 2020-10-25 RX ADMIN — METOCLOPRAMIDE SCH MG: 5 TABLET ORAL at 08:44

## 2020-10-25 RX ADMIN — VALPROATE SODIUM SCH MLS/HR: 100 INJECTION, SOLUTION INTRAVENOUS at 20:10

## 2020-10-25 RX ADMIN — FAMOTIDINE SCH MG: 20 TABLET, FILM COATED ORAL at 08:44

## 2020-10-25 RX ADMIN — ATORVASTATIN CALCIUM SCH MG: 20 TABLET, FILM COATED ORAL at 08:44

## 2020-10-25 NOTE — P.PN
Subjective


Progress Note Date: 10/25/20





Morenita Ramirez, is a 60-year-old female patient well-known to my services.  

Patient was recently discharged she was admitted to the hospital for DKA and 

pneumonia.  Patient has underlying history of diabetes mellitus type 1 which is 

a brittle diabetic.  Lantus was decreased upon discharge.  Additional medical 

history includes stroke and seizures.  She was discharged home with her sister. 

According to ER report patient was found to have a low blood sugar with altered 

mental status changes.  Blood sugar was corrected and alt mental status changes 

recovered.  There is questionable compliance with diet.  Consult placed for 

possible ECF placement on discharge.  Chest x-ray was completed showing no act

yadi cardiopulmonary disease.  There is almost complete clearing of the right 

side patchy pulmonary infiltrate compared to old exam.  We'll continue to 

monitor blood sugar closely and make adjustments to home medication .





On 10/17/2020 patient was seen and examined on the medical floor she is alert 

and oriented 3 in no distress there is no fever or chills no headache or 

dizziness no chest pain no shortness of breath no cough no nausea or vomiting no

abdominal pain no diarrhea no blood in the stools no burning with urination no 

frequency or urgency and no hematuria glucose levels are much better controlled 

today she is receiving Levemir 10 units at bedtime and sliding scale before 

meals








On 10/18/2020 patient was seen and examined on the medical floor she is alert 

and oriented 3 in no distress there is no fever or chills no headache or 

dizziness no chest pain no shortness of breath no cough no nausea or vomiting no

abdominal pain no diarrhea no blood in the stools no burning with urination no 

frequency or urgency and no hematuria.  Patient to glucose level is still 

fluctuating she was 88 before breakfast this morning no short-acting insulin was

given patient was given breakfast her glucose level was 464 before lunch she is 

being given 8 units of NovoLog before lunch will continue to monitor and adjust 

insulin, possible transfer to rehab tomorrow.





On 10/19/2020 patient was seen and examined on the medical floor she is alert 

and oriented in no distress, glucose levels are better controlled however this 

morning patient had another episode of hypoglycemia, at this time will decrease 

Levemir dose to 8 units daily and continue was NovoLog before meals, and give a 

bedtime snack, but no short-acting insulin at bedtime.





On 10/20/2020 patient was seen and examined on the medical floor she is alert 

and oriented 3 in no apparent distress she had significantly elevated glucose 

level of 600 this morning, otherwise she denies any complaints at this point 

will increase Levemir again to 10 units at bedtime, avoid NovoLog before 

bedtime, and give NovoLog only before meals will continue to monitor glucose 

level and adjust insulin accordingly








On 10/21/2020 Patient is more sleepy today. Blood sugar in the 400's this AM.  

corrected per scale. Will continue to monitor for the next 24-48 hours. Denies 

any specific complaints








On 10/22/2020 patient was seen and examined on the medical floor she is 

somnolent, arousable for a few seconds and she returns to sleep, she was up all 

night very agitated and screaming, she received IV Ativan and IM Haldol, 

consultation for psychiatry and neurology were initiated in that regard, 

otherwise no events since yesterday glucose level is better controlled.





On 10/23/2020 patient was seen and examined on the medical floor she is somno

lent arousable in no apparent distress she has been evaluated by psychiatry and 

neurology medications are being adjusted she was started on IV fluid due to mild

dehydration





On 10/24/2020 patient was seen and examined on the medical floor she is more 

alert today she was able to tolerate her breakfast well glucose was very low 

this morning she received half an amp of D50 otherwise she denies any complaints

at this time





On 10/25/2020 patient is currently resting comfortably in bed.  Per nursing s

taff patient is more alert and less agitated today.  Patient is currently 

resting comfortably in bed does wake up follow commands.  Blood sugar this a.m. 

252 corrected per scale.  Patient maintained on IV Depacon per neurology 

psychiatry meds also adjusted per psychiatry services. 





Objective





- Vital Signs


Vital signs: 


                                   Vital Signs











Temp  98.0 F   10/25/20 05:00


 


Pulse  60   10/25/20 05:00


 


Resp  16   10/25/20 05:00


 


BP  99/64   10/25/20 05:00


 


Pulse Ox  95   10/25/20 05:00








                                 Intake & Output











 10/24/20 10/25/20 10/25/20





 18:59 06:59 18:59


 


Other:   


 


  Voiding Method Diaper Diaper 





 Incontinent Incontinent 


 


  # Voids 2 2 


 


  # Bowel Movements  1 














- Exam








Head normocephalic and atraumatic


Neck supple no JVD no goiter


Lungs clear to auscultation bilaterally no wheezing or crackles


Heart regular rate and rhythm S1-S2, no rub or gallop


Abdomen is soft nontender nondistended positive bowel sounds no 

hepatosplenomegaly


Extremities no edema no cyanosis or clubbing


Neuro alert and orientated to 3. sleepy








- Labs


CBC & Chem 7: 


                                 10/25/20 06:07





                                 10/25/20 06:07


Labs: 


                  Abnormal Lab Results - Last 24 Hours (Table)











  10/24/20 10/24/20 10/24/20 Range/Units





  12:13 17:04 19:49 


 


WBC     (3.8-10.6)  k/uL


 


RBC     (3.80-5.40)  m/uL


 


Hgb     (11.4-16.0)  gm/dL


 


Hct     (34.0-46.0)  %


 


RDW     (11.5-15.5)  %


 


Neutrophils # (Manual)     (1.3-7.7)  k/uL


 


Carbon Dioxide     (21.6-31.8)  mmol/L


 


Glucose     ()  mg/dL


 


POC Glucose (mg/dL)  160 H  110 H  447 H  (75-99)  mg/dL


 


Total Bilirubin     (0.3-1.2)  mg/dL


 


Alkaline Phosphatase     ()  U/L


 


Total Protein     (6.2-8.2)  g/dL


 


Albumin     (3.80-4.90)  g/dL


 


Albumin/Globulin Ratio     (1.60-3.17)  g/dL














  10/25/20 10/25/20 10/25/20 Range/Units





  02:15 04:53 06:07 


 


WBC    2.2 L  (3.8-10.6)  k/uL


 


RBC    2.64 L  (3.80-5.40)  m/uL


 


Hgb    8.2 L  (11.4-16.0)  gm/dL


 


Hct    25.8 L  (34.0-46.0)  %


 


RDW    16.0 H  (11.5-15.5)  %


 


Neutrophils # (Manual)    0.57 L  (1.3-7.7)  k/uL


 


Carbon Dioxide     (21.6-31.8)  mmol/L


 


Glucose     ()  mg/dL


 


POC Glucose (mg/dL)  447 H  315 H   (75-99)  mg/dL


 


Total Bilirubin     (0.3-1.2)  mg/dL


 


Alkaline Phosphatase     ()  U/L


 


Total Protein     (6.2-8.2)  g/dL


 


Albumin     (3.80-4.90)  g/dL


 


Albumin/Globulin Ratio     (1.60-3.17)  g/dL














  10/25/20 10/25/20 Range/Units





  06:07 07:11 


 


WBC    (3.8-10.6)  k/uL


 


RBC    (3.80-5.40)  m/uL


 


Hgb    (11.4-16.0)  gm/dL


 


Hct    (34.0-46.0)  %


 


RDW    (11.5-15.5)  %


 


Neutrophils # (Manual)    (1.3-7.7)  k/uL


 


Carbon Dioxide  32.8 H   (21.6-31.8)  mmol/L


 


Glucose  252 H   ()  mg/dL


 


POC Glucose (mg/dL)   207 H  (75-99)  mg/dL


 


Total Bilirubin  0.1 L   (0.3-1.2)  mg/dL


 


Alkaline Phosphatase  152 H   ()  U/L


 


Total Protein  5.1 L   (6.2-8.2)  g/dL


 


Albumin  2.70 L   (3.80-4.90)  g/dL


 


Albumin/Globulin Ratio  1.13 L   (1.60-3.17)  g/dL














Assessment and Plan


Assessment: 








1.  Altered mental status is with hypoglycemia.  Hypoglycemia was corrected 

altered mental status changes have resolved





2.  Diabetes mellitus type 1.  Patient is known to be a brittle diabetic with 

fluctuating blood sugars





3.  Recent hospitalization for pneumonia.  Chest x-ray completed showing 

improvement





4.  History of seizures.  Neurology services are following





5.  History of stroke





6.  Essential hypertension





7.  History of hyperlipidemia.  Patient obtained on statin





8.  History of COPD no exacerbation at this time





9.  History of coronary artery disease





10.  History of peripheral vascular disease with previous history of multiple 

toe amputations





11.  Iron deficiency anemia





12.  Episodes of agitation and screaming, patient received Haldol and Ativan, p

sychiatry and neurology consultation requested. 








DVT prophylaxis Lovenox.  GI prophylaxis Pepcid


PT OT consulted for possible ECF placement





I performed an examination of the patient and discussed their management with 

the Nurse Practitioner.  I have reviewed the Nurse Practitioner's notes and agr

ee with the documented findings and plan of care

## 2020-10-26 VITALS — RESPIRATION RATE: 18 BRPM

## 2020-10-26 LAB
ALBUMIN SERPL-MCNC: 2.7 G/DL (ref 3.8–4.9)
ALBUMIN/GLOB SERPL: 1.08 G/DL (ref 1.6–3.17)
ALP SERPL-CCNC: 133 U/L (ref 41–126)
ALT SERPL-CCNC: 39 U/L (ref 8–44)
ANION GAP SERPL CALC-SCNC: 7.7 MMOL/L (ref 4–12)
AST SERPL-CCNC: 26 U/L (ref 13–35)
BASOPHILS # BLD AUTO: 0 K/UL (ref 0–0.2)
BASOPHILS NFR BLD AUTO: 0 %
BUN SERPL-SCNC: 16 MG/DL (ref 9–27)
BUN/CREAT SERPL: 14.55 RATIO (ref 12–20)
CALCIUM SPEC-MCNC: 8.8 MG/DL (ref 8.7–10.3)
CHLORIDE SERPL-SCNC: 106 MMOL/L (ref 96–109)
CO2 SERPL-SCNC: 27.3 MMOL/L (ref 21.6–31.8)
EOSINOPHIL # BLD AUTO: 0 K/UL (ref 0–0.7)
EOSINOPHIL NFR BLD AUTO: 1 %
ERYTHROCYTE [DISTWIDTH] IN BLOOD BY AUTOMATED COUNT: 2.96 M/UL (ref 3.8–5.4)
ERYTHROCYTE [DISTWIDTH] IN BLOOD: 16.2 % (ref 11.5–15.5)
GLOBULIN SER CALC-MCNC: 2.5 G/DL (ref 1.6–3.3)
GLUCOSE BLD-MCNC: 202 MG/DL (ref 75–99)
GLUCOSE BLD-MCNC: 211 MG/DL (ref 75–99)
GLUCOSE BLD-MCNC: 263 MG/DL (ref 75–99)
GLUCOSE BLD-MCNC: 297 MG/DL (ref 75–99)
GLUCOSE BLD-MCNC: 324 MG/DL (ref 75–99)
GLUCOSE BLD-MCNC: 336 MG/DL (ref 75–99)
GLUCOSE BLD-MCNC: 81 MG/DL (ref 75–99)
GLUCOSE SERPL-MCNC: 236 MG/DL (ref 70–110)
GLUCOSE SERPL-MCNC: 39 MG/DL (ref 70–110)
HCT VFR BLD AUTO: 29.4 % (ref 34–46)
HGB BLD-MCNC: 9.1 GM/DL (ref 11.4–16)
LYMPHOCYTES # SPEC AUTO: 1.4 K/UL (ref 1–4.8)
LYMPHOCYTES NFR SPEC AUTO: 17 %
MCH RBC QN AUTO: 30.6 PG (ref 25–35)
MCHC RBC AUTO-ENTMCNC: 30.8 G/DL (ref 31–37)
MCV RBC AUTO: 99.4 FL (ref 80–100)
MONOCYTES # BLD AUTO: 0.6 K/UL (ref 0–1)
MONOCYTES NFR BLD AUTO: 7 %
NEUTROPHILS # BLD AUTO: 6.2 K/UL (ref 1.3–7.7)
NEUTROPHILS NFR BLD AUTO: 74 %
PLATELET # BLD AUTO: 167 K/UL (ref 150–450)
POTASSIUM SERPL-SCNC: 5.5 MMOL/L (ref 3.5–5.5)
PROT SERPL-MCNC: 5.2 G/DL (ref 6.2–8.2)
SODIUM SERPL-SCNC: 141 MMOL/L (ref 135–145)
WBC # BLD AUTO: 8.4 K/UL (ref 3.8–10.6)

## 2020-10-26 PROCEDURE — 05HB33Z INSERTION OF INFUSION DEVICE INTO RIGHT BASILIC VEIN, PERCUTANEOUS APPROACH: ICD-10-PCS

## 2020-10-26 RX ADMIN — VALPROATE SODIUM SCH MLS/HR: 100 INJECTION, SOLUTION INTRAVENOUS at 20:32

## 2020-10-26 RX ADMIN — METOCLOPRAMIDE SCH MG: 5 TABLET ORAL at 17:33

## 2020-10-26 RX ADMIN — ENOXAPARIN SODIUM SCH MG: 40 INJECTION SUBCUTANEOUS at 08:28

## 2020-10-26 RX ADMIN — LOSARTAN POTASSIUM SCH MG: 50 TABLET, FILM COATED ORAL at 08:30

## 2020-10-26 RX ADMIN — LACOSAMIDE SCH MLS/HR: 10 INJECTION INTRAVENOUS at 01:56

## 2020-10-26 RX ADMIN — METOPROLOL TARTRATE SCH MG: 25 TABLET, FILM COATED ORAL at 08:32

## 2020-10-26 RX ADMIN — METOCLOPRAMIDE SCH MG: 5 TABLET ORAL at 08:29

## 2020-10-26 RX ADMIN — VALPROATE SODIUM SCH MLS/HR: 100 INJECTION, SOLUTION INTRAVENOUS at 11:15

## 2020-10-26 RX ADMIN — INSULIN DETEMIR SCH UNIT: 100 INJECTION, SOLUTION SUBCUTANEOUS at 20:33

## 2020-10-26 RX ADMIN — Medication SCH EACH: at 17:33

## 2020-10-26 RX ADMIN — LACOSAMIDE SCH MLS/HR: 10 INJECTION INTRAVENOUS at 16:00

## 2020-10-26 RX ADMIN — OXYCODONE HYDROCHLORIDE AND ACETAMINOPHEN SCH MG: 500 TABLET ORAL at 08:29

## 2020-10-26 RX ADMIN — ATORVASTATIN CALCIUM SCH MG: 20 TABLET, FILM COATED ORAL at 08:29

## 2020-10-26 RX ADMIN — Medication SCH MG: at 08:29

## 2020-10-26 RX ADMIN — CLOPIDOGREL BISULFATE SCH MG: 75 TABLET ORAL at 08:30

## 2020-10-26 RX ADMIN — INSULIN ASPART SCH UNIT: 100 INJECTION, SOLUTION INTRAVENOUS; SUBCUTANEOUS at 08:28

## 2020-10-26 RX ADMIN — ASPIRIN 81 MG CHEWABLE TABLET SCH MG: 81 TABLET CHEWABLE at 08:29

## 2020-10-26 RX ADMIN — METOPROLOL TARTRATE SCH MG: 25 TABLET, FILM COATED ORAL at 20:32

## 2020-10-26 RX ADMIN — INSULIN ASPART SCH: 100 INJECTION, SOLUTION INTRAVENOUS; SUBCUTANEOUS at 12:23

## 2020-10-26 RX ADMIN — METOCLOPRAMIDE SCH MG: 5 TABLET ORAL at 12:45

## 2020-10-26 RX ADMIN — Medication SCH EACH: at 08:29

## 2020-10-26 RX ADMIN — VALPROATE SODIUM SCH MLS/HR: 100 INJECTION, SOLUTION INTRAVENOUS at 03:39

## 2020-10-26 RX ADMIN — METOPROLOL TARTRATE SCH MG: 25 TABLET, FILM COATED ORAL at 01:31

## 2020-10-26 RX ADMIN — INSULIN ASPART SCH UNIT: 100 INJECTION, SOLUTION INTRAVENOUS; SUBCUTANEOUS at 17:33

## 2020-10-26 RX ADMIN — HYDROCODONE BITARTRATE AND ACETAMINOPHEN PRN EACH: 10; 325 TABLET ORAL at 09:56

## 2020-10-26 RX ADMIN — FAMOTIDINE SCH MG: 20 TABLET, FILM COATED ORAL at 08:29

## 2020-10-26 NOTE — P.PN
Progress Note - Text


Progress Note Date: 10/26/20





 Identifying Data: This patient is a 60  female with a significant 

history of IDDM, DKA, CVAs with left-sided paralysis, seizure disorder, 

hypothyroidism, and CAD was admitted on 10/16/2020 with mental status changes 

and hyperglycemia.





Interval History: 


Patient was seen in bed and was arousable and was able to engage in the 

Psychiatric interview. Patient is reporting no significant depression or anxiety

at this time. She denies any suicidal or homicidal ideation, intention, and/or 

plan. She expresses no auditory or visual hallucinations. She denies any 

paranoia. She has been noted by staff to yell intermittently but this behavior 

has decreased significantly. Staff note that the addition of seroquel in the 

morning has helped her calm down be less agitated. She is currently calm and 

maintains eye contact and speaks in a low tone of voice. She is not endorsing 

any pain at this time and does not appear in any acute distress. 





Mental Status Exam:


General Appearance: Patient appears older than her stated age, thin and frail.  

Dressed in a hospital gown. 


Behavior: Patient psychomotor activity is slow. Eye contact is maintained. No 

abnormal or stereotypic behaviors noted.


Speech: Patient's speech is low in volume, nonspontaneous, minimal.


Mood/Affect: Mood is "okay.", affect is blunted.


Suicidality/Homicidality: Denies SI or HI.


Perceptions: Denies [facility]


Though content/process: Linear and logical in short conversation. 


Memory and concentration: The patient is alert and oriented to person and place 

only.  Concentration is poor.


Judgment and insight: Poor





Assessment


Altered Mental Status with hypoglycemia


Psychosis, unspecified 





Plan:


-We will make no medication changes at this time.


Continue Seroquel 25 mg by mouth in the morning, 50 mg by mouth at bedtime


Cymbalta 30 mg by mouth daily


Haldol/Ativan when necessary, Xanax when necessary.


-Depacon as per Neurology


-Delirium precautions recommended with patient including - avoiding use of 

narcotics and CNS sedatives, limit anticholinergic medications when possible, 

frequent reorientation, minimize use of restraints, open window shades during 

the day and close them at night


- Psychiatry will sign off at this time. Please contact if any questions.

## 2020-10-26 NOTE — P.PN
Subjective


Progress Note Date: 10/26/20








Morenita Ramirez, is a 60-year-old female patient well-known to my services.  

Patient was recently discharged she was admitted to the hospital for DKA and 

pneumonia.  Patient has underlying history of diabetes mellitus type 1 which is 

a brittle diabetic.  Lantus was decreased upon discharge.  Additional medical 

history includes stroke and seizures.  She was discharged home with her sister. 

According to ER report patient was found to have a low blood sugar with altered 

mental status changes.  Blood sugar was corrected and alt mental status changes 

recovered.  There is questionable compliance with diet.  Consult placed for 

possible ECF placement on discharge.  Chest x-ray was completed showing no ac

tive cardiopulmonary disease.  There is almost complete clearing of the right 

side patchy pulmonary infiltrate compared to old exam.  We'll continue to 

monitor blood sugar closely and make adjustments to home medication .





On 10/17/2020 patient was seen and examined on the medical floor she is alert 

and oriented 3 in no distress there is no fever or chills no headache or 

dizziness no chest pain no shortness of breath no cough no nausea or vomiting no

abdominal pain no diarrhea no blood in the stools no burning with urination no 

frequency or urgency and no hematuria glucose levels are much better controlled 

today she is receiving Levemir 10 units at bedtime and sliding scale before 

meals








On 10/18/2020 patient was seen and examined on the medical floor she is alert 

and oriented 3 in no distress there is no fever or chills no headache or 

dizziness no chest pain no shortness of breath no cough no nausea or vomiting no

abdominal pain no diarrhea no blood in the stools no burning with urination no 

frequency or urgency and no hematuria.  Patient to glucose level is still 

fluctuating she was 88 before breakfast this morning no short-acting insulin was

given patient was given breakfast her glucose level was 464 before lunch she is 

being given 8 units of NovoLog before lunch will continue to monitor and adjust 

insulin, possible transfer to rehab tomorrow.





On 10/19/2020 patient was seen and examined on the medical floor she is alert 

and oriented in no distress, glucose levels are better controlled however this 

morning patient had another episode of hypoglycemia, at this time will decrease 

Levemir dose to 8 units daily and continue was NovoLog before meals, and give a 

bedtime snack, but no short-acting insulin at bedtime.





On 10/20/2020 patient was seen and examined on the medical floor she is alert 

and oriented 3 in no apparent distress she had significantly elevated glucose 

level of 600 this morning, otherwise she denies any complaints at this point 

will increase Levemir again to 10 units at bedtime, avoid NovoLog before 

bedtime, and give NovoLog only before meals will continue to monitor glucose 

level and adjust insulin accordingly








On 10/21/2020 Patient is more sleepy today. Blood sugar in the 400's this AM.  

corrected per scale. Will continue to monitor for the next 24-48 hours. Denies 

any specific complaints








On 10/22/2020 patient was seen and examined on the medical floor she is 

somnolent, arousable for a few seconds and she returns to sleep, she was up all 

night very agitated and screaming, she received IV Ativan and IM Haldol, 

consultation for psychiatry and neurology were initiated in that regard, 

otherwise no events since yesterday glucose level is better controlled.





On 10/23/2020 patient was seen and examined on the medical floor she is somn

olent arousable in no apparent distress she has been evaluated by psychiatry and

neurology medications are being adjusted she was started on IV fluid due to mild

dehydration





On 10/24/2020 patient was seen and examined on the medical floor she is more 

alert today she was able to tolerate her breakfast well glucose was very low 

this morning she received half an amp of D50 otherwise she denies any complaints

at this time.





On 10/25/2020 patient is currently resting comfortably in bed.  Per nursing 

staff patient is more alert and less agitated today.  Patient is currently 

resting comfortably in bed does wake up follow commands.  Blood sugar this a.m. 

252 corrected per scale.  Patient maintained on IV Depacon per neurology 

psychiatry meds also adjusted per psychiatry services. 








On 1026 and on the medical floor at this time she is somnolent responsive to 

stimuli, in no apparent distress, earlier she had episodes of agitation and 

screaming, recommendation from neurology and psychiatry reviewed currently she 

is maintained on Seroquel 25 mg in a.m. and 50 mg in p.m., Reglan dose was 

decreased to 5 mg 3 times daily before meals, if patient is tolerating well Will

attempt to discontinue Reglan, she is receiving Norco and Xanax on a when 

necessary basis, at this time will discontinue Haldol when necessary and 

monitor, hopefully patient will be ready for transfer to a nursing facility so

on.





Objective





- Vital Signs


Vital signs: 


                                   Vital Signs











Temp  98.1 F   10/26/20 05:00


 


Pulse  89   10/26/20 11:03


 


Resp  18   10/26/20 11:03


 


BP  129/60   10/26/20 11:03


 


Pulse Ox  96   10/26/20 11:03








                                 Intake & Output











 10/25/20 10/26/20 10/26/20





 18:59 06:59 18:59


 


Intake Total   250


 


Balance   250


 


Weight   58.967 kg


 


Intake:   


 


  Intake, IV Titration   250





  Amount   


 


    Valproate Sodium 250 mg   250





    In Sodium Chloride 0.9%   





    100 ml @ 100 mls/hr IVPB   





    Q8[facility] Rx#:[id number]   


 


Other:   


 


  Voiding Method Diaper Diaper Diaper





 Incontinent Incontinent Incontinent


 


  # Voids 3 1 1


 


  # Bowel Movements  1 














- Exam








Head normocephalic and atraumatic


Neck supple no JVD no goiter


Lungs clear to auscultation bilaterally no wheezing or crackles


Heart regular rate and rhythm S1-S2, no rub or gallop


Abdomen is soft nontender nondistended positive bowel sounds no 

hepatosplenomegaly


Extremities no edema no cyanosis or clubbing


Neuro alert and orientated to 3








- Labs


CBC & Chem 7: 


                                 10/26/20 05:35





                                 10/26/20 05:35


Labs: 


                  Abnormal Lab Results - Last 24 Hours (Table)











  10/24/20 10/25/20 10/25/20 Range/Units





  05:57 20:04 22:27 


 


RBC     (3.80-5.40)  m/uL


 


Hgb     (11.4-16.0)  gm/dL


 


Hct     (34.0-46.0)  %


 


MCHC     (31.0-37.0)  g/dL


 


RDW     (11.5-15.5)  %


 


Est GFR (CKD-EPI)NonAf     (60.0-200.0)   


 


Glucose  39 L*    ()  mg/dL


 


POC Glucose (mg/dL)   145 H  236 H  (75-99)  mg/dL


 


Total Bilirubin     (0.3-1.2)  mg/dL


 


Alkaline Phosphatase     ()  U/L


 


Total Protein     (6.2-8.2)  g/dL


 


Albumin     (3.80-4.90)  g/dL


 


Albumin/Globulin Ratio     (1.60-3.17)  g/dL














  10/26/20 10/26/20 10/26/20 Range/Units





  00:54 02:28 05:35 


 


RBC    2.96 L  (3.80-5.40)  m/uL


 


Hgb    9.1 L  (11.4-16.0)  gm/dL


 


Hct    29.4 L  (34.0-46.0)  %


 


MCHC    30.8 L  (31.0-37.0)  g/dL


 


RDW    16.2 H  (11.5-15.5)  %


 


Est GFR (CKD-EPI)NonAf     (60.0-200.0)   


 


Glucose     ()  mg/dL


 


POC Glucose (mg/dL)  336 H  297 H   (75-99)  mg/dL


 


Total Bilirubin     (0.3-1.2)  mg/dL


 


Alkaline Phosphatase     ()  U/L


 


Total Protein     (6.2-8.2)  g/dL


 


Albumin     (3.80-4.90)  g/dL


 


Albumin/Globulin Ratio     (1.60-3.17)  g/dL














  10/26/20 10/26/20 10/26/20 Range/Units





  05:35 06:00 06:52 


 


RBC     (3.80-5.40)  m/uL


 


Hgb     (11.4-16.0)  gm/dL


 


Hct     (34.0-46.0)  %


 


MCHC     (31.0-37.0)  g/dL


 


RDW     (11.5-15.5)  %


 


Est GFR (CKD-EPI)NonAf  54.9 L    (60.0-200.0)   


 


Glucose  236 H    ()  mg/dL


 


POC Glucose (mg/dL)   263 H  211 H  (75-99)  mg/dL


 


Total Bilirubin  0.1 L    (0.3-1.2)  mg/dL


 


Alkaline Phosphatase  133 H    ()  U/L


 


Total Protein  5.2 L    (6.2-8.2)  g/dL


 


Albumin  2.70 L    (3.80-4.90)  g/dL


 


Albumin/Globulin Ratio  1.08 L    (1.60-3.17)  g/dL














  10/26/20 Range/Units





  17:09 


 


RBC   (3.80-5.40)  m/uL


 


Hgb   (11.4-16.0)  gm/dL


 


Hct   (34.0-46.0)  %


 


MCHC   (31.0-37.0)  g/dL


 


RDW   (11.5-15.5)  %


 


Est GFR (CKD-EPI)NonAf   (60.0-200.0)   


 


Glucose   ()  mg/dL


 


POC Glucose (mg/dL)  324 H  (75-99)  mg/dL


 


Total Bilirubin   (0.3-1.2)  mg/dL


 


Alkaline Phosphatase   ()  U/L


 


Total Protein   (6.2-8.2)  g/dL


 


Albumin   (3.80-4.90)  g/dL


 


Albumin/Globulin Ratio   (1.60-3.17)  g/dL














Assessment and Plan


Plan: 








1.  Altered mental status is with hypoglycemia.  Hypoglycemia was corrected 

altered mental status changes have resolved





2.  Diabetes mellitus type 1.  Patient is known to be a brittle diabetic with 

fluctuating blood sugars





3.  Recent hospitalization for pneumonia.  Chest x-ray completed showing 

improvement





4.  History of seizures.  Patient was evaluated by neurology services and 

medications adjusted during previous day





5.  History of stroke





6.  Essential hypertension





7.  History of hyperlipidemia.  Patient obtained on statin





8.  History of COPD no exacerbation at this time





9.  History of coronary artery disease





10.  History of peripheral vascular disease with previous history of multiple 

toe amputations





11.  Iron deficiency anemia





12.  Episodes of agitation and screaming, patient received Haldol and Ativan, 

psychiatry and neurology consultation requested








DVT prophylaxis Lovenox.  GI prophylaxis Pepcid


PT OT consulted for possible ECF placement

## 2020-10-27 VITALS — DIASTOLIC BLOOD PRESSURE: 55 MMHG | SYSTOLIC BLOOD PRESSURE: 123 MMHG | TEMPERATURE: 98.3 F

## 2020-10-27 VITALS — HEART RATE: 77 BPM

## 2020-10-27 LAB
ALBUMIN SERPL-MCNC: 2.7 G/DL (ref 3.8–4.9)
ALBUMIN/GLOB SERPL: 1.13 G/DL (ref 1.6–3.17)
ALP SERPL-CCNC: 119 U/L (ref 41–126)
ALT SERPL-CCNC: 34 U/L (ref 8–44)
ANION GAP SERPL CALC-SCNC: 6.7 MMOL/L (ref 4–12)
AST SERPL-CCNC: 23 U/L (ref 13–35)
BASOPHILS # BLD AUTO: 0 K/UL (ref 0–0.2)
BASOPHILS NFR BLD AUTO: 0 %
BUN SERPL-SCNC: 15 MG/DL (ref 9–27)
BUN/CREAT SERPL: 16.67 RATIO (ref 12–20)
CALCIUM SPEC-MCNC: 8.8 MG/DL (ref 8.7–10.3)
CHLORIDE SERPL-SCNC: 104 MMOL/L (ref 96–109)
CO2 SERPL-SCNC: 28.3 MMOL/L (ref 21.6–31.8)
EOSINOPHIL # BLD AUTO: 0 K/UL (ref 0–0.7)
EOSINOPHIL NFR BLD AUTO: 1 %
ERYTHROCYTE [DISTWIDTH] IN BLOOD BY AUTOMATED COUNT: 2.64 M/UL (ref 3.8–5.4)
ERYTHROCYTE [DISTWIDTH] IN BLOOD: 16.3 % (ref 11.5–15.5)
GLOBULIN SER CALC-MCNC: 2.4 G/DL (ref 1.6–3.3)
GLUCOSE BLD-MCNC: 196 MG/DL (ref 75–99)
GLUCOSE BLD-MCNC: 249 MG/DL (ref 75–99)
GLUCOSE BLD-MCNC: 306 MG/DL (ref 75–99)
GLUCOSE BLD-MCNC: 322 MG/DL (ref 75–99)
GLUCOSE SERPL-MCNC: 205 MG/DL (ref 70–110)
HCT VFR BLD AUTO: 25.7 % (ref 34–46)
HGB BLD-MCNC: 8 GM/DL (ref 11.4–16)
LYMPHOCYTES # SPEC AUTO: 2 K/UL (ref 1–4.8)
LYMPHOCYTES NFR SPEC AUTO: 38 %
MCH RBC QN AUTO: 30.3 PG (ref 25–35)
MCHC RBC AUTO-ENTMCNC: 31.2 G/DL (ref 31–37)
MCV RBC AUTO: 97.4 FL (ref 80–100)
MONOCYTES # BLD AUTO: 0.4 K/UL (ref 0–1)
MONOCYTES NFR BLD AUTO: 7 %
NEUTROPHILS # BLD AUTO: 2.8 K/UL (ref 1.3–7.7)
NEUTROPHILS NFR BLD AUTO: 52 %
PLATELET # BLD AUTO: 151 K/UL (ref 150–450)
POTASSIUM SERPL-SCNC: 4.3 MMOL/L (ref 3.5–5.5)
PROT SERPL-MCNC: 5.1 G/DL (ref 6.2–8.2)
SODIUM SERPL-SCNC: 139 MMOL/L (ref 135–145)
WBC # BLD AUTO: 5.3 K/UL (ref 3.8–10.6)

## 2020-10-27 RX ADMIN — INSULIN ASPART SCH UNIT: 100 INJECTION, SOLUTION INTRAVENOUS; SUBCUTANEOUS at 10:17

## 2020-10-27 RX ADMIN — ATORVASTATIN CALCIUM SCH MG: 20 TABLET, FILM COATED ORAL at 10:22

## 2020-10-27 RX ADMIN — METOPROLOL TARTRATE SCH MG: 25 TABLET, FILM COATED ORAL at 10:18

## 2020-10-27 RX ADMIN — HYDROCODONE BITARTRATE AND ACETAMINOPHEN PRN EACH: 10; 325 TABLET ORAL at 02:56

## 2020-10-27 RX ADMIN — HYDROCODONE BITARTRATE AND ACETAMINOPHEN PRN EACH: 10; 325 TABLET ORAL at 11:39

## 2020-10-27 RX ADMIN — ASPIRIN 81 MG CHEWABLE TABLET SCH MG: 81 TABLET CHEWABLE at 10:18

## 2020-10-27 RX ADMIN — POTASSIUM CHLORIDE SCH: 14.9 INJECTION, SOLUTION INTRAVENOUS at 00:31

## 2020-10-27 RX ADMIN — FAMOTIDINE SCH MG: 20 TABLET, FILM COATED ORAL at 10:18

## 2020-10-27 RX ADMIN — Medication SCH EACH: at 10:19

## 2020-10-27 RX ADMIN — LACOSAMIDE SCH: 10 INJECTION INTRAVENOUS at 13:19

## 2020-10-27 RX ADMIN — OXYCODONE HYDROCHLORIDE AND ACETAMINOPHEN SCH MG: 500 TABLET ORAL at 10:18

## 2020-10-27 RX ADMIN — CLOPIDOGREL BISULFATE SCH MG: 75 TABLET ORAL at 10:18

## 2020-10-27 RX ADMIN — Medication SCH MG: at 10:19

## 2020-10-27 RX ADMIN — VALPROATE SODIUM SCH MLS/HR: 100 INJECTION, SOLUTION INTRAVENOUS at 03:20

## 2020-10-27 RX ADMIN — INSULIN ASPART SCH UNIT: 100 INJECTION, SOLUTION INTRAVENOUS; SUBCUTANEOUS at 13:16

## 2020-10-27 RX ADMIN — METOCLOPRAMIDE SCH MG: 5 TABLET ORAL at 13:18

## 2020-10-27 RX ADMIN — ENOXAPARIN SODIUM SCH MG: 40 INJECTION SUBCUTANEOUS at 10:21

## 2020-10-27 RX ADMIN — VALPROATE SODIUM SCH: 100 INJECTION, SOLUTION INTRAVENOUS at 13:10

## 2020-10-27 RX ADMIN — LOSARTAN POTASSIUM SCH MG: 50 TABLET, FILM COATED ORAL at 10:19

## 2020-10-27 RX ADMIN — LACOSAMIDE SCH MLS/HR: 10 INJECTION INTRAVENOUS at 02:19

## 2020-10-27 RX ADMIN — METOCLOPRAMIDE SCH MG: 5 TABLET ORAL at 10:21

## 2020-10-31 ENCOUNTER — HOSPITAL ENCOUNTER (OUTPATIENT)
Dept: HOSPITAL 47 - EC | Age: 60
Setting detail: OBSERVATION
LOS: 1 days | Discharge: HOME | End: 2020-11-01
Attending: INTERNAL MEDICINE | Admitting: INTERNAL MEDICINE
Payer: COMMERCIAL

## 2020-10-31 DIAGNOSIS — Z88.8: ICD-10-CM

## 2020-10-31 DIAGNOSIS — K21.9: ICD-10-CM

## 2020-10-31 DIAGNOSIS — Z91.030: ICD-10-CM

## 2020-10-31 DIAGNOSIS — Z88.5: ICD-10-CM

## 2020-10-31 DIAGNOSIS — R11.2: Primary | ICD-10-CM

## 2020-10-31 DIAGNOSIS — Z89.411: ICD-10-CM

## 2020-10-31 DIAGNOSIS — Z96.1: ICD-10-CM

## 2020-10-31 DIAGNOSIS — Z79.891: ICD-10-CM

## 2020-10-31 DIAGNOSIS — Z79.82: ICD-10-CM

## 2020-10-31 DIAGNOSIS — K59.00: ICD-10-CM

## 2020-10-31 DIAGNOSIS — I25.10: ICD-10-CM

## 2020-10-31 DIAGNOSIS — R53.1: ICD-10-CM

## 2020-10-31 DIAGNOSIS — Z89.421: ICD-10-CM

## 2020-10-31 DIAGNOSIS — Z86.19: ICD-10-CM

## 2020-10-31 DIAGNOSIS — E78.5: ICD-10-CM

## 2020-10-31 DIAGNOSIS — Z86.14: ICD-10-CM

## 2020-10-31 DIAGNOSIS — Z87.01: ICD-10-CM

## 2020-10-31 DIAGNOSIS — Z90.49: ICD-10-CM

## 2020-10-31 DIAGNOSIS — R19.7: ICD-10-CM

## 2020-10-31 DIAGNOSIS — Z79.02: ICD-10-CM

## 2020-10-31 DIAGNOSIS — I50.9: ICD-10-CM

## 2020-10-31 DIAGNOSIS — Z88.1: ICD-10-CM

## 2020-10-31 DIAGNOSIS — E03.9: ICD-10-CM

## 2020-10-31 DIAGNOSIS — I25.2: ICD-10-CM

## 2020-10-31 DIAGNOSIS — I11.0: ICD-10-CM

## 2020-10-31 DIAGNOSIS — Z79.4: ICD-10-CM

## 2020-10-31 DIAGNOSIS — Z83.3: ICD-10-CM

## 2020-10-31 DIAGNOSIS — M54.5: ICD-10-CM

## 2020-10-31 DIAGNOSIS — E10.51: ICD-10-CM

## 2020-10-31 DIAGNOSIS — E10.319: ICD-10-CM

## 2020-10-31 DIAGNOSIS — D50.9: ICD-10-CM

## 2020-10-31 DIAGNOSIS — Z87.891: ICD-10-CM

## 2020-10-31 DIAGNOSIS — M19.90: ICD-10-CM

## 2020-10-31 DIAGNOSIS — Z82.5: ICD-10-CM

## 2020-10-31 DIAGNOSIS — R56.9: ICD-10-CM

## 2020-10-31 DIAGNOSIS — Z95.5: ICD-10-CM

## 2020-10-31 DIAGNOSIS — I69.354: ICD-10-CM

## 2020-10-31 DIAGNOSIS — Z90.710: ICD-10-CM

## 2020-10-31 DIAGNOSIS — Z88.0: ICD-10-CM

## 2020-10-31 DIAGNOSIS — Z86.718: ICD-10-CM

## 2020-10-31 DIAGNOSIS — J44.9: ICD-10-CM

## 2020-10-31 DIAGNOSIS — Z79.899: ICD-10-CM

## 2020-10-31 DIAGNOSIS — Z99.3: ICD-10-CM

## 2020-10-31 DIAGNOSIS — F41.9: ICD-10-CM

## 2020-10-31 DIAGNOSIS — Z82.49: ICD-10-CM

## 2020-10-31 DIAGNOSIS — F31.9: ICD-10-CM

## 2020-10-31 LAB
ALBUMIN SERPL-MCNC: 3.6 G/DL (ref 3.5–5)
ALP SERPL-CCNC: 98 U/L (ref 38–126)
ALT SERPL-CCNC: 23 U/L (ref 4–34)
ANION GAP SERPL CALC-SCNC: 3 MMOL/L
AST SERPL-CCNC: 67 U/L (ref 14–36)
BASOPHILS # BLD AUTO: 0 K/UL (ref 0–0.2)
BASOPHILS NFR BLD AUTO: 0 %
BUN SERPL-SCNC: 21 MG/DL (ref 7–17)
CALCIUM SPEC-MCNC: 8.9 MG/DL (ref 8.4–10.2)
CHLORIDE SERPL-SCNC: 105 MMOL/L (ref 98–107)
CK SERPL-CCNC: 52 U/L (ref 30–135)
CO2 SERPL-SCNC: 32 MMOL/L (ref 22–30)
EOSINOPHIL # BLD AUTO: 0 K/UL (ref 0–0.7)
EOSINOPHIL NFR BLD AUTO: 1 %
ERYTHROCYTE [DISTWIDTH] IN BLOOD BY AUTOMATED COUNT: 3.13 M/UL (ref 3.8–5.4)
ERYTHROCYTE [DISTWIDTH] IN BLOOD: 16.1 % (ref 11.5–15.5)
GLUCOSE BLD-MCNC: 103 MG/DL (ref 75–99)
GLUCOSE SERPL-MCNC: 110 MG/DL (ref 74–99)
GLUCOSE UR QL: (no result)
HCT VFR BLD AUTO: 30.8 % (ref 34–46)
HGB BLD-MCNC: 9.8 GM/DL (ref 11.4–16)
LYMPHOCYTES # SPEC AUTO: 1.6 K/UL (ref 1–4.8)
LYMPHOCYTES NFR SPEC AUTO: 28 %
MCH RBC QN AUTO: 31.4 PG (ref 25–35)
MCHC RBC AUTO-ENTMCNC: 32 G/DL (ref 31–37)
MCV RBC AUTO: 98.3 FL (ref 80–100)
MONOCYTES # BLD AUTO: 0.3 K/UL (ref 0–1)
MONOCYTES NFR BLD AUTO: 5 %
NEUTROPHILS # BLD AUTO: 3.7 K/UL (ref 1.3–7.7)
NEUTROPHILS NFR BLD AUTO: 66 %
PH UR: 7 [PH] (ref 5–8)
PLATELET # BLD AUTO: 205 K/UL (ref 150–450)
PROT SERPL-MCNC: 7.7 G/DL (ref 6.3–8.2)
SODIUM SERPL-SCNC: 140 MMOL/L (ref 137–145)
SP GR UR: 1.02 (ref 1–1.03)
UROBILINOGEN UR QL STRIP: <2 MG/DL (ref ?–2)
WBC # BLD AUTO: 5.6 K/UL (ref 3.8–10.6)

## 2020-10-31 PROCEDURE — 82550 ASSAY OF CK (CPK): CPT

## 2020-10-31 PROCEDURE — 83690 ASSAY OF LIPASE: CPT

## 2020-10-31 PROCEDURE — 36415 COLL VENOUS BLD VENIPUNCTURE: CPT

## 2020-10-31 PROCEDURE — 96374 THER/PROPH/DIAG INJ IV PUSH: CPT

## 2020-10-31 PROCEDURE — 99285 EMERGENCY DEPT VISIT HI MDM: CPT

## 2020-10-31 PROCEDURE — 76700 US EXAM ABDOM COMPLETE: CPT

## 2020-10-31 PROCEDURE — 71045 X-RAY EXAM CHEST 1 VIEW: CPT

## 2020-10-31 PROCEDURE — 82009 KETONE BODYS QUAL: CPT

## 2020-10-31 PROCEDURE — 82150 ASSAY OF AMYLASE: CPT

## 2020-10-31 PROCEDURE — 93005 ELECTROCARDIOGRAM TRACING: CPT

## 2020-10-31 PROCEDURE — 74018 RADEX ABDOMEN 1 VIEW: CPT

## 2020-10-31 PROCEDURE — 80053 COMPREHEN METABOLIC PANEL: CPT

## 2020-10-31 PROCEDURE — 81003 URINALYSIS AUTO W/O SCOPE: CPT

## 2020-10-31 PROCEDURE — 84484 ASSAY OF TROPONIN QUANT: CPT

## 2020-10-31 PROCEDURE — 85025 COMPLETE CBC W/AUTO DIFF WBC: CPT

## 2020-10-31 NOTE — ED
Nausea/Vomiting/Diarrhea HPI





- General


Source: patient, EMS, RN notes reviewed, old records reviewed


Mode of arrival: EMS


Limitations: no limitations





- History of Present Illness


MD complaint: nausea, vomiting





<Yobani Landaverde - Last Filed: 10/31/20 23:08>





<Mu Amor - Last Filed: 20 00:45>





- General


Chief complaint: Nausea/Vomiting/Diarrhea


Stated complaint: Vomiting


Time Seen by Provider: 10/31/20 21:18





- History of Present Illness


Initial comments: 





Is a 60-year-old female with a history of multiple medical issues including CVA 

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

and a recent hospitalization who presents with a EMS tonight with complaints of 

weakness and started yesterday and persistent nausea vomiting tonight.  No other

complaints no other modifying factors at this time (Yobani Landaverde)





- Related Data


                                Home Medications











 Medication  Instructions  Recorded  Confirmed


 


Famotidine [Pepcid] 20 mg PO DAILY 02/19/16 10/15/20


 


HYDROcodone/APAP 10-325MG [Norco 1 tab PO TID PRN 05/06/17 10/15/20





]   


 


Atorvastatin [Lipitor] 20 mg PO DAILY 07/31/19 10/15/20


 


Aspirin [Adult Low Dose Aspirin EC] 81 mg PO DAILY 01/10/20 10/15/20


 


Ferrous Sulfate [Iron (65  mg PO DAILY 01/10/20 10/15/20





Elemental)]   


 


Albuterol Sulfate [Ventolin HFA] 2 puff INHALATION RT-Q4H PRN 07/28/20 10/15/20


 


Ascorbic Acid [Vitamin C] 500 mg PO DAILY 08/31/20 10/15/20


 


Vitamin B Complex 1 tab PO DAILY 08/31/20 10/15/20








                                  Previous Rx's











 Medication  Instructions  Recorded


 


Divalproex [Depakote] 250 mg PO TID #90 tablet. 20


 


Losartan [Cozaar] 50 mg PO DAILY  tab 20


 


Calcium Carb-Vit D 500Mg-200Un 1 each PO BID-W/MEALS  tab 20





[Oscal 500+D]  


 


Clopidogrel [Plavix] 75 mg PO DAILY  tab 20


 


INSULIN ASPART (NovoLOG) [NovoLOG 0 unit SQ AC-TID  vial 20





(formulary)]  


 


Insulin Glargine,Hum.rec.anlog 10 unit SQ HS #0 10/14/20





[Basaglar Kwikpen U-100]  


 


Lacosamide [Vimpat] 100 mg PO BID 30 Days #60 tablet 10/14/20


 


QUEtiapine [SEROquel] 50 mg PO HS 30 Days #30 tab 10/14/20


 


ALPRAZolam [Xanax] 0.5 mg PO QID PRN  tab 10/27/20


 


DULoxetine HCL [Cymbalta] 30 mg PO DAILY  capsule.dr 10/27/20


 


Metoclopramide [Reglan] 5 mg PO AC-TID  tab 10/27/20


 


Metoprolol Tartrate [Lopressor] 12.5 mg PO BID  tab 10/27/20


 


QUEtiapine [SEROquel] 25 mg PO DAILY  tab 10/27/20











                                    Allergies











Allergy/AdvReac Type Severity Reaction Status Date / Time


 


Barbiturates Allergy  Rash/Hives Verified 10/15/20 22:22


 


cephalexin monohydrate Allergy  Rash/Hives Verified 10/15/20 22:22





[From Keflex]     


 


morphine Allergy  Rash/Hives Verified 10/15/20 22:22


 


Penicillins Allergy  Rash/Hives Verified 10/15/20 22:22


 


phenobarbital Allergy  Swelling Verified 10/15/20 22:22


 


venom-honey bee Allergy  Swelling Verified 10/15/20 22:22





[bee venom (honey bee)]     


 


amlodipine besylate AdvReac  Vomiting Verified 10/15/20 22:22





[From Norvasc]     














Review of Systems


ROS Other: All systems not noted in ROS Statement are negative.





<Yobani Landaverde - Last Filed: 10/31/20 23:08>


ROS Other: All systems not noted in ROS Statement are negative.





<Mu Amor - Last Filed: 20 00:45>


ROS Statement: 


Those systems with pertinent positive or pertinent negative responses have been 

documented in the HPI.








Past Medical History


Past Medical History: Asthma, Coronary Artery Disease (CAD), Chest Pain / 

Angina, Heart Failure, COPD, CVA/TIA, Diabetes Mellitus, Deep Vein Thrombosis 

(DVT), Eye Disorder, GERD/Reflux, Hyperlipidemia, Hypertension, Myocardial 

Infarction (MI), Neurologic Disorder, Osteoarthritis (OA), Pneumonia, Renal 

Disease


Additional Past Medical History / Comment(s): IDDM (brittle), DKAs, neuropathy 

bilateral hands/feet, retinopathy bilateral eyes, cellulitis R foot, R great toe

and 2nd toe infections/amputations, current wound R foot-being seen in [facility], 

renal failure, anemia, CVAs with L sided paralysis, headaches started after 

CVAs, brain lesions, DVT R axillae, low back pain, varicosities, seizure many 

years ago (), hypothyroid, constipation, bilateral tinnitis occasionally, 

sinus problems.


Last Myocardial Infarction Date:: 


History of Any Multi-Drug Resistant Organisms: MRSA


Date of last positivie culture/infection: 18


MDRO Source:: Right Foot


Past Surgical History: Appendectomy,  Section, Cholecystectomy, Heart 

Catheterization With Stent, Hysterectomy, Orthopedic Surgery


Additional Past Surgical History / Comment(s): PCI with multiple stents, R great

toe and 2nd toe amps, debridements R foot ulcer, L shoulder surgery to remove 

bone, bronchoscopy, EGD, colonoscopy, R arm port since removed, bilateral 

cataract removals/lens implants.


Past Anesthesia/Blood Transfusion Reactions: No Reported Reaction


Additional Past Anesthesia/Blood Transfusion Reaction / Comment(s): HX OF BLOOD 

TRANSFUSION- NO REACTION


Date of Last Stent Placement:: 2013


Past Psychological History: Anxiety, Bipolar, Depression


Smoking Status: Former smoker


Past Alcohol Use History: None Reported


Past Drug Use History: Marijuana





- Past Family History


  ** Father


Family Medical History: Unable to Obtain, Coronary Artery Disease (CAD), 

Diabetes Mellitus





  ** Mother


Family Medical History: COPD





<Yobani Landaverde - Last Filed: 10/31/20 23:08>





General Exam


Limitations: physical limitation


General appearance: alert, in no apparent distress, lethargic


Head exam: Present: atraumatic, normocephalic, normal inspection


Eye exam: Present: normal appearance, PERRL, EOMI.  Absent: scleral icterus, 

conjunctival injection, periorbital swelling


ENT exam: Present: mucous membranes dry


Neck exam: Present: normal inspection.  Absent: tenderness, meningismus, 

lymphadenopathy


Respiratory exam: Present: normal lung sounds bilaterally.  Absent: respiratory 

distress, wheezes, rales, rhonchi, stridor


Cardiovascular Exam: Present: regular rate, normal rhythm, normal heart sounds. 

Absent: systolic murmur, diastolic murmur, rubs, gallop, clicks


GI/Abdominal exam: Present: soft, normal bowel sounds.  Absent: distended, 

tenderness, guarding, rebound, rigid


Extremities exam: Present: full ROM, normal capillary refill, other 

(Contractures to left side also well-healed amputation site of the right foot 

first and second toes.).  Absent: tenderness, pedal edema, joint swelling, calf 

tenderness


Back exam: Present: normal inspection


Neurological exam: Present: alert, oriented X3, CN II-XII intact, motor sensory 

deficit


Psychiatric exam: Present: normal affect, normal mood


Skin exam: Present: warm, dry, intact, normal color.  Absent: rash





<Yobani Landaverde - Last Filed: 10/31/20 23:08>





- General Exam Comments


Initial Comments: 





Is a well-developed asthenic appearing female demonstrate evidence of prior CVA 

a poor historian she was actively vomiting upon arrival (Yobani Landaverde)





Course





<Yobani Landaverde - Last Filed: 10/31/20 23:08>





                                   Vital Signs











  10/31/20 10/31/20





  21:19 21:26


 


Temperature 97.4 F L 


 


Pulse Rate 63 


 


Respiratory 17 





Rate  


 


Blood Pressure 163/112 174/93


 


O2 Sat by Pulse 95 





Oximetry  














- Reevaluation(s)


Reevaluation #1: 





10/31/20 23:08


The patients care is endorsed to Dr. Amor at shift change. (Yobani Landaverde)





Medical Decision Making





- EKG Data


-: EKG Interpreted by Me


EKG shows normal: sinus rhythm (Sinus rhythm a 60 1950 QRS duration 86 QT 

since /424 possible left atrial enlargement no definite acute ST-T wave 

changes)





<Yobani Landaverde - Last Filed: 10/31/20 23:08>





- Lab Data


Result diagrams: 


                                 10/31/20 22:45





                                 10/31/20 22:45





<Mu Amor - Last Filed: 20 00:45>





- Lab Data





                                   Lab Results











  10/31/20 10/31/20 10/31/20 Range/Units





  21:34 22:45 22:45 


 


WBC   5.6   (3.8-10.6)  k/uL


 


RBC   3.13 L   (3.80-5.40)  m/uL


 


Hgb   9.8 L D   (11.4-16.0)  gm/dL


 


Hct   30.8 L   (34.0-46.0)  %


 


MCV   98.3   (80.0-100.0)  fL


 


MCH   31.4   (25.0-35.0)  pg


 


MCHC   32.0   (31.0-37.0)  g/dL


 


RDW   16.1 H   (11.5-15.5)  %


 


Plt Count   205   (150-450)  k/uL


 


Neutrophils %   66   %


 


Lymphocytes %   28   %


 


Monocytes %   5   %


 


Eosinophils %   1   %


 


Basophils %   0   %


 


Neutrophils #   3.7   (1.3-7.7)  k/uL


 


Lymphocytes #   1.6   (1.0-4.8)  k/uL


 


Monocytes #   0.3   (0-1.0)  k/uL


 


Eosinophils #   0.0   (0-0.7)  k/uL


 


Basophils #   0.0   (0-0.2)  k/uL


 


Hypochromasia   Slight   


 


Anisocytosis   Slight   


 


Macrocytosis   Slight   


 


Sodium    140  (137-145)  mmol/L


 


Potassium      (3.5-5.1)  mmol/L


 


Chloride    105  ()  mmol/L


 


Carbon Dioxide    32 H  (22-30)  mmol/L


 


Anion Gap    3  mmol/L


 


BUN    21 H  (7-17)  mg/dL


 


Creatinine    0.90  (0.52-1.04)  mg/dL


 


Est GFR (CKD-EPI)AfAm    81  (>60 ml/min/1.73 sqM)  


 


Est GFR (CKD-EPI)NonAf    71  (>60 ml/min/1.73 sqM)  


 


Glucose    110 H  (74-99)  mg/dL


 


POC Glucose (mg/dL)  103 H    (75-99)  mg/dL


 


POC Glu Operater ID  Ana Cristina Dhillon    


 


Calcium    8.9  (8.4-10.2)  mg/dL


 


Total Bilirubin    1.4 H  (0.2-1.3)  mg/dL


 


AST    67 H  (14-36)  U/L


 


ALT    23  (4-34)  U/L


 


Alkaline Phosphatase    98  ()  U/L


 


Creatine Kinase    52  ()  U/L


 


Troponin I     (0.000-0.034)  ng/mL


 


Total Protein    7.7  (6.3-8.2)  g/dL


 


Albumin    3.6  (3.5-5.0)  g/dL


 


Lipase    20 L  ()  U/L


 


Urine Color     


 


Urine Appearance     (Clear)  


 


Urine pH     (5.0-8.0)  


 


Ur Specific Gravity     (1.001-1.035)  


 


Urine Protein     (Negative)  


 


Urine Glucose (UA)     (Negative)  


 


Urine Ketones     (Negative)  


 


Urine Blood     (Negative)  


 


Urine Nitrite     (Negative)  


 


Urine Bilirubin     (Negative)  


 


Urine Urobilinogen     (<2.0)  mg/dL


 


Ur Leukocyte Esterase     (Negative)  


 


Acetone, Qual    Negative  (Negative)  














  10/31/20 10/31/20 Range/Units





  22:45 22:45 


 


WBC    (3.8-10.6)  k/uL


 


RBC    (3.80-5.40)  m/uL


 


Hgb    (11.4-16.0)  gm/dL


 


Hct    (34.0-46.0)  %


 


MCV    (80.0-100.0)  fL


 


MCH    (25.0-35.0)  pg


 


MCHC    (31.0-37.0)  g/dL


 


RDW    (11.5-15.5)  %


 


Plt Count    (150-450)  k/uL


 


Neutrophils %    %


 


Lymphocytes %    %


 


Monocytes %    %


 


Eosinophils %    %


 


Basophils %    %


 


Neutrophils #    (1.3-7.7)  k/uL


 


Lymphocytes #    (1.0-4.8)  k/uL


 


Monocytes #    (0-1.0)  k/uL


 


Eosinophils #    (0-0.7)  k/uL


 


Basophils #    (0-0.2)  k/uL


 


Hypochromasia    


 


Anisocytosis    


 


Macrocytosis    


 


Sodium    (137-145)  mmol/L


 


Potassium    (3.5-5.1)  mmol/L


 


Chloride    ()  mmol/L


 


Carbon Dioxide    (22-30)  mmol/L


 


Anion Gap    mmol/L


 


BUN    (7-17)  mg/dL


 


Creatinine    (0.52-1.04)  mg/dL


 


Est GFR (CKD-EPI)AfAm    (>60 ml/min/1.73 sqM)  


 


Est GFR (CKD-EPI)NonAf    (>60 ml/min/1.73 sqM)  


 


Glucose    (74-99)  mg/dL


 


POC Glucose (mg/dL)    (75-99)  mg/dL


 


POC Glu Operater ID    


 


Calcium    (8.4-10.2)  mg/dL


 


Total Bilirubin    (0.2-1.3)  mg/dL


 


AST    (14-36)  U/L


 


ALT    (4-34)  U/L


 


Alkaline Phosphatase    ()  U/L


 


Creatine Kinase    ()  U/L


 


Troponin I   <0.012  (0.000-0.034)  ng/mL


 


Total Protein    (6.3-8.2)  g/dL


 


Albumin    (3.5-5.0)  g/dL


 


Lipase    ()  U/L


 


Urine Color  Yellow   


 


Urine Appearance  Clear   (Clear)  


 


Urine pH  7.0   (5.0-8.0)  


 


Ur Specific Gravity  1.019   (1.001-1.035)  


 


Urine Protein  Trace H   (Negative)  


 


Urine Glucose (UA)  1+ H   (Negative)  


 


Urine Ketones  Negative   (Negative)  


 


Urine Blood  Negative   (Negative)  


 


Urine Nitrite  Negative   (Negative)  


 


Urine Bilirubin  Negative   (Negative)  


 


Urine Urobilinogen  <2.0   (<2.0)  mg/dL


 


Ur Leukocyte Esterase  Negative   (Negative)  


 


Acetone, Qual    (Negative)  














Disposition





<Yobani Landaverde - Last Filed: 10/31/20 23:08>





<Mu Amor - Last Filed: 20 00:45>


Clinical Impression: 


 Intractable vomiting





Disposition: ADMITTED AS IP TO THIS [facility]


Condition: Fair


Referrals: 


Saniya Cabral MD [Primary Care Provider] - 1-2 days

## 2020-10-31 NOTE — XR
EXAMINATION TYPE: XR chest 1V

 

DATE OF EXAM: 10/31/2020

 

COMPARISON: 10/15/2020

 

HISTORY: Chest pain

 

TECHNIQUE:

 

FINDINGS: There is no heart failure nor confluent pneumonic infiltrate. Costophrenic angles are clear
. There is apparent cardiac stent. There are no hilar masses. There is no pleural effusion. There are
 chest leads. Bony thorax is intact.

 

IMPRESSION: No active cardiopulmonary disease. No change.

## 2020-10-31 NOTE — XR
EXAMINATION TYPE: XR KUB

 

DATE OF EXAM: 10/31/2020

 

COMPARISON: 8/12/2020

 

HISTORY: Abdominal pain

 

TECHNIQUE:

 

FINDINGS: 2 views were obtained supine. Bowel gas pattern is normal. There is no sign of intestinal o
bstruction or pneumoperitoneum. There are phleboliths in the pelvis. There is artifact over the pelvi
s. There is no evidence of a mass. I see no calcifications over the kidneys. There is atherosclerotic
 vascular calcification.

 

IMPRESSION: Nonacute abdomen. No adverse change compared to old exam.

## 2020-11-01 VITALS
RESPIRATION RATE: 16 BRPM | DIASTOLIC BLOOD PRESSURE: 76 MMHG | SYSTOLIC BLOOD PRESSURE: 126 MMHG | TEMPERATURE: 97.8 F | HEART RATE: 87 BPM

## 2020-11-01 LAB
ALBUMIN SERPL-MCNC: 3.1 G/DL (ref 3.5–5)
ALBUMIN/GLOB SERPL: 0.9 {RATIO}
ALP SERPL-CCNC: 110 U/L (ref 38–126)
ALT SERPL-CCNC: 19 U/L (ref 4–34)
AMYLASE SERPL-CCNC: <30 U/L (ref 30–110)
ANION GAP SERPL CALC-SCNC: 8 MMOL/L
AST SERPL-CCNC: 45 U/L (ref 14–36)
BASOPHILS # BLD AUTO: 0 K/UL (ref 0–0.2)
BASOPHILS NFR BLD AUTO: 0 %
BUN SERPL-SCNC: 14 MG/DL (ref 7–17)
CALCIUM SPEC-MCNC: 8.6 MG/DL (ref 8.4–10.2)
CHLORIDE SERPL-SCNC: 100 MMOL/L (ref 98–107)
CO2 SERPL-SCNC: 25 MMOL/L (ref 22–30)
EOSINOPHIL # BLD AUTO: 0 K/UL (ref 0–0.7)
EOSINOPHIL NFR BLD AUTO: 0 %
ERYTHROCYTE [DISTWIDTH] IN BLOOD BY AUTOMATED COUNT: 3.03 M/UL (ref 3.8–5.4)
ERYTHROCYTE [DISTWIDTH] IN BLOOD: 16.1 % (ref 11.5–15.5)
GLOBULIN SER CALC-MCNC: 3.4 G/DL
GLUCOSE BLD-MCNC: 170 MG/DL (ref 75–99)
GLUCOSE BLD-MCNC: 365 MG/DL (ref 75–99)
GLUCOSE SERPL-MCNC: 314 MG/DL (ref 74–99)
HCT VFR BLD AUTO: 30.3 % (ref 34–46)
HGB BLD-MCNC: 9.1 GM/DL (ref 11.4–16)
LYMPHOCYTES # SPEC AUTO: 1.7 K/UL (ref 1–4.8)
LYMPHOCYTES NFR SPEC AUTO: 31 %
MCH RBC QN AUTO: 30.1 PG (ref 25–35)
MCHC RBC AUTO-ENTMCNC: 30 G/DL (ref 31–37)
MCV RBC AUTO: 100.1 FL (ref 80–100)
MONOCYTES # BLD AUTO: 0.3 K/UL (ref 0–1)
MONOCYTES NFR BLD AUTO: 6 %
NEUTROPHILS # BLD AUTO: 3.3 K/UL (ref 1.3–7.7)
NEUTROPHILS NFR BLD AUTO: 60 %
PLATELET # BLD AUTO: 183 K/UL (ref 150–450)
POTASSIUM SERPL-SCNC: 4.2 MMOL/L (ref 3.5–5.1)
PROT SERPL-MCNC: 6.5 G/DL (ref 6.3–8.2)
SODIUM SERPL-SCNC: 133 MMOL/L (ref 137–145)
WBC # BLD AUTO: 5.5 K/UL (ref 3.8–10.6)

## 2020-11-01 RX ADMIN — INSULIN ASPART SCH UNIT: 100 INJECTION, SOLUTION INTRAVENOUS; SUBCUTANEOUS at 12:42

## 2020-11-01 RX ADMIN — METOCLOPRAMIDE SCH MG: 5 TABLET ORAL at 07:43

## 2020-11-01 RX ADMIN — METOCLOPRAMIDE SCH MG: 5 TABLET ORAL at 12:42

## 2020-11-01 RX ADMIN — INSULIN ASPART SCH UNIT: 100 INJECTION, SOLUTION INTRAVENOUS; SUBCUTANEOUS at 07:43

## 2020-11-01 NOTE — P.DS
Providers


Date of admission: 


10/17/20 13:42





Expected date of discharge: 10/27/20


Attending physician: 


Saniya Cabral





Consults: 





                                        





10/21/20 15:19


Consult Physician Urgent 


   Consulting Provider: Chloe Lance


   Consult Reason/Comments: AMS


   Do you want consulting provider notified?: Yes





10/21/20 22:44


Consult Physician Routine 


   Consulting Provider: Akhil Chambers


   Consult Reason/Comments: agitation, psychosis


   Do you want consulting provider notified?: Yes











Primary care physician: 


Saniya Cabral





[facility] Course: 





Discharge diagnosis





1.  Altered mental status is with hypoglycemia.  Hypoglycemia was corrected 

altered mental status changes have resolved





2.  Diabetes mellitus type 1.  Patient is known to be a brittle diabetic with 

fluctuating blood sugars





3.  Recent hospitalization for pneumonia.  Chest x-ray completed showing 

improvement





4.  History of seizures.  Patient was evaluated by neurology services and 

medications adjusted during previous day





5.  History of stroke





6.  Essential hypertension





7.  History of hyperlipidemia.  Patient obtained on statin





8.  History of COPD no exacerbation at this time





9.  History of coronary artery disease





10.  History of peripheral vascular disease with previous history of multiple 

toe amputations





11.  Iron deficiency anemia





12.  Episodes of agitation and screaming, patient received Haldol and Ativan, 

psychiatry and neurology consultation requested





Hospital course





Morenita Ramirez, is a 60-year-old female patient well-known to my services.  

Patient was recently discharged she was admitted to the hospital for DKA and 

pneumonia.  Patient has underlying history of diabetes mellitus type 1 which is 

a brittle diabetic.  Lantus was decreased upon discharge.  Additional medical 

history includes stroke and seizures.  She was discharged home with her sister. 

According to ER report patient was found to have a low blood sugar with altered 

mental status changes.  Blood sugar was corrected and alt mental status changes 

recovered.  There is questionable compliance with diet.  Consult placed for 

possible ECF placement on discharge.  Chest x-ray was completed showing no 

active cardiopulmonary disease.  There is almost complete clearing of the right 

side patchy pulmonary infiltrate compared to old exam.  We'll continue to 

monitor blood sugar closely and make adjustments to home medication .





On 10/17/2020 patient was seen and examined on the medical floor she is alert 

and oriented 3 in no distress there is no fever or chills no headache or 

dizziness no chest pain no shortness of breath no cough no nausea or vomiting no

abdominal pain no diarrhea no blood in the stools no burning with urination no 

frequency or urgency and no hematuria glucose levels are much better controlled 

today she is receiving Levemir 10 units at bedtime and sliding scale before 

meals








On 10/18/2020 patient was seen and examined on the medical floor she is alert 

and oriented 3 in no distress there is no fever or chills no headache or 

dizziness no chest pain no shortness of breath no cough no nausea or vomiting no

abdominal pain no diarrhea no blood in the stools no burning with urination no 

frequency or urgency and no hematuria.  Patient to glucose level is still 

fluctuating she was 88 before breakfast this morning no short-acting insulin was

given patient was given breakfast her glucose level was 464 before lunch she is 

being given 8 units of NovoLog before lunch will continue to monitor and adjust 

insulin, possible transfer to rehab tomorrow.





On 10/19/2020 patient was seen and examined on the medical floor she is alert 

and oriented in no distress, glucose levels are better controlled however this 

morning patient had another episode of hypoglycemia, at this time will decrease 

Levemir dose to 8 units daily and continue was NovoLog before meals, and give a 

bedtime snack, but no short-acting insulin at bedtime.





On 10/20/2020 patient was seen and examined on the medical floor she is alert 

and oriented 3 in no apparent distress she had significantly elevated glucose 

level of 600 this morning, otherwise she denies any complaints at this point 

will increase Levemir again to 10 units at bedtime, avoid NovoLog before 

bedtime, and give NovoLog only before meals will continue to monitor glucose 

level and adjust insulin accordingly








On 10/21/2020 Patient is more sleepy today. Blood sugar in the 400's this AM.  

corrected per scale. Will continue to monitor for the next 24-48 hours. Denies 

any specific complaints








On 10/22/2020 patient was seen and examined on the medical floor she is 

somnolent, arousable for a few seconds and she returns to sleep, she was up all 

night very agitated and screaming, she received IV Ativan and IM Haldol, 

consultation for psychiatry and neurology were initiated in that regard, 

otherwise no events since yesterday glucose level is better controlled.





On 10/23/2020 patient was seen and examined on the medical floor she is somnolen

t arousable in no apparent distress she has been evaluated by psychiatry and 

neurology medications are being adjusted she was started on IV fluid due to mild

dehydration





On 10/24/2020 patient was seen and examined on the medical floor she is more 

alert today she was able to tolerate her breakfast well glucose was very low 

this morning she received half an amp of D50 otherwise she denies any complaints

at this time.





On 10/25/2020 patient is currently resting comfortably in bed.  Per nursing 

staff patient is more alert and less agitated today.  Patient is currently 

resting comfortably in bed does wake up follow commands.  Blood sugar this a.m. 

252 corrected per scale.  Patient maintained on IV Depacon per neurology 

psychiatry meds also adjusted per psychiatry services. 








On 1026 and on the medical floor at this time she is somnolent responsive to 

stimuli, in no apparent distress, earlier she had episodes of agitation and 

screaming, recommendation from neurology and psychiatry reviewed currently she 

is maintained on Seroquel 25 mg in a.m. and 50 mg in p.m., Reglan dose was 

decreased to 5 mg 3 times daily before meals, if patient is tolerating well Will

attempt to discontinue Reglan, she is receiving Norco and Xanax on a when 

necessary basis, at this time will discontinue Haldol when necessary and 

monitor, hopefully patient will be ready for transfer to a nursing facility 

soon.





On 10/27/2020 patient DC'd home with family.  Patient has returned to baseline





Patient Condition at Discharge: Stable





Plan - Discharge Summary


Discharge Rx Participant: No


New Discharge Prescriptions: 


New


   DULoxetine HCL [Cymbalta] 30 mg PO DAILY  capsule.


   Metoprolol Tartrate [Lopressor] 12.5 mg PO BID  tab


   Metoclopramide [Reglan] 5 mg PO AC-TID  tab


   QUEtiapine [SEROquel] 25 mg PO DAILY  tab


   ALPRAZolam [Xanax] 0.5 mg PO QID PRN  tab


     PRN Reason: Anxiety





Continue


   Famotidine [Pepcid] 20 mg PO DAILY


   HYDROcodone/APAP 10-325MG [Norco ] 1 tab PO TID PRN


     PRN Reason: Pain


   Atorvastatin [Lipitor] 20 mg PO DAILY


   Aspirin [Adult Low Dose Aspirin EC] 81 mg PO DAILY


   Ferrous Sulfate [Iron (65 MG Elemental)] 325 mg PO DAILY


   Divalproex [Depakote] 250 mg PO TID #90 tablet.


   Albuterol Sulfate [Ventolin HFA] 2 puff INHALATION RT-Q4H PRN


     PRN Reason: Shortness Of Breath


   Losartan [Cozaar] 50 mg PO DAILY  tab


   Ascorbic Acid [Vitamin C] 500 mg PO DAILY


   Vitamin B Complex 1 tab PO DAILY


   Clopidogrel [Plavix] 75 mg PO DAILY  tab


   QUEtiapine [SEROquel] 50 mg PO HS 30 Days #30 tab


   Lacosamide [Vimpat] 100 mg PO BID 30 Days #60 tablet


   Insulin Glargine,Hum.rec.anlog [Basaglar Kwikpen U-100] 10 unit SQ HS #0





Discontinued


   DULoxetine HCL [Cymbalta] 60 mg PO DAILY


   ALPRAZolam [Xanax] 1 mg PO Q8H PRN


     PRN Reason: Anxiety


   Ondansetron Odt [Zofran ODT] 4 mg PO DAILY PRN


     PRN Reason: Nausea


   Metoclopramide [Reglan] 10 mg PO AC-TID  tab


   QUEtiapine [SEROquel] 25 mg PO BID





No Action


   Calcium Carb-Vit D 500Mg-200Un [Oscal 500+D] 1 tab PO BID-W/MEALS


   INSULIN LISPRO (humaLOG) [humaLOG] 6 units SQ AC-TID


   Ondansetron Odt [Zofran Odt] 4 mg PO Q8HR PRN 3 Days #9 tab


     PRN Reason: Nausea


Discharge Medication List





Famotidine [Pepcid] 20 mg PO DAILY 02/19/16 [History]


HYDROcodone/APAP 10-325MG [Norco ] 1 tab PO TID PRN 05/06/17 [History]


Atorvastatin [Lipitor] 20 mg PO DAILY 07/31/19 [History]


Aspirin [Adult Low Dose Aspirin EC] 81 mg PO DAILY 01/10/20 [History]


Ferrous Sulfate [Iron (65 MG Elemental)] 325 mg PO DAILY 01/10/20 [History]


Divalproex [Depakote] 250 mg PO TID #90 tablet. 03/19/20 [Rx]


Albuterol Sulfate [Ventolin HFA] 2 puff INHALATION RT-Q4H PRN 07/28/20 [History]


Losartan [Cozaar] 50 mg PO DAILY  tab 08/20/20 [Rx]


Ascorbic Acid [Vitamin C] 500 mg PO DAILY 08/31/20 [History]


Vitamin B Complex 1 tab PO DAILY 08/31/20 [History]


Clopidogrel [Plavix] 75 mg PO DAILY  tab 09/04/20 [Rx]


Insulin Glargine,Hum.rec.anlog [Basaglar Kwikpen U-100] 10 unit SQ HS #0 

10/14/20 [Rx]


Lacosamide [Vimpat] 100 mg PO BID 30 Days #60 tablet 10/14/20 [Rx]


QUEtiapine [SEROquel] 50 mg PO HS 30 Days #30 tab 10/14/20 [Rx]


ALPRAZolam [Xanax] 0.5 mg PO QID PRN  tab 10/27/20 [Rx]


DULoxetine HCL [Cymbalta] 30 mg PO DAILY  capsule. 10/27/20 [Rx]


Metoclopramide [Reglan] 5 mg PO AC-TID  tab 10/27/20 [Rx]


Metoprolol Tartrate [Lopressor] 12.5 mg PO BID  tab 10/27/20 [Rx]


QUEtiapine [SEROquel] 25 mg PO DAILY  tab 10/27/20 [Rx]


Calcium Carb-Vit D 500Mg-200Un [Oscal 500+D] 1 tab PO BID-W/MEALS 11/01/20 

[History]


INSULIN LISPRO (humaLOG) [humaLOG] 6 units SQ AC-TID 11/01/20 [History]


Ondansetron Odt [Zofran Odt] 4 mg PO Q8HR PRN 3 Days #9 tab 11/01/20 [Rx]








Follow up Appointment(s)/Referral(s): 


[facility], [NON-STAFF] - 1 Week


Saniya Cabral MD [Primary Care Provider] - 11/03/20 2:15 pm


Patient Instructions/Handouts:  Metoprolol (By mouth), Metoclopramide (By 

mouth), Alprazolam (By mouth), Quetiapine (By mouth), Duloxetine (By mouth), 

Hypoglycemia in a Person with Diabetes (DC), Altered Mental Status (GEN), Fall 

Prevention (DC), Type 2 Diabetes Management for Adults (DC)


Activity/Diet/Wound Care/Special Instructions: 


Monitor CBG and write down results. 





Do not give rapid acting insulin after dinner time insulin dose. 


Discharge Disposition: HOME WITH HOME HEALTH SERVICES

## 2020-11-01 NOTE — US
EXAMINATION TYPE: US abdomen complete

 

DATE OF EXAM: 11/1/2020

 

COMPARISON: NONE

 

CLINICAL HISTORY: n/v. Patient has altered mental status and left arm is fixed against LUQ, patient l
aying contorted in bed and cannot hold still, cholecystectomy

 

EXAM MEASUREMENTS:

 

Liver Length:  16.4 cm   

Gallbladder Wall:  Surgically absent    

CBD:  0.7 cm

Spleen:  not seen    

Right Kidney:  9.8 x 4.8 x 4.7 cm 

Left Kidney:  not seen   

 

 

 

Pancreas:  limited views appear wnl

Liver:  limited views appear wnl  

Gallbladder:  Surgically absent

**Evidence for sonographic Crespo's sign:  no

CBD:  0.7 cm, normal for postcholecystectomy patient

Spleen:  unable to image due to patients fixed arm on LUQ and inability to move, bowel gas   

Right Kidney:  wnl   

Left Kidney:  unable to image due to patients fixed arm on LUQ and inability to move, bowel gas  

Upper IVC:  wnl  

Abd Aorta:  wnl

 

IMPRESSION: 

1. Limited examination due to patient physical medical conditions this time.

2. Mild hepatomegaly.

## 2020-11-01 NOTE — P.HPIM
History of Present Illness


H&P Date: 20


Chief Complaint: Nausea and vomiting





This is a 60-year-old female patient well-known to my services who presented 

with complaints of nausea and vomiting.  Per EMS report patient started having 

issues yesterday throughout the night and had generalized weakness.  Patient has

a past medical history of brittle diabetes, CVA with left-sided weakness and 

prior amputation of the first and second toes and multiple hospitalizations.  

Patient resides with sister and caregiver.  Chest x-ray completed showing no 

active cardiopulmonary disease no change.  KUB x-ray completed showing nonacute 

abdomen no adverse change compared to old exam.  Lipase 20.  UA negative for 

infection.  Blood sugars have been stable.  Patient is eager to be discharged 

home symptoms have improved.  Will order ultrasound of abdomen and recheck labs.

 Patient to also eat regular diet for lunch and possible discharge later.





Review of Systems





Please refer to HPI otherwise unremarkable





Past Medical History


Past Medical History: Asthma, Coronary Artery Disease (CAD), Chest Pain / 

Angina, Heart Failure, COPD, CVA/TIA, Diabetes Mellitus, Deep Vein Thrombosis 

(DVT), Eye Disorder, GERD/Reflux, Hyperlipidemia, Hypertension, Myocardial In

farction (MI), Neurologic Disorder, Osteoarthritis (OA), Pneumonia, Renal 

Disease


Additional Past Medical History / Comment(s): IDDM (brittle), DKAs, neuropathy 

bilateral hands/feet, retinopathy bilateral eyes, cellulitis R foot, R great toe

and 2nd toe infections/amputations, current wound R foot-being seen in [facility], 

renal failure, anemia, CVAs with L sided paralysis, headaches started after 

CVAs, brain lesions, DVT R axillae, low back pain, varicosities, seizure many 

years ago (), hypothyroid, constipation, bilateral tinnitis occasionally, 

sinus problems.


Last Myocardial Infarction Date:: 


History of Any Multi-Drug Resistant Organisms: MRSA


Date of last positivie culture/infection: 18


MDRO Source:: Right Foot


Past Surgical History: Appendectomy,  Section, Cholecystectomy, Heart 

Catheterization With Stent, Hysterectomy, Orthopedic Surgery


Additional Past Surgical History / Comment(s): PCI with multiple stents, R great

toe and 2nd toe amps, debridements R foot ulcer, L shoulder surgery to remove 

bone, bronchoscopy, EGD, colonoscopy, R arm port since removed, bilateral 

cataract removals/lens implants.


Past Anesthesia/Blood Transfusion Reactions: No Reported Reaction


Additional Past Anesthesia/Blood Transfusion Reaction / Comment(s): HX OF BLOOD 

TRANSFUSION- NO REACTION


Date of Last Stent Placement:: 2013


Past Psychological History: Anxiety, Bipolar, Depression


Additional Psychological History / Comment(s): Pt has a legal guardian, Noelle Menon, who is pt's sister.  Currently her legal guardian is hospitalized.  Pt 

has a caregiver, Eleanor, who resides with her.  Pt is wheelchair bound d/t CVA 

with L sided paralysis arm and leg.  She has a shower chair and a glucometer.  

Her sister or caregiver drive her to appts.  [email or web address].


Smoking Status: Never smoker


Past Alcohol Use History: None Reported


Additional Past Alcohol Use History / Comment(s): Pt started smoking in  and

quit in .   Using marijuana edibles occasionally but none for a "long time"


Past Drug Use History: Marijuana


Additional Drug Use History / Comment(s): using marijuana edibles





- Past Family History


  ** Father


Family Medical History: Unable to Obtain, Coronary Artery Disease (CAD), Diabet

es Mellitus





  ** Mother


Family Medical History: COPD





Medications and Allergies


                                Home Medications











 Medication  Instructions  Recorded  Confirmed  Type


 


Famotidine [Pepcid] 20 mg PO DAILY 16 History


 


HYDROcodone/APAP 10-325MG [Norco 1 tab PO TID PRN 17 History





]    


 


Atorvastatin [Lipitor] 20 mg PO DAILY 19 History


 


Aspirin [Adult Low Dose Aspirin EC] 81 mg PO DAILY 01/10/20 11/01/20 History


 


Ferrous Sulfate [Iron (65  mg PO DAILY 01/10/20 11/01/20 History





Elemental)]    


 


Divalproex [Depakote] 250 mg PO TID #90 tablet. 20 Rx


 


Albuterol Sulfate [Ventolin HFA] 2 puff INHALATION RT-Q4H PRN 20 

History


 


Losartan [Cozaar] 50 mg PO DAILY  tab 20 Rx


 


Ascorbic Acid [Vitamin C] 500 mg PO DAILY 20 History


 


Vitamin B Complex 1 tab PO DAILY 20 History


 


Clopidogrel [Plavix] 75 mg PO DAILY  tab 20 Rx


 


Insulin Glargine,Hum.rec.anlog 10 unit SQ HS #0 10/14/20 11/01/20 Rx





[Basaglar Kwikpen U-100]    


 


Lacosamide [Vimpat] 100 mg PO BID 30 Days #60 tablet 10/14/20 11/01/20 Rx


 


QUEtiapine [SEROquel] 50 mg PO HS 30 Days #30 tab 10/14/20 11/01/20 Rx


 


ALPRAZolam [Xanax] 0.5 mg PO QID PRN  tab 10/27/20 11/01/20 Rx


 


DULoxetine HCL [Cymbalta] 30 mg PO DAILY  capsule. 10/27/20 11/01/20 Rx


 


Metoclopramide [Reglan] 5 mg PO AC-TID  tab 10/27/20 11/01/20 Rx


 


Metoprolol Tartrate [Lopressor] 12.5 mg PO BID  tab 10/27/20 11/01/20 Rx


 


QUEtiapine [SEROquel] 25 mg PO DAILY  tab 10/27/20 11/01/20 Rx


 


Calcium Carb-Vit D 500Mg-200Un 1 tab PO BID-W/MEALS 20 History





[Oscal 500+D]    


 


INSULIN LISPRO (humaLOG) [humaLOG] 6 units SQ AC-TID 20 History








                                    Allergies











Allergy/AdvReac Type Severity Reaction Status Date / Time


 


Barbiturates Allergy  Rash/Hives Verified 20 10:22


 


cephalexin monohydrate Allergy  Rash/Hives Verified 20 10:22





[From Keflex]     


 


morphine Allergy  Rash/Hives Verified 20 10:22


 


Penicillins Allergy  Rash/Hives Verified 20 10:22


 


phenobarbital Allergy  Swelling Verified 20 10:22


 


venom-honey bee Allergy  Swelling Verified 20 10:22





[bee venom (honey bee)]     


 


amlodipine besylate AdvReac  Vomiting Verified 20 10:22





[From Norvasc]     














Physical Exam


Vitals: 


                                   Vital Signs











  Temp Pulse Pulse Resp BP BP Pulse Ox


 


 20 07:00  97.8 F   87  16   126/76  92 L


 


 20 02:00        96


 


 20 01:05 EST  98 F   80  20   176/78 


 


 20 01:00 EDT   78   16  142/87   96


 


 20 00:56   75   16    97


 


 10/31/20 21:26      174/93  


 


 10/31/20 21:19  97.4 F L  63   17  163/112   95








                                Intake and Output











 10/31/20 11/01/20 11/01/20





 23:59 06:59 14:59


 


Intake Total   


 


Output Total   


 


Balance   


 


Intake:   


 


  Oral   


 


Output:   


 


  Emesis   


 


Other:   


 


  # Voids   


 


  Weight   














Head normocephalic


Neck supple


Lungs clear to auscultation bilaterally no wheezing or crackles


Heart regular rate and rhythm S1-S2, no rub or gallop


Abdomen is soft nontender nondistended positive bowel sounds no 

hepatosplenomegaly


Extremities no edema


Neuro alert and orientated to 3.  Intermittent confusion at times history of 

stroke





Results


CBC & Chem 7: 


                                 10/31/20 22:45





                                 10/31/20 22:45


Labs: 


                  Abnormal Lab Results - Last 24 Hours (Table)











  10/31/20 10/31/20 10/31/20 Range/Units





  21:34 22:45 22:45 


 


RBC   3.13 L   (3.80-5.40)  m/uL


 


Hgb   9.8 L D   (11.4-16.0)  gm/dL


 


Hct   30.8 L   (34.0-46.0)  %


 


RDW   16.1 H   (11.5-15.5)  %


 


Carbon Dioxide    32 H  (22-30)  mmol/L


 


BUN    21 H  (7-17)  mg/dL


 


Glucose    110 H  (74-99)  mg/dL


 


POC Glucose (mg/dL)  103 H    (75-99)  mg/dL


 


Total Bilirubin    1.4 H  (0.2-1.3)  mg/dL


 


AST    67 H  (14-36)  U/L


 


Lipase    20 L  ()  U/L


 


Urine Protein     (Negative)  


 


Urine Glucose (UA)     (Negative)  














  10/31/20 11/01/20 Range/Units





  22:45 06:57 


 


RBC    (3.80-5.40)  m/uL


 


Hgb    (11.4-16.0)  gm/dL


 


Hct    (34.0-46.0)  %


 


RDW    (11.5-15.5)  %


 


Carbon Dioxide    (22-30)  mmol/L


 


BUN    (7-17)  mg/dL


 


Glucose    (74-99)  mg/dL


 


POC Glucose (mg/dL)   365 H  (75-99)  mg/dL


 


Total Bilirubin    (0.2-1.3)  mg/dL


 


AST    (14-36)  U/L


 


Lipase    ()  U/L


 


Urine Protein  Trace H   (Negative)  


 


Urine Glucose (UA)  1+ H   (Negative)  














Thrombosis Risk Factor Assmnt





- Choose All That Apply


Any of the Below Risk Factors Present?: Yes


Each Factor Represents 1 point: Age 41-60 years


Thrombosis Risk Factor Assessment Total Risk Factor Score: 1


Thrombosis Risk Factor Assessment Level: Low Risk





Assessment and Plan


Assessment: 





1.  Intractable nausea and vomiting.  KUB negative.  Will order ultrasound of 

abdomen.  Recheck labs





2.  Brittle diabetes mellitus type 1. 





3.  History of seizures





4.  History of stroke





5.  Essential hypertension





6.  Hyperlipidemia.  Maintained on statin





7.  History of COPD no exacerbation at this time





8.  History of coronary artery disease





9.  History of peripheral vascular disease with previous history multiple toe 

amputations





10.  History of iron deficiency anemia





DVT prophylaxis Lovenox


GI prophylaxis Protonix


Ultrasound of abdomen to be completed


Regular diet for lunch


Possible discharge today


Time with Patient: Greater than 30 (Greater than 60% of the total time spent in 

counseling and coordination of care)

## 2020-11-01 NOTE — P.DS
Providers


Date of admission: 


11/01/20 00:45





Expected date of discharge: 11/01/20


Attending physician: 


Saniya Cabral





Primary care physician: 


Saniya Cabral





[facility] Course: 





Discharge diagnosis





1.  Intractable nausea and vomiting.  KUB negative.  Will order ultrasound of 

abdomen.  Recheck labs





2.  Brittle diabetes mellitus type 1. 





3.  History of seizures





4.  History of stroke





5.  Essential hypertension





6.  Hyperlipidemia.  Maintained on statin





7.  History of COPD no exacerbation at this time





8.  History of coronary artery disease





9.  History of peripheral vascular disease with previous history multiple toe 

amputations





10.  History of iron deficiency anemia





DVT prophylaxis Lovenox


GI prophylaxis Protonix


Ultrasound of abdomen to be completed


Regular diet for lunch


Possible discharge today


 Hospital course





This is a 60-year-old female patient well-known to my services who presented 

with complaints of nausea and vomiting.  Per EMS report patient started having 

issues yesterday throughout the night and had generalized weakness.  Patient has

a past medical history of brittle diabetes, CVA with left-sided weakness and 

prior amputation of the first and second toes and multiple hospitalizations.  

Patient resides with sister and caregiver.  Chest x-ray completed showing no 

active cardiopulmonary disease no change.  KUB x-ray completed showing nonacute 

abdomen no adverse change compared to old exam.  Lipase 20.  UA negative for 

infection.  Blood sugars have been stable.  Patient is eager to be discharged 

home symptoms have improved.  Will order ultrasound of abdomen and recheck labs.

 Patient to also eat regular diet for lunch and possible discharge later.











Patient Condition at Discharge: Stable





Plan - Discharge Summary


Discharge Rx Participant: No


New Discharge Prescriptions: 


New


   Ondansetron Odt [Zofran Odt] 4 mg PO Q8HR PRN 3 Days #9 tab


     PRN Reason: Nausea





Continue


   Famotidine [Pepcid] 20 mg PO DAILY


   HYDROcodone/APAP 10-325MG [Norco ] 1 tab PO TID PRN


     PRN Reason: Pain


   Atorvastatin [Lipitor] 20 mg PO DAILY


   Aspirin [Adult Low Dose Aspirin EC] 81 mg PO DAILY


   Ferrous Sulfate [Iron (65 MG Elemental)] 325 mg PO DAILY


   Divalproex [Depakote] 250 mg PO TID #90 tablet.


   Albuterol Sulfate [Ventolin HFA] 2 puff INHALATION RT-Q4H PRN


     PRN Reason: Shortness Of Breath


   Losartan [Cozaar] 50 mg PO DAILY  tab


   Ascorbic Acid [Vitamin C] 500 mg PO DAILY


   Vitamin B Complex 1 tab PO DAILY


   Clopidogrel [Plavix] 75 mg PO DAILY  tab


   QUEtiapine [SEROquel] 50 mg PO HS 30 Days #30 tab


   Lacosamide [Vimpat] 100 mg PO BID 30 Days #60 tablet


   Insulin Glargine,Hum.rec.anlog [Basaglar Kwikpen U-100] 10 unit SQ HS #0


   DULoxetine HCL [Cymbalta] 30 mg PO DAILY  capsule.


   Metoprolol Tartrate [Lopressor] 12.5 mg PO BID  tab


   Metoclopramide [Reglan] 5 mg PO AC-TID  tab


   QUEtiapine [SEROquel] 25 mg PO DAILY  tab


   ALPRAZolam [Xanax] 0.5 mg PO QID PRN  tab


     PRN Reason: Anxiety


   Calcium Carb-Vit D 500Mg-200Un [Oscal 500+D] 1 tab PO BID-W/MEALS


   INSULIN LISPRO (humaLOG) [humaLOG] 6 units SQ AC-TID


Discharge Medication List





Famotidine [Pepcid] 20 mg PO DAILY 02/19/16 [History]


HYDROcodone/APAP 10-325MG [Norco ] 1 tab PO TID PRN 05/06/17 [History]


Atorvastatin [Lipitor] 20 mg PO DAILY 07/31/19 [History]


Aspirin [Adult Low Dose Aspirin EC] 81 mg PO DAILY 01/10/20 [History]


Ferrous Sulfate [Iron (65 MG Elemental)] 325 mg PO DAILY 01/10/20 [History]


Divalproex [Depakote] 250 mg PO TID #90 tablet. 03/19/20 [Rx]


Albuterol Sulfate [Ventolin HFA] 2 puff INHALATION RT-Q4H PRN 07/28/20 [History]


Losartan [Cozaar] 50 mg PO DAILY  tab 08/20/20 [Rx]


Ascorbic Acid [Vitamin C] 500 mg PO DAILY 08/31/20 [History]


Vitamin B Complex 1 tab PO DAILY 08/31/20 [History]


Clopidogrel [Plavix] 75 mg PO DAILY  tab 09/04/20 [Rx]


Insulin Glargine,Hum.rec.anlog [Basaglar Kwikpen U-100] 10 unit SQ HS #0 

10/14/20 [Rx]


Lacosamide [Vimpat] 100 mg PO BID 30 Days #60 tablet 10/14/20 [Rx]


QUEtiapine [SEROquel] 50 mg PO HS 30 Days #30 tab 10/14/20 [Rx]


ALPRAZolam [Xanax] 0.5 mg PO QID PRN  tab 10/27/20 [Rx]


DULoxetine HCL [Cymbalta] 30 mg PO DAILY  capsule.dr 10/27/20 [Rx]


Metoclopramide [Reglan] 5 mg PO AC-TID  tab 10/27/20 [Rx]


Metoprolol Tartrate [Lopressor] 12.5 mg PO BID  tab 10/27/20 [Rx]


QUEtiapine [SEROquel] 25 mg PO DAILY  tab 10/27/20 [Rx]


Calcium Carb-Vit D 500Mg-200Un [Oscal 500+D] 1 tab PO BID-W/MEALS 11/01/20 

[History]


INSULIN LISPRO (humaLOG) [humaLOG] 6 units SQ AC-TID 11/01/20 [History]


Ondansetron Odt [Zofran Odt] 4 mg PO Q8HR PRN 3 Days #9 tab 11/01/20 [Rx]








Follow up Appointment(s)/Referral(s): 


Saniya Cabral MD [Primary Care Provider] - 1-2 days


Activity/Diet/Wound Care/Special Instructions: 


Activity as tolerated





Diet consistent carb


Discharge Disposition: HOME SELF-CARE

## 2020-11-03 LAB — GLUCOSE BLD-MCNC: 286 MG/DL (ref 75–99)

## 2020-11-06 ENCOUNTER — HOSPITAL ENCOUNTER (INPATIENT)
Dept: HOSPITAL 47 - EC | Age: 60
LOS: 12 days | Discharge: HOSPICE | DRG: 41 | End: 2020-11-18
Attending: INTERNAL MEDICINE | Admitting: INTERNAL MEDICINE
Payer: COMMERCIAL

## 2020-11-06 VITALS — BODY MASS INDEX: 25.4 KG/M2

## 2020-11-06 DIAGNOSIS — I50.9: ICD-10-CM

## 2020-11-06 DIAGNOSIS — M62.422: ICD-10-CM

## 2020-11-06 DIAGNOSIS — Z89.411: ICD-10-CM

## 2020-11-06 DIAGNOSIS — Z96.1: ICD-10-CM

## 2020-11-06 DIAGNOSIS — Z79.899: ICD-10-CM

## 2020-11-06 DIAGNOSIS — Z82.49: ICD-10-CM

## 2020-11-06 DIAGNOSIS — I25.10: ICD-10-CM

## 2020-11-06 DIAGNOSIS — Z88.1: ICD-10-CM

## 2020-11-06 DIAGNOSIS — E10.40: ICD-10-CM

## 2020-11-06 DIAGNOSIS — H93.13: ICD-10-CM

## 2020-11-06 DIAGNOSIS — Z98.890: ICD-10-CM

## 2020-11-06 DIAGNOSIS — E10.621: ICD-10-CM

## 2020-11-06 DIAGNOSIS — I69.354: ICD-10-CM

## 2020-11-06 DIAGNOSIS — F41.9: ICD-10-CM

## 2020-11-06 DIAGNOSIS — K59.00: ICD-10-CM

## 2020-11-06 DIAGNOSIS — I25.2: ICD-10-CM

## 2020-11-06 DIAGNOSIS — M19.90: ICD-10-CM

## 2020-11-06 DIAGNOSIS — D50.9: ICD-10-CM

## 2020-11-06 DIAGNOSIS — Z79.82: ICD-10-CM

## 2020-11-06 DIAGNOSIS — K29.70: ICD-10-CM

## 2020-11-06 DIAGNOSIS — F31.9: ICD-10-CM

## 2020-11-06 DIAGNOSIS — L97.519: ICD-10-CM

## 2020-11-06 DIAGNOSIS — Z79.4: ICD-10-CM

## 2020-11-06 DIAGNOSIS — K31.819: ICD-10-CM

## 2020-11-06 DIAGNOSIS — Z87.19: ICD-10-CM

## 2020-11-06 DIAGNOSIS — M54.5: ICD-10-CM

## 2020-11-06 DIAGNOSIS — Z83.3: ICD-10-CM

## 2020-11-06 DIAGNOSIS — E10.52: ICD-10-CM

## 2020-11-06 DIAGNOSIS — E78.5: ICD-10-CM

## 2020-11-06 DIAGNOSIS — E10.43: ICD-10-CM

## 2020-11-06 DIAGNOSIS — I83.90: ICD-10-CM

## 2020-11-06 DIAGNOSIS — B95.61: ICD-10-CM

## 2020-11-06 DIAGNOSIS — G93.89: ICD-10-CM

## 2020-11-06 DIAGNOSIS — Z86.14: ICD-10-CM

## 2020-11-06 DIAGNOSIS — Z88.0: ICD-10-CM

## 2020-11-06 DIAGNOSIS — I69.392: ICD-10-CM

## 2020-11-06 DIAGNOSIS — M86.8X7: ICD-10-CM

## 2020-11-06 DIAGNOSIS — E10.69: ICD-10-CM

## 2020-11-06 DIAGNOSIS — I69.319: ICD-10-CM

## 2020-11-06 DIAGNOSIS — I95.9: ICD-10-CM

## 2020-11-06 DIAGNOSIS — Z90.710: ICD-10-CM

## 2020-11-06 DIAGNOSIS — Z87.42: ICD-10-CM

## 2020-11-06 DIAGNOSIS — Z90.49: ICD-10-CM

## 2020-11-06 DIAGNOSIS — Z98.41: ICD-10-CM

## 2020-11-06 DIAGNOSIS — Z91.030: ICD-10-CM

## 2020-11-06 DIAGNOSIS — I11.0: ICD-10-CM

## 2020-11-06 DIAGNOSIS — N13.30: ICD-10-CM

## 2020-11-06 DIAGNOSIS — Z89.421: ICD-10-CM

## 2020-11-06 DIAGNOSIS — Z87.891: ICD-10-CM

## 2020-11-06 DIAGNOSIS — Z87.01: ICD-10-CM

## 2020-11-06 DIAGNOSIS — Z87.39: ICD-10-CM

## 2020-11-06 DIAGNOSIS — Z98.42: ICD-10-CM

## 2020-11-06 DIAGNOSIS — E03.9: ICD-10-CM

## 2020-11-06 DIAGNOSIS — K31.84: ICD-10-CM

## 2020-11-06 DIAGNOSIS — G40.909: Primary | ICD-10-CM

## 2020-11-06 DIAGNOSIS — Z98.891: ICD-10-CM

## 2020-11-06 DIAGNOSIS — Z88.8: ICD-10-CM

## 2020-11-06 DIAGNOSIS — Z88.5: ICD-10-CM

## 2020-11-06 DIAGNOSIS — Z95.5: ICD-10-CM

## 2020-11-06 DIAGNOSIS — Z82.5: ICD-10-CM

## 2020-11-06 DIAGNOSIS — J44.9: ICD-10-CM

## 2020-11-06 DIAGNOSIS — I65.21: ICD-10-CM

## 2020-11-06 DIAGNOSIS — E10.319: ICD-10-CM

## 2020-11-06 DIAGNOSIS — Z79.02: ICD-10-CM

## 2020-11-06 DIAGNOSIS — K21.9: ICD-10-CM

## 2020-11-06 DIAGNOSIS — Z86.718: ICD-10-CM

## 2020-11-06 LAB
ALBUMIN SERPL-MCNC: 3.8 G/DL (ref 3.5–5)
ALP SERPL-CCNC: 89 U/L (ref 38–126)
ALT SERPL-CCNC: 17 U/L (ref 4–34)
ANION GAP SERPL CALC-SCNC: 6 MMOL/L
APTT BLD: 20.2 SEC (ref 22–30)
AST SERPL-CCNC: 34 U/L (ref 14–36)
BASOPHILS # BLD AUTO: 0 K/UL (ref 0–0.2)
BASOPHILS NFR BLD AUTO: 0 %
BUN SERPL-SCNC: 21 MG/DL (ref 7–17)
CALCIUM SPEC-MCNC: 9.5 MG/DL (ref 8.4–10.2)
CHLORIDE SERPL-SCNC: 99 MMOL/L (ref 98–107)
CO2 SERPL-SCNC: 31 MMOL/L (ref 22–30)
EOSINOPHIL # BLD AUTO: 0 K/UL (ref 0–0.7)
EOSINOPHIL NFR BLD AUTO: 0 %
ERYTHROCYTE [DISTWIDTH] IN BLOOD BY AUTOMATED COUNT: 3.17 M/UL (ref 3.8–5.4)
ERYTHROCYTE [DISTWIDTH] IN BLOOD: 16.6 % (ref 11.5–15.5)
GLUCOSE BLD-MCNC: 100 MG/DL (ref 75–99)
GLUCOSE BLD-MCNC: 130 MG/DL (ref 75–99)
GLUCOSE BLD-MCNC: 136 MG/DL (ref 75–99)
GLUCOSE BLD-MCNC: 148 MG/DL (ref 75–99)
GLUCOSE SERPL-MCNC: 130 MG/DL (ref 74–99)
HCT VFR BLD AUTO: 31 % (ref 34–46)
HGB BLD-MCNC: 9.9 GM/DL (ref 11.4–16)
HYALINE CASTS UR QL AUTO: 1 /LPF (ref 0–2)
INR PPP: 0.9 (ref ?–1.2)
LYMPHOCYTES # SPEC AUTO: 2.8 K/UL (ref 1–4.8)
LYMPHOCYTES NFR SPEC AUTO: 28 %
MAGNESIUM SPEC-SCNC: 1.9 MG/DL (ref 1.6–2.3)
MCH RBC QN AUTO: 31.3 PG (ref 25–35)
MCHC RBC AUTO-ENTMCNC: 32 G/DL (ref 31–37)
MCV RBC AUTO: 97.9 FL (ref 80–100)
MONOCYTES # BLD AUTO: 0.5 K/UL (ref 0–1)
MONOCYTES NFR BLD AUTO: 5 %
NEUTROPHILS # BLD AUTO: 6.6 K/UL (ref 1.3–7.7)
NEUTROPHILS NFR BLD AUTO: 66 %
PH UR: 7.5 [PH] (ref 5–8)
PLATELET # BLD AUTO: 196 K/UL (ref 150–450)
POTASSIUM SERPL-SCNC: 4.7 MMOL/L (ref 3.5–5.1)
PROT SERPL-MCNC: 7.7 G/DL (ref 6.3–8.2)
PT BLD: 9.5 SEC (ref 9–12)
SODIUM SERPL-SCNC: 136 MMOL/L (ref 137–145)
SP GR UR: 1.02 (ref 1–1.03)
UROBILINOGEN UR QL STRIP: <2 MG/DL (ref ?–2)
WBC # BLD AUTO: 10.1 K/UL (ref 3.8–10.6)
WBC # UR AUTO: 3 /HPF (ref 0–5)

## 2020-11-06 PROCEDURE — 86900 BLOOD TYPING SEROLOGIC ABO: CPT

## 2020-11-06 PROCEDURE — 80165 DIPROPYLACETIC ACID FREE: CPT

## 2020-11-06 PROCEDURE — 82272 OCCULT BLD FECES 1-3 TESTS: CPT

## 2020-11-06 PROCEDURE — 81001 URINALYSIS AUTO W/SCOPE: CPT

## 2020-11-06 PROCEDURE — 80048 BASIC METABOLIC PNL TOTAL CA: CPT

## 2020-11-06 PROCEDURE — 84484 ASSAY OF TROPONIN QUANT: CPT

## 2020-11-06 PROCEDURE — 82009 KETONE BODYS QUAL: CPT

## 2020-11-06 PROCEDURE — 43255 EGD CONTROL BLEEDING ANY: CPT

## 2020-11-06 PROCEDURE — 99285 EMERGENCY DEPT VISIT HI MDM: CPT

## 2020-11-06 PROCEDURE — 76937 US GUIDE VASCULAR ACCESS: CPT

## 2020-11-06 PROCEDURE — 87077 CULTURE AEROBIC IDENTIFY: CPT

## 2020-11-06 PROCEDURE — 80164 ASSAY DIPROPYLACETIC ACD TOT: CPT

## 2020-11-06 PROCEDURE — 85610 PROTHROMBIN TIME: CPT

## 2020-11-06 PROCEDURE — 85025 COMPLETE CBC W/AUTO DIFF WBC: CPT

## 2020-11-06 PROCEDURE — 86901 BLOOD TYPING SEROLOGIC RH(D): CPT

## 2020-11-06 PROCEDURE — 43239 EGD BIOPSY SINGLE/MULTIPLE: CPT

## 2020-11-06 PROCEDURE — 36410 VNPNXR 3YR/> PHY/QHP DX/THER: CPT

## 2020-11-06 PROCEDURE — 87040 BLOOD CULTURE FOR BACTERIA: CPT

## 2020-11-06 PROCEDURE — 82805 BLOOD GASES W/O2 SATURATION: CPT

## 2020-11-06 PROCEDURE — 83735 ASSAY OF MAGNESIUM: CPT

## 2020-11-06 PROCEDURE — 88305 TISSUE EXAM BY PATHOLOGIST: CPT

## 2020-11-06 PROCEDURE — 86850 RBC ANTIBODY SCREEN: CPT

## 2020-11-06 PROCEDURE — 85652 RBC SED RATE AUTOMATED: CPT

## 2020-11-06 PROCEDURE — 87186 SC STD MICRODIL/AGAR DIL: CPT

## 2020-11-06 PROCEDURE — 36415 COLL VENOUS BLD VENIPUNCTURE: CPT

## 2020-11-06 PROCEDURE — 86920 COMPATIBILITY TEST SPIN: CPT

## 2020-11-06 PROCEDURE — 87075 CULTR BACTERIA EXCEPT BLOOD: CPT

## 2020-11-06 PROCEDURE — 70450 CT HEAD/BRAIN W/O DYE: CPT

## 2020-11-06 PROCEDURE — 83605 ASSAY OF LACTIC ACID: CPT

## 2020-11-06 PROCEDURE — 86140 C-REACTIVE PROTEIN: CPT

## 2020-11-06 PROCEDURE — 85730 THROMBOPLASTIN TIME PARTIAL: CPT

## 2020-11-06 PROCEDURE — 93005 ELECTROCARDIOGRAM TRACING: CPT

## 2020-11-06 PROCEDURE — 85027 COMPLETE CBC AUTOMATED: CPT

## 2020-11-06 PROCEDURE — 94640 AIRWAY INHALATION TREATMENT: CPT

## 2020-11-06 PROCEDURE — 96360 HYDRATION IV INFUSION INIT: CPT

## 2020-11-06 PROCEDURE — 71045 X-RAY EXAM CHEST 1 VIEW: CPT

## 2020-11-06 PROCEDURE — 36600 WITHDRAWAL OF ARTERIAL BLOOD: CPT

## 2020-11-06 PROCEDURE — 87205 SMEAR GRAM STAIN: CPT

## 2020-11-06 PROCEDURE — 96361 HYDRATE IV INFUSION ADD-ON: CPT

## 2020-11-06 PROCEDURE — 74177 CT ABD & PELVIS W/CONTRAST: CPT

## 2020-11-06 PROCEDURE — 80053 COMPREHEN METABOLIC PANEL: CPT

## 2020-11-06 PROCEDURE — 87070 CULTURE OTHR SPECIMN AEROBIC: CPT

## 2020-11-06 RX ADMIN — METOPROLOL TARTRATE SCH MG: 25 TABLET, FILM COATED ORAL at 21:47

## 2020-11-06 RX ADMIN — DIVALPROEX SODIUM SCH MG: 250 TABLET, DELAYED RELEASE ORAL at 22:15

## 2020-11-06 RX ADMIN — CEFAZOLIN SCH MLS/HR: 330 INJECTION, POWDER, FOR SOLUTION INTRAMUSCULAR; INTRAVENOUS at 13:31

## 2020-11-06 RX ADMIN — Medication SCH MG: at 21:47

## 2020-11-06 RX ADMIN — INSULIN DETEMIR SCH: 100 INJECTION, SOLUTION SUBCUTANEOUS at 21:47

## 2020-11-06 RX ADMIN — DIVALPROEX SODIUM SCH MG: 250 TABLET, DELAYED RELEASE ORAL at 16:04

## 2020-11-06 RX ADMIN — DIVALPROEX SODIUM SCH: 250 TABLET, DELAYED RELEASE ORAL at 16:05

## 2020-11-06 NOTE — CT
EXAMINATION TYPE: CT brain wo con

 

DATE OF EXAM: 11/6/2020

 

HISTORY: Weakness.

 

CT DLP: 1040.4 mGycm.  Automated Exposure Control for Dose Reduction was Utilized.

 

TECHNIQUE: CT scan of the head is performed without contrast.

 

COMPARISON: 10/2/2020 CT brain

 

FINDINGS:   

 

Redemonstrated large encephalomalacia of the right anterior cerebral hemisphere There is no acute int
racranial hemorrhage, midline shift, or mass effect identified. The ventricles, sulci, and cisterns a
re normal in size and configuration.

 

No extra-axial fluid collection. Bones and extracranial soft tissues are intact. The globes are gross
ly symmetric. Visualized sinuses and mastoid air cells are clear.   

 

IMPRESSION:  

1. No acute intracranial hemorrhage, midline shift, or mass effect.

2. Old large encephalomalacia of the right anterior cerebral hemisphere redemonstrated.

## 2020-11-06 NOTE — ED
General Adult HPI





- General


Chief complaint: Neuro Symptoms/Deficit


Stated complaint: CVA symptoms


Time Seen by Provider: 20 10:40


Source: patient, RN notes reviewed, old records reviewed


Mode of arrival: EMS


Limitations: altered mental status





- History of Present Illness


Initial comments: 





60-year-old female diabetic, previous CVA presenting with vomiting, lethargy.  

Transported by EMS uncertain if there was signs of stroke given her left hemipl

egia which is baseline for this patient.  She does have contracture of the left 

upper extremity.  There was reported increased vomiting over the past several 

days and her blood sugar had been trending up.  Patient is unable to contribute 

to the history.  According to family the patient had been started on several new

medications recently.





- Related Data


                                Home Medications











 Medication  Instructions  Recorded  Confirmed


 


Famotidine [Pepcid] 20 mg PO DAILY 16


 


HYDROcodone/APAP 10-325MG [Norco 1 tab PO TID PRN 17





]   


 


Atorvastatin [Lipitor] 20 mg PO DAILY 19


 


Aspirin [Adult Low Dose Aspirin EC] 81 mg PO DAILY 01/10/20 11/06/20


 


Ferrous Sulfate [Iron (65  mg PO BID 01/10/20 11/06/20





Elemental)]   


 


Albuterol Sulfate [Ventolin HFA] 2 puff INHALATION RT-Q4H PRN 20


 


Ascorbic Acid [Vitamin C] 500 mg PO DAILY 20


 


Vitamin B Complex 1 tab PO DAILY 20


 


Calcium Carb-Vit D 500Mg-200Un 1 tab PO BID-W/MEALS 20





[Oscal 500+D]   


 


INSULIN LISPRO (humaLOG) [humaLOG] 6 units SQ AC-TID 20


 


Insulin Detemir [Levemir Flextouch] 10 units SQ HS 20


 


Lacosamide [Vimpat] 100 mg PO BID 20


 


Metoprolol Tartrate [Lopressor] 12.5 mg PO BID 20








                                  Previous Rx's











 Medication  Instructions  Recorded


 


Divalproex [Depakote] 250 mg PO TID #90 tablet. 20


 


Losartan [Cozaar] 50 mg PO DAILY  tab 20


 


Clopidogrel [Plavix] 75 mg PO DAILY  tab 20


 


QUEtiapine [SEROquel] 50 mg PO HS 30 Days #30 tab 10/14/20


 


ALPRAZolam [Xanax] 0.5 mg PO QID PRN  tab 10/27/20


 


DULoxetine HCL [Cymbalta] 30 mg PO DAILY  capsule. 10/27/20


 


Metoclopramide [Reglan] 5 mg PO AC-TID  tab 10/27/20


 


QUEtiapine [SEROquel] 25 mg PO DAILY  tab 10/27/20


 


Ondansetron Odt [Zofran Odt] 4 mg PO Q8HR PRN 3 Days #9 tab 20











                                    Allergies











Allergy/AdvReac Type Severity Reaction Status Date / Time


 


Barbiturates Allergy  Rash/Hives Verified 20 10:22


 


cephalexin monohydrate Allergy  Rash/Hives Verified 20 10:22





[From Keflex]     


 


morphine Allergy  Rash/Hives Verified 20 10:22


 


Penicillins Allergy  Rash/Hives Verified 20 10:22


 


phenobarbital Allergy  Swelling Verified 20 10:22


 


venom-honey bee Allergy  Swelling Verified 20 10:22





[bee venom (honey bee)]     


 


amlodipine besylate AdvReac  Vomiting Verified 20 10:22





[From Norvasc]     














Review of Systems


ROS Statement: 


Those systems with pertinent positive or pertinent negative responses have been 

documented in the HPI.





ROS Other: All systems not noted in ROS Statement are negative.





Past Medical History


Past Medical History: Asthma, Coronary Artery Disease (CAD), Chest Pain / 

Angina, Heart Failure, COPD, CVA/TIA, Diabetes Mellitus, Deep Vein Thrombosis 

(DVT), Eye Disorder, GERD/Reflux, Hyperlipidemia, Hypertension, Myocardial I

nfarction (MI), Neurologic Disorder, Osteoarthritis (OA), Pneumonia, Renal 

Disease


Additional Past Medical History / Comment(s): IDDM (brittle), DKAs, neuropathy 

bilateral hands/feet, retinopathy bilateral eyes, cellulitis R foot, R great toe

and 2nd toe infections/amputations, current wound R foot-being seen in [facility], 

renal failure, anemia, CVAs with L sided paralysis, headaches started after 

CVAs, brain lesions, DVT R axillae, low back pain, varicosities, seizure many ye

ars ago (), hypothyroid, constipation, bilateral tinnitis occasionally, 

sinus problems.


Last Myocardial Infarction Date:: 


History of Any Multi-Drug Resistant Organisms: MRSA


Date of last positivie culture/infection: 18


MDRO Source:: Right Foot


Past Surgical History: Appendectomy,  Section, Cholecystectomy, Heart 

Catheterization With Stent, Hysterectomy, Orthopedic Surgery


Additional Past Surgical History / Comment(s): PCI with multiple stents, R great

toe and 2nd toe amps, debridements R foot ulcer, L shoulder surgery to remove 

bone, bronchoscopy, EGD, colonoscopy, R arm port since removed, bilateral 

cataract removals/lens implants.


Past Anesthesia/Blood Transfusion Reactions: No Reported Reaction


Additional Past Anesthesia/Blood Transfusion Reaction / Comment(s): HX OF BLOOD 

TRANSFUSION- NO REACTION


Date of Last Stent Placement:: 2013


Past Psychological History: Anxiety, Bipolar, Depression


Smoking Status: Never smoker


Past Alcohol Use History: None Reported


Past Drug Use History: Marijuana





- Past Family History


  ** Father


Family Medical History: Unable to Obtain, Coronary Artery Disease (CAD), 

Diabetes Mellitus





  ** Mother


Family Medical History: COPD





General Exam


Limitations: altered mental status


General appearance: in no apparent distress, lethargic


Head exam: Present: atraumatic, normocephalic


Eye exam: Present: normal appearance, PERRL


ENT exam: Present: mucous membranes dry


Neck exam: Present: normal inspection.  Absent: tenderness, meningismus


Respiratory exam: Present: normal lung sounds bilaterally.  Absent: respiratory 

distress, wheezes


Cardiovascular Exam: Present: regular rate, normal rhythm


GI/Abdominal exam: Present: soft.  Absent: distended, tenderness, guarding, 

rebound


Neurological exam: Present: alert, motor sensory deficit (Left hemiplegia, left-

sided facial droop).  Absent: oriented X3


Skin exam: Present: warm, dry, intact





Course


                                   Vital Signs











  20





  10:40 11:45 12:16


 


Temperature 97.9 F  


 


Pulse Rate 85 81 85


 


Respiratory 18 16 17





Rate   


 


Blood Pressure 174/74 130/66 157/75


 


O2 Sat by Pulse 93 L 96 96





Oximetry   














  20





  12:49


 


Temperature 


 


Pulse Rate 78


 


Respiratory 18





Rate 


 


Blood Pressure 141/72


 


O2 Sat by Pulse 96





Oximetry 














EKG Findings





- EKG Comments:


EKG Findings:: EKG: Sinus tachycardia, rate of 114, NJ interval 158, QRS 

duration 88, , they report quality EKG, no ST segment elevation.





Medical Decision Making





- Medical Decision Making





60-year-old female with altered mental status, vomiting.  Patient has previous 

CVA, head CT showed large encephalomalacia, no active bleed or acute stroke on 

CT.  Chest x-rays negative for acute cardio pulmonary disease.  She has normal 

white blood cell count, stable hemoglobin.  She has normal electrolytes, mild 

lactic acidosis.  She's given IV fluids.  Case discussed with Dr. Cabral who 

will admit.  Neurology placed on consult.





- Lab Data


Result diagrams: 


                                 20 10:57





                                 20 10:57


                                   Lab Results











  20 Range/Units





  10:46 10:57 10:57 


 


WBC   10.1   (3.8-10.6)  k/uL


 


RBC   3.17 L   (3.80-5.40)  m/uL


 


Hgb   9.9 L   (11.4-16.0)  gm/dL


 


Hct   31.0 L   (34.0-46.0)  %


 


MCV   97.9   (80.0-100.0)  fL


 


MCH   31.3   (25.0-35.0)  pg


 


MCHC   32.0   (31.0-37.0)  g/dL


 


RDW   16.6 H   (11.5-15.5)  %


 


Plt Count   196   (150-450)  k/uL


 


Neutrophils %   66   %


 


Lymphocytes %   28   %


 


Monocytes %   5   %


 


Eosinophils %   0   %


 


Basophils %   0   %


 


Neutrophils #   6.6   (1.3-7.7)  k/uL


 


Lymphocytes #   2.8   (1.0-4.8)  k/uL


 


Monocytes #   0.5   (0-1.0)  k/uL


 


Eosinophils #   0.0   (0-0.7)  k/uL


 


Basophils #   0.0   (0-0.2)  k/uL


 


Anisocytosis   Slight   


 


Macrocytosis   Slight   


 


PT    9.5  (9.0-12.0)  sec


 


INR    0.9  (<1.2)  


 


APTT    20.2 L  (22.0-30.0)  sec


 


Sodium     (137-145)  mmol/L


 


Potassium     (3.5-5.1)  mmol/L


 


Chloride     ()  mmol/L


 


Carbon Dioxide     (22-30)  mmol/L


 


Anion Gap     mmol/L


 


BUN     (7-17)  mg/dL


 


Creatinine     (0.52-1.04)  mg/dL


 


Est GFR (CKD-EPI)AfAm     (>60 ml/min/1.73 sqM)  


 


Est GFR (CKD-EPI)NonAf     (>60 ml/min/1.73 sqM)  


 


Glucose     (74-99)  mg/dL


 


POC Glucose (mg/dL)  130 H    (75-99)  mg/dL


 


POC Glu Operater ID  Annika Ruiz    


 


Plasma Lactic Acid Carmelo     (0.7-2.0)  mmol/L


 


Calcium     (8.4-10.2)  mg/dL


 


Magnesium     (1.6-2.3)  mg/dL


 


Total Bilirubin     (0.2-1.3)  mg/dL


 


AST     (14-36)  U/L


 


ALT     (4-34)  U/L


 


Alkaline Phosphatase     ()  U/L


 


Troponin I     (0.000-0.034)  ng/mL


 


Total Protein     (6.3-8.2)  g/dL


 


Albumin     (3.5-5.0)  g/dL


 


Urine Color     


 


Urine Appearance     (Clear)  


 


Urine pH     (5.0-8.0)  


 


Ur Specific Gravity     (1.001-1.035)  


 


Urine Protein     (Negative)  


 


Urine Glucose (UA)     (Negative)  


 


Urine Ketones     (Negative)  


 


Urine Blood     (Negative)  


 


Urine Nitrite     (Negative)  


 


Urine Bilirubin     (Negative)  


 


Urine Urobilinogen     (<2.0)  mg/dL


 


Ur Leukocyte Esterase     (Negative)  


 


Urine WBC     (0-5)  /hpf


 


Urine Bacteria     (None)  /hpf


 


Hyaline Casts     (0-2)  /lpf


 


Urine Mucus     (None)  /hpf


 


Acetone, Qual     (Negative)  














  20 Range/Units





  10:57 10:57 10:57 


 


WBC     (3.8-10.6)  k/uL


 


RBC     (3.80-5.40)  m/uL


 


Hgb     (11.4-16.0)  gm/dL


 


Hct     (34.0-46.0)  %


 


MCV     (80.0-100.0)  fL


 


MCH     (25.0-35.0)  pg


 


MCHC     (31.0-37.0)  g/dL


 


RDW     (11.5-15.5)  %


 


Plt Count     (150-450)  k/uL


 


Neutrophils %     %


 


Lymphocytes %     %


 


Monocytes %     %


 


Eosinophils %     %


 


Basophils %     %


 


Neutrophils #     (1.3-7.7)  k/uL


 


Lymphocytes #     (1.0-4.8)  k/uL


 


Monocytes #     (0-1.0)  k/uL


 


Eosinophils #     (0-0.7)  k/uL


 


Basophils #     (0-0.2)  k/uL


 


Anisocytosis     


 


Macrocytosis     


 


PT     (9.0-12.0)  sec


 


INR     (<1.2)  


 


APTT     (22.0-30.0)  sec


 


Sodium   136 L   (137-145)  mmol/L


 


Potassium   4.7   (3.5-5.1)  mmol/L


 


Chloride   99   ()  mmol/L


 


Carbon Dioxide   31 H   (22-30)  mmol/L


 


Anion Gap   6   mmol/L


 


BUN   21 H   (7-17)  mg/dL


 


Creatinine   1.12 H   (0.52-1.04)  mg/dL


 


Est GFR (CKD-EPI)AfAm   62   (>60 ml/min/1.73 sqM)  


 


Est GFR (CKD-EPI)NonAf   54   (>60 ml/min/1.73 sqM)  


 


Glucose   130 H   (74-99)  mg/dL


 


POC Glucose (mg/dL)     (75-99)  mg/dL


 


POC Glu Operater ID     


 


Plasma Lactic Acid Carmelo    2.6 H*  (0.7-2.0)  mmol/L


 


Calcium   9.5   (8.4-10.2)  mg/dL


 


Magnesium   1.9   (1.6-2.3)  mg/dL


 


Total Bilirubin   0.5   (0.2-1.3)  mg/dL


 


AST   34   (14-36)  U/L


 


ALT   17   (4-34)  U/L


 


Alkaline Phosphatase   89   ()  U/L


 


Troponin I     (0.000-0.034)  ng/mL


 


Total Protein   7.7   (6.3-8.2)  g/dL


 


Albumin   3.8   (3.5-5.0)  g/dL


 


Urine Color  Yellow    


 


Urine Appearance  Clear    (Clear)  


 


Urine pH  7.5    (5.0-8.0)  


 


Ur Specific Gravity  1.017    (1.001-1.035)  


 


Urine Protein  Trace H    (Negative)  


 


Urine Glucose (UA)  Negative    (Negative)  


 


Urine Ketones  Negative    (Negative)  


 


Urine Blood  Negative    (Negative)  


 


Urine Nitrite  Negative    (Negative)  


 


Urine Bilirubin  Negative    (Negative)  


 


Urine Urobilinogen  <2.0    (<2.0)  mg/dL


 


Ur Leukocyte Esterase  Trace H    (Negative)  


 


Urine WBC  3    (0-5)  /hpf


 


Urine Bacteria  Moderate H    (None)  /hpf


 


Hyaline Casts  1    (0-2)  /lpf


 


Urine Mucus  Rare H    (None)  /hpf


 


Acetone, Qual   Negative   (Negative)  














  20 Range/Units





  10:57 


 


WBC   (3.8-10.6)  k/uL


 


RBC   (3.80-5.40)  m/uL


 


Hgb   (11.4-16.0)  gm/dL


 


Hct   (34.0-46.0)  %


 


MCV   (80.0-100.0)  fL


 


MCH   (25.0-35.0)  pg


 


MCHC   (31.0-37.0)  g/dL


 


RDW   (11.5-15.5)  %


 


Plt Count   (150-450)  k/uL


 


Neutrophils %   %


 


Lymphocytes %   %


 


Monocytes %   %


 


Eosinophils %   %


 


Basophils %   %


 


Neutrophils #   (1.3-7.7)  k/uL


 


Lymphocytes #   (1.0-4.8)  k/uL


 


Monocytes #   (0-1.0)  k/uL


 


Eosinophils #   (0-0.7)  k/uL


 


Basophils #   (0-0.2)  k/uL


 


Anisocytosis   


 


Macrocytosis   


 


PT   (9.0-12.0)  sec


 


INR   (<1.2)  


 


APTT   (22.0-30.0)  sec


 


Sodium   (137-145)  mmol/L


 


Potassium   (3.5-5.1)  mmol/L


 


Chloride   ()  mmol/L


 


Carbon Dioxide   (22-30)  mmol/L


 


Anion Gap   mmol/L


 


BUN   (7-17)  mg/dL


 


Creatinine   (0.52-1.04)  mg/dL


 


Est GFR (CKD-EPI)AfAm   (>60 ml/min/1.73 sqM)  


 


Est GFR (CKD-EPI)NonAf   (>60 ml/min/1.73 sqM)  


 


Glucose   (74-99)  mg/dL


 


POC Glucose (mg/dL)   (75-99)  mg/dL


 


POC Glu Operater ID   


 


Plasma Lactic Acid Carmelo   (0.7-2.0)  mmol/L


 


Calcium   (8.4-10.2)  mg/dL


 


Magnesium   (1.6-2.3)  mg/dL


 


Total Bilirubin   (0.2-1.3)  mg/dL


 


AST   (14-36)  U/L


 


ALT   (4-34)  U/L


 


Alkaline Phosphatase   ()  U/L


 


Troponin I  <0.012  (0.000-0.034)  ng/mL


 


Total Protein   (6.3-8.2)  g/dL


 


Albumin   (3.5-5.0)  g/dL


 


Urine Color   


 


Urine Appearance   (Clear)  


 


Urine pH   (5.0-8.0)  


 


Ur Specific Gravity   (1.001-1.035)  


 


Urine Protein   (Negative)  


 


Urine Glucose (UA)   (Negative)  


 


Urine Ketones   (Negative)  


 


Urine Blood   (Negative)  


 


Urine Nitrite   (Negative)  


 


Urine Bilirubin   (Negative)  


 


Urine Urobilinogen   (<2.0)  mg/dL


 


Ur Leukocyte Esterase   (Negative)  


 


Urine WBC   (0-5)  /hpf


 


Urine Bacteria   (None)  /hpf


 


Hyaline Casts   (0-2)  /lpf


 


Urine Mucus   (None)  /hpf


 


Acetone, Qual   (Negative)  














Disposition


Clinical Impression: 


 Anemia, Nausea & vomiting, Acute encephalopathy, Altered mental status





Disposition: ADMITTED AS IP TO THIS \[facility] [place] [facility]\""


Condition: Stable


Is patient prescribed a controlled substance at d/c from ED?: No


Referrals: 


Saniya Cabral MD [Primary Care Provider] - 1-2 days


Decision to Admit Reason: Admit from EC


Decision Date: 20


Decision Time: 13:12

## 2020-11-06 NOTE — P.CNNES
History of Present Illness


Consult date: 20


Requesting physician: Yobani Pritchett


Reason for Consult: altered mental status


History of Present Illness: 


This is a 60-year-old woman with medical history of stroke in 2015 

(encephalomalacia over the right frontal parietal and temporal lobe) with 

residual left hemiparesis and symptomatic right ICA stenosis, asymptomatic left 

ICA stenosis, seizure disorder, diabetes, multiple admissions for DKA, 

hypertension, ex tobacco use (stopped ) that presented to the emergency 

department on 2020 for altered mental status.  The patient is well-known 

to the neurology service since she has multiple hospital admissions for altered 

mental status due to toxic metabolic encephalopathy secondary due to the 

multiple DKA is as well as seizure-like activity.  Patient is accompanied with 

her daughter was at bedside.  Patient presented today to the ED with vomiting 

and lethargy for past 2-3 days.  Over the past couple day and her blood sugar 

are trending up.  Per her daughter she could not keep anything down.  Her 

daughter noticed that today around 12:00 in afternoon and the patient was 

staring off and she had an extensor posture of upper extremity lasting few 

minutes.  During the episode she was nonresponsive.  Other than that she feels 

the patient now is back to her baseline.





Workup in the ED consisted of:


Initial vital signs: Blood pressure of 174/74, heart rate of 85, respiratory of 

18, temperature of 97.9 Fahrenheit and the pulse ox 93 L at room air.


CT of the head is reported as no acute intracranial hemorrhage, midline shift or

mass effect.  Old large and supplements over the right anterior cerebral 

hemisphere redemonstrated.  I personally reviewed the CT of the head and I agree

with her heart.


Chest x-ray reported as no acute pulmonary process.


EKG is reported as sinus tachycardia.  Right atrial enlargement.  Nonspecific ST

abnormality.  Abnormal EKG.


POC glucose is 1:30.  Sodium is 136.  Creatinine is 1.12.





Last time she was seen by neurology team was on 10/23/2020 and that was the pr

ogress note written by my colleague Dr. Hazel and she was here for altered 

mental status possibly due to hypoglycemia.  A concern was also for comp 

exposure seizure.  


On our last progress note the was recommended for the patient continue Vimpat 

150 mg twice a day and to continue Depakote 250 mg 3 times a day.

















Review of Systems


Review of system:  The 12 point system was reviewed and apparent positive and 

negative per HPI.








Past Medical History


Past Medical History: Asthma, Coronary Artery Disease (CAD), Chest Pain / 

Angina, Heart Failure, COPD, CVA/TIA, Diabetes Mellitus, Deep Vein Thrombosis 

(DVT), Eye Disorder, GERD/Reflux, Hyperlipidemia, Hypertension, Myocardial 

Infarction (MI), Neurologic Disorder, Osteoarthritis (OA), Pneumonia, Renal 

Disease


Additional Past Medical History / Comment(s): IDDM (brittle), DKAs, neuropathy 

bilateral hands/feet, retinopathy bilateral eyes, cellulitis R foot, R great toe

and 2nd toe infections/amputations, current wound R foot-being seen in [facility], 

renal failure, anemia, CVAs with L sided paralysis, headaches started after 

CVAs, brain lesions, DVT R axillae, low back pain, varicosities, seizure many 

years ago (), hypothyroid, constipation, bilateral tinnitis occasionally, 

sinus problems.


Last Myocardial Infarction Date:: 


History of Any Multi-Drug Resistant Organisms: MRSA


Date of last positivie culture/infection: 18


MDRO Source:: Right Foot


Past Surgical History: Appendectomy,  Section, Cholecystectomy, Heart 

Catheterization With Stent, Hysterectomy, Orthopedic Surgery


Additional Past Surgical History / Comment(s): PCI with multiple stents, R great

toe and 2nd toe amps, debridements R foot ulcer, L shoulder surgery to remove 

bone, bronchoscopy, EGD, colonoscopy, R arm port since removed, bilateral 

cataract removals/lens implants.


Past Anesthesia/Blood Transfusion Reactions: No Reported Reaction


Additional Past Anesthesia/Blood Transfusion Reaction / Comment(s): HX OF BLOOD 

TRANSFUSION- NO REACTION


Date of Last Stent Placement:: 2013


Past Psychological History: Anxiety, Bipolar, Depression


Smoking Status: Never smoker


Past Alcohol Use History: None Reported


Past Drug Use History: Marijuana





- Past Family History


  ** Father


Family Medical History: Unable to Obtain, Coronary Artery Disease (CAD), 

Diabetes Mellitus





  ** Mother


Family Medical History: COPD





Medications and Allergies


                                Home Medications











 Medication  Instructions  Recorded  Confirmed  Type


 


Famotidine [Pepcid] 20 mg PO DAILY 16 History


 


HYDROcodone/APAP 10-325MG [Norco 1 tab PO TID PRN 17 History





]    


 


Atorvastatin [Lipitor] 20 mg PO DAILY 19 History


 


Aspirin [Adult Low Dose Aspirin EC] 81 mg PO DAILY 01/10/20 11/06/20 History


 


Ferrous Sulfate [Iron (65  mg PO BID 01/10/20 11/06/20 History





Elemental)]    


 


Divalproex [Depakote] 250 mg PO TID #90 tablet. 20 Rx


 


Albuterol Sulfate [Ventolin HFA] 2 puff INHALATION RT-Q4H PRN 20 

History


 


Losartan [Cozaar] 50 mg PO DAILY  tab 20 Rx


 


Ascorbic Acid [Vitamin C] 500 mg PO DAILY 20 History


 


Vitamin B Complex 1 tab PO DAILY 20 History


 


Clopidogrel [Plavix] 75 mg PO DAILY  tab 20 Rx


 


QUEtiapine [SEROquel] 50 mg PO HS 30 Days #30 tab 10/14/20 11/06/20 Rx


 


ALPRAZolam [Xanax] 0.5 mg PO QID PRN  tab 10/27/20 11/06/20 Rx


 


DULoxetine HCL [Cymbalta] 30 mg PO DAILY  capsule. 10/27/20 11/06/20 Rx


 


Metoclopramide [Reglan] 5 mg PO AC-TID  tab 10/27/20 11/06/20 Rx


 


QUEtiapine [SEROquel] 25 mg PO DAILY  tab 10/27/20 11/06/20 Rx


 


Calcium Carb-Vit D 500Mg-200Un 1 tab PO BID-W/MEALS 20 History





[Oscal 500+D]    


 


INSULIN LISPRO (humaLOG) [humaLOG] 6 units SQ AC-TID 20 History


 


Ondansetron Odt [Zofran Odt] 4 mg PO Q8HR PRN 3 Days #9 tab 20 Rx


 


Insulin Detemir [Levemir Flextouch] 10 units SQ HS 20 History


 


Lacosamide [Vimpat] 100 mg PO BID 20 History


 


Metoprolol Tartrate [Lopressor] 12.5 mg PO BID 20 History








                                    Allergies











Allergy/AdvReac Type Severity Reaction Status Date / Time


 


Barbiturates Allergy  Rash/Hives Verified 20 10:22


 


cephalexin monohydrate Allergy  Rash/Hives Verified 20 10:22





[From Keflex]     


 


morphine Allergy  Rash/Hives Verified 20 10:22


 


Penicillins Allergy  Rash/Hives Verified 20 10:22


 


phenobarbital Allergy  Swelling Verified 20 10:22


 


venom-honey bee Allergy  Swelling Verified 20 10:22





[bee venom (honey bee)]     


 


amlodipine besylate AdvReac  Vomiting Verified 20 10:22





[From Norvasc]     














Physical Examination





- Vital Signs


Vital Signs: 


                                   Vital Signs











  Temp Pulse Resp BP Pulse Ox


 


 20 13:33   83  18  132/75  98


 


 20 12:49   78  18  141/72  96


 


 20 12:16   85  17  157/75  96


 


 20 11:45   81  16  130/66  96


 


 20 10:40  97.9 F  85  18  174/74  93 L








                                Intake and Output











 20





 22:59 06:59 14:59


 


Other:   


 


  Weight   58.967 kg











GENERAL: The patient is lying in bed and is not in acute distress.


CHEST: The heart rate is regular rate rhythm.   No murmurs to auscultation. 


LUNG: Clear to auscultation bilaterally no wheezing noted throughout.  Not 

labored breathing.


ABDOMEN/GI: Bowel sounds present in all 4 quadrants. No tenderness to palpation 

throughout.


MUSCULOSKELETAL:  right foot digit 1, 2  and 5th digits are amputated.





NEUROLOGICAL:


Higher mental function: The patient is drowsy but awakeable oriented to self, 

place and time.  Patient is following commands.  No aphasia and no neglect.


Cranial nerves: The pupils are round, equal (4mm) and reactive to light.  Visual

fields left lower field  cut to confrontation..  Extraocular movement is intact 

no nystagmus is noted.  Facial sensation is normal to touch throughout.  The 

facial strength is left nasolabial flattening.  Hearing is normal bilaterally to

hand rub.  Tongue is midline and moved side-to-side without any difficulty.  +ve

mild to moderate dysarthria is noted.  


Motor: Gait is deferred.  Left upper extremity strength is 0 out of 5.  Left 

lower extremity is 4 positive.  Otherwise the right upper and lower extremities 

5 out of 5.  Increased tone in the left upper extremity  especially at the hand.


Sensation: Sensation to touch throughout. 


Reflexes:  Brisk over the left side which seems 3 positive while 1+ positive 

throughout.  


Plantars Left toe was upgoing.  While the right unable to assess since the 








Results


Patient lactic acid is 2.6.


AST is 34, ALTs 17.


Urine analysis appears clear, leukocyte esterase is trace, urine bacteria was 

moderate


Acetone is negative.








- Laboratory Findings


CBC and BMP: 


                                 20 10:57





                                 20 10:57


Abnormal Lab Findings: 


                                  Abnormal Labs











  20





  10:46 10:57 10:57


 


RBC   3.17 L 


 


Hgb   9.9 L 


 


Hct   31.0 L 


 


RDW   16.6 H 


 


APTT    20.2 L


 


Sodium   


 


Carbon Dioxide   


 


BUN   


 


Creatinine   


 


Glucose   


 


POC Glucose (mg/dL)  130 H  


 


Plasma Lactic Acid Carmelo   


 


Urine Protein   


 


Ur Leukocyte Esterase   


 


Urine Bacteria   


 


Urine Mucus   














  20





  10:57 10:57 10:57


 


RBC   


 


Hgb   


 


Hct   


 


RDW   


 


APTT   


 


Sodium   136 L 


 


Carbon Dioxide   31 H 


 


BUN   21 H 


 


Creatinine   1.12 H 


 


Glucose   130 H 


 


POC Glucose (mg/dL)   


 


Plasma Lactic Acid Carmelo    2.6 H*


 


Urine Protein  Trace H  


 


Ur Leukocyte Esterase  Trace H  


 


Urine Bacteria  Moderate H  


 


Urine Mucus  Rare H  














Assessment and Plan


Assessment: 





Provoked Seizure since patient having Vomitting episode for past 2-3 days


Histroy of Right hemispheric stroke with residual left-sided weakness


Right ICA occlusion


Asymptomatic left ICA stenosis


Acute kidney insufficiency


Brittle diabetes


Multiple admission for the Diabetic ketoacidosis


X tobacco use


Hyperlipidemia


Hypertension


History of myocardial infarction


History of coronary artery disease





Plan: 





Because the patient missed her medication last 2-3 days because of vomiting all 

load that the patient will Vimpat 200 mg once at night then the patient to 

resume home 150 mg twice a day.


Continue continue Depakote 250 mg 3 times a day.


EEG is not warranted since the patient condition has improved.





ED ordered blood culture and that is pending.


We'll defer any underlying infection to the primary team.  





The patient condition remains to be stable and no further seizure-like episode 

the next 24 hours then no further neurological workup is needed.





Thank You for the consultation.





Dr. Luis will be on neurology service from 20-20.





Destin Zamora M.D.


Neuro-hospitalist





Time with Patient: Greater than 30

## 2020-11-06 NOTE — XR
EXAMINATION TYPE: XR chest 1V portable

 

DATE OF EXAM: 11/6/2020

 

COMPARISON: 3/20/2020

 

INDICATION: Acute mental status change

 

TECHNIQUE: Single frontal view of the chest is obtained.

 

FINDINGS:  

The heart size is normal.  

The pulmonary vasculature is normal.  

The lungs are clear.  

 

 

IMPRESSION:  

1. No acute pulmonary process.

## 2020-11-07 LAB
ALBUMIN SERPL-MCNC: 2.7 G/DL (ref 3.8–4.9)
ALBUMIN/GLOB SERPL: 1.08 G/DL (ref 1.6–3.17)
ALP SERPL-CCNC: 75 U/L (ref 41–126)
ALT SERPL-CCNC: 13 U/L (ref 8–44)
ANION GAP SERPL CALC-SCNC: 5.5 MMOL/L (ref 4–12)
AST SERPL-CCNC: 17 U/L (ref 13–35)
BASOPHILS # BLD AUTO: 0 K/UL (ref 0–0.2)
BASOPHILS NFR BLD AUTO: 0 %
BUN SERPL-SCNC: 15 MG/DL (ref 9–27)
BUN/CREAT SERPL: 16.67 RATIO (ref 12–20)
CALCIUM SPEC-MCNC: 8 MG/DL (ref 8.7–10.3)
CHLORIDE SERPL-SCNC: 104 MMOL/L (ref 96–109)
CO2 SERPL-SCNC: 29.5 MMOL/L (ref 21.6–31.8)
EOSINOPHIL # BLD AUTO: 0 K/UL (ref 0–0.7)
EOSINOPHIL NFR BLD AUTO: 0 %
ERYTHROCYTE [DISTWIDTH] IN BLOOD BY AUTOMATED COUNT: 2.53 M/UL (ref 3.8–5.4)
ERYTHROCYTE [DISTWIDTH] IN BLOOD: 17 % (ref 11.5–15.5)
GLOBULIN SER CALC-MCNC: 2.5 G/DL (ref 1.6–3.3)
GLUCOSE BLD-MCNC: 128 MG/DL (ref 75–99)
GLUCOSE BLD-MCNC: 164 MG/DL (ref 75–99)
GLUCOSE BLD-MCNC: 240 MG/DL (ref 75–99)
GLUCOSE BLD-MCNC: 301 MG/DL (ref 75–99)
GLUCOSE BLD-MCNC: 56 MG/DL (ref 75–99)
GLUCOSE BLD-MCNC: 56 MG/DL (ref 75–99)
GLUCOSE BLD-MCNC: 62 MG/DL (ref 75–99)
GLUCOSE BLD-MCNC: 74 MG/DL (ref 75–99)
GLUCOSE SERPL-MCNC: 198 MG/DL (ref 70–110)
HCT VFR BLD AUTO: 25.4 % (ref 34–46)
HGB BLD-MCNC: 7.6 GM/DL (ref 11.4–16)
LYMPHOCYTES # SPEC AUTO: 2.1 K/UL (ref 1–4.8)
LYMPHOCYTES NFR SPEC AUTO: 31 %
MCH RBC QN AUTO: 30 PG (ref 25–35)
MCHC RBC AUTO-ENTMCNC: 29.9 G/DL (ref 31–37)
MCV RBC AUTO: 100.4 FL (ref 80–100)
MONOCYTES # BLD AUTO: 0.4 K/UL (ref 0–1)
MONOCYTES NFR BLD AUTO: 5 %
NEUTROPHILS # BLD AUTO: 4.1 K/UL (ref 1.3–7.7)
NEUTROPHILS NFR BLD AUTO: 62 %
PLATELET # BLD AUTO: 147 K/UL (ref 150–450)
POTASSIUM SERPL-SCNC: 3.9 MMOL/L (ref 3.5–5.5)
PROT SERPL-MCNC: 5.2 G/DL (ref 6.2–8.2)
SODIUM SERPL-SCNC: 139 MMOL/L (ref 135–145)
WBC # BLD AUTO: 6.6 K/UL (ref 3.8–10.6)

## 2020-11-07 RX ADMIN — CEFAZOLIN SCH: 330 INJECTION, POWDER, FOR SOLUTION INTRAMUSCULAR; INTRAVENOUS at 08:24

## 2020-11-07 RX ADMIN — LACOSAMIDE SCH MLS/HR: 10 INJECTION INTRAVENOUS at 10:45

## 2020-11-07 RX ADMIN — HYDROCODONE BITARTRATE AND ACETAMINOPHEN PRN EACH: 10; 325 TABLET ORAL at 20:26

## 2020-11-07 RX ADMIN — INSULIN ASPART SCH: 100 INJECTION, SOLUTION INTRAVENOUS; SUBCUTANEOUS at 13:03

## 2020-11-07 RX ADMIN — INSULIN DETEMIR SCH UNIT: 100 INJECTION, SOLUTION SUBCUTANEOUS at 20:26

## 2020-11-07 RX ADMIN — Medication SCH EACH: at 16:58

## 2020-11-07 RX ADMIN — METOPROLOL TARTRATE SCH MG: 25 TABLET, FILM COATED ORAL at 09:14

## 2020-11-07 RX ADMIN — DIVALPROEX SODIUM SCH MG: 250 TABLET, DELAYED RELEASE ORAL at 09:16

## 2020-11-07 RX ADMIN — DIVALPROEX SODIUM SCH MG: 250 TABLET, DELAYED RELEASE ORAL at 15:45

## 2020-11-07 RX ADMIN — INSULIN ASPART SCH UNIT: 100 INJECTION, SOLUTION INTRAVENOUS; SUBCUTANEOUS at 09:15

## 2020-11-07 RX ADMIN — METOCLOPRAMIDE SCH MG: 5 TABLET ORAL at 13:06

## 2020-11-07 RX ADMIN — Medication SCH EACH: at 09:14

## 2020-11-07 RX ADMIN — DIVALPROEX SODIUM SCH MG: 250 TABLET, DELAYED RELEASE ORAL at 20:27

## 2020-11-07 RX ADMIN — HYDROCODONE BITARTRATE AND ACETAMINOPHEN PRN EACH: 10; 325 TABLET ORAL at 02:50

## 2020-11-07 RX ADMIN — OXYCODONE HYDROCHLORIDE AND ACETAMINOPHEN SCH MG: 500 TABLET ORAL at 09:15

## 2020-11-07 RX ADMIN — INSULIN ASPART SCH UNIT: 100 INJECTION, SOLUTION INTRAVENOUS; SUBCUTANEOUS at 17:04

## 2020-11-07 RX ADMIN — ASPIRIN 81 MG CHEWABLE TABLET SCH MG: 81 TABLET CHEWABLE at 09:55

## 2020-11-07 RX ADMIN — CEFAZOLIN SCH MLS/HR: 330 INJECTION, POWDER, FOR SOLUTION INTRAMUSCULAR; INTRAVENOUS at 13:06

## 2020-11-07 RX ADMIN — LOSARTAN POTASSIUM SCH MG: 50 TABLET, FILM COATED ORAL at 09:14

## 2020-11-07 RX ADMIN — Medication SCH MG: at 09:13

## 2020-11-07 RX ADMIN — METOCLOPRAMIDE SCH MG: 5 TABLET ORAL at 09:15

## 2020-11-07 RX ADMIN — LACOSAMIDE SCH MLS/HR: 10 INJECTION INTRAVENOUS at 21:25

## 2020-11-07 RX ADMIN — Medication SCH MG: at 20:26

## 2020-11-07 RX ADMIN — METOCLOPRAMIDE SCH MG: 5 TABLET ORAL at 16:58

## 2020-11-07 RX ADMIN — METOPROLOL TARTRATE SCH MG: 25 TABLET, FILM COATED ORAL at 20:26

## 2020-11-07 RX ADMIN — PANTOPRAZOLE SODIUM SCH MG: 40 INJECTION, POWDER, FOR SOLUTION INTRAVENOUS at 20:27

## 2020-11-07 RX ADMIN — ATORVASTATIN CALCIUM SCH MG: 20 TABLET, FILM COATED ORAL at 09:14

## 2020-11-07 NOTE — P.PN
Subjective


Progress Note Date: 11/07/20





The patient is seen in neurologic follow-up on November 7, 2020 via 

teleneurology.


The patient has difficulty providing history.  She does endorse having several 

days of nausea and vomiting prior to admission.


The patient reports having difficulty with her memory.  This is her baseline.





Objective





- Vital Signs


Vital signs: 


                                   Vital Signs











Temp  98.3 F   11/07/20 15:00


 


Pulse  79   11/07/20 15:00


 


Resp  16   11/07/20 15:00


 


BP  136/70   11/07/20 15:00


 


Pulse Ox  95   11/07/20 15:00








                                 Intake & Output











 11/06/20 11/07/20 11/07/20





 18:59 06:59 18:59


 


Weight 58.967 kg  


 


Other:   


 


  Voiding Method  Diaper Diaper





  Incontinent Incontinent


 


  # Voids  4 1














- Exam





Gen.: The patient is reclining in the bed.  She is well-nourished, well-

developed and in no acute distress.


Mental status: The patient is awake and alert.  She is oriented to her date of 

birth and location.  She is unable to toe me her correct age.  She is oriented 

to the current year.  She is unable to name the president.  She reports the 

month to be "March".


Motor: left hemiparesis





- Labs


CBC & Chem 7: 


                                 11/07/20 06:20





                                 11/07/20 06:20


Labs: 


                  Abnormal Lab Results - Last 24 Hours (Table)











  11/06/20 11/06/20 11/06/20 Range/Units





  20:25 20:58 21:41 


 


RBC     (3.80-5.40)  m/uL


 


Hgb     (11.4-16.0)  gm/dL


 


Hct     (34.0-46.0)  %


 


MCV     (80.0-100.0)  fL


 


MCHC     (31.0-37.0)  g/dL


 


RDW     (11.5-15.5)  %


 


Plt Count     (150-450)  k/uL


 


Glucose     ()  mg/dL


 


POC Glucose (mg/dL)  100 H  136 H  148 H  (75-99)  mg/dL


 


Calcium     (8.7-10.3)  mg/dL


 


Total Protein     (6.2-8.2)  g/dL


 


Albumin     (3.80-4.90)  g/dL


 


Albumin/Globulin Ratio     (1.60-3.17)  g/dL














  11/07/20 11/07/20 11/07/20 Range/Units





  02:19 06:20 06:20 


 


RBC   2.53 L   (3.80-5.40)  m/uL


 


Hgb   7.6 L D   (11.4-16.0)  gm/dL


 


Hct   25.4 L   (34.0-46.0)  %


 


MCV   100.4 H   (80.0-100.0)  fL


 


MCHC   29.9 L   (31.0-37.0)  g/dL


 


RDW   17.0 H   (11.5-15.5)  %


 


Plt Count   147 L   (150-450)  k/uL


 


Glucose    198 H  ()  mg/dL


 


POC Glucose (mg/dL)  301 H    (75-99)  mg/dL


 


Calcium    8.0 L  (8.7-10.3)  mg/dL


 


Total Protein    5.2 L  (6.2-8.2)  g/dL


 


Albumin    2.70 L  (3.80-4.90)  g/dL


 


Albumin/Globulin Ratio    1.08 L  (1.60-3.17)  g/dL














  11/07/20 11/07/20 11/07/20 Range/Units





  07:00 11:51 12:08 


 


RBC     (3.80-5.40)  m/uL


 


Hgb     (11.4-16.0)  gm/dL


 


Hct     (34.0-46.0)  %


 


MCV     (80.0-100.0)  fL


 


MCHC     (31.0-37.0)  g/dL


 


RDW     (11.5-15.5)  %


 


Plt Count     (150-450)  k/uL


 


Glucose     ()  mg/dL


 


POC Glucose (mg/dL)  164 H  56 L  56 L  (75-99)  mg/dL


 


Calcium     (8.7-10.3)  mg/dL


 


Total Protein     (6.2-8.2)  g/dL


 


Albumin     (3.80-4.90)  g/dL


 


Albumin/Globulin Ratio     (1.60-3.17)  g/dL














  11/07/20 11/07/20 Range/Units





  12:27 12:43 


 


RBC    (3.80-5.40)  m/uL


 


Hgb    (11.4-16.0)  gm/dL


 


Hct    (34.0-46.0)  %


 


MCV    (80.0-100.0)  fL


 


MCHC    (31.0-37.0)  g/dL


 


RDW    (11.5-15.5)  %


 


Plt Count    (150-450)  k/uL


 


Glucose    ()  mg/dL


 


POC Glucose (mg/dL)  62 L  74 L  (75-99)  mg/dL


 


Calcium    (8.7-10.3)  mg/dL


 


Total Protein    (6.2-8.2)  g/dL


 


Albumin    (3.80-4.90)  g/dL


 


Albumin/Globulin Ratio    (1.60-3.17)  g/dL








                      Microbiology - Last 24 Hours (Table)











 11/06/20 11:14 Blood Culture - Preliminary





 Blood    No Growth after 24 hours














Assessment and Plan


Assessment: 





1.  Seizure, provoked by lack of antiepileptic medication secondary to vomiting


2.  History of strokes


3.  Cognitive deficits-secondary to stroke


4.  History of diabetes mellitus and DKA


5.  History of toxic metabolic encephalopathy secondary to DKA


Plan: 





1.  Agree with restarting of patient's home antiepileptic medications


2.  Will check a valproic acid level


3.  Continue supportive care, if valproic acid level is therapeutic, the patient

may be stable for discharge


Time with Patient: Less than 30 (spend 25 minutes with patient via 

teleneurology)

## 2020-11-07 NOTE — P.HPIM
History of Present Illness


H&P Date: 20





Morenita Ramirez, is a 60-year-old female who presented to [facility] emergency room with a chief complaint of mental status changes with 

worsening somnolence and episodes of vomiting, she was evaluated in the 

emergency room, vital examination on presentation reveals a temperature of 97.9 

pulse 85 respiration 18 blood pressure 174/74 pulse ox 93% on room air, white 

blood count was 10.1 hemoglobin 9.9 platelet count 196 sodium 136 BUN 21 

creatinine 1.12 glucose 113 lactic acid 1.2 urine analysis revealed trace 

leukocyte esterase with moderate bacteria, chest x-ray revealed no acute 

pulmonary process, computed tomography scan of the brain revealed no evidence of

acute intracranial hemorrhage or midline shift there was a large area of old 

encephalomalacic of the right anterior cerebral hemisphere related to previous 

stroke.  Patient was admitted to telemetry floor neurology consultation was 

requested due to concern of a recurrent stroke.


Patient has a known history of insulin-dependent diabetes mellitus type 1 

history of hypertension, history of hyperlipidemia, history of seizure disorder,

history of asthma, and history of psychosis.





Past Medical History


Past Medical History: Asthma, Coronary Artery Disease (CAD), Chest Pain / A

ngina, Heart Failure, COPD, CVA/TIA, Diabetes Mellitus, Deep Vein Thrombosis 

(DVT), Eye Disorder, GERD/Reflux, Hyperlipidemia, Hypertension, Myocardial 

Infarction (MI), Neurologic Disorder, Osteoarthritis (OA), Pneumonia, Renal 

Disease


Additional Past Medical History / Comment(s): IDDM (brittle), DKAs, neuropathy 

bilateral hands/feet, retinopathy bilateral eyes, cellulitis R foot, R great toe

and 2nd toe infections/amputations, current wound R foot-being seen in [facility], brenna

al failure, anemia, CVAs with L sided paralysis, headaches started after CVAs, 

brain lesions, DVT R axillae, low back pain, varicosities, seizure many years 

ago (), hypothyroid, constipation, bilateral tinnitis occasionally, sinus 

problems.


Last Myocardial Infarction Date:: 


History of Any Multi-Drug Resistant Organisms: MRSA


Date of last positivie culture/infection: 18


MDRO Source:: Right Foot


Past Surgical History: Appendectomy,  Section, Cholecystectomy, Heart 

Catheterization With Stent, Hysterectomy, Orthopedic Surgery


Additional Past Surgical History / Comment(s): PCI with multiple stents, R great

toe and 2nd toe amps, debridements R foot ulcer, L shoulder surgery to remove 

bone, bronchoscopy, EGD, colonoscopy, R arm port since removed, bilateral 

cataract removals/lens implants.


Past Anesthesia/Blood Transfusion Reactions: No Reported Reaction


Additional Past Anesthesia/Blood Transfusion Reaction / Comment(s): HX OF BLOOD 

TRANSFUSION- NO REACTION


Date of Last Stent Placement:: 2013


Past Psychological History: Anxiety, Bipolar, Depression


Smoking Status: Never smoker


Past Alcohol Use History: None Reported


Past Drug Use History: Marijuana





- Past Family History


  ** Father


Family Medical History: Unable to Obtain, Coronary Artery Disease (CAD), 

Diabetes Mellitus





  ** Mother


Family Medical History: COPD





Medications and Allergies


                                Home Medications











 Medication  Instructions  Recorded  Confirmed  Type


 


Famotidine [Pepcid] 20 mg PO DAILY 16 History


 


HYDROcodone/APAP 10-325MG [Norco 1 tab PO TID PRN 17 History





]    


 


Atorvastatin [Lipitor] 20 mg PO DAILY 19 History


 


Aspirin [Adult Low Dose Aspirin EC] 81 mg PO DAILY 01/10/20 11/06/20 History


 


Ferrous Sulfate [Iron (65  mg PO BID 01/10/20 11/06/20 History





Elemental)]    


 


Divalproex [Depakote] 250 mg PO TID #90 tablet. 20 Rx


 


Albuterol Sulfate [Ventolin HFA] 2 puff INHALATION RT-Q4H PRN 20 

History


 


Losartan [Cozaar] 50 mg PO DAILY  tab 20 Rx


 


Ascorbic Acid [Vitamin C] 500 mg PO DAILY 20 History


 


Vitamin B Complex 1 tab PO DAILY 20 History


 


Clopidogrel [Plavix] 75 mg PO DAILY  tab 20 Rx


 


QUEtiapine [SEROquel] 50 mg PO HS 30 Days #30 tab 10/14/20 11/06/20 Rx


 


ALPRAZolam [Xanax] 0.5 mg PO QID PRN  tab 10/27/20 11/06/20 Rx


 


DULoxetine HCL [Cymbalta] 30 mg PO DAILY  capsule. 10/27/20 11/06/20 Rx


 


Metoclopramide [Reglan] 5 mg PO AC-TID  tab 10/27/20 11/06/20 Rx


 


QUEtiapine [SEROquel] 25 mg PO DAILY  tab 10/27/20 11/06/20 Rx


 


Calcium Carb-Vit D 500Mg-200Un 1 tab PO BID-W/MEALS 20 History





[Oscal 500+D]    


 


INSULIN LISPRO (humaLOG) [humaLOG] 6 units SQ AC-TID 20 History


 


Ondansetron Odt [Zofran Odt] 4 mg PO Q8HR PRN 3 Days #9 tab 20 Rx


 


Insulin Detemir [Levemir Flextouch] 10 units SQ HS 20 History


 


Lacosamide [Vimpat] 100 mg PO BID 20 History


 


Metoprolol Tartrate [Lopressor] 12.5 mg PO BID 20 History








                                    Allergies











Allergy/AdvReac Type Severity Reaction Status Date / Time


 


Barbiturates Allergy  Rash/Hives Verified 20 10:22


 


cephalexin monohydrate Allergy  Rash/Hives Verified 20 10:22





[From Keflex]     


 


morphine Allergy  Rash/Hives Verified 20 10:22


 


Penicillins Allergy  Rash/Hives Verified 20 10:22


 


phenobarbital Allergy  Swelling Verified 20 10:22


 


venom-honey bee Allergy  Swelling Verified 20 10:22





[bee venom (honey bee)]     


 


amlodipine besylate AdvReac  Vomiting Verified 20 10:22





[From Norvasc]     














Physical Exam


Vitals: 


                                   Vital Signs











  Temp Pulse Pulse Resp BP BP Pulse Ox


 


 20 01:56  98.7 F   89  18   161/77  95


 


 20 20:14  98.2 F   83  18   196/91  95


 


 20 17:30   81   12  104/93   96


 


 20 16:30   98   14  113/95   88 L


 


 20 15:30   92   21  164/83   97


 


 20 15:00   85   13  123/72   97


 


 20 14:30   78   14  130/75   97


 


 20 14:17   84   18  130/75   98


 


 20 13:33   83   18  132/75   98


 


 20 12:49   78   18  141/72   96


 


 20 12:16   85   17  157/75   96


 


 20 11:45   81   16  130/66   96


 


 20 10:40  97.9 F  85   18  174/74   93 L








                                Intake and Output











 20





 14:59 22:59 06:59


 


Other:   


 


  Voiding Method  Diaper Diaper





  Incontinent Incontinent


 


  # Voids  1 4


 


  Weight 58.967 kg  














In general patient is alert, responsive, in no distress


HEENT head normocephalic and atraumatic


Neck is supple no JVD no goiter no lymphadenopathy


Chest exam reveals a few scattered crackles no wheezing


Cardiac exam reveals regular heart sounds no gallops no murmurs


Abdomen is soft nontender no organomegaly was normal bowel sounds


Extremity exam reveals no edema no cyanosis or clubbing


Neurological examination patient is alert responsive answering questions 

appropriately she is moving all 4 extremity spontaneously there is residual 

spasticity and weakness on the left upper extremity in the left lower extremity





Results


CBC & Chem 7: 


                                 20 06:20





                                 20 10:57


Labs: 


                  Abnormal Lab Results - Last 24 Hours (Table)











  20 Range/Units





  10:46 10:57 10:57 


 


RBC   3.17 L   (3.80-5.40)  m/uL


 


Hgb   9.9 L   (11.4-16.0)  gm/dL


 


Hct   31.0 L   (34.0-46.0)  %


 


RDW   16.6 H   (11.5-15.5)  %


 


APTT    20.2 L  (22.0-30.0)  sec


 


Sodium     (137-145)  mmol/L


 


Carbon Dioxide     (22-30)  mmol/L


 


BUN     (7-17)  mg/dL


 


Creatinine     (0.52-1.04)  mg/dL


 


Glucose     (74-99)  mg/dL


 


POC Glucose (mg/dL)  130 H    (75-99)  mg/dL


 


Plasma Lactic Acid Carmelo     (0.7-2.0)  mmol/L


 


Urine Protein     (Negative)  


 


Ur Leukocyte Esterase     (Negative)  


 


Urine Bacteria     (None)  /hpf


 


Urine Mucus     (None)  /hpf














  1120 Range/Units





  10:57 10:57 10:57 


 


RBC     (3.80-5.40)  m/uL


 


Hgb     (11.4-16.0)  gm/dL


 


Hct     (34.0-46.0)  %


 


RDW     (11.5-15.5)  %


 


APTT     (22.0-30.0)  sec


 


Sodium   136 L   (137-145)  mmol/L


 


Carbon Dioxide   31 H   (22-30)  mmol/L


 


BUN   21 H   (7-17)  mg/dL


 


Creatinine   1.12 H   (0.52-1.04)  mg/dL


 


Glucose   130 H   (74-99)  mg/dL


 


POC Glucose (mg/dL)     (75-99)  mg/dL


 


Plasma Lactic Acid Carmelo    2.6 H*  (0.7-2.0)  mmol/L


 


Urine Protein  Trace H    (Negative)  


 


Ur Leukocyte Esterase  Trace H    (Negative)  


 


Urine Bacteria  Moderate H    (None)  /hpf


 


Urine Mucus  Rare H    (None)  /hpf














  20 Range/Units





  20:25 20:58 21:41 


 


RBC     (3.80-5.40)  m/uL


 


Hgb     (11.4-16.0)  gm/dL


 


Hct     (34.0-46.0)  %


 


RDW     (11.5-15.5)  %


 


APTT     (22.0-30.0)  sec


 


Sodium     (137-145)  mmol/L


 


Carbon Dioxide     (22-30)  mmol/L


 


BUN     (7-17)  mg/dL


 


Creatinine     (0.52-1.04)  mg/dL


 


Glucose     (74-99)  mg/dL


 


POC Glucose (mg/dL)  100 H  136 H  148 H  (75-99)  mg/dL


 


Plasma Lactic Acid Carmelo     (0.7-2.0)  mmol/L


 


Urine Protein     (Negative)  


 


Ur Leukocyte Esterase     (Negative)  


 


Urine Bacteria     (None)  /hpf


 


Urine Mucus     (None)  /hpf














  20 Range/Units





  02:19 


 


RBC   (3.80-5.40)  m/uL


 


Hgb   (11.4-16.0)  gm/dL


 


Hct   (34.0-46.0)  %


 


RDW   (11.5-15.5)  %


 


APTT   (22.0-30.0)  sec


 


Sodium   (137-145)  mmol/L


 


Carbon Dioxide   (22-30)  mmol/L


 


BUN   (7-17)  mg/dL


 


Creatinine   (0.52-1.04)  mg/dL


 


Glucose   (74-99)  mg/dL


 


POC Glucose (mg/dL)  301 H  (75-99)  mg/dL


 


Plasma Lactic Acid Carmelo   (0.7-2.0)  mmol/L


 


Urine Protein   (Negative)  


 


Ur Leukocyte Esterase   (Negative)  


 


Urine Bacteria   (None)  /hpf


 


Urine Mucus   (None)  /hpf














Thrombosis Risk Factor Assmnt





- Choose All That Apply


Each Factor Represents 1 point: Age 41-60 years


Other Risk Factors: No


Other congenital or acquired thrombophilia - If yes, enter type in comment: No


Thrombosis Risk Factor Assessment Total Risk Factor Score: 1


Thrombosis Risk Factor Assessment Level: Low Risk





Assessment and Plan


Plan: 





1.  Mental status changes likely related to recurrent seizure activity due to 

missing antiseizure medications due to vomiting





2.  Previous history of stroke in , no evidence of stroke at this time.





3.  Underlying history of insulin-dependent diabetes mellitus, type I maintained

on insulin, with previous multiple admissions with DKA





4.  Underlying history of gastroparesis, maintained on Reglan





5.  History of psychosis maintained on Seroquel





6.  Underlying history of hypertension





7.  Underlying history of hyperlipidemia





8.  Previous history of coronary artery disease with myocardial infarction in 

the past





9.  Anemia was significant hemoglobin drop since yesterday





At this time patient is admitted to medical floor, she was seen by neurology and

was given IV antiseizure medications


Mental status improved significantly today


Will consult gastroenterology in regard to anemia with significant drop in 

hemoglobin


Will give IV iron and monitor closely

## 2020-11-08 LAB
BASOPHILS # BLD AUTO: 0 K/UL (ref 0–0.2)
BASOPHILS NFR BLD AUTO: 0 %
EOSINOPHIL # BLD AUTO: 0 K/UL (ref 0–0.7)
EOSINOPHIL NFR BLD AUTO: 1 %
ERYTHROCYTE [DISTWIDTH] IN BLOOD BY AUTOMATED COUNT: 2.65 M/UL (ref 3.8–5.4)
ERYTHROCYTE [DISTWIDTH] IN BLOOD: 16.6 % (ref 11.5–15.5)
GLUCOSE BLD-MCNC: 103 MG/DL (ref 75–99)
GLUCOSE BLD-MCNC: 103 MG/DL (ref 75–99)
GLUCOSE BLD-MCNC: 119 MG/DL (ref 75–99)
GLUCOSE BLD-MCNC: 230 MG/DL (ref 75–99)
GLUCOSE BLD-MCNC: 326 MG/DL (ref 75–99)
GLUCOSE BLD-MCNC: 439 MG/DL (ref 75–99)
HCT VFR BLD AUTO: 27.2 % (ref 34–46)
HGB BLD-MCNC: 8.2 GM/DL (ref 11.4–16)
LYMPHOCYTES # SPEC AUTO: 2.3 K/UL (ref 1–4.8)
LYMPHOCYTES NFR SPEC AUTO: 36 %
MCH RBC QN AUTO: 31 PG (ref 25–35)
MCHC RBC AUTO-ENTMCNC: 30.2 G/DL (ref 31–37)
MCV RBC AUTO: 102.4 FL (ref 80–100)
MONOCYTES # BLD AUTO: 0.3 K/UL (ref 0–1)
MONOCYTES NFR BLD AUTO: 5 %
NEUTROPHILS # BLD AUTO: 3.6 K/UL (ref 1.3–7.7)
NEUTROPHILS NFR BLD AUTO: 56 %
PLATELET # BLD AUTO: 151 K/UL (ref 150–450)
WBC # BLD AUTO: 6.3 K/UL (ref 3.8–10.6)

## 2020-11-08 RX ADMIN — INSULIN ASPART SCH: 100 INJECTION, SOLUTION INTRAVENOUS; SUBCUTANEOUS at 11:44

## 2020-11-08 RX ADMIN — METOPROLOL TARTRATE SCH: 25 TABLET, FILM COATED ORAL at 09:57

## 2020-11-08 RX ADMIN — CEFAZOLIN SCH MLS/HR: 330 INJECTION, POWDER, FOR SOLUTION INTRAMUSCULAR; INTRAVENOUS at 09:13

## 2020-11-08 RX ADMIN — PANTOPRAZOLE SODIUM SCH: 40 INJECTION, POWDER, FOR SOLUTION INTRAVENOUS at 21:08

## 2020-11-08 RX ADMIN — METOCLOPRAMIDE SCH MG: 5 TABLET ORAL at 09:31

## 2020-11-08 RX ADMIN — PANTOPRAZOLE SODIUM SCH MG: 40 INJECTION, POWDER, FOR SOLUTION INTRAVENOUS at 09:27

## 2020-11-08 RX ADMIN — DIVALPROEX SODIUM SCH MG: 250 TABLET, DELAYED RELEASE ORAL at 17:55

## 2020-11-08 RX ADMIN — INSULIN ASPART SCH UNIT: 100 INJECTION, SOLUTION INTRAVENOUS; SUBCUTANEOUS at 18:09

## 2020-11-08 RX ADMIN — LOSARTAN POTASSIUM SCH MG: 50 TABLET, FILM COATED ORAL at 09:29

## 2020-11-08 RX ADMIN — INSULIN ASPART SCH: 100 INJECTION, SOLUTION INTRAVENOUS; SUBCUTANEOUS at 08:33

## 2020-11-08 RX ADMIN — METOPROLOL TARTRATE SCH MG: 25 TABLET, FILM COATED ORAL at 21:07

## 2020-11-08 RX ADMIN — HYDROCODONE BITARTRATE AND ACETAMINOPHEN PRN EACH: 10; 325 TABLET ORAL at 19:24

## 2020-11-08 RX ADMIN — CEFAZOLIN SCH: 330 INJECTION, POWDER, FOR SOLUTION INTRAMUSCULAR; INTRAVENOUS at 17:56

## 2020-11-08 RX ADMIN — OXYCODONE HYDROCHLORIDE AND ACETAMINOPHEN SCH MG: 500 TABLET ORAL at 09:29

## 2020-11-08 RX ADMIN — METOCLOPRAMIDE SCH MG: 5 TABLET ORAL at 17:55

## 2020-11-08 RX ADMIN — METOCLOPRAMIDE SCH MG: 5 TABLET ORAL at 13:08

## 2020-11-08 RX ADMIN — LACOSAMIDE SCH MG: 150 TABLET, FILM COATED ORAL at 21:07

## 2020-11-08 RX ADMIN — Medication SCH EACH: at 09:28

## 2020-11-08 RX ADMIN — ASPIRIN 81 MG CHEWABLE TABLET SCH MG: 81 TABLET CHEWABLE at 09:28

## 2020-11-08 RX ADMIN — Medication SCH EACH: at 17:55

## 2020-11-08 RX ADMIN — INSULIN DETEMIR SCH UNIT: 100 INJECTION, SOLUTION SUBCUTANEOUS at 21:07

## 2020-11-08 RX ADMIN — Medication SCH MG: at 09:45

## 2020-11-08 RX ADMIN — LACOSAMIDE SCH MLS/HR: 10 INJECTION INTRAVENOUS at 09:57

## 2020-11-08 RX ADMIN — Medication SCH MG: at 21:07

## 2020-11-08 RX ADMIN — DIVALPROEX SODIUM SCH MG: 250 TABLET, DELAYED RELEASE ORAL at 09:33

## 2020-11-08 RX ADMIN — DIVALPROEX SODIUM SCH MG: 250 TABLET, DELAYED RELEASE ORAL at 21:07

## 2020-11-08 RX ADMIN — ATORVASTATIN CALCIUM SCH MG: 20 TABLET, FILM COATED ORAL at 09:29

## 2020-11-08 NOTE — P.PN
Subjective


Progress Note Date: 11/08/20





Morenita Ramirez, is a 60-year-old female who presented to [facility] emergency room with a chief complaint of mental status changes with 

worsening somnolence and episodes of vomiting, she was evaluated in the 

emergency room, vital examination on presentation reveals a temperature of 97.9 

pulse 85 respiration 18 blood pressure 174/74 pulse ox 93% on room air, white 

blood count was 10.1 hemoglobin 9.9 platelet count 196 sodium 136 BUN 21 

creatinine 1.12 glucose 113 lactic acid 1.2 urine analysis revealed trace 

leukocyte esterase with moderate bacteria, chest x-ray revealed no acute pul

monary process, computed tomography scan of the brain revealed no evidence of 

acute intracranial hemorrhage or midline shift there was a large area of old 

encephalomalacic of the right anterior cerebral hemisphere related to previous 

stroke.  Patient was admitted to telemetry floor neurology consultation was 

requested due to concern of a recurrent stroke.


Patient has a known history of insulin-dependent diabetes mellitus type 1 

history of hypertension, history of hyperlipidemia, history of seizure disorder,

history of asthma, and history of psychosis.





On 11/08/2020 patient is alert and oriented resting in bed patient does appear 

restless.  CT of abdomen and pelvis has been ordered due to nausea and vomiting.

 Hemoglobin 8.2.  GI and neurology services are following.  Patient denies any 

chest pain or shortness of breath.  Does have appetite requesting food.





Objective





- Vital Signs


Vital signs: 


                                   Vital Signs











Temp  97.3 F L  11/08/20 07:00


 


Pulse  56 L  11/08/20 07:00


 


Resp  14   11/08/20 08:37


 


BP  88/53   11/08/20 07:00


 


Pulse Ox  96   11/08/20 07:00








                                 Intake & Output











 11/07/20 11/08/20 11/08/20





 18:59 06:59 18:59


 


Other:   


 


  Voiding Method Diaper Diaper Diaper





 Incontinent Incontinent Incontinent


 


  # Voids 1 2 


 


  # Bowel Movements  0 














- Exam





In general patient is alert, responsive, in no distress


HEENT head normocephalic and atraumatic


Neck is supple no JVD no goiter no lymphadenopathy


Chest exam reveals a few scattered crackles no wheezing


Cardiac exam reveals regular heart sounds no gallops no murmurs


Abdomen is soft nontender no organomegaly was normal bowel sounds


Extremity exam reveals no edema no cyanosis or clubbing


Neurological examination patient is alert responsive answering questions 

appropriately she is moving all 4 extremity spontaneously there is residual 

spasticity and weakness on the left upper extremity in the left lower extremity





- Labs


CBC & Chem 7: 


                                 11/08/20 06:18





                                 11/07/20 06:20


Labs: 


                  Abnormal Lab Results - Last 24 Hours (Table)











  11/07/20 11/07/20 11/07/20 Range/Units





  06:20 11:51 12:08 


 


RBC     (3.80-5.40)  m/uL


 


Hgb     (11.4-16.0)  gm/dL


 


Hct     (34.0-46.0)  %


 


MCV     (80.0-100.0)  fL


 


MCHC     (31.0-37.0)  g/dL


 


RDW     (11.5-15.5)  %


 


Glucose  198 H    ()  mg/dL


 


POC Glucose (mg/dL)   56 L  56 L  (75-99)  mg/dL


 


Calcium  8.0 L    (8.7-10.3)  mg/dL


 


Total Protein  5.2 L    (6.2-8.2)  g/dL


 


Albumin  2.70 L    (3.80-4.90)  g/dL


 


Albumin/Globulin Ratio  1.08 L    (1.60-3.17)  g/dL














  11/07/20 11/07/20 11/07/20 Range/Units





  12:27 12:43 17:02 


 


RBC     (3.80-5.40)  m/uL


 


Hgb     (11.4-16.0)  gm/dL


 


Hct     (34.0-46.0)  %


 


MCV     (80.0-100.0)  fL


 


MCHC     (31.0-37.0)  g/dL


 


RDW     (11.5-15.5)  %


 


Glucose     ()  mg/dL


 


POC Glucose (mg/dL)  62 L  74 L  240 H  (75-99)  mg/dL


 


Calcium     (8.7-10.3)  mg/dL


 


Total Protein     (6.2-8.2)  g/dL


 


Albumin     (3.80-4.90)  g/dL


 


Albumin/Globulin Ratio     (1.60-3.17)  g/dL














  11/07/20 11/08/20 11/08/20 Range/Units





  20:09 04:26 06:18 


 


RBC    2.65 L  (3.80-5.40)  m/uL


 


Hgb    8.2 L  (11.4-16.0)  gm/dL


 


Hct    27.2 L  (34.0-46.0)  %


 


MCV    102.4 H  (80.0-100.0)  fL


 


MCHC    30.2 L  (31.0-37.0)  g/dL


 


RDW    16.6 H  (11.5-15.5)  %


 


Glucose     ()  mg/dL


 


POC Glucose (mg/dL)  128 H  119 H   (75-99)  mg/dL


 


Calcium     (8.7-10.3)  mg/dL


 


Total Protein     (6.2-8.2)  g/dL


 


Albumin     (3.80-4.90)  g/dL


 


Albumin/Globulin Ratio     (1.60-3.17)  g/dL














  11/08/20 Range/Units





  07:11 


 


RBC   (3.80-5.40)  m/uL


 


Hgb   (11.4-16.0)  gm/dL


 


Hct   (34.0-46.0)  %


 


MCV   (80.0-100.0)  fL


 


MCHC   (31.0-37.0)  g/dL


 


RDW   (11.5-15.5)  %


 


Glucose   ()  mg/dL


 


POC Glucose (mg/dL)  103 H  (75-99)  mg/dL


 


Calcium   (8.7-10.3)  mg/dL


 


Total Protein   (6.2-8.2)  g/dL


 


Albumin   (3.80-4.90)  g/dL


 


Albumin/Globulin Ratio   (1.60-3.17)  g/dL








                      Microbiology - Last 24 Hours (Table)











 11/06/20 11:14 Blood Culture - Preliminary





 Blood    No Growth after 24 hours














Assessment and Plan


Plan: 





1.  Mental status changes likely related to recurrent seizure activity due to 

missing antiseizure medications due to vomiting





2.  Previous history of stroke in 2015, no evidence of stroke at this time.





3.  Underlying history of insulin-dependent diabetes mellitus, type I maintained

on insulin, with previous multiple admissions with DKA





4.  Underlying history of gastroparesis, maintained on Reglan





5.  History of psychosis maintained on Seroquel





6.  Underlying history of hypertension





7.  Underlying history of hyperlipidemia





8.  Previous history of coronary artery disease with myocardial infarction in 

the past





9.  Anemia was significant hemoglobin drop since yesterday.  Hemoglobin 

improving today 8.2





At this time patient is admitted to medical floor, she was seen by neurology and

was given IV antiseizure medications


Mental status improved significantly today


GI services following possible plans for scope


CT of abdomen and pelvis ordered


Will give IV iron and monitor closely

## 2020-11-08 NOTE — P.CONS
History of Present Illness





- Reason for Consult


Consult date: 20


anemia


Requesting physician: Saniya Cabral





- Chief Complaint


Seizure





- History of Present Illness








60-year-old female with multiple medical comorbidities including insulin-de

pendent diabetes mellitus, prior CVA, hypertension, hyperlipidemia, coronary 

artery disease, peripheral vascular disease and COPD who presented to the 

hospital due altered mental status felt to be secondary to seizure.  This was 

felt to be provoked as the patient was having some nausea and vomiting and 

unable to tolerate her seizure medication.patient has had multiple admissions to

the hospital as well as recent admission for suspected pneumonia and 

hypoglycemia.  She has been noted to be anemic.  Currently she is denying any 

signs or symptoms of GI bleeding.  She'll use her last endoscopic evaluation was

a 10 years ago with EGD and colonoscopy significant for polypectomy.  On 

presentation hemoglobin 7.6 with WBC 6.6, platelet count 147,000, total 

bilirubin 0.2, alkaline phosphatase 75, AST 17 and ALT 13.stool testing was 

negative for occult blood.





Review of Systems





REVIEW OF SYSTEMS:


CONSTITUTIONAL: Denies any fevers, chills, weight change or fatigue.


CARDIOVASCULAR: Denies any chest pain, palpitations high or low blood pressures


RESPIRATORY: Denies any shortness of breath, hemoptysis or cough.  


GENITOURINARY:  No dysuria or hematuria. 


MUSCULOSKELETAL: No weakness reported. 


SKIN: Denies any new rashes or lesions, jaundice or pallor. 


PSYCHIATRIC: Denies any depression or anxiety at this time. 


NEUROLOGY: Denies headache, denies any new focal deficits, did present to the 

hospital due to altered mental status due to seizure. 


EARS/NOSE/THROAT: No recent hearing change, congestion, nasal discharge or sore 

throat.


EYES: No pain in eyes, discharge or change in vision. 


GASTROINTESTINAL: As per HPI.





Past Medical History


Past Medical History: Asthma, Coronary Artery Disease (CAD), Chest Pain / 

Angina, Heart Failure, COPD, CVA/TIA, Diabetes Mellitus, Deep Vein Thrombosis (D

VT), Eye Disorder, GERD/Reflux, Hyperlipidemia, Hypertension, Myocardial 

Infarction (MI), Neurologic Disorder, Osteoarthritis (OA), Pneumonia, Renal 

Disease


Additional Past Medical History / Comment(s): IDDM (brittle), DKAs, neuropathy 

bilateral hands/feet, retinopathy bilateral eyes, cellulitis R foot, R great toe

and 2nd toe infections/amputations, current wound R foot-being seen in [facility], 

renal failure, anemia, CVAs with L sided paralysis, headaches started after 

CVAs, brain lesions, DVT R axillae, low back pain, varicosities, seizure many 

years ago (), hypothyroid, constipation, bilateral tinnitis occasionally, 

sinus problems.


Last Myocardial Infarction Date:: 


History of Any Multi-Drug Resistant Organisms: MRSA


Year Discovered:: 18


MDRO Source:: Right Foot


Past Surgical History: Appendectomy,  Section, Cholecystectomy, Heart 

Catheterization With Stent, Hysterectomy, Orthopedic Surgery


Additional Past Surgical History / Comment(s): PCI with multiple stents, R great

toe and 2nd toe amps, debridements R foot ulcer, L shoulder surgery to remove 

bone, bronchoscopy, EGD, colonoscopy, R arm port since removed, bilateral 

cataract removals/lens implants.


Past Anesthesia/Blood Transfusion Reactions: No Reported Reaction


Additional Past Anesthesia/Blood Transfusion Reaction / Comm: HX OF BLOOD 

TRANSFUSION- NO REACTION


Date of Last Stent Placement:: 2013


Past Psychological History: Anxiety, Bipolar, Depression


Smoking Status: Never smoker


Past Alcohol Use History: None Reported


Past Drug Use History: Marijuana





- Past Family History


  ** Father


Family Medical History: Unable to Obtain, Coronary Artery Disease (CAD), 

Diabetes Mellitus





  ** Mother


Family Medical History: COPD





Medications and Allergies


                                Home Medications











 Medication  Instructions  Recorded  Confirmed  Type


 


Famotidine [Pepcid] 20 mg PO DAILY 16 History


 


HYDROcodone/APAP 10-325MG [Norco 1 tab PO TID PRN 17 History





]    


 


Atorvastatin [Lipitor] 20 mg PO DAILY 19 History


 


Aspirin [Adult Low Dose Aspirin EC] 81 mg PO DAILY 01/10/20 11/06/20 History


 


Ferrous Sulfate [Iron (65  mg PO BID 01/10/20 11/06/20 History





Elemental)]    


 


Divalproex [Depakote] 250 mg PO TID #90 tablet. 20 Rx


 


Albuterol Sulfate [Ventolin HFA] 2 puff INHALATION RT-Q4H PRN 20 

History


 


Losartan [Cozaar] 50 mg PO DAILY  tab 20 Rx


 


Ascorbic Acid [Vitamin C] 500 mg PO DAILY 20 History


 


Vitamin B Complex 1 tab PO DAILY 20 History


 


Clopidogrel [Plavix] 75 mg PO DAILY  tab 20 Rx


 


QUEtiapine [SEROquel] 50 mg PO HS 30 Days #30 tab 10/14/20 11/06/20 Rx


 


ALPRAZolam [Xanax] 0.5 mg PO QID PRN  tab 10/27/20 11/06/20 Rx


 


DULoxetine HCL [Cymbalta] 30 mg PO DAILY  capsule.dr 10/27/20 11/06/20 Rx


 


Metoclopramide [Reglan] 5 mg PO AC-TID  tab 10/27/20 11/06/20 Rx


 


QUEtiapine [SEROquel] 25 mg PO DAILY  tab 10/27/20 11/06/20 Rx


 


Calcium Carb-Vit D 500Mg-200Un 1 tab PO BID-W/MEALS 20 History





[Oscal 500+D]    


 


INSULIN LISPRO (humaLOG) [humaLOG] 6 units SQ AC-TID 20 History


 


Ondansetron Odt [Zofran Odt] 4 mg PO Q8HR PRN 3 Days #9 tab 20 Rx


 


Insulin Detemir [Levemir Flextouch] 10 units SQ HS 20 History


 


Lacosamide [Vimpat] 100 mg PO BID 20 History


 


Metoprolol Tartrate [Lopressor] 12.5 mg PO BID 20 History








                                    Allergies











Allergy/AdvReac Type Severity Reaction Status Date / Time


 


Barbiturates Allergy  Rash/Hives Verified 20 10:22


 


cephalexin monohydrate Allergy  Rash/Hives Verified 20 10:22





[From Keflex]     


 


morphine Allergy  Rash/Hives Verified 20 10:22


 


Penicillins Allergy  Rash/Hives Verified 20 10:22


 


phenobarbital Allergy  Swelling Verified 20 10:22


 


venom-honey bee Allergy  Swelling Verified 20 10:22





[bee venom (honey bee)]     


 


amlodipine besylate AdvReac  Vomiting Verified 20 10:22





[From Norvasc]     














Physical Exam


Vitals: 


                                   Vital Signs











  Temp Pulse Pulse Resp BP BP Pulse Ox


 


 20 09:00  97.5 F L   99  18   137/57  93 L


 


 20 01:56  98.7 F   89  18   161/77  95


 


 20 20:14  98.2 F   83  18   196/91  95


 


 20 17:30   81   12  104/93   96


 


 20 16:30   98   14  113/95   88 L


 


 20 15:30   92   21  164/83   97


 


 20 15:00   85   13  123/72   97


 


 20 14:30   78   14  130/75   97


 


 20 14:17   84   18  130/75   98


 


 20 13:33   83   18  132/75   98


 


 20 12:49   78   18  141/72   96








                                Intake and Output











 20





 22:59 06:59 14:59


 


Other:   


 


  Voiding Method Diaper Diaper Diaper





 Incontinent Incontinent Incontinent


 


  # Voids 1 4 1














On physical examination, patient appears comfortable in no apparent distress. 


HEAD: Normocephalic, atraumatic. 


EYES: No scleral icterus. No conjunctival injection. 


MOUTH: No lesions, tongue midline. 


NECK: Trachea midline, no gross abnormalities. 


CHEST: Decreased air entry in all lung fields. 


HEART: Regular rate and rhythm. 


ABDOMEN: Soft, thin and nontender. Bowel sounds are positive. No organomegaly.  

No guarding or rigidity.


EXTREMITIES: No pedal edema. 


SKIN: No rashes, no jaundice. 


NEUROLOGIC: Alert and oriented x3.  No focal deficits. 





Results


CBC & Chem 7: 


                                 20 06:18





                                 20 06:20


Labs: 


                  Abnormal Lab Results - Last 24 Hours (Table)











  20 Range/Units





  10:57 10:57 20:25 


 


RBC     (3.80-5.40)  m/uL


 


Hgb     (11.4-16.0)  gm/dL


 


Hct     (34.0-46.0)  %


 


MCV     (80.0-100.0)  fL


 


MCHC     (31.0-37.0)  g/dL


 


RDW     (11.5-15.5)  %


 


Plt Count     (150-450)  k/uL


 


APTT  20.2 L    (22.0-30.0)  sec


 


Glucose     ()  mg/dL


 


POC Glucose (mg/dL)    100 H  (75-99)  mg/dL


 


Plasma Lactic Acid Carmelo   2.6 H*   (0.7-2.0)  mmol/L


 


Calcium     (8.7-10.3)  mg/dL


 


Total Protein     (6.2-8.2)  g/dL


 


Albumin     (3.80-4.90)  g/dL


 


Albumin/Globulin Ratio     (1.60-3.17)  g/dL














  20 Range/Units





  20:58 21:41 02:19 


 


RBC     (3.80-5.40)  m/uL


 


Hgb     (11.4-16.0)  gm/dL


 


Hct     (34.0-46.0)  %


 


MCV     (80.0-100.0)  fL


 


MCHC     (31.0-37.0)  g/dL


 


RDW     (11.5-15.5)  %


 


Plt Count     (150-450)  k/uL


 


APTT     (22.0-30.0)  sec


 


Glucose     ()  mg/dL


 


POC Glucose (mg/dL)  136 H  148 H  301 H  (75-99)  mg/dL


 


Plasma Lactic Acid Carmelo     (0.7-2.0)  mmol/L


 


Calcium     (8.7-10.3)  mg/dL


 


Total Protein     (6.2-8.2)  g/dL


 


Albumin     (3.80-4.90)  g/dL


 


Albumin/Globulin Ratio     (1.60-3.17)  g/dL














  20 Range/Units





  06:20 06:20 07:00 


 


RBC  2.53 L    (3.80-5.40)  m/uL


 


Hgb  7.6 L D    (11.4-16.0)  gm/dL


 


Hct  25.4 L    (34.0-46.0)  %


 


MCV  100.4 H    (80.0-100.0)  fL


 


MCHC  29.9 L    (31.0-37.0)  g/dL


 


RDW  17.0 H    (11.5-15.5)  %


 


Plt Count  147 L    (150-450)  k/uL


 


APTT     (22.0-30.0)  sec


 


Glucose   198 H   ()  mg/dL


 


POC Glucose (mg/dL)    164 H  (75-99)  mg/dL


 


Plasma Lactic Acid Carmelo     (0.7-2.0)  mmol/L


 


Calcium   8.0 L   (8.7-10.3)  mg/dL


 


Total Protein   5.2 L   (6.2-8.2)  g/dL


 


Albumin   2.70 L   (3.80-4.90)  g/dL


 


Albumin/Globulin Ratio   1.08 L   (1.60-3.17)  g/dL














  20 Range/Units





  11:51 12:08 12:27 


 


RBC     (3.80-5.40)  m/uL


 


Hgb     (11.4-16.0)  gm/dL


 


Hct     (34.0-46.0)  %


 


MCV     (80.0-100.0)  fL


 


MCHC     (31.0-37.0)  g/dL


 


RDW     (11.5-15.5)  %


 


Plt Count     (150-450)  k/uL


 


APTT     (22.0-30.0)  sec


 


Glucose     ()  mg/dL


 


POC Glucose (mg/dL)  56 L  56 L  62 L  (75-99)  mg/dL


 


Plasma Lactic Acid Carmelo     (0.7-2.0)  mmol/L


 


Calcium     (8.7-10.3)  mg/dL


 


Total Protein     (6.2-8.2)  g/dL


 


Albumin     (3.80-4.90)  g/dL


 


Albumin/Globulin Ratio     (1.60-3.17)  g/dL











CT Scan - head: report reviewed





Assessment and Plan


(1) Iron deficiency anemia


Narrative/Plan: 


60-year-old female with multiple medical comorbidities who presented to the 

hospital due to altered mental status secondary to seizure.  Patient found to be

anemic which was also noted on prior hospitalization.  Previous iron studies 

consistent with iron deficiency anemia and patient is on iron supplementation.  

She denies any signs or symptoms of GI bleeding.  She does report a remote 

history of endoscopic evaluation approximate 8 to 9 years ago with EGD and 

colonoscopy. Macrocytic indices.  Anemia likely multifactorial given multiple 

medical comorbidities cannot rule out a component of GI blood loss but no overt 

signs or symptoms at this time with negative stool testing for occult blood.


Current Visit: Yes   Status: Acute   Code(s): D50.9 - IRON DEFICIENCY ANEMIA, 

UNSPECIFIED   SNOMED Code(s): 48510230


   


Plan: 





supportive care


Continue Protonix therapy


Continue to monitor lumen and hematocrit and transfuse as needed


Continue to monitor for any signs or symptoms of GI blood loss


currently on consistent carbohydrate diet


Continue consider endoscopic evaluation either during hospitalization when 

medically stable or after discharge in the outpatient setting pending labs and 

clinical course


Thank you for allowing us to participate in the care of the patient

## 2020-11-08 NOTE — P.PN
Subjective


Progress Note Date: 11/08/20


Principal diagnosis: 





iron deficiency anemia





the patient is seen lying in bed with no signs or symptoms of GI bleeding 

reported.  Tolerating diet.  No abdominal pain.





Objective





- Vital Signs


Vital signs: 


                                   Vital Signs











Temp  97.3 F L  11/08/20 07:00


 


Pulse  56 L  11/08/20 07:00


 


Resp  14   11/08/20 08:37


 


BP  88/53   11/08/20 07:00


 


Pulse Ox  96   11/08/20 07:00








                                 Intake & Output











 11/07/20 11/08/20 11/08/20





 18:59 06:59 18:59


 


Intake Total   300


 


Balance   300


 


Intake:   


 


  Oral   300


 


Other:   


 


  Voiding Method Diaper Diaper Diaper





 Incontinent Incontinent Incontinent


 


  # Voids 1 2 1


 


  # Bowel Movements  0 














- Exam





On physical examination, patient appears comfortable in no apparent distress. 


HEAD: Normocephalic, atraumatic. 


EYES: No scleral icterus. No conjunctival injection. 


MOUTH: No lesions, tongue midline. 


NECK: Trachea midline, no gross abnormalities. 


ABDOMEN: Soft, nontender. Bowel sounds are positive. No organomegaly.  No 

guarding or rigidity.


EXTREMITIES: No pedal edema. 


SKIN: No rashes, no jaundice. 


NEUROLOGIC: Alert and oriented to person and place. 





- Labs


CBC & Chem 7: 


                                 11/08/20 06:18





                                 11/07/20 06:20


Labs: 


                  Abnormal Lab Results - Last 24 Hours (Table)











  11/07/20 11/07/20 11/07/20 Range/Units





  11:51 12:08 12:27 


 


RBC     (3.80-5.40)  m/uL


 


Hgb     (11.4-16.0)  gm/dL


 


Hct     (34.0-46.0)  %


 


MCV     (80.0-100.0)  fL


 


MCHC     (31.0-37.0)  g/dL


 


RDW     (11.5-15.5)  %


 


POC Glucose (mg/dL)  56 L  56 L  62 L  (75-99)  mg/dL














  11/07/20 11/07/20 11/07/20 Range/Units





  12:43 17:02 20:09 


 


RBC     (3.80-5.40)  m/uL


 


Hgb     (11.4-16.0)  gm/dL


 


Hct     (34.0-46.0)  %


 


MCV     (80.0-100.0)  fL


 


MCHC     (31.0-37.0)  g/dL


 


RDW     (11.5-15.5)  %


 


POC Glucose (mg/dL)  74 L  240 H  128 H  (75-99)  mg/dL














  11/08/20 11/08/20 11/08/20 Range/Units





  04:26 06:18 07:11 


 


RBC   2.65 L   (3.80-5.40)  m/uL


 


Hgb   8.2 L   (11.4-16.0)  gm/dL


 


Hct   27.2 L   (34.0-46.0)  %


 


MCV   102.4 H   (80.0-100.0)  fL


 


MCHC   30.2 L   (31.0-37.0)  g/dL


 


RDW   16.6 H   (11.5-15.5)  %


 


POC Glucose (mg/dL)  119 H   103 H  (75-99)  mg/dL














  11/08/20 Range/Units





  11:34 


 


RBC   (3.80-5.40)  m/uL


 


Hgb   (11.4-16.0)  gm/dL


 


Hct   (34.0-46.0)  %


 


MCV   (80.0-100.0)  fL


 


MCHC   (31.0-37.0)  g/dL


 


RDW   (11.5-15.5)  %


 


POC Glucose (mg/dL)  103 H  (75-99)  mg/dL








                      Microbiology - Last 24 Hours (Table)











 11/06/20 11:14 Blood Culture - Preliminary





 Blood    No Growth after 24 hours














Assessment and Plan


(1) Iron deficiency anemia


Narrative/Plan: 


60-year-old female with multiple medical comorbidities who presented to the 

hospital due to altered mental status secondary to seizure.  Patient found to be

anemic which was also noted on prior hospitalization.  Previous iron studies 

consistent with iron deficiency anemia and patient is on iron supplementation.  

She denies any signs or symptoms of GI bleeding.  She does report a remote 

history of endoscopic evaluation approximate 8 to 9 years ago with EGD and 

colonoscopy. Macrocytic indices.  Anemia likely multifactorial given multiple 

medical comorbidities cannot rule out a component of GI blood loss but no overt 

signs or symptoms at this time with negative stool testing for occult blood.


Current Visit: Yes   Status: Acute   Code(s): D50.9 - IRON DEFICIENCY ANEMIA, 

UNSPECIFIED   SNOMED Code(s): 39136625


   


Plan: 





supportive care


Continue Protonix therapy


Continue to monitor hemoglobin and hematocrit and transfuse as needed


Continue to monitor for any signs or symptoms of GI blood loss


currently on consistent carbohydrate diet, we'll change to clear liquid diet


Consider endoscopic evaluation either during hospitalization when medically 

stable or after discharge in the outpatient setting pending labs and clinical 

course for evaluation of the patient's iron deficiency anemia


Thank you for allowing us to participate in the care of the patient

## 2020-11-08 NOTE — CT
EXAMINATION TYPE: CT abdomen pelvis w con

 

DATE OF EXAM: 11/8/2020

 

COMPARISON: 7/30/2020

 

HISTORY: nausea and vomiting

 

CT DLP: 441 mGycm

Automated exposure control for dose reduction was used.

 

CONTRAST: 

Performed with IV Contrast, patient injected with 50 mL of Isovue 300.

 

There is some mild pleural thickening and infiltrate and atelectasis left posterior lung base. There 
is no pleural effusion. Heart size is normal. There is no pericardial effusion. Liver and spleen appe
ar normal. Bile ducts are not dilated. Stomach is intact.

 

There is no adrenal mass. The kidneys have normal size. There is no hydronephrosis. Ureters are not d
ilated. There is no retroperitoneal adenopathy. Abdominal aorta is atheromatous. Urinary bladder is d
ilated and measures 17 cm in length. There is no inguinal hernia. There is no free fluid in the pelvi
s. There is hysterectomy.

 

Appendix is not seen. There is no sign of thickened appendix. I see no evidence of a bowel obstructio
n. There is no mesenteric edema. There is no sign of ascites or free air.

 

Lumbar vertebra have normal alignment. There is no compression fracture. The bony pelvis is intact. T
here is some sclerosis and deformity of the femoral heads related to some chronic avascular necrosis.
 Sacroiliac joints appear intact.

 

IMPRESSION:

There is some pleural thickening and infiltrate and atelectasis left posterior lung base that appears
 new compared to old exam. Dilated urinary bladder is new compared to old exam.

 

Chronic avascular necrosis of the femoral heads unchanged. Atherosclerotic vascular disease.

## 2020-11-09 LAB
ALBUMIN SERPL-MCNC: 3 G/DL (ref 3.8–4.9)
ALBUMIN/GLOB SERPL: 1.07 G/DL (ref 1.6–3.17)
ALP SERPL-CCNC: 80 U/L (ref 41–126)
ALT SERPL-CCNC: 14 U/L (ref 8–44)
ANION GAP SERPL CALC-SCNC: 6.5 MMOL/L (ref 4–12)
AST SERPL-CCNC: 20 U/L (ref 13–35)
BASOPHILS # BLD AUTO: 0 K/UL (ref 0–0.2)
BASOPHILS NFR BLD AUTO: 0 %
BUN SERPL-SCNC: 11 MG/DL (ref 9–27)
BUN/CREAT SERPL: 13.75 RATIO (ref 12–20)
CALCIUM SPEC-MCNC: 9 MG/DL (ref 8.7–10.3)
CHLORIDE SERPL-SCNC: 108 MMOL/L (ref 96–109)
CO2 SERPL-SCNC: 29.5 MMOL/L (ref 21.6–31.8)
EOSINOPHIL # BLD AUTO: 0 K/UL (ref 0–0.7)
EOSINOPHIL NFR BLD AUTO: 1 %
ERYTHROCYTE [DISTWIDTH] IN BLOOD BY AUTOMATED COUNT: 2.9 M/UL (ref 3.8–5.4)
ERYTHROCYTE [DISTWIDTH] IN BLOOD: 16.3 % (ref 11.5–15.5)
GLOBULIN SER CALC-MCNC: 2.8 G/DL (ref 1.6–3.3)
GLUCOSE BLD-MCNC: 164 MG/DL (ref 75–99)
GLUCOSE BLD-MCNC: 180 MG/DL (ref 75–99)
GLUCOSE BLD-MCNC: 192 MG/DL (ref 75–99)
GLUCOSE BLD-MCNC: 46 MG/DL (ref 75–99)
GLUCOSE BLD-MCNC: 99 MG/DL (ref 75–99)
GLUCOSE SERPL-MCNC: 37 MG/DL (ref 70–110)
HCT VFR BLD AUTO: 28.6 % (ref 34–46)
HGB BLD-MCNC: 9 GM/DL (ref 11.4–16)
LYMPHOCYTES # SPEC AUTO: 1.6 K/UL (ref 1–4.8)
LYMPHOCYTES NFR SPEC AUTO: 21 %
MCH RBC QN AUTO: 31.2 PG (ref 25–35)
MCHC RBC AUTO-ENTMCNC: 31.6 G/DL (ref 31–37)
MCV RBC AUTO: 98.6 FL (ref 80–100)
MONOCYTES # BLD AUTO: 0.5 K/UL (ref 0–1)
MONOCYTES NFR BLD AUTO: 7 %
NEUTROPHILS # BLD AUTO: 5.3 K/UL (ref 1.3–7.7)
NEUTROPHILS NFR BLD AUTO: 70 %
PLATELET # BLD AUTO: 184 K/UL (ref 150–450)
POTASSIUM SERPL-SCNC: 3.4 MMOL/L (ref 3.5–5.5)
PROT SERPL-MCNC: 5.8 G/DL (ref 6.2–8.2)
SODIUM SERPL-SCNC: 144 MMOL/L (ref 135–145)
WBC # BLD AUTO: 7.5 K/UL (ref 3.8–10.6)

## 2020-11-09 RX ADMIN — DIVALPROEX SODIUM SCH MG: 250 TABLET, DELAYED RELEASE ORAL at 17:16

## 2020-11-09 RX ADMIN — METOCLOPRAMIDE SCH MG: 5 TABLET ORAL at 09:25

## 2020-11-09 RX ADMIN — INSULIN ASPART SCH UNIT: 100 INJECTION, SOLUTION INTRAVENOUS; SUBCUTANEOUS at 17:16

## 2020-11-09 RX ADMIN — LOSARTAN POTASSIUM SCH MG: 50 TABLET, FILM COATED ORAL at 09:24

## 2020-11-09 RX ADMIN — HYDROCODONE BITARTRATE AND ACETAMINOPHEN PRN EACH: 10; 325 TABLET ORAL at 17:26

## 2020-11-09 RX ADMIN — OXYCODONE HYDROCHLORIDE AND ACETAMINOPHEN SCH MG: 500 TABLET ORAL at 09:25

## 2020-11-09 RX ADMIN — METOPROLOL TARTRATE SCH MG: 25 TABLET, FILM COATED ORAL at 20:57

## 2020-11-09 RX ADMIN — INSULIN ASPART SCH: 100 INJECTION, SOLUTION INTRAVENOUS; SUBCUTANEOUS at 07:30

## 2020-11-09 RX ADMIN — Medication SCH EACH: at 17:14

## 2020-11-09 RX ADMIN — Medication SCH MG: at 20:57

## 2020-11-09 RX ADMIN — CEFAZOLIN SCH: 330 INJECTION, POWDER, FOR SOLUTION INTRAMUSCULAR; INTRAVENOUS at 12:19

## 2020-11-09 RX ADMIN — DIVALPROEX SODIUM SCH MG: 250 TABLET, DELAYED RELEASE ORAL at 09:25

## 2020-11-09 RX ADMIN — HALOPERIDOL LACTATE PRN MG: 5 INJECTION, SOLUTION INTRAMUSCULAR at 20:58

## 2020-11-09 RX ADMIN — INSULIN ASPART SCH: 100 INJECTION, SOLUTION INTRAVENOUS; SUBCUTANEOUS at 21:45

## 2020-11-09 RX ADMIN — LACOSAMIDE SCH MG: 150 TABLET, FILM COATED ORAL at 20:57

## 2020-11-09 RX ADMIN — METOCLOPRAMIDE SCH: 5 TABLET ORAL at 18:54

## 2020-11-09 RX ADMIN — DIVALPROEX SODIUM SCH MG: 250 TABLET, DELAYED RELEASE ORAL at 22:05

## 2020-11-09 RX ADMIN — LACOSAMIDE SCH MG: 150 TABLET, FILM COATED ORAL at 09:25

## 2020-11-09 RX ADMIN — METOCLOPRAMIDE SCH: 5 TABLET ORAL at 14:30

## 2020-11-09 RX ADMIN — ATORVASTATIN CALCIUM SCH MG: 20 TABLET, FILM COATED ORAL at 09:25

## 2020-11-09 RX ADMIN — ASPIRIN 81 MG CHEWABLE TABLET SCH MG: 81 TABLET CHEWABLE at 09:25

## 2020-11-09 RX ADMIN — PANTOPRAZOLE SODIUM SCH MG: 40 INJECTION, POWDER, FOR SOLUTION INTRAVENOUS at 20:57

## 2020-11-09 RX ADMIN — Medication SCH MG: at 09:24

## 2020-11-09 RX ADMIN — Medication SCH EACH: at 09:25

## 2020-11-09 RX ADMIN — INSULIN ASPART SCH: 100 INJECTION, SOLUTION INTRAVENOUS; SUBCUTANEOUS at 15:22

## 2020-11-09 RX ADMIN — PANTOPRAZOLE SODIUM SCH: 40 INJECTION, POWDER, FOR SOLUTION INTRAVENOUS at 12:19

## 2020-11-09 RX ADMIN — METOPROLOL TARTRATE SCH MG: 25 TABLET, FILM COATED ORAL at 09:24

## 2020-11-09 NOTE — P.PN
Subjective


Progress Note Date: 11/09/20





Patient was seen for a follow-up.  Patient with recurrent admissions to the 

hospital for seizures.  This time patient was having protracted vomiting and 

therefore not able to absorb oral antiepileptic medications.





Objective





- Vital Signs


Vital signs: 


                                   Vital Signs











Temp  97.5 F L  11/09/20 00:57


 


Pulse  68   11/09/20 07:00


 


Resp  18   11/09/20 07:00


 


BP  106/64   11/09/20 07:00


 


Pulse Ox  94 L  11/09/20 07:00








                                 Intake & Output











 11/08/20 11/09/20 11/09/20





 18:59 06:59 18:59


 


Intake Total 500  240


 


Balance 500  240


 


Intake:   


 


  Oral 500  240


 


Other:   


 


  Voiding Method Diaper Diaper Diaper





 Incontinent Incontinent Incontinent


 


  # Voids 1 4 


 


  # Bowel Movements 0 1 














- Exam





Patient is alert and awake in no distress.  Laying comfortably in the bed.  

Continues to be left spastic hemiplegic.  No seizures reported since in the 

hospital.





- Labs


CBC & Chem 7: 


                                 11/09/20 06:09





                                 11/09/20 06:09


Labs: 


                  Abnormal Lab Results - Last 24 Hours (Table)











  11/08/20 11/08/20 11/09/20 Range/Units





  18:03 20:50 06:09 


 


RBC    2.90 L  (3.80-5.40)  m/uL


 


Hgb    9.0 L  (11.4-16.0)  gm/dL


 


Hct    28.6 L  (34.0-46.0)  %


 


RDW    16.3 H  (11.5-15.5)  %


 


Potassium     (3.5-5.5)  mmol/L


 


Glucose     ()  mg/dL


 


POC Glucose (mg/dL)  439 H  230 H   (75-99)  mg/dL


 


Total Bilirubin     (0.2-1.2)  mg/dL


 


Total Protein     (6.2-8.2)  g/dL


 


Albumin     (3.80-4.90)  g/dL


 


Albumin/Globulin Ratio     (1.60-3.17)  g/dL














  11/09/20 11/09/20 11/09/20 Range/Units





  06:09 07:00 11:40 


 


RBC     (3.80-5.40)  m/uL


 


Hgb     (11.4-16.0)  gm/dL


 


Hct     (34.0-46.0)  %


 


RDW     (11.5-15.5)  %


 


Potassium  3.4 L    (3.5-5.5)  mmol/L


 


Glucose  37 L*    ()  mg/dL


 


POC Glucose (mg/dL)   46 L  180 H  (75-99)  mg/dL


 


Total Bilirubin  0.1 L    (0.2-1.2)  mg/dL


 


Total Protein  5.8 L    (6.2-8.2)  g/dL


 


Albumin  3.00 L    (3.80-4.90)  g/dL


 


Albumin/Globulin Ratio  1.07 L    (1.60-3.17)  g/dL














  11/09/20 Range/Units





  16:50 


 


RBC   (3.80-5.40)  m/uL


 


Hgb   (11.4-16.0)  gm/dL


 


Hct   (34.0-46.0)  %


 


RDW   (11.5-15.5)  %


 


Potassium   (3.5-5.5)  mmol/L


 


Glucose   ()  mg/dL


 


POC Glucose (mg/dL)  192 H  (75-99)  mg/dL


 


Total Bilirubin   (0.2-1.2)  mg/dL


 


Total Protein   (6.2-8.2)  g/dL


 


Albumin   (3.80-4.90)  g/dL


 


Albumin/Globulin Ratio   (1.60-3.17)  g/dL








                      Microbiology - Last 24 Hours (Table)











 11/06/20 11:14 Blood Culture - Preliminary





 Blood    No Growth after 72 hours














Assessment and Plan


Assessment: 





* Seizure disorder, provoked by lack of antiepileptic medication secondary due 

  to vomiting.


* History of CVA with spastic left hemiplegia


* Cognitive deficits due to CVA.


* History of diabetes, with history of hypoglycemic episodes as well as DKA, 

  suggestive of brittle diabetes


Plan: 





* Continue patient's home seizure medications.  Patient currently on Depakote 

  250 mg 3 times a day and Vimpat 150 mg twice a day.


* Await Depakote level.


* Avoid episodes of hypoglycemia.  Patient's blood glucose was 37 this 6:09 a.m.

## 2020-11-09 NOTE — P.PN
Subjective


Progress Note Date: 11/09/20





Morenita Ramirez, is a 60-year-old female who presented to [facility] emergency room with a chief complaint of mental status changes with 

worsening somnolence and episodes of vomiting, she was evaluated in the 

emergency room, vital examination on presentation reveals a temperature of 97.9 

pulse 85 respiration 18 blood pressure 174/74 pulse ox 93% on room air, white 

blood count was 10.1 hemoglobin 9.9 platelet count 196 sodium 136 BUN 21 

creatinine 1.12 glucose 113 lactic acid 1.2 urine analysis revealed trace 

leukocyte esterase with moderate bacteria, chest x-ray revealed no acute pul

monary process, computed tomography scan of the brain revealed no evidence of 

acute intracranial hemorrhage or midline shift there was a large area of old 

encephalomalacic of the right anterior cerebral hemisphere related to previous 

stroke.  Patient was admitted to telemetry floor neurology consultation was 

requested due to concern of a recurrent stroke.


Patient has a known history of insulin-dependent diabetes mellitus type 1 

history of hypertension, history of hyperlipidemia, history of seizure disorder,

history of asthma, and history of psychosis.





On 11/08/2020 patient is alert and oriented resting in bed patient does appear 

restless.  CT of abdomen and pelvis has been ordered due to nausea and vomiting.

 Hemoglobin 8.2.  GI and neurology services are following.  Patient denies any 

chest pain or shortness of breath.  Does have appetite requesting food.





on 11/19/2020 patient was seen and examined on the medical floor she is alert 

confused and occasionally agitated she received a dose of Haldol earlier today 

her hemoglobin is down and gastroenterology planning EGD and colonoscopy shireen 

further review of system was possible today.





Objective





- Vital Signs


Vital signs: 


                                   Vital Signs











Temp  97.5 F L  11/09/20 00:57


 


Pulse  68   11/09/20 07:00


 


Resp  18   11/09/20 07:00


 


BP  106/64   11/09/20 07:00


 


Pulse Ox  94 L  11/09/20 07:00








                                 Intake & Output











 11/08/20 11/09/20 11/09/20





 18:59 06:59 18:59


 


Intake Total 500  240


 


Balance 500  240


 


Intake:   


 


  Oral 500  240


 


Other:   


 


  Voiding Method Diaper Diaper Diaper





 Incontinent Incontinent Incontinent


 


  # Voids 1 4 


 


  # Bowel Movements 0 1 














- Exam





In general patient is alert, responsive, in no distress


HEENT head normocephalic and atraumatic


Neck is supple no JVD no goiter no lymphadenopathy


Chest exam reveals a few scattered crackles no wheezing


Cardiac exam reveals regular heart sounds no gallops no murmurs


Abdomen is soft nontender no organomegaly was normal bowel sounds


Extremity exam reveals no edema no cyanosis or clubbing


Neurological examination patient is alert responsive answering questions a

ppropriately she is moving all 4 extremity spontaneously there is residual 

spasticity and weakness on the left upper extremity in the left lower extremity





- Labs


CBC & Chem 7: 


                                 11/09/20 06:09





                                 11/09/20 06:09


Labs: 


                  Abnormal Lab Results - Last 24 Hours (Table)











  11/08/20 11/08/20 11/09/20 Range/Units





  18:03 20:50 06:09 


 


RBC    2.90 L  (3.80-5.40)  m/uL


 


Hgb    9.0 L  (11.4-16.0)  gm/dL


 


Hct    28.6 L  (34.0-46.0)  %


 


RDW    16.3 H  (11.5-15.5)  %


 


Potassium     (3.5-5.5)  mmol/L


 


Glucose     ()  mg/dL


 


POC Glucose (mg/dL)  439 H  230 H   (75-99)  mg/dL


 


Total Bilirubin     (0.2-1.2)  mg/dL


 


Total Protein     (6.2-8.2)  g/dL


 


Albumin     (3.80-4.90)  g/dL


 


Albumin/Globulin Ratio     (1.60-3.17)  g/dL














  11/09/20 11/09/20 11/09/20 Range/Units





  06:09 07:00 11:40 


 


RBC     (3.80-5.40)  m/uL


 


Hgb     (11.4-16.0)  gm/dL


 


Hct     (34.0-46.0)  %


 


RDW     (11.5-15.5)  %


 


Potassium  3.4 L    (3.5-5.5)  mmol/L


 


Glucose  37 L*    ()  mg/dL


 


POC Glucose (mg/dL)   46 L  180 H  (75-99)  mg/dL


 


Total Bilirubin  0.1 L    (0.2-1.2)  mg/dL


 


Total Protein  5.8 L    (6.2-8.2)  g/dL


 


Albumin  3.00 L    (3.80-4.90)  g/dL


 


Albumin/Globulin Ratio  1.07 L    (1.60-3.17)  g/dL














  11/09/20 Range/Units





  16:50 


 


RBC   (3.80-5.40)  m/uL


 


Hgb   (11.4-16.0)  gm/dL


 


Hct   (34.0-46.0)  %


 


RDW   (11.5-15.5)  %


 


Potassium   (3.5-5.5)  mmol/L


 


Glucose   ()  mg/dL


 


POC Glucose (mg/dL)  192 H  (75-99)  mg/dL


 


Total Bilirubin   (0.2-1.2)  mg/dL


 


Total Protein   (6.2-8.2)  g/dL


 


Albumin   (3.80-4.90)  g/dL


 


Albumin/Globulin Ratio   (1.60-3.17)  g/dL








                      Microbiology - Last 24 Hours (Table)











 11/06/20 11:14 Blood Culture - Preliminary





 Blood    No Growth after 72 hours














Assessment and Plan


Plan: 





1.  Mental status changes likely related to recurrent seizure activity due to 

missing antiseizure medications due to vomiting





2.  Previous history of stroke in 2015, no evidence of stroke at this time.





3.  Underlying history of insulin-dependent diabetes mellitus, type I maintained

on insulin, with previous multiple admissions with DKA





4.  Underlying history of gastroparesis, maintained on Reglan





5.  History of psychosis maintained on Seroquel





6.  Underlying history of hypertension





7.  Underlying history of hyperlipidemia





8.  Previous history of coronary artery disease with myocardial infarction in t

he past





9.  Anemia was significant hemoglobin drop since yesterday.  Hemoglobin 

improving today 8.2





At this time patient is admitted to medical floor, she was seen by neurology and

was given IV antiseizure medications


Mental status improved significantly today


GI services following possible plans for scope


CT of abdomen and pelvis ordered


Will give IV iron and monitor closely

## 2020-11-09 NOTE — P.PN
Subjective


Progress Note Date: 11/09/20


Principal diagnosis: 





Iron Deficiency Anemia





Patient was seen and examined lying in bed.  She is very drowsy.  She denied any

nausea, vomiting, or abdominal pain.  Patient is kept repeating that she was 

hungry.  She is tolerating clear liquid diet.  Does not report any signs or s

ymptoms of any active GI bleeding.





Objective





- Vital Signs


Vital signs: 


                                   Vital Signs











Temp  97.5 F L  11/09/20 00:57


 


Pulse  68   11/09/20 07:00


 


Resp  18   11/09/20 07:00


 


BP  106/64   11/09/20 07:00


 


Pulse Ox  94 L  11/09/20 07:00








                                 Intake & Output











 11/08/20 11/09/20 11/09/20





 18:59 06:59 18:59


 


Intake Total 500  240


 


Balance 500  240


 


Intake:   


 


  Oral 500  240


 


Other:   


 


  Voiding Method Diaper Diaper Diaper





 Incontinent Incontinent Incontinent


 


  # Voids 1 4 


 


  # Bowel Movements 0 1 














- Exam





General appearance: The patient is alert, drowsy, oriented, in no acute 

distress.


HET: Head is normocephalic and atraumatic.  Conjunctiva pink.  Sclera anicteric.


Neck: Supple without lymphadenopathy.  


Abdomen: Soft, nontender, nondistended with  bowel sounds.  No guarding or 

rigidity.


Extremities: Normal skin color and turgor.  No pedal edema


Neurological: No focal deficits.  Alert and oriented 3.





- Labs


CBC & Chem 7: 


                                 11/09/20 06:09





                                 11/09/20 06:09


Labs: 


                  Abnormal Lab Results - Last 24 Hours (Table)











  11/08/20 11/08/20 11/08/20 Range/Units





  16:20 18:03 20:50 


 


RBC     (3.80-5.40)  m/uL


 


Hgb     (11.4-16.0)  gm/dL


 


Hct     (34.0-46.0)  %


 


RDW     (11.5-15.5)  %


 


Potassium     (3.5-5.5)  mmol/L


 


Glucose     ()  mg/dL


 


POC Glucose (mg/dL)  326 H  439 H  230 H  (75-99)  mg/dL


 


Total Bilirubin     (0.2-1.2)  mg/dL


 


Total Protein     (6.2-8.2)  g/dL


 


Albumin     (3.80-4.90)  g/dL


 


Albumin/Globulin Ratio     (1.60-3.17)  g/dL














  11/09/20 11/09/20 11/09/20 Range/Units





  06:09 06:09 07:00 


 


RBC  2.90 L    (3.80-5.40)  m/uL


 


Hgb  9.0 L    (11.4-16.0)  gm/dL


 


Hct  28.6 L    (34.0-46.0)  %


 


RDW  16.3 H    (11.5-15.5)  %


 


Potassium   3.4 L   (3.5-5.5)  mmol/L


 


Glucose   37 L*   ()  mg/dL


 


POC Glucose (mg/dL)    46 L  (75-99)  mg/dL


 


Total Bilirubin   0.1 L   (0.2-1.2)  mg/dL


 


Total Protein   5.8 L   (6.2-8.2)  g/dL


 


Albumin   3.00 L   (3.80-4.90)  g/dL


 


Albumin/Globulin Ratio   1.07 L   (1.60-3.17)  g/dL














  11/09/20 Range/Units





  11:40 


 


RBC   (3.80-5.40)  m/uL


 


Hgb   (11.4-16.0)  gm/dL


 


Hct   (34.0-46.0)  %


 


RDW   (11.5-15.5)  %


 


Potassium   (3.5-5.5)  mmol/L


 


Glucose   ()  mg/dL


 


POC Glucose (mg/dL)  180 H  (75-99)  mg/dL


 


Total Bilirubin   (0.2-1.2)  mg/dL


 


Total Protein   (6.2-8.2)  g/dL


 


Albumin   (3.80-4.90)  g/dL


 


Albumin/Globulin Ratio   (1.60-3.17)  g/dL








                      Microbiology - Last 24 Hours (Table)











 11/06/20 11:14 Blood Culture - Preliminary





 Blood    No Growth after 48 hours














Assessment and Plan


(1) Iron deficiency anemia


Narrative/Plan: 


Is is a 60-year-old female with multiple medical comorbidities who presented to 

the hospital due to altered mental status secondary to seizure.  Patient found 

to be anemic which was also noted on prior hospitalization.  Previous iron 

studies consistent with iron deficiency anemia and patient is on iron 

supplementation.  She denies any signs or symptoms of GI bleeding, she does 

report a remote striatum endoscopic evaluation approximately 8 or 9 years ago 

with EGD and colonoscopy.  Macrocytic indices anemia likely multifactorial given

multiple medical comorbidities cannot rule out a component of GI blood loss but 

no overt signs or symptoms at this time with negative stool testing for occult 

blood


Current Visit: Yes   Status: Acute   Code(s): D50.9 - IRON DEFICIENCY ANEMIA, 

UNSPECIFIED   SNOMED Code(s): 84958671


   


Plan: 





Supportive care


Continue Protonix therapy


Continue to monitor hemoglobin and hematocrit and transfuse as needed


Continue to monitor for signs or symptoms of GI blood loss


Clear liquid diet


Nothing by mouth after midnight


Bowel prep ordered


Patient is scheduled for an EGD and colonoscopy tomorrow


Thank you for allowing us to participate in the care of the patient











The impression and plan of care has been dictated as directed.








I performed a history and examination of this patient,  discussed the same with 

the dictator.  I agree with the dictator's note ,documented as a scribe.  Any 

additional findings or plans will be noted.

## 2020-11-10 LAB
ANION GAP SERPL CALC-SCNC: 12.6 MMOL/L (ref 4–12)
BUN SERPL-SCNC: 18 MG/DL (ref 9–27)
BUN/CREAT SERPL: 15 RATIO (ref 12–20)
CALCIUM SPEC-MCNC: 8.7 MG/DL (ref 8.7–10.3)
CHLORIDE SERPL-SCNC: 99 MMOL/L (ref 96–109)
CO2 SERPL-SCNC: 26.4 MMOL/L (ref 21.6–31.8)
ERYTHROCYTE [DISTWIDTH] IN BLOOD BY AUTOMATED COUNT: 2.81 M/UL (ref 3.8–5.4)
ERYTHROCYTE [DISTWIDTH] IN BLOOD: 15.9 % (ref 11.5–15.5)
GLUCOSE BLD-MCNC: 212 MG/DL (ref 75–99)
GLUCOSE BLD-MCNC: 250 MG/DL (ref 75–99)
GLUCOSE BLD-MCNC: 385 MG/DL (ref 75–99)
GLUCOSE BLD-MCNC: 437 MG/DL (ref 75–99)
GLUCOSE BLD-MCNC: 448 MG/DL (ref 75–99)
GLUCOSE BLD-MCNC: 484 MG/DL (ref 75–99)
GLUCOSE BLD-MCNC: 498 MG/DL (ref 75–99)
GLUCOSE BLD-MCNC: 513 MG/DL (ref 75–99)
GLUCOSE BLD-MCNC: 543 MG/DL (ref 75–99)
GLUCOSE SERPL-MCNC: 536 MG/DL (ref 70–110)
HCT VFR BLD AUTO: 28.8 % (ref 34–46)
HGB BLD-MCNC: 8.8 GM/DL (ref 11.4–16)
MCH RBC QN AUTO: 31.5 PG (ref 25–35)
MCHC RBC AUTO-ENTMCNC: 30.7 G/DL (ref 31–37)
MCV RBC AUTO: 102.6 FL (ref 80–100)
PLATELET # BLD AUTO: 216 K/UL (ref 150–450)
POTASSIUM SERPL-SCNC: 4.9 MMOL/L (ref 3.5–5.5)
SODIUM SERPL-SCNC: 138 MMOL/L (ref 135–145)
WBC # BLD AUTO: 8.2 K/UL (ref 3.8–10.6)

## 2020-11-10 PROCEDURE — 0DB98ZX EXCISION OF DUODENUM, VIA NATURAL OR ARTIFICIAL OPENING ENDOSCOPIC, DIAGNOSTIC: ICD-10-PCS

## 2020-11-10 PROCEDURE — 0DB78ZX EXCISION OF STOMACH, PYLORUS, VIA NATURAL OR ARTIFICIAL OPENING ENDOSCOPIC, DIAGNOSTIC: ICD-10-PCS

## 2020-11-10 PROCEDURE — 0D598ZZ DESTRUCTION OF DUODENUM, VIA NATURAL OR ARTIFICIAL OPENING ENDOSCOPIC: ICD-10-PCS

## 2020-11-10 RX ADMIN — PANTOPRAZOLE SODIUM SCH MG: 40 INJECTION, POWDER, FOR SOLUTION INTRAVENOUS at 10:54

## 2020-11-10 RX ADMIN — METOPROLOL TARTRATE SCH: 25 TABLET, FILM COATED ORAL at 10:31

## 2020-11-10 RX ADMIN — INSULIN ASPART SCH: 100 INJECTION, SOLUTION INTRAVENOUS; SUBCUTANEOUS at 12:09

## 2020-11-10 RX ADMIN — INSULIN ASPART SCH UNIT: 100 INJECTION, SOLUTION INTRAVENOUS; SUBCUTANEOUS at 17:52

## 2020-11-10 RX ADMIN — SODIUM FERRIC GLUCONATE COMPLEX SCH MLS/HR: 12.5 INJECTION INTRAVENOUS at 18:06

## 2020-11-10 RX ADMIN — METOCLOPRAMIDE SCH: 5 TABLET ORAL at 12:05

## 2020-11-10 RX ADMIN — OXYCODONE HYDROCHLORIDE AND ACETAMINOPHEN SCH: 500 TABLET ORAL at 10:31

## 2020-11-10 RX ADMIN — ATORVASTATIN CALCIUM SCH: 20 TABLET, FILM COATED ORAL at 10:31

## 2020-11-10 RX ADMIN — INSULIN ASPART SCH UNIT: 100 INJECTION, SOLUTION INTRAVENOUS; SUBCUTANEOUS at 07:39

## 2020-11-10 RX ADMIN — METOPROLOL TARTRATE SCH MG: 25 TABLET, FILM COATED ORAL at 21:05

## 2020-11-10 RX ADMIN — PANTOPRAZOLE SODIUM SCH MG: 40 INJECTION, POWDER, FOR SOLUTION INTRAVENOUS at 21:04

## 2020-11-10 RX ADMIN — METOCLOPRAMIDE SCH MG: 5 TABLET ORAL at 16:15

## 2020-11-10 RX ADMIN — CEFAZOLIN SCH: 330 INJECTION, POWDER, FOR SOLUTION INTRAMUSCULAR; INTRAVENOUS at 10:32

## 2020-11-10 RX ADMIN — Medication SCH: at 10:31

## 2020-11-10 RX ADMIN — HYDROCODONE BITARTRATE AND ACETAMINOPHEN PRN EACH: 10; 325 TABLET ORAL at 21:04

## 2020-11-10 RX ADMIN — LACOSAMIDE SCH: 150 TABLET, FILM COATED ORAL at 09:44

## 2020-11-10 RX ADMIN — ASPIRIN 81 MG CHEWABLE TABLET SCH: 81 TABLET CHEWABLE at 10:31

## 2020-11-10 RX ADMIN — INSULIN ASPART SCH UNIT: 100 INJECTION, SOLUTION INTRAVENOUS; SUBCUTANEOUS at 21:05

## 2020-11-10 RX ADMIN — METOCLOPRAMIDE SCH: 5 TABLET ORAL at 07:42

## 2020-11-10 RX ADMIN — Medication SCH: at 07:42

## 2020-11-10 RX ADMIN — CEFAZOLIN SCH: 330 INJECTION, POWDER, FOR SOLUTION INTRAMUSCULAR; INTRAVENOUS at 00:36

## 2020-11-10 RX ADMIN — LACOSAMIDE SCH MG: 150 TABLET, FILM COATED ORAL at 21:05

## 2020-11-10 RX ADMIN — DIVALPROEX SODIUM SCH MG: 250 TABLET, DELAYED RELEASE ORAL at 21:06

## 2020-11-10 RX ADMIN — Medication SCH EACH: at 16:15

## 2020-11-10 RX ADMIN — HALOPERIDOL LACTATE PRN MG: 5 INJECTION, SOLUTION INTRAMUSCULAR at 02:34

## 2020-11-10 RX ADMIN — LOSARTAN POTASSIUM SCH: 50 TABLET, FILM COATED ORAL at 10:31

## 2020-11-10 RX ADMIN — DIVALPROEX SODIUM SCH: 250 TABLET, DELAYED RELEASE ORAL at 09:44

## 2020-11-10 NOTE — P.PN
Subjective


Progress Note Date: 11/10/20





Patient was seen for a follow-up.  Patient just came back from an EGD, which 

showed antral gastritis, nonbleeding duodenal AVM.  No further seizures 

reported.  Patient at present asking for oatmeal. 





Objective





- Vital Signs


Vital signs: 


                                   Vital Signs











Temp  99.0 F   11/10/20 15:00


 


Pulse  109 H  11/10/20 15:00


 


Resp  20   11/10/20 15:00


 


BP  114/72   11/10/20 15:00


 


Pulse Ox  94 L  11/10/20 15:00








                                 Intake & Output











 11/09/20 11/10/20 11/10/20





 18:59 06:59 18:59


 


Intake Total 240 100 300


 


Balance 240 100 300


 


Intake:   


 


  IV   200


 


  Intake, IV Titration   100





  Amount   


 


    Valproate Sodium 250 mg   100





    In Sodium Chloride 0.9%   





    100 ml @ 100 mls/hr IVPB   





    Q8H [facility] Rx#:[id number]   


 


  Oral 240 100 


 


Other:   


 


  Voiding Method Diaper Diaper Diaper





 Incontinent Incontinent Incontinent


 


  # Voids  3 


 


  # Bowel Movements   1














- Exam





Patient is alert and awake in no distress.  Appears somewhat restless, asking 

for oatmeal, trying to get out of bed.   Continues to be left spastic 

hemiparesis.  No seizures reported since in the hospital.





- Labs


CBC & Chem 7: 


                                 11/10/20 07:21





                                 11/10/20 07:21


Labs: 


                  Abnormal Lab Results - Last 24 Hours (Table)











  11/09/20 11/10/20 11/10/20 Range/Units





  21:17 06:45 06:46 


 


RBC     (3.80-5.40)  m/uL


 


Hgb     (11.4-16.0)  gm/dL


 


Hct     (34.0-46.0)  %


 


MCV     (80.0-100.0)  fL


 


MCHC     (31.0-37.0)  g/dL


 


RDW     (11.5-15.5)  %


 


Anion Gap     (4.00-12.00)  mmol/L


 


Est GFR (CKD-EPI)AfAm     (60.0-200.0)   


 


Est GFR (CKD-EPI)NonAf     (60.0-200.0)   


 


Glucose     ()  mg/dL


 


POC Glucose (mg/dL)  164 H  513 H  484 H  (75-99)  mg/dL














  11/10/20 11/10/20 11/10/20 Range/Units





  07:09 07:21 07:21 


 


RBC   2.81 L   (3.80-5.40)  m/uL


 


Hgb   8.8 L   (11.4-16.0)  gm/dL


 


Hct   28.8 L   (34.0-46.0)  %


 


MCV   102.6 H   (80.0-100.0)  fL


 


MCHC   30.7 L   (31.0-37.0)  g/dL


 


RDW   15.9 H   (11.5-15.5)  %


 


Anion Gap    12.60 H  (4.00-12.00)  mmol/L


 


Est GFR (CKD-EPI)AfAm    57.3 L  (60.0-200.0)   


 


Est GFR (CKD-EPI)NonAf    49.4 L  (60.0-200.0)   


 


Glucose    536 H*  ()  mg/dL


 


POC Glucose (mg/dL)  498 H    (75-99)  mg/dL














  11/10/20 11/10/20 11/10/20 Range/Units





  07:59 09:01 11:13 


 


RBC     (3.80-5.40)  m/uL


 


Hgb     (11.4-16.0)  gm/dL


 


Hct     (34.0-46.0)  %


 


MCV     (80.0-100.0)  fL


 


MCHC     (31.0-37.0)  g/dL


 


RDW     (11.5-15.5)  %


 


Anion Gap     (4.00-12.00)  mmol/L


 


Est GFR (CKD-EPI)AfAm     (60.0-200.0)   


 


Est GFR (CKD-EPI)NonAf     (60.0-200.0)   


 


Glucose     ()  mg/dL


 


POC Glucose (mg/dL)  543 H  385 H  250 H  (75-99)  mg/dL














  11/10/20 11/10/20 11/10/20 Range/Units





  15:12 15:14 16:56 


 


RBC     (3.80-5.40)  m/uL


 


Hgb     (11.4-16.0)  gm/dL


 


Hct     (34.0-46.0)  %


 


MCV     (80.0-100.0)  fL


 


MCHC     (31.0-37.0)  g/dL


 


RDW     (11.5-15.5)  %


 


Anion Gap     (4.00-12.00)  mmol/L


 


Est GFR (CKD-EPI)AfAm     (60.0-200.0)   


 


Est GFR (CKD-EPI)NonAf     (60.0-200.0)   


 


Glucose     ()  mg/dL


 


POC Glucose (mg/dL)  425 H  448 H  437 H  (75-99)  mg/dL








                      Microbiology - Last 24 Hours (Table)











 11/06/20 11:14 Blood Culture - Preliminary





 Blood    No Growth after 96 hours














Assessment and Plan


Assessment: 





* Seizure disorder, provoked by lack of antiepileptic medication secondary due 

  to vomiting.


* History of CVA with spastic left hemiplegia


* Cognitive deficits due to CVA.


* History of diabetes, with history of hypoglycemic episodes as well as DKA, 

  suggestive of brittle diabetes


Plan: 





* Continue patient's home seizure medications.  Patient currently on Depakote 2

  50 mg 3 times a day and Vimpat 150 mg twice a day.


* Total Depakote level yesterday was 52 (), which is therapeutic.  

  Patient's free Depakote level was 14.9 (4.8-17.3) on 10/07/2020.


* Avoid episodes of hypoglycemia. 


* Neurologically clear.

## 2020-11-10 NOTE — P.PCN
Date of Procedure: 11/10/20


Description of Procedure: 





BRIEF HISTORY: 


60-year-old female with multiple medical comorbidities including insulin-

dependent diabetes mellitus, prior CVA, hypertension, hyperlipidemia, coronary 

artery disease, peripheral vascular disease and COPD who presented to the 

hospital due altered mental status felt to be secondary to seizure.  This was 

felt to be provoked as the patient was having some nausea and vomiting and 

unable to tolerate her seizure medication.patient has had multiple admissions to

the hospital as well as recent admission for suspected pneumonia and 

hypoglycemia.  She has been noted to be anemic.  Currently she is denying any 

signs or symptoms of GI bleeding.  She states her last endoscopic evaluation was

a 10 years ago with EGD and colonoscopy significant for polypectomy.  On 

presentation hemoglobin 7.6 with WBC 6.6, platelet count 147,000.  Plan was for 

EGD and colonoscopy was for patient was unable to tolerate bowel prep.





PROCEDURE PERFORMED: 


Esophagogastroduodenoscopy with biopsy and gold probe ablation  .





PREOPERATIVE DIAGNOSIS: 


Iron deficiency anemia. 





ESTIMATED BLOOD LOSS: 


Minimal.





IV sedation per anesthesia. 





PROCEDURE: 


After informed consent was obtained, the patient  was brought into the endoscopy

unit. IV sedation was administered by Anesthesia under continuous monitoring. 

Initially the Olympus GIF-190 video endoscope was inserted into the mouth. 

Esophagus intubated without any difficulty. It was gradually advanced into the 

stomach and duodenum and carefully examined. The bulb and the second part of the

duodenum appeared normal, except for one nonbleeding AVM in the second portion 

of the duodenum which was treated with gold probe ablation.  Biopsies of the 

duodenum were taken. The scope at this time was withdrawn to the stomach, 

adequately insufflated with air, and upon careful examination, mucosa of the 

antrum, body, cardia and the fundus were significant for severe scattered 

erythema and superficial erosions suggestive of severe gastritis with biopsies 

of the antrum and body taken. The scope was then withdrawn into the esophagus. 

The GE junction was located at 39 cm from the incisors. The esophagus appeared 

normal. There were no erosions or ulcerations seen and the patient tolerated the

procedure well. 





IMPRESSION: 


1.  Severe gastritis.


2.  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


3.  Biopsies of the antrum and body and duodenum.





RECOMMENDATIONS: 


The findings of this examination were discussed with the patient in the medical 

team.  Okay to resume diet.  Continue symptomatic treatment of nausea and 

vomiting.  Continue BID PPI therapy.  Avoid NSAID use.  Await pathology from 

biopsies.

## 2020-11-11 LAB
ERYTHROCYTE [DISTWIDTH] IN BLOOD BY AUTOMATED COUNT: 2.51 M/UL (ref 3.8–5.4)
ERYTHROCYTE [DISTWIDTH] IN BLOOD: 16.1 % (ref 11.5–15.5)
GLUCOSE BLD-MCNC: 297 MG/DL (ref 75–99)
GLUCOSE BLD-MCNC: 332 MG/DL (ref 75–99)
GLUCOSE BLD-MCNC: 388 MG/DL (ref 75–99)
GLUCOSE BLD-MCNC: 517 MG/DL (ref 75–99)
GLUCOSE BLD-MCNC: 99 MG/DL (ref 75–99)
HCT VFR BLD AUTO: 24.9 % (ref 34–46)
HGB BLD-MCNC: 8 GM/DL (ref 11.4–16)
MCH RBC QN AUTO: 32 PG (ref 25–35)
MCHC RBC AUTO-ENTMCNC: 32.1 G/DL (ref 31–37)
MCV RBC AUTO: 99.5 FL (ref 80–100)
PLATELET # BLD AUTO: 223 K/UL (ref 150–450)
WBC # BLD AUTO: 5.6 K/UL (ref 3.8–10.6)

## 2020-11-11 RX ADMIN — PANTOPRAZOLE SODIUM SCH MG: 40 INJECTION, POWDER, FOR SOLUTION INTRAVENOUS at 08:39

## 2020-11-11 RX ADMIN — ASPIRIN 81 MG CHEWABLE TABLET SCH MG: 81 TABLET CHEWABLE at 08:33

## 2020-11-11 RX ADMIN — METOPROLOL TARTRATE SCH: 25 TABLET, FILM COATED ORAL at 10:54

## 2020-11-11 RX ADMIN — OXYCODONE HYDROCHLORIDE AND ACETAMINOPHEN SCH MG: 500 TABLET ORAL at 08:33

## 2020-11-11 RX ADMIN — HYDROCODONE BITARTRATE AND ACETAMINOPHEN PRN EACH: 10; 325 TABLET ORAL at 04:13

## 2020-11-11 RX ADMIN — DIVALPROEX SODIUM SCH MG: 250 TABLET, DELAYED RELEASE ORAL at 15:52

## 2020-11-11 RX ADMIN — PANTOPRAZOLE SODIUM SCH MG: 40 INJECTION, POWDER, FOR SOLUTION INTRAVENOUS at 20:24

## 2020-11-11 RX ADMIN — METOCLOPRAMIDE SCH MG: 5 TABLET ORAL at 17:10

## 2020-11-11 RX ADMIN — HALOPERIDOL LACTATE PRN MG: 5 INJECTION, SOLUTION INTRAMUSCULAR at 03:31

## 2020-11-11 RX ADMIN — INSULIN ASPART SCH UNIT: 100 INJECTION, SOLUTION INTRAVENOUS; SUBCUTANEOUS at 12:02

## 2020-11-11 RX ADMIN — METOCLOPRAMIDE SCH MG: 5 TABLET ORAL at 08:34

## 2020-11-11 RX ADMIN — LACOSAMIDE SCH MG: 150 TABLET, FILM COATED ORAL at 08:39

## 2020-11-11 RX ADMIN — METOCLOPRAMIDE SCH: 5 TABLET ORAL at 12:49

## 2020-11-11 RX ADMIN — HALOPERIDOL LACTATE PRN MG: 5 INJECTION, SOLUTION INTRAMUSCULAR at 08:35

## 2020-11-11 RX ADMIN — LACOSAMIDE SCH MG: 150 TABLET, FILM COATED ORAL at 20:25

## 2020-11-11 RX ADMIN — Medication SCH EACH: at 08:33

## 2020-11-11 RX ADMIN — INSULIN ASPART SCH: 100 INJECTION, SOLUTION INTRAVENOUS; SUBCUTANEOUS at 16:55

## 2020-11-11 RX ADMIN — HYDROCODONE BITARTRATE AND ACETAMINOPHEN PRN EACH: 10; 325 TABLET ORAL at 15:52

## 2020-11-11 RX ADMIN — Medication SCH EACH: at 17:10

## 2020-11-11 RX ADMIN — ATORVASTATIN CALCIUM SCH MG: 20 TABLET, FILM COATED ORAL at 08:33

## 2020-11-11 RX ADMIN — DIVALPROEX SODIUM SCH MG: 250 TABLET, DELAYED RELEASE ORAL at 08:34

## 2020-11-11 RX ADMIN — METOPROLOL TARTRATE SCH MG: 25 TABLET, FILM COATED ORAL at 20:25

## 2020-11-11 RX ADMIN — DIVALPROEX SODIUM SCH MG: 250 TABLET, DELAYED RELEASE ORAL at 20:25

## 2020-11-11 RX ADMIN — INSULIN ASPART SCH UNIT: 100 INJECTION, SOLUTION INTRAVENOUS; SUBCUTANEOUS at 20:24

## 2020-11-11 RX ADMIN — HYDROCODONE BITARTRATE AND ACETAMINOPHEN PRN EACH: 10; 325 TABLET ORAL at 23:36

## 2020-11-11 RX ADMIN — CEFAZOLIN SCH: 330 INJECTION, POWDER, FOR SOLUTION INTRAMUSCULAR; INTRAVENOUS at 01:50

## 2020-11-11 RX ADMIN — CEFAZOLIN SCH: 330 INJECTION, POWDER, FOR SOLUTION INTRAMUSCULAR; INTRAVENOUS at 12:49

## 2020-11-11 RX ADMIN — SODIUM FERRIC GLUCONATE COMPLEX SCH MLS/HR: 12.5 INJECTION INTRAVENOUS at 08:49

## 2020-11-11 RX ADMIN — LOSARTAN POTASSIUM SCH: 50 TABLET, FILM COATED ORAL at 10:54

## 2020-11-11 RX ADMIN — INSULIN ASPART SCH UNIT: 100 INJECTION, SOLUTION INTRAVENOUS; SUBCUTANEOUS at 07:18

## 2020-11-11 NOTE — P.PN
Subjective


Progress Note Date: 11/11/20





Morenita Ramirez, is a 60-year-old female who presented to [facility] emergency room with a chief complaint of mental status changes with 

worsening somnolence and episodes of vomiting, she was evaluated in the 

emergency room, vital examination on presentation reveals a temperature of 97.9 

pulse 85 respiration 18 blood pressure 174/74 pulse ox 93% on room air, white 

blood count was 10.1 hemoglobin 9.9 platelet count 196 sodium 136 BUN 21 

creatinine 1.12 glucose 113 lactic acid 1.2 urine analysis revealed trace 

leukocyte esterase with moderate bacteria, chest x-ray revealed no acute pul

monary process, computed tomography scan of the brain revealed no evidence of 

acute intracranial hemorrhage or midline shift there was a large area of old 

encephalomalacic of the right anterior cerebral hemisphere related to previous 

stroke.  Patient was admitted to telemetry floor neurology consultation was 

requested due to concern of a recurrent stroke.


Patient has a known history of insulin-dependent diabetes mellitus type 1 

history of hypertension, history of hyperlipidemia, history of seizure disorder,

history of asthma, and history of psychosis.





On 11/08/2020 patient is alert and oriented resting in bed patient does appear 

restless.  CT of abdomen and pelvis has been ordered due to nausea and vomiting.

 Hemoglobin 8.2.  GI and neurology services are following.  Patient denies any 

chest pain or shortness of breath.  Does have appetite requesting food.





on 11/19/2020 patient was seen and examined on the medical floor she is alert 

confused and occasionally agitated she received a dose of Haldol earlier today 

her hemoglobin is down and gastroenterology planning EGD and colonoscopy shireen 

further review of system was possible today.





On 11/11/2020 patient is currently sleepy will wake up to follow commands but 

falls back to sleep clinic DC Haldol and continue to monitor for 24 hours 

possible discharge tomorrow





Objective





- Vital Signs


Vital signs: 


                                   Vital Signs











Temp  96.9 F L  11/11/20 07:00


 


Pulse  81   11/11/20 07:00


 


Resp  16   11/11/20 07:00


 


BP  90/53   11/11/20 11:49


 


Pulse Ox  92 L  11/11/20 07:00








                                 Intake & Output











 11/10/20 11/11/20 11/11/20





 18:59 06:59 18:59


 


Intake Total 300 300 236


 


Balance 300 300 236


 


Intake:   


 


    


 


  Intake, IV Titration 100  





  Amount   


 


    Valproate Sodium 250 mg 100  





    In Sodium Chloride 0.9%   





    100 ml @ 100 mls/hr IVPB   





    Q8H [facility] Rx#:[id number]   


 


  Oral  300 236


 


Other:   


 


  Voiding Method Diaper Diaper Diaper





 Incontinent Incontinent Incontinent


 


  # Voids  3 


 


  # Bowel Movements 1  














- Exam





In general patient is alert, responsive, in no distress


HEENT head normocephalic and atraumatic


Neck is supple no JVD no goiter no lymphadenopathy


Chest exam reveals a few scattered crackles no wheezing


Cardiac exam reveals regular heart sounds no gallops no murmurs


Abdomen is soft nontender no organomegaly was normal bowel sounds


Extremity exam reveals no edema no cyanosis or clubbing


Neurological examination patient is alert responsive answering questions 

appropriately she is moving all 4 extremity spontaneously there is residual 

spasticity and weakness on the left upper extremity in the left lower extremity





- Labs


CBC & Chem 7: 


                                 11/11/20 11:36





                                 11/10/20 07:21


Labs: 


                  Abnormal Lab Results - Last 24 Hours (Table)











  11/10/20 11/10/20 11/10/20 Range/Units





  07:21 15:12 15:14 


 


RBC     (3.80-5.40)  m/uL


 


Hgb     (11.4-16.0)  gm/dL


 


Hct     (34.0-46.0)  %


 


RDW     (11.5-15.5)  %


 


Glucose  536 H*    ()  mg/dL


 


POC Glucose (mg/dL)   425 H  448 H  (75-99)  mg/dL














  11/10/20 11/10/20 11/11/20 Range/Units





  16:56 20:23 06:50 


 


RBC     (3.80-5.40)  m/uL


 


Hgb     (11.4-16.0)  gm/dL


 


Hct     (34.0-46.0)  %


 


RDW     (11.5-15.5)  %


 


Glucose     ()  mg/dL


 


POC Glucose (mg/dL)  437 H  212 H  517 H  (75-99)  mg/dL














  11/11/20 11/11/20 11/11/20 Range/Units





  09:17 11:34 11:36 


 


RBC    2.51 L  (3.80-5.40)  m/uL


 


Hgb    8.0 L  (11.4-16.0)  gm/dL


 


Hct    24.9 L  (34.0-46.0)  %


 


RDW    16.1 H  (11.5-15.5)  %


 


Glucose     ()  mg/dL


 


POC Glucose (mg/dL)  388 H  297 H   (75-99)  mg/dL








                      Microbiology - Last 24 Hours (Table)











 11/06/20 11:14 Blood Culture - Preliminary





 Blood    No Growth after 120 hours














Assessment and Plan


Plan: 








1.  Mental status changes likely related to recurrent seizure activity due to 

missing antiseizure medications due to vomiting.  Patient was evaluated by 

neurology services continue current home seizure medication doses.  Cleared by 

neurology.  Resolved





2.  Previous history of stroke in 2015, no evidence of stroke at this time.





3.  Underlying history of insulin-dependent diabetes mellitus, type I maintained

on insulin, with previous multiple admissions with DKA





4.  Underlying history of gastroparesis, maintained on Reglan





5.  History of psychosis maintained on Seroquel





6.  Underlying history of hypertension





7.  Underlying history of hyperlipidemia





8.  Previous history of coronary artery disease with myocardial infarction in 

the past





9.  Anemia was significant hemoglobin drop since yesterday.  Hemoglobin 

improving today 8.2





10.  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  Per GI services 

continue PPI twice a day therapy avoid NSAID use awaiting pathology okay to 

resume diet

## 2020-11-11 NOTE — P.DS
Providers


Date of admission: 


11/07/20 11:32





Expected date of discharge: 11/11/20


Attending physician: 


Saniya Cabral





Consults: 





                                        





11/06/20 13:09


Consult Physician Routine 


   Consulting Provider: Destin Zamora


   Consult Reason/Comments: AMS


   Do you want consulting provider notified?: Yes





11/07/20 09:50


Consult Physician Routine 


   Consulting Provider: Kieran Tompkins


   Consult Reason/Comments: anemia


   Do you want consulting provider notified?: Yes











Primary care physician: 


Saniya Cabral





[facility] Course: 





Discharge diagnosis








1.  Mental status changes likely related to recurrent seizure activity due to 

missing antiseizure medications due to vomiting.  Patient was evaluated by 

neurology services continue current home seizure medication doses.  Cleared by 

neurology.  Resolved





2.  Previous history of stroke in 2015, no evidence of stroke at this time.





3.  Underlying history of insulin-dependent diabetes mellitus, type I maintained

on insulin, with previous multiple admissions with DKA





4.  Underlying history of gastroparesis, maintained on Reglan





5.  History of psychosis maintained on Seroquel





6.  Underlying history of hypertension





7.  Underlying history of hyperlipidemia





8.  Previous history of coronary artery disease with myocardial infarction in 

the past





9.  Anemia was significant hemoglobin drop since yesterday.  Hemoglobin 

improving today 8.2





10.  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  Per GI services 

continue PPI twice a day therapy avoid NSAID use awaiting pathology okay to 

resume diet











Hospital course





Morenita Ramirez, is a 60-year-old female who presented to [facility] emergency room with a chief complaint of mental status changes with 

worsening somnolence and episodes of vomiting, she was evaluated in the 

emergency room, vital examination on presentation reveals a temperature of 97.9 

pulse 85 respiration 18 blood pressure 174/74 pulse ox 93% on room air, white 

blood count was 10.1 hemoglobin 9.9 platelet count 196 sodium 136 BUN 21 

creatinine 1.12 glucose 113 lactic acid 1.2 urine analysis revealed trace 

leukocyte esterase with moderate bacteria, chest x-ray revealed no acute 

pulmonary process, computed tomography scan of the brain revealed no evidence of

acute intracranial hemorrhage or midline shift there was a large area of old 

encephalomalacic of the right anterior cerebral hemisphere related to previous 

stroke.  Patient was admitted to telemetry floor neurology consultation was 

requested due to concern of a recurrent stroke.


Patient has a known history of insulin-dependent diabetes mellitus type 1 

history of hypertension, history of hyperlipidemia, history of seizure disorder,

history of asthma, and history of psychosis.





On 11/08/2020 patient is alert and oriented resting in bed patient does appear 

restless.  CT of abdomen and pelvis has been ordered due to nausea and vomiting.

 Hemoglobin 8.2.  GI and neurology services are following.  Patient denies any 

chest pain or shortness of breath.  Does have appetite requesting food.





on 11/19/2020 patient was seen and examined on the medical floor she is alert 

confused and occasionally agitated she received a dose of Haldol earlier today 

her hemoglobin is down and gastroenterology planning EGD and colonoscopy shireen 

further review of system was possible today.








On 11/11/2020 patient is alert resting comfortably in bed with occasional 

agitation but has returned to baseline.  Patient is status post EGD with AVM was

cauterized.  Patient will be discharged on PPI twice a day.  At that time p

atient is shortness of breath.  Patient denies nausea vomiting or diarrhea.  

Patient denies any urinary burning or frequency





Patient Condition at Discharge: Stable





Plan - Discharge Summary


Discharge Rx Participant: Yes


New Discharge Prescriptions: 


New


   Pantoprazole Sodium [Protonix] 40 mg PO DAILY 30 Days #30 tablet.





Continue


   HYDROcodone/APAP 10-325MG [Norco ] 1 tab PO TID PRN


     PRN Reason: Pain


   Atorvastatin [Lipitor] 20 mg PO DAILY


   Aspirin [Adult Low Dose Aspirin EC] 81 mg PO DAILY


   Ferrous Sulfate [Iron (65 MG Elemental)] 325 mg PO BID


   Divalproex [Depakote] 250 mg PO TID #90 tablet.


   Albuterol Sulfate [Ventolin HFA] 2 puff INHALATION RT-Q4H PRN


     PRN Reason: Shortness Of Breath


   Losartan [Cozaar] 50 mg PO DAILY  tab


   Ascorbic Acid [Vitamin C] 500 mg PO DAILY


   Vitamin B Complex 1 tab PO DAILY


   QUEtiapine [SEROquel] 50 mg PO HS 30 Days #30 tab


   DULoxetine HCL [Cymbalta] 30 mg PO DAILY  capsule.


   Metoclopramide [Reglan] 5 mg PO AC-TID  tab


   QUEtiapine [SEROquel] 25 mg PO DAILY  tab


   ALPRAZolam [Xanax] 0.5 mg PO QID PRN  tab


     PRN Reason: Anxiety


   Calcium Carb-Vit D 500Mg-200Un [Oscal 500+D] 1 tab PO BID-W/MEALS


   INSULIN LISPRO (humaLOG) [humaLOG] 6 units SQ AC-TID


   Ondansetron Odt [Zofran ODT] 4 mg PO Q8HR PRN 3 Days #9 tab


     PRN Reason: Nausea


   Lacosamide [Vimpat] 100 mg PO BID


   Metoprolol Tartrate [Lopressor] 12.5 mg PO BID


   Insulin Detemir [Levemir Flextouch] 10 units SQ HS





Discontinued


   Famotidine [Pepcid] 20 mg PO DAILY





No Action


   Clopidogrel [Plavix] 75 mg PO DAILY  tab


Discharge Medication List





HYDROcodone/APAP 10-325MG [Norco ] 1 tab PO TID PRN 05/06/17 [History]


Atorvastatin [Lipitor] 20 mg PO DAILY 07/31/19 [History]


Aspirin [Adult Low Dose Aspirin EC] 81 mg PO DAILY 01/10/20 [History]


Ferrous Sulfate [Iron (65 MG Elemental)] 325 mg PO BID 01/10/20 [History]


Divalproex [Depakote] 250 mg PO TID #90 tablet. 03/19/20 [Rx]


Albuterol Sulfate [Ventolin HFA] 2 puff INHALATION RT-Q4H PRN 07/28/20 [History]


Losartan [Cozaar] 50 mg PO DAILY  tab 08/20/20 [Rx]


Ascorbic Acid [Vitamin C] 500 mg PO DAILY 08/31/20 [History]


Vitamin B Complex 1 tab PO DAILY 08/31/20 [History]


Clopidogrel [Plavix] 75 mg PO DAILY  tab 09/04/20 [Rx]


QUEtiapine [SEROquel] 50 mg PO HS 30 Days #30 tab 10/14/20 [Rx]


ALPRAZolam [Xanax] 0.5 mg PO QID PRN  tab 10/27/20 [Rx]


DULoxetine HCL [Cymbalta] 30 mg PO DAILY  capsule. 10/27/20 [Rx]


Metoclopramide [Reglan] 5 mg PO AC-TID  tab 10/27/20 [Rx]


QUEtiapine [SEROquel] 25 mg PO DAILY  tab 10/27/20 [Rx]


Calcium Carb-Vit D 500Mg-200Un [Oscal 500+D] 1 tab PO BID-W/MEALS 11/01/20 

[History]


INSULIN LISPRO (humaLOG) [humaLOG] 6 units SQ AC-TID 11/01/20 [History]


Ondansetron Odt [Zofran ODT] 4 mg PO Q8HR PRN 3 Days #9 tab 11/01/20 [Rx]


Insulin Detemir [Levemir Flextouch] 10 units SQ HS 11/06/20 [History]


Lacosamide [Vimpat] 100 mg PO BID 11/06/20 [History]


Metoprolol Tartrate [Lopressor] 12.5 mg PO BID 11/06/20 [History]


Pantoprazole Sodium [Protonix] 40 mg PO DAILY 30 Days #30 tablet. 11/11/20 

[Rx]








Follow up Appointment(s)/Referral(s): 


[facility], [NON-STAFF] - As Needed


Saniya Cabral MD [Primary Care Provider] - 1-2 days


Discharge Disposition: HOME SELF-CARE

## 2020-11-11 NOTE — P.PN
Subjective


Progress Note Date: 11/11/20


Principal diagnosis: 





Iron Deficiency Anemia





Patient was seen and examined at the bedside.  She was sleeping.  She denies any

abdominal pain.  There is been no reported bowel movement with blood.





Objective





- Vital Signs


Vital signs: 


                                   Vital Signs











Temp  96.9 F L  11/11/20 07:00


 


Pulse  81   11/11/20 07:00


 


Resp  16   11/11/20 07:00


 


BP  90/53   11/11/20 11:49


 


Pulse Ox  92 L  11/11/20 07:00








                                 Intake & Output











 11/10/20 11/11/20 11/11/20





 18:59 06:59 18:59


 


Intake Total 300 300 236


 


Balance 300 300 236


 


Intake:   


 


    


 


  Intake, IV Titration 100  





  Amount   


 


    Valproate Sodium 250 mg 100  





    In Sodium Chloride 0.9%   





    100 ml @ 100 mls/hr IVPB   





    Q8H [facility] Rx#:[id number]   


 


  Oral  300 236


 


Other:   


 


  Voiding Method Diaper Diaper Diaper





 Incontinent Incontinent Incontinent


 


  # Voids  3 


 


  # Bowel Movements 1  














- Exam





General appearance: The patient is alert, drowsy, oriented, in no acute 

distress.


HET: Head is normocephalic and atraumatic.  Conjunctiva pink.  Sclera anicteric.


Neck: Supple without lymphadenopathy.  


Abdomen: Soft, nontender, nondistended with  bowel sounds.  No guarding or 

rigidity.


Extremities: Normal skin color and turgor.  No pedal edema


Neurological: No focal deficits.  Alert and oriented 3.





- Labs


CBC & Chem 7: 


                                 11/11/20 11:36





                                 11/10/20 07:21


Labs: 


                  Abnormal Lab Results - Last 24 Hours (Table)











  11/10/20 11/10/20 11/10/20 Range/Units





  07:21 15:12 15:14 


 


RBC     (3.80-5.40)  m/uL


 


Hgb     (11.4-16.0)  gm/dL


 


Hct     (34.0-46.0)  %


 


RDW     (11.5-15.5)  %


 


Glucose  536 H*    ()  mg/dL


 


POC Glucose (mg/dL)   425 H  448 H  (75-99)  mg/dL














  11/10/20 11/10/20 11/11/20 Range/Units





  16:56 20:23 06:50 


 


RBC     (3.80-5.40)  m/uL


 


Hgb     (11.4-16.0)  gm/dL


 


Hct     (34.0-46.0)  %


 


RDW     (11.5-15.5)  %


 


Glucose     ()  mg/dL


 


POC Glucose (mg/dL)  437 H  212 H  517 H  (75-99)  mg/dL














  11/11/20 11/11/20 11/11/20 Range/Units





  09:17 11:34 11:36 


 


RBC    2.51 L  (3.80-5.40)  m/uL


 


Hgb    8.0 L  (11.4-16.0)  gm/dL


 


Hct    24.9 L  (34.0-46.0)  %


 


RDW    16.1 H  (11.5-15.5)  %


 


Glucose     ()  mg/dL


 


POC Glucose (mg/dL)  388 H  297 H   (75-99)  mg/dL








                      Microbiology - Last 24 Hours (Table)











 11/06/20 11:14 Blood Culture - Preliminary





 Blood    No Growth after 120 hours














Assessment and Plan


(1) Iron deficiency anemia


Narrative/Plan: 


Is is a 60-year-old female with multiple medical comorbidities who presented to 

the hospital due to altered mental status secondary to seizure.  Patient found 

to be anemic which was also noted on prior hospitalization.  Previous iron 

studies consistent with iron deficiency anemia and patient is on iron 

supplementation.  She denies any signs or symptoms of GI bleeding, she does 

report a remote striatum endoscopic evaluation approximately 8 or 9 years ago 

with EGD and colonoscopy.  Macrocytic indices anemia likely multifactorial given

multiple medical comorbidities cannot rule out a component of GI blood loss but 

no overt signs or symptoms at this time with negative stool testing for occult 

blood.  He is status post EGD with findings that include her gastritis, 

nonbleeding duodenal AVM treated with gold probe ablation.


Current Visit: Yes   Status: Acute   Code(s): D50.9 - IRON DEFICIENCY ANEMIA, 

UNSPECIFIED   SNOMED Code(s): 75679049


   


Plan: 





Supportive care


Continue Protonix therapy


Continue to monitor hemoglobin and hematocrit and transfuse as needed


Continue to monitor for signs or symptoms of GI blood loss


Okay for regular diet


The patient is status post EGD, colonoscopy was not completed due to failure for

bowel prep


Thank you for allowing us to participate in the care of the patient we will be 

on standby.  If any further needs please don't hesitate to call us back.











The impression and plan of care has been dictated as directed.








I performed a history and examination of this patient,  discussed the same with 

the dictator.  I agree with the dictator's note ,documented as a scribe.  Any 

additional findings or plans will be noted.

## 2020-11-11 NOTE — P.PN
Subjective


Progress Note Date: 11/10/20





Morenita Ramirez, is a 60-year-old female who presented to [facility] emergency room with a chief complaint of mental status changes with 

worsening somnolence and episodes of vomiting, she was evaluated in the 

emergency room, vital examination on presentation reveals a temperature of 97.9 

pulse 85 respiration 18 blood pressure 174/74 pulse ox 93% on room air, white 

blood count was 10.1 hemoglobin 9.9 platelet count 196 sodium 136 BUN 21 

creatinine 1.12 glucose 113 lactic acid 1.2 urine analysis revealed trace 

leukocyte esterase with moderate bacteria, chest x-ray revealed no acute pul

monary process, computed tomography scan of the brain revealed no evidence of 

acute intracranial hemorrhage or midline shift there was a large area of old 

encephalomalacic of the right anterior cerebral hemisphere related to previous 

stroke.  Patient was admitted to telemetry floor neurology consultation was 

requested due to concern of a recurrent stroke.


Patient has a known history of insulin-dependent diabetes mellitus type 1 

history of hypertension, history of hyperlipidemia, history of seizure disorder,

history of asthma, and history of psychosis.





On 11/08/2020 patient is alert and oriented resting in bed patient does appear 

restless.  CT of abdomen and pelvis has been ordered due to nausea and vomiting.

 Hemoglobin 8.2.  GI and neurology services are following.  Patient denies any 

chest pain or shortness of breath.  Does have appetite requesting food.





on 11/9/2020 patient was seen and examined on the medical floor she is alert 

confused and occasionally agitated she received a dose of Haldol earlier today 

her hemoglobin is down and gastroenterology planning EGD and colonoscopy shireen 

further review of system was possible today.





On 11/10/2020 agent was seen and examined on the medical floor she is alert, 

confused in no apparent distress, there is no fever or chills no headache or 

dizziness no chest pain no shortness of breath no cough no nausea or vomiting no

abdominal pain no agitation today, patient underwent EGD today, she has AVM 

which was treated she will be given IV iron, sister Noelle contacted and 

multiple medical issues discussed in details over the form.





Objective





- Vital Signs


Vital signs: 


                                   Vital Signs











Temp  99.0 F   11/10/20 15:00


 


Pulse  109 H  11/10/20 15:00


 


Resp  20   11/10/20 15:00


 


BP  114/72   11/10/20 15:00


 


Pulse Ox  94 L  11/10/20 15:00








                                 Intake & Output











 11/09/20 11/10/20 11/10/20





 18:59 06:59 18:59


 


Intake Total 240 100 300


 


Balance 240 100 300


 


Intake:   


 


  IV   200


 


  Intake, IV Titration   100





  Amount   


 


    Valproate Sodium 250 mg   100





    In Sodium Chloride 0.9%   





    100 ml @ 100 mls/hr IVPB   





    Q8H [facility] Rx#:[id number]   


 


  Oral 240 100 


 


Other:   


 


  Voiding Method Diaper Diaper Diaper





 Incontinent Incontinent Incontinent


 


  # Voids  3 


 


  # Bowel Movements   1














- Exam





In general patient is alert, responsive, in no distress


HEENT head normocephalic and atraumatic


Neck is supple no JVD no goiter no lymphadenopathy


Chest exam reveals a few scattered crackles no wheezing


Cardiac exam reveals regular heart sounds no gallops no murmurs


Abdomen is soft nontender no organomegaly was normal bowel sounds


Extremity exam reveals no edema no cyanosis or clubbing


Neurological examination patient is alert responsive answering questions 

appropriately she is moving all 4 extremity spontaneously there is residual 

spasticity and weakness on the left upper extremity in the left lower extremity





- Labs


CBC & Chem 7: 


                                 11/11/20 11:36





                                 11/10/20 07:21


Labs: 


                  Abnormal Lab Results - Last 24 Hours (Table)











  11/09/20 11/10/20 11/10/20 Range/Units





  21:17 06:45 06:46 


 


RBC     (3.80-5.40)  m/uL


 


Hgb     (11.4-16.0)  gm/dL


 


Hct     (34.0-46.0)  %


 


MCV     (80.0-100.0)  fL


 


MCHC     (31.0-37.0)  g/dL


 


RDW     (11.5-15.5)  %


 


Anion Gap     (4.00-12.00)  mmol/L


 


Est GFR (CKD-EPI)AfAm     (60.0-200.0)   


 


Est GFR (CKD-EPI)NonAf     (60.0-200.0)   


 


Glucose     ()  mg/dL


 


POC Glucose (mg/dL)  164 H  513 H  484 H  (75-99)  mg/dL














  11/10/20 11/10/20 11/10/20 Range/Units





  07:09 07:21 07:21 


 


RBC   2.81 L   (3.80-5.40)  m/uL


 


Hgb   8.8 L   (11.4-16.0)  gm/dL


 


Hct   28.8 L   (34.0-46.0)  %


 


MCV   102.6 H   (80.0-100.0)  fL


 


MCHC   30.7 L   (31.0-37.0)  g/dL


 


RDW   15.9 H   (11.5-15.5)  %


 


Anion Gap    12.60 H  (4.00-12.00)  mmol/L


 


Est GFR (CKD-EPI)AfAm    57.3 L  (60.0-200.0)   


 


Est GFR (CKD-EPI)NonAf    49.4 L  (60.0-200.0)   


 


Glucose    536 H*  ()  mg/dL


 


POC Glucose (mg/dL)  498 H    (75-99)  mg/dL














  11/10/20 11/10/20 11/10/20 Range/Units





  07:59 09:01 11:13 


 


RBC     (3.80-5.40)  m/uL


 


Hgb     (11.4-16.0)  gm/dL


 


Hct     (34.0-46.0)  %


 


MCV     (80.0-100.0)  fL


 


MCHC     (31.0-37.0)  g/dL


 


RDW     (11.5-15.5)  %


 


Anion Gap     (4.00-12.00)  mmol/L


 


Est GFR (CKD-EPI)AfAm     (60.0-200.0)   


 


Est GFR (CKD-EPI)NonAf     (60.0-200.0)   


 


Glucose     ()  mg/dL


 


POC Glucose (mg/dL)  543 H  385 H  250 H  (75-99)  mg/dL














  11/10/20 11/10/20 11/10/20 Range/Units





  15:12 15:14 16:56 


 


RBC     (3.80-5.40)  m/uL


 


Hgb     (11.4-16.0)  gm/dL


 


Hct     (34.0-46.0)  %


 


MCV     (80.0-100.0)  fL


 


MCHC     (31.0-37.0)  g/dL


 


RDW     (11.5-15.5)  %


 


Anion Gap     (4.00-12.00)  mmol/L


 


Est GFR (CKD-EPI)AfAm     (60.0-200.0)   


 


Est GFR (CKD-EPI)NonAf     (60.0-200.0)   


 


Glucose     ()  mg/dL


 


POC Glucose (mg/dL)  425 H  448 H  437 H  (75-99)  mg/dL








                      Microbiology - Last 24 Hours (Table)











 11/06/20 11:14 Blood Culture - Preliminary





 Blood    No Growth after 96 hours














Assessment and Plan


Plan: 





1.  Mental status changes likely related to recurrent seizure activity due to m

issing antiseizure medications due to vomiting





2.  Previous history of stroke in 2015, no evidence of stroke at this time.





3.  Underlying history of insulin-dependent diabetes mellitus, type I maintained

on insulin, with previous multiple admissions with DKA





4.  Underlying history of gastroparesis, maintained on Reglan





5.  History of psychosis maintained on Seroquel





6.  Underlying history of hypertension





7.  Underlying history of hyperlipidemia





8.  Previous history of coronary artery disease with myocardial infarction in 

the past





9.  Anemia was significant hemoglobin drop since yesterday.  Hemoglobin 

improving today 8.2





At this time patient is admitted to medical floor, she was seen by neurology and

was given IV antiseizure medications


Mental status improved significantly today


GI services following possible plans for scope


CT of abdomen and pelvis ordered


Will give IV iron and monitor closely

## 2020-11-11 NOTE — P.PN
Subjective


Progress Note Date: 11/11/20





Patient was seen for a follow-up.  Patient somnolent at this time.  





EGD showed antral gastritis, nonbleeding duodenal AVM.  No further seizures 

reported since yesterday.  





Objective





- Vital Signs


Vital signs: 


                                   Vital Signs











Temp  96.9 F L  11/11/20 07:00


 


Pulse  81   11/11/20 07:00


 


Resp  16   11/11/20 07:00


 


BP  90/53   11/11/20 11:49


 


Pulse Ox  92 L  11/11/20 07:00








                                 Intake & Output











 11/10/20 11/11/20 11/11/20





 18:59 06:59 18:59


 


Intake Total 300 300 236


 


Balance 300 300 236


 


Intake:   


 


    


 


  Intake, IV Titration 100  





  Amount   


 


    Valproate Sodium 250 mg 100  





    In Sodium Chloride 0.9%   





    100 ml @ 100 mls/hr IVPB   





    Q8H [facility] Rx#:[id number]   


 


  Oral  300 236


 


Other:   


 


  Voiding Method Diaper Diaper Diaper





 Incontinent Incontinent Incontinent


 


  # Voids  3 


 


  # Bowel Movements 1  














- Exam





Patient is sleeping, did not wake her up.  Spoke to the nurse.  States she has 

been stable.  No seizures reported since in the hospital.





- Labs


CBC & Chem 7: 


                                 11/11/20 11:36





                                 11/10/20 07:21


Labs: 


                  Abnormal Lab Results - Last 24 Hours (Table)











  11/10/20 11/10/20 11/10/20 Range/Units





  07:21 15:12 15:14 


 


RBC     (3.80-5.40)  m/uL


 


Hgb     (11.4-16.0)  gm/dL


 


Hct     (34.0-46.0)  %


 


RDW     (11.5-15.5)  %


 


Glucose  536 H*    ()  mg/dL


 


POC Glucose (mg/dL)   425 H  448 H  (75-99)  mg/dL














  11/10/20 11/10/20 11/11/20 Range/Units





  16:56 20:23 06:50 


 


RBC     (3.80-5.40)  m/uL


 


Hgb     (11.4-16.0)  gm/dL


 


Hct     (34.0-46.0)  %


 


RDW     (11.5-15.5)  %


 


Glucose     ()  mg/dL


 


POC Glucose (mg/dL)  437 H  212 H  517 H  (75-99)  mg/dL














  11/11/20 11/11/20 11/11/20 Range/Units





  09:17 11:34 11:36 


 


RBC    2.51 L  (3.80-5.40)  m/uL


 


Hgb    8.0 L  (11.4-16.0)  gm/dL


 


Hct    24.9 L  (34.0-46.0)  %


 


RDW    16.1 H  (11.5-15.5)  %


 


Glucose     ()  mg/dL


 


POC Glucose (mg/dL)  388 H  297 H   (75-99)  mg/dL








                      Microbiology - Last 24 Hours (Table)











 11/06/20 11:14 Blood Culture - Preliminary





 Blood    No Growth after 96 hours














Assessment and Plan


Assessment: 





* Seizure disorder, provoked by lack of antiepileptic medication secondary due 

  to vomiting.


* History of CVA with spastic left hemiplegia


* Cognitive deficits due to CVA.


* History of diabetes, with history of hypoglycemic episodes as well as DKA, 

  suggestive of brittle diabetes


Plan: 





* Continue patient's home seizure medications.  Patient currently on Depakote 

  250 mg 3 times a day and Vimpat 150 mg twice a day.


* Total Depakote level yesterday was 52 (), which is therapeutic.  Lopez [facility]brook's free Depakote level was 14.9 (4.8-17.3) on 10/07/2020.


* Avoid episodes of hypoglycemia. 


* Neurologically clear for discharge.


* Discussed with patient's nurse.

## 2020-11-12 LAB
ALBUMIN SERPL-MCNC: 2.5 G/DL (ref 3.5–5)
ALBUMIN/GLOB SERPL: 0.8 {RATIO}
ALP SERPL-CCNC: 62 U/L (ref 38–126)
ALT SERPL-CCNC: 19 U/L (ref 4–34)
ANION GAP SERPL CALC-SCNC: -2 MMOL/L
AST SERPL-CCNC: 35 U/L (ref 14–36)
BASOPHILS # BLD AUTO: 0 K/UL (ref 0–0.2)
BASOPHILS NFR BLD AUTO: 1 %
BUN SERPL-SCNC: 19 MG/DL (ref 7–17)
CALCIUM SPEC-MCNC: 8.4 MG/DL (ref 8.4–10.2)
CHLORIDE SERPL-SCNC: 107 MMOL/L (ref 98–107)
CO2 BLDA-SCNC: 31 MMOL/L (ref 19–24)
CO2 SERPL-SCNC: 33 MMOL/L (ref 22–30)
EOSINOPHIL # BLD AUTO: 0.1 K/UL (ref 0–0.7)
EOSINOPHIL NFR BLD AUTO: 1 %
ERYTHROCYTE [DISTWIDTH] IN BLOOD BY AUTOMATED COUNT: 2.73 M/UL (ref 3.8–5.4)
ERYTHROCYTE [DISTWIDTH] IN BLOOD: 16.3 % (ref 11.5–15.5)
GLOBULIN SER CALC-MCNC: 3.1 G/DL
GLUCOSE BLD-MCNC: 211 MG/DL (ref 75–99)
GLUCOSE BLD-MCNC: 219 MG/DL (ref 75–99)
GLUCOSE BLD-MCNC: 233 MG/DL (ref 75–99)
GLUCOSE BLD-MCNC: 259 MG/DL (ref 75–99)
GLUCOSE BLD-MCNC: 317 MG/DL (ref 75–99)
GLUCOSE BLD-MCNC: 320 MG/DL (ref 75–99)
GLUCOSE SERPL-MCNC: 279 MG/DL (ref 74–99)
HCO3 BLDA-SCNC: 30 MMOL/L (ref 21–25)
HCT VFR BLD AUTO: 26.8 % (ref 34–46)
HGB BLD-MCNC: 8.5 GM/DL (ref 11.4–16)
LYMPHOCYTES # SPEC AUTO: 2.2 K/UL (ref 1–4.8)
LYMPHOCYTES NFR SPEC AUTO: 45 %
MCH RBC QN AUTO: 31 PG (ref 25–35)
MCHC RBC AUTO-ENTMCNC: 31.5 G/DL (ref 31–37)
MCV RBC AUTO: 98.5 FL (ref 80–100)
MONOCYTES # BLD AUTO: 0.3 K/UL (ref 0–1)
MONOCYTES NFR BLD AUTO: 6 %
NEUTROPHILS # BLD AUTO: 2.3 K/UL (ref 1.3–7.7)
NEUTROPHILS NFR BLD AUTO: 47 %
PCO2 BLDA: 49 MMHG (ref 35–45)
PH BLDA: 7.39 [PH] (ref 7.35–7.45)
PLATELET # BLD AUTO: 195 K/UL (ref 150–450)
PO2 BLDA: 84 MMHG (ref 83–108)
POTASSIUM SERPL-SCNC: 4 MMOL/L (ref 3.5–5.1)
PROT SERPL-MCNC: 5.6 G/DL (ref 6.3–8.2)
SODIUM SERPL-SCNC: 138 MMOL/L (ref 137–145)
WBC # BLD AUTO: 4.8 K/UL (ref 3.8–10.6)

## 2020-11-12 RX ADMIN — PANTOPRAZOLE SODIUM SCH MG: 40 TABLET, DELAYED RELEASE ORAL at 20:50

## 2020-11-12 RX ADMIN — DIVALPROEX SODIUM SCH MG: 250 TABLET, DELAYED RELEASE ORAL at 20:50

## 2020-11-12 RX ADMIN — INSULIN ASPART SCH: 100 INJECTION, SOLUTION INTRAVENOUS; SUBCUTANEOUS at 12:57

## 2020-11-12 RX ADMIN — METOPROLOL TARTRATE SCH MG: 25 TABLET, FILM COATED ORAL at 20:53

## 2020-11-12 RX ADMIN — METOCLOPRAMIDE SCH: 5 TABLET ORAL at 12:58

## 2020-11-12 RX ADMIN — DIVALPROEX SODIUM SCH MG: 250 TABLET, DELAYED RELEASE ORAL at 08:11

## 2020-11-12 RX ADMIN — LACOSAMIDE SCH MG: 150 TABLET, FILM COATED ORAL at 20:47

## 2020-11-12 RX ADMIN — ASPIRIN 81 MG CHEWABLE TABLET SCH MG: 81 TABLET CHEWABLE at 08:11

## 2020-11-12 RX ADMIN — LOSARTAN POTASSIUM SCH MG: 50 TABLET, FILM COATED ORAL at 08:11

## 2020-11-12 RX ADMIN — OXYCODONE HYDROCHLORIDE AND ACETAMINOPHEN SCH MG: 500 TABLET ORAL at 08:11

## 2020-11-12 RX ADMIN — METOCLOPRAMIDE SCH MG: 5 TABLET ORAL at 07:47

## 2020-11-12 RX ADMIN — SODIUM FERRIC GLUCONATE COMPLEX SCH MLS/HR: 12.5 INJECTION INTRAVENOUS at 10:39

## 2020-11-12 RX ADMIN — INSULIN ASPART SCH UNIT: 100 INJECTION, SOLUTION INTRAVENOUS; SUBCUTANEOUS at 07:47

## 2020-11-12 RX ADMIN — CEFAZOLIN SCH: 330 INJECTION, POWDER, FOR SOLUTION INTRAMUSCULAR; INTRAVENOUS at 04:15

## 2020-11-12 RX ADMIN — METOCLOPRAMIDE SCH MG: 5 TABLET ORAL at 17:53

## 2020-11-12 RX ADMIN — DIVALPROEX SODIUM SCH MG: 250 TABLET, DELAYED RELEASE ORAL at 16:00

## 2020-11-12 RX ADMIN — HYDROCODONE BITARTRATE AND ACETAMINOPHEN PRN EACH: 10; 325 TABLET ORAL at 23:23

## 2020-11-12 RX ADMIN — INSULIN ASPART SCH UNIT: 100 INJECTION, SOLUTION INTRAVENOUS; SUBCUTANEOUS at 20:47

## 2020-11-12 RX ADMIN — LACOSAMIDE SCH MG: 150 TABLET, FILM COATED ORAL at 08:11

## 2020-11-12 RX ADMIN — CEFAZOLIN SCH MLS/HR: 330 INJECTION, POWDER, FOR SOLUTION INTRAMUSCULAR; INTRAVENOUS at 14:09

## 2020-11-12 RX ADMIN — PANTOPRAZOLE SODIUM SCH MG: 40 INJECTION, POWDER, FOR SOLUTION INTRAVENOUS at 08:12

## 2020-11-12 RX ADMIN — Medication SCH EACH: at 07:47

## 2020-11-12 RX ADMIN — ATORVASTATIN CALCIUM SCH MG: 20 TABLET, FILM COATED ORAL at 08:11

## 2020-11-12 RX ADMIN — INSULIN ASPART SCH UNIT: 100 INJECTION, SOLUTION INTRAVENOUS; SUBCUTANEOUS at 17:52

## 2020-11-12 RX ADMIN — METOPROLOL TARTRATE SCH MG: 25 TABLET, FILM COATED ORAL at 08:11

## 2020-11-12 RX ADMIN — Medication SCH EACH: at 17:53

## 2020-11-12 NOTE — P.PN
Subjective


Progress Note Date: 11/12/20





Morenita Ramirez, is a 60-year-old female who presented to [facility] emergency room with a chief complaint of mental status changes with 

worsening somnolence and episodes of vomiting, she was evaluated in the 

emergency room, vital examination on presentation reveals a temperature of 97.9 

pulse 85 respiration 18 blood pressure 174/74 pulse ox 93% on room air, white 

blood count was 10.1 hemoglobin 9.9 platelet count 196 sodium 136 BUN 21 

creatinine 1.12 glucose 113 lactic acid 1.2 urine analysis revealed trace 

leukocyte esterase with moderate bacteria, chest x-ray revealed no acute pul

monary process, computed tomography scan of the brain revealed no evidence of 

acute intracranial hemorrhage or midline shift there was a large area of old 

encephalomalacic of the right anterior cerebral hemisphere related to previous 

stroke.  Patient was admitted to telemetry floor neurology consultation was 

requested due to concern of a recurrent stroke.


Patient has a known history of insulin-dependent diabetes mellitus type 1 

history of hypertension, history of hyperlipidemia, history of seizure disorder,

history of asthma, and history of psychosis.





On 11/08/2020 patient is alert and oriented resting in bed patient does appear 

restless.  CT of abdomen and pelvis has been ordered due to nausea and vomiting.

 Hemoglobin 8.2.  GI and neurology services are following.  Patient denies any 

chest pain or shortness of breath.  Does have appetite requesting food.





on 11/9/2020 patient was seen and examined on the medical floor she is alert 

confused and occasionally agitated she received a dose of Haldol earlier today 

her hemoglobin is down and gastroenterology planning EGD and colonoscopy shireen 

further review of system was possible today.





On 11/10/2020 agent was seen and examined on the medical floor she is alert, 

confused in no apparent distress, there is no fever or chills no headache or 

dizziness no chest pain no shortness of breath no cough no nausea or vomiting no

abdominal pain no agitation today, patient underwent EGD today, she has AVM 

which was treated she will be given IV iron, sister Noelle contacted and 

multiple medical issues discussed in details over the form.








On 11/11/2020 patient is currently sleepy will wake up to follow commands but 

falls back to sleep clinic DC Haldol and continue to monitor for 24 hours 

possible discharge tomorrow








On 11/12/2020 patient was seen and examined on the medical floor she was 

somnolent, she had low blood pressure she was given a fluid bolus, at this point

Ativan and Xanax were discontinued Haldol was discontinued yesterday will 

continue was Seroquel only and order Ativan as needed if patient gets agitated 

patient improved significantly with IV fluid bolus blood pressure is back to 

normal range.





Objective





- Vital Signs


Vital signs: 


                                   Vital Signs











Temp  96.1 F L  11/12/20 13:06


 


Pulse  56 L  11/12/20 13:06


 


Resp  16   11/12/20 13:06


 


BP  117/58   11/12/20 13:06


 


Pulse Ox  99   11/12/20 13:06








                                 Intake & Output











 11/11/20 11/12/20 11/12/20





 18:59 06:59 18:59


 


Intake Total 236  


 


Balance 236  


 


Intake:   


 


  Oral 236  


 


Other:   


 


  Voiding Method Diaper Diaper Diaper





 Incontinent Incontinent 


 


  # Voids 1  1














- Exam





In general patient is alert, responsive, in no distress


HEENT head normocephalic and atraumatic


Neck is supple no JVD no goiter no lymphadenopathy


Chest exam reveals a few scattered crackles no wheezing


Cardiac exam reveals regular heart sounds no gallops no murmurs


Abdomen is soft nontender no organomegaly was normal bowel sounds


Extremity exam reveals no edema no cyanosis or clubbing


Neurological examination patient is alert responsive answering questions 

appropriately she is moving all 4 extremity spontaneously there is residual 

spasticity and weakness on the left upper extremity in the left lower extremity





- Labs


CBC & Chem 7: 


                                 11/12/20 11:00





                                 11/12/20 11:00


Labs: 


                  Abnormal Lab Results - Last 24 Hours (Table)











  11/11/20 11/12/20 11/12/20 Range/Units





  20:11 06:55 10:26 


 


RBC     (3.80-5.40)  m/uL


 


Hgb     (11.4-16.0)  gm/dL


 


Hct     (34.0-46.0)  %


 


RDW     (11.5-15.5)  %


 


ABG pCO2     (35-45)  mmHg


 


ABG HCO3     (21-25)  mmol/L


 


ABG Total CO2     (19-24)  mmol/L


 


ABG O2 Saturation     (94-97)  %


 


Carbon Dioxide     (22-30)  mmol/L


 


BUN     (7-17)  mg/dL


 


Glucose     (74-99)  mg/dL


 


POC Glucose (mg/dL)  332 H  320 H  317 H  (75-99)  mg/dL


 


Total Protein     (6.3-8.2)  g/dL


 


Albumin     (3.5-5.0)  g/dL














  11/12/20 11/12/20 11/12/20 Range/Units





  11:00 11:00 11:10 


 


RBC  2.73 L    (3.80-5.40)  m/uL


 


Hgb  8.5 L    (11.4-16.0)  gm/dL


 


Hct  26.8 L    (34.0-46.0)  %


 


RDW  16.3 H    (11.5-15.5)  %


 


ABG pCO2     (35-45)  mmHg


 


ABG HCO3     (21-25)  mmol/L


 


ABG Total CO2     (19-24)  mmol/L


 


ABG O2 Saturation     (94-97)  %


 


Carbon Dioxide   33 H   (22-30)  mmol/L


 


BUN   19 H   (7-17)  mg/dL


 


Glucose   279 H   (74-99)  mg/dL


 


POC Glucose (mg/dL)    233 H  (75-99)  mg/dL


 


Total Protein   5.6 L   (6.3-8.2)  g/dL


 


Albumin   2.5 L   (3.5-5.0)  g/dL














  11/12/20 11/12/20 Range/Units





  11:37 11:54 


 


RBC    (3.80-5.40)  m/uL


 


Hgb    (11.4-16.0)  gm/dL


 


Hct    (34.0-46.0)  %


 


RDW    (11.5-15.5)  %


 


ABG pCO2   49 H  (35-45)  mmHg


 


ABG HCO3   30 H  (21-25)  mmol/L


 


ABG Total CO2   31 H  (19-24)  mmol/L


 


ABG O2 Saturation   97.1 H  (94-97)  %


 


Carbon Dioxide    (22-30)  mmol/L


 


BUN    (7-17)  mg/dL


 


Glucose    (74-99)  mg/dL


 


POC Glucose (mg/dL)  219 H   (75-99)  mg/dL


 


Total Protein    (6.3-8.2)  g/dL


 


Albumin    (3.5-5.0)  g/dL








                      Microbiology - Last 24 Hours (Table)











 11/06/20 11:14 Blood Culture - Preliminary





 Blood    No Growth after 120 hours














Assessment and Plan


Plan: 





1.  Mental status changes likely related to recurrent seizure activity due to 

missing antiseizure medications due to vomiting





2.  Previous history of stroke in 2015, no evidence of stroke at this time.





3.  Underlying history of insulin-dependent diabetes mellitus, type I maintained

on insulin, with previous multiple admissions with DKA





4.  Underlying history of gastroparesis, maintained on Reglan





5.  History of psychosis maintained on Seroquel





6.  Underlying history of hypertension





7.  Underlying history of hyperlipidemia





8.  Previous history of coronary artery disease with myocardial infarction in 

the past





9.  Anemia was significant hemoglobin drop since yesterday.  Hemoglobin 

improving today 8.2





At this time patient is admitted to medical floor, she was seen by neurology and

was given IV antiseizure medications


Mental status improved significantly today


GI services following possible plans for scope


CT of abdomen and pelvis ordered


Will give IV iron and monitor closely

## 2020-11-12 NOTE — P.PN
Subjective


Progress Note Date: 11/12/20





Patient was seen for a follow-up.  Rapid response team activated, patient 

appears more somnolent.  No seizure activity witnessed.  





EGD showed antral gastritis, nonbleeding duodenal AVM.  No further seizures 

reported since yesterday.  





Objective





- Vital Signs


Vital signs: 


                                   Vital Signs











Temp  96.1 F L  11/12/20 13:06


 


Pulse  56 L  11/12/20 13:06


 


Resp  16   11/12/20 13:06


 


BP  117/58   11/12/20 13:06


 


Pulse Ox  99   11/12/20 13:06








                                 Intake & Output











 11/11/20 11/12/20 11/12/20





 18:59 06:59 18:59


 


Intake Total 236  


 


Balance 236  


 


Intake:   


 


  Oral 236  


 


Other:   


 


  Voiding Method Diaper Diaper Diaper





 Incontinent Incontinent 


 


  # Voids 1  1














- Exam





Patient is very somnolent.  Blood sugar checked was 219.  Rapid response team 

working with the patient.  No seizures reported since in the hospital.  

Continues to have left spastic hemiparesis.





- Labs


CBC & Chem 7: 


                                 11/12/20 11:00





                                 11/12/20 11:00


Labs: 


                  Abnormal Lab Results - Last 24 Hours (Table)











  11/11/20 11/12/20 11/12/20 Range/Units





  20:11 06:55 10:26 


 


RBC     (3.80-5.40)  m/uL


 


Hgb     (11.4-16.0)  gm/dL


 


Hct     (34.0-46.0)  %


 


RDW     (11.5-15.5)  %


 


ABG pCO2     (35-45)  mmHg


 


ABG HCO3     (21-25)  mmol/L


 


ABG Total CO2     (19-24)  mmol/L


 


ABG O2 Saturation     (94-97)  %


 


Carbon Dioxide     (22-30)  mmol/L


 


BUN     (7-17)  mg/dL


 


Glucose     (74-99)  mg/dL


 


POC Glucose (mg/dL)  332 H  320 H  317 H  (75-99)  mg/dL


 


Total Protein     (6.3-8.2)  g/dL


 


Albumin     (3.5-5.0)  g/dL














  11/12/20 11/12/20 11/12/20 Range/Units





  11:00 11:00 11:10 


 


RBC  2.73 L    (3.80-5.40)  m/uL


 


Hgb  8.5 L    (11.4-16.0)  gm/dL


 


Hct  26.8 L    (34.0-46.0)  %


 


RDW  16.3 H    (11.5-15.5)  %


 


ABG pCO2     (35-45)  mmHg


 


ABG HCO3     (21-25)  mmol/L


 


ABG Total CO2     (19-24)  mmol/L


 


ABG O2 Saturation     (94-97)  %


 


Carbon Dioxide   33 H   (22-30)  mmol/L


 


BUN   19 H   (7-17)  mg/dL


 


Glucose   279 H   (74-99)  mg/dL


 


POC Glucose (mg/dL)    233 H  (75-99)  mg/dL


 


Total Protein   5.6 L   (6.3-8.2)  g/dL


 


Albumin   2.5 L   (3.5-5.0)  g/dL














  11/12/20 11/12/20 Range/Units





  11:37 11:54 


 


RBC    (3.80-5.40)  m/uL


 


Hgb    (11.4-16.0)  gm/dL


 


Hct    (34.0-46.0)  %


 


RDW    (11.5-15.5)  %


 


ABG pCO2   49 H  (35-45)  mmHg


 


ABG HCO3   30 H  (21-25)  mmol/L


 


ABG Total CO2   31 H  (19-24)  mmol/L


 


ABG O2 Saturation   97.1 H  (94-97)  %


 


Carbon Dioxide    (22-30)  mmol/L


 


BUN    (7-17)  mg/dL


 


Glucose    (74-99)  mg/dL


 


POC Glucose (mg/dL)  219 H   (75-99)  mg/dL


 


Total Protein    (6.3-8.2)  g/dL


 


Albumin    (3.5-5.0)  g/dL








                      Microbiology - Last 24 Hours (Table)











 11/06/20 11:14 Blood Culture - Preliminary





 Blood    No Growth after 120 hours














Assessment and Plan


Assessment: 





* Seizure disorder, provoked by lack of antiepileptic medication secondary due 

  to vomiting.


* History of CVA with spastic left hemiplegia


* Cognitive deficits due to CVA.


* History of diabetes, with history of hypoglycemic episodes as well as DKA, 

  suggestive of brittle diabetes


Plan: 





* Patient is hyporesponsive with blood pressure 69/38.  Blood sugar is 219.  IM 

  to address hypotension.  No seizure witnessed.


* Continue patient's home seizure medications.  Patient currently on Depakote 

  250 mg 3 times a day and Vimpat 150 mg twice a day.


* Total Depakote level yesterday was 52 (), which is therapeutic.  

  Patient's free Depakote level was 14.9 (4.8-17.3) on 10/07/2020.


* Avoid episodes of hypoglycemia. 


* Discussed with patient's nurse.

## 2020-11-13 LAB
ALBUMIN SERPL-MCNC: 2.5 G/DL (ref 3.8–4.9)
ALBUMIN/GLOB SERPL: 1 G/DL (ref 1.6–3.17)
ALP SERPL-CCNC: 72 U/L (ref 41–126)
ALT SERPL-CCNC: 22 U/L (ref 8–44)
ANION GAP SERPL CALC-SCNC: 10 MMOL/L (ref 4–12)
AST SERPL-CCNC: 21 U/L (ref 13–35)
BASOPHILS # BLD AUTO: 0 K/UL (ref 0–0.2)
BASOPHILS NFR BLD AUTO: 0 %
BUN SERPL-SCNC: 15 MG/DL (ref 9–27)
BUN/CREAT SERPL: 11.54 RATIO (ref 12–20)
CALCIUM SPEC-MCNC: 8 MG/DL (ref 8.7–10.3)
CHLORIDE SERPL-SCNC: 107 MMOL/L (ref 96–109)
CO2 SERPL-SCNC: 25 MMOL/L (ref 21.6–31.8)
EOSINOPHIL # BLD AUTO: 0 K/UL (ref 0–0.7)
EOSINOPHIL NFR BLD AUTO: 0 %
ERYTHROCYTE [DISTWIDTH] IN BLOOD BY AUTOMATED COUNT: 2.31 M/UL (ref 3.8–5.4)
ERYTHROCYTE [DISTWIDTH] IN BLOOD: 15.8 % (ref 11.5–15.5)
GLOBULIN SER CALC-MCNC: 2.5 G/DL (ref 1.6–3.3)
GLUCOSE BLD-MCNC: 120 MG/DL (ref 75–99)
GLUCOSE BLD-MCNC: 255 MG/DL (ref 75–99)
GLUCOSE BLD-MCNC: 388 MG/DL (ref 75–99)
GLUCOSE BLD-MCNC: 405 MG/DL (ref 75–99)
GLUCOSE BLD-MCNC: 441 MG/DL (ref 75–99)
GLUCOSE SERPL-MCNC: 400 MG/DL (ref 70–110)
GLUCOSE UR QL: (no result)
HCT VFR BLD AUTO: 23.5 % (ref 34–46)
HGB BLD-MCNC: 7.3 GM/DL (ref 11.4–16)
KETONES UR QL STRIP.AUTO: (no result)
LYMPHOCYTES # SPEC AUTO: 1.5 K/UL (ref 1–4.8)
LYMPHOCYTES NFR SPEC AUTO: 21 %
MCH RBC QN AUTO: 31.5 PG (ref 25–35)
MCHC RBC AUTO-ENTMCNC: 31 G/DL (ref 31–37)
MCV RBC AUTO: 101.6 FL (ref 80–100)
MONOCYTES # BLD AUTO: 0.5 K/UL (ref 0–1)
MONOCYTES NFR BLD AUTO: 7 %
NEUTROPHILS # BLD AUTO: 5 K/UL (ref 1.3–7.7)
NEUTROPHILS NFR BLD AUTO: 71 %
PH UR: 5.5 [PH] (ref 5–8)
PLATELET # BLD AUTO: 223 K/UL (ref 150–450)
POTASSIUM SERPL-SCNC: 4.2 MMOL/L (ref 3.5–5.5)
PROT SERPL-MCNC: 5 G/DL (ref 6.2–8.2)
RBC UR QL: 1 /HPF (ref 0–5)
SODIUM SERPL-SCNC: 142 MMOL/L (ref 135–145)
SP GR UR: 1.02 (ref 1–1.03)
UROBILINOGEN UR QL STRIP: <2 MG/DL (ref ?–2)
WBC # BLD AUTO: 7.1 K/UL (ref 3.8–10.6)
WBC #/AREA URNS HPF: 2 /HPF (ref 0–5)

## 2020-11-13 RX ADMIN — METOPROLOL TARTRATE SCH MG: 25 TABLET, FILM COATED ORAL at 09:48

## 2020-11-13 RX ADMIN — LOSARTAN POTASSIUM SCH MG: 50 TABLET, FILM COATED ORAL at 09:48

## 2020-11-13 RX ADMIN — METOPROLOL TARTRATE SCH MG: 25 TABLET, FILM COATED ORAL at 21:57

## 2020-11-13 RX ADMIN — METOCLOPRAMIDE SCH: 5 TABLET ORAL at 17:25

## 2020-11-13 RX ADMIN — INSULIN ASPART SCH UNIT: 100 INJECTION, SOLUTION INTRAVENOUS; SUBCUTANEOUS at 21:58

## 2020-11-13 RX ADMIN — DIVALPROEX SODIUM SCH MG: 250 TABLET, DELAYED RELEASE ORAL at 17:24

## 2020-11-13 RX ADMIN — INSULIN ASPART SCH UNIT: 100 INJECTION, SOLUTION INTRAVENOUS; SUBCUTANEOUS at 12:30

## 2020-11-13 RX ADMIN — ATORVASTATIN CALCIUM SCH MG: 20 TABLET, FILM COATED ORAL at 09:47

## 2020-11-13 RX ADMIN — DIVALPROEX SODIUM SCH MG: 250 TABLET, DELAYED RELEASE ORAL at 09:47

## 2020-11-13 RX ADMIN — ASPIRIN 81 MG CHEWABLE TABLET SCH MG: 81 TABLET CHEWABLE at 09:47

## 2020-11-13 RX ADMIN — DIVALPROEX SODIUM SCH MG: 250 TABLET, DELAYED RELEASE ORAL at 21:57

## 2020-11-13 RX ADMIN — PANTOPRAZOLE SODIUM SCH MG: 40 TABLET, DELAYED RELEASE ORAL at 21:57

## 2020-11-13 RX ADMIN — PANTOPRAZOLE SODIUM SCH MG: 40 TABLET, DELAYED RELEASE ORAL at 09:49

## 2020-11-13 RX ADMIN — METOCLOPRAMIDE SCH MG: 5 TABLET ORAL at 12:30

## 2020-11-13 RX ADMIN — SODIUM FERRIC GLUCONATE COMPLEX SCH MLS/HR: 12.5 INJECTION INTRAVENOUS at 09:49

## 2020-11-13 RX ADMIN — OXYCODONE HYDROCHLORIDE AND ACETAMINOPHEN SCH MG: 500 TABLET ORAL at 09:47

## 2020-11-13 RX ADMIN — Medication SCH EACH: at 17:24

## 2020-11-13 RX ADMIN — INSULIN ASPART SCH UNIT: 100 INJECTION, SOLUTION INTRAVENOUS; SUBCUTANEOUS at 07:57

## 2020-11-13 RX ADMIN — LACOSAMIDE SCH MG: 150 TABLET, FILM COATED ORAL at 09:48

## 2020-11-13 RX ADMIN — Medication SCH EACH: at 07:49

## 2020-11-13 RX ADMIN — INSULIN ASPART SCH: 100 INJECTION, SOLUTION INTRAVENOUS; SUBCUTANEOUS at 17:24

## 2020-11-13 RX ADMIN — CEFAZOLIN SCH MLS/HR: 330 INJECTION, POWDER, FOR SOLUTION INTRAMUSCULAR; INTRAVENOUS at 19:47

## 2020-11-13 RX ADMIN — LACOSAMIDE SCH MG: 150 TABLET, FILM COATED ORAL at 21:57

## 2020-11-13 RX ADMIN — METOCLOPRAMIDE SCH MG: 5 TABLET ORAL at 07:50

## 2020-11-13 RX ADMIN — HYDROCODONE BITARTRATE AND ACETAMINOPHEN PRN EACH: 10; 325 TABLET ORAL at 17:23

## 2020-11-13 RX ADMIN — CEFAZOLIN SCH: 330 INJECTION, POWDER, FOR SOLUTION INTRAMUSCULAR; INTRAVENOUS at 06:20

## 2020-11-13 RX ADMIN — HYDROCODONE BITARTRATE AND ACETAMINOPHEN PRN EACH: 10; 325 TABLET ORAL at 07:49

## 2020-11-13 NOTE — P.PN
Subjective


Progress Note Date: 11/13/20





Patient was seen for a follow-up.  Patient is somnolent.  No seizure activity 

witnessed.  





EGD showed antral gastritis, nonbleeding duodenal AVM.  No further seizures 

reported since yesterday.  





Objective





- Vital Signs


Vital signs: 


                                   Vital Signs











Temp  98.2 F   11/13/20 12:19


 


Pulse  93   11/12/20 19:17


 


Resp  17   11/13/20 12:19


 


BP  109/61   11/13/20 12:19


 


Pulse Ox  93 L  11/13/20 12:19








                                 Intake & Output











 11/12/20 11/13/20 11/13/20





 18:59 06:59 18:59


 


Other:   


 


  Voiding Method Diaper Diaper Diaper


 


  # Voids 1 3 














- Exam





Patient is very somnolent.  Patient does open her eyes to calling her name, but 

then drifts back to sleep.  Patient has left hemiparesis.  Patient moves right 

arm spontaneously.  No seizures reported since in the hospital.  Continues to 

have left spastic hemiparesis.





- Labs


CBC & Chem 7: 


                                 11/13/20 11:35





                                 11/12/20 11:00


Labs: 


                  Abnormal Lab Results - Last 24 Hours (Table)











  11/12/20 11/12/20 11/13/20 Range/Units





  17:00 20:33 07:00 


 


RBC     (3.80-5.40)  m/uL


 


Hgb     (11.4-16.0)  gm/dL


 


Hct     (34.0-46.0)  %


 


MCV     (80.0-100.0)  fL


 


RDW     (11.5-15.5)  %


 


POC Glucose (mg/dL)  211 H  259 H  441 H  (75-99)  mg/dL


 


Urine Glucose (UA)     (Negative)  


 


Urine Ketones     (Negative)  


 


Urine Blood     (Negative)  














  11/13/20 11/13/20 11/13/20 Range/Units





  09:54 11:35 12:00 


 


RBC   2.31 L   (3.80-5.40)  m/uL


 


Hgb   7.3 L   (11.4-16.0)  gm/dL


 


Hct   23.5 L   (34.0-46.0)  %


 


MCV   101.6 H   (80.0-100.0)  fL


 


RDW   15.8 H   (11.5-15.5)  %


 


POC Glucose (mg/dL)  405 H    (75-99)  mg/dL


 


Urine Glucose (UA)    4+ H  (Negative)  


 


Urine Ketones    3+ H  (Negative)  


 


Urine Blood    Trace H  (Negative)  














  11/13/20 Range/Units





  12:03 


 


RBC   (3.80-5.40)  m/uL


 


Hgb   (11.4-16.0)  gm/dL


 


Hct   (34.0-46.0)  %


 


MCV   (80.0-100.0)  fL


 


RDW   (11.5-15.5)  %


 


POC Glucose (mg/dL)  388 H  (75-99)  mg/dL


 


Urine Glucose (UA)   (Negative)  


 


Urine Ketones   (Negative)  


 


Urine Blood   (Negative)  








                      Microbiology - Last 24 Hours (Table)











 11/06/20 11:14 Blood Culture - Final





 Blood    No Growth after 144 hours














Assessment and Plan


Assessment: 





* Seizure disorder, provoked by lack of antiepileptic medication secondary due 

  to vomiting.


* History of CVA with spastic left hemiplegia


* Cognitive deficits due to CVA.


* History of diabetes, with history of hypoglycemic episodes as well as DKA, 

  suggestive of brittle diabetes


Plan: 





* No seizure witnessed while in the hospital.


* Continue patient's home seizure medications.  Patient currently on Depakote 

  250 mg 3 times a day and Vimpat 150 mg twice a day.


* Total Depakote level yesterday was 52 (), which is therapeutic.  

  Patient's free Depakote level was 14.9 (4.8-17.3) on 10/07/2020.


* Avoid episodes of hypoglycemia/hypotension. 


* Neurologically stable for discharge, pending medical clearance.

## 2020-11-13 NOTE — P.CONS
History of Present Illness





- Reason for Consult


Consult date: 20


Right foot infection


Requesting physician: Saniya Cabral





- Chief Complaint


Right foot swelling and redness x few days





- History of Present Illness


Patient is a 60 year  female with past medical history significant for 

CAD and diabetes mellitus history of diabetic foot infection requiring 

amputation of multiple of her toes and this patient was brought into the ER at 

[facility] on 2020 for evaluation of vomiting and 

lethargy patient subsequently has been admitted to the hospital and is being 

managed by primary and neurology services, patient was noticed to have right 

middle finger swollen and red yesterday that prompted this infection this 

consultation is history of any trauma, and the patient is currently lethargic 

and sleepy" and unable to provide history so most information has been obtained 

from review the chart, review of the chart and it shows patient has persistent 

ingrown staph aureus and staph epidermidis in both the vancomycin AKASH of 2








Review of Systems


Positive points has been mentioned in HPI complete review could not be obtained 

because of his underlying mental status








Past Medical History


Past Medical History: Asthma, Coronary Artery Disease (CAD), Chest Pain / 

Angina, Heart Failure, COPD, CVA/TIA, Diabetes Mellitus, Deep Vein Thrombosis 

(DVT), Eye Disorder, GERD/Reflux, Hyperlipidemia, Hypertension, Myocardial 

Infarction (MI), Neurologic Disorder, Osteoarthritis (OA), Pneumonia, Renal 

Disease


Additional Past Medical History / Comment(s): IDDM (brittle), DKAs, neuropathy 

bilateral hands/feet, retinopathy bilateral eyes, cellulitis R foot, R great toe

and 2nd toe infections/amputations, current wound R foot-being seen in [facility], 

renal failure, anemia, CVAs with L sided paralysis, headaches started after 

CVAs, brain lesions, DVT R axillae, low back pain, varicosities, seizure many 

years ago (), hypothyroid, constipation, bilateral tinnitis occasionally, 

sinus problems.


Last Myocardial Infarction Date:: 


History of Any Multi-Drug Resistant Organisms: MRSA


Year Discovered:: 18


MDRO Source:: Right Foot


Past Surgical History: Appendectomy,  Section, Cholecystectomy, Heart 

Catheterization With Stent, Hysterectomy, Orthopedic Surgery


Additional Past Surgical History / Comment(s): PCI with multiple stents, R great

toe and 2nd toe amps, debridements R foot ulcer, L shoulder surgery to remove 

bone, bronchoscopy, EGD, colonoscopy, R arm port since removed, bilateral 

cataract removals/lens implants.


Past Anesthesia/Blood Transfusion Reactions: No Reported Reaction


Additional Past Anesthesia/Blood Transfusion Reaction / Comm: HX OF BLOOD 

TRANSFUSION- NO REACTION


Date of Last Stent Placement:: 2013


Past Psychological History: Anxiety, Bipolar, Depression


Smoking Status: Never smoker


Past Alcohol Use History: None Reported


Past Drug Use History: Marijuana





- Past Family History


  ** Father


Family Medical History: Unable to Obtain, Coronary Artery Disease (CAD), 

Diabetes Mellitus





  ** Mother


Family Medical History: COPD





Medications and Allergies


                                Home Medications











 Medication  Instructions  Recorded  Confirmed  Type


 


HYDROcodone/APAP 10-325MG [Norco 1 tab PO TID PRN 17 History





]    


 


Atorvastatin [Lipitor] 20 mg PO DAILY 19 History


 


Aspirin [Adult Low Dose Aspirin EC] 81 mg PO DAILY 01/10/20 11/06/20 History


 


Ferrous Sulfate [Iron (65  mg PO BID 01/10/20 11/06/20 History





Elemental)]    


 


Divalproex [Depakote] 250 mg PO TID #90 tablet. 20 Rx


 


Albuterol Sulfate [Ventolin HFA] 2 puff INHALATION RT-Q4H PRN 20 

History


 


Losartan [Cozaar] 50 mg PO DAILY  tab 20 Rx


 


Ascorbic Acid [Vitamin C] 500 mg PO DAILY 20 History


 


Vitamin B Complex 1 tab PO DAILY 20 History


 


Clopidogrel [Plavix] 75 mg PO DAILY  tab 20 Rx


 


QUEtiapine [SEROquel] 50 mg PO HS 30 Days #30 tab 10/14/20 11/06/20 Rx


 


ALPRAZolam [Xanax] 0.5 mg PO QID PRN  tab 10/27/20 11/06/20 Rx


 


DULoxetine HCL [Cymbalta] 30 mg PO DAILY  capsule. 10/27/20 11/06/20 Rx


 


Metoclopramide [Reglan] 5 mg PO AC-TID  tab 10/27/20 11/06/20 Rx


 


QUEtiapine [SEROquel] 25 mg PO DAILY  tab 10/27/20 11/06/20 Rx


 


Calcium Carb-Vit D 500Mg-200Un 1 tab PO BID-W/MEALS 20 History





[Oscal 500+D]    


 


INSULIN LISPRO (humaLOG) [humaLOG] 6 units SQ AC-TID 20 History


 


Ondansetron Odt [Zofran ODT] 4 mg PO Q8HR PRN 3 Days #9 tab 20 Rx


 


Insulin Detemir [Levemir Flextouch] 10 units SQ HS 20 History


 


Lacosamide [Vimpat] 100 mg PO BID 20 History


 


Metoprolol Tartrate [Lopressor] 12.5 mg PO BID 20 History


 


Pantoprazole Sodium [Protonix] 40 mg PO BID 30 Days #60 tablet. 20  Rx








                                    Allergies











Allergy/AdvReac Type Severity Reaction Status Date / Time


 


Barbiturates Allergy  Rash/Hives Verified 20 10:22


 


cephalexin monohydrate Allergy  Rash/Hives Verified 20 10:22





[From Keflex]     


 


morphine Allergy  Rash/Hives Verified 20 10:22


 


Penicillins Allergy  Rash/Hives Verified 20 10:22


 


phenobarbital Allergy  Swelling Verified 20 10:22


 


venom-honey bee Allergy  Swelling Verified 20 10:22





[bee venom (honey bee)]     


 


amlodipine besylate AdvReac  Vomiting Verified 20 10:22





[From Norvasc]     














Physical Exam


Vitals: 


                                   Vital Signs











  Temp Pulse Pulse Resp BP BP Pulse Ox


 


 20 15:00  98.6 F  67   16  92/61   96


 


 20 12:19  98.2 F    17   109/61  93 L


 


 20 06:48  98.4 F    17   129/75  96


 


 20 05:23  99.3 F      


 


 20 03:10  100.7 F H    19  152/77   94 L


 


 20 00:00     18   


 


 20 19:17  99.1 F   93  19  142/48   99








                                Intake and Output











 11/13/20 11/13/20 11/13/20





 06:59 14:59 22:59


 


Other:   


 


  Voiding Method Diaper Diaper Diaper


 


  # Voids 3 2 


 


  Weight   58.967 kg











GENERAL DESCRIPTION: Middle-aged female lying in bed, no distress. No tachypnea 

or accessory muscle of respiration use.


HEENT: Shows Pallor , no scleral icterus. Oral mucous membrane is dry. No 

pharyngeal erythema or thrush


NECK: Trachea central, no thyromegaly.


LUNGS: Unlabored breathing. Clear to auscultation anteriorly. No wheeze or 

crackle.


HEART: S1, S2, regular rate and rhythm. No loud murmur


ABDOMEN: Soft, no tenderness , guarding or rigidity, no organomegaly


EXTREMITIES: No edema of feet.  Right third toe swollen and red with instability

of the joint


SKIN: No rash, no masses palpable.


NEUROLOGICAL: The patient is lethargic orientation could not be determined











Results


CBC & Chem 7: 


                                 20 11:35





                                 20 11:35


Labs: 


                  Abnormal Lab Results - Last 24 Hours (Table)











  20 Range/Units





  17:00 20:33 07:00 


 


RBC     (3.80-5.40)  m/uL


 


Hgb     (11.4-16.0)  gm/dL


 


Hct     (34.0-46.0)  %


 


MCV     (80.0-100.0)  fL


 


RDW     (11.5-15.5)  %


 


POC Glucose (mg/dL)  211 H  259 H  441 H  (75-99)  mg/dL


 


Urine Glucose (UA)     (Negative)  


 


Urine Ketones     (Negative)  


 


Urine Blood     (Negative)  














  20 Range/Units





  09:54 11:35 12:00 


 


RBC   2.31 L   (3.80-5.40)  m/uL


 


Hgb   7.3 L   (11.4-16.0)  gm/dL


 


Hct   23.5 L   (34.0-46.0)  %


 


MCV   101.6 H   (80.0-100.0)  fL


 


RDW   15.8 H   (11.5-15.5)  %


 


POC Glucose (mg/dL)  405 H    (75-99)  mg/dL


 


Urine Glucose (UA)    4+ H  (Negative)  


 


Urine Ketones    3+ H  (Negative)  


 


Urine Blood    Trace H  (Negative)  














  20 Range/Units





  12:03 


 


RBC   (3.80-5.40)  m/uL


 


Hgb   (11.4-16.0)  gm/dL


 


Hct   (34.0-46.0)  %


 


MCV   (80.0-100.0)  fL


 


RDW   (11.5-15.5)  %


 


POC Glucose (mg/dL)  388 H  (75-99)  mg/dL


 


Urine Glucose (UA)   (Negative)  


 


Urine Ketones   (Negative)  


 


Urine Blood   (Negative)  








                      Microbiology - Last 24 Hours (Table)











 20 11:14 Blood Culture - Final





 Blood    No Growth after 144 hours














Assessment and Plan


Assessment: 


1-patient with right diabetic foot infection involving her right third toe which

did shows instability at the joint and concern for underlying osteomyelitis 

likely from gram-positive skin ofe in this patient has previously grown 

resistant gram-positive such as staph epi and MRSA in the cultures


2-Patient with multiple antibiotic ALLERGIES that would limit the number of 

antibiotic safe to use





(1) Diabetic infection of right foot


Current Visit: Yes   Status: Acute   Code(s): E11.628 - TYPE 2 DIABETES MELLITUS

WITH OTHER SKIN COMPLICATIONS; L08.9 - LOCAL INFECTION OF THE SKIN AND 

SUBCUTANEOUS TISSUE, UNSP   SNOMED Code(s): 89890831


   


Plan: 


1-we will obtain x-rays of the right foot


2-we will check CRP and a sed rate


3-local wound culture


4-empirically start the patient on daptomycin 6 mg per KG daily


5-vascular surgery evaluation as the patient will likely need amputation of the 

toe


We will follow on clinical condition and cultures to further adjust medication 

if needed


Thank you for this consultation will follow this patient with you

## 2020-11-13 NOTE — XR
EXAMINATION TYPE: XR foot complete RT

 

DATE OF EXAM: 11/13/2020

 

COMPARISON: 8/30/2020

 

HISTORY: Infection

 

TECHNIQUE:

 

FINDINGS: 3 views of the right foot were obtained. There is amputation deformity of the first and sec
ond toes. There is amputation deformity of the fifth toe and most of the fifth metatarsal. There is f
racture at the head of the first metatarsal. There is an united fracture at the base of the first met
atarsal. The hindfoot appears intact. There is deformity of the second metatarsal head related to non
displaced fracture.

There is small 7 mm wire foreign body along the proximal phalanx of the fourth toe.

IMPRESSION: Multiple fractures. Fragment position not significantly different than old exam. Osteomye
litis is not excluded at the fracture sites.

## 2020-11-13 NOTE — P.PN
Subjective


Progress Note Date: 11/13/20





Morenita Ramirez, is a 60-year-old female who presented to [facility] emergency room with a chief complaint of mental status changes with 

worsening somnolence and episodes of vomiting, she was evaluated in the 

emergency room, vital examination on presentation reveals a temperature of 97.9 

pulse 85 respiration 18 blood pressure 174/74 pulse ox 93% on room air, white 

blood count was 10.1 hemoglobin 9.9 platelet count 196 sodium 136 BUN 21 

creatinine 1.12 glucose 113 lactic acid 1.2 urine analysis revealed trace 

leukocyte esterase with moderate bacteria, chest x-ray revealed no acute pul

monary process, computed tomography scan of the brain revealed no evidence of 

acute intracranial hemorrhage or midline shift there was a large area of old 

encephalomalacic of the right anterior cerebral hemisphere related to previous 

stroke.  Patient was admitted to telemetry floor neurology consultation was 

requested due to concern of a recurrent stroke.


Patient has a known history of insulin-dependent diabetes mellitus type 1 

history of hypertension, history of hyperlipidemia, history of seizure disorder,

history of asthma, and history of psychosis.





On 11/08/2020 patient is alert and oriented resting in bed patient does appear 

restless.  CT of abdomen and pelvis has been ordered due to nausea and vomiting.

 Hemoglobin 8.2.  GI and neurology services are following.  Patient denies any 

chest pain or shortness of breath.  Does have appetite requesting food.





on 11/9/2020 patient was seen and examined on the medical floor she is alert 

confused and occasionally agitated she received a dose of Haldol earlier today 

her hemoglobin is down and gastroenterology planning EGD and colonoscopy shireen 

further review of system was possible today.





On 11/10/2020 agent was seen and examined on the medical floor she is alert, 

confused in no apparent distress, there is no fever or chills no headache or 

dizziness no chest pain no shortness of breath no cough no nausea or vomiting no

abdominal pain no agitation today, patient underwent EGD today, she has AVM 

which was treated she will be given IV iron, sister Noelle contacted and 

multiple medical issues discussed in details over the form.








On 11/11/2020 patient is currently sleepy will wake up to follow commands but 

falls back to sleep clinic DC Haldol and continue to monitor for 24 hours 

possible discharge tomorrow








On 11/12/2020 patient was seen and examined on the medical floor she was 

somnolent, she had low blood pressure she was given a fluid bolus, at this point

Ativan and Xanax were discontinued Haldol was discontinued yesterday will 

continue was Seroquel only and order Ativan as needed if patient gets agitated 

patient improved significantly with IV fluid bolus blood pressure is back to 

normal range.





On 11/13/2020 patient remains sleepy but per nursing staff has been up and 

awake.  Patient did have elevated temperatures throughout night 100.7.  Dr. Snyder has been consulted for wound on leg.  Repeat urinary analysis has been 

ordered.  Patient did have episode of hypotension this has resolved.  Patient 

denies any chest pain or shortness breath.  Patient denies nausea vomiting or 

diarrhea.  Patient denies any urinary burning or frequency





Objective





- Vital Signs


Vital signs: 


                                   Vital Signs











Temp  98.4 F   11/13/20 06:48


 


Pulse  93   11/12/20 19:17


 


Resp  17   11/13/20 06:48


 


BP  129/75   11/13/20 06:48


 


Pulse Ox  96   11/13/20 06:48








                                 Intake & Output











 11/12/20 11/13/20 11/13/20





 18:59 06:59 18:59


 


Other:   


 


  Voiding Method Diaper Diaper Diaper


 


  # Voids 1 3 














- Exam





In general patient is alert, responsive, in no distress


HEENT head normocephalic and atraumatic


Neck is supple no JVD no goiter no lymphadenopathy


Chest exam reveals a few scattered crackles no wheezing


Cardiac exam reveals regular heart sounds no gallops no murmurs


Abdomen is soft nontender no organomegaly was normal bowel sounds


Extremity exam reveals no edema no cyanosis or clubbing


Neurological examination patient is alert responsive answering questions 

appropriately she is moving all 4 extremity spontaneously there is residual 

spasticity and weakness on the left upper extremity in the left lower extremity





- Labs


CBC & Chem 7: 


                                 11/12/20 11:00





                                 11/12/20 11:00


Labs: 


                  Abnormal Lab Results - Last 24 Hours (Table)











  11/12/20 11/12/20 11/12/20 Range/Units





  11:00 11:54 17:00 


 


RBC  2.73 L    (3.80-5.40)  m/uL


 


Hgb  8.5 L    (11.4-16.0)  gm/dL


 


Hct  26.8 L    (34.0-46.0)  %


 


RDW  16.3 H    (11.5-15.5)  %


 


ABG pCO2   49 H   (35-45)  mmHg


 


ABG HCO3   30 H   (21-25)  mmol/L


 


ABG Total CO2   31 H   (19-24)  mmol/L


 


ABG O2 Saturation   97.1 H   (94-97)  %


 


POC Glucose (mg/dL)    211 H  (75-99)  mg/dL














  11/12/20 11/13/20 11/13/20 Range/Units





  20:33 07:00 09:54 


 


RBC     (3.80-5.40)  m/uL


 


Hgb     (11.4-16.0)  gm/dL


 


Hct     (34.0-46.0)  %


 


RDW     (11.5-15.5)  %


 


ABG pCO2     (35-45)  mmHg


 


ABG HCO3     (21-25)  mmol/L


 


ABG Total CO2     (19-24)  mmol/L


 


ABG O2 Saturation     (94-97)  %


 


POC Glucose (mg/dL)  259 H  441 H  405 H  (75-99)  mg/dL








                      Microbiology - Last 24 Hours (Table)











 11/06/20 11:14 Blood Culture - Final





 Blood    No Growth after 144 hours














Assessment and Plan


Plan: 





1.  Mental status changes likely related to recurrent seizure activity due to 

missing antiseizure medications due to vomiting.  Resolved





2.  Previous history of stroke in 2015, no evidence of stroke at this time.





3.  Underlying history of insulin-dependent diabetes mellitus, type I maintained

on insulin, with previous multiple admissions with DKA





4.  Underlying history of gastroparesis, maintained on Reglan





5.  History of psychosis maintained on Seroquel





6.  Underlying history of hypertension





7.  Underlying history of hyperlipidemia





8.  Previous history of coronary artery disease with myocardial infarction in 

the past





9.  Anemia was significant hemoglobin drop since yesterday.  Hemoglobin 

improving today 8.2





10.  Febrile.  Urinary analysis ordered.  Dr. Snyder consulted





11.  Wound to leg.  Dr. Snyder consulted





12.  Hypotension.  Patient did receive fluids has resolved





At this time patient is admitted to medical floor, she was seen by neurology and

was given IV antiseizure medications

## 2020-11-14 LAB
ALBUMIN SERPL-MCNC: 2.8 G/DL (ref 3.8–4.9)
ALBUMIN/GLOB SERPL: 1.08 G/DL (ref 1.6–3.17)
ALP SERPL-CCNC: 82 U/L (ref 41–126)
ALT SERPL-CCNC: 19 U/L (ref 8–44)
ANION GAP SERPL CALC-SCNC: 10.8 MMOL/L (ref 4–12)
AST SERPL-CCNC: 19 U/L (ref 13–35)
BASOPHILS # BLD AUTO: 0 K/UL (ref 0–0.2)
BASOPHILS NFR BLD AUTO: 0 %
BUN SERPL-SCNC: 16 MG/DL (ref 9–27)
BUN/CREAT SERPL: 12.31 RATIO (ref 12–20)
CALCIUM SPEC-MCNC: 8.5 MG/DL (ref 8.7–10.3)
CHLORIDE SERPL-SCNC: 109 MMOL/L (ref 96–109)
CO2 SERPL-SCNC: 21.2 MMOL/L (ref 21.6–31.8)
EOSINOPHIL # BLD AUTO: 0 K/UL (ref 0–0.7)
EOSINOPHIL NFR BLD AUTO: 1 %
ERYTHROCYTE [DISTWIDTH] IN BLOOD BY AUTOMATED COUNT: 2.64 M/UL (ref 3.8–5.4)
ERYTHROCYTE [DISTWIDTH] IN BLOOD: 16.2 % (ref 11.5–15.5)
GLOBULIN SER CALC-MCNC: 2.6 G/DL (ref 1.6–3.3)
GLUCOSE BLD-MCNC: 116 MG/DL (ref 75–99)
GLUCOSE BLD-MCNC: 136 MG/DL (ref 75–99)
GLUCOSE BLD-MCNC: 222 MG/DL (ref 75–99)
GLUCOSE BLD-MCNC: 395 MG/DL (ref 75–99)
GLUCOSE SERPL-MCNC: 335 MG/DL (ref 70–110)
HCT VFR BLD AUTO: 26.9 % (ref 34–46)
HGB BLD-MCNC: 8.2 GM/DL (ref 11.4–16)
LYMPHOCYTES # SPEC AUTO: 1.5 K/UL (ref 1–4.8)
LYMPHOCYTES NFR SPEC AUTO: 17 %
MCH RBC QN AUTO: 31.2 PG (ref 25–35)
MCHC RBC AUTO-ENTMCNC: 30.6 G/DL (ref 31–37)
MCV RBC AUTO: 102 FL (ref 80–100)
MONOCYTES # BLD AUTO: 0.5 K/UL (ref 0–1)
MONOCYTES NFR BLD AUTO: 6 %
NEUTROPHILS # BLD AUTO: 6.6 K/UL (ref 1.3–7.7)
NEUTROPHILS NFR BLD AUTO: 75 %
PLATELET # BLD AUTO: 255 K/UL (ref 150–450)
POTASSIUM SERPL-SCNC: 4.7 MMOL/L (ref 3.5–5.5)
PROT SERPL-MCNC: 5.4 G/DL (ref 6.2–8.2)
SODIUM SERPL-SCNC: 141 MMOL/L (ref 135–145)
WBC # BLD AUTO: 8.8 K/UL (ref 3.8–10.6)

## 2020-11-14 RX ADMIN — DIVALPROEX SODIUM SCH MG: 250 TABLET, DELAYED RELEASE ORAL at 07:47

## 2020-11-14 RX ADMIN — DIVALPROEX SODIUM SCH MG: 250 TABLET, DELAYED RELEASE ORAL at 21:08

## 2020-11-14 RX ADMIN — INSULIN ASPART SCH UNIT: 100 INJECTION, SOLUTION INTRAVENOUS; SUBCUTANEOUS at 13:39

## 2020-11-14 RX ADMIN — CEFAZOLIN SCH MLS/HR: 330 INJECTION, POWDER, FOR SOLUTION INTRAMUSCULAR; INTRAVENOUS at 23:44

## 2020-11-14 RX ADMIN — HYDROCODONE BITARTRATE AND ACETAMINOPHEN PRN EACH: 10; 325 TABLET ORAL at 11:04

## 2020-11-14 RX ADMIN — METOPROLOL TARTRATE SCH MG: 25 TABLET, FILM COATED ORAL at 21:08

## 2020-11-14 RX ADMIN — INSULIN ASPART SCH UNIT: 100 INJECTION, SOLUTION INTRAVENOUS; SUBCUTANEOUS at 07:45

## 2020-11-14 RX ADMIN — LACOSAMIDE SCH MG: 150 TABLET, FILM COATED ORAL at 07:47

## 2020-11-14 RX ADMIN — OXYCODONE HYDROCHLORIDE AND ACETAMINOPHEN SCH MG: 500 TABLET ORAL at 07:46

## 2020-11-14 RX ADMIN — HYDROCODONE BITARTRATE AND ACETAMINOPHEN PRN EACH: 10; 325 TABLET ORAL at 03:15

## 2020-11-14 RX ADMIN — METOCLOPRAMIDE SCH MG: 5 TABLET ORAL at 07:46

## 2020-11-14 RX ADMIN — PANTOPRAZOLE SODIUM SCH MG: 40 TABLET, DELAYED RELEASE ORAL at 21:07

## 2020-11-14 RX ADMIN — METOPROLOL TARTRATE SCH MG: 25 TABLET, FILM COATED ORAL at 07:46

## 2020-11-14 RX ADMIN — INSULIN ASPART SCH: 100 INJECTION, SOLUTION INTRAVENOUS; SUBCUTANEOUS at 16:42

## 2020-11-14 RX ADMIN — DIVALPROEX SODIUM SCH MG: 250 TABLET, DELAYED RELEASE ORAL at 16:35

## 2020-11-14 RX ADMIN — INSULIN ASPART SCH: 100 INJECTION, SOLUTION INTRAVENOUS; SUBCUTANEOUS at 21:15

## 2020-11-14 RX ADMIN — SODIUM FERRIC GLUCONATE COMPLEX SCH MLS/HR: 12.5 INJECTION INTRAVENOUS at 11:04

## 2020-11-14 RX ADMIN — PANTOPRAZOLE SODIUM SCH MG: 40 TABLET, DELAYED RELEASE ORAL at 07:46

## 2020-11-14 RX ADMIN — Medication SCH EACH: at 16:35

## 2020-11-14 RX ADMIN — LACOSAMIDE SCH MG: 150 TABLET, FILM COATED ORAL at 21:07

## 2020-11-14 RX ADMIN — HYDROCODONE BITARTRATE AND ACETAMINOPHEN PRN EACH: 10; 325 TABLET ORAL at 21:17

## 2020-11-14 RX ADMIN — CEFAZOLIN SCH MLS/HR: 330 INJECTION, POWDER, FOR SOLUTION INTRAMUSCULAR; INTRAVENOUS at 09:38

## 2020-11-14 RX ADMIN — METOCLOPRAMIDE SCH MG: 5 TABLET ORAL at 16:36

## 2020-11-14 RX ADMIN — ASPIRIN 81 MG CHEWABLE TABLET SCH MG: 81 TABLET CHEWABLE at 07:46

## 2020-11-14 RX ADMIN — Medication SCH EACH: at 07:45

## 2020-11-14 RX ADMIN — METOCLOPRAMIDE SCH: 5 TABLET ORAL at 13:39

## 2020-11-14 RX ADMIN — LOSARTAN POTASSIUM SCH MG: 50 TABLET, FILM COATED ORAL at 07:46

## 2020-11-14 NOTE — P.PN
Subjective


Progress Note Date: 11/14/20





Morenita Ramirez, is a 60-year-old female who presented to [facility] emergency room with a chief complaint of mental status changes with 

worsening somnolence and episodes of vomiting, she was evaluated in the 

emergency room, vital examination on presentation reveals a temperature of 97.9 

pulse 85 respiration 18 blood pressure 174/74 pulse ox 93% on room air, white 

blood count was 10.1 hemoglobin 9.9 platelet count 196 sodium 136 BUN 21 

creatinine 1.12 glucose 113 lactic acid 1.2 urine analysis revealed trace 

leukocyte esterase with moderate bacteria, chest x-ray revealed no acute pul

monary process, computed tomography scan of the brain revealed no evidence of 

acute intracranial hemorrhage or midline shift there was a large area of old 

encephalomalacic of the right anterior cerebral hemisphere related to previous 

stroke.  Patient was admitted to telemetry floor neurology consultation was 

requested due to concern of a recurrent stroke.


Patient has a known history of insulin-dependent diabetes mellitus type 1 

history of hypertension, history of hyperlipidemia, history of seizure disorder,

history of asthma, and history of psychosis.





On 11/08/2020 patient is alert and oriented resting in bed patient does appear 

restless.  CT of abdomen and pelvis has been ordered due to nausea and vomiting.

 Hemoglobin 8.2.  GI and neurology services are following.  Patient denies any 

chest pain or shortness of breath.  Does have appetite requesting food.





on 11/9/2020 patient was seen and examined on the medical floor she is alert 

confused and occasionally agitated she received a dose of Haldol earlier today 

her hemoglobin is down and gastroenterology planning EGD and colonoscopy shireen 

further review of system was possible today.





On 11/10/2020 agent was seen and examined on the medical floor she is alert, 

confused in no apparent distress, there is no fever or chills no headache or 

dizziness no chest pain no shortness of breath no cough no nausea or vomiting no

abdominal pain no agitation today, patient underwent EGD today, she has AVM 

which was treated she will be given IV iron, sister Noelle contacted and 

multiple medical issues discussed in details over the form.








On 11/11/2020 patient is currently sleepy will wake up to follow commands but 

falls back to sleep clinic DC Haldol and continue to monitor for 24 hours 

possible discharge tomorrow








On 11/12/2020 patient was seen and examined on the medical floor she was 

somnolent, she had low blood pressure she was given a fluid bolus, at this point

Ativan and Xanax were discontinued Haldol was discontinued yesterday will 

continue was Seroquel only and order Ativan as needed if patient gets agitated 

patient improved significantly with IV fluid bolus blood pressure is back to 

normal range.





On 11/13/2020 patient remains sleepy but per nursing staff has been up and 

awake.  Patient did have elevated temperatures throughout night 100.7.  Dr. Snyder has been consulted for wound on leg.  Repeat urinary analysis has been 

ordered.  Patient did have episode of hypotension this has resolved.  Patient 

denies any chest pain or shortness breath.  Patient denies nausea vomiting or 

diarrhea.  Patient denies any urinary burning or frequency.





On 11/14/2020 patient was seen and examined on the medical floor, she is somn

olent, in no apparent distress at this time, her nurse reports that, patient 

slept well all night, however when she was awake she was screaming, she kept on 

screaming until she was tired and fell asleep again, patient is accepting her 

meals and her oral medications in some apple sauce with one on one help.  I am 

unable to do any review of systems at this time.  Ativan and Xanax and Haldol 

were discontinued due to continue was sleeping.  At this time will give Seroquel

25 mg in the morning, 25 mg in the early afternoon,  and 50 mg at bedtime.  Her 

sister Noelle who is the power of  was contacted multiple times during 

this admission, CODE STATUS was discussed including possibility of hospice care,

her sister stated that she will discuss that with patient's children and let us 

know their decision.





Objective





- Vital Signs


Vital signs: 


                                   Vital Signs











Temp  98.7 F   11/14/20 07:00


 


Pulse  97   11/14/20 07:00


 


Resp  18   11/14/20 03:19


 


BP  143/79   11/14/20 07:00


 


Pulse Ox  93 L  11/14/20 07:00








                                 Intake & Output











 11/13/20 11/14/20 11/14/20





 18:59 06:59 18:59


 


Weight 58.967 kg  


 


Other:   


 


  Voiding Method Diaper Diaper Diaper


 


  # Voids 2 0 1














- Exam





In general patient is alert, responsive, in no distress


HEENT head normocephalic and atraumatic


Neck is supple no JVD no goiter no lymphadenopathy


Chest exam reveals a few scattered crackles no wheezing


Cardiac exam reveals regular heart sounds no gallops no murmurs


Abdomen is soft nontender no organomegaly was normal bowel sounds


Extremity exam reveals no edema no cyanosis or clubbing


Neurological examination patient is alert responsive answering questions 

appropriately she is moving all 4 extremity spontaneously there is residual 

spasticity and weakness on the left upper extremity in the left lower extremity





- Labs


CBC & Chem 7: 


                                 11/14/20 05:57





                                 11/14/20 05:57


Labs: 


                  Abnormal Lab Results - Last 24 Hours (Table)











  11/13/20 11/13/20 11/13/20 Range/Units





  11:35 12:00 16:54 


 


RBC     (3.80-5.40)  m/uL


 


Hgb     (11.4-16.0)  gm/dL


 


Hct     (34.0-46.0)  %


 


MCV     (80.0-100.0)  fL


 


MCHC     (31.0-37.0)  g/dL


 


RDW     (11.5-15.5)  %


 


ESR     (0-30)  mm/Hr


 


Carbon Dioxide     (21.6-31.8)  mmol/L


 


Est GFR (CKD-EPI)AfAm  52.0 L    (60.0-200.0)   


 


Est GFR (CKD-EPI)NonAf  44.9 L    (60.0-200.0)   


 


BUN/Creatinine Ratio  11.54 L    (12.00-20.00)  Ratio


 


Glucose  400 H    ()  mg/dL


 


POC Glucose (mg/dL)    120 H  (75-99)  mg/dL


 


Calcium  8.0 L    (8.7-10.3)  mg/dL


 


Total Bilirubin     (0.3-1.2)  mg/dL


 


C-Reactive Protein     (0.0-0.8)  mg/dL


 


Total Protein  5.0 L    (6.2-8.2)  g/dL


 


Albumin  2.50 L    (3.80-4.90)  g/dL


 


Albumin/Globulin Ratio  1.00 L    (1.60-3.17)  g/dL


 


Urine Glucose (UA)   4+ H   (Negative)  


 


Urine Ketones   3+ H   (Negative)  


 


Urine Blood   Trace H   (Negative)  














  11/13/20 11/14/20 11/14/20 Range/Units





  20:21 05:57 05:57 


 


RBC   2.64 L   (3.80-5.40)  m/uL


 


Hgb   8.2 L   (11.4-16.0)  gm/dL


 


Hct   26.9 L   (34.0-46.0)  %


 


MCV   102.0 H   (80.0-100.0)  fL


 


MCHC   30.6 L   (31.0-37.0)  g/dL


 


RDW   16.2 H   (11.5-15.5)  %


 


ESR   77 H   (0-30)  mm/Hr


 


Carbon Dioxide    21.2 L  (21.6-31.8)  mmol/L


 


Est GFR (CKD-EPI)AfAm    52.0 L  (60.0-200.0)   


 


Est GFR (CKD-EPI)NonAf    44.9 L  (60.0-200.0)   


 


BUN/Creatinine Ratio     (12.00-20.00)  Ratio


 


Glucose    335 H  ()  mg/dL


 


POC Glucose (mg/dL)  255 H    (75-99)  mg/dL


 


Calcium    8.5 L  (8.7-10.3)  mg/dL


 


Total Bilirubin    0.1 L  (0.3-1.2)  mg/dL


 


C-Reactive Protein    17.7 H  (0.0-0.8)  mg/dL


 


Total Protein    5.4 L  (6.2-8.2)  g/dL


 


Albumin    2.80 L  (3.80-4.90)  g/dL


 


Albumin/Globulin Ratio    1.08 L  (1.60-3.17)  g/dL


 


Urine Glucose (UA)     (Negative)  


 


Urine Ketones     (Negative)  


 


Urine Blood     (Negative)  














  11/14/20 11/14/20 Range/Units





  07:24 11:37 


 


RBC    (3.80-5.40)  m/uL


 


Hgb    (11.4-16.0)  gm/dL


 


Hct    (34.0-46.0)  %


 


MCV    (80.0-100.0)  fL


 


MCHC    (31.0-37.0)  g/dL


 


RDW    (11.5-15.5)  %


 


ESR    (0-30)  mm/Hr


 


Carbon Dioxide    (21.6-31.8)  mmol/L


 


Est GFR (CKD-EPI)AfAm    (60.0-200.0)   


 


Est GFR (CKD-EPI)NonAf    (60.0-200.0)   


 


BUN/Creatinine Ratio    (12.00-20.00)  Ratio


 


Glucose    ()  mg/dL


 


POC Glucose (mg/dL)  395 H  222 H  (75-99)  mg/dL


 


Calcium    (8.7-10.3)  mg/dL


 


Total Bilirubin    (0.3-1.2)  mg/dL


 


C-Reactive Protein    (0.0-0.8)  mg/dL


 


Total Protein    (6.2-8.2)  g/dL


 


Albumin    (3.80-4.90)  g/dL


 


Albumin/Globulin Ratio    (1.60-3.17)  g/dL


 


Urine Glucose (UA)    (Negative)  


 


Urine Ketones    (Negative)  


 


Urine Blood    (Negative)  








                      Microbiology - Last 24 Hours (Table)











 11/13/20 13:20 Gram Stain - Preliminary





 Toe - Left Third Wound Culture - Preliminary





    Presumptive Staph aureus


 


 11/13/20 13:20 Anaerobic Culture - Preliminary





 Toe - Left Third 














Assessment and Plan


Plan: 





1.  Mental status changes likely related to recurrent seizure activity due to 

missing antiseizure medications due to vomiting.  Resolved





2.  Previous history of stroke in 2015, no evidence of stroke at this time.





3.  Underlying history of insulin-dependent diabetes mellitus, type I maintained

on insulin, with previous multiple admissions with DKA





4.  Underlying history of gastroparesis, maintained on Reglan





5.  History of psychosis maintained on Seroquel





6.  Underlying history of hypertension





7.  Underlying history of hyperlipidemia





8.  Previous history of coronary artery disease with myocardial infarction in 

the past





9.  Anemia was significant hemoglobin drop since yesterday.  Hemoglobin 

improving today 8.2





10.  Febrile.  Urinary analysis ordered.  Dr. Snyder consulted





11.  Wound to leg.  Dr. Snyder consulted





12.  Hypotension.  Patient did receive fluids has resolved





At this time patient is admitted to medical floor, she was seen by neurology and

was given IV antiseizure medications

## 2020-11-14 NOTE — PN
PROGRESS NOTE



DATE OF SERVICE:

11/14/2020



REASON FOR FOLLOWUP:

Right third toe osteomyelitis Staph aureus.



INTERVAL HISTORY:

Patient is currently afebrile.  The patient remains to be sleepy, lethargic and unable

to provide any history.  No vomiting or diarrhea or any other changes in clinical

condition reported by the nursing staff.



PHYSICAL EXAMINATION:

Blood pressure 136/82 with a pulse of 71, temperature 98.9.  She is 90% on room air.

General description:  The patient is a middle-aged female lying in bed in no distress.

Respiratory system: Unlabored breathing, clear to auscultation anteriorly.  Heart S1,

S2.  Regular rate and rhythm.

ABDOMEN: Soft. No tenderness.  Right 3rd toes remains to be swollen and red. Minimal

drainage.



LABS:

Hemoglobin 8.8, white count 8.9, BUN of 13, creatinine 1.3.  Local culture with Staph

aureus.



DIAGNOSTIC IMPRESSION AND PLAN:

Patient with right third toe infection diabetic foot with concern for underlying

osteomyelitis.  Dr. Ferrell will be consulted to which the patient is known as he did

have previous amputation.  The patient likely will need amputation of her third toe.

Continue with daptomycin and monitor clinical course closely.





MMODL / IJN: [id number] / Job#: [id number]

## 2020-11-15 LAB
ALBUMIN SERPL-MCNC: 2.5 G/DL (ref 3.8–4.9)
ALBUMIN/GLOB SERPL: 0.93 G/DL (ref 1.6–3.17)
ALP SERPL-CCNC: 91 U/L (ref 41–126)
ALT SERPL-CCNC: 17 U/L (ref 8–44)
ANION GAP SERPL CALC-SCNC: 11.1 MMOL/L (ref 4–12)
AST SERPL-CCNC: 13 U/L (ref 13–35)
BASOPHILS # BLD AUTO: 0 K/UL (ref 0–0.2)
BASOPHILS NFR BLD AUTO: 0 %
BUN SERPL-SCNC: 23 MG/DL (ref 9–27)
BUN/CREAT SERPL: 14.38 RATIO (ref 12–20)
CALCIUM SPEC-MCNC: 8.4 MG/DL (ref 8.7–10.3)
CHLORIDE SERPL-SCNC: 110 MMOL/L (ref 96–109)
CO2 SERPL-SCNC: 19.9 MMOL/L (ref 21.6–31.8)
EOSINOPHIL # BLD AUTO: 0 K/UL (ref 0–0.7)
EOSINOPHIL NFR BLD AUTO: 0 %
ERYTHROCYTE [DISTWIDTH] IN BLOOD BY AUTOMATED COUNT: 2.49 M/UL (ref 3.8–5.4)
ERYTHROCYTE [DISTWIDTH] IN BLOOD: 16 % (ref 11.5–15.5)
GLOBULIN SER CALC-MCNC: 2.7 G/DL (ref 1.6–3.3)
GLUCOSE BLD-MCNC: 252 MG/DL (ref 75–99)
GLUCOSE BLD-MCNC: 302 MG/DL (ref 75–99)
GLUCOSE BLD-MCNC: 343 MG/DL (ref 75–99)
GLUCOSE BLD-MCNC: 483 MG/DL (ref 75–99)
GLUCOSE SERPL-MCNC: 459 MG/DL (ref 70–110)
HCT VFR BLD AUTO: 26.4 % (ref 34–46)
HGB BLD-MCNC: 7.9 GM/DL (ref 11.4–16)
LYMPHOCYTES # SPEC AUTO: 1.2 K/UL (ref 1–4.8)
LYMPHOCYTES NFR SPEC AUTO: 12 %
MCH RBC QN AUTO: 31.8 PG (ref 25–35)
MCHC RBC AUTO-ENTMCNC: 30.1 G/DL (ref 31–37)
MCV RBC AUTO: 105.8 FL (ref 80–100)
MONOCYTES # BLD AUTO: 0.5 K/UL (ref 0–1)
MONOCYTES NFR BLD AUTO: 4 %
NEUTROPHILS # BLD AUTO: 8.6 K/UL (ref 1.3–7.7)
NEUTROPHILS NFR BLD AUTO: 83 %
PLATELET # BLD AUTO: 216 K/UL (ref 150–450)
POTASSIUM SERPL-SCNC: 4.9 MMOL/L (ref 3.5–5.5)
PROT SERPL-MCNC: 5.2 G/DL (ref 6.2–8.2)
SODIUM SERPL-SCNC: 141 MMOL/L (ref 135–145)
WBC # BLD AUTO: 10.4 K/UL (ref 3.8–10.6)

## 2020-11-15 RX ADMIN — METOPROLOL TARTRATE SCH MG: 25 TABLET, FILM COATED ORAL at 07:53

## 2020-11-15 RX ADMIN — LACOSAMIDE SCH MG: 150 TABLET, FILM COATED ORAL at 07:54

## 2020-11-15 RX ADMIN — LACOSAMIDE SCH MG: 150 TABLET, FILM COATED ORAL at 21:20

## 2020-11-15 RX ADMIN — METOPROLOL TARTRATE SCH MG: 25 TABLET, FILM COATED ORAL at 21:20

## 2020-11-15 RX ADMIN — Medication SCH EACH: at 15:23

## 2020-11-15 RX ADMIN — METOCLOPRAMIDE SCH MG: 5 TABLET ORAL at 15:25

## 2020-11-15 RX ADMIN — ASPIRIN 81 MG CHEWABLE TABLET SCH MG: 81 TABLET CHEWABLE at 07:53

## 2020-11-15 RX ADMIN — DIVALPROEX SODIUM SCH MG: 250 TABLET, DELAYED RELEASE ORAL at 07:54

## 2020-11-15 RX ADMIN — METOCLOPRAMIDE SCH: 5 TABLET ORAL at 12:45

## 2020-11-15 RX ADMIN — LOSARTAN POTASSIUM SCH MG: 50 TABLET, FILM COATED ORAL at 07:53

## 2020-11-15 RX ADMIN — PANTOPRAZOLE SODIUM SCH MG: 40 TABLET, DELAYED RELEASE ORAL at 07:53

## 2020-11-15 RX ADMIN — INSULIN ASPART SCH UNIT: 100 INJECTION, SOLUTION INTRAVENOUS; SUBCUTANEOUS at 07:54

## 2020-11-15 RX ADMIN — Medication SCH EACH: at 07:53

## 2020-11-15 RX ADMIN — HYDROCODONE BITARTRATE AND ACETAMINOPHEN PRN EACH: 10; 325 TABLET ORAL at 21:27

## 2020-11-15 RX ADMIN — PANTOPRAZOLE SODIUM SCH MG: 40 TABLET, DELAYED RELEASE ORAL at 21:20

## 2020-11-15 RX ADMIN — SODIUM FERRIC GLUCONATE COMPLEX SCH MLS/HR: 12.5 INJECTION INTRAVENOUS at 08:36

## 2020-11-15 RX ADMIN — DIVALPROEX SODIUM SCH MG: 250 TABLET, DELAYED RELEASE ORAL at 15:24

## 2020-11-15 RX ADMIN — INSULIN ASPART SCH UNIT: 100 INJECTION, SOLUTION INTRAVENOUS; SUBCUTANEOUS at 17:39

## 2020-11-15 RX ADMIN — OXYCODONE HYDROCHLORIDE AND ACETAMINOPHEN SCH MG: 500 TABLET ORAL at 07:53

## 2020-11-15 RX ADMIN — CEFAZOLIN SCH MLS/HR: 330 INJECTION, POWDER, FOR SOLUTION INTRAMUSCULAR; INTRAVENOUS at 11:08

## 2020-11-15 RX ADMIN — INSULIN ASPART SCH UNIT: 100 INJECTION, SOLUTION INTRAVENOUS; SUBCUTANEOUS at 12:50

## 2020-11-15 RX ADMIN — INSULIN ASPART SCH UNIT: 100 INJECTION, SOLUTION INTRAVENOUS; SUBCUTANEOUS at 21:19

## 2020-11-15 RX ADMIN — DIVALPROEX SODIUM SCH MG: 250 TABLET, DELAYED RELEASE ORAL at 21:20

## 2020-11-15 RX ADMIN — METOCLOPRAMIDE SCH MG: 5 TABLET ORAL at 07:53

## 2020-11-15 NOTE — P.PN
Subjective


Progress Note Date: 11/15/20





Morenita Ramirez, is a 60-year-old female who presented to [facility] emergency room with a chief complaint of mental status changes with 

worsening somnolence and episodes of vomiting, she was evaluated in the 

emergency room, vital examination on presentation reveals a temperature of 97.9 

pulse 85 respiration 18 blood pressure 174/74 pulse ox 93% on room air, white 

blood count was 10.1 hemoglobin 9.9 platelet count 196 sodium 136 BUN 21 

creatinine 1.12 glucose 113 lactic acid 1.2 urine analysis revealed trace 

leukocyte esterase with moderate bacteria, chest x-ray revealed no acute pul

monary process, computed tomography scan of the brain revealed no evidence of 

acute intracranial hemorrhage or midline shift there was a large area of old 

encephalomalacic of the right anterior cerebral hemisphere related to previous 

stroke.  Patient was admitted to telemetry floor neurology consultation was 

requested due to concern of a recurrent stroke.


Patient has a known history of insulin-dependent diabetes mellitus type 1 

history of hypertension, history of hyperlipidemia, history of seizure disorder,

history of asthma, and history of psychosis.





On 11/08/2020 patient is alert and oriented resting in bed patient does appear 

restless.  CT of abdomen and pelvis has been ordered due to nausea and vomiting.

 Hemoglobin 8.2.  GI and neurology services are following.  Patient denies any 

chest pain or shortness of breath.  Does have appetite requesting food.





on 11/9/2020 patient was seen and examined on the medical floor she is alert 

confused and occasionally agitated she received a dose of Haldol earlier today 

her hemoglobin is down and gastroenterology planning EGD and colonoscopy shireen 

further review of system was possible today.





On 11/10/2020 agent was seen and examined on the medical floor she is alert, 

confused in no apparent distress, there is no fever or chills no headache or 

dizziness no chest pain no shortness of breath no cough no nausea or vomiting no

abdominal pain no agitation today, patient underwent EGD today, she has AVM 

which was treated she will be given IV iron, sister Noelle contacted and 

multiple medical issues discussed in details over the form.








On 11/11/2020 patient is currently sleepy will wake up to follow commands but 

falls back to sleep clinic DC Haldol and continue to monitor for 24 hours 

possible discharge tomorrow








On 11/12/2020 patient was seen and examined on the medical floor she was 

somnolent, she had low blood pressure she was given a fluid bolus, at this point

Ativan and Xanax were discontinued Haldol was discontinued yesterday will 

continue was Seroquel only and order Ativan as needed if patient gets agitated 

patient improved significantly with IV fluid bolus blood pressure is back to 

normal range.





On 11/13/2020 patient remains sleepy but per nursing staff has been up and 

awake.  Patient did have elevated temperatures throughout night 100.7.  Dr. Snyder has been consulted for wound on leg.  Repeat urinary analysis has been 

ordered.  Patient did have episode of hypotension this has resolved.  Patient 

denies any chest pain or shortness breath.  Patient denies nausea vomiting or 

diarrhea.  Patient denies any urinary burning or frequency.





On 11/14/2020 patient was seen and examined on the medical floor, she is somn

olent, in no apparent distress at this time, her nurse reports that, patient 

slept well all night, however when she was awake she was screaming, she kept on 

screaming until she was tired and fell asleep again, patient is accepting her 

meals and her oral medications in some apple sauce with one on one help.  I am 

unable to do any review of systems at this time.  Ativan and Xanax and Haldol 

were discontinued due to continue was sleeping.  At this time will give Seroquel

25 mg in the morning, 25 mg in the early afternoon,  and 50 mg at bedtime.  Her 

sister Noelle who is the power of  was contacted multiple times during 

this admission, CODE STATUS was discussed including possibility of hospice care,

her sister stated that she will discuss that with patient's children and let us 

know their decision.





On 11/15/2020 patient remains somnolent.  Per nursing staff does wake up and 

screams.  Per nursing still trying to make decision in regards to hospice.  

Awaiting callback from power of  kidneys after she talks to family 

patient is a no code.  She remains on daptomycin Dr. Dash has been consulted 

per infectious disease recommendation for possible toe amputation





Objective





- Vital Signs


Vital signs: 


                                   Vital Signs











Temp  97.9 F   11/15/20 07:00


 


Pulse  73   11/15/20 07:00


 


Resp  20   11/14/20 23:55


 


BP  101/66   11/15/20 07:00


 


Pulse Ox  96   11/15/20 07:00








                                 Intake & Output











 11/14/20 11/15/20 11/15/20





 18:59 06:59 18:59


 


Intake Total 100 875 


 


Output Total 300  


 


Balance -200 875 


 


Intake:   


 


  Intake, IV Titration  875 





  Amount   


 


    DAPTOmycin 350 mg In  50 





    Sodium Chloride 0.9% 50   





    ml @ 100 mls/hr IVPB Q24H   





    [facility] Rx#:[id number]   


 


    Sodium Chloride 0.9% 1,  825 





    000 ml @ 75 mls/hr IV .   





    [id number] [facility] Rx#:[id number]   


 


  Oral 100  


 


Output:   


 


  Urine 300  


 


    Straight 300  


 


Other:   


 


  Voiding Method Diaper Diaper 


 


  # Voids 1 2 














- Exam





In general patient is alert, responsive, in no distress


HEENT head normocephalic and atraumatic


Neck is supple no JVD no goiter no lymphadenopathy


Chest exam reveals a few scattered crackles no wheezing


Cardiac exam reveals regular heart sounds no gallops no murmurs


Abdomen is soft nontender no organomegaly was normal bowel sounds


Extremity exam reveals no edema no cyanosis or clubbing


Neurological examination patient is alert responsive answering questions 

appropriately she is moving all 4 extremity spontaneously there is residual 

spasticity and weakness on the left upper extremity in the left lower extremity





- Labs


CBC & Chem 7: 


                                 11/15/20 06:02





                                 11/15/20 06:02


Labs: 


                  Abnormal Lab Results - Last 24 Hours (Table)











  11/14/20 11/14/20 11/14/20 Range/Units





  05:57 11:37 16:37 


 


RBC     (3.80-5.40)  m/uL


 


Hgb     (11.4-16.0)  gm/dL


 


Hct     (34.0-46.0)  %


 


MCV     (80.0-100.0)  fL


 


MCHC     (31.0-37.0)  g/dL


 


RDW     (11.5-15.5)  %


 


Neutrophils #     (1.3-7.7)  k/uL


 


ESR  77 H    (0-30)  mm/Hr


 


Chloride     ()  mmol/L


 


Carbon Dioxide     (21.6-31.8)  mmol/L


 


Creatinine     (0.6-1.5)  mg/dL


 


Est GFR (CKD-EPI)AfAm     (60.0-200.0)   


 


Est GFR (CKD-EPI)NonAf     (60.0-200.0)   


 


Glucose     ()  mg/dL


 


POC Glucose (mg/dL)   222 H  136 H  (75-99)  mg/dL


 


Calcium     (8.7-10.3)  mg/dL


 


Total Bilirubin     (0.2-1.2)  mg/dL


 


Total Protein     (6.2-8.2)  g/dL


 


Albumin     (3.80-4.90)  g/dL


 


Albumin/Globulin Ratio     (1.60-3.17)  g/dL














  11/14/20 11/15/20 11/15/20 Range/Units





  20:53 06:02 06:02 


 


RBC   2.49 L   (3.80-5.40)  m/uL


 


Hgb   7.9 L   (11.4-16.0)  gm/dL


 


Hct   26.4 L   (34.0-46.0)  %


 


MCV   105.8 H   (80.0-100.0)  fL


 


MCHC   30.1 L   (31.0-37.0)  g/dL


 


RDW   16.0 H   (11.5-15.5)  %


 


Neutrophils #   8.6 H   (1.3-7.7)  k/uL


 


ESR     (0-30)  mm/Hr


 


Chloride    110 H  ()  mmol/L


 


Carbon Dioxide    19.9 L  (21.6-31.8)  mmol/L


 


Creatinine    1.6 H  (0.6-1.5)  mg/dL


 


Est GFR (CKD-EPI)AfAm    40.5 L  (60.0-200.0)   


 


Est GFR (CKD-EPI)NonAf    34.9 L  (60.0-200.0)   


 


Glucose    459 H  ()  mg/dL


 


POC Glucose (mg/dL)  116 H    (75-99)  mg/dL


 


Calcium    8.4 L  (8.7-10.3)  mg/dL


 


Total Bilirubin    0.1 L  (0.2-1.2)  mg/dL


 


Total Protein    5.2 L  (6.2-8.2)  g/dL


 


Albumin    2.50 L  (3.80-4.90)  g/dL


 


Albumin/Globulin Ratio    0.93 L  (1.60-3.17)  g/dL














  11/15/20 Range/Units





  07:14 


 


RBC   (3.80-5.40)  m/uL


 


Hgb   (11.4-16.0)  gm/dL


 


Hct   (34.0-46.0)  %


 


MCV   (80.0-100.0)  fL


 


MCHC   (31.0-37.0)  g/dL


 


RDW   (11.5-15.5)  %


 


Neutrophils #   (1.3-7.7)  k/uL


 


ESR   (0-30)  mm/Hr


 


Chloride   ()  mmol/L


 


Carbon Dioxide   (21.6-31.8)  mmol/L


 


Creatinine   (0.6-1.5)  mg/dL


 


Est GFR (CKD-EPI)AfAm   (60.0-200.0)   


 


Est GFR (CKD-EPI)NonAf   (60.0-200.0)   


 


Glucose   ()  mg/dL


 


POC Glucose (mg/dL)  483 H  (75-99)  mg/dL


 


Calcium   (8.7-10.3)  mg/dL


 


Total Bilirubin   (0.2-1.2)  mg/dL


 


Total Protein   (6.2-8.2)  g/dL


 


Albumin   (3.80-4.90)  g/dL


 


Albumin/Globulin Ratio   (1.60-3.17)  g/dL








                      Microbiology - Last 24 Hours (Table)











 11/13/20 13:20 Gram Stain - Preliminary





 Toe - Left Third Wound Culture - Preliminary





    Presumptive Staph aureus














Assessment and Plan


Plan: 





1.  Mental status changes likely related to recurrent seizure activity due to 

missing antiseizure medications due to vomiting.  Resolved





2.  Previous history of stroke in 2015, no evidence of stroke at this time.





3.  Underlying history of insulin-dependent diabetes mellitus, type I maintained

on insulin, with previous multiple admissions with DKA





4.  Underlying history of gastroparesis, maintained on Reglan





5.  History of psychosis maintained on Seroquel





6.  Underlying history of hypertension





7.  Underlying history of hyperlipidemia





8.  Previous history of coronary artery disease with myocardial infarction in 

the past





9.  Anemia was significant hemoglobin drop since yesterday.  Patient remains on 

IV iron





10.  Febrile.  Urinary analysis ordered.  Dr. Snyder consulted





11.  Diabetic ulcer to right third toe.  Patient remains on daptomycin possible 

concerns resting mellitus.  Dr. Dye has been consulted for possible 

amputation





12.  Hypotension.  Patient did receive fluids has resolved





Neurology and infectious disease services are following


DPOA is in discussion with family about possible hospice


Dr. Dash has been consulted due to toe infection and possible requiring 

amputation

## 2020-11-16 LAB
ALBUMIN SERPL-MCNC: 2.2 G/DL (ref 3.8–4.9)
ALBUMIN/GLOB SERPL: 0.81 G/DL (ref 1.6–3.17)
ALP SERPL-CCNC: 86 U/L (ref 41–126)
ALT SERPL-CCNC: 16 U/L (ref 8–44)
ANION GAP SERPL CALC-SCNC: 6.2 MMOL/L (ref 4–12)
AST SERPL-CCNC: 12 U/L (ref 13–35)
BASOPHILS # BLD AUTO: 0 K/UL (ref 0–0.2)
BASOPHILS NFR BLD AUTO: 0 %
BUN SERPL-SCNC: 28 MG/DL (ref 9–27)
BUN/CREAT SERPL: 14.74 RATIO (ref 12–20)
CALCIUM SPEC-MCNC: 8.3 MG/DL (ref 8.7–10.3)
CHLORIDE SERPL-SCNC: 117 MMOL/L (ref 96–109)
CO2 SERPL-SCNC: 22.8 MMOL/L (ref 21.6–31.8)
EOSINOPHIL # BLD AUTO: 0 K/UL (ref 0–0.7)
EOSINOPHIL NFR BLD AUTO: 0 %
ERYTHROCYTE [DISTWIDTH] IN BLOOD BY AUTOMATED COUNT: 2.1 M/UL (ref 3.8–5.4)
ERYTHROCYTE [DISTWIDTH] IN BLOOD: 16.7 % (ref 11.5–15.5)
GLOBULIN SER CALC-MCNC: 2.7 G/DL (ref 1.6–3.3)
GLUCOSE BLD-MCNC: 128 MG/DL (ref 75–99)
GLUCOSE BLD-MCNC: 223 MG/DL (ref 75–99)
GLUCOSE BLD-MCNC: 227 MG/DL (ref 75–99)
GLUCOSE BLD-MCNC: 329 MG/DL (ref 75–99)
GLUCOSE SERPL-MCNC: 117 MG/DL (ref 70–110)
HCT VFR BLD AUTO: 21.6 % (ref 34–46)
HGB BLD-MCNC: 6.5 GM/DL (ref 11.4–16)
LYMPHOCYTES # SPEC AUTO: 1.8 K/UL (ref 1–4.8)
LYMPHOCYTES NFR SPEC AUTO: 20 %
MCH RBC QN AUTO: 31.1 PG (ref 25–35)
MCHC RBC AUTO-ENTMCNC: 30.3 G/DL (ref 31–37)
MCV RBC AUTO: 102.7 FL (ref 80–100)
MONOCYTES # BLD AUTO: 0.5 K/UL (ref 0–1)
MONOCYTES NFR BLD AUTO: 5 %
NEUTROPHILS # BLD AUTO: 6.6 K/UL (ref 1.3–7.7)
PLATELET # BLD AUTO: 217 K/UL (ref 150–450)
POTASSIUM SERPL-SCNC: 3.9 MMOL/L (ref 3.5–5.5)
PROT SERPL-MCNC: 4.9 G/DL (ref 6.2–8.2)
SODIUM SERPL-SCNC: 146 MMOL/L (ref 135–145)
WBC # BLD AUTO: 9 K/UL (ref 3.8–10.6)

## 2020-11-16 PROCEDURE — 30233N1 TRANSFUSION OF NONAUTOLOGOUS RED BLOOD CELLS INTO PERIPHERAL VEIN, PERCUTANEOUS APPROACH: ICD-10-PCS

## 2020-11-16 RX ADMIN — PANTOPRAZOLE SODIUM SCH MG: 40 TABLET, DELAYED RELEASE ORAL at 09:21

## 2020-11-16 RX ADMIN — HYDROCODONE BITARTRATE AND ACETAMINOPHEN PRN EACH: 10; 325 TABLET ORAL at 09:21

## 2020-11-16 RX ADMIN — METOPROLOL TARTRATE SCH MG: 25 TABLET, FILM COATED ORAL at 09:20

## 2020-11-16 RX ADMIN — INSULIN ASPART SCH: 100 INJECTION, SOLUTION INTRAVENOUS; SUBCUTANEOUS at 07:39

## 2020-11-16 RX ADMIN — INSULIN ASPART SCH: 100 INJECTION, SOLUTION INTRAVENOUS; SUBCUTANEOUS at 12:48

## 2020-11-16 RX ADMIN — CEFAZOLIN SCH: 330 INJECTION, POWDER, FOR SOLUTION INTRAMUSCULAR; INTRAVENOUS at 21:52

## 2020-11-16 RX ADMIN — DIVALPROEX SODIUM SCH MG: 250 TABLET, DELAYED RELEASE ORAL at 09:22

## 2020-11-16 RX ADMIN — DIVALPROEX SODIUM SCH MG: 250 TABLET, DELAYED RELEASE ORAL at 21:01

## 2020-11-16 RX ADMIN — METOCLOPRAMIDE SCH: 5 TABLET ORAL at 16:49

## 2020-11-16 RX ADMIN — LOSARTAN POTASSIUM SCH MG: 50 TABLET, FILM COATED ORAL at 09:21

## 2020-11-16 RX ADMIN — INSULIN ASPART SCH UNIT: 100 INJECTION, SOLUTION INTRAVENOUS; SUBCUTANEOUS at 17:31

## 2020-11-16 RX ADMIN — SODIUM FERRIC GLUCONATE COMPLEX SCH MLS/HR: 12.5 INJECTION INTRAVENOUS at 10:17

## 2020-11-16 RX ADMIN — Medication SCH: at 16:49

## 2020-11-16 RX ADMIN — METOCLOPRAMIDE SCH: 5 TABLET ORAL at 12:58

## 2020-11-16 RX ADMIN — LACOSAMIDE SCH MG: 150 TABLET, FILM COATED ORAL at 09:21

## 2020-11-16 RX ADMIN — PANTOPRAZOLE SODIUM SCH MG: 40 TABLET, DELAYED RELEASE ORAL at 21:01

## 2020-11-16 RX ADMIN — LACOSAMIDE SCH MG: 150 TABLET, FILM COATED ORAL at 21:01

## 2020-11-16 RX ADMIN — Medication SCH EACH: at 09:21

## 2020-11-16 RX ADMIN — ASPIRIN 81 MG CHEWABLE TABLET SCH MG: 81 TABLET CHEWABLE at 09:21

## 2020-11-16 RX ADMIN — METOCLOPRAMIDE SCH MG: 5 TABLET ORAL at 09:22

## 2020-11-16 RX ADMIN — METOPROLOL TARTRATE SCH MG: 25 TABLET, FILM COATED ORAL at 21:01

## 2020-11-16 RX ADMIN — CEFAZOLIN SCH: 330 INJECTION, POWDER, FOR SOLUTION INTRAMUSCULAR; INTRAVENOUS at 00:55

## 2020-11-16 RX ADMIN — OXYCODONE HYDROCHLORIDE AND ACETAMINOPHEN SCH MG: 500 TABLET ORAL at 09:20

## 2020-11-16 RX ADMIN — INSULIN ASPART SCH UNIT: 100 INJECTION, SOLUTION INTRAVENOUS; SUBCUTANEOUS at 21:01

## 2020-11-16 RX ADMIN — DIVALPROEX SODIUM SCH: 250 TABLET, DELAYED RELEASE ORAL at 16:49

## 2020-11-16 NOTE — P.PN
Subjective


Progress Note Date: 11/16/20





Patient was seen for a follow-up.  Patient is somnolent.  No seizure activity 

witnessed by the nursing staff in the last 24 hours or since last seen.  





EGD showed antral gastritis, nonbleeding duodenal AVM.  No further seizures 

reported since yesterday.  





Objective





- Vital Signs


Vital signs: 


                                   Vital Signs











Temp  97.8 F   11/16/20 07:00


 


Pulse  69   11/16/20 07:00


 


Resp  16   11/16/20 07:00


 


BP  90/55   11/16/20 07:00


 


Pulse Ox  92 L  11/16/20 07:00








                                 Intake & Output











 11/15/20 11/16/20 11/16/20





 18:59 06:59 18:59


 


Intake Total 1200 912.5 


 


Balance 1200 912.5 


 


Intake:   


 


  Intake, IV Titration 1200 912.5 





  Amount   


 


    DAPTOmycin 350 mg In  50 





    Sodium Chloride 0.9% 50   





    ml @ 100 mls/hr IVPB Q24H   





    [facility] Rx#:[id number]   


 


    Sodium Chloride 0.9% 1, 600 862.5 





    000 ml @ 75 mls/hr IV .   





    [id number] [facility] Rx#:[id number]   


 


    Sodium Chloride 0.9% 500 500  





    ml 500 ml @ 0 mls/hr IV .   





    [facility] Rx#:   





    [id number]   


 


    Sodium Ferric Gluconat- 100  





    Sucrose 125 mg In Sodium   





    Chloride 0.9% 100 ml @   





    100 mls/hr IVPB DAILY [facility]   





    Rx#:[id number]   


 


Other:   


 


  Voiding Method Diaper Diaper Diaper


 


  # Voids 0 1 














- Exam





Patient is very somnolent.  Patient appears very pale.  Patient does open her 

eyes to calling her name, but then drifts back to sleep.  Patient has left 

hemiparesis.  Patient moves right arm spontaneously.  Right arm IV line appears 

slightly infiltrated.  No seizures reported since in the hospital.  Continues to

have left spastic hemiparesis.





- Labs


CBC & Chem 7: 


                                 11/16/20 05:20





                                 11/16/20 05:20


Labs: 


                  Abnormal Lab Results - Last 24 Hours (Table)











  11/15/20 11/15/20 11/16/20 Range/Units





  16:34 20:12 05:20 


 


RBC    2.10 L  (3.80-5.40)  m/uL


 


Hgb    6.5 L*  (11.4-16.0)  gm/dL


 


Hct    21.6 L  (34.0-46.0)  %


 


MCV    102.7 H  (80.0-100.0)  fL


 


MCHC    30.3 L  (31.0-37.0)  g/dL


 


RDW    16.7 H  (11.5-15.5)  %


 


Sodium     (135-145)  mmol/L


 


Chloride     ()  mmol/L


 


BUN     (9.0-27.0)  mg/dL


 


Creatinine     (0.6-1.5)  mg/dL


 


Est GFR (CKD-EPI)AfAm     (60.0-200.0)   


 


Est GFR (CKD-EPI)NonAf     (60.0-200.0)   


 


Glucose     ()  mg/dL


 


POC Glucose (mg/dL)  302 H  252 H   (75-99)  mg/dL


 


Calcium     (8.7-10.3)  mg/dL


 


Total Bilirubin     (0.2-1.2)  mg/dL


 


AST     (13-35)  U/L


 


Total Protein     (6.2-8.2)  g/dL


 


Albumin     (3.80-4.90)  g/dL


 


Albumin/Globulin Ratio     (1.60-3.17)  g/dL


 


Crossmatch     














  11/16/20 11/16/20 11/16/20 Range/Units





  05:20 07:12 10:12 


 


RBC     (3.80-5.40)  m/uL


 


Hgb     (11.4-16.0)  gm/dL


 


Hct     (34.0-46.0)  %


 


MCV     (80.0-100.0)  fL


 


MCHC     (31.0-37.0)  g/dL


 


RDW     (11.5-15.5)  %


 


Sodium  146 H    (135-145)  mmol/L


 


Chloride  117 H    ()  mmol/L


 


BUN  28.0 H    (9.0-27.0)  mg/dL


 


Creatinine  1.9 H    (0.6-1.5)  mg/dL


 


Est GFR (CKD-EPI)AfAm  32.9 L    (60.0-200.0)   


 


Est GFR (CKD-EPI)NonAf  28.4 L    (60.0-200.0)   


 


Glucose  117 H    ()  mg/dL


 


POC Glucose (mg/dL)   128 H   (75-99)  mg/dL


 


Calcium  8.3 L    (8.7-10.3)  mg/dL


 


Total Bilirubin  0.1 L    (0.2-1.2)  mg/dL


 


AST  12 L    (13-35)  U/L


 


Total Protein  4.9 L    (6.2-8.2)  g/dL


 


Albumin  2.20 L    (3.80-4.90)  g/dL


 


Albumin/Globulin Ratio  0.81 L    (1.60-3.17)  g/dL


 


Crossmatch    See Detail  














  11/16/20 Range/Units





  11:37 


 


RBC   (3.80-5.40)  m/uL


 


Hgb   (11.4-16.0)  gm/dL


 


Hct   (34.0-46.0)  %


 


MCV   (80.0-100.0)  fL


 


MCHC   (31.0-37.0)  g/dL


 


RDW   (11.5-15.5)  %


 


Sodium   (135-145)  mmol/L


 


Chloride   ()  mmol/L


 


BUN   (9.0-27.0)  mg/dL


 


Creatinine   (0.6-1.5)  mg/dL


 


Est GFR (CKD-EPI)AfAm   (60.0-200.0)   


 


Est GFR (CKD-EPI)NonAf   (60.0-200.0)   


 


Glucose   ()  mg/dL


 


POC Glucose (mg/dL)  223 H  (75-99)  mg/dL


 


Calcium   (8.7-10.3)  mg/dL


 


Total Bilirubin   (0.2-1.2)  mg/dL


 


AST   (13-35)  U/L


 


Total Protein   (6.2-8.2)  g/dL


 


Albumin   (3.80-4.90)  g/dL


 


Albumin/Globulin Ratio   (1.60-3.17)  g/dL


 


Crossmatch   








                      Microbiology - Last 24 Hours (Table)











 11/13/20 13:20 Anaerobic Culture - Preliminary





 Toe - Left Third 


 


 11/13/20 13:20 Gram Stain - Final





 Toe - Left Third Wound Culture - Final





    Staphylococcus aureus














Assessment and Plan


Assessment: 





* Seizure disorder, provoked by lack of antiepileptic medication secondary due 

  to vomiting.


* History of CVA with spastic left hemiplegia


* Cognitive deficits due to CVA.


* History of diabetes, with history of hypoglycemic episodes as well as DKA, 

  suggestive of brittle diabetes


* Anemia


Plan: 





* No seizure witnessed while in the hospital.


* Continue patient's home seizure medications.  Patient currently on Depakote 

  250 mg 3 times a day and Vimpat 150 mg twice a day.


* Total Depakote level was 52 () on 11/09/2020, which is therapeutic.  

  Patient's free Depakote level was 10.3 (4.8-17.3) on 11/08/2020.


* Avoid episodes of hypoglycemia/hypotension. 


* IM to follow-up on severe anemia.


* Neurologically stable for discharge, pending medical clearance.

## 2020-11-16 NOTE — PN
PROGRESS NOTE



DATE OF SERVICE:

11/15/2020



REASON FOR FOLLOWUP:

Right middle toe diabetic foot infection, osteomyelitis.



INTERVAL HISTORY:

The patient is currently afebrile.  The patient remains to be lethargic and sleepy and

was unable to provide any history.  No vomiting.  No diarrhea or any other changes

reported by the nursing staff.



PHYSICAL EXAMINATION:

Blood pressure 148/67, pulse of 80, temperature 98.1.  She is 92% on room air.

General description is a middle-aged female lying in bed in no distress.

RESPIRATORY SYSTEM: Unlabored breathing, is clear to auscultation anteriorly.

HEART: S1, S2.  Regular rate and rhythm.

ABDOMEN:  Soft, no tenderness.

Right middle toe remains to be swollen and red.



LABS:

Hemoglobin 7.9, white count 10.4, creatinine 1.6.  Wound culture with MSSA.



DIAGNOSTIC IMPRESSION AND PLAN:

Patient with right diabetic foot infection in this patient with possible osteomyelitis

of the right third toe.  The patient does have CEPHALEXIN and PENICILLIN allergy and

has borderline kidney function and hydronephrosis with vancomycin.  She will continue

with daptomycin.  Await evaluation by Vascular Surgery possible amputation.  Continue

supportive care.





MMODL / IJN: [id number] / Job#: [id number]

## 2020-11-16 NOTE — P.GSCN
<Cyrus Yung - Last Filed: 20 13:24>





History of Present Illness


Consult date: 20


Reason for Consult: 





Right foot, necrotic right third toe.  Evaluate for possible amputation, right 

foot third right toe.


Requesting physician: Saniya Cabral


History of present illness: 





This a 60-year-old female patient who is followed by Dr. Saniya Cabral on an 

outpatient basis.  She has a past medical history significant for multiple 

medical problems including coronary artery disease, asthma, congestive heart 

failure, COPD, diabetes mellitus with diabetic foot infection requiring 

amputation of multiple toes, CVA/TIA, GERD, hypertension, hyperlipidemia, 

myocardial infarction, and deep vein thrombosis.  She presented to the emergency

department here at [facility] via EMS on 2020 with 

episodes of vomiting and altered mental status.  The patient history has been 

obtained from her chart as the patient is a poor historian, and somnolent.  On 

admission her initial laboratory results showed a WBC count 10.1, hemoglobin 9.

9, platelets 196, sodium 136, BUN 21, creatinine 1.12, glucose 130 and lactic 

acid 2.6.  During her admission she was noted to have some necrotic and reddened

area to her right foot third toe and subsequently Dr. Ferrell from vascular 

surgery was consulted.





Review of Systems





Could not obtain because of her underlying mental status, positive points of the

mention in her HPI.





Past Medical History


Past Medical History: Asthma, Coronary Artery Disease (CAD), Chest Pain / 

Angina, Heart Failure, COPD, CVA/TIA, Diabetes Mellitus, Deep Vein Thrombosis 

(DVT), Eye Disorder, GERD/Reflux, Hyperlipidemia, Hypertension, Myocardial 

Infarction (MI), Neurologic Disorder, Osteoarthritis (OA), Pneumonia, Renal 

Disease


Additional Past Medical History / Comment(s): IDDM (brittle), DKAs, neuropathy 

bilateral hands/feet, retinopathy bilateral eyes, cellulitis R foot, R great toe

and 2nd toe infections/amputations, current wound R foot-being seen in [facility], 

renal failure, anemia, CVAs with L sided paralysis, headaches started after 

CVAs, brain lesions, DVT R axillae, low back pain, varicosities, seizure many 

years ago (), hypothyroid, constipation, bilateral tinnitis occasionally, 

sinus problems.


Last Myocardial Infarction Date:: 


History of Any Multi-Drug Resistant Organisms: MRSA


Year Discovered:: 18


MDRO Source:: Right Foot


Past Surgical History: Appendectomy,  Section, Cholecystectomy, Heart 

Catheterization With Stent, Hysterectomy, Orthopedic Surgery


Additional Past Surgical History / Comment(s): PCI with multiple stents, R great

toe and 2nd toe amps, debridements R foot ulcer, L shoulder surgery to remove 

bone, bronchoscopy, EGD, colonoscopy, R arm port since removed, bilateral 

cataract removals/lens implants.


Past Anesthesia/Blood Transfusion Reactions: No Reported Reaction


Additional Past Anesthesia/Blood Transfusion Reaction / Comm: HX OF BLOOD 

TRANSFUSION- NO REACTION


Date of Last Stent Placement:: 2013


Past Psychological History: Anxiety, Bipolar, Depression


Smoking Status: Never smoker


Past Alcohol Use History: None Reported


Past Drug Use History: Marijuana





- Past Family History


  ** Father


Family Medical History: Unable to Obtain, Coronary Artery Disease (CAD), 

Diabetes Mellitus





  ** Mother


Family Medical History: COPD





Medications and Allergies


                                Home Medications











 Medication  Instructions  Recorded  Confirmed  Type


 


HYDROcodone/APAP 10-325MG [Norco 1 tab PO TID PRN 17 History





]    


 


Atorvastatin [Lipitor] 20 mg PO DAILY 19 History


 


Aspirin [Adult Low Dose Aspirin EC] 81 mg PO DAILY 01/10/20 11/06/20 History


 


Ferrous Sulfate [Iron (65  mg PO BID 01/10/20 11/06/20 History





Elemental)]    


 


Divalproex [Depakote] 250 mg PO TID #90 tablet. 20 Rx


 


Albuterol Sulfate [Ventolin HFA] 2 puff INHALATION RT-Q4H PRN 20 

History


 


Losartan [Cozaar] 50 mg PO DAILY  tab 20 Rx


 


Ascorbic Acid [Vitamin C] 500 mg PO DAILY 20 History


 


Vitamin B Complex 1 tab PO DAILY 20 History


 


Clopidogrel [Plavix] 75 mg PO DAILY  tab 20 Rx


 


QUEtiapine [SEROquel] 50 mg PO HS 30 Days #30 tab 10/14/20 11/06/20 Rx


 


ALPRAZolam [Xanax] 0.5 mg PO QID PRN  tab 10/27/20 11/06/20 Rx


 


DULoxetine HCL [Cymbalta] 30 mg PO DAILY  capsule. 10/27/20 11/06/20 Rx


 


Metoclopramide [Reglan] 5 mg PO AC-TID  tab 10/27/20 11/06/20 Rx


 


QUEtiapine [SEROquel] 25 mg PO DAILY  tab 10/27/20 11/06/20 Rx


 


Calcium Carb-Vit D 500Mg-200Un 1 tab PO BID-W/MEALS 20 History





[Oscal 500+D]    


 


INSULIN LISPRO (humaLOG) [humaLOG] 6 units SQ AC-TID 20 History


 


Ondansetron Odt [Zofran ODT] 4 mg PO Q8HR PRN 3 Days #9 tab 20 Rx


 


Insulin Detemir [Levemir Flextouch] 10 units SQ HS 20 History


 


Lacosamide [Vimpat] 100 mg PO BID 20 History


 


Metoprolol Tartrate [Lopressor] 12.5 mg PO BID 20 History


 


Pantoprazole Sodium [Protonix] 40 mg PO BID 30 Days #60 tablet. 20  Rx








                                    Allergies











Allergy/AdvReac Type Severity Reaction Status Date / Time


 


Barbiturates Allergy  Rash/Hives Verified 20 10:22


 


cephalexin monohydrate Allergy  Rash/Hives Verified 20 10:22





[From Keflex]     


 


morphine Allergy  Rash/Hives Verified 20 10:22


 


Penicillins Allergy  Rash/Hives Verified 20 10:22


 


phenobarbital Allergy  Swelling Verified 20 10:22


 


venom-honey bee Allergy  Swelling Verified 20 10:22





[bee venom (honey bee)]     


 


amlodipine besylate AdvReac  Vomiting Verified 20 10:22





[From Norvas]     














Surgical - Exam


                                   Vital Signs











Temp Pulse Resp BP Pulse Ox


 


 97.9 F   85   18   174/74   93 L


 


 20 10:40  20 10:40  20 10:40  20 10:40  20 10:40














- General


no distress, no pain, chronically ill





- Eyes


normal ocular movement, no icteric





- ENT


normal pinna, normal nares, normal mucosa, no congestion





- Neck





Neck is supple, no lymphadenopathy.


no masses, no bruits, trachea midline, no venous distension





- Respiratory





Lung sounds are essentially clear throughout.  No wheezes, rhonchi or crackles. 

Respirations are symmetrical and nonlabored.





- Cardiovascular





Regular rhythm and rate.  S1 and S2 to present, negative for S3, gallop or 

murmur.





- Abdomen





Abdomen is soft, nontender and nondistended.  No guarding or rigidity.  No 

organomegaly appreciated.





- Genitourinary





Deferred





- Rectum





Deferred





- Integumentary





Skin is warm and dry.  No clubbing or cyanosis is present.  Necrotic area to her

right foot third toe with bone exposure.





- Neurologic





Could not assess her orientation due to her somnolence.  Opens eyes with verbal 

stimuli.





- Psychiatric


no memory intact





Results





- Labs





                                 20 05:20





                                 20 05:20


                  Abnormal Lab Results - Last 24 Hours (Table)











  11/15/20 11/15/20 11/16/20 Range/Units





  16:34 20:12 05:20 


 


RBC    2.10 L  (3.80-5.40)  m/uL


 


Hgb    6.5 L*  (11.4-16.0)  gm/dL


 


Hct    21.6 L  (34.0-46.0)  %


 


MCV    102.7 H  (80.0-100.0)  fL


 


MCHC    30.3 L  (31.0-37.0)  g/dL


 


RDW    16.7 H  (11.5-15.5)  %


 


Sodium     (135-145)  mmol/L


 


Chloride     ()  mmol/L


 


BUN     (9.0-27.0)  mg/dL


 


Creatinine     (0.6-1.5)  mg/dL


 


Est GFR (CKD-EPI)AfAm     (60.0-200.0)   


 


Est GFR (CKD-EPI)NonAf     (60.0-200.0)   


 


Glucose     ()  mg/dL


 


POC Glucose (mg/dL)  302 H  252 H   (75-99)  mg/dL


 


Calcium     (8.7-10.3)  mg/dL


 


Total Bilirubin     (0.2-1.2)  mg/dL


 


AST     (13-35)  U/L


 


Total Protein     (6.2-8.2)  g/dL


 


Albumin     (3.80-4.90)  g/dL


 


Albumin/Globulin Ratio     (1.60-3.17)  g/dL


 


Crossmatch     














  20 Range/Units





  05:20 07:12 10:12 


 


RBC     (3.80-5.40)  m/uL


 


Hgb     (11.4-16.0)  gm/dL


 


Hct     (34.0-46.0)  %


 


MCV     (80.0-100.0)  fL


 


MCHC     (31.0-37.0)  g/dL


 


RDW     (11.5-15.5)  %


 


Sodium  146 H    (135-145)  mmol/L


 


Chloride  117 H    ()  mmol/L


 


BUN  28.0 H    (9.0-27.0)  mg/dL


 


Creatinine  1.9 H    (0.6-1.5)  mg/dL


 


Est GFR (CKD-EPI)AfAm  32.9 L    (60.0-200.0)   


 


Est GFR (CKD-EPI)NonAf  28.4 L    (60.0-200.0)   


 


Glucose  117 H    ()  mg/dL


 


POC Glucose (mg/dL)   128 H   (75-99)  mg/dL


 


Calcium  8.3 L    (8.7-10.3)  mg/dL


 


Total Bilirubin  0.1 L    (0.2-1.2)  mg/dL


 


AST  12 L    (13-35)  U/L


 


Total Protein  4.9 L    (6.2-8.2)  g/dL


 


Albumin  2.20 L    (3.80-4.90)  g/dL


 


Albumin/Globulin Ratio  0.81 L    (1.60-3.17)  g/dL


 


Crossmatch    See Detail  














  20 Range/Units





  11:37 


 


RBC   (3.80-5.40)  m/uL


 


Hgb   (11.4-16.0)  gm/dL


 


Hct   (34.0-46.0)  %


 


MCV   (80.0-100.0)  fL


 


MCHC   (31.0-37.0)  g/dL


 


RDW   (11.5-15.5)  %


 


Sodium   (135-145)  mmol/L


 


Chloride   ()  mmol/L


 


BUN   (9.0-27.0)  mg/dL


 


Creatinine   (0.6-1.5)  mg/dL


 


Est GFR (CKD-EPI)AfAm   (60.0-200.0)   


 


Est GFR (CKD-EPI)NonAf   (60.0-200.0)   


 


Glucose   ()  mg/dL


 


POC Glucose (mg/dL)  223 H  (75-99)  mg/dL


 


Calcium   (8.7-10.3)  mg/dL


 


Total Bilirubin   (0.2-1.2)  mg/dL


 


AST   (13-35)  U/L


 


Total Protein   (6.2-8.2)  g/dL


 


Albumin   (3.80-4.90)  g/dL


 


Albumin/Globulin Ratio   (1.60-3.17)  g/dL


 


Crossmatch   








                      Microbiology - Last 24 Hours (Table)











 20 13:20 Anaerobic Culture - Preliminary





 Toe - Left Third 


 


 20 13:20 Gram Stain - Final





 Toe - Left Third Wound Culture - Final





    Staphylococcus aureus








                                 Diabetes panel











  20 Range/Units





  05:20 


 


Sodium  146 H  (135-145)  mmol/L


 


Potassium  3.9  (3.5-5.5)  mmol/L


 


Chloride  117 H  ()  mmol/L


 


Carbon Dioxide  22.8  (21.6-31.8)  mmol/L


 


BUN  28.0 H  (9.0-27.0)  mg/dL


 


Creatinine  1.9 H  (0.6-1.5)  mg/dL


 


Glucose  117 H  ()  mg/dL


 


Calcium  8.3 L  (8.7-10.3)  mg/dL


 


AST  12 L  (13-35)  U/L


 


ALT  16  (8-44)  U/L


 


Alkaline Phosphatase  86  ()  U/L


 


Total Protein  4.9 L  (6.2-8.2)  g/dL


 


Albumin  2.20 L  (3.80-4.90)  g/dL








                                  Calcium panel











  20 Range/Units





  05:20 


 


Calcium  8.3 L  (8.7-10.3)  mg/dL


 


Albumin  2.20 L  (3.80-4.90)  g/dL








                                 Pituitary panel











  20 Range/Units





  05:20 


 


Sodium  146 H  (135-145)  mmol/L


 


Potassium  3.9  (3.5-5.5)  mmol/L


 


Chloride  117 H  ()  mmol/L


 


Carbon Dioxide  22.8  (21.6-31.8)  mmol/L


 


BUN  28.0 H  (9.0-27.0)  mg/dL


 


Creatinine  1.9 H  (0.6-1.5)  mg/dL


 


Glucose  117 H  ()  mg/dL


 


Calcium  8.3 L  (8.7-10.3)  mg/dL








                                  Adrenal panel











  20 Range/Units





  05:20 


 


Sodium  146 H  (135-145)  mmol/L


 


Potassium  3.9  (3.5-5.5)  mmol/L


 


Chloride  117 H  ()  mmol/L


 


Carbon Dioxide  22.8  (21.6-31.8)  mmol/L


 


BUN  28.0 H  (9.0-27.0)  mg/dL


 


Creatinine  1.9 H  (0.6-1.5)  mg/dL


 


Glucose  117 H  ()  mg/dL


 


Calcium  8.3 L  (8.7-10.3)  mg/dL


 


Total Bilirubin  0.1 L  (0.2-1.2)  mg/dL


 


AST  12 L  (13-35)  U/L


 


ALT  16  (8-44)  U/L


 


Alkaline Phosphatase  86  ()  U/L


 


Total Protein  4.9 L  (6.2-8.2)  g/dL


 


Albumin  2.20 L  (3.80-4.90)  g/dL














Assessment and Plan


Assessment: 





1.  Diabetic infection right foot, third toe


2.  Altered mental status


3.  History of stroke in 


4.  Insulin-dependent diabetes mellitus


5.  History of hypertension


6.  History of hyperlipidemia


7.  History of coronary artery disease with history of myocardial infarction


8.  Anemi.


Plan: 





The patient was seen and examined at her bedside on the fourth floor medical 

surgical unit.  Her chart and diagnostics were reviewed.  Her case was discussed

in detail with Dr. Gerardo Ferrell from vascular surgery.  The patient will be 

placed nothing by mouth after midnight and will be scheduled for a right foot 

third toe amputation.  This is a been discussed with the patient's guardian who 

is in agreement with the plan.  Continue antibiotics which are managed by 

infectious disease.  Continue local wound care to her right foot.  Medical 

management other comorbidities per primary care service.  More recommendations 

to follow based on patient's clinical course.





Thank you Dr. Cabral for this consult and we will look for to working with you 

in the care of this patient.





Nurse practitioner note has been reviewed by the physician.  Signing provider 

agrees with the above documented findings, assessment and plan of care.


Time with Patient: Greater than 30





<MariaelenaGerardo - Last Filed: 20 13:30>





Surgical - Exam


Osteopathic Statement: *.  No significant issues noted on an osteopathic 

structural exam other than those noted in the History and Physical/Consult.


                                   Vital Signs











Temp Pulse Resp BP Pulse Ox


 


 97.9 F   85   18   174/74   93 L


 


 20 10:40  20 10:40  20 10:40  20 10:40  20 10:40














Results





- Labs





                                 20 07:28





                                 20 07:28


                  Abnormal Lab Results - Last 24 Hours (Table)











  20 Range/Units





  10:12 16:47 20:39 


 


RBC     (3.80-5.40)  m/uL


 


Hgb     (11.4-16.0)  gm/dL


 


Hct     (34.0-46.0)  %


 


MCV     (80.0-100.0)  fL


 


RDW     (11.5-15.5)  %


 


Neutrophils #     (1.3-7.7)  k/uL


 


Potassium     (3.5-5.1)  mmol/L


 


Chloride     ()  mmol/L


 


Carbon Dioxide     (22-30)  mmol/L


 


BUN     (7-17)  mg/dL


 


Creatinine     (0.52-1.04)  mg/dL


 


Glucose     (74-99)  mg/dL


 


POC Glucose (mg/dL)   329 H  227 H  (75-99)  mg/dL


 


Albumin     (3.5-5.0)  g/dL


 


Crossmatch  See Detail    














  20 Range/Units





  06:58 07:28 07:28 


 


RBC   3.08 L   (3.80-5.40)  m/uL


 


Hgb   9.9 L D   (11.4-16.0)  gm/dL


 


Hct   31.9 L   (34.0-46.0)  %


 


MCV   103.4 H   (80.0-100.0)  fL


 


RDW   16.1 H   (11.5-15.5)  %


 


Neutrophils #   8.3 H   (1.3-7.7)  k/uL


 


Potassium    5.4 H  (3.5-5.1)  mmol/L


 


Chloride    118 H  ()  mmol/L


 


Carbon Dioxide    19 L  (22-30)  mmol/L


 


BUN    35 H  (7-17)  mg/dL


 


Creatinine    2.49 H  (0.52-1.04)  mg/dL


 


Glucose    366 H  (74-99)  mg/dL


 


POC Glucose (mg/dL)  361 H    (75-99)  mg/dL


 


Albumin    2.7 L  (3.5-5.0)  g/dL


 


Crossmatch     














  20 Range/Units





  11:58 


 


RBC   (3.80-5.40)  m/uL


 


Hgb   (11.4-16.0)  gm/dL


 


Hct   (34.0-46.0)  %


 


MCV   (80.0-100.0)  fL


 


RDW   (11.5-15.5)  %


 


Neutrophils #   (1.3-7.7)  k/uL


 


Potassium   (3.5-5.1)  mmol/L


 


Chloride   ()  mmol/L


 


Carbon Dioxide   (22-30)  mmol/L


 


BUN   (7-17)  mg/dL


 


Creatinine   (0.52-1.04)  mg/dL


 


Glucose   (74-99)  mg/dL


 


POC Glucose (mg/dL)  475 H  (75-99)  mg/dL


 


Albumin   (3.5-5.0)  g/dL


 


Crossmatch   








                                 Diabetes panel











  20 Range/Units





  07:28 


 


Sodium  143  (137-145)  mmol/L


 


Potassium  5.4 H  (3.5-5.1)  mmol/L


 


Chloride  118 H  ()  mmol/L


 


Carbon Dioxide  19 L  (22-30)  mmol/L


 


BUN  35 H  (7-17)  mg/dL


 


Creatinine  2.49 H  (0.52-1.04)  mg/dL


 


Glucose  366 H  (74-99)  mg/dL


 


Calcium  8.7  (8.4-10.2)  mg/dL


 


AST  26  (14-36)  U/L


 


ALT  18  (4-34)  U/L


 


Alkaline Phosphatase  116  ()  U/L


 


Total Protein  6.7  (6.3-8.2)  g/dL


 


Albumin  2.7 L  (3.5-5.0)  g/dL








                                  Calcium panel











  20 Range/Units





  07:28 


 


Calcium  8.7  (8.4-10.2)  mg/dL


 


Albumin  2.7 L  (3.5-5.0)  g/dL








                                 Pituitary panel











  20 Range/Units





  07:28 


 


Sodium  143  (137-145)  mmol/L


 


Potassium  5.4 H  (3.5-5.1)  mmol/L


 


Chloride  118 H  ()  mmol/L


 


Carbon Dioxide  19 L  (22-30)  mmol/L


 


BUN  35 H  (7-17)  mg/dL


 


Creatinine  2.49 H  (0.52-1.04)  mg/dL


 


Glucose  366 H  (74-99)  mg/dL


 


Calcium  8.7  (8.4-10.2)  mg/dL








                                  Adrenal panel











  20 Range/Units





  07:28 


 


Sodium  143  (137-145)  mmol/L


 


Potassium  5.4 H  (3.5-5.1)  mmol/L


 


Chloride  118 H  ()  mmol/L


 


Carbon Dioxide  19 L  (22-30)  mmol/L


 


BUN  35 H  (7-17)  mg/dL


 


Creatinine  2.49 H  (0.52-1.04)  mg/dL


 


Glucose  366 H  (74-99)  mg/dL


 


Calcium  8.7  (8.4-10.2)  mg/dL


 


Total Bilirubin  0.5  (0.2-1.3)  mg/dL


 


AST  26  (14-36)  U/L


 


ALT  18  (4-34)  U/L


 


Alkaline Phosphatase  116  ()  U/L


 


Total Protein  6.7  (6.3-8.2)  g/dL


 


Albumin  2.7 L  (3.5-5.0)  g/dL














Assessment and Plan


Plan: 





I examined this patient.  I discussed the case with the nurse practitioner.  I 

discussed with family options for therapy.  The family has agreed with hospice 

care.  They have also agreed for us to do a bedside debridement/amputation to 

make this foot E0 to care for at home.  Her family verbalized their 

understanding of the issues and our plan.

## 2020-11-16 NOTE — PN
PROGRESS NOTE



DATE OF SERVICE:

11/16/2020



REASON FOR FOLLOWUP:

Right third toe osteomyelitis.



INTERVAL HISTORY:

Patient is currently afebrile.  Patient is breathing comfortably.  He is

hemodynamically stable.  Remains to be lethargic and sleepy, unable to provide any

history.  No vomiting.  No diarrhea or any other reported change reported by nursing

staff.



PHYSICAL EXAMINATION:

Blood pressure 137/80 with a pulse of 75, temperature 98.  She is 100% on 3 L nasal

cannula.

General description is a middle-aged female lying in bed in no distress.  Respiratory

system: Unlabored breathing, decreased breath sounds in the base, with no wheeze.

Heart S1, S2.  Regular rate and rhythm.  Abdomen soft, no tenderness.  Right third toe

still swollen and red with some drainage.



LABS:

Hemoglobin 6.5, white count 9.0, creatinine is 1.9.



DIAGNOSTIC IMPRESSION AND PLAN:

Patient with right third toe diabetic foot infection, concern for underlying

osteomyelitis.  Surgery has seen the patient for possible amputation.  Patient is

covered with _____ because of MULTIPLE ANTIBIOTIC ALLERGIES and borderline kidney

function.  Continue supportive care.





MMODL / IJN: [id number] / Job#: [id number]

## 2020-11-17 LAB
ALBUMIN SERPL-MCNC: 2.7 G/DL (ref 3.5–5)
ALBUMIN/GLOB SERPL: 0.7 {RATIO}
ALP SERPL-CCNC: 116 U/L (ref 38–126)
ALT SERPL-CCNC: 18 U/L (ref 4–34)
ANION GAP SERPL CALC-SCNC: 6 MMOL/L
AST SERPL-CCNC: 26 U/L (ref 14–36)
BASOPHILS # BLD AUTO: 0 K/UL (ref 0–0.2)
BASOPHILS NFR BLD AUTO: 0 %
BUN SERPL-SCNC: 35 MG/DL (ref 7–17)
CALCIUM SPEC-MCNC: 8.7 MG/DL (ref 8.4–10.2)
CHLORIDE SERPL-SCNC: 118 MMOL/L (ref 98–107)
CO2 SERPL-SCNC: 19 MMOL/L (ref 22–30)
EOSINOPHIL # BLD AUTO: 0 K/UL (ref 0–0.7)
EOSINOPHIL NFR BLD AUTO: 0 %
ERYTHROCYTE [DISTWIDTH] IN BLOOD BY AUTOMATED COUNT: 3.08 M/UL (ref 3.8–5.4)
ERYTHROCYTE [DISTWIDTH] IN BLOOD: 16.1 % (ref 11.5–15.5)
GLOBULIN SER CALC-MCNC: 4 G/DL
GLUCOSE BLD-MCNC: 320 MG/DL (ref 75–99)
GLUCOSE BLD-MCNC: 361 MG/DL (ref 75–99)
GLUCOSE BLD-MCNC: 427 MG/DL (ref 75–99)
GLUCOSE BLD-MCNC: 475 MG/DL (ref 75–99)
GLUCOSE SERPL-MCNC: 366 MG/DL (ref 74–99)
HCT VFR BLD AUTO: 31.9 % (ref 34–46)
HGB BLD-MCNC: 9.9 GM/DL (ref 11.4–16)
LYMPHOCYTES # SPEC AUTO: 1.2 K/UL (ref 1–4.8)
LYMPHOCYTES NFR SPEC AUTO: 11 %
MCH RBC QN AUTO: 32.1 PG (ref 25–35)
MCHC RBC AUTO-ENTMCNC: 31 G/DL (ref 31–37)
MCV RBC AUTO: 103.4 FL (ref 80–100)
MONOCYTES # BLD AUTO: 0.5 K/UL (ref 0–1)
MONOCYTES NFR BLD AUTO: 5 %
NEUTROPHILS # BLD AUTO: 8.3 K/UL (ref 1.3–7.7)
NEUTROPHILS NFR BLD AUTO: 81 %
PLATELET # BLD AUTO: 203 K/UL (ref 150–450)
POTASSIUM SERPL-SCNC: 5.4 MMOL/L (ref 3.5–5.1)
PROT SERPL-MCNC: 6.7 G/DL (ref 6.3–8.2)
SODIUM SERPL-SCNC: 143 MMOL/L (ref 137–145)
WBC # BLD AUTO: 10.2 K/UL (ref 3.8–10.6)

## 2020-11-17 PROCEDURE — 0Y6T0Z0 DETACHMENT AT RIGHT 3RD TOE, COMPLETE, OPEN APPROACH: ICD-10-PCS

## 2020-11-17 RX ADMIN — LACOSAMIDE SCH: 150 TABLET, FILM COATED ORAL at 21:14

## 2020-11-17 RX ADMIN — METOCLOPRAMIDE SCH: 5 TABLET ORAL at 16:42

## 2020-11-17 RX ADMIN — OXYCODONE HYDROCHLORIDE AND ACETAMINOPHEN SCH: 500 TABLET ORAL at 07:18

## 2020-11-17 RX ADMIN — METOPROLOL TARTRATE SCH: 25 TABLET, FILM COATED ORAL at 21:14

## 2020-11-17 RX ADMIN — LACOSAMIDE SCH MG: 150 TABLET, FILM COATED ORAL at 09:45

## 2020-11-17 RX ADMIN — METOCLOPRAMIDE SCH: 5 TABLET ORAL at 12:00

## 2020-11-17 RX ADMIN — PANTOPRAZOLE SODIUM SCH: 40 TABLET, DELAYED RELEASE ORAL at 07:19

## 2020-11-17 RX ADMIN — LOSARTAN POTASSIUM SCH: 50 TABLET, FILM COATED ORAL at 07:26

## 2020-11-17 RX ADMIN — INSULIN ASPART SCH UNIT: 100 INJECTION, SOLUTION INTRAVENOUS; SUBCUTANEOUS at 13:32

## 2020-11-17 RX ADMIN — Medication SCH: at 07:05

## 2020-11-17 RX ADMIN — CEFAZOLIN SCH: 330 INJECTION, POWDER, FOR SOLUTION INTRAMUSCULAR; INTRAVENOUS at 17:20

## 2020-11-17 RX ADMIN — INSULIN ASPART SCH: 100 INJECTION, SOLUTION INTRAVENOUS; SUBCUTANEOUS at 21:14

## 2020-11-17 RX ADMIN — INSULIN ASPART SCH UNIT: 100 INJECTION, SOLUTION INTRAVENOUS; SUBCUTANEOUS at 17:28

## 2020-11-17 RX ADMIN — CEFAZOLIN SCH: 330 INJECTION, POWDER, FOR SOLUTION INTRAMUSCULAR; INTRAVENOUS at 05:21

## 2020-11-17 RX ADMIN — ASPIRIN 81 MG CHEWABLE TABLET SCH: 81 TABLET CHEWABLE at 07:19

## 2020-11-17 RX ADMIN — DIVALPROEX SODIUM SCH MG: 250 TABLET, DELAYED RELEASE ORAL at 09:46

## 2020-11-17 RX ADMIN — PANTOPRAZOLE SODIUM SCH: 40 TABLET, DELAYED RELEASE ORAL at 21:15

## 2020-11-17 RX ADMIN — DIVALPROEX SODIUM SCH MG: 250 TABLET, DELAYED RELEASE ORAL at 16:45

## 2020-11-17 RX ADMIN — DIVALPROEX SODIUM SCH: 250 TABLET, DELAYED RELEASE ORAL at 21:15

## 2020-11-17 RX ADMIN — Medication SCH: at 16:42

## 2020-11-17 RX ADMIN — SODIUM FERRIC GLUCONATE COMPLEX SCH: 12.5 INJECTION INTRAVENOUS at 10:52

## 2020-11-17 RX ADMIN — METOPROLOL TARTRATE SCH MG: 25 TABLET, FILM COATED ORAL at 09:45

## 2020-11-17 RX ADMIN — METOCLOPRAMIDE SCH: 5 TABLET ORAL at 07:18

## 2020-11-17 RX ADMIN — INSULIN ASPART SCH: 100 INJECTION, SOLUTION INTRAVENOUS; SUBCUTANEOUS at 07:18

## 2020-11-17 NOTE — P.PN
Subjective


Progress Note Date: 11/17/20





Patient was seen for a follow-up.  Patient is somnolent.  Per nursing report, 

either she is sleeping, or when she is awake, she starts yelling.  Patient has 

developed possible osteomyelitis of the second toe of the left foot.  Patient 

had a seizure early this morning at around 3 AM, lasting for less than a minute.

 Patient was fairly well awake afterwards.   





EGD showed antral gastritis, nonbleeding duodenal AVM.  No further seizures 

reported since yesterday.  





Objective





- Vital Signs


Vital signs: 


                                   Vital Signs











Temp  98.0 F   11/17/20 07:21


 


Pulse  95   11/17/20 07:21


 


Resp  19   11/17/20 07:21


 


BP  91/59   11/17/20 07:21


 


Pulse Ox  97   11/17/20 07:21








                                 Intake & Output











 11/16/20 11/17/20 11/17/20





 18:59 06:59 18:59


 


Intake Total 310 150 


 


Balance 310 150 


 


Intake:   


 


  Intake, IV Titration  150 





  Amount   


 


    Sodium Chloride 0.9% 1,  150 





    000 ml @ 75 mls/hr IV .   





    [id number] [facility] Rx#:[id number]   


 


  Blood Product 310  


 


    Rc As-1  Unit 310  





    [id number]   


 


Other:   


 


  Voiding Method Diaper  Diaper


 


  # Voids 1 1 














- Exam





Patient is very somnolent.  Patient states "what" when her name is called.  

Patient does follow commands on the right.  Patient is spastic on the left.  Her

left second toe appears to have possible osteomyelitis. 





- Labs


CBC & Chem 7: 


                                 11/17/20 07:28





                                 11/17/20 07:28


Labs: 


                  Abnormal Lab Results - Last 24 Hours (Table)











  11/16/20 11/16/20 11/16/20 Range/Units





  10:12 11:37 16:47 


 


RBC     (3.80-5.40)  m/uL


 


Hgb     (11.4-16.0)  gm/dL


 


Hct     (34.0-46.0)  %


 


MCV     (80.0-100.0)  fL


 


RDW     (11.5-15.5)  %


 


Neutrophils #     (1.3-7.7)  k/uL


 


Potassium     (3.5-5.1)  mmol/L


 


Chloride     ()  mmol/L


 


Carbon Dioxide     (22-30)  mmol/L


 


BUN     (7-17)  mg/dL


 


Creatinine     (0.52-1.04)  mg/dL


 


Glucose     (74-99)  mg/dL


 


POC Glucose (mg/dL)   223 H  329 H  (75-99)  mg/dL


 


Albumin     (3.5-5.0)  g/dL


 


Crossmatch  See Detail    














  11/16/20 11/17/20 11/17/20 Range/Units





  20:39 06:58 07:28 


 


RBC    3.08 L  (3.80-5.40)  m/uL


 


Hgb    9.9 L D  (11.4-16.0)  gm/dL


 


Hct    31.9 L  (34.0-46.0)  %


 


MCV    103.4 H  (80.0-100.0)  fL


 


RDW    16.1 H  (11.5-15.5)  %


 


Neutrophils #    8.3 H  (1.3-7.7)  k/uL


 


Potassium     (3.5-5.1)  mmol/L


 


Chloride     ()  mmol/L


 


Carbon Dioxide     (22-30)  mmol/L


 


BUN     (7-17)  mg/dL


 


Creatinine     (0.52-1.04)  mg/dL


 


Glucose     (74-99)  mg/dL


 


POC Glucose (mg/dL)  227 H  361 H   (75-99)  mg/dL


 


Albumin     (3.5-5.0)  g/dL


 


Crossmatch     














  11/17/20 Range/Units





  07:28 


 


RBC   (3.80-5.40)  m/uL


 


Hgb   (11.4-16.0)  gm/dL


 


Hct   (34.0-46.0)  %


 


MCV   (80.0-100.0)  fL


 


RDW   (11.5-15.5)  %


 


Neutrophils #   (1.3-7.7)  k/uL


 


Potassium  5.4 H  (3.5-5.1)  mmol/L


 


Chloride  118 H  ()  mmol/L


 


Carbon Dioxide  19 L  (22-30)  mmol/L


 


BUN  35 H  (7-17)  mg/dL


 


Creatinine  2.49 H  (0.52-1.04)  mg/dL


 


Glucose  366 H  (74-99)  mg/dL


 


POC Glucose (mg/dL)   (75-99)  mg/dL


 


Albumin  2.7 L  (3.5-5.0)  g/dL


 


Crossmatch   














Assessment and Plan


Assessment: 





* Seizure disorder, provoked by lack of antiepileptic medication secondary due 

  to vomiting.


* Patient had a breakthrough seizure last night, possibly related to infection.


* History of CVA with spastic left hemiplegia


* Cognitive deficits due to CVA.


* History of diabetes, with history of hypoglycemic episodes as well as DKA, 

  suggestive of brittle diabetes


* Anemia


Plan: 





* Patient had a breakthrough seizure last night.  May be related to ongoing foot

  infection.  Will not make changes in her antiepileptic medication regimen, as 

  she is already too groggy most of the time.


* Continue patient's home seizure medications.  Patient currently on Depakote 

  250 mg 3 times a day and Vimpat 150 mg twice a day.


* Total Depakote level was 52 () on 11/09/2020, which is therapeutic.  

  Patient's free Depakote level was 10.3 (4.8-17.3) on 11/08/2020.


* Avoid episodes of hypoglycemia/hypotension. 


* IM to follow-up on severe anemia.


* Per nursing report, family is considering hospice care.

## 2020-11-17 NOTE — PN
PROGRESS NOTE



DATE OF SERVICE:

11/17/2020



REASON FOR FOLLOWUP:

Right third toe MSSA diabetic foot infection with osteomyelitis.



INTERVAL HISTORY:

The patient is status post amputation of the right third toe.  The patient is post

surgery and lethargic.  No vomiting, diarrhea or any other changes reported by nursing

staff.  Patient is unable to provide any history.



PHYSICAL EXAMINATION:

Blood pressure 91/59, pulse 95, temperature 98. She is 95% on 3 L nasal cannula.

General description is a middle-aged female lying in bed in no distress.

RESPIRATORY SYSTEM::  Unlabored breathing, coarse breath sounds bilaterally, no wheeze.

HEART:  S1, S2.  Regular rate and rhythm.

ABDOMEN:  Soft, no tenderness.



LABS:

Hemoglobin 9.1, white count 10.2, BUN of 35, creatinine is 2.49.



DIAGNOSTIC IMPRESSION AND PLAN:

Patient right third toe diabetic foot infection, possible osteomyelitis.  Culture with

MSSA.  The patient did have multiple antibiotic allergies and borderline kidney

function high risk of nephrotoxicity.  Currently covered with daptomycin to continue

with a possible plan for hospice antibiotic will be safely discontinued once switched

to hospice.  Continue supportive care.





MMODL / IJN: [id number] / Job#: [id number]

## 2020-11-17 NOTE — P.PN
Subjective


Progress Note Date: 11/17/20





Morenita Ramirez, is a 60-year-old female who presented to [facility] emergency room with a chief complaint of mental status changes with 

worsening somnolence and episodes of vomiting, she was evaluated in the 

emergency room, vital examination on presentation reveals a temperature of 97.9 

pulse 85 respiration 18 blood pressure 174/74 pulse ox 93% on room air, white 

blood count was 10.1 hemoglobin 9.9 platelet count 196 sodium 136 BUN 21 

creatinine 1.12 glucose 113 lactic acid 1.2 urine analysis revealed trace 

leukocyte esterase with moderate bacteria, chest x-ray revealed no acute pul

monary process, computed tomography scan of the brain revealed no evidence of 

acute intracranial hemorrhage or midline shift there was a large area of old 

encephalomalacic of the right anterior cerebral hemisphere related to previous 

stroke.  Patient was admitted to telemetry floor neurology consultation was 

requested due to concern of a recurrent stroke.


Patient has a known history of insulin-dependent diabetes mellitus type 1 

history of hypertension, history of hyperlipidemia, history of seizure disorder,

history of asthma, and history of psychosis.





On 11/08/2020 patient is alert and oriented resting in bed patient does appear 

restless.  CT of abdomen and pelvis has been ordered due to nausea and vomiting.

 Hemoglobin 8.2.  GI and neurology services are following.  Patient denies any 

chest pain or shortness of breath.  Does have appetite requesting food.





on 11/9/2020 patient was seen and examined on the medical floor she is alert 

confused and occasionally agitated she received a dose of Haldol earlier today 

her hemoglobin is down and gastroenterology planning EGD and colonoscopy shireen 

further review of system was possible today.





On 11/10/2020 agent was seen and examined on the medical floor she is alert, 

confused in no apparent distress, there is no fever or chills no headache or 

dizziness no chest pain no shortness of breath no cough no nausea or vomiting no

abdominal pain no agitation today, patient underwent EGD today, she has AVM 

which was treated she will be given IV iron, sister Noelle contacted and 

multiple medical issues discussed in details over the form.








On 11/11/2020 patient is currently sleepy will wake up to follow commands but 

falls back to sleep clinic DC Haldol and continue to monitor for 24 hours 

possible discharge tomorrow








On 11/12/2020 patient was seen and examined on the medical floor she was 

somnolent, she had low blood pressure she was given a fluid bolus, at this point

Ativan and Xanax were discontinued Haldol was discontinued yesterday will 

continue was Seroquel only and order Ativan as needed if patient gets agitated 

patient improved significantly with IV fluid bolus blood pressure is back to 

normal range.





On 11/13/2020 patient remains sleepy but per nursing staff has been up and 

awake.  Patient did have elevated temperatures throughout night 100.7.  Dr. Snyder has been consulted for wound on leg.  Repeat urinary analysis has been 

ordered.  Patient did have episode of hypotension this has resolved.  Patient 

denies any chest pain or shortness breath.  Patient denies nausea vomiting or 

diarrhea.  Patient denies any urinary burning or frequency.





On 11/14/2020 patient was seen and examined on the medical floor, she is somn

olent, in no apparent distress at this time, her nurse reports that, patient 

slept well all night, however when she was awake she was screaming, she kept on 

screaming until she was tired and fell asleep again, patient is accepting her 

meals and her oral medications in some apple sauce with one on one help.  I am 

unable to do any review of systems at this time.  Ativan and Xanax and Haldol 

were discontinued due to continue was sleeping.  At this time will give Seroquel

25 mg in the morning, 25 mg in the early afternoon,  and 50 mg at bedtime.  Her 

sister Noelle who is the power of  was contacted multiple times during 

this admission, CODE STATUS was discussed including possibility of hospice care,

her sister stated that she will discuss that with patient's children and let us 

know their decision.





On 11/15/2020 patient remains somnolent.  Per nursing staff does wake up and 

screams.  Per nursing still trying to make decision in regards to hospice.  

Awaiting callback from power of  kidneys after she talks to family 

patient is a no code.  She remains on daptomycin Dr. Dash has been consulted 

per infectious disease recommendation for possible toe amputation.





On 11/16/2020 patient was seen and examined on the medical floor she is 

somnolent but arousable in no distress, there is no fever or chills hemoglobin 

is down to 6.5 today 1 unit of red blood cell transfusion was ordered he was 

evaluated by Dr. Ferrell and patient will need amputation of her toe, otherwise

no significant change in condition since yesterday.





On 11/17/2020 patient was seen and examined on the medical floor she is 

somnolent and arousable in no distress she was evaluated by Dr. Ferrell and 

would have debridement of her foot ulcer, also Dr. Ferrell contacted her sister

Noelle with the power of  and at this point, family is agreeable with 

hospice, otherwise no change in condition since yesterday, there is erythema 

around the midline in the right upper extremity and this will be discontinued, 

plan is to transfer to home with hospice tomorrow.





Objective





- Vital Signs


Vital signs: 


                                   Vital Signs











Temp  97.1 F L  11/17/20 15:00


 


Pulse  90   11/17/20 15:00


 


Resp  20   11/17/20 15:00


 


BP  128/95   11/17/20 15:00


 


Pulse Ox  98   11/17/20 15:00








                                 Intake & Output











 11/16/20 11/17/20 11/17/20





 18:59 06:59 18:59


 


Intake Total 310 150 100


 


Balance 310 150 100


 


Intake:   


 


  Intake, IV Titration  150 





  Amount   


 


    Sodium Chloride 0.9% 1,  150 





    000 ml @ 75 mls/hr IV .   





    [id number] [facility] Rx#:[id number]   


 


  Oral   100


 


  Blood Product 310  


 


    Rc As-1  Unit 310  





    [id number]   


 


Other:   


 


  Voiding Method Diaper  Diaper


 


  # Voids 1 1 2














- Exam





In general patient is alert, responsive, in no distress


HEENT head normocephalic and atraumatic


Neck is supple no JVD no goiter no lymphadenopathy


Chest exam reveals a few scattered crackles no wheezing


Cardiac exam reveals regular heart sounds no gallops no murmurs


Abdomen is soft nontender no organomegaly was normal bowel sounds


Extremity exam reveals no edema no cyanosis or clubbing


Neurological examination patient is alert responsive answering questions 

appropriately she is moving all 4 extremity spontaneously there is residual 

spasticity and weakness on the left upper extremity in the left lower extremity





- Labs


CBC & Chem 7: 


                                 11/17/20 07:28





                                 11/17/20 07:28


Labs: 


                  Abnormal Lab Results - Last 24 Hours (Table)











  11/16/20 11/16/20 11/17/20 Range/Units





  10:12 20:39 06:58 


 


RBC     (3.80-5.40)  m/uL


 


Hgb     (11.4-16.0)  gm/dL


 


Hct     (34.0-46.0)  %


 


MCV     (80.0-100.0)  fL


 


RDW     (11.5-15.5)  %


 


Neutrophils #     (1.3-7.7)  k/uL


 


Potassium     (3.5-5.1)  mmol/L


 


Chloride     ()  mmol/L


 


Carbon Dioxide     (22-30)  mmol/L


 


BUN     (7-17)  mg/dL


 


Creatinine     (0.52-1.04)  mg/dL


 


Glucose     (74-99)  mg/dL


 


POC Glucose (mg/dL)   227 H  361 H  (75-99)  mg/dL


 


Albumin     (3.5-5.0)  g/dL


 


Crossmatch  See Detail    














  11/17/20 11/17/20 11/17/20 Range/Units





  07:28 07:28 11:58 


 


RBC  3.08 L    (3.80-5.40)  m/uL


 


Hgb  9.9 L D    (11.4-16.0)  gm/dL


 


Hct  31.9 L    (34.0-46.0)  %


 


MCV  103.4 H    (80.0-100.0)  fL


 


RDW  16.1 H    (11.5-15.5)  %


 


Neutrophils #  8.3 H    (1.3-7.7)  k/uL


 


Potassium   5.4 H   (3.5-5.1)  mmol/L


 


Chloride   118 H   ()  mmol/L


 


Carbon Dioxide   19 L   (22-30)  mmol/L


 


BUN   35 H   (7-17)  mg/dL


 


Creatinine   2.49 H   (0.52-1.04)  mg/dL


 


Glucose   366 H   (74-99)  mg/dL


 


POC Glucose (mg/dL)    475 H  (75-99)  mg/dL


 


Albumin   2.7 L   (3.5-5.0)  g/dL


 


Crossmatch     














  11/17/20 Range/Units





  17:12 


 


RBC   (3.80-5.40)  m/uL


 


Hgb   (11.4-16.0)  gm/dL


 


Hct   (34.0-46.0)  %


 


MCV   (80.0-100.0)  fL


 


RDW   (11.5-15.5)  %


 


Neutrophils #   (1.3-7.7)  k/uL


 


Potassium   (3.5-5.1)  mmol/L


 


Chloride   ()  mmol/L


 


Carbon Dioxide   (22-30)  mmol/L


 


BUN   (7-17)  mg/dL


 


Creatinine   (0.52-1.04)  mg/dL


 


Glucose   (74-99)  mg/dL


 


POC Glucose (mg/dL)  427 H  (75-99)  mg/dL


 


Albumin   (3.5-5.0)  g/dL


 


Crossmatch   














Assessment and Plan


Plan: 





1.  Mental status changes likely related to recurrent seizure activity due to 

missing antiseizure medications due to vomiting.  Resolved





2.  Previous history of stroke in 2015, no evidence of stroke at this time.





3.  Underlying history of insulin-dependent diabetes mellitus, type I maintained

on insulin, with previous multiple admissions with DKA





4.  Underlying history of gastroparesis, maintained on Reglan





5.  History of psychosis maintained on Seroquel





6.  Underlying history of hypertension





7.  Underlying history of hyperlipidemia





8.  Previous history of coronary artery disease with myocardial infarction in 

the past





9.  Anemia was significant hemoglobin drop since yesterday.  Patient remains on 

IV iron





10.  Febrile.  Urinary analysis ordered.  Dr. Snyder consulted





11.  Diabetic ulcer to right third toe.  Patient remains on daptomycin possible 

concerns resting mellitus.  Dr. Dye has been consulted for possible 

amputation





12.  Hypotension.  Patient did receive fluids has resolved





Neurology and infectious disease services are following


DPOA is in discussion with family about possible hospice


Dr. Dash has been consulted due to toe infection and possible requiring 

amputation

## 2020-11-18 ENCOUNTER — HOSPITAL ENCOUNTER (INPATIENT)
Dept: HOSPITAL 47 - 4SSUR | Age: 60
LOS: 2 days | Discharge: HOSPICE | DRG: 951 | End: 2020-11-20
Attending: INTERNAL MEDICINE | Admitting: INTERNAL MEDICINE
Payer: MEDICAID

## 2020-11-18 VITALS — DIASTOLIC BLOOD PRESSURE: 81 MMHG | RESPIRATION RATE: 16 BRPM | SYSTOLIC BLOOD PRESSURE: 183 MMHG | HEART RATE: 113 BPM

## 2020-11-18 VITALS — TEMPERATURE: 101.3 F

## 2020-11-18 DIAGNOSIS — Z95.5: ICD-10-CM

## 2020-11-18 DIAGNOSIS — Z79.02: ICD-10-CM

## 2020-11-18 DIAGNOSIS — F41.9: ICD-10-CM

## 2020-11-18 DIAGNOSIS — Z90.89: ICD-10-CM

## 2020-11-18 DIAGNOSIS — Z88.5: ICD-10-CM

## 2020-11-18 DIAGNOSIS — Z79.82: ICD-10-CM

## 2020-11-18 DIAGNOSIS — Z90.710: ICD-10-CM

## 2020-11-18 DIAGNOSIS — G40.909: ICD-10-CM

## 2020-11-18 DIAGNOSIS — I50.9: ICD-10-CM

## 2020-11-18 DIAGNOSIS — Z86.718: ICD-10-CM

## 2020-11-18 DIAGNOSIS — Z83.3: ICD-10-CM

## 2020-11-18 DIAGNOSIS — Z87.891: ICD-10-CM

## 2020-11-18 DIAGNOSIS — Z51.5: Primary | ICD-10-CM

## 2020-11-18 DIAGNOSIS — Z88.1: ICD-10-CM

## 2020-11-18 DIAGNOSIS — I25.10: ICD-10-CM

## 2020-11-18 DIAGNOSIS — Z86.14: ICD-10-CM

## 2020-11-18 DIAGNOSIS — Z96.1: ICD-10-CM

## 2020-11-18 DIAGNOSIS — M19.90: ICD-10-CM

## 2020-11-18 DIAGNOSIS — F31.9: ICD-10-CM

## 2020-11-18 DIAGNOSIS — J44.9: ICD-10-CM

## 2020-11-18 DIAGNOSIS — G81.94: ICD-10-CM

## 2020-11-18 DIAGNOSIS — E78.5: ICD-10-CM

## 2020-11-18 DIAGNOSIS — E03.9: ICD-10-CM

## 2020-11-18 DIAGNOSIS — I25.2: ICD-10-CM

## 2020-11-18 DIAGNOSIS — I11.0: ICD-10-CM

## 2020-11-18 DIAGNOSIS — Z88.8: ICD-10-CM

## 2020-11-18 DIAGNOSIS — Z98.890: ICD-10-CM

## 2020-11-18 DIAGNOSIS — E10.319: ICD-10-CM

## 2020-11-18 DIAGNOSIS — Z88.0: ICD-10-CM

## 2020-11-18 DIAGNOSIS — Z79.899: ICD-10-CM

## 2020-11-18 DIAGNOSIS — Z98.891: ICD-10-CM

## 2020-11-18 DIAGNOSIS — K21.9: ICD-10-CM

## 2020-11-18 DIAGNOSIS — Z90.49: ICD-10-CM

## 2020-11-18 DIAGNOSIS — Z91.030: ICD-10-CM

## 2020-11-18 DIAGNOSIS — Z82.49: ICD-10-CM

## 2020-11-18 DIAGNOSIS — Z79.4: ICD-10-CM

## 2020-11-18 DIAGNOSIS — Z87.01: ICD-10-CM

## 2020-11-18 DIAGNOSIS — Z82.5: ICD-10-CM

## 2020-11-18 DIAGNOSIS — G93.40: ICD-10-CM

## 2020-11-18 LAB
GLUCOSE BLD-MCNC: 395 MG/DL (ref 75–99)
GLUCOSE BLD-MCNC: 430 MG/DL (ref 75–99)
GLUCOSE BLD-MCNC: 481 MG/DL (ref 75–99)
GLUCOSE BLD-MCNC: 507 MG/DL (ref 75–99)

## 2020-11-18 PROCEDURE — 05HF33Z INSERTION OF INFUSION DEVICE INTO LEFT CEPHALIC VEIN, PERCUTANEOUS APPROACH: ICD-10-PCS

## 2020-11-18 RX ADMIN — LOSARTAN POTASSIUM SCH: 50 TABLET, FILM COATED ORAL at 09:08

## 2020-11-18 RX ADMIN — INSULIN ASPART SCH UNIT: 100 INJECTION, SOLUTION INTRAVENOUS; SUBCUTANEOUS at 07:01

## 2020-11-18 RX ADMIN — ASPIRIN 81 MG CHEWABLE TABLET SCH: 81 TABLET CHEWABLE at 09:08

## 2020-11-18 RX ADMIN — METOCLOPRAMIDE SCH: 5 TABLET ORAL at 09:08

## 2020-11-18 RX ADMIN — LACOSAMIDE SCH: 150 TABLET, FILM COATED ORAL at 09:08

## 2020-11-18 RX ADMIN — INSULIN ASPART SCH UNIT: 100 INJECTION, SOLUTION INTRAVENOUS; SUBCUTANEOUS at 20:50

## 2020-11-18 RX ADMIN — HYDROMORPHONE HYDROCHLORIDE PRN MG: 1 INJECTION, SOLUTION INTRAMUSCULAR; INTRAVENOUS; SUBCUTANEOUS at 21:37

## 2020-11-18 RX ADMIN — Medication SCH: at 09:07

## 2020-11-18 RX ADMIN — OXYCODONE HYDROCHLORIDE AND ACETAMINOPHEN SCH: 500 TABLET ORAL at 09:08

## 2020-11-18 RX ADMIN — HYDROMORPHONE HYDROCHLORIDE PRN MG: 1 INJECTION, SOLUTION INTRAMUSCULAR; INTRAVENOUS; SUBCUTANEOUS at 11:42

## 2020-11-18 RX ADMIN — DIVALPROEX SODIUM SCH: 250 TABLET, DELAYED RELEASE ORAL at 09:08

## 2020-11-18 RX ADMIN — SODIUM FERRIC GLUCONATE COMPLEX SCH: 12.5 INJECTION INTRAVENOUS at 09:08

## 2020-11-18 RX ADMIN — INSULIN ASPART SCH UNIT: 100 INJECTION, SOLUTION INTRAVENOUS; SUBCUTANEOUS at 17:12

## 2020-11-18 RX ADMIN — PANTOPRAZOLE SODIUM SCH: 40 TABLET, DELAYED RELEASE ORAL at 09:08

## 2020-11-18 RX ADMIN — METOPROLOL TARTRATE SCH: 25 TABLET, FILM COATED ORAL at 09:08

## 2020-11-18 RX ADMIN — CEFAZOLIN SCH: 330 INJECTION, POWDER, FOR SOLUTION INTRAMUSCULAR; INTRAVENOUS at 09:07

## 2020-11-18 RX ADMIN — HYDROMORPHONE HYDROCHLORIDE PRN MG: 1 INJECTION, SOLUTION INTRAMUSCULAR; INTRAVENOUS; SUBCUTANEOUS at 18:19

## 2020-11-18 RX ADMIN — INSULIN ASPART SCH: 100 INJECTION, SOLUTION INTRAVENOUS; SUBCUTANEOUS at 06:38

## 2020-11-19 VITALS
SYSTOLIC BLOOD PRESSURE: 157 MMHG | DIASTOLIC BLOOD PRESSURE: 69 MMHG | HEART RATE: 91 BPM | RESPIRATION RATE: 20 BRPM | TEMPERATURE: 98.5 F

## 2020-11-19 LAB
GLUCOSE BLD-MCNC: 251 MG/DL (ref 75–99)
GLUCOSE BLD-MCNC: 300 MG/DL (ref 75–99)
GLUCOSE BLD-MCNC: 308 MG/DL (ref 75–99)
GLUCOSE BLD-MCNC: 411 MG/DL (ref 75–99)

## 2020-11-19 RX ADMIN — HYDROMORPHONE HYDROCHLORIDE PRN MG: 1 INJECTION, SOLUTION INTRAMUSCULAR; INTRAVENOUS; SUBCUTANEOUS at 15:52

## 2020-11-19 RX ADMIN — INSULIN ASPART SCH UNIT: 100 INJECTION, SOLUTION INTRAVENOUS; SUBCUTANEOUS at 22:41

## 2020-11-19 RX ADMIN — HYDROMORPHONE HYDROCHLORIDE PRN MG: 1 INJECTION, SOLUTION INTRAMUSCULAR; INTRAVENOUS; SUBCUTANEOUS at 03:24

## 2020-11-19 RX ADMIN — INSULIN ASPART SCH UNIT: 100 INJECTION, SOLUTION INTRAVENOUS; SUBCUTANEOUS at 08:04

## 2020-11-19 RX ADMIN — INSULIN ASPART SCH UNIT: 100 INJECTION, SOLUTION INTRAVENOUS; SUBCUTANEOUS at 16:55

## 2020-11-19 RX ADMIN — HYDROMORPHONE HYDROCHLORIDE PRN MG: 1 INJECTION, SOLUTION INTRAMUSCULAR; INTRAVENOUS; SUBCUTANEOUS at 10:02

## 2020-11-19 RX ADMIN — INSULIN ASPART SCH UNIT: 100 INJECTION, SOLUTION INTRAVENOUS; SUBCUTANEOUS at 11:56

## 2020-11-19 NOTE — P.HPIM
History of Present Illness


H&P Date: 20





Morenita Ramirez is a 60-year-old female who had several recent admissions due to 

uncontrolled glucose level, episodes of hypoglycemia, recurrent seizure 

activity, and lower extremity infection.  Patient developed worsening 

encephalopathy likely due to recurrent seizures, condition discussed with her 

sister Noelle who is the power of , several times over the last 2 

admissions, yesterday patient's sister Noelle spoke was Dr. Ferrell and decided

to proceed with hospice care.








Past Medical History


Past Medical History: Asthma, Coronary Artery Disease (CAD), Chest Pain / 

Angina, Heart Failure, COPD, CVA/TIA, Diabetes Mellitus, Deep Vein Thrombosis (

DVT), Eye Disorder, GERD/Reflux, Hyperlipidemia, Hypertension, Myocardial 

Infarction (MI), Neurologic Disorder, Osteoarthritis (OA), Pneumonia, Renal 

Disease


Additional Past Medical History / Comment(s): IDDM (brittle), DKAs, neuropathy 

bilateral hands/feet, retinopathy bilateral eyes, cellulitis R foot, R great toe

and 2nd toe infections/amputations, current wound R foot-being seen in [facility], 

renal failure, anemia, CVAs with L sided paralysis, headaches started after 

CVAs, brain lesions, DVT R axillae, low back pain, varicosities, seizure many 

years ago (), hypothyroid, constipation, bilateral tinnitis occasionally, 

sinus problems.


Last Myocardial Infarction Date:: 


History of Any Multi-Drug Resistant Organisms: MRSA


Date of last positivie culture/infection: 18


MDRO Source:: Right Foot


Past Surgical History: Appendectomy,  Section, Cholecystectomy, Heart 

Catheterization With Stent, Hysterectomy, Orthopedic Surgery


Additional Past Surgical History / Comment(s): PCI with multiple stents, R great

toe and 2nd toe amps, debridements R foot ulcer, L shoulder surgery to remove 

bone, bronchoscopy, EGD, colonoscopy, R arm port since removed, bilateral 

cataract removals/lens implants.


Past Anesthesia/Blood Transfusion Reactions: No Reported Reaction


Additional Past Anesthesia/Blood Transfusion Reaction / Comment(s): HX OF BLOOD 

TRANSFUSION- NO REACTION


Date of Last Stent Placement:: 2013


Past Psychological History: Anxiety, Bipolar, Depression


Smoking Status: Never smoker


Past Alcohol Use History: None Reported


Past Drug Use History: Marijuana





- Past Family History


  ** Father


Family Medical History: Unable to Obtain, Coronary Artery Disease (CAD), 

Diabetes Mellitus





  ** Mother


Family Medical History: COPD





Medications and Allergies


                                Home Medications











 Medication  Instructions  Recorded  Confirmed  Type


 


HYDROcodone/APAP 10-325MG [Norco 1 tab PO TID PRN 17 History





]    


 


Atorvastatin [Lipitor] 20 mg PO DAILY 19 History


 


Aspirin [Adult Low Dose Aspirin EC] 81 mg PO DAILY 01/10/20 11/18/20 History


 


Ferrous Sulfate [Iron (65  mg PO BID 01/10/20 11/18/20 History





Elemental)]    


 


Divalproex [Depakote] 250 mg PO TID #90 tablet. 20 Rx


 


Albuterol Sulfate [Ventolin HFA] 2 puff INHALATION RT-Q4H PRN 20 

History


 


Losartan [Cozaar] 50 mg PO DAILY  tab 20 Rx


 


Ascorbic Acid [Vitamin C] 500 mg PO DAILY 20 History


 


Vitamin B Complex 1 tab PO DAILY 20 History


 


Clopidogrel [Plavix] 75 mg PO DAILY  tab 20 Rx


 


QUEtiapine [SEROquel] 50 mg PO HS 30 Days #30 tab 10/14/20 11/18/20 Rx


 


ALPRAZolam [Xanax] 0.5 mg PO QID PRN  tab 10/27/20 11/18/20 Rx


 


DULoxetine HCL [Cymbalta] 30 mg PO DAILY  capsule. 10/27/20 11/18/20 Rx


 


Metoclopramide [Reglan] 5 mg PO AC-TID  tab 10/27/20 11/18/20 Rx


 


QUEtiapine [SEROquel] 25 mg PO DAILY  tab 10/27/20 11/18/20 Rx


 


Calcium Carb-Vit D 500Mg-200Un 1 tab PO BID-W/MEALS 20 History





[Oscal 500+D]    


 


INSULIN LISPRO (humaLOG) [humaLOG] 6 units SQ AC-TID 20 History


 


Ondansetron Odt [Zofran ODT] 4 mg PO Q8HR PRN 3 Days #9 tab 20 Rx


 


Insulin Detemir [Levemir Flextouch] 10 units SQ HS 20 History


 


Lacosamide [Vimpat] 100 mg PO BID 20 History


 


Metoprolol Tartrate [Lopressor] 12.5 mg PO BID 20 History


 


Pantoprazole Sodium [Protonix] 40 mg PO BID 30 Days #60 tablet. 20 Rx








                                    Allergies











Allergy/AdvReac Type Severity Reaction Status Date / Time


 


Barbiturates Allergy  Rash/Hives Verified 20 13:53


 


cephalexin monohydrate Allergy  Rash/Hives Verified 20 13:53





[From Keflex]     


 


morphine Allergy  Rash/Hives Verified 20 13:53


 


Penicillins Allergy  Rash/Hives Verified 20 13:53


 


phenobarbital Allergy  Swelling Verified 20 13:53


 


venom-honey bee Allergy  Swelling Verified 20 13:53





[bee venom (honey bee)]     


 


amlodipine besylate AdvReac  Vomiting Verified 20 13:53





[From Norvasc]     














Physical Exam


Vitals: 


                                   Vital Signs











  Temp Pulse Resp BP Pulse Ox


 


 20 14:40  99.3 F  94  18  161/75  95








                                Intake and Output











 20





 06:59 14:59 22:59


 


Other:   


 


  Weight  58.967 kg 














Patient is sedated responsive to stimuli in no apparent distress


HEENT head normocephalic and atraumatic


Neck is supple no JVD no goiter no lymphadenopathy


Chest exam reveals a few scattered crackles no wheezing


Cardiac exam reveals regular heart sounds no murmurs


Abdomen is soft nontender no organomegaly with normal bowel sounds


Extremity exam reveals minimal edema with recent toe debridement





Assessment and Plan


Plan: 





1.  Severe encephalopathy


2.  Underlying history of stroke


3.  Underlying history of diabetes mellitus type 1 maintained on insulin, not 

well controlled


4.  Underlying history of hypertension


5.  Prolonged previous history of smoking.





At this time patient will start with inpatient hospice


Arrangement will be made for her to be transferred back to her home to continue 

hospice care at home.

## 2020-11-19 NOTE — P.PN
Subjective


Progress Note Date: 11/19/20








Morenita Ramirez is a 60-year-old female who had several recent admissions due to 

uncontrolled glucose level, episodes of hypoglycemia, recurrent seizure 

activity, and lower extremity infection.  Patient developed worsening 

encephalopathy likely due to recurrent seizures, condition discussed with her 

sister Noelle who is the power of , several times over the last 2 

admissions, yesterday patient's sister Noelle spoke was Dr. Ferrell and decided

to proceed with hospice care.








On 11/19/2020 patient was seen and examined on the medical floor she is 

somnolent responsive to stimuli in no apparent distress, at this time she was 

started on hospice care, arrangements are being made at home for possible 

transfer to home with hospice tomorrow





Objective





- Vital Signs


Vital signs: 


                                   Vital Signs











Temp  98.5 F   11/19/20 00:40


 


Pulse  91   11/19/20 08:00


 


Resp  20   11/19/20 00:40


 


BP  157/69   11/19/20 00:40


 


Pulse Ox  96   11/19/20 00:40








                                 Intake & Output











 11/18/20 11/19/20 11/19/20





 18:59 06:59 18:59


 


Intake Total  0 


 


Balance  0 


 


Weight 58.967 kg  


 


Intake:   


 


  Oral  0 


 


Other:   


 


  Voiding Method  Diaper Diaper


 


  # Voids  1 














- Exam








Patient is sedated responsive to stimuli in no apparent distress


HEENT head normocephalic and atraumatic


Neck is supple no JVD no goiter no lymphadenopathy


Chest exam reveals a few scattered crackles no wheezing


Cardiac exam reveals regular heart sounds no murmurs


Abdomen is soft nontender no organomegaly with normal bowel sounds


Extremity exam reveals minimal edema with recent toe debridement





- Labs


Labs: 


                  Abnormal Lab Results - Last 24 Hours (Table)











  11/18/20 11/19/20 11/19/20 Range/Units





  20:22 06:52 11:21 


 


POC Glucose (mg/dL)  430 H  411 H  300 H  (75-99)  mg/dL














  11/19/20 Range/Units





  16:32 


 


POC Glucose (mg/dL)  308 H  (75-99)  mg/dL














Assessment and Plan


Plan: 





1.  Severe encephalopathy


2.  Underlying history of stroke


3.  Underlying history of diabetes mellitus type 1 maintained on insulin, not 

well controlled


4.  Underlying history of hypertension


5.  Prolonged previous history of smoking.





At this time patient will start with inpatient hospice


Arrangement will be made for her to be transferred back to her home to continue 

hospice care at home.

## 2020-11-20 LAB
GLUCOSE BLD-MCNC: 267 MG/DL (ref 75–99)
GLUCOSE BLD-MCNC: 318 MG/DL (ref 75–99)

## 2020-11-20 RX ADMIN — INSULIN ASPART SCH UNIT: 100 INJECTION, SOLUTION INTRAVENOUS; SUBCUTANEOUS at 07:45

## 2020-11-20 NOTE — P.DS
Providers


Date of admission: 


11/18/20 11:18





Expected date of discharge: 11/20/20


Attending physician: 


Saniya Cabral





Primary care physician: 


Saniya Cabral





[facility] Course: 








Diagnoses on discharge:











1.  Severe encephalopathy


2.  Underlying history of stroke


3.  Underlying history of diabetes mellitus type 1 maintained on insulin, not 

well controlled


4.  Underlying history of hypertension


5.  Prolonged previous history of smoking.


6.  Underlying history of seizure disorder














Hospital Course:





Morenita Ramirez is a 60-year-old female who had several recent admissions due to 

uncontrolled glucose level, episodes of hypoglycemia, recurrent seizure 

activity, and lower extremity infection.  Patient developed worsening 

encephalopathy likely due to recurrent seizures, condition discussed with her 

sister Noelle who is the power of , several times over the last 2 

admissions, yesterday patient's sister Noelle spoke was Dr. Ferrell and decided

to proceed with hospice care.








On 11/19/2020 patient was seen and examined on the medical floor she is 

somnolent responsive to stimuli in no apparent distress, at this time she was 

started on hospice care, arrangements are being made at home for possible 

transfer to home with hospice tomorrow.





On 11/20/2020 patient was seen and examined on the medical floor she is 

somnolent arousable in no apparent distress there is no fever or chills no 

headache or dizziness no chest pain no shortness of breath no cough no nausea or

vomiting no abdominal pain no diarrhea and no urinary symptoms she will be 

transferred home today to continue with hospice care at home





Plan - Discharge Summary


New Discharge Prescriptions: 


No Action


   HYDROcodone/APAP 10-325MG [Norco ] 1 tab PO TID PRN


     PRN Reason: Pain


   Atorvastatin [Lipitor] 20 mg PO DAILY


   Aspirin [Adult Low Dose Aspirin EC] 81 mg PO DAILY


   Ferrous Sulfate [Iron (65 MG Elemental)] 325 mg PO BID


   Divalproex [Depakote] 250 mg PO TID #90 tablet.


   Albuterol Sulfate [Ventolin HFA] 2 puff INHALATION RT-Q4H PRN


     PRN Reason: Shortness Of Breath


   Losartan [Cozaar] 50 mg PO DAILY  tab


   Ascorbic Acid [Vitamin C] 500 mg PO DAILY


   Vitamin B Complex 1 tab PO DAILY


   Clopidogrel [Plavix] 75 mg PO DAILY  tab


   QUEtiapine [SEROquel] 50 mg PO HS 30 Days #30 tab


   DULoxetine HCL [Cymbalta] 30 mg PO DAILY  capsule.


   Metoclopramide [Reglan] 5 mg PO AC-TID  tab


   QUEtiapine [SEROquel] 25 mg PO DAILY  tab


   ALPRAZolam [Xanax] 0.5 mg PO QID PRN  tab


     PRN Reason: Anxiety


   Calcium Carb-Vit D 500Mg-200Un [Oscal 500+D] 1 tab PO BID-W/MEALS


   INSULIN LISPRO (humaLOG) [humaLOG] 6 units SQ AC-TID


   Ondansetron Odt [Zofran ODT] 4 mg PO Q8HR PRN 3 Days #9 tab


     PRN Reason: Nausea


   Lacosamide [Vimpat] 100 mg PO BID


   Metoprolol Tartrate [Lopressor] 12.5 mg PO BID


   Insulin Detemir [Levemir Flextouch] 10 units SQ HS


   Pantoprazole Sodium [Protonix] 40 mg PO BID 30 Days #60 tablet.


Discharge Medication List





HYDROcodone/APAP 10-325MG [Norco ] 1 tab PO TID PRN 05/06/17 [History]


Atorvastatin [Lipitor] 20 mg PO DAILY 07/31/19 [History]


Aspirin [Adult Low Dose Aspirin EC] 81 mg PO DAILY 01/10/20 [History]


Ferrous Sulfate [Iron (65 MG Elemental)] 325 mg PO BID 01/10/20 [History]


Divalproex [Depakote] 250 mg PO TID #90 tablet. 03/19/20 [Rx]


Albuterol Sulfate [Ventolin HFA] 2 puff INHALATION RT-Q4H PRN 07/28/20 [History]


Losartan [Cozaar] 50 mg PO DAILY  tab 08/20/20 [Rx]


Ascorbic Acid [Vitamin C] 500 mg PO DAILY 08/31/20 [History]


Vitamin B Complex 1 tab PO DAILY 08/31/20 [History]


Clopidogrel [Plavix] 75 mg PO DAILY  tab 09/04/20 [Rx]


QUEtiapine [SEROquel] 50 mg PO HS 30 Days #30 tab 10/14/20 [Rx]


ALPRAZolam [Xanax] 0.5 mg PO QID PRN  tab 10/27/20 [Rx]


DULoxetine HCL [Cymbalta] 30 mg PO DAILY  capsule. 10/27/20 [Rx]


Metoclopramide [Reglan] 5 mg PO AC-TID  tab 10/27/20 [Rx]


QUEtiapine [SEROquel] 25 mg PO DAILY  tab 10/27/20 [Rx]


Calcium Carb-Vit D 500Mg-200Un [Oscal 500+D] 1 tab PO BID-W/MEALS 11/01/20 

[History]


INSULIN LISPRO (humaLOG) [humaLOG] 6 units SQ AC-TID 11/01/20 [History]


Ondansetron Odt [Zofran ODT] 4 mg PO Q8HR PRN 3 Days #9 tab 11/01/20 [Rx]


Insulin Detemir [Levemir Flextouch] 10 units SQ HS 11/06/20 [History]


Lacosamide [Vimpat] 100 mg PO BID 11/06/20 [History]


Metoprolol Tartrate [Lopressor] 12.5 mg PO BID 11/06/20 [History]


Pantoprazole Sodium [Protonix] 40 mg PO BID 30 Days #60 tablet. 11/11/20 [Rx]








Discharge Disposition: HOME WITH HOSPICE

## 2021-09-19 NOTE — P.CRDCN
History of Present Illness


Consult date: 17


History of present illness: 





Mrs. Ramirez is a pleasant 57 year-old female with past medical history 

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 

mellitus, hypertension, dyslipidemia, CVA with residual left sided paralysis, 

COPD and neuropathy. She has seen Dr. Marte in the office but not since 

. She lives at home with a full-time caregiver. We have been asked to see 

her in consultation secondary to a possible syncopal episode. Per the caregiver 

she has been having diarrhea all day Saturday and . Yesterday she was on 

the toilet urinating and when attempting to stand and transfer back to the 

wheelchair she went limp and her eyes were fluttering. This last approximately 3

-4 minutes. She denies chest pain, shortness of breath, palpitations, dizziness 

or nausea/vomiting prior to this event. The caregiver lowered her to the floor 

and her blood sugar was found to be elevated greater than 500 and she 

administered insulin. Upon arrival to ED her blood sugar was 220. She has had 

two episodes of hyoglycemia since admission with sugar going into the 40's. At 

the time of my exam she continues to complain of ongoing dizziness with 

exertion or movement. Caregiver states she helped her to the bathroom again 

this morning and she complained of feeling dizzy. She denies chest pain, 

shortness of breath, nausea, vomiting, palpitations or diaphoresis. 


EKG reveals sinus mechanism with no acute ST or T-wave abnormalities. 


Cardiac enzymes negative x2, potassium 4.1, magnesium 2.1, TSH 3.65, Hgb 10.1, 

plt 196, BUN 25. Cr 0.91. improved from 1.8 on admission. 


Blood pressure 128/66 with heart rate 71. Orthostatics negative yesterday. 


Current cardiac medications include atorvastatin 20 mg and aspirin 81mg. 


Cath report from  states she had in-stent restenosis of RCA 3 months after 

initial stent placement. 


Most recent echocardiogram on file from 2014 reveals preserved LV function 

with EF 55-60% with mild LVH, mild MR, mild TR, mild PH with RVSP 43 mmHg and 

mildly thickened mitral valve.


Carotid ultrasound were reviewed.   





Review of Systems





CONSTITUTIONAL: Denies fever. Denies chills.


EYES: Denies blurred vision. Denies vision changes. Denies eye pain.


EARS, NOSE, MOUTH & THROAT: Complains of intermittent headache, resolved. 

Denies sore throat. Denies ear pain.


CARDIOVASCULAR: Denies chest pain. Denies shortness of breath. Denies 

orthopnea. Denies PND. Denies palpitations.


RESPIRATORY: Denies cough. 


GASTROINTESTINAL: Denies abdominal pain.  Complains of diarrhea, resolved. 

Denies constipation. Denies nausea. Denies vomiting.


MUSCULOSKELETAL: Denies myalgias.


INTEGUMENTARY: Denies pruitis. Denies rash.


NEUROLOGIC:  Complains of generalized weakness with intermittent episodes of 

tingling to the left arm.  This is chronic and ongoing.  Complains of 

exertional dizziness.


PSYCHIATRIC: Denies anxiety. Denies depression.


ENDOCRINE: Denies fatigue. Denies weight change. Denies polydipsia. Denies 

polyurina.


GENITOURINARY: Denies burning, hematuria or urgency with micturation.


HEMATOLOGIC: Denies history of anemia. Denies bleeding. 








Past Medical History


Past Medical History: Chest Pain / Angina, Heart Failure, COPD, CVA/TIA, 

Diabetes Mellitus, Deep Vein Thrombosis (DVT), Eye Disorder, GERD/Reflux, 

Hyperlipidemia, Hypertension, Myocardial Infarction (MI), Thyroid Disorder


Additional Past Medical History / Comment(s): HX OF CVA X3 (LAST 2015)-HAS LT 

ARM PARALYSIS & WEAKNESS LEFT LEG. MI .  DVT RT  AXILLA. RENAL FAILURE.  

LOW THYROID,  PERIPHERAL NEUROPATHY HANDS & FEET.  ANEMIA.  HX OF DKA. USES W/

C. CONSTIPATION, ESOPHAGITIS.  EPIGLOTITIS.  HEADACHES SINCE CVA.  RETINOPATHY 

MARZENA EYES.  HX RT TOE INFECTION- GANGRENE, HAD AMP.


Last Myocardial Infarction Date:: 


History of Any Multi-Drug Resistant Organisms: MRSA


Date of last positivie culture/infection: 2013


MDRO Source:: Right Foot


Past Surgical History: Appendectomy,  Section, Cholecystectomy, Heart 

Catheterization With Stent, Hysterectomy, Orthopedic Surgery


Additional Past Surgical History / Comment(s): Amputation Rt 2ND Toe.  C-S X3.  

EGD.  Bronchoscopy.  RT Arm Port Placed FOR AB RX; Removed.  6 CARDIAC STENTS. 

left shoulder bone removed


Past Anesthesia/Blood Transfusion Reactions: No Reported Reaction


Additional Past Anesthesia/Blood Transfusion Reaction / Comment(s): HX OF BLOOD 

TRANSFUSION- NO REACTION


Date of Last Stent Placement:: 2013


Past Psychological History: Anxiety, Bipolar, Depression


Additional Psychological History / Comment(s): PT HAS A CARE GIVER-LOLITA. LOLITA 

STATED PT UNABLE TO AMBULATE(LT SIDE WAS AFFECTED BY STROKE) USES A W/C.


Smoking Status: Former smoker


Past Alcohol Use History: None Reported


Additional Past Alcohol Use History / Comment(s): Patient states she is using a 

vaper cigarettes. She has a 24-hour caregiver that lives with her.  She is 

wheelchair bound.


Past Drug Use History: None Reported





- Past Family History


  ** Father


Family Medical History: Unable to Obtain, Coronary Artery Disease (CAD), 

Diabetes Mellitus





  ** Mother


Family Medical History: COPD





Medications and Allergies


 Home Medications











 Medication  Instructions  Recorded  Confirmed  Type


 


Nitroglycerin Sl Tabs [Nitrostat] 0.4 mg SUBLINGUAL Q5M PRN 01/15/15 11/27/17 

History


 


Albuterol Inhaler [Ventolin Hfa 2 puff INHALATION RT-Q6H PRN 16 

History





Inhaler]    


 


Aspirin EC [Ecotrin Low Dose] 81 mg PO DAILY 16 History


 


Atorvastatin [Lipitor] 20 mg PO HS 16 History


 


Citalopram Hydrobromide [CeleXA] 20 mg PO DAILY 16 History


 


Famotidine [Pepcid] 20 mg PO BID 16 History


 


Metoclopramide [Reglan] 5 mg PO AC-TID 16 History


 


Valproic Acid [Depakene] 250 mg PO DAILY 16 History


 


Budesonide-Formot 160-4.5 Mcg 2 puff INHALATION RT-BID 10/06/16 11/27/17 History





[Symbicort 160-4.5 Mcg Inhaler]    


 


Ergocalciferol [Vitamin D2 50,000 unit PO SA 10/06/16 11/27/17 History





(DRISDOL)]    


 


Ascorbic Acid [Vitamin C] 500 mg PO DAILY 17 History


 


HYDROcodone/APAP 10-325MG [Norco 1 tab PO Q6H PRN 17 History





]    


 


DULoxetine HCL [Cymbalta] 60 mg PO DAILY 17 History


 


Ferrous Sulfate [Iron (65  mg PO DAILY 17 History





Elemental)]    


 


Insulin Glargine [Lantus] 22 unit SQ HS 17 History


 


Insulin Lispro [humaLOG] 6 units SQ AC-TID 17 History


 


ALPRAZolam [Xanax] 1 mg PO Q8HR 17 History











 Allergies











Allergy/AdvReac Type Severity Reaction Status Date / Time


 


Barbiturates Allergy  Rash/Hives Verified 17 07:55


 


cephalexin monohydrate Allergy  Rash/Hives Verified 17 07:55





[From Keflex]     


 


morphine Allergy  Rash/Hives Verified 17 07:55


 


Penicillins Allergy  Rash/Hives Verified 17 07:55


 


phenobarbital Allergy  Swelling Verified 17 07:55


 


venom-honey bee Allergy  Swelling Verified 17 07:55





[bee venom (honey bee)]     


 


amlodipine besylate AdvReac  Vomiting Verified 17 07:55





[From Norvasc]     














Physical Exam


Vitals: 


 Vital Signs











  Temp Pulse Pulse Resp BP BP BP


 


 17 08:00  97.4 F L   71  18   


 


 17 04:00  98.7 F   75  18   


 


 17 03:24    70  18   


 


 17 00:00  98.4 F   97  18   


 


 17 23:48    84  18   


 


 17 20:04   80     


 


 17 20:00  98 F   81  18   


 


 17 19:35   80     


 


 17 15:27  98.8 F   80  16   


 


 17 12:28    77    106/57 


 


 17 12:27    76   109/54  


 


 17 12:00  97.5 F L   73  17    110/52














  BP Pulse Ox


 


 17 08:00  128/66  97


 


 17 04:00  147/74  100


 


 17 03:24  


 


 17 00:00  138/65  98


 


 17 23:48  


 


 17 20:04  


 


 17 20:00  152/73  100


 


 17 19:35  


 


 17 15:27  101/54  94 L


 


 17 12:28   93 L


 


 17 12:27   95


 


 17 12:00   88 L








 Intake and Output











 17





 22:59 06:59 14:59


 


Other:   


 


  Voiding Method Bedside Commode Bedside Commode 





 Diaper Diaper 





 Incontinent Incontinent 


 


  Weight 81.647 kg  














GENERAL: This is a 57-year-old  female in no apparent distress at the 

time of my examination.


HEENT: Head is atraumatic, normocephalic. Pupils are equal, round. Sclerae 

anicteric. Conjunctivae are clear. Mucous membranes of the mouth are moist. 

Neck is supple. There is no jugular venous distention. No carotid bruit is 

heard.


LUNGS: Clear to auscultation no wheezes, rales or rhonchi. No chest wall 

tenderness is noted on palpation or with deep breathing. Diminished 

bilaterally. 


HEART: Regular rate and rhythm without murmurs, rubs or gallops. S1 and S2 

heard.


ABDOMEN: Soft, nontender. Bowel sounds are heard. No organomegaly noted.


EXTREMITIES: 2+ peripheral pulses with no evidence of peripheral edema and no 

calf tenderness noted.


NEUROLOGIC: Patient is awake, alert and oriented x3. Poor historian. 


 








Results





 17 06:54





 17 06:54


 Cardiac Enzymes











  17 Range/Units





  06:54 06:54 


 


AST  31   (14-36)  U/L


 


Troponin I   <0.012  (0.000-0.034)  ng/mL








 CBC











  17 Range/Units





  06:54 


 


WBC  4.1  (3.8-10.6)  k/uL


 


RBC  3.37 L  (3.80-5.40)  m/uL


 


Hgb  10.1 L  (11.4-16.0)  gm/dL


 


Hct  33.1 L  (34.0-46.0)  %


 


Plt Count  196  (150-450)  k/uL








 Comprehensive Metabolic Panel











  17 Range/Units





  06:54 


 


Sodium  144  (137-145)  mmol/L


 


Potassium  4.1  (3.5-5.1)  mmol/L


 


Chloride  112 H  ()  mmol/L


 


Carbon Dioxide  26  (22-30)  mmol/L


 


BUN  25 H  (7-17)  mg/dL


 


Creatinine  0.91  (0.52-1.04)  mg/dL


 


Glucose  45 L*  (74-99)  mg/dL


 


Calcium  9.2  (8.4-10.2)  mg/dL


 


AST  31  (14-36)  U/L


 


ALT  49  (9-52)  U/L


 


Alkaline Phosphatase  60  ()  U/L


 


Total Protein  6.1 L  (6.3-8.2)  g/dL


 


Albumin  3.2 L  (3.5-5.0)  g/dL








 Current Medications











Generic Name Dose Route Start Last Admin





  Trade Name Freq  PRN Reason Stop Dose Admin


 


Acetaminophen  650 mg  17 04:01  





  Tylenol Tab  PO   





  Q6HR PRN   





  Mild Pain or Fever > 100.5   


 


Hydrocodone Bitart/Acetaminophen  1 each  17 10:08  17 21:52





  Norco 10  PO   1 each





  Q6H PRN   Administration





  Pain   


 


Albuterol Sulfate  2.5 mg  17 04:18  17 19:32





  Ventolin Nebulized  INHALATION   2.5 mg





  RT-Q6H PRN   Administration





  Shortness Of Breath   


 


Alprazolam  1 mg  17 08:00  17 01:15





  Xanax  PO   Not Given





  Q8HR [facility]   


 


Ascorbic Acid  500 mg  17 12:00  17 12:27





  Vitamin C  PO   500 mg





  DAILY@1200 [facility]   Administration


 


Aspirin  81 mg  17 09:00  17 08:42





  Aspirin  PO   81 mg





  DAILY T[facility]ON   Administration


 


Atorvastatin Calcium  20 mg  17 21:00  17 21:53





  Lipitor  PO   20 mg





  HS TYRON   Administration


 


Budesonide/Formoterol Fumarate  2 puff  17 08:00  17 09:21





  Symbicort 160-4.5 Mcg Inhaler  INHALATION   2 puff





  RT-BID [facility]   Administration


 


Citalopram Hydrobromide  20 mg  17 09:00  17 08:42





  Celexa  PO   20 mg





  DAILY TYRON   Administration


 


Duloxetine HCl  60 mg  17 09:00  17 08:42





  Cymbalta  PO   60 mg





  DAILY TYRON   Administration


 


Ergocalciferol  50,000 unit  17 09:00  





  Vitamin D2  PO   





  Sa@0900 [facility]   


 


Famotidine  20 mg  17 09:00  





  Pepcid  PO   





  DAILY TYRON   


 


Ferrous Sulfate  325 mg  17 12:00  17 12:28





  Feosol  PO   325 mg





  DAILY@1200 TYRON   Administration


 


Heparin Sodium (Porcine)  5,000 unit  17 21:00  17 21:51





  Heparin  SQ   5,000 unit





  Q12HR TYRON   Administration


 


Hydromorphone HCl  1 mg  17 04:01  17 06:48





  Dilaudid  IVP   1 mg





  Q3HR PRN   Administration





  Severe Pain   


 


Sodium Chloride  1,000 mls @ 50 mls/hr  17 04:15  17 09:37





  Saline 0.9%  IV   Not Given





  .Q20H [facility]   


 


Ibuprofen  400 mg  17 04:01  





  Motrin  PO   





  Q6HR PRN   





  Mild Pain or Fever > 100.5   


 


Insulin Aspart  0 unit  17 12:30  17 21:51





  Novolog  SQ   3 unit





  ACHS [facility]   Administration





  Protocol   


 


Insulin Detemir  22 unit  17 21:00  17 21:52





  Levemir  SQ   22 unit





  HS TYRON   Administration


 


Loperamide HCl  2 mg  17 04:01  





  Imodium  PO   





  Q2HR PRN   





  Loose Stool   


 


Metoclopramide HCl  5 mg  17 07:30  17 18:10





  Reglan  PO   5 mg





  AC-TID TYRON   Administration


 


Miscellaneous Information  1 each  17 20:31  





  Rx Info: Iv Contrast Was Given  MISCELLANE  17 20:32  





  DAILY PRN   





  Per Protocol   


 


Naloxone HCl  0.2 mg  17 04:01  





  Narcan  IV   





  Q2M PRN   





  Opioid Reversal   


 


Nitroglycerin  0.4 mg  17 04:18  





  Nitrostat  SUBLINGUAL   





  Q5M PRN   





  Chest Pain   


 


Ondansetron HCl  4 mg  17 04:01  





  Zofran  IVP   





  Q8HR PRN   





  Nausea And Vomiting   


 


Valproic Acid  250 mg  17 09:00  17 08:42





  Depakene Syrup  PO   250 mg





  DAILY [facility]   Administration








 Intake and Output











 17





 22:59 06:59 14:59


 


Other:   


 


  Voiding Method Bedside Commode Bedside Commode 





 Diaper Diaper 





 Incontinent Incontinent 


 


  Weight 81.647 kg  








 





 17 06:54 





 17 06:54 











Assessment and Plan


Assessment: 





ASSESSMENT


1.  Syncope, possible vasovagal response. 


2.  History of coronary artery disease


3.  History of hypertension


4.  Dyslipidemia


5.  History of iron deficiency anemia


6.  Diabetes mellitus, uncontrolled





PLAN


Obtain echocardiogram and Doppler study to assess cardiac structure and 

function.  Continue with cautious rehydration with IV fluids to correct 

dehydration.  Event monitor to be placed at time of discharge.  She should 

follow up with Dr. Marte in 2-3 weeks.





Nurse Practitioner note has been reviewed, I agree with a documented findings 

and plan of care.  Patient was seen and examined.
Face Mask

## 2024-06-11 NOTE — ED
Recheck HPI





- General


Chief Complaint: Recheck/Abnormal Lab/Rx


Stated Complaint: Hyperglycemia


Time Seen by Provider: 20 19:19


Source: patient, RN notes reviewed, old records reviewed


Mode of arrival: wheelchair


Limitations: no limitations





- History of Present Illness


Initial Comments: 





This is a 51-year-old female sent ER for failure to thrive.  Patient is failing 

outpatient treatment she is on antibiotics at home for infection home nurse 

evaluated patient will today and house was significantly unlivable covered in 

feces, terrible conditions for home, patient herself without complaint but is 

unable to give history poor historian and is not make her own medical decisions


MD Complaint: other (Recheck regarding elevated blood sugar as well as current 

treatment plan)


-: unknown


Returns Today for: Called Because of Abnormal Lab/Test


Symptoms Since Prior Visit: no new symptoms


Context: planned re-check


Associated Symptoms: none





- Related Data


                                Home Medications











 Medication  Instructions  Recorded  Confirmed


 


Albuterol Inhaler [Ventolin Hfa 2 puff INHALATION RT-Q4H PRN 02/19/16 01/10/20





Inhaler]   


 


Famotidine [Pepcid] 20 mg PO DAILY 02/19/16 01/10/20


 


Valproic Acid [Depakene] 250 mg PO DAILY 02/19/16 01/10/20


 


Ergocalciferol [Vitamin D2 50,000 unit PO SA 10/06/16 01/10/20





(DRISDOL)]   


 


HYDROcodone/APAP 10-325MG [Norco 1 tab PO Q6H PRN 05/06/17 01/10/20





]   


 


DULoxetine HCL [Cymbalta] 60 mg PO DAILY 09/19/17 01/10/20


 


ALPRAZolam [Xanax] 1 mg PO Q8H 09/22/17 01/10/20


 


Ondansetron [Zofran] 4 mg PO DAILY PRN 04/09/18 01/10/20


 


Atorvastatin [Lipitor] 20 mg PO DAILY 07/31/19 01/10/20


 


QUEtiapine FUMARATE [SEROquel] 25 mg PO BID 07/31/19 01/10/20


 


QUEtiapine [SEROquel] 100 mg PO HS 07/31/19 01/10/20


 


Vitamin B Complex With Vit C 1 tab PO DAILY 01/02/20 01/10/20


 


Aspirin [Adult Low Dose Aspirin EC] 81 mg PO DAILY 01/10/20 01/10/20


 


Ferrous Sulfate [Iron (65  mg PO DAILY 01/10/20 01/10/20





Elemental)]   








                                  Previous Rx's











 Medication  Instructions  Recorded


 


INSULIN ASPART (NovoLOG) [NovoLOG 0 unit SQ ACHS  vial 20





(formulary)]  


 


Insulin Glargine [Lantus] 12 unit SQ HS #0 20


 


DAPTOmycin [Daptomycin] 350 mg IV DAILY #40 vial 20











                                    Allergies











Allergy/AdvReac Type Severity Reaction Status Date / Time


 


Barbiturates Allergy  Rash/Hives Verified 01/10/20 20:52


 


cephalexin monohydrate Allergy  Rash/Hives Verified 01/10/20 20:52





[From Keflex]     


 


morphine Allergy  Rash/Hives Verified 01/10/20 20:52


 


Penicillins Allergy  Rash/Hives Verified 01/10/20 20:52


 


phenobarbital Allergy  Swelling Verified 01/10/20 20:52


 


venom-honey bee Allergy  Swelling Verified 01/10/20 20:52





[bee venom (honey bee)]     


 


amlodipine besylate AdvReac  Vomiting Verified 01/10/20 20:52





[From Norvasc]     














Review of Systems


ROS Statement: 


Those systems with pertinent positive or pertinent negative responses have been 

documented in the HPI.





ROS Other: All systems not noted in ROS Statement are negative.





Past Medical History


Past Medical History: Asthma, Coronary Artery Disease (CAD), Chest Pain / 

Angina, Heart Failure, COPD, CVA/TIA, Diabetes Mellitus, Deep Vein Thrombosis 

(DVT), Eye Disorder, GERD/Reflux, Hyperlipidemia, Hypertension, Myocardial 

Infarction (MI), Neurologic Disorder, Osteoarthritis (OA), Pneumonia, Renal 

Disease


Additional Past Medical History / Comment(s): IDDM (brittle), DKAs, neuropathy b

ilateral hands/feet, retinopathy bilateral eyes, cellulitis R foot, R great toe 

and 2nd toe infections/amputations, current wound R foot-being seen in [facility], 

renal failure, anemia, CVAs with L sided paralysis, headaches started after 

CVAs, brain lesions, DVT R axillae, low back pain, varicosities, seizure many 

years ago (), hypothyroid, constipation, bilateral tinnitis occasionally, 

sinus problems.


Last Myocardial Infarction Date:: 


History of Any Multi-Drug Resistant Organisms: MRSA


Date of last positivie culture/infection: 18


MDRO Source:: Right Foot


Past Surgical History: Appendectomy,  Section, Cholecystectomy, Heart 

Catheterization With Stent, Hysterectomy, Orthopedic Surgery


Additional Past Surgical History / Comment(s): PCI with multiple stents, R great

toe and 2nd toe amps, debridements R foot ulcer, L shoulder surgery to remove 

bone, bronchoscopy, EGD, colonoscopy, R arm port since removed, bilateral 

cataract removals/lens implants.


Past Anesthesia/Blood Transfusion Reactions: No Reported Reaction


Additional Past Anesthesia/Blood Transfusion Reaction / Comment(s): HX OF BLOOD 

TRANSFUSION- NO REACTION


Date of Last Stent Placement:: 2013


Past Psychological History: Anxiety, Bipolar, Depression


Smoking Status: Former smoker


Past Alcohol Use History: None Reported


Past Drug Use History: Marijuana





- Past Family History


  ** Father


Family Medical History: Unable to Obtain, Coronary Artery Disease (CAD), 

Diabetes Mellitus





  ** Mother


Family Medical History: COPD





General Exam


Limitations: no limitations


General appearance: alert, in no apparent distress


Head exam: Present: atraumatic, normocephalic, normal inspection


Eye exam: Present: normal appearance, PERRL, EOMI.  Absent: scleral icterus, 

conjunctival injection, periorbital swelling


ENT exam: Present: normal exam, mucous membranes moist


Neck exam: Present: normal inspection.  Absent: tenderness, meningismus, 

lymphadenopathy


Respiratory exam: Present: normal lung sounds bilaterally.  Absent: respiratory 

distress, wheezes, rales, rhonchi, stridor


Cardiovascular Exam: Present: regular rate, normal rhythm, normal heart sounds. 

Absent: systolic murmur, diastolic murmur, rubs, gallop, clicks


GI/Abdominal exam: Present: soft, normal bowel sounds.  Absent: distended, 

tenderness, guarding, rebound, rigid


Extremities exam: Present: normal inspection, full ROM, normal capillary refill.

 Absent: tenderness, pedal edema, joint swelling, calf tenderness


Back exam: Present: normal inspection


Neurological exam: Present: alert, oriented X3, CN II-XII intact


Psychiatric exam: Present: normal affect, normal mood


Skin exam: Present: warm, dry, intact, normal color.  Absent: rash





Course


                                   Vital Signs











  20





  17:33


 


Temperature 98.1 F


 


Pulse Rate 99


 


Respiratory 17





Rate 


 


Blood Pressure 110/56


 


O2 Sat by Pulse 98





Oximetry 














- Reevaluation(s)


Reevaluation #1: 





20 21:42


Medical records reviewed


Reevaluation #2: 





20 21:43


Patient denies and continues to deny complaint





- Consultations


Consultation #1: 





Spoke with Dr. Mancia regarding admission, recommends inpatient admission 

secondary to failure to thrive





Medical Decision Making





- Medical Decision Making





51 female with unsafely conditions, patient will be admitted for continued 

evaluation and management likely long-term placement





- Lab Data


                                   Lab Results











  20 Range/Units





  17:44 19:35 


 


POC Glucose (mg/dL)  269 H  247 H  (75-99)  mg/dL


 


POC Glu Operater Ernestina Pierre Jessica  














Disposition


Clinical Impression: 


 Acute exacerbation of chronic obstructive airways disease, Malnutrition, 

Failure to thrive, Weakness





Disposition: ADMITTED AS IP TO THIS [facility]


Condition: Fair


Is patient prescribed a controlled substance at d/c from ED?: No


Referrals: 


Saniya Cabral MD [Primary Care Provider] - 1-2 days
None

## 2024-08-16 NOTE — P.PN
----- Message from Shanel Martinez PA-C sent at 8/15/2024  4:45 PM CDT -----  The A1c is slightly increased from last visit up to 6.1.  This is considered prediabetic and does place you at higher risk of diabetes.  I would recommend continuing to work on a low sugar diet.   Subjective


Progress Note Date: 01/06/20


Principal diagnosis: 


Severe sepsis





Osteomyelitis of the right fifth metatarsal bone





Diabetic ketoacidosis





Severe type 1 diabetes with complications





Mild hyperkalemia





Peripheral vascular disease








01/06/2020, patient seen reese examined during the rounds she has been started on

clear liquid diet denies any chest pain patient remains on broad-spectrum 

antibiotics for osteomyelitis of the right foot, PICC line to be inserted, bone 

scan phosphatase has been completed secondary to be done tonight





01/04/2020, patient seen eval examined during the rounds is still intermittently

nauseous but more awake and oriented now remains on broad-spectrum antibiotics 

midline there wasn't inserted yesterday was lost would however a small that her 

for IV axis has been obtained patient will likely need a PICC line for long-term

antibiotics for right foot osteomyelitis which will be done early next week labs

reviewed medications reviewed continue current plan of care





01/03/2020, patient seen and evaluated examined she is appears more comfortable 

diabetic ketoacidosis had resolved patient is off of insulin drip she cannot 

take much by mouth due to her ongoing intermittent nausea and emesis she is on 

Zofran however, she is complaining of pain in the right foot for which she is st

arted on low-dose Toradol, overall appears more composed labs reviewed 

medications reviewed care plan discussed with the staff at length patient is 

appear more composed today








Objective





- Vital Signs


Vital signs: 


                                   Vital Signs











Temp  98.9 F   01/06/20 08:20


 


Pulse  96   01/06/20 08:20


 


Resp  16   01/06/20 08:20


 


BP  126/70   01/06/20 08:20


 


Pulse Ox  96   01/06/20 08:20








                                 Intake & Output











 01/05/20 01/06/20 01/06/20





 18:59 06:59 18:59


 


Intake Total 920  400


 


Output Total 350 350 350


 


Balance 570 -350 50


 


Intake:   


 


  Intake, IV Titration 400  200





  Amount   


 


    Dextrose 5%-0.45% NaCl 1, 150  200





    000 ml @ 50 mls/hr IV .   





    Q20H [facility] Rx#:[id number]   


 


    Vancomycin 1,000 mg In 250  





    Sodium Chloride 0.9% 250   





    ml @ 125 mls/hr IVPB Q12H   





    [facility] Rx#:[id number]   


 


  Oral 520  200


 


Output:   


 


  Urine 350 350 350


 


Other:   


 


  Voiding Method Indwelling Catheter Indwelling Catheter Indwelling Catheter


 


  # Voids   1














- Exam


- Constitutional


General appearance: disheveled, mild distress





- EENT


Eyes: EOMI, PERRLA, poor dentition, normal appearance


ENT: normal oropharynx


Ears: bilateral: normal





- Neck


Neck: normal ROM


Carotids: bilateral: upstroke normal


Thyroid: bilateral: normal size





- Respiratory


Respiratory: bilateral: CTA





- Cardiovascular


Rhythm: regular


Heart sounds: normal: S1, S2





- Gastrointestinal


General gastrointestinal: decreased bowel sounds, distended, soft





- Integumentary


Integumentary: decreased turgor





- Neurologic


Neurologic: CNII-XII intact





- Musculoskeletal


Musculoskeletal: generalized weakness, right sided weakness





- Psychiatric


Psychiatric: A&O x's 3, appropriate affect





Prior surgery of the right foot with osteomyelitis finding as noted above











- Labs


CBC & Chem 7: 


                                 01/06/20 10:43





                                 01/06/20 10:43


Labs: 


                  Abnormal Lab Results - Last 24 Hours (Table)











  01/05/20 01/05/20 01/05/20 Range/Units





  17:03 20:09 20:24 


 


Potassium     (3.5-5.1)  mmol/L


 


Glucose     (74-99)  mg/dL


 


POC Glucose (mg/dL)  204 H  200 H  212 H  (75-99)  mg/dL


 


AST     (14-36)  U/L


 


Albumin     (3.5-5.0)  g/dL














  01/06/20 01/06/20 01/06/20 Range/Units





  02:03 06:59 10:43 


 


Potassium    3.0 L  (3.5-5.1)  mmol/L


 


Glucose    127 H  (74-99)  mg/dL


 


POC Glucose (mg/dL)  145 H  123 H   (75-99)  mg/dL


 


AST    45 H  (14-36)  U/L


 


Albumin    3.2 L  (3.5-5.0)  g/dL














  01/06/20 Range/Units





  13:45 


 


Potassium   (3.5-5.1)  mmol/L


 


Glucose   (74-99)  mg/dL


 


POC Glucose (mg/dL)  70 L  (75-99)  mg/dL


 


AST   (14-36)  U/L


 


Albumin   (3.5-5.0)  g/dL














Assessment and Plan


Assessment: 


Severe sepsis





Osteomyelitis of the right foot and metatarsal bones 





Diabetic ketoacidosis





Severe type 1 diabetes with complications





Poorly controlled diabetes mellitus





Mild hyperkalemia





Peripheral vascular disease





Plan: 





PICC line





IV fluids will gently rehydrate





Broad-spectrum antibiotics with IV vancomycin





Supportive care





Monitor electrolytes and potassium





Monitor renal functions








Time with Patient: Greater than 30